# Patient Record
Sex: MALE | Race: WHITE | NOT HISPANIC OR LATINO | Employment: OTHER | URBAN - NONMETROPOLITAN AREA
[De-identification: names, ages, dates, MRNs, and addresses within clinical notes are randomized per-mention and may not be internally consistent; named-entity substitution may affect disease eponyms.]

---

## 2017-01-02 ENCOUNTER — OUTSIDE FACILITY SERVICE (OUTPATIENT)
Dept: PULMONOLOGY | Facility: CLINIC | Age: 57
End: 2017-01-02

## 2017-01-02 PROCEDURE — 32555 ASPIRATE PLEURA W/ IMAGING: CPT | Performed by: INTERNAL MEDICINE

## 2017-02-01 ENCOUNTER — HOSPITAL ENCOUNTER (OUTPATIENT)
Dept: INFUSION THERAPY | Facility: HOSPITAL | Age: 57
Setting detail: INFUSION SERIES
Discharge: HOME | End: 2017-02-01

## 2017-02-01 VITALS
BODY MASS INDEX: 21.37 KG/M2 | DIASTOLIC BLOOD PRESSURE: 71 MMHG | HEART RATE: 72 BPM | OXYGEN SATURATION: 98 % | SYSTOLIC BLOOD PRESSURE: 141 MMHG | HEIGHT: 68 IN | RESPIRATION RATE: 20 BRPM | TEMPERATURE: 97.2 F | WEIGHT: 141 LBS

## 2017-02-01 DIAGNOSIS — Z45.2 ENCOUNTER FOR VENOUS ACCESS DEVICE CARE: ICD-10-CM

## 2017-02-01 PROCEDURE — 96523 IRRIG DRUG DELIVERY DEVICE: CPT

## 2017-02-01 PROCEDURE — 25010000002 HEPARIN FLUSH (PORCINE) 100 UNIT/ML SOLUTION: Performed by: NURSE PRACTITIONER

## 2017-02-01 RX ORDER — SODIUM CHLORIDE 0.9 % (FLUSH) 0.9 %
10 SYRINGE (ML) INJECTION AS NEEDED
Status: DISCONTINUED | OUTPATIENT
Start: 2017-02-01 | End: 2017-02-03 | Stop reason: HOSPADM

## 2017-02-01 RX ORDER — SODIUM CHLORIDE 0.9 % (FLUSH) 0.9 %
10 SYRINGE (ML) INJECTION AS NEEDED
Status: CANCELLED | OUTPATIENT
Start: 2017-02-01

## 2017-02-01 RX ORDER — HEPARIN SODIUM (PORCINE) LOCK FLUSH IV SOLN 100 UNIT/ML 100 UNIT/ML
500 SOLUTION INTRAVENOUS AS NEEDED
Status: CANCELLED | OUTPATIENT
Start: 2017-02-01

## 2017-02-01 RX ADMIN — SODIUM CHLORIDE, PRESERVATIVE FREE 500 UNITS: 5 INJECTION INTRAVENOUS at 09:14

## 2017-02-01 RX ADMIN — Medication 20 ML: at 09:12

## 2017-02-10 ENCOUNTER — HOSPITAL ENCOUNTER (OUTPATIENT)
Dept: GENERAL RADIOLOGY | Facility: HOSPITAL | Age: 57
Discharge: HOME | End: 2017-02-10
Attending: INTERNAL MEDICINE | Admitting: INTERNAL MEDICINE

## 2017-02-10 DIAGNOSIS — J90 PLEURAL EFFUSION: Primary | ICD-10-CM

## 2017-02-10 PROCEDURE — 71020 HC CHEST PA AND LATERAL: CPT

## 2017-02-13 ENCOUNTER — OFFICE VISIT (OUTPATIENT)
Dept: PULMONOLOGY | Facility: CLINIC | Age: 57
End: 2017-02-13

## 2017-02-13 VITALS
BODY MASS INDEX: 21.37 KG/M2 | OXYGEN SATURATION: 97 % | SYSTOLIC BLOOD PRESSURE: 142 MMHG | WEIGHT: 141 LBS | RESPIRATION RATE: 18 BRPM | DIASTOLIC BLOOD PRESSURE: 82 MMHG | HEART RATE: 75 BPM | HEIGHT: 68 IN

## 2017-02-13 DIAGNOSIS — R59.0 MEDIASTINAL LYMPHADENOPATHY: ICD-10-CM

## 2017-02-13 DIAGNOSIS — J90 PLEURAL EFFUSION: Primary | ICD-10-CM

## 2017-02-13 PROCEDURE — 99213 OFFICE O/P EST LOW 20 MIN: CPT | Performed by: INTERNAL MEDICINE

## 2017-02-13 NOTE — PROGRESS NOTES
"Chief Complaint   Patient presents with   • Follow-up         Subjective   Talha Cutler is a 56 y.o. male.     History of Present Illness   Comes back today after being seen in the hospital and after he had a right-sided thoracentesis.  He denies any significant shortness of breath.  Apart from occasional cough, he denies any other symptoms with regards to fever, chills or night sweats.    He has not missed any more dialysis.    The following portions of the patient's history were reviewed and updated as appropriate: allergies, current medications, past family history, past medical history, past social history and past surgical history.    Review of Systems   HENT: Positive for sneezing. Negative for sinus pressure and sore throat.    Respiratory: Positive for cough. Negative for shortness of breath and wheezing.    Cardiovascular: Negative for palpitations and leg swelling.   Psychiatric/Behavioral: Positive for sleep disturbance.       Objective   Visit Vitals   • /82   • Pulse 75   • Resp 18   • Ht 68\" (172.7 cm)   • Wt 141 lb (64 kg)   • SpO2 97%   • BMI 21.44 kg/m2     Physical Exam  Constitutional:          General: No respiratory distress noted. Communication was appropriate.    Eyes:          Extra ocular movement was intact.    Neck:           Supple. No JVD noted.    Respiratory:          Right dullness to percussion.          Decreased Air Entry right base with scattered crackles heard.    Musculoskeletal/Extremities:          Used a wheelchair          No significant edema noted     Neurologic:          Awake, alert and oriented x 3. No focal deficits.           Patient was able to follow simple commands.      Assessment/Plan   Talha was seen today for follow-up.    Diagnoses and all orders for this visit:    Pleural effusion  -     CT Chest Without Contrast; Future    Mediastinal lymphadenopathy  -     CT Chest Without Contrast; Future         Return in about 7 weeks (around 4/3/2017) for " Recheck, Imaging study.    DISCUSSION (if any):  I reviewed the patient's CT scan with the patient and his family members.  He definitely had mediastinal lymphadenopathy    I would request the patient to undergo a repeat CT scan.  His pleural effusion was nonmalignant although it was lymphocytic.    The patient does not have any ongoing symptoms and since the pleural effusion is currently benign, we will only attempt repeat thoracentesis, if clinically indicated for worsening symptoms.    If the CT scan continues to suggest the presence of lymphadenopathy, then I will defer to discuss the possibility of endobronchial ultrasound-guided needle biopsy.      Errors in dictation may reflect use of voice recognition software and not all errors in transcription may have been detected prior to signing    This document was electronically signed by Ean Hernandez MD February 13, 2017  10:41 AM

## 2017-02-27 ENCOUNTER — HOSPITAL ENCOUNTER (EMERGENCY)
Facility: HOSPITAL | Age: 57
Discharge: HOME | End: 2017-02-27
Attending: EMERGENCY MEDICINE | Admitting: STUDENT IN AN ORGANIZED HEALTH CARE EDUCATION/TRAINING PROGRAM

## 2017-02-27 ENCOUNTER — APPOINTMENT (OUTPATIENT)
Dept: CT IMAGING | Facility: HOSPITAL | Age: 57
End: 2017-02-27

## 2017-02-27 ENCOUNTER — APPOINTMENT (OUTPATIENT)
Dept: GENERAL RADIOLOGY | Facility: HOSPITAL | Age: 57
End: 2017-02-27

## 2017-02-27 VITALS
DIASTOLIC BLOOD PRESSURE: 81 MMHG | HEIGHT: 68 IN | HEART RATE: 79 BPM | WEIGHT: 144 LBS | SYSTOLIC BLOOD PRESSURE: 141 MMHG | BODY MASS INDEX: 21.82 KG/M2 | TEMPERATURE: 97.8 F | RESPIRATION RATE: 20 BRPM | OXYGEN SATURATION: 97 %

## 2017-02-27 DIAGNOSIS — J90 PLEURAL EFFUSION, RIGHT: Primary | ICD-10-CM

## 2017-02-27 LAB
ALBUMIN SERPL-MCNC: 4.1 G/DL (ref 3.5–5)
ALBUMIN/GLOB SERPL: 1 G/DL (ref 1–2)
ALP SERPL-CCNC: 56 U/L (ref 38–126)
ALT SERPL W P-5'-P-CCNC: 19 U/L (ref 13–69)
ANION GAP SERPL CALCULATED.3IONS-SCNC: 16.9 MMOL/L
AST SERPL-CCNC: 27 U/L (ref 15–46)
BASOPHILS # BLD AUTO: 0.06 10*3/MM3 (ref 0–0.2)
BASOPHILS NFR BLD AUTO: 0.8 % (ref 0–2.5)
BILIRUB SERPL-MCNC: 0.8 MG/DL (ref 0.2–1.3)
BUN BLD-MCNC: 35 MG/DL (ref 7–20)
BUN/CREAT SERPL: 4.6 (ref 6.3–21.9)
CALCIUM SPEC-SCNC: 9.5 MG/DL (ref 8.4–10.2)
CHLORIDE SERPL-SCNC: 92 MMOL/L (ref 98–107)
CO2 SERPL-SCNC: 29 MMOL/L (ref 26–30)
CREAT BLD-MCNC: 7.6 MG/DL (ref 0.6–1.3)
DEPRECATED RDW RBC AUTO: 47 FL (ref 37–54)
EOSINOPHIL # BLD AUTO: 0.16 10*3/MM3 (ref 0–0.7)
EOSINOPHIL NFR BLD AUTO: 2 % (ref 0–7)
ERYTHROCYTE [DISTWIDTH] IN BLOOD BY AUTOMATED COUNT: 14.2 % (ref 11.5–14.5)
GFR SERPL CREATININE-BSD FRML MDRD: 7 ML/MIN/1.73
GLOBULIN UR ELPH-MCNC: 4.1 GM/DL
GLUCOSE BLD-MCNC: 173 MG/DL (ref 74–98)
HCT VFR BLD AUTO: 30.8 % (ref 42–52)
HGB BLD-MCNC: 10.2 G/DL (ref 14–18)
IMM GRANULOCYTES # BLD: 0.04 10*3/MM3 (ref 0–0.06)
IMM GRANULOCYTES NFR BLD: 0.5 % (ref 0–0.6)
LYMPHOCYTES # BLD AUTO: 0.88 10*3/MM3 (ref 0.6–3.4)
LYMPHOCYTES NFR BLD AUTO: 11.1 % (ref 10–50)
MCH RBC QN AUTO: 30.1 PG (ref 27–31)
MCHC RBC AUTO-ENTMCNC: 33.1 G/DL (ref 30–37)
MCV RBC AUTO: 90.9 FL (ref 80–94)
MONOCYTES # BLD AUTO: 0.73 10*3/MM3 (ref 0–0.9)
MONOCYTES NFR BLD AUTO: 9.2 % (ref 0–12)
NEUTROPHILS # BLD AUTO: 6.06 10*3/MM3 (ref 2–6.9)
NEUTROPHILS NFR BLD AUTO: 76.4 % (ref 37–80)
NRBC BLD MANUAL-RTO: 0 /100 WBC (ref 0–0)
NT-PROBNP SERPL-MCNC: ABNORMAL PG/ML (ref 0–125)
PLATELET # BLD AUTO: 276 10*3/MM3 (ref 130–400)
PMV BLD AUTO: 10.3 FL (ref 6–12)
POTASSIUM BLD-SCNC: 4.9 MMOL/L (ref 3.5–5.1)
PROT SERPL-MCNC: 8.2 G/DL (ref 6.3–8.2)
RBC # BLD AUTO: 3.39 10*6/MM3 (ref 4.7–6.1)
SODIUM BLD-SCNC: 133 MMOL/L (ref 137–145)
TROPONIN I SERPL-MCNC: 0.02 NG/ML (ref 0–0.03)
WBC NRBC COR # BLD: 7.93 10*3/MM3 (ref 4.8–10.8)

## 2017-02-27 PROCEDURE — 25010000002 MORPHINE PER 10 MG: Performed by: STUDENT IN AN ORGANIZED HEALTH CARE EDUCATION/TRAINING PROGRAM

## 2017-02-27 PROCEDURE — 99284 EMERGENCY DEPT VISIT MOD MDM: CPT

## 2017-02-27 PROCEDURE — 93005 ELECTROCARDIOGRAM TRACING: CPT | Performed by: PHYSICIAN ASSISTANT

## 2017-02-27 PROCEDURE — 84484 ASSAY OF TROPONIN QUANT: CPT | Performed by: PHYSICIAN ASSISTANT

## 2017-02-27 PROCEDURE — 80053 COMPREHEN METABOLIC PANEL: CPT | Performed by: PHYSICIAN ASSISTANT

## 2017-02-27 PROCEDURE — 83880 ASSAY OF NATRIURETIC PEPTIDE: CPT | Performed by: PHYSICIAN ASSISTANT

## 2017-02-27 PROCEDURE — 85025 COMPLETE CBC W/AUTO DIFF WBC: CPT | Performed by: PHYSICIAN ASSISTANT

## 2017-02-27 PROCEDURE — 96374 THER/PROPH/DIAG INJ IV PUSH: CPT

## 2017-02-27 PROCEDURE — 96375 TX/PRO/DX INJ NEW DRUG ADDON: CPT

## 2017-02-27 PROCEDURE — 25010000002 HEPARIN FLUSH (PORCINE) 100 UNIT/ML SOLUTION: Performed by: STUDENT IN AN ORGANIZED HEALTH CARE EDUCATION/TRAINING PROGRAM

## 2017-02-27 PROCEDURE — 71250 CT THORAX DX C-: CPT

## 2017-02-27 PROCEDURE — 25010000002 ONDANSETRON PER 1 MG: Performed by: PHYSICIAN ASSISTANT

## 2017-02-27 RX ORDER — MORPHINE SULFATE 4 MG/ML
4 INJECTION, SOLUTION INTRAMUSCULAR; INTRAVENOUS ONCE
Status: COMPLETED | OUTPATIENT
Start: 2017-02-27 | End: 2017-02-27

## 2017-02-27 RX ORDER — ONDANSETRON 2 MG/ML
4 INJECTION INTRAMUSCULAR; INTRAVENOUS ONCE
Status: COMPLETED | OUTPATIENT
Start: 2017-02-27 | End: 2017-02-27

## 2017-02-27 RX ORDER — SODIUM CHLORIDE 0.9 % (FLUSH) 0.9 %
10 SYRINGE (ML) INJECTION AS NEEDED
Status: DISCONTINUED | OUTPATIENT
Start: 2017-02-27 | End: 2017-02-27 | Stop reason: HOSPADM

## 2017-02-27 RX ADMIN — ONDANSETRON 4 MG: 2 INJECTION INTRAMUSCULAR; INTRAVENOUS at 20:17

## 2017-02-27 RX ADMIN — MORPHINE SULFATE 4 MG: 4 INJECTION, SOLUTION INTRAMUSCULAR; INTRAVENOUS at 20:17

## 2017-02-27 RX ADMIN — SODIUM CHLORIDE, PRESERVATIVE FREE 300 UNITS: 5 INJECTION INTRAVENOUS at 21:10

## 2017-02-28 LAB
HOLD SPECIMEN: NORMAL
HOLD SPECIMEN: NORMAL
WHOLE BLOOD HOLD SPECIMEN: NORMAL
WHOLE BLOOD HOLD SPECIMEN: NORMAL

## 2017-03-01 ENCOUNTER — OFFICE VISIT (OUTPATIENT)
Dept: PULMONOLOGY | Facility: CLINIC | Age: 57
End: 2017-03-01

## 2017-03-01 VITALS
DIASTOLIC BLOOD PRESSURE: 77 MMHG | WEIGHT: 144 LBS | HEIGHT: 68 IN | RESPIRATION RATE: 18 BRPM | BODY MASS INDEX: 21.82 KG/M2 | OXYGEN SATURATION: 96 % | HEART RATE: 77 BPM | SYSTOLIC BLOOD PRESSURE: 122 MMHG

## 2017-03-01 DIAGNOSIS — R59.0 MEDIASTINAL LYMPHADENOPATHY: ICD-10-CM

## 2017-03-01 DIAGNOSIS — J90 PLEURAL EFFUSION: Primary | ICD-10-CM

## 2017-03-01 DIAGNOSIS — R59.0 AXILLARY LYMPHADENOPATHY: ICD-10-CM

## 2017-03-01 PROCEDURE — 99214 OFFICE O/P EST MOD 30 MIN: CPT | Performed by: INTERNAL MEDICINE

## 2017-03-01 NOTE — PROGRESS NOTES
"Chief Complaint   Patient presents with   • Follow-up         Subjective   Talha Cutler is a 56 y.o. male.     History of Present Illness     The patient is here for a follow-up of pleural effusion.    He states he was in the emergency room 2 days ago for pain in the right side and epigastric area.  The pain resolved after receiving morphine and has not returned at this time.  He denies missing dialysis.  He does feel the thoracentesis he previously had helped with the ease of his breathing. He denies any shortness of breath more than normal.    The following portions of the patient's history were reviewed and updated as appropriate: allergies, current medications, past family history, past medical history, past social history and past surgical history.    Review of Systems   HENT: Negative for sinus pressure, sneezing and sore throat.    Respiratory: Negative for cough, shortness of breath and wheezing.        Objective   Visit Vitals   • /77   • Pulse 77   • Resp 18   • Ht 68\" (172.7 cm)   • Wt 144 lb (65.3 kg)   • SpO2 96%   • BMI 21.9 kg/m2     Physical Exam   Constitutional: He is oriented to person, place, and time. He appears well-developed.   HENT:   Head: Normocephalic and atraumatic.   Crowded oropharynx.   Eyes: EOM are normal.   Cardiovascular: Normal rate and regular rhythm.    Pulmonary/Chest: Effort normal.   Dullness to percussion on the right.  Decreased A/E on the right side.   Musculoskeletal:   Wheelchair.   Neurological: He is alert and oriented to person, place, and time.   Skin: Skin is dry.   Left port and right graft.  Left axillary lymph node enlarged and palpable.   Psychiatric: He has a normal mood and affect.   Vitals reviewed.        Assessment/Plan   Talha was seen today for follow-up.    Diagnoses and all orders for this visit:    Pleural effusion    Mediastinal lymphadenopathy    Axillary lymphadenopathy  -     Ambulatory Referral to General Surgery           Return in " about 4 weeks (around 3/29/2017) for Recheck.    DISCUSSION (if any):  Reviewed and discussed the CT with the patient which shows a large right effusion and adjacent airspace disease and adenopathy. Findings may be infectious but suspect neoplastic process and recommend appropriate followup.    The effusion does not seem to be worse than the previous when a thoracentesis was performed and he denies shortness of breath more than normal.  There also seems to be an element of trapped lung.  His recent symptoms that led him to the ER, could be from multiple etiologies including possible pleurisy versus costochondritis?    I will refer the patient to Dr. Garnett for possible biopsy of the left axillary lymph node. The patient is agreeable with this procedure if necessary.    If there is really lymph node biopsy is not possible, then we can consider endobronchial ultrasound-guided needle biopsy.  For the bronchoscopy he will definitely need general anesthesia and endotracheal intubation and at this time, axillary lymph node biopsy seems to be less invasive.    Errors in dictation may reflect use of voice recognition software and not all errors in transcription may have been detected prior to signing    This document was electronically signed by Ean Hernandez MD March 9, 2017  4:00 PM

## 2017-03-12 ENCOUNTER — APPOINTMENT (OUTPATIENT)
Dept: GENERAL RADIOLOGY | Facility: HOSPITAL | Age: 57
End: 2017-03-12

## 2017-03-12 ENCOUNTER — APPOINTMENT (OUTPATIENT)
Dept: CT IMAGING | Facility: HOSPITAL | Age: 57
End: 2017-03-12

## 2017-03-12 ENCOUNTER — HOSPITAL ENCOUNTER (EMERGENCY)
Facility: HOSPITAL | Age: 57
Discharge: HOME | End: 2017-03-12
Attending: EMERGENCY MEDICINE | Admitting: EMERGENCY MEDICINE

## 2017-03-12 VITALS
WEIGHT: 145.72 LBS | DIASTOLIC BLOOD PRESSURE: 79 MMHG | OXYGEN SATURATION: 97 % | HEART RATE: 76 BPM | BODY MASS INDEX: 22.09 KG/M2 | TEMPERATURE: 97.8 F | SYSTOLIC BLOOD PRESSURE: 149 MMHG | RESPIRATION RATE: 18 BRPM | HEIGHT: 68 IN

## 2017-03-12 DIAGNOSIS — G89.29 CHRONIC ABDOMINAL PAIN: Primary | ICD-10-CM

## 2017-03-12 DIAGNOSIS — R10.9 CHRONIC ABDOMINAL PAIN: Primary | ICD-10-CM

## 2017-03-12 DIAGNOSIS — K59.01 SLOW TRANSIT CONSTIPATION: ICD-10-CM

## 2017-03-12 DIAGNOSIS — Z99.2 END STAGE RENAL DISEASE ON DIALYSIS (HCC): ICD-10-CM

## 2017-03-12 DIAGNOSIS — N18.6 END STAGE RENAL DISEASE ON DIALYSIS (HCC): ICD-10-CM

## 2017-03-12 LAB
ALBUMIN SERPL-MCNC: 4 G/DL (ref 3.5–5)
ALBUMIN/GLOB SERPL: 1 G/DL (ref 1–2)
ALP SERPL-CCNC: 66 U/L (ref 38–126)
ALT SERPL W P-5'-P-CCNC: 11 U/L (ref 13–69)
AMYLASE SERPL-CCNC: 51 U/L (ref 30–110)
ANION GAP SERPL CALCULATED.3IONS-SCNC: 15.1 MMOL/L
AST SERPL-CCNC: 25 U/L (ref 15–46)
BASOPHILS # BLD AUTO: 0.05 10*3/MM3 (ref 0–0.2)
BASOPHILS NFR BLD AUTO: 0.6 % (ref 0–2.5)
BILIRUB SERPL-MCNC: 0.7 MG/DL (ref 0.2–1.3)
BUN BLD-MCNC: 25 MG/DL (ref 7–20)
BUN/CREAT SERPL: 4.5 (ref 6.3–21.9)
CALCIUM SPEC-SCNC: 9.2 MG/DL (ref 8.4–10.2)
CHLORIDE SERPL-SCNC: 90 MMOL/L (ref 98–107)
CK SERPL-CCNC: 93 U/L (ref 30–170)
CO2 SERPL-SCNC: 33 MMOL/L (ref 26–30)
CREAT BLD-MCNC: 5.6 MG/DL (ref 0.6–1.3)
DEPRECATED RDW RBC AUTO: 51.3 FL (ref 37–54)
EOSINOPHIL # BLD AUTO: 0.16 10*3/MM3 (ref 0–0.7)
EOSINOPHIL NFR BLD AUTO: 1.9 % (ref 0–7)
ERYTHROCYTE [DISTWIDTH] IN BLOOD BY AUTOMATED COUNT: 15.2 % (ref 11.5–14.5)
GFR SERPL CREATININE-BSD FRML MDRD: 11 ML/MIN/1.73
GLOBULIN UR ELPH-MCNC: 4.2 GM/DL
GLUCOSE BLD-MCNC: 363 MG/DL (ref 74–98)
HCT VFR BLD AUTO: 31.7 % (ref 42–52)
HGB BLD-MCNC: 10.3 G/DL (ref 14–18)
HOLD SPECIMEN: NORMAL
IMM GRANULOCYTES # BLD: 0.05 10*3/MM3 (ref 0–0.06)
IMM GRANULOCYTES NFR BLD: 0.6 % (ref 0–0.6)
LIPASE SERPL-CCNC: 158 U/L (ref 23–300)
LYMPHOCYTES # BLD AUTO: 0.87 10*3/MM3 (ref 0.6–3.4)
LYMPHOCYTES NFR BLD AUTO: 10.3 % (ref 10–50)
MAGNESIUM SERPL-MCNC: 1.9 MG/DL (ref 1.6–2.3)
MCH RBC QN AUTO: 30 PG (ref 27–31)
MCHC RBC AUTO-ENTMCNC: 32.5 G/DL (ref 30–37)
MCV RBC AUTO: 92.4 FL (ref 80–94)
MONOCYTES # BLD AUTO: 0.78 10*3/MM3 (ref 0–0.9)
MONOCYTES NFR BLD AUTO: 9.2 % (ref 0–12)
NEUTROPHILS # BLD AUTO: 6.57 10*3/MM3 (ref 2–6.9)
NEUTROPHILS NFR BLD AUTO: 77.4 % (ref 37–80)
NRBC BLD MANUAL-RTO: 0 /100 WBC (ref 0–0)
PLATELET # BLD AUTO: 238 10*3/MM3 (ref 130–400)
PMV BLD AUTO: 10.3 FL (ref 6–12)
POTASSIUM BLD-SCNC: 5.1 MMOL/L (ref 3.5–5.1)
PROT SERPL-MCNC: 8.2 G/DL (ref 6.3–8.2)
RBC # BLD AUTO: 3.43 10*6/MM3 (ref 4.7–6.1)
SODIUM BLD-SCNC: 133 MMOL/L (ref 137–145)
TROPONIN I SERPL-MCNC: 0.02 NG/ML (ref 0–0.03)
WBC NRBC COR # BLD: 8.48 10*3/MM3 (ref 4.8–10.8)
WHOLE BLOOD HOLD SPECIMEN: NORMAL
WHOLE BLOOD HOLD SPECIMEN: NORMAL

## 2017-03-12 PROCEDURE — 99283 EMERGENCY DEPT VISIT LOW MDM: CPT

## 2017-03-12 PROCEDURE — 71020 HC CHEST PA AND LATERAL: CPT

## 2017-03-12 PROCEDURE — 82550 ASSAY OF CK (CPK): CPT | Performed by: NURSE PRACTITIONER

## 2017-03-12 PROCEDURE — 84484 ASSAY OF TROPONIN QUANT: CPT | Performed by: EMERGENCY MEDICINE

## 2017-03-12 PROCEDURE — 74176 CT ABD & PELVIS W/O CONTRAST: CPT

## 2017-03-12 PROCEDURE — 83690 ASSAY OF LIPASE: CPT | Performed by: EMERGENCY MEDICINE

## 2017-03-12 PROCEDURE — 80053 COMPREHEN METABOLIC PANEL: CPT | Performed by: EMERGENCY MEDICINE

## 2017-03-12 PROCEDURE — 82150 ASSAY OF AMYLASE: CPT | Performed by: NURSE PRACTITIONER

## 2017-03-12 PROCEDURE — 25010000002 HEPARIN FLUSH (PORCINE) 100 UNIT/ML SOLUTION: Performed by: EMERGENCY MEDICINE

## 2017-03-12 PROCEDURE — 85025 COMPLETE CBC W/AUTO DIFF WBC: CPT | Performed by: EMERGENCY MEDICINE

## 2017-03-12 PROCEDURE — 83735 ASSAY OF MAGNESIUM: CPT | Performed by: NURSE PRACTITIONER

## 2017-03-12 RX ORDER — HEPARIN SODIUM 1000 [USP'U]/ML
INJECTION, SOLUTION INTRAVENOUS; SUBCUTANEOUS ONCE
Status: CANCELLED | OUTPATIENT
Start: 2017-03-12 | End: 2017-03-12

## 2017-03-12 RX ORDER — SODIUM CHLORIDE 0.9 % (FLUSH) 0.9 %
10 SYRINGE (ML) INJECTION AS NEEDED
Status: DISCONTINUED | OUTPATIENT
Start: 2017-03-12 | End: 2017-03-12 | Stop reason: HOSPADM

## 2017-03-12 RX ORDER — ONDANSETRON 4 MG/1
4 TABLET, FILM COATED ORAL ONCE
Status: COMPLETED | OUTPATIENT
Start: 2017-03-12 | End: 2017-03-12

## 2017-03-12 RX ORDER — PHENOBARBITAL, HYOSCYAMINE SULFATE, ATROPINE SULFATE, SCOPOLAMINE HYDROBROMIDE .0194; .1037; 16.2; .0065 MG/5ML; MG/5ML; MG/5ML; MG/5ML
2.5 ELIXIR ORAL ONCE
Status: DISCONTINUED | OUTPATIENT
Start: 2017-03-12 | End: 2017-03-12

## 2017-03-12 RX ORDER — MAGNESIUM HYDROXIDE/ALUMINUM HYDROXICE/SIMETHICONE 120; 1200; 1200 MG/30ML; MG/30ML; MG/30ML
30 SUSPENSION ORAL ONCE
Status: COMPLETED | OUTPATIENT
Start: 2017-03-12 | End: 2017-03-12

## 2017-03-12 RX ADMIN — SODIUM CHLORIDE, PRESERVATIVE FREE 500 UNITS: 5 INJECTION INTRAVENOUS at 19:28

## 2017-03-12 RX ADMIN — ALUMINUM HYDROXIDE, MAGNESIUM HYDROXIDE, AND SIMETHICONE 30 ML: 200; 200; 20 SUSPENSION ORAL at 17:26

## 2017-03-12 RX ADMIN — LIDOCAINE HYDROCHLORIDE 15 ML: 20 SOLUTION ORAL; TOPICAL at 17:27

## 2017-03-12 RX ADMIN — ONDANSETRON 4 MG: 4 TABLET, FILM COATED ORAL at 17:27

## 2017-03-12 NOTE — ED PROVIDER NOTES
Subjective   HPI Comments: Patient presents to the emergency department with complaint of diffuse abdominal pain that he recognizes as similar in presentation and intensity to frequent occasions of abdominal pain.  This pain was worse after eating yesterday.  It is worse when he lies down.  She has had no vomiting.  No diarrhea.  No fever.  He is eating drinking and voiding well.  His symptoms are worse after eating.    Review of medical record reveals that the patient had an EGD revealing gastritis in January of this year.          History provided by:  Patient   used: No        Review of Systems   Constitutional: Negative.  Negative for diaphoresis and fever.   HENT: Negative for sore throat.    Eyes: Negative.  Negative for pain and visual disturbance.   Respiratory: Negative for cough, shortness of breath, wheezing and stridor.    Cardiovascular: Negative.  Negative for chest pain.   Gastrointestinal: Positive for abdominal distention. Negative for blood in stool, constipation, diarrhea, nausea and vomiting. Abdominal pain: patient localizes pain to the entire abdomen and especially below the umbilicus.   Endocrine:        Patient has dialysis on Tuesdays Thursdays and Saturdays; he did attend dialysis yesterday on schedule.   Genitourinary: Negative.  Negative for dysuria.   Musculoskeletal: Negative.  Negative for back pain and neck pain.   Skin: Negative.  Negative for pallor and rash.   Allergic/Immunologic: Negative.    Neurological: Negative.  Negative for syncope and headaches.   Hematological: Negative.    Psychiatric/Behavioral: Negative.  Negative for agitation.       Past Medical History   Diagnosis Date   • Anemia    • Chronic kidney disease    • Diabetes mellitus    • H/O chest x-ray 03/25/2016     Interval decrease in size of the patients right pleural effusion with a persistent small left effusion as well   • Hypertension    • Left-sided weakness    • Renal failure    • Stroke         Allergies   Allergen Reactions   • Erythromycin Hives       Past Surgical History   Procedure Laterality Date   • Cholecystectomy     • Eye surgery     • Cataract extraction, bilateral     • Colonoscopy     • Portacath placement         History reviewed. No pertinent family history.    Social History     Social History   • Marital status:      Spouse name: N/A   • Number of children: N/A   • Years of education: N/A     Social History Main Topics   • Smoking status: Never Smoker   • Smokeless tobacco: Never Used   • Alcohol use No   • Drug use: None   • Sexual activity: Not Asked     Other Topics Concern   • None     Social History Narrative           Objective   Physical Exam   Constitutional: He is oriented to person, place, and time. He appears well-developed.   HENT:   Head: Normocephalic.   Eyes: EOM are normal. Pupils are equal, round, and reactive to light.   Neck: Normal range of motion. Neck supple.   Cardiovascular: Normal rate and regular rhythm.    Pulmonary/Chest: Effort normal. He has no wheezes. He has no rales.   Abdominal: Soft. Bowel sounds are normal. He exhibits no distension. There is tenderness. There is no rebound and no guarding.   Musculoskeletal: Normal range of motion. He exhibits no edema.   Neurological: He is alert and oriented to person, place, and time.   Skin: Skin is warm and dry.   Psychiatric: He has a normal mood and affect.   Nursing note and vitals reviewed.      Procedures         ED Course  ED Course   Comment By Time   Patient had no relief of his presenting pain with GI cocktail.  CT reveals constipation which is consistent with his examination. Patti Palencia, APRN 03/12 [id number]                  [facility]  Number of Diagnoses or Management Options  Chronic abdominal pain:   End stage renal disease on dialysis:   Slow transit constipation:       Final diagnoses:   Chronic abdominal pain   Slow transit constipation   End stage renal disease on dialysis             Patti Palencia, ALEXYS  03/12/17 1846       Patti Palencia, ALEXYS  03/12/17 1856

## 2017-03-12 NOTE — ED NOTES
Port-a cath de-accessed to left chest wall and flushed with 5ml of Heparin per policy. Guaze dressing applied.      Haydee Casey RN  03/12/17 1934

## 2017-03-14 LAB — HOLD SPECIMEN: NORMAL

## 2017-03-16 ENCOUNTER — APPOINTMENT (OUTPATIENT)
Dept: GENERAL RADIOLOGY | Facility: HOSPITAL | Age: 57
End: 2017-03-16

## 2017-03-16 ENCOUNTER — HOSPITAL ENCOUNTER (EMERGENCY)
Facility: HOSPITAL | Age: 57
Discharge: HOME | End: 2017-03-16
Attending: EMERGENCY MEDICINE | Admitting: EMERGENCY MEDICINE

## 2017-03-16 VITALS
HEIGHT: 68 IN | WEIGHT: 144 LBS | RESPIRATION RATE: 20 BRPM | OXYGEN SATURATION: 96 % | SYSTOLIC BLOOD PRESSURE: 156 MMHG | TEMPERATURE: 98.2 F | DIASTOLIC BLOOD PRESSURE: 87 MMHG | BODY MASS INDEX: 21.82 KG/M2 | HEART RATE: 101 BPM

## 2017-03-16 DIAGNOSIS — R73.9 HYPERGLYCEMIA: ICD-10-CM

## 2017-03-16 DIAGNOSIS — R07.89 CHEST TIGHTNESS OR PRESSURE: Primary | ICD-10-CM

## 2017-03-16 DIAGNOSIS — J90 PLEURAL EFFUSION: ICD-10-CM

## 2017-03-16 DIAGNOSIS — N18.9 CHRONIC RENAL FAILURE, UNSPECIFIED STAGE: ICD-10-CM

## 2017-03-16 LAB
ALBUMIN SERPL-MCNC: 3.9 G/DL (ref 3.5–5)
ALBUMIN/GLOB SERPL: 1 G/DL (ref 1–2)
ALP SERPL-CCNC: 60 U/L (ref 38–126)
ALT SERPL W P-5'-P-CCNC: 22 U/L (ref 13–69)
ANION GAP SERPL CALCULATED.3IONS-SCNC: 13 MMOL/L
ARTERIAL PATENCY WRIST A: POSITIVE
AST SERPL-CCNC: 29 U/L (ref 15–46)
BACTERIA UR QL AUTO: ABNORMAL /HPF
BASE EXCESS BLDA CALC-SCNC: 13.1 MMOL/L
BASOPHILS # BLD AUTO: 0.04 10*3/MM3 (ref 0–0.2)
BASOPHILS NFR BLD AUTO: 0.5 % (ref 0–2.5)
BDY SITE: ABNORMAL
BILIRUB SERPL-MCNC: 0.9 MG/DL (ref 0.2–1.3)
BILIRUB UR QL STRIP: NEGATIVE
BUN BLD-MCNC: 14 MG/DL (ref 7–20)
BUN/CREAT SERPL: 3.5 (ref 6.3–21.9)
CALCIUM SPEC-SCNC: 9 MG/DL (ref 8.4–10.2)
CHLORIDE SERPL-SCNC: 91 MMOL/L (ref 98–107)
CLARITY UR: CLEAR
CO2 SERPL-SCNC: 35 MMOL/L (ref 26–30)
COHGB MFR BLD: 1.7 %
COLOR UR: YELLOW
CREAT BLD-MCNC: 4 MG/DL (ref 0.6–1.3)
DEPRECATED RDW RBC AUTO: 50.7 FL (ref 37–54)
EOSINOPHIL # BLD AUTO: 0.11 10*3/MM3 (ref 0–0.7)
EOSINOPHIL NFR BLD AUTO: 1.3 % (ref 0–7)
ERYTHROCYTE [DISTWIDTH] IN BLOOD BY AUTOMATED COUNT: 14.8 % (ref 11.5–14.5)
FLUAV AG NPH QL: NEGATIVE
FLUBV AG NPH QL IA: NEGATIVE
GFR SERPL CREATININE-BSD FRML MDRD: 16 ML/MIN/1.73
GLOBULIN UR ELPH-MCNC: 3.8 GM/DL
GLUCOSE BLD-MCNC: 320 MG/DL (ref 74–98)
GLUCOSE UR STRIP-MCNC: ABNORMAL MG/DL
HCO3 BLDA-SCNC: 35.8 MMOL/L (ref 22–28)
HCT VFR BLD AUTO: 31.1 % (ref 42–52)
HGB BLD-MCNC: 10 G/DL (ref 14–18)
HGB BLDA-MCNC: 10.3 G/DL (ref 12–18)
HGB UR QL STRIP.AUTO: ABNORMAL
HOROWITZ INDEX BLD+IHG-RTO: 24 %
HYALINE CASTS UR QL AUTO: ABNORMAL /LPF
IMM GRANULOCYTES # BLD: 0.06 10*3/MM3 (ref 0–0.06)
IMM GRANULOCYTES NFR BLD: 0.7 % (ref 0–0.6)
KETONES UR QL STRIP: ABNORMAL
LEUKOCYTE ESTERASE UR QL STRIP.AUTO: NEGATIVE
LYMPHOCYTES # BLD AUTO: 0.56 10*3/MM3 (ref 0.6–3.4)
LYMPHOCYTES NFR BLD AUTO: 6.7 % (ref 10–50)
MCH RBC QN AUTO: 29.9 PG (ref 27–31)
MCHC RBC AUTO-ENTMCNC: 32.2 G/DL (ref 30–37)
MCV RBC AUTO: 92.8 FL (ref 80–94)
METHGB BLD QL: 0.7 %
MODALITY: ABNORMAL
MONOCYTES # BLD AUTO: 0.72 10*3/MM3 (ref 0–0.9)
MONOCYTES NFR BLD AUTO: 8.6 % (ref 0–12)
NEUTROPHILS # BLD AUTO: 6.93 10*3/MM3 (ref 2–6.9)
NEUTROPHILS NFR BLD AUTO: 82.2 % (ref 37–80)
NITRITE UR QL STRIP: NEGATIVE
NRBC BLD MANUAL-RTO: 0 /100 WBC (ref 0–0)
OXYHGB MFR BLDV: 92.8 % (ref 94–99)
PCO2 BLDA: 42.7 MM HG (ref 35–45)
PH BLDA: 7.53 PH UNITS
PH UR STRIP.AUTO: >=9 [PH] (ref 5–8)
PLATELET # BLD AUTO: 294 10*3/MM3 (ref 130–400)
PMV BLD AUTO: 10.2 FL (ref 6–12)
PO2 BLDA: 67.4 MM HG (ref 75–100)
POTASSIUM BLD-SCNC: 4 MMOL/L (ref 3.5–5.1)
PROT SERPL-MCNC: 7.7 G/DL (ref 6.3–8.2)
PROT UR QL STRIP: ABNORMAL
RBC # BLD AUTO: 3.35 10*6/MM3 (ref 4.7–6.1)
RBC # UR: ABNORMAL /HPF
REF LAB TEST METHOD: ABNORMAL
SODIUM BLD-SCNC: 135 MMOL/L (ref 137–145)
SP GR UR STRIP: 1.02 (ref 1–1.03)
SQUAMOUS #/AREA URNS HPF: ABNORMAL /HPF
TROPONIN I SERPL-MCNC: 0.03 NG/ML (ref 0–0.03)
TROPONIN I SERPL-MCNC: 0.03 NG/ML (ref 0–0.05)
UROBILINOGEN UR QL STRIP: ABNORMAL
WBC NRBC COR # BLD: 8.42 10*3/MM3 (ref 4.8–10.8)
WBC UR QL AUTO: ABNORMAL /HPF

## 2017-03-16 PROCEDURE — 81001 URINALYSIS AUTO W/SCOPE: CPT | Performed by: EMERGENCY MEDICINE

## 2017-03-16 PROCEDURE — 83050 HGB METHEMOGLOBIN QUAN: CPT

## 2017-03-16 PROCEDURE — 36600 WITHDRAWAL OF ARTERIAL BLOOD: CPT

## 2017-03-16 PROCEDURE — 84484 ASSAY OF TROPONIN QUANT: CPT | Performed by: EMERGENCY MEDICINE

## 2017-03-16 PROCEDURE — 96374 THER/PROPH/DIAG INJ IV PUSH: CPT

## 2017-03-16 PROCEDURE — 99285 EMERGENCY DEPT VISIT HI MDM: CPT

## 2017-03-16 PROCEDURE — 63710000001 INSULIN REGULAR HUMAN PER 5 UNITS: Performed by: EMERGENCY MEDICINE

## 2017-03-16 PROCEDURE — 82805 BLOOD GASES W/O2 SATURATION: CPT

## 2017-03-16 PROCEDURE — 93005 ELECTROCARDIOGRAM TRACING: CPT | Performed by: EMERGENCY MEDICINE

## 2017-03-16 PROCEDURE — 25010000002 ONDANSETRON PER 1 MG: Performed by: EMERGENCY MEDICINE

## 2017-03-16 PROCEDURE — 71010 HC CHEST PA OR AP: CPT

## 2017-03-16 PROCEDURE — 85025 COMPLETE CBC W/AUTO DIFF WBC: CPT | Performed by: EMERGENCY MEDICINE

## 2017-03-16 PROCEDURE — 96375 TX/PRO/DX INJ NEW DRUG ADDON: CPT

## 2017-03-16 PROCEDURE — 87086 URINE CULTURE/COLONY COUNT: CPT | Performed by: EMERGENCY MEDICINE

## 2017-03-16 PROCEDURE — 82375 ASSAY CARBOXYHB QUANT: CPT

## 2017-03-16 PROCEDURE — 80053 COMPREHEN METABOLIC PANEL: CPT | Performed by: EMERGENCY MEDICINE

## 2017-03-16 PROCEDURE — 87804 INFLUENZA ASSAY W/OPTIC: CPT | Performed by: EMERGENCY MEDICINE

## 2017-03-16 RX ORDER — SODIUM CHLORIDE 0.9 % (FLUSH) 0.9 %
10 SYRINGE (ML) INJECTION AS NEEDED
Status: DISCONTINUED | OUTPATIENT
Start: 2017-03-16 | End: 2017-03-16 | Stop reason: HOSPADM

## 2017-03-16 RX ORDER — ONDANSETRON 2 MG/ML
4 INJECTION INTRAMUSCULAR; INTRAVENOUS ONCE
Status: COMPLETED | OUTPATIENT
Start: 2017-03-16 | End: 2017-03-16

## 2017-03-16 RX ADMIN — HUMAN INSULIN 6 UNITS: 100 INJECTION, SOLUTION SUBCUTANEOUS at 20:56

## 2017-03-16 RX ADMIN — ONDANSETRON 4 MG: 2 INJECTION INTRAMUSCULAR; INTRAVENOUS at 19:53

## 2017-03-16 NOTE — ED PROVIDER NOTES
Subjective   HPI Comments: Patient with some chest tightness since awakening today, thought it would improve with dialysis but it did not.  No corey chest pain.  Mild SOA, no cough, no fever.  Has had nausea for past several days.      History provided by:  Patient      Review of Systems   Constitutional: Positive for fatigue. Negative for chills and fever.   HENT: Negative for congestion.    Respiratory: Positive for chest tightness. Negative for cough.    Cardiovascular: Negative for chest pain.   Gastrointestinal: Positive for nausea. Negative for abdominal pain, constipation, diarrhea and vomiting.   Genitourinary: Negative for dysuria.   Musculoskeletal: Negative for back pain.   Skin: Negative for color change and rash.   All other systems reviewed and are negative.      Past Medical History   Diagnosis Date   • Anemia    • Chronic kidney disease    • Diabetes mellitus    • H/O chest x-ray 03/25/2016     Interval decrease in size of the patients right pleural effusion with a persistent small left effusion as well   • Hypertension    • Left-sided weakness    • Renal failure    • Stroke        Allergies   Allergen Reactions   • Erythromycin Hives       Past Surgical History   Procedure Laterality Date   • Cholecystectomy     • Eye surgery     • Cataract extraction, bilateral     • Colonoscopy     • Portacath placement         History reviewed. No pertinent family history.    Social History     Social History   • Marital status:      Spouse name: N/A   • Number of children: N/A   • Years of education: N/A     Social History Main Topics   • Smoking status: Never Smoker   • Smokeless tobacco: Never Used   • Alcohol use No   • Drug use: None   • Sexual activity: Not Asked     Other Topics Concern   • None     Social History Narrative           Objective   Physical Exam   Constitutional: He is oriented to person, place, and time. He appears well-developed and well-nourished.   HENT:   Head: Normocephalic.    Eyes: EOM are normal. Pupils are equal, round, and reactive to light.   Neck: Normal range of motion. Neck supple.   Cardiovascular: Normal rate and regular rhythm.    Pulmonary/Chest: Effort normal and breath sounds normal.   Abdominal: Soft. There is no tenderness.   Musculoskeletal: Normal range of motion. He exhibits no edema.   Neurological: He is alert and oriented to person, place, and time.   Skin: Skin is warm and dry.   Psychiatric: He has a normal mood and affect. His behavior is normal. Thought content normal.   Nursing note and vitals reviewed.      ECG 12 Lead    Date/Time: 3/16/2017 7:38 PM  Performed by: KATHIE VINCENT  Authorized by: KATHIE VINCENT   Interpreted by physician  Rhythm: sinus tachycardia  Rate: tachycardic  QRS axis: normal  T flattening: II, III, aVF, V4, V5 and V6  Clinical impression: non-specific ECG               ED Course  ED Course                  MDM  Number of Diagnoses or Management Options  Chest tightness or pressure:   Chronic renal failure, unspecified stage:   Hyperglycemia:   Pleural effusion:   Diagnosis management comments: Patient is resting comfortably and breathing easily.  Despite many hours of his symptoms he has no significant elevation in his cardiac enzymes.  His chest x-ray reveals a large right-sided pleural effusion which has been demonstrated previously and may be related to his current symptoms.  He states that he feels fine but just has some mild sensation of tightness in his chest which is improved.  He wants to go home and follow-up with his physician in the morning.  We discussed indications for return and need for close follow-up.       Amount and/or Complexity of Data Reviewed  Clinical lab tests: ordered and reviewed  Tests in the radiology section of CPT®: ordered and reviewed  Independent visualization of images, tracings, or specimens: yes        Final diagnoses:   Chest tightness or pressure   Pleural effusion   Hyperglycemia   Chronic  renal failure, unspecified stage            Butch Hsu MD  03/16/17 2124

## 2017-03-17 NOTE — DISCHARGE INSTRUCTIONS
Return to the emergency Department for increased pain, fevers, increased shortness of breath.  Get rechecked by a physician in the morning.

## 2017-03-18 LAB — BACTERIA SPEC AEROBE CULT: NO GROWTH

## 2017-03-25 ENCOUNTER — APPOINTMENT (OUTPATIENT)
Dept: GENERAL RADIOLOGY | Facility: HOSPITAL | Age: 57
End: 2017-03-25

## 2017-03-25 ENCOUNTER — HOSPITAL ENCOUNTER (EMERGENCY)
Facility: HOSPITAL | Age: 57
Discharge: ANOTHER HOSPITAL | End: 2017-03-25
Attending: EMERGENCY MEDICINE | Admitting: EMERGENCY MEDICINE

## 2017-03-25 ENCOUNTER — APPOINTMENT (OUTPATIENT)
Dept: CT IMAGING | Facility: HOSPITAL | Age: 57
End: 2017-03-25

## 2017-03-25 VITALS
RESPIRATION RATE: 16 BRPM | HEIGHT: 68 IN | SYSTOLIC BLOOD PRESSURE: 155 MMHG | WEIGHT: 138 LBS | TEMPERATURE: 98.3 F | OXYGEN SATURATION: 97 % | DIASTOLIC BLOOD PRESSURE: 84 MMHG | BODY MASS INDEX: 20.92 KG/M2 | HEART RATE: 81 BPM

## 2017-03-25 DIAGNOSIS — N49.2 CELLULITIS, SCROTUM: Primary | ICD-10-CM

## 2017-03-25 LAB
ALBUMIN SERPL-MCNC: 4 G/DL (ref 3.5–5)
ALBUMIN/GLOB SERPL: 1 G/DL (ref 1–2)
ALP SERPL-CCNC: 64 U/L (ref 38–126)
ALT SERPL W P-5'-P-CCNC: 12 U/L (ref 13–69)
ANION GAP SERPL CALCULATED.3IONS-SCNC: 18 MMOL/L
AST SERPL-CCNC: 25 U/L (ref 15–46)
BASOPHILS # BLD AUTO: 0.06 10*3/MM3 (ref 0–0.2)
BASOPHILS NFR BLD AUTO: 0.6 % (ref 0–2.5)
BILIRUB SERPL-MCNC: 0.9 MG/DL (ref 0.2–1.3)
BUN BLD-MCNC: 29 MG/DL (ref 7–20)
BUN/CREAT SERPL: 4.3 (ref 6.3–21.9)
CALCIUM SPEC-SCNC: 9.1 MG/DL (ref 8.4–10.2)
CHLORIDE SERPL-SCNC: 86 MMOL/L (ref 98–107)
CO2 SERPL-SCNC: 30 MMOL/L (ref 26–30)
CREAT BLD-MCNC: 6.7 MG/DL (ref 0.6–1.3)
D-LACTATE SERPL-SCNC: 0.6 MMOL/L (ref 0.5–2)
DEPRECATED RDW RBC AUTO: 49.9 FL (ref 37–54)
EOSINOPHIL # BLD AUTO: 0.22 10*3/MM3 (ref 0–0.7)
EOSINOPHIL NFR BLD AUTO: 2.4 % (ref 0–7)
ERYTHROCYTE [DISTWIDTH] IN BLOOD BY AUTOMATED COUNT: 15.2 % (ref 11.5–14.5)
GFR SERPL CREATININE-BSD FRML MDRD: 9 ML/MIN/1.73
GLOBULIN UR ELPH-MCNC: 4.1 GM/DL
GLUCOSE BLD-MCNC: 363 MG/DL (ref 74–98)
HCT VFR BLD AUTO: 32.3 % (ref 42–52)
HGB BLD-MCNC: 10.6 G/DL (ref 14–18)
HOLD SPECIMEN: NORMAL
HOLD SPECIMEN: NORMAL
IMM GRANULOCYTES # BLD: 0.05 10*3/MM3 (ref 0–0.06)
IMM GRANULOCYTES NFR BLD: 0.5 % (ref 0–0.6)
LYMPHOCYTES # BLD AUTO: 0.69 10*3/MM3 (ref 0.6–3.4)
LYMPHOCYTES NFR BLD AUTO: 7.5 % (ref 10–50)
MCH RBC QN AUTO: 30 PG (ref 27–31)
MCHC RBC AUTO-ENTMCNC: 32.8 G/DL (ref 30–37)
MCV RBC AUTO: 91.5 FL (ref 80–94)
MONOCYTES # BLD AUTO: 0.76 10*3/MM3 (ref 0–0.9)
MONOCYTES NFR BLD AUTO: 8.2 % (ref 0–12)
NEUTROPHILS # BLD AUTO: 7.48 10*3/MM3 (ref 2–6.9)
NEUTROPHILS NFR BLD AUTO: 80.8 % (ref 37–80)
NRBC BLD MANUAL-RTO: 0 /100 WBC (ref 0–0)
PLATELET # BLD AUTO: 257 10*3/MM3 (ref 130–400)
PMV BLD AUTO: 10.2 FL (ref 6–12)
POTASSIUM BLD-SCNC: 5 MMOL/L (ref 3.5–5.1)
PROT SERPL-MCNC: 8.1 G/DL (ref 6.3–8.2)
RBC # BLD AUTO: 3.53 10*6/MM3 (ref 4.7–6.1)
SODIUM BLD-SCNC: 129 MMOL/L (ref 137–145)
TROPONIN I SERPL-MCNC: 0.02 NG/ML (ref 0–0.03)
TROPONIN I SERPL-MCNC: 0.02 NG/ML (ref 0–0.05)
WBC NRBC COR # BLD: 9.26 10*3/MM3 (ref 4.8–10.8)
WHOLE BLOOD HOLD SPECIMEN: NORMAL
WHOLE BLOOD HOLD SPECIMEN: NORMAL

## 2017-03-25 PROCEDURE — 96365 THER/PROPH/DIAG IV INF INIT: CPT

## 2017-03-25 PROCEDURE — 25010000002 VANCOMYCIN PER 500 MG: Performed by: EMERGENCY MEDICINE

## 2017-03-25 PROCEDURE — 85025 COMPLETE CBC W/AUTO DIFF WBC: CPT | Performed by: EMERGENCY MEDICINE

## 2017-03-25 PROCEDURE — 96368 THER/DIAG CONCURRENT INF: CPT

## 2017-03-25 PROCEDURE — 93005 ELECTROCARDIOGRAM TRACING: CPT | Performed by: EMERGENCY MEDICINE

## 2017-03-25 PROCEDURE — 74176 CT ABD & PELVIS W/O CONTRAST: CPT

## 2017-03-25 PROCEDURE — 71010 HC CHEST PA OR AP: CPT

## 2017-03-25 PROCEDURE — 84484 ASSAY OF TROPONIN QUANT: CPT | Performed by: EMERGENCY MEDICINE

## 2017-03-25 PROCEDURE — 80053 COMPREHEN METABOLIC PANEL: CPT | Performed by: EMERGENCY MEDICINE

## 2017-03-25 PROCEDURE — 25010000002 PIPERACILLIN SOD-TAZOBACTAM PER 1 G: Performed by: EMERGENCY MEDICINE

## 2017-03-25 PROCEDURE — 99284 EMERGENCY DEPT VISIT MOD MDM: CPT

## 2017-03-25 PROCEDURE — 83605 ASSAY OF LACTIC ACID: CPT | Performed by: EMERGENCY MEDICINE

## 2017-03-25 RX ORDER — ASPIRIN 325 MG
325 TABLET ORAL ONCE
Status: COMPLETED | OUTPATIENT
Start: 2017-03-25 | End: 2017-03-25

## 2017-03-25 RX ORDER — SODIUM CHLORIDE 0.9 % (FLUSH) 0.9 %
10 SYRINGE (ML) INJECTION AS NEEDED
Status: DISCONTINUED | OUTPATIENT
Start: 2017-03-25 | End: 2017-03-25 | Stop reason: HOSPADM

## 2017-03-25 RX ADMIN — ASPIRIN 325 MG ORAL TABLET 325 MG: 325 PILL ORAL at 11:30

## 2017-03-25 RX ADMIN — VANCOMYCIN HYDROCHLORIDE 1250 MG: 500 INJECTION, POWDER, LYOPHILIZED, FOR SOLUTION INTRAVENOUS at 11:29

## 2017-03-25 RX ADMIN — TAZOBACTAM SODIUM AND PIPERACILLIN SODIUM 3.38 G: 375; 3 INJECTION, SOLUTION INTRAVENOUS at 11:29

## 2017-03-25 NOTE — ED PROVIDER NOTES
Subjective   HPI Comments: 56-year-old male presenting with 2 complaints.  He states that this morning he woke up, had fluttering in the left side of his chest, felt very weak all over, could not get out of bed.  This prompted him to call EMS.he denies any shortness of breath, specific chest pain, nausea, vomiting.  His second complaint is scrotal him pain and swelling.  He states that for the last 2-3 weeks he has had swelling and pain to his right scrotum, there is been drainage both bloody and purulent from this over the last couple days.  Again he denies any fevers, nausea, vomiting.  A Tuesday Thursday Saturday dialysis patient and last dialyzed on Thursday.      Review of Systems   Constitutional: Negative for chills and fever.   HENT: Negative for congestion, rhinorrhea and sore throat.    Eyes: Negative for pain.   Respiratory: Negative for cough and shortness of breath.    Cardiovascular: Positive for chest pain and palpitations. Negative for leg swelling.   Gastrointestinal: Negative for abdominal pain, diarrhea, nausea and vomiting.   Genitourinary: Positive for scrotal swelling. Negative for dysuria.   Musculoskeletal: Negative for arthralgias.   Skin: Negative for rash.   Neurological: Negative for weakness and numbness.   Psychiatric/Behavioral: Negative for behavioral problems.       Past Medical History:   Diagnosis Date   • Anemia    • Chronic kidney disease    • Diabetes mellitus    • H/O chest x-ray 03/25/2016    Interval decrease in size of the patients right pleural effusion with a persistent small left effusion as well   • Hypertension    • Left-sided weakness    • Renal failure    • Stroke        Allergies   Allergen Reactions   • Erythromycin Hives       Past Surgical History:   Procedure Laterality Date   • CATARACT EXTRACTION, BILATERAL     • CHOLECYSTECTOMY     • COLONOSCOPY     • EYE SURGERY     • PORTACATH PLACEMENT         History reviewed. No pertinent family history.    Social History      Social History   • Marital status:      Spouse name: N/A   • Number of children: N/A   • Years of education: N/A     Social History Main Topics   • Smoking status: Never Smoker   • Smokeless tobacco: Never Used   • Alcohol use No   • Drug use: No   • Sexual activity: Not Asked     Other Topics Concern   • None     Social History Narrative   • None           Objective   Physical Exam   Constitutional: He is oriented to person, place, and time. No distress.   Chronically ill-appearing   HENT:   Head: Normocephalic and atraumatic.   Right Ear: External ear normal.   Left Ear: External ear normal.   Nose: Nose normal.   Mouth/Throat: Oropharynx is clear and moist.   Eyes: Conjunctivae and EOM are normal. Pupils are equal, round, and reactive to light.   Neck: Normal range of motion. Neck supple.   Cardiovascular: Normal rate, regular rhythm, normal heart sounds and intact distal pulses.    AV fistula and right upper extremity, good bruit/thrill   Pulmonary/Chest: Effort normal and breath sounds normal. No respiratory distress.   Abdominal: Soft. Bowel sounds are normal. He exhibits no distension. There is no tenderness. There is no rebound and no guarding.   Genitourinary:   Genitourinary Comments: Scrotum is markedly swollen with induration and erythema, and there is one area with some mild drainage to the right superior aspect   Musculoskeletal: Normal range of motion. He exhibits no edema, tenderness or deformity.   Neurological: He is alert and oriented to person, place, and time.   Skin: Skin is warm and dry. No rash noted.   Psychiatric: He has a normal mood and affect. His behavior is normal.   Nursing note and vitals reviewed.      Procedures         ED Course  ED Course                  MDM  Number of Diagnoses or Management Options  Cellulitis, scrotum:   Diagnosis management comments: 55 yo M with chest fluttering and scrotal swelling/redness. Chronically ill appearing M in no distress with normal  "vitals and exam as above. I am much more concerned about the infection of his scrotum than I am about his \"fluttering\". We will check labs, CT, cxr. Disposition pending work up though he may need admission for IV antibiotics/drainage.  -labs  -ct  -cxr  -ekg    Ddx: cellulitis, epididymitis, uti, lyte abnormality, acs, palpitations    Ekg: Sinus rhythm, normal rate, normal axis/intervals, diffuse T wave inversions and flattening, this is unchanged from previous    Labs without any acute or significant abnormality. CT re demonstrates large R pleural effusion and airspace disease, unchanged, also shows extensive scrotal cellulitis. I have given him vancomycin and zosyn. Discussed with hospitalist for admission.  We have no urology back up here, recommended transfer to . Discussed with Dr Oliva who accepted in transfer.      Final diagnoses:   Cellulitis, scrotum            Steffen Bedolla MD  03/25/17 1222    "

## 2017-04-10 ENCOUNTER — OFFICE VISIT (OUTPATIENT)
Dept: SURGERY | Facility: CLINIC | Age: 57
End: 2017-04-10

## 2017-04-10 VITALS
HEIGHT: 68 IN | HEART RATE: 84 BPM | BODY MASS INDEX: 20.76 KG/M2 | RESPIRATION RATE: 18 BRPM | WEIGHT: 137 LBS | DIASTOLIC BLOOD PRESSURE: 72 MMHG | TEMPERATURE: 98.6 F | SYSTOLIC BLOOD PRESSURE: 108 MMHG

## 2017-04-10 DIAGNOSIS — R22.32 AXILLARY MASS, LEFT: Primary | ICD-10-CM

## 2017-04-10 DIAGNOSIS — R59.0 LYMPHADENOPATHY OF HEAD AND NECK REGION: Primary | ICD-10-CM

## 2017-04-10 DIAGNOSIS — N18.5 CHRONIC RENAL FAILURE, STAGE 5 (HCC): ICD-10-CM

## 2017-04-10 PROCEDURE — 99204 OFFICE O/P NEW MOD 45 MIN: CPT | Performed by: SURGERY

## 2017-04-10 PROCEDURE — 10022 PR FINE NEEDLE ASP;W/IMAGING GUIDANCE: CPT | Performed by: SURGERY

## 2017-04-10 RX ORDER — METOPROLOL SUCCINATE 25 MG/1
TABLET, EXTENDED RELEASE ORAL
COMMUNITY
Start: 2017-04-05 | End: 2017-05-04 | Stop reason: HOSPADM

## 2017-04-10 RX ORDER — LANCETS
EACH MISCELLANEOUS
COMMUNITY
Start: 2017-02-24 | End: 2017-05-04 | Stop reason: HOSPADM

## 2017-04-10 RX ORDER — LOSARTAN POTASSIUM 100 MG/1
TABLET ORAL
COMMUNITY
Start: 2017-03-06 | End: 2017-05-04 | Stop reason: HOSPADM

## 2017-04-10 RX ORDER — LANTHANUM CARBONATE 1000 MG/1
TABLET, CHEWABLE ORAL
COMMUNITY
Start: 2017-01-02 | End: 2018-12-17

## 2017-04-10 NOTE — PROGRESS NOTES
"Reason for Consultation: Lymphadenopathy    Chief Complaint:   Chief Complaint   Patient presents with   • Swollen Glands     axillary.       History: Pt is here at the request of Dr. Hernandez for evaluation of axillay lymphadenopathy, he denies palpable mass @ either axilla, he stated \"Dr. Hernandez told me that he feels something in both armpits, he also stated \"I had a port-a-cath taken out recently and I need Dr. Garnett to look at my incision.\"  Patient has mediastinal lymphadenopathy as well.  There is concern he may have malignancy.  He was sent and for evaluation of axillary lymph nodes for possible biopsy.  No night sweats or fevers.  No weight loss but does have anemia from chronic renal failure.  He is on hemodialysis.    Review of Systems   Constitutional: Negative for chills, fever and unexpected weight change.   HENT: Negative for trouble swallowing and voice change.    Eyes: Negative for visual disturbance.   Respiratory: Negative for apnea, cough, chest tightness, shortness of breath and wheezing.    Cardiovascular: Negative for chest pain, palpitations and leg swelling.   Gastrointestinal: Negative for abdominal distention, abdominal pain, anal bleeding, blood in stool, constipation, diarrhea, nausea, rectal pain and vomiting.   Endocrine: Negative for cold intolerance and heat intolerance.   Genitourinary: Negative for difficulty urinating, dysuria, flank pain, scrotal swelling and testicular pain.   Musculoskeletal: Negative for back pain, gait problem and joint swelling.   Skin: Negative for color change, rash and wound.   Neurological: Negative for dizziness, syncope, speech difficulty, weakness, numbness and headaches.   Hematological: Negative for adenopathy. Does not bruise/bleed easily.   Psychiatric/Behavioral: Negative for confusion. The patient is not nervous/anxious.        Vital Signs  /72 (BP Location: Left arm)  Pulse 84  Temp 98.6 °F (37 °C) (Temporal Artery )   Resp 18  Ht 68\" " (172.7 cm)  Wt 137 lb (62.1 kg)  BMI 20.83 kg/m2    Allergies:  Allergies   Allergen Reactions   • Erythromycin Hives       Medications:    Current Outpatient Prescriptions:   •  ACCU-CHEK SOFTCLIX LANCETS lancets, , Disp: , Rfl:   •  amLODIPine (NORVASC) 5 MG tablet, Take 5 mg by mouth daily., Disp: , Rfl:   •  aspirin 325 MG tablet, Take 325 mg by mouth daily., Disp: , Rfl:   •  Cholecalciferol (VITAMIN D3) 5000 UNITS capsule capsule, Take  by mouth daily., Disp: , Rfl:   •  fenofibrate (TRICOR) 54 MG tablet, Take 54 mg by mouth daily., Disp: , Rfl:   •  folic acid (FOLVITE) 1 MG tablet, Take 1 mg by mouth daily., Disp: , Rfl:   •  FOSRENOL 1000 MG chewable tablet, , Disp: , Rfl:   •  insulin aspart (NovoLOG) 100 UNIT/ML injection, Inject 12 Units under the skin 3 (Three) Times a Day Before Meals., Disp: , Rfl:   •  Insulin Glargine (TOUJEO SOLOSTAR) 300 UNIT/ML solution pen-injector, Inject 40 Units under the skin Every Night., Disp: , Rfl:   •  levothyroxine (SYNTHROID, LEVOTHROID) 100 MCG tablet, Take 100 mcg by mouth daily., Disp: , Rfl:   •  losartan (COZAAR) 100 MG tablet, , Disp: , Rfl:   •  metoprolol succinate XL (TOPROL-XL) 25 MG 24 hr tablet, , Disp: , Rfl:   •  omeprazole (PriLOSEC) 20 MG capsule, Take 20 mg by mouth daily., Disp: , Rfl:     Physical Exam:   General Appearance:    Alert, cooperative, in no acute distress   Head:    Normocephalic, without obvious abnormality, atraumatic   Lungs:     Clear to auscultation,respirations regular, even and                  unlabored    Heart:    Regular rhythm and normal rate, normal S1 and S2, no            murmur, no gallop, no rub, no click   Abdomen:     Normal bowel sounds, no masses, no organomegaly, soft        non-tender, non-distended, no guarding, no rebound                tenderness   Extremities:   Moves all extremities well, no edema, no cyanosis, no             Redness.  Engorged veins right upper extremity with edema.  Nonpitting.  Minimal  palpable lymph nodes in both axilla.     Pulses:   Pulses palpable and equal bilaterally   Skin:   No bleeding, bruising or rash      Assessment/Plan     1. Lymphadenopathy of head and neck region    2. Chronic renal failure, stage 5      Procedure: FNA left axillary lymph node    I recommend a FNA of the left axillary lesion. The procedure and risks were clearly explained including bleeding, infection and requirement for re-biopsy and the patient understood these and wishes to proceed.    The patient was brought to the procedure room. Consent and time out were performed. The area was prepped and draped in the usual fashion. 1% lidocaine with epinephrine was infused locally. A 20G needle was used to biopsy the lesion with ultrasound guidance.  Minimal blood loss had occurred and hemostasis had been well controlled with pressure.  The patient tolerated the procedure and there were no complications. We will make a f/u appointment to discuss results.    Hema Garnett MD  04/10/17

## 2017-04-26 ENCOUNTER — PREP FOR SURGERY (OUTPATIENT)
Dept: PULMONOLOGY | Facility: CLINIC | Age: 57
End: 2017-04-26

## 2017-04-26 ENCOUNTER — OFFICE VISIT (OUTPATIENT)
Dept: PULMONOLOGY | Facility: CLINIC | Age: 57
End: 2017-04-26

## 2017-04-26 VITALS
BODY MASS INDEX: 20.76 KG/M2 | OXYGEN SATURATION: 95 % | HEART RATE: 91 BPM | SYSTOLIC BLOOD PRESSURE: 120 MMHG | HEIGHT: 68 IN | WEIGHT: 137 LBS | DIASTOLIC BLOOD PRESSURE: 84 MMHG

## 2017-04-26 DIAGNOSIS — J90 PLEURAL EFFUSION ON RIGHT: Primary | ICD-10-CM

## 2017-04-26 DIAGNOSIS — G47.34 NOCTURNAL HYPOXIA: ICD-10-CM

## 2017-04-26 DIAGNOSIS — J90 PLEURAL EFFUSION: Primary | ICD-10-CM

## 2017-04-26 DIAGNOSIS — R59.0 MEDIASTINAL LYMPHADENOPATHY: ICD-10-CM

## 2017-04-26 PROCEDURE — 99214 OFFICE O/P EST MOD 30 MIN: CPT | Performed by: INTERNAL MEDICINE

## 2017-04-26 NOTE — PROGRESS NOTES
"Chief Complaint   Patient presents with   • Follow-up     procedure         Subjective   Talha Cutler is a 56 y.o. male.     History of Present Illness   Patient comes in today to discuss the results of attempted axillary lymph node biopsy.  He is complaining of shortness of breath and also feels continued heaviness on the right side of the chest.    He was recently placed on oxygen by his cardiologist, for overnight use, and was also told that he has high pressures on the right side of his heart?    He denies any weight loss or hemoptysis.  He continues to struggle with shortness of breath with minimal exertion and although he is currently at his \"dry weight\", his heaviness on the right side of the chest almost never completely resolves.    The following portions of the patient's history were reviewed and updated as appropriate: allergies, current medications, past family history, past medical history, past social history and past surgical history.    Review of Systems   HENT: Negative for sinus pressure, sneezing and sore throat.    Respiratory: Positive for cough, shortness of breath and wheezing.        Objective   Visit Vitals   • /84   • Pulse 91   • Ht 68\" (172.7 cm)   • Wt 137 lb (62.1 kg)   • SpO2 95%   • BMI 20.83 kg/m2       Physical Exam   Constitutional: He is oriented to person, place, and time. He appears well-developed and well-nourished.   Eyes: EOM are normal.   Neck: Neck supple. No JVD present.   Cardiovascular: Normal rate and regular rhythm.    Pulmonary/Chest:   Dullness to percussion right side.   Musculoskeletal:   Used wheelchair.   Neurological: He is alert and oriented to person, place, and time.   Skin: Skin is warm and dry.   Vitals reviewed.      Assessment/Plan   Talha was seen today for follow-up.    Diagnoses and all orders for this visit:    Pleural effusion  -     Thoracentesis; Future    Mediastinal lymphadenopathy    Nocturnal hypoxia         Return in about 4 weeks " (around 5/24/2017) for Recheck, Discuss results, Overbook.    DISCUSSION (if any):  I reviewed the patient's needle biopsy results which were unfortunately non-diagnostic.  I also reviewed the patient's last CT scan which was done while he was in the emergency room and it continues to show large right-sided pleural effusion along with mediastinal lymphadenopathy.    The patient's last pleural fluid analysis was also reviewed and although the cytometry and cytology were negative, the cell count was definitely lymphocytic with 96% lymphocytes noted.    Due to continued symptoms and the fact that the pleural effusion has recurred post-thoracentesis in January and was exudative and lymphocytic, further investigation is definitely necessary.    I will proceed with thoracentesis within the next 72 hours and if the results remain nondiagnostic, then we will strongly consider endobronchial ultrasound-guided needle biopsy of the mediastinal lymph nodes.    The risks of the thoracentesis including but not limited to damage to the overlying structures, pneumothorax, bleeding, worsening of respiratory failure, requirement for chest tube placement among others were explained.    Patient understood the risks and benefits and agreed to proceed with the procedure.    The alternatives were also discussed with the patient.    Errors in dictation may reflect use of voice recognition software and not all errors in transcription may have been detected prior to signing    This document was electronically signed by Ean Hernandez MD April 26, 2017  12:50 PM

## 2017-04-28 ENCOUNTER — APPOINTMENT (OUTPATIENT)
Dept: GENERAL RADIOLOGY | Facility: HOSPITAL | Age: 57
End: 2017-04-28
Attending: INTERNAL MEDICINE

## 2017-04-28 ENCOUNTER — HOSPITAL ENCOUNTER (OUTPATIENT)
Dept: CARDIOLOGY | Facility: HOSPITAL | Age: 57
Setting detail: OBSERVATION
Discharge: ANOTHER HOSPITAL | End: 2017-05-04
Attending: INTERNAL MEDICINE | Admitting: INTERNAL MEDICINE

## 2017-04-28 DIAGNOSIS — J90 PLEURAL EFFUSION ON RIGHT: ICD-10-CM

## 2017-04-28 DIAGNOSIS — J90 PLEURAL EFFUSION: ICD-10-CM

## 2017-04-28 LAB
ALBUMIN SERPL-MCNC: 4.1 G/DL (ref 3.5–5)
ALBUMIN/GLOB SERPL: 1 G/DL (ref 1–2)
ALP SERPL-CCNC: 67 U/L (ref 38–126)
ALT SERPL W P-5'-P-CCNC: 22 U/L (ref 13–69)
ANION GAP SERPL CALCULATED.3IONS-SCNC: 21.6 MMOL/L
APPEARANCE FLD: CLEAR
AST SERPL-CCNC: 24 U/L (ref 15–46)
BASOPHILS # BLD AUTO: 0.07 10*3/MM3 (ref 0–0.2)
BASOPHILS NFR BLD AUTO: 1.4 % (ref 0–2.5)
BILIRUB SERPL-MCNC: 0.7 MG/DL (ref 0.2–1.3)
BUN BLD-MCNC: 27 MG/DL (ref 7–20)
BUN/CREAT SERPL: 5 (ref 6.3–21.9)
CALCIUM SPEC-SCNC: 9.2 MG/DL (ref 8.4–10.2)
CHLORIDE SERPL-SCNC: 88 MMOL/L (ref 98–107)
CK SERPL-CCNC: 71 U/L (ref 30–170)
CO2 SERPL-SCNC: 28 MMOL/L (ref 26–30)
COLOR FLD: YELLOW
CREAT BLD-MCNC: 5.4 MG/DL (ref 0.6–1.3)
DEPRECATED RDW RBC AUTO: 55.9 FL (ref 37–54)
EOSINOPHIL # BLD AUTO: 0.19 10*3/MM3 (ref 0–0.7)
EOSINOPHIL NFR BLD AUTO: 3.7 % (ref 0–7)
ERYTHROCYTE [DISTWIDTH] IN BLOOD BY AUTOMATED COUNT: 16.3 % (ref 11.5–14.5)
GFR SERPL CREATININE-BSD FRML MDRD: 11 ML/MIN/1.73
GLOBULIN UR ELPH-MCNC: 4 GM/DL
GLUCOSE BLD-MCNC: 398 MG/DL (ref 74–98)
GLUCOSE BLDC GLUCOMTR-MCNC: 132 MG/DL (ref 70–130)
GLUCOSE BLDC GLUCOMTR-MCNC: 349 MG/DL (ref 70–130)
GLUCOSE FLD-MCNC: 330 MG/DL
HCT VFR BLD AUTO: 34.9 % (ref 42–52)
HGB BLD-MCNC: 11.1 G/DL (ref 14–18)
IMM GRANULOCYTES # BLD: 0.02 10*3/MM3 (ref 0–0.06)
IMM GRANULOCYTES NFR BLD: 0.4 % (ref 0–0.6)
INR PPP: 1.16 (ref 0.9–1.1)
LDH FLD-CCNC: 243 U/L
LDH SERPL-CCNC: 392 U/L (ref 313–618)
LYMPHOCYTES # BLD AUTO: 0.56 10*3/MM3 (ref 0.6–3.4)
LYMPHOCYTES NFR BLD AUTO: 10.9 % (ref 10–50)
LYMPHOCYTES NFR FLD MANUAL: 86 %
MCH RBC QN AUTO: 30.5 PG (ref 27–31)
MCHC RBC AUTO-ENTMCNC: 31.8 G/DL (ref 30–37)
MCV RBC AUTO: 95.9 FL (ref 80–94)
MONOCYTES # BLD AUTO: 0.57 10*3/MM3 (ref 0–0.9)
MONOCYTES NFR BLD AUTO: 11.1 % (ref 0–12)
NEUTROPHILS # BLD AUTO: 3.74 10*3/MM3 (ref 2–6.9)
NEUTROPHILS NFR BLD AUTO: 72.5 % (ref 37–80)
NEUTROPHILS NFR FLD MANUAL: 14 %
NRBC BLD MANUAL-RTO: 0 /100 WBC (ref 0–0)
PLATELET # BLD AUTO: 226 10*3/MM3 (ref 130–400)
PMV BLD AUTO: 10.4 FL (ref 6–12)
POTASSIUM BLD-SCNC: 5.6 MMOL/L (ref 3.5–5.1)
PROT FLD-MCNC: 3.1 G/DL
PROT SERPL-MCNC: 8.1 G/DL (ref 6.3–8.2)
PROTHROMBIN TIME: 12.7 SECONDS (ref 9.3–12.1)
RBC # BLD AUTO: 3.64 10*6/MM3 (ref 4.7–6.1)
RBC # FLD AUTO: 2000 /MM3 (ref 0–200000)
SODIUM BLD-SCNC: 132 MMOL/L (ref 137–145)
TROPONIN I SERPL-MCNC: 0.02 NG/ML (ref 0–0.03)
TROPONIN I SERPL-MCNC: 0.03 NG/ML (ref 0–0.03)
WBC # FLD: 278 /MM3 (ref 0–1000)
WBC NRBC COR # BLD: 5.15 10*3/MM3 (ref 4.8–10.8)

## 2017-04-28 PROCEDURE — 85610 PROTHROMBIN TIME: CPT | Performed by: INTERNAL MEDICINE

## 2017-04-28 PROCEDURE — 87116 MYCOBACTERIA CULTURE: CPT | Performed by: INTERNAL MEDICINE

## 2017-04-28 PROCEDURE — 82962 GLUCOSE BLOOD TEST: CPT

## 2017-04-28 PROCEDURE — 87081 CULTURE SCREEN ONLY: CPT | Performed by: INTERNAL MEDICINE

## 2017-04-28 PROCEDURE — 89051 BODY FLUID CELL COUNT: CPT | Performed by: INTERNAL MEDICINE

## 2017-04-28 PROCEDURE — 63710000001 INSULIN ASPART PER 5 UNITS: Performed by: FAMILY MEDICINE

## 2017-04-28 PROCEDURE — 84157 ASSAY OF PROTEIN OTHER: CPT | Performed by: INTERNAL MEDICINE

## 2017-04-28 PROCEDURE — 71010 HC CHEST PA OR AP: CPT

## 2017-04-28 PROCEDURE — 84484 ASSAY OF TROPONIN QUANT: CPT | Performed by: INTERNAL MEDICINE

## 2017-04-28 PROCEDURE — 83615 LACTATE (LD) (LDH) ENZYME: CPT | Performed by: INTERNAL MEDICINE

## 2017-04-28 PROCEDURE — G0378 HOSPITAL OBSERVATION PER HR: HCPCS

## 2017-04-28 PROCEDURE — 63710000001 INSULIN DETEMIR PER 5 UNITS: Performed by: FAMILY MEDICINE

## 2017-04-28 PROCEDURE — 96372 THER/PROPH/DIAG INJ SC/IM: CPT

## 2017-04-28 PROCEDURE — 25010000002 HEPARIN (PORCINE) PER 1000 UNITS: Performed by: INTERNAL MEDICINE

## 2017-04-28 PROCEDURE — 87070 CULTURE OTHR SPECIMN AEROBIC: CPT | Performed by: INTERNAL MEDICINE

## 2017-04-28 PROCEDURE — 85025 COMPLETE CBC W/AUTO DIFF WBC: CPT | Performed by: INTERNAL MEDICINE

## 2017-04-28 PROCEDURE — 87102 FUNGUS ISOLATION CULTURE: CPT | Performed by: INTERNAL MEDICINE

## 2017-04-28 PROCEDURE — 87075 CULTR BACTERIA EXCEPT BLOOD: CPT | Performed by: INTERNAL MEDICINE

## 2017-04-28 PROCEDURE — 87206 SMEAR FLUORESCENT/ACID STAI: CPT | Performed by: INTERNAL MEDICINE

## 2017-04-28 PROCEDURE — 88305 TISSUE EXAM BY PATHOLOGIST: CPT | Performed by: INTERNAL MEDICINE

## 2017-04-28 PROCEDURE — 32555 ASPIRATE PLEURA W/ IMAGING: CPT | Performed by: INTERNAL MEDICINE

## 2017-04-28 PROCEDURE — 82945 GLUCOSE OTHER FLUID: CPT | Performed by: INTERNAL MEDICINE

## 2017-04-28 PROCEDURE — 88112 CYTOPATH CELL ENHANCE TECH: CPT | Performed by: INTERNAL MEDICINE

## 2017-04-28 PROCEDURE — 87015 SPECIMEN INFECT AGNT CONCNTJ: CPT | Performed by: INTERNAL MEDICINE

## 2017-04-28 PROCEDURE — 93005 ELECTROCARDIOGRAM TRACING: CPT | Performed by: INTERNAL MEDICINE

## 2017-04-28 PROCEDURE — 80053 COMPREHEN METABOLIC PANEL: CPT | Performed by: INTERNAL MEDICINE

## 2017-04-28 PROCEDURE — 82550 ASSAY OF CK (CPK): CPT | Performed by: INTERNAL MEDICINE

## 2017-04-28 RX ORDER — SODIUM CHLORIDE 0.9 % (FLUSH) 0.9 %
1-10 SYRINGE (ML) INJECTION AS NEEDED
Status: DISCONTINUED | OUTPATIENT
Start: 2017-04-28 | End: 2017-05-05 | Stop reason: HOSPADM

## 2017-04-28 RX ORDER — ONDANSETRON 2 MG/ML
4 INJECTION INTRAMUSCULAR; INTRAVENOUS EVERY 6 HOURS PRN
Status: DISCONTINUED | OUTPATIENT
Start: 2017-04-28 | End: 2017-05-05 | Stop reason: HOSPADM

## 2017-04-28 RX ORDER — ASPIRIN 81 MG/1
81 TABLET ORAL DAILY
Status: DISCONTINUED | OUTPATIENT
Start: 2017-04-28 | End: 2017-05-05 | Stop reason: HOSPADM

## 2017-04-28 RX ORDER — ACETAMINOPHEN 325 MG/1
650 TABLET ORAL EVERY 4 HOURS PRN
Status: DISCONTINUED | OUTPATIENT
Start: 2017-04-28 | End: 2017-05-05 | Stop reason: HOSPADM

## 2017-04-28 RX ORDER — ONDANSETRON 4 MG/1
4 TABLET, ORALLY DISINTEGRATING ORAL EVERY 6 HOURS PRN
Status: DISCONTINUED | OUTPATIENT
Start: 2017-04-28 | End: 2017-05-05 | Stop reason: HOSPADM

## 2017-04-28 RX ORDER — FOLIC ACID 1 MG/1
1 TABLET ORAL DAILY
Status: DISCONTINUED | OUTPATIENT
Start: 2017-04-28 | End: 2017-05-05 | Stop reason: HOSPADM

## 2017-04-28 RX ORDER — ONDANSETRON 4 MG/1
4 TABLET, FILM COATED ORAL EVERY 6 HOURS PRN
Status: DISCONTINUED | OUTPATIENT
Start: 2017-04-28 | End: 2017-05-05 | Stop reason: HOSPADM

## 2017-04-28 RX ORDER — METOPROLOL SUCCINATE 25 MG/1
25 TABLET, EXTENDED RELEASE ORAL
Status: DISCONTINUED | OUTPATIENT
Start: 2017-04-28 | End: 2017-04-30

## 2017-04-28 RX ORDER — LEVOTHYROXINE SODIUM 0.1 MG/1
100 TABLET ORAL
Status: DISCONTINUED | OUTPATIENT
Start: 2017-04-29 | End: 2017-05-05 | Stop reason: HOSPADM

## 2017-04-28 RX ORDER — HEPARIN SODIUM 5000 [USP'U]/ML
5000 INJECTION, SOLUTION INTRAVENOUS; SUBCUTANEOUS EVERY 12 HOURS SCHEDULED
Status: DISCONTINUED | OUTPATIENT
Start: 2017-04-28 | End: 2017-05-05 | Stop reason: HOSPADM

## 2017-04-28 RX ORDER — PANTOPRAZOLE SODIUM 40 MG/1
40 TABLET, DELAYED RELEASE ORAL
Status: DISCONTINUED | OUTPATIENT
Start: 2017-04-29 | End: 2017-05-05 | Stop reason: HOSPADM

## 2017-04-28 RX ORDER — AMLODIPINE BESYLATE 5 MG/1
5 TABLET ORAL DAILY
Status: DISCONTINUED | OUTPATIENT
Start: 2017-04-28 | End: 2017-05-05 | Stop reason: HOSPADM

## 2017-04-28 RX ORDER — FENOFIBRATE 48 MG/1
48 TABLET, COATED ORAL DAILY
Status: DISCONTINUED | OUTPATIENT
Start: 2017-04-29 | End: 2017-05-02

## 2017-04-28 RX ORDER — LOSARTAN POTASSIUM 50 MG/1
100 TABLET ORAL
Status: DISCONTINUED | OUTPATIENT
Start: 2017-04-28 | End: 2017-05-05 | Stop reason: HOSPADM

## 2017-04-28 RX ADMIN — HEPARIN SODIUM 5000 UNITS: 5000 INJECTION, SOLUTION INTRAVENOUS; SUBCUTANEOUS at 20:02

## 2017-04-28 RX ADMIN — INSULIN ASPART 12 UNITS: 100 INJECTION, SOLUTION INTRAVENOUS; SUBCUTANEOUS at 18:32

## 2017-04-28 RX ADMIN — INSULIN DETEMIR 40 UNITS: 100 INJECTION, SOLUTION SUBCUTANEOUS at 20:03

## 2017-04-29 ENCOUNTER — APPOINTMENT (OUTPATIENT)
Dept: GENERAL RADIOLOGY | Facility: HOSPITAL | Age: 57
End: 2017-04-29

## 2017-04-29 LAB
ANION GAP SERPL CALCULATED.3IONS-SCNC: 19.7 MMOL/L
BASOPHILS # BLD AUTO: 0.05 10*3/MM3 (ref 0–0.2)
BASOPHILS NFR BLD AUTO: 0.9 % (ref 0–2.5)
BUN BLD-MCNC: 38 MG/DL (ref 7–20)
BUN/CREAT SERPL: 5.9 (ref 6.3–21.9)
CALCIUM SPEC-SCNC: 9.2 MG/DL (ref 8.4–10.2)
CHLORIDE SERPL-SCNC: 89 MMOL/L (ref 98–107)
CO2 SERPL-SCNC: 27 MMOL/L (ref 26–30)
CREAT BLD-MCNC: 6.4 MG/DL (ref 0.6–1.3)
DEPRECATED RDW RBC AUTO: 54.4 FL (ref 37–54)
EOSINOPHIL # BLD AUTO: 0.19 10*3/MM3 (ref 0–0.7)
EOSINOPHIL NFR BLD AUTO: 3.4 % (ref 0–7)
ERYTHROCYTE [DISTWIDTH] IN BLOOD BY AUTOMATED COUNT: 15.9 % (ref 11.5–14.5)
GFR SERPL CREATININE-BSD FRML MDRD: 9 ML/MIN/1.73
GLUCOSE BLD-MCNC: 102 MG/DL (ref 74–98)
GLUCOSE BLDC GLUCOMTR-MCNC: 112 MG/DL (ref 70–130)
GLUCOSE BLDC GLUCOMTR-MCNC: 131 MG/DL (ref 70–130)
GLUCOSE BLDC GLUCOMTR-MCNC: 230 MG/DL (ref 70–130)
GLUCOSE BLDC GLUCOMTR-MCNC: 60 MG/DL (ref 70–130)
HCT VFR BLD AUTO: 35.4 % (ref 42–52)
HGB BLD-MCNC: 11.3 G/DL (ref 14–18)
IMM GRANULOCYTES # BLD: 0.02 10*3/MM3 (ref 0–0.06)
IMM GRANULOCYTES NFR BLD: 0.4 % (ref 0–0.6)
LYMPHOCYTES # BLD AUTO: 0.84 10*3/MM3 (ref 0.6–3.4)
LYMPHOCYTES NFR BLD AUTO: 15.1 % (ref 10–50)
MCH RBC QN AUTO: 30.4 PG (ref 27–31)
MCHC RBC AUTO-ENTMCNC: 31.9 G/DL (ref 30–37)
MCV RBC AUTO: 95.2 FL (ref 80–94)
MONOCYTES # BLD AUTO: 0.68 10*3/MM3 (ref 0–0.9)
MONOCYTES NFR BLD AUTO: 12.2 % (ref 0–12)
NEUTROPHILS # BLD AUTO: 3.79 10*3/MM3 (ref 2–6.9)
NEUTROPHILS NFR BLD AUTO: 68 % (ref 37–80)
NRBC BLD MANUAL-RTO: 0 /100 WBC (ref 0–0)
PHOSPHATE SERPL-MCNC: 7 MG/DL (ref 2.5–4.5)
PLATELET # BLD AUTO: 213 10*3/MM3 (ref 130–400)
PMV BLD AUTO: 10.1 FL (ref 6–12)
POTASSIUM BLD-SCNC: 4.7 MMOL/L (ref 3.5–5.1)
RBC # BLD AUTO: 3.72 10*6/MM3 (ref 4.7–6.1)
SODIUM BLD-SCNC: 131 MMOL/L (ref 137–145)
TROPONIN I SERPL-MCNC: 0.03 NG/ML (ref 0–0.03)
WBC NRBC COR # BLD: 5.57 10*3/MM3 (ref 4.8–10.8)

## 2017-04-29 PROCEDURE — 80074 ACUTE HEPATITIS PANEL: CPT | Performed by: INTERNAL MEDICINE

## 2017-04-29 PROCEDURE — 99225 PR SBSQ OBSERVATION CARE/DAY 25 MINUTES: CPT | Performed by: INTERNAL MEDICINE

## 2017-04-29 PROCEDURE — G0378 HOSPITAL OBSERVATION PER HR: HCPCS

## 2017-04-29 PROCEDURE — 82962 GLUCOSE BLOOD TEST: CPT

## 2017-04-29 PROCEDURE — 85025 COMPLETE CBC W/AUTO DIFF WBC: CPT | Performed by: INTERNAL MEDICINE

## 2017-04-29 PROCEDURE — 84484 ASSAY OF TROPONIN QUANT: CPT | Performed by: INTERNAL MEDICINE

## 2017-04-29 PROCEDURE — 71020 HC CHEST PA AND LATERAL: CPT

## 2017-04-29 PROCEDURE — 86706 HEP B SURFACE ANTIBODY: CPT | Performed by: INTERNAL MEDICINE

## 2017-04-29 PROCEDURE — 96372 THER/PROPH/DIAG INJ SC/IM: CPT

## 2017-04-29 PROCEDURE — 90935 HEMODIALYSIS ONE EVALUATION: CPT

## 2017-04-29 PROCEDURE — 80048 BASIC METABOLIC PNL TOTAL CA: CPT | Performed by: INTERNAL MEDICINE

## 2017-04-29 PROCEDURE — 84100 ASSAY OF PHOSPHORUS: CPT | Performed by: INTERNAL MEDICINE

## 2017-04-29 PROCEDURE — 25010000002 HEPARIN (PORCINE) PER 1000 UNITS: Performed by: INTERNAL MEDICINE

## 2017-04-29 RX ADMIN — ASPIRIN 81 MG: 81 TABLET, COATED ORAL at 08:55

## 2017-04-29 RX ADMIN — FOLIC ACID 1 MG: 1 TABLET ORAL at 08:56

## 2017-04-29 RX ADMIN — LEVOTHYROXINE SODIUM 100 MCG: 100 TABLET ORAL at 06:00

## 2017-04-29 RX ADMIN — METOPROLOL SUCCINATE 25 MG: 25 TABLET, EXTENDED RELEASE ORAL at 08:55

## 2017-04-29 RX ADMIN — PANTOPRAZOLE SODIUM 40 MG: 40 TABLET, DELAYED RELEASE ORAL at 06:00

## 2017-04-29 RX ADMIN — INSULIN DETEMIR 40 UNITS: 100 INJECTION, SOLUTION SUBCUTANEOUS at 22:05

## 2017-04-29 RX ADMIN — AMLODIPINE BESYLATE 5 MG: 5 TABLET ORAL at 08:56

## 2017-04-29 RX ADMIN — LOSARTAN POTASSIUM 100 MG: 50 TABLET, FILM COATED ORAL at 08:56

## 2017-04-29 RX ADMIN — FENOFIBRATE 48 MG: 48 TABLET, FILM COATED ORAL at 08:56

## 2017-04-29 RX ADMIN — HEPARIN SODIUM 5000 UNITS: 5000 INJECTION, SOLUTION INTRAVENOUS; SUBCUTANEOUS at 08:56

## 2017-04-29 RX ADMIN — HEPARIN SODIUM 5000 UNITS: 5000 INJECTION, SOLUTION INTRAVENOUS; SUBCUTANEOUS at 20:30

## 2017-04-30 ENCOUNTER — APPOINTMENT (OUTPATIENT)
Dept: GENERAL RADIOLOGY | Facility: HOSPITAL | Age: 57
End: 2017-04-30

## 2017-04-30 LAB
ANION GAP SERPL CALCULATED.3IONS-SCNC: 19.6 MMOL/L
BASOPHILS # BLD AUTO: 0.07 10*3/MM3 (ref 0–0.2)
BASOPHILS NFR BLD AUTO: 1 % (ref 0–2.5)
BUN BLD-MCNC: 19 MG/DL (ref 7–20)
BUN/CREAT SERPL: 4.4 (ref 6.3–21.9)
CALCIUM SPEC-SCNC: 9.3 MG/DL (ref 8.4–10.2)
CHLORIDE SERPL-SCNC: 96 MMOL/L (ref 98–107)
CO2 SERPL-SCNC: 25 MMOL/L (ref 26–30)
CREAT BLD-MCNC: 4.3 MG/DL (ref 0.6–1.3)
DEPRECATED RDW RBC AUTO: 53.9 FL (ref 37–54)
EOSINOPHIL # BLD AUTO: 0.5 10*3/MM3 (ref 0–0.7)
EOSINOPHIL NFR BLD AUTO: 7.3 % (ref 0–7)
ERYTHROCYTE [DISTWIDTH] IN BLOOD BY AUTOMATED COUNT: 15.9 % (ref 11.5–14.5)
GFR SERPL CREATININE-BSD FRML MDRD: 14 ML/MIN/1.73
GLUCOSE BLD-MCNC: 61 MG/DL (ref 74–98)
GLUCOSE BLDC GLUCOMTR-MCNC: 136 MG/DL (ref 70–130)
GLUCOSE BLDC GLUCOMTR-MCNC: 175 MG/DL (ref 70–130)
GLUCOSE BLDC GLUCOMTR-MCNC: 277 MG/DL (ref 70–130)
GLUCOSE BLDC GLUCOMTR-MCNC: 99 MG/DL (ref 70–130)
HAV IGM SERPL QL IA: NEGATIVE
HBV CORE IGM SERPL QL IA: NEGATIVE
HBV SURFACE AB SER QL: REACTIVE
HBV SURFACE AG SERPL QL IA: NEGATIVE
HCT VFR BLD AUTO: 39.2 % (ref 42–52)
HCV AB S/CO SERPL IA: <0.1 S/CO RATIO (ref 0–0.9)
HGB BLD-MCNC: 12.7 G/DL (ref 14–18)
IMM GRANULOCYTES # BLD: 0.02 10*3/MM3 (ref 0–0.06)
IMM GRANULOCYTES NFR BLD: 0.3 % (ref 0–0.6)
LYMPHOCYTES # BLD AUTO: 1.39 10*3/MM3 (ref 0.6–3.4)
LYMPHOCYTES NFR BLD AUTO: 20.3 % (ref 10–50)
MCH RBC QN AUTO: 30.6 PG (ref 27–31)
MCHC RBC AUTO-ENTMCNC: 32.4 G/DL (ref 30–37)
MCV RBC AUTO: 94.5 FL (ref 80–94)
MONOCYTES # BLD AUTO: 0.82 10*3/MM3 (ref 0–0.9)
MONOCYTES NFR BLD AUTO: 12 % (ref 0–12)
MRSA SPEC QL CULT: NORMAL
NEUTROPHILS # BLD AUTO: 4.05 10*3/MM3 (ref 2–6.9)
NEUTROPHILS NFR BLD AUTO: 59.1 % (ref 37–80)
NRBC BLD MANUAL-RTO: 0 /100 WBC (ref 0–0)
PLATELET # BLD AUTO: 243 10*3/MM3 (ref 130–400)
PMV BLD AUTO: 9.8 FL (ref 6–12)
POTASSIUM BLD-SCNC: 4.6 MMOL/L (ref 3.5–5.1)
RBC # BLD AUTO: 4.15 10*6/MM3 (ref 4.7–6.1)
SODIUM BLD-SCNC: 136 MMOL/L (ref 137–145)
TROPONIN I SERPL-MCNC: 0.04 NG/ML (ref 0–0.03)
VRE SPEC QL CULT: NORMAL
WBC NRBC COR # BLD: 6.85 10*3/MM3 (ref 4.8–10.8)

## 2017-04-30 PROCEDURE — 63710000001 INSULIN ASPART PER 5 UNITS: Performed by: FAMILY MEDICINE

## 2017-04-30 PROCEDURE — 93005 ELECTROCARDIOGRAM TRACING: CPT | Performed by: FAMILY MEDICINE

## 2017-04-30 PROCEDURE — 80048 BASIC METABOLIC PNL TOTAL CA: CPT | Performed by: FAMILY MEDICINE

## 2017-04-30 PROCEDURE — 99225 PR SBSQ OBSERVATION CARE/DAY 25 MINUTES: CPT | Performed by: INTERNAL MEDICINE

## 2017-04-30 PROCEDURE — 71010 HC CHEST PA OR AP: CPT

## 2017-04-30 PROCEDURE — 84484 ASSAY OF TROPONIN QUANT: CPT | Performed by: FAMILY MEDICINE

## 2017-04-30 PROCEDURE — 85025 COMPLETE CBC W/AUTO DIFF WBC: CPT | Performed by: INTERNAL MEDICINE

## 2017-04-30 PROCEDURE — 25010000002 HEPARIN (PORCINE) PER 1000 UNITS: Performed by: INTERNAL MEDICINE

## 2017-04-30 PROCEDURE — G0378 HOSPITAL OBSERVATION PER HR: HCPCS

## 2017-04-30 PROCEDURE — 82962 GLUCOSE BLOOD TEST: CPT

## 2017-04-30 PROCEDURE — 96372 THER/PROPH/DIAG INJ SC/IM: CPT

## 2017-04-30 RX ORDER — METOPROLOL SUCCINATE 25 MG/1
25 TABLET, EXTENDED RELEASE ORAL
Status: DISCONTINUED | OUTPATIENT
Start: 2017-05-02 | End: 2017-05-05 | Stop reason: HOSPADM

## 2017-04-30 RX ADMIN — ASPIRIN 81 MG: 81 TABLET, COATED ORAL at 09:08

## 2017-04-30 RX ADMIN — INSULIN ASPART 12 UNITS: 100 INJECTION, SOLUTION INTRAVENOUS; SUBCUTANEOUS at 12:03

## 2017-04-30 RX ADMIN — INSULIN DETEMIR 20 UNITS: 100 INJECTION, SOLUTION SUBCUTANEOUS at 21:36

## 2017-04-30 RX ADMIN — HEPARIN SODIUM 5000 UNITS: 5000 INJECTION, SOLUTION INTRAVENOUS; SUBCUTANEOUS at 09:07

## 2017-04-30 RX ADMIN — FENOFIBRATE 48 MG: 48 TABLET, FILM COATED ORAL at 09:08

## 2017-04-30 RX ADMIN — ACETAMINOPHEN 650 MG: 325 TABLET, FILM COATED ORAL at 05:49

## 2017-04-30 RX ADMIN — FOLIC ACID 1 MG: 1 TABLET ORAL at 09:08

## 2017-04-30 RX ADMIN — AMLODIPINE BESYLATE 5 MG: 5 TABLET ORAL at 09:08

## 2017-04-30 RX ADMIN — METOPROLOL SUCCINATE 25 MG: 25 TABLET, EXTENDED RELEASE ORAL at 09:07

## 2017-04-30 RX ADMIN — LOSARTAN POTASSIUM 100 MG: 50 TABLET, FILM COATED ORAL at 09:08

## 2017-04-30 RX ADMIN — HEPARIN SODIUM 5000 UNITS: 5000 INJECTION, SOLUTION INTRAVENOUS; SUBCUTANEOUS at 21:36

## 2017-04-30 RX ADMIN — PANTOPRAZOLE SODIUM 40 MG: 40 TABLET, DELAYED RELEASE ORAL at 05:38

## 2017-04-30 RX ADMIN — LEVOTHYROXINE SODIUM 100 MCG: 100 TABLET ORAL at 05:39

## 2017-05-01 ENCOUNTER — APPOINTMENT (OUTPATIENT)
Dept: GENERAL RADIOLOGY | Facility: HOSPITAL | Age: 57
End: 2017-05-01

## 2017-05-01 LAB
ACINETOBACTER SCREEN CX: NORMAL
ALBUMIN SERPL-MCNC: 4.5 G/DL (ref 3.5–5)
ANION GAP SERPL CALCULATED.3IONS-SCNC: 21.4 MMOL/L
BASOPHILS # BLD AUTO: 0.06 10*3/MM3 (ref 0–0.2)
BASOPHILS NFR BLD AUTO: 1.1 % (ref 0–2.5)
BUN BLD-MCNC: 30 MG/DL (ref 7–20)
BUN/CREAT SERPL: 5.3 (ref 6.3–21.9)
CALCIUM SPEC-SCNC: 9.7 MG/DL (ref 8.4–10.2)
CHLORIDE SERPL-SCNC: 90 MMOL/L (ref 98–107)
CO2 SERPL-SCNC: 22 MMOL/L (ref 26–30)
CREAT BLD-MCNC: 5.7 MG/DL (ref 0.6–1.3)
DEPRECATED RDW RBC AUTO: 53.6 FL (ref 37–54)
EOSINOPHIL # BLD AUTO: 0.45 10*3/MM3 (ref 0–0.7)
EOSINOPHIL NFR BLD AUTO: 8.5 % (ref 0–7)
ERYTHROCYTE [DISTWIDTH] IN BLOOD BY AUTOMATED COUNT: 15.4 % (ref 11.5–14.5)
GFR SERPL CREATININE-BSD FRML MDRD: 10 ML/MIN/1.73
GLUCOSE BLD-MCNC: 133 MG/DL (ref 74–98)
GLUCOSE BLDC GLUCOMTR-MCNC: 103 MG/DL (ref 70–130)
GLUCOSE BLDC GLUCOMTR-MCNC: 118 MG/DL (ref 70–130)
GLUCOSE BLDC GLUCOMTR-MCNC: 141 MG/DL (ref 70–130)
HCT VFR BLD AUTO: 38.9 % (ref 42–52)
HGB BLD-MCNC: 12.4 G/DL (ref 14–18)
IMM GRANULOCYTES # BLD: 0.03 10*3/MM3 (ref 0–0.06)
IMM GRANULOCYTES NFR BLD: 0.6 % (ref 0–0.6)
LYMPHOCYTES # BLD AUTO: 0.78 10*3/MM3 (ref 0.6–3.4)
LYMPHOCYTES NFR BLD AUTO: 14.8 % (ref 10–50)
MCH RBC QN AUTO: 30.4 PG (ref 27–31)
MCHC RBC AUTO-ENTMCNC: 31.9 G/DL (ref 30–37)
MCV RBC AUTO: 95.3 FL (ref 80–94)
MONOCYTES # BLD AUTO: 0.53 10*3/MM3 (ref 0–0.9)
MONOCYTES NFR BLD AUTO: 10 % (ref 0–12)
NEUTROPHILS # BLD AUTO: 3.43 10*3/MM3 (ref 2–6.9)
NEUTROPHILS NFR BLD AUTO: 65 % (ref 37–80)
NRBC BLD MANUAL-RTO: 0 /100 WBC (ref 0–0)
PHOSPHATE SERPL-MCNC: 5.8 MG/DL (ref 2.5–4.5)
PLATELET # BLD AUTO: 214 10*3/MM3 (ref 130–400)
PMV BLD AUTO: 10.3 FL (ref 6–12)
POTASSIUM BLD-SCNC: 5.4 MMOL/L (ref 3.5–5.1)
RBC # BLD AUTO: 4.08 10*6/MM3 (ref 4.7–6.1)
SODIUM BLD-SCNC: 128 MMOL/L (ref 137–145)
WBC NRBC COR # BLD: 5.28 10*3/MM3 (ref 4.8–10.8)

## 2017-05-01 PROCEDURE — 71010 HC CHEST PA OR AP: CPT

## 2017-05-01 PROCEDURE — 99239 HOSP IP/OBS DSCHRG MGMT >30: CPT | Performed by: INTERNAL MEDICINE

## 2017-05-01 PROCEDURE — 85025 COMPLETE CBC W/AUTO DIFF WBC: CPT | Performed by: INTERNAL MEDICINE

## 2017-05-01 PROCEDURE — 63710000001 INSULIN ASPART PER 5 UNITS: Performed by: FAMILY MEDICINE

## 2017-05-01 PROCEDURE — 25010000002 HEPARIN (PORCINE) PER 1000 UNITS: Performed by: INTERNAL MEDICINE

## 2017-05-01 PROCEDURE — 90935 HEMODIALYSIS ONE EVALUATION: CPT

## 2017-05-01 PROCEDURE — 80069 RENAL FUNCTION PANEL: CPT | Performed by: INTERNAL MEDICINE

## 2017-05-01 PROCEDURE — G0378 HOSPITAL OBSERVATION PER HR: HCPCS

## 2017-05-01 PROCEDURE — 82962 GLUCOSE BLOOD TEST: CPT

## 2017-05-01 PROCEDURE — 96372 THER/PROPH/DIAG INJ SC/IM: CPT

## 2017-05-01 RX ORDER — HYDROCODONE BITARTRATE AND ACETAMINOPHEN 5; 325 MG/1; MG/1
1 TABLET ORAL ONCE
Status: COMPLETED | OUTPATIENT
Start: 2017-05-01 | End: 2017-05-01

## 2017-05-01 RX ORDER — LOSARTAN POTASSIUM 100 MG/1
100 TABLET ORAL
Start: 2017-05-01 | End: 2019-01-01 | Stop reason: HOSPADM

## 2017-05-01 RX ORDER — HYDROCODONE BITARTRATE AND ACETAMINOPHEN 5; 325 MG/1; MG/1
1 TABLET ORAL ONCE
Refills: 0
Start: 2017-05-01 | End: 2017-05-01

## 2017-05-01 RX ORDER — METOPROLOL SUCCINATE 25 MG/1
25 TABLET, EXTENDED RELEASE ORAL
Start: 2017-05-02 | End: 2017-05-31 | Stop reason: ALTCHOICE

## 2017-05-01 RX ADMIN — FENOFIBRATE 48 MG: 48 TABLET, FILM COATED ORAL at 09:19

## 2017-05-01 RX ADMIN — INSULIN ASPART 12 UNITS: 100 INJECTION, SOLUTION INTRAVENOUS; SUBCUTANEOUS at 16:35

## 2017-05-01 RX ADMIN — ASPIRIN 81 MG: 81 TABLET, COATED ORAL at 09:20

## 2017-05-01 RX ADMIN — LEVOTHYROXINE SODIUM 100 MCG: 100 TABLET ORAL at 05:40

## 2017-05-01 RX ADMIN — INSULIN DETEMIR 20 UNITS: 100 INJECTION, SOLUTION SUBCUTANEOUS at 21:55

## 2017-05-01 RX ADMIN — HEPARIN SODIUM 5000 UNITS: 5000 INJECTION, SOLUTION INTRAVENOUS; SUBCUTANEOUS at 21:55

## 2017-05-01 RX ADMIN — LOSARTAN POTASSIUM 100 MG: 50 TABLET, FILM COATED ORAL at 09:20

## 2017-05-01 RX ADMIN — FOLIC ACID 1 MG: 1 TABLET ORAL at 09:20

## 2017-05-01 RX ADMIN — AMLODIPINE BESYLATE 5 MG: 5 TABLET ORAL at 09:20

## 2017-05-01 RX ADMIN — PANTOPRAZOLE SODIUM 40 MG: 40 TABLET, DELAYED RELEASE ORAL at 05:40

## 2017-05-01 RX ADMIN — INSULIN ASPART 12 UNITS: 100 INJECTION, SOLUTION INTRAVENOUS; SUBCUTANEOUS at 11:00

## 2017-05-01 RX ADMIN — HEPARIN SODIUM 5000 UNITS: 5000 INJECTION, SOLUTION INTRAVENOUS; SUBCUTANEOUS at 09:20

## 2017-05-01 RX ADMIN — HYDROCODONE BITARTRATE AND ACETAMINOPHEN 1 TABLET: 5; 325 TABLET ORAL at 16:06

## 2017-05-02 LAB
ALBUMIN SERPL-MCNC: 3.9 G/DL (ref 3.5–5)
ALBUMIN/GLOB SERPL: 1 G/DL (ref 1–2)
ALP SERPL-CCNC: 59 U/L (ref 38–126)
ALT SERPL W P-5'-P-CCNC: 19 U/L (ref 13–69)
ANION GAP SERPL CALCULATED.3IONS-SCNC: 15.9 MMOL/L
AST SERPL-CCNC: 25 U/L (ref 15–46)
BACTERIA FLD CULT: NO GROWTH
BACTERIA SPEC ANAEROBE CULT: NO GROWTH
BASOPHILS # BLD AUTO: 0.06 10*3/MM3 (ref 0–0.2)
BASOPHILS NFR BLD AUTO: 1 % (ref 0–2.5)
BILIRUB SERPL-MCNC: 0.6 MG/DL (ref 0.2–1.3)
BUN BLD-MCNC: 20 MG/DL (ref 7–20)
BUN/CREAT SERPL: 4.5 (ref 6.3–21.9)
CALCIUM SPEC-SCNC: 9 MG/DL (ref 8.4–10.2)
CHLORIDE SERPL-SCNC: 91 MMOL/L (ref 98–107)
CO2 SERPL-SCNC: 29 MMOL/L (ref 26–30)
CREAT BLD-MCNC: 4.4 MG/DL (ref 0.6–1.3)
DEPRECATED RDW RBC AUTO: 52.5 FL (ref 37–54)
EOSINOPHIL # BLD AUTO: 0.55 10*3/MM3 (ref 0–0.7)
EOSINOPHIL NFR BLD AUTO: 9.5 % (ref 0–7)
ERYTHROCYTE [DISTWIDTH] IN BLOOD BY AUTOMATED COUNT: 15.4 % (ref 11.5–14.5)
GFR SERPL CREATININE-BSD FRML MDRD: 14 ML/MIN/1.73
GLOBULIN UR ELPH-MCNC: 3.9 GM/DL
GLUCOSE BLD-MCNC: 186 MG/DL (ref 74–98)
GLUCOSE BLDC GLUCOMTR-MCNC: 127 MG/DL (ref 70–130)
GLUCOSE BLDC GLUCOMTR-MCNC: 194 MG/DL (ref 70–130)
GLUCOSE BLDC GLUCOMTR-MCNC: 211 MG/DL (ref 70–130)
GLUCOSE BLDC GLUCOMTR-MCNC: 73 MG/DL (ref 70–130)
HCT VFR BLD AUTO: 36.8 % (ref 42–52)
HGB BLD-MCNC: 11.9 G/DL (ref 14–18)
IMM GRANULOCYTES # BLD: 0.03 10*3/MM3 (ref 0–0.06)
IMM GRANULOCYTES NFR BLD: 0.5 % (ref 0–0.6)
LYMPHOCYTES # BLD AUTO: 0.67 10*3/MM3 (ref 0.6–3.4)
LYMPHOCYTES NFR BLD AUTO: 11.5 % (ref 10–50)
MCH RBC QN AUTO: 30.2 PG (ref 27–31)
MCHC RBC AUTO-ENTMCNC: 32.3 G/DL (ref 30–37)
MCV RBC AUTO: 93.4 FL (ref 80–94)
MONOCYTES # BLD AUTO: 0.74 10*3/MM3 (ref 0–0.9)
MONOCYTES NFR BLD AUTO: 12.7 % (ref 0–12)
NEUTROPHILS # BLD AUTO: 3.76 10*3/MM3 (ref 2–6.9)
NEUTROPHILS NFR BLD AUTO: 64.8 % (ref 37–80)
NRBC BLD MANUAL-RTO: 0 /100 WBC (ref 0–0)
PLATELET # BLD AUTO: 219 10*3/MM3 (ref 130–400)
PMV BLD AUTO: 10 FL (ref 6–12)
POTASSIUM BLD-SCNC: 4.9 MMOL/L (ref 3.5–5.1)
PROT SERPL-MCNC: 7.8 G/DL (ref 6.3–8.2)
RBC # BLD AUTO: 3.94 10*6/MM3 (ref 4.7–6.1)
SODIUM BLD-SCNC: 131 MMOL/L (ref 137–145)
WBC NRBC COR # BLD: 5.81 10*3/MM3 (ref 4.8–10.8)

## 2017-05-02 PROCEDURE — 63710000001 INSULIN DETEMIR PER 5 UNITS: Performed by: FAMILY MEDICINE

## 2017-05-02 PROCEDURE — 80053 COMPREHEN METABOLIC PANEL: CPT | Performed by: INTERNAL MEDICINE

## 2017-05-02 PROCEDURE — G0378 HOSPITAL OBSERVATION PER HR: HCPCS

## 2017-05-02 PROCEDURE — 25010000002 HEPARIN (PORCINE) PER 1000 UNITS: Performed by: INTERNAL MEDICINE

## 2017-05-02 PROCEDURE — 63710000001 INSULIN ASPART PER 5 UNITS: Performed by: FAMILY MEDICINE

## 2017-05-02 PROCEDURE — 96372 THER/PROPH/DIAG INJ SC/IM: CPT

## 2017-05-02 PROCEDURE — 85025 COMPLETE CBC W/AUTO DIFF WBC: CPT | Performed by: INTERNAL MEDICINE

## 2017-05-02 PROCEDURE — 82962 GLUCOSE BLOOD TEST: CPT

## 2017-05-02 RX ADMIN — AMLODIPINE BESYLATE 5 MG: 5 TABLET ORAL at 08:41

## 2017-05-02 RX ADMIN — LOSARTAN POTASSIUM 100 MG: 50 TABLET, FILM COATED ORAL at 08:42

## 2017-05-02 RX ADMIN — ASPIRIN 81 MG: 81 TABLET, COATED ORAL at 08:44

## 2017-05-02 RX ADMIN — HEPARIN SODIUM 5000 UNITS: 5000 INJECTION, SOLUTION INTRAVENOUS; SUBCUTANEOUS at 08:42

## 2017-05-02 RX ADMIN — INSULIN DETEMIR 40 UNITS: 100 INJECTION, SOLUTION SUBCUTANEOUS at 21:42

## 2017-05-02 RX ADMIN — PANTOPRAZOLE SODIUM 40 MG: 40 TABLET, DELAYED RELEASE ORAL at 05:06

## 2017-05-02 RX ADMIN — LEVOTHYROXINE SODIUM 100 MCG: 100 TABLET ORAL at 05:06

## 2017-05-02 RX ADMIN — METOPROLOL SUCCINATE 25 MG: 25 TABLET, EXTENDED RELEASE ORAL at 08:41

## 2017-05-02 RX ADMIN — FOLIC ACID 1 MG: 1 TABLET ORAL at 08:41

## 2017-05-02 RX ADMIN — INSULIN ASPART 12 UNITS: 100 INJECTION, SOLUTION INTRAVENOUS; SUBCUTANEOUS at 06:40

## 2017-05-02 RX ADMIN — FENOFIBRATE 48 MG: 48 TABLET, FILM COATED ORAL at 08:42

## 2017-05-02 RX ADMIN — HEPARIN SODIUM 5000 UNITS: 5000 INJECTION, SOLUTION INTRAVENOUS; SUBCUTANEOUS at 21:41

## 2017-05-03 LAB
ANION GAP SERPL CALCULATED.3IONS-SCNC: 17.6 MMOL/L
BASOPHILS # BLD AUTO: 0.09 10*3/MM3 (ref 0–0.2)
BASOPHILS NFR BLD AUTO: 1.7 % (ref 0–2.5)
BUN BLD-MCNC: 37 MG/DL (ref 7–20)
BUN/CREAT SERPL: 6.3 (ref 6.3–21.9)
CALCIUM SPEC-SCNC: 9.6 MG/DL (ref 8.4–10.2)
CHLORIDE SERPL-SCNC: 91 MMOL/L (ref 98–107)
CO2 SERPL-SCNC: 26 MMOL/L (ref 26–30)
CREAT BLD-MCNC: 5.9 MG/DL (ref 0.6–1.3)
DEPRECATED RDW RBC AUTO: 51.8 FL (ref 37–54)
EOSINOPHIL # BLD AUTO: 0.48 10*3/MM3 (ref 0–0.7)
EOSINOPHIL NFR BLD AUTO: 9.2 % (ref 0–7)
ERYTHROCYTE [DISTWIDTH] IN BLOOD BY AUTOMATED COUNT: 15.1 % (ref 11.5–14.5)
GFR SERPL CREATININE-BSD FRML MDRD: 10 ML/MIN/1.73
GLUCOSE BLD-MCNC: 114 MG/DL (ref 74–98)
GLUCOSE BLDC GLUCOMTR-MCNC: 147 MG/DL (ref 70–130)
GLUCOSE BLDC GLUCOMTR-MCNC: 227 MG/DL (ref 70–130)
GLUCOSE BLDC GLUCOMTR-MCNC: 96 MG/DL (ref 70–130)
HCT VFR BLD AUTO: 37.6 % (ref 42–52)
HGB BLD-MCNC: 11.8 G/DL (ref 14–18)
IMM GRANULOCYTES # BLD: 0.04 10*3/MM3 (ref 0–0.06)
IMM GRANULOCYTES NFR BLD: 0.8 % (ref 0–0.6)
LYMPHOCYTES # BLD AUTO: 0.77 10*3/MM3 (ref 0.6–3.4)
LYMPHOCYTES NFR BLD AUTO: 14.7 % (ref 10–50)
MCH RBC QN AUTO: 29.3 PG (ref 27–31)
MCHC RBC AUTO-ENTMCNC: 31.4 G/DL (ref 30–37)
MCV RBC AUTO: 93.3 FL (ref 80–94)
MONOCYTES # BLD AUTO: 0.75 10*3/MM3 (ref 0–0.9)
MONOCYTES NFR BLD AUTO: 14.3 % (ref 0–12)
NEUTROPHILS # BLD AUTO: 3.11 10*3/MM3 (ref 2–6.9)
NEUTROPHILS NFR BLD AUTO: 59.3 % (ref 37–80)
NRBC BLD MANUAL-RTO: 0 /100 WBC (ref 0–0)
PLATELET # BLD AUTO: 227 10*3/MM3 (ref 130–400)
PMV BLD AUTO: 10.2 FL (ref 6–12)
POTASSIUM BLD-SCNC: 5.6 MMOL/L (ref 3.5–5.1)
RBC # BLD AUTO: 4.03 10*6/MM3 (ref 4.7–6.1)
SODIUM BLD-SCNC: 129 MMOL/L (ref 137–145)
WBC NRBC COR # BLD: 5.24 10*3/MM3 (ref 4.8–10.8)

## 2017-05-03 PROCEDURE — 63710000001 INSULIN DETEMIR PER 5 UNITS: Performed by: FAMILY MEDICINE

## 2017-05-03 PROCEDURE — G0378 HOSPITAL OBSERVATION PER HR: HCPCS

## 2017-05-03 PROCEDURE — 82962 GLUCOSE BLOOD TEST: CPT

## 2017-05-03 PROCEDURE — 80048 BASIC METABOLIC PNL TOTAL CA: CPT | Performed by: INTERNAL MEDICINE

## 2017-05-03 PROCEDURE — 96372 THER/PROPH/DIAG INJ SC/IM: CPT

## 2017-05-03 PROCEDURE — 25010000002 HEPARIN (PORCINE) PER 1000 UNITS: Performed by: INTERNAL MEDICINE

## 2017-05-03 PROCEDURE — 85025 COMPLETE CBC W/AUTO DIFF WBC: CPT | Performed by: INTERNAL MEDICINE

## 2017-05-03 PROCEDURE — 90935 HEMODIALYSIS ONE EVALUATION: CPT

## 2017-05-03 RX ORDER — SODIUM POLYSTYRENE SULFONATE 15 G/60ML
15 SUSPENSION ORAL; RECTAL ONCE
Status: DISCONTINUED | OUTPATIENT
Start: 2017-05-03 | End: 2017-05-03

## 2017-05-03 RX ADMIN — LEVOTHYROXINE SODIUM 100 MCG: 100 TABLET ORAL at 05:37

## 2017-05-03 RX ADMIN — HEPARIN SODIUM 5000 UNITS: 5000 INJECTION, SOLUTION INTRAVENOUS; SUBCUTANEOUS at 22:08

## 2017-05-03 RX ADMIN — ASPIRIN 81 MG: 81 TABLET, COATED ORAL at 09:23

## 2017-05-03 RX ADMIN — ACETAMINOPHEN 650 MG: 325 TABLET, FILM COATED ORAL at 15:22

## 2017-05-03 RX ADMIN — PANTOPRAZOLE SODIUM 40 MG: 40 TABLET, DELAYED RELEASE ORAL at 05:37

## 2017-05-03 RX ADMIN — FOLIC ACID 1 MG: 1 TABLET ORAL at 09:23

## 2017-05-03 RX ADMIN — HEPARIN SODIUM 5000 UNITS: 5000 INJECTION, SOLUTION INTRAVENOUS; SUBCUTANEOUS at 09:23

## 2017-05-03 RX ADMIN — METOPROLOL SUCCINATE 25 MG: 25 TABLET, EXTENDED RELEASE ORAL at 09:26

## 2017-05-03 RX ADMIN — LOSARTAN POTASSIUM 100 MG: 50 TABLET, FILM COATED ORAL at 09:26

## 2017-05-03 RX ADMIN — INSULIN DETEMIR 40 UNITS: 100 INJECTION, SOLUTION SUBCUTANEOUS at 22:09

## 2017-05-03 RX ADMIN — AMLODIPINE BESYLATE 5 MG: 5 TABLET ORAL at 09:25

## 2017-05-04 VITALS
BODY MASS INDEX: 20.72 KG/M2 | HEIGHT: 68 IN | WEIGHT: 136.7 LBS | TEMPERATURE: 97.5 F | OXYGEN SATURATION: 96 % | RESPIRATION RATE: 20 BRPM | SYSTOLIC BLOOD PRESSURE: 125 MMHG | HEART RATE: 94 BPM | DIASTOLIC BLOOD PRESSURE: 64 MMHG

## 2017-05-04 LAB
ANION GAP SERPL CALCULATED.3IONS-SCNC: 15.6 MMOL/L
BUN BLD-MCNC: 25 MG/DL (ref 7–20)
BUN/CREAT SERPL: 5.8 (ref 6.3–21.9)
CALCIUM SPEC-SCNC: 9.3 MG/DL (ref 8.4–10.2)
CHLORIDE SERPL-SCNC: 97 MMOL/L (ref 98–107)
CO2 SERPL-SCNC: 25 MMOL/L (ref 26–30)
CREAT BLD-MCNC: 4.3 MG/DL (ref 0.6–1.3)
GFR SERPL CREATININE-BSD FRML MDRD: 14 ML/MIN/1.73
GLUCOSE BLD-MCNC: 108 MG/DL (ref 74–98)
GLUCOSE BLDC GLUCOMTR-MCNC: 148 MG/DL (ref 70–130)
GLUCOSE BLDC GLUCOMTR-MCNC: 212 MG/DL (ref 70–130)
GLUCOSE BLDC GLUCOMTR-MCNC: 68 MG/DL (ref 70–130)
GLUCOSE BLDC GLUCOMTR-MCNC: 72 MG/DL (ref 70–130)
LAB AP CASE REPORT: NORMAL
LAB AP DIAGNOSIS COMMENT: NORMAL
Lab: NORMAL
POTASSIUM BLD-SCNC: 4.6 MMOL/L (ref 3.5–5.1)
SODIUM BLD-SCNC: 133 MMOL/L (ref 137–145)

## 2017-05-04 PROCEDURE — G0378 HOSPITAL OBSERVATION PER HR: HCPCS

## 2017-05-04 PROCEDURE — 25010000002 HEPARIN (PORCINE) PER 1000 UNITS: Performed by: INTERNAL MEDICINE

## 2017-05-04 PROCEDURE — 63710000001 INSULIN DETEMIR PER 5 UNITS: Performed by: FAMILY MEDICINE

## 2017-05-04 PROCEDURE — 63710000001 INSULIN ASPART PER 5 UNITS: Performed by: FAMILY MEDICINE

## 2017-05-04 PROCEDURE — 96372 THER/PROPH/DIAG INJ SC/IM: CPT

## 2017-05-04 PROCEDURE — 80048 BASIC METABOLIC PNL TOTAL CA: CPT | Performed by: INTERNAL MEDICINE

## 2017-05-04 PROCEDURE — 82962 GLUCOSE BLOOD TEST: CPT

## 2017-05-04 RX ADMIN — ASPIRIN 81 MG: 81 TABLET, COATED ORAL at 08:51

## 2017-05-04 RX ADMIN — METOPROLOL SUCCINATE 25 MG: 25 TABLET, EXTENDED RELEASE ORAL at 08:51

## 2017-05-04 RX ADMIN — LEVOTHYROXINE SODIUM 100 MCG: 100 TABLET ORAL at 06:38

## 2017-05-04 RX ADMIN — AMLODIPINE BESYLATE 5 MG: 5 TABLET ORAL at 08:50

## 2017-05-04 RX ADMIN — HEPARIN SODIUM 5000 UNITS: 5000 INJECTION, SOLUTION INTRAVENOUS; SUBCUTANEOUS at 08:50

## 2017-05-04 RX ADMIN — FOLIC ACID 1 MG: 1 TABLET ORAL at 08:51

## 2017-05-04 RX ADMIN — INSULIN DETEMIR 40 UNITS: 100 INJECTION, SOLUTION SUBCUTANEOUS at 20:20

## 2017-05-04 RX ADMIN — HEPARIN SODIUM 5000 UNITS: 5000 INJECTION, SOLUTION INTRAVENOUS; SUBCUTANEOUS at 20:19

## 2017-05-04 RX ADMIN — ACETAMINOPHEN 650 MG: 325 TABLET, FILM COATED ORAL at 18:36

## 2017-05-04 RX ADMIN — LOSARTAN POTASSIUM 100 MG: 50 TABLET, FILM COATED ORAL at 08:51

## 2017-05-04 RX ADMIN — PANTOPRAZOLE SODIUM 40 MG: 40 TABLET, DELAYED RELEASE ORAL at 06:38

## 2017-05-04 RX ADMIN — INSULIN ASPART 12 UNITS: 100 INJECTION, SOLUTION INTRAVENOUS; SUBCUTANEOUS at 12:30

## 2017-05-26 ENCOUNTER — TELEPHONE (OUTPATIENT)
Dept: PULMONOLOGY | Facility: CLINIC | Age: 57
End: 2017-05-26

## 2017-05-30 LAB
BACTERIA SPEC AEROBE CULT: NORMAL
FUNGUS SPEC CULT: NORMAL
FUNGUS SPEC CULT: NORMAL
FUNGUS SPEC FUNGUS STN: NORMAL

## 2017-05-31 ENCOUNTER — OFFICE VISIT (OUTPATIENT)
Dept: PULMONOLOGY | Facility: CLINIC | Age: 57
End: 2017-05-31

## 2017-05-31 VITALS
HEART RATE: 94 BPM | DIASTOLIC BLOOD PRESSURE: 56 MMHG | WEIGHT: 137 LBS | SYSTOLIC BLOOD PRESSURE: 110 MMHG | OXYGEN SATURATION: 98 % | BODY MASS INDEX: 20.83 KG/M2

## 2017-05-31 DIAGNOSIS — R59.0 MEDIASTINAL LYMPHADENOPATHY: ICD-10-CM

## 2017-05-31 DIAGNOSIS — J90 PLEURAL EFFUSION: Primary | ICD-10-CM

## 2017-05-31 PROCEDURE — 99214 OFFICE O/P EST MOD 30 MIN: CPT | Performed by: INTERNAL MEDICINE

## 2017-05-31 RX ORDER — AMOXICILLIN AND CLAVULANATE POTASSIUM 500; 125 MG/1; MG/1
TABLET, FILM COATED ORAL
COMMUNITY
Start: 2017-04-12 | End: 2017-06-14 | Stop reason: HOSPADM

## 2017-05-31 RX ORDER — METOPROLOL SUCCINATE 50 MG/1
50 TABLET, EXTENDED RELEASE ORAL DAILY
Status: ON HOLD | COMMUNITY
Start: 2017-05-15 | End: 2017-11-03

## 2017-05-31 RX ORDER — AMLODIPINE BESYLATE 10 MG/1
10 TABLET ORAL DAILY
COMMUNITY
Start: 2017-05-15 | End: 2019-01-01 | Stop reason: HOSPADM

## 2017-06-09 LAB
ACID FAST STN SPEC: NEGATIVE
BACTERIA SPEC AEROBE CULT: NEGATIVE
SPECIMEN PREPARATION: NORMAL

## 2017-06-13 ENCOUNTER — APPOINTMENT (OUTPATIENT)
Dept: CT IMAGING | Facility: HOSPITAL | Age: 57
End: 2017-06-13

## 2017-06-13 ENCOUNTER — APPOINTMENT (OUTPATIENT)
Dept: GENERAL RADIOLOGY | Facility: HOSPITAL | Age: 57
End: 2017-06-13

## 2017-06-13 ENCOUNTER — HOSPITAL ENCOUNTER (INPATIENT)
Facility: HOSPITAL | Age: 57
LOS: 1 days | Discharge: ANOTHER HOSPITAL | End: 2017-06-14
Attending: EMERGENCY MEDICINE | Admitting: FAMILY MEDICINE

## 2017-06-13 DIAGNOSIS — R73.9 HYPERGLYCEMIA: ICD-10-CM

## 2017-06-13 DIAGNOSIS — R06.02 SOB (SHORTNESS OF BREATH): Primary | ICD-10-CM

## 2017-06-13 PROBLEM — E11.65 UNCONTROLLED DIABETES MELLITUS WITH HYPERGLYCEMIA, WITH LONG-TERM CURRENT USE OF INSULIN (HCC): Chronic | Status: ACTIVE | Noted: 2017-06-13

## 2017-06-13 PROBLEM — N18.6 HYPERTENSION DUE TO END STAGE RENAL DISEASE CAUSED BY TYPE 2 DIABETES MELLITUS, ON DIALYSIS (HCC): Chronic | Status: ACTIVE | Noted: 2017-06-13

## 2017-06-13 PROBLEM — R06.89 CHRONIC RESPIRATORY INSUFFICIENCY: Chronic | Status: ACTIVE | Noted: 2017-06-13

## 2017-06-13 PROBLEM — E11.22 HYPERTENSION DUE TO END STAGE RENAL DISEASE CAUSED BY TYPE 2 DIABETES MELLITUS, ON DIALYSIS (HCC): Chronic | Status: ACTIVE | Noted: 2017-06-13

## 2017-06-13 PROBLEM — Z99.2 END STAGE RENAL DISEASE ON DIALYSIS (HCC): Chronic | Status: ACTIVE | Noted: 2017-06-13

## 2017-06-13 PROBLEM — Z79.4 TYPE 2 DIABETES MELLITUS TREATED WITH INSULIN (HCC): Chronic | Status: ACTIVE | Noted: 2017-06-13

## 2017-06-13 PROBLEM — Z79.4 UNCONTROLLED DIABETES MELLITUS WITH HYPERGLYCEMIA, WITH LONG-TERM CURRENT USE OF INSULIN (HCC): Chronic | Status: ACTIVE | Noted: 2017-06-13

## 2017-06-13 PROBLEM — E11.9 TYPE 2 DIABETES MELLITUS TREATED WITH INSULIN (HCC): Chronic | Status: ACTIVE | Noted: 2017-06-13

## 2017-06-13 PROBLEM — I12.0 HYPERTENSION DUE TO END STAGE RENAL DISEASE CAUSED BY TYPE 2 DIABETES MELLITUS, ON DIALYSIS (HCC): Chronic | Status: ACTIVE | Noted: 2017-06-13

## 2017-06-13 PROBLEM — Z99.2 HYPERTENSION DUE TO END STAGE RENAL DISEASE CAUSED BY TYPE 2 DIABETES MELLITUS, ON DIALYSIS (HCC): Chronic | Status: ACTIVE | Noted: 2017-06-13

## 2017-06-13 PROBLEM — R53.81 PHYSICAL DEBILITY: Chronic | Status: ACTIVE | Noted: 2017-06-13

## 2017-06-13 PROBLEM — N18.6 END STAGE RENAL DISEASE ON DIALYSIS (HCC): Chronic | Status: ACTIVE | Noted: 2017-06-13

## 2017-06-13 PROBLEM — E87.5 HYPERKALEMIA: Status: ACTIVE | Noted: 2017-06-13

## 2017-06-13 LAB
ALBUMIN SERPL-MCNC: 3.7 G/DL (ref 3.5–5)
ALBUMIN/GLOB SERPL: 0.9 G/DL (ref 1–2)
ALP SERPL-CCNC: 96 U/L (ref 38–126)
ALT SERPL W P-5'-P-CCNC: 19 U/L (ref 13–69)
ANION GAP SERPL CALCULATED.3IONS-SCNC: 17.6 MMOL/L
ANION GAP SERPL CALCULATED.3IONS-SCNC: 20.8 MMOL/L
ANION GAP SERPL CALCULATED.3IONS-SCNC: 23.6 MMOL/L
ANION GAP SERPL CALCULATED.3IONS-SCNC: 24.3 MMOL/L
ARTERIAL PATENCY WRIST A: POSITIVE
AST SERPL-CCNC: 39 U/L (ref 15–46)
BASE EXCESS BLDA CALC-SCNC: 1.6 MMOL/L
BASOPHILS # BLD AUTO: 0.05 10*3/MM3 (ref 0–0.2)
BASOPHILS # BLD AUTO: 0.08 10*3/MM3 (ref 0–0.2)
BASOPHILS NFR BLD AUTO: 0.5 % (ref 0–2.5)
BASOPHILS NFR BLD AUTO: 0.6 % (ref 0–2.5)
BDY SITE: ABNORMAL
BILIRUB SERPL-MCNC: 1.1 MG/DL (ref 0.2–1.3)
BUN BLD-MCNC: 13 MG/DL (ref 7–20)
BUN BLD-MCNC: 36 MG/DL (ref 7–20)
BUN BLD-MCNC: 36 MG/DL (ref 7–20)
BUN BLD-MCNC: 38 MG/DL (ref 7–20)
BUN/CREAT SERPL: 5 (ref 6.3–21.9)
BUN/CREAT SERPL: 5.6 (ref 6.3–21.9)
BUN/CREAT SERPL: 6.1 (ref 6.3–21.9)
BUN/CREAT SERPL: 6.1 (ref 6.3–21.9)
CALCIUM SPEC-SCNC: 8.6 MG/DL (ref 8.4–10.2)
CALCIUM SPEC-SCNC: 8.9 MG/DL (ref 8.4–10.2)
CALCIUM SPEC-SCNC: 9.1 MG/DL (ref 8.4–10.2)
CALCIUM SPEC-SCNC: 9.2 MG/DL (ref 8.4–10.2)
CHLORIDE SERPL-SCNC: 79 MMOL/L (ref 98–107)
CHLORIDE SERPL-SCNC: 84 MMOL/L (ref 98–107)
CHLORIDE SERPL-SCNC: 85 MMOL/L (ref 98–107)
CHLORIDE SERPL-SCNC: 98 MMOL/L (ref 98–107)
CO2 SERPL-SCNC: 24 MMOL/L (ref 26–30)
CO2 SERPL-SCNC: 25 MMOL/L (ref 26–30)
CO2 SERPL-SCNC: 26 MMOL/L (ref 26–30)
CO2 SERPL-SCNC: 26 MMOL/L (ref 26–30)
COHGB MFR BLD: 1.3 %
CREAT BLD-MCNC: 2.6 MG/DL (ref 0.6–1.3)
CREAT BLD-MCNC: 5.9 MG/DL (ref 0.6–1.3)
CREAT BLD-MCNC: 6.2 MG/DL (ref 0.6–1.3)
CREAT BLD-MCNC: 6.4 MG/DL (ref 0.6–1.3)
DEPRECATED RDW RBC AUTO: 48.4 FL (ref 37–54)
DEPRECATED RDW RBC AUTO: 53.8 FL (ref 37–54)
EOSINOPHIL # BLD AUTO: 0.17 10*3/MM3 (ref 0–0.7)
EOSINOPHIL # BLD AUTO: 0.17 10*3/MM3 (ref 0–0.7)
EOSINOPHIL NFR BLD AUTO: 1.2 % (ref 0–7)
EOSINOPHIL NFR BLD AUTO: 1.6 % (ref 0–7)
ERYTHROCYTE [DISTWIDTH] IN BLOOD BY AUTOMATED COUNT: 15.8 % (ref 11.5–14.5)
ERYTHROCYTE [DISTWIDTH] IN BLOOD BY AUTOMATED COUNT: 16.7 % (ref 11.5–14.5)
GFR SERPL CREATININE-BSD FRML MDRD: 10 ML/MIN/1.73
GFR SERPL CREATININE-BSD FRML MDRD: 25 ML/MIN/1.73
GFR SERPL CREATININE-BSD FRML MDRD: 9 ML/MIN/1.73
GFR SERPL CREATININE-BSD FRML MDRD: 9 ML/MIN/1.73
GLOBULIN UR ELPH-MCNC: 4 GM/DL
GLUCOSE BLD-MCNC: 163 MG/DL (ref 74–98)
GLUCOSE BLD-MCNC: 618 MG/DL (ref 74–98)
GLUCOSE BLD-MCNC: 784 MG/DL (ref 74–98)
GLUCOSE BLD-MCNC: 94 MG/DL (ref 74–98)
GLUCOSE BLDC GLUCOMTR-MCNC: 116 MG/DL (ref 70–130)
GLUCOSE BLDC GLUCOMTR-MCNC: 120 MG/DL (ref 70–130)
GLUCOSE BLDC GLUCOMTR-MCNC: 164 MG/DL (ref 70–130)
GLUCOSE BLDC GLUCOMTR-MCNC: 190 MG/DL (ref 70–130)
GLUCOSE BLDC GLUCOMTR-MCNC: 310 MG/DL (ref 70–130)
GLUCOSE BLDC GLUCOMTR-MCNC: 487 MG/DL (ref 70–130)
GLUCOSE BLDC GLUCOMTR-MCNC: 69 MG/DL (ref 70–130)
GLUCOSE BLDC GLUCOMTR-MCNC: 87 MG/DL (ref 70–130)
GLUCOSE BLDC GLUCOMTR-MCNC: >599 MG/DL (ref 70–130)
HCO3 BLDA-SCNC: 26.1 MMOL/L (ref 22–28)
HCT VFR BLD AUTO: 35.7 % (ref 42–52)
HCT VFR BLD AUTO: 37.8 % (ref 42–52)
HGB BLD-MCNC: 11.6 G/DL (ref 14–18)
HGB BLD-MCNC: 12.7 G/DL (ref 14–18)
HGB BLDA-MCNC: 12.3 G/DL (ref 12–18)
HOLD SPECIMEN: NORMAL
HOLD SPECIMEN: NORMAL
HOROWITZ INDEX BLD+IHG-RTO: 32 %
IMM GRANULOCYTES # BLD: 0.09 10*3/MM3 (ref 0–0.06)
IMM GRANULOCYTES # BLD: 0.19 10*3/MM3 (ref 0–0.06)
IMM GRANULOCYTES NFR BLD: 0.8 % (ref 0–0.6)
IMM GRANULOCYTES NFR BLD: 1.4 % (ref 0–0.6)
LYMPHOCYTES # BLD AUTO: 0.49 10*3/MM3 (ref 0.6–3.4)
LYMPHOCYTES # BLD AUTO: 0.51 10*3/MM3 (ref 0.6–3.4)
LYMPHOCYTES NFR BLD AUTO: 3.7 % (ref 10–50)
LYMPHOCYTES NFR BLD AUTO: 4.5 % (ref 10–50)
MCH RBC QN AUTO: 28.7 PG (ref 27–31)
MCH RBC QN AUTO: 28.9 PG (ref 27–31)
MCHC RBC AUTO-ENTMCNC: 32.5 G/DL (ref 30–37)
MCHC RBC AUTO-ENTMCNC: 33.6 G/DL (ref 30–37)
MCV RBC AUTO: 85.3 FL (ref 80–94)
MCV RBC AUTO: 89 FL (ref 80–94)
METHGB BLD QL: 0.9 %
MODALITY: ABNORMAL
MONOCYTES # BLD AUTO: 0.75 10*3/MM3 (ref 0–0.9)
MONOCYTES # BLD AUTO: 1.02 10*3/MM3 (ref 0–0.9)
MONOCYTES NFR BLD AUTO: 6.9 % (ref 0–12)
MONOCYTES NFR BLD AUTO: 7.3 % (ref 0–12)
NEUTROPHILS # BLD AUTO: 11.94 10*3/MM3 (ref 2–6.9)
NEUTROPHILS # BLD AUTO: 9.39 10*3/MM3 (ref 2–6.9)
NEUTROPHILS NFR BLD AUTO: 85.7 % (ref 37–80)
NEUTROPHILS NFR BLD AUTO: 85.8 % (ref 37–80)
NRBC BLD MANUAL-RTO: 0 /100 WBC (ref 0–0)
NRBC BLD MANUAL-RTO: 0 /100 WBC (ref 0–0)
NT-PROBNP SERPL-MCNC: ABNORMAL PG/ML (ref 0–125)
OXYHGB MFR BLDV: 92.8 % (ref 94–99)
PCO2 BLDA: 40.8 MM HG (ref 35–45)
PH BLDA: 7.42 PH UNITS
PLATELET # BLD AUTO: 282 10*3/MM3 (ref 130–400)
PLATELET # BLD AUTO: 341 10*3/MM3 (ref 130–400)
PMV BLD AUTO: 10.4 FL (ref 6–12)
PMV BLD AUTO: 9.9 FL (ref 6–12)
PO2 BLDA: 77.7 MM HG (ref 75–100)
POTASSIUM BLD-SCNC: 3.6 MMOL/L (ref 3.5–5.1)
POTASSIUM BLD-SCNC: 4.6 MMOL/L (ref 3.5–5.1)
POTASSIUM BLD-SCNC: 5.3 MMOL/L (ref 3.5–5.1)
POTASSIUM BLD-SCNC: 5.8 MMOL/L (ref 3.5–5.1)
PROT SERPL-MCNC: 7.7 G/DL (ref 6.3–8.2)
RBC # BLD AUTO: 4.01 10*6/MM3 (ref 4.7–6.1)
RBC # BLD AUTO: 4.43 10*6/MM3 (ref 4.7–6.1)
SAO2 % BLDCOA: 94.9 %
SODIUM BLD-SCNC: 120 MMOL/L (ref 137–145)
SODIUM BLD-SCNC: 127 MMOL/L (ref 137–145)
SODIUM BLD-SCNC: 129 MMOL/L (ref 137–145)
SODIUM BLD-SCNC: 138 MMOL/L (ref 137–145)
TROPONIN I SERPL-MCNC: 0.01 NG/ML (ref 0–0.03)
WBC NRBC COR # BLD: 10.94 10*3/MM3 (ref 4.8–10.8)
WBC NRBC COR # BLD: 13.91 10*3/MM3 (ref 4.8–10.8)
WHOLE BLOOD HOLD SPECIMEN: NORMAL
WHOLE BLOOD HOLD SPECIMEN: NORMAL

## 2017-06-13 PROCEDURE — 90935 HEMODIALYSIS ONE EVALUATION: CPT

## 2017-06-13 PROCEDURE — 87081 CULTURE SCREEN ONLY: CPT | Performed by: FAMILY MEDICINE

## 2017-06-13 PROCEDURE — 99285 EMERGENCY DEPT VISIT HI MDM: CPT

## 2017-06-13 PROCEDURE — 82962 GLUCOSE BLOOD TEST: CPT

## 2017-06-13 PROCEDURE — 83050 HGB METHEMOGLOBIN QUAN: CPT

## 2017-06-13 PROCEDURE — 87040 BLOOD CULTURE FOR BACTERIA: CPT | Performed by: INTERNAL MEDICINE

## 2017-06-13 PROCEDURE — 71020 HC CHEST PA AND LATERAL: CPT

## 2017-06-13 PROCEDURE — 5A1D60Z PERFORMANCE OF URINARY FILTRATION, MULTIPLE: ICD-10-PCS | Performed by: INTERNAL MEDICINE

## 2017-06-13 PROCEDURE — 85025 COMPLETE CBC W/AUTO DIFF WBC: CPT | Performed by: FAMILY MEDICINE

## 2017-06-13 PROCEDURE — 82805 BLOOD GASES W/O2 SATURATION: CPT

## 2017-06-13 PROCEDURE — 25010000002 PIPERACILLIN SOD-TAZOBACTAM PER 1 G: Performed by: INTERNAL MEDICINE

## 2017-06-13 PROCEDURE — 71250 CT THORAX DX C-: CPT

## 2017-06-13 PROCEDURE — 25010000002 VANCOMYCIN PER 500 MG: Performed by: INTERNAL MEDICINE

## 2017-06-13 PROCEDURE — 80053 COMPREHEN METABOLIC PANEL: CPT | Performed by: EMERGENCY MEDICINE

## 2017-06-13 PROCEDURE — 82375 ASSAY CARBOXYHB QUANT: CPT

## 2017-06-13 PROCEDURE — 25010000002 LEVOFLOXACIN PER 250 MG: Performed by: INTERNAL MEDICINE

## 2017-06-13 PROCEDURE — 93005 ELECTROCARDIOGRAM TRACING: CPT | Performed by: EMERGENCY MEDICINE

## 2017-06-13 PROCEDURE — 25010000002 ONDANSETRON PER 1 MG: Performed by: FAMILY MEDICINE

## 2017-06-13 PROCEDURE — 25010000002 INSULIN REGULAR HUMAN PER 5 UNITS: Performed by: EMERGENCY MEDICINE

## 2017-06-13 PROCEDURE — 85025 COMPLETE CBC W/AUTO DIFF WBC: CPT | Performed by: EMERGENCY MEDICINE

## 2017-06-13 PROCEDURE — 63710000001 INSULIN DETEMIR PER 5 UNITS: Performed by: INTERNAL MEDICINE

## 2017-06-13 PROCEDURE — 83880 ASSAY OF NATRIURETIC PEPTIDE: CPT | Performed by: EMERGENCY MEDICINE

## 2017-06-13 PROCEDURE — 84484 ASSAY OF TROPONIN QUANT: CPT | Performed by: EMERGENCY MEDICINE

## 2017-06-13 PROCEDURE — 99223 1ST HOSP IP/OBS HIGH 75: CPT | Performed by: INTERNAL MEDICINE

## 2017-06-13 PROCEDURE — 25010000002 HEPARIN (PORCINE) PER 1000 UNITS: Performed by: FAMILY MEDICINE

## 2017-06-13 PROCEDURE — 99223 1ST HOSP IP/OBS HIGH 75: CPT | Performed by: FAMILY MEDICINE

## 2017-06-13 PROCEDURE — 36600 WITHDRAWAL OF ARTERIAL BLOOD: CPT

## 2017-06-13 RX ORDER — SODIUM CHLORIDE 0.9 % (FLUSH) 0.9 %
30 SYRINGE (ML) INJECTION ONCE AS NEEDED
Status: DISCONTINUED | OUTPATIENT
Start: 2017-06-13 | End: 2017-06-14

## 2017-06-13 RX ORDER — OXYCODONE HYDROCHLORIDE AND ACETAMINOPHEN 5; 325 MG/1; MG/1
1 TABLET ORAL ONCE
Status: COMPLETED | OUTPATIENT
Start: 2017-06-13 | End: 2017-06-13

## 2017-06-13 RX ORDER — OXYCODONE HYDROCHLORIDE AND ACETAMINOPHEN 5; 325 MG/1; MG/1
1 TABLET ORAL EVERY 4 HOURS PRN
Status: DISCONTINUED | OUTPATIENT
Start: 2017-06-13 | End: 2017-06-14 | Stop reason: HOSPADM

## 2017-06-13 RX ORDER — AMLODIPINE BESYLATE 5 MG/1
5 TABLET ORAL
Status: DISCONTINUED | OUTPATIENT
Start: 2017-06-13 | End: 2017-06-14 | Stop reason: HOSPADM

## 2017-06-13 RX ORDER — SODIUM CHLORIDE 9 MG/ML
30 INJECTION, SOLUTION INTRAVENOUS CONTINUOUS PRN
Status: DISCONTINUED | OUTPATIENT
Start: 2017-06-13 | End: 2017-06-14

## 2017-06-13 RX ORDER — FAMOTIDINE 20 MG/1
40 TABLET, FILM COATED ORAL DAILY
Status: DISCONTINUED | OUTPATIENT
Start: 2017-06-13 | End: 2017-06-14 | Stop reason: HOSPADM

## 2017-06-13 RX ORDER — LANTHANUM CARBONATE 500 MG/1
1000 TABLET, CHEWABLE ORAL
Status: DISCONTINUED | OUTPATIENT
Start: 2017-06-13 | End: 2017-06-14 | Stop reason: HOSPADM

## 2017-06-13 RX ORDER — DEXTROSE MONOHYDRATE 25 G/50ML
25 INJECTION, SOLUTION INTRAVENOUS
Status: DISCONTINUED | OUTPATIENT
Start: 2017-06-13 | End: 2017-06-14 | Stop reason: HOSPADM

## 2017-06-13 RX ORDER — LEVOFLOXACIN 5 MG/ML
500 INJECTION, SOLUTION INTRAVENOUS
Status: DISCONTINUED | OUTPATIENT
Start: 2017-06-13 | End: 2017-06-13

## 2017-06-13 RX ORDER — FOLIC ACID 1 MG/1
1 TABLET ORAL DAILY
Status: DISCONTINUED | OUTPATIENT
Start: 2017-06-13 | End: 2017-06-14 | Stop reason: HOSPADM

## 2017-06-13 RX ORDER — ASPIRIN 325 MG
325 TABLET ORAL DAILY
Status: DISCONTINUED | OUTPATIENT
Start: 2017-06-13 | End: 2017-06-14 | Stop reason: HOSPADM

## 2017-06-13 RX ORDER — SODIUM CHLORIDE 0.9 % (FLUSH) 0.9 %
10 SYRINGE (ML) INJECTION AS NEEDED
Status: DISCONTINUED | OUTPATIENT
Start: 2017-06-13 | End: 2017-06-14

## 2017-06-13 RX ORDER — SODIUM CHLORIDE 9 MG/ML
30 INJECTION, SOLUTION INTRAVENOUS CONTINUOUS
Status: DISCONTINUED | OUTPATIENT
Start: 2017-06-13 | End: 2017-06-14 | Stop reason: HOSPADM

## 2017-06-13 RX ORDER — ONDANSETRON 2 MG/ML
4 INJECTION INTRAMUSCULAR; INTRAVENOUS EVERY 6 HOURS PRN
Status: DISCONTINUED | OUTPATIENT
Start: 2017-06-13 | End: 2017-06-14 | Stop reason: HOSPADM

## 2017-06-13 RX ORDER — METOPROLOL SUCCINATE 50 MG/1
50 TABLET, EXTENDED RELEASE ORAL
Status: DISCONTINUED | OUTPATIENT
Start: 2017-06-13 | End: 2017-06-14 | Stop reason: HOSPADM

## 2017-06-13 RX ORDER — LEVOTHYROXINE SODIUM 0.1 MG/1
100 TABLET ORAL DAILY
Status: DISCONTINUED | OUTPATIENT
Start: 2017-06-13 | End: 2017-06-14 | Stop reason: HOSPADM

## 2017-06-13 RX ORDER — HEPARIN SODIUM 5000 [USP'U]/ML
5000 INJECTION, SOLUTION INTRAVENOUS; SUBCUTANEOUS EVERY 12 HOURS SCHEDULED
Status: DISCONTINUED | OUTPATIENT
Start: 2017-06-13 | End: 2017-06-14 | Stop reason: HOSPADM

## 2017-06-13 RX ORDER — SODIUM CHLORIDE 0.9 % (FLUSH) 0.9 %
10 SYRINGE (ML) INJECTION AS NEEDED
Status: DISCONTINUED | OUTPATIENT
Start: 2017-06-13 | End: 2017-06-14 | Stop reason: HOSPADM

## 2017-06-13 RX ORDER — SODIUM CHLORIDE 9 MG/ML
125 INJECTION, SOLUTION INTRAVENOUS CONTINUOUS
Status: ACTIVE | OUTPATIENT
Start: 2017-06-13 | End: 2017-06-13

## 2017-06-13 RX ORDER — LOSARTAN POTASSIUM 50 MG/1
100 TABLET ORAL
Status: DISCONTINUED | OUTPATIENT
Start: 2017-06-13 | End: 2017-06-14 | Stop reason: HOSPADM

## 2017-06-13 RX ORDER — SODIUM CHLORIDE 0.9 % (FLUSH) 0.9 %
1-10 SYRINGE (ML) INJECTION AS NEEDED
Status: DISCONTINUED | OUTPATIENT
Start: 2017-06-13 | End: 2017-06-14

## 2017-06-13 RX ADMIN — LEVOTHYROXINE SODIUM 100 MCG: 100 TABLET ORAL at 07:58

## 2017-06-13 RX ADMIN — FAMOTIDINE 40 MG: 20 TABLET, FILM COATED ORAL at 09:25

## 2017-06-13 RX ADMIN — LOSARTAN POTASSIUM 100 MG: 50 TABLET, FILM COATED ORAL at 09:25

## 2017-06-13 RX ADMIN — OXYCODONE HYDROCHLORIDE AND ACETAMINOPHEN 1 TABLET: 5; 325 TABLET ORAL at 03:58

## 2017-06-13 RX ADMIN — OXYCODONE HYDROCHLORIDE AND ACETAMINOPHEN 1 TABLET: 5; 325 TABLET ORAL at 11:06

## 2017-06-13 RX ADMIN — VANCOMYCIN HYDROCHLORIDE 1250 MG: 500 INJECTION, POWDER, LYOPHILIZED, FOR SOLUTION INTRAVENOUS at 13:44

## 2017-06-13 RX ADMIN — METOPROLOL SUCCINATE 50 MG: 50 TABLET, EXTENDED RELEASE ORAL at 09:25

## 2017-06-13 RX ADMIN — ASPIRIN 325 MG ORAL TABLET 325 MG: 325 PILL ORAL at 09:25

## 2017-06-13 RX ADMIN — HEPARIN SODIUM 5000 UNITS: 5000 INJECTION, SOLUTION INTRAVENOUS; SUBCUTANEOUS at 09:26

## 2017-06-13 RX ADMIN — SODIUM CHLORIDE 125 ML/HR: 9 INJECTION, SOLUTION INTRAVENOUS at 03:38

## 2017-06-13 RX ADMIN — FOLIC ACID 1 MG: 1 TABLET ORAL at 09:25

## 2017-06-13 RX ADMIN — LANTHANUM CARBONATE 1000 MG: 500 TABLET, CHEWABLE ORAL at 11:49

## 2017-06-13 RX ADMIN — ONDANSETRON 4 MG: 2 INJECTION INTRAMUSCULAR; INTRAVENOUS at 14:09

## 2017-06-13 RX ADMIN — TAZOBACTAM SODIUM AND PIPERACILLIN SODIUM 2.25 G: 250; 2 INJECTION, SOLUTION INTRAVENOUS at 11:49

## 2017-06-13 RX ADMIN — CHOLECALCIFEROL CAP 125 MCG (5000 UNIT) 5000 UNITS: 125 CAP at 09:25

## 2017-06-13 RX ADMIN — HEPARIN SODIUM 5000 UNITS: 5000 INJECTION, SOLUTION INTRAVENOUS; SUBCUTANEOUS at 20:47

## 2017-06-13 RX ADMIN — SODIUM CHLORIDE 4 UNITS/HR: 9 INJECTION, SOLUTION INTRAVENOUS at 03:36

## 2017-06-13 RX ADMIN — AMLODIPINE BESYLATE 5 MG: 5 TABLET ORAL at 09:25

## 2017-06-13 RX ADMIN — INSULIN DETEMIR 10 UNITS: 100 INJECTION, SOLUTION SUBCUTANEOUS at 16:15

## 2017-06-13 RX ADMIN — LEVOFLOXACIN 500 MG: 5 INJECTION, SOLUTION INTRAVENOUS at 12:36

## 2017-06-13 NOTE — CONSULTS
Nephrology Consultation    Referring Provider:   No ref. provider found    Reason for Consultation:    ESRD and associated problems       Subjective     Chief complaint   Chief Complaint   Patient presents with   • Shortness of Breath       History of present illness:    Patient is 57-year-old white male with multiple medical problems as listed below in the past medical history.  He has been fairly compliant with his dialysis regimen recently had a Pleurx catheter placed after VATS procedure at [facility].  Gradually became short of breath to a point where he was uncomfortable.  He also complained of testicular pain that was also getting worse.  He said he had cysts that were drained and feels like that infection is coming back with recurrent pain in the scrotum as well.  I was consulted for his end-stage renal disease management and hemodialysis.  I reviewed the admission H&P and emergency room documentation.  Details were discussed with the hospitalist service.  Past Medical History:   Diagnosis Date   • Anemia    • Chronic kidney disease    • Diabetes mellitus    • H/O chest x-ray 03/25/2016    Interval decrease in size of the patients right pleural effusion with a persistent small left effusion as well   • Hypertension    • Left-sided weakness    • Renal failure    • Stroke        Past Surgical History:   Procedure Laterality Date   • CATARACT EXTRACTION, BILATERAL     • CHOLECYSTECTOMY     • COLONOSCOPY     • EYE SURGERY     • PORTACATH PLACEMENT     • VENOUS ACCESS DEVICE (PORT) REMOVAL         Family History   Problem Relation Age of Onset   • Cancer Mother    • Hypertension Father    • Diabetes Father    • Hypertension Sister    • Diabetes Sister    • Hypertension Brother    • Diabetes Paternal Grandmother           negative h/o ESRD     Social History   Substance Use Topics   • Smoking status: Never Smoker   • Smokeless tobacco: Never Used   • Alcohol use No     Prescriptions Prior to Admission    Medication Sig Dispense Refill Last Dose   • amLODIPine (NORVASC) 10 MG tablet    Taking   • amLODIPine (NORVASC) 5 MG tablet Take 5 mg by mouth daily.   Not Taking   • amoxicillin-clavulanate (AUGMENTIN) 500-125 MG per tablet    Not Taking   • aspirin 325 MG tablet Take 325 mg by mouth daily.   Taking   • Cholecalciferol (VITAMIN D3) 5000 UNITS capsule capsule Take  by mouth daily.   Taking   • fenofibrate (TRICOR) 54 MG tablet Take 54 mg by mouth daily.   Taking   • folic acid (FOLVITE) 1 MG tablet Take 1 mg by mouth daily.   Taking   • FOSRENOL 1000 MG chewable tablet 3 (Three) Times a Day With Meals.   Taking   • insulin aspart (NovoLOG) 100 UNIT/ML injection Inject 4 Units under the skin 3 (Three) Times a Day Before Meals.   Taking   • levothyroxine (SYNTHROID, LEVOTHROID) 100 MCG tablet Take 100 mcg by mouth daily.   Taking   • losartan (COZAAR) 100 MG tablet Take 1 tablet by mouth Daily.   Taking   • metoprolol succinate XL (TOPROL-XL) 50 MG 24 hr tablet    Taking   • omeprazole (PriLOSEC) 20 MG capsule Take 20 mg by mouth daily.   Taking     Allergies:  Erythromycin    Review of Systems  Constitutional: Negative for fever and chills, no diaphoresis, He is complaining of fatigue and unexpected weight change, he has lost significant weight.  HENT: Negative for congestion and hearing loss.   Eyes: Negative for redness and visual disturbance.   Respiratory: Positive for shortness of breath. Negative for chest pain . Negative for cough and chest tightness.   Cardiovascular: Negative for chest pain and palpitations.   Gastrointestinal: Negative for abdominal distention, abdominal pain and blood in stool. Denies any constipation or diarrhea.  Endocrine: Negative for cold or heat intolerance.   Genitourinary: Negative for difficulty urinating, dysuria and frequency.  He makes very small amount of urine.  He does complain of scrotal pain and said there was some abscess and/or cyst that was noted and drained.  He is  "scheduled to have a follow-up set up with  urology as well.  Musculoskeletal: Positive for arthralgias, denies any back pain and myalgias.   Skin: Negative for color change, rash and wound.   Neurological: Negative for syncope, new onset weakness or numbness of any extremities. Denies any headaches.   Hematological: Negative for adenopathy. Does not bruise/bleed easily.   Psychiatric/Behavioral: Negative for confusion. The patient is not nervous/anxious.     Objective     Vital Signs  /80  Pulse 89  Temp 97.5 °F (36.4 °C) (Oral)   Resp 22  Ht 68\" (172.7 cm)  Wt 148 lb 4 oz (67.2 kg)  SpO2 100%  BMI 22.54 kg/m2            No intake or output data in the 24 hours ending 06/13/17 0805    Physical Exam:     General Appearance:   Alert, cooperative, in no acute distress.     Head:   Normocephalic, without obvious abnormality, atraumatic.     Eyes:       Normal, conjunctivae and sclerae, no icterus, no pallor, corneas clear, PERRLA        Throat:   Oral mucosa dry      Neck:  No adenopathy, supple, trachea midline, no thyromegaly, no carotid bruit, no JVD      Back:   No CVA tenderness on Percussion.     Lungs:    Clear to auscultation and fair air movement noted.      Heart:   Regular rhythm and normal rate, normal S1 and S2.       Abdomen:   Obese. Normal bowel sounds, no masses, no organomegaly, soft non-tender, non-distended, no guarding, no rebound tenderness        Extremities:  Moves all extremities, no edema, no cyanosis, no redness.     Pulses:  Pulses palpable and equal bilaterally but weak.     Skin:  No bleeding, bruising or rash        Neurologic:  Cranial nerves grossly intact, move all extremities             Results Review:  Lab Results (last 7 days)     Procedure Component Value Units Date/Time    CBC & Differential [[id number]] Collected:  06/13/17 0211    Specimen:  Blood Updated:  06/13/17 0240    Narrative:       The following orders were created for panel order CBC & " Differential.  Procedure                               Abnormality         Status                     ---------                               -----------         ------                     CBC Auto Differential[[id number]]        Abnormal            Final result                 Please view results for these tests on the individual orders.    CBC Auto Differential [[id number]]  (Abnormal) Collected:  06/13/17 0211    Specimen:  Blood Updated:  06/13/17 0240     WBC 10.94 (H) 10*3/mm3      RBC 4.01 (L) 10*6/mm3      Hemoglobin 11.6 (L) g/dL      Hematocrit 35.7 (L) %      MCV 89.0 fL      MCH 28.9 pg      MCHC 32.5 g/dL      RDW 16.7 (H) %      RDW-SD 53.8 fl      MPV 9.9 fL      Platelets 341 10*3/mm3      Neutrophil % 85.7 (H) %      Lymphocyte % 4.5 (L) %      Monocyte % 6.9 %      Eosinophil % 1.6 %      Basophil % 0.5 %      Immature Grans % 0.8 (H) %      Neutrophils, Absolute 9.39 (H) 10*3/mm3      Lymphocytes, Absolute 0.49 (L) 10*3/mm3      Monocytes, Absolute 0.75 10*3/mm3      Eosinophils, Absolute 0.17 10*3/mm3      Basophils, Absolute 0.05 10*3/mm3      Immature Grans, Absolute 0.09 (H) 10*3/mm3      nRBC 0.0 /100 WBC     Troponin [[id number]]  (Normal) Collected:  06/13/17 0211    Specimen:  Blood Updated:  06/13/17 0243     Troponin I 0.015 ng/mL     Narrative:       Normal Patient Upper Reference Limit (URL) (99th Percentile)=0.03 ng/mL   Non-AMI Illness Reference Limit=0.03-0.11 ng/mL   AMI Confirmation=0.12 ng/mL and above    Comprehensive Metabolic Panel [[id number]]  (Abnormal) Collected:  06/13/17 0211    Specimen:  Blood Updated:  06/13/17 0246     Glucose 784 (C) mg/dL       Glucose >180, Hemoglobin A1C recommended.        BUN 36 (H) mg/dL      Creatinine 5.90 (H) mg/dL      Sodium 120 (C) mmol/L      Potassium 5.8 (C) mmol/L      Chloride 79 (L) mmol/L      CO2 26.0 mmol/L      Calcium 8.9 mg/dL      Total Protein 7.7 g/dL      Albumin 3.70 g/dL      ALT (SGPT) 19 U/L      AST (SGOT) 39 U/L       Alkaline Phosphatase 96 U/L      Total Bilirubin 1.1 mg/dL      eGFR Non African Amer 10 (L) mL/min/1.73      Globulin 4.0 gm/dL      A/G Ratio 0.9 (L) g/dL      BUN/Creatinine Ratio 6.1 (L)     Anion Gap 20.8 mmol/L     Narrative:       Abnormal estimated GFR should be followed by more specific studies to confirm end stage chronic renal disease. The equation used for calculation may not be accurate for patients less than 19 years old, greater than 70 years old, patients at extremes of weight, malnutrition, or with acute renal dysfunction.    BNP [[id number]]  (Abnormal) Collected:  06/13/17 0211    Specimen:  Blood Updated:  06/13/17 0316     proBNP 211734.0 (C) pg/mL     Blood Gas, Arterial With Co-Ox [[id number]]  (Abnormal) Collected:  06/13/17 0322    Specimen:  Arterial Blood Updated:  06/13/17 0323     Site Arterial: left radial     Gerardo's Test Positive     pH, Arterial 7.415 pH units      pCO2, Arterial 40.8 mm Hg      pO2, Arterial 77.7 mm Hg      HCO3, Arterial 26.1 mmol/L      Base Excess, Arterial 1.6 mmol/L      O2 Saturation, Arterial 94.9 %      Hemoglobin, Blood Gas 12.3 g/dL      Oxyhemoglobin 92.8 (L) %      Methemoglobin 0.9 %      Carboxyhemoglobin 1.3 %      Modality Cannula - Nasal     FIO2 32 %     Lavender Top [[id number]] Collected:  06/13/17 0211    Specimen:  Blood Updated:  06/13/17 0401     Extra Tube hold for add-on      Auto resulted       Gold Top - SST [[id number]] Collected:  06/13/17 0211    Specimen:  Blood Updated:  06/13/17 0401     Extra Tube Hold for add-ons.      Auto resulted.       POC Glucose Fingerstick [[id number]]  (Abnormal) Collected:  06/13/17 0335    Specimen:  Blood Updated:  06/13/17 0430     Glucose >599 (C) mg/dL       Serial Number: [id number]    : [id number]       POC Glucose Fingerstick [[id number]]  (Abnormal) Collected:  06/13/17 0423    Specimen:  Blood Updated:  06/13/17 0431     Glucose >599 (C) mg/dL       Serial Number: [id number]    : [id number]        POC Glucose Fingerstick [[id number]]  (Abnormal) Collected:  06/13/17 0449    Specimen:  Blood Updated:  06/13/17 0455     Glucose >599 (C) mg/dL       Serial Number: [id number]    : [id number]       Acinetobacter Screen [[id number]] Collected:  06/13/17 0535    Specimen:  Swab from Nares Updated:  06/13/17 0539    MRSA Screen Culture [[id number]] Collected:  06/13/17 0535    Specimen:  Swab from Nares Updated:  06/13/17 0539    VRE Culture [[id number]] Collected:  06/13/17 0535    Specimen:  Swab from Per Rectum Updated:  06/13/17 0539    [place] Draw [[id number]] Collected:  06/13/17 0211    Specimen:  Blood Updated:  06/13/17 0601    Narrative:       The following orders were created for panel order [place] Draw.  Procedure                               Abnormality         Status                     ---------                               -----------         ------                     Light Blue Top[[id number]]                                   Final result               Lavender Top[[id number]]                                     Final result               Gold Top - SST[[id number]]                                   Final result               Green Top (No Gel)[[id number]]                               Final result                 Please view results for these tests on the individual orders.    Light Blue Top [[id number]] Collected:  06/13/17 0211    Specimen:  Blood Updated:  06/13/17 0601     Extra Tube hold for add-on      Auto resulted       Green Top (No Gel) [[id number]] Collected:  06/13/17 0211    Specimen:  Blood Updated:  06/13/17 0601     Extra Tube Hold for add-ons.      Auto resulted.       Basic Metabolic Panel [[id number]]  (Abnormal) Collected:  06/13/17 0551    Specimen:  Blood Updated:  06/13/17 0633     Glucose 618 (C) mg/dL      BUN 36 (H) mg/dL      Creatinine 6.40 (H) mg/dL      Sodium 127 (C) mmol/L      Potassium 5.3 (H) mmol/L      Chloride 84 (L) mmol/L      CO2 24.0 (L) mmol/L      Calcium  9.1 mg/dL      eGFR Non African Amer 9 (L) mL/min/1.73      BUN/Creatinine Ratio 5.6 (L)     Anion Gap 24.3 mmol/L     Narrative:       Abnormal estimated GFR should be followed by more specific studies to confirm end stage chronic renal disease. The equation used for calculation may not be accurate for patients less than 19 years old, greater than 70 years old, patients at extremes of weight, malnutrition, or with acute renal dysfunction.    CBC & Differential [[id number]] Collected:  06/13/17 0551    Specimen:  Blood Updated:  06/13/17 0639    Narrative:       The following orders were created for panel order CBC & Differential.  Procedure                               Abnormality         Status                     ---------                               -----------         ------                     CBC Auto Differential[[id number]]        Abnormal            Final result                 Please view results for these tests on the individual orders.    CBC Auto Differential [[id number]]  (Abnormal) Collected:  06/13/17 0551    Specimen:  Blood Updated:  06/13/17 0639     WBC 13.91 (H) 10*3/mm3      RBC 4.43 (L) 10*6/mm3      Hemoglobin 12.7 (L) g/dL      Hematocrit 37.8 (L) %      MCV 85.3 fL      MCH 28.7 pg      MCHC 33.6 g/dL      RDW 15.8 (H) %      RDW-SD 48.4 fl      MPV 10.4 fL      Platelets 282 10*3/mm3      Neutrophil % 85.8 (H) %      Lymphocyte % 3.7 (L) %      Monocyte % 7.3 %      Eosinophil % 1.2 %      Basophil % 0.6 %      Immature Grans % 1.4 (H) %      Neutrophils, Absolute 11.94 (H) 10*3/mm3      Lymphocytes, Absolute 0.51 (L) 10*3/mm3      Monocytes, Absolute 1.02 (H) 10*3/mm3      Eosinophils, Absolute 0.17 10*3/mm3      Basophils, Absolute 0.08 10*3/mm3      Immature Grans, Absolute 0.19 (H) 10*3/mm3      nRBC 0.0 /100 WBC     POC Glucose Fingerstick [[id number]]  (Abnormal) Collected:  06/13/17 0702    Specimen:  Blood Updated:  06/13/17 0705     Glucose 487 (C) mg/dL       Serial Number:  [id number]    : [id number]           Imaging Results (last 72 hours)     Procedure Component Value Units Date/Time    XR Chest 2 View [[id number]] Collected:  06/13/17 0758     Updated:  06/13/17 0801    Narrative:       PROCEDURE: XR CHEST 2 VW-        HISTORY: SOA  triage protocol; R06.02-Shortness of breath     COMPARISON: May 1, 2017.     FINDINGS: The heart is normal in size. The mediastinum is unremarkable.  There is a small to moderate size right pleural effusion which is  unchanged. A Pleurx drain is in place on the right. There is new  airspace disease in the right upper lung field likely a pneumonia. The  left lung is clear. There is no pneumothorax. There are no acute osseous  abnormalities.           Impression:       New right upper lung field airspace disease consistent with  a pneumonia. Follow-up to radiographic resolution is recommended.           This report was finalized on 6/13/2017 7:59 AM by Marilia Mora M.D..    CT Chest Without Contrast [[id number]] Collected:  06/13/17 0759     Updated:  06/13/17 0802    Narrative:       PROCEDURE: CT CHEST WO CONTRAST-     HISTORY: SOB; R06.02-Shortness of breath; R73.9-Hyperglycemia,  unspecified     COMPARISON:  None .     PROCEDURE:  Multiple axial CT images were obtained from the thoracic  inlet through the upper abdomen without the use of contrast.      FINDINGS:   Soft tissue windows reveal prominent axillary lymph nodes likely  reactive. Heart size is normal. The aorta is normal in caliber. There is  a small loculated right pleural effusion with a Pleurx drain in place.  There is a small simple appearing left effusion. Lung windows reveal  patchy airspace disease fairly diffusely within the right lung likely  related to a pneumonia. The visualized upper abdomen is unremarkable.  Bone windows reveal no acute osseous abnormalities.       Impression:       Loculated right effusion and diffuse right lung airspace  disease likely reflecting a  pneumonia.     349 mGy.cm          This study was performed with techniques to keep radiation doses as low  as reasonably achievable (ALARA). Individualized dose reduction  techniques using automated exposure control or adjustment of mA and/or  kV according to the patient size were employed.      This report was finalized on 6/13/2017 8:00 AM by Marilia Mora M.D..              amLODIPine 5 mg Oral Q24H   aspirin 325 mg Oral Daily   famotidine 40 mg Oral Daily   folic acid 1 mg Oral Daily   heparin (porcine) 5,000 Units Subcutaneous Q12H   insulin aspart 0-14 Units Subcutaneous 4x Daily AC & at Bedtime   lanthanum 1,000 mg Oral TID With Meals   levothyroxine 100 mcg Oral Daily   losartan 100 mg Oral Q24H   metoprolol succinate XL 50 mg Oral Q24H   vitamin D3 5,000 Units Oral Daily       insulin regular infusion 1 unit/mL 1-20 Units/hr Last Rate: 4 Units/hr (06/13/17 0336)   sodium chloride 30 mL/hr    sodium chloride 125 mL/hr Last Rate: 125 mL/hr (06/13/17 0338)   sodium chloride 30 mL/hr        Assessment/Plan       1. ESRD: HD TTS, etiol diabetic nephropathy; dialyzed yest w/o incident; access RUE AV Graft. He is scheduled for  dialysis today clinically appears stable.  2. Vol overload: Clinically does appear euvolemic. ultrafiltration As tolerated will try 3-4 L   3. Hyperkalemia: resolved with HD, continue to follow closely.  4. Recurrent right pleural effusion: Dr. Hernandez is planning to evaluate and treat.  5. HTN: BP stable, on ARB, metoprolol and norvasc  6. DM2: BG improved, initially quite high, on insulin  7. MBD: Since he's under a controlled environment he is taking the binders and likely will improve.  8. Anemia of CKD: Hb stable 12.7, hold MARIANO   9. H/o CAD: History of multiple coronary artery issues does follow-up with her cardiologist on a regular basis. He sees Dr. Ambrosio for cardiac issues.    Details discussed with the patient as well as family and the hospitalist service.     Rest as  ordered    In closing, I sincerely appreciate opportunity to participate in care of this patient. If I can be of any further assistance with the management of this patient, please don’t hesitate to contact me.    Chance Mackey MD  06/13/17  8:05 AM    EMR Dragon/Transcription disclaimer:   Much of this encounter note is an electronic transcription/translation of spoken language to printed text. The electronic translation of spoken language may permit erroneous, or at times, nonsensical words or phrases to be inadvertently transcribed; Although I have reviewed the note for such errors, some may still exist.

## 2017-06-13 NOTE — DISCHARGE SUMMARY
[facility]   DISCHARGE SUMMARY      Name:  Talha Cutler   Age:  57 y.o.  Sex:  male  :  1960  MRN:  [id number]   Visit Number:  [id number]    Admission Date:  2017  Date of Discharge:  2017  Primary Care Physician:  Idalia Kay MD    Discharge Diagnoses:     1. Right-sided recurrent loculated pleural effusion, present on admission.  2. Right lower lobe pneumonia, healthcare associated, present on admission.  3. Diabetes mellitus type 2 with hyperglycemia, present on admission.  4. End-stage renal disease on hemodialysis on Tuesday, Thursday and Saturday.  5. Coronary artery disease on medical management.  6. Scrotal cellulitis, present on admission.    Principal Problem:    SOB (shortness of breath)  Active Problems:    Pleural effusion, bilateral    Type 2 diabetes mellitus treated with insulin    End stage renal disease on dialysis    Uncontrolled diabetes mellitus with hyperglycemia, with long-term current use of insulin    Hyperkalemia    Hypertension due to end stage renal disease caused by type 2 diabetes mellitus, on dialysis    Physical debility    Chronic respiratory insufficiency    Presenting Problem:    SOB (shortness of breath) [R06.02]  SOB (shortness of breath) [R06.02]     Consults:     Consults     Date and Time Order Name Status Description    2017 0500 Inpatient Consult to Pulmonology Completed     2017 0500 Inpatient Consult to Nephrology Completed         Consulting Physician(s)     Provider Relationship    Ean Hernandez MD Consulting Physician    Chance Mackey MD Consulting Physician        Procedures Performed:    None    History of presenting illness:    Patient is a 56 yo male, with history of of ESRD on HD, followed by Dr. Mackey. Chronic pleural effusions, s/p pleural drain placed by CT surgeon at  upon transfer from this facility last month; followed by Dr. Hernandez from pulmonology as well, who uses oxygen  via [place] at night; was brought to the emergency room with increasing shortness of breath for approx 7-10 days.  Patient states that there was sudden reduction in drainage from 1000 ml every 2 days, to less than 5ml a few days ago.  No history of chest pain or fevers.  He was noted to have elevated blood sugars in the emergency room of more than 700 without evidence of diabetic ketoacidosis.  He was started on insulin drip and was admitted to the ICU.  Chest x-ray showed effusion on the right side.      Hospital Course:    Patient was admitted to the ICU and was continued on oxygen and bronchodilators.  He was continued on IV insulin drip with improvement in his blood sugars and it was subsequently discontinued.  He was started on Levemir 10 units daily.  He was seen by Dr. Mackey from nephrology and he ordered hemodialysis which is due today.  He was also seen by Dr. Hernandez from pulmonology and he felt that the patient's Pleurx catheter is not draining likely secondary to the localization office pleural effusion.  He felt that the patient would benefit from going back to [facility] for evaluation by the thoracic surgeon.    I discussed the patient's condition with Dr. Trevor Garcia at [facility] and he was willing to see the patient in consultation but advised that the patient be admitted to the medical service.  I discussed the patient's condition with Dr. Leslie and he accepted the patient in transfer.  Patient was placed on IV antibiotic therapy with Zosyn and vancomycin due to pneumonia noted in the right lower lobe.  Patient also has scrotal cellulitis as well as ulcers with surrounding erythema in the right leg.    Vital Signs:    Temp:  [96.5 °F (35.8 °C)-98.3 °F (36.8 °C)] 97.9 °F (36.6 °C)  Heart Rate:  [71-93] 86  Resp:  [13-23] 18  BP: (107-148)/(57-93) 128/78    Physical Exam:    General Appearance:  Alert and cooperative, not in any acute distress.   Head:  Atraumatic and  normocephalic, without obvious abnormality.   Eyes:          PERRLA, conjunctivae and sclerae normal, no Icterus. No pallor. Extra-occular movements are within normal limits.   Ears:  Ears appear intact with no abnormalities noted.   Throat: No oral lesions, no thrush, oral mucosa moist.   Neck: Supple, trachea midline, no thyromegaly, no carotid bruit.   Back:   No kyphoscoliosis present. No tenderness to palpation,   range of motion normal.   Lungs:   Chest shape is normal. Breath sounds heard bilaterally equally but decreased in the right base.  No crackles or wheezing. No Pleural rub or bronchial breathing. Pleurx catheter noted in the right side.     Heart:  Normal S1 and S2, no murmur, no gallop, no rub. No JVD.   Abdomen:   Normal bowel sounds, no masses, no organomegaly. Soft     non-tender, non-distended, no guarding, no rebound tenderness.   Extremities: Moves all extremities well, no edema, no cyanosis, no            clubbing.  AV fistula with bruit noted on the right arm.  He has also send redness noted in the right leg and right foot as well as heel as documented on the nurse's notes and photographs.   Neurologic: Alert and oriented x 3. Moves all four limbs equally. No tremors. No facial asymetry.     Pertinent Lab Results:       Results from last 7 days  Lab Units 06/14/17  0654 06/13/17  1917 06/13/17  1139  06/13/17  0211   SODIUM mmol/L 132* 138 129*  < > 120*   POTASSIUM mmol/L 5.2* 3.6 4.6  < > 5.8*   CHLORIDE mmol/L 94* 98 85*  < > 79*   TOTAL CO2 mmol/L 25.0* 26.0 25.0*  < > 26.0   BUN mg/dL 21* 13 38*  < > 36*   CREATININE mg/dL 4.10* 2.60* 6.20*  < > 5.90*   CALCIUM mg/dL 8.7 8.6 9.2  < > 8.9   BILIRUBIN mg/dL  --   --   --   --  1.1   ALK PHOS U/L  --   --   --   --  96   ALT (SGPT) U/L  --   --   --   --  19   AST (SGOT) U/L  --   --   --   --  39   GLUCOSE mg/dL 120* 94 163*  < > 784*   < > = values in this interval not displayed.    Results from last 7 days  Lab Units 06/14/17  0674  06/13/17  0551 06/13/17  0211   WBC 10*3/mm3 10.30 13.91* 10.94*   HEMOGLOBIN g/dL 11.9* 12.7* 11.6*   HEMATOCRIT % 37.6* 37.8* 35.7*   PLATELETS 10*3/mm3 319 282 341           Results from last 7 days  Lab Units 06/13/17  0211   TROPONIN I ng/mL 0.015       Results from last 7 days  Lab Units 06/13/17  0211   PROBNP pg/mL 655337.0*               Results from last 7 days  Lab Units 06/13/17  0322   PH, ARTERIAL pH units 7.415   PO2 ART mm Hg 77.7   PCO2, ARTERIAL mm Hg 40.8   HCO3 ART mmol/L 26.1     Pertinent Radiology Results:    Imaging Results (all)     Procedure Component Value Units Date/Time    XR Chest 2 View [[id number]] Collected:  06/13/17 0758     Updated:  06/13/17 0801    Narrative:       PROCEDURE: XR CHEST 2 VW-        HISTORY: SOA  triage protocol; R06.02-Shortness of breath     COMPARISON: May 1, 2017.     FINDINGS: The heart is normal in size. The mediastinum is unremarkable.  There is a small to moderate size right pleural effusion which is  unchanged. A Pleurx drain is in place on the right. There is new  airspace disease in the right upper lung field likely a pneumonia. The  left lung is clear. There is no pneumothorax. There are no acute osseous  abnormalities.           Impression:       New right upper lung field airspace disease consistent with  a pneumonia. Follow-up to radiographic resolution is recommended.     This report was finalized on 6/13/2017 7:59 AM by Marilia Mora M.D..    CT Chest Without Contrast [[id number]] Collected:  06/13/17 0759     Updated:  06/13/17 0802    Narrative:       PROCEDURE: CT CHEST WO CONTRAST-     HISTORY: SOB; R06.02-Shortness of breath; R73.9-Hyperglycemia,  unspecified     COMPARISON:  None .     PROCEDURE:  Multiple axial CT images were obtained from the thoracic  inlet through the upper abdomen without the use of contrast.      FINDINGS:   Soft tissue windows reveal prominent axillary lymph nodes likely  reactive. Heart size is normal. The aorta  is normal in caliber. There is  a small loculated right pleural effusion with a Pleurx drain in place.  There is a small simple appearing left effusion. Lung windows reveal  patchy airspace disease fairly diffusely within the right lung likely  related to a pneumonia. The visualized upper abdomen is unremarkable.  Bone windows reveal no acute osseous abnormalities.       Impression:       Loculated right effusion and diffuse right lung airspace  disease likely reflecting a pneumonia.     This study was performed with techniques to keep radiation doses as low  as reasonably achievable (ALARA). Individualized dose reduction  techniques using automated exposure control or adjustment of mA and/or  kV according to the patient size were employed.      This report was finalized on 6/13/2017 8:00 AM by Marilia Mora M.D..        Condition on Discharge:      Stable.    Code status during the hospital stay:    Full Code    Discharge Disposition:    Short Term Hospital ([place] - External) Transfer to [facility] medical service, when bed available.    Discharge Medications:     Talha Cutler   Home Medication Instructions RICHAR:[id number]    Printed on:06/14/17 6438   Medication Information                      amLODIPine (NORVASC) 10 MG tablet               aspirin 325 MG tablet  Take 325 mg by mouth daily.             Cholecalciferol (VITAMIN D3) 5000 UNITS capsule capsule  Take  by mouth daily.             fenofibrate (TRICOR) 54 MG tablet  Take 54 mg by mouth daily.             folic acid (FOLVITE) 1 MG tablet  Take 1 mg by mouth daily.             FOSRENOL 1000 MG chewable tablet  3 (Three) Times a Day With Meals.             insulin aspart (NovoLOG) 100 UNIT/ML injection  Inject 4 Units under the skin 3 (Three) Times a Day Before Meals.             insulin detemir (LEVEMIR) 100 UNIT/ML injection  Inject 10 Units under the skin Every Night.             levothyroxine (SYNTHROID, LEVOTHROID) 100 MCG tablet  Take 100 mcg by  mouth daily.             losartan (COZAAR) 100 MG tablet  Take 1 tablet by mouth Daily.             metoprolol succinate XL (TOPROL-XL) 50 MG 24 hr tablet               omeprazole (PriLOSEC) 20 MG capsule  Take 20 mg by mouth daily.             piperacillin-tazobactam (ZOSYN) 2.25 g/100 mL 0.9% NS IVPB (mbp)  Infuse 100 mL into a venous catheter Every 8 (Eight) Hours. Indications: Skin and Soft Tissue Infection             pravastatin (PRAVACHOL) 40 MG tablet  Take 80 mg by mouth Daily.               Discharge Diet:     Diet Instructions     Diet: Consistent Carbohydrate, Renal; Thin Liquids, No Restrictions       Discharge Diet:   Consistent Carbohydrate  Renal      Fluid Consistency:  Thin Liquids, No Restrictions               Activity at Discharge:     Activity Instructions     Activity as Tolerated                   Follow-up Appointments:    Follow-up Information     Follow up with Idalia Kay MD Follow up in 2 week(s).    Specialty:  Family Medicine    Contact information:    [address] [facility] [address]  [phone]          Future Appointments  Date Time Provider Department Center   8/2/2017 8:00 AM FER[facility]O CT 1 [facility] CT [facility]   8/2/2017 11:00 AM ALEXYS Funez [facility] None     Test Results Pending at Discharge:   Order Current Status    Acinetobacter Screen In process    MRSA Screen Culture In process    VRE Culture In process    Blood Culture Preliminary result             Carmelo Ray MD  06/14/17  2:36 PM    Time spent: 35 min    Please note that portions of this note may have been completed with a voice recognition program. Efforts were made to edit the dictations, but occasionally words are mistranscribed.

## 2017-06-13 NOTE — PAYOR COMM NOTE
"TO:HUMANA  FROM:TROY BURTON PHONE [phone]  INPT CLINICALS    Talha Storm (57 y.o. 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)   Code Status:  FULL    Ht:  68\" (172.7 cm)   Wt:  148 lb 2.4 oz (67.2 kg)    Admission Cmt:  None   Principal Problem:  SOB (shortness of breath) [R06.02]                 Active Insurance as of 6/13/2017     Primary Coverage     Payor Plan Insurance Group Employer/Plan Group    HUMANA MEDICARE REPLACEMENT HUMANA MEDICARE REPL [id number]     Payor Plan Address Payor Plan Phone Number Effective From Effective To    [address] [address] [phone] 1/1/2014     [place] [address]       Subscriber Name Subscriber Birth Date Member ID       TALHA STORM 1960 [id number]           Secondary Coverage     Payor Plan Insurance Group Employer/Plan Group    [place]      Payor Plan Address Payor Plan Phone Number Effective From Effective To    [address] [phone] 2/1/2017     [place] [address]       Subscriber Name Subscriber Birth Date Member ID       TALHA STORM 1960 [id number]                 Emergency Contacts      (Rel.) Home Phone Work Phone Mobile Phone    Mallika Storm (Spouse) [phone] -- --               History & Physical      Nickolas Ruelas DO at 6/13/2017  4:49 AM              [facility] Medicine Services  HISTORY AND " PHYSICAL    Primary Care Physician: Idalia Kay MD    Subjective     Chief Complaint:    Chief Complaint   Patient presents with   • Shortness of Breath       History of Present Illness:   Pt is a 56 yo male, well known hx of ESRD on HD, sched of T/Th/Sat; followed by Dr. Mackey.  Chronic pleural effusions, s/p pleural drain placed by CT surgeon at  upon transfer from this facility last month; followed by Dr. Hernandez also; uses oxygen via NC at night; worsened SOA/CARSON for approx 7-10 days; pt states sudden reduction in drainage from 1000ml every 2 days, to less than 5ml a few days ago; now chronically SOA; without F/C; no CP; no hemoptysis; sig elevation to his BS additionally; has appt to see CT surgeon at pulm clinic at  in July.    In ER was found to have sig elev BNP, with BS > 700, without DKA; K elevated as well; chronic effusion noted on CXR, and CT was ordered of chest.  Pt denies any worsened edema to bernadine LE.  Pt to be admitted for further inpt care/treatment.      Review of Systems   1. Constitutional: Poor appetite; worsened SOA/CARSON and malaise and fatigue.   2. HENT: No dysphagia; no changes to vision/hearing/smell/taste; no epistaxis  3. Eyes: Negative for redness and visual disturbance.   4. Respiratory: worsened SOA; mild cough, not productive.   5. Cardiovascular: Negative for chest pain and palpitations.   6. Gastrointestinal: Negative for abdominal distention, abdominal pain and blood in stool.   7. Endocrine: Negative for cold intolerance and heat intolerance.   8. Genitourinary: Chronic dec UOP; no hematuria.   9. Musculoskeletal: Chronic arthralgias/myalgias.   10. Skin: no new wounds or ulcerations; chronic erythema to R pleural drain site; no sig drainage.  11. Neurological: Negative for syncope, but generalized weakness chronically, worsened over last week or so.   12. Hematological: Negative for adenopathy. Does not bruise/bleed easily.   13. Psychiatric/Behavioral: Negative  for confusion. The patient is not nervous/anxious.     Past Medical History:   Past Medical History:   Diagnosis Date   • Anemia    • Chronic kidney disease    • Diabetes mellitus    • H/O chest x-ray 03/25/2016    Interval decrease in size of the patients right pleural effusion with a persistent small left effusion as well   • Hypertension    • Left-sided weakness    • Renal failure    • Stroke        Past Surgical History:  Past Surgical History:   Procedure Laterality Date   • CATARACT EXTRACTION, BILATERAL     • CHOLECYSTECTOMY     • COLONOSCOPY     • EYE SURGERY     • PORTACATH PLACEMENT     • VENOUS ACCESS DEVICE (PORT) REMOVAL         Family History: family history includes Cancer in his mother; Diabetes in his father, paternal grandmother, and sister; Hypertension in his brother, father, and sister.    Social History:  reports that he has never smoked. He has never used smokeless tobacco. He reports that he does not drink alcohol or use illicit drugs.    Medications:  Prescriptions Prior to Admission   Medication Sig Dispense Refill Last Dose   • amLODIPine (NORVASC) 10 MG tablet    Taking   • amLODIPine (NORVASC) 5 MG tablet Take 5 mg by mouth daily.   Not Taking   • amoxicillin-clavulanate (AUGMENTIN) 500-125 MG per tablet    Not Taking   • aspirin 325 MG tablet Take 325 mg by mouth daily.   Taking   • Cholecalciferol (VITAMIN D3) 5000 UNITS capsule capsule Take  by mouth daily.   Taking   • fenofibrate (TRICOR) 54 MG tablet Take 54 mg by mouth daily.   Taking   • folic acid (FOLVITE) 1 MG tablet Take 1 mg by mouth daily.   Taking   • FOSRENOL 1000 MG chewable tablet 3 (Three) Times a Day With Meals.   Taking   • insulin aspart (NovoLOG) 100 UNIT/ML injection Inject 4 Units under the skin 3 (Three) Times a Day Before Meals.   Taking   • levothyroxine (SYNTHROID, LEVOTHROID) 100 MCG tablet Take 100 mcg by mouth daily.   Taking   • losartan (COZAAR) 100 MG tablet Take 1 tablet by mouth Daily.   Taking   •  "metoprolol succinate XL (TOPROL-XL) 50 MG 24 hr tablet    Taking   • omeprazole (PriLOSEC) 20 MG capsule Take 20 mg by mouth daily.   Taking       Allergies:  Allergies   Allergen Reactions   • Erythromycin Hives         Objective     Physical Exam:  Vital Signs: /85  Pulse 98  Temp 97.8 °F (36.6 °C) (Oral)   Resp 22  Ht 68\" (172.7 cm)  Wt 134 lb (60.8 kg)  SpO2 (!) 88% Comment: PLACED ON 2L/NC AND INCREASED TO 96%   BMI 20.37 kg/m2     General Appearance: alert, oriented x 3, no acute distress.  Chronic ill-appearing male.  Skin: warm and dry.   HEENT: Atraumatic.  pupils round and reactive to light and accommodation, oral mucosa pink and moist.  Nares patent without epistaxis.  External auditory canals are patent tympanic membranes intact.  Neck: supple, no JVD, trachea midline.  No thyromegaly  Lungs: AAE to all lung fields, rhonchus BS to Right lung throughout.  R pleural drain in place, clean and dry  Heart: RR, normal S1 and S2, no S3, no rub.  Abdomen: soft, non-tender, no palpable bladder, present bowel sounds to auscultation ×4.  No guarding or rigidity.  Extremities: no clubbing, cyanosis or edema.  Good range of motion actively and passively.  Symmetric muscle strength and development, thin frail LE.  Noted tunnel cath/av fistula to RUE.  Neuro: normal speech and mental status.  Cranial nerves II through XII intact.  No anosmia. DTR 2+; No focal motor/sensory deficits.          Results Reviewed:    Lab Results (last 72 hours)     Procedure Component Value Units Date/Time    CBC & Differential [[id number]] Collected:  06/13/17 0211    Specimen:  Blood Updated:  06/13/17 0240    Narrative:       The following orders were created for panel order CBC & Differential.  Procedure                               Abnormality         Status                     ---------                               -----------         ------                     CBC Auto Differential[[id number]]        Abnormal            " Final result                 Please view results for these tests on the individual orders.    CBC Auto Differential [[id number]]  (Abnormal) Collected:  06/13/17 0211    Specimen:  Blood Updated:  06/13/17 0240     WBC 10.94 (H) 10*3/mm3      RBC 4.01 (L) 10*6/mm3      Hemoglobin 11.6 (L) g/dL      Hematocrit 35.7 (L) %      MCV 89.0 fL      MCH 28.9 pg      MCHC 32.5 g/dL      RDW 16.7 (H) %      RDW-SD 53.8 fl      MPV 9.9 fL      Platelets 341 10*3/mm3      Neutrophil % 85.7 (H) %      Lymphocyte % 4.5 (L) %      Monocyte % 6.9 %      Eosinophil % 1.6 %      Basophil % 0.5 %      Immature Grans % 0.8 (H) %      Neutrophils, Absolute 9.39 (H) 10*3/mm3      Lymphocytes, Absolute 0.49 (L) 10*3/mm3      Monocytes, Absolute 0.75 10*3/mm3      Eosinophils, Absolute 0.17 10*3/mm3      Basophils, Absolute 0.05 10*3/mm3      Immature Grans, Absolute 0.09 (H) 10*3/mm3      nRBC 0.0 /100 WBC     Troponin [[id number]]  (Normal) Collected:  06/13/17 0211    Specimen:  Blood Updated:  06/13/17 0243     Troponin I 0.015 ng/mL     Narrative:       Normal Patient Upper Reference Limit (URL) (99th Percentile)=0.03 ng/mL   Non-AMI Illness Reference Limit=0.03-0.11 ng/mL   AMI Confirmation=0.12 ng/mL and above    Comprehensive Metabolic Panel [[id number]]  (Abnormal) Collected:  06/13/17 0211    Specimen:  Blood Updated:  06/13/17 0246     Glucose 784 (C) mg/dL       Glucose >180, Hemoglobin A1C recommended.        BUN 36 (H) mg/dL      Creatinine 5.90 (H) mg/dL      Sodium 120 (C) mmol/L      Potassium 5.8 (C) mmol/L      Chloride 79 (L) mmol/L      CO2 26.0 mmol/L      Calcium 8.9 mg/dL      Total Protein 7.7 g/dL      Albumin 3.70 g/dL      ALT (SGPT) 19 U/L      AST (SGOT) 39 U/L      Alkaline Phosphatase 96 U/L      Total Bilirubin 1.1 mg/dL      eGFR Non African Amer 10 (L) mL/min/1.73      Globulin 4.0 gm/dL      A/G Ratio 0.9 (L) g/dL      BUN/Creatinine Ratio 6.1 (L)     Anion Gap 20.8 mmol/L     Narrative:       Abnormal  estimated GFR should be followed by more specific studies to confirm end stage chronic renal disease. The equation used for calculation may not be accurate for patients less than 19 years old, greater than 70 years old, patients at extremes of weight, malnutrition, or with acute renal dysfunction.    BNP [[id number]]  (Abnormal) Collected:  06/13/17 0211    Specimen:  Blood Updated:  06/13/17 0316     proBNP 231443.0 (C) pg/mL     Blood Gas, Arterial With Co-Ox [[id number]]  (Abnormal) Collected:  06/13/17 0322    Specimen:  Arterial Blood Updated:  06/13/17 0323     Site Arterial: left radial     Gerardo's Test Positive     pH, Arterial 7.415 pH units      pCO2, Arterial 40.8 mm Hg      pO2, Arterial 77.7 mm Hg      HCO3, Arterial 26.1 mmol/L      Base Excess, Arterial 1.6 mmol/L      O2 Saturation, Arterial 94.9 %      Hemoglobin, Blood Gas 12.3 g/dL      Oxyhemoglobin 92.8 (L) %      Methemoglobin 0.9 %      Carboxyhemoglobin 1.3 %      Modality Cannula - Nasal     FIO2 32 %     [facility] Draw [[id number]] Collected:  06/13/17 0211    Specimen:  Blood Updated:  06/13/17 0401    Narrative:       The following orders were created for panel order [facility] Draw.  Procedure                               Abnormality         Status                     ---------                               -----------         ------                     Light Blue Top[[id number]]                                                              Lavender Top[[id number]]                                     Final result               Gold Top - SST[[id number]]                                   Final result               Green Top (No Gel)[[id number]]                                                            Please view results for these tests on the individual orders.    Lavender Top [[id number]] Collected:  06/13/17 0211    Specimen:  Blood Updated:  06/13/17 0401     Extra Tube hold for add-on      Auto resulted       Gold Top - SST [[id number]]  Collected:  06/13/17 0211    Specimen:  Blood Updated:  06/13/17 0401     Extra Tube Hold for add-ons.      Auto resulted.       POC Glucose Fingerstick [[id number]]  (Abnormal) Collected:  06/13/17 0335    Specimen:  Blood Updated:  06/13/17 0430     Glucose >599 (C) mg/dL       Serial Number: [id number]    : [id number]       POC Glucose Fingerstick [[id number]]  (Abnormal) Collected:  06/13/17 0423    Specimen:  Blood Updated:  06/13/17 0431     Glucose >599 (C) mg/dL       Serial Number: [id number]    : [id number]             Imaging Results (last 72 hours)     Procedure Component Value Units Date/Time    XR Chest 2 View [[id number]] Updated:  06/13/17 0227    CT Chest Without Contrast [[id number]] Updated:  06/13/17 0328          ECG/EMG Results (most recent)     None          I have personally reviewed and interpreted available lab data, radiology studies and ECG obtained at time of admission.     Assessment / Plan     Assessment/Problem List:   Principal Problem:    SOB (shortness of breath)  Active Problems:    Pleural effusion, bilateral    Type 2 diabetes mellitus treated with insulin    End stage renal disease on dialysis    Uncontrolled diabetes mellitus with hyperglycemia, with long-term current use of insulin    Hyperkalemia    Hypertension due to end stage renal disease caused by type 2 diabetes mellitus, on dialysis    Physical debility    Chronic respiratory insufficiency          Plan:  Pt admitted for inpt care; will need HD today, consulted Dr. Mackey his usual nephrologist; serial BMP d/t hyperkalemia; given insulin at onset of tx in ER; initially on a gtt; will convert to SSI as able; renal profile daily with CBC; consulted Dr. Hernandez concerning POC for now presumed malfunctioning R lung drain; pt had placed by CT surgeon at ; planned f/u there in July, but likely to need expedited appt for f/u; CT scan today looks more loculated in R lung, likely explains sudden cessation of any sig  drainage per pt hx; no identifiable need for abx at this time; BNP sig elevated; will follow clinically after HD; I have lowered his amlodipine dosing today in lieu of his HD and volume extraction; cont oxygen supplementation, but pt usually only uses during sleep at home, but needs continuously at this time; severe hyperglycemia, not sure of when/if pt has ever been at goal for BS control; last HbA1c I can see was >12; pt from historical perspective only takes a few units of novolog prior to meals; likely needs long acting insulin therapy in addition to short acting with meals.  Condition guarded, clinical picture likely to change after HD; POC to be adjusted as needed; discussed POC in detail with pt and family at bedside, all verb understanding and agree.    I have spent a total of 70 minutes in direct critical care with pt today.          Nickolas Ruelas DO 06/13/17 4:49 AM    Please note that portions of this note may have been completed with a voice recognition program. Efforts were made to edit the dictations, but occasionally words are mistranscribed.         Electronically signed by Nickolas Ruelas DO at 6/13/2017  5:09 AM           Emergency Department Notes      Estrada Sorto MD at 6/13/2017  2:06 AM          Subjective   History of Present Illness   57M with a history of end-stage renal disease on hemodialysis, recurrent right-sided pleural effusion with a Pleurx catheter in place presenting with shortness of breath.  Patient states that he has felt more short of breath tonight in the setting of 3 days of his catheter not draining.  States that he had previously been draining about 1 L every 2 days but over the last 3 days only draining about a teaspoon.  Denies fevers, chills, vomiting, cough, leg swelling or other specific symptoms.    Review of Systems   Respiratory: Positive for shortness of breath.    All other systems reviewed and are negative.      Past Medical History:   Diagnosis Date    • Anemia    • Chronic kidney disease    • Diabetes mellitus    • H/O chest x-ray 03/25/2016    Interval decrease in size of the patients right pleural effusion with a persistent small left effusion as well   • Hypertension    • Left-sided weakness    • Renal failure    • Stroke        Allergies   Allergen Reactions   • Erythromycin Hives       Past Surgical History:   Procedure Laterality Date   • CATARACT EXTRACTION, BILATERAL     • CHOLECYSTECTOMY     • COLONOSCOPY     • EYE SURGERY     • PORTACATH PLACEMENT     • VENOUS ACCESS DEVICE (PORT) REMOVAL         Family History   Problem Relation Age of Onset   • Cancer Mother    • Hypertension Father    • Diabetes Father    • Hypertension Sister    • Diabetes Sister    • Hypertension Brother    • Diabetes Paternal Grandmother        Social History     Social History   • Marital status:      Spouse name: N/A   • Number of children: N/A   • Years of education: N/A     Social History Main Topics   • Smoking status: Never Smoker   • Smokeless tobacco: Never Used   • Alcohol use No   • Drug use: No   • Sexual activity: Defer     Other Topics Concern   • None     Social History Narrative           Objective   Physical Exam   Constitutional: He is oriented to person, place, and time. He appears well-developed and well-nourished. No distress.   HENT:   Head: Normocephalic and atraumatic.   Mouth/Throat: Oropharynx is clear and moist.   Eyes: Conjunctivae are normal. No scleral icterus.   Neck: Normal range of motion. Neck supple. No tracheal deviation present.   Cardiovascular: Normal rate, regular rhythm, normal heart sounds and intact distal pulses.  Exam reveals no gallop and no friction rub.    No murmur heard.  Pulmonary/Chest: Effort normal. No stridor. No respiratory distress. He has no wheezes. He has rales (R-sided from base to midchest). He exhibits no tenderness.   pleurex catheter in place in R lower anterior chest wall w/ dressing in place clean / dry /  intact   Abdominal: Soft. He exhibits no distension and no mass. There is no tenderness. There is no rebound and no guarding.   Musculoskeletal: Normal range of motion. He exhibits no deformity.   Neurological: He is alert and oriented to person, place, and time.   Skin: Skin is warm and dry. No rash noted. He is not diaphoretic. No erythema. No pallor.   Psychiatric: He has a normal mood and affect. His behavior is normal.   Nursing note and vitals reviewed.      Procedures        ED Course  ED Course                  MDM   57M here w/ SOB, crackles R lung field in setting of pleurex catheter not draining. Denies other symptoms of PNA. Has breath sounds throughout both lung fields, making PNTX less likely. Low suspicion for atypical presentation of cardiac etiology. Highest concern for recurrent effusion vs other. Will get labs, cxr and reassess.     EKG: NSR w/ inferolateral TWI similar to prior EKG.     2:29 AM Xray shows pulmonary congestion and opacification of R lung field similar to prior CXRs.   3:05 AM Given equivocal CXR, will add CT non-contrast. Labs show hyperglycemia (700s) w/o e/o DKA. Will start on mIVF, insulin gtt and discuss w/ hospitalist.   3:13 AM Discussed w/ hospitalist and will get CT scan here in ED and then transfer to inpatient care.     Final diagnoses:   SOB (shortness of breath)            Estrada Sorto MD  06/13/17 0316       Electronically signed by Estrada Sorto MD at 6/13/2017  3:16 AM        Hospital Medications (active)       Dose Frequency Start End    amLODIPine (NORVASC) tablet 5 mg 5 mg Every 24 Hours Scheduled 6/13/2017     Sig - Route: Take 1 tablet by mouth Daily. - Oral    aspirin tablet 325 mg 325 mg Daily 6/13/2017     Sig - Route: Take 1 tablet by mouth Daily. - Oral    dextrose (D50W) solution 25 g 25 g Every 15 Minutes PRN 6/13/2017     Sig - Route: Infuse 50 mL into a venous catheter Every 15 (Fifteen) Minutes As Needed for Low Blood Sugar (Blood Sugar  Less Than 70, Patient Has IV Access - Unresponsive, NPO or Unable To Safely Swallow). - Intravenous    dextrose (INSTA-GLUCOSE) 77.4 % gel 1 tube 1 tube Every 15 Minutes PRN 6/13/2017     Sig - Route: Take 1 tube by mouth Every 15 (Fifteen) Minutes As Needed (Blood Sugar Less Than 70, Patient Alert, Is Not NPO & Can Safely Swallow). - Oral    famotidine (PEPCID) tablet 40 mg 40 mg Daily 6/13/2017     Sig - Route: Take 2 tablets by mouth Daily. - Oral    folic acid (FOLVITE) tablet 1 mg 1 mg Daily 6/13/2017     Sig - Route: Take 1 tablet by mouth Daily. - Oral    glucagon (human recombinant) (GLUCAGEN DIAGNOSTIC) injection 1 mg 1 mg Every 15 Minutes PRN 6/13/2017     Sig - Route: Inject 1 mg under the skin Every 15 (Fifteen) Minutes As Needed (Blood Glucose Less Than 70 - Patient Without IV Access - Unresponsive, NPO or Unable To Safely Swallow). - Subcutaneous    heparin (porcine) 5000 UNIT/ML injection 5,000 Units 5,000 Units Every 12 Hours Scheduled 6/13/2017     Sig - Route: Inject 1 mL under the skin Every 12 (Twelve) Hours. - Subcutaneous    insulin aspart (novoLOG) injection 0-14 Units 0-14 Units 4 Times Daily Before Meals & Nightly 6/13/2017     Sig - Route: Inject 0-14 Units under the skin 4 (Four) Times a Day Before Meals & at Bedtime. - Subcutaneous    insulin regular (HumuLIN R,NovoLIN R) 100 Units in sodium chloride 0.9 % 100 mL (1 Units/mL) infusion 1-20 Units/hr Titrated 6/13/2017     Sig - Route: Infuse 1-20 Units/hr into a venous catheter Dose Adjusted By Provider As Needed. - Intravenous    lanthanum (FOSRENOL) chewable tablet 1,000 mg 1,000 mg 3 Times Daily With Meals 6/13/2017     Sig - Route: Chew 2 tablets 3 (Three) Times a Day With Meals. - Oral    levoFLOXacin (LEVAQUIN) 500 mg/100 mL D5W (premix) (LEVAQUIN) 500 mg 500 mg Every 48 Hours 6/13/2017     Sig - Route: Infuse 100 mL into a venous catheter Every Other Day. - Intravenous    levothyroxine (SYNTHROID, LEVOTHROID) tablet 100 mcg 100 mcg  Daily 6/13/2017     Sig - Route: Take 1 tablet by mouth Daily. - Oral    losartan (COZAAR) tablet 100 mg 100 mg Every 24 Hours Scheduled 6/13/2017     Sig - Route: Take 2 tablets by mouth Daily. - Oral    metoprolol succinate XL (TOPROL-XL) 24 hr tablet 50 mg 50 mg Every 24 Hours Scheduled 6/13/2017     Sig - Route: Take 1 tablet by mouth Daily. - Oral    ondansetron (ZOFRAN) injection 4 mg 4 mg Every 6 Hours PRN 6/13/2017     Sig - Route: Infuse 2 mL into a venous catheter Every 6 (Six) Hours As Needed for Nausea or Vomiting. - Intravenous    oxyCODONE-acetaminophen (PERCOCET) 5-325 MG per tablet 1 tablet 1 tablet Once 6/13/2017 6/13/2017    Sig - Route: Take 1 tablet by mouth 1 (One) Time. - Oral    oxyCODONE-acetaminophen (PERCOCET) 5-325 MG per tablet 1 tablet 1 tablet Every 4 Hours PRN 6/13/2017 6/23/2017    Sig - Route: Take 1 tablet by mouth Every 4 (Four) Hours As Needed for Moderate Pain (4-6). - Oral    Pharmacy to dose vancomycin  Continuous PRN 6/13/2017     Sig - Route: Continuous As Needed for Consult. - Does not apply    piperacillin-tazobactam (ZOSYN) in iso-osmotic dextrose IVPB 2.25 g (premix) 2.25 g Every 8 Hours 6/13/2017     Sig - Route: Infuse 50 mL into a venous catheter Every 8 (Eight) Hours. - Intravenous    sodium chloride 0.9 % flush 1-10 mL 1-10 mL As Needed 6/13/2017     Sig - Route: Infuse 1-10 mL into a venous catheter As Needed for Line Care. - Intravenous    sodium chloride 0.9 % flush 10 mL 10 mL As Needed 6/13/2017     Sig - Route: Infuse 10 mL into a venous catheter As Needed for Line Care. - Intravenous    Cosign for Ordering: Accepted by Estrada Sorto MD on 6/13/2017  3:34 AM    sodium chloride 0.9 % flush 10 mL 10 mL As Needed 6/13/2017     Sig - Route: Infuse 10 mL into a venous catheter As Needed for Line Care. - Intravenous    sodium chloride 0.9 % flush 30 mL 30 mL Once As Needed 6/13/2017     Sig - Route: Infuse 30 mL into a venous catheter 1 (One) Time As Needed for  Line Care. - Intravenous    sodium chloride 0.9 % infusion 30 mL/hr Continuous PRN 6/13/2017     Sig - Route: Infuse 30 mL/hr into a venous catheter Continuous As Needed (if insulin infusion rate is insufficient to maintain patency.). - Intravenous    sodium chloride 0.9 % infusion 125 mL/hr Continuous 6/13/2017 6/13/2017    Sig - Route: Infuse 125 mL/hr into a venous catheter Continuous. - Intravenous    sodium chloride 0.9 % infusion 30 mL/hr Continuous 6/13/2017     Sig - Route: Infuse 30 mL/hr into a venous catheter Continuous. - Intravenous    vitamin D3 capsule 5,000 Units 5,000 Units Daily 6/13/2017     Sig - Route: Take 1 capsule by mouth Daily. - Oral          Blood Administration Record     None        Physician Progress Notes (last 24 hours) (Notes from 6/12/2017 11:05 AM through 6/13/2017 11:05 AM)     No notes of this type exist for this encounter.           Consult Notes (last 24 hours) (Notes from 6/12/2017 11:05 AM through 6/13/2017 11:05 AM)      Ean Hernandez MD at 6/13/2017 10:24 AM  Version 1 of 1     Consult Orders:    1. Inpatient Consult to Pulmonology [[id number]] ordered by Nickolas Ruelas DO at 06/13/17 0447                Date of consultation:   June 13, 2017    Requested by:   Hospitalist Service.   PCP: Idalia Kay MD      Reason:  Abnormal CT of the chest.  Pleural catheter not draining any more    History of Present Illness:  57-year-old gentleman with past medical history significant for recurrent right-sided pleural effusion who was sent to [facility] after he developed hydropneumothorax postthoracentesis.  The patient was invited by CT surgery and a Pleurx catheter was placed.    The patient says that over the past few days there has been decreasing fluid drained from the right-sided Yusef catheter and for the past 2 times he is only gotten 5-10 mls at a time.    The patient says that he has been having increased cough with phlegm production, which is  "mostly clear.  He denies any fever or chills but mentions increased right-sided pressure and pain.  He also mentions epigastric pain which sometimes radiates towards the umbilicus.     He denies any sick contacts.  He also said that he has not missed any dialysis.    The patient came to the emergency room with above mentioned symptoms along with shortness of breath.  He underwent a CT scan and was admitted to the hospitalist service    Review of System: All other review of systems negative except indicated in HPI.  Also positive for occasional back pain and mild anxiety.    Past Medical History:  Past Medical History:   Diagnosis Date   • Anemia    • Chronic kidney disease    • Diabetes mellitus    • H/O chest x-ray 03/25/2016    Interval decrease in size of the patients right pleural effusion with a persistent small left effusion as well   • Hypertension    • Left-sided weakness    • Renal failure    • Stroke          Past Surgical History:  Past Surgical History:   Procedure Laterality Date   • CATARACT EXTRACTION, BILATERAL     • CHOLECYSTECTOMY     • COLONOSCOPY     • EYE SURGERY     • PORTACATH PLACEMENT     • VENOUS ACCESS DEVICE (PORT) REMOVAL           Family History:  Family History   Problem Relation Age of Onset   • Cancer Mother    • Hypertension Father    • Diabetes Father    • Hypertension Sister    • Diabetes Sister    • Hypertension Brother    • Diabetes Paternal Grandmother          Social History:  Social History     Social History   • Marital status:      Spouse name: N/A   • Number of children: N/A   • Years of education: N/A     Social History Main Topics   • Smoking status: Never Smoker   • Smokeless tobacco: Never Used   • Alcohol use No   • Drug use: No   • Sexual activity: Defer     Other Topics Concern   • None     Social History Narrative         Physical Exam:  /69  Pulse 81  Temp 97.5 °F (36.4 °C) (Oral)   Resp 22  Ht 68\" (172.7 cm)  Wt 148 lb 2.4 oz (67.2 kg)  SpO2 " 100%  BMI 22.53 kg/m2    General:              No respiratory distress noted.        Eyes:                EOM sluggish               Conjunctiva appeared somewhat injected and pale. Pupils seemed equal        ENT:                 Oropharynx was somewhat crowded. No lesions noted.               Missing teeth noted         Neck:                 No JVD.                 Thyroid did not seem to be enlarged.        Cardiovascular:                S1 + S2.  Regular.  Left-sided port noted        Respiratory:                 Respiratory effort was adequate.                 Dullness to percussion at the right base.  Right sided pleur-x catheter noted.               Good Air Entry Bilaterally with no wheezing.  Bilateral crackles heard.        Abdomen:                Soft. Bowel sounds positive.  No obvious organomegaly noted. Mild epigastric tenderness noted.      Musculoskeletal/Extremities:                Traceedema noted in the lower extremities.                Right AV graft noted                No clubbing noted.                Gait could not be assesed at this time.        Neurologic/Psychiatric:                AAOx3.                  Affect was appropriate                Was able to follow simple commands        Skin:               Warm and dry.               Chronic skin changes in lower extremities              Labs:   Reviewed. Pertinent labs were noted.     Lab Results   Component Value Date    WBC 13.91 (H) 06/13/2017    HGB 12.7 (L) 06/13/2017    HCT 37.8 (L) 06/13/2017    MCV 85.3 06/13/2017     06/13/2017       Lab Results   Component Value Date    GLUCOSE 618 (C) 06/13/2017    CALCIUM 9.1 06/13/2017     (C) 06/13/2017    K 5.3 (H) 06/13/2017    CO2 24.0 (L) 06/13/2017    CL 84 (L) 06/13/2017    BUN 36 (H) 06/13/2017    CREATININE 6.40 (H) 06/13/2017    EGFRIFNONA 9 (L) 06/13/2017    BCR 5.6 (L) 06/13/2017    ANIONGAP 24.3 06/13/2017         ABG:  Lab Results   Component Value Date    PHART 7.415  06/13/2017    ZRV0KVO 40.8 06/13/2017    PO2ART 77.7 06/13/2017    SO2 91.8 (L) 03/20/2016    FCOHB 0.9 (L) 03/20/2016    HGBBG 12.3 06/13/2017    N7JVGZCK 94.9 06/13/2017    CARBOXYHGB 1.3 06/13/2017    FMETHB 0.9 03/20/2016           Imaging Study: Images reviewed personally and discussed with patient.    Imaging Results (last 72 hours)     Procedure Component Value Units Date/Time    XR Chest 2 View [[id number]] Collected:  06/13/17 0758     Updated:  06/13/17 0801    Narrative:       PROCEDURE: XR CHEST 2 VW-        HISTORY: SOA  triage protocol; R06.02-Shortness of breath     COMPARISON: May 1, 2017.     FINDINGS: The heart is normal in size. The mediastinum is unremarkable.  There is a small to moderate size right pleural effusion which is  unchanged. A Pleurx drain is in place on the right. There is new  airspace disease in the right upper lung field likely a pneumonia. The  left lung is clear. There is no pneumothorax. There are no acute osseous  abnormalities.           Impression:       New right upper lung field airspace disease consistent with  a pneumonia. Follow-up to radiographic resolution is recommended.           This report was finalized on 6/13/2017 7:59 AM by Marilia Mora M.D..    CT Chest Without Contrast [[id number]] Collected:  06/13/17 0759     Updated:  06/13/17 0802    Narrative:       PROCEDURE: CT CHEST WO CONTRAST-     HISTORY: SOB; R06.02-Shortness of breath; R73.9-Hyperglycemia,  unspecified     COMPARISON:  None .     PROCEDURE:  Multiple axial CT images were obtained from the thoracic  inlet through the upper abdomen without the use of contrast.      FINDINGS:   Soft tissue windows reveal prominent axillary lymph nodes likely  reactive. Heart size is normal. The aorta is normal in caliber. There is  a small loculated right pleural effusion with a Pleurx drain in place.  There is a small simple appearing left effusion. Lung windows reveal  patchy airspace disease fairly  diffusely within the right lung likely  related to a pneumonia. The visualized upper abdomen is unremarkable.  Bone windows reveal no acute osseous abnormalities.       Impression:       Loculated right effusion and diffuse right lung airspace  disease likely reflecting a pneumonia.     349 mGy.cm          This study was performed with techniques to keep radiation doses as low  as reasonably achievable (ALARA). Individualized dose reduction  techniques using automated exposure control or adjustment of mA and/or  kV according to the patient size were employed.      This report was finalized on 6/13/2017 8:00 AM by Marilia Mora M.D..            Assessment:  1.  HCAP?  2.  Recent recurrent right-sided pleural effusion  3.  Status post Pleurx catheter placement  4.  ESRD  5.  Epigastric pain.?  Acute gastritis    Discussion/Recommendations:   The patient's history is somewhat vague and is difficult to attribute to one condition although it seems that he probably has an element of underlying pneumonia.  The Pleurx catheter output would already low and currently the patient says that it does not drain anything but 5 mL at a time.  Based on the CT findings, and the fact that he already has an appointment with CT surgery at [facility], I believe he needs to have CT surgery evaluated the Pleurx catheter.    He may need to undergo thoracic procedure and I explained that the patient.  This will of course be determined by the thoracic surgeon after his evaluation.    Since the patient is on dialysis and should be considered somewhat immunocompromised, along with you definitely elevated glucose levels, I would start the patient on antibiotics.  As already mentioned, his symptoms are vague but could point towards pneumonia.    The plan was discussed with the patient.  I have also discussed the case with the nursing staff.    Recommendations were also discussed with the referring provider.     I would like to  thank you for the opportunity to participate in the care of this patient.  We will communicate changes and recommendations, if and when necessary.      Ean Hernandez MD  06/13/17  10:24 AM    Please note that portions of this note may have been completed with a voice recognition software. Every effort was made to edit the dictation, but occasionally words are mistranscribed.       Electronically signed by Ean Hernandez MD at 6/13/2017 10:40 AM

## 2017-06-13 NOTE — CONSULTS
"Adult Nutrition  Assessment/PES    Patient Name:  Talha Cutler  YOB: 1960  MRN: [id number]  Admit Date:  6/13/2017    Assessment Date:  6/13/2017        Reason for Assessment       06/13/17 1001    Reason for Assessment    Reason For Assessment/Visit diagnosis/disease state;nurse/nurse practitioner consult    Identified At Risk By Screening Criteria MST SCORE 2+;reduced oral intake over the last month;unintentional loss of 10 lbs or more in the past 2 mos    Diagnosis Diagnosis    Cardiac HTN    Endocrine DM Type 2    Metabolic/ICU Hyperkalemia    Pulmonary/Critical Care Pulmonary edema;Other (comment)   Respiratory insufficiency, Pleural effusions    Renal ESRD;Hemodialysis                Anthropometrics       06/13/17 1003    Anthropometrics    Height Method Stated    Height 172.7 cm (68\")    Weight Method Bed scale    Weight 67.2 kg (148 lb 2.4 oz)    Ideal Body Weight (IBW)    Ideal Body Weight (IBW), Male (kg) 70.89    % Ideal Body Weight 94.99    Body Mass Index (BMI)    BMI (kg/m2) 22.57    BMI Grade 19.1 - 24.9 - normal            Labs/Tests/Procedures/Meds       06/13/17 1003    Labs/Tests/Procedures/Meds    Labs/Tests Review Reviewed   Low: Na, Cl     High: K, Glu, BUN, Creat, HgbA1C    Medication Review Reviewed, pertinent;Anticoag;Insulin   Switching to basal levemir            Physical Findings       06/13/17 1007    Physical Findings/Assessment    Additional Documentation --            Estimated/Assessed Needs       06/13/17 1008    Calculation Measurements    Weight Used For Calculations 67.2 kg (148 lb 2.4 oz)   Actual    Height Used for Calculations 1.727 m (5' 8\")    Estimated/Assessed Energy Needs    Energy Need Method [place] Jeor    Age 57    RMR ([place] Jeor Equation) 1466.5    Activity Factors ([place] Jeor)  Confined to bed  1.2    Estimated Kcal Range  ~1272-6600 kcal    Estimated/Assessed Protein Needs    Weight Used for Protein Calculation 67.2 kg (148 lb 2.4 " oz)    Protein (gm/kg) 1.4    1.4 Gm Protein (gm) 94.08    Estimated Protein Range ~81-94 gm    Estimated/Assessed Fluid Needs    Fluid Need Method RDA method   Output + 1000 mL            Nutrition Prescription Ordered       06/13/17 1010    Nutrition Prescription PO    Current PO Diet Regular    Common Modifiers Cardiac;Consistent Carbohydrate;Renal            Evaluation of Received Nutrient/Fluid Intake       06/13/17 1008    PO Evaluation    Number of Days PO Intake Evaluated Insufficient Data   Pt recently admitted              Problem/Interventions:        Problem 1       06/13/17 1011    Nutrition Diagnoses Problem 1    Problem 1 Impaired Nutrient Utilization    Etiology (related to) Medical Diagnosis    Endocrine DM2    Renal ESRD;Hemodialysis    Signs/Symptoms (evidenced by) Biochemical    Specific Labs Noted BUN;HgbA1C;Glucose;K+;Creatinine;Na+            Problem 2       06/13/17 1012    Nutrition Diagnoses Problem 2    Problem 2 Unintended Weight Loss    Etiology (related to) Factors Affecting Nutrition    Appetite Poor    Signs/Symptoms (evidenced by) Unintended Weight Change;Report/Observation;Report of Minimal PO Intake    Reported/Observed By RN;Patient    Unintended Weight Change Loss    Number of Pounds Lost 14-23    Weight loss time period 2 months                  Intervention Goal       06/13/17 1013    Intervention Goal    General Meet nutritional needs for age/condition;Provide information regarding MNT for treatment/condition    PO Meet estimated needs;Establish PO    Weight Maintain weight            Nutrition Intervention       06/13/17 1013    Nutrition Intervention    RD/Tech Action Follow Tx progress;Encourage intake;Care plan reviewd            Nutrition Prescription       06/13/17 1014    Nutrition Prescription PO    PO Prescription Other (comment)   Continue current diet order    New PO Prescription Ordered? No, recommended    Other Orders    Obtain Weight Daily    Obtain Weight  Ordered? No, recommended    Supplement Vitamin mineral supplement   Renal    Supplement Ordered? No, recommended            Education/Evaluation       06/13/17 1015    Education    Education Will Instruct as appropriate;Education topics    Education Topics Diabetes;CHO counting;Renal diet;Cardiac diabetic    Monitor/Evaluation    Monitor Per protocol;I&O;Pertinent labs;PO intake;Weight        Comments:   Rec #1: Continue current diet order; Establishing and encouraging PO intake. Rec #2: Consider renal MVI with minerals daily. Rec #3: Continue to monitor/replace electrolytes. RD aware of pt's possible VATS procedure. Pt being switched to basal insulin therapy Levemir per MD to better control elevated BS. Elevated most recent HgbA1C 11.7. RD to follow pt. Consult RD PRN.     Electronically signed by:  Afshan Grace RD  06/13/17 10:16 AM

## 2017-06-13 NOTE — PROGRESS NOTES
Continued Stay Note  ANTOINE Sharma     Patient Name: Talha Cutler  MRN: [id number]  Today's Date: 6/13/2017    Admit Date: 6/13/2017          Discharge Plan       06/13/17 1338    Case Management/Social Work Plan    Plan Discharge plan is tenatively to transfer to . SW will follow as needed.     Patient/Family In Agreement With Plan yes    Final Note    Final Note Following for discharge planning.               Discharge Codes     None            Alexus Lockett

## 2017-06-13 NOTE — H&P
[facility] Medicine Services  HISTORY AND PHYSICAL    Primary Care Physician: Idalia Kay MD    Subjective     Chief Complaint:    Chief Complaint   Patient presents with   • Shortness of Breath       History of Present Illness:   Pt is a 56 yo male, well known hx of ESRD on HD, sched of T/Th/Sat; followed by Dr. Mackey.  Chronic pleural effusions, s/p pleural drain placed by CT surgeon at  upon transfer from this facility last month; followed by Dr. Hernandez also; uses oxygen via NC at night; worsened SOA/CARSON for approx 7-10 days; pt states sudden reduction in drainage from 1000ml every 2 days, to less than 5ml a few days ago; now chronically SOA; without F/C; no CP; no hemoptysis; sig elevation to his BS additionally; has appt to see CT surgeon at pulm clinic at  in July.    In ER was found to have sig elev BNP, with BS > 700, without DKA; K elevated as well; chronic effusion noted on CXR, and CT was ordered of chest.  Pt denies any worsened edema to bernadine LE.  Pt to be admitted for further inpt care/treatment.      Review of Systems   1. Constitutional: Poor appetite; worsened SOA/CARSON and malaise and fatigue.   2. HENT: No dysphagia; no changes to vision/hearing/smell/taste; no epistaxis  3. Eyes: Negative for redness and visual disturbance.   4. Respiratory: worsened SOA; mild cough, not productive.   5. Cardiovascular: Negative for chest pain and palpitations.   6. Gastrointestinal: Negative for abdominal distention, abdominal pain and blood in stool.   7. Endocrine: Negative for cold intolerance and heat intolerance.   8. Genitourinary: Chronic dec UOP; no hematuria.   9. Musculoskeletal: Chronic arthralgias/myalgias.   10. Skin: no new wounds or ulcerations; chronic erythema to R pleural drain site; no sig drainage.  11. Neurological: Negative for syncope, but generalized weakness chronically, worsened over last week or so.   12. Hematological: Negative for adenopathy.  Does not bruise/bleed easily.   13. Psychiatric/Behavioral: Negative for confusion. The patient is not nervous/anxious.     Past Medical History:   Past Medical History:   Diagnosis Date   • Anemia    • Chronic kidney disease    • Diabetes mellitus    • H/O chest x-ray 03/25/2016    Interval decrease in size of the patients right pleural effusion with a persistent small left effusion as well   • Hypertension    • Left-sided weakness    • Renal failure    • Stroke        Past Surgical History:  Past Surgical History:   Procedure Laterality Date   • CATARACT EXTRACTION, BILATERAL     • CHOLECYSTECTOMY     • COLONOSCOPY     • EYE SURGERY     • PORTACATH PLACEMENT     • VENOUS ACCESS DEVICE (PORT) REMOVAL         Family History: family history includes Cancer in his mother; Diabetes in his father, paternal grandmother, and sister; Hypertension in his brother, father, and sister.    Social History:  reports that he has never smoked. He has never used smokeless tobacco. He reports that he does not drink alcohol or use illicit drugs.    Medications:  Prescriptions Prior to Admission   Medication Sig Dispense Refill Last Dose   • amLODIPine (NORVASC) 10 MG tablet    Taking   • amLODIPine (NORVASC) 5 MG tablet Take 5 mg by mouth daily.   Not Taking   • amoxicillin-clavulanate (AUGMENTIN) 500-125 MG per tablet    Not Taking   • aspirin 325 MG tablet Take 325 mg by mouth daily.   Taking   • Cholecalciferol (VITAMIN D3) 5000 UNITS capsule capsule Take  by mouth daily.   Taking   • fenofibrate (TRICOR) 54 MG tablet Take 54 mg by mouth daily.   Taking   • folic acid (FOLVITE) 1 MG tablet Take 1 mg by mouth daily.   Taking   • FOSRENOL 1000 MG chewable tablet 3 (Three) Times a Day With Meals.   Taking   • insulin aspart (NovoLOG) 100 UNIT/ML injection Inject 4 Units under the skin 3 (Three) Times a Day Before Meals.   Taking   • levothyroxine (SYNTHROID, LEVOTHROID) 100 MCG tablet Take 100 mcg by mouth daily.   Taking   • losartan  "(COZAAR) 100 MG tablet Take 1 tablet by mouth Daily.   Taking   • metoprolol succinate XL (TOPROL-XL) 50 MG 24 hr tablet    Taking   • omeprazole (PriLOSEC) 20 MG capsule Take 20 mg by mouth daily.   Taking       Allergies:  Allergies   Allergen Reactions   • Erythromycin Hives         Objective     Physical Exam:  Vital Signs: /85  Pulse 98  Temp 97.8 °F (36.6 °C) (Oral)   Resp 22  Ht 68\" (172.7 cm)  Wt 134 lb (60.8 kg)  SpO2 (!) 88% Comment: PLACED ON 2L/NC AND INCREASED TO 96%   BMI 20.37 kg/m2     General Appearance: alert, oriented x 3, no acute distress.  Chronic ill-appearing male.  Skin: warm and dry.   HEENT: Atraumatic.  pupils round and reactive to light and accommodation, oral mucosa pink and moist.  Nares patent without epistaxis.  External auditory canals are patent tympanic membranes intact.  Neck: supple, no JVD, trachea midline.  No thyromegaly  Lungs: AAE to all lung fields, rhonchus BS to Right lung throughout.  R pleural drain in place, clean and dry  Heart: RR, normal S1 and S2, no S3, no rub.  Abdomen: soft, non-tender, no palpable bladder, present bowel sounds to auscultation ×4.  No guarding or rigidity.  Extremities: no clubbing, cyanosis or edema.  Good range of motion actively and passively.  Symmetric muscle strength and development, thin frail LE.  Noted tunnel cath/av fistula to RUE.  Neuro: normal speech and mental status.  Cranial nerves II through XII intact.  No anosmia. DTR 2+; No focal motor/sensory deficits.          Results Reviewed:    Lab Results (last 72 hours)     Procedure Component Value Units Date/Time    CBC & Differential [[id number]] Collected:  06/13/17 0211    Specimen:  Blood Updated:  06/13/17 0240    Narrative:       The following orders were created for panel order CBC & Differential.  Procedure                               Abnormality         Status                     ---------                               -----------         ------                "      CBC Auto Differential[[id number]]        Abnormal            Final result                 Please view results for these tests on the individual orders.    CBC Auto Differential [[id number]]  (Abnormal) Collected:  06/13/17 0211    Specimen:  Blood Updated:  06/13/17 0240     WBC 10.94 (H) 10*3/mm3      RBC 4.01 (L) 10*6/mm3      Hemoglobin 11.6 (L) g/dL      Hematocrit 35.7 (L) %      MCV 89.0 fL      MCH 28.9 pg      MCHC 32.5 g/dL      RDW 16.7 (H) %      RDW-SD 53.8 fl      MPV 9.9 fL      Platelets 341 10*3/mm3      Neutrophil % 85.7 (H) %      Lymphocyte % 4.5 (L) %      Monocyte % 6.9 %      Eosinophil % 1.6 %      Basophil % 0.5 %      Immature Grans % 0.8 (H) %      Neutrophils, Absolute 9.39 (H) 10*3/mm3      Lymphocytes, Absolute 0.49 (L) 10*3/mm3      Monocytes, Absolute 0.75 10*3/mm3      Eosinophils, Absolute 0.17 10*3/mm3      Basophils, Absolute 0.05 10*3/mm3      Immature Grans, Absolute 0.09 (H) 10*3/mm3      nRBC 0.0 /100 WBC     Troponin [[id number]]  (Normal) Collected:  06/13/17 0211    Specimen:  Blood Updated:  06/13/17 0243     Troponin I 0.015 ng/mL     Narrative:       Normal Patient Upper Reference Limit (URL) (99th Percentile)=0.03 ng/mL   Non-AMI Illness Reference Limit=0.03-0.11 ng/mL   AMI Confirmation=0.12 ng/mL and above    Comprehensive Metabolic Panel [[id number]]  (Abnormal) Collected:  06/13/17 0211    Specimen:  Blood Updated:  06/13/17 0246     Glucose 784 (C) mg/dL       Glucose >180, Hemoglobin A1C recommended.        BUN 36 (H) mg/dL      Creatinine 5.90 (H) mg/dL      Sodium 120 (C) mmol/L      Potassium 5.8 (C) mmol/L      Chloride 79 (L) mmol/L      CO2 26.0 mmol/L      Calcium 8.9 mg/dL      Total Protein 7.7 g/dL      Albumin 3.70 g/dL      ALT (SGPT) 19 U/L      AST (SGOT) 39 U/L      Alkaline Phosphatase 96 U/L      Total Bilirubin 1.1 mg/dL      eGFR Non African Amer 10 (L) mL/min/1.73      Globulin 4.0 gm/dL      A/G Ratio 0.9 (L) g/dL      BUN/Creatinine Ratio 6.1  (L)     Anion Gap 20.8 mmol/L     Narrative:       Abnormal estimated GFR should be followed by more specific studies to confirm end stage chronic renal disease. The equation used for calculation may not be accurate for patients less than 19 years old, greater than 70 years old, patients at extremes of weight, malnutrition, or with acute renal dysfunction.    BNP [[id number]]  (Abnormal) Collected:  06/13/17 0211    Specimen:  Blood Updated:  06/13/17 0316     proBNP 357206.0 (C) pg/mL     Blood Gas, Arterial With Co-Ox [[id number]]  (Abnormal) Collected:  06/13/17 0322    Specimen:  Arterial Blood Updated:  06/13/17 0323     Site Arterial: left radial     Gerardo's Test Positive     pH, Arterial 7.415 pH units      pCO2, Arterial 40.8 mm Hg      pO2, Arterial 77.7 mm Hg      HCO3, Arterial 26.1 mmol/L      Base Excess, Arterial 1.6 mmol/L      O2 Saturation, Arterial 94.9 %      Hemoglobin, Blood Gas 12.3 g/dL      Oxyhemoglobin 92.8 (L) %      Methemoglobin 0.9 %      Carboxyhemoglobin 1.3 %      Modality Cannula - Nasal     FIO2 32 %     [place] Draw [[id number]] Collected:  06/13/17 0211    Specimen:  Blood Updated:  06/13/17 0401    Narrative:       The following orders were created for panel order [place] Draw.  Procedure                               Abnormality         Status                     ---------                               -----------         ------                     Light Blue Top[[id number]]                                                              Lavender Top[[id number]]                                     Final result               Gold Top - SST[[id number]]                                   Final result               Green Top (No Gel)[[id number]]                                                            Please view results for these tests on the individual orders.    Lavender Top [[id number]] Collected:  06/13/17 0211    Specimen:  Blood Updated:  06/13/17 0401     Extra Tube hold for add-on       Auto resulted       Gold Top - SST [[id number]] Collected:  06/13/17 0211    Specimen:  Blood Updated:  06/13/17 0401     Extra Tube Hold for add-ons.      Auto resulted.       POC Glucose Fingerstick [[id number]]  (Abnormal) Collected:  06/13/17 0335    Specimen:  Blood Updated:  06/13/17 0430     Glucose >599 (C) mg/dL       Serial Number: [id number]    : [id number]       POC Glucose Fingerstick [[id number]]  (Abnormal) Collected:  06/13/17 0423    Specimen:  Blood Updated:  06/13/17 0431     Glucose >599 (C) mg/dL       Serial Number: [id number]    : [id number]             Imaging Results (last 72 hours)     Procedure Component Value Units Date/Time    XR Chest 2 View [[id number]] Updated:  06/13/17 0227    CT Chest Without Contrast [[id number]] Updated:  06/13/17 0328          ECG/EMG Results (most recent)     None          I have personally reviewed and interpreted available lab data, radiology studies and ECG obtained at time of admission.     Assessment / Plan     Assessment/Problem List:   Principal Problem:    SOB (shortness of breath)  Active Problems:    Pleural effusion, bilateral    Type 2 diabetes mellitus treated with insulin    End stage renal disease on dialysis    Uncontrolled diabetes mellitus with hyperglycemia, with long-term current use of insulin    Hyperkalemia    Hypertension due to end stage renal disease caused by type 2 diabetes mellitus, on dialysis    Physical debility    Chronic respiratory insufficiency          Plan:  Pt admitted for inpt care; will need HD today, consulted Dr. Mackey his usual nephrologist; serial BMP d/t hyperkalemia; given insulin at onset of tx in ER; initially on a gtt; will convert to SSI as able; renal profile daily with CBC; consulted Dr. Hernandez concerning POC for now presumed malfunctioning R lung drain; pt had placed by CT surgeon at ; planned f/u there in July, but likely to need expedited appt for f/u; CT scan today looks more loculated in R  lung, likely explains sudden cessation of any sig drainage per pt hx; no identifiable need for abx at this time; BNP sig elevated; will follow clinically after HD; I have lowered his amlodipine dosing today in lieu of his HD and volume extraction; cont oxygen supplementation, but pt usually only uses during sleep at home, but needs continuously at this time; severe hyperglycemia, not sure of when/if pt has ever been at goal for BS control; last HbA1c I can see was >12; pt from historical perspective only takes a few units of novolog prior to meals; likely needs long acting insulin therapy in addition to short acting with meals.  Condition guarded, clinical picture likely to change after HD; POC to be adjusted as needed; discussed POC in detail with pt and family at bedside, all verb understanding and agree.    I have spent a total of 70 minutes in direct critical care with pt today.          Nickolas Ruelas,  06/13/17 4:49 AM    Please note that portions of this note may have been completed with a voice recognition program. Efforts were made to edit the dictations, but occasionally words are mistranscribed.

## 2017-06-13 NOTE — PROGRESS NOTES
Results from last 7 days     Lab Units 06/13/17  1139 06/13/17  0551 06/13/17  0211   WBC 10*3/mm3 --  13.91* 10.94*   CREATININE mg/dL 6.20* 6.40* 5.90*   Estimated Creatinine Clearance: 12.3 mL/min (by C-G formula based on Cr of 6.2).    Indication:  HCAP    Micro:  Microbiology Results (last 10 days)       ** No results found for the last 240 hours. **                                      Current Abx:  Vancomycin, Levaquin, Zosyn    Assessment/Plan:  Pharmacy will continue to monitor.

## 2017-06-13 NOTE — CONSULTS
"Pharmacokinetic Follow-up Note - Vancomycin     Talha Cutler is a 57 y.o. male  68\" (172.7 cm) 148 lb 2.4 oz (67.2 kg)     Indication for use: HCAP      Results from last 7 days     Lab Units 06/13/17  1139 06/13/17  0551 06/13/17  0211   WBC 10*3/mm3 --  13.91* 10.94*   CREATININE mg/dL 6.20* 6.40* 5.90*      Estimated Creatinine Clearance: 12.3 mL/min (by C-G formula based on Cr of 6.2).  Temp Readings from Last 1 Encounters:   06/13/17 97.5 °F (36.4 °C) (Oral)     Lab Results   Component Value Date    VANCORANDOM 10 01/03/2017       Microbiology:  Microbiology Results (last 10 days)       ** No results found for the last 240 hours. **            Current Vancomycin Dose:  750 mg IVPB every pre-dialysis level < 20    Other Antimicrobials:   Zosyn, Levaquin    Assessment/Plan:    Pharmacy will continue to monitor renal function and adjust dose accordingly.    Lenora Yoder RPH   06/13/17 2:55 PM   "

## 2017-06-13 NOTE — CONSULTS
Date of consultation:   June 13, 2017    Requested by:   Hospitalist Service.   PCP: Idalia Kay MD      Reason:  Abnormal CT of the chest.  Pleural catheter not draining any more    History of Present Illness:  57-year-old gentleman with past medical history significant for recurrent right-sided pleural effusion who was sent to [facility] after he developed hydropneumothorax postthoracentesis.  The patient was invited by CT surgery and a Pleurx catheter was placed.    The patient says that over the past few days there has been decreasing fluid drained from the right-sided Yusef catheter and for the past 2 times he is only gotten 5-10 mls at a time.    The patient says that he has been having increased cough with phlegm production, which is mostly clear.  He denies any fever or chills but mentions increased right-sided pressure and pain.  He also mentions epigastric pain which sometimes radiates towards the umbilicus.     He denies any sick contacts.  He also said that he has not missed any dialysis.    The patient came to the emergency room with above mentioned symptoms along with shortness of breath.  He underwent a CT scan and was admitted to the hospitalist service    Review of System: All other review of systems negative except indicated in HPI.  Also positive for occasional back pain and mild anxiety.    Past Medical History:  Past Medical History:   Diagnosis Date   • Anemia    • Chronic kidney disease    • Diabetes mellitus    • H/O chest x-ray 03/25/2016    Interval decrease in size of the patients right pleural effusion with a persistent small left effusion as well   • Hypertension    • Left-sided weakness    • Renal failure    • Stroke          Past Surgical History:  Past Surgical History:   Procedure Laterality Date   • CATARACT EXTRACTION, BILATERAL     • CHOLECYSTECTOMY     • COLONOSCOPY     • EYE SURGERY     • PORTACATH PLACEMENT     • VENOUS ACCESS DEVICE (PORT) REMOVAL    "        Family History:  Family History   Problem Relation Age of Onset   • Cancer Mother    • Hypertension Father    • Diabetes Father    • Hypertension Sister    • Diabetes Sister    • Hypertension Brother    • Diabetes Paternal Grandmother          Social History:  Social History     Social History   • Marital status:      Spouse name: N/A   • Number of children: N/A   • Years of education: N/A     Social History Main Topics   • Smoking status: Never Smoker   • Smokeless tobacco: Never Used   • Alcohol use No   • Drug use: No   • Sexual activity: Defer     Other Topics Concern   • None     Social History Narrative         Physical Exam:  /69  Pulse 81  Temp 97.5 °F (36.4 °C) (Oral)   Resp 22  Ht 68\" (172.7 cm)  Wt 148 lb 2.4 oz (67.2 kg)  SpO2 100%  BMI 22.53 kg/m2    General:              No respiratory distress noted.        Eyes:                EOM sluggish               Conjunctiva appeared somewhat injected and pale. Pupils seemed equal        ENT:                 Oropharynx was somewhat crowded. No lesions noted.               Missing teeth noted         Neck:                 No JVD.                 Thyroid did not seem to be enlarged.        Cardiovascular:                S1 + S2.  Regular.  Left-sided port noted        Respiratory:                 Respiratory effort was adequate.                 Dullness to percussion at the right base.  Right sided pleur-x catheter noted.               Good Air Entry Bilaterally with no wheezing.  Bilateral crackles heard.        Abdomen:                Soft. Bowel sounds positive.  No obvious organomegaly noted. Mild epigastric tenderness noted.      Musculoskeletal/Extremities:                Traceedema noted in the lower extremities.                Right AV graft noted                No clubbing noted.                Gait could not be assesed at this time.        Neurologic/Psychiatric:                AAOx3.                  Affect was appropriate  "               Was able to follow simple commands        Skin:               Warm and dry.               Chronic skin changes in lower extremities              Labs:   Reviewed. Pertinent labs were noted.     Lab Results   Component Value Date    WBC 13.91 (H) 06/13/2017    HGB 12.7 (L) 06/13/2017    HCT 37.8 (L) 06/13/2017    MCV 85.3 06/13/2017     06/13/2017       Lab Results   Component Value Date    GLUCOSE 618 (C) 06/13/2017    CALCIUM 9.1 06/13/2017     (C) 06/13/2017    K 5.3 (H) 06/13/2017    CO2 24.0 (L) 06/13/2017    CL 84 (L) 06/13/2017    BUN 36 (H) 06/13/2017    CREATININE 6.40 (H) 06/13/2017    EGFRIFNONA 9 (L) 06/13/2017    BCR 5.6 (L) 06/13/2017    ANIONGAP 24.3 06/13/2017         ABG:  Lab Results   Component Value Date    PHART 7.415 06/13/2017    ADG6NCO 40.8 06/13/2017    PO2ART 77.7 06/13/2017    SO2 91.8 (L) 03/20/2016    FCOHB 0.9 (L) 03/20/2016    HGBBG 12.3 06/13/2017    R4UFKPQG 94.9 06/13/2017    CARBOXYHGB 1.3 06/13/2017    FMETHB 0.9 03/20/2016           Imaging Study: Images reviewed personally and discussed with patient.    Imaging Results (last 72 hours)     Procedure Component Value Units Date/Time    XR Chest 2 View [[id number]] Collected:  06/13/17 0758     Updated:  06/13/17 0801    Narrative:       PROCEDURE: XR CHEST 2 VW-        HISTORY: SOA  triage protocol; R06.02-Shortness of breath     COMPARISON: May 1, 2017.     FINDINGS: The heart is normal in size. The mediastinum is unremarkable.  There is a small to moderate size right pleural effusion which is  unchanged. A Pleurx drain is in place on the right. There is new  airspace disease in the right upper lung field likely a pneumonia. The  left lung is clear. There is no pneumothorax. There are no acute osseous  abnormalities.           Impression:       New right upper lung field airspace disease consistent with  a pneumonia. Follow-up to radiographic resolution is recommended.           This report was finalized  on 6/13/2017 7:59 AM by Marilia Mora M.D..    CT Chest Without Contrast [[id number]] Collected:  06/13/17 0759     Updated:  06/13/17 0802    Narrative:       PROCEDURE: CT CHEST WO CONTRAST-     HISTORY: SOB; R06.02-Shortness of breath; R73.9-Hyperglycemia,  unspecified     COMPARISON:  None .     PROCEDURE:  Multiple axial CT images were obtained from the thoracic  inlet through the upper abdomen without the use of contrast.      FINDINGS:   Soft tissue windows reveal prominent axillary lymph nodes likely  reactive. Heart size is normal. The aorta is normal in caliber. There is  a small loculated right pleural effusion with a Pleurx drain in place.  There is a small simple appearing left effusion. Lung windows reveal  patchy airspace disease fairly diffusely within the right lung likely  related to a pneumonia. The visualized upper abdomen is unremarkable.  Bone windows reveal no acute osseous abnormalities.       Impression:       Loculated right effusion and diffuse right lung airspace  disease likely reflecting a pneumonia.     349 mGy.cm          This study was performed with techniques to keep radiation doses as low  as reasonably achievable (ALARA). Individualized dose reduction  techniques using automated exposure control or adjustment of mA and/or  kV according to the patient size were employed.      This report was finalized on 6/13/2017 8:00 AM by Marilia Mora M.D..            Assessment:  1.  HCAP?  2.  Recent recurrent right-sided pleural effusion  3.  Status post Pleurx catheter placement  4.  ESRD  5.  Epigastric pain.?  Acute gastritis    Discussion/Recommendations:   The patient's history is somewhat vague and is difficult to attribute to one condition although it seems that he probably has an element of underlying pneumonia.  The Pleurx catheter output would already low and currently the patient says that it does not drain anything but 5 mL at a time.  Based on the CT findings, and  the fact that he already has an appointment with CT surgery at [facility], I believe he needs to have CT surgery evaluated the Pleurx catheter.    He may need to undergo thoracic procedure and I explained that the patient.  This will of course be determined by the thoracic surgeon after his evaluation.    Since the patient is on dialysis and should be considered somewhat immunocompromised, along with you definitely elevated glucose levels, I would start the patient on antibiotics.  As already mentioned, his symptoms are vague but could point towards pneumonia.    The plan was discussed with the patient.  I have also discussed the case with the nursing staff.    Recommendations were also discussed with the referring provider.     I would like to thank you for the opportunity to participate in the care of this patient.  We will communicate changes and recommendations, if and when necessary.      Ean Hernandez MD  06/13/17  10:24 AM    Please note that portions of this note may have been completed with a voice recognition software. Every effort was made to edit the dictation, but occasionally words are mistranscribed.

## 2017-06-13 NOTE — PLAN OF CARE
Problem: Patient Care Overview (Adult)  Goal: Plan of Care Review  Outcome: Ongoing (interventions implemented as appropriate)    06/13/17 1400   Patient Care Overview   Progress improving   Outcome Evaluation   Outcome Summary/Follow up Plan VSS, to transfer to    Coping/Psychosocial Response Interventions   Plan Of Care Reviewed With patient       Goal: Adult Individualization and Mutuality  Outcome: Ongoing (interventions implemented as appropriate)    Problem: Respiratory Insufficiency (Adult)  Goal: Identify Related Risk Factors and Signs and Symptoms  Outcome: Outcome(s) achieved Date Met:  06/13/17  Goal: Acid/Base Balance  Outcome: Ongoing (interventions implemented as appropriate)  Goal: Effective Ventilation  Outcome: Ongoing (interventions implemented as appropriate)    Problem: Skin Integrity Impairment, Risk/Actual (Adult)  Goal: Identify Related Risk Factors and Signs and Symptoms  Outcome: Outcome(s) achieved Date Met:  06/13/17  Goal: Skin Integrity/Wound Healing  Outcome: Ongoing (interventions implemented as appropriate)

## 2017-06-13 NOTE — ED PROVIDER NOTES
Subjective   History of Present Illness   57M with a history of end-stage renal disease on hemodialysis, recurrent right-sided pleural effusion with a Pleurx catheter in place presenting with shortness of breath.  Patient states that he has felt more short of breath tonight in the setting of 3 days of his catheter not draining.  States that he had previously been draining about 1 L every 2 days but over the last 3 days only draining about a teaspoon.  Denies fevers, chills, vomiting, cough, leg swelling or other specific symptoms.    Review of Systems   Respiratory: Positive for shortness of breath.    All other systems reviewed and are negative.      Past Medical History:   Diagnosis Date   • Anemia    • Chronic kidney disease    • Diabetes mellitus    • H/O chest x-ray 03/25/2016    Interval decrease in size of the patients right pleural effusion with a persistent small left effusion as well   • Hypertension    • Left-sided weakness    • Renal failure    • Stroke        Allergies   Allergen Reactions   • Erythromycin Hives       Past Surgical History:   Procedure Laterality Date   • CATARACT EXTRACTION, BILATERAL     • CHOLECYSTECTOMY     • COLONOSCOPY     • EYE SURGERY     • PORTACATH PLACEMENT     • VENOUS ACCESS DEVICE (PORT) REMOVAL         Family History   Problem Relation Age of Onset   • Cancer Mother    • Hypertension Father    • Diabetes Father    • Hypertension Sister    • Diabetes Sister    • Hypertension Brother    • Diabetes Paternal Grandmother        Social History     Social History   • Marital status:      Spouse name: N/A   • Number of children: N/A   • Years of education: N/A     Social History Main Topics   • Smoking status: Never Smoker   • Smokeless tobacco: Never Used   • Alcohol use No   • Drug use: No   • Sexual activity: Defer     Other Topics Concern   • None     Social History Narrative           Objective   Physical Exam   Constitutional: He is oriented to person, place, and  time. He appears well-developed and well-nourished. No distress.   HENT:   Head: Normocephalic and atraumatic.   Mouth/Throat: Oropharynx is clear and moist.   Eyes: Conjunctivae are normal. No scleral icterus.   Neck: Normal range of motion. Neck supple. No tracheal deviation present.   Cardiovascular: Normal rate, regular rhythm, normal heart sounds and intact distal pulses.  Exam reveals no gallop and no friction rub.    No murmur heard.  Pulmonary/Chest: Effort normal. No stridor. No respiratory distress. He has no wheezes. He has rales (R-sided from base to midchest). He exhibits no tenderness.   pleurex catheter in place in R lower anterior chest wall w/ dressing in place clean / dry / intact   Abdominal: Soft. He exhibits no distension and no mass. There is no tenderness. There is no rebound and no guarding.   Musculoskeletal: Normal range of motion. He exhibits no deformity.   Neurological: He is alert and oriented to person, place, and time.   Skin: Skin is warm and dry. No rash noted. He is not diaphoretic. No erythema. No pallor.   Psychiatric: He has a normal mood and affect. His behavior is normal.   Nursing note and vitals reviewed.      Procedures         ED Course  ED Course                  MDM   57M here w/ SOB, crackles R lung field in setting of pleurex catheter not draining. Denies other symptoms of PNA. Has breath sounds throughout both lung fields, making PNTX less likely. Low suspicion for atypical presentation of cardiac etiology. Highest concern for recurrent effusion vs other. Will get labs, cxr and reassess.     EKG: NSR w/ inferolateral TWI similar to prior EKG.     2:29 AM Xray shows pulmonary congestion and opacification of R lung field similar to prior CXRs.   3:05 AM Given equivocal CXR, will add CT non-contrast. Labs show hyperglycemia (700s) w/o e/o DKA. Will start on mIVF, insulin gtt and discuss w/ hospitalist.   3:13 AM Discussed w/ hospitalist and will get CT scan here in ED  and then transfer to inpatient care.     Final diagnoses:   SOB (shortness of breath)            Estrada Sorto MD  06/13/17 8837

## 2017-06-13 NOTE — PROGRESS NOTES
"Pharmacokinetic Initial Note - Vancomycin    Talha Culter is a 57 y.o. male  68\" (172.7 cm) 148 lb 2.4 oz (67.2 kg)    Indication for use: HCAP      Results from last 7 days     Lab Units 06/13/17  1139 06/13/17  0551 06/13/17  0211   WBC 10*3/mm3 --  13.91* 10.94*   CREATININE mg/dL 6.20* 6.40* 5.90*      Estimated Creatinine Clearance: 12.3 mL/min (by C-G formula based on Cr of 6.2).  Temp Readings from Last 1 Encounters:   06/13/17 97.5 °F (36.4 °C) (Oral)       Culture results  Microbiology Results (last 10 days)       ** No results found for the last 240 hours. **            Other Antimicrobials  HCAP    Assessment/Plan  Initiated Vancomycin 1250 mg IVPB once, followed by 750 mg for pre-dialysis level of < 20. Will order Vancomycin trough when pharmacokinetically appropriate.  Pharmacy will monitor renal function and adjust dose accordingly.    Lenora Yoder RPH  06/13/17 2:48 PM   "

## 2017-06-13 NOTE — PROGRESS NOTES
"Adult Nutrition  Assessment/PES    Patient Name:  Talha Cutler  YOB: 1960  MRN: [id number]  Admit Date:  6/13/2017    Assessment Date:  6/13/2017        Reason for Assessment       06/13/17 1001    Reason for Assessment    Reason For Assessment/Visit diagnosis/disease state;nurse/nurse practitioner consult    Identified At Risk By Screening Criteria MST SCORE 2+;reduced oral intake over the last month;unintentional loss of 10 lbs or more in the past 2 mos    Diagnosis Diagnosis    Cardiac HTN    Endocrine DM Type 2    Metabolic/ICU Hyperkalemia    Pulmonary/Critical Care Pulmonary edema;Other (comment)   Respiratory insufficiency, Pleural effusions    Renal ESRD;Hemodialysis                Anthropometrics       06/13/17 1003    Anthropometrics    Height Method Stated    Height 172.7 cm (68\")    Weight Method Bed scale    Weight 67.2 kg (148 lb 2.4 oz)    Ideal Body Weight (IBW)    Ideal Body Weight (IBW), Male (kg) 70.89    % Ideal Body Weight 94.99    Body Mass Index (BMI)    BMI (kg/m2) 22.57    BMI Grade 19.1 - 24.9 - normal            Labs/Tests/Procedures/Meds       06/13/17 1003    Labs/Tests/Procedures/Meds    Labs/Tests Review Reviewed   Low: Na, Cl     High: K, Glu, BUN, Creat, HgbA1C    Medication Review Reviewed, pertinent;Anticoag;Insulin   Switching to basal levemir            Physical Findings       06/13/17 1007    Physical Findings/Assessment    Additional Documentation --            Estimated/Assessed Needs       06/13/17 1008    Calculation Measurements    Weight Used For Calculations 67.2 kg (148 lb 2.4 oz)   Actual    Height Used for Calculations 1.727 m (5' 8\")    Estimated/Assessed Energy Needs    Energy Need Method McClure-St Jeor    Age 57    RMR (McClure-St. Jeor Equation) 1466.5    Activity Factors (McClure St Jeor)  Confined to bed  1.2    Estimated Kcal Range  ~5807-4901 kcal    Estimated/Assessed Protein Needs    Weight Used for Protein Calculation 67.2 kg (148 lb 2.4 " oz)    Protein (gm/kg) 1.4    1.4 Gm Protein (gm) 94.08    Estimated Protein Range ~81-94 gm    Estimated/Assessed Fluid Needs    Fluid Need Method RDA method   Output + 1000 mL            Nutrition Prescription Ordered       06/13/17 1010    Nutrition Prescription PO    Current PO Diet Regular    Common Modifiers Cardiac;Consistent Carbohydrate;Renal            Evaluation of Received Nutrient/Fluid Intake       06/13/17 1008    PO Evaluation    Number of Days PO Intake Evaluated Insufficient Data   Pt recently admitted              Problem/Interventions:        Problem 1       06/13/17 1011    Nutrition Diagnoses Problem 1    Problem 1 Impaired Nutrient Utilization    Etiology (related to) Medical Diagnosis    Endocrine DM2    Renal ESRD;Hemodialysis    Signs/Symptoms (evidenced by) Biochemical    Specific Labs Noted BUN;HgbA1C;Glucose;K+;Creatinine;Na+            Problem 2       06/13/17 1012    Nutrition Diagnoses Problem 2    Problem 2 Unintended Weight Loss    Etiology (related to) Factors Affecting Nutrition    Appetite Poor    Signs/Symptoms (evidenced by) Unintended Weight Change;Report/Observation;Report of Minimal PO Intake    Reported/Observed By RN;Patient    Unintended Weight Change Loss    Number of Pounds Lost 14-23    Weight loss time period 2 months                  Intervention Goal       06/13/17 1013    Intervention Goal    General Meet nutritional needs for age/condition;Provide information regarding MNT for treatment/condition    PO Meet estimated needs;Establish PO    Weight Maintain weight            Nutrition Intervention       06/13/17 1013    Nutrition Intervention    RD/Tech Action Follow Tx progress;Encourage intake;Care plan reviewd            Nutrition Prescription       06/13/17 1014    Nutrition Prescription PO    PO Prescription Other (comment)   Continue current diet order    New PO Prescription Ordered? No, recommended    Other Orders    Obtain Weight Daily    Obtain Weight  Ordered? No, recommended    Supplement Vitamin mineral supplement   Renal    Supplement Ordered? No, recommended            Education/Evaluation       06/13/17 1015    Education    Education Will Instruct as appropriate;Education topics    Education Topics Diabetes;CHO counting;Renal diet;Cardiac diabetic    Monitor/Evaluation    Monitor Per protocol;I&O;Pertinent labs;PO intake;Weight        Comments:   Rec #1: Continue current diet order; Establishing and encouraging PO intake. Rec #2: Consider renal MVI with minerals daily. Rec #3: Continue to monitor/replace electrolytes. RD aware of pt's possible VATS procedure. Pt being switched to basal insulin therapy Levemir per MD to better control elevated BS. Elevated most recent HgbA1C 11.7. RD to follow pt. Consult RD PRN.    Electronically signed by:  Afshan Grace RD  06/13/17 10:25 AM

## 2017-06-14 VITALS
BODY MASS INDEX: 22.11 KG/M2 | HEIGHT: 68 IN | SYSTOLIC BLOOD PRESSURE: 142 MMHG | HEART RATE: 90 BPM | DIASTOLIC BLOOD PRESSURE: 77 MMHG | WEIGHT: 145.9 LBS | TEMPERATURE: 98.9 F | RESPIRATION RATE: 18 BRPM | OXYGEN SATURATION: 98 %

## 2017-06-14 LAB
ALBUMIN SERPL-MCNC: 3 G/DL (ref 3.5–5)
ANION GAP SERPL CALCULATED.3IONS-SCNC: 18.2 MMOL/L
BASOPHILS # BLD AUTO: 0.08 10*3/MM3 (ref 0–0.2)
BASOPHILS NFR BLD AUTO: 0.8 % (ref 0–2.5)
BUN BLD-MCNC: 21 MG/DL (ref 7–20)
BUN/CREAT SERPL: 5.1 (ref 6.3–21.9)
CALCIUM SPEC-SCNC: 8.7 MG/DL (ref 8.4–10.2)
CHLORIDE SERPL-SCNC: 94 MMOL/L (ref 98–107)
CO2 SERPL-SCNC: 25 MMOL/L (ref 26–30)
CREAT BLD-MCNC: 4.1 MG/DL (ref 0.6–1.3)
DEPRECATED RDW RBC AUTO: 52.6 FL (ref 37–54)
EOSINOPHIL # BLD AUTO: 0.31 10*3/MM3 (ref 0–0.7)
EOSINOPHIL NFR BLD AUTO: 3 % (ref 0–7)
ERYTHROCYTE [DISTWIDTH] IN BLOOD BY AUTOMATED COUNT: 16.3 % (ref 11.5–14.5)
GFR SERPL CREATININE-BSD FRML MDRD: 15 ML/MIN/1.73
GLUCOSE BLD-MCNC: 120 MG/DL (ref 74–98)
GLUCOSE BLDC GLUCOMTR-MCNC: 129 MG/DL (ref 70–130)
GLUCOSE BLDC GLUCOMTR-MCNC: 145 MG/DL (ref 70–130)
GLUCOSE BLDC GLUCOMTR-MCNC: 209 MG/DL (ref 70–130)
GLUCOSE BLDC GLUCOMTR-MCNC: 46 MG/DL (ref 70–130)
GLUCOSE BLDC GLUCOMTR-MCNC: 51 MG/DL (ref 70–130)
GLUCOSE BLDC GLUCOMTR-MCNC: 77 MG/DL (ref 70–130)
HCT VFR BLD AUTO: 37.6 % (ref 42–52)
HGB BLD-MCNC: 11.9 G/DL (ref 14–18)
IMM GRANULOCYTES # BLD: 0.1 10*3/MM3 (ref 0–0.06)
IMM GRANULOCYTES NFR BLD: 1 % (ref 0–0.6)
LYMPHOCYTES # BLD AUTO: 0.67 10*3/MM3 (ref 0.6–3.4)
LYMPHOCYTES NFR BLD AUTO: 6.5 % (ref 10–50)
MCH RBC QN AUTO: 28.2 PG (ref 27–31)
MCHC RBC AUTO-ENTMCNC: 31.6 G/DL (ref 30–37)
MCV RBC AUTO: 89.1 FL (ref 80–94)
MONOCYTES # BLD AUTO: 0.83 10*3/MM3 (ref 0–0.9)
MONOCYTES NFR BLD AUTO: 8.1 % (ref 0–12)
NEUTROPHILS # BLD AUTO: 8.31 10*3/MM3 (ref 2–6.9)
NEUTROPHILS NFR BLD AUTO: 80.6 % (ref 37–80)
NRBC BLD MANUAL-RTO: 0 /100 WBC (ref 0–0)
PHOSPHATE SERPL-MCNC: 6.2 MG/DL (ref 2.5–4.5)
PLATELET # BLD AUTO: 319 10*3/MM3 (ref 130–400)
PMV BLD AUTO: 10 FL (ref 6–12)
POTASSIUM BLD-SCNC: 5.2 MMOL/L (ref 3.5–5.1)
RBC # BLD AUTO: 4.22 10*6/MM3 (ref 4.7–6.1)
SODIUM BLD-SCNC: 132 MMOL/L (ref 137–145)
VANCOMYCIN SERPL-MCNC: 13.57 MCG/ML (ref 5–40)
WBC NRBC COR # BLD: 10.3 10*3/MM3 (ref 4.8–10.8)

## 2017-06-14 PROCEDURE — 80069 RENAL FUNCTION PANEL: CPT | Performed by: FAMILY MEDICINE

## 2017-06-14 PROCEDURE — 85025 COMPLETE CBC W/AUTO DIFF WBC: CPT | Performed by: FAMILY MEDICINE

## 2017-06-14 PROCEDURE — 99232 SBSQ HOSP IP/OBS MODERATE 35: CPT | Performed by: INTERNAL MEDICINE

## 2017-06-14 PROCEDURE — 80202 ASSAY OF VANCOMYCIN: CPT | Performed by: INTERNAL MEDICINE

## 2017-06-14 PROCEDURE — 25010000002 HEPARIN (PORCINE) PER 1000 UNITS: Performed by: FAMILY MEDICINE

## 2017-06-14 PROCEDURE — 82962 GLUCOSE BLOOD TEST: CPT

## 2017-06-14 PROCEDURE — 99239 HOSP IP/OBS DSCHRG MGMT >30: CPT | Performed by: INTERNAL MEDICINE

## 2017-06-14 PROCEDURE — 25010000002 PIPERACILLIN-TAZOBACTAM: Performed by: INTERNAL MEDICINE

## 2017-06-14 PROCEDURE — 63710000001 INSULIN ASPART PER 5 UNITS: Performed by: FAMILY MEDICINE

## 2017-06-14 RX ORDER — PRAVASTATIN SODIUM 40 MG
80 TABLET ORAL NIGHTLY
COMMUNITY
End: 2018-12-17

## 2017-06-14 RX ADMIN — PIPERACILLIN AND TAZOBACTAM 2.25 G: 2; .25 INJECTION, POWDER, LYOPHILIZED, FOR SOLUTION INTRAVENOUS; PARENTERAL at 12:02

## 2017-06-14 RX ADMIN — ASPIRIN 325 MG ORAL TABLET 325 MG: 325 PILL ORAL at 08:06

## 2017-06-14 RX ADMIN — AMLODIPINE BESYLATE 5 MG: 5 TABLET ORAL at 08:06

## 2017-06-14 RX ADMIN — LANTHANUM CARBONATE 1000 MG: 500 TABLET, CHEWABLE ORAL at 08:07

## 2017-06-14 RX ADMIN — FAMOTIDINE 40 MG: 20 TABLET, FILM COATED ORAL at 08:06

## 2017-06-14 RX ADMIN — FOLIC ACID 1 MG: 1 TABLET ORAL at 08:06

## 2017-06-14 RX ADMIN — LANTHANUM CARBONATE 1000 MG: 500 TABLET, CHEWABLE ORAL at 17:14

## 2017-06-14 RX ADMIN — Medication 1 TUBE: at 00:49

## 2017-06-14 RX ADMIN — INSULIN ASPART 10 UNITS: 100 INJECTION, SOLUTION INTRAVENOUS; SUBCUTANEOUS at 17:15

## 2017-06-14 RX ADMIN — LEVOTHYROXINE SODIUM 100 MCG: 100 TABLET ORAL at 07:31

## 2017-06-14 RX ADMIN — LOSARTAN POTASSIUM 100 MG: 50 TABLET, FILM COATED ORAL at 08:06

## 2017-06-14 RX ADMIN — CHOLECALCIFEROL CAP 125 MCG (5000 UNIT) 5000 UNITS: 125 CAP at 08:06

## 2017-06-14 RX ADMIN — METOPROLOL SUCCINATE 50 MG: 50 TABLET, EXTENDED RELEASE ORAL at 08:06

## 2017-06-14 RX ADMIN — HEPARIN SODIUM 5000 UNITS: 5000 INJECTION, SOLUTION INTRAVENOUS; SUBCUTANEOUS at 08:06

## 2017-06-14 RX ADMIN — LANTHANUM CARBONATE 1000 MG: 500 TABLET, CHEWABLE ORAL at 12:02

## 2017-06-14 NOTE — PLAN OF CARE
Problem: Patient Care Overview (Adult)  Goal: Plan of Care Review  Outcome: Ongoing (interventions implemented as appropriate)    06/14/17 4343   Patient Care Overview   Progress improving   Outcome Evaluation   Outcome Summary/Follow up Plan Patient's labs improved with dialysis today. pt reports less pain, but continued tightness in chest r/t non functioning drain site. VS at this time, will continue to monitor. MARGRET Hernandez RN   Coping/Psychosocial Response Interventions   Plan Of Care Reviewed With patient       Goal: Discharge Needs Assessment  Outcome: Ongoing (interventions implemented as appropriate)    Problem: Respiratory Insufficiency (Adult)  Goal: Acid/Base Balance  Outcome: Ongoing (interventions implemented as appropriate)  Goal: Effective Ventilation  Outcome: Ongoing (interventions implemented as appropriate)    Problem: Skin Integrity Impairment, Risk/Actual (Adult)  Goal: Skin Integrity/Wound Healing  Outcome: Ongoing (interventions implemented as appropriate)

## 2017-06-14 NOTE — PROGRESS NOTES
Discharge Planning Assessment  [facility]     Patient Name: Talha Cutler  MRN: [id number]  Today's Date: 6/14/2017    Admit Date: 6/13/2017          Discharge Needs Assessment       06/14/17 1052    Living Environment    Lives With spouse    Living Arrangements house    Home Accessibility no concerns;other (see comments)    Transportation Available family or friend will provide    Living Environment    Provides Primary Care For no one, unable/limited ability to care for self    Caregiving Concerns wife still working    Unique Family Situation Lives with wife, takes [facility] to dialysis    Quality Of Family Relationships supportive    Able to Return to Prior Living Arrangements yes    Living Arrangement Comments Transferring to     Discharge Needs Assessment    Concerns To Be Addressed no discharge needs identified    Anticipated Changes Related to Illness none    Equipment Needed After Discharge none    Discharge Facility/Level Of Care Needs acute care hospital    Discharge Disposition another healthcare facility, not defined    Discharge Planning Comments TO  when bed available            Discharge Plan     None        Discharge Placement     No information found        Expected Discharge Date and Time     Expected Discharge Date Expected Discharge Time    Jun 14, 2017               Demographic Summary       06/14/17 1051    Referral Information    Admission Type inpatient    Arrived From admitted as an inpatient    Referral Source admission list    Reason For Consult discharge planning    Record Reviewed medical record            Functional Status     None            Psychosocial     None            Abuse/Neglect     None            Legal     None            Substance Abuse     None            Patient Forms     None          Alexus SCHMIDT patient. Patient is transferring to  when a bed is available.

## 2017-06-14 NOTE — PROGRESS NOTES
Continued Stay Note  [facility]     Patient Name: Talha Cutler  MRN: [id number]  Today's Date: 6/14/2017    Admit Date: 6/13/2017          Discharge Plan       06/14/17 1106    Case Management/Social Work Plan    Plan SW spoke to patient who lives at home with his wife in [place]. Takes [facility] to dialysis in [place]. Has [employer] for O2 and is on 2l nc at night.  Has manual wheelchair, no home health, no medication access issues, no transport issues, no drug/alcohol issues. Wants a power chair and will work with GP. Has ramp at home.  No home access issues.      Patient/Family In Agreement With Plan yes    Additional Comments To [facility] when appropriate. LACe patient.     Final Note    Final Note Following for social and discharge needs.               Discharge Codes     None        Expected Discharge Date and Time     Expected Discharge Date Expected Discharge Time    Jun 14, 2017             Alexus Lockett

## 2017-06-14 NOTE — PROGRESS NOTES
[facility]    Name:  Talha Cutler   Age:  57 y.o.  Sex:  male  :  1960  MRN:  [id number]   Visit Number:  [id number]  Admission Date:  2017  Date Of Service:  17  Primary Care Physician:  Idalia Kay MD     LOS: 1 day :  Patient Care Team:  Idalia Kay MD as PCP - General:    Chief Complaint:      Follow-up of right-sided pleural effusion and hyperglycemia.    Subjective / Interval History:     Patient is currently lying down on the bed and is complaining of right-sided pleuritic chest pain.  Denies any significant shortness of breath today.  No fevers.  He was admitted yesterday from the emergency room with nondraining Pleurx catheter.  CT scan showed loculated right-sided pleural effusion as well as underlying pneumonia.  Patient was seen by Dr. Hernandez yesterday and he recommended transfer to [facility] with the patient was recently admitted.  He was scheduled for transfer to [facility] but is currently awaiting availability of beds.    Patient was also on insulin drip yesterday but his blood sugars have improved and is currently on low-dose Levemir and subcutaneous insulin protocol.  No fevers.    Review of Systems:     General ROS: Patient denies any fevers, chills or loss of consciousness.  Respiratory ROS: Complains of baseline shortness of breath and pleuritic chest pain.  Cardiovascular ROS: No history of exertional chest pain.  Gastrointestinal ROS: Denies nausea and vomiting. Denies any abdominal pain. No diarrhea.  Neurological ROS: Denies any focal weakness. No loss of consciousness. Denies any numbness. Denies neck pain.  Dermatological ROS: Denies any redness or pruritis.    Vital Signs:    Temp:  [96.5 °F (35.8 °C)-98.3 °F (36.8 °C)] 97.9 °F (36.6 °C)  Heart Rate:  [71-93] 84  Resp:  [13-23] 13  BP: (107-148)/(57-93) 136/83    Intake and output:    I/O last 3 completed shifts:  In: 2289 [P.O.:1080; I.V.:1209]  Out:  3700 [Other:3700]  I/O this shift:  In: 100 [IV Piggyback:100]  Out: -     Physical Examination:    General Appearance:  Alert and cooperative, not in any acute distress.   Head:  Atraumatic and normocephalic, without obvious abnormality.   Eyes:          PERRLA, conjunctivae and sclerae normal, no Icterus. No pallor. Extra-occular movements are within normal limits.   Neck: Supple, trachea midline, no thyromegaly, no carotid bruit.   Lungs:   Chest shape is normal. Breath sounds heard bilaterally but decreased in the right base.  Bilateral basal crackles heard.  No wheezing.  Pleurx catheter is noted in the right side.     Heart:  Normal S1 and S2, no murmur, no gallop, no rub. No JVD   Abdomen:   Normal bowel sounds, no masses, no organomegaly. Soft       non-tender, non-distended, no guarding, no rebound tenderness.  Scrotal edema and erythema noted.   Extremities: Moves all extremities well, no edema, no cyanosis, no            clubbing.  AV fistula with bruit noted on the right arm.  He has also send redness noted in the right leg and right foot as well as heel as documented on the nurse's notes and photographs.   Neurologic: Awake, alert and oriented times 3. Moves all 4 extremities equally.   Laboratory results:      Results from last 7 days  Lab Units 06/14/17  0654 06/13/17  1917 06/13/17  1139  06/13/17  0211   SODIUM mmol/L 132* 138 129*  < > 120*   POTASSIUM mmol/L 5.2* 3.6 4.6  < > 5.8*   CHLORIDE mmol/L 94* 98 85*  < > 79*   TOTAL CO2 mmol/L 25.0* 26.0 25.0*  < > 26.0   BUN mg/dL 21* 13 38*  < > 36*   CREATININE mg/dL 4.10* 2.60* 6.20*  < > 5.90*   CALCIUM mg/dL 8.7 8.6 9.2  < > 8.9   BILIRUBIN mg/dL  --   --   --   --  1.1   ALK PHOS U/L  --   --   --   --  96   ALT (SGPT) U/L  --   --   --   --  19   AST (SGOT) U/L  --   --   --   --  39   GLUCOSE mg/dL 120* 94 163*  < > 784*   < > = values in this interval not displayed.    Results from last 7 days  Lab Units 06/14/17  0654 06/13/17  0551  06/13/17  0211   WBC 10*3/mm3 10.30 13.91* 10.94*   HEMOGLOBIN g/dL 11.9* 12.7* 11.6*   HEMATOCRIT % 37.6* 37.8* 35.7*   PLATELETS 10*3/mm3 319 282 341           Results from last 7 days  Lab Units 06/13/17  0211   TROPONIN I ng/mL 0.015       Results from last 7 days  Lab Units 06/13/17  1139   BLOODCX  No growth at 24 hours     I have reviewed the patient's laboratory results.    Radiology results:    Imaging Results (last 24 hours)     ** No results found for the last 24 hours. **        I have reviewed the patient's radiology reports.    Medication Review:     I have reviewed the patients active and prn medications.     Principal Problem:    SOB (shortness of breath)  Active Problems:    Pleural effusion, bilateral    Type 2 diabetes mellitus treated with insulin    End stage renal disease on dialysis    Uncontrolled diabetes mellitus with hyperglycemia, with long-term current use of insulin    Hyperkalemia    Hypertension due to end stage renal disease caused by type 2 diabetes mellitus, on dialysis    Physical debility    Chronic respiratory insufficiency    Assessment:    1. Right-sided recurrent loculated pleural effusion, present on admission.  2.  Right lower lobe pneumonia, healthcare associated, present on admission.  3. Diabetes mellitus type 2 with hyperglycemia, present on admission.  4. End-stage renal disease on hemodialysis on Tuesday, Thursday and Saturday.  5. Coronary artery disease on medical management.  6.  Scrotal cellulitis, present on admission.    Plan:    Patient is currently doing better and did have hemodialysis yesterday.  He has been seen by Dr. Hernandez and Dr. Mackey and is currently on IV antibiotic therapy for his right lower lobe pneumonia as well as scrotal cellulitis.  He will be continued on oxygen and bronchodilators.  He is on Levemir as well as subcutaneous insulin protocol and his blood sugars are fairly stable.  His home medications have been continued.  We will transfer  him to the regular medical floor today.  He is currently awaiting transfer to [facility] under the medical service with cardiothoracic surgery consultation.    Carmelo Ray MD  06/14/17  2:05 PM    Please note that portions of this note may have been completed with a voice recognition program. Efforts were made to edit the dictations, but occasionally words are mistranscribed.

## 2017-06-14 NOTE — DISCHARGE INSTR - OTHER ORDERS
If you have any questions about your recovery, please call [phone]. A registered nurse is available 24 hours a day 7 days a week to assist you.

## 2017-06-14 NOTE — PLAN OF CARE
Problem: Skin Integrity Impairment, Risk/Actual (Adult)  Goal: Skin Integrity/Wound Healing  Outcome: Ongoing (interventions implemented as appropriate)    06/14/17 9637   Skin Integrity Impairment, Risk/Actual (Adult)   Skin Integrity/Wound Healing making progress toward outcome

## 2017-06-14 NOTE — PROGRESS NOTES
"Nephrology Progress Note.    LOS: 1 day    Patient Care Team:  Idalia Kay MD as PCP - General    Chief Complaint:    Chief Complaint   Patient presents with   • Shortness of Breath       Subjective   Patient seen and examined this morning.  Events from last night noted.  Patient denies having any fevers chills.  No nausea or vomiting no abdominal pain.  Denies any chest pain shortness of breath cough or sputum production.  There is no significant edema.   Patient also denies having new onset weakness of numbness of either extremity. Still complaining of pain in the scrotal region.      Review of Systems:    The pertinent  ROS was done and it is noted above, rest  was negative.    Objective     Vital Signs  /77  Pulse 93  Temp 98.1 °F (36.7 °C) (Oral)   Resp 20  Ht 68\" (172.7 cm)  Wt 145 lb 14.4 oz (66.2 kg)  SpO2 100%  BMI 22.18 kg/m2           Intake/Output Summary (Last 24 hours) at 06/14/17 0950  Last data filed at 06/14/17 0500   Gross per 24 hour   Intake             1689 ml   Output             3700 ml   Net            -2011 ml       Physical Exam:    General Appearance: alert, oriented x 3, no acute distress,   HEENT: pupils round and reactive to light, oral mucosa dry, extra occular movements intact.  Neck: supple, no JVD, trachea midline  Lungs: Clear to Auscultation, unlabored breathing effort  Heart: RRR, normal S1 and S2, no S3, no rub  Abdomen: soft, non-tender, no palpable bladder, present bowel sounds to auscultation, scrotal area shows small multiple cysts and some pain and disconfort.  Extremities: no edema, cyanosis or clubbing.   Neuro: normal speech and mental status, grossly non focal.     Results Review:      Results from last 7 days  Lab Units 06/14/17  0654 06/13/17  1917 06/13/17  1139  06/13/17  0211   SODIUM mmol/L 132* 138 129*  < > 120*   POTASSIUM mmol/L 5.2* 3.6 4.6  < > 5.8*   CHLORIDE mmol/L 94* 98 85*  < > 79*   TOTAL CO2 mmol/L 25.0* 26.0 25.0*  < > 26.0 "   BUN mg/dL 21* 13 38*  < > 36*   CREATININE mg/dL 4.10* 2.60* 6.20*  < > 5.90*   CALCIUM mg/dL 8.7 8.6 9.2  < > 8.9   BILIRUBIN mg/dL  --   --   --   --  1.1   ALK PHOS U/L  --   --   --   --  96   ALT (SGPT) U/L  --   --   --   --  19   AST (SGOT) U/L  --   --   --   --  39   GLUCOSE mg/dL 120* 94 163*  < > 784*   < > = values in this interval not displayed.    Estimated Creatinine Clearance: 18.4 mL/min (by C-G formula based on Cr of 4.1).      Results from last 7 days  Lab Units 06/14/17  0654   PHOSPHORUS mg/dL 6.2*               Results from last 7 days  Lab Units 06/14/17  0654 06/13/17  0551 06/13/17  0211   WBC 10*3/mm3 10.30 13.91* 10.94*   HEMOGLOBIN g/dL 11.9* 12.7* 11.6*   PLATELETS 10*3/mm3 319 282 341               Imaging Results (last 24 hours)     ** No results found for the last 24 hours. **          amLODIPine 5 mg Oral Q24H   aspirin 325 mg Oral Daily   famotidine 40 mg Oral Daily   folic acid 1 mg Oral Daily   heparin (porcine) 5,000 Units Subcutaneous Q12H   insulin aspart 0-14 Units Subcutaneous 4x Daily AC & at Bedtime   insulin detemir 10 Units Subcutaneous Nightly   lanthanum 1,000 mg Oral TID With Meals   levothyroxine 100 mcg Oral Daily   losartan 100 mg Oral Q24H   metoprolol succinate XL 50 mg Oral Q24H   vitamin D3 5,000 Units Oral Daily       insulin regular infusion 1 unit/mL 1-20 Units/hr Last Rate: 12 Units/hr (06/13/17 0842)   Pharmacy to dose vancomycin     sodium chloride 30 mL/hr        Medication Review:   Current Facility-Administered Medications   Medication Dose Route Frequency Provider Last Rate Last Dose   • amLODIPine (NORVASC) tablet 5 mg  5 mg Oral Q24H Nickolas Ruelas DO   5 mg at 06/14/17 0806   • aspirin tablet 325 mg  325 mg Oral Daily Nickolas Ruelas DO   325 mg at 06/14/17 0806   • dextrose (D50W) solution 25 g  25 g Intravenous Q15 Min PRN Nickolas Ruelas, DO       • dextrose (INSTA-GLUCOSE) 77.4 % gel 1 tube  1 tube Oral Q15 Min PRN Nickolas Ruelas, DO   1  tube at 06/14/17 0049   • famotidine (PEPCID) tablet 40 mg  40 mg Oral Daily Nickolas K Suraj, DO   40 mg at 06/14/17 0806   • folic acid (FOLVITE) tablet 1 mg  1 mg Oral Daily Nickolas K Suraj, DO   1 mg at 06/14/17 0806   • glucagon (human recombinant) (GLUCAGEN DIAGNOSTIC) injection 1 mg  1 mg Subcutaneous Q15 Min PRN Nickolas KAYCE Ruelas, DO       • heparin (porcine) 5000 UNIT/ML injection 5,000 Units  5,000 Units Subcutaneous Q12H Nickolas K [place], DO   5,000 Units at 06/14/17 0806   • insulin aspart (novoLOG) injection 0-14 Units  0-14 Units Subcutaneous 4x Daily AC & at Bedtime Nickolashal Ruelas, DO       • insulin detemir (LEVEMIR) injection 10 Units  10 Units Subcutaneous Nightly Carmelo Ray MD   10 Units at 06/13/17 1615   • insulin regular (HumuLIN R,NovoLIN R) 100 Units in sodium chloride 0.9 % 100 mL (1 Units/mL) infusion  1-20 Units/hr Intravenous Titrated Estrada Sorto MD 12 mL/hr at 06/13/17 0842 12 Units/hr at 06/13/17 0842   • lanthanum (FOSRENOL) chewable tablet 1,000 mg  1,000 mg Oral TID With Meals Nickolas KAYCE Ruelas, DO   1,000 mg at 06/14/17 0807   • levothyroxine (SYNTHROID, LEVOTHROID) tablet 100 mcg  100 mcg Oral Daily Nickolas KAYCE Ruelas, DO   100 mcg at 06/14/17 0731   • losartan (COZAAR) tablet 100 mg  100 mg Oral Q24H Nickolas K Suraj, DO   100 mg at 06/14/17 0806   • metoprolol succinate XL (TOPROL-XL) 24 hr tablet 50 mg  50 mg Oral Q24H Nickolas KAYCE Ruelas, DO   50 mg at 06/14/17 0806   • ondansetron (ZOFRAN) injection 4 mg  4 mg Intravenous Q6H PRN Nickolashal Ruelas, DO   4 mg at 06/13/17 1409   • oxyCODONE-acetaminophen (PERCOCET) 5-325 MG per tablet 1 tablet  1 tablet Oral Q4H PRN Nickolas Ruelas DO   1 tablet at 06/13/17 1106   • Pharmacy to dose vancomycin   Does not apply Continuous PRN Ean Hernandez MD       • sodium chloride 0.9 % flush 10 mL  10 mL Intravenous PRN Estrada Sorto MD       • sodium chloride 0.9 % infusion  30 mL/hr Intravenous Continuous Nickolas Ruelas DO       •  vancomycin in dextrose 5% 150 mL (VANCOCIN) IVPB 750 mg  750 mg Intravenous PRN Ean Hernandez MD       • vitamin D3 capsule 5,000 Units  5,000 Units Oral Daily Nickolas Ruelas,    5,000 Units at 06/14/17 0806       Assessment/Plan     1. ESRD: HD TTS, etiol diabetic nephropathy; dialyzed yest w/o incident; access RUE AV Graft. He is scheduled for dialysis tomorrow clinically appears stable.  2. Vol overload: Clinically does appear euvolemic. ultrafiltration As tolerated will try 3-4 L yesterday and again in the morning with dialysis.  3. Hyperkalemia: resolved with HD, continue to follow closely. Increased K+ noted again today.  4. Recurrent right pleural effusion: Dr. Hernandez is planning to evaluate and treat., likely transfer to .  5. HTN: BP stable, on ARB, metoprolol and norvasc  6. DM2: BG improved, initially quite high, on insulin  7. MBD: Since he's under a controlled environment he is taking the binders and likely will improve.  8. Anemia of CKD: Hb stable 12.7, hold MARIANO   9. H/o CAD: History of multiple coronary artery issues does follow-up with her cardiologist on a regular basis. He sees Dr. Ambrosio for cardiac issues.      Details were discussed with the patient as well as family in the room.  I will also discussed the assessment and plan with the hospitalist service.    Rest as ordered.    Chance Mackey MD  06/14/17  9:50 AM      EMR Dragon/Transcription disclaimer:   Much of this encounter note is an electronic transcription/translation of spoken language to printed text. The electronic translation of spoken language may permit erroneous, or at times, nonsensical words or phrases to be inadvertently transcribed; Although I have reviewed the note for such errors, some may still exist.

## 2017-06-14 NOTE — DISCHARGE SUMMARY
[facility]   DISCHARGE SUMMARY      Name:  Talha Cutler   Age:  57 y.o.  Sex:  male  :  1960  MRN:  [id number]   Visit Number:  [id number]    Admission Date:  2017  Date of Discharge:  2017  Primary Care Physician:  Idalia Kay MD    Discharge Diagnoses:     1. Right-sided recurrent loculated pleural effusion, present on admission.  2. Right lower lobe pneumonia, healthcare associated, present on admission.  3. Diabetes mellitus type 2 with hyperglycemia, present on admission.  4. End-stage renal disease on hemodialysis on Tuesday, Thursday and Saturday.  5. Coronary artery disease on medical management.  6. Scrotal cellulitis, present on admission.    Principal Problem:    SOB (shortness of breath)  Active Problems:    Pleural effusion, bilateral    Type 2 diabetes mellitus treated with insulin    End stage renal disease on dialysis    Uncontrolled diabetes mellitus with hyperglycemia, with long-term current use of insulin    Hyperkalemia    Hypertension due to end stage renal disease caused by type 2 diabetes mellitus, on dialysis    Physical debility    Chronic respiratory insufficiency    Presenting Problem:    SOB (shortness of breath) [R06.02]  SOB (shortness of breath) [R06.02]     Consults:     Consults     Date and Time Order Name Status Description    2017 0500 Inpatient Consult to Pulmonology Completed     2017 0500 Inpatient Consult to Nephrology Completed         Consulting Physician(s)     Provider Relationship    Ean Hernandez MD Consulting Physician    Chance Mackey MD Consulting Physician        Procedures Performed:    None    History of presenting illness:    Patient is a 56 yo male, with history of of ESRD on HD, followed by Dr. Mackey. Chronic pleural effusions, s/p pleural drain placed by CT surgeon at  upon transfer from this facility last month; followed by Dr. Hernandez from pulmonology as well, who uses oxygen  via [place] at night; was brought to the emergency room with increasing shortness of breath for approx 7-10 days.  Patient states that there was sudden reduction in drainage from 1000 ml every 2 days, to less than 5ml a few days ago.  No history of chest pain or fevers.  He was noted to have elevated blood sugars in the emergency room of more than 700 without evidence of diabetic ketoacidosis.  He was started on insulin drip and was admitted to the ICU.  Chest x-ray showed effusion on the right side.      Hospital Course:    Patient was admitted to the ICU and was continued on oxygen and bronchodilators.  He was continued on IV insulin drip with improvement in his blood sugars and it was subsequently discontinued.  He was started on Levemir 10 units daily.  He was seen by Dr. Mackey from nephrology and he ordered hemodialysis which is due today.  He was also seen by Dr. Hernandez from pulmonology and he felt that the patient's Pleurx catheter is not draining likely secondary to the localization office pleural effusion.  He felt that the patient would benefit from going back to [facility] for evaluation by the thoracic surgeon.    I discussed the patient's condition with Dr. Trevor Garcia at [facility] and he was willing to see the patient in consultation but advised that the patient be admitted to the medical service.  I discussed the patient's condition with Dr. Leslie and he accepted the patient in transfer.  Patient was placed on IV antibiotic therapy with Zosyn and vancomycin due to pneumonia noted in the right lower lobe.  Patient also has scrotal cellulitis as well as ulcers with surrounding erythema in the right leg.    Patient was initially planned for discharge on 6/13/2017 when this discharge summary was prepared.  Unfortunately no bed was available yesterday and hence the patient will be transferred to [facility] 2 days since bed has become available.  No significant change in the patients medical  condition since yesterday.  He is hemodynamically stable for transfer.    Vital Signs:    Temp:  [97.9 °F (36.6 °C)-98.9 °F (37.2 °C)] 98.9 °F (37.2 °C)  Heart Rate:  [74-93] 90  Resp:  [13-23] 18  BP: (107-148)/(57-93) 142/77    Physical Exam:    General Appearance:  Alert and cooperative, not in any acute distress.   Head:  Atraumatic and normocephalic, without obvious abnormality.   Eyes:          PERRLA, conjunctivae and sclerae normal, no Icterus. No pallor. Extra-occular movements are within normal limits.   Ears:  Ears appear intact with no abnormalities noted.   Throat: No oral lesions, no thrush, oral mucosa moist.   Neck: Supple, trachea midline, no thyromegaly, no carotid bruit.   Back:   No kyphoscoliosis present. No tenderness to palpation,   range of motion normal.   Lungs:   Chest shape is normal. Breath sounds heard bilaterally equally but decreased in the right base.  No crackles or wheezing. No Pleural rub or bronchial breathing. Pleurx catheter noted in the right side.     Heart:  Normal S1 and S2, no murmur, no gallop, no rub. No JVD.   Abdomen:   Normal bowel sounds, no masses, no organomegaly. Soft     non-tender, non-distended, no guarding, no rebound tenderness.   Extremities: Moves all extremities well, no edema, no cyanosis, no            clubbing.  AV fistula with bruit noted on the right arm.  He has also send redness noted in the right leg and right foot as well as heel as documented on the nurse's notes and photographs.   Neurologic: Alert and oriented x 3. Moves all four limbs equally. No tremors. No facial asymetry.     Pertinent Lab Results:       Results from last 7 days  Lab Units 06/14/17  0654 06/13/17  1917 06/13/17  1139  06/13/17  0211   SODIUM mmol/L 132* 138 129*  < > 120*   POTASSIUM mmol/L 5.2* 3.6 4.6  < > 5.8*   CHLORIDE mmol/L 94* 98 85*  < > 79*   TOTAL CO2 mmol/L 25.0* 26.0 25.0*  < > 26.0   BUN mg/dL 21* 13 38*  < > 36*   CREATININE mg/dL 4.10* 2.60* 6.20*  < > 5.90*    CALCIUM mg/dL 8.7 8.6 9.2  < > 8.9   BILIRUBIN mg/dL  --   --   --   --  1.1   ALK PHOS U/L  --   --   --   --  96   ALT (SGPT) U/L  --   --   --   --  19   AST (SGOT) U/L  --   --   --   --  39   GLUCOSE mg/dL 120* 94 163*  < > 784*   < > = values in this interval not displayed.    Results from last 7 days  Lab Units 06/14/17  0654 06/13/17  0551 06/13/17 0211   WBC 10*3/mm3 10.30 13.91* 10.94*   HEMOGLOBIN g/dL 11.9* 12.7* 11.6*   HEMATOCRIT % 37.6* 37.8* 35.7*   PLATELETS 10*3/mm3 319 282 341           Results from last 7 days  Lab Units 06/13/17  0211   TROPONIN I ng/mL 0.015       Results from last 7 days  Lab Units 06/13/17  0211   PROBNP pg/mL 921091.0*               Results from last 7 days  Lab Units 06/13/17  0322   PH, ARTERIAL pH units 7.415   PO2 ART mm Hg 77.7   PCO2, ARTERIAL mm Hg 40.8   HCO3 ART mmol/L 26.1     Pertinent Radiology Results:    Imaging Results (all)     Procedure Component Value Units Date/Time    XR Chest 2 View [[id number]] Collected:  06/13/17 0758     Updated:  06/13/17 0801    Narrative:       PROCEDURE: XR CHEST 2 VW-        HISTORY: SOA  triage protocol; R06.02-Shortness of breath     COMPARISON: May 1, 2017.     FINDINGS: The heart is normal in size. The mediastinum is unremarkable.  There is a small to moderate size right pleural effusion which is  unchanged. A Pleurx drain is in place on the right. There is new  airspace disease in the right upper lung field likely a pneumonia. The  left lung is clear. There is no pneumothorax. There are no acute osseous  abnormalities.           Impression:       New right upper lung field airspace disease consistent with  a pneumonia. Follow-up to radiographic resolution is recommended.     This report was finalized on 6/13/2017 7:59 AM by Marilia Mora M.D..    CT Chest Without Contrast [[id number]] Collected:  06/13/17 0759     Updated:  06/13/17 0802    Narrative:       PROCEDURE: CT CHEST WO CONTRAST-     HISTORY: SOB;  R06.02-Shortness of breath; R73.9-Hyperglycemia,  unspecified     COMPARISON:  None .     PROCEDURE:  Multiple axial CT images were obtained from the thoracic  inlet through the upper abdomen without the use of contrast.      FINDINGS:   Soft tissue windows reveal prominent axillary lymph nodes likely  reactive. Heart size is normal. The aorta is normal in caliber. There is  a small loculated right pleural effusion with a Pleurx drain in place.  There is a small simple appearing left effusion. Lung windows reveal  patchy airspace disease fairly diffusely within the right lung likely  related to a pneumonia. The visualized upper abdomen is unremarkable.  Bone windows reveal no acute osseous abnormalities.       Impression:       Loculated right effusion and diffuse right lung airspace  disease likely reflecting a pneumonia.     This study was performed with techniques to keep radiation doses as low  as reasonably achievable (ALARA). Individualized dose reduction  techniques using automated exposure control or adjustment of mA and/or  kV according to the patient size were employed.      This report was finalized on 6/13/2017 8:00 AM by Marilia Mora M.D..        Condition on Discharge:      Stable.    Code status during the hospital stay:    Full Code    Discharge Disposition:    Short Term Hospital (DC - External) Transfer to [facility] medical service, when bed available.    Discharge Medications:     Talha Cutler   Home Medication Instructions RICHAR:[id number]    Printed on:06/14/17 1482   Medication Information                      amLODIPine (NORVASC) 10 MG tablet               aspirin 325 MG tablet  Take 325 mg by mouth daily.             Cholecalciferol (VITAMIN D3) 5000 UNITS capsule capsule  Take  by mouth daily.             fenofibrate (TRICOR) 54 MG tablet  Take 54 mg by mouth daily.             folic acid (FOLVITE) 1 MG tablet  Take 1 mg by mouth daily.             FOSRENOL 1000 MG chewable tablet  3  (Three) Times a Day With Meals.             insulin aspart (NovoLOG) 100 UNIT/ML injection  Inject 4 Units under the skin 3 (Three) Times a Day Before Meals.             insulin detemir (LEVEMIR) 100 UNIT/ML injection  Inject 10 Units under the skin Every Night.             levothyroxine (SYNTHROID, LEVOTHROID) 100 MCG tablet  Take 100 mcg by mouth daily.             losartan (COZAAR) 100 MG tablet  Take 1 tablet by mouth Daily.             metoprolol succinate XL (TOPROL-XL) 50 MG 24 hr tablet               omeprazole (PriLOSEC) 20 MG capsule  Take 20 mg by mouth daily.             piperacillin-tazobactam (ZOSYN) 2.25 g/100 mL 0.9% NS IVPB (mbp)  Infuse 100 mL into a venous catheter Every 8 (Eight) Hours. Indications: Skin and Soft Tissue Infection             pravastatin (PRAVACHOL) 40 MG tablet  Take 80 mg by mouth Daily.               Discharge Diet:     Diet Instructions     Diet: Consistent Carbohydrate, Renal; Thin Liquids, No Restrictions       Discharge Diet:   Consistent Carbohydrate  Renal      Fluid Consistency:  Thin Liquids, No Restrictions               Activity at Discharge:     Activity Instructions     Activity as Tolerated                   Follow-up Appointments:    Follow-up Information     Follow up with Idalia Kay MD Follow up in 2 week(s).    Specialty:  Family Medicine    Contact information:    [address] [facility] [address] [phone]          Future Appointments  Date Time Provider Department Center   8/2/2017 8:00 AM FERNANDO CT 1 [facility] CT [facility]   8/2/2017 11:00 AM ALEXYS Funez [facility] None     Test Results Pending at Discharge:   Order Current Status    Acinetobacter Screen In process    MRSA Screen Culture In process    VRE Culture In process    Blood Culture Preliminary result             Carmelo Ray MD  06/14/17  4:34 PM    Time spent: 35 min    Please note that portions of this note may have been completed with a voice recognition  program. Efforts were made to edit the dictations, but occasionally words are mistranscribed.

## 2017-06-14 NOTE — NURSING NOTE
Pt resting on side of bed, assisted self to allow skin care assessment; multiple scabs to lower extremities, pt stated from trauma; open area to left heel, per pt from pulling skin; cleansed well and dressing applied; skin dry and scaley; pts scrotum/penis redness appears improving since pictures on admission by nursing care; plan of care explained to pt and rn

## 2017-06-14 NOTE — PLAN OF CARE
Problem: Patient Care Overview (Adult)  Goal: Plan of Care Review  Outcome: Ongoing (interventions implemented as appropriate)    06/14/17 7927   Patient Care Overview   Progress improving   Outcome Evaluation   Outcome Summary/Follow up Plan (scrotum appears better )   Coping/Psychosocial Response Interventions   Plan Of Care Reviewed With patient

## 2017-06-14 NOTE — PROGRESS NOTES
"  CC: Abnormal CT. ? PNA.    S: Feels \"okay\". Had HD yesterday.    O:Vital signs reviewed.     General: No respiratory distress noted.  Neck: No JVD   Cardiovascular: S1 + S2. Reg.   Respiratory: No wheezing heard. R>L crackles noted. Pleur-x catheter in place  Extremities: No edema noted.  Neurologic:  AAOx3.  Was able to follow commands     Labs: Reviewed.       Results from last 7 days  Lab Units 06/14/17  0654 06/13/17  1917 06/13/17  1139  06/13/17  0211   SODIUM mmol/L 132* 138 129*  < > 120*   POTASSIUM mmol/L 5.2* 3.6 4.6  < > 5.8*   CHLORIDE mmol/L 94* 98 85*  < > 79*   TOTAL CO2 mmol/L 25.0* 26.0 25.0*  < > 26.0   BUN mg/dL 21* 13 38*  < > 36*   CREATININE mg/dL 4.10* 2.60* 6.20*  < > 5.90*   CALCIUM mg/dL 8.7 8.6 9.2  < > 8.9   BILIRUBIN mg/dL  --   --   --   --  1.1   ALK PHOS U/L  --   --   --   --  96   ALT (SGPT) U/L  --   --   --   --  19   AST (SGOT) U/L  --   --   --   --  39   GLUCOSE mg/dL 120* 94 163*  < > 784*   < > = values in this interval not displayed.      Results from last 7 days  Lab Units 06/14/17  0654   PHOSPHORUS mg/dL 6.2*           Results from last 7 days  Lab Units 06/14/17  0654 06/13/17  0551 06/13/17  0211   WBC 10*3/mm3 10.30 13.91* 10.94*   HEMOGLOBIN g/dL 11.9* 12.7* 11.6*   PLATELETS 10*3/mm3 319 282 341                 insulin regular infusion 1 unit/mL 1-20 Units/hr Last Rate: 12 Units/hr (06/13/17 0842)   Pharmacy to dose vancomycin     sodium chloride 30 mL/hr        Assessment & Recommendations/Plan:   1. HCAP?  2. Recent recurrent right-sided pleural effusion  3. Status post Pleurx catheter placement  4. ESRD    Patient is awaiting transfer to  CT surgery for further evaluation.    I will advise continuation of Abx for now.    Ean Hernandez MD  06/14/17  3:30 PM    Please note that portions of this note may have been completed with a voice recognition software. Every effort was made to edit the dictation, but occasionally words are mistranscribed.    "

## 2017-06-15 LAB
ACINETOBACTER SCREEN CX: NORMAL
GLUCOSE BLDC GLUCOMTR-MCNC: 310 MG/DL (ref 70–130)
MRSA SPEC QL CULT: NORMAL
VRE SPEC QL CULT: NORMAL

## 2017-06-18 LAB — BACTERIA SPEC AEROBE CULT: NORMAL

## 2017-07-12 ENCOUNTER — APPOINTMENT (OUTPATIENT)
Dept: CT IMAGING | Facility: HOSPITAL | Age: 57
End: 2017-07-12

## 2017-07-12 ENCOUNTER — HOSPITAL ENCOUNTER (INPATIENT)
Facility: HOSPITAL | Age: 57
LOS: 2 days | Discharge: HOME | End: 2017-07-14
Attending: EMERGENCY MEDICINE | Admitting: FAMILY MEDICINE

## 2017-07-12 DIAGNOSIS — E87.5 HYPERKALEMIA: Primary | ICD-10-CM

## 2017-07-12 DIAGNOSIS — Z74.09 IMPAIRED MOBILITY AND ADLS: ICD-10-CM

## 2017-07-12 DIAGNOSIS — Z74.09 IMPAIRED FUNCTIONAL MOBILITY, BALANCE, GAIT, AND ENDURANCE: ICD-10-CM

## 2017-07-12 DIAGNOSIS — K85.90 ACUTE PANCREATITIS, UNSPECIFIED COMPLICATION STATUS, UNSPECIFIED PANCREATITIS TYPE: ICD-10-CM

## 2017-07-12 DIAGNOSIS — Z78.9 IMPAIRED MOBILITY AND ADLS: ICD-10-CM

## 2017-07-12 DIAGNOSIS — R73.9 HYPERGLYCEMIA WITHOUT KETOSIS: ICD-10-CM

## 2017-07-12 PROBLEM — E11.65 HYPERGLYCEMIA DUE TO TYPE 2 DIABETES MELLITUS (HCC): Status: ACTIVE | Noted: 2017-07-12

## 2017-07-12 LAB
ALBUMIN SERPL-MCNC: 3.6 G/DL (ref 3.5–5)
ALBUMIN/GLOB SERPL: 0.9 G/DL (ref 1–2)
ALP SERPL-CCNC: 77 U/L (ref 38–126)
ALT SERPL W P-5'-P-CCNC: 25 U/L (ref 13–69)
ANION GAP SERPL CALCULATED.3IONS-SCNC: 21.2 MMOL/L
AST SERPL-CCNC: 28 U/L (ref 15–46)
BASOPHILS # BLD AUTO: 0.05 10*3/MM3 (ref 0–0.2)
BASOPHILS NFR BLD AUTO: 1.1 % (ref 0–2.5)
BILIRUB SERPL-MCNC: 1 MG/DL (ref 0.2–1.3)
BUN BLD-MCNC: 29 MG/DL (ref 7–20)
BUN/CREAT SERPL: 6.3 (ref 6.3–21.9)
CALCIUM SPEC-SCNC: 8.7 MG/DL (ref 8.4–10.2)
CHLORIDE SERPL-SCNC: 92 MMOL/L (ref 98–107)
CO2 SERPL-SCNC: 26 MMOL/L (ref 26–30)
CREAT BLD-MCNC: 4.6 MG/DL (ref 0.6–1.3)
D-LACTATE SERPL-SCNC: 1.6 MMOL/L (ref 0.5–2)
DEPRECATED RDW RBC AUTO: 63.7 FL (ref 37–54)
EOSINOPHIL # BLD AUTO: 0.23 10*3/MM3 (ref 0–0.7)
EOSINOPHIL NFR BLD AUTO: 5.1 % (ref 0–7)
ERYTHROCYTE [DISTWIDTH] IN BLOOD BY AUTOMATED COUNT: 19.2 % (ref 11.5–14.5)
GFR SERPL CREATININE-BSD FRML MDRD: 13 ML/MIN/1.73
GLOBULIN UR ELPH-MCNC: 3.8 GM/DL
GLUCOSE BLD-MCNC: 560 MG/DL (ref 74–98)
GLUCOSE BLDC GLUCOMTR-MCNC: 366 MG/DL (ref 70–130)
GLUCOSE BLDC GLUCOMTR-MCNC: 527 MG/DL (ref 70–130)
HCT VFR BLD AUTO: 34.4 % (ref 42–52)
HGB BLD-MCNC: 10.7 G/DL (ref 14–18)
IMM GRANULOCYTES # BLD: 0.02 10*3/MM3 (ref 0–0.06)
IMM GRANULOCYTES NFR BLD: 0.4 % (ref 0–0.6)
LIPASE SERPL-CCNC: 132 U/L (ref 23–300)
LYMPHOCYTES # BLD AUTO: 0.52 10*3/MM3 (ref 0.6–3.4)
LYMPHOCYTES NFR BLD AUTO: 11.4 % (ref 10–50)
MCH RBC QN AUTO: 28.7 PG (ref 27–31)
MCHC RBC AUTO-ENTMCNC: 31.1 G/DL (ref 30–37)
MCV RBC AUTO: 92.2 FL (ref 80–94)
MONOCYTES # BLD AUTO: 0.63 10*3/MM3 (ref 0–0.9)
MONOCYTES NFR BLD AUTO: 13.8 % (ref 0–12)
NEUTROPHILS # BLD AUTO: 3.1 10*3/MM3 (ref 2–6.9)
NEUTROPHILS NFR BLD AUTO: 68.2 % (ref 37–80)
NRBC BLD MANUAL-RTO: 0 /100 WBC (ref 0–0)
PH BLDV: 7.39 [PH]
PH BLDV: 7.39 [PH]
PLATELET # BLD AUTO: 217 10*3/MM3 (ref 130–400)
PMV BLD AUTO: 10.3 FL (ref 6–12)
POTASSIUM BLD-SCNC: 7.2 MMOL/L (ref 3.5–5.1)
PROT SERPL-MCNC: 7.4 G/DL (ref 6.3–8.2)
RBC # BLD AUTO: 3.73 10*6/MM3 (ref 4.7–6.1)
SODIUM BLD-SCNC: 132 MMOL/L (ref 137–145)
TROPONIN I SERPL-MCNC: 0.01 NG/ML (ref 0–0.03)
WBC NRBC COR # BLD: 4.55 10*3/MM3 (ref 4.8–10.8)

## 2017-07-12 PROCEDURE — 87077 CULTURE AEROBIC IDENTIFY: CPT | Performed by: FAMILY MEDICINE

## 2017-07-12 PROCEDURE — 87070 CULTURE OTHR SPECIMN AEROBIC: CPT | Performed by: FAMILY MEDICINE

## 2017-07-12 PROCEDURE — 87081 CULTURE SCREEN ONLY: CPT | Performed by: FAMILY MEDICINE

## 2017-07-12 PROCEDURE — 63710000001 INSULIN REGULAR HUMAN PER 5 UNITS: Performed by: NURSE PRACTITIONER

## 2017-07-12 PROCEDURE — 85025 COMPLETE CBC W/AUTO DIFF WBC: CPT | Performed by: NURSE PRACTITIONER

## 2017-07-12 PROCEDURE — 93005 ELECTROCARDIOGRAM TRACING: CPT | Performed by: NURSE PRACTITIONER

## 2017-07-12 PROCEDURE — 83690 ASSAY OF LIPASE: CPT | Performed by: NURSE PRACTITIONER

## 2017-07-12 PROCEDURE — 84484 ASSAY OF TROPONIN QUANT: CPT | Performed by: NURSE PRACTITIONER

## 2017-07-12 PROCEDURE — 82962 GLUCOSE BLOOD TEST: CPT

## 2017-07-12 PROCEDURE — 71250 CT THORAX DX C-: CPT

## 2017-07-12 PROCEDURE — 80053 COMPREHEN METABOLIC PANEL: CPT | Performed by: NURSE PRACTITIONER

## 2017-07-12 PROCEDURE — 83605 ASSAY OF LACTIC ACID: CPT | Performed by: NURSE PRACTITIONER

## 2017-07-12 PROCEDURE — 74176 CT ABD & PELVIS W/O CONTRAST: CPT

## 2017-07-12 PROCEDURE — 87015 SPECIMEN INFECT AGNT CONCNTJ: CPT | Performed by: FAMILY MEDICINE

## 2017-07-12 PROCEDURE — 99222 1ST HOSP IP/OBS MODERATE 55: CPT | Performed by: FAMILY MEDICINE

## 2017-07-12 PROCEDURE — 82800 BLOOD PH: CPT

## 2017-07-12 PROCEDURE — 99285 EMERGENCY DEPT VISIT HI MDM: CPT

## 2017-07-12 RX ORDER — SODIUM CHLORIDE 0.9 % (FLUSH) 0.9 %
1-10 SYRINGE (ML) INJECTION AS NEEDED
Status: DISCONTINUED | OUTPATIENT
Start: 2017-07-12 | End: 2017-07-14 | Stop reason: HOSPADM

## 2017-07-12 RX ORDER — ACETAMINOPHEN 325 MG/1
650 TABLET ORAL EVERY 4 HOURS PRN
Status: DISCONTINUED | OUTPATIENT
Start: 2017-07-12 | End: 2017-07-14 | Stop reason: HOSPADM

## 2017-07-12 RX ORDER — ONDANSETRON 4 MG/1
4 TABLET, FILM COATED ORAL EVERY 6 HOURS PRN
Status: DISCONTINUED | OUTPATIENT
Start: 2017-07-12 | End: 2017-07-14 | Stop reason: HOSPADM

## 2017-07-12 RX ORDER — PANTOPRAZOLE SODIUM 40 MG/1
40 TABLET, DELAYED RELEASE ORAL EVERY MORNING
Status: DISCONTINUED | OUTPATIENT
Start: 2017-07-13 | End: 2017-07-14 | Stop reason: HOSPADM

## 2017-07-12 RX ORDER — HEPARIN SODIUM 5000 [USP'U]/ML
5000 INJECTION, SOLUTION INTRAVENOUS; SUBCUTANEOUS EVERY 12 HOURS SCHEDULED
Status: DISCONTINUED | OUTPATIENT
Start: 2017-07-13 | End: 2017-07-14 | Stop reason: HOSPADM

## 2017-07-12 RX ORDER — DEXTROSE MONOHYDRATE 25 G/50ML
25 INJECTION, SOLUTION INTRAVENOUS
Status: DISCONTINUED | OUTPATIENT
Start: 2017-07-12 | End: 2017-07-13 | Stop reason: SDUPTHER

## 2017-07-12 RX ORDER — SODIUM CHLORIDE 0.9 % (FLUSH) 0.9 %
30 SYRINGE (ML) INJECTION ONCE AS NEEDED
Status: DISCONTINUED | OUTPATIENT
Start: 2017-07-12 | End: 2017-07-13

## 2017-07-12 RX ORDER — ONDANSETRON 2 MG/ML
4 INJECTION INTRAMUSCULAR; INTRAVENOUS EVERY 6 HOURS PRN
Status: DISCONTINUED | OUTPATIENT
Start: 2017-07-12 | End: 2017-07-14 | Stop reason: HOSPADM

## 2017-07-12 RX ORDER — LOSARTAN POTASSIUM 50 MG/1
100 TABLET ORAL
Status: DISCONTINUED | OUTPATIENT
Start: 2017-07-13 | End: 2017-07-14 | Stop reason: HOSPADM

## 2017-07-12 RX ORDER — ATORVASTATIN CALCIUM 10 MG/1
10 TABLET, FILM COATED ORAL DAILY
Status: DISCONTINUED | OUTPATIENT
Start: 2017-07-13 | End: 2017-07-14 | Stop reason: HOSPADM

## 2017-07-12 RX ORDER — METOPROLOL SUCCINATE 50 MG/1
50 TABLET, EXTENDED RELEASE ORAL
Status: DISCONTINUED | OUTPATIENT
Start: 2017-07-13 | End: 2017-07-14 | Stop reason: HOSPADM

## 2017-07-12 RX ORDER — SODIUM CHLORIDE 9 MG/ML
30 INJECTION, SOLUTION INTRAVENOUS CONTINUOUS
Status: DISCONTINUED | OUTPATIENT
Start: 2017-07-13 | End: 2017-07-13

## 2017-07-12 RX ORDER — FOLIC ACID 1 MG/1
1 TABLET ORAL DAILY
Status: DISCONTINUED | OUTPATIENT
Start: 2017-07-13 | End: 2017-07-14 | Stop reason: HOSPADM

## 2017-07-12 RX ORDER — ASPIRIN 325 MG
325 TABLET ORAL DAILY
Status: DISCONTINUED | OUTPATIENT
Start: 2017-07-13 | End: 2017-07-14 | Stop reason: HOSPADM

## 2017-07-12 RX ORDER — ONDANSETRON 4 MG/1
4 TABLET, ORALLY DISINTEGRATING ORAL EVERY 6 HOURS PRN
Status: DISCONTINUED | OUTPATIENT
Start: 2017-07-12 | End: 2017-07-14 | Stop reason: HOSPADM

## 2017-07-12 RX ORDER — SODIUM CHLORIDE 9 MG/ML
125 INJECTION, SOLUTION INTRAVENOUS CONTINUOUS
Status: ACTIVE | OUTPATIENT
Start: 2017-07-12 | End: 2017-07-12

## 2017-07-12 RX ORDER — AMLODIPINE BESYLATE 5 MG/1
10 TABLET ORAL
Status: DISCONTINUED | OUTPATIENT
Start: 2017-07-13 | End: 2017-07-14 | Stop reason: HOSPADM

## 2017-07-12 RX ORDER — LEVOTHYROXINE SODIUM 0.1 MG/1
100 TABLET ORAL DAILY
Status: DISCONTINUED | OUTPATIENT
Start: 2017-07-13 | End: 2017-07-14 | Stop reason: HOSPADM

## 2017-07-12 RX ORDER — SODIUM CHLORIDE 0.9 % (FLUSH) 0.9 %
10 SYRINGE (ML) INJECTION AS NEEDED
Status: DISCONTINUED | OUTPATIENT
Start: 2017-07-12 | End: 2017-07-14 | Stop reason: HOSPADM

## 2017-07-12 RX ADMIN — SODIUM CHLORIDE 125 ML/HR: 9 INJECTION, SOLUTION INTRAVENOUS at 21:29

## 2017-07-12 RX ADMIN — HUMAN INSULIN 7 UNITS: 100 INJECTION, SOLUTION SUBCUTANEOUS at 21:39

## 2017-07-13 PROBLEM — N18.9 CHRONIC KIDNEY DISEASE-MINERAL AND BONE DISORDER: Status: ACTIVE | Noted: 2017-07-13

## 2017-07-13 PROBLEM — E83.9 CHRONIC KIDNEY DISEASE-MINERAL AND BONE DISORDER: Status: ACTIVE | Noted: 2017-07-13

## 2017-07-13 PROBLEM — M89.9 CHRONIC KIDNEY DISEASE-MINERAL AND BONE DISORDER: Status: ACTIVE | Noted: 2017-07-13

## 2017-07-13 LAB
ANION GAP SERPL CALCULATED.3IONS-SCNC: 22 MMOL/L
BACTERIA UR QL AUTO: ABNORMAL /HPF
BASOPHILS # BLD AUTO: 0.08 10*3/MM3 (ref 0–0.2)
BASOPHILS NFR BLD AUTO: 1.6 % (ref 0–2.5)
BILIRUB UR QL STRIP: NEGATIVE
BUN BLD-MCNC: 33 MG/DL (ref 7–20)
BUN/CREAT SERPL: 6.9 (ref 6.3–21.9)
CALCIUM SPEC-SCNC: 8.9 MG/DL (ref 8.4–10.2)
CHLORIDE SERPL-SCNC: 97 MMOL/L (ref 98–107)
CLARITY UR: CLEAR
CO2 SERPL-SCNC: 25 MMOL/L (ref 26–30)
COLOR UR: ABNORMAL
CREAT BLD-MCNC: 4.8 MG/DL (ref 0.6–1.3)
DEPRECATED RDW RBC AUTO: 65.4 FL (ref 37–54)
EOSINOPHIL # BLD AUTO: 0.3 10*3/MM3 (ref 0–0.7)
EOSINOPHIL NFR BLD AUTO: 5.9 % (ref 0–7)
ERYTHROCYTE [DISTWIDTH] IN BLOOD BY AUTOMATED COUNT: 19.4 % (ref 11.5–14.5)
GFR SERPL CREATININE-BSD FRML MDRD: 13 ML/MIN/1.73
GLUCOSE BLD-MCNC: 114 MG/DL (ref 74–98)
GLUCOSE BLDC GLUCOMTR-MCNC: 108 MG/DL (ref 70–130)
GLUCOSE BLDC GLUCOMTR-MCNC: 114 MG/DL (ref 70–130)
GLUCOSE BLDC GLUCOMTR-MCNC: 114 MG/DL (ref 70–130)
GLUCOSE BLDC GLUCOMTR-MCNC: 131 MG/DL (ref 70–130)
GLUCOSE BLDC GLUCOMTR-MCNC: 141 MG/DL (ref 70–130)
GLUCOSE BLDC GLUCOMTR-MCNC: 172 MG/DL (ref 70–130)
GLUCOSE BLDC GLUCOMTR-MCNC: 177 MG/DL (ref 70–130)
GLUCOSE BLDC GLUCOMTR-MCNC: 282 MG/DL (ref 70–130)
GLUCOSE BLDC GLUCOMTR-MCNC: 282 MG/DL (ref 70–130)
GLUCOSE BLDC GLUCOMTR-MCNC: 310 MG/DL (ref 70–130)
GLUCOSE BLDC GLUCOMTR-MCNC: 90 MG/DL (ref 70–130)
GLUCOSE BLDC GLUCOMTR-MCNC: 96 MG/DL (ref 70–130)
GLUCOSE BLDC GLUCOMTR-MCNC: 99 MG/DL (ref 70–130)
GLUCOSE UR STRIP-MCNC: ABNORMAL MG/DL
HBA1C MFR BLD: 9.6 % (ref 3–6)
HCT VFR BLD AUTO: 38.3 % (ref 42–52)
HGB BLD-MCNC: 11.9 G/DL (ref 14–18)
HGB UR QL STRIP.AUTO: ABNORMAL
HYALINE CASTS UR QL AUTO: ABNORMAL /LPF
IMM GRANULOCYTES # BLD: 0.04 10*3/MM3 (ref 0–0.06)
IMM GRANULOCYTES NFR BLD: 0.8 % (ref 0–0.6)
KETONES UR QL STRIP: NEGATIVE
LEUKOCYTE ESTERASE UR QL STRIP.AUTO: NEGATIVE
LYMPHOCYTES # BLD AUTO: 0.68 10*3/MM3 (ref 0.6–3.4)
LYMPHOCYTES NFR BLD AUTO: 13.3 % (ref 10–50)
MCH RBC QN AUTO: 29.1 PG (ref 27–31)
MCHC RBC AUTO-ENTMCNC: 31.1 G/DL (ref 30–37)
MCV RBC AUTO: 93.6 FL (ref 80–94)
MONOCYTES # BLD AUTO: 0.68 10*3/MM3 (ref 0–0.9)
MONOCYTES NFR BLD AUTO: 13.3 % (ref 0–12)
NEUTROPHILS # BLD AUTO: 3.32 10*3/MM3 (ref 2–6.9)
NEUTROPHILS NFR BLD AUTO: 65.1 % (ref 37–80)
NITRITE UR QL STRIP: NEGATIVE
NRBC BLD MANUAL-RTO: 0 /100 WBC (ref 0–0)
PH UR STRIP.AUTO: 7.5 [PH] (ref 5–8)
PLAT MORPH BLD: NORMAL
PLATELET # BLD AUTO: 210 10*3/MM3 (ref 130–400)
PMV BLD AUTO: 10 FL (ref 6–12)
POTASSIUM BLD-SCNC: 6 MMOL/L (ref 3.5–5.1)
PROT UR QL STRIP: ABNORMAL
RBC # BLD AUTO: 4.09 10*6/MM3 (ref 4.7–6.1)
RBC # UR: ABNORMAL /HPF
RBC MORPH BLD: NORMAL
REF LAB TEST METHOD: ABNORMAL
SODIUM BLD-SCNC: 138 MMOL/L (ref 137–145)
SP GR UR STRIP: 1.02 (ref 1–1.03)
SQUAMOUS #/AREA URNS HPF: ABNORMAL /HPF
TROPONIN I SERPL-MCNC: 0.01 NG/ML (ref 0–0.03)
UROBILINOGEN UR QL STRIP: ABNORMAL
WBC MORPH BLD: NORMAL
WBC NRBC COR # BLD: 5.1 10*3/MM3 (ref 4.8–10.8)
WBC UR QL AUTO: ABNORMAL /HPF

## 2017-07-13 PROCEDURE — 25010000002 INSULIN REGULAR HUMAN PER 5 UNITS: Performed by: NURSE PRACTITIONER

## 2017-07-13 PROCEDURE — 85025 COMPLETE CBC W/AUTO DIFF WBC: CPT | Performed by: FAMILY MEDICINE

## 2017-07-13 PROCEDURE — 90935 HEMODIALYSIS ONE EVALUATION: CPT

## 2017-07-13 PROCEDURE — 85007 BL SMEAR W/DIFF WBC COUNT: CPT | Performed by: FAMILY MEDICINE

## 2017-07-13 PROCEDURE — 83036 HEMOGLOBIN GLYCOSYLATED A1C: CPT | Performed by: INTERNAL MEDICINE

## 2017-07-13 PROCEDURE — 99223 1ST HOSP IP/OBS HIGH 75: CPT | Performed by: INTERNAL MEDICINE

## 2017-07-13 PROCEDURE — 80048 BASIC METABOLIC PNL TOTAL CA: CPT | Performed by: FAMILY MEDICINE

## 2017-07-13 PROCEDURE — 99232 SBSQ HOSP IP/OBS MODERATE 35: CPT | Performed by: INTERNAL MEDICINE

## 2017-07-13 PROCEDURE — 84484 ASSAY OF TROPONIN QUANT: CPT | Performed by: FAMILY MEDICINE

## 2017-07-13 PROCEDURE — 5A1D00Z PERFORMANCE OF URINARY FILTRATION, SINGLE: ICD-10-PCS | Performed by: INTERNAL MEDICINE

## 2017-07-13 PROCEDURE — 25010000002 HEPARIN (PORCINE) PER 1000 UNITS: Performed by: FAMILY MEDICINE

## 2017-07-13 PROCEDURE — 82962 GLUCOSE BLOOD TEST: CPT

## 2017-07-13 PROCEDURE — 81001 URINALYSIS AUTO W/SCOPE: CPT | Performed by: NURSE PRACTITIONER

## 2017-07-13 RX ORDER — LANTHANUM CARBONATE 500 MG/1
1000 TABLET, CHEWABLE ORAL
Status: DISCONTINUED | OUTPATIENT
Start: 2017-07-13 | End: 2017-07-14 | Stop reason: HOSPADM

## 2017-07-13 RX ORDER — HYDROCODONE BITARTRATE AND ACETAMINOPHEN 5; 325 MG/1; MG/1
1 TABLET ORAL EVERY 6 HOURS PRN
Status: DISCONTINUED | OUTPATIENT
Start: 2017-07-13 | End: 2017-07-14 | Stop reason: HOSPADM

## 2017-07-13 RX ORDER — FOLIC ACID/VIT B COMPLEX AND C 0.8 MG
1 TABLET ORAL DAILY
Status: DISCONTINUED | OUTPATIENT
Start: 2017-07-13 | End: 2017-07-14 | Stop reason: HOSPADM

## 2017-07-13 RX ORDER — AMOXICILLIN AND CLAVULANATE POTASSIUM 875; 125 MG/1; MG/1
1 TABLET, FILM COATED ORAL 2 TIMES DAILY
Status: ON HOLD | COMMUNITY
Start: 2017-07-10 | End: 2017-10-31

## 2017-07-13 RX ORDER — DEXTROSE MONOHYDRATE 25 G/50ML
25 INJECTION, SOLUTION INTRAVENOUS
Status: DISCONTINUED | OUTPATIENT
Start: 2017-07-13 | End: 2017-07-14 | Stop reason: HOSPADM

## 2017-07-13 RX ADMIN — LEVOTHYROXINE SODIUM 100 MCG: 100 TABLET ORAL at 06:31

## 2017-07-13 RX ADMIN — HEPARIN SODIUM 5000 UNITS: 5000 INJECTION, SOLUTION INTRAVENOUS; SUBCUTANEOUS at 09:13

## 2017-07-13 RX ADMIN — HEPARIN SODIUM 5000 UNITS: 5000 INJECTION, SOLUTION INTRAVENOUS; SUBCUTANEOUS at 01:32

## 2017-07-13 RX ADMIN — LOSARTAN POTASSIUM 100 MG: 50 TABLET, FILM COATED ORAL at 09:13

## 2017-07-13 RX ADMIN — PANTOPRAZOLE SODIUM 40 MG: 40 TABLET, DELAYED RELEASE ORAL at 06:32

## 2017-07-13 RX ADMIN — HEPARIN SODIUM 5000 UNITS: 5000 INJECTION, SOLUTION INTRAVENOUS; SUBCUTANEOUS at 22:29

## 2017-07-13 RX ADMIN — HYDROCODONE BITARTRATE AND ACETAMINOPHEN 1 TABLET: 5; 325 TABLET ORAL at 10:06

## 2017-07-13 RX ADMIN — Medication 1 TABLET: at 10:06

## 2017-07-13 RX ADMIN — ATORVASTATIN CALCIUM 10 MG: 10 TABLET, FILM COATED ORAL at 09:13

## 2017-07-13 RX ADMIN — SODIUM CHLORIDE 3 UNITS/HR: 9 INJECTION, SOLUTION INTRAVENOUS at 00:57

## 2017-07-13 RX ADMIN — METOPROLOL SUCCINATE 50 MG: 50 TABLET, EXTENDED RELEASE ORAL at 09:13

## 2017-07-13 RX ADMIN — FOLIC ACID 1 MG: 1 TABLET ORAL at 09:13

## 2017-07-13 RX ADMIN — LANTHANUM CARBONATE 1000 MG: 500 TABLET, CHEWABLE ORAL at 10:06

## 2017-07-13 RX ADMIN — SODIUM CHLORIDE 30 ML/HR: 9 INJECTION, SOLUTION INTRAVENOUS at 01:00

## 2017-07-13 RX ADMIN — ASPIRIN 325 MG ORAL TABLET 325 MG: 325 PILL ORAL at 09:13

## 2017-07-13 RX ADMIN — HYDROCODONE BITARTRATE AND ACETAMINOPHEN 1 TABLET: 5; 325 TABLET ORAL at 22:29

## 2017-07-13 RX ADMIN — AMLODIPINE BESYLATE 10 MG: 5 TABLET ORAL at 09:13

## 2017-07-13 NOTE — PROGRESS NOTES
[facility]    Name:  Talha Cutler   Age:  57 y.o.  Sex:  male  :  1960  MRN:  [id number]   Visit Number:  [id number]  Admission Date:  2017  Date Of Service:  17  Primary Care Physician:  Idalia Kay MD     LOS: 1 day :  Patient Care Team:  Idalia Kay MD as PCP - General:    Chief Complaint:      Abdominal pain.    Subjective / Interval History:     This is a 57-year-old pleasant male who is well known to me from his prior admissions, with history of diabetes, end-stage renal disease, chronic right-sided loculated pleural effusion with the Pleurx catheter was admitted from the emergency room yesterday with hypoglycemia requiring insulin drip.  CT scan of the abdomen and pelvis done in the emergency room showed mild pancreatitis without any elevated lipase levels.    Patient states that he has history of labile blood sugar control for the last several years.  He does give history of recurrent hypoglycemic events and during his last admission at [facility] his long-acting insulin was discontinued.  Patient is currently on short-acting insulin 3 times daily at home.  He stated that when he woke up the previous night he checked his blood sugars C1 though he did not have any significant symptoms, the blood sugar was noted to be very high and he came to the emergency room.    During his last admission here, his Pleurx catheter was malfunctioning and he was transferred to [facility] for cardiothoracic surgery evaluation.  Patient states that, he was evaluated by the surgeon and he recommended that the Pleurx catheter remain for another 2 months.  He states that the Pleurx catheter has stopped draining for the last month or so.  He denies any increasing shortness of breath at this time.  He was evaluated by Dr. Hernandez this morning and he has felt that his pleural effusions  are improving significantly.  Previous physician documentation, laboratory and imaging data have been reviewed.    Patient lives with his wife and has been wheelchair bound in the last several weeks.  He does not have any home health at this time.  He does use 2 L of oxygen at night at home.    Review of Systems:     General ROS: Patient denies any fevers, chills or loss of consciousness.  Respiratory ROS: Denies cough or shortness of breath.  Cardiovascular ROS: Denies chest pain or palpitations. No history of exertional chest pain.  Gastrointestinal ROS: Complaints of abdominal pain and nausea.  No vomiting. No diarrhea.  Neurological ROS: Denies any focal weakness. No loss of consciousness. Denies any numbness. Denies neck pain.  Dermatological ROS: Denies any redness or pruritis.    Vital Signs:    Temp:  [96.5 °F (35.8 °C)-98.6 °F (37 °C)] 97.7 °F (36.5 °C)  Heart Rate:  [88-96] 95  Resp:  [15-18] 15  BP: (109-158)/() 137/80    Intake and output:    I/O last 3 completed shifts:  In: 149.5 [I.V.:149.5]  Out: 100 [Urine:100]       Physical Examination:    General Appearance:  Alert and cooperative, not in any acute distress.   Head:  Atraumatic and normocephalic, without obvious abnormality.   Eyes:          PERRLA, conjunctivae and sclerae normal, no Icterus. No pallor. Extra-occular movements are within normal limits.   Neck: Supple, trachea midline, no thyromegaly, no carotid bruit.   Lungs:   Chest shape is normal. Breath sounds heard bilaterally equally but decreased in the bases.  Bilateral basal crackles heard.  No wheezing.  Pleurx catheter is noted in the right anterior lower chest.     Heart:  Normal S1 and S2, no murmur, no gallop, no rub. No JVD   Abdomen:   Normal bowel sounds, no masses, no organomegaly. Soft       Epigastric tenderness noted, non-distended, no guarding, no rebound tenderness.  Acute Modic patches noted on the abdominal wall.   Extremities: Moves all extremities well, no  edema, no cyanosis, no            clubbing.  Patient has skin ulcers in both the shins as well as absent nail on the left great toe.  AV fistula with the bruit noted in the right arm.   Skin: No bleeding, bruising or rash.   Neurologic: Awake, alert and oriented times 3. Moves all 4 extremities equally.   Laboratory results:      Results from last 7 days  Lab Units 07/13/17  0438 07/12/17 2128   SODIUM mmol/L 138 132*   POTASSIUM mmol/L 6.0* 7.2*   CHLORIDE mmol/L 97* 92*   CO2 mmol/L 25.0* 26.0   BUN mg/dL 33* 29*   CREATININE mg/dL 4.80* 4.60*   CALCIUM mg/dL 8.9 8.7   BILIRUBIN mg/dL  --  1.0   ALK PHOS U/L  --  77   ALT (SGPT) U/L  --  25   AST (SGOT) U/L  --  28   GLUCOSE mg/dL 114* 560*       Results from last 7 days  Lab Units 07/13/17  0438 07/12/17 2128   WBC 10*3/mm3 5.10 4.55*   HEMOGLOBIN g/dL 11.9* 10.7*   HEMATOCRIT % 38.3* 34.4*   PLATELETS 10*3/mm3 210 217           Results from last 7 days  Lab Units 07/13/17  0438 07/12/17 2128   TROPONIN I ng/mL 0.015 0.013         I have reviewed the patient's laboratory results.    Radiology results:    Imaging Results (last 24 hours)     Procedure Component Value Units Date/Time    CT Abdomen Pelvis Without Contrast [[id number]] Collected:  07/12/17 2120     Updated:  07/12/17 2123    Narrative:       FINAL REPORT    CLINICAL HISTORY:  RIGHT UPPER QUAD PAIN/EPIGASTRIC PAIN, RENAL FAILURE    FINDINGS:  Multiple contiguous transaxial slices through the abdomen and pelvis were obtained without the intravenous administration of contrast with coronal reformatted images.    There are bilateral pleural effusions and airspace disease. There is pancreatic edema with stranding in adjacent fat consistent with a mild acute pancreatitis. Recommend correlation with amylase and lipase values. The liver, spleenn, adrenal glands   appear normal. Kidneys are atrophic with dense renal vascular calcifications. There is no hydronephrosis.    The bowel gas pattern is  nonobstructive.    Moderate stool consistent with constipation. There is scattered diverticulosis without diverticulitis. There is no evidence for appendicitis. The aorta and iliac arteries are densely calcified. There is moderate bladder wall thickening with stranding of   adjacent fat suggesting cystitis.    Impression:    Findings suggesting a mild pancreatitis. Recommend correlation with  lab values    Cystitis    Bilateral airspace disease and effusions    Atherosclerotic vascular disease    Constipation      Impression:       Authenticated by José Luis Morgan MD on 07/12/2017 09:20:59 PM    CT Chest Without Contrast [[id number]] Collected:  07/12/17 2125     Updated:  07/12/17 2127    Narrative:       FINAL REPORT    CLINICAL HISTORY:  CHRONIC PLEURAL EFFUSION WITH THORACENTESIS TUBE IN PLACE TO DRAIN AS NEEDED. (LOOKING FOR POSSIBLE ABSCESS) OR WORSE PLEURAL EFFUSION    FINDINGS:  Comparisons none available    Multiple contiguous transaxial slices of the chest obtained without the intravenous administration of contrast.    There is a loculated mild-to-moderate right pleural effusion with drainage catheter in place. There is adjacent airspace disease. There is a moderate layering left pleural effusion. There is diffuse centrilobular emphysema. There are multiple large   prevascular and paratracheal lymph nodes area the heart is enlarged. There are calcifications of the coronary arteries. Aorta is calcified and ectatic.    Impression:    Bilateral pleural effusions and right airspace disease without evidence for abscess      Impression:       Authenticated by José Luis Morgan MD on 07/12/2017 09:25:46 PM        I have reviewed the patient's radiology reports.    Medication Review:     I have reviewed the patients active and prn medications.     Principal Problem:    Hyperglycemia due to type 2 diabetes mellitus  Active Problems:    Pleural effusion, bilateral    Hypertension    SOB (shortness of breath)     Type 2 diabetes mellitus treated with insulin    End stage renal disease on dialysis    Uncontrolled diabetes mellitus with hyperglycemia, with long-term current use of insulin    Hyperkalemia    Physical debility    Chronic respiratory insufficiency    Pancreatitis, acute    Chronic kidney disease-mineral and bone disorder    Assessment:    1.  Acute hyperglycemia secondary to diabetes mellitus type 2, present on admission.  2.  Acute hyperkalemia secondary to #3.  3.  End-stage renal disease on hemodialysis, MWF.  4.  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  5.  Chronic right-sided loculated pleural effusion status post Pleurx catheter.  6.  Mild pancreatitis with abdominal pain, present on admission.  7.  Coronary artery disease on medical management.  8.  Chronic debility.  9.  Essential hypertension.    Plan:    Patient is currently lying down on the bed and is feeling better except for mild abdominal pain.  He has been on insulin drip through the night but was discontinued early this morning due to normal blood sugars.  Patient is currently nothing by mouth but will be started on clear liquid diet.  Patient states that he has been on Levemir in the past but it was discontinued due to history of significant hypoglycemic events.  We will start him on low-dose Levemir today at 5 units daily along with subcutaneous insulin protocol.  He will be watched for hypoglycemic events.    Patient is currently on Tylenol for pain and I will place him on Lortab for his abdominal pain.  His potassium is 6 today and he is scheduled for hemodialysis today.  He does not have any significant fluid overload at this time.  He is being followed by Dr. Mackey and I have discussed the patient's condition with him.    Patient was also seen by Dr. Hernandez today and he feels that the patient's bilateral pleural effusions have significantly improved.  I have discussed the patient's condition with Dr. Hernandez today.  He will  be continued on nasal cannula oxygen and incentive spirometry.  He is not on any antibiotics at this time.  We will consult physical and occupational therapy to improve his functional status.  He will be transferred to medical floor with telemetry once his dialysis is completed.  Hopefully, he may be able to go home in the next couple of days with home health.  Patient has an appointment with cardiothoracic surgeon in  in the next 1 month or so for evaluation regarding continuation of his Pleurx catheter.    Carmelo Ray MD  07/13/17  9:14 AM    Portions of this note may have been completed with Dragon, a voice recognition program. Not all errors in transcription may have been detected prior to signing.

## 2017-07-13 NOTE — CONSULTS
"Adult Nutrition  Assessment/PES    Patient Name:  Talha Cutler  YOB: 1960  MRN: [id number]  Admit Date:  7/12/2017    Assessment Date:  7/13/2017        Reason for Assessment       07/13/17 1135    Reason for Assessment    Reason For Assessment/Visit diagnosis/disease state;nurse/nurse practitioner consult    Diagnosis Diagnosis    Endocrine DM Type 2;Hyperglycemia    Gastrointestinal Pancreatitis, acute;Other (comment)   possible pancrease failure    Metabolic/ICU Hyperkalemia    Pulmonary/Critical Care Other (comment)   SOA, Chronic Respiratory Insuffeciency     Renal ESRD;Hemodialysis                Anthropometrics       07/13/17 1140    Anthropometrics    Height Method Stated    Height 172.7 cm (68\")    Weight Method Bed scale    Weight 65.1 kg (143 lb 8.3 oz)    Ideal Body Weight (IBW)    Ideal Body Weight (IBW), Male (kg) 70.89    % Ideal Body Weight 92.02    Body Mass Index (BMI)    BMI (kg/m2) 21.87    BMI Grade 19.1 - 24.9 - normal            Labs/Tests/Procedures/Meds       07/13/17 1141    Labs/Tests/Procedures/Meds    Labs/Tests Review Reviewed   Low: Cl   High: Glu, K, BUN, Creat    Medication Review Reviewed, pertinent;Insulin;Anticoag              Estimated/Assessed Needs       07/13/17 1143    Calculation Measurements    Weight Used For Calculations 70.9 kg (156 lb 4.6 oz)   IBW    Height Used for Calculations 1.727 m (5' 8\")    Estimated/Assessed Energy Needs    Energy Need Method [place]-St Jeor    Age 57    RMR ([place]-St. Jeor Equation) 1503.4    Activity Factors ([place] St Jeor)  Confined to bed  1.2    Estimated Kcal Range  ~7057-4760 kcal    Estimated/Assessed Protein Needs    Weight Used for Protein Calculation 65.1 kg (143 lb 8.3 oz)    Protein (gm/kg) 1.4    1.4 Gm Protein (gm) 91.14    Estimated Protein Range ~78-91 gm    Estimated/Assessed Fluid Needs    Fluid Need Method RDA method   Output + 1000 mL            Nutrition Prescription Ordered       07/13/17 1144    " Nutrition Prescription PO    Current PO Diet Clear Liquid;Renal Dialysis([facility] only)            Evaluation of Received Nutrient/Fluid Intake       07/13/17 1143    PO Evaluation    Number of Days PO Intake Evaluated Insufficient Data   Pt recently admitted              Problem/Interventions:        Problem 1       07/13/17 1147    Nutrition Diagnoses Problem 1    Problem 1 Impaired Nutrient Utilization    Etiology (related to) Medical Diagnosis    Endocrine DM2    Renal ESRD;Hemodialysis    Signs/Symptoms (evidenced by) Biochemical    Specific Labs Noted Glucose;K+;Creatinine;BUN;Chloride            Problem 2       07/13/17 1151    Nutrition Diagnoses Problem 2    Problem 2 Inadequate Intake/Infusion    Inadequate Intake Type Oral    Macronutrient Kcal;Fluid;Protein    Micronutrient Vitamin;Mineral    Etiology (related to) MNT for Treatment/Condition    Signs/Symptoms (evidenced by) Clear Liquid Diet                  Intervention Goal       07/13/17 1151    Intervention Goal    General Meet nutritional needs for age/condition;Provide information regarding MNT for treatment/condition    PO Meet estimated needs;Establish PO    Weight Maintain weight            Nutrition Intervention       07/13/17 1152    Nutrition Intervention    RD/Tech Action Care plan reviewd;Encourage intake;Follow Tx progress;Recommend/ordered    Recommended/Ordered Supplement            Nutrition Prescription       07/13/17 1152    Nutrition Prescription PO    PO Prescription Begin/change supplement    Supplement Ensure Clear    Supplement Frequency 2 times a day    New PO Prescription Ordered? Yes    Other Orders    Obtain Weight Daily    Obtain Weight Ordered? No, recommended    Supplement Vitamin mineral supplement    Supplement Ordered? No, recommended            Education/Evaluation       07/13/17 1152    Education    Education Previous education by RD/CHUY;Provided education regarding;Education topics    Provided education regarding Healthy  eating for diabetes    Education Topics Renal diet;Diabetes;CHO counting    Monitor/Evaluation    Monitor Per protocol;I&O;PO intake;Supplement intake;Pertinent labs        Comments:  Rec #1: Advance diet to full liquids when medically feasible. RD ordered nutrition supplement of Ensure Clear BID to promote PO intake. Rec #2: Establish and encourage PO intake. Rec #3: Consider renal MVI with minerals daily. RD to follow pt. Consult RD PRN.     Electronically signed by:  Afshan Grace RD  07/13/17 11:53 AM

## 2017-07-13 NOTE — PROGRESS NOTES
"Adult Nutrition  Assessment/PES    Patient Name:  Talha Cutler  YOB: 1960  MRN: [id number]  Admit Date:  7/12/2017    Assessment Date:  7/13/2017        Reason for Assessment       07/13/17 1135    Reason for Assessment    Reason For Assessment/Visit diagnosis/disease state;nurse/nurse practitioner consult    Diagnosis Diagnosis    Endocrine DM Type 2;Hyperglycemia    Gastrointestinal Pancreatitis, acute;Other (comment)   possible pancrease failure    Metabolic/ICU Hyperkalemia    Pulmonary/Critical Care Other (comment)   SOA, Chronic Respiratory Insuffeciency     Renal ESRD;Hemodialysis                Anthropometrics       07/13/17 1140    Anthropometrics    Height Method Stated    Height 172.7 cm (68\")    Weight Method Bed scale    Weight 65.1 kg (143 lb 8.3 oz)    Ideal Body Weight (IBW)    Ideal Body Weight (IBW), Male (kg) 70.89    % Ideal Body Weight 92.02    Body Mass Index (BMI)    BMI (kg/m2) 21.87    BMI Grade 19.1 - 24.9 - normal            Labs/Tests/Procedures/Meds       07/13/17 1141    Labs/Tests/Procedures/Meds    Labs/Tests Review Reviewed   Low: Cl   High: Glu, K, BUN, Creat    Medication Review Reviewed, pertinent;Insulin;Anticoag              Estimated/Assessed Needs       07/13/17 1143    Calculation Measurements    Weight Used For Calculations 70.9 kg (156 lb 4.6 oz)   IBW    Height Used for Calculations 1.727 m (5' 8\")    Estimated/Assessed Energy Needs    Energy Need Method [place]-St Jeor    Age 57    RMR ([place]-St. Jeor Equation) 1503.4    Activity Factors ([place] St Jeor)  Confined to bed  1.2    Estimated Kcal Range  ~2415-5476 kcal    Estimated/Assessed Protein Needs    Weight Used for Protein Calculation 65.1 kg (143 lb 8.3 oz)    Protein (gm/kg) 1.4    1.4 Gm Protein (gm) 91.14    Estimated Protein Range ~78-91 gm    Estimated/Assessed Fluid Needs    Fluid Need Method RDA method   Output + 1000 mL            Nutrition Prescription Ordered       07/13/17 1144    " Nutrition Prescription PO    Current PO Diet Clear Liquid;Renal Dialysis([facility] only)            Evaluation of Received Nutrient/Fluid Intake       07/13/17 1143    PO Evaluation    Number of Days PO Intake Evaluated Insufficient Data   Pt recently admitted              Problem/Interventions:        Problem 1       07/13/17 1147    Nutrition Diagnoses Problem 1    Problem 1 Impaired Nutrient Utilization    Etiology (related to) Medical Diagnosis    Endocrine DM2    Renal ESRD;Hemodialysis    Signs/Symptoms (evidenced by) Biochemical    Specific Labs Noted Glucose;K+;Creatinine;BUN;Chloride            Problem 2       07/13/17 1151    Nutrition Diagnoses Problem 2    Problem 2 Inadequate Intake/Infusion    Inadequate Intake Type Oral    Macronutrient Kcal;Fluid;Protein    Micronutrient Vitamin;Mineral    Etiology (related to) MNT for Treatment/Condition    Signs/Symptoms (evidenced by) Clear Liquid Diet                  Intervention Goal       07/13/17 1151    Intervention Goal    General Meet nutritional needs for age/condition;Provide information regarding MNT for treatment/condition    PO Meet estimated needs;Establish PO    Weight Maintain weight            Nutrition Intervention       07/13/17 1152    Nutrition Intervention    RD/Tech Action Care plan reviewd;Encourage intake;Follow Tx progress;Recommend/ordered    Recommended/Ordered Supplement            Nutrition Prescription       07/13/17 1152    Nutrition Prescription PO    PO Prescription Begin/change supplement    Supplement Ensure Clear    Supplement Frequency 2 times a day    New PO Prescription Ordered? Yes    Other Orders    Obtain Weight Daily    Obtain Weight Ordered? No, recommended    Supplement Vitamin mineral supplement    Supplement Ordered? No, recommended            Education/Evaluation       07/13/17 1152    Education    Education Previous education by RD/CHUY;Provided education regarding;Education topics    Provided education regarding Healthy  eating for diabetes    Education Topics Renal diet;Diabetes;CHO counting    Monitor/Evaluation    Monitor Per protocol;I&O;PO intake;Supplement intake;Pertinent labs        Comments:  Rec #1: Advance diet to full liquids when medically feasible. RD ordered nutrition supplement of Ensure Clear BID to promote PO intake. Rec #2: Establish and encourage PO intake. Rec #3: Consider renal MVI with minerals daily. RD to follow pt. Consult RD PRN.     Electronically signed by:  Afshan Grace RD  07/13/17 11:56 AM

## 2017-07-13 NOTE — CONSULTS
Nephrology Consultation    Referring Provider:   No ref. provider found    Reason for Consultation:    ESRD and associated problems       Subjective     Chief complaint   Chief Complaint   Patient presents with   • Hyperglycemia       History of present illness:    Patient is 57 year old white male with multiple medical problems as listed in the past medical history  who was admitted by the hospitalist service. He was noted to have increased blood sugars as well as hyper kalemia. Is also noted to have signs of pancreatitis on CT of the abdomen. he is due for his dialysis today.I was consulted for dialysis and associated problems  I have reviewed labs/imaging/records from this hospitalization, including ER staff and admitting/attending physicians H/P's and progress notes to establish a comprehensive understanding of this patient's clinical hospital course, as well as to establish plan of care appropriately.   Past Medical History:   Diagnosis Date   • Anemia    • CHF (congestive heart failure)    • Chronic kidney disease    • Diabetes mellitus    • H/O chest x-ray 03/25/2016    Interval decrease in size of the patients right pleural effusion with a persistent small left effusion as well   • History of transfusion    • Hypertension    • Left-sided weakness    • Renal failure    • Stroke        Past Surgical History:   Procedure Laterality Date   • CATARACT EXTRACTION, BILATERAL     • CHOLECYSTECTOMY     • COLONOSCOPY     • EYE SURGERY     • PORTACATH PLACEMENT     • VENOUS ACCESS DEVICE (PORT) REMOVAL         Family History   Problem Relation Age of Onset   • COPD Mother    • Hypertension Father    • Diabetes Father    • Hypertension Sister    • Diabetes Sister    • Hypertension Brother    • Diabetes Paternal Grandmother           negative h/o ESRD     Social History   Substance Use Topics   • Smoking status: Never Smoker   • Smokeless tobacco: Never Used   • Alcohol use No     Prescriptions Prior to Admission    Medication Sig Dispense Refill Last Dose   • amLODIPine (NORVASC) 10 MG tablet    7/12/2017 at Unknown time   • amoxicillin-clavulanate (AUGMENTIN) 875-125 MG per tablet Take 1 tablet by mouth 2 (Two) Times a Day.   7/12/2017 at Unknown time   • aspirin 325 MG tablet Take 325 mg by mouth daily.   7/12/2017 at Unknown time   • Cholecalciferol (VITAMIN D3) 5000 UNITS capsule capsule Take  by mouth daily.   7/12/2017 at Unknown time   • fenofibrate (TRICOR) 54 MG tablet Take 54 mg by mouth daily.   7/11/2017 at Unknown time   • folic acid (FOLVITE) 1 MG tablet Take 1 mg by mouth daily.   7/12/2017 at Unknown time   • FOSRENOL 1000 MG chewable tablet 3 (Three) Times a Day With Meals.   7/12/2017 at Unknown time   • insulin aspart (NovoLOG) 100 UNIT/ML injection Inject 4 Units under the skin 3 (Three) Times a Day Before Meals.   7/12/2017 at Unknown time   • levothyroxine (SYNTHROID, LEVOTHROID) 100 MCG tablet Take 100 mcg by mouth daily.   7/12/2017 at Unknown time   • losartan (COZAAR) 100 MG tablet Take 1 tablet by mouth Daily.   7/12/2017 at Unknown time   • metoprolol succinate XL (TOPROL-XL) 50 MG 24 hr tablet    7/12/2017 at Unknown time   • omeprazole (PriLOSEC) 20 MG capsule Take 20 mg by mouth 2 (Two) Times a Day.   7/12/2017 at Unknown time   • pravastatin (PRAVACHOL) 40 MG tablet Take 80 mg by mouth Daily.   7/12/2017 at Unknown time   • insulin detemir (LEVEMIR) 100 UNIT/ML injection Inject 10 Units under the skin Every Night. 100 mL 0    • piperacillin-tazobactam (ZOSYN) 2.25 g/100 mL 0.9% NS IVPB (mbp) Infuse 100 mL into a venous catheter Every 8 (Eight) Hours. Indications: Skin and Soft Tissue Infection 10 each 0      Allergies:  Erythromycin    Review of Systems  Constitutional: Negative for fever and chills, no diaphoresis, positive fatigue and unexpected weight change. Recently he has been losing weight  HENT: Negative for congestion and hearing loss.   Eyes: Negative for redness and visual  "disturbance.   Respiratory: positive for shortness of breath. Negative for chest pain . Negative for cough and chest tightness.   Cardiovascular: Negative for chest pain and palpitations.   Gastrointestinal: Negative for abdominal distention, he does complain of nonspecific abdominal pain and denies any blood in stool. Denies any diarrhea.he does have constipation  Endocrine: Negative for cold or heat intolerance.   Genitourinary: Negative for difficulty urinating, dysuria and frequency. He does not make much urine  Musculoskeletal: Negative for arthralgias, back pain and myalgias.   Skin: Negative for color change, rash and wound.   Neurological: Negative for syncope, new onset weakness or numbness of any extremities. Denies any headaches.   Hematological: Negative for adenopathy. Does not bruise/bleed easily.   Psychiatric/Behavioral: Negative for confusion. The patient is not nervous/anxious.     Objective     Vital Signs  /80  Pulse 78  Temp 98.2 °F (36.8 °C) (Oral)   Resp 15  Ht 68\" (172.7 cm)  Wt 143 lb 8.3 oz (65.1 kg)  SpO2 99%  BMI 21.82 kg/m2              Intake/Output Summary (Last 24 hours) at 07/13/17 1221  Last data filed at 07/13/17 0700   Gross per 24 hour   Intake            149.5 ml   Output              100 ml   Net             49.5 ml       Physical Exam:     General Appearance:   Alert, cooperative, in no acute distress.     Head:   Normocephalic, without obvious abnormality, atraumatic.     Eyes:       Normal, conjunctivae and sclerae, no icterus, no pallor, corneas clear, PERRLA        Throat:   Oral mucosa dry      Neck:  No adenopathy, supple, trachea midline, no thyromegaly, no carotid bruit, no JVD      Back:   No CVA tenderness on Percussion.     Lungs:    does have decreased breath sounds all over.      Heart:   Regular rhythm and normal rate, normal S1 and S2. 2/6 SM       Abdomen:   Obese. Normal bowel sounds, no masses, no organomegaly, soft tender, non-distended, no " guarding, no rebound tenderness        Extremities:  Moves all extremities, no edema, no cyanosis, no redness.     Pulses:  Pulses palpable and equal bilaterally but weak.     Skin:  No bleeding, bruising or rash        Neurologic:  Cranial nerves grossly intact, move all extremities             Results Review:  Lab Results (last 7 days)     Procedure Component Value Units Date/Time    POC Glucose Fingerstick [[id number]]  (Abnormal) Collected:  07/12/17 2014    Specimen:  Blood Updated:  07/12/17 2021     Glucose 527 (C) mg/dL       Serial Number: [id number]:  [id number]       pH, Venous [[id number]] Collected:  07/12/17 2124    Specimen:  Blood Updated:  07/12/17 2125     pH, Venous 7.393    pH, Venous [[id number]] Collected:  07/12/17 2124    Specimen:  Blood Updated:  07/12/17 2125     pH, Venous 7.393    CBC & Differential [[id number]] Collected:  07/12/17 2128    Specimen:  Blood Updated:  07/12/17 2143    Narrative:       The following orders were created for panel order CBC & Differential.  Procedure                               Abnormality         Status                     ---------                               -----------         ------                     CBC Auto Differential[[id number]]        Abnormal            Final result                 Please view results for these tests on the individual orders.    CBC Auto Differential [[id number]]  (Abnormal) Collected:  07/12/17 2128    Specimen:  Blood Updated:  07/12/17 2143     WBC 4.55 (L) 10*3/mm3      RBC 3.73 (L) 10*6/mm3      Hemoglobin 10.7 (L) g/dL      Hematocrit 34.4 (L) %      MCV 92.2 fL      MCH 28.7 pg      MCHC 31.1 g/dL      RDW 19.2 (H) %      RDW-SD 63.7 (H) fl      MPV 10.3 fL      Platelets 217 10*3/mm3      Neutrophil % 68.2 %      Lymphocyte % 11.4 %      Monocyte % 13.8 (H) %      Eosinophil % 5.1 %      Basophil % 1.1 %      Immature Grans % 0.4 %      Neutrophils, Absolute 3.10 10*3/mm3      Lymphocytes, Absolute 0.52 (L) 10*3/mm3       Monocytes, Absolute 0.63 10*3/mm3      Eosinophils, Absolute 0.23 10*3/mm3      Basophils, Absolute 0.05 10*3/mm3      Immature Grans, Absolute 0.02 10*3/mm3      nRBC 0.0 /100 WBC     Lactic Acid, Plasma [[id number]]  (Normal) Collected:  07/12/17 2128    Specimen:  Blood Updated:  07/12/17 2148     Lactate 1.6 mmol/L     Lipase [[id number]]  (Normal) Collected:  07/12/17 2128    Specimen:  Blood Updated:  07/12/17 2159     Lipase 132 U/L     Troponin [[id number]]  (Normal) Collected:  07/12/17 2128    Specimen:  Blood Updated:  07/12/17 2159     Troponin I 0.013 ng/mL     Narrative:       Normal Patient Upper Reference Limit (URL) (99th Percentile)=0.03 ng/mL   Non-AMI Illness Reference Limit=0.03-0.11 ng/mL   AMI Confirmation=0.12 ng/mL and above    Comprehensive Metabolic Panel [[id number]]  (Abnormal) Collected:  07/12/17 2128    Specimen:  Blood Updated:  07/12/17 2201     Glucose 560 (C) mg/dL      BUN 29 (H) mg/dL      Creatinine 4.60 (H) mg/dL      Sodium 132 (L) mmol/L      Potassium 7.2 (C) mmol/L      Chloride 92 (L) mmol/L      CO2 26.0 mmol/L      Calcium 8.7 mg/dL      Total Protein 7.4 g/dL      Albumin 3.60 g/dL      ALT (SGPT) 25 U/L      AST (SGOT) 28 U/L      Alkaline Phosphatase 77 U/L      Total Bilirubin 1.0 mg/dL      eGFR Non African Amer 13 (L) mL/min/1.73      Globulin 3.8 gm/dL      A/G Ratio 0.9 (L) g/dL      BUN/Creatinine Ratio 6.3     Anion Gap 21.2 mmol/L     Narrative:       Abnormal estimated GFR should be followed by more specific studies to confirm end stage chronic renal disease. The equation used for calculation may not be accurate for patients less than 19 years old, greater than 70 years old, patients at extremes of weight, malnutrition, or with acute renal dysfunction.    POC Glucose Fingerstick [[id number]]  (Abnormal) Collected:  07/12/17 2247    Specimen:  Blood Updated:  07/12/17 2250     Glucose 366 (H) mg/dL       Serial Number: [id number]:  [id number]       POC  Glucose Fingerstick [[id number]]  (Abnormal) Collected:  07/12/17 2338    Specimen:  Blood Updated:  07/13/17 0005     Glucose 310 (H) mg/dL       Serial Number: [id number]:  [id number]       MRSA Screen Culture [[id number]] Collected:  07/12/17 2357    Specimen:  Swab from Nares Updated:  07/13/17 0022    Body Fluid Culture [[id number]] Collected:  07/12/17 2358    Specimen:  Body Fluid from Pleural Cavity Updated:  07/13/17 0022    VRE Culture [[id number]] Collected:  07/12/17 2357    Specimen:  Swab from Per Rectum Updated:  07/13/17 0022    POC Glucose Fingerstick [[id number]]  (Abnormal) Collected:  07/13/17 0024    Specimen:  Blood Updated:  07/13/17 0030     Glucose 282 (H) mg/dL       Serial Number: [id number]:  [id number]       Urinalysis With / Culture If Indicated [[id number]]  (Abnormal) Collected:  07/13/17 0049    Specimen:  Urine from Urine, Clean Catch Updated:  07/13/17 0102     Color, UA Dark Yellow (A)     Appearance, UA Clear     pH, UA 7.5     Specific Gravity, UA 1.020     Glucose,  mg/dL (2+) (A)     Ketones, UA Negative     Bilirubin, UA Negative     Blood, UA Trace (A)     Protein, UA >=300 mg/dL (3+) (A)     Leuk Esterase, UA Negative     Nitrite, UA Negative     Urobilinogen, UA 0.2 E.U./dL    Urinalysis, Microscopic Only [[id number]]  (Abnormal) Collected:  07/13/17 0049    Specimen:  Urine from Urine, Clean Catch Updated:  07/13/17 0110     RBC, UA 3-5 (A) /HPF      WBC, UA 0-2 (A) /HPF      Bacteria, UA Trace (A) /HPF      Squamous Epithelial Cells, UA 0-2 /HPF      Hyaline Casts, UA None Seen /LPF      Methodology Manual Light Microscopy    Acinetobacter Screen [[id number]] Collected:  07/12/17 2349    Specimen:  Swab from Axilla, Right Updated:  07/13/17 0127    POC Glucose Fingerstick [[id number]]  (Abnormal) Collected:  07/13/17 0133    Specimen:  Blood Updated:  07/13/17 0145     Glucose 282 (H) mg/dL       Serial Number: [id number]:  [id number]       POC Glucose  Fingerstick [[id number]]  (Abnormal) Collected:  07/13/17 0238    Specimen:  Blood Updated:  07/13/17 0240     Glucose 177 (H) mg/dL       Serial Number: [id number]:  [id number]       POC Glucose Fingerstick [[id number]]  (Normal) Collected:  07/13/17 0313    Specimen:  Blood Updated:  07/13/17 0315     Glucose 108 mg/dL       Serial Number: [id number]:  [id number]       POC Glucose Fingerstick [[id number]]  (Normal) Collected:  07/13/17 0352    Specimen:  Blood Updated:  07/13/17 0355     Glucose 99 mg/dL       Serial Number: [id number]:  [id number]       CBC Auto Differential [[id number]]  (Abnormal) Collected:  07/13/17 0438    Specimen:  Blood Updated:  07/13/17 0508     WBC 5.10 10*3/mm3      RBC 4.09 (L) 10*6/mm3      Hemoglobin 11.9 (L) g/dL      Hematocrit 38.3 (L) %      MCV 93.6 fL      MCH 29.1 pg      MCHC 31.1 g/dL      RDW 19.4 (H) %      RDW-SD 65.4 (H) fl      MPV 10.0 fL      Platelets 210 10*3/mm3      Neutrophil % 65.1 %      Lymphocyte % 13.3 %      Monocyte % 13.3 (H) %      Eosinophil % 5.9 %      Basophil % 1.6 %      Immature Grans % 0.8 (H) %      Neutrophils, Absolute 3.32 10*3/mm3      Lymphocytes, Absolute 0.68 10*3/mm3      Monocytes, Absolute 0.68 10*3/mm3      Eosinophils, Absolute 0.30 10*3/mm3      Basophils, Absolute 0.08 10*3/mm3      Immature Grans, Absolute 0.04 10*3/mm3      nRBC 0.0 /100 WBC     Scan Slide [[id number]]  (Normal) Collected:  07/13/17 0438    Specimen:  Blood Updated:  07/13/17 0508     RBC Morphology Normal     WBC Morphology Normal     Platelet Morphology Normal    POC Glucose Fingerstick [[id number]]  (Normal) Collected:  07/13/17 0509    Specimen:  Blood Updated:  07/13/17 0515     Glucose 96 mg/dL       Serial Number: [id number]:  [id number]       POC Glucose Fingerstick [[id number]]  (Normal) Collected:  07/13/17 0625    Specimen:  Blood Updated:  07/13/17 0635     Glucose 114 mg/dL       Serial Number: [id number]:  [id number]       Troponin  [[id number]]  (Normal) Collected:  07/13/17 0438    Specimen:  Blood Updated:  07/13/17 0644     Troponin I 0.015 ng/mL     Narrative:       Normal Patient Upper Reference Limit (URL) (99th Percentile)=0.03 ng/mL   Non-AMI Illness Reference Limit=0.03-0.11 ng/mL   AMI Confirmation=0.12 ng/mL and above    Basic Metabolic Panel [[id number]]  (Abnormal) Collected:  07/13/17 0438    Specimen:  Blood Updated:  07/13/17 0710     Glucose 114 (H) mg/dL      BUN 33 (H) mg/dL      Creatinine 4.80 (H) mg/dL      Sodium 138 mmol/L      Potassium 6.0 (C) mmol/L      Chloride 97 (L) mmol/L      CO2 25.0 (L) mmol/L      Calcium 8.9 mg/dL      eGFR Non African Amer 13 (L) mL/min/1.73      BUN/Creatinine Ratio 6.9     Anion Gap 22.0 mmol/L     Narrative:       Abnormal estimated GFR should be followed by more specific studies to confirm end stage chronic renal disease. The equation used for calculation may not be accurate for patients less than 19 years old, greater than 70 years old, patients at extremes of weight, malnutrition, or with acute renal dysfunction.    POC Glucose Fingerstick [[id number]]  (Normal) Collected:  07/13/17 0736    Specimen:  Blood Updated:  07/13/17 0740     Glucose 114 mg/dL       Serial Number: [id number]:  [id number]       POC Glucose Fingerstick [[id number]]  (Abnormal) Collected:  07/13/17 0825    Specimen:  Blood Updated:  07/13/17 0830     Glucose 141 (H) mg/dL       Serial Number: [id number]:  [id number]           Imaging Results (last 72 hours)     Procedure Component Value Units Date/Time    CT Abdomen Pelvis Without Contrast [[id number]] Collected:  07/12/17 2120     Updated:  07/12/17 2123    Narrative:       FINAL REPORT    CLINICAL HISTORY:  RIGHT UPPER QUAD PAIN/EPIGASTRIC PAIN, RENAL FAILURE    FINDINGS:  Multiple contiguous transaxial slices through the abdomen and pelvis were obtained without the intravenous administration of contrast with coronal reformatted images.    There are  bilateral pleural effusions and airspace disease. There is pancreatic edema with stranding in adjacent fat consistent with a mild acute pancreatitis. Recommend correlation with amylase and lipase values. The liver, spleenn, adrenal glands   appear normal. Kidneys are atrophic with dense renal vascular calcifications. There is no hydronephrosis.    The bowel gas pattern is nonobstructive.    Moderate stool consistent with constipation. There is scattered diverticulosis without diverticulitis. There is no evidence for appendicitis. The aorta and iliac arteries are densely calcified. There is moderate bladder wall thickening with stranding of   adjacent fat suggesting cystitis.    Impression:    Findings suggesting a mild pancreatitis. Recommend correlation with  lab values    Cystitis    Bilateral airspace disease and effusions    Atherosclerotic vascular disease    Constipation      Impression:       Authenticated by José Luis Morgan MD on 07/12/2017 09:20:59 PM    CT Chest Without Contrast [[id number]] Collected:  07/12/17 2125     Updated:  07/12/17 2127    Narrative:       FINAL REPORT    CLINICAL HISTORY:  CHRONIC PLEURAL EFFUSION WITH THORACENTESIS TUBE IN PLACE TO DRAIN AS NEEDED. (LOOKING FOR POSSIBLE ABSCESS) OR WORSE PLEURAL EFFUSION    FINDINGS:  Comparisons none available    Multiple contiguous transaxial slices of the chest obtained without the intravenous administration of contrast.    There is a loculated mild-to-moderate right pleural effusion with drainage catheter in place. There is adjacent airspace disease. There is a moderate layering left pleural effusion. There is diffuse centrilobular emphysema. There are multiple large   prevascular and paratracheal lymph nodes area the heart is enlarged. There are calcifications of the coronary arteries. Aorta is calcified and ectatic.    Impression:    Bilateral pleural effusions and right airspace disease without evidence for abscess      Impression:        Authenticated by José Luis Morgan MD on 07/12/2017 09:25:46 PM              amLODIPine 10 mg Oral Q24H   aspirin 325 mg Oral Daily   atorvastatin 10 mg Oral Daily   b complex-vitamin c-folic acid 1 tablet Oral Daily   folic acid 1 mg Oral Daily   heparin (porcine) 5,000 Units Subcutaneous Q12H   insulin aspart 0-7 Units Subcutaneous 4x Daily AC & at Bedtime   lanthanum 1,000 mg Oral TID With Meals   levothyroxine 100 mcg Oral Daily   losartan 100 mg Oral Q24H   metoprolol succinate XL 50 mg Oral Q24H   pantoprazole 40 mg Oral QAM          Assessment/Plan          1.   End stage renal disease on dialysis: He gets his dialysis on TTS, in [facility] and runs 4 hours with standard bath. Usually gains 2-3 L in between  Treatments.He is due for his dialysis today we'll make arrangements.  2.   Uncontrolled diabetes mellitus with hyperglycemia, with long-term current use of insulin: it appears that for the cause 2-3 years he has poorly controlled diabetes blood sugars have been running between 200 to 400 HbA1c was 11 or higher.  3.   Pancreatitis, acute: He is doing better continue to follow labs.  4.   Chronic kidney disease-mineral and bone disorder: Recently noted to have good control of his PO4 and PTH.  5.   Pleural effusion, bilateral: since the Plurex cath he has done well no acute issues have been noted.  6.   Hypertension: Optimal control with current meds.  7.   SOB (shortness of breath): Off and on with any amount of physical activity.  8.   Hyperkalemia: Likely will resolve with dialysis follow BS closely.  9.   Physical debility: He is wheel chair dependent for now.  10.   Chronic respiratory insufficiency: Fair control with current treatment plan.        Details discussed with the patient as well as family and the hospitalist service.     Rest as ordered    In closing, I sincerely appreciate opportunity to participate in care of this patient. If I can be of any further assistance with the management of  this patient, please don’t hesitate to contact me.    Chance Mackey MD  07/13/17  12:21 PM    EMR Dragon/Transcription disclaimer:   Much of this encounter note is an electronic transcription/translation of spoken language to printed text. The electronic translation of spoken language may permit erroneous, or at times, nonsensical words or phrases to be inadvertently transcribed; Although I have reviewed the note for such errors, some may still exist.

## 2017-07-13 NOTE — PLAN OF CARE
Problem: Patient Care Overview (Adult)  Goal: Plan of Care Review  Outcome: Ongoing (interventions implemented as appropriate)    07/13/17 0349   Coping/Psychosocial Response Interventions   Plan Of Care Reviewed With patient   Patient Care Overview   Progress no change         Problem: Diabetes, Type 2 (Adult)  Goal: Signs and Symptoms of Listed Potential Problems Will be Absent or Manageable (Diabetes, Type 2)  Outcome: Ongoing (interventions implemented as appropriate)    07/13/17 0349   Diabetes, Type 2   Problems Assessed (Type 2 Diabetes) all   Problems Present (Type 2 Diabetes) situational response  (insulin drip off latest fsbs 99)         Problem: Skin Integrity Impairment, Risk/Actual (Adult)  Goal: Identify Related Risk Factors and Signs and Symptoms  Outcome: Ongoing (interventions implemented as appropriate)    07/13/17 0349   Skin Integrity Impairment, Risk/Actual   Skin Integrity Impairment, Risk/Actual: Related Risk Factors fluid/nutrition status;immobility;infection/disease process;medication effects;metabolic imbalance;sensory impairment;skin disorders;tissue perfusion impaired   Signs and Symptoms (Skin Integrity Impairment) inflammation;plaques/scales  (shins bloody &red,left heel,butt crease red)       Goal: Skin Integrity/Wound Healing  Outcome: Ongoing (interventions implemented as appropriate)    07/13/17 0349   Skin Integrity Impairment, Risk/Actual (Adult)   Skin Integrity/Wound Healing unable to achieve outcome

## 2017-07-13 NOTE — NURSING NOTE
Discontinued Wound Consult. Wound Nurse was unable to visualize and treat pt., due to Dialysis. Discussed treatment plan, and spoke to physician regarding plan of care. Orders to follow.

## 2017-07-13 NOTE — SIGNIFICANT NOTE
07/13/17 1302   Rehab Treatment   Discipline physical therapist   Rehab Evaluation   Evaluation Not Performed patient unavailable for evaluation  (Pt currently in dialysis and unavailable for PT eval.  PT yury follow up with patient tomorrow and proceed with eval as tolerated and appropriate.)

## 2017-07-13 NOTE — ED NOTES
2240: DR. MYERS CALLED PER SEFERINO JULIEN, CALL SENT TO HER @ THIS TIME.     Jackelyn Anna  07/12/17 2248

## 2017-07-13 NOTE — PROGRESS NOTES
Discharge Planning Assessment   Zachary     Patient Name: Talha Cutler  MRN: [id number]  Today's Date: 7/13/2017    Admit Date: 7/12/2017          Discharge Needs Assessment     None            Discharge Plan       07/13/17 1617    Case Management/Social Work Plan    Plan Social service discharge planning    Additional Comments SW will follow up tomorrow with discharge planning. SW went to talk with pt earlier, he was getting dialysis and sleeping.     Final Note    Final Note SW will continue to follow pt for discharge needs         Discharge Placement     No information found                Demographic Summary     None            Functional Status     None            Psychosocial     None            Abuse/Neglect     None            Legal     None            Substance Abuse     None            Patient Forms     None          KEI Peralta  07/13/17  4:20 PM

## 2017-07-13 NOTE — H&P
[facility] Medicine Services  HISTORY AND PHYSICAL    Primary Care Physician: Idalia Kay MD    Subjective     Chief Complaint:  High blood sugar, weakness, nausea, abdominal pain  Chief Complaint   Patient presents with   • Hyperglycemia       History of Present Illness:   This is a very unfortunate 57 year old male, , ESRD on dialysis, DM2 uncontrolled, comes to ER with c/o high blood sugar reading at assisted living facility in which he resides.  ER workup found several abnormal lab findings: blood sugar over 570, potassium greater than 7, CT ABD/PELV showed pancreatitis but lipase is normal which could indicate pancreatic failure to produce enzymatic response to the inflammation.  Patient has chronic pleural effusions, has drain in place for evacuation of material.  Absence of WBC increase is comforting for lack of infection but given the patient's chronic immunodeficiency secondary to multiple organ pathology, lungs, pancreas, heart, liver, etc could be present, will check a culture to cover.        Review of Systems   1. Constitutional: Negative for fever. Negative for chills, diaphoresis, fatigue and unexpected weight change. Very talkative gentleman with adequate knowledge of his healthcare and medicine dosages, optimistic considering his serious health conditions.  2. HENT: Negative for congestion and hearing loss.   3. Eyes: Negative for redness and visual disturbance.   4. Respiratory: history of pleural effusions, bilateral pneumonias, has shunt in place for chronic effusion drainage, has floculated effusions in past, chronic respiratory insufficiency  5. Cardiovascular: Negative for chest pain and palpitations.   6. Gastrointestinal: Negative for abdominal distention, positive for abdominal pain and negative blood in stool.   7. Endocrine: Negative for cold intolerance and heat intolerance, history of poorly controlled DM2  8. Genitourinary: Negative for  "difficulty urinating, dysuria and frequency.   9. Musculoskeletal: Negative for arthralgias, back pain and myalgias.   10. Skin: Negative for color change, rash and wound.   11. Neurological: Negative for syncope, weakness and headaches.   12. Hematological: Negative for adenopathy. Does not bruise/bleed easily. Has chronic anemia secondary to ESRD  13. Psychiatric/Behavioral: Negative for confusion. The patient is not nervous/anxious.   14. Renal: history of Tuesday Thursday Saturday dialysis had his session yesterday      Past Medical History:   Past Medical History:   Diagnosis Date   • Anemia    • Chronic kidney disease    • Diabetes mellitus    • H/O chest x-ray 03/25/2016    Interval decrease in size of the patients right pleural effusion with a persistent small left effusion as well   • Hypertension    • Left-sided weakness    • Renal failure    • Stroke        Past Surgical History:  Past Surgical History:   Procedure Laterality Date   • CATARACT EXTRACTION, BILATERAL     • CHOLECYSTECTOMY     • COLONOSCOPY     • EYE SURGERY     • PORTACATH PLACEMENT     • VENOUS ACCESS DEVICE (PORT) REMOVAL         Family History: family history includes Cancer in his mother; Diabetes in his father, paternal grandmother, and sister; Hypertension in his brother, father, and sister.    Social History:  reports that he has never smoked. He has never used smokeless tobacco. He reports that he does not drink alcohol or use illicit drugs.    Medications:    (Not in a hospital admission)    Allergies:  Allergies   Allergen Reactions   • Erythromycin Hives         Objective     Physical Exam:  Vital Signs: /77  Pulse 94  Temp 98.6 °F (37 °C) (Oral)   Resp 18  Ht 68\" (172.7 cm)  Wt 133 lb (60.3 kg)  SpO2 93%  BMI 20.22 kg/m2     General Appearance: alert, oriented x 3, no acute distress.  Skin: warm and dry generally but has pressure ulcer on left buttock stage I  Also has anterior tibial surfaces skin breakdown covered " in bandage/tagaderms bilaterally in similar locations distal tibial surfaces anteriorly oval in shape about 3-5 cm  HEENT: pupils round and reactive to light, oral mucosa normal.  Neck: supple, no JVD, trachea midline.  Lungs: CTA, unlabored breathing effort.  Heart: RRR, normal S1 and S2, no S3, no rub.  Abdomen: soft, non-tender, no palpable bladder, present bowel sounds to auscultation.  : scrotal nodularity from previous history of cysts w removal/drainage now scarring  Extremities: no clubbing, cyanosis or edema.  Neuro: normal speech and mental status.          Results Reviewed:    Lab Results (last 24 hours)     Procedure Component Value Units Date/Time    POC Glucose Fingerstick [[id number]]  (Abnormal) Collected:  07/12/17 2014    Specimen:  Blood Updated:  07/12/17 2021     Glucose 527 (C) mg/dL       Serial Number: [id number]:  [id number]       pH, Venous [[id number]] Collected:  07/12/17 2124    Specimen:  Blood Updated:  07/12/17 2125     pH, Venous 7.393    pH, Venous [[id number]] Collected:  07/12/17 2124    Specimen:  Blood Updated:  07/12/17 2125     pH, Venous 7.393    CBC & Differential [[id number]] Collected:  07/12/17 2128    Specimen:  Blood Updated:  07/12/17 2143    Narrative:       The following orders were created for panel order CBC & Differential.  Procedure                               Abnormality         Status                     ---------                               -----------         ------                     CBC Auto Differential[[id number]]        Abnormal            Final result                 Please view results for these tests on the individual orders.    CBC Auto Differential [[id number]]  (Abnormal) Collected:  07/12/17 2128    Specimen:  Blood Updated:  07/12/17 2143     WBC 4.55 (L) 10*3/mm3      RBC 3.73 (L) 10*6/mm3      Hemoglobin 10.7 (L) g/dL      Hematocrit 34.4 (L) %      MCV 92.2 fL      MCH 28.7 pg      MCHC 31.1 g/dL      RDW 19.2 (H) %      RDW-SD 63.7 (H)  fl      MPV 10.3 fL      Platelets 217 10*3/mm3      Neutrophil % 68.2 %      Lymphocyte % 11.4 %      Monocyte % 13.8 (H) %      Eosinophil % 5.1 %      Basophil % 1.1 %      Immature Grans % 0.4 %      Neutrophils, Absolute 3.10 10*3/mm3      Lymphocytes, Absolute 0.52 (L) 10*3/mm3      Monocytes, Absolute 0.63 10*3/mm3      Eosinophils, Absolute 0.23 10*3/mm3      Basophils, Absolute 0.05 10*3/mm3      Immature Grans, Absolute 0.02 10*3/mm3      nRBC 0.0 /100 WBC     Lactic Acid, Plasma [[id number]]  (Normal) Collected:  07/12/17 2128    Specimen:  Blood Updated:  07/12/17 2148     Lactate 1.6 mmol/L     Lipase [[id number]]  (Normal) Collected:  07/12/17 2128    Specimen:  Blood Updated:  07/12/17 2159     Lipase 132 U/L     Troponin [[id number]]  (Normal) Collected:  07/12/17 2128    Specimen:  Blood Updated:  07/12/17 2159     Troponin I 0.013 ng/mL     Narrative:       Normal Patient Upper Reference Limit (URL) (99th Percentile)=0.03 ng/mL   Non-AMI Illness Reference Limit=0.03-0.11 ng/mL   AMI Confirmation=0.12 ng/mL and above    Comprehensive Metabolic Panel [[id number]]  (Abnormal) Collected:  07/12/17 2128    Specimen:  Blood Updated:  07/12/17 2201     Glucose 560 (C) mg/dL      BUN 29 (H) mg/dL      Creatinine 4.60 (H) mg/dL      Sodium 132 (L) mmol/L      Potassium 7.2 (C) mmol/L      Chloride 92 (L) mmol/L      CO2 26.0 mmol/L      Calcium 8.7 mg/dL      Total Protein 7.4 g/dL      Albumin 3.60 g/dL      ALT (SGPT) 25 U/L      AST (SGOT) 28 U/L      Alkaline Phosphatase 77 U/L      Total Bilirubin 1.0 mg/dL      eGFR Non African Amer 13 (L) mL/min/1.73      Globulin 3.8 gm/dL      A/G Ratio 0.9 (L) g/dL      BUN/Creatinine Ratio 6.3     Anion Gap 21.2 mmol/L     Narrative:       Abnormal estimated GFR should be followed by more specific studies to confirm end stage chronic renal disease. The equation used for calculation may not be accurate for patients less than 19 years old, greater than 70 years old,  patients at extremes of weight, malnutrition, or with acute renal dysfunction.    POC Glucose Fingerstick [[id number]]  (Abnormal) Collected:  07/12/17 2247    Specimen:  Blood Updated:  07/12/17 2250     Glucose 366 (H) mg/dL       Serial Number: [id number]:  [id number]             I have personally reviewed and interpreted available lab data, radiology studies and ECG obtained at time of admission.     Assessment / Plan     Assessment/Problem List:   Principal Problem:    Hyperglycemia due to type 2 diabetes mellitus  Active Problems:    Pleural effusion, bilateral    Hypertension    SOB (shortness of breath)    Type 2 diabetes mellitus treated with insulin    End stage renal disease on dialysis    Uncontrolled diabetes mellitus with hyperglycemia, with long-term current use of insulin    Hyperkalemia    Physical debility    Chronic respiratory insufficiency    Pancreatitis, acute          Plan:  Admit to ICU on insulin drip  Dialysis per nephrologist, Dr Mackey familiar with patient, consult in am  IVNS at [facility]  NPO  SSI, labs q 4 hours for BMP, accuchecks once bs below 200 and can DC insulin gtt  duoneb prn  O2 NC sats above 92  Heparin for dvt proph  Hyperkalemia will self correct with administration of insulin along with high K+  Closely monitor EKG since hyperkalemic  Phosphorus magnesium add to morning labs  Full code needs to be discussed at least with family  Follow lipase once more to check for sudden abnormal value but so far just CT evidence of such, treat clinically  Monitor chronic anemia with daily CBC  Blood cultures prior to any Abx  Consult pulmonology for pleural effusion, culture the collected material.            Joel Wallace DO 07/12/17 11:00 PM    Please note that portions of this note may have been completed with a voice recognition program. Efforts were made to edit the dictations, but occasionally words are mistranscribed.

## 2017-07-13 NOTE — ED PROVIDER NOTES
Subjective   History of Present Illness  This is a 57-year-old gentleman who is brought in by his wife for hyperglycemia.  She states she has a history of chronic illness and he has been having increase in high blood sugars for the past few days he goes to her [facility] who  about the day and when he came home today he had a blood sugar over 400 and he was having complaints of fatigue and malaise.  He does complain of some shortness of breath, some pain across the top of his abdomen and his right side.  He does have hemodialysis and has recently had that yesterday he is on Tuesday, Thursday, Saturday schedule.  He as recently been treated for a chronic pleural effusion at  with a therapututic thorecenticis   Review of Systems   Constitutional: Positive for appetite change, chills, fatigue and unexpected weight change.   HENT: Negative.    Eyes: Negative.    Respiratory: Positive for shortness of breath.    Cardiovascular: Negative.    Gastrointestinal: Positive for abdominal pain and nausea.   Endocrine: Positive for cold intolerance.   Genitourinary: Negative.    Skin: Negative.    Neurological: Positive for weakness.   Psychiatric/Behavioral: Negative.    All other systems reviewed and are negative.      Past Medical History:   Diagnosis Date   • Anemia    • Chronic kidney disease    • Diabetes mellitus    • H/O chest x-ray 03/25/2016    Interval decrease in size of the patients right pleural effusion with a persistent small left effusion as well   • Hypertension    • Left-sided weakness    • Renal failure    • Stroke        Allergies   Allergen Reactions   • Erythromycin Hives       Past Surgical History:   Procedure Laterality Date   • CATARACT EXTRACTION, BILATERAL     • CHOLECYSTECTOMY     • COLONOSCOPY     • EYE SURGERY     • PORTACATH PLACEMENT     • VENOUS ACCESS DEVICE (PORT) REMOVAL         Family History   Problem Relation Age of Onset   • Cancer Mother    • Hypertension Father    • Diabetes  Father    • Hypertension Sister    • Diabetes Sister    • Hypertension Brother    • Diabetes Paternal Grandmother        Social History     Social History   • Marital status:      Spouse name: N/A   • Number of children: N/A   • Years of education: N/A     Social History Main Topics   • Smoking status: Never Smoker   • Smokeless tobacco: Never Used   • Alcohol use No   • Drug use: No   • Sexual activity: Defer     Other Topics Concern   • None     Social History Narrative           Objective   Physical Exam   Constitutional: He appears well-developed and well-nourished.   Nursing note and vitals reviewed.  GEN: No acute distress  Head: Normocephalic, atraumatic  Eyes: Pupils equal round reactive to light  ENT: Posterior pharynx normal in appearance, oral mucosa is moist  Chest: Nontender to palpation  Cardiovascular: Regular rate  Lungs: Clear to auscultation bilaterally  Abdomen: Soft, tender across top of abdomen with increase in  Tenderness across RUQ  Extremities: No edema, multiple abrasions across bilat lower extremities. Right heel with 2 cm cir pressure ulcer to heel non stage able. Right great toenail missing. Multiple abrasions on tops of both feet. No abnormal exudate or redness to surrounding tissue.  Scrotum with multiple areas that has been opened from appears to be prior I/d no induration. Non tender, no drainage.   Neuro: GCS 15  Psych: Mood and affect are appropriate      Procedures         ED Course  ED Course   Comment By Time   Consulted Dr. Wallace, discussed case including his chronic condition. Will admit to ICU and arrange his HD tomorrow Latia Huahead, APRN 07/12 8500                  MDM  Number of Diagnoses or Management Options  Diagnosis management comments: Mr. Gomez has multiple chronic conditions including hemodialysis, diabetes, and chronic pleural effusions recently.  He has several areas that were could be suspect for infection and possible sepsis.  Due to his  hyperglycemia we are going to assess him for ketoacidosis, we will do a CT of his abdomen and pelvis to assess for any infection to his abdomen or possible pancreatitis, also do a CT of his chest to evaluate to see if he has any worsening pleural effusion or infection/abscess around his thoracentesis site.  I'll go ahead and give him 7 units of Humulin R and a slow 250 mL bolus while we are waiting on his labs.      Final diagnoses:   Hyperkalemia   Hyperglycemia without ketosis   Acute pancreatitis, unspecified complication status, unspecified pancreatitis type            Latia Blancas, APRN  07/12/17 7908

## 2017-07-13 NOTE — SIGNIFICANT NOTE
07/13/17 1300   Rehab Treatment   Discipline occupational therapist   Rehab Evaluation   Evaluation Not Performed patient unavailable for evaluation  (Pt in dialysis at this time.)

## 2017-07-13 NOTE — CONSULTS
Date of consultation:   July 13, 2017    Requested by:   Hospitalist Service.   PCP: Idalia Kay MD    Reason:  Abnormal CT of the chest.      History of Present Illness:  57-year-old gentleman with past medical history significant for ESRD and recurrent pleural effusion who has been managed by [facility] and came in yesterday due to AMS.    He was found to have hyperglycemia and also underwent CT of the chest. The CT of the chest showed abnormal finding in pulmonary consultation requested for further recommendations.    The patient is currently awake and alert and denies any cough, phlegm production or fever.  The patient actually has been followed by [facility] cardiothoracic surgical department and was recommended to keep the indwelling chest catheter in place for the next few weeks as it was felt that it was definitely increasing the chances of resolving the pleural effusion.    He also says that while he was in the hospital at [facility], his dialysis was only given for 3 hours at a time?.  The patient since discharge has had multiple sessions of dialysis with successful removal of fluid and the patient is back to his dry weight.    Review of System: All other review of systems negative except indicated in HPI.  Also positive for occasional back pain.    Past Medical History:  Past Medical History:   Diagnosis Date   • Anemia    • CHF (congestive heart failure)    • Chronic kidney disease    • Diabetes mellitus    • H/O chest x-ray 03/25/2016    Interval decrease in size of the patients right pleural effusion with a persistent small left effusion as well   • History of transfusion    • Hypertension    • Left-sided weakness    • Renal failure    • Stroke          Past Surgical History:  Past Surgical History:   Procedure Laterality Date   • CATARACT EXTRACTION, BILATERAL     • CHOLECYSTECTOMY     • COLONOSCOPY     • EYE SURGERY     • PORTACATH PLACEMENT     • VENOUS  "ACCESS DEVICE (PORT) REMOVAL           Family History:  Family History   Problem Relation Age of Onset   • COPD Mother    • Hypertension Father    • Diabetes Father    • Hypertension Sister    • Diabetes Sister    • Hypertension Brother    • Diabetes Paternal Grandmother          Social History:  Social History     Social History   • Marital status:      Spouse name: N/A   • Number of children: N/A   • Years of education: N/A     Social History Main Topics   • Smoking status: Never Smoker   • Smokeless tobacco: Never Used   • Alcohol use No   • Drug use: No   • Sexual activity: Defer     Other Topics Concern   • None     Social History Narrative         Physical Exam:  /74  Pulse 92  Temp 97.7 °F (36.5 °C) (Oral)   Resp 15  Ht 68\" (172.7 cm)  Wt 143 lb 9 oz (65.1 kg)  SpO2 96%  BMI 21.83 kg/m2    General:              No respiratory distress noted.        Eyes:                EOM sluggish               Conjunctiva appeared somewhat injected and pale. Pupils seemed equal        ENT:                 Oropharynx was somewhat crowded. No lesions noted.               Missing teeth noted         Neck:                 No JVD.                 Thyroid did not seem to be enlarged.        Cardiovascular:                S1 + S2.  Regular.        Respiratory:                 Respiratory effort was adequate.                 Right sided pleur-x catheter noted.               Good Air Entry Bilaterally with no wheezing.  Bilateral crackles heard.        Abdomen:                Soft. Bowel sounds positive.  No obvious organomegaly noted. Mild epigastric tenderness noted.      Musculoskeletal/Extremities:                Traceedema noted in the lower extremities.                Right AV graft noted                No clubbing noted.                Gait could not be assesed at this time.        Neurologic/Psychiatric:                AAOx3.                  Affect was appropriate                Was able to follow simple " commands        Skin:               Warm and dry.               Chronic skin changes in lower extremities            Labs:   Reviewed. Pertinent labs were noted.     Lab Results   Component Value Date    WBC 5.10 07/13/2017    HGB 11.9 (L) 07/13/2017    HCT 38.3 (L) 07/13/2017    MCV 93.6 07/13/2017     07/13/2017       Lab Results   Component Value Date    GLUCOSE 114 (H) 07/13/2017    CALCIUM 8.9 07/13/2017     07/13/2017    K 6.0 (C) 07/13/2017    CO2 25.0 (L) 07/13/2017    CL 97 (L) 07/13/2017    BUN 33 (H) 07/13/2017    CREATININE 4.80 (H) 07/13/2017    EGFRIFNONA 13 (L) 07/13/2017    BCR 6.9 07/13/2017    ANIONGAP 22.0 07/13/2017         ABG:  Lab Results   Component Value Date    PHART 7.415 06/13/2017    OSH4SKI 40.8 06/13/2017    PO2ART 77.7 06/13/2017    SO2 91.8 (L) 03/20/2016    FCOHB 0.9 (L) 03/20/2016    HGBBG 12.3 06/13/2017    F0VELJDX 94.9 06/13/2017    CARBOXYHGB 1.3 06/13/2017    FMETHB 0.9 03/20/2016           Imaging Study: Images reviewed personally and discussed with patient.  Imaging Results (last 24 hours)     Procedure Component Value Units Date/Time    CT Abdomen Pelvis Without Contrast [[id number]] Collected:  07/12/17 2120     Updated:  07/12/17 2123    Narrative:       FINAL REPORT    CLINICAL HISTORY:  RIGHT UPPER QUAD PAIN/EPIGASTRIC PAIN, RENAL FAILURE    FINDINGS:  Multiple contiguous transaxial slices through the abdomen and pelvis were obtained without the intravenous administration of contrast with coronal reformatted images.    There are bilateral pleural effusions and airspace disease. There is pancreatic edema with stranding in adjacent fat consistent with a mild acute pancreatitis. Recommend correlation with amylase and lipase values. The liver, spleenn, adrenal glands   appear normal. Kidneys are atrophic with dense renal vascular calcifications. There is no hydronephrosis.    The bowel gas pattern is nonobstructive.    Moderate stool consistent with  constipation. There is scattered diverticulosis without diverticulitis. There is no evidence for appendicitis. The aorta and iliac arteries are densely calcified. There is moderate bladder wall thickening with stranding of   adjacent fat suggesting cystitis.    Impression:    Findings suggesting a mild pancreatitis. Recommend correlation with  lab values    Cystitis    Bilateral airspace disease and effusions    Atherosclerotic vascular disease    Constipation      Impression:       Authenticated by José Luis Morgan MD on 07/12/2017 09:20:59 PM    CT Chest Without Contrast [[id number]] Collected:  07/12/17 2125     Updated:  07/12/17 2127    Narrative:       FINAL REPORT    CLINICAL HISTORY:  CHRONIC PLEURAL EFFUSION WITH THORACENTESIS TUBE IN PLACE TO DRAIN AS NEEDED. (LOOKING FOR POSSIBLE ABSCESS) OR WORSE PLEURAL EFFUSION    FINDINGS:  Comparisons none available    Multiple contiguous transaxial slices of the chest obtained without the intravenous administration of contrast.    There is a loculated mild-to-moderate right pleural effusion with drainage catheter in place. There is adjacent airspace disease. There is a moderate layering left pleural effusion. There is diffuse centrilobular emphysema. There are multiple large   prevascular and paratracheal lymph nodes area the heart is enlarged. There are calcifications of the coronary arteries. Aorta is calcified and ectatic.    Impression:    Bilateral pleural effusions and right airspace disease without evidence for abscess      Impression:       Authenticated by José Luis Morgan MD on 07/12/2017 09:25:46 PM            Assessment:  1.  Chronic B/L, R>L recurrent right-sided pleural effusion  2.  Pancreatitis  3.  Status post Pleurx catheter placement  4.  ESRD    Discussion/Recommendations:   I reviewed all his recent CT scans including the CT scan from yesterday and the one from 13th of June.  The changes are mostly chronic and if anything, there has been  increased resolution of the right-sided pleural effusion which was definitely loculated in the past.  The Pleurx catheter remains in place.  There is no evidence to suggest active infection involving the patient's pleural space or lung parenchyma.    His altered mental status was likely due to?  Sepsis due to pancreatitis plus/minus hyperglycemia.    There is no further need for pulmonary workup at this time.    He will definitely need to have close follow-up with [facility] and I have recommended that he follows with them on a regular basis.    The plan was discussed with the patient.  I have also discussed the case with the nursing staff.    Recommendations were also discussed with the referring provider.     I would like to thank you for the opportunity to participate in the care of this patient.  We will communicate changes and recommendations, if and when necessary.    We will mostly follow on a when necessary basis.      Ean Hernandez MD  07/13/17  10:24 AM    Please note that portions of this note may have been completed with a voice recognition software. Every effort was made to edit the dictation, but occasionally words are mistranscribed.

## 2017-07-13 NOTE — PLAN OF CARE
Problem: Patient Care Overview (Adult)  Goal: Plan of Care Review  Outcome: Ongoing (interventions implemented as appropriate)    07/13/17 4427   Coping/Psychosocial Response Interventions   Plan Of Care Reviewed With patient   Patient Care Overview   Progress improving   Outcome Evaluation   Outcome Summary/Follow up Plan Pt. is showing signs of improvement throughout the shift. Pt. no longer needs insulin drip, and dialysis was performed. VSS at this time, and will continue to monitor.         Problem: Diabetes, Type 2 (Adult)  Goal: Signs and Symptoms of Listed Potential Problems Will be Absent or Manageable (Diabetes, Type 2)  Outcome: Ongoing (interventions implemented as appropriate)    Problem: Skin Integrity Impairment, Risk/Actual (Adult)  Goal: Identify Related Risk Factors and Signs and Symptoms  Outcome: Outcome(s) achieved Date Met:  07/13/17  Goal: Skin Integrity/Wound Healing  Outcome: Ongoing (interventions implemented as appropriate)

## 2017-07-13 NOTE — PAYOR COMM NOTE
"TO:HUMANA  FROM:TROY BURTON PHONE [phone]  INPT CLINICALS    Talha Storm (57 y.o. 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)   Code Status:  FULL    Ht:  68\" (172.7 cm)   Wt:  143 lb 8.3 oz (65.1 kg)    Admission Cmt:  None   Principal Problem:  Hyperglycemia due to type 2 diabetes mellitus [E11.65]                 Active Insurance as of 7/12/2017     Primary Coverage     Payor Plan Insurance Group Employer/Plan Group    HUMANA MEDICARE REPLACEMENT HUMANA MEDICARE REPL [id number]     Payor Plan Address Payor Plan Phone Number Effective From Effective To    [address] [address] [phone] 1/1/2014     [place] [address]       Subscriber Name Subscriber Birth Date Member ID       TALHA STORM 1960 [id number]                 Emergency Contacts      (Rel.) Home Phone Work Phone Mobile Phone    Mallika Storm (Spouse) [phone] -- --               History & Physical      Joel Wallace DO at 7/12/2017 10:51 PM              [facility] Medicine Services  HISTORY AND PHYSICAL    Primary Care Physician: Idalia Kay MD    Subjective     Chief Complaint:  High blood sugar, weakness, nausea, abdominal pain  Chief Complaint   Patient presents with   • Hyperglycemia       History of Present Illness:   This is a very unfortunate 57 year old male, , ESRD on dialysis, DM2 uncontrolled, " comes to ER with c/o high blood sugar reading at assisted living facility in which he resides.  ER workup found several abnormal lab findings: blood sugar over 570, potassium greater than 7, CT ABD/PELV showed pancreatitis but lipase is normal which could indicate pancreatic failure to produce enzymatic response to the inflammation.  Patient has chronic pleural effusions, has drain in place for evacuation of material.  Absence of WBC increase is comforting for lack of infection but given the patient's chronic immunodeficiency secondary to multiple organ pathology, lungs, pancreas, heart, liver, etc could be present, will check a culture to cover.        Review of Systems   1. Constitutional: Negative for fever. Negative for chills, diaphoresis, fatigue and unexpected weight change. Very talkative gentleman with adequate knowledge of his healthcare and medicine dosages, optimistic considering his serious health conditions.  2. HENT: Negative for congestion and hearing loss.   3. Eyes: Negative for redness and visual disturbance.   4. Respiratory: history of pleural effusions, bilateral pneumonias, has shunt in place for chronic effusion drainage, has floculated effusions in past, chronic respiratory insufficiency  5. Cardiovascular: Negative for chest pain and palpitations.   6. Gastrointestinal: Negative for abdominal distention, positive for abdominal pain and negative blood in stool.   7. Endocrine: Negative for cold intolerance and heat intolerance, history of poorly controlled DM2  8. Genitourinary: Negative for difficulty urinating, dysuria and frequency.   9. Musculoskeletal: Negative for arthralgias, back pain and myalgias.   10. Skin: Negative for color change, rash and wound.   11. Neurological: Negative for syncope, weakness and headaches.   12. Hematological: Negative for adenopathy. Does not bruise/bleed easily. Has chronic anemia secondary to ESRD  13. Psychiatric/Behavioral: Negative for confusion. The  "patient is not nervous/anxious.   14. Renal: history of Tuesday Thursday Saturday dialysis had his session yesterday      Past Medical History:   Past Medical History:   Diagnosis Date   • Anemia    • Chronic kidney disease    • Diabetes mellitus    • H/O chest x-ray 03/25/2016    Interval decrease in size of the patients right pleural effusion with a persistent small left effusion as well   • Hypertension    • Left-sided weakness    • Renal failure    • Stroke        Past Surgical History:  Past Surgical History:   Procedure Laterality Date   • CATARACT EXTRACTION, BILATERAL     • CHOLECYSTECTOMY     • COLONOSCOPY     • EYE SURGERY     • PORTACATH PLACEMENT     • VENOUS ACCESS DEVICE (PORT) REMOVAL         Family History: family history includes Cancer in his mother; Diabetes in his father, paternal grandmother, and sister; Hypertension in his brother, father, and sister.    Social History:  reports that he has never smoked. He has never used smokeless tobacco. He reports that he does not drink alcohol or use illicit drugs.    Medications:    (Not in a hospital admission)    Allergies:  Allergies   Allergen Reactions   • Erythromycin Hives         Objective     Physical Exam:  Vital Signs: /77  Pulse 94  Temp 98.6 °F (37 °C) (Oral)   Resp 18  Ht 68\" (172.7 cm)  Wt 133 lb (60.3 kg)  SpO2 93%  BMI 20.22 kg/m2     General Appearance: alert, oriented x 3, no acute distress.  Skin: warm and dry generally but has pressure ulcer on left buttock stage I  Also has anterior tibial surfaces skin breakdown covered in bandage/tagaderms bilaterally in similar locations distal tibial surfaces anteriorly oval in shape about 3-5 cm  HEENT: pupils round and reactive to light, oral mucosa normal.  Neck: supple, no JVD, trachea midline.  Lungs: CTA, unlabored breathing effort.  Heart: RRR, normal S1 and S2, no S3, no rub.  Abdomen: soft, non-tender, no palpable bladder, present bowel sounds to auscultation.  : scrotal " nodularity from previous history of cysts w removal/drainage now scarring  Extremities: no clubbing, cyanosis or edema.  Neuro: normal speech and mental status.          Results Reviewed:    Lab Results (last 24 hours)     Procedure Component Value Units Date/Time    POC Glucose Fingerstick [[id number]]  (Abnormal) Collected:  07/12/17 2014    Specimen:  Blood Updated:  07/12/17 2021     Glucose 527 (C) mg/dL       Serial Number: [id number]:  [id number]       pH, Venous [[id number]] Collected:  07/12/17 2124    Specimen:  Blood Updated:  07/12/17 2125     pH, Venous 7.393    pH, Venous [[id number]] Collected:  07/12/17 2124    Specimen:  Blood Updated:  07/12/17 2125     pH, Venous 7.393    CBC & Differential [[id number]] Collected:  07/12/17 2128    Specimen:  Blood Updated:  07/12/17 2143    Narrative:       The following orders were created for panel order CBC & Differential.  Procedure                               Abnormality         Status                     ---------                               -----------         ------                     CBC Auto Differential[[id number]]        Abnormal            Final result                 Please view results for these tests on the individual orders.    CBC Auto Differential [[id number]]  (Abnormal) Collected:  07/12/17 2128    Specimen:  Blood Updated:  07/12/17 2143     WBC 4.55 (L) 10*3/mm3      RBC 3.73 (L) 10*6/mm3      Hemoglobin 10.7 (L) g/dL      Hematocrit 34.4 (L) %      MCV 92.2 fL      MCH 28.7 pg      MCHC 31.1 g/dL      RDW 19.2 (H) %      RDW-SD 63.7 (H) fl      MPV 10.3 fL      Platelets 217 10*3/mm3      Neutrophil % 68.2 %      Lymphocyte % 11.4 %      Monocyte % 13.8 (H) %      Eosinophil % 5.1 %      Basophil % 1.1 %      Immature Grans % 0.4 %      Neutrophils, Absolute 3.10 10*3/mm3      Lymphocytes, Absolute 0.52 (L) 10*3/mm3      Monocytes, Absolute 0.63 10*3/mm3      Eosinophils, Absolute 0.23 10*3/mm3      Basophils, Absolute 0.05 10*3/mm3       Immature Grans, Absolute 0.02 10*3/mm3      nRBC 0.0 /100 WBC     Lactic Acid, Plasma [[id number]]  (Normal) Collected:  07/12/17 2128    Specimen:  Blood Updated:  07/12/17 2148     Lactate 1.6 mmol/L     Lipase [[id number]]  (Normal) Collected:  07/12/17 2128    Specimen:  Blood Updated:  07/12/17 2159     Lipase 132 U/L     Troponin [[id number]]  (Normal) Collected:  07/12/17 2128    Specimen:  Blood Updated:  07/12/17 2159     Troponin I 0.013 ng/mL     Narrative:       Normal Patient Upper Reference Limit (URL) (99th Percentile)=0.03 ng/mL   Non-AMI Illness Reference Limit=0.03-0.11 ng/mL   AMI Confirmation=0.12 ng/mL and above    Comprehensive Metabolic Panel [[id number]]  (Abnormal) Collected:  07/12/17 2128    Specimen:  Blood Updated:  07/12/17 2201     Glucose 560 (C) mg/dL      BUN 29 (H) mg/dL      Creatinine 4.60 (H) mg/dL      Sodium 132 (L) mmol/L      Potassium 7.2 (C) mmol/L      Chloride 92 (L) mmol/L      CO2 26.0 mmol/L      Calcium 8.7 mg/dL      Total Protein 7.4 g/dL      Albumin 3.60 g/dL      ALT (SGPT) 25 U/L      AST (SGOT) 28 U/L      Alkaline Phosphatase 77 U/L      Total Bilirubin 1.0 mg/dL      eGFR Non African Amer 13 (L) mL/min/1.73      Globulin 3.8 gm/dL      A/G Ratio 0.9 (L) g/dL      BUN/Creatinine Ratio 6.3     Anion Gap 21.2 mmol/L     Narrative:       Abnormal estimated GFR should be followed by more specific studies to confirm end stage chronic renal disease. The equation used for calculation may not be accurate for patients less than 19 years old, greater than 70 years old, patients at extremes of weight, malnutrition, or with acute renal dysfunction.    POC Glucose Fingerstick [[id number]]  (Abnormal) Collected:  07/12/17 2247    Specimen:  Blood Updated:  07/12/17 2250     Glucose 366 (H) mg/dL       Serial Number: [id number]:  [id number]             I have personally reviewed and interpreted available lab data, radiology studies and ECG obtained at time of admission.      Assessment / Plan     Assessment/Problem List:   Principal Problem:    Hyperglycemia due to type 2 diabetes mellitus  Active Problems:    Pleural effusion, bilateral    Hypertension    SOB (shortness of breath)    Type 2 diabetes mellitus treated with insulin    End stage renal disease on dialysis    Uncontrolled diabetes mellitus with hyperglycemia, with long-term current use of insulin    Hyperkalemia    Physical debility    Chronic respiratory insufficiency    Pancreatitis, acute          Plan:  Admit to ICU on insulin drip  Dialysis per nephrologist, Dr Mackey familiar with patient, consult in am  IVNS at [facility]  NPO  SSI, labs q 4 hours for BMP, accuchecks once bs below 200 and can DC insulin gtt  duoneb prn  O2 NC sats above 92  Heparin for dvt proph  Hyperkalemia will self correct with administration of insulin along with high K+  Closely monitor EKG since hyperkalemic  Phosphorus magnesium add to morning labs  Full code needs to be discussed at least with family  Follow lipase once more to check for sudden abnormal value but so far just CT evidence of such, treat clinically  Monitor chronic anemia with daily CBC  Blood cultures prior to any Abx  Consult pulmonology for pleural effusion, culture the collected material.            Joel Wallace DO 07/12/17 11:00 PM    Please note that portions of this note may have been completed with a voice recognition program. Efforts were made to edit the dictations, but occasionally words are mistranscribed.       Electronically signed by Joel Wallace DO at 7/13/2017  6:07 AM           Emergency Department Notes      ALEXYS uFentes at 7/12/2017  8:49 PM     Attestation signed by Estrada Sorto MD at 7/12/2017 11:33 PM              For this patient encounter, I reviewed the NP or PA documentation, treatment plan, and medical decision making. Estrada Sorto MD 7/12/2017 11:33 PM                               Subjective   History of Present Illness  This  is a 57-year-old gentleman who is brought in by his wife for hyperglycemia.  She states she has a history of chronic illness and he has been having increase in high blood sugars for the past few days he goes to her rising adults who  about the day and when he came home today he had a blood sugar over 400 and he was having complaints of fatigue and malaise.  He does complain of some shortness of breath, some pain across the top of his abdomen and his right side.  He does have hemodialysis and has recently had that yesterday he is on Tuesday, Thursday, Saturday schedule.  He as recently been treated for a chronic pleural effusion at  with a therapututic thorecenticis   Review of Systems   Constitutional: Positive for appetite change, chills, fatigue and unexpected weight change.   HENT: Negative.    Eyes: Negative.    Respiratory: Positive for shortness of breath.    Cardiovascular: Negative.    Gastrointestinal: Positive for abdominal pain and nausea.   Endocrine: Positive for cold intolerance.   Genitourinary: Negative.    Skin: Negative.    Neurological: Positive for weakness.   Psychiatric/Behavioral: Negative.    All other systems reviewed and are negative.      Past Medical History:   Diagnosis Date   • Anemia    • Chronic kidney disease    • Diabetes mellitus    • H/O chest x-ray 03/25/2016    Interval decrease in size of the patients right pleural effusion with a persistent small left effusion as well   • Hypertension    • Left-sided weakness    • Renal failure    • Stroke        Allergies   Allergen Reactions   • Erythromycin Hives       Past Surgical History:   Procedure Laterality Date   • CATARACT EXTRACTION, BILATERAL     • CHOLECYSTECTOMY     • COLONOSCOPY     • EYE SURGERY     • PORTACATH PLACEMENT     • VENOUS ACCESS DEVICE (PORT) REMOVAL         Family History   Problem Relation Age of Onset   • Cancer Mother    • Hypertension Father    • Diabetes Father    • Hypertension Sister    • Diabetes  Sister    • Hypertension Brother    • Diabetes Paternal Grandmother        Social History     Social History   • Marital status:      Spouse name: N/A   • Number of children: N/A   • Years of education: N/A     Social History Main Topics   • Smoking status: Never Smoker   • Smokeless tobacco: Never Used   • Alcohol use No   • Drug use: No   • Sexual activity: Defer     Other Topics Concern   • None     Social History Narrative           Objective   Physical Exam   Constitutional: He appears well-developed and well-nourished.   Nursing note and vitals reviewed.  GEN: No acute distress  Head: Normocephalic, atraumatic  Eyes: Pupils equal round reactive to light  ENT: Posterior pharynx normal in appearance, oral mucosa is moist  Chest: Nontender to palpation  Cardiovascular: Regular rate  Lungs: Clear to auscultation bilaterally  Abdomen: Soft, tender across top of abdomen with increase in  Tenderness across RUQ  Extremities: No edema, multiple abrasions across bilat lower extremities. Right heel with 2 cm cir pressure ulcer to heel non stage able. Right great toenail missing. Multiple abrasions on tops of both feet. No abnormal exudate or redness to surrounding tissue.  Scrotum with multiple areas that has been opened from appears to be prior I/d no induration. Non tender, no drainage.   Neuro: GCS 15  Psych: Mood and affect are appropriate      Procedures        ED Course  ED Course   Comment By Time   Consulted Dr. Wallace, discussed case including his chronic condition. Will admit to ICU and arrange his HD tomorrow Latia Huahead, APRN 07/12 0164                  MDM  Number of Diagnoses or Management Options  Diagnosis management comments: Mr. Gomez has multiple chronic conditions including hemodialysis, diabetes, and chronic pleural effusions recently.  He has several areas that were could be suspect for infection and possible sepsis.  Due to his hyperglycemia we are going to assess him for  ketoacidosis, we will do a CT of his abdomen and pelvis to assess for any infection to his abdomen or possible pancreatitis, also do a CT of his chest to evaluate to see if he has any worsening pleural effusion or infection/abscess around his thoracentesis site.  I'll go ahead and give him 7 units of Humulin R and a slow 250 mL bolus while we are waiting on his labs.      Final diagnoses:   Hyperkalemia   Hyperglycemia without ketosis   Acute pancreatitis, unspecified complication status, unspecified pancreatitis type            Latia Blancas, ALEXYS  07/12/17 [id number]       Electronically signed by Estrada Sorto MD at 7/12/2017 11:33 PM      Jackelyn Anna at 7/12/2017 10:41 PM          2240: DR. MYERS CALLED PER LATIA JULIEN, CALL SENT TO HER @ THIS TIME.     Jackelyn Anna  07/12/17 2242       Electronically signed by Jackelyn Anna at 7/12/2017 10:42 PM        Hospital Medications (active)       Dose Frequency Start End    acetaminophen (TYLENOL) tablet 650 mg 650 mg Every 4 Hours PRN 7/12/2017     Sig - Route: Take 2 tablets by mouth Every 4 (Four) Hours As Needed for Mild Pain (1-3). - Oral    amLODIPine (NORVASC) tablet 10 mg 10 mg Every 24 Hours Scheduled 7/13/2017     Sig - Route: Take 2 tablets by mouth Daily. - Oral    aspirin tablet 325 mg 325 mg Daily 7/13/2017     Sig - Route: Take 1 tablet by mouth Daily. - Oral    atorvastatin (LIPITOR) tablet 10 mg 10 mg Daily 7/13/2017     Sig - Route: Take 1 tablet by mouth Daily. - Oral    b complex-vitamin c-folic acid (NEPHRO-SHIRLENE) tablet 1 tablet 1 tablet Daily 7/13/2017     Sig - Route: Take 1 tablet by mouth Daily. - Oral    dextrose (D50W) solution 25 g 25 g Every 15 Minutes PRN 7/13/2017     Sig - Route: Infuse 50 mL into a venous catheter Every 15 (Fifteen) Minutes As Needed for Low Blood Sugar (Blood Sugar Less Than 70, Patient Has IV Access - Unresponsive, NPO or Unable To Safely Swallow). - Intravenous    dextrose (INSTA-GLUCOSE) 77.4 % gel 1 tube 1  tube Every 15 Minutes PRN 7/13/2017     Sig - Route: Take 1 tube by mouth Every 15 (Fifteen) Minutes As Needed (Blood Sugar Less Than 70, Patient Alert, Is Not NPO & Can Safely Swallow). - Oral    folic acid (FOLVITE) tablet 1 mg 1 mg Daily 7/13/2017     Sig - Route: Take 1 tablet by mouth Daily. - Oral    glucagon (human recombinant) (GLUCAGEN DIAGNOSTIC) injection 1 mg 1 mg Every 15 Minutes PRN 7/13/2017     Sig - Route: Inject 1 mg under the skin Every 15 (Fifteen) Minutes As Needed (Blood Glucose Less Than 70 - Patient Without IV Access - Unresponsive, NPO or Unable To Safely Swallow). - Subcutaneous    heparin (porcine) 5000 UNIT/ML injection 5,000 Units 5,000 Units Every 12 Hours Scheduled 7/13/2017     Sig - Route: Inject 1 mL under the skin Every 12 (Twelve) Hours. - Subcutaneous    HYDROcodone-acetaminophen (NORCO) 5-325 MG per tablet 1 tablet 1 tablet Every 6 Hours PRN 7/13/2017 7/23/2017    Sig - Route: Take 1 tablet by mouth Every 6 (Six) Hours As Needed for Moderate Pain . - Oral    insulin aspart (novoLOG) injection 0-7 Units 0-7 Units 4 Times Daily Before Meals & Nightly 7/13/2017     Sig - Route: Inject 0-7 Units under the skin 4 (Four) Times a Day Before Meals & at Bedtime. - Subcutaneous    insulin regular (humuLIN R,novoLIN R) injection 7 Units 7 Units Once 7/12/2017 7/12/2017    Sig - Route: Infuse 7 Units into a venous catheter 1 (One) Time. - Intravenous    Cosign for Ordering: Accepted by Estrada Sorto MD on 7/12/2017 10:15 PM    lanthanum (FOSRENOL) chewable tablet 1,000 mg 1,000 mg 3 Times Daily With Meals 7/13/2017     Sig - Route: Chew 2 tablets 3 (Three) Times a Day With Meals. - Oral    Notes to Pharmacy: Only give if he eats a meal    levothyroxine (SYNTHROID, LEVOTHROID) tablet 100 mcg 100 mcg Daily 7/13/2017     Sig - Route: Take 1 tablet by mouth Daily. - Oral    losartan (COZAAR) tablet 100 mg 100 mg Every 24 Hours Scheduled 7/13/2017     Sig - Route: Take 2 tablets by mouth  "Daily. - Oral    metoprolol succinate XL (TOPROL-XL) 24 hr tablet 50 mg 50 mg Every 24 Hours Scheduled 7/13/2017     Sig - Route: Take 1 tablet by mouth Daily. - Oral    ondansetron (ZOFRAN) injection 4 mg 4 mg Every 6 Hours PRN 7/12/2017     Sig - Route: Infuse 2 mL into a venous catheter Every 6 (Six) Hours As Needed for Nausea or Vomiting. - Intravenous    Linked Group 1:  \"Or\" Linked Group Details        ondansetron (ZOFRAN) tablet 4 mg 4 mg Every 6 Hours PRN 7/12/2017     Sig - Route: Take 1 tablet by mouth Every 6 (Six) Hours As Needed for Nausea or Vomiting. - Oral    Linked Group 1:  \"Or\" Linked Group Details        ondansetron ODT (ZOFRAN-ODT) disintegrating tablet 4 mg 4 mg Every 6 Hours PRN 7/12/2017     Sig - Route: Take 1 tablet by mouth Every 6 (Six) Hours As Needed for Nausea or Vomiting. - Oral    Linked Group 1:  \"Or\" Linked Group Details        pantoprazole (PROTONIX) EC tablet 40 mg 40 mg Every Morning 7/13/2017     Sig - Route: Take 1 tablet by mouth Every Morning. - Oral    sodium chloride 0.9 % bolus 1,000 mL 1,000 mL As Needed 7/12/2017 7/13/2017    Sig - Route: Infuse 1,000 mL into a venous catheter As Needed (to maintain SBP > 100 mmHG DURING DIALYSIS ONLY). - Intravenous    sodium chloride 0.9 % flush 1-10 mL 1-10 mL As Needed 7/12/2017     Sig - Route: Infuse 1-10 mL into a venous catheter As Needed for Line Care. - Intravenous    sodium chloride 0.9 % flush 10 mL 10 mL As Needed 7/12/2017     Sig - Route: Infuse 10 mL into a venous catheter As Needed for Line Care. - Intravenous    Cosign for Ordering: Accepted by Estrada Sorto MD on 7/12/2017 10:15 PM    Linked Group 2:  \"And\" Linked Group Details        sodium chloride 0.9 % infusion 125 mL/hr Continuous 7/12/2017 7/12/2017    Sig - Route: Infuse 125 mL/hr into a venous catheter Continuous. - Intravenous    Cosign for Ordering: Accepted by Estrada Sorto MD on 7/12/2017 10:15 PM    dextrose (D50W) solution 25 g (Discontinued) " 25 g Every 15 Minutes PRN 7/12/2017 7/13/2017    Sig - Route: Infuse 50 mL into a venous catheter Every 15 (Fifteen) Minutes As Needed for Low Blood Sugar (Blood Sugar Less Than 70, Patient Has IV Access - Unresponsive, NPO or Unable To Safely Swallow). - Intravenous    Reason for Discontinue: Duplicate order    dextrose (INSTA-GLUCOSE) 77.4 % gel 1 tube (Discontinued) 1 tube Every 15 Minutes PRN 7/12/2017 7/13/2017    Sig - Route: Take 1 tube by mouth Every 15 (Fifteen) Minutes As Needed (Blood Sugar Less Than 70, Patient Alert, Is Not NPO & Can Safely Swallow). - Oral    Reason for Discontinue: Duplicate order    glucagon (human recombinant) (GLUCAGEN DIAGNOSTIC) injection 1 mg (Discontinued) 1 mg Every 15 Minutes PRN 7/12/2017 7/13/2017    Sig - Route: Inject 1 mg under the skin Every 15 (Fifteen) Minutes As Needed (Blood Glucose Less Than 70 - Patient Without IV Access - Unresponsive, NPO or Unable To Safely Swallow). - Subcutaneous    Reason for Discontinue: Duplicate order    insulin aspart (novoLOG) injection 0-7 Units (Discontinued) 0-7 Units 4 Times Daily Before Meals & Nightly 7/13/2017 7/13/2017    Sig - Route: Inject 0-7 Units under the skin 4 (Four) Times a Day Before Meals & at Bedtime. - Subcutaneous    insulin aspart (novoLOG) injection 4 Units (Discontinued) 4 Units 3 Times Daily Before Meals 7/13/2017 7/13/2017    Sig - Route: Inject 4 Units under the skin 3 (Three) Times a Day Before Meals. - Subcutaneous    insulin detemir (LEVEMIR) injection 10 Units (Discontinued) 10 Units Nightly 7/13/2017 7/13/2017    Sig - Route: Inject 10 Units under the skin Every Night. - Subcutaneous    insulin regular (HumuLIN R,NovoLIN R) 100 Units in sodium chloride 0.9 % 100 mL (1 Units/mL) infusion (Discontinued) 3 Units/hr Continuous 7/12/2017 7/13/2017    Sig - Route: Infuse 3 Units/hr into a venous catheter Continuous. - Intravenous    Cosign for Ordering: Accepted by Estrada Sorto MD on 7/12/2017 11:34 PM     insulin regular (HumuLIN R,NovoLIN R) 100 Units in sodium chloride 0.9 % 100 mL (1 Units/mL) infusion (Discontinued) 3 Units/hr Titrated 7/13/2017 7/13/2017    Sig - Route: Infuse 3 Units/hr into a venous catheter Dose Adjusted By Provider As Needed. - Intravenous    sodium chloride 0.9 % bolus 500 mL (Discontinued) 500 mL Once 7/12/2017 7/12/2017    Sig - Route: Infuse 500 mL into a venous catheter 1 (One) Time. - Intravenous    Cosign for Ordering: Accepted by Estrada Sorto MD on 7/12/2017 10:15 PM    sodium chloride 0.9 % flush 30 mL (Discontinued) 30 mL Once As Needed 7/12/2017 7/13/2017    Sig - Route: Infuse 30 mL into a venous catheter 1 (One) Time As Needed for Line Care. - Intravenous    Cosign for Ordering: Accepted by Estrada Sorto MD on 7/12/2017 11:34 PM    sodium chloride 0.9 % infusion (Discontinued) 30 mL/hr Continuous 7/13/2017 7/13/2017    Sig - Route: Infuse 30 mL/hr into a venous catheter Continuous. - Intravenous            Lab Results (last 24 hours)     Procedure Component Value Units Date/Time    POC Glucose Fingerstick [[id number]]  (Abnormal) Collected:  07/12/17 2014    Specimen:  Blood Updated:  07/12/17 2021     Glucose 527 (C) mg/dL       Serial Number: [id number]:  [id number]       pH, Venous [[id number]] Collected:  07/12/17 2124    Specimen:  Blood Updated:  07/12/17 2125     pH, Venous 7.393    pH, Venous [[id number]] Collected:  07/12/17 2124    Specimen:  Blood Updated:  07/12/17 2125     pH, Venous 7.393    CBC & Differential [[id number]] Collected:  07/12/17 2128    Specimen:  Blood Updated:  07/12/17 2143    Narrative:       The following orders were created for panel order CBC & Differential.  Procedure                               Abnormality         Status                     ---------                               -----------         ------                     CBC Auto Differential[[id number]]        Abnormal            Final result                 Please view  results for these tests on the individual orders.    CBC Auto Differential [[id number]]  (Abnormal) Collected:  07/12/17 2128    Specimen:  Blood Updated:  07/12/17 2143     WBC 4.55 (L) 10*3/mm3      RBC 3.73 (L) 10*6/mm3      Hemoglobin 10.7 (L) g/dL      Hematocrit 34.4 (L) %      MCV 92.2 fL      MCH 28.7 pg      MCHC 31.1 g/dL      RDW 19.2 (H) %      RDW-SD 63.7 (H) fl      MPV 10.3 fL      Platelets 217 10*3/mm3      Neutrophil % 68.2 %      Lymphocyte % 11.4 %      Monocyte % 13.8 (H) %      Eosinophil % 5.1 %      Basophil % 1.1 %      Immature Grans % 0.4 %      Neutrophils, Absolute 3.10 10*3/mm3      Lymphocytes, Absolute 0.52 (L) 10*3/mm3      Monocytes, Absolute 0.63 10*3/mm3      Eosinophils, Absolute 0.23 10*3/mm3      Basophils, Absolute 0.05 10*3/mm3      Immature Grans, Absolute 0.02 10*3/mm3      nRBC 0.0 /100 WBC     Lactic Acid, Plasma [[id number]]  (Normal) Collected:  07/12/17 2128    Specimen:  Blood Updated:  07/12/17 2148     Lactate 1.6 mmol/L     Lipase [[id number]]  (Normal) Collected:  07/12/17 2128    Specimen:  Blood Updated:  07/12/17 2159     Lipase 132 U/L     Troponin [[id number]]  (Normal) Collected:  07/12/17 2128    Specimen:  Blood Updated:  07/12/17 2159     Troponin I 0.013 ng/mL     Narrative:       Normal Patient Upper Reference Limit (URL) (99th Percentile)=0.03 ng/mL   Non-AMI Illness Reference Limit=0.03-0.11 ng/mL   AMI Confirmation=0.12 ng/mL and above    Comprehensive Metabolic Panel [[id number]]  (Abnormal) Collected:  07/12/17 2128    Specimen:  Blood Updated:  07/12/17 2201     Glucose 560 (C) mg/dL      BUN 29 (H) mg/dL      Creatinine 4.60 (H) mg/dL      Sodium 132 (L) mmol/L      Potassium 7.2 (C) mmol/L      Chloride 92 (L) mmol/L      CO2 26.0 mmol/L      Calcium 8.7 mg/dL      Total Protein 7.4 g/dL      Albumin 3.60 g/dL      ALT (SGPT) 25 U/L      AST (SGOT) 28 U/L      Alkaline Phosphatase 77 U/L      Total Bilirubin 1.0 mg/dL      eGFR Non African Amer 13  (L) mL/min/1.73      Globulin 3.8 gm/dL      A/G Ratio 0.9 (L) g/dL      BUN/Creatinine Ratio 6.3     Anion Gap 21.2 mmol/L     Narrative:       Abnormal estimated GFR should be followed by more specific studies to confirm end stage chronic renal disease. The equation used for calculation may not be accurate for patients less than 19 years old, greater than 70 years old, patients at extremes of weight, malnutrition, or with acute renal dysfunction.    POC Glucose Fingerstick [[id number]]  (Abnormal) Collected:  07/12/17 2247    Specimen:  Blood Updated:  07/12/17 2250     Glucose 366 (H) mg/dL       Serial Number: [id number]:  [id number]       POC Glucose Fingerstick [[id number]]  (Abnormal) Collected:  07/12/17 2338    Specimen:  Blood Updated:  07/13/17 0005     Glucose 310 (H) mg/dL       Serial Number: [id number]:  [id number]       MRSA Screen Culture [[id number]] Collected:  07/12/17 2357    Specimen:  Swab from Nares Updated:  07/13/17 0022    Body Fluid Culture [[id number]] Collected:  07/12/17 2358    Specimen:  Body Fluid from Pleural Cavity Updated:  07/13/17 0022    VRE Culture [[id number]] Collected:  07/12/17 2357    Specimen:  Swab from Per Rectum Updated:  07/13/17 0022    POC Glucose Fingerstick [[id number]]  (Abnormal) Collected:  07/13/17 0024    Specimen:  Blood Updated:  07/13/17 0030     Glucose 282 (H) mg/dL       Serial Number: [id number]:  [id number]       Urinalysis With / Culture If Indicated [[id number]]  (Abnormal) Collected:  07/13/17 0049    Specimen:  Urine from Urine, Clean Catch Updated:  07/13/17 0102     Color, UA Dark Yellow (A)     Appearance, UA Clear     pH, UA 7.5     Specific Gravity, UA 1.020     Glucose,  mg/dL (2+) (A)     Ketones, UA Negative     Bilirubin, UA Negative     Blood, UA Trace (A)     Protein, UA >=300 mg/dL (3+) (A)     Leuk Esterase, UA Negative     Nitrite, UA Negative     Urobilinogen, UA 0.2 E.U./dL    Urinalysis, Microscopic Only [[id number]]   (Abnormal) Collected:  07/13/17 0049    Specimen:  Urine from Urine, Clean Catch Updated:  07/13/17 0110     RBC, UA 3-5 (A) /HPF      WBC, UA 0-2 (A) /HPF      Bacteria, UA Trace (A) /HPF      Squamous Epithelial Cells, UA 0-2 /HPF      Hyaline Casts, UA None Seen /LPF      Methodology Manual Light Microscopy    Acinetobacter Screen [[id number]] Collected:  07/12/17 2349    Specimen:  Swab from Axilla, Right Updated:  07/13/17 0127    POC Glucose Fingerstick [[id number]]  (Abnormal) Collected:  07/13/17 0133    Specimen:  Blood Updated:  07/13/17 0145     Glucose 282 (H) mg/dL       Serial Number: [id number]:  [id number]       POC Glucose Fingerstick [[id number]]  (Abnormal) Collected:  07/13/17 0238    Specimen:  Blood Updated:  07/13/17 0240     Glucose 177 (H) mg/dL       Serial Number: [id number]:  [id number]       POC Glucose Fingerstick [[id number]]  (Normal) Collected:  07/13/17 0313    Specimen:  Blood Updated:  07/13/17 0315     Glucose 108 mg/dL       Serial Number: [id number]:  [id number]       POC Glucose Fingerstick [[id number]]  (Normal) Collected:  07/13/17 0352    Specimen:  Blood Updated:  07/13/17 0355     Glucose 99 mg/dL       Serial Number: [id number]:  [id number]       CBC Auto Differential [[id number]]  (Abnormal) Collected:  07/13/17 0438    Specimen:  Blood Updated:  07/13/17 0508     WBC 5.10 10*3/mm3      RBC 4.09 (L) 10*6/mm3      Hemoglobin 11.9 (L) g/dL      Hematocrit 38.3 (L) %      MCV 93.6 fL      MCH 29.1 pg      MCHC 31.1 g/dL      RDW 19.4 (H) %      RDW-SD 65.4 (H) fl      MPV 10.0 fL      Platelets 210 10*3/mm3      Neutrophil % 65.1 %      Lymphocyte % 13.3 %      Monocyte % 13.3 (H) %      Eosinophil % 5.9 %      Basophil % 1.6 %      Immature Grans % 0.8 (H) %      Neutrophils, Absolute 3.32 10*3/mm3      Lymphocytes, Absolute 0.68 10*3/mm3      Monocytes, Absolute 0.68 10*3/mm3      Eosinophils, Absolute 0.30 10*3/mm3      Basophils, Absolute 0.08 10*3/mm3       Immature Grans, Absolute 0.04 10*3/mm3      nRBC 0.0 /100 WBC     Scan Slide [[id number]]  (Normal) Collected:  07/13/17 0438    Specimen:  Blood Updated:  07/13/17 0508     RBC Morphology Normal     WBC Morphology Normal     Platelet Morphology Normal    POC Glucose Fingerstick [[id number]]  (Normal) Collected:  07/13/17 0509    Specimen:  Blood Updated:  07/13/17 0515     Glucose 96 mg/dL       Serial Number: [id number]:  [id number]       POC Glucose Fingerstick [[id number]]  (Normal) Collected:  07/13/17 0625    Specimen:  Blood Updated:  07/13/17 0635     Glucose 114 mg/dL       Serial Number: [id number]:  [id number]       Troponin [[id number]]  (Normal) Collected:  07/13/17 0438    Specimen:  Blood Updated:  07/13/17 0644     Troponin I 0.015 ng/mL     Narrative:       Normal Patient Upper Reference Limit (URL) (99th Percentile)=0.03 ng/mL   Non-AMI Illness Reference Limit=0.03-0.11 ng/mL   AMI Confirmation=0.12 ng/mL and above    Basic Metabolic Panel [[id number]]  (Abnormal) Collected:  07/13/17 0438    Specimen:  Blood Updated:  07/13/17 0710     Glucose 114 (H) mg/dL      BUN 33 (H) mg/dL      Creatinine 4.80 (H) mg/dL      Sodium 138 mmol/L      Potassium 6.0 (C) mmol/L      Chloride 97 (L) mmol/L      CO2 25.0 (L) mmol/L      Calcium 8.9 mg/dL      eGFR Non African Amer 13 (L) mL/min/1.73      BUN/Creatinine Ratio 6.9     Anion Gap 22.0 mmol/L     Narrative:       Abnormal estimated GFR should be followed by more specific studies to confirm end stage chronic renal disease. The equation used for calculation may not be accurate for patients less than 19 years old, greater than 70 years old, patients at extremes of weight, malnutrition, or with acute renal dysfunction.    POC Glucose Fingerstick [[id number]]  (Normal) Collected:  07/13/17 0736    Specimen:  Blood Updated:  07/13/17 0740     Glucose 114 mg/dL       Serial Number: [id number]:  [id number]       POC Glucose Fingerstick [[id number]]   (Abnormal) Collected:  07/13/17 0825    Specimen:  Blood Updated:  07/13/17 0830     Glucose 141 (H) mg/dL       Serial Number: [id number]:  [id number]       Hemoglobin A1c [[id number]] Collected:  07/13/17 0438    Specimen:  Blood Updated:  07/13/17 0944    POC Glucose Fingerstick [[id number]]  (Abnormal) Collected:  07/13/17 1107    Specimen:  Blood Updated:  07/13/17 1111     Glucose 172 (H) mg/dL       Serial Number: [id number]:  [id number]           Physician Progress Notes (last 24 hours) (Notes from 7/12/2017 11:47 AM through 7/13/2017 11:47 AM)     No notes of this type exist for this encounter.        Consult Notes (last 24 hours) (Notes from 7/12/2017 11:47 AM through 7/13/2017 11:47 AM)     No notes of this type exist for this encounter.

## 2017-07-14 VITALS
HEIGHT: 68 IN | OXYGEN SATURATION: 91 % | RESPIRATION RATE: 18 BRPM | WEIGHT: 141.1 LBS | BODY MASS INDEX: 21.38 KG/M2 | DIASTOLIC BLOOD PRESSURE: 79 MMHG | HEART RATE: 99 BPM | TEMPERATURE: 98.1 F | SYSTOLIC BLOOD PRESSURE: 147 MMHG

## 2017-07-14 LAB
ALBUMIN SERPL-MCNC: 3.8 G/DL (ref 3.5–5)
ANION GAP SERPL CALCULATED.3IONS-SCNC: 21.2 MMOL/L
BUN BLD-MCNC: 14 MG/DL (ref 7–20)
BUN/CREAT SERPL: 4.2 (ref 6.3–21.9)
CALCIUM SPEC-SCNC: 9 MG/DL (ref 8.4–10.2)
CHLORIDE SERPL-SCNC: 96 MMOL/L (ref 98–107)
CO2 SERPL-SCNC: 26 MMOL/L (ref 26–30)
CREAT BLD-MCNC: 3.3 MG/DL (ref 0.6–1.3)
GFR SERPL CREATININE-BSD FRML MDRD: 19 ML/MIN/1.73
GLUCOSE BLD-MCNC: 107 MG/DL (ref 74–98)
GLUCOSE BLDC GLUCOMTR-MCNC: 197 MG/DL (ref 70–130)
GLUCOSE BLDC GLUCOMTR-MCNC: 90 MG/DL (ref 70–130)
PHOSPHATE SERPL-MCNC: 5.3 MG/DL (ref 2.5–4.5)
POTASSIUM BLD-SCNC: 5.2 MMOL/L (ref 3.5–5.1)
SODIUM BLD-SCNC: 138 MMOL/L (ref 137–145)

## 2017-07-14 PROCEDURE — 25010000002 HEPARIN (PORCINE) PER 1000 UNITS: Performed by: FAMILY MEDICINE

## 2017-07-14 PROCEDURE — 82962 GLUCOSE BLOOD TEST: CPT

## 2017-07-14 PROCEDURE — 97165 OT EVAL LOW COMPLEX 30 MIN: CPT

## 2017-07-14 PROCEDURE — 99238 HOSP IP/OBS DSCHRG MGMT 30/<: CPT | Performed by: INTERNAL MEDICINE

## 2017-07-14 PROCEDURE — 97162 PT EVAL MOD COMPLEX 30 MIN: CPT

## 2017-07-14 PROCEDURE — 63710000001 INSULIN DETEMIR PER 5 UNITS: Performed by: INTERNAL MEDICINE

## 2017-07-14 PROCEDURE — 80069 RENAL FUNCTION PANEL: CPT | Performed by: INTERNAL MEDICINE

## 2017-07-14 RX ADMIN — FOLIC ACID 1 MG: 1 TABLET ORAL at 08:43

## 2017-07-14 RX ADMIN — PANTOPRAZOLE SODIUM 40 MG: 40 TABLET, DELAYED RELEASE ORAL at 06:30

## 2017-07-14 RX ADMIN — LEVOTHYROXINE SODIUM 100 MCG: 100 TABLET ORAL at 06:30

## 2017-07-14 RX ADMIN — ASPIRIN 325 MG ORAL TABLET 325 MG: 325 PILL ORAL at 08:44

## 2017-07-14 RX ADMIN — AMLODIPINE BESYLATE 10 MG: 5 TABLET ORAL at 08:43

## 2017-07-14 RX ADMIN — LOSARTAN POTASSIUM 100 MG: 50 TABLET, FILM COATED ORAL at 08:44

## 2017-07-14 RX ADMIN — Medication 1 TABLET: at 08:44

## 2017-07-14 RX ADMIN — LANTHANUM CARBONATE 1000 MG: 500 TABLET, CHEWABLE ORAL at 08:47

## 2017-07-14 RX ADMIN — HEPARIN SODIUM 5000 UNITS: 5000 INJECTION, SOLUTION INTRAVENOUS; SUBCUTANEOUS at 08:45

## 2017-07-14 RX ADMIN — METOPROLOL SUCCINATE 50 MG: 50 TABLET, EXTENDED RELEASE ORAL at 08:44

## 2017-07-14 RX ADMIN — ATORVASTATIN CALCIUM 10 MG: 10 TABLET, FILM COATED ORAL at 08:43

## 2017-07-14 RX ADMIN — INSULIN DETEMIR 8 UNITS: 100 INJECTION, SOLUTION SUBCUTANEOUS at 08:55
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
## 2017-07-14 NOTE — PLAN OF CARE
Problem: Patient Care Overview (Adult)  Goal: Plan of Care Review  Outcome: Ongoing (interventions implemented as appropriate)    07/14/17 1433   Coping/Psychosocial Response Interventions   Plan Of Care Reviewed With patient   Patient Care Overview   Progress no change   Outcome Evaluation   Outcome Summary/Follow up Plan Pt seen for OT evaluation today. Pt is being d/c home today therefore a plan of care will not be established. Pt is going home with home health services.

## 2017-07-14 NOTE — PLAN OF CARE
Problem: Patient Care Overview (Adult)  Goal: Plan of Care Review  Outcome: Ongoing (interventions implemented as appropriate)    07/14/17 0013   Coping/Psychosocial Response Interventions   Plan Of Care Reviewed With patient   Patient Care Overview   Progress no change         Problem: Diabetes, Type 2 (Adult)  Goal: Signs and Symptoms of Listed Potential Problems Will be Absent or Manageable (Diabetes, Type 2)  Outcome: Ongoing (interventions implemented as appropriate)    07/14/17 0013   Diabetes, Type 2   Problems Assessed (Type 2 Diabetes) all   Problems Present (Type 2 Diabetes) impaired glycemic control  (no sliding scale coverage)         Problem: Skin Integrity Impairment, Risk/Actual (Adult)  Goal: Skin Integrity/Wound Healing  Outcome: Ongoing (interventions implemented as appropriate)    07/14/17 0013   Skin Integrity Impairment, Risk/Actual (Adult)   Skin Integrity/Wound Healing making progress toward outcome         Problem: Pancreatitis, Acute/Chronic (Adult)  Goal: Signs and Symptoms of Listed Potential Problems Will be Absent or Manageable (Pancreatitis, Acute/Chronic)  Outcome: Ongoing (interventions implemented as appropriate)    07/14/17 0013   Pancreatitis, Acute/Chronic   Problems Assessed (Pancreatitis) all   Problems Present (Pancreatitis) pain;infection;malabsorption/maldigestion  (PRN pain med given)

## 2017-07-14 NOTE — THERAPY DISCHARGE NOTE
Acute Care - Physical Therapy Initial Eval/Discharge  ANTOINE Sharma     Patient Name: Talha Cutler  : 1960  MRN: [id number]  Today's Date: 2017   Onset of Illness/Injury or Date of Surgery Date: 17  Date of Referral to PT: 17  Referring Physician: Dr. Ray       Admit Date: 2017    Visit Dx:    ICD-10-CM ICD-9-CM   1. Hyperkalemia E87.5 276.7   2. Hyperglycemia without ketosis R73.9 790.29   3. Acute pancreatitis, unspecified complication status, unspecified pancreatitis type K85.90 577.0   4. Impaired functional mobility, balance, gait, and endurance Z74.09 V49.89     Patient Active Problem List   Diagnosis   • Pleural effusion, bilateral   • Chest pain   • Edema   • Diabetes mellitus   • Hypertension   • Kidney disease   • Encounter for venous access device care   • Pleural effusion   • SOB (shortness of breath)   • Type 2 diabetes mellitus treated with insulin   • End stage renal disease on dialysis   • Uncontrolled diabetes mellitus with hyperglycemia, with long-term current use of insulin   • Hyperkalemia   • Hypertension due to end stage renal disease caused by type 2 diabetes mellitus, on dialysis   • Physical debility   • Chronic respiratory insufficiency   • Hyperglycemia due to type 2 diabetes mellitus   • Pancreatitis, acute   • Chronic kidney disease-mineral and bone disorder     Past Medical History:   Diagnosis Date   • Anemia    • CHF (congestive heart failure)    • Chronic kidney disease    • Diabetes mellitus    • H/O chest x-ray 2016    Interval decrease in size of the patients right pleural effusion with a persistent small left effusion as well   • History of transfusion    • Hypertension    • Left-sided weakness    • Renal failure    • Stroke      Past Surgical History:   Procedure Laterality Date   • CATARACT EXTRACTION, BILATERAL     • CHOLECYSTECTOMY     • COLONOSCOPY     • EYE SURGERY     • PORTACATH PLACEMENT     • VENOUS ACCESS DEVICE (PORT) REMOVAL             PT ASSESSMENT (last 72 hours)      PT Evaluation       07/14/17 1112 07/14/17 1052    Rehab Evaluation    Document Type discharge evaluation/summary  -LM (r) MC (t) LM (c)     Subjective Information agree to therapy;no complaints  -LM (r) MC (t) LM (c)     Patient Effort, Rehab Treatment good  -LM (r) MC (t) LM (c)     Symptoms Noted During/After Treatment none  -LM (r) MC (t) LM (c)     General Information    Patient Profile Review yes  -LM (r) MC (t) LM (c)     Onset of Illness/Injury or Date of Surgery Date 07/12/17  -LM (r) MC (t) LM (c)     Referring Physician Dr. Ray   -LM (r) MC (t) LM (c)     General Observations Pt receievd sitting EOB   -LM (r) MC (t) LM (c)     Pertinent History Of Current Problem Hyperglycemia PMH stroke, CKD, DM, chronic pleural effusion, HTN, renal failure   -LM (r) MC (t) LM (c)     Precautions/Limitations fall precautions  -LM (r) MC (t) LM (c)     Prior Level of Function w/c or scooter;community mobility;all household mobility  -LM (r) MC (t) LM (c)     Equipment Currently Used at Home power chair, (recliner lift);wheelchair;commode;shower chair;oxygen  -LM (r) MC (t) LM (c) oxygen;ramp;wheelchair;commode;shower chair   In process of getting power wheelchair, self. O2 Premier  -KN    Plans/Goals Discussed With patient;agreed upon  -LM (r) MC (t) LM (c)     Risks Reviewed patient:;increased discomfort  -LM (r) MC (t) LM (c)     Benefits Reviewed patient:;improve function;increase independence;increase strength;increase balance  -LM (r) MC (t) LM (c)     Barriers to Rehab previous functional deficit;medically complex  -LM (r) MC (t) LM (c)     Living Environment    Lives With spouse  -LM (r) MC (t) LM (c) spouse  -KN    Living Arrangements house  -LM (r) MC (t) LM (c) house  -KN    Home Accessibility ramps present at home  -LM (r) MC (t) LM (c) no concerns  -KN    Stair Railings at Home none  -LM (r) MC (t) LM (c) none  -KN    Type of Financial/Environmental Concern  none   -KN    Transportation Available  family or friend will provide;public transportation  -    Clinical Impression    Date of Referral to PT 07/13/17  -LM (r) MC (t) LM (c)     PT Diagnosis Impaired strength and functional mobility   -LM (r) MC (t) LM (c)     Patient/Family Goals Statement Home with home health   -LM (r) MC (t) LM (c)     Criteria for Skilled Therapeutic Interventions Met no;other (see comments)   Pt is being d/c from facility on day of evaluation   -LM (r) MC (t) LM (c)     Pathology/Pathophysiology Noted (Describe Specifically for Each System) musculoskeletal;neuromuscular;cardiovascular;pulmonary;integumentary  -LM (r) MC (t) LM (c)     Impairments Found (describe specific impairments) aerobic capacity/endurance;gait, locomotion, and balance;muscle performance;posture  -LM (r) MC (t) LM (c)     Vital Signs    Pretreatment Heart Rate (beats/min) 93  -LM (r) MC (t) LM (c)     Posttreatment Heart Rate (beats/min) 93  -LM (r) MC (t) LM (c)     Pre SpO2 (%) 93  -LM (r) MC (t) LM (c)     O2 Delivery Pre Treatment room air  -LM (r) MC (t) LM (c)     Post SpO2 (%) 92  -LM (r) MC (t) LM (c)     O2 Delivery Post Treatment room air  -LM (r) MC (t) LM (c)     Pre Patient Position Sitting  -LM (r) MC (t) LM (c)     Post Patient Position Sitting  -LM (r) MC (t) LM (c)     Pain Assessment    Pain Assessment No/denies pain  -LM (r) MC (t) LM (c)     Cognitive Assessment/Intervention    Current Cognitive/Communication Assessment functional  -LM (r) MC (t) LM (c)     Orientation Status oriented x 4  -LM (r) MC (t) LM (c)     Follows Commands/Answers Questions able to follow multi-step instructions;100% of the time  -LM (r) MC (t) LM (c)     Personal Safety WNL/WFL  -LM (r) MC (t) LM (c)     Personal Safety Interventions fall prevention program maintained;gait belt;nonskid shoes/slippers when out of bed  -LM (r) MC (t) LM (c)     MMT (Manual Muscle Testing)    General MMT Assessment lower extremity strength deficits  identified  -LM (r) MC (t) LM (c)     General MMT Assessment Detail L LE demonstrates increased weakness during functional activities. Generalized weakness in B LE demonstrated during functional mobility   -LM (r) MC (t) LM (c)     Bed Mobility, Assessment/Treatment    Bed Mobility, Assistive Device bed rails;head of bed elevated  -LM (r) MC (t) LM (c)     Bed Mob, Supine to Sit, Rogers conditional independence  -LM (r) MC (t) LM (c)     Bed Mob, Sit to Supine, Rogers conditional independence  -LM (r) MC (t) LM (c)     Bed Mobility, Safety Issues impaired trunk control for bed mobility;decreased use of legs for bridging/pushing;decreased use of arms for pushing/pulling  -LM (r) MC (t) LM (c)     Bed Mobility, Impairments strength decreased;impaired balance;coordination impaired;motor control impaired;postural control impaired;pain  -LM (r) MC (t) LM (c)     Bed Mobility, Comment Pt states unable to achieve full supine position secondary to shortened hip flexors   -LM (r) MC (t) LM (c)     Transfer Assessment/Treatment    Transfers, Bed-Chair Rogers minimum assist (75% patient effort)  -LM (r) MC (t) LM (c)     Transfers, Chair-Bed Rogers minimum assist (75% patient effort)  -LM (r) MC (t) LM (c)     Transfers, Sit-Stand Rogers minimum assist (75% patient effort)  -LM (r) MC (t) LM (c)     Transfers, Stand-Sit Rogers minimum assist (75% patient effort)  -LM (r) MC (t) LM (c)     Transfer, Safety Issues impulsivity;balance decreased during turns;step length decreased  -LM (r) MC (t) LM (c)     Transfer, Impairments ROM decreased;strength decreased;impaired balance;coordination impaired;postural control impaired;motor control impaired  -LM (r) MC (t) LM (c)     Gait Assessment/Treatment    Gait, Comment Pt unable to perform gait during evaluation   -LM (r) MC (t) LM (c)     Positioning and Restraints    Pre-Treatment Position in bed  -LM (r) MC (t) LM (c)     Post Treatment Position  chair  -LM (r) MC (t) LM (c)     In Chair sitting;call light within reach;encouraged to call for assist;notified nsg   Notified Nsg about sore on scrotal area  -LM (r) MC (t) LM (c)       07/14/17 0000 07/13/17 2000    Muscle Tone Assessment    Muscle Tone Assessment Bilateral Upper Extremities;Bilateral Lower Extremities  -CS Bilateral Upper Extremities;Bilateral Lower Extremities  -CS    Bilateral Upper Extremities Muscle Tone Assessment mildly decreased tone  -CS mildly decreased tone  -CS    Bilateral Lower Extremities Muscle Tone Assessment mildly decreased tone  -CS mildly decreased tone  -CS      07/13/17 1600 07/13/17 1302    Rehab Evaluation    Evaluation Not Performed  patient unavailable for evaluation   Pt currently in dialysis and unavailable for PT eval.  PT yury follow up with patient tomorrow and proceed with eval as tolerated and appropriate.  -LM    Muscle Tone Assessment    Bilateral Upper Extremities Muscle Tone Assessment mildly decreased tone  -NW     Bilateral Lower Extremities Muscle Tone Assessment mildly decreased tone  -NW       07/13/17 1300 07/13/17 1200    Rehab Evaluation    Evaluation Not Performed patient unavailable for evaluation   Pt in dialysis at this time. Simultaneous filing. User may be unaware of other data.  -AH     Muscle Tone Assessment    Bilateral Upper Extremities Muscle Tone Assessment  mildly decreased tone  -NW    Bilateral Lower Extremities Muscle Tone Assessment  mildly decreased tone  -NW      07/13/17 0800 07/13/17 0656    Muscle Tone Assessment    Muscle Tone Assessment  Bilateral Upper Extremities;Bilateral Lower Extremities  -CS    Bilateral Upper Extremities Muscle Tone Assessment mildly decreased tone  -NW mildly decreased tone  -CS    Bilateral Lower Extremities Muscle Tone Assessment mildly decreased tone  -NW mildly decreased tone  -CS      07/13/17 0600 07/13/17 0400    Muscle Tone Assessment    Muscle Tone Assessment Bilateral Upper  Extremities;Bilateral Lower Extremities  -CS Bilateral Upper Extremities;Bilateral Lower Extremities  -CS    Bilateral Upper Extremities Muscle Tone Assessment mildly decreased tone  -CS mildly decreased tone  -CS    Bilateral Lower Extremities Muscle Tone Assessment mildly decreased tone  -CS mildly decreased tone  -CS      07/13/17 0200 07/13/17 0127    Living Environment    Lives With  child(starr), adult;spouse  -CS    Living Arrangements  house  -CS    Home Accessibility  no concerns;ramps present at home  -CS    Stair Railings at Home  none  -CS    Type of Financial/Environmental Concern  none  -CS    Transportation Available  car;family or friend will provide  -CS    Muscle Tone Assessment    Muscle Tone Assessment Bilateral Upper Extremities;Bilateral Lower Extremities  -CS     Bilateral Upper Extremities Muscle Tone Assessment mildly decreased tone  -CS     Bilateral Lower Extremities Muscle Tone Assessment mildly decreased tone  -CS       07/13/17 0113 07/12/17 2330    General Information    Equipment Currently Used at Home shower chair;wheelchair;commode;glucometer;oxygen  -CS     Muscle Tone Assessment    Muscle Tone Assessment  Bilateral Upper Extremities;Bilateral Lower Extremities  -CS    Bilateral Upper Extremities Muscle Tone Assessment  mildly decreased tone  -CS    Bilateral Lower Extremities Muscle Tone Assessment  mildly decreased tone  -CS      User Key  (r) = Recorded By, (t) = Taken By, (c) = Cosigned By    Initials Name Provider Type    NADIR Landrum Occupational Therapist    NW Torie Anderson, RN Registered Nurse    CS Duarte Canales, RN Registered Nurse    LENNOX Serrato, PT Physical Therapist    NATHAN Villanueva, PT Student PT Student    SUMI Contreras           Physical Therapy Education     Title: PT OT SLP Therapies (Active)     Topic: Physical Therapy (Active)     Point: Mobility training (Done)    Learning Progress Summary    Learner Readiness Method  Response Comment Documented by Status   Patient Acceptance E VU Safe and proper t/fs to from bed to chair.  07/14/17 1251 Done               Point: Body mechanics (Done)    Learning Progress Summary    Learner Readiness Method Response Comment Documented by Status   Patient Acceptance E VU Safe and proper t/fs to from bed to chair.  07/14/17 1251 Done               Point: Precautions (Done)    Learning Progress Summary    Learner Readiness Method Response Comment Documented by Status   Patient Acceptance E VU Safe and proper t/fs to from bed to chair.  07/14/17 1251 Done                      User Key     Initials Effective Dates Name Provider Type Discipline     06/15/17 -  Sheldon Villanueva, PT Student PT Student PT                PT Recommendation and Plan  Anticipated Discharge Disposition: (P) home with home health  Planned Therapy Interventions: balance training, bed mobility training, gait training, home exercise program, manual therapy techniques, motor coordination training, postural re-education, patient/family education, ROM (Range of Motion), stretching, transfer training, wheelchair management/propulsion training  Plan of Care Review  Plan Of Care Reviewed With: (P) patient  Outcome Summary/Follow up Plan: (P) Pt seen for PT evaluation today. Pt able to perform bed mobility with CI and bed to chair t/f with Heber.  Pt does present with decreased strength, balance and aerobic capacity with functional mobility.  Pt  is being d/c from facility home with home health to address these deficits with functional mobility.               Outcome Measures       07/14/17 1112          How much help from another person do you currently need...    Turning from your back to your side while in flat bed without using bedrails? 4  -LM (r) MC (t) LM (c)      Moving from lying on back to sitting on the side of a flat bed without bedrails? 4  -LM (r) MC (t) LM (c)      Moving to and from a bed to a chair (including a  wheelchair)? 3  -LM (r) MC (t) LM (c)      Standing up from a chair using your arms (e.g., wheelchair, bedside chair)? 3  -LM (r) MC (t) LM (c)      Climbing 3-5 steps with a railing? 2  -LM (r) MC (t) LM (c)      To walk in hospital room? 1  -LM (r) MC (t) LM (c)      AM-PAC 6 Clicks Score 17  -LM (r) MC (t)      Functional Assessment    Outcome Measure Options AM-PAC 6 Clicks Basic Mobility (PT)  -LM (r) MC (t) LM (c)        User Key  (r) = Recorded By, (t) = Taken By, (c) = Cosigned By    Initials Name Provider Type    LENNOX Serrato, PT Physical Therapist    NATHAN Villanueva, PT Student PT Student           Time Calculation:         PT Charges       07/14/17 1254          Time Calculation    Start Time 1112  -LM (r) MC (t) LM (c)      PT Received On 07/14/17  -LM (r) MC (t) LM (c)      PT Goal Re-Cert Due Date 07/24/17  -LM (r) MC (t) LM (c)        User Key  (r) = Recorded By, (t) = Taken By, (c) = Cosigned By    Initials Name Provider Type    LENNOX Serrato, PT Physical Therapist    NATHAN Villanueva, PT Student PT Student          Therapy Charges for Today     Code Description Service Date Service Provider Modifiers Qty    09200646498 HC PT EVAL MOD COMPLEXITY 4 7/14/2017 Sheldon Villanueva, PT Student GP 1          PT G-Codes  Outcome Measure Options: AM-PAC 6 Clicks Basic Mobility (PT)    PT Discharge Summary  Anticipated Discharge Disposition: (P) home with home health  Reason for Discharge: (P) Discharge from facility  Outcomes Achieved: (P) Discharge from facility occurred on same date as evluation  Discharge Destination: (P) Home with home health    Sheldon Villanueva, PT Student  7/14/2017

## 2017-07-14 NOTE — NURSING NOTE
Transferred pt to room 306 from ICU bed. Total lift into bed in room, assist of 4 staff .  His belongings (phone and eyeglasses) with him.

## 2017-07-14 NOTE — PLAN OF CARE
Problem: Patient Care Overview (Adult)  Goal: Plan of Care Review  Outcome: Ongoing (interventions implemented as appropriate)    07/14/17 4095   Coping/Psychosocial Response Interventions   Plan Of Care Reviewed With patient   Outcome Evaluation   Outcome Summary/Follow up Plan Pt seen for PT evaluation today. Pt able to perform bed mobility with CI and bed to chair t/f with Heber. Pt does present with decreased strength, balance and aerobic capacity with functional mobility. Pt is being d/c from facility home with home health to address these deficits with functional mobility.

## 2017-07-14 NOTE — PROGRESS NOTES
"Nephrology Progress Note.    LOS: 2 days    Patient Care Team:  Idalia Kay MD as PCP - General    Chief Complaint:    Chief Complaint   Patient presents with   • Hyperglycemia       Subjective   Patient seen and examined this morning.  Events from last night noted.  Patient denies having any fevers chills.  No nausea or vomiting no abdominal pain.  Denies any chest pain shortness of breath cough or sputum production.  There is no significant edema.   Patient also denies having new onset weakness of numbness of either extremity.he feels fairly well and wants to go home.      Review of Systems:    The pertinent  ROS was done and it is noted above, rest  was negative.    Objective     Vital Signs  /79 (BP Location: Left arm, Patient Position: Lying)  Pulse 99  Temp 98.1 °F (36.7 °C) (Oral)   Resp 18  Ht 68\" (172.7 cm)  Wt 141 lb 1.6 oz (64 kg)  SpO2 91%  BMI 21.45 kg/m2      I/O this shift:  In: 360 [P.O.:360]  Out: -     Intake/Output Summary (Last 24 hours) at 07/14/17 0927  Last data filed at 07/14/17 0902   Gross per 24 hour   Intake             1183 ml   Output             3000 ml   Net            -1817 ml       Physical Exam:    General Appearance: alert, oriented x 3, no acute distress,   HEENT: pupils round and reactive to light, oral mucosa dry, extra occular movements intact.  Neck: supple, no JVD, trachea midline  Lungs: Clear to Auscultation, unlabored breathing effort  Heart: RRR, normal S1 and S2, no S3, no rub  Abdomen: soft, non-tender, no palpable bladder, present bowel sounds to auscultation  Extremities: no edema, cyanosis or clubbing.   Neuro: normal speech and mental status, grossly non focal.     Results Review:      Results from last 7 days  Lab Units 07/14/17  0520 07/13/17  0438 07/12/17  2128   SODIUM mmol/L 138 138 132*   POTASSIUM mmol/L 5.2* 6.0* 7.2*   CHLORIDE mmol/L 96* 97* 92*   CO2 mmol/L 26.0 25.0* 26.0   BUN mg/dL 14 33* 29*   CREATININE mg/dL 3.30* 4.80* " 4.60*   CALCIUM mg/dL 9.0 8.9 8.7   BILIRUBIN mg/dL  --   --  1.0   ALK PHOS U/L  --   --  77   ALT (SGPT) U/L  --   --  25   AST (SGOT) U/L  --   --  28   GLUCOSE mg/dL 107* 114* 560*       Estimated Creatinine Clearance: 22.1 mL/min (by C-G formula based on Cr of 3.3).      Results from last 7 days  Lab Units 07/14/17  0520   PHOSPHORUS mg/dL 5.3*               Results from last 7 days  Lab Units 07/13/17  0438 07/12/17  2128   WBC 10*3/mm3 5.10 4.55*   HEMOGLOBIN g/dL 11.9* 10.7*   PLATELETS 10*3/mm3 210 217               Imaging Results (last 24 hours)     ** No results found for the last 24 hours. **          amLODIPine 10 mg Oral Q24H   aspirin 325 mg Oral Daily   atorvastatin 10 mg Oral Daily   b complex-vitamin c-folic acid 1 tablet Oral Daily   folic acid 1 mg Oral Daily   heparin (porcine) 5,000 Units Subcutaneous Q12H   insulin aspart 0-7 Units Subcutaneous 4x Daily AC & at Bedtime   insulin detemir 8 Units Subcutaneous QAM   lanthanum 1,000 mg Oral TID With Meals   levothyroxine 100 mcg Oral Daily   losartan 100 mg Oral Q24H   metoprolol succinate XL 50 mg Oral Q24H   pantoprazole 40 mg Oral QAM          Medication Review:   Current Facility-Administered Medications   Medication Dose Route Frequency Provider Last Rate Last Dose   • acetaminophen (TYLENOL) tablet 650 mg  650 mg Oral Q4H PRN Joel Wallace DO       • amLODIPine (NORVASC) tablet 10 mg  10 mg Oral Q24H Joel Wallace DO   10 mg at 07/14/17 0843   • aspirin tablet 325 mg  325 mg Oral Daily Joel Wallace DO   325 mg at 07/14/17 0844   • atorvastatin (LIPITOR) tablet 10 mg  10 mg Oral Daily Joel Wallace DO   10 mg at 07/14/17 0843   • b complex-vitamin c-folic acid (NEPHRO-SHIRLENE) tablet 1 tablet  1 tablet Oral Daily Chance Mackey MD   1 tablet at 07/14/17 0844   • dextrose (D50W) solution 25 g  25 g Intravenous Q15 Min PRN Carmelo Ray MD       • dextrose (INSTA-GLUCOSE) 77.4 % gel 1 tube  1 tube Oral Q15 Min PRN Carmelo Ray MD       •  folic acid (FOLVITE) tablet 1 mg  1 mg Oral Daily Joel Likens, DO   1 mg at 07/14/17 0843   • glucagon (human recombinant) (GLUCAGEN DIAGNOSTIC) injection 1 mg  1 mg Subcutaneous Q15 Min PRN Carmelo Ray MD       • heparin (porcine) 5000 UNIT/ML injection 5,000 Units  5,000 Units Subcutaneous Q12H Joel Likens, DO   5,000 Units at 07/14/17 0845   • HYDROcodone-acetaminophen (NORCO) 5-325 MG per tablet 1 tablet  1 tablet Oral Q6H PRN Carmelo Ray MD   1 tablet at 07/13/17 2229   • insulin aspart (novoLOG) injection 0-7 Units  0-7 Units Subcutaneous 4x Daily AC & at Bedtime Carmelo Ray MD       • insulin detemir (LEVEMIR) injection 8 Units  8 Units Subcutaneous QAM Carmelo Ray MD   8 Units at 07/14/17 0855   • lanthanum (FOSRENOL) chewable tablet 1,000 mg  1,000 mg Oral TID With Meals Chance Mackey MD   1,000 mg at 07/14/17 0847   • levothyroxine (SYNTHROID, LEVOTHROID) tablet 100 mcg  100 mcg Oral Daily Joel Likens, DO   100 mcg at 07/14/17 0630   • losartan (COZAAR) tablet 100 mg  100 mg Oral Q24H Joel Likens, DO   100 mg at 07/14/17 0844   • metoprolol succinate XL (TOPROL-XL) 24 hr tablet 50 mg  50 mg Oral Q24H Joel Likens, DO   50 mg at 07/14/17 0844   • ondansetron (ZOFRAN) tablet 4 mg  4 mg Oral Q6H PRN Joel Likens, DO        Or   • ondansetron ODT (ZOFRAN-ODT) disintegrating tablet 4 mg  4 mg Oral Q6H PRN Joel Santosns, DO        Or   • ondansetron (ZOFRAN) injection 4 mg  4 mg Intravenous Q6H PRN Joel Likens, DO       • pantoprazole (PROTONIX) EC tablet 40 mg  40 mg Oral QAM Joel Likens, DO   40 mg at 07/14/17 0630   • sodium chloride 0.9 % bolus 1,000 mL  1,000 mL Intravenous PRN Chance Mackey MD       • sodium chloride 0.9 % flush 1-10 mL  1-10 mL Intravenous PRN Joel Wallaec DO       • sodium chloride 0.9 % flush 10 mL  10 mL Intravenous PRN ALEXYS Fuentes           Assessment/Plan     1. End stage renal disease on dialysis: He gets his dialysis on [facility]TS, [facility] and  runs 4 hours with standard bath. Usually gains 2-3 L in between Treatments.He is due for his dialysis tomorrow and he will go to the pre-clinic.   2. Uncontrolled diabetes mellitus with hyperglycemia, with long-term current use of insulin: it appears that for the cause 2-3 years he has poorly controlled diabetes blood sugars have been running between 200 to 400 HbA1c was 11 or higher. Continue counseling  3. Pancreatitis, acute: He is doing better continue to follow labs. he is able to eat and drink does not have any abdominal pain  4. Chronic kidney disease-mineral and bone disorder: Recently noted to have good control of his PO4 and PTH.  5. Pleural effusion, bilateral: since the Plurex cath he has done well no acute issues have been noted.  6. Hypertension: Optimal control with current meds.  7. SOB (shortness of breath): Off and on with any amount of physical activity. Currently he is pretty much wheelchair dependent.  8. Hyperkalemia: Likely will resolve with dialysis follow BS closely.  9. Physical debility: He is wheel chair dependent for now.  10. Chronic respiratory insufficiency: Fair control with current treatment plan      Details were discussed with the patient as well as family in the room.  I will also discussed the assessment and plan with the hospitalist service. I will see him during dialysis. He does not need any follow up in the office.    Rest as ordered.    Chance Mackey MD  07/14/17  9:27 AM      EMR Dragon/Transcription disclaimer:   Much of this encounter note is an electronic transcription/translation of spoken language to printed text. The electronic translation of spoken language may permit erroneous, or at times, nonsensical words or phrases to be inadvertently transcribed; Although I have reviewed the note for such errors, some may still exist.

## 2017-07-14 NOTE — DISCHARGE SUMMARY
[facility]   DISCHARGE SUMMARY      Name:  Talha Cutler   Age:  57 y.o.  Sex:  male  :  1960  MRN:  [id number]   Visit Number:  [id number]    Admission Date:  2017  Date of Discharge:  2017  Primary Care Physician:  Idalia Kay MD   Primary cardiothoracic surgeon: Trevor Garcia MD ([facility])    Discharge Diagnoses:     1. Acute hyperglycemia secondary to diabetes mellitus type 2, present on admission.  2. Acute hyperkalemia secondary to #3.  3. End-stage renal disease on hemodialysis, MWF.  4. 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  5. Chronic right-sided loculated pleural effusion status post Pleurx catheter.  6. Mild pancreatitis with abdominal pain, present on admission.  7. Coronary artery disease on medical management.  8. Chronic debility.  9. Essential hypertension.    Principal Problem:    Hyperglycemia due to type 2 diabetes mellitus  Active Problems:    Pleural effusion, bilateral    Hypertension    SOB (shortness of breath)    Type 2 diabetes mellitus treated with insulin    End stage renal disease on dialysis    Uncontrolled diabetes mellitus with hyperglycemia, with long-term current use of insulin    Hyperkalemia    Physical debility    Chronic respiratory insufficiency    Pancreatitis, acute    Chronic kidney disease-mineral and bone disorder    Presenting Problem:    Hyperglycemia due to type 2 diabetes mellitus [E11.65]  Hyperkalemia [E87.5]     Consults:     Consulting Physician(s)     Provider Relationship    Ean Hernandez MD Consulting Physician    Chance Mackey MD Consulting Physician        Procedures Performed:    None.    History of presenting illness:    This is a 57-year-old male with history of ESRD on dialysis, DM2 uncontrolled, comes to ER with complaints of high blood sugar reading at home.  Emergency room workup found several abnormal lab findings including blood sugar over 570, potassium greater  than 7. CT of the abdomen and pelvis showed pancreatitis but his lipase levels were within normal limits. Patient has had chronic pleural effusions, has drain in place for evacuation of material.  He was last admitted to this facility about a month ago and was subsequently transferred to  for nondraining Pleurx catheter.  He was subsequently discharged and was recommended to keep the Pleurx drain for about 2 months despite non-drainage.  During his last admission at  his Levemir was discontinued apparently because of persistent hypoglycemia.  Patient states that he lives with his wife and son and is only been taking NovoLog at home without any long-acting insulin.  He did not have any evidence of diabetic ketoacidosis but due to his elevated blood sugars, he was placed on insulin drip and was admitted to the ICU.     Hospital Course:    Patient was continued on insulin drip in the ICU for a few hours with improvement in his blood sugars and it was subsequently discontinued.  Patient was initially placed on nothing by mouth orders due to his epigastric pain and mild pancreatitis.  He was subsequently started on clear liquid diet.  His CT of the chest did show right-sided airspace disease but he did not have any fevers, leukocytosis, chest congestion or cough.  His CT scan from a month ago also showed the similar findings suggesting chronicity of these findings.  He did not receive any antibiotics during the hospital stay.  He remained afebrile throughout the hospital stay.  He was transferred to medical floor and was seen by physical therapy.    Patient's right-sided Pleurx catheter has not been draining at least for the last one month according to the patient.  He however stated that a needle aspiration was apparently done on admission but no documentation of this was found.  I discussed the patient's condition with Dr. Garcia at  and advised them about his admission here and the need for follow-up.  Patient  states that he has a follow-up with Dr. Garcia on 7/26/2017 and Dr. Garcia thought that it was appropriate.  Patient currently lives with his wife and son and has home oxygen at 2 L/m at night.  He refused home health but is planning to get outpatient physical therapy at the adult  center that he visits.  During the hospital stay, he was seen by Dr. Hernandez who is his pulmonologist, and Dr. Hernandez felt that the patient's right-sided pleural effusion has improved compared to the past.    Patient does have uncontrolled diabetes but has history of recurrent hypoglycemic events.  He was started on low-dose Levemir therapy at 8 units daily which will be continued at discharge.  He was seen by Dr. Mackey from nephrology and underwent hemodialysis for his hyperkalemia which has improved after dialysis.  His home medications will be continued.  Patient was on Augmentin ride to admission for his toe infection and he has been advised to complete that antibiotic therapy when he returns home.  Overall his long-term prognosis is poor due to his multiple comorbidities and recurrent hospitalization.    Vital Signs:    Temp:  [97.3 °F (36.3 °C)-98.8 °F (37.1 °C)] 98.1 °F (36.7 °C)  Heart Rate:  [75-99] 99  Resp:  [13-21] 18  BP: (109-147)/(59-91) 147/79    Physical Exam:    General Appearance:  Alert and cooperative, not in any acute distress.   Head:  Atraumatic and normocephalic, without obvious abnormality.   Eyes:          PERRLA, conjunctivae and sclerae normal, no Icterus. No pallor. Extra-occular movements are within normal limits.   Ears:  Ears appear intact with no abnormalities noted.   Throat: No oral lesions, no thrush, oral mucosa moist.   Neck: Supple, trachea midline, no thyromegaly, no carotid bruit.   Back:   No kyphoscoliosis present. No tenderness to palpation,   range of motion normal.   Lungs:   Chest shape is normal. Breath sounds heard bilaterally equally.  No crackles or wheezing. No Pleural rub or  bronchial breathing.   Heart:  Normal S1 and S2, no murmur, no gallop, no rub. No JVD.   Abdomen:   Normal bowel sounds, no masses, no organomegaly. Soft Epigastric tenderness noted, non-distended, no guarding, no rebound tenderness. Echymotic patches noted on the abdominal wall.   Extremities: Moves all extremities well, no edema, no cyanosis, no clubbing. Patient has skin ulcers in both the shins as well as absent nail on the left great toe. AV fistula with the bruit noted in the right arm.   Pulses: Pulses palpable and equal bilaterally.   Skin: No bleeding, bruising or rash.   Neurologic: Alert and oriented x 3. Moves all four limbs equally. No tremors. No facial asymetry.       Pertinent Lab Results:       Results from last 7 days  Lab Units 07/14/17  0520 07/13/17 0438 07/12/17 2128   SODIUM mmol/L 138 138 132*   POTASSIUM mmol/L 5.2* 6.0* 7.2*   CHLORIDE mmol/L 96* 97* 92*   CO2 mmol/L 26.0 25.0* 26.0   BUN mg/dL 14 33* 29*   CREATININE mg/dL 3.30* 4.80* 4.60*   CALCIUM mg/dL 9.0 8.9 8.7   BILIRUBIN mg/dL  --   --  1.0   ALK PHOS U/L  --   --  77   ALT (SGPT) U/L  --   --  25   AST (SGOT) U/L  --   --  28   GLUCOSE mg/dL 107* 114* 560*       Results from last 7 days  Lab Units 07/13/17  0438 07/12/17 2128   WBC 10*3/mm3 5.10 4.55*   HEMOGLOBIN g/dL 11.9* 10.7*   HEMATOCRIT % 38.3* 34.4*   PLATELETS 10*3/mm3 210 217           Results from last 7 days  Lab Units 07/13/17  0438 07/12/17 2128   TROPONIN I ng/mL 0.015 0.013               Results from last 7 days  Lab Units 07/12/17 2128   LIPASE U/L 132           Results from last 7 days  Lab Units 07/13/17  0049   COLOR UA  Dark Yellow*   GLUCOSE UA  500 mg/dL (2+)*   KETONES UA  Negative   LEUKOCYTES UA  Negative   PH, URINE  7.5   BILIRUBIN UA  Negative   UROBILINOGEN UA  0.2 E.U./dL     Pain Management Panel     Pain Management Panel Latest Ref Rng & Units 6/23/2016 7/30/2015    CREATININE UR Not Estab. mg/dL 10.5 25.1              Pertinent Radiology  Results:    Imaging Results (all)     Procedure Component Value Units Date/Time    CT Abdomen Pelvis Without Contrast [[id number]] Collected:  07/12/17 2120     Updated:  07/12/17 2123    Narrative:       FINAL REPORT    CLINICAL HISTORY:  RIGHT UPPER QUAD PAIN/EPIGASTRIC PAIN, RENAL FAILURE    FINDINGS:  Multiple contiguous transaxial slices through the abdomen and pelvis were obtained without the intravenous administration of contrast with coronal reformatted images.    There are bilateral pleural effusions and airspace disease. There is pancreatic edema with stranding in adjacent fat consistent with a mild acute pancreatitis. Recommend correlation with amylase and lipase values. The liver, spleenn, adrenal glands   appear normal. Kidneys are atrophic with dense renal vascular calcifications. There is no hydronephrosis.    The bowel gas pattern is nonobstructive.    Moderate stool consistent with constipation. There is scattered diverticulosis without diverticulitis. There is no evidence for appendicitis. The aorta and iliac arteries are densely calcified. There is moderate bladder wall thickening with stranding of   adjacent fat suggesting cystitis.    Impression:    Findings suggesting a mild pancreatitis. Recommend correlation with  lab values    Cystitis    Bilateral airspace disease and effusions    Atherosclerotic vascular disease    Constipation      Impression:       Authenticated by José Luis Morgan MD on 07/12/2017 09:20:59 PM    CT Chest Without Contrast [[id number]] Collected:  07/12/17 2125     Updated:  07/12/17 2127    Narrative:       FINAL REPORT    CLINICAL HISTORY:  CHRONIC PLEURAL EFFUSION WITH THORACENTESIS TUBE IN PLACE TO DRAIN AS NEEDED. (LOOKING FOR POSSIBLE ABSCESS) OR WORSE PLEURAL EFFUSION    FINDINGS:  Comparisons none available    Multiple contiguous transaxial slices of the chest obtained without the intravenous administration of contrast.    There is a loculated mild-to-moderate  right pleural effusion with drainage catheter in place. There is adjacent airspace disease. There is a moderate layering left pleural effusion. There is diffuse centrilobular emphysema. There are multiple large   prevascular and paratracheal lymph nodes area the heart is enlarged. There are calcifications of the coronary arteries. Aorta is calcified and ectatic.    Impression:    Bilateral pleural effusions and right airspace disease without evidence for abscess      Impression:       Authenticated by José Luis Morgan MD on 07/12/2017 09:25:46 PM        Condition on Discharge:      Stable.    Code status during the hospital stay:    Full Code    Discharge Disposition:    Home or Self Care    Discharge Medications:     CutlerTalha   Home Medication Instructions RICHAR:[id number]    Printed on:07/14/17 4839   Medication Information                      amLODIPine (NORVASC) 10 MG tablet               amoxicillin-clavulanate (AUGMENTIN) 875-125 MG per tablet  Take 1 tablet by mouth 2 (Two) Times a Day.             aspirin 325 MG tablet  Take 325 mg by mouth daily.             Cholecalciferol (VITAMIN D3) 5000 UNITS capsule capsule  Take  by mouth daily.             fenofibrate (TRICOR) 54 MG tablet  Take 54 mg by mouth daily.             folic acid (FOLVITE) 1 MG tablet  Take 1 mg by mouth daily.             FOSRENOL 1000 MG chewable tablet  3 (Three) Times a Day With Meals.             insulin aspart (NovoLOG) 100 UNIT/ML injection  Inject 4 Units under the skin 3 (Three) Times a Day Before Meals.             insulin detemir (LEVEMIR) 100 UNIT/ML injection  Inject 8 Units under the skin Daily.             levothyroxine (SYNTHROID, LEVOTHROID) 100 MCG tablet  Take 100 mcg by mouth daily.             losartan (COZAAR) 100 MG tablet  Take 1 tablet by mouth Daily.             metoprolol succinate XL (TOPROL-XL) 50 MG 24 hr tablet               omeprazole (PriLOSEC) 20 MG capsule  Take 20 mg by mouth 2 (Two) Times a  Day.             pravastatin (PRAVACHOL) 40 MG tablet  Take 80 mg by mouth Daily.               Discharge Diet:     Diet Instructions     Diet: Cardiac, Renal; Thin Liquids, No Restrictions       Discharge Diet:   Cardiac  Renal      Fluid Consistency:  Thin Liquids, No Restrictions               Activity at Discharge:     Activity Instructions     Activity as Tolerated                   Follow-up Appointments:    Follow-up Information     Follow up with Idalia Kay MD Follow up on 7/20/2017.    Specialty:  Family Medicine    Why:  @ 2:30 pm    Contact information:    [address] [place] [address]  [phone]          Follow up with ALEXYS Funez Follow up on 8/2/2017.    Specialty:  Pulmonary Disease    Why:  @ 11:00 am.    Contact information:    [address], [place] [address] [phone]          Follow up with Chance Mackey MD Follow up in 4 week(s).    Specialties:  Nephrology, Hospitalist    Why:  Dr. Mackey will follow up with patient in dialysis.    Contact information:    [address] [place] [address]  [phone]          Future Appointments  Date Time Provider Department Center   8/2/2017 8:00 AM [facility] CT 1 [facility]   8/2/2017 11:00 AM ALEXYS Funez [facility] None     Test Results Pending at Discharge:     Order Current Status    Acinetobacter Screen In process    Body Fluid Culture In process    MRSA Screen Culture In process    VRE Culture In process           Carmelo Ray MD  07/14/17  11:14 AM    Time spent: 25 min    Portions of this note may have been completed with Dragon, a voice recognition program. Not all errors in transcription may have been detected prior to signing.

## 2017-07-14 NOTE — THERAPY DISCHARGE NOTE
Acute Care - Occupational Therapy Initial Eval/Discharge   Zachary     Patient Name: Talha Cutler  : 1960  MRN: [id number]  Today's Date: 2017  Onset of Illness/Injury or Date of Surgery Date: 17  Date of Referral to OT: 17  Referring Physician: Dr. Ray       Admit Date: 2017       ICD-10-CM ICD-9-CM   1. Hyperkalemia E87.5 276.7   2. Hyperglycemia without ketosis R73.9 790.29   3. Acute pancreatitis, unspecified complication status, unspecified pancreatitis type K85.90 577.0   4. Impaired functional mobility, balance, gait, and endurance Z74.09 V49.89   5. Impaired mobility and ADLs Z74.09 799.89     Patient Active Problem List   Diagnosis   • Pleural effusion, bilateral   • Chest pain   • Edema   • Diabetes mellitus   • Hypertension   • Kidney disease   • Encounter for venous access device care   • Pleural effusion   • SOB (shortness of breath)   • Type 2 diabetes mellitus treated with insulin   • End stage renal disease on dialysis   • Uncontrolled diabetes mellitus with hyperglycemia, with long-term current use of insulin   • Hyperkalemia   • Hypertension due to end stage renal disease caused by type 2 diabetes mellitus, on dialysis   • Physical debility   • Chronic respiratory insufficiency   • Hyperglycemia due to type 2 diabetes mellitus   • Pancreatitis, acute   • Chronic kidney disease-mineral and bone disorder     Past Medical History:   Diagnosis Date   • Anemia    • CHF (congestive heart failure)    • Chronic kidney disease    • Diabetes mellitus    • H/O chest x-ray 2016    Interval decrease in size of the patients right pleural effusion with a persistent small left effusion as well   • History of transfusion    • Hypertension    • Left-sided weakness    • Renal failure    • Stroke      Past Surgical History:   Procedure Laterality Date   • CATARACT EXTRACTION, BILATERAL     • CHOLECYSTECTOMY     • COLONOSCOPY     • EYE SURGERY     • PORTACATH PLACEMENT     •  VENOUS ACCESS DEVICE (PORT) REMOVAL            OT ASSESSMENT FLOWSHEET (last 72 hours)      OT Evaluation       07/14/17 1112 07/14/17 1107 07/14/17 1052 07/14/17 1047 07/14/17 0000    Rehab Evaluation    Document Type discharge evaluation/summary  -LM (r) MC (t) LM (c) evaluation;discharge summary  -       Subjective Information agree to therapy;no complaints  -LM (r) MC (t) LM (c) agree to therapy;no complaints  -       Patient Effort, Rehab Treatment good  -LM (r) MC (t) LM (c) good  -       Symptoms Noted During/After Treatment none  -LM (r) MC (t) LM (c) none  -       General Information    Patient Profile Review yes  -LM (r) MC (t) LM (c) yes  -       Onset of Illness/Injury or Date of Surgery Date 07/12/17  -LM (r) MC (t) LM (c) 07/12/17  -       Referring Physician Dr. Ray   -LM (r) MC (t) LM (c) Dr. Ray  -       General Observations Pt receievd sitting EOB   -LM (r) MC (t) LM (c) Pt received sitting eob  -       Pertinent History Of Current Problem Hyperglycemia PMH stroke, CKD, DM, chronic pleural effusion, HTN, renal failure   -LM (r) MC (t) LM (c) hyperglycemia, h/o CVA, CKD, DM, chronic pleural effusions, HTN, chronic renal failure  -       Precautions/Limitations fall precautions  -LM (r) MC (t) LM (c) fall precautions  -       Prior Level of Function w/c or scooter;community mobility;all household mobility  -LM (r) MC (t) LM (c) independent:;w/c or scooter;dressing;min assist:;bathing  -       Equipment Currently Used at Home power chair, (recliner lift);wheelchair;commode;shower chair;oxygen  -LM (r) MC (t) LM (c) power chair, (recliner lift);wheelchair;shower chair;commode;oxygen  - oxygen;ramp;wheelchair;commode;shower chair   In process of getting power wheelchair, self. O2 Premier  -      Plans/Goals Discussed With patient;agreed upon  -LM (r) MC (t) LM (c) patient;agreed upon  -       Risks Reviewed patient:;increased discomfort  -LM (r) MC (t) LM (c)  patient:;increased discomfort  -       Benefits Reviewed patient:;improve function;increase independence;increase strength;increase balance  -LM (r) MC (t) LM (c) patient:;increase independence;improve function;increase strength  -       Barriers to Rehab previous functional deficit;medically complex  -LM (r) MC (t) LM (c) previous functional deficit;medically complex  -       Living Environment    Lives With spouse  -LM (r) MC (t) LM (c) spouse  - spouse  -KN      Living Arrangements house  -LM (r) MC (t) LM (c) house  - house  -      Home Accessibility ramps present at home  -LM (r) MC (t) LM (c) ramps present at home  - no concerns  -      Stair Railings at Home none  -LM (r) MC (t) LM (c) none  - none  -      Type of Financial/Environmental Concern   none  -      Transportation Available   family or friend will provide;public transportation  -      Clinical Impression    Date of Referral to OT  07/13/17  -       OT Diagnosis  ADL decline  -       Patient/Family Goals Statement  Pt wants to return home with his family  -       Criteria for Skilled Therapeutic Interventions Met  no  -       Therapy Frequency  evaluation only  -       Anticipated Discharge Disposition  home with home health  -       Functional Level Prior    Ambulation  4-->completely dependent   used w/c for mobility  -  3-->assistive equipment and person  -KN     Transferring  2-->assistive person  -AH  3-->assistive equipment and person  -KN     Toileting  0-->independent  -  3-->assistive equipment and person  -KN     Bathing  2-->assistive person  -AH  2-->assistive person  -KN     Dressing  0-->independent  -  2-->assistive person  -KN     Eating  0-->independent  -AH  0-->independent  -KN     Communication  0-->understands/communicates without difficulty  -  0-->understands/communicates without difficulty  -KN     Swallowing  0-->swallows foods/liquids without difficulty  -  0-->swallows  foods/liquids without difficulty  -KN     Vital Signs    Pretreatment Heart Rate (beats/min) 93  -LM (r) MC (t) LM (c)        Posttreatment Heart Rate (beats/min) 93  -LM (r) MC (t) LM (c)        Pre SpO2 (%) 93  -LM (r) MC (t) LM (c) 93  -AH       O2 Delivery Pre Treatment room air  -LM (r) MC (t) LM (c) room air  -AH       Post SpO2 (%) 92  -LM (r) MC (t) LM (c) 92  -AH       O2 Delivery Post Treatment room air  -LM (r) MC (t) LM (c) room air  -AH       Pre Patient Position Sitting  -LM (r) MC (t) LM (c)        Post Patient Position Sitting  -LM (r) MC (t) LM (c)        Pain Assessment    Pain Assessment No/denies pain  -LM (r) MC (t) LM (c) No/denies pain  -AH       Vision Assessment/Intervention    Visual Impairment  WFL with corrective lenses  -       Cognitive Assessment/Intervention    Current Cognitive/Communication Assessment functional  -LM (r) MC (t) LM (c) functional  -       Orientation Status oriented x 4  -LM (r) MC (t) LM (c) oriented x 4  -AH       Follows Commands/Answers Questions able to follow multi-step instructions;100% of the time  -LM (r) MC (t) LM (c) able to follow multi-step instructions  -       Personal Safety WNL/WFL  -LM (r) MC (t) LM (c) WNL/WFL  -AH       Personal Safety Interventions fall prevention program maintained;gait belt;nonskid shoes/slippers when out of bed  -LM (r) MC (t) LM (c) fall prevention program maintained;gait belt;nonskid shoes/slippers when out of bed  -       MMT (Manual Muscle Testing)    General MMT Assessment lower extremity strength deficits identified  -LM (r) MC (t) LM (c)        General MMT Assessment Detail L LE demonstrates increased weakness during functional activities. Generalized weakness in B LE demonstrated during functional mobility   -LM (r) MC (t) LM (c)        Muscle Tone Assessment    Muscle Tone Assessment     Bilateral Upper Extremities;Bilateral Lower Extremities  -CS    Bilateral Upper Extremities Muscle Tone Assessment      mildly decreased tone  -CS    Bilateral Lower Extremities Muscle Tone Assessment     mildly decreased tone  -CS    Bed Mobility, Assessment/Treatment    Bed Mobility, Assistive Device bed rails;head of bed elevated  -LM (r) MC (t) LM (c) bed rails;head of bed elevated  -       Bed Mob, Supine to Sit, [place] conditional independence  -LM (r) MC (t) LM (c) conditional independence  -       Bed Mob, Sit to Supine, [place] conditional independence  -LM (r) MC (t) LM (c) conditional independence  -       Bed Mobility, Safety Issues impaired trunk control for bed mobility;decreased use of legs for bridging/pushing;decreased use of arms for pushing/pulling  -LM (r) MC (t) LM (c)        Bed Mobility, Impairments strength decreased;impaired balance;coordination impaired;motor control impaired;postural control impaired;pain  -LM (r) MC (t) LM (c)        Bed Mobility, Comment Pt states unable to achieve full supine position secondary to shortened hip flexors   -LM (r) MC (t) LM (c)        Transfer Assessment/Treatment    Transfers, Bed-Chair [place] minimum assist (75% patient effort)  -LM (r) MC (t) LM (c) minimum assist (75% patient effort)  -       Transfers, Chair-Bed [place] minimum assist (75% patient effort)  -LM (r) MC (t) LM (c) minimum assist (75% patient effort)  -       Transfers, Sit-Stand [place] minimum assist (75% patient effort)  -LM (r) MC (t) LM (c) minimum assist (75% patient effort)  -       Transfers, Stand-Sit [place] minimum assist (75% patient effort)  -LM (r) MC (t) LM (c) minimum assist (75% patient effort)  -       Transfer, Safety Issues impulsivity;balance decreased during turns;step length decreased  -LM (r) MC (t) LM (c)        Transfer, Impairments ROM decreased;strength decreased;impaired balance;coordination impaired;postural control impaired;motor control impaired  -LM (r) MC (t) LM (c)        Upper Body Bathing Assessment/Training    UB Bathing  Assess/Train, [place] Level  minimum assist (75% patient effort)  -       Lower Body Bathing Assessment/Training    LB Bathing Assess/Train, [place] Level  minimum assist (75% patient effort)  -       Upper Body Dressing Assessment/Training    UB Dressing Assess/Train, [place]  set up required  -       Lower Body Dressing Assessment/Training    LB Dressing Assess/Train, [place]  set up required  -       Toileting Assessment/Training    Toileting Assess/Train, Indepen Level  set up required  -       Grooming Assessment/Training    Grooming Assess/Train, Indepen Level  independent  -       Positioning and Restraints    Pre-Treatment Position in bed  -LM (r) MC (t) LM (c) in bed  -AH       Post Treatment Position chair  -LM (r) MC (t) LM (c) chair  -AH       In Chair sitting;call light within reach;encouraged to call for assist;notified nsg   Notified Nsg about sore on scrotal area  -LM (r) MC (t) LM (c) sitting;call light within reach;encouraged to call for assist  -         07/13/17 2000 07/13/17 1600 07/13/17 1302 07/13/17 1300 07/13/17 1200    Rehab Evaluation    Evaluation Not Performed   patient unavailable for evaluation   Pt currently in dialysis and unavailable for PT eval.  PT yury follow up with patient tomorrow and proceed with eval as tolerated and appropriate.  - patient unavailable for evaluation   Pt in dialysis at this time. Simultaneous filing. User may be unaware of other data.  -AH     Muscle Tone Assessment    Muscle Tone Assessment Bilateral Upper Extremities;Bilateral Lower Extremities  -CS        Bilateral Upper Extremities Muscle Tone Assessment mildly decreased tone  -CS mildly decreased tone  -NW   mildly decreased tone  -NW    Bilateral Lower Extremities Muscle Tone Assessment mildly decreased tone  -CS mildly decreased tone  -NW   mildly decreased tone  -NW      07/13/17 0800 07/13/17 0656 07/13/17 0600 07/13/17 0400 07/13/17 0200    Muscle Tone Assessment     Muscle Tone Assessment  Bilateral Upper Extremities;Bilateral Lower Extremities  -CS Bilateral Upper Extremities;Bilateral Lower Extremities  -CS Bilateral Upper Extremities;Bilateral Lower Extremities  -CS Bilateral Upper Extremities;Bilateral Lower Extremities  -CS    Bilateral Upper Extremities Muscle Tone Assessment mildly decreased tone  -NW mildly decreased tone  -CS mildly decreased tone  -CS mildly decreased tone  -CS mildly decreased tone  -CS    Bilateral Lower Extremities Muscle Tone Assessment mildly decreased tone  -NW mildly decreased tone  -CS mildly decreased tone  -CS mildly decreased tone  -CS mildly decreased tone  -CS      07/13/17 0127 07/13/17 0113 07/12/17 2330          General Information    Equipment Currently Used at Home  shower chair;wheelchair;commode;glucometer;oxygen  -CS       Living Environment    Lives With child(starr), adult;spouse  -CS        Living Arrangements house  -CS        Home Accessibility no concerns;ramps present at home  -CS        Stair Railings at Home none  -CS        Type of Financial/Environmental Concern none  -CS        Transportation Available car;family or friend will provide  -CS        Functional Level Prior    Ambulation  4-->completely dependent  -CS       Transferring  3-->assistive equipment and person  -CS       Toileting  3-->assistive equipment and person  -CS       Bathing  3-->assistive equipment and person  -CS       Dressing  2-->assistive person  -CS       Eating  0-->independent  -CS       Communication  0-->understands/communicates without difficulty  -CS       Swallowing  0-->swallows foods/liquids without difficulty  -CS       Muscle Tone Assessment    Muscle Tone Assessment   Bilateral Upper Extremities;Bilateral Lower Extremities  -CS      Bilateral Upper Extremities Muscle Tone Assessment   mildly decreased tone  -CS      Bilateral Lower Extremities Muscle Tone Assessment   mildly decreased tone  -CS        User Key  (r) = Recorded By,  (t) = Taken By, (c) = Cosigned By    Initials Name Effective Dates     Grace Landrum 10/26/16 -     NW Torie Anderson, RN 10/26/16 -     CS Duarte Canales, RN 11/07/16 -     LM Xiao Serrato, PT 10/26/16 -     NATHAN Villanueva, PT Student 06/15/17 -     SUMI Contreras 06/23/17 -           Occupational Therapy Education     Title: PT OT SLP Therapies (Resolved)     Topic: Occupational Therapy (Resolved)     Point: ADL training (Resolved)    Description: Instruct learner(s) on proper safety adaptation and remediation techniques during self care or transfers.   Instruct in proper use of assistive devices.    Learning Progress Summary    Learner Readiness Method Response Comment Documented by Status   Patient Acceptance E VU Role of OT  07/14/17 1432 Done                      User Key     Initials Effective Dates Name Provider Type Discipline     10/26/16 -  Grace Merrittss Occupational Therapist OT                OT Recommendation and Plan  Anticipated Discharge Disposition: home with home health  Therapy Frequency: evaluation only  Plan of Care Review  Plan Of Care Reviewed With: patient  Progress: no change  Outcome Summary/Follow up Plan: Pt seen for OT evaluation today.  Pt is being d/c home today therefore a plan of care will not be established.  Pt is going home with home health services.               Outcome Measures       07/14/17 1112 07/14/17 1107       How much help from another person do you currently need...    Turning from your back to your side while in flat bed without using bedrails? 4  -LM (r) MC (t) LM (c)      Moving from lying on back to sitting on the side of a flat bed without bedrails? 4  -LM (r) MC (t) LM (c)      Moving to and from a bed to a chair (including a wheelchair)? 3  -LM (r) MC (t) LM (c)      Standing up from a chair using your arms (e.g., wheelchair, bedside chair)? 3  -LM (r) MC (t) LM (c)      Climbing 3-5 steps with a railing? 2  -LM (r) MC (t) LM (c)       To walk in hospital room? 1  -LM (r) MC (t) LM (c)      AM-PAC 6 Clicks Score 17  -LM (r) MC (t)      How much help from another is currently needed...    Putting on and taking off regular lower body clothing?  3  -AH     Bathing (including washing, rinsing, and drying)  3  -AH     Toileting (which includes using toilet bed pan or urinal)  3  -AH     Putting on and taking off regular upper body clothing  4  -AH     Taking care of personal grooming (such as brushing teeth)  4  -AH     Eating meals  4  -     Score  21  -     Functional Assessment    Outcome Measure Options AM-PAC 6 Clicks Basic Mobility (PT)  -LM (r) MC (t) LM (c) AM-PAC 6 Clicks Daily Activity (OT)  -       User Key  (r) = Recorded By, (t) = Taken By, (c) = Cosigned By    Initials Name Provider Type     Grace Landrum Occupational Therapist    LM Xiao Serrato, PT Physical Therapist    MC Sheldon Villanueva, PT Student PT Student          Time Calculation:         Time Calculation- OT       07/14/17 1435          Time Calculation- OT    OT Start Time 1107  -      OT Received On 07/14/17  -        User Key  (r) = Recorded By, (t) = Taken By, (c) = Cosigned By    Initials Name Provider Type     Grace Landrum Occupational Therapist          Therapy Charges for Today     Code Description Service Date Service Provider Modifiers Qty    [id number]  OT EVAL LOW COMPLEXITY 4 7/14/2017 Grace Landrum GO 1               OT Discharge Summary  Anticipated Discharge Disposition: home with home health    Grace Landrum  7/14/2017

## 2017-07-14 NOTE — PLAN OF CARE
Problem: Patient Care Overview (Adult)  Goal: Plan of Care Review  Outcome: Ongoing (interventions implemented as appropriate)    07/14/17 0504   Coping/Psychosocial Response Interventions   Plan Of Care Reviewed With patient   Patient Care Overview   Progress no change   Outcome Evaluation   Outcome Summary/Follow up Plan Pt transferred from ICU. Pt rested well throughout the night.       Goal: Adult Individualization and Mutuality  Outcome: Ongoing (interventions implemented as appropriate)  Goal: Discharge Needs Assessment  Outcome: Ongoing (interventions implemented as appropriate)    Problem: Diabetes, Type 2 (Adult)  Goal: Signs and Symptoms of Listed Potential Problems Will be Absent or Manageable (Diabetes, Type 2)  Outcome: Ongoing (interventions implemented as appropriate)    07/14/17 0504   Diabetes, Type 2   Problems Assessed (Type 2 Diabetes) all   Problems Present (Type 2 Diabetes) impaired glycemic control         Problem: Skin Integrity Impairment, Risk/Actual (Adult)  Goal: Skin Integrity/Wound Healing  Outcome: Ongoing (interventions implemented as appropriate)    07/14/17 0504   Skin Integrity Impairment, Risk/Actual (Adult)   Skin Integrity/Wound Healing making progress toward outcome         Problem: Pancreatitis, Acute/Chronic (Adult)  Goal: Signs and Symptoms of Listed Potential Problems Will be Absent or Manageable (Pancreatitis, Acute/Chronic)  Outcome: Ongoing (interventions implemented as appropriate)    07/14/17 0504   Pancreatitis, Acute/Chronic   Problems Assessed (Pancreatitis) all   Problems Present (Pancreatitis) pain

## 2017-07-15 LAB — ACINETOBACTER SCREEN CX: NORMAL

## 2017-07-16 LAB — MRSA SPEC QL CULT: NORMAL

## 2017-07-17 LAB
BACTERIA FLD CULT: NORMAL
VRE SPEC QL CULT: ABNORMAL

## 2017-07-31 ENCOUNTER — HOSPITAL ENCOUNTER (EMERGENCY)
Facility: HOSPITAL | Age: 57
Discharge: HOME | End: 2017-07-31
Attending: EMERGENCY MEDICINE | Admitting: EMERGENCY MEDICINE

## 2017-07-31 ENCOUNTER — APPOINTMENT (OUTPATIENT)
Dept: GENERAL RADIOLOGY | Facility: HOSPITAL | Age: 57
End: 2017-07-31

## 2017-07-31 VITALS
DIASTOLIC BLOOD PRESSURE: 90 MMHG | OXYGEN SATURATION: 99 % | HEART RATE: 82 BPM | HEIGHT: 68 IN | SYSTOLIC BLOOD PRESSURE: 145 MMHG | TEMPERATURE: 97.7 F | RESPIRATION RATE: 18 BRPM | WEIGHT: 133 LBS | BODY MASS INDEX: 20.16 KG/M2

## 2017-07-31 DIAGNOSIS — R07.89 ACUTE CHEST WALL PAIN: Primary | ICD-10-CM

## 2017-07-31 LAB
ALBUMIN SERPL-MCNC: 3.8 G/DL (ref 3.5–5)
ALBUMIN/GLOB SERPL: 1.1 G/DL (ref 1–2)
ALP SERPL-CCNC: 70 U/L (ref 38–126)
ALT SERPL W P-5'-P-CCNC: 22 U/L (ref 13–69)
AMYLASE SERPL-CCNC: 48 U/L (ref 30–110)
ANION GAP SERPL CALCULATED.3IONS-SCNC: 20.6 MMOL/L
AST SERPL-CCNC: 32 U/L (ref 15–46)
BASOPHILS # BLD AUTO: 0.07 10*3/MM3 (ref 0–0.2)
BASOPHILS NFR BLD AUTO: 1.1 % (ref 0–2.5)
BILIRUB SERPL-MCNC: 1 MG/DL (ref 0.2–1.3)
BUN BLD-MCNC: 33 MG/DL (ref 7–20)
BUN/CREAT SERPL: 6.2 (ref 6.3–21.9)
CALCIUM SPEC-SCNC: 9.6 MG/DL (ref 8.4–10.2)
CHLORIDE SERPL-SCNC: 82 MMOL/L (ref 98–107)
CO2 SERPL-SCNC: 30 MMOL/L (ref 26–30)
CREAT BLD-MCNC: 5.3 MG/DL (ref 0.6–1.3)
DEPRECATED RDW RBC AUTO: 52.4 FL (ref 37–54)
EOSINOPHIL # BLD AUTO: 0.39 10*3/MM3 (ref 0–0.7)
EOSINOPHIL NFR BLD AUTO: 5.9 % (ref 0–7)
ERYTHROCYTE [DISTWIDTH] IN BLOOD BY AUTOMATED COUNT: 18 % (ref 11.5–14.5)
GFR SERPL CREATININE-BSD FRML MDRD: 11 ML/MIN/1.73
GLOBULIN UR ELPH-MCNC: 3.5 GM/DL
GLUCOSE BLD-MCNC: 125 MG/DL (ref 74–98)
HCT VFR BLD AUTO: 35.4 % (ref 42–52)
HGB BLD-MCNC: 11.4 G/DL (ref 14–18)
IMM GRANULOCYTES # BLD: 0.05 10*3/MM3 (ref 0–0.06)
IMM GRANULOCYTES NFR BLD: 0.8 % (ref 0–0.6)
LIPASE SERPL-CCNC: 40 U/L (ref 23–300)
LYMPHOCYTES # BLD AUTO: 0.94 10*3/MM3 (ref 0.6–3.4)
LYMPHOCYTES NFR BLD AUTO: 14.1 % (ref 10–50)
MCH RBC QN AUTO: 28.6 PG (ref 27–31)
MCHC RBC AUTO-ENTMCNC: 32.2 G/DL (ref 30–37)
MCV RBC AUTO: 88.9 FL (ref 80–94)
MONOCYTES # BLD AUTO: 0.72 10*3/MM3 (ref 0–0.9)
MONOCYTES NFR BLD AUTO: 10.8 % (ref 0–12)
NEUTROPHILS # BLD AUTO: 4.48 10*3/MM3 (ref 2–6.9)
NEUTROPHILS NFR BLD AUTO: 67.3 % (ref 37–80)
NRBC BLD MANUAL-RTO: 0.6 /100 WBC (ref 0–0)
PLATELET # BLD AUTO: 292 10*3/MM3 (ref 130–400)
PMV BLD AUTO: 10.1 FL (ref 6–12)
POTASSIUM BLD-SCNC: 5.6 MMOL/L (ref 3.5–5.1)
PROT SERPL-MCNC: 7.3 G/DL (ref 6.3–8.2)
RBC # BLD AUTO: 3.98 10*6/MM3 (ref 4.7–6.1)
SODIUM BLD-SCNC: 127 MMOL/L (ref 137–145)
TROPONIN I SERPL-MCNC: 0.01 NG/ML (ref 0–0.03)
WBC NRBC COR # BLD: 6.65 10*3/MM3 (ref 4.8–10.8)

## 2017-07-31 PROCEDURE — 83690 ASSAY OF LIPASE: CPT | Performed by: EMERGENCY MEDICINE

## 2017-07-31 PROCEDURE — 82150 ASSAY OF AMYLASE: CPT | Performed by: EMERGENCY MEDICINE

## 2017-07-31 PROCEDURE — 84484 ASSAY OF TROPONIN QUANT: CPT | Performed by: EMERGENCY MEDICINE

## 2017-07-31 PROCEDURE — 80053 COMPREHEN METABOLIC PANEL: CPT | Performed by: EMERGENCY MEDICINE

## 2017-07-31 PROCEDURE — 71010 HC CHEST PA OR AP: CPT

## 2017-07-31 PROCEDURE — 93005 ELECTROCARDIOGRAM TRACING: CPT | Performed by: EMERGENCY MEDICINE

## 2017-07-31 PROCEDURE — 99284 EMERGENCY DEPT VISIT MOD MDM: CPT

## 2017-07-31 PROCEDURE — 85025 COMPLETE CBC W/AUTO DIFF WBC: CPT | Performed by: EMERGENCY MEDICINE

## 2017-07-31 RX ORDER — SODIUM CHLORIDE 0.9 % (FLUSH) 0.9 %
10 SYRINGE (ML) INJECTION AS NEEDED
Status: DISCONTINUED | OUTPATIENT
Start: 2017-07-31 | End: 2017-07-31 | Stop reason: HOSPADM

## 2017-07-31 RX ADMIN — NITROGLYCERIN 1 INCH: 20 OINTMENT TOPICAL at 16:41

## 2017-08-02 ENCOUNTER — HOSPITAL ENCOUNTER (OUTPATIENT)
Dept: CT IMAGING | Facility: HOSPITAL | Age: 57
End: 2017-08-02
Attending: INTERNAL MEDICINE

## 2017-08-20 ENCOUNTER — APPOINTMENT (OUTPATIENT)
Dept: CT IMAGING | Facility: HOSPITAL | Age: 57
End: 2017-08-20

## 2017-08-20 ENCOUNTER — HOSPITAL ENCOUNTER (EMERGENCY)
Facility: HOSPITAL | Age: 57
Discharge: HOME | End: 2017-08-20
Attending: EMERGENCY MEDICINE | Admitting: EMERGENCY MEDICINE

## 2017-08-20 VITALS
DIASTOLIC BLOOD PRESSURE: 86 MMHG | HEIGHT: 68 IN | HEART RATE: 92 BPM | TEMPERATURE: 97.6 F | RESPIRATION RATE: 18 BRPM | BODY MASS INDEX: 20 KG/M2 | SYSTOLIC BLOOD PRESSURE: 142 MMHG | WEIGHT: 132 LBS | OXYGEN SATURATION: 99 %

## 2017-08-20 DIAGNOSIS — J18.9 PNEUMONIA OF RIGHT LUNG DUE TO INFECTIOUS ORGANISM, UNSPECIFIED PART OF LUNG: Primary | ICD-10-CM

## 2017-08-20 LAB
ALBUMIN SERPL-MCNC: 3.9 G/DL (ref 3.5–5)
ALBUMIN/GLOB SERPL: 1.1 G/DL (ref 1–2)
ALP SERPL-CCNC: 71 U/L (ref 38–126)
ALT SERPL W P-5'-P-CCNC: 25 U/L (ref 13–69)
ANION GAP SERPL CALCULATED.3IONS-SCNC: 18.8 MMOL/L
AST SERPL-CCNC: 28 U/L (ref 15–46)
BASOPHILS # BLD AUTO: 0.08 10*3/MM3 (ref 0–0.2)
BASOPHILS NFR BLD AUTO: 1.2 % (ref 0–2.5)
BILIRUB SERPL-MCNC: 0.8 MG/DL (ref 0.2–1.3)
BUN BLD-MCNC: 24 MG/DL (ref 7–20)
BUN/CREAT SERPL: 5.5 (ref 6.3–21.9)
CALCIUM SPEC-SCNC: 9.8 MG/DL (ref 8.4–10.2)
CHLORIDE SERPL-SCNC: 82 MMOL/L (ref 98–107)
CO2 SERPL-SCNC: 33 MMOL/L (ref 26–30)
CREAT BLD-MCNC: 4.4 MG/DL (ref 0.6–1.3)
DEPRECATED RDW RBC AUTO: 49.1 FL (ref 37–54)
EOSINOPHIL # BLD AUTO: 0.39 10*3/MM3 (ref 0–0.7)
EOSINOPHIL NFR BLD AUTO: 5.9 % (ref 0–7)
ERYTHROCYTE [DISTWIDTH] IN BLOOD BY AUTOMATED COUNT: 15.2 % (ref 11.5–14.5)
GFR SERPL CREATININE-BSD FRML MDRD: 14 ML/MIN/1.73
GLOBULIN UR ELPH-MCNC: 3.7 GM/DL
GLUCOSE BLD-MCNC: 346 MG/DL (ref 74–98)
HCT VFR BLD AUTO: 35.5 % (ref 42–52)
HGB BLD-MCNC: 11.6 G/DL (ref 14–18)
HOLD SPECIMEN: NORMAL
HOLD SPECIMEN: NORMAL
IMM GRANULOCYTES # BLD: 0.03 10*3/MM3 (ref 0–0.06)
IMM GRANULOCYTES NFR BLD: 0.5 % (ref 0–0.6)
LYMPHOCYTES # BLD AUTO: 0.72 10*3/MM3 (ref 0.6–3.4)
LYMPHOCYTES NFR BLD AUTO: 10.9 % (ref 10–50)
MCH RBC QN AUTO: 28.9 PG (ref 27–31)
MCHC RBC AUTO-ENTMCNC: 32.7 G/DL (ref 30–37)
MCV RBC AUTO: 88.3 FL (ref 80–94)
MONOCYTES # BLD AUTO: 0.79 10*3/MM3 (ref 0–0.9)
MONOCYTES NFR BLD AUTO: 12 % (ref 0–12)
NEUTROPHILS # BLD AUTO: 4.6 10*3/MM3 (ref 2–6.9)
NEUTROPHILS NFR BLD AUTO: 69.5 % (ref 37–80)
NRBC BLD MANUAL-RTO: 0 /100 WBC (ref 0–0)
NT-PROBNP SERPL-MCNC: ABNORMAL PG/ML (ref 0–125)
PLATELET # BLD AUTO: 244 10*3/MM3 (ref 130–400)
PMV BLD AUTO: 10 FL (ref 6–12)
POTASSIUM BLD-SCNC: 4.8 MMOL/L (ref 3.5–5.1)
PROT SERPL-MCNC: 7.6 G/DL (ref 6.3–8.2)
RBC # BLD AUTO: 4.02 10*6/MM3 (ref 4.7–6.1)
SODIUM BLD-SCNC: 129 MMOL/L (ref 137–145)
TROPONIN I SERPL-MCNC: 0.01 NG/ML (ref 0–0.03)
WBC NRBC COR # BLD: 6.61 10*3/MM3 (ref 4.8–10.8)
WHOLE BLOOD HOLD SPECIMEN: NORMAL
WHOLE BLOOD HOLD SPECIMEN: NORMAL

## 2017-08-20 PROCEDURE — 80053 COMPREHEN METABOLIC PANEL: CPT | Performed by: EMERGENCY MEDICINE

## 2017-08-20 PROCEDURE — 93005 ELECTROCARDIOGRAM TRACING: CPT | Performed by: EMERGENCY MEDICINE

## 2017-08-20 PROCEDURE — 83880 ASSAY OF NATRIURETIC PEPTIDE: CPT | Performed by: EMERGENCY MEDICINE

## 2017-08-20 PROCEDURE — 71250 CT THORAX DX C-: CPT

## 2017-08-20 PROCEDURE — 99284 EMERGENCY DEPT VISIT MOD MDM: CPT

## 2017-08-20 PROCEDURE — 85025 COMPLETE CBC W/AUTO DIFF WBC: CPT | Performed by: EMERGENCY MEDICINE

## 2017-08-20 PROCEDURE — 84484 ASSAY OF TROPONIN QUANT: CPT | Performed by: EMERGENCY MEDICINE

## 2017-08-20 RX ORDER — DOXYCYCLINE 100 MG/1
100 CAPSULE ORAL 2 TIMES DAILY
Qty: 13 CAPSULE | Refills: 0 | Status: SHIPPED | OUTPATIENT
Start: 2017-08-20 | End: 2017-08-27

## 2017-08-20 RX ORDER — DOXYCYCLINE HYCLATE 50 MG/1
100 CAPSULE ORAL ONCE
Status: DISCONTINUED | OUTPATIENT
Start: 2017-08-20 | End: 2017-08-20

## 2017-08-20 RX ORDER — DOXYCYCLINE 100 MG/1
100 CAPSULE ORAL ONCE
Status: COMPLETED | OUTPATIENT
Start: 2017-08-20 | End: 2017-08-20

## 2017-08-20 RX ORDER — DOCUSATE SODIUM 100 MG/1
100 CAPSULE, LIQUID FILLED ORAL EVERY 12 HOURS
COMMUNITY

## 2017-08-20 RX ORDER — SODIUM CHLORIDE 0.9 % (FLUSH) 0.9 %
10 SYRINGE (ML) INJECTION AS NEEDED
Status: DISCONTINUED | OUTPATIENT
Start: 2017-08-20 | End: 2017-08-21 | Stop reason: HOSPADM

## 2017-08-20 RX ADMIN — DOXYCYCLINE 100 MG: 100 CAPSULE ORAL at 23:14

## 2017-08-21 NOTE — ED PROVIDER NOTES
Subjective   History of Present Illness   57M w/ hx of ESRD (HD yesterday), recurrent right-sided pleural effusion with a nondraining a Pleurx catheter in place, CHF, anemia presenting with shortness of breath.  Patient describes dyspnea on exertion that started today.  Has a dry cough but that is unchanged from prior.  Denies any fevers, chills, chest pain, leg swelling, or other specific symptoms.  States that currently he feels fine at rest and is breathing well.    Review of Systems   Respiratory: Positive for shortness of breath.    All other systems reviewed and are negative.      Past Medical History:   Diagnosis Date   • Anemia    • CHF (congestive heart failure)    • Chronic kidney disease    • Diabetes mellitus    • H/O chest x-ray 03/25/2016    Interval decrease in size of the patients right pleural effusion with a persistent small left effusion as well   • History of transfusion    • Hypertension    • Left-sided weakness    • Renal failure    • Stroke        Allergies   Allergen Reactions   • Erythromycin Hives       Past Surgical History:   Procedure Laterality Date   • ARTERIOVENOUS FISTULA Right    • CATARACT EXTRACTION, BILATERAL     • CHOLECYSTECTOMY     • COLONOSCOPY     • EYE SURGERY     • PORTACATH PLACEMENT     • VENOUS ACCESS DEVICE (PORT) REMOVAL         Family History   Problem Relation Age of Onset   • COPD Mother    • Hypertension Father    • Diabetes Father    • Hypertension Sister    • Diabetes Sister    • Hypertension Brother    • Diabetes Paternal Grandmother        Social History     Social History   • Marital status:      Spouse name: N/A   • Number of children: N/A   • Years of education: N/A     Social History Main Topics   • Smoking status: Never Smoker   • Smokeless tobacco: Never Used   • Alcohol use No   • Drug use: No   • Sexual activity: Defer     Other Topics Concern   • None     Social History Narrative   • None           Objective   Physical Exam   Constitutional: He  is oriented to person, place, and time. He appears well-developed and well-nourished. No distress.   HENT:   Head: Normocephalic and atraumatic.   Mouth/Throat: Oropharynx is clear and moist.   Eyes: No scleral icterus.   Neck: Normal range of motion. Neck supple. No tracheal deviation present.   Cardiovascular: Normal rate, regular rhythm, normal heart sounds and intact distal pulses.  Exam reveals no gallop and no friction rub.    No murmur heard.  Pulmonary/Chest: Effort normal and breath sounds normal. No stridor. No respiratory distress. He has no wheezes. He has no rales (fine crackles R>L base. ).   Pleurex catheter in place R lower chest w/o any surrounding erythema nor induration.   Abdominal: Soft. He exhibits no distension and no mass. There is no tenderness. There is no rebound and no guarding.   Musculoskeletal: Normal range of motion. He exhibits no deformity.   Neurological: He is alert and oriented to person, place, and time.   Skin: Skin is warm and dry. No rash noted. He is not diaphoretic. No erythema. No pallor.   Psychiatric: He has a normal mood and affect. His behavior is normal.   Nursing note and vitals reviewed.      Procedures         ED Course  ED Course                  MDM   49-year-old male with known recurrent pleural effusion here with shortness of breath.  Afebrile, vital signs stable.  O2 sat 91% on room air.  He does have crackles in the right more than the left base concerning for recurrent effusion versus other.  Given his, located pulmonary history, will get CT scan of the chest and send basic lab work.  We'll reassess.    9:55 PM CBC, CMP remarkable for mild pseudo-hyponatremia and hyperglycemia w/o anion gap.     10:58 PM Pt Continues to look well and remained stable.  Rest of labs are reassuring.  CT scan shows bilateral pleural effusions with It in place.  Right lung opacity somewhat worrisome for pneumonia.  We will treat for outpatient pneumonia allow to go home given the  patient's well appearance.  Discussed strict return to care precautions the importance of follow-up with his pulmonologist within the next 2-3 days for reassessment and possible drainage of his pleural effusions.  Patient agrees to this plan of care and understands strict return to care precautions.    Final diagnoses:   Pneumonia of right lung due to infectious organism, unspecified part of lung            Estrada Sorto MD  08/20/17 0371

## 2017-08-25 ENCOUNTER — APPOINTMENT (OUTPATIENT)
Dept: GENERAL RADIOLOGY | Facility: HOSPITAL | Age: 57
End: 2017-08-25

## 2017-08-25 ENCOUNTER — HOSPITAL ENCOUNTER (EMERGENCY)
Facility: HOSPITAL | Age: 57
Discharge: HOME | End: 2017-08-26
Attending: STUDENT IN AN ORGANIZED HEALTH CARE EDUCATION/TRAINING PROGRAM | Admitting: STUDENT IN AN ORGANIZED HEALTH CARE EDUCATION/TRAINING PROGRAM

## 2017-08-25 DIAGNOSIS — R06.02 SOB (SHORTNESS OF BREATH): Primary | ICD-10-CM

## 2017-08-25 DIAGNOSIS — J90 RECURRENT PLEURAL EFFUSION ON RIGHT: ICD-10-CM

## 2017-08-25 LAB
ALBUMIN SERPL-MCNC: 4 G/DL (ref 3.5–5)
ALBUMIN/GLOB SERPL: 1.1 G/DL (ref 1–2)
ALP SERPL-CCNC: 62 U/L (ref 38–126)
ALT SERPL W P-5'-P-CCNC: 29 U/L (ref 13–69)
ANION GAP SERPL CALCULATED.3IONS-SCNC: 20.5 MMOL/L
AST SERPL-CCNC: 40 U/L (ref 15–46)
BASOPHILS # BLD AUTO: 0.09 10*3/MM3 (ref 0–0.2)
BASOPHILS NFR BLD AUTO: 1.4 % (ref 0–2.5)
BILIRUB SERPL-MCNC: 0.9 MG/DL (ref 0.2–1.3)
BUN BLD-MCNC: 34 MG/DL (ref 7–20)
BUN/CREAT SERPL: 8.1 (ref 6.3–21.9)
CALCIUM SPEC-SCNC: 9.6 MG/DL (ref 8.4–10.2)
CHLORIDE SERPL-SCNC: 85 MMOL/L (ref 98–107)
CO2 SERPL-SCNC: 30 MMOL/L (ref 26–30)
CREAT BLD-MCNC: 4.2 MG/DL (ref 0.6–1.3)
D-LACTATE SERPL-SCNC: 0.8 MMOL/L (ref 0.5–2)
DEPRECATED RDW RBC AUTO: 49.1 FL (ref 37–54)
EOSINOPHIL # BLD AUTO: 0.52 10*3/MM3 (ref 0–0.7)
EOSINOPHIL NFR BLD AUTO: 8.3 % (ref 0–7)
ERYTHROCYTE [DISTWIDTH] IN BLOOD BY AUTOMATED COUNT: 15.1 % (ref 11.5–14.5)
GFR SERPL CREATININE-BSD FRML MDRD: 15 ML/MIN/1.73
GLOBULIN UR ELPH-MCNC: 3.6 GM/DL
GLUCOSE BLD-MCNC: 289 MG/DL (ref 74–98)
HCT VFR BLD AUTO: 35.5 % (ref 42–52)
HGB BLD-MCNC: 11.4 G/DL (ref 14–18)
IMM GRANULOCYTES # BLD: 0.02 10*3/MM3 (ref 0–0.06)
IMM GRANULOCYTES NFR BLD: 0.3 % (ref 0–0.6)
LYMPHOCYTES # BLD AUTO: 0.87 10*3/MM3 (ref 0.6–3.4)
LYMPHOCYTES NFR BLD AUTO: 13.9 % (ref 10–50)
MCH RBC QN AUTO: 28.5 PG (ref 27–31)
MCHC RBC AUTO-ENTMCNC: 32.1 G/DL (ref 30–37)
MCV RBC AUTO: 88.8 FL (ref 80–94)
MONOCYTES # BLD AUTO: 0.75 10*3/MM3 (ref 0–0.9)
MONOCYTES NFR BLD AUTO: 12 % (ref 0–12)
NEUTROPHILS # BLD AUTO: 4.02 10*3/MM3 (ref 2–6.9)
NEUTROPHILS NFR BLD AUTO: 64.1 % (ref 37–80)
NRBC BLD MANUAL-RTO: 0 /100 WBC (ref 0–0)
PLATELET # BLD AUTO: 240 10*3/MM3 (ref 130–400)
PMV BLD AUTO: 10.2 FL (ref 6–12)
POTASSIUM BLD-SCNC: 6.5 MMOL/L (ref 3.5–5.1)
PROT SERPL-MCNC: 7.6 G/DL (ref 6.3–8.2)
RBC # BLD AUTO: 4 10*6/MM3 (ref 4.7–6.1)
SODIUM BLD-SCNC: 129 MMOL/L (ref 137–145)
WBC NRBC COR # BLD: 6.27 10*3/MM3 (ref 4.8–10.8)

## 2017-08-25 PROCEDURE — 80053 COMPREHEN METABOLIC PANEL: CPT | Performed by: STUDENT IN AN ORGANIZED HEALTH CARE EDUCATION/TRAINING PROGRAM

## 2017-08-25 PROCEDURE — 99284 EMERGENCY DEPT VISIT MOD MDM: CPT

## 2017-08-25 PROCEDURE — 85025 COMPLETE CBC W/AUTO DIFF WBC: CPT | Performed by: STUDENT IN AN ORGANIZED HEALTH CARE EDUCATION/TRAINING PROGRAM

## 2017-08-25 PROCEDURE — 83605 ASSAY OF LACTIC ACID: CPT | Performed by: STUDENT IN AN ORGANIZED HEALTH CARE EDUCATION/TRAINING PROGRAM

## 2017-08-25 PROCEDURE — 93005 ELECTROCARDIOGRAM TRACING: CPT | Performed by: STUDENT IN AN ORGANIZED HEALTH CARE EDUCATION/TRAINING PROGRAM

## 2017-08-25 PROCEDURE — 71020 HC CHEST PA AND LATERAL: CPT

## 2017-08-25 RX ORDER — SODIUM CHLORIDE 0.9 % (FLUSH) 0.9 %
10 SYRINGE (ML) INJECTION AS NEEDED
Status: DISCONTINUED | OUTPATIENT
Start: 2017-08-25 | End: 2017-08-26 | Stop reason: HOSPADM

## 2017-08-26 VITALS
TEMPERATURE: 98.3 F | RESPIRATION RATE: 18 BRPM | OXYGEN SATURATION: 98 % | BODY MASS INDEX: 19.55 KG/M2 | HEIGHT: 68 IN | WEIGHT: 129 LBS | HEART RATE: 97 BPM | DIASTOLIC BLOOD PRESSURE: 83 MMHG | SYSTOLIC BLOOD PRESSURE: 136 MMHG

## 2017-08-26 LAB
HOLD SPECIMEN: NORMAL
WHOLE BLOOD HOLD SPECIMEN: NORMAL

## 2017-08-26 RX ORDER — OXYCODONE HYDROCHLORIDE AND ACETAMINOPHEN 5; 325 MG/1; MG/1
1 TABLET ORAL ONCE
Status: COMPLETED | OUTPATIENT
Start: 2017-08-26 | End: 2017-08-26

## 2017-08-26 RX ORDER — ONDANSETRON 4 MG/1
4 TABLET, ORALLY DISINTEGRATING ORAL EVERY 6 HOURS PRN
Qty: 20 TABLET | Refills: 0 | Status: SHIPPED | OUTPATIENT
Start: 2017-08-26 | End: 2019-01-01

## 2017-08-26 RX ORDER — OXYCODONE HYDROCHLORIDE AND ACETAMINOPHEN 5; 325 MG/1; MG/1
1 TABLET ORAL EVERY 6 HOURS PRN
Qty: 20 TABLET | Refills: 0 | Status: SHIPPED | OUTPATIENT
Start: 2017-08-26 | End: 2018-09-19 | Stop reason: HOSPADM

## 2017-08-26 RX ORDER — ONDANSETRON 4 MG/1
4 TABLET, FILM COATED ORAL ONCE
Status: COMPLETED | OUTPATIENT
Start: 2017-08-26 | End: 2017-08-26

## 2017-08-26 RX ADMIN — OXYCODONE AND ACETAMINOPHEN 1 TABLET: 5; 325 TABLET ORAL at 00:10

## 2017-08-26 RX ADMIN — ONDANSETRON 4 MG: 4 TABLET, FILM COATED ORAL at 00:40

## 2017-10-09 ENCOUNTER — HOSPITAL ENCOUNTER (EMERGENCY)
Facility: HOSPITAL | Age: 57
Discharge: LEFT BEFORE DISCHARGE | End: 2017-10-09

## 2017-10-09 VITALS
TEMPERATURE: 97.5 F | DIASTOLIC BLOOD PRESSURE: 85 MMHG | HEIGHT: 68 IN | OXYGEN SATURATION: 94 % | BODY MASS INDEX: 19.85 KG/M2 | WEIGHT: 131 LBS | SYSTOLIC BLOOD PRESSURE: 144 MMHG | RESPIRATION RATE: 16 BRPM | HEART RATE: 88 BPM

## 2017-10-30 ENCOUNTER — APPOINTMENT (OUTPATIENT)
Dept: GENERAL RADIOLOGY | Facility: HOSPITAL | Age: 57
End: 2017-10-30

## 2017-10-30 ENCOUNTER — APPOINTMENT (OUTPATIENT)
Dept: CT IMAGING | Facility: HOSPITAL | Age: 57
End: 2017-10-30

## 2017-10-30 ENCOUNTER — HOSPITAL ENCOUNTER (INPATIENT)
Facility: HOSPITAL | Age: 57
LOS: 3 days | Discharge: HOME | End: 2017-11-03
Attending: EMERGENCY MEDICINE | Admitting: INTERNAL MEDICINE

## 2017-10-30 DIAGNOSIS — J90 PLEURAL EFFUSION: ICD-10-CM

## 2017-10-30 DIAGNOSIS — J18.9 PNEUMONIA OF RIGHT LOWER LOBE DUE TO INFECTIOUS ORGANISM: Primary | ICD-10-CM

## 2017-10-30 DIAGNOSIS — R06.02 SOB (SHORTNESS OF BREATH): ICD-10-CM

## 2017-10-30 DIAGNOSIS — E87.5 HYPERKALEMIA: ICD-10-CM

## 2017-10-30 LAB
ANION GAP SERPL CALCULATED.3IONS-SCNC: 20.7 MMOL/L
BASOPHILS # BLD AUTO: 0.05 10*3/MM3 (ref 0–0.2)
BASOPHILS NFR BLD AUTO: 0.8 % (ref 0–2.5)
BUN BLD-MCNC: 42 MG/DL (ref 7–20)
BUN/CREAT SERPL: 7.1 (ref 6.3–21.9)
CALCIUM SPEC-SCNC: 9.8 MG/DL (ref 8.4–10.2)
CHLORIDE SERPL-SCNC: 89 MMOL/L (ref 98–107)
CO2 SERPL-SCNC: 27 MMOL/L (ref 26–30)
CREAT BLD-MCNC: 5.9 MG/DL (ref 0.6–1.3)
DEPRECATED RDW RBC AUTO: 62.4 FL (ref 37–54)
EOSINOPHIL # BLD AUTO: 0.39 10*3/MM3 (ref 0–0.7)
EOSINOPHIL NFR BLD AUTO: 6.6 % (ref 0–7)
ERYTHROCYTE [DISTWIDTH] IN BLOOD BY AUTOMATED COUNT: 18.9 % (ref 11.5–14.5)
GFR SERPL CREATININE-BSD FRML MDRD: 10 ML/MIN/1.73
GLUCOSE BLD-MCNC: 230 MG/DL (ref 74–98)
HCT VFR BLD AUTO: 34.9 % (ref 42–52)
HGB BLD-MCNC: 11.8 G/DL (ref 14–18)
IMM GRANULOCYTES # BLD: 0.02 10*3/MM3 (ref 0–0.06)
IMM GRANULOCYTES NFR BLD: 0.3 % (ref 0–0.6)
LYMPHOCYTES # BLD AUTO: 0.76 10*3/MM3 (ref 0.6–3.4)
LYMPHOCYTES NFR BLD AUTO: 12.9 % (ref 10–50)
MCH RBC QN AUTO: 30.9 PG (ref 27–31)
MCHC RBC AUTO-ENTMCNC: 33.8 G/DL (ref 30–37)
MCV RBC AUTO: 91.4 FL (ref 80–94)
MONOCYTES # BLD AUTO: 0.77 10*3/MM3 (ref 0–0.9)
MONOCYTES NFR BLD AUTO: 13.1 % (ref 0–12)
NEUTROPHILS # BLD AUTO: 3.9 10*3/MM3 (ref 2–6.9)
NEUTROPHILS NFR BLD AUTO: 66.3 % (ref 37–80)
NRBC BLD MANUAL-RTO: 0 /100 WBC (ref 0–0)
PLATELET # BLD AUTO: 183 10*3/MM3 (ref 130–400)
PMV BLD AUTO: 10.2 FL (ref 6–12)
POTASSIUM BLD-SCNC: 5.7 MMOL/L (ref 3.5–5.1)
RBC # BLD AUTO: 3.82 10*6/MM3 (ref 4.7–6.1)
SODIUM BLD-SCNC: 131 MMOL/L (ref 137–145)
WBC NRBC COR # BLD: 5.89 10*3/MM3 (ref 4.8–10.8)

## 2017-10-30 PROCEDURE — 25010000002 LEVOFLOXACIN PER 250 MG: Performed by: PHYSICIAN ASSISTANT

## 2017-10-30 PROCEDURE — 80048 BASIC METABOLIC PNL TOTAL CA: CPT | Performed by: PHYSICIAN ASSISTANT

## 2017-10-30 PROCEDURE — 63710000001 INSULIN REGULAR HUMAN PER 5 UNITS: Performed by: PHYSICIAN ASSISTANT

## 2017-10-30 PROCEDURE — G0378 HOSPITAL OBSERVATION PER HR: HCPCS

## 2017-10-30 PROCEDURE — 99284 EMERGENCY DEPT VISIT MOD MDM: CPT

## 2017-10-30 PROCEDURE — 87040 BLOOD CULTURE FOR BACTERIA: CPT | Performed by: PHYSICIAN ASSISTANT

## 2017-10-30 PROCEDURE — 99220 PR INITIAL OBSERVATION CARE/DAY 70 MINUTES: CPT | Performed by: INTERNAL MEDICINE

## 2017-10-30 PROCEDURE — 85025 COMPLETE CBC W/AUTO DIFF WBC: CPT | Performed by: PHYSICIAN ASSISTANT

## 2017-10-30 PROCEDURE — 71250 CT THORAX DX C-: CPT

## 2017-10-30 PROCEDURE — 93005 ELECTROCARDIOGRAM TRACING: CPT | Performed by: PHYSICIAN ASSISTANT

## 2017-10-30 PROCEDURE — 71020 HC CHEST PA AND LATERAL: CPT

## 2017-10-30 RX ORDER — DEXTROSE MONOHYDRATE 25 G/50ML
50 INJECTION, SOLUTION INTRAVENOUS
Status: DISCONTINUED | OUTPATIENT
Start: 2017-10-30 | End: 2017-10-31

## 2017-10-30 RX ORDER — SODIUM CHLORIDE 0.9 % (FLUSH) 0.9 %
10 SYRINGE (ML) INJECTION AS NEEDED
Status: DISCONTINUED | OUTPATIENT
Start: 2017-10-30 | End: 2017-11-03 | Stop reason: HOSPADM

## 2017-10-30 RX ORDER — LEVOFLOXACIN 5 MG/ML
750 INJECTION, SOLUTION INTRAVENOUS ONCE
Status: COMPLETED | OUTPATIENT
Start: 2017-10-30 | End: 2017-10-31

## 2017-10-30 RX ADMIN — DEXTROSE MONOHYDRATE 50 ML: 25 INJECTION, SOLUTION INTRAVENOUS at 22:56

## 2017-10-30 RX ADMIN — HUMAN INSULIN 10 UNITS: 100 INJECTION, SOLUTION SUBCUTANEOUS at 22:58

## 2017-10-30 RX ADMIN — LEVOFLOXACIN 750 MG: 5 INJECTION, SOLUTION INTRAVENOUS at 23:40

## 2017-10-31 LAB
GLUCOSE BLDC GLUCOMTR-MCNC: 213 MG/DL (ref 70–130)
GLUCOSE BLDC GLUCOMTR-MCNC: 236 MG/DL (ref 70–130)
GLUCOSE BLDC GLUCOMTR-MCNC: 78 MG/DL (ref 70–130)
GLUCOSE BLDC GLUCOMTR-MCNC: 86 MG/DL (ref 70–130)

## 2017-10-31 PROCEDURE — 63710000001 INSULIN ASPART PER 5 UNITS: Performed by: INTERNAL MEDICINE

## 2017-10-31 PROCEDURE — 87081 CULTURE SCREEN ONLY: CPT | Performed by: INTERNAL MEDICINE

## 2017-10-31 PROCEDURE — 80074 ACUTE HEPATITIS PANEL: CPT | Performed by: INTERNAL MEDICINE

## 2017-10-31 PROCEDURE — 90935 HEMODIALYSIS ONE EVALUATION: CPT

## 2017-10-31 PROCEDURE — 63710000001 INSULIN DETEMIR PER 5 UNITS: Performed by: INTERNAL MEDICINE

## 2017-10-31 PROCEDURE — 86706 HEP B SURFACE ANTIBODY: CPT | Performed by: INTERNAL MEDICINE

## 2017-10-31 PROCEDURE — 82962 GLUCOSE BLOOD TEST: CPT

## 2017-10-31 PROCEDURE — 25010000002 PIPERACILLIN SOD-TAZOBACTAM PER 1 G: Performed by: INTERNAL MEDICINE

## 2017-10-31 PROCEDURE — 25010000002 ENOXAPARIN PER 10 MG: Performed by: INTERNAL MEDICINE

## 2017-10-31 PROCEDURE — 99225 PR SBSQ OBSERVATION CARE/DAY 25 MINUTES: CPT | Performed by: NURSE PRACTITIONER

## 2017-10-31 PROCEDURE — 25010000002 VANCOMYCIN PER 500 MG: Performed by: INTERNAL MEDICINE

## 2017-10-31 PROCEDURE — G0378 HOSPITAL OBSERVATION PER HR: HCPCS

## 2017-10-31 RX ORDER — ONDANSETRON 2 MG/ML
4 INJECTION INTRAMUSCULAR; INTRAVENOUS EVERY 6 HOURS PRN
Status: DISCONTINUED | OUTPATIENT
Start: 2017-10-31 | End: 2017-11-03 | Stop reason: HOSPADM

## 2017-10-31 RX ORDER — LOSARTAN POTASSIUM 50 MG/1
100 TABLET ORAL
Status: DISCONTINUED | OUTPATIENT
Start: 2017-10-31 | End: 2017-11-03 | Stop reason: HOSPADM

## 2017-10-31 RX ORDER — FOLIC ACID 1 MG/1
1 TABLET ORAL DAILY
Status: DISCONTINUED | OUTPATIENT
Start: 2017-10-31 | End: 2017-11-03 | Stop reason: HOSPADM

## 2017-10-31 RX ORDER — LEVOTHYROXINE SODIUM 0.1 MG/1
100 TABLET ORAL DAILY
Status: DISCONTINUED | OUTPATIENT
Start: 2017-10-31 | End: 2017-11-03 | Stop reason: HOSPADM

## 2017-10-31 RX ORDER — ATORVASTATIN CALCIUM 10 MG/1
10 TABLET, FILM COATED ORAL DAILY
Status: DISCONTINUED | OUTPATIENT
Start: 2017-10-31 | End: 2017-11-03 | Stop reason: HOSPADM

## 2017-10-31 RX ORDER — PANTOPRAZOLE SODIUM 40 MG/1
40 TABLET, DELAYED RELEASE ORAL EVERY MORNING
Status: DISCONTINUED | OUTPATIENT
Start: 2017-10-31 | End: 2017-11-03 | Stop reason: HOSPADM

## 2017-10-31 RX ORDER — DIPHENHYDRAMINE HCL 25 MG
50 CAPSULE ORAL NIGHTLY PRN
Status: DISCONTINUED | OUTPATIENT
Start: 2017-10-31 | End: 2017-11-03 | Stop reason: HOSPADM

## 2017-10-31 RX ORDER — METOPROLOL SUCCINATE 100 MG/1
100 TABLET, EXTENDED RELEASE ORAL DAILY
Status: DISCONTINUED | OUTPATIENT
Start: 2017-10-31 | End: 2017-11-03 | Stop reason: HOSPADM

## 2017-10-31 RX ORDER — METOCLOPRAMIDE 5 MG/1
5 TABLET ORAL 2 TIMES DAILY
COMMUNITY
End: 2018-07-07

## 2017-10-31 RX ORDER — NICOTINE POLACRILEX 4 MG
1 LOZENGE BUCCAL
Status: DISCONTINUED | OUTPATIENT
Start: 2017-10-31 | End: 2017-11-03 | Stop reason: HOSPADM

## 2017-10-31 RX ORDER — DEXTROSE MONOHYDRATE 25 G/50ML
25 INJECTION, SOLUTION INTRAVENOUS
Status: DISCONTINUED | OUTPATIENT
Start: 2017-10-31 | End: 2017-11-03 | Stop reason: HOSPADM

## 2017-10-31 RX ORDER — ACETAMINOPHEN 325 MG/1
650 TABLET ORAL EVERY 4 HOURS PRN
Status: DISCONTINUED | OUTPATIENT
Start: 2017-10-31 | End: 2017-11-03 | Stop reason: HOSPADM

## 2017-10-31 RX ORDER — SODIUM CHLORIDE 0.9 % (FLUSH) 0.9 %
1-10 SYRINGE (ML) INJECTION AS NEEDED
Status: DISCONTINUED | OUTPATIENT
Start: 2017-10-31 | End: 2017-11-03 | Stop reason: HOSPADM

## 2017-10-31 RX ORDER — DOCUSATE SODIUM 100 MG/1
100 CAPSULE, LIQUID FILLED ORAL 2 TIMES DAILY
Status: DISCONTINUED | OUTPATIENT
Start: 2017-10-31 | End: 2017-11-03 | Stop reason: HOSPADM

## 2017-10-31 RX ORDER — AMLODIPINE BESYLATE 5 MG/1
10 TABLET ORAL DAILY
Status: DISCONTINUED | OUTPATIENT
Start: 2017-10-31 | End: 2017-11-03 | Stop reason: HOSPADM

## 2017-10-31 RX ORDER — ASPIRIN 325 MG
325 TABLET ORAL DAILY
Status: DISCONTINUED | OUTPATIENT
Start: 2017-10-31 | End: 2017-11-03 | Stop reason: HOSPADM

## 2017-10-31 RX ADMIN — PANTOPRAZOLE SODIUM 40 MG: 40 TABLET, DELAYED RELEASE ORAL at 06:45

## 2017-10-31 RX ADMIN — ASPIRIN 325 MG ORAL TABLET 325 MG: 325 PILL ORAL at 09:17

## 2017-10-31 RX ADMIN — LEVOTHYROXINE SODIUM 100 MCG: 100 TABLET ORAL at 09:16

## 2017-10-31 RX ADMIN — INSULIN ASPART 3 UNITS: 100 INJECTION, SOLUTION INTRAVENOUS; SUBCUTANEOUS at 06:45

## 2017-10-31 RX ADMIN — LOSARTAN POTASSIUM 100 MG: 50 TABLET, FILM COATED ORAL at 09:16

## 2017-10-31 RX ADMIN — TAZOBACTAM SODIUM AND PIPERACILLIN SODIUM 2.25 G: 250; 2 INJECTION, SOLUTION INTRAVENOUS at 12:45

## 2017-10-31 RX ADMIN — INSULIN ASPART 3 UNITS: 100 INJECTION, SOLUTION INTRAVENOUS; SUBCUTANEOUS at 22:27

## 2017-10-31 RX ADMIN — ENOXAPARIN SODIUM 30 MG: 30 INJECTION SUBCUTANEOUS at 09:16

## 2017-10-31 RX ADMIN — VANCOMYCIN HYDROCHLORIDE 500 MG: 500 INJECTION, POWDER, LYOPHILIZED, FOR SOLUTION INTRAVENOUS at 21:02

## 2017-10-31 RX ADMIN — ATORVASTATIN CALCIUM 10 MG: 10 TABLET, FILM COATED ORAL at 09:16

## 2017-10-31 RX ADMIN — INSULIN DETEMIR 8 UNITS: 100 INJECTION, SOLUTION SUBCUTANEOUS at 09:16

## 2017-10-31 RX ADMIN — TAZOBACTAM SODIUM AND PIPERACILLIN SODIUM 3.38 G: 375; 3 INJECTION, SOLUTION INTRAVENOUS at 03:41

## 2017-10-31 RX ADMIN — FOLIC ACID 1 MG: 1 TABLET ORAL at 09:17

## 2017-10-31 RX ADMIN — VANCOMYCIN HYDROCHLORIDE 1250 MG: 500 INJECTION, POWDER, LYOPHILIZED, FOR SOLUTION INTRAVENOUS at 05:13

## 2017-10-31 RX ADMIN — AMLODIPINE BESYLATE 10 MG: 5 TABLET ORAL at 09:16

## 2017-10-31 RX ADMIN — TAZOBACTAM SODIUM AND PIPERACILLIN SODIUM 2.25 G: 250; 2 INJECTION, SOLUTION INTRAVENOUS at 21:02

## 2017-10-31 RX ADMIN — METOPROLOL SUCCINATE 100 MG: 100 TABLET, EXTENDED RELEASE ORAL at 09:17

## 2017-11-01 LAB
ANION GAP SERPL CALCULATED.3IONS-SCNC: 20.7 MMOL/L
BASOPHILS # BLD AUTO: 0.06 10*3/MM3 (ref 0–0.2)
BASOPHILS NFR BLD AUTO: 1 % (ref 0–2.5)
BUN BLD-MCNC: 27 MG/DL (ref 7–20)
BUN/CREAT SERPL: 5.4 (ref 6.3–21.9)
CALCIUM SPEC-SCNC: 9.7 MG/DL (ref 8.4–10.2)
CHLORIDE SERPL-SCNC: 94 MMOL/L (ref 98–107)
CO2 SERPL-SCNC: 26 MMOL/L (ref 26–30)
CREAT BLD-MCNC: 5 MG/DL (ref 0.6–1.3)
DEPRECATED RDW RBC AUTO: 64.5 FL (ref 37–54)
EOSINOPHIL # BLD AUTO: 0.37 10*3/MM3 (ref 0–0.7)
EOSINOPHIL NFR BLD AUTO: 6.2 % (ref 0–7)
ERYTHROCYTE [DISTWIDTH] IN BLOOD BY AUTOMATED COUNT: 19 % (ref 11.5–14.5)
GFR SERPL CREATININE-BSD FRML MDRD: 12 ML/MIN/1.73
GLUCOSE BLD-MCNC: 186 MG/DL (ref 74–98)
GLUCOSE BLDC GLUCOMTR-MCNC: 154 MG/DL (ref 70–130)
GLUCOSE BLDC GLUCOMTR-MCNC: 194 MG/DL (ref 70–130)
GLUCOSE BLDC GLUCOMTR-MCNC: 197 MG/DL (ref 70–130)
GLUCOSE BLDC GLUCOMTR-MCNC: 328 MG/DL (ref 70–130)
HAV IGM SERPL QL IA: NEGATIVE
HBV CORE IGM SERPL QL IA: NEGATIVE
HBV SURFACE AB SER QL: REACTIVE
HBV SURFACE AG SERPL QL IA: NEGATIVE
HCT VFR BLD AUTO: 36.4 % (ref 42–52)
HCV AB S/CO SERPL IA: <0.1 S/CO RATIO (ref 0–0.9)
HGB BLD-MCNC: 11.8 G/DL (ref 14–18)
IMM GRANULOCYTES # BLD: 0.02 10*3/MM3 (ref 0–0.06)
IMM GRANULOCYTES NFR BLD: 0.3 % (ref 0–0.6)
LYMPHOCYTES # BLD AUTO: 0.71 10*3/MM3 (ref 0.6–3.4)
LYMPHOCYTES NFR BLD AUTO: 12 % (ref 10–50)
MCH RBC QN AUTO: 30.3 PG (ref 27–31)
MCHC RBC AUTO-ENTMCNC: 32.4 G/DL (ref 30–37)
MCV RBC AUTO: 93.6 FL (ref 80–94)
MONOCYTES # BLD AUTO: 1.04 10*3/MM3 (ref 0–0.9)
MONOCYTES NFR BLD AUTO: 17.5 % (ref 0–12)
MRSA SPEC QL CULT: NORMAL
NEUTROPHILS # BLD AUTO: 3.74 10*3/MM3 (ref 2–6.9)
NEUTROPHILS NFR BLD AUTO: 63 % (ref 37–80)
NRBC BLD MANUAL-RTO: 0 /100 WBC (ref 0–0)
PLATELET # BLD AUTO: 189 10*3/MM3 (ref 130–400)
PMV BLD AUTO: 10.1 FL (ref 6–12)
POTASSIUM BLD-SCNC: 5.7 MMOL/L (ref 3.5–5.1)
RBC # BLD AUTO: 3.89 10*6/MM3 (ref 4.7–6.1)
SODIUM BLD-SCNC: 135 MMOL/L (ref 137–145)
WBC NRBC COR # BLD: 5.94 10*3/MM3 (ref 4.8–10.8)

## 2017-11-01 PROCEDURE — 87205 SMEAR GRAM STAIN: CPT | Performed by: INTERNAL MEDICINE

## 2017-11-01 PROCEDURE — 25010000002 ENOXAPARIN PER 10 MG: Performed by: INTERNAL MEDICINE

## 2017-11-01 PROCEDURE — 87070 CULTURE OTHR SPECIMN AEROBIC: CPT | Performed by: INTERNAL MEDICINE

## 2017-11-01 PROCEDURE — 99225 PR SBSQ OBSERVATION CARE/DAY 25 MINUTES: CPT | Performed by: NURSE PRACTITIONER

## 2017-11-01 PROCEDURE — 25010000002 PIPERACILLIN SOD-TAZOBACTAM PER 1 G: Performed by: INTERNAL MEDICINE

## 2017-11-01 PROCEDURE — 85025 COMPLETE CBC W/AUTO DIFF WBC: CPT | Performed by: NURSE PRACTITIONER

## 2017-11-01 PROCEDURE — 63710000001 INSULIN DETEMIR PER 5 UNITS: Performed by: INTERNAL MEDICINE

## 2017-11-01 PROCEDURE — G0378 HOSPITAL OBSERVATION PER HR: HCPCS

## 2017-11-01 PROCEDURE — 80048 BASIC METABOLIC PNL TOTAL CA: CPT | Performed by: NURSE PRACTITIONER

## 2017-11-01 PROCEDURE — 63710000001 INSULIN ASPART PER 5 UNITS: Performed by: INTERNAL MEDICINE

## 2017-11-01 PROCEDURE — 99223 1ST HOSP IP/OBS HIGH 75: CPT | Performed by: INTERNAL MEDICINE

## 2017-11-01 PROCEDURE — 94799 UNLISTED PULMONARY SVC/PX: CPT

## 2017-11-01 PROCEDURE — 82962 GLUCOSE BLOOD TEST: CPT

## 2017-11-01 RX ORDER — FOLIC ACID/VIT B COMPLEX AND C 0.8 MG
1 TABLET ORAL DAILY
Status: DISCONTINUED | OUTPATIENT
Start: 2017-11-01 | End: 2017-11-03 | Stop reason: HOSPADM

## 2017-11-01 RX ORDER — SODIUM POLYSTYRENE SULFONATE 15 G/60ML
15 SUSPENSION ORAL; RECTAL ONCE
Status: COMPLETED | OUTPATIENT
Start: 2017-11-01 | End: 2017-11-01

## 2017-11-01 RX ADMIN — TAZOBACTAM SODIUM AND PIPERACILLIN SODIUM 2.25 G: 250; 2 INJECTION, SOLUTION INTRAVENOUS at 11:40

## 2017-11-01 RX ADMIN — DOCUSATE SODIUM 100 MG: 100 CAPSULE, LIQUID FILLED ORAL at 18:14

## 2017-11-01 RX ADMIN — LEVOTHYROXINE SODIUM 100 MCG: 100 TABLET ORAL at 08:39

## 2017-11-01 RX ADMIN — Medication 1 TABLET: at 12:38

## 2017-11-01 RX ADMIN — ENOXAPARIN SODIUM 30 MG: 30 INJECTION SUBCUTANEOUS at 09:00

## 2017-11-01 RX ADMIN — INSULIN ASPART 2 UNITS: 100 INJECTION, SOLUTION INTRAVENOUS; SUBCUTANEOUS at 18:13

## 2017-11-01 RX ADMIN — AMLODIPINE BESYLATE 10 MG: 5 TABLET ORAL at 08:39

## 2017-11-01 RX ADMIN — TAZOBACTAM SODIUM AND PIPERACILLIN SODIUM 2.25 G: 250; 2 INJECTION, SOLUTION INTRAVENOUS at 05:07

## 2017-11-01 RX ADMIN — INSULIN DETEMIR 8 UNITS: 100 INJECTION, SOLUTION SUBCUTANEOUS at 08:40

## 2017-11-01 RX ADMIN — INSULIN ASPART 2 UNITS: 100 INJECTION, SOLUTION INTRAVENOUS; SUBCUTANEOUS at 21:29

## 2017-11-01 RX ADMIN — INSULIN ASPART 2 UNITS: 100 INJECTION, SOLUTION INTRAVENOUS; SUBCUTANEOUS at 11:50

## 2017-11-01 RX ADMIN — DOCUSATE SODIUM 100 MG: 100 CAPSULE, LIQUID FILLED ORAL at 08:40

## 2017-11-01 RX ADMIN — METOPROLOL SUCCINATE 100 MG: 100 TABLET, EXTENDED RELEASE ORAL at 08:39

## 2017-11-01 RX ADMIN — INSULIN ASPART 5 UNITS: 100 INJECTION, SOLUTION INTRAVENOUS; SUBCUTANEOUS at 06:53

## 2017-11-01 RX ADMIN — ATORVASTATIN CALCIUM 10 MG: 10 TABLET, FILM COATED ORAL at 08:39

## 2017-11-01 RX ADMIN — FOLIC ACID 1 MG: 1 TABLET ORAL at 08:39

## 2017-11-01 RX ADMIN — SODIUM POLYSTYRENE SULFONATE 15 G: 15 SUSPENSION ORAL; RECTAL at 14:23

## 2017-11-01 RX ADMIN — LOSARTAN POTASSIUM 100 MG: 50 TABLET, FILM COATED ORAL at 08:39

## 2017-11-01 RX ADMIN — PANTOPRAZOLE SODIUM 40 MG: 40 TABLET, DELAYED RELEASE ORAL at 06:53

## 2017-11-01 RX ADMIN — TAZOBACTAM SODIUM AND PIPERACILLIN SODIUM 2.25 G: 250; 2 INJECTION, SOLUTION INTRAVENOUS at 20:29

## 2017-11-01 RX ADMIN — ASPIRIN 325 MG ORAL TABLET 325 MG: 325 PILL ORAL at 08:39

## 2017-11-01 NOTE — CONSULTS
"Date of consultation:   November 1, 2017    Requested by:   Hospitalist Service.     PCP: Idalia Kay MD      Reason:  HCAP. Pleural effusion.       History of Present Illness:  57 y.o. male with past medical history significant for ESRD and recurrent pleural effusion who has been managed by [facility].  He came in with complaints of cough and increasing shortness of breath for the past day or 2.  The patient actually underwent dialysis on Saturday and noticed that he started having cough and shortness of breath shortly thereafter.  He also noticed pain on the right side of the chest with deep inspiration.  The patient apparently had no issues whatsoever during dialysis.    The patient denies any fever or chills.  He did however, have some \"cold sweats\".  He denies any recent sick contacts.    Upon arrival to the emergency room he was admitted to the hospitalist service.  He had a CT of the chest performed which suggested right-sided pneumonia.  Currently the patient says that he feels somewhat better.    Review of System:  All other review of systems negative except indicated in HPI.    Past Medical History:  Past Medical History:   Diagnosis Date   • Anemia    • CHF (congestive heart failure)    • Chronic kidney disease    • Diabetes mellitus    • H/O chest x-ray 03/25/2016    Interval decrease in size of the patients right pleural effusion with a persistent small left effusion as well   • History of transfusion    • Hypertension    • Left-sided weakness    • Renal failure    • Stroke          Past Surgical History:  Past Surgical History:   Procedure Laterality Date   • ARTERIOVENOUS FISTULA Right    • CATARACT EXTRACTION, BILATERAL     • CHOLECYSTECTOMY     • COLONOSCOPY     • EYE SURGERY     • PORTACATH PLACEMENT     • VENOUS ACCESS DEVICE (PORT) REMOVAL           Family History:  Family History   Problem Relation Age of Onset   • COPD Mother    • Hypertension Father    • Diabetes " "Father    • Hypertension Sister    • Diabetes Sister    • Hypertension Brother    • Diabetes Paternal Grandmother          Social History:  Social History     Social History   • Marital status:      Spouse name: N/A   • Number of children: N/A   • Years of education: N/A     Social History Main Topics   • Smoking status: Never Smoker   • Smokeless tobacco: Never Used   • Alcohol use No   • Drug use: No   • Sexual activity: Defer     Other Topics Concern   • None     Social History Narrative         Physical Exam:  /83  Pulse 95  Temp 98.6 °F (37 °C) (Oral)   Resp 18  Ht 68\" (172.7 cm)  Wt 129 lb 12.8 oz (58.9 kg)  SpO2 96%  BMI 19.74 kg/m2    General:              No respiratory distress noted.        Eyes:                EOM sluggish               Conjunctiva appeared somewhat pale. Pupils seemed equal        ENT:                 Oropharynx was moist. No lesions noted.               Missing teeth noted         Neck:                 No JVD.                 Thyroid did not seem to be enlarged.        Cardiovascular:                S1 + S2.  Regular.        Respiratory:                 Respiratory effort was adequate.                 Good [place] Entry Bilaterally with right basal crackles heard.        Abdomen:                Soft. Bowel sounds positive.  No obvious organomegaly noted. Mild epigastric tenderness noted.      Musculoskeletal/Extremities:                No edema noted in the lower extremities.                Right AV graft noted                No clubbing noted.                Gait could not be assesed at this time.        Neurologic/Psychiatric:                AAOx3.                  Affect was appropriate                Was able to follow simple commands        Skin:               Warm and dry.               Chronic skin changes in lower extremities        Labs:   Reviewed. Pertinent labs were noted.     Lab Results   Component Value Date    WBC 5.89 10/30/2017    HGB 11.8 (L) " 10/30/2017    HCT 34.9 (L) 10/30/2017    MCV 91.4 10/30/2017     10/30/2017       Lab Results   Component Value Date    GLUCOSE 230 (H) 10/30/2017    CALCIUM 9.8 10/30/2017     (L) 10/30/2017    K 5.7 (C) 10/30/2017    CO2 27.0 10/30/2017    CL 89 (L) 10/30/2017    BUN 42 (H) 10/30/2017    CREATININE 5.90 (H) 10/30/2017    EGFRIFNONA 10 (L) 10/30/2017    BCR 7.1 10/30/2017    ANIONGAP 20.7 10/30/2017         Imaging Study: Images reviewed personally and discussed with patient     Imaging Results (last 72 hours)     Procedure Component Value Units Date/Time    CT Chest Without Contrast [[id number]] Collected:  10/30/17 2214     Updated:  10/30/17 2215    Narrative:       FINAL REPORT    TECHNIQUE:  Routine axial images were obtained from the lung apices to below  the diaphragm without contrast.    CLINICAL HISTORY:  soa cough    FINDINGS:  There are small bilateral pleural effusions. There are air space  opacities involving the right lower and right middle lobes  worrisome for pneumonia. The left lung is clear. There are  multiple enlarged mediastinal nodes, probably reactive but  follow CT is recommended for reevaluation. There is extensive  vascular calcification involving the coronary arteries and the  upper abdomen. There is no significant osseous abnormality.      Impression:       Right lung opacities worrisome for pneumonia. Bilateral pleural  effusions and mediastinal adenopathy of uncertain etiology.    Authenticated by Brice Sutton M.D. on 10/30/2017 10:14:07 PM    XR Chest 2 View [[id number]] Collected:  10/31/17 0747     Updated:  10/31/17 0750    Narrative:       PROCEDURE: XR CHEST 2 VW-        HISTORY: cough soa     COMPARISON: August 25, 2017.     FINDINGS: The heart is enlarged. The mediastinum is unremarkable. A  right effusion and right basilar airspace disease is essentially  unchanged. While the airspace disease may reflect atelectasis and  underlying pneumonia is not excluded. The left  lung is clear. There is  no pneumothorax. There are no acute osseous abnormalities.           Impression:       Small right pleural effusion and right lung airspace disease  which may reflect atelectasis or pneumonia.         This report was finalized on 10/31/2017 7:48 AM by Marilia Mora M.D..            Assessment:  1.  Right-sided healthcare associated pneumonia  2.  Chronic right-sided pleural effusion.  Status post Pleurx catheter placement and removal.  3.  ESRD.  On hemodialysis  4.  Mediastinal lymphadenopathy    Discussion/Recommendations:   Based on the CT findings, I do believe that the patient has right-sided healthcare associated pneumonia.  I agree with broad-spectrum antibiotics at this time.  I will order a sputum culture and sensitivity.    We will also repeat a chest x-ray in the morning.    As far as mediastinal lymph nodes are concerned, he is being followed closely by [facility] and have recommended him to continue follow-up with them.  The patient is not entirely clear but it seems that he was supposed to undergo a biopsy, although he insisted it was canceled?.    I told the patient that the lymph nodes have persisted and some shape or form and since he's already had initial workup and discussion with [facility] pulmonology group, I have asked him to continue follow-up with them and to discuss further strategies with him as well.    As far as his pleural effusion is concerned, it is definitely not recurrent to the same extent and seems to have responded to the Pleurx catheter placement.    Some parts of history, ROS & physical exam were obtained by and with the APRN.     All the relevant labs, radiology images, echocardiograms etc were reviewed with the APRN. Plan was also discussed in detail with APRN.     The plan was discussed with the patient.  I have also discussed the case with the nursing staff.    Recommendations were also discussed with the referring  provider.     I would like to thank you for the opportunity to participate in the care of this patient.  We will communicate changes and recommendations, if and when necessary.      Ean Hernandez MD  11/01/17  9:10 AM    Dictated utilizing Dragon dictation.

## 2017-11-01 NOTE — PLAN OF CARE
Problem: Patient Care Overview (Adult)  Goal: Plan of Care Review  Outcome: Ongoing (interventions implemented as appropriate)    Problem: Fall Risk (Adult)  Goal: Absence of Falls  Outcome: Ongoing (interventions implemented as appropriate)    Problem: Pneumonia (Adult)  Goal: Signs and Symptoms of Listed Potential Problems Will be Absent or Manageable (Pneumonia)  Outcome: Ongoing (interventions implemented as appropriate)    Problem: Diabetes, Type 2 (Adult)  Goal: Signs and Symptoms of Listed Potential Problems Will be Absent or Manageable (Diabetes, Type 2)  Outcome: Ongoing (interventions implemented as appropriate)    Problem: Skin Integrity Impairment, Risk/Actual (Adult)  Goal: Skin Integrity/Wound Healing  Outcome: Ongoing (interventions implemented as appropriate)

## 2017-11-01 NOTE — PLAN OF CARE
Problem: Patient Care Overview (Adult)  Goal: Plan of Care Review  Outcome: Ongoing (interventions implemented as appropriate)    11/01/17 0526   Coping/Psychosocial Response Interventions   Plan Of Care Reviewed With patient   Patient Care Overview   Progress improving   Outcome Evaluation   Outcome Summary/Follow up Plan Respiratory status stable overnight.       Goal: Adult Individualization and Mutuality  Outcome: Ongoing (interventions implemented as appropriate)    10/31/17 0517 11/01/17 0526   Individualization   Patient Specific Preferences likes room dark at night --    Patient Specific Goals go home --    Patient Specific Interventions --  IV antibiotics       Goal: Discharge Needs Assessment  Outcome: Ongoing (interventions implemented as appropriate)    Problem: Fall Risk (Adult)  Goal: Absence of Falls  Outcome: Ongoing (interventions implemented as appropriate)    11/01/17 0526   Fall Risk (Adult)   Absence of Falls making progress toward outcome         Problem: Pneumonia (Adult)  Goal: Signs and Symptoms of Listed Potential Problems Will be Absent or Manageable (Pneumonia)  Outcome: Ongoing (interventions implemented as appropriate)    11/01/17 0526   Pneumonia   Problems Assessed (Pneumonia) all   Problems Present (Pneumonia) none         Problem: Diabetes, Type 2 (Adult)  Goal: Signs and Symptoms of Listed Potential Problems Will be Absent or Manageable (Diabetes, Type 2)  Outcome: Ongoing (interventions implemented as appropriate)    11/01/17 0526   Diabetes, Type 2   Problems Assessed (Type 2 Diabetes) all   Problems Present (Type 2 Diabetes) impaired glycemic control         Problem: Skin Integrity Impairment, Risk/Actual (Adult)  Goal: Identify Related Risk Factors and Signs and Symptoms  Outcome: Outcome(s) achieved Date Met:  11/01/17 11/01/17 0526   Skin Integrity Impairment, Risk/Actual   Skin Integrity Impairment, Risk/Actual: Related Risk Factors immobility;infection/disease process        Goal: Skin Integrity/Wound Healing  Outcome: Ongoing (interventions implemented as appropriate)    11/01/17 0526   Skin Integrity Impairment, Risk/Actual (Adult)   Skin Integrity/Wound Healing making progress toward outcome

## 2017-11-01 NOTE — PROGRESS NOTES
"Nephrology Progress Note.    LOS: 0 days    Patient Care Team:  Idalia Kay MD as PCP - General    Chief Complaint:    Chief Complaint   Patient presents with   • Cough       Subjective     Interval History:     Patient Complaints: none    Patient seen and examined this morning.  Events from last night noted.  Patient denies having any fevers chills.  No nausea or vomiting no abdominal pain.  Denies any chest pain or worsening shortness of breath.  There is no significant edema.   Patient also denies having new onset weakness of numbness of either extremity, Pulmonary note reviewed and details were discussed with the patient. He thinks he is getting better.    History taken from: patient      Review of Systems:    The pertinent  ROS was done and it is noted above, rest  was negative.    Objective     Vital Signs  /83  Pulse 95  Temp 98.6 °F (37 °C) (Oral)   Resp 18  Ht 68\" (172.7 cm)  Wt 129 lb 12.8 oz (58.9 kg)  SpO2 96%  BMI 19.74 kg/m2      I/O this shift:  In: 50 [IV Piggyback:50]  Out: -     Intake/Output Summary (Last 24 hours) at 11/01/17 1322  Last data filed at 11/01/17 1140   Gross per 24 hour   Intake              490 ml   Output             2500 ml   Net            -2010 ml       Physical Exam:    General Appearance: alert, oriented x 3, no acute distress,   HEENT: pupils round and reactive to light, oral mucosa dry, extra occular movements intact.  Neck: supple, no JVD, trachea midline  Lungs: Clear to Auscultation, unlabored breathing effort, about his baseline.  Heart: RRR, normal S1 and S2, no S3, no rub 2/6 CONNOR  Abdomen: soft, non-tender, no palpable bladder, present bowel sounds to auscultation  Extremities: no edema, cyanosis or clubbing.   Neuro: normal speech and mental status, grossly non focal.     Results Review:      Results from last 7 days  Lab Units 11/01/17  1228 10/30/17  2106   SODIUM mmol/L 135* 131*   POTASSIUM mmol/L 5.7* 5.7*   CHLORIDE mmol/L 94* 89* "   CO2 mmol/L 26.0 27.0   BUN mg/dL 27* 42*   CREATININE mg/dL 5.00* 5.90*   CALCIUM mg/dL 9.7 9.8   GLUCOSE mg/dL 186* 230*       Estimated Creatinine Clearance: 13.4 mL/min (by C-G formula based on Cr of 5).                  Results from last 7 days  Lab Units 11/01/17  1228 10/30/17  2106   WBC 10*3/mm3 5.94 5.89   HEMOGLOBIN g/dL 11.8* 11.8*   PLATELETS 10*3/mm3 189 183               Imaging Results (last 24 hours)     ** No results found for the last 24 hours. **          amLODIPine 10 mg Oral Daily   aspirin 325 mg Oral Daily   atorvastatin 10 mg Oral Daily   b complex-vitamin c-folic acid 1 tablet Oral Daily   docusate sodium 100 mg Oral BID   enoxaparin 30 mg Subcutaneous Daily   folic acid 1 mg Oral Daily   insulin aspart 0-7 Units Subcutaneous 4x Daily AC & at Bedtime   insulin detemir 8 Units Subcutaneous Daily   levothyroxine 100 mcg Oral Daily   losartan 100 mg Oral Q24H   metoprolol succinate  mg Oral Daily   pantoprazole 40 mg Oral QAM   piperacillin-tazobactam 2.25 g Intravenous Q8H       Pharmacy to dose vancomycin    [START ON 11/2/2017] vancomycin 1,000 mg       Medication Review:   Current Facility-Administered Medications   Medication Dose Route Frequency Provider Last Rate Last Dose   • acetaminophen (TYLENOL) tablet 650 mg  650 mg Oral Q4H PRN John Sosa MD       • amLODIPine (NORVASC) tablet 10 mg  10 mg Oral Daily John Ssoa MD   10 mg at 11/01/17 0839   • aspirin tablet 325 mg  325 mg Oral Daily John Sosa MD   325 mg at 11/01/17 0839   • atorvastatin (LIPITOR) tablet 10 mg  10 mg Oral Daily John Sosa MD   10 mg at 11/01/17 0839   • b complex-vitamin c-folic acid (NEPHRO-SHIRLENE) tablet 1 tablet  1 tablet Oral Daily Chance Mackey MD   1 tablet at 11/01/17 1238   • dextrose (D50W) solution 25 g  25 g Intravenous Q15 Min PRN John Sosa MD       • dextrose (GLUTOSE) oral gel 1 tube  1 tube Oral Q15 Min PRN John Sosa MD       • diphenhydrAMINE (BENADRYL)  capsule 50 mg  50 mg Oral Nightly PRN John Sosa MD       • docusate sodium (COLACE) capsule 100 mg  100 mg Oral BID John Sosa MD   100 mg at 11/01/17 0840   • enoxaparin (LOVENOX) syringe 30 mg  30 mg Subcutaneous Daily John Sosa MD   30 mg at 11/01/17 0900   • folic acid (FOLVITE) tablet 1 mg  1 mg Oral Daily John Sosa MD   1 mg at 11/01/17 0839   • glucagon (human recombinant) (GLUCAGEN DIAGNOSTIC) injection 1 mg  1 mg Subcutaneous Q15 Min PRN John Sosa MD       • insulin aspart (novoLOG) injection 0-7 Units  0-7 Units Subcutaneous 4x Daily AC & at Bedtime John Sosa MD   2 Units at 11/01/17 1150   • insulin detemir (LEVEMIR) injection 8 Units  8 Units Subcutaneous Daily John Sosa MD   8 Units at 11/01/17 0840   • levothyroxine (SYNTHROID, LEVOTHROID) tablet 100 mcg  100 mcg Oral Daily John Sosa MD   100 mcg at 11/01/17 0839   • losartan (COZAAR) tablet 100 mg  100 mg Oral Q24H oJhn Sosa MD   100 mg at 11/01/17 0839   • metoprolol succinate XL (TOPROL-XL) 24 hr tablet 100 mg  100 mg Oral Daily John Sosa MD   100 mg at 11/01/17 0839   • ondansetron (ZOFRAN) injection 4 mg  4 mg Intravenous Q6H PRN John Sosa MD       • pantoprazole (PROTONIX) EC tablet 40 mg  40 mg Oral QAM John Sosa MD   40 mg at 11/01/17 0653   • Pharmacy to dose vancomycin   Does not apply Continuous PRN John Sosa MD       • piperacillin-tazobactam (ZOSYN) in iso-osmotic dextrose IVPB 2.25 g (premix)  2.25 g Intravenous Q8H John Sosa MD 0 mL/hr at 10/31/17 1345 2.25 g at 11/01/17 1140   • sodium chloride 0.9 % bolus 1,000 mL  1,000 mL Intravenous PRN Chance Mackey MD       • sodium chloride 0.9 % flush 1-10 mL  1-10 mL Intravenous PRN John Sosa MD       • sodium chloride 0.9 % flush 10 mL  10 mL Intravenous PRN Bernard Chatterjee Jr., PA-C       • [START ON 11/2/2017] vancomycin 1000 mg in sodium chloride 0.9% 250 mL IVPB  1,000 mg Intravenous  Continuous PRN John Sosa MD           Assessment/Plan       1.   End stage renal disease on dialysis  2.   Uncontrolled diabetes mellitus with hyperglycemia, with long-term current use of insulin  3.   Chronic kidney disease-mineral and bone disorder  4.   Anemia of chronic kidney disease  5.   Type 2 diabetes mellitus treated with insulin  6.   Hypertension due to end stage renal disease caused by type 2 diabetes mellitus, on dialysis  7.   Physical debility  8.   Chronic respiratory insufficiency  9.   Pneumonia of right lower lobe due to infectious organism    Will continue his regular dialysis, TTS and was done yesterday without complication. Next dialysis is due tomorrow and will make the arrangements.  His pulmonary issues are being monitored, he has complicated pulmonary problem has been followed by  as outpatient as well. Dr. Hernandez note reviewed and discussed with the patient and the hospitalist service as well.    Details were discussed with the patient as well as family in the room.  Further recommendations will depend on clinical course of the patient during the current hospitalization.      I also discussed the details with the nursing staff.    Rest as ordered.    Chance Mackey MD  11/01/17  1:22 PM      EMR Dragon/Transcription disclaimer:   Much of this encounter note is an electronic transcription/translation of spoken language to printed text. The electronic translation of spoken language may permit erroneous, or at times, nonsensical words or phrases to be inadvertently transcribed; Although I have reviewed the note for such errors, some may still exist.

## 2017-11-01 NOTE — PROGRESS NOTES
PROGRESS NOTE        Date of Admission: 10/30/2017  Length of Stay: 0  Primary Care Physician: Idalia Kay MD    Subjective   Chief Complaint:  Follow up pleural effusions with possible pneumonia  HPI: Patient was seen today.  He was admitted for possible RLL pneumonia.  Dr. Hernandez with pulmonology was consulted.  He presented with SOA and right sided pleuritic chest pain.  I have spoken with pulmonology who does feel that this is an actual right sided health care associated pneumonia.  He is getting broad spectrum IV antibiotics.  Repeat chest xray ordered for am.  He is followed at  closely for mediastinal lymph nodes for which pulmonary recommends that he continues to follow up there.  There is question as to whether or not he is supposed to undergo a biopsy.  As for the pleural effusion it is not recurrent and does appear to have responded to pleurix catheter.  Patient is currently lying in bed and states that he is feeling better.  His SOA is better. He did undergo dialysis yesterday.      Review Of Systems:   Review of Systems   General ROS: Patient denies any fevers, chills or loss of consciousness.    Psychological ROS: Denies any hallucinations and delusions.  Ophthalmic ROS: Denies any diplopia or transient loss of vision.  ENT ROS: Denies sore throat, nasal congestion or ear pain.   Allergy and Immunology ROS: Denies rash or itching.  Hematological and Lymphatic ROS: Denies neck swelling or easy bleeding.  Endocrine ROS: Denies any recent unintentional weight gain or loss.  Breast ROS: Denies any pain or swelling.  Respiratory ROS: shortness of breath.  Pleuritic typle pain right side.     Cardiovascular ROS: Denies chest pain or palpitations. No history of exertional chest pain.   Gastrointestinal ROS: Denies nausea and vomiting. Denies any abdominal pain. No diarrhea.  Genito-Urinary ROS: Denies dysuria or hematuria.  Musculoskeletal ROS: Denies back pain. No muscle pain. No calf pain.    Neurological ROS: Denies any focal weakness. No loss of consciousness. Denies any numbness. Denies neck pain.   Dermatological ROS: Denies any redness or pruritis.    Objective      Temp:  [97.9 °F (36.6 °C)-98.6 °F (37 °C)] 98.6 °F (37 °C)  Heart Rate:  [86-95] 95  Resp:  [17-20] 18  BP: (124-150)/(68-89) 150/83  Physical Exam    General Appearance:  Alert and cooperative, not in any acute distress.   Head:  Atraumatic and normocephalic, without obvious abnormality.   Eyes:          PERRLA, conjunctivae and sclerae normal, no Icterus. No pallor. Extra-occular movements are within normal limits.   Ears:  Ears appear intact with no abnormalities noted.   Throat: No oral lesions, no thrush, oral mucosa moist.  Poor dentition   Neck: Supple, trachea midline, no thyromegaly, no carotid bruit.   Back:   No kyphoscoliosis present. No tenderness to palpation,   range of motion normal.   Lungs:   Chest shape is normal. Breath sounds heard bilaterally equally.  Few crackles in base. No Pleural rub or bronchial breathing.   Heart:  Normal S1 and S2, no murmur, no gallop, no rub. No JVD   Abdomen:   Normal bowel sounds, no masses, no organomegaly. Soft     non-tender, non-distended, no guarding, no rebound                tenderness   Extremities: Moves all extremities well, no edema, no cyanosis, no clubbing.  RUE AV graft   Pulses: Pulses palpable and equal bilaterally   Skin: No bleeding, bruising or rash   Lymph nodes: No palpable adenopathy   Neurologic:    Psychiatric/Behavior:     Cranial nerves 2 - 12 grossly intact, sensation intact, Motor power is normal and equal bilaterally.  Mood normal, behavior normal       Results Review:    I have reviewed the labs, radiology results and diagnostic studies.      Results from last 7 days  Lab Units 10/30/17  2106   WBC 10*3/mm3 5.89   HEMOGLOBIN g/dL 11.8*   PLATELETS 10*3/mm3 183       Results from last 7 days  Lab Units 10/30/17  2106   SODIUM mmol/L 131*   POTASSIUM mmol/L  5.7*   CO2 mmol/L 27.0   CREATININE mg/dL 5.90*   GLUCOSE mg/dL 230*       Culture Data:   Blood Culture   Date Value Ref Range Status   10/30/2017 No growth at less than 24 hours  Preliminary   10/30/2017 No growth at less than 24 hours  Preliminary     Radiology Data:   Cardiology Data:    I have reviewed the medications.    Assessment/Plan     Assessment and Plan:   1. Right lower lobe pneumonia, present on admission,  Continue IV antibitoics in the form of Vanc, Zosyn and Levaquin.  Dr. Hernandez has been consulted and following with hospitalist service.    2. Bilateral pleural effusion, chronic- Pumonology consulted for further evaluation and does not feel that this is recurrent has in fact responded to pleurx cath placement.    3. Acute hyperkalemia, due to # 4-      4. End-stage renal disease on hemodialysis, MW.  Dr. Mackey has been consulted.  He is due for dialysis in am.   5. 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  Insulin protocol.  Monitor BS and titrate accordingly.    6. Coronary artery disease on medical management.  7. Chronic debility.  8. Essential hypertension.  BP is stable.  Continue currently treatment regimen.          DVT prophylaxis:  Lovenox  Discharge Planning:   Judit Person, ALEXYS 11/01/17 12:07 PM

## 2017-11-02 ENCOUNTER — APPOINTMENT (OUTPATIENT)
Dept: GENERAL RADIOLOGY | Facility: HOSPITAL | Age: 57
End: 2017-11-02
Attending: INTERNAL MEDICINE

## 2017-11-02 LAB
ACINETOBACTER SCREEN CX: NORMAL
ANION GAP SERPL CALCULATED.3IONS-SCNC: 22.6 MMOL/L
BASOPHILS # BLD AUTO: 0.05 10*3/MM3 (ref 0–0.2)
BASOPHILS NFR BLD AUTO: 1.1 % (ref 0–2.5)
BUN BLD-MCNC: 33 MG/DL (ref 7–20)
BUN/CREAT SERPL: 5.5 (ref 6.3–21.9)
CALCIUM SPEC-SCNC: 9 MG/DL (ref 8.4–10.2)
CHLORIDE SERPL-SCNC: 92 MMOL/L (ref 98–107)
CO2 SERPL-SCNC: 22 MMOL/L (ref 26–30)
CREAT BLD-MCNC: 6 MG/DL (ref 0.6–1.3)
DEPRECATED RDW RBC AUTO: 60.8 FL (ref 37–54)
EOSINOPHIL # BLD AUTO: 0.22 10*3/MM3 (ref 0–0.7)
EOSINOPHIL NFR BLD AUTO: 4.6 % (ref 0–7)
ERYTHROCYTE [DISTWIDTH] IN BLOOD BY AUTOMATED COUNT: 18.1 % (ref 11.5–14.5)
GFR SERPL CREATININE-BSD FRML MDRD: 10 ML/MIN/1.73
GLUCOSE BLD-MCNC: 183 MG/DL (ref 74–98)
GLUCOSE BLDC GLUCOMTR-MCNC: 127 MG/DL (ref 70–130)
GLUCOSE BLDC GLUCOMTR-MCNC: 180 MG/DL (ref 70–130)
GLUCOSE BLDC GLUCOMTR-MCNC: 354 MG/DL (ref 70–130)
HCT VFR BLD AUTO: 29.9 % (ref 42–52)
HGB BLD-MCNC: 9.7 G/DL (ref 14–18)
IMM GRANULOCYTES # BLD: 0.01 10*3/MM3 (ref 0–0.06)
IMM GRANULOCYTES NFR BLD: 0.2 % (ref 0–0.6)
LYMPHOCYTES # BLD AUTO: 0.49 10*3/MM3 (ref 0.6–3.4)
LYMPHOCYTES NFR BLD AUTO: 10.3 % (ref 10–50)
MCH RBC QN AUTO: 29.6 PG (ref 27–31)
MCHC RBC AUTO-ENTMCNC: 32.4 G/DL (ref 30–37)
MCV RBC AUTO: 91.2 FL (ref 80–94)
MONOCYTES # BLD AUTO: 0.72 10*3/MM3 (ref 0–0.9)
MONOCYTES NFR BLD AUTO: 15.2 % (ref 0–12)
NEUTROPHILS # BLD AUTO: 3.25 10*3/MM3 (ref 2–6.9)
NEUTROPHILS NFR BLD AUTO: 68.6 % (ref 37–80)
NRBC BLD MANUAL-RTO: 0 /100 WBC (ref 0–0)
PLATELET # BLD AUTO: 167 10*3/MM3 (ref 130–400)
PMV BLD AUTO: 10.7 FL (ref 6–12)
POTASSIUM BLD-SCNC: 5.6 MMOL/L (ref 3.5–5.1)
RBC # BLD AUTO: 3.28 10*6/MM3 (ref 4.7–6.1)
SODIUM BLD-SCNC: 131 MMOL/L (ref 137–145)
VANCOMYCIN SERPL-MCNC: 17.84 MCG/ML (ref 5–40)
WBC NRBC COR # BLD: 4.74 10*3/MM3 (ref 4.8–10.8)

## 2017-11-02 PROCEDURE — 25010000002 ENOXAPARIN PER 10 MG: Performed by: INTERNAL MEDICINE

## 2017-11-02 PROCEDURE — 63710000001 INSULIN DETEMIR PER 5 UNITS: Performed by: INTERNAL MEDICINE

## 2017-11-02 PROCEDURE — 82962 GLUCOSE BLOOD TEST: CPT

## 2017-11-02 PROCEDURE — 80202 ASSAY OF VANCOMYCIN: CPT | Performed by: INTERNAL MEDICINE

## 2017-11-02 PROCEDURE — 90935 HEMODIALYSIS ONE EVALUATION: CPT

## 2017-11-02 PROCEDURE — 63710000001 INSULIN ASPART PER 5 UNITS: Performed by: INTERNAL MEDICINE

## 2017-11-02 PROCEDURE — 25010000002 PIPERACILLIN SOD-TAZOBACTAM PER 1 G: Performed by: INTERNAL MEDICINE

## 2017-11-02 PROCEDURE — 99232 SBSQ HOSP IP/OBS MODERATE 35: CPT | Performed by: INTERNAL MEDICINE

## 2017-11-02 PROCEDURE — 85025 COMPLETE CBC W/AUTO DIFF WBC: CPT | Performed by: NURSE PRACTITIONER

## 2017-11-02 PROCEDURE — 80048 BASIC METABOLIC PNL TOTAL CA: CPT | Performed by: NURSE PRACTITIONER

## 2017-11-02 PROCEDURE — 71020 HC CHEST PA AND LATERAL: CPT

## 2017-11-02 PROCEDURE — 25010000002 VANCOMYCIN PER 500 MG: Performed by: INTERNAL MEDICINE

## 2017-11-02 PROCEDURE — 99232 SBSQ HOSP IP/OBS MODERATE 35: CPT | Performed by: NURSE PRACTITIONER

## 2017-11-02 PROCEDURE — 87070 CULTURE OTHR SPECIMN AEROBIC: CPT | Performed by: INTERNAL MEDICINE

## 2017-11-02 RX ADMIN — TAZOBACTAM SODIUM AND PIPERACILLIN SODIUM 2.25 G: 250; 2 INJECTION, SOLUTION INTRAVENOUS at 11:55

## 2017-11-02 RX ADMIN — ENOXAPARIN SODIUM 30 MG: 30 INJECTION SUBCUTANEOUS at 08:13

## 2017-11-02 RX ADMIN — LEVOTHYROXINE SODIUM 100 MCG: 100 TABLET ORAL at 08:12

## 2017-11-02 RX ADMIN — INSULIN ASPART 2 UNITS: 100 INJECTION, SOLUTION INTRAVENOUS; SUBCUTANEOUS at 06:34

## 2017-11-02 RX ADMIN — TAZOBACTAM SODIUM AND PIPERACILLIN SODIUM 2.25 G: 250; 2 INJECTION, SOLUTION INTRAVENOUS at 03:45

## 2017-11-02 RX ADMIN — ASPIRIN 325 MG ORAL TABLET 325 MG: 325 PILL ORAL at 08:12

## 2017-11-02 RX ADMIN — LOSARTAN POTASSIUM 100 MG: 50 TABLET, FILM COATED ORAL at 08:12

## 2017-11-02 RX ADMIN — DOCUSATE SODIUM 100 MG: 100 CAPSULE, LIQUID FILLED ORAL at 08:12

## 2017-11-02 RX ADMIN — INSULIN ASPART 5 UNITS: 100 INJECTION, SOLUTION INTRAVENOUS; SUBCUTANEOUS at 21:35

## 2017-11-02 RX ADMIN — TAZOBACTAM SODIUM AND PIPERACILLIN SODIUM 2.25 G: 250; 2 INJECTION, SOLUTION INTRAVENOUS at 19:51

## 2017-11-02 RX ADMIN — AMLODIPINE BESYLATE 10 MG: 5 TABLET ORAL at 08:12

## 2017-11-02 RX ADMIN — VANCOMYCIN HYDROCHLORIDE 1000 MG: 1 INJECTION, POWDER, LYOPHILIZED, FOR SOLUTION INTRAVENOUS at 21:35

## 2017-11-02 RX ADMIN — INSULIN ASPART 6 UNITS: 100 INJECTION, SOLUTION INTRAVENOUS; SUBCUTANEOUS at 17:20

## 2017-11-02 RX ADMIN — DOCUSATE SODIUM 100 MG: 100 CAPSULE, LIQUID FILLED ORAL at 17:20

## 2017-11-02 RX ADMIN — ATORVASTATIN CALCIUM 10 MG: 10 TABLET, FILM COATED ORAL at 08:12

## 2017-11-02 RX ADMIN — FOLIC ACID 1 MG: 1 TABLET ORAL at 08:13

## 2017-11-02 RX ADMIN — PANTOPRAZOLE SODIUM 40 MG: 40 TABLET, DELAYED RELEASE ORAL at 06:14

## 2017-11-02 RX ADMIN — Medication 1 TABLET: at 08:13

## 2017-11-02 RX ADMIN — METOPROLOL SUCCINATE 100 MG: 100 TABLET, EXTENDED RELEASE ORAL at 08:13

## 2017-11-02 RX ADMIN — Medication 10 ML: at 19:51

## 2017-11-02 RX ADMIN — INSULIN DETEMIR 8 UNITS: 100 INJECTION, SOLUTION SUBCUTANEOUS at 08:13

## 2017-11-02 NOTE — PROGRESS NOTES
"CC: Abnormal CT. Right sided PNA.     S: Decreased right sided chest pain. Continues to have occasional cough. Clear sputum. No fever.     O: /81  Pulse 84  Temp 98.3 °F (36.8 °C) (Oral)   Resp 18  Ht 68\" (172.7 cm)  Wt 129 lb 12.8 oz (58.9 kg)  SpO2 100%  BMI 19.74 kg/m2    General: No respiratory distress noted.  Neck: No JVD   Cardiovascular: S1 + S2. Reg.   Respiratory: No wheezing heard. R>L crackles noted.   Extremities: No edema noted.  Neurologic:  AAOx3.  Was able to follow commands     Labs: Reviewed.       Results from last 7 days  Lab Units 11/02/17  0541 11/01/17  1228 10/30/17  2106   SODIUM mmol/L 131* 135* 131*   POTASSIUM mmol/L 5.6* 5.7* 5.7*   CHLORIDE mmol/L 92* 94* 89*   CO2 mmol/L 22.0* 26.0 27.0   BUN mg/dL 33* 27* 42*   CREATININE mg/dL 6.00* 5.00* 5.90*   CALCIUM mg/dL 9.0 9.7 9.8   GLUCOSE mg/dL 183* 186* 230*                 Results from last 7 days  Lab Units 11/02/17  0541 11/01/17  1228 10/30/17  2106   WBC 10*3/mm3 4.74* 5.94 5.89   HEMOGLOBIN g/dL 9.7* 11.8* 11.8*   PLATELETS 10*3/mm3 167 189 183         Pharmacy to dose vancomycin    vancomycin 1,000 mg       Assessment & Recommendations/Plan:   1.  Right-sided healthcare associated pneumonia  - Cont Abx.  - Awaiting culture data.  - Will recommend manual chest PT.     2.  Chronic right-sided pleural effusion.  Status post Pleurx catheter placement and removal.  - Will do Chest X Ray, after HD today.     3.  ESRD.  On hemodialysis    4.  Mediastinal lymphadenopathy  - See below.     He was once again reminded to keep his appointment with [facility] team, especially since he is being evaluated for possible lymph node biopsy.      Ean Hernandez MD  11/02/17  8:22 AM    Please note that portions of this note may have been completed with a voice recognition software. Every effort was made to edit the dictation, but occasionally words are mistranscribed.    "

## 2017-11-02 NOTE — PROGRESS NOTES
"Nephrology Progress Note.    LOS: 0 days    Patient Care Team:  Idalia Kay MD as PCP - General    Chief Complaint:    Chief Complaint   Patient presents with   • Cough       Subjective     Interval History:     Patient Complaints: none    Patient seen and examined this morning.  Events from last night noted.  Patient denies having any fevers chills.  No nausea or vomiting no abdominal pain.  Denies any chest pain or worsening shortness of breath.  There is no significant edema.   Patient also denies having new onset weakness of numbness of either extremity, Pulmonary note reviewed and details were discussed with the patient. He thinks he is getting better. Repeat X ray planned for later today.    History taken from: patient      Review of Systems:    The pertinent  ROS was done and it is noted above, rest  was negative.    Objective     Vital Signs  /81  Pulse 107  Temp 98 °F (36.7 °C) (Oral)   Resp 16  Ht 68\" (172.7 cm)  Wt 129 lb 12.8 oz (58.9 kg)  SpO2 97%  BMI 19.74 kg/m2           Intake/Output Summary (Last 24 hours) at 11/02/17 1004  Last data filed at 11/02/17 0545   Gross per 24 hour   Intake              150 ml   Output                0 ml   Net              150 ml       Physical Exam:    General Appearance: alert, oriented x 3, no acute distress,   HEENT: pupils round and reactive to light, oral mucosa dry, extra occular movements intact.  Neck: supple, no JVD, trachea midline  Lungs: Clear to Auscultation, unlabored breathing effort, about his baseline.  Heart: RRR, normal S1 and S2, no S3, no rub 2/6 CONNOR  Abdomen: soft, non-tender, no palpable bladder, present bowel sounds to auscultation  Extremities: no edema, cyanosis or clubbing.   Neuro: normal speech and mental status, grossly non focal.     Results Review:      Results from last 7 days  Lab Units 11/02/17  0541 11/01/17  1228 10/30/17  2106   SODIUM mmol/L 131* 135* 131*   POTASSIUM mmol/L 5.6* 5.7* 5.7*   CHLORIDE " mmol/L 92* 94* 89*   CO2 mmol/L 22.0* 26.0 27.0   BUN mg/dL 33* 27* 42*   CREATININE mg/dL 6.00* 5.00* 5.90*   CALCIUM mg/dL 9.0 9.7 9.8   GLUCOSE mg/dL 183* 186* 230*       Estimated Creatinine Clearance: 11.2 mL/min (by C-G formula based on Cr of 6).                  Results from last 7 days  Lab Units 11/02/17  0541 11/01/17  1228 10/30/17  2106   WBC 10*3/mm3 4.74* 5.94 5.89   HEMOGLOBIN g/dL 9.7* 11.8* 11.8*   PLATELETS 10*3/mm3 167 189 183               Imaging Results (last 24 hours)     ** No results found for the last 24 hours. **          amLODIPine 10 mg Oral Daily   aspirin 325 mg Oral Daily   atorvastatin 10 mg Oral Daily   b complex-vitamin c-folic acid 1 tablet Oral Daily   docusate sodium 100 mg Oral BID   enoxaparin 30 mg Subcutaneous Daily   folic acid 1 mg Oral Daily   insulin aspart 0-7 Units Subcutaneous 4x Daily AC & at Bedtime   insulin detemir 8 Units Subcutaneous Daily   levothyroxine 100 mcg Oral Daily   losartan 100 mg Oral Q24H   metoprolol succinate  mg Oral Daily   pantoprazole 40 mg Oral QAM   piperacillin-tazobactam 2.25 g Intravenous Q8H       Pharmacy to dose vancomycin    vancomycin 1,000 mg       Medication Review:   Current Facility-Administered Medications   Medication Dose Route Frequency Provider Last Rate Last Dose   • acetaminophen (TYLENOL) tablet 650 mg  650 mg Oral Q4H PRN John Sosa MD       • amLODIPine (NORVASC) tablet 10 mg  10 mg Oral Daily John Sosa MD   10 mg at 11/02/17 0812   • aspirin tablet 325 mg  325 mg Oral Daily John Sosa MD   325 mg at 11/02/17 0812   • atorvastatin (LIPITOR) tablet 10 mg  10 mg Oral Daily John Sosa MD   10 mg at 11/02/17 0812   • b complex-vitamin c-folic acid (NEPHRO-SHIRLENE) tablet 1 tablet  1 tablet Oral Daily Chance Mackey MD   1 tablet at 11/02/17 0813   • dextrose (D50W) solution 25 g  25 g Intravenous Q15 Min PRN John Sosa MD       • dextrose (GLUTOSE) oral gel 1 tube  1 tube Oral Q15 Min PRN  John Sosa MD       • diphenhydrAMINE (BENADRYL) capsule 50 mg  50 mg Oral Nightly PRN John Sosa MD       • docusate sodium (COLACE) capsule 100 mg  100 mg Oral BID John Sosa MD   100 mg at 11/02/17 0812   • enoxaparin (LOVENOX) syringe 30 mg  30 mg Subcutaneous Daily John Sosa MD   30 mg at 11/02/17 0813   • folic acid (FOLVITE) tablet 1 mg  1 mg Oral Daily John Sosa MD   1 mg at 11/02/17 0813   • glucagon (human recombinant) (GLUCAGEN DIAGNOSTIC) injection 1 mg  1 mg Subcutaneous Q15 Min PRN John Sosa MD       • insulin aspart (novoLOG) injection 0-7 Units  0-7 Units Subcutaneous 4x Daily AC & at Bedtime John Sosa MD   2 Units at 11/02/17 0634   • insulin detemir (LEVEMIR) injection 8 Units  8 Units Subcutaneous Daily John Sosa MD   8 Units at 11/02/17 0813   • levothyroxine (SYNTHROID, LEVOTHROID) tablet 100 mcg  100 mcg Oral Daily John Sosa MD   100 mcg at 11/02/17 0812   • losartan (COZAAR) tablet 100 mg  100 mg Oral Q24H John Sosa MD   100 mg at 11/02/17 0812   • metoprolol succinate XL (TOPROL-XL) 24 hr tablet 100 mg  100 mg Oral Daily John Sosa MD   100 mg at 11/02/17 0813   • ondansetron (ZOFRAN) injection 4 mg  4 mg Intravenous Q6H PRN John Sosa MD       • pantoprazole (PROTONIX) EC tablet 40 mg  40 mg Oral QAM John Sosa MD   40 mg at 11/02/17 0614   • Pharmacy to dose vancomycin   Does not apply Continuous PRN John Sosa MD       • piperacillin-tazobactam (ZOSYN) in iso-osmotic dextrose IVPB 2.25 g (premix)  2.25 g Intravenous Q8H John Sosa MD   Stopped at 11/02/17 0545   • sodium chloride 0.9 % bolus 1,000 mL  1,000 mL Intravenous PRN Chance Mackey MD       • sodium chloride 0.9 % flush 1-10 mL  1-10 mL Intravenous PRN John Sosa MD       • sodium chloride 0.9 % flush 10 mL  10 mL Intravenous PRN Bernard Chatterjee Jr., PA-C       • vancomycin 1000 mg in sodium chloride 0.9% 250 mL IVPB  1,000 mg  Intravenous Continuous PRN John Sosa MD           Assessment/Plan       1.   End stage renal disease on dialysis  2.   Uncontrolled diabetes mellitus with hyperglycemia, with long-term current use of insulin  3.   Chronic kidney disease-mineral and bone disorder  4.   Anemia of chronic kidney disease  5.   Type 2 diabetes mellitus treated with insulin  6.   Hypertension due to end stage renal disease caused by type 2 diabetes mellitus, on dialysis  7.   Physical debility  8.   Chronic respiratory insufficiency  9.   Pneumonia of right lower lobe due to infectious organism    Will continue his regular dialysis, TTS and was seen today while he was getting it done. It went without complication. Next dialysis is due Saturday and will make the arrangements. Labs reviewed and discussed.  His pulmonary issues are being monitored, he has complicated pulmonary problem has been followed by  as outpatient as well. Dr. Hernandez note reviewed and discussed with the patient and the hospitalist service as well. Continue the antibiotics.    Details were discussed with the patient as well as family in the room.  Further recommendations will depend on clinical course of the patient during the current hospitalization.      I also discussed the details with the nursing staff.    Rest as ordered.    Chance Mackey MD  11/02/17  10:04 AM      EMR Dragon/Transcription disclaimer:   Much of this encounter note is an electronic transcription/translation of spoken language to printed text. The electronic translation of spoken language may permit erroneous, or at times, nonsensical words or phrases to be inadvertently transcribed; Although I have reviewed the note for such errors, some may still exist.

## 2017-11-02 NOTE — PROGRESS NOTES
PROGRESS NOTE        Date of Admission: 10/30/2017  Length of Stay: 0  Primary Care Physician: Idalia Kay MD    Subjective   Chief Complaint:  Follow up pleural effusions with possible pneumonia  HPI: Patient was seen today.  He was admitted for possible RLL pneumonia.  Dr. Hernandez with pulmonology was consulted.  He presented with SOA and right sided pleuritic chest pain.  I have spoken with pulmonology who does feel that this is an actual right sided health care associated pneumonia.  He is getting broad spectrum IV antibiotics.  He is followed at  closely for mediastinal lymph nodes for which pulmonary recommends that he continues to follow up there.  There is question as to whether or not he is supposed to undergo a biopsy.  As for the pleural effusion it is not recurrent and does appear to have responded to pleurix catheter.  Patient is currently lying in bed and states that he is feeling better.  His SOA is better. He is currently undergoing dialysis.  Dr. Hernandez has ordered a repeat chest xray for after dialysis today then further decisions can be made regarding discharge.      Review Of Systems:   Review of Systems   General ROS: Patient denies any fevers, chills or loss of consciousness.    Psychological ROS: Denies any hallucinations and delusions.  Ophthalmic ROS: Denies any diplopia or transient loss of vision.  ENT ROS: Denies sore throat, nasal congestion or ear pain.   Allergy and Immunology ROS: Denies rash or itching.  Hematological and Lymphatic ROS: Denies neck swelling or easy bleeding.  Endocrine ROS: Denies any recent unintentional weight gain or loss.  Breast ROS: Denies any pain or swelling.  Respiratory ROS: shortness of breath.  Pleuritic typle pain right side.     Cardiovascular ROS: Denies chest pain or palpitations. No history of exertional chest pain.   Gastrointestinal ROS: Denies nausea and vomiting. Denies any abdominal pain. No diarrhea.  Genito-Urinary ROS: Denies  dysuria or hematuria.  Musculoskeletal ROS: Denies back pain. No muscle pain. No calf pain.   Neurological ROS: Denies any focal weakness. No loss of consciousness. Denies any numbness. Denies neck pain.   Dermatological ROS: Denies any redness or pruritis.    Objective      Temp:  [97.5 °F (36.4 °C)-98.3 °F (36.8 °C)] 98 °F (36.7 °C)  Heart Rate:  [] 84  Resp:  [16-18] 18  BP: (119-147)/(64-81) 133/74  Physical Exam    General Appearance:  Alert and cooperative, not in any acute distress.   Head:  Atraumatic and normocephalic, without obvious abnormality.   Eyes:          PERRLA, conjunctivae and sclerae normal, no Icterus. No pallor. Extra-occular movements are within normal limits.   Ears:  Ears appear intact with no abnormalities noted.   Throat: No oral lesions, no thrush, oral mucosa moist.  Poor dentition   Neck: Supple, trachea midline, no thyromegaly, no carotid bruit.   Back:   No kyphoscoliosis present. No tenderness to palpation,   range of motion normal.   Lungs:   Chest shape is normal. Breath sounds heard bilaterally equally.  Few crackles right lobe. No Pleural rub or bronchial breathing.   Heart:  Normal S1 and S2, no murmur, no gallop, no rub. No JVD   Abdomen:   Normal bowel sounds, no masses, no organomegaly. Soft     non-tender, non-distended, no guarding, no rebound                tenderness   Extremities: Moves all extremities well, no edema, no cyanosis, no clubbing.  RUE AV graft   Pulses: Pulses palpable and equal bilaterally   Skin: No bleeding, bruising or rash   Lymph nodes: No palpable adenopathy   Neurologic:    Psychiatric/Behavior:     Cranial nerves 2 - 12 grossly intact, sensation intact, Motor power is normal and equal bilaterally.  Mood normal, behavior normal       Results Review:    I have reviewed the labs, radiology results and diagnostic studies.      Results from last 7 days  Lab Units 11/02/17  0541   WBC 10*3/mm3 4.74*   HEMOGLOBIN g/dL 9.7*   PLATELETS 10*3/mm3 167        Results from last 7 days  Lab Units 11/02/17  0541   SODIUM mmol/L 131*   POTASSIUM mmol/L 5.6*   CO2 mmol/L 22.0*   CREATININE mg/dL 6.00*   GLUCOSE mg/dL 183*       Culture Data:   Blood Culture   Date Value Ref Range Status   10/30/2017 No growth at less than 24 hours  Preliminary   10/30/2017 No growth at less than 24 hours  Preliminary     Radiology Data:   Cardiology Data:    I have reviewed the medications.    Assessment/Plan     Assessment and Plan:   1. Right lower lobe pneumonia, present on admission,  Continue IV antibitoics in the form of Vanc, Zosyn and Levaquin.  Dr. Hernandez has been consulted and following with hospitalist service.    2. Bilateral pleural effusion, chronic- Pumonology consulted for further evaluation and does not feel that this is recurrent has in fact responded to pleurx cath placement.  A repeat chest xray has been ordered for after dialysis.  Will await finding and recommendation per pulmonary and then make decisions regarding discharge planning.    3. Acute hyperkalemia, due to # 4-  Patient did get kayexalate yesterday and is getting dialysis currently.      4. End-stage renal disease on hemodialysis, MWF.  Dr. Mackey has been consulted.  He is currently getting dialysis.  .   5. 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  Insulin protocol.  Monitor BS and titrate accordingly.  BS are currently stable.    6. Coronary artery disease on medical management.  7. Chronic debility.  8. Essential hypertension.  BP is stable.  Continue currently treatment regimen.          DVT prophylaxis:  Lovenox  Discharge Planning:   ALEXYS Gunderson 11/02/17 1:16 PM

## 2017-11-02 NOTE — PAYOR COMM NOTE
"TO:HUMANA   FROM:TROY BURTON RN PHONE [phone]  INPT CLINICALS    Talha Storm (57 y.o. 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)   Code Status:  FULL    Ht:  68\" (172.7 cm)   Wt:  129 lb 12.8 oz (58.9 kg)    Admission Cmt:  None   Principal Problem:  None                Active Insurance as of 10/30/2017     Primary Coverage     Payor Plan Insurance Group Employer/Plan Group    HUMANA MEDICARE REPLACEMENT HUMANA MEDICARE REPL [id number]     Payor Plan Address Payor Plan Phone Number Effective From Effective To    [address] [address] [phone] 1/1/2014     [place] [address]       Subscriber Name Subscriber Birth Date Member ID       TALHA STORM 1960 [id number]                 Emergency Contacts      (Rel.) Home Phone Work Phone Mobile Phone    Mallika Storm (Spouse) [phone] -- --               History & Physical      John Sosa MD at 10/30/2017 11:25 PM              [facility] Room Medicine Services  HISTORY AND PHYSICAL    Primary Care Physician: Idalia Kay MD    Subjective     Chief Complaint:    Chief Complaint   Patient presents with   • Cough       History of Present Illness:     I have reviewed labs/imaging/records from this hospitalization, including ER staff to establish a comprehensive understanding of this patient's clinical issues, as well as to " establish plan of care appropriately.     Mr. Cutler is a 57 year old male with medical history of chronic right-sided loculated recurrent pleural effusions s/p Plurex catheter placement and removal, on home oxygen therapy, ESRD on hemodialysis, MWF, type 2 diabetes mellitus, coronary artery disease, history of CVA with left sided hemiparesis, hypertension, who presented to the ER with complaints of right sided rib pain for 1 day. He states that he has had his Pleurx catheter taken out about 6 weeks ago after which he followed up with his pulmonologist at . He states that no clear reason for the recurrent pleural effusions has been identified. He also states that he was supposed to have biopsy of his lymph nodes but this was canceled. He states that he has chronic shortness of breath and this has not worsened over the past few days and he is not coughing more than usual but when he does cough he feels as if he has thick phlegm that would not come out. He denies any fevers or chills. Denies any lower extremity edema, abdominal pain, diarrhea, dysuria or hematuria.     In the ER, his vitals were stable and labs were at baseline, with exception of potassium which was elevated at 5.7. He received dextrose with insulin for this. A CXR was performed which showed questionable effusions and right sided infiltrate and subsequently a CT scan was ordered for better evaluation. CT showed bilateral pleural effusions and right lower lobe infiltrate. He was initiated on IV antibiotics and admitted for further management.       Review of Systems   1. Constitutional: Negative for fever. Negative for chills, diaphoresis, fatigue and unexpected weight change.   2. HENT: Negative for congestion and hearing loss.   3. Eyes: Negative for redness and visual disturbance.   4. Respiratory: + shortness of breath. Negative for chest pain . + cough and chest tightness.   5. Cardiovascular: Negative for chest pain and palpitations.  "  6. Gastrointestinal: Negative for abdominal distention, abdominal pain and blood in stool.   7. Endocrine: Negative for cold intolerance and heat intolerance.   8. Genitourinary: Negative for difficulty urinating, dysuria and frequency.   9. Musculoskeletal: Negative for arthralgias, back pain and myalgias.   10. Skin: Negative for color change, + wound on right anterior shin  11. Neurological: Negative for syncope, weakness and headaches.   12. Hematological: Negative for adenopathy. Does not bruise/bleed easily.   13. Psychiatric/Behavioral: Negative for confusion. The patient is not nervous/anxious.     Past Medical History:   Past Medical History:   Diagnosis Date   • Anemia    • CHF (congestive heart failure)    • Chronic kidney disease    • Diabetes mellitus    • H/O chest x-ray 03/25/2016    Interval decrease in size of the patients right pleural effusion with a persistent small left effusion as well   • History of transfusion    • Hypertension    • Left-sided weakness    • Renal failure    • Stroke        Past Surgical History:  Past Surgical History:   Procedure Laterality Date   • ARTERIOVENOUS FISTULA Right    • CATARACT EXTRACTION, BILATERAL     • CHOLECYSTECTOMY     • COLONOSCOPY     • EYE SURGERY     • PORTACATH PLACEMENT     • VENOUS ACCESS DEVICE (PORT) REMOVAL         Family History: family history includes COPD in his mother; Diabetes in his father, paternal grandmother, and sister; Hypertension in his brother, father, and sister.    Social History:  reports that he has never smoked. He has never used smokeless tobacco. He reports that he does not drink alcohol or use illicit drugs.    Medications:    (Not in a hospital admission)    Allergies:  Allergies   Allergen Reactions   • Erythromycin Hives         Objective     Physical Exam:  Vital Signs: /78 (BP Location: Right arm, Patient Position: Lying)  Pulse 90  Temp 98.2 °F (36.8 °C) (Oral)   Resp 16  Ht 68\" (172.7 cm)  Wt 128 lb " (58.1 kg)  SpO2 96%  BMI 19.46 kg/m2     Physical Exam:     General Appearance:   Alert, cooperative, in no acute distress.     Head:   Normocephalic, without obvious abnormality, atraumatic.     Eyes:       Normal, conjunctivae and sclerae, no icterus, no pallor, corneas clear, PERRLA        Throat:   Oral mucosa dry      Neck:  No adenopathy, supple, trachea midline, no thyromegaly, no carotid bruit, no JVD      Back:   No CVA tenderness on Percussion.     Lungs:    Decreased air entry on the right side, diminished breath sounds at the right lung base, mild rales bilaterally, no wheezing      Heart:   Regular rhythm and normal rate, normal S1 and S2.       Abdomen:   Obese. Normal bowel sounds, no masses, no organomegaly, soft non-tender, non-distended, no guarding, no rebound tenderness        Extremities:  Moves all extremities, no edema, no cyanosis, no redness, left upper extremity weakness (chronic), right upper extremity fistula with thrill and bruit   Pulses:  Pulses palpable and equal bilaterally but weak.     Skin:  No bleeding, abrasions noted on left hand, right anterior shin with healed sound       Neurologic:  Cranial nerves grossly intact, move all extremities         Results Review:  Lab Results (last 7 days)     Procedure Component Value Units Date/Time    CBC Auto Differential [[id number]]  (Abnormal) Collected:  10/30/17 2106    Specimen:  Blood Updated:  10/30/17 2116     WBC 5.89 10*3/mm3      RBC 3.82 (L) 10*6/mm3      Hemoglobin 11.8 (L) g/dL      Hematocrit 34.9 (L) %      MCV 91.4 fL      MCH 30.9 pg      MCHC 33.8 g/dL      RDW 18.9 (H) %      RDW-SD 62.4 (H) fl      MPV 10.2 fL      Platelets 183 10*3/mm3      Neutrophil % 66.3 %      Lymphocyte % 12.9 %      Monocyte % 13.1 (H) %      Eosinophil % 6.6 %      Basophil % 0.8 %      Immature Grans % 0.3 %      Neutrophils, Absolute 3.90 10*3/mm3      Lymphocytes, Absolute 0.76 10*3/mm3      Monocytes, Absolute 0.77 10*3/mm3       Eosinophils, Absolute 0.39 10*3/mm3      Basophils, Absolute 0.05 10*3/mm3      Immature Grans, Absolute 0.02 10*3/mm3      nRBC 0.0 /100 WBC     Basic Metabolic Panel [[id number]]  (Abnormal) Collected:  10/30/17 2106    Specimen:  Blood Updated:  10/30/17 2137     Glucose 230 (H) mg/dL      BUN 42 (H) mg/dL      Creatinine 5.90 (H) mg/dL      Sodium 131 (L) mmol/L      Potassium 5.7 (C) mmol/L      Chloride 89 (L) mmol/L      CO2 27.0 mmol/L      Calcium 9.8 mg/dL      eGFR Non African Amer 10 (L) mL/min/1.73      BUN/Creatinine Ratio 7.1     Anion Gap 20.7 mmol/L     Narrative:       Abnormal estimated GFR should be followed by more specific studies to confirm end stage chronic renal disease. The equation used for calculation may not be accurate for patients less than 19 years old, greater than 70 years old, patients at extremes of weight, malnutrition, or with acute renal dysfunction.        Imaging Results (last 72 hours)     Procedure Component Value Units Date/Time    XR Chest 2 View [[id number]] Updated:  10/30/17 2041    CT Chest Without Contrast [[id number]] Collected:  10/30/17 2214     Updated:  10/30/17 2215    Narrative:       FINAL REPORT    TECHNIQUE:  Routine axial images were obtained from the lung apices to below  the diaphragm without contrast.    CLINICAL HISTORY:  soa cough    FINDINGS:  There are small bilateral pleural effusions. There are air space  opacities involving the right lower and right middle lobes  worrisome for pneumonia. The left lung is clear. There are  multiple enlarged mediastinal nodes, probably reactive but  follow CT is recommended for reevaluation. There is extensive  vascular calcification involving the coronary arteries and the  upper abdomen. There is no significant osseous abnormality.      Impression:       Right lung opacities worrisome for pneumonia. Bilateral pleural  effusions and mediastinal adenopathy of uncertain etiology.    Authenticated by Brice Sutton M.D. on  10/30/2017 10:14:07 PM          I have personally reviewed and interpreted available lab data, radiology studies and ECG obtained at time of admission.     Assessment / Plan     Assessment/Problem List:   Active Problems:    Pneumonia of right lower lobe due to infectious organism    1. Right lower lobe pneumonia, present on admission, this does appear to be chronic  2. Bilateral pleural effusion, chronic  3. Acute hyperkalemia, due to # 4  4. End-stage renal disease on hemodialysis, MWF.  5. 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  6. Coronary artery disease on medical management.  7. Chronic debility.  8. Essential hypertension.    Plan:  The pleurual effusions and right lower lobe infiltrate appear to be chronic in nature. In the absence of fever or leukocytosis, I doubt this is acute pneumonia, however, per patient there has not been a clear cause of his recurrent effusions. Will initiate broad spectrum antibiotics as patient is immunocompromised and has had recent hospitalizations, but would consider de-escalating or discontinuing within 24 hours if cultures return negative.    Pulmonary consultation with Dr. Hernandez.   Will need hemodialysis in the morning, nephrology consultation with Dr. Mackey.     Details were discussed with the patient.   Further recommendations will depend on clinical course of the patient during the current hospitalization.    I also discussed the details with the nursing staff.    Rest as ordered.    Anticipated stay is less than 2 midnights.    John Sosa MD 10/30/17 11:25 PM    EMR Dragon/Transcription disclaimer:   Much of this encounter note is an electronic transcription/translation of spoken language to printed text. The electronic translation of spoken language may permit erroneous, or at times, nonsensical words or phrases to be inadvertently transcribed; Although I have reviewed the note for such errors, some may still exist.      Electronically signed by  John Sosa MD at 10/31/2017  3:05 AM           Emergency Department Notes      Bernard Chatterjee Jr., PA-C at 10/30/2017  7:37 PM     Attestation signed by Estrada Sorto MD at 10/31/2017  2:06 AM              For this patient encounter, I reviewed the NP or PA documentation, treatment plan, and medical decision making. Estrada Sorto MD 10/31/2017 2:06 AM                               Subjective   HPI Comments: 57-year-old male presents with right sided pain in his rib area when he takes a deep breath or lies on his side.  He states he feels like he has fluid in the area.  He states he feels shortness of breath and has difficulty breathing, as well as of pain with cough.  Symptoms have been there for 1 day.      History provided by:  Patient   used: No        Review of Systems   Respiratory: Positive for cough and shortness of breath.    All other systems reviewed and are negative.      Past Medical History:   Diagnosis Date   • Anemia    • CHF (congestive heart failure)    • Chronic kidney disease    • Diabetes mellitus    • H/O chest x-ray 03/25/2016    Interval decrease in size of the patients right pleural effusion with a persistent small left effusion as well   • History of transfusion    • Hypertension    • Left-sided weakness    • Renal failure    • Stroke        Allergies   Allergen Reactions   • Erythromycin Hives       Past Surgical History:   Procedure Laterality Date   • ARTERIOVENOUS FISTULA Right    • CATARACT EXTRACTION, BILATERAL     • CHOLECYSTECTOMY     • COLONOSCOPY     • EYE SURGERY     • PORTACATH PLACEMENT     • VENOUS ACCESS DEVICE (PORT) REMOVAL         Family History   Problem Relation Age of Onset   • COPD Mother    • Hypertension Father    • Diabetes Father    • Hypertension Sister    • Diabetes Sister    • Hypertension Brother    • Diabetes Paternal Grandmother        Social History     Social History   • Marital status:      Spouse name: N/A   •  Number of children: N/A   • Years of education: N/A     Social History Main Topics   • Smoking status: Never Smoker   • Smokeless tobacco: Never Used   • Alcohol use No   • Drug use: No   • Sexual activity: Defer     Other Topics Concern   • None     Social History Narrative           Objective   Physical Exam   Constitutional: He is oriented to person, place, and time. He appears well-developed and well-nourished.   HENT:   Head: Normocephalic and atraumatic.   Left Ear: External ear normal.   Eyes: EOM are normal. Pupils are equal, round, and reactive to light.   Neck: Normal range of motion.   Cardiovascular: Normal rate and regular rhythm.    Pulmonary/Chest: Effort normal and breath sounds normal.   Abdominal: Soft. Bowel sounds are normal.   Musculoskeletal: Normal range of motion.   Neurological: He is alert and oriented to person, place, and time.   Skin: Skin is warm and dry.   Psychiatric: He has a normal mood and affect. His behavior is normal. Judgment and thought content normal.   Nursing note and vitals reviewed.      Procedures        ED Course  ED Course      Discussed with Dr. Sosa she  accept the patient for admission            MDM    Final diagnoses:   Pneumonia of right lower lobe due to infectious organism   Hyperkalemia   SOB (shortness of breath)   Pleural effusion            Bernard Chatterjee Jr., PA-C  10/30/17 2302       Electronically signed by Estrada Sorto MD at 10/31/2017  2:06 AM      Fanny Meraz RN at 10/30/2017  8:25 PM          Called      Fanny Meraz RN  10/30/17 2025       Electronically signed by Fanny Meraz RN at 10/30/2017  8:25 PM      Nikky Venegas at 10/30/2017 10:44 PM          Contacted Dr. Sosa per Bernard Chatterjee.     April Maribel Venegas  10/30/17 0904       Electronically signed by Nikky Venegas at 10/30/2017 10:44 PM      Fanny Meraz RN at 10/30/2017 11:06 PM          WAITING ON LAB TO COME DRAW BC BEFORE  STARTING IV ABX.      Fanny Meraz RN  10/30/17 9254       Electronically signed by Fanny Meraz RN at 10/30/2017 11:07 PM      Fanny Meraz RN at 10/30/2017 11:37 PM          LAB HAS FINISHED DRAWING BC. WILL INITIATE IV ABX.      Fanny Meraz RN  10/30/17 2566       Electronically signed by Fanny Meraz RN at 10/30/2017 11:38 PM      Fanny Meraz RN at 10/30/2017 11:51 PM          CALLED 3RD FLOOR TO GIVE REPORT; SPOKE WITH LUCIO. LUCIO STATES THAT THE RN TAKING MY PT IS GIVING REPORT ON A PT SHE IS HANDING OFF AND WILL CALL ME BACK.      Fanny Meraz RN  10/30/17 9931       Electronically signed by Fanny Meraz RN at 10/30/2017 11:52 PM      Fanny Meraz RN at 10/30/2017 11:59 PM          REPORT COMPLETED TO ROSALINDA PENN.      Fanny Meraz RN  10/30/17 5842       Electronically signed by Fanny Meraz RN at 10/30/2017 11:59 PM        Hospital Medications (active)       Dose Frequency Start End    acetaminophen (TYLENOL) tablet 650 mg 650 mg Every 4 Hours PRN 10/31/2017     Sig - Route: Take 2 tablets by mouth Every 4 (Four) Hours As Needed for Mild Pain . - Oral    amLODIPine (NORVASC) tablet 10 mg 10 mg Daily 10/31/2017     Sig - Route: Take 2 tablets by mouth Daily. - Oral    aspirin tablet 325 mg 325 mg Daily 10/31/2017     Sig - Route: Take 1 tablet by mouth Daily. - Oral    atorvastatin (LIPITOR) tablet 10 mg 10 mg Daily 10/31/2017     Sig - Route: Take 1 tablet by mouth Daily. - Oral    b complex-vitamin c-folic acid (NEPHRO-SHIRLENE) tablet 1 tablet 1 tablet Daily 11/1/2017     Sig - Route: Take 1 tablet by mouth Daily. - Oral    dextrose (D50W) solution 25 g 25 g Every 15 Minutes PRN 10/31/2017     Sig - Route: Infuse 50 mL into a venous catheter Every 15 (Fifteen) Minutes As Needed for Low Blood Sugar (Blood Sugar Less Than 70, Patient Has IV Access - Unresponsive, NPO or Unable To Safely Swallow). - Intravenous    dextrose (GLUTOSE) oral gel 1 tube 1  tube Every 15 Minutes PRN 10/31/2017     Sig - Route: Take 1 tube by mouth Every 15 (Fifteen) Minutes As Needed for Low Blood Sugar (Blood Sugar Less Than 70, Patient Alert, Is Not NPO & Can Safely Swallow). - Oral    diphenhydrAMINE (BENADRYL) capsule 50 mg 50 mg Nightly PRN 10/31/2017     Sig - Route: Take 2 capsules by mouth At Night As Needed for Itching or Sleep. - Oral    docusate sodium (COLACE) capsule 100 mg 100 mg 2 Times Daily 10/31/2017     Sig - Route: Take 1 capsule by mouth 2 (Two) Times a Day. - Oral    enoxaparin (LOVENOX) syringe 30 mg 30 mg Daily 10/31/2017     Sig - Route: Inject 0.3 mL under the skin Daily. - Subcutaneous    folic acid (FOLVITE) tablet 1 mg 1 mg Daily 10/31/2017     Sig - Route: Take 1 tablet by mouth Daily. - Oral    glucagon (human recombinant) (GLUCAGEN DIAGNOSTIC) injection 1 mg 1 mg Every 15 Minutes PRN 10/31/2017     Sig - Route: Inject 1 mg under the skin Every 15 (Fifteen) Minutes As Needed (Blood Glucose Less Than 70 - Patient Without IV Access - Unresponsive, NPO or Unable To Safely Swallow). - Subcutaneous    insulin aspart (novoLOG) injection 0-7 Units 0-7 Units 4 Times Daily Before Meals & Nightly 10/31/2017     Sig - Route: Inject 0-7 Units under the skin 4 (Four) Times a Day Before Meals & at Bedtime. - Subcutaneous    insulin detemir (LEVEMIR) injection 8 Units 8 Units Daily 10/31/2017     Sig - Route: Inject 8 Units under the skin Daily. - Subcutaneous    levothyroxine (SYNTHROID, LEVOTHROID) tablet 100 mcg 100 mcg Daily 10/31/2017     Sig - Route: Take 1 tablet by mouth Daily. - Oral    losartan (COZAAR) tablet 100 mg 100 mg Every 24 Hours Scheduled 10/31/2017     Sig - Route: Take 2 tablets by mouth Daily. - Oral    metoprolol succinate XL (TOPROL-XL) 24 hr tablet 100 mg 100 mg Daily 10/31/2017     Sig - Route: Take 1 tablet by mouth Daily. - Oral    ondansetron (ZOFRAN) injection 4 mg 4 mg Every 6 Hours PRN 10/31/2017     Sig - Route: Infuse 2 mL into a  "venous catheter Every 6 (Six) Hours As Needed for Nausea or Vomiting. - Intravenous    pantoprazole (PROTONIX) EC tablet 40 mg 40 mg Every Morning 10/31/2017     Sig - Route: Take 1 tablet by mouth Every Morning. - Oral    Pharmacy to dose vancomycin  Continuous PRN 10/31/2017     Sig - Route: Continuous As Needed for Consult. - Does not apply    Linked Group 1:  \"And\" Linked Group Details        piperacillin-tazobactam (ZOSYN) in iso-osmotic dextrose IVPB 2.25 g (premix) 2.25 g Every 8 Hours 10/31/2017     Sig - Route: Infuse 50 mL into a venous catheter Every 8 (Eight) Hours. - Intravenous    sodium chloride 0.9 % bolus 1,000 mL 1,000 mL As Needed 11/1/2017 11/2/2017    Sig - Route: Infuse 1,000 mL into a venous catheter As Needed (to maintain SBP > 100 mmHG DURING DIALYSIS ONLY). - Intravenous    sodium chloride 0.9 % flush 1-10 mL 1-10 mL As Needed 10/31/2017     Sig - Route: Infuse 1-10 mL into a venous catheter As Needed for Line Care. - Intravenous    sodium chloride 0.9 % flush 10 mL 10 mL As Needed 10/30/2017     Sig - Route: Infuse 10 mL into a venous catheter As Needed for Line Care. - Intravenous    Cosign for Ordering: Accepted by Estrada Sorto MD on 10/30/2017  9:43 PM    Linked Group 2:  \"And\" Linked Group Details        sodium polystyrene (KAYEXALATE) 15 GM/60ML suspension 15 g 15 g Once 11/1/2017 11/1/2017    Sig - Route: Take 60 mL by mouth 1 (One) Time. - Oral    vancomycin 1000 mg in sodium chloride 0.9% 250 mL IVPB 1,000 mg Continuous PRN 11/2/2017     Sig - Route: Infuse 1,000 mg into a venous catheter Continuous As Needed (administer post dialysis if AM random on dialysis days is <20). - Intravenous            Lab Results (last 24 hours)     Procedure Component Value Units Date/Time    CBC & Differential [[id number]] Collected:  11/01/17 1228    Specimen:  Blood Updated:  11/01/17 1242    Narrative:       The following orders were created for panel order CBC & Differential.  Procedure      "                          Abnormality         Status                     ---------                               -----------         ------                     CBC Auto Differential[[id number]]        Abnormal            Final result                 Please view results for these tests on the individual orders.    CBC Auto Differential [[id number]]  (Abnormal) Collected:  11/01/17 1228    Specimen:  Blood Updated:  11/01/17 1242     WBC 5.94 10*3/mm3      RBC 3.89 (L) 10*6/mm3      Hemoglobin 11.8 (L) g/dL      Hematocrit 36.4 (L) %      MCV 93.6 fL      MCH 30.3 pg      MCHC 32.4 g/dL      RDW 19.0 (H) %      RDW-SD 64.5 (H) fl      MPV 10.1 fL      Platelets 189 10*3/mm3      Neutrophil % 63.0 %      Lymphocyte % 12.0 %      Monocyte % 17.5 (H) %      Eosinophil % 6.2 %      Basophil % 1.0 %      Immature Grans % 0.3 %      Neutrophils, Absolute 3.74 10*3/mm3      Lymphocytes, Absolute 0.71 10*3/mm3      Monocytes, Absolute 1.04 (H) 10*3/mm3      Eosinophils, Absolute 0.37 10*3/mm3      Basophils, Absolute 0.06 10*3/mm3      Immature Grans, Absolute 0.02 10*3/mm3      nRBC 0.0 /100 WBC     Basic Metabolic Panel [[id number]]  (Abnormal) Collected:  11/01/17 1228    Specimen:  Blood Updated:  11/01/17 1308     Glucose 186 (H) mg/dL      BUN 27 (H) mg/dL      Creatinine 5.00 (H) mg/dL      Sodium 135 (L) mmol/L      Potassium 5.7 (C) mmol/L      Chloride 94 (L) mmol/L      CO2 26.0 mmol/L      Calcium 9.7 mg/dL      eGFR Non African Amer 12 (L) mL/min/1.73      BUN/Creatinine Ratio 5.4 (L)     Anion Gap 20.7 mmol/L     Narrative:       Abnormal estimated GFR should be followed by more specific studies to confirm end stage chronic renal disease. The equation used for calculation may not be accurate for patients less than 19 years old, greater than 70 years old, patients at extremes of weight, malnutrition, or with acute renal dysfunction.    POC Glucose Fingerstick [[id number]]  (Abnormal) Collected:  11/01/17 9087     Specimen:  Blood Updated:  11/01/17 1800     Glucose 154 (H) mg/dL       Serial Number: [id number]:  [id number]       POC Glucose Fingerstick [[id number]]  (Abnormal) Collected:  11/01/17 2054    Specimen:  Blood Updated:  11/01/17 2105     Glucose 194 (H) mg/dL       Serial Number: [id number]:  [id number]       Blood Culture - Blood, [[id number]]  (Normal) Collected:  10/30/17 2326    Specimen:  Blood from Hand, Right Updated:  11/01/17 2331     Blood Culture No growth at 2 days    Blood Culture - Blood, Blood, Venous Line [[id number]]  (Normal) Collected:  10/30/17 2336    Specimen:  Blood from Arm, Left Updated:  11/01/17 2346     Blood Culture No growth at 2 days    POC Glucose Fingerstick [[id number]]  (Abnormal) Collected:  11/02/17 0605    Specimen:  Blood Updated:  11/02/17 0630     Glucose 180 (H) mg/dL       Serial Number: [id number]:  [id number]       CBC & Differential [[id number]] Collected:  11/02/17 0541    Specimen:  Blood Updated:  11/02/17 0634    Narrative:       The following orders were created for panel order CBC & Differential.  Procedure                               Abnormality         Status                     ---------                               -----------         ------                     CBC Auto Differential[[id number]]        Abnormal            Final result                 Please view results for these tests on the individual orders.    CBC Auto Differential [[id number]]  (Abnormal) Collected:  11/02/17 0541    Specimen:  Blood Updated:  11/02/17 0634     WBC 4.74 (L) 10*3/mm3      RBC 3.28 (L) 10*6/mm3      Hemoglobin 9.7 (L) g/dL      Hematocrit 29.9 (L) %      MCV 91.2 fL      MCH 29.6 pg      MCHC 32.4 g/dL      RDW 18.1 (H) %      RDW-SD 60.8 (H) fl      MPV 10.7 fL      Platelets 167 10*3/mm3      Neutrophil % 68.6 %      Lymphocyte % 10.3 %      Monocyte % 15.2 (H) %      Eosinophil % 4.6 %      Basophil % 1.1 %      Immature Grans % 0.2 %      Neutrophils, Absolute  3.25 10*3/mm3      Lymphocytes, Absolute 0.49 (L) 10*3/mm3      Monocytes, Absolute 0.72 10*3/mm3      Eosinophils, Absolute 0.22 10*3/mm3      Basophils, Absolute 0.05 10*3/mm3      Immature Grans, Absolute 0.01 10*3/mm3      nRBC 0.0 /100 WBC     Basic Metabolic Panel [[id number]]  (Abnormal) Collected:  11/02/17 0541    Specimen:  Blood Updated:  11/02/17 0641     Glucose 183 (H) mg/dL      BUN 33 (H) mg/dL      Creatinine 6.00 (H) mg/dL      Sodium 131 (L) mmol/L      Potassium 5.6 (C) mmol/L      Chloride 92 (L) mmol/L      CO2 22.0 (L) mmol/L      Calcium 9.0 mg/dL      eGFR Non African Amer 10 (L) mL/min/1.73      BUN/Creatinine Ratio 5.5 (L)     Anion Gap 22.6 mmol/L     Narrative:       Abnormal estimated GFR should be followed by more specific studies to confirm end stage chronic renal disease. The equation used for calculation may not be accurate for patients less than 19 years old, greater than 70 years old, patients at extremes of weight, malnutrition, or with acute renal dysfunction.    Vancomycin, Random [[id number]]  (Normal) Collected:  11/02/17 0541    Specimen:  Blood Updated:  11/02/17 0652     Vancomycin Random 17.84 mcg/mL     Acinetobacter Screen - Swab, Nares [[id number]]  (Normal) Collected:  10/31/17 0057    Specimen:  Swab from Nares Updated:  11/02/17 0828     ACINETOBACTER SCREEN CX No Acinetobacter isolated    Respiratory Culture - Sputum, Cough [[id number]] Collected:  11/01/17 1000    Specimen:  Sputum from Cough Updated:  11/02/17 0947     Gram Stain Result Greater than 20 WBCs per low power field      Less than 10 Epithelial cells per low power field      Rare (1+) Gram positive cocci in pairs    Respiratory Culture - Sputum, Cough [[id number]] Collected:  11/02/17 0749    Specimen:  Sputum from Cough Updated:  11/02/17 1120     Gram Stain Result Greater than 20 WBCs per low power field      Less than 10 Epithelial cells per low power field      Few (2+) Gram positive cocci in pairs and  "clusters    POC Glucose Fingerstick [[id number]]  (Normal) Collected:  11/02/17 1142    Specimen:  Blood Updated:  11/02/17 1211     Glucose 127 mg/dL       Serial Number: [id number]:  [id number]           Imaging Results (last 24 hours)     ** No results found for the last 24 hours. **           Physician Progress Notes (last 24 hours) (Notes from 11/1/2017 12:33 PM through 11/2/2017 12:33 PM)      Ean Hernanedz MD at 11/2/2017  8:22 AM  Version 1 of 1         CC: Abnormal CT. Right sided PNA.     S: Decreased right sided chest pain. Continues to have occasional cough. Clear sputum. No fever.     O: /81  Pulse 84  Temp 98.3 °F (36.8 °C) (Oral)   Resp 18  Ht 68\" (172.7 cm)  Wt 129 lb 12.8 oz (58.9 kg)  SpO2 100%  BMI 19.74 kg/m2    General: No respiratory distress noted.  Neck: No JVD   Cardiovascular: S1 + S2. Reg.   Respiratory: No wheezing heard. R>L crackles noted.   Extremities: No edema noted.  Neurologic:  AAOx3.  Was able to follow commands     Labs: Reviewed.       Results from last 7 days  Lab Units 11/02/17  0541 11/01/17  1228 10/30/17  2106   SODIUM mmol/L 131* 135* 131*   POTASSIUM mmol/L 5.6* 5.7* 5.7*   CHLORIDE mmol/L 92* 94* 89*   CO2 mmol/L 22.0* 26.0 27.0   BUN mg/dL 33* 27* 42*   CREATININE mg/dL 6.00* 5.00* 5.90*   CALCIUM mg/dL 9.0 9.7 9.8   GLUCOSE mg/dL 183* 186* 230*                 Results from last 7 days  Lab Units 11/02/17  0541 11/01/17  1228 10/30/17  2106   WBC 10*3/mm3 4.74* 5.94 5.89   HEMOGLOBIN g/dL 9.7* 11.8* 11.8*   PLATELETS 10*3/mm3 167 189 183         Pharmacy to dose vancomycin    vancomycin 1,000 mg       Assessment & Recommendations/Plan:   1.  Right-sided healthcare associated pneumonia  - Cont Abx.  - Awaiting culture data.  - Will recommend manual chest PT.     2.  Chronic right-sided pleural effusion.  Status post Pleurx catheter placement and removal.  - Will do Chest X Ray, after HD today.     3.  ESRD.  On hemodialysis    4.  Mediastinal " lymphadenopathy  - See below.     He was once again reminded to keep his appointment with [facility] team, especially since he is being evaluated for possible lymph node biopsy.      Ean Hernandez MD  11/02/17  8:22 AM    Please note that portions of this note may have been completed with a voice recognition software. Every effort was made to edit the dictation, but occasionally words are mistranscribed.       Electronically signed by Ean Hernandez MD at 11/2/2017  8:26 AM        Consult Notes (last 24 hours) (Notes from 11/1/2017 12:33 PM through 11/2/2017 12:33 PM)     No notes of this type exist for this encounter.

## 2017-11-02 NOTE — PLAN OF CARE
Problem: Patient Care Overview (Adult)  Goal: Plan of Care Review  Outcome: Ongoing (interventions implemented as appropriate)    Problem: Pneumonia (Adult)  Goal: Signs and Symptoms of Listed Potential Problems Will be Absent or Manageable (Pneumonia)  Outcome: Ongoing (interventions implemented as appropriate)    Problem: Diabetes, Type 2 (Adult)  Goal: Signs and Symptoms of Listed Potential Problems Will be Absent or Manageable (Diabetes, Type 2)  Outcome: Ongoing (interventions implemented as appropriate)    Problem: Skin Integrity Impairment, Risk/Actual (Adult)  Goal: Skin Integrity/Wound Healing  Outcome: Ongoing (interventions implemented as appropriate)

## 2017-11-02 NOTE — PLAN OF CARE
Problem: Fall Risk (Adult)  Goal: Absence of Falls  Outcome: Ongoing (interventions implemented as appropriate)    11/01/17 2305   Fall Risk (Adult)   Absence of Falls making progress toward outcome         Problem: Pneumonia (Adult)  Goal: Signs and Symptoms of Listed Potential Problems Will be Absent or Manageable (Pneumonia)  Outcome: Ongoing (interventions implemented as appropriate)    11/01/17 2305   Pneumonia   Problems Assessed (Pneumonia) progression of infection;respiratory compromise   Problems Present (Pneumonia) progression of infection;respiratory compromise         Problem: Diabetes, Type 2 (Adult)  Goal: Signs and Symptoms of Listed Potential Problems Will be Absent or Manageable (Diabetes, Type 2)  Outcome: Ongoing (interventions implemented as appropriate)    11/01/17 2305   Diabetes, Type 2   Problems Assessed (Type 2 Diabetes) impaired glycemic control   Problems Present (Type 2 Diabetes) impaired glycemic control         Problem: Skin Integrity Impairment, Risk/Actual (Adult)  Goal: Skin Integrity/Wound Healing  Outcome: Ongoing (interventions implemented as appropriate)    11/01/17 2305   Skin Integrity Impairment, Risk/Actual (Adult)   Skin Integrity/Wound Healing making progress toward outcome

## 2017-11-03 VITALS
SYSTOLIC BLOOD PRESSURE: 132 MMHG | TEMPERATURE: 97.5 F | BODY MASS INDEX: 19.67 KG/M2 | WEIGHT: 129.8 LBS | OXYGEN SATURATION: 95 % | RESPIRATION RATE: 18 BRPM | DIASTOLIC BLOOD PRESSURE: 72 MMHG | HEIGHT: 68 IN | HEART RATE: 83 BPM

## 2017-11-03 LAB
ANION GAP SERPL CALCULATED.3IONS-SCNC: 21.5 MMOL/L
BUN BLD-MCNC: 20 MG/DL (ref 7–20)
BUN/CREAT SERPL: 5.1 (ref 6.3–21.9)
CALCIUM SPEC-SCNC: 9 MG/DL (ref 8.4–10.2)
CHLORIDE SERPL-SCNC: 93 MMOL/L (ref 98–107)
CO2 SERPL-SCNC: 24 MMOL/L (ref 26–30)
CREAT BLD-MCNC: 3.9 MG/DL (ref 0.6–1.3)
DEPRECATED RDW RBC AUTO: 61.1 FL (ref 37–54)
ERYTHROCYTE [DISTWIDTH] IN BLOOD BY AUTOMATED COUNT: 18.2 % (ref 11.5–14.5)
GFR SERPL CREATININE-BSD FRML MDRD: 16 ML/MIN/1.73
GLUCOSE BLD-MCNC: 146 MG/DL (ref 74–98)
GLUCOSE BLDC GLUCOMTR-MCNC: 143 MG/DL (ref 70–130)
GLUCOSE BLDC GLUCOMTR-MCNC: 305 MG/DL (ref 70–130)
GLUCOSE BLDC GLUCOMTR-MCNC: 43 MG/DL (ref 70–130)
HCT VFR BLD AUTO: 31.4 % (ref 42–52)
HGB BLD-MCNC: 10.2 G/DL (ref 14–18)
MCH RBC QN AUTO: 29.7 PG (ref 27–31)
MCHC RBC AUTO-ENTMCNC: 32.5 G/DL (ref 30–37)
MCV RBC AUTO: 91.3 FL (ref 80–94)
PLATELET # BLD AUTO: 176 10*3/MM3 (ref 130–400)
PMV BLD AUTO: 10.8 FL (ref 6–12)
POTASSIUM BLD-SCNC: 5.5 MMOL/L (ref 3.5–5.1)
RBC # BLD AUTO: 3.44 10*6/MM3 (ref 4.7–6.1)
SODIUM BLD-SCNC: 133 MMOL/L (ref 137–145)
VRE SPEC QL CULT: NORMAL
WBC NRBC COR # BLD: 4.31 10*3/MM3 (ref 4.8–10.8)

## 2017-11-03 PROCEDURE — 25010000002 PIPERACILLIN SOD-TAZOBACTAM PER 1 G: Performed by: INTERNAL MEDICINE

## 2017-11-03 PROCEDURE — 63710000001 INSULIN DETEMIR PER 5 UNITS: Performed by: INTERNAL MEDICINE

## 2017-11-03 PROCEDURE — 80048 BASIC METABOLIC PNL TOTAL CA: CPT | Performed by: NURSE PRACTITIONER

## 2017-11-03 PROCEDURE — 82962 GLUCOSE BLOOD TEST: CPT

## 2017-11-03 PROCEDURE — 25010000002 ENOXAPARIN PER 10 MG: Performed by: INTERNAL MEDICINE

## 2017-11-03 PROCEDURE — 99232 SBSQ HOSP IP/OBS MODERATE 35: CPT | Performed by: INTERNAL MEDICINE

## 2017-11-03 PROCEDURE — 85027 COMPLETE CBC AUTOMATED: CPT | Performed by: NURSE PRACTITIONER

## 2017-11-03 PROCEDURE — 99239 HOSP IP/OBS DSCHRG MGMT >30: CPT | Performed by: FAMILY MEDICINE

## 2017-11-03 PROCEDURE — 63710000001 INSULIN ASPART PER 5 UNITS: Performed by: INTERNAL MEDICINE

## 2017-11-03 RX ORDER — ACETAMINOPHEN 325 MG/1
325 TABLET ORAL EVERY 4 HOURS PRN
Status: ON HOLD
Start: 2017-11-03 | End: 2019-01-01

## 2017-11-03 RX ORDER — AMOXICILLIN AND CLAVULANATE POTASSIUM 250; 125 MG/1; MG/1
1 TABLET, FILM COATED ORAL 2 TIMES DAILY
Qty: 14 TABLET | Refills: 0 | Status: SHIPPED | OUTPATIENT
Start: 2017-11-03 | End: 2017-11-10

## 2017-11-03 RX ORDER — METOPROLOL SUCCINATE 100 MG/1
100 TABLET, EXTENDED RELEASE ORAL DAILY
Qty: 30 TABLET | Refills: 0 | Status: SHIPPED | OUTPATIENT
Start: 2017-11-03 | End: 2019-01-01 | Stop reason: HOSPADM

## 2017-11-03 RX ADMIN — TAZOBACTAM SODIUM AND PIPERACILLIN SODIUM 2.25 G: 250; 2 INJECTION, SOLUTION INTRAVENOUS at 12:41

## 2017-11-03 RX ADMIN — ENOXAPARIN SODIUM 30 MG: 30 INJECTION SUBCUTANEOUS at 08:28

## 2017-11-03 RX ADMIN — Medication 1 TABLET: at 08:23

## 2017-11-03 RX ADMIN — LOSARTAN POTASSIUM 100 MG: 50 TABLET, FILM COATED ORAL at 08:23

## 2017-11-03 RX ADMIN — LEVOTHYROXINE SODIUM 100 MCG: 100 TABLET ORAL at 08:23

## 2017-11-03 RX ADMIN — ASPIRIN 325 MG ORAL TABLET 325 MG: 325 PILL ORAL at 08:23

## 2017-11-03 RX ADMIN — TAZOBACTAM SODIUM AND PIPERACILLIN SODIUM 2.25 G: 250; 2 INJECTION, SOLUTION INTRAVENOUS at 04:32

## 2017-11-03 RX ADMIN — AMLODIPINE BESYLATE 10 MG: 5 TABLET ORAL at 08:23

## 2017-11-03 RX ADMIN — ATORVASTATIN CALCIUM 10 MG: 10 TABLET, FILM COATED ORAL at 08:23

## 2017-11-03 RX ADMIN — METOPROLOL SUCCINATE 100 MG: 100 TABLET, EXTENDED RELEASE ORAL at 08:23

## 2017-11-03 RX ADMIN — PANTOPRAZOLE SODIUM 40 MG: 40 TABLET, DELAYED RELEASE ORAL at 06:22

## 2017-11-03 RX ADMIN — FOLIC ACID 1 MG: 1 TABLET ORAL at 08:23

## 2017-11-03 RX ADMIN — INSULIN DETEMIR 8 UNITS: 100 INJECTION, SOLUTION SUBCUTANEOUS at 08:23

## 2017-11-03 RX ADMIN — INSULIN ASPART 5 UNITS: 100 INJECTION, SOLUTION INTRAVENOUS; SUBCUTANEOUS at 12:35

## 2017-11-03 RX ADMIN — DOCUSATE SODIUM 100 MG: 100 CAPSULE, LIQUID FILLED ORAL at 08:23

## 2017-11-03 NOTE — PROGRESS NOTES
"CC: Abnormal CT. Right sided PNA.     S: No right sided chest pain. No cough. No fever.     O: /72 (BP Location: Right arm, Patient Position: Sitting)  Pulse 96  Temp 97.8 °F (36.6 °C) (Oral)   Resp 18  Ht 68\" (172.7 cm)  Wt 129 lb 12.8 oz (58.9 kg)  SpO2 96%  BMI 19.74 kg/m2    General: No respiratory distress noted.  Neck: No JVD   Cardiovascular: S1 + S2. Reg.   Respiratory: No wheezing heard. R>L minimal basal crackles noted.   Extremities: No edema noted.  Neurologic:  AAOx3.  Was able to follow commands     Labs: Reviewed.       Results from last 7 days  Lab Units 11/03/17  0611 11/02/17  0541 11/01/17  1228   SODIUM mmol/L 133* 131* 135*   POTASSIUM mmol/L 5.5* 5.6* 5.7*   CHLORIDE mmol/L 93* 92* 94*   CO2 mmol/L 24.0* 22.0* 26.0   BUN mg/dL 20 33* 27*   CREATININE mg/dL 3.90* 6.00* 5.00*   CALCIUM mg/dL 9.0 9.0 9.7   GLUCOSE mg/dL 146* 183* 186*                 Results from last 7 days  Lab Units 11/03/17  0611 11/02/17  0541 11/01/17  1228 10/30/17  2106   WBC 10*3/mm3 4.31* 4.74* 5.94 5.89   HEMOGLOBIN g/dL 10.2* 9.7* 11.8* 11.8*   PLATELETS 10*3/mm3 176 167 189 183         Pharmacy to dose vancomycin    vancomycin 1,000 mg     Imaging Results (last 24 hours)     Procedure Component Value Units Date/Time    XR Chest 2 View [[id number]] Collected:  11/02/17 1442     Updated:  11/02/17 1445    Narrative:       PROCEDURE: XR CHEST 2 VW-        HISTORY: PNA.; J18.1-Lobar pneumonia, unspecified organism;  E87.5-Hyperkalemia; R06.02-Shortness of breath; O25-Yliykfp effusion,  not elsewhere classified     COMPARISON: October 30, 2017.     FINDINGS: The heart is normal in size. The mediastinum is unremarkable.  There is a loculated right effusion. There is improved aeration of the  right lung base compared to the prior exam. The left lung is clear.  There is no pneumothorax. There are no acute osseous abnormalities.           Impression:       Improved aeration of the right lung compared to the " prior  exam.                 This report was finalized on 11/2/2017 2:43 PM by Marilia Mora M.D..          Assessment & Recommendations/Plan:   1.  Right-sided healthcare associated pneumonia  - Can de escalate antibiotics and stop the Vanc and Zosyn.  - He can be possibly discharged on Augmentin.   - MRSA screen was negative.  - Will recommend manual chest PT.     2.  Chronic right-sided pleural effusion.  Status post Pleurx catheter placement and removal.  - Chest X Ray, after HD yesterday, was reviewed  - Definite improvement noted.     3.  ESRD.  On hemodialysis    4.  Mediastinal lymphadenopathy  - See below.     His wife was in the room with him and I once again reminded the patient and his wife, to keep his appointment with [facility] team, especially since he is being evaluated for possible lymph node biopsy.    We have updated the admitting attending and nursing staff, as appropriate, on the patient's current status and plan. I will be going off shift tonight and will be unavailable.       Ean Hernandez MD  11/03/17  12:49 PM    Please note that portions of this note may have been completed with a voice recognition software. Every effort was made to edit the dictation, but occasionally words are mistranscribed.

## 2017-11-03 NOTE — DISCHARGE SUMMARY
[facility]   DISCHARGE SUMMARY      Name:  Talha Cutler   Age:  57 y.o.  Sex:  male  :  1960  MRN:  [id number]   Visit Number:  [id number]  Primary Care Physician:  Idalia Kay MD  Date of Discharge:  11/3/2017  Admission Date:  10/30/2017      Discharge Diagnosis:         Principal Problem:    Pneumonia of right lower lobe due to infectious organism  Active Problems:    Type 2 diabetes mellitus treated with insulin    End stage renal disease on dialysis    Uncontrolled diabetes mellitus with hyperglycemia, with long-term current use of insulin    Hypertension due to end stage renal disease caused by type 2 diabetes mellitus, on dialysis    Physical debility    Chronic respiratory insufficiency    Recurrent right pleural effusion    Chronic kidney disease-mineral and bone disorder      Presenting Problem/History of Present Illness:    Pneumonia of right lower lobe due to infectious organism [J18.1]  Pneumonia of right lower lobe due to infectious organism [J18.1]     Consults:     Consults     Date and Time Order Name Status Description    10/31/2017 0245 Inpatient Consult to Nephrology Completed     10/31/2017 024 Inpatient Consult to Pulmonology Completed           Procedures Performed:             History of presenting illness:    Mr. Cutler is a 57 year old male with medical history of chronic right-sided loculated recurrent pleural effusions s/p Plurex catheter placement and removal, on home oxygen therapy, ESRD on hemodialysis, MWF, type 2 diabetes mellitus, coronary artery disease, history of CVA with left sided hemiparesis, hypertension, who presented to the ER with complaints of right sided rib pain for 1 day. He states that he has had his Pleurx catheter taken out about 6 weeks ago after which he followed up with his pulmonologist at . He states that no clear reason for the recurrent pleural effusions has been identified. He also states that he was  supposed to have biopsy of his lymph nodes but this was canceled. He states that he has chronic shortness of breath and this has not worsened over the past few days and he is not coughing more than usual but when he does cough he feels as if he has thick phlegm that would not come out. He denies any fevers or chills. Denies any lower extremity edema, abdominal pain, diarrhea, dysuria or hematuria.      In the ER, his vitals were stable and labs were at baseline, with exception of potassium which was elevated at 5.7. He received dextrose with insulin for this. A CXR was performed which showed questionable effusions and right sided infiltrate and subsequently a CT scan was ordered for better evaluation. CT showed bilateral pleural effusions and right lower lobe infiltrate. He was initiated on IV antibiotics and admitted for further management.     Hospital Course:    I have reviewed labs/imaging/records from this hospitalization, including ER staff and admitting/attending physicians H/P's and progress notes to establish a comprehensive understanding of this patient's clinical hospital course, as well as to establish a transition of care appropriately.    Patient has responded well to IV antibiotic therapy.  He is shown no further fever or chills.  His respiratory status is improved throughout his hospitalization.  He has been evaluated by Dr. Hernandez, pulmonology, as well as Dr. Mackey, nephrology, and has undergone his normal scheduled hemodialysis while inpatient.  Patient's follow-up chest x-ray done after hemodialysis yesterday showed marked improvement in aeration of his lung with no other acute or new findings.  His transition to oral Augmentin therapy, decrease dosing and planned administration on days of hemodialysis only.  He will take another 14 total doses until completion.  He will be scheduled follow-up with  concerning his chronic right pleural effusion and mediastinal adenopathy with probable planned  left no biopsy at some point.  He'll also follow Dr. Hernandez approximately 2 weeks, and continue his planned hemodialysis on Tuesdays Thursdays and Saturdays.  Dr. Mackey has agreed to this plan of care as well.  Patient verbalized understanding of the plan, verbalized understanding and agrees.  I've answered all of his questions today.  I'll discuss case with his nephrologist and pulmonologist prior to discharge.    Vital Signs:    Temp:  [97.5 °F (36.4 °C)-98.3 °F (36.8 °C)] 97.5 °F (36.4 °C)  Heart Rate:  [67-97] 83  Resp:  [16-18] 18  BP: (127-155)/(58-78) 132/72    Physical Exam:    General Appearance:  Alert and cooperative, not in any acute distress.   Head:  Atraumatic and normocephalic, without obvious abnormality.   Eyes:          PERRLA, conjunctivae and sclerae normal, no Icterus. No pallor. Extra-occular movements are within normal limits.   Ears:  Ears appear intact with no abnormalities noted.   Throat: No oral lesions, no thrush, oral mucosa moist.   Neck: Supple, trachea midline, no thyromegaly, no carotid bruit.   Back:   No kyphoscoliosis present. No tenderness to palpation,   range of motion normal.   Lungs:   Chest shape is normal. Breath sounds heard bilaterally with AAE to all lung fields.  No wheezing, with trace sandpaper like crackles to R base. No Pleural rub or bronchial breathing.   Heart:  Normal S1 and S2, 2/6 CONNOR, no gallop, no rub. No JVD.   Abdomen:   Normal bowel sounds, no masses, no organomegaly. Soft     non-tender, non-distended, no guarding, no rebound tenderness.   Extremities: Moves all extremities well, no edema, no cyanosis, no clubbing.   Pulses: Pulses palpable and equal bilaterally.   Skin: No bleeding, bruising or rash.   Lymph nodes: No palpable adenopathy.   Neurologic: Alert and oriented x 3. Moves all four limbs equally. No tremors. No facial asymetry.       Pertinent Lab Results:       Results from last 7 days  Lab Units 11/03/17  0611 11/02/17  0541 11/01/17  1228    SODIUM mmol/L 133* 131* 135*   POTASSIUM mmol/L 5.5* 5.6* 5.7*   CHLORIDE mmol/L 93* 92* 94*   CO2 mmol/L 24.0* 22.0* 26.0   BUN mg/dL 20 33* 27*   CREATININE mg/dL 3.90* 6.00* 5.00*   CALCIUM mg/dL 9.0 9.0 9.7   GLUCOSE mg/dL 146* 183* 186*       Results from last 7 days  Lab Units 11/03/17  0611 11/02/17  0541 11/01/17  1228   WBC 10*3/mm3 4.31* 4.74* 5.94   HEMOGLOBIN g/dL 10.2* 9.7* 11.8*   HEMATOCRIT % 31.4* 29.9* 36.4*   PLATELETS 10*3/mm3 176 167 189                                   Invalid input(s): USDES,  BLOODU, NITRITITE, BACT, EP  Pain Management Panel     Pain Management Panel Latest Ref Rng & Units 6/23/2016 7/30/2015    CREATININE UR Not Estab. mg/dL 10.5 25.1          Results from last 7 days  Lab Units 10/31/17  0057 10/30/17  2336 10/30/17  2326   BLOODCX   --  No growth at 3 days No growth at 3 days   MRSA SCREEN CX  No Methicillin Resistant Staphylococcus aureus isolated  --   --        Pertinent Radiology Results:    Imaging Results (all)     Procedure Component Value Units Date/Time    CT Chest Without Contrast [[id number]] Collected:  10/30/17 2214     Updated:  10/30/17 2215    Narrative:       FINAL REPORT    TECHNIQUE:  Routine axial images were obtained from the lung apices to below  the diaphragm without contrast.    CLINICAL HISTORY:  soa cough    FINDINGS:  There are small bilateral pleural effusions. There are air space  opacities involving the right lower and right middle lobes  worrisome for pneumonia. The left lung is clear. There are  multiple enlarged mediastinal nodes, probably reactive but  follow CT is recommended for reevaluation. There is extensive  vascular calcification involving the coronary arteries and the  upper abdomen. There is no significant osseous abnormality.      Impression:       Right lung opacities worrisome for pneumonia. Bilateral pleural  effusions and mediastinal adenopathy of uncertain etiology.    Authenticated by Brice Sutton M.D. on 10/30/2017 10:14:07  PM    XR Chest 2 View [[id number]] Collected:  10/31/17 0747     Updated:  10/31/17 0750    Narrative:       PROCEDURE: XR CHEST 2 VW-        HISTORY: cough soa     COMPARISON: August 25, 2017.     FINDINGS: The heart is enlarged. The mediastinum is unremarkable. A  right effusion and right basilar airspace disease is essentially  unchanged. While the airspace disease may reflect atelectasis and  underlying pneumonia is not excluded. The left lung is clear. There is  no pneumothorax. There are no acute osseous abnormalities.           Impression:       Small right pleural effusion and right lung airspace disease  which may reflect atelectasis or pneumonia.           This report was finalized on 10/31/2017 7:48 AM by Marilia Mora M.D..    XR Chest 2 View [[id number]] Collected:  11/02/17 1442     Updated:  11/02/17 1445    Narrative:       PROCEDURE: XR CHEST 2 VW-        HISTORY: PNA.; J18.1-Lobar pneumonia, unspecified organism;  E87.5-Hyperkalemia; R06.02-Shortness of breath; M98-Psfdlxs effusion,  not elsewhere classified     COMPARISON: October 30, 2017.     FINDINGS: The heart is normal in size. The mediastinum is unremarkable.  There is a loculated right effusion. There is improved aeration of the  right lung base compared to the prior exam. The left lung is clear.  There is no pneumothorax. There are no acute osseous abnormalities.           Impression:       Improved aeration of the right lung compared to the prior  exam.                 This report was finalized on 11/2/2017 2:43 PM by Marilia oMra M.D..          Condition on Discharge:      Improved/stable    Code status during the hospital stay:    Full Code    Discharge Disposition:    Home or Self Care    Discharge Medication:     Talha Cutler   Home Medication Instructions RICHAR:[id number]    Printed on:11/03/17 4557   Medication Information                      acetaminophen (TYLENOL) 325 MG tablet  Take 1 tablet by mouth Every 4  (Four) Hours As Needed for Mild Pain .             amLODIPine (NORVASC) 10 MG tablet               amoxicillin-clavulanate (AUGMENTIN) 250-125 MG per tablet  Take 1 tablet by mouth 2 (Two) Times a Day for 14 doses.             aspirin 325 MG tablet  Take 81 mg by mouth Daily.             Cholecalciferol (VITAMIN D3) 5000 UNITS capsule capsule  Take  by mouth daily.             docusate sodium (COLACE) 100 MG capsule  Take 100 mg by mouth Daily.             folic acid (FOLVITE) 1 MG tablet  Take 1 mg by mouth daily.             FOSRENOL 1000 MG chewable tablet  3 (Three) Times a Day With Meals.             insulin detemir (LEVEMIR) 100 UNIT/ML injection  Inject 8 Units under the skin Daily.             levothyroxine (SYNTHROID, LEVOTHROID) 100 MCG tablet  Take 100 mcg by mouth daily.             losartan (COZAAR) 100 MG tablet  Take 1 tablet by mouth Daily.             metoclopramide (REGLAN) 5 MG tablet  Take 5 mg by mouth 2 (Two) Times a Day.             metoprolol succinate XL (TOPROL-XL) 100 MG 24 hr tablet  Take 1 tablet by mouth Daily.             ondansetron ODT (ZOFRAN-ODT) 4 MG disintegrating tablet  Take 1 tablet by mouth Every 6 (Six) Hours As Needed for Nausea or Vomiting.             oxyCODONE-acetaminophen (PERCOCET) 5-325 MG per tablet  Take 1 tablet by mouth Every 6 (Six) Hours As Needed for Severe Pain .             pravastatin (PRAVACHOL) 40 MG tablet  Take 80 mg by mouth Daily.                 Discharge Diet:     Diet Instructions     Diet: Regular, Consistent Carbohydrate, Cardiac, Renal; Thin       Discharge Diet:   Regular  Consistent Carbohydrate  Cardiac  Renal      Fluid Consistency:  Thin                 Activity at Discharge:     Activity Instructions     Activity as Tolerated                     Follow-up Appointments:    Additional Instructions for the Follow-ups that You Need to Schedule     Additional Discharge Follow-Up (Specify Provider)    As directed    To:  Dr. Mackey   Follow Up  Details:  post-hospitalization follow up, scheduled Tuesday/Thursday/Sat Hemodialysis       Discharge Follow-Up With Specified Provider    As directed    To:  Dr. Hernandez   Follow Up:  2 Weeks   Follow Up Details:  post-hospitalization follow up             Follow-up Information     Follow up with Idalia Kay MD .    Specialty:  Family Medicine    Contact information:    [address] [place] [address]  [phone]            Additional Instructions for the Follow-ups that You Need to Schedule     Additional Discharge Follow-Up (Specify Provider)    As directed    To:  Dr. Mackey   Follow Up Details:  post-hospitalization follow up, scheduled Tuesday/Thursday/Sat Hemodialysis       Discharge Follow-Up With Specified Provider    As directed    To:  Dr. Hernandez   Follow Up:  2 Weeks   Follow Up Details:  post-hospitalization follow up                 Test Results Pending at Discharge:     Order Current Status    Blood Culture - Blood, Preliminary result    Blood Culture - Blood, Blood, Venous Line Preliminary result    Respiratory Culture - Sputum, Cough Preliminary result    Respiratory Culture - Sputum, Cough Preliminary result             Nickolas Ruelas DO  11/03/17  1:07 PM    Time: Discharge 35 min

## 2017-11-03 NOTE — PROGRESS NOTES
Continued Stay Note  ANTOINE Sharma     Patient Name: Talha Cutler  MRN: [id number]  Today's Date: 11/3/2017    Admit Date: 10/30/2017          Discharge Plan       11/03/17 1436    Final Note    Final Note Discharged home               Discharge Codes       11/03/17 1436    Discharge Codes    Discharge Codes 01  Discharge to home        Expected Discharge Date and Time     Expected Discharge Date Expected Discharge Time    Nov 3, 2017             Annika Stanford

## 2017-11-03 NOTE — PROGRESS NOTES
Continued Stay Note  ANTOINE Sharma     Patient Name: Talha Cutler  MRN: [id number]  Today's Date: 11/3/2017    Admit Date: 10/30/2017          Discharge Plan       11/03/17 1441    Case Management/Social Work Plan    Additional Comments Called Sofía at [facility]  for  Human Nurse follow up       11/03/17 1436    Final Note    Final Note Discharged home               Discharge Codes       11/03/17 1436    Discharge Codes    Discharge Codes 01  Discharge to home        Expected Discharge Date and Time     Expected Discharge Date Expected Discharge Time    Nov 3, 2017             Annika Stanford

## 2017-11-03 NOTE — PROGRESS NOTES
"Nephrology Progress Note.    LOS: 1 day    Patient Care Team:  Idalia Kay MD as PCP - General    Chief Complaint:    Chief Complaint   Patient presents with   • Cough       Subjective     Interval History:     Patient Complaints: none    Patient seen and examined this morning.  Events from last night noted.  Patient denies having any fevers chills.  No nausea or vomiting no abdominal pain.  Denies any chest pain or worsening shortness of breath. Denies having any cough.  There is no significant edema.   Patient also denies having new onset weakness of numbness of either extremity, Pulmonary note reviewed and details were discussed with the patient. He thinks he is getting better. Repeat X ray was done just today and it was reviewed by me.  He feels that he is much better and may end up going home today.    History taken from: patient      Review of Systems:    The pertinent  ROS was done and it is noted above, rest  was negative.    Objective     Vital Signs  /72 (BP Location: Right arm, Patient Position: Sitting)  Pulse 96  Temp 97.8 °F (36.6 °C) (Oral)   Resp 18  Ht 68\" (172.7 cm)  Wt 129 lb 12.8 oz (58.9 kg)  SpO2 96%  BMI 19.74 kg/m2      I/O this shift:  In: 240 [P.O.:240]  Out: -     Intake/Output Summary (Last 24 hours) at 11/03/17 1054  Last data filed at 11/03/17 0823   Gross per 24 hour   Intake             1290 ml   Output             3200 ml   Net            -1910 ml       Physical Exam:    General Appearance: alert, oriented x 3, no acute distress,   HEENT: pupils round and reactive to light, oral mucosa dry, extra occular movements intact.  Neck: supple, no JVD, trachea midline  Lungs: Clear to Auscultation, unlabored breathing effort, about his baseline.  Heart: RRR, normal S1 and S2, no S3, no rub 2/6 CONNOR  Abdomen: soft, non-tender, no palpable bladder, present bowel sounds to auscultation  Extremities: no edema, cyanosis or clubbing.   Neuro: normal speech and mental " status, grossly non focal.     Results Review:      Results from last 7 days  Lab Units 11/03/17  0611 11/02/17  0541 11/01/17  1228   SODIUM mmol/L 133* 131* 135*   POTASSIUM mmol/L 5.5* 5.6* 5.7*   CHLORIDE mmol/L 93* 92* 94*   CO2 mmol/L 24.0* 22.0* 26.0   BUN mg/dL 20 33* 27*   CREATININE mg/dL 3.90* 6.00* 5.00*   CALCIUM mg/dL 9.0 9.0 9.7   GLUCOSE mg/dL 146* 183* 186*       Estimated Creatinine Clearance: 17.2 mL/min (by C-G formula based on Cr of 3.9).                  Results from last 7 days  Lab Units 11/03/17  0611 11/02/17  0541 11/01/17  1228 10/30/17  2106   WBC 10*3/mm3 4.31* 4.74* 5.94 5.89   HEMOGLOBIN g/dL 10.2* 9.7* 11.8* 11.8*   PLATELETS 10*3/mm3 176 167 189 183               Imaging Results (last 24 hours)     Procedure Component Value Units Date/Time    XR Chest 2 View [[id number]] Collected:  11/02/17 1442     Updated:  11/02/17 1445    Narrative:       PROCEDURE: XR CHEST 2 VW-        HISTORY: PNA.; J18.1-Lobar pneumonia, unspecified organism;  E87.5-Hyperkalemia; R06.02-Shortness of breath; B33-Ttygscs effusion,  not elsewhere classified     COMPARISON: October 30, 2017.     FINDINGS: The heart is normal in size. The mediastinum is unremarkable.  There is a loculated right effusion. There is improved aeration of the  right lung base compared to the prior exam. The left lung is clear.  There is no pneumothorax. There are no acute osseous abnormalities.           Impression:       Improved aeration of the right lung compared to the prior  exam.                 This report was finalized on 11/2/2017 2:43 PM by Marilia Mora M.D..          amLODIPine 10 mg Oral Daily   aspirin 325 mg Oral Daily   atorvastatin 10 mg Oral Daily   b complex-vitamin c-folic acid 1 tablet Oral Daily   docusate sodium 100 mg Oral BID   enoxaparin 30 mg Subcutaneous Daily   folic acid 1 mg Oral Daily   insulin aspart 0-7 Units Subcutaneous 4x Daily AC & at Bedtime   insulin detemir 8 Units Subcutaneous Daily    levothyroxine 100 mcg Oral Daily   losartan 100 mg Oral Q24H   metoprolol succinate  mg Oral Daily   pantoprazole 40 mg Oral QAM   piperacillin-tazobactam 2.25 g Intravenous Q8H       Pharmacy to dose vancomycin    vancomycin 1,000 mg       Medication Review:   Current Facility-Administered Medications   Medication Dose Route Frequency Provider Last Rate Last Dose   • acetaminophen (TYLENOL) tablet 650 mg  650 mg Oral Q4H PRN John Sosa MD       • amLODIPine (NORVASC) tablet 10 mg  10 mg Oral Daily John Sosa MD   10 mg at 11/03/17 0823   • aspirin tablet 325 mg  325 mg Oral Daily John Sosa MD   325 mg at 11/03/17 0823   • atorvastatin (LIPITOR) tablet 10 mg  10 mg Oral Daily John Sosa MD   10 mg at 11/03/17 0823   • b complex-vitamin c-folic acid (NEPHRO-SHIRLENE) tablet 1 tablet  1 tablet Oral Daily Chance Mackey MD   1 tablet at 11/03/17 0823   • dextrose (D50W) solution 25 g  25 g Intravenous Q15 Min PRN John Sosa MD       • dextrose (GLUTOSE) oral gel 1 tube  1 tube Oral Q15 Min PRN John Sosa MD       • diphenhydrAMINE (BENADRYL) capsule 50 mg  50 mg Oral Nightly PRN John Sosa MD       • docusate sodium (COLACE) capsule 100 mg  100 mg Oral BID John Sosa MD   100 mg at 11/03/17 0823   • enoxaparin (LOVENOX) syringe 30 mg  30 mg Subcutaneous Daily John Sosa MD   30 mg at 11/03/17 0828   • folic acid (FOLVITE) tablet 1 mg  1 mg Oral Daily John Sosa MD   1 mg at 11/03/17 0823   • glucagon (human recombinant) (GLUCAGEN DIAGNOSTIC) injection 1 mg  1 mg Subcutaneous Q15 Min PRN John Sosa MD       • insulin aspart (novoLOG) injection 0-7 Units  0-7 Units Subcutaneous 4x Daily AC & at Bedtime John Sosa MD   5 Units at 11/02/17 2135   • insulin detemir (LEVEMIR) injection 8 Units  8 Units Subcutaneous Daily John Sosa MD   8 Units at 11/03/17 0823   • levothyroxine (SYNTHROID, LEVOTHROID) tablet 100 mcg  100 mcg Oral Daily Mosumi  MD Sheila   100 mcg at 11/03/17 0823   • losartan (COZAAR) tablet 100 mg  100 mg Oral Q24H John Sosa MD   100 mg at 11/03/17 0823   • metoprolol succinate XL (TOPROL-XL) 24 hr tablet 100 mg  100 mg Oral Daily John Sosa MD   100 mg at 11/03/17 0823   • ondansetron (ZOFRAN) injection 4 mg  4 mg Intravenous Q6H PRN John Sosa MD       • pantoprazole (PROTONIX) EC tablet 40 mg  40 mg Oral QAM John Sosa MD   40 mg at 11/03/17 0622   • Pharmacy to dose vancomycin   Does not apply Continuous PRN John Sosa MD       • piperacillin-tazobactam (ZOSYN) in iso-osmotic dextrose IVPB 2.25 g (premix)  2.25 g Intravenous Q8H John Sosa MD 0 mL/hr at 11/02/17 0545 2.25 g at 11/03/17 0432   • sodium chloride 0.9 % flush 1-10 mL  1-10 mL Intravenous PRN John Sosa MD       • sodium chloride 0.9 % flush 10 mL  10 mL Intravenous PRN Bernard Chatterjee Jr., PA-C   10 mL at 11/02/17 1951   • vancomycin 1000 mg in sodium chloride 0.9% 250 mL IVPB  1,000 mg Intravenous Continuous PRN John Sosa MD           Assessment/Plan       1.   End stage renal disease on dialysis  2.   Uncontrolled diabetes mellitus with hyperglycemia, with long-term current use of insulin  3.   Chronic kidney disease-mineral and bone disorder  4.   Anemia of chronic kidney disease  5.   Type 2 diabetes mellitus treated with insulin  6.   Hypertension due to end stage renal disease caused by type 2 diabetes mellitus, on dialysis  7.   Physical debility  8.   Chronic respiratory insufficiency  9.   Pneumonia of right lower lobe due to infectious organism  Plan:    · Will continue his regular dialysis, TTS . Next dialysis is due Saturday and will make the arrangements. Labs reviewed and discussed.   · His pulmonary issues are being monitored, he has complicated pulmonary problem has been followed by  as outpatient as well. Dr. Hernandez note reviewed and discussed with the patient and the hospitalist service as well.  Continue the antibiotics.  · He does have slightly increased potassium as long as it stays low high 5.6 will not give him any Kayexalate.  He has done fairly well and potassium usually runs below 6.  · Continue surveillance labs      Details were discussed with the patient as well as hospitalist service.  Further recommendations will depend on clinical course of the patient during the current hospitalization.      I also discussed the details with the nursing staff.    Rest as ordered.    Chance Mackey MD  11/03/17  10:54 AM      EMR Dragon/Transcription disclaimer:   Much of this encounter note is an electronic transcription/translation of spoken language to printed text. The electronic translation of spoken language may permit erroneous, or at times, nonsensical words or phrases to be inadvertently transcribed; Although I have reviewed the note for such errors, some may still exist.

## 2017-11-03 NOTE — PLAN OF CARE
"Problem: Patient Care Overview (Adult)  Goal: Adult Individualization and Mutuality  Outcome: Ongoing (interventions implemented as appropriate)  Due to freq ED visits and admissions visited pt to discuss Palliative Care for chronic life-limiting conditions. Pt verbalized \" I have those\".  Pt reports has had HH ([facility]) in past and [employer] nurse visit monthly.  Reports [employer] nurse no longer visits.  He would like that as well.   Pt reports feels like he is doing better but will accept whatever help we can provide.  Pt scheduled to see Pulmonologist at [facility] but would like to \"switch to Dr Hernandez\" after [facility] visit.    Pt agreeable to Palliative Care referral.  HCP notified.  Demographics and clinicals faxed.  Dr Ruelas updated.   CM, Annika Stanford RN, notified of request for [employer] nurse to visit.  Reported would contact Sofía @ [facility] to notify.      "

## 2017-11-04 LAB
BACTERIA SPEC AEROBE CULT: NORMAL
BACTERIA SPEC AEROBE CULT: NORMAL

## 2017-11-05 LAB
BACTERIA SPEC RESP CULT: ABNORMAL
GRAM STN SPEC: ABNORMAL

## 2017-11-06 LAB — GLUCOSE BLDC GLUCOMTR-MCNC: 308 MG/DL (ref 70–130)

## 2017-11-07 ENCOUNTER — APPOINTMENT (OUTPATIENT)
Dept: GENERAL RADIOLOGY | Facility: HOSPITAL | Age: 57
End: 2017-11-07

## 2017-11-07 ENCOUNTER — HOSPITAL ENCOUNTER (EMERGENCY)
Facility: HOSPITAL | Age: 57
Discharge: HOME | End: 2017-11-07
Attending: EMERGENCY MEDICINE | Admitting: EMERGENCY MEDICINE

## 2017-11-07 VITALS
HEART RATE: 95 BPM | BODY MASS INDEX: 19.38 KG/M2 | OXYGEN SATURATION: 93 % | RESPIRATION RATE: 20 BRPM | WEIGHT: 127.87 LBS | SYSTOLIC BLOOD PRESSURE: 173 MMHG | DIASTOLIC BLOOD PRESSURE: 81 MMHG | TEMPERATURE: 97.8 F | HEIGHT: 68 IN
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
## 2017-11-07 DIAGNOSIS — J18.9 PNEUMONIA OF RIGHT LOWER LOBE DUE TO INFECTIOUS ORGANISM: ICD-10-CM

## 2017-11-07 DIAGNOSIS — S83.92XA SPRAIN OF LEFT KNEE, INITIAL ENCOUNTER: ICD-10-CM

## 2017-11-07 DIAGNOSIS — M25.552 ACUTE PAIN OF LEFT HIP: ICD-10-CM

## 2017-11-07 LAB
ALBUMIN SERPL-MCNC: 4.7 G/DL (ref 3.5–5)
ALBUMIN/GLOB SERPL: 1.2 G/DL (ref 1–2)
ALP SERPL-CCNC: 95 U/L (ref 38–126)
ALT SERPL W P-5'-P-CCNC: 27 U/L (ref 13–69)
ANION GAP SERPL CALCULATED.3IONS-SCNC: 21.7 MMOL/L
AST SERPL-CCNC: 41 U/L (ref 15–46)
BASOPHILS # BLD AUTO: 0.05 10*3/MM3 (ref 0–0.2)
BASOPHILS NFR BLD AUTO: 0.7 % (ref 0–2.5)
BILIRUB SERPL-MCNC: 0.9 MG/DL (ref 0.2–1.3)
BUN BLD-MCNC: 20 MG/DL (ref 7–20)
BUN/CREAT SERPL: 5.7 (ref 6.3–21.9)
CALCIUM SPEC-SCNC: 9.7 MG/DL (ref 8.4–10.2)
CHLORIDE SERPL-SCNC: 85 MMOL/L (ref 98–107)
CO2 SERPL-SCNC: 34 MMOL/L (ref 26–30)
CREAT BLD-MCNC: 3.5 MG/DL (ref 0.6–1.3)
DEPRECATED RDW RBC AUTO: 57.2 FL (ref 37–54)
EOSINOPHIL # BLD AUTO: 0.24 10*3/MM3 (ref 0–0.7)
EOSINOPHIL NFR BLD AUTO: 3.3 % (ref 0–7)
ERYTHROCYTE [DISTWIDTH] IN BLOOD BY AUTOMATED COUNT: 17.2 % (ref 11.5–14.5)
GFR SERPL CREATININE-BSD FRML MDRD: 18 ML/MIN/1.73
GLOBULIN UR ELPH-MCNC: 4 GM/DL
GLUCOSE BLD-MCNC: 224 MG/DL (ref 74–98)
HCT VFR BLD AUTO: 36 % (ref 42–52)
HGB BLD-MCNC: 11.9 G/DL (ref 14–18)
IMM GRANULOCYTES # BLD: 0.02 10*3/MM3 (ref 0–0.06)
IMM GRANULOCYTES NFR BLD: 0.3 % (ref 0–0.6)
LYMPHOCYTES # BLD AUTO: 0.61 10*3/MM3 (ref 0.6–3.4)
LYMPHOCYTES NFR BLD AUTO: 8.4 % (ref 10–50)
MCH RBC QN AUTO: 30.1 PG (ref 27–31)
MCHC RBC AUTO-ENTMCNC: 33.1 G/DL (ref 30–37)
MCV RBC AUTO: 90.9 FL (ref 80–94)
MONOCYTES # BLD AUTO: 0.87 10*3/MM3 (ref 0–0.9)
MONOCYTES NFR BLD AUTO: 12 % (ref 0–12)
NEUTROPHILS # BLD AUTO: 5.49 10*3/MM3 (ref 2–6.9)
NEUTROPHILS NFR BLD AUTO: 75.3 % (ref 37–80)
NRBC BLD MANUAL-RTO: 0 /100 WBC (ref 0–0)
PLATELET # BLD AUTO: 227 10*3/MM3 (ref 130–400)
PMV BLD AUTO: 10.2 FL (ref 6–12)
POTASSIUM BLD-SCNC: 4.7 MMOL/L (ref 3.5–5.1)
PROT SERPL-MCNC: 8.7 G/DL (ref 6.3–8.2)
RBC # BLD AUTO: 3.96 10*6/MM3 (ref 4.7–6.1)
SODIUM BLD-SCNC: 136 MMOL/L (ref 137–145)
WBC NRBC COR # BLD: 7.28 10*3/MM3 (ref 4.8–10.8)

## 2017-11-07 PROCEDURE — 71010 HC CHEST PA OR AP: CPT

## 2017-11-07 PROCEDURE — 73502 X-RAY EXAM HIP UNI 2-3 VIEWS: CPT

## 2017-11-07 PROCEDURE — 73562 X-RAY EXAM OF KNEE 3: CPT

## 2017-11-07 PROCEDURE — 85025 COMPLETE CBC W/AUTO DIFF WBC: CPT | Performed by: EMERGENCY MEDICINE

## 2017-11-07 PROCEDURE — 99284 EMERGENCY DEPT VISIT MOD MDM: CPT

## 2017-11-07 PROCEDURE — 80053 COMPREHEN METABOLIC PANEL: CPT | Performed by: EMERGENCY MEDICINE

## 2017-11-07 RX ORDER — HYDROCODONE BITARTRATE AND ACETAMINOPHEN 5; 325 MG/1; MG/1
1 TABLET ORAL ONCE
Status: COMPLETED | OUTPATIENT
Start: 2017-11-07 | End: 2017-11-07

## 2017-11-07 RX ADMIN — HYDROCODONE BITARTRATE AND ACETAMINOPHEN 1 TABLET: 5; 325 TABLET ORAL at 17:45

## 2017-11-07 NOTE — ED PROVIDER NOTES
Subjective   HPI Comments: 57-year-old male with a remote CVA causing left-sided weakness, chronic renal failure, on dialysis Tuesday Thursday and Saturday.  Finished dialysis today.  Also has history of diabetes mellitus and hypertension.    He is here for generalized weakness for more than one week, weakness to the severity that when he stood up out of his wheelchair today, he twisted his left knee and felt a pop.  The pain radiates to his left hip.  It is moderate to severe in character and achy.  No pain to his left ankle or foot.  He has never seen orthopedics before.  No head injury or loss of consciousness.  No spine pain.  Also complains of a nonproductive cough with some soreness in his left ribs.  Diagnosed with pneumonia last week and is currently taking Augmentin on dialysis days.  This fall was at 7 AM this morning.  Symptoms are progressively worsening.    Aggravating factors: Movement of the knee.  Alleviating factors: Remaining still.  Treatment prior to arrival: Remaining still.      History provided by:  Patient  History limited by: nothing.   used: No        Review of Systems   Constitutional: Negative.    HENT: Negative.    Eyes: Negative.    Respiratory: Positive for cough and shortness of breath.    Cardiovascular: Negative.  Negative for chest pain.   Gastrointestinal: Negative.  Negative for abdominal pain.   Endocrine: Negative.    Genitourinary: Negative for dysuria.   Musculoskeletal: Positive for arthralgias and joint swelling.   Skin: Negative.    Allergic/Immunologic: Negative.    Neurological: Negative.    Hematological: Negative.    Psychiatric/Behavioral: Negative.    All other systems reviewed and are negative.      Past Medical History:   Diagnosis Date   • Anemia    • CHF (congestive heart failure)    • Chronic kidney disease    • Diabetes mellitus    • H/O chest x-ray 03/25/2016    Interval decrease in size of the patients right pleural effusion with a  persistent small left effusion as well   • History of transfusion    • Hypertension    • Left-sided weakness    • Renal failure    • Stroke        Allergies   Allergen Reactions   • Erythromycin Hives       Past Surgical History:   Procedure Laterality Date   • ARTERIOVENOUS FISTULA Right    • CATARACT EXTRACTION, BILATERAL     • CHOLECYSTECTOMY     • COLONOSCOPY     • EYE SURGERY     • PORTACATH PLACEMENT     • VENOUS ACCESS DEVICE (PORT) REMOVAL         Family History   Problem Relation Age of Onset   • COPD Mother    • Hypertension Father    • Diabetes Father    • Hypertension Sister    • Diabetes Sister    • Hypertension Brother    • Diabetes Paternal Grandmother        Social History     Social History   • Marital status:      Spouse name: N/A   • Number of children: N/A   • Years of education: N/A     Social History Main Topics   • Smoking status: Never Smoker   • Smokeless tobacco: Never Used   • Alcohol use No   • Drug use: No   • Sexual activity: Defer     Other Topics Concern   • None     Social History Narrative           Objective   Physical Exam   Constitutional: He is oriented to person, place, and time. He appears well-developed and well-nourished. No distress.   HENT:   Head: Normocephalic and atraumatic.   Right Ear: External ear normal.   Left Ear: External ear normal.   Eyes: EOM are normal. Pupils are equal, round, and reactive to light.   Neck: Normal range of motion. Neck supple.   Cardiovascular: Normal rate, regular rhythm and normal heart sounds.    Pulmonary/Chest: Effort normal. No stridor. He has no wheezes. He exhibits no tenderness.   The patient has decreased breath sounds in the right lung base.  Some rhonchi in the left lung base.   Musculoskeletal: Normal range of motion. He exhibits no edema.   The left knee is mild to moderately swollen and tender to palpation.  The left ankle and left foot are nontender.  The left hip is tender to palpation without shortening or deformity.   The mid and lower back are nontender to palpation.    The patient has an abrasion to his right medial ankle.   Neurological: He is alert and oriented to person, place, and time.   The patient has left-sided hemiparesis from a previous stroke, unchanged according to his report.   Skin: Skin is warm and dry. No rash noted. He is not diaphoretic.   Psychiatric: He has a normal mood and affect. His behavior is normal. Judgment and thought content normal.   Nursing note and vitals reviewed.      Procedures         ED Course  ED Course                  MDM  Number of Diagnoses or Management Options  Accidental fall, initial encounter: new and requires workup  Acute pain of left hip: new and requires workup  Pneumonia of right lower lobe due to infectious organism: new and requires workup  Sprain of left knee, initial encounter: new and requires workup     Amount and/or Complexity of Data Reviewed  Clinical lab tests: reviewed and ordered  Tests in the radiology section of CPT®: ordered and reviewed  Independent visualization of images, tracings, or specimens: yes    Risk of Complications, Morbidity, and/or Mortality  Presenting problems: moderate  Diagnostic procedures: low  Management options: moderate  General comments: The patient understands that there is no fracture but he may need to have an MRI performed of his knee to look for torn ligaments/cartilage.  He understands he may need to have an MRI performed through his family doctor or to Dr. Cueto, orthopedic specialist.  He understands to call Dr. Cueto for an appointment.    Patient Progress  Patient progress: stable      Final diagnoses:   Accidental fall, initial encounter   Sprain of left knee, initial encounter   Acute pain of left hip   Pneumonia of right lower lobe due to infectious organism            Ale Mendoza PA-C  11/07/17 9016

## 2017-11-08 NOTE — DISCHARGE INSTRUCTIONS
Finish Augmentin.    Return to the ER if worse.  Follow-up with your doctor in 1-2 days for further workup, treatment and evaluation.  Follow-up with Dr. Cueto, orthopedic specialist, for further workup of your knee pain if it does not improve.  You may need to have an MRI performed.  Call for appointment.

## 2017-11-14 ENCOUNTER — APPOINTMENT (OUTPATIENT)
Dept: GENERAL RADIOLOGY | Facility: HOSPITAL | Age: 57
End: 2017-11-14

## 2017-11-14 ENCOUNTER — HOSPITAL ENCOUNTER (EMERGENCY)
Facility: HOSPITAL | Age: 57
Discharge: HOME | End: 2017-11-14
Attending: EMERGENCY MEDICINE | Admitting: EMERGENCY MEDICINE

## 2017-11-14 VITALS
SYSTOLIC BLOOD PRESSURE: 129 MMHG | RESPIRATION RATE: 18 BRPM | HEIGHT: 68 IN | TEMPERATURE: 99.4 F | DIASTOLIC BLOOD PRESSURE: 74 MMHG | OXYGEN SATURATION: 94 % | WEIGHT: 127 LBS | BODY MASS INDEX: 19.25 KG/M2 | HEART RATE: 101 BPM

## 2017-11-14 DIAGNOSIS — R05.9 COUGH: ICD-10-CM

## 2017-11-14 DIAGNOSIS — J90 LOCULATED PLEURAL EFFUSION: Primary | ICD-10-CM

## 2017-11-14 LAB
ALBUMIN SERPL-MCNC: 4.1 G/DL (ref 3.5–5)
ALBUMIN/GLOB SERPL: 1.1 G/DL (ref 1–2)
ALP SERPL-CCNC: 74 U/L (ref 38–126)
ALT SERPL W P-5'-P-CCNC: 21 U/L (ref 13–69)
ANION GAP SERPL CALCULATED.3IONS-SCNC: 17.9 MMOL/L
AST SERPL-CCNC: 35 U/L (ref 15–46)
BASOPHILS # BLD AUTO: 0.04 10*3/MM3 (ref 0–0.2)
BASOPHILS NFR BLD AUTO: 0.8 % (ref 0–2.5)
BILIRUB SERPL-MCNC: 1 MG/DL (ref 0.2–1.3)
BUN BLD-MCNC: 19 MG/DL (ref 7–20)
BUN/CREAT SERPL: 6.3 (ref 6.3–21.9)
CALCIUM SPEC-SCNC: 9.1 MG/DL (ref 8.4–10.2)
CHLORIDE SERPL-SCNC: 82 MMOL/L (ref 98–107)
CO2 SERPL-SCNC: 33 MMOL/L (ref 26–30)
CREAT BLD-MCNC: 3 MG/DL (ref 0.6–1.3)
D-LACTATE SERPL-SCNC: 1.4 MMOL/L (ref 0.5–2)
DEPRECATED RDW RBC AUTO: 53.1 FL (ref 37–54)
EOSINOPHIL # BLD AUTO: 0.02 10*3/MM3 (ref 0–0.7)
EOSINOPHIL NFR BLD AUTO: 0.4 % (ref 0–7)
ERYTHROCYTE [DISTWIDTH] IN BLOOD BY AUTOMATED COUNT: 16.1 % (ref 11.5–14.5)
GFR SERPL CREATININE-BSD FRML MDRD: 22 ML/MIN/1.73
GLOBULIN UR ELPH-MCNC: 3.9 GM/DL
GLUCOSE BLD-MCNC: 394 MG/DL (ref 74–98)
HCT VFR BLD AUTO: 31 % (ref 42–52)
HGB BLD-MCNC: 10.3 G/DL (ref 14–18)
IMM GRANULOCYTES # BLD: 0.02 10*3/MM3 (ref 0–0.06)
IMM GRANULOCYTES NFR BLD: 0.4 % (ref 0–0.6)
LYMPHOCYTES # BLD AUTO: 0.58 10*3/MM3 (ref 0.6–3.4)
LYMPHOCYTES NFR BLD AUTO: 11.4 % (ref 10–50)
MCH RBC QN AUTO: 30 PG (ref 27–31)
MCHC RBC AUTO-ENTMCNC: 33.2 G/DL (ref 30–37)
MCV RBC AUTO: 90.4 FL (ref 80–94)
MONOCYTES # BLD AUTO: 0.81 10*3/MM3 (ref 0–0.9)
MONOCYTES NFR BLD AUTO: 15.9 % (ref 0–12)
NEUTROPHILS # BLD AUTO: 3.64 10*3/MM3 (ref 2–6.9)
NEUTROPHILS NFR BLD AUTO: 71.1 % (ref 37–80)
NRBC BLD MANUAL-RTO: 0 /100 WBC (ref 0–0)
PLATELET # BLD AUTO: 231 10*3/MM3 (ref 130–400)
PMV BLD AUTO: 10.4 FL (ref 6–12)
POTASSIUM BLD-SCNC: 3.9 MMOL/L (ref 3.5–5.1)
PROT SERPL-MCNC: 8 G/DL (ref 6.3–8.2)
RBC # BLD AUTO: 3.43 10*6/MM3 (ref 4.7–6.1)
SODIUM BLD-SCNC: 129 MMOL/L (ref 137–145)
TROPONIN I SERPL-MCNC: 0.1 NG/ML (ref 0–0.03)
WBC NRBC COR # BLD: 5.11 10*3/MM3 (ref 4.8–10.8)

## 2017-11-14 PROCEDURE — 80053 COMPREHEN METABOLIC PANEL: CPT | Performed by: NURSE PRACTITIONER

## 2017-11-14 PROCEDURE — 63710000001 INSULIN REGULAR HUMAN PER 5 UNITS: Performed by: NURSE PRACTITIONER

## 2017-11-14 PROCEDURE — 99284 EMERGENCY DEPT VISIT MOD MDM: CPT

## 2017-11-14 PROCEDURE — 25010000002 VANCOMYCIN PER 500 MG: Performed by: NURSE PRACTITIONER

## 2017-11-14 PROCEDURE — 87040 BLOOD CULTURE FOR BACTERIA: CPT | Performed by: EMERGENCY MEDICINE

## 2017-11-14 PROCEDURE — 85025 COMPLETE CBC W/AUTO DIFF WBC: CPT | Performed by: NURSE PRACTITIONER

## 2017-11-14 PROCEDURE — 96367 TX/PROPH/DG ADDL SEQ IV INF: CPT

## 2017-11-14 PROCEDURE — 71020 HC CHEST PA AND LATERAL: CPT

## 2017-11-14 PROCEDURE — 82962 GLUCOSE BLOOD TEST: CPT

## 2017-11-14 PROCEDURE — 25010000002 CEFTRIAXONE PER 250 MG: Performed by: NURSE PRACTITIONER

## 2017-11-14 PROCEDURE — 96365 THER/PROPH/DIAG IV INF INIT: CPT

## 2017-11-14 PROCEDURE — 83605 ASSAY OF LACTIC ACID: CPT | Performed by: NURSE PRACTITIONER

## 2017-11-14 PROCEDURE — 84484 ASSAY OF TROPONIN QUANT: CPT | Performed by: NURSE PRACTITIONER

## 2017-11-14 PROCEDURE — 93005 ELECTROCARDIOGRAM TRACING: CPT | Performed by: NURSE PRACTITIONER

## 2017-11-14 RX ORDER — SODIUM CHLORIDE 0.9 % (FLUSH) 0.9 %
10 SYRINGE (ML) INJECTION AS NEEDED
Status: DISCONTINUED | OUTPATIENT
Start: 2017-11-14 | End: 2017-11-15 | Stop reason: HOSPADM

## 2017-11-14 RX ADMIN — HUMAN INSULIN 8 UNITS: 100 INJECTION, SOLUTION SUBCUTANEOUS at 22:20

## 2017-11-14 RX ADMIN — CEFTRIAXONE 1 G: 1 INJECTION, SOLUTION INTRAVENOUS at 21:54

## 2017-11-14 RX ADMIN — VANCOMYCIN HYDROCHLORIDE 1000 MG: 1 INJECTION, POWDER, LYOPHILIZED, FOR SOLUTION INTRAVENOUS at 22:21

## 2017-11-15 LAB
GLUCOSE BLDC GLUCOMTR-MCNC: 263 MG/DL (ref 70–130)
GLUCOSE BLDC GLUCOMTR-MCNC: 404 MG/DL (ref 70–130)

## 2017-11-15 NOTE — ED PROVIDER NOTES
Subjective   History of Present Illness  This is a 57-year-old male who comes in today complaining of increased cough and congestion times the past 3 days.  He reports a recent hospitalization here at [facility] where he was admitted on October 30 and discharged on November 3 for a right lower lobe pleural effusion.  According to the records he has a history of a chronic loculated pleural effusion and was being worked up by [facility] pulmonology and was scheduled to go back for a biopsy to see why he was having these recurrent pleural effusions.  He apparently at one time had a PleurX catheter in place but was subsequently removed prior to his last hospitalization. He was treated with several days of IV antibiotics and sent home on augmentin where he finished his last dose on November 7th. He reports he was feeling much better until about 4 days ago he started to develop a cough and fever again with increase in shortness of breath. He went to his dialysis appointment today and they advised him to come back to the emergency department for reevaluation of his cough. .  Review of Systems   Constitutional: Negative.    HENT: Negative.    Eyes: Negative.    Respiratory: Positive for cough, choking and shortness of breath.    Cardiovascular: Negative.    Gastrointestinal: Negative.    Genitourinary: Negative.    Skin: Negative.    Neurological: Negative.    Psychiatric/Behavioral: Negative.    All other systems reviewed and are negative.      Past Medical History:   Diagnosis Date   • Anemia    • CHF (congestive heart failure)    • Chronic kidney disease    • Diabetes mellitus    • H/O chest x-ray 03/25/2016    Interval decrease in size of the patients right pleural effusion with a persistent small left effusion as well   • History of transfusion    • Hypertension    • Left-sided weakness    • Renal failure    • Stroke        Allergies   Allergen Reactions   • Erythromycin Hives       Past Surgical  History:   Procedure Laterality Date   • ARTERIOVENOUS FISTULA Right    • CATARACT EXTRACTION, BILATERAL     • CHOLECYSTECTOMY     • COLONOSCOPY     • EYE SURGERY     • PORTACATH PLACEMENT     • VENOUS ACCESS DEVICE (PORT) REMOVAL         Family History   Problem Relation Age of Onset   • COPD Mother    • Hypertension Father    • Diabetes Father    • Hypertension Sister    • Diabetes Sister    • Hypertension Brother    • Diabetes Paternal Grandmother        Social History     Social History   • Marital status:      Spouse name: N/A   • Number of children: N/A   • Years of education: N/A     Social History Main Topics   • Smoking status: Never Smoker   • Smokeless tobacco: Never Used   • Alcohol use No   • Drug use: No   • Sexual activity: Defer     Other Topics Concern   • None     Social History Narrative           Objective   Physical Exam   Constitutional: He appears well-developed and well-nourished.   Nursing note and vitals reviewed.  GEN: No acute distress  Head: Normocephalic, atraumatic  Eyes: Pupils equal round reactive to light  ENT: Posterior pharynx normal in appearance, oral mucosa is moist  Chest: Nontender to palpation  Cardiovascular: Regular rate  Lungs: Decreased right lower lobe  Abdomen: Soft, nontender, nondistended, no peritoneal signs  Extremities: No edema, normal appearance  Neuro: GCS 15  Psych: Mood and affect are appropriate      Procedures         ED Course  ED Course   Comment By Time   Consult with Dr. Mackey and reviewed patient's troponin, chest x-ray, glucose and labs.  After careful discussion and review of his medical records Dr. Mackey feels that it would be in his best interest to give him a gram of Rocephin today and a gram of vancomycin and let him go home and he will continue those antibiotics with his dialysis.  When we reviewed his chest x-ray he does not have any significant changes that would warrant hospitalization his white blood count is normal and his lactic  acid is normal.  He does not feel that his condition has changed from his chronic pleural effusion.  He also has discussed this with Mr. ibrahim and he is agreeable with this plan of care.  We did discuss his troponin being 0.097 and he does not feel that this is an elevation due to a cardiac problem. Latia RUIZ Magalis, APRN 11/14 2120                  MDM  Number of Diagnoses or Management Options  Diagnosis management comments: Due to the fact he is having these same symptoms as he had during his prior hospitalization we will go ahead and repeat his labs CBC, CMP also get a lactic acid and blood cultures, and a chest x-ray.       Amount and/or Complexity of Data Reviewed  Clinical lab tests: ordered and reviewed  Tests in the radiology section of CPT®: ordered and reviewed  Review and summarize past medical records: yes  Discuss the patient with other providers: yes  Independent visualization of images, tracings, or specimens: yes    Risk of Complications, Morbidity, and/or Mortality  Presenting problems: high  Diagnostic procedures: moderate  Management options: moderate        Final diagnoses:   Loculated pleural effusion   Cough            Latia SARA Magalis, APRN  11/14/17 2125

## 2017-11-15 NOTE — ED NOTES
2113: DR. ALBERT CALLED PER SEFERINO JULIEN, CALL SENT TO HER @ THIS TIME.     Jackelyn Anna  11/14/17 2113

## 2017-11-18 ENCOUNTER — HOSPITAL ENCOUNTER (EMERGENCY)
Facility: HOSPITAL | Age: 57
Discharge: HOME | End: 2017-11-18
Attending: EMERGENCY MEDICINE | Admitting: EMERGENCY MEDICINE

## 2017-11-18 ENCOUNTER — APPOINTMENT (OUTPATIENT)
Dept: GENERAL RADIOLOGY | Facility: HOSPITAL | Age: 57
End: 2017-11-18

## 2017-11-18 VITALS
OXYGEN SATURATION: 95 % | BODY MASS INDEX: 19.55 KG/M2 | RESPIRATION RATE: 20 BRPM | SYSTOLIC BLOOD PRESSURE: 137 MMHG | HEIGHT: 68 IN | TEMPERATURE: 98.3 F | HEART RATE: 109 BPM | WEIGHT: 128.97 LBS | DIASTOLIC BLOOD PRESSURE: 62 MMHG

## 2017-11-18 DIAGNOSIS — R06.00 DYSPNEA, UNSPECIFIED TYPE: Primary | ICD-10-CM

## 2017-11-18 DIAGNOSIS — J90 LOCULATED PLEURAL EFFUSION: ICD-10-CM

## 2017-11-18 LAB
ALBUMIN SERPL-MCNC: 4.2 G/DL (ref 3.5–5)
ALBUMIN/GLOB SERPL: 1.1 G/DL (ref 1–2)
ALP SERPL-CCNC: 70 U/L (ref 38–126)
ALT SERPL W P-5'-P-CCNC: 29 U/L (ref 13–69)
ANION GAP SERPL CALCULATED.3IONS-SCNC: 19.7 MMOL/L
AST SERPL-CCNC: 30 U/L (ref 15–46)
BASOPHILS # BLD AUTO: 0.05 10*3/MM3 (ref 0–0.2)
BASOPHILS NFR BLD AUTO: 0.6 % (ref 0–2.5)
BILIRUB SERPL-MCNC: 1.1 MG/DL (ref 0.2–1.3)
BUN BLD-MCNC: 11 MG/DL (ref 7–20)
BUN/CREAT SERPL: 5.2 (ref 6.3–21.9)
CALCIUM SPEC-SCNC: 9.3 MG/DL (ref 8.4–10.2)
CHLORIDE SERPL-SCNC: 85 MMOL/L (ref 98–107)
CO2 SERPL-SCNC: 34 MMOL/L (ref 26–30)
CREAT BLD-MCNC: 2.1 MG/DL (ref 0.6–1.3)
DEPRECATED RDW RBC AUTO: 50.2 FL (ref 37–54)
EOSINOPHIL # BLD AUTO: 0.17 10*3/MM3 (ref 0–0.7)
EOSINOPHIL NFR BLD AUTO: 2.2 % (ref 0–7)
ERYTHROCYTE [DISTWIDTH] IN BLOOD BY AUTOMATED COUNT: 15.4 % (ref 11.5–14.5)
FLUAV AG NPH QL: NEGATIVE
FLUBV AG NPH QL IA: NEGATIVE
GFR SERPL CREATININE-BSD FRML MDRD: 33 ML/MIN/1.73
GLOBULIN UR ELPH-MCNC: 3.7 GM/DL
GLUCOSE BLD-MCNC: 182 MG/DL (ref 74–98)
HCT VFR BLD AUTO: 30.1 % (ref 42–52)
HGB BLD-MCNC: 10 G/DL (ref 14–18)
IMM GRANULOCYTES # BLD: 0.06 10*3/MM3 (ref 0–0.06)
IMM GRANULOCYTES NFR BLD: 0.8 % (ref 0–0.6)
LYMPHOCYTES # BLD AUTO: 0.54 10*3/MM3 (ref 0.6–3.4)
LYMPHOCYTES NFR BLD AUTO: 6.9 % (ref 10–50)
MCH RBC QN AUTO: 29.5 PG (ref 27–31)
MCHC RBC AUTO-ENTMCNC: 33.2 G/DL (ref 30–37)
MCV RBC AUTO: 88.8 FL (ref 80–94)
MONOCYTES # BLD AUTO: 0.88 10*3/MM3 (ref 0–0.9)
MONOCYTES NFR BLD AUTO: 11.3 % (ref 0–12)
NEUTROPHILS # BLD AUTO: 6.11 10*3/MM3 (ref 2–6.9)
NEUTROPHILS NFR BLD AUTO: 78.2 % (ref 37–80)
NRBC BLD MANUAL-RTO: 0 /100 WBC (ref 0–0)
PLATELET # BLD AUTO: 260 10*3/MM3 (ref 130–400)
PMV BLD AUTO: 10.1 FL (ref 6–12)
POTASSIUM BLD-SCNC: 3.7 MMOL/L (ref 3.5–5.1)
PROT SERPL-MCNC: 7.9 G/DL (ref 6.3–8.2)
RBC # BLD AUTO: 3.39 10*6/MM3 (ref 4.7–6.1)
SODIUM BLD-SCNC: 135 MMOL/L (ref 137–145)
TROPONIN I SERPL-MCNC: 0.04 NG/ML (ref 0–0.03)
WBC NRBC COR # BLD: 7.81 10*3/MM3 (ref 4.8–10.8)

## 2017-11-18 PROCEDURE — 85025 COMPLETE CBC W/AUTO DIFF WBC: CPT | Performed by: NURSE PRACTITIONER

## 2017-11-18 PROCEDURE — 94799 UNLISTED PULMONARY SVC/PX: CPT

## 2017-11-18 PROCEDURE — 99284 EMERGENCY DEPT VISIT MOD MDM: CPT

## 2017-11-18 PROCEDURE — 71020 HC CHEST PA AND LATERAL: CPT

## 2017-11-18 PROCEDURE — 94640 AIRWAY INHALATION TREATMENT: CPT

## 2017-11-18 PROCEDURE — 84484 ASSAY OF TROPONIN QUANT: CPT | Performed by: NURSE PRACTITIONER

## 2017-11-18 PROCEDURE — 80053 COMPREHEN METABOLIC PANEL: CPT | Performed by: NURSE PRACTITIONER

## 2017-11-18 PROCEDURE — 93005 ELECTROCARDIOGRAM TRACING: CPT | Performed by: NURSE PRACTITIONER

## 2017-11-18 PROCEDURE — 87804 INFLUENZA ASSAY W/OPTIC: CPT | Performed by: EMERGENCY MEDICINE

## 2017-11-18 RX ORDER — SODIUM CHLORIDE 0.9 % (FLUSH) 0.9 %
10 SYRINGE (ML) INJECTION AS NEEDED
Status: DISCONTINUED | OUTPATIENT
Start: 2017-11-18 | End: 2017-11-18 | Stop reason: HOSPADM

## 2017-11-18 RX ORDER — IPRATROPIUM BROMIDE AND ALBUTEROL SULFATE 2.5; .5 MG/3ML; MG/3ML
3 SOLUTION RESPIRATORY (INHALATION) ONCE
Status: COMPLETED | OUTPATIENT
Start: 2017-11-18 | End: 2017-11-18

## 2017-11-18 RX ORDER — ALBUTEROL SULFATE 90 UG/1
2 AEROSOL, METERED RESPIRATORY (INHALATION) EVERY 4 HOURS PRN
Qty: 1 INHALER | Refills: 0 | Status: SHIPPED | OUTPATIENT
Start: 2017-11-18

## 2017-11-18 RX ADMIN — IPRATROPIUM BROMIDE AND ALBUTEROL SULFATE 3 ML: .5; 3 SOLUTION RESPIRATORY (INHALATION) at 15:17

## 2017-11-18 NOTE — ED PROVIDER NOTES
"Subjective   History of Present Illness  This is a 57-year-old male who comes in today complaining of increased cough and congestion that started today.  He was seen here in the emergency department 4 days ago and was noted not to have any worsening of his pleural effusion. I contacted on call Dr. Garcia who is also his nephrologist. He informed me that he would let him go home and would start him on IV vancomycin and rocephin with each of his dialysis treatments. He went to dialysis today and the nurse told him he sounded \"tight\" and sent him here for further evaluation.    He reports a recent hospitalization here at [facility] where he was admitted on October 30 and discharged on November 3 for a right lower lobe pleural effusion.  According to the records he has a history of a chronic loculated pleural effusion and was being worked up by Psychiatric pul[facility]ology and was scheduled to go back for a biopsy to see why he was having these recurrent pleural effusions.  He apparently at one time had a PleurX catheter in place but was subsequently removed prior to his last hospitalization. He was treated with several days of IV antibiotics and sent home on augmentin where he finished his last dose on November 7th. He reported he was feeling much better for only a few days and then he started to develop a cough and fever again with increase in shortness of breath so he returned to the emergency department 4 days ago..  Review of Systems   Constitutional: Negative.    HENT: Negative.    Eyes: Negative.    Respiratory: Positive for cough and shortness of breath.    Cardiovascular: Negative.    Gastrointestinal: Negative.    Genitourinary: Negative.    Skin: Negative.    Neurological: Negative.    Psychiatric/Behavioral: Negative.    All other systems reviewed and are negative.      Past Medical History:   Diagnosis Date   • Anemia    • CHF (congestive heart failure)    • Chronic kidney disease    • Diabetes " mellitus    • H/O chest x-ray 03/25/2016    Interval decrease in size of the patients right pleural effusion with a persistent small left effusion as well   • History of transfusion    • Hypertension    • Left-sided weakness    • Renal failure    • Stroke        Allergies   Allergen Reactions   • Erythromycin Hives       Past Surgical History:   Procedure Laterality Date   • ARTERIOVENOUS FISTULA Right    • CATARACT EXTRACTION, BILATERAL     • CHOLECYSTECTOMY     • COLONOSCOPY     • EYE SURGERY     • PORTACATH PLACEMENT     • VENOUS ACCESS DEVICE (PORT) REMOVAL         Family History   Problem Relation Age of Onset   • COPD Mother    • Hypertension Father    • Diabetes Father    • Hypertension Sister    • Diabetes Sister    • Hypertension Brother    • Diabetes Paternal Grandmother        Social History     Social History   • Marital status:      Spouse name: N/A   • Number of children: N/A   • Years of education: N/A     Social History Main Topics   • Smoking status: Never Smoker   • Smokeless tobacco: Never Used   • Alcohol use No   • Drug use: No   • Sexual activity: Defer     Other Topics Concern   • None     Social History Narrative           Objective   Physical Exam   Constitutional: He appears well-developed and well-nourished.   Nursing note and vitals reviewed.  GEN: No acute distress  Head: Normocephalic, atraumatic  Eyes: Pupils equal round reactive to light  ENT: Posterior pharynx normal in appearance, oral mucosa is moist  Chest: Nontender to palpation  Cardiovascular: Regular rate  Lungs: wheezes with rhonchi RLL  Abdomen: Soft, nontender, nondistended, no peritoneal signs  Extremities: No edema, normal appearance  Neuro: GCS 15  Psych: Mood and affect are appropriate      Procedures         ED Course  ED Course   Comment By Time   Console did with Dr. Mackey he states that at this point that we need to discuss long-term care for Mr. ibrahim.  He does not meet criteria for inpatient treatment  today unless the family are just unable to care for him at home.  He is getting IV antibiotic therapy during his dialysis treatment. Latia Blancas, APRN 11/18 1623   Discussed with patient and his wife about possible long-term care options.  They both state that he is able to care for himself at home and she does assist with his clear somewhat.  She seemed somewhat put off by suggesting that he would need long-term care.  She states that she is able to care for him and he does fine most of the time.  I did discuss with them the frequent emergency department visits would suggest that he is unable to care for his self.  However, that he states that it's just when he gets short of breath that he has to come to the emergency department.  However, today we will go ahead and send him home and we will have him follow-up with Dr. Mackey and he will continue to get his IV therapy during dialysis.  Him and his wife is agreeable to this plan of care with given him strict return to care instructions Latia Blancas, APRN 11/18 1627                  MDM  Number of Diagnoses or Management Options  Diagnosis management comments: After reviewing his records and talking today Dr. Mackey with the last ER visit feel like that the family is having a hard time caring for him at home.  Dr. Mackey feels like that he is becoming harder for them to care for with his chronic medical problems and his mental status.  He has been getting IV vancomycin and Rocephin during his dialysis treatments.  However, he is still complaining of increasing shortness of air.  Today we will repeat his chest x-ray, CBC, CMP and troponin and evaluate the need for hospitalization.       Amount and/or Complexity of Data Reviewed  Clinical lab tests: ordered and reviewed  Tests in the radiology section of CPT®: ordered and reviewed  Review and summarize past medical records: yes  Discuss the patient with other providers: yes  Independent visualization of  images, tracings, or specimens: yes    Risk of Complications, Morbidity, and/or Mortality  Presenting problems: moderate  Diagnostic procedures: moderate  Management options: moderate        Final diagnoses:   Dyspnea, unspecified type   Loculated pleural effusion            Latia Blancas, APRN  11/18/17 [id number]

## 2017-11-18 NOTE — ED NOTES
2 unsuccessful attempts at IV and blood obtainment. Lab aware and sending someone.      Gracie Mcneil RN  11/18/17 7615

## 2017-11-19 LAB
BACTERIA SPEC AEROBE CULT: NORMAL
BACTERIA SPEC AEROBE CULT: NORMAL

## 2018-02-15 ENCOUNTER — HOSPITAL ENCOUNTER (EMERGENCY)
Facility: HOSPITAL | Age: 58
Discharge: HOME | End: 2018-02-15
Attending: EMERGENCY MEDICINE | Admitting: EMERGENCY MEDICINE

## 2018-02-15 ENCOUNTER — APPOINTMENT (OUTPATIENT)
Dept: GENERAL RADIOLOGY | Facility: HOSPITAL | Age: 58
End: 2018-02-15
Attending: EMERGENCY MEDICINE

## 2018-02-15 VITALS
WEIGHT: 127 LBS | BODY MASS INDEX: 19.25 KG/M2 | TEMPERATURE: 98 F | HEIGHT: 68 IN | HEART RATE: 78 BPM | RESPIRATION RATE: 18 BRPM | OXYGEN SATURATION: 98 % | DIASTOLIC BLOOD PRESSURE: 77 MMHG | SYSTOLIC BLOOD PRESSURE: 149 MMHG

## 2018-02-15 DIAGNOSIS — R07.9 CHEST PAIN, UNSPECIFIED TYPE: Primary | ICD-10-CM

## 2018-02-15 DIAGNOSIS — N18.6 END STAGE RENAL DISEASE ON DIALYSIS (HCC): Chronic | ICD-10-CM

## 2018-02-15 DIAGNOSIS — Z99.2 END STAGE RENAL DISEASE ON DIALYSIS (HCC): Chronic | ICD-10-CM

## 2018-02-15 LAB
ALBUMIN SERPL-MCNC: 4 G/DL (ref 3.5–5)
ALBUMIN/GLOB SERPL: 1.1 G/DL (ref 1–2)
ALP SERPL-CCNC: 74 U/L (ref 38–126)
ALT SERPL W P-5'-P-CCNC: 8 U/L (ref 13–69)
ANION GAP SERPL CALCULATED.3IONS-SCNC: 18.3 MMOL/L
AST SERPL-CCNC: 30 U/L (ref 15–46)
BASOPHILS # BLD AUTO: 0.04 10*3/MM3 (ref 0–0.2)
BASOPHILS NFR BLD AUTO: 0.7 % (ref 0–2.5)
BILIRUB SERPL-MCNC: 0.8 MG/DL (ref 0.2–1.3)
BUN BLD-MCNC: 19 MG/DL (ref 7–20)
BUN/CREAT SERPL: 6.8 (ref 6.3–21.9)
CALCIUM SPEC-SCNC: 8.9 MG/DL (ref 8.4–10.2)
CHLORIDE SERPL-SCNC: 93 MMOL/L (ref 98–107)
CO2 SERPL-SCNC: 31 MMOL/L (ref 26–30)
CREAT BLD-MCNC: 2.8 MG/DL (ref 0.6–1.3)
DEPRECATED RDW RBC AUTO: 47.8 FL (ref 37–54)
EOSINOPHIL # BLD AUTO: 0.51 10*3/MM3 (ref 0–0.7)
EOSINOPHIL NFR BLD AUTO: 9.2 % (ref 0–7)
ERYTHROCYTE [DISTWIDTH] IN BLOOD BY AUTOMATED COUNT: 14.3 % (ref 11.5–14.5)
GFR SERPL CREATININE-BSD FRML MDRD: 23 ML/MIN/1.73
GLOBULIN UR ELPH-MCNC: 3.5 GM/DL
GLUCOSE BLD-MCNC: 192 MG/DL (ref 74–98)
HCT VFR BLD AUTO: 27.3 % (ref 42–52)
HGB BLD-MCNC: 9.2 G/DL (ref 14–18)
IMM GRANULOCYTES # BLD: 0.1 10*3/MM3 (ref 0–0.06)
IMM GRANULOCYTES NFR BLD: 1.8 % (ref 0–0.6)
LYMPHOCYTES # BLD AUTO: 0.72 10*3/MM3 (ref 0.6–3.4)
LYMPHOCYTES NFR BLD AUTO: 13 % (ref 10–50)
MCH RBC QN AUTO: 30.5 PG (ref 27–31)
MCHC RBC AUTO-ENTMCNC: 33.7 G/DL (ref 30–37)
MCV RBC AUTO: 90.4 FL (ref 80–94)
MONOCYTES # BLD AUTO: 0.81 10*3/MM3 (ref 0–0.9)
MONOCYTES NFR BLD AUTO: 14.7 % (ref 0–12)
NEUTROPHILS # BLD AUTO: 3.34 10*3/MM3 (ref 2–6.9)
NEUTROPHILS NFR BLD AUTO: 60.6 % (ref 37–80)
NRBC BLD MANUAL-RTO: 0 /100 WBC (ref 0–0)
PLATELET # BLD AUTO: 255 10*3/MM3 (ref 130–400)
PMV BLD AUTO: 10.3 FL (ref 6–12)
POTASSIUM BLD-SCNC: 4.3 MMOL/L (ref 3.5–5.1)
PROT SERPL-MCNC: 7.5 G/DL (ref 6.3–8.2)
RBC # BLD AUTO: 3.02 10*6/MM3 (ref 4.7–6.1)
SODIUM BLD-SCNC: 138 MMOL/L (ref 137–145)
TROPONIN I SERPL-MCNC: 0.02 NG/ML (ref 0–0.03)
TROPONIN I SERPL-MCNC: 0.03 NG/ML (ref 0–0.03)
WBC NRBC COR # BLD: 5.52 10*3/MM3 (ref 4.8–10.8)

## 2018-02-15 PROCEDURE — 84484 ASSAY OF TROPONIN QUANT: CPT | Performed by: EMERGENCY MEDICINE

## 2018-02-15 PROCEDURE — 93005 ELECTROCARDIOGRAM TRACING: CPT | Performed by: EMERGENCY MEDICINE

## 2018-02-15 PROCEDURE — 80053 COMPREHEN METABOLIC PANEL: CPT | Performed by: EMERGENCY MEDICINE

## 2018-02-15 PROCEDURE — 85025 COMPLETE CBC W/AUTO DIFF WBC: CPT | Performed by: EMERGENCY MEDICINE

## 2018-02-15 PROCEDURE — 99284 EMERGENCY DEPT VISIT MOD MDM: CPT

## 2018-02-15 PROCEDURE — 71045 X-RAY EXAM CHEST 1 VIEW: CPT

## 2018-02-15 NOTE — ED PROVIDER NOTES
Subjective   Patient is a 57 y.o. male presenting with chest pain.   History provided by:  Patient   used: No    Chest Pain   Pain location:  L chest  Pain quality: pressure    Pain radiates to:  Does not radiate  Onset quality:  Sudden  Progression:  Resolved  Chronicity:  New  Context: at rest    Relieved by:  Nothing  Worsened by:  Nothing  Ineffective treatments:  None tried  Associated symptoms: no abdominal pain, no back pain, no cough, no diaphoresis, no dizziness, no dysphagia, no fever, no headache, no heartburn, no lower extremity edema, no nausea, no near-syncope, no numbness, no palpitations, no shortness of breath, no syncope, no vomiting and no weakness    Risk factors: diabetes mellitus and hypertension        Review of Systems   Constitutional: Negative for chills, diaphoresis and fever.   HENT: Negative for congestion, rhinorrhea, sore throat and trouble swallowing.    Eyes: Negative for discharge and visual disturbance.   Respiratory: Negative for cough, chest tightness, shortness of breath and wheezing.    Cardiovascular: Positive for chest pain. Negative for palpitations, leg swelling, syncope and near-syncope.   Gastrointestinal: Negative for abdominal pain, constipation, diarrhea, heartburn, nausea and vomiting.   Genitourinary: Negative for dysuria, flank pain and hematuria.   Musculoskeletal: Negative for back pain, myalgias and neck pain.   Skin: Negative for color change and rash.   Neurological: Negative for dizziness, weakness, numbness and headaches.   Psychiatric/Behavioral: Negative for self-injury and suicidal ideas.       Past Medical History:   Diagnosis Date   • Anemia    • CHF (congestive heart failure)    • Chronic kidney disease    • Diabetes mellitus    • H/O chest x-ray 03/25/2016    Interval decrease in size of the patients right pleural effusion with a persistent small left effusion as well   • History of transfusion    • Hypertension    • Left-sided  weakness    • Renal failure    • Stroke        Allergies   Allergen Reactions   • Erythromycin Hives       Past Surgical History:   Procedure Laterality Date   • ARTERIOVENOUS FISTULA Right    • CATARACT EXTRACTION, BILATERAL     • CHOLECYSTECTOMY     • COLONOSCOPY     • EYE SURGERY     • PORTACATH PLACEMENT     • VENOUS ACCESS DEVICE (PORT) REMOVAL         Family History   Problem Relation Age of Onset   • COPD Mother    • Hypertension Father    • Diabetes Father    • Hypertension Sister    • Diabetes Sister    • Hypertension Brother    • Diabetes Paternal Grandmother        Social History     Social History   • Marital status:      Spouse name: N/A   • Number of children: N/A   • Years of education: N/A     Social History Main Topics   • Smoking status: Never Smoker   • Smokeless tobacco: Never Used   • Alcohol use No   • Drug use: No   • Sexual activity: Defer     Other Topics Concern   • None     Social History Narrative           Objective   Physical Exam   Constitutional: He is oriented to person, place, and time. He appears well-developed and well-nourished.   HENT:   Head: Normocephalic and atraumatic.   Nose: Nose normal.   Mouth/Throat: Oropharynx is clear and moist.   Eyes: Conjunctivae and EOM are normal. Pupils are equal, round, and reactive to light.   Neck: Normal range of motion. Neck supple.   Cardiovascular: Normal rate, regular rhythm, normal heart sounds and intact distal pulses.    Pulmonary/Chest: Effort normal and breath sounds normal. No respiratory distress. He has no wheezes. He exhibits no tenderness.   Abdominal: Soft. Bowel sounds are normal. There is no tenderness. There is no rebound and no guarding.   Musculoskeletal: Normal range of motion. He exhibits no edema, tenderness or deformity.   Neurological: He is alert and oriented to person, place, and time. No cranial nerve deficit. Coordination normal.   Skin: Skin is warm and dry. No rash noted. No erythema. No pallor.    Psychiatric: He has a normal mood and affect. His behavior is normal. Judgment and thought content normal.   Nursing note and vitals reviewed.      Procedures         ED Course  ED Course                  MDM  Number of Diagnoses or Management Options  Chest pain, unspecified type:   End stage renal disease on dialysis:   Diagnosis management comments: EKG: Ventricular rate 79, CO interval 134, , QRS duration 78, sinus rhythm. Similar to 11/18/17    Patient presents from dialysis with complaints of L chest pressure. Started suddenly. Resolved within a minute. Patient currently asymptomatic. Patient had 2:47 minute treatment. Lost 3kg and 1 unit of blood. Sees Dr. Garcia. In the ED, no complaints. Troponin is WNL. Creatinine is chronically elevated. Potassium is WNL. EKG does not show an acute process. CXR shows small right effusion with scarring in the R lung base. No acute process. Repeat troponin obtained. Trending down. Contacted the nephrologist on call for Dr. Garcia. Dr. Stroud. He feels the patient can resume dialysis Saturday as scheduled. Patient instructed to follow up with PCP this week, to take medication as directed, and to return for worsening symptoms.       Final diagnoses:   Chest pain, unspecified type   End stage renal disease on dialysis            Lesa Ruby MD  02/15/18 2534

## 2018-03-26 ENCOUNTER — HOSPITAL ENCOUNTER (EMERGENCY)
Facility: HOSPITAL | Age: 58
Discharge: HOME | End: 2018-03-26
Attending: EMERGENCY MEDICINE | Admitting: STUDENT IN AN ORGANIZED HEALTH CARE EDUCATION/TRAINING PROGRAM

## 2018-03-26 ENCOUNTER — APPOINTMENT (OUTPATIENT)
Dept: CT IMAGING | Facility: HOSPITAL | Age: 58
End: 2018-03-26

## 2018-03-26 ENCOUNTER — APPOINTMENT (OUTPATIENT)
Dept: ULTRASOUND IMAGING | Facility: HOSPITAL | Age: 58
End: 2018-03-26

## 2018-03-26 VITALS
OXYGEN SATURATION: 99 % | BODY MASS INDEX: 18.79 KG/M2 | TEMPERATURE: 97.8 F | RESPIRATION RATE: 18 BRPM | DIASTOLIC BLOOD PRESSURE: 77 MMHG | HEART RATE: 77 BPM | HEIGHT: 68 IN | WEIGHT: 124 LBS | SYSTOLIC BLOOD PRESSURE: 164 MMHG

## 2018-03-26 DIAGNOSIS — I99.8 ISCHEMIC TOE: Primary | ICD-10-CM

## 2018-03-26 DIAGNOSIS — M54.41 ACUTE MIDLINE LOW BACK PAIN WITH RIGHT-SIDED SCIATICA: ICD-10-CM

## 2018-03-26 LAB
ALBUMIN SERPL-MCNC: 4.3 G/DL (ref 3.5–5)
ALBUMIN/GLOB SERPL: 1.2 G/DL (ref 1–2)
ALP SERPL-CCNC: 79 U/L (ref 38–126)
ALT SERPL W P-5'-P-CCNC: 12 U/L (ref 13–69)
ANION GAP SERPL CALCULATED.3IONS-SCNC: 25.4 MMOL/L (ref 10–20)
AST SERPL-CCNC: 25 U/L (ref 15–46)
BASOPHILS # BLD AUTO: 0.07 10*3/MM3 (ref 0–0.2)
BASOPHILS NFR BLD AUTO: 0.6 % (ref 0–2.5)
BILIRUB SERPL-MCNC: 0.7 MG/DL (ref 0.2–1.3)
BUN BLD-MCNC: 32 MG/DL (ref 7–20)
BUN/CREAT SERPL: 4.6 (ref 6.3–21.9)
CALCIUM SPEC-SCNC: 9.4 MG/DL (ref 8.4–10.2)
CHLORIDE SERPL-SCNC: 83 MMOL/L (ref 98–107)
CO2 SERPL-SCNC: 29 MMOL/L (ref 26–30)
CREAT BLD-MCNC: 6.9 MG/DL (ref 0.6–1.3)
DEPRECATED RDW RBC AUTO: 56.3 FL (ref 37–54)
EOSINOPHIL # BLD AUTO: 0.29 10*3/MM3 (ref 0–0.7)
EOSINOPHIL NFR BLD AUTO: 2.7 % (ref 0–7)
ERYTHROCYTE [DISTWIDTH] IN BLOOD BY AUTOMATED COUNT: 17 % (ref 11.5–14.5)
GFR SERPL CREATININE-BSD FRML MDRD: 8 ML/MIN/1.73
GLOBULIN UR ELPH-MCNC: 3.7 GM/DL
GLUCOSE BLD-MCNC: 285 MG/DL (ref 74–98)
HCT VFR BLD AUTO: 36.1 % (ref 42–52)
HGB BLD-MCNC: 11.6 G/DL (ref 14–18)
IMM GRANULOCYTES # BLD: 0.04 10*3/MM3 (ref 0–0.06)
IMM GRANULOCYTES NFR BLD: 0.4 % (ref 0–0.6)
INR PPP: 1.45 (ref 0.9–1.1)
LYMPHOCYTES # BLD AUTO: 0.68 10*3/MM3 (ref 0.6–3.4)
LYMPHOCYTES NFR BLD AUTO: 6.2 % (ref 10–50)
MCH RBC QN AUTO: 30.2 PG (ref 27–31)
MCHC RBC AUTO-ENTMCNC: 32.1 G/DL (ref 30–37)
MCV RBC AUTO: 94 FL (ref 80–94)
MONOCYTES # BLD AUTO: 1.02 10*3/MM3 (ref 0–0.9)
MONOCYTES NFR BLD AUTO: 9.3 % (ref 0–12)
NEUTROPHILS # BLD AUTO: 8.81 10*3/MM3 (ref 2–6.9)
NEUTROPHILS NFR BLD AUTO: 80.8 % (ref 37–80)
NRBC BLD MANUAL-RTO: 0 /100 WBC (ref 0–0)
PLATELET # BLD AUTO: 356 10*3/MM3 (ref 130–400)
PMV BLD AUTO: 8.4 FL (ref 6–12)
POTASSIUM BLD-SCNC: 4.4 MMOL/L (ref 3.5–5.1)
PROT SERPL-MCNC: 8 G/DL (ref 6.3–8.2)
PROTHROMBIN TIME: 16.1 SECONDS (ref 9.3–12.1)
RBC # BLD AUTO: 3.84 10*6/MM3 (ref 4.7–6.1)
SODIUM BLD-SCNC: 133 MMOL/L (ref 137–145)
WBC NRBC COR # BLD: 10.91 10*3/MM3 (ref 4.8–10.8)

## 2018-03-26 PROCEDURE — 25010000002 IOPAMIDOL 61 % SOLUTION: Performed by: STUDENT IN AN ORGANIZED HEALTH CARE EDUCATION/TRAINING PROGRAM

## 2018-03-26 PROCEDURE — 99284 EMERGENCY DEPT VISIT MOD MDM: CPT

## 2018-03-26 PROCEDURE — 85610 PROTHROMBIN TIME: CPT | Performed by: EMERGENCY MEDICINE

## 2018-03-26 PROCEDURE — 80053 COMPREHEN METABOLIC PANEL: CPT | Performed by: EMERGENCY MEDICINE

## 2018-03-26 PROCEDURE — 85025 COMPLETE CBC W/AUTO DIFF WBC: CPT | Performed by: EMERGENCY MEDICINE

## 2018-03-26 PROCEDURE — 93926 LOWER EXTREMITY STUDY: CPT

## 2018-03-26 PROCEDURE — 75635 CT ANGIO ABDOMINAL ARTERIES: CPT

## 2018-03-26 RX ORDER — HYDROCODONE BITARTRATE AND ACETAMINOPHEN 7.5; 325 MG/1; MG/1
1 TABLET ORAL EVERY 6 HOURS PRN
COMMUNITY
End: 2018-09-19 | Stop reason: HOSPADM

## 2018-03-26 RX ORDER — DIGOXIN 125 MCG
125 TABLET ORAL 3 TIMES WEEKLY
COMMUNITY
End: 2019-01-07 | Stop reason: HOSPADM

## 2018-03-26 RX ORDER — OXYCODONE HYDROCHLORIDE AND ACETAMINOPHEN 5; 325 MG/1; MG/1
1 TABLET ORAL ONCE
Status: COMPLETED | OUTPATIENT
Start: 2018-03-26 | End: 2018-03-26

## 2018-03-26 RX ORDER — ASPIRIN 81 MG/1
81 TABLET ORAL DAILY
COMMUNITY
End: 2018-09-19 | Stop reason: HOSPADM

## 2018-03-26 RX ORDER — SPIRONOLACTONE 25 MG/1
25 TABLET ORAL DAILY
COMMUNITY
End: 2018-11-11 | Stop reason: HOSPADM

## 2018-03-26 RX ORDER — ISOSORBIDE MONONITRATE 30 MG/1
30 TABLET, EXTENDED RELEASE ORAL DAILY
COMMUNITY
End: 2019-01-01 | Stop reason: HOSPADM

## 2018-03-26 RX ORDER — WARFARIN SODIUM 3 MG/1
1.5 TABLET ORAL
COMMUNITY
End: 2018-07-07

## 2018-03-26 RX ORDER — HYDRALAZINE HYDROCHLORIDE 50 MG/1
50 TABLET, FILM COATED ORAL 3 TIMES DAILY
COMMUNITY
End: 2019-01-01 | Stop reason: HOSPADM

## 2018-03-26 RX ORDER — FENOFIBRATE 54 MG/1
54 TABLET ORAL DAILY
COMMUNITY
End: 2018-12-17

## 2018-03-26 RX ORDER — OMEPRAZOLE 20 MG/1
20 CAPSULE, DELAYED RELEASE ORAL DAILY
COMMUNITY
End: 2019-01-01 | Stop reason: HOSPADM

## 2018-03-26 RX ADMIN — IOPAMIDOL 95 ML: 612 INJECTION, SOLUTION INTRAVENOUS at 20:41

## 2018-03-26 RX ADMIN — OXYCODONE AND ACETAMINOPHEN 1 TABLET: 5; 325 TABLET ORAL at 18:20

## 2018-03-27 ENCOUNTER — TRANSCRIBE ORDERS (OUTPATIENT)
Dept: CARDIOLOGY | Facility: HOSPITAL | Age: 58
End: 2018-03-27

## 2018-03-27 DIAGNOSIS — Z99.2 DIALYSIS PATIENT (HCC): Primary | ICD-10-CM

## 2018-03-28 ENCOUNTER — TRANSCRIBE ORDERS (OUTPATIENT)
Dept: CARDIOLOGY | Facility: HOSPITAL | Age: 58
End: 2018-03-28

## 2018-03-28 ENCOUNTER — HOSPITAL ENCOUNTER (OUTPATIENT)
Facility: HOSPITAL | Age: 58
Discharge: HOME | End: 2018-03-28
Attending: INTERNAL MEDICINE | Admitting: INTERNAL MEDICINE

## 2018-03-28 ENCOUNTER — HOSPITAL ENCOUNTER (OUTPATIENT)
Dept: CARDIOLOGY | Facility: HOSPITAL | Age: 58
Discharge: HOME | End: 2018-03-28
Attending: INTERNAL MEDICINE

## 2018-03-28 VITALS
HEIGHT: 68 IN | WEIGHT: 124.34 LBS | RESPIRATION RATE: 18 BRPM | HEART RATE: 78 BPM | SYSTOLIC BLOOD PRESSURE: 128 MMHG | DIASTOLIC BLOOD PRESSURE: 53 MMHG | BODY MASS INDEX: 18.84 KG/M2 | TEMPERATURE: 98.6 F | OXYGEN SATURATION: 99 %

## 2018-03-28 VITALS
SYSTOLIC BLOOD PRESSURE: 127 MMHG | HEART RATE: 68 BPM | DIASTOLIC BLOOD PRESSURE: 55 MMHG | OXYGEN SATURATION: 97 % | RESPIRATION RATE: 16 BRPM

## 2018-03-28 DIAGNOSIS — I73.9 PAD (PERIPHERAL ARTERY DISEASE) (HCC): ICD-10-CM

## 2018-03-28 DIAGNOSIS — I73.9 PAD (PERIPHERAL ARTERY DISEASE) (HCC): Primary | ICD-10-CM

## 2018-03-28 DIAGNOSIS — I70.229 CRITICAL LOWER LIMB ISCHEMIA (HCC): Primary | ICD-10-CM

## 2018-03-28 DIAGNOSIS — Z99.2 DIALYSIS PATIENT (HCC): ICD-10-CM

## 2018-03-28 LAB
ALBUMIN SERPL-MCNC: 3.7 G/DL (ref 3.5–5)
ALBUMIN/GLOB SERPL: 1.1 G/DL (ref 1–2)
ALP SERPL-CCNC: 72 U/L (ref 38–126)
ALT SERPL W P-5'-P-CCNC: 17 U/L (ref 13–69)
ANION GAP SERPL CALCULATED.3IONS-SCNC: 20.1 MMOL/L (ref 10–20)
APTT PPP: 28 SECONDS (ref 25–36)
AST SERPL-CCNC: 21 U/L (ref 15–46)
BASOPHILS # BLD AUTO: 0.05 10*3/MM3 (ref 0–0.2)
BASOPHILS NFR BLD AUTO: 0.5 % (ref 0–2.5)
BILIRUB SERPL-MCNC: 0.5 MG/DL (ref 0.2–1.3)
BUN BLD-MCNC: 23 MG/DL (ref 7–20)
BUN/CREAT SERPL: 4.7 (ref 6.3–21.9)
CALCIUM SPEC-SCNC: 8.6 MG/DL (ref 8.4–10.2)
CHLORIDE SERPL-SCNC: 83 MMOL/L (ref 98–107)
CO2 SERPL-SCNC: 33 MMOL/L (ref 26–30)
CREAT BLD-MCNC: 4.9 MG/DL (ref 0.6–1.3)
DEPRECATED RDW RBC AUTO: 55.8 FL (ref 37–54)
EOSINOPHIL # BLD AUTO: 0.24 10*3/MM3 (ref 0–0.7)
EOSINOPHIL NFR BLD AUTO: 2.4 % (ref 0–7)
ERYTHROCYTE [DISTWIDTH] IN BLOOD BY AUTOMATED COUNT: 16.2 % (ref 11.5–14.5)
GFR SERPL CREATININE-BSD FRML MDRD: 12 ML/MIN/1.73
GLOBULIN UR ELPH-MCNC: 3.5 GM/DL
GLUCOSE BLD-MCNC: 406 MG/DL (ref 74–98)
GLUCOSE BLDC GLUCOMTR-MCNC: 107 MG/DL (ref 70–130)
HCT VFR BLD AUTO: 32.4 % (ref 42–52)
HGB BLD-MCNC: 10.5 G/DL (ref 14–18)
IMM GRANULOCYTES # BLD: 0.06 10*3/MM3 (ref 0–0.06)
IMM GRANULOCYTES NFR BLD: 0.6 % (ref 0–0.6)
INR PPP: 1.29 (ref 0.9–1.1)
LYMPHOCYTES # BLD AUTO: 0.61 10*3/MM3 (ref 0.6–3.4)
LYMPHOCYTES NFR BLD AUTO: 6.1 % (ref 10–50)
MCH RBC QN AUTO: 30.3 PG (ref 27–31)
MCHC RBC AUTO-ENTMCNC: 32.4 G/DL (ref 30–37)
MCV RBC AUTO: 93.6 FL (ref 80–94)
MONOCYTES # BLD AUTO: 1.07 10*3/MM3 (ref 0–0.9)
MONOCYTES NFR BLD AUTO: 10.6 % (ref 0–12)
NEUTROPHILS # BLD AUTO: 8.05 10*3/MM3 (ref 2–6.9)
NEUTROPHILS NFR BLD AUTO: 79.8 % (ref 37–80)
NRBC BLD MANUAL-RTO: 0 /100 WBC (ref 0–0)
PLATELET # BLD AUTO: 279 10*3/MM3 (ref 130–400)
PMV BLD AUTO: 8.6 FL (ref 6–12)
POTASSIUM BLD-SCNC: 4.1 MMOL/L (ref 3.5–5.1)
PROT SERPL-MCNC: 7.2 G/DL (ref 6.3–8.2)
PROTHROMBIN TIME: 14.3 SECONDS (ref 9.3–12.1)
RBC # BLD AUTO: 3.46 10*6/MM3 (ref 4.7–6.1)
SODIUM BLD-SCNC: 132 MMOL/L (ref 137–145)
WBC NRBC COR # BLD: 10.08 10*3/MM3 (ref 4.8–10.8)

## 2018-03-28 PROCEDURE — C1887 CATHETER, GUIDING: HCPCS | Performed by: INTERNAL MEDICINE

## 2018-03-28 PROCEDURE — C1725 CATH, TRANSLUMIN NON-LASER: HCPCS | Performed by: INTERNAL MEDICINE

## 2018-03-28 PROCEDURE — 85730 THROMBOPLASTIN TIME PARTIAL: CPT | Performed by: INTERNAL MEDICINE

## 2018-03-28 PROCEDURE — C1876 STENT, NON-COA/NON-COV W/DEL: HCPCS | Performed by: INTERNAL MEDICINE

## 2018-03-28 PROCEDURE — 85610 PROTHROMBIN TIME: CPT | Performed by: INTERNAL MEDICINE

## 2018-03-28 PROCEDURE — 82962 GLUCOSE BLOOD TEST: CPT

## 2018-03-28 PROCEDURE — C1724 CATH, TRANS ATHEREC,ROTATION: HCPCS | Performed by: INTERNAL MEDICINE

## 2018-03-28 PROCEDURE — 63710000001 INSULIN ASPART PER 5 UNITS: Performed by: INTERNAL MEDICINE

## 2018-03-28 PROCEDURE — C1769 GUIDE WIRE: HCPCS | Performed by: INTERNAL MEDICINE

## 2018-03-28 PROCEDURE — C1760 CLOSURE DEV, VASC: HCPCS | Performed by: INTERNAL MEDICINE

## 2018-03-28 PROCEDURE — 25010000002 HEPARIN (PORCINE) PER 1000 UNITS: Performed by: INTERNAL MEDICINE

## 2018-03-28 PROCEDURE — 85025 COMPLETE CBC W/AUTO DIFF WBC: CPT | Performed by: INTERNAL MEDICINE

## 2018-03-28 PROCEDURE — 0 IOPAMIDOL PER 1 ML: Performed by: INTERNAL MEDICINE

## 2018-03-28 PROCEDURE — 75710 ARTERY X-RAYS ARM/LEG: CPT | Performed by: INTERNAL MEDICINE

## 2018-03-28 PROCEDURE — 25010000002 MIDAZOLAM PER 1 MG: Performed by: INTERNAL MEDICINE

## 2018-03-28 PROCEDURE — C1884 EMBOLIZATION PROTECT SYST: HCPCS | Performed by: INTERNAL MEDICINE

## 2018-03-28 PROCEDURE — 25010000002 FENTANYL CITRATE (PF) 100 MCG/2ML SOLUTION: Performed by: INTERNAL MEDICINE

## 2018-03-28 PROCEDURE — 80053 COMPREHEN METABOLIC PANEL: CPT | Performed by: INTERNAL MEDICINE

## 2018-03-28 PROCEDURE — C1894 INTRO/SHEATH, NON-LASER: HCPCS | Performed by: INTERNAL MEDICINE

## 2018-03-28 DEVICE — PREMOUNTED STENT SYSTEM
Type: IMPLANTABLE DEVICE | Status: FUNCTIONAL
Brand: EXPRESS® LD ILIAC / BILIARY

## 2018-03-28 RX ORDER — ONDANSETRON 2 MG/ML
4 INJECTION INTRAMUSCULAR; INTRAVENOUS EVERY 6 HOURS PRN
Status: DISCONTINUED | OUTPATIENT
Start: 2018-03-28 | End: 2018-03-28 | Stop reason: HOSPADM

## 2018-03-28 RX ORDER — ACETAMINOPHEN 325 MG/1
650 TABLET ORAL EVERY 4 HOURS PRN
Status: DISCONTINUED | OUTPATIENT
Start: 2018-03-28 | End: 2018-03-29 | Stop reason: HOSPADM

## 2018-03-28 RX ORDER — ONDANSETRON 2 MG/ML
4 INJECTION INTRAMUSCULAR; INTRAVENOUS EVERY 6 HOURS PRN
Status: DISCONTINUED | OUTPATIENT
Start: 2018-03-28 | End: 2018-03-29 | Stop reason: HOSPADM

## 2018-03-28 RX ORDER — FENTANYL CITRATE 50 UG/ML
INJECTION, SOLUTION INTRAMUSCULAR; INTRAVENOUS AS NEEDED
Status: DISCONTINUED | OUTPATIENT
Start: 2018-03-28 | End: 2018-03-28 | Stop reason: HOSPADM

## 2018-03-28 RX ORDER — CLOPIDOGREL BISULFATE 75 MG/1
TABLET ORAL AS NEEDED
Status: DISCONTINUED | OUTPATIENT
Start: 2018-03-28 | End: 2018-03-28 | Stop reason: HOSPADM

## 2018-03-28 RX ORDER — MIDAZOLAM HYDROCHLORIDE 1 MG/ML
INJECTION INTRAMUSCULAR; INTRAVENOUS AS NEEDED
Status: DISCONTINUED | OUTPATIENT
Start: 2018-03-28 | End: 2018-03-28 | Stop reason: HOSPADM

## 2018-03-28 RX ORDER — ONDANSETRON 4 MG/1
4 TABLET, ORALLY DISINTEGRATING ORAL EVERY 6 HOURS PRN
Status: DISCONTINUED | OUTPATIENT
Start: 2018-03-28 | End: 2018-03-28 | Stop reason: HOSPADM

## 2018-03-28 RX ORDER — LIDOCAINE HYDROCHLORIDE 10 MG/ML
INJECTION, SOLUTION INFILTRATION; PERINEURAL AS NEEDED
Status: DISCONTINUED | OUTPATIENT
Start: 2018-03-28 | End: 2018-03-28 | Stop reason: HOSPADM

## 2018-03-28 RX ORDER — HEPARIN SODIUM 1000 [USP'U]/ML
INJECTION, SOLUTION INTRAVENOUS; SUBCUTANEOUS AS NEEDED
Status: DISCONTINUED | OUTPATIENT
Start: 2018-03-28 | End: 2018-03-28 | Stop reason: HOSPADM

## 2018-03-28 RX ORDER — HYDROCODONE BITARTRATE AND ACETAMINOPHEN 5; 325 MG/1; MG/1
1 TABLET ORAL EVERY 4 HOURS PRN
Status: DISCONTINUED | OUTPATIENT
Start: 2018-03-28 | End: 2018-03-29 | Stop reason: HOSPADM

## 2018-03-28 RX ORDER — ONDANSETRON 4 MG/1
4 TABLET, FILM COATED ORAL EVERY 6 HOURS PRN
Status: DISCONTINUED | OUTPATIENT
Start: 2018-03-28 | End: 2018-03-28 | Stop reason: HOSPADM

## 2018-03-28 RX ADMIN — HYDROCODONE BITARTRATE AND ACETAMINOPHEN 1 TABLET: 5; 325 TABLET ORAL at 07:59

## 2018-03-28 RX ADMIN — INSULIN ASPART 6 UNITS: 100 INJECTION, SOLUTION INTRAVENOUS; SUBCUTANEOUS at 10:24

## 2018-03-28 NOTE — PRE-PROCEDURE NOTE
For all pre-procedure (A/O with runoff) charting, refer to 3/28/18 case number [id number]. This patient has two outpatient procedures on the same day with different physicians that required two separate case numbers.

## 2018-03-28 NOTE — H&P
Idalia Kay MD      Patient Care Team:  Idalia Kay MD as PCP - General        History of present illness:  Middle-aged gentleman with end-stage renal disease been having continuous pain in the right lower ex 20 below the knee going on for the last 2 weeks now.  Is unable to lie down comfortably in bed.  Has injured his third toe of the right lower extremity which is badly lacerated after which has not been healing and hurting a lot.  Was in the emergency room 2 days ago at which point a arterial Doppler revealed abnormal inflow nevertheless a CT angiography subsequently done revealed diffuse disease of the SFA.  With the diagnosis of critical limb ischemia has been brought in for possible angiography with intervention as needed.    Review of Systems   Pertinent items are noted in HPI  Review of Systems      History  #1 Baseline EKG: Sinus rhythm MS interval of 170 ms ST segment depression inferior and inferolateral leads.    #2 LV function assessment EF 40-45% echocardiogram.    #3 coronary artery disease-has established CAD and has had previous interventions presently is on medical therapy.    #4 peripheral vascular disease.    #5 hypertension but    #6 end-stage renal disease on dialysis therapy.    #7 diabetes mellitus with poor control.    #8 in traction of both lower extremities at the hip cannot straighten it out completely.    #9 CVA in the past.    #10 Chronic Anemia.    #11 Atrial Fibrillation but    #12 Anticoagulation Therapy.    Personal history: Nonsmoker does not drink alcohol function status the patient is been minimal to none.    Family history noncontributory.    Review of symptoms not available.      Past Surgical History:   Procedure Laterality Date   • ARTERIOVENOUS FISTULA Right    • CATARACT EXTRACTION, BILATERAL     • CHOLECYSTECTOMY     • COLONOSCOPY     • EYE SURGERY     • PORTACATH PLACEMENT     • VENOUS ACCESS DEVICE (PORT) REMOVAL     , Family History    Problem Relation Age of Onset   • COPD Mother    • Hypertension Father    • Diabetes Father    • Hypertension Sister    • Diabetes Sister    • Hypertension Brother    • Diabetes Paternal Grandmother    , Social History   Substance Use Topics   • Smoking status: Never Smoker   • Smokeless tobacco: Never Used   • Alcohol use No   , Prescriptions Prior to Admission   Medication Sig Dispense Refill Last Dose   • acetaminophen (TYLENOL) 325 MG tablet Take 1 tablet by mouth Every 4 (Four) Hours As Needed for Mild Pain .   3/24/2018 at 0600   • albuterol (PROVENTIL HFA;VENTOLIN HFA) 108 (90 Base) MCG/ACT inhaler Inhale 2 puffs Every 4 (Four) Hours As Needed for Shortness of Air. 1 inhaler 0 More than a month at Unknown time   • amLODIPine (NORVASC) 10 MG tablet    3/28/2018 at 0500   • aspirin 325 MG tablet Take 81 mg by mouth Daily.   10/30/2017 at Unknown time   • aspirin 81 MG EC tablet Take 81 mg by mouth Daily.   3/28/2018 at 0500   • Cholecalciferol (VITAMIN D3) 5000 UNITS capsule capsule Take  by mouth daily.   3/28/2018 at 0500   • digoxin (LANOXIN) 125 MCG tablet Take 125 mcg by mouth Daily.   3/26/2018 at 2030   • docusate sodium (COLACE) 100 MG capsule Take 100 mg by mouth Daily.   3/28/2018 at 0500   • fenofibrate (TRICOR) 54 MG tablet Take 54 mg by mouth Daily.   3/27/2018 at 2030   • folic acid (FOLVITE) 1 MG tablet Take 1 mg by mouth daily.   3/28/2018 at 0500   • FOSRENOL 1000 MG chewable tablet 3 (Three) Times a Day With Meals.   3/27/2018 at 2030   • hydrALAZINE (APRESOLINE) 50 MG tablet Take 50 mg by mouth 3 (Three) Times a Day.   3/28/2018 at 0500   • HYDROcodone-acetaminophen (NORCO) 7.5-325 MG per tablet Take 1 tablet by mouth Every 6 (Six) Hours As Needed for Moderate Pain .      • insulin detemir (LEVEMIR) 100 UNIT/ML injection Inject 8 Units under the skin Daily. 100 mL 0 3/28/2018 at 0500   • isosorbide mononitrate (IMDUR) 30 MG 24 hr tablet Take 30 mg by mouth Daily.   3/28/2018 at 0500   •  levothyroxine (SYNTHROID, LEVOTHROID) 100 MCG tablet Take 100 mcg by mouth daily.   3/28/2018 at 0500   • losartan (COZAAR) 100 MG tablet Take 1 tablet by mouth Daily.   3/28/2018 at 0500   • metoclopramide (REGLAN) 5 MG tablet Take 5 mg by mouth 2 (Two) Times a Day.      • metoprolol succinate XL (TOPROL-XL) 100 MG 24 hr tablet Take 1 tablet by mouth Daily. 30 tablet 0 3/28/2018 at 0500   • omeprazole (priLOSEC) 20 MG capsule Take 20 mg by mouth Daily.   3/28/2018 at 0500   • ondansetron ODT (ZOFRAN-ODT) 4 MG disintegrating tablet Take 1 tablet by mouth Every 6 (Six) Hours As Needed for Nausea or Vomiting. 20 tablet 0 Not Taking at Unknown time   • oxyCODONE-acetaminophen (PERCOCET) 5-325 MG per tablet Take 1 tablet by mouth Every 6 (Six) Hours As Needed for Severe Pain . 20 tablet 0 Not Taking at Unknown time   • pravastatin (PRAVACHOL) 40 MG tablet Take 80 mg by mouth Daily.   3/27/2018 at 2030   • spironolactone (ALDACTONE) 25 MG tablet Take 25 mg by mouth Daily.   3/28/2018 at 0530   • warfarin (COUMADIN) 3 MG tablet Take 1.5 mg by mouth Daily.   3/26/2018 at 0530   , Scheduled Meds:  , Continuous Infusions:    No current facility-administered medications for this encounter. , PRN Meds:  , Allergies:  Erythromycin     Objective     Vital Sign Min/Max for last 24 hours  Temp  Min: 98.6 °F (37 °C)  Max: 98.6 °F (37 °C)   BP  Min: 109/45  Max: 139/63   Pulse  Min: 71  Max: 84   Resp  Min: 16  Max: 18   SpO2  Min: 94 %  Max: 100 %   Flow (L/min)  Min: 2  Max: 2   Weight  Min: 56.4 kg (124 lb 5.4 oz)  Max: 56.4 kg (124 lb 5.4 oz)              Physical Exam:     General Appearance:    Alert, cooperative, in no acute distress   Head:    Normocephalic, without obvious abnormality, atraumatic   Eyes:            Lids and lashes normal, conjunctivae and sclerae normal, no   icterus, no pallor, corneas clear, PERRLA   Ears:    Ears appear intact with no abnormalities noted   Throat:   No oral lesions, no thrush, oral  mucosa moist   Neck:   No adenopathy, supple, trachea midline, no thyromegaly, no   carotid bruit, no JVD   Back:     No kyphosis present, no scoliosis present, no skin lesions,      erythema or scars, no tenderness to percussion or                   palpation,   range of motion normal   Lungs:     Clear to auscultation,respirations regular, even and                  unlabored    Heart:    Regular rhythm and normal rate, normal S1 and S2, no            murmur, no gallop, no rub, no click   Chest Wall:    No abnormalities observed   Abdomen:     Normal bowel sounds, no masses, no organomegaly, soft        non-tender, non-distended, no guarding, no rebound                tenderness   Rectal:     Deferred   Extremities:   Moves all extremities well, no edema, no cyanosis, no             redness   Pulses:  No palpable pulses in both lower extremities.  Injury to the third toe of the right lower extremity present.     Skin:   No bleeding, bruising or rash   Lymph nodes:   No palpable adenopathy   Neurologic:   Cranial nerves 2 - 12 grossly intact, sensation intact, DTR       present and equal bilaterally       Results Review:   I reviewed the patient's new clinical results.      LAB DATA :           WBC   Date Value Ref Range Status   03/28/2018 10.08 4.80 - 10.80 10*3/mm3 Final     RBC   Date Value Ref Range Status   03/28/2018 3.46 (L) 4.70 - 6.10 10*6/mm3 Final     Hemoglobin   Date Value Ref Range Status   03/28/2018 10.5 (L) 14.0 - 18.0 g/dL Final     Hematocrit   Date Value Ref Range Status   03/28/2018 32.4 (L) 42.0 - 52.0 % Final     MCV   Date Value Ref Range Status   03/28/2018 93.6 80.0 - 94.0 fL Final     MCH   Date Value Ref Range Status   03/28/2018 30.3 27.0 - 31.0 pg Final     MCHC   Date Value Ref Range Status   03/28/2018 32.4 30.0 - 37.0 g/dL Final     RDW   Date Value Ref Range Status   03/28/2018 16.2 (H) 11.5 - 14.5 % Final     RDW-SD   Date Value Ref Range Status   03/28/2018 55.8 (H) 37.0 - 54.0  fl Final     MPV   Date Value Ref Range Status   03/28/2018 8.6 6.0 - 12.0 fL Final     Platelets   Date Value Ref Range Status   03/28/2018 279 130 - 400 10*3/mm3 Final     Neutrophil %   Date Value Ref Range Status   03/28/2018 79.8 37.0 - 80.0 % Final     Lymphocyte %   Date Value Ref Range Status   03/28/2018 6.1 (L) 10.0 - 50.0 % Final     Monocyte %   Date Value Ref Range Status   03/28/2018 10.6 0.0 - 12.0 % Final     Eosinophil %   Date Value Ref Range Status   03/28/2018 2.4 0.0 - 7.0 % Final     Basophil %   Date Value Ref Range Status   03/28/2018 0.5 0.0 - 2.5 % Final     Immature Grans %   Date Value Ref Range Status   03/28/2018 0.6 0.0 - 0.6 % Final     Neutrophils, Absolute   Date Value Ref Range Status   03/28/2018 8.05 (H) 2.00 - 6.90 10*3/mm3 Final     Lymphocytes, Absolute   Date Value Ref Range Status   03/28/2018 0.61 0.60 - 3.40 10*3/mm3 Final     Monocytes, Absolute   Date Value Ref Range Status   03/28/2018 1.07 (H) 0.00 - 0.90 10*3/mm3 Final     Eosinophils, Absolute   Date Value Ref Range Status   03/28/2018 0.24 0.00 - 0.70 10*3/mm3 Final     Basophils, Absolute   Date Value Ref Range Status   03/28/2018 0.05 0.00 - 0.20 10*3/mm3 Final     Immature Grans, Absolute   Date Value Ref Range Status   03/28/2018 0.06 0.00 - 0.06 10*3/mm3 Final     nRBC   Date Value Ref Range Status   03/28/2018 0.0 0.0 - 0.0 /100 WBC Final       Lab Results   Component Value Date    GLUCOSE 406 (C) 03/28/2018    BUN 23 (H) 03/28/2018    CREATININE 4.90 (H) 03/28/2018    EGFRIFNONA 12 (L) 03/28/2018    BCR 4.7 (L) 03/28/2018    CO2 33.0 (H) 03/28/2018    CALCIUM 8.6 03/28/2018    ALBUMIN 3.70 03/28/2018    LABIL2 1.1 03/28/2018    AST 21 03/28/2018    ALT 17 03/28/2018       Lab Results   Component Value Date    CKTOTAL 71 04/28/2017    CKMB 1.9 01/02/2017    CKMBINDEX 2 01/02/2017    TROPONINI 0.024 02/15/2018       No results found for: DDIMER    No results found for: SITE, ALLENTEST, PHART, WQC7NQH, PO2ART,  CSF0PQJ, BASEEXCESS, Z9CHINOO, HGBBG, HCTABG, OXYHEMOGLOBI, METHHGBN, CARBOXYHGB, CO2CT, BAROMETRIC, MODALITY, FIO2  Lab Results   Component Value Date    HGBA1C 9.6 (H) 07/13/2017         Lab Results   Component Value Date    LIPASE 40 07/31/2017       IMAGING DATA:     Ct Angio Abdominal Aorta Bilateral Iliofem Runoff With & Without Contrast    Result Date: 3/27/2018  Narrative: PROCEDURE: CT ANGIO ABDOMINAL AORTA BILAT ILIOFEM RUNOFF W WO CONTRAST-  HISTORY: ischemic toe, known vasculopath; I99.8-Other disorder of circulatory system; M54.41-Lumbago with sciatica, right side  COMPARISON: None .  PROCEDURE: Axial images were obtained from the lung bases through the lower extremities following the administration of Isovue-300 contrast per the CTA protocol. Coronal and sagittal MIP images were reformatted.  FINDINGS:  CTA: The abdominal aorta is normal in caliber with no evidence of aneurysmal dilatation or dissection. Atherosclerotic changes are seen within the abdominal aorta with calcified plaque present. The SMA, celiac, and NICOLE are patent. There are single nonstenotic renal arteries bilaterally. The common iliac arteries are patent with no significant stenosis.      Right lower extremity: Diffuse atherosclerotic disease is seen in the common iliac and internal iliac arteries without significant stenosis. The external iliac, CFA, profunda femoris and SFA are patent, however there is diffuse atherosclerotic disease in the SFA with varying degrees of stenosis. There is normal three-vessel runoff.  Left lower extremity: The common iliac and internal iliac arteries are diffusely atherosclerotic, however they are patent. The CFA and profunda femoris are patent. There is diffuse atherosclerotic disease in the SFA which produces areas of up to 80% stenosis proximally. There is also atherosclerotic disease in the left popliteal which produces an area of high-grade stenosis. There is normal three-vessel runoff on the  left. Note is made of contrast within the left external iliac vein which may be either secondary to collateral venous circulation or an AV fistula although a direct fistulous connection is not seen.  ABDOMEN: The lung bases are clear. The solid abdominal organs and abdominal portions of the GI tract are within normal limits. There is no free fluid, free air or adenopathy.  Pelvis: The pelvic viscera and pelvic GI tract is unremarkable. The appendix is normal.      Impression: Diffuse bilateral atherosclerotic disease with areas of high-grade stenosis in the right SFA and left popliteal arteries. There is also contrast seen in the left external iliac vein which may be from collateral circulation or possibly an AV fistula. Catheter directed angiogram is recommended.  This study was performed with techniques to keep radiation doses as low as reasonably achievable (ALARA). Individualized dose reduction techniques using automated exposure control or adjustment of mA and/or kV according to the patient size were employed.  This report was finalized on 3/27/2018 8:07 AM by Marilia Mora M.D..    Us Arterial Doppler Lower Extremity Right    Result Date: 3/26/2018  Narrative: FINAL REPORT TECHNIQUE: Multiple duplex Doppler images of the right lower extremity arterial system were performed using color, Doppler, and spectral analysis. CLINICAL HISTORY: ISCHEMIC THIRD TOE FINDINGS: Common iliac: 83 cm/s CFA: 108 cm/s Proximal SFA: 100 cm/s Mid SFA: 154 cm/s Distal SFA: 50.6 cm/s Popliteal artery: 54 cm/s. Distal posterior tibial: 59 cm/s Distal anterior tibial  52 cm/s There is calcified plaque within the right external iliac artery with a monophasic waveform.  Plaque disease is seen in the common femoral and superficial femoral arteries with monophasic waveforms.  In the mid superficial femoral artery, there is calcified plaque with elevated velocities suggesting at least 50% stenosis.  In the distal SFA there is a low  amplitude monophasic waveform.  This is also seen within the popliteal artery as well as within the tibial vessels.  Findings are most consistent with mid/distal SFA stenosis.     Impression: Velocities and waveforms suggest mid/distal SFA stenosis.  There is monophasic flow distally. Authenticated by Heath Gooden MD on 03/26/2018 06:28:42 PM      Assessment/Plan     DIAGNOSIS   #1 critical limb ischemia: Patient has had critical limb ischemia of the right lower extremity with constant rest pain.  He is CT angiography does not reveal any inflow of normality nevertheless his affect arterial Doppler clearly shows monophasic waveforms with reduced velocities.  Needs further evaluation of his inflow.  There also appears to be significant disease in the SFA.  All these will be evaluated with an invasive angiography with intervention as needed.  Patient has been explained the same risks and benefits have been explained he is in agreement with this plan.        Principal Problem:    PAD (peripheral artery disease)          I discussed the patients findings and my recommendations with patient    Clay Ruano MD  03/28/18  1:50 PM

## 2018-03-28 NOTE — PROCEDURES
Patient was recognized and procedure were discussed with the patient.  Under aseptic technique and local anesthesia left side was prepared.  Incision was made to free the barrier in the tunnel and catheter was removed without any difficulty.  1 sutures were applied along with some Steri-Strips and pressure dressing was done.  Patient tolerated the procedure well, there was no complications.

## 2018-03-28 NOTE — DISCHARGE INSTRUCTIONS
Do not get your chest site wet until your suture is out.      Have the dialysis nurse remove your suture in one week.    Stop taking your Aspirin.      Start taking Plavix 75 mg by mouth daily.

## 2018-03-28 NOTE — DISCHARGE INSTR - APPOINTMENTS
You have a follow up appointment with Dr. Ruano on May 9, Wednesday, at 215 pm.  If you are unable to keep that appointment, please call Dr. Ruano's office and reschedule.  ( [phone])

## 2018-03-28 NOTE — H&P
[facility]  HISTORY AND PHYSICAL    Primary Care Physician: Idalia Kay MD    Subjective     Chief Complaint:  Has a functioning fistula needs tunneled dialysis catheter removal.    History of Present Illness:   Patient with long-standing history of diabetes and hypertension who had a clotted access get a tunneled catheter placed. He has new access placed which is working fine has been used for about 2 weeks.  At this point it has been decided to remove the tunneled dialysis catheter that was placed temporarily.  The patient denies having any fevers chills chest pain shortness of breath no nausea vomiting.  Patient was recently seen during dialysis and has been doing fairly well.      Review of Systems   Otherwise complete ROS performed and negative except as mentioned in the HPI.    Past Medical History:   Past Medical History:   Diagnosis Date   • Anemia    • CHF (congestive heart failure)    • Chronic kidney disease    • Diabetes mellitus    • H/O chest x-ray 03/25/2016    Interval decrease in size of the patients right pleural effusion with a persistent small left effusion as well   • History of transfusion    • Hypertension    • Left-sided weakness    • Renal failure    • Stroke        Past Surgical History:  Past Surgical History:   Procedure Laterality Date   • ARTERIOVENOUS FISTULA Right    • CATARACT EXTRACTION, BILATERAL     • CHOLECYSTECTOMY     • COLONOSCOPY     • EYE SURGERY     • PORTACATH PLACEMENT     • VENOUS ACCESS DEVICE (PORT) REMOVAL         Family History: family history includes COPD in his mother; Diabetes in his father, paternal grandmother, and sister; Hypertension in his brother, father, and sister.    Social History:  reports that he has never smoked. He has never used smokeless tobacco. He reports that he does not drink alcohol or use drugs.    Medications:    (Not in a hospital admission)  Reviewed by me    Allergies:  Allergies  "  Allergen Reactions   • Erythromycin Hives         Objective     Physical Exam:  Vital Signs: /51 (BP Location: Left arm, Patient Position: Lying)   Pulse 84   Temp 98.6 °F (37 °C)   Resp 16   Ht 172.7 cm (68\")   Wt 56.4 kg (124 lb 5.4 oz)   SpO2 100%   BMI 18.91 kg/m²      General Appearance: alert, oriented x 3, no acute distress,   Skin: warm and dry  HEENT: pupils round and reactive to light, oral mucosa normal,   Neck: supple, no JVD, trachea midline  Lungs: CTA, unlabored breathing effort  Heart: RRR, normal S1 and S2, no S3, no rub  Abdomen: soft, non-tender, no palpable bladder, present bowel sounds to auscultation  Extremities: no edema, cyanosis or clubbing  Neuro: normal speech and mental status          Results Reviewed:    Results from last 7 days  Lab Units 03/28/18  0930   WBC 10*3/mm3 10.08   HEMOGLOBIN g/dL 10.5*   PLATELETS 10*3/mm3 279       Results from last 7 days  Lab Units 03/26/18  1850   SODIUM mmol/L 133*   POTASSIUM mmol/L 4.4   CO2 mmol/L 29.0   CREATININE mg/dL 6.90*   GLUCOSE mg/dL 285*   CALCIUM mg/dL 9.4         Assessment / Plan     Assessment/Problem List:   Principal Problem:    Encounter for venous access device care  Active Problems:    End stage renal disease on dialysis    Chronic kidney disease-mineral and bone disorder    Diabetes mellitus    Hypertension    Physical debility          Plan:    Tunneled dialysis catheter will be removed, please see the procedure note.  Keep the dressing on for 24 hours.    The stitches will be removed by the dialysis nurse at the dialysis clinic in 1 week.    Follow-up with me during dialysis at the dialysis clinic             Chance Mackey MD 03/28/18 9:52 AM        "

## 2018-06-20 ENCOUNTER — HOSPITAL ENCOUNTER (EMERGENCY)
Facility: HOSPITAL | Age: 58
Discharge: HOME | End: 2018-06-20
Attending: EMERGENCY MEDICINE | Admitting: EMERGENCY MEDICINE

## 2018-06-20 ENCOUNTER — HOSPITAL ENCOUNTER (EMERGENCY)
Facility: HOSPITAL | Age: 58
Discharge: ANOTHER HOSPITAL | End: 2018-06-20
Attending: EMERGENCY MEDICINE | Admitting: EMERGENCY MEDICINE

## 2018-06-20 VITALS
TEMPERATURE: 97.8 F | WEIGHT: 124 LBS | RESPIRATION RATE: 16 BRPM | OXYGEN SATURATION: 97 % | HEIGHT: 68 IN | DIASTOLIC BLOOD PRESSURE: 77 MMHG | SYSTOLIC BLOOD PRESSURE: 172 MMHG | BODY MASS INDEX: 18.79 KG/M2 | HEART RATE: 71 BPM

## 2018-06-20 VITALS
TEMPERATURE: 97.9 F | WEIGHT: 124 LBS | HEART RATE: 69 BPM | RESPIRATION RATE: 18 BRPM | DIASTOLIC BLOOD PRESSURE: 76 MMHG | SYSTOLIC BLOOD PRESSURE: 137 MMHG | HEIGHT: 68 IN | BODY MASS INDEX: 18.79 KG/M2 | OXYGEN SATURATION: 94 %

## 2018-06-20 DIAGNOSIS — T14.8XXA BLEEDING FROM WOUND: Primary | ICD-10-CM

## 2018-06-20 DIAGNOSIS — T82.9XXA COMPLICATION OF ARTERIOVENOUS DIALYSIS FISTULA, INITIAL ENCOUNTER: Primary | ICD-10-CM

## 2018-06-20 PROCEDURE — 99283 EMERGENCY DEPT VISIT LOW MDM: CPT

## 2018-06-20 PROCEDURE — 99282 EMERGENCY DEPT VISIT SF MDM: CPT

## 2018-07-02 ENCOUNTER — HOSPITAL ENCOUNTER (EMERGENCY)
Facility: HOSPITAL | Age: 58
Discharge: HOME | End: 2018-07-02
Attending: STUDENT IN AN ORGANIZED HEALTH CARE EDUCATION/TRAINING PROGRAM | Admitting: STUDENT IN AN ORGANIZED HEALTH CARE EDUCATION/TRAINING PROGRAM

## 2018-07-02 ENCOUNTER — APPOINTMENT (OUTPATIENT)
Dept: GENERAL RADIOLOGY | Facility: HOSPITAL | Age: 58
End: 2018-07-02

## 2018-07-02 VITALS
WEIGHT: 124 LBS | DIASTOLIC BLOOD PRESSURE: 76 MMHG | SYSTOLIC BLOOD PRESSURE: 168 MMHG | RESPIRATION RATE: 16 BRPM | TEMPERATURE: 98.3 F | OXYGEN SATURATION: 95 % | HEART RATE: 80 BPM | BODY MASS INDEX: 18.79 KG/M2 | HEIGHT: 68 IN

## 2018-07-02 DIAGNOSIS — R07.81 RIB PAIN ON RIGHT SIDE: Primary | ICD-10-CM

## 2018-07-02 DIAGNOSIS — S81.001A OPEN KNEE WOUND, RIGHT, INITIAL ENCOUNTER: ICD-10-CM

## 2018-07-02 PROCEDURE — 99283 EMERGENCY DEPT VISIT LOW MDM: CPT

## 2018-07-02 PROCEDURE — 71046 X-RAY EXAM CHEST 2 VIEWS: CPT

## 2018-07-02 RX ORDER — CEPHALEXIN 500 MG/1
500 CAPSULE ORAL 4 TIMES DAILY
Qty: 28 CAPSULE | Refills: 0 | Status: SHIPPED | OUTPATIENT
Start: 2018-07-02 | End: 2018-09-19 | Stop reason: HOSPADM

## 2018-07-02 NOTE — ED PROVIDER NOTES
Subjective   58-year-old male who presents with family with concerns for right-sided rib pain.  The patient reports that 5 days ago while waiting on the bus to take him to dialysis he fell out of his wheelchair.  States he did not strike his head but he did strike the right side of his chest wall.  States it hurt but is progressively getting worse and this morning it is 5 out of 10 and worse with movement or deep breaths.  Patient has a secondary complaint of concerns for 2 small sores on his knee he states that he believes he may be getting infected.            Review of Systems   All other systems reviewed and are negative.      Past Medical History:   Diagnosis Date   • Anemia    • CHF (congestive heart failure)    • Chronic kidney disease    • Diabetes mellitus    • H/O chest x-ray 03/25/2016    Interval decrease in size of the patients right pleural effusion with a persistent small left effusion as well   • History of transfusion    • Hypertension    • Left-sided weakness    • Renal failure    • Stroke        Allergies   Allergen Reactions   • Erythromycin Hives       Past Surgical History:   Procedure Laterality Date   • ARTERIOVENOUS FISTULA Right    • CARDIAC CATHETERIZATION N/A 3/28/2018    Procedure: Peripheral angiography;  Surgeon: Clay Ruano MD;  Location: [facility] CATH INVASIVE LOCATION;  Service: Cardiovascular   • CARDIAC CATHETERIZATION N/A 3/28/2018    Procedure: Angioplasty-peripheral;  Surgeon: Clay Ruano MD;  Location: [facility] CATH INVASIVE LOCATION;  Service: Cardiovascular   • CARDIAC CATHETERIZATION N/A 3/28/2018    Procedure: Atherectomy-peripheral;  Surgeon: Clay Ruano MD;  Location: [facility] CATH INVASIVE LOCATION;  Service: Cardiovascular   • CATARACT EXTRACTION, BILATERAL     • CHOLECYSTECTOMY     • COLONOSCOPY     • EYE SURGERY     • INTERVENTIONAL RADIOLOGY PROCEDURE N/A 3/28/2018    Procedure: Abdominal Aortogram;  Surgeon: Clay Ruano MD;   Location: [facility] CATH INVASIVE LOCATION;  Service: Cardiovascular   • PORTACATH PLACEMENT     • VENOUS ACCESS DEVICE (PORT) REMOVAL         Family History   Problem Relation Age of Onset   • COPD Mother    • Hypertension Father    • Diabetes Father    • Hypertension Sister    • Diabetes Sister    • Hypertension Brother    • Diabetes Paternal Grandmother        Social History     Social History   • Marital status:      Social History Main Topics   • Smoking status: Never Smoker   • Smokeless tobacco: Never Used   • Alcohol use No   • Drug use: No   • Sexual activity: Defer     Other Topics Concern   • Not on file           Objective   Physical Exam   Nursing note and vitals reviewed.    GEN: No acute distress  Head: Normocephalic, atraumatic  Eyes: Pupils equal round reactive to light  ENT: Posterior pharynx normal in appearance, oral mucosa is moist  Chest: Chest wall is tender to palpation along the lateral right lower rib cage, no ecchymosis, no palpable crepitus  Cardiovascular: Regular rate  Lungs: Clear to auscultation bilaterally  Abdomen: Soft, nontender, nondistended, no peritoneal signs  Extremities: Patient has 2 half centimeter lesions just above his kneecap with mild surrounding erythema.  There is no weeping or purulence.  Neuro: GCS 15  Psych: Mood and affect are appropriate    Procedures           ED Course                  MDM  Number of Diagnoses or Management Options  Open knee wound, right, initial encounter:   Rib pain on right side:   Diagnosis management comments: At this time I do not think the patient has severe infection on his knees.  He is a severe vasculopath.  I will cover him prophylactically due to his open wounds.    Chest x-ray does show an old small right-sided effusion.  I see no acute medical abnormalities.    Did discuss findings with the patient.         Amount and/or Complexity of Data Reviewed  Tests in the radiology section of CPT®: reviewed  Decide to obtain previous  medical records or to obtain history from someone other than the patient: yes  Obtain history from someone other than the patient: yes  Review and summarize past medical records: yes  Independent visualization of images, tracings, or specimens: yes          Final diagnoses:   Rib pain on right side   Open knee wound, right, initial encounter            Joel Zelaya MD  07/02/18 0710

## 2018-07-07 ENCOUNTER — APPOINTMENT (OUTPATIENT)
Dept: GENERAL RADIOLOGY | Facility: HOSPITAL | Age: 58
End: 2018-07-07

## 2018-07-07 ENCOUNTER — HOSPITAL ENCOUNTER (EMERGENCY)
Facility: HOSPITAL | Age: 58
Discharge: HOME | End: 2018-07-07
Attending: EMERGENCY MEDICINE | Admitting: EMERGENCY MEDICINE

## 2018-07-07 ENCOUNTER — APPOINTMENT (OUTPATIENT)
Dept: CT IMAGING | Facility: HOSPITAL | Age: 58
End: 2018-07-07

## 2018-07-07 VITALS
DIASTOLIC BLOOD PRESSURE: 69 MMHG | HEIGHT: 68 IN | RESPIRATION RATE: 18 BRPM | WEIGHT: 124 LBS | BODY MASS INDEX: 18.79 KG/M2 | SYSTOLIC BLOOD PRESSURE: 157 MMHG | HEART RATE: 81 BPM | OXYGEN SATURATION: 98 % | TEMPERATURE: 97.8 F

## 2018-07-07 DIAGNOSIS — R07.81 RIB PAIN ON RIGHT SIDE: Primary | ICD-10-CM

## 2018-07-07 LAB
ALBUMIN SERPL-MCNC: 4.4 G/DL (ref 3.5–5)
ALBUMIN/GLOB SERPL: 1 G/DL (ref 1–2)
ALP SERPL-CCNC: 223 U/L (ref 38–126)
ALT SERPL W P-5'-P-CCNC: 22 U/L (ref 13–69)
ANION GAP SERPL CALCULATED.3IONS-SCNC: 13.9 MMOL/L (ref 10–20)
AST SERPL-CCNC: 48 U/L (ref 15–46)
BASOPHILS # BLD AUTO: 0.07 10*3/MM3 (ref 0–0.2)
BASOPHILS NFR BLD AUTO: 1.3 % (ref 0–2.5)
BILIRUB SERPL-MCNC: 1.1 MG/DL (ref 0.2–1.3)
BUN BLD-MCNC: 11 MG/DL (ref 7–20)
BUN/CREAT SERPL: 6.9 (ref 6.3–21.9)
CALCIUM SPEC-SCNC: 9.7 MG/DL (ref 8.4–10.2)
CHLORIDE SERPL-SCNC: 86 MMOL/L (ref 98–107)
CO2 SERPL-SCNC: 40 MMOL/L (ref 26–30)
CREAT BLD-MCNC: 1.6 MG/DL (ref 0.6–1.3)
DEPRECATED RDW RBC AUTO: 48.1 FL (ref 37–54)
EOSINOPHIL # BLD AUTO: 0.64 10*3/MM3 (ref 0–0.7)
EOSINOPHIL NFR BLD AUTO: 11.9 % (ref 0–7)
ERYTHROCYTE [DISTWIDTH] IN BLOOD BY AUTOMATED COUNT: 15.3 % (ref 11.5–14.5)
GFR SERPL CREATININE-BSD FRML MDRD: 44 ML/MIN/1.73
GLOBULIN UR ELPH-MCNC: 4.2 GM/DL
GLUCOSE BLD-MCNC: 249 MG/DL (ref 74–98)
HCT VFR BLD AUTO: 31.5 % (ref 42–52)
HGB BLD-MCNC: 10.9 G/DL (ref 14–18)
HOLD SPECIMEN: NORMAL
HOLD SPECIMEN: NORMAL
IMM GRANULOCYTES # BLD: 0.02 10*3/MM3 (ref 0–0.06)
IMM GRANULOCYTES NFR BLD: 0.4 % (ref 0–0.6)
INR PPP: 1.04 (ref 0.9–1.1)
LYMPHOCYTES # BLD AUTO: 0.63 10*3/MM3 (ref 0.6–3.4)
LYMPHOCYTES NFR BLD AUTO: 11.8 % (ref 10–50)
MCH RBC QN AUTO: 29.6 PG (ref 27–31)
MCHC RBC AUTO-ENTMCNC: 34.6 G/DL (ref 30–37)
MCV RBC AUTO: 85.6 FL (ref 80–94)
MONOCYTES # BLD AUTO: 0.59 10*3/MM3 (ref 0–0.9)
MONOCYTES NFR BLD AUTO: 11 % (ref 0–12)
NEUTROPHILS # BLD AUTO: 3.41 10*3/MM3 (ref 2–6.9)
NEUTROPHILS NFR BLD AUTO: 63.6 % (ref 37–80)
NRBC BLD MANUAL-RTO: 0 /100 WBC (ref 0–0)
NT-PROBNP SERPL-MCNC: ABNORMAL PG/ML (ref 0–125)
PLATELET # BLD AUTO: 242 10*3/MM3 (ref 130–400)
PMV BLD AUTO: 10.1 FL (ref 6–12)
POTASSIUM BLD-SCNC: 3.9 MMOL/L (ref 3.5–5.1)
PROT SERPL-MCNC: 8.6 G/DL (ref 6.3–8.2)
PROTHROMBIN TIME: 11.6 SECONDS (ref 9.3–12.1)
RBC # BLD AUTO: 3.68 10*6/MM3 (ref 4.7–6.1)
SODIUM BLD-SCNC: 136 MMOL/L (ref 137–145)
TROPONIN I SERPL-MCNC: 0.03 NG/ML (ref 0–0.03)
WBC NRBC COR # BLD: 5.36 10*3/MM3 (ref 4.8–10.8)
WHOLE BLOOD HOLD SPECIMEN: NORMAL
WHOLE BLOOD HOLD SPECIMEN: NORMAL

## 2018-07-07 PROCEDURE — 85025 COMPLETE CBC W/AUTO DIFF WBC: CPT | Performed by: EMERGENCY MEDICINE

## 2018-07-07 PROCEDURE — 93005 ELECTROCARDIOGRAM TRACING: CPT

## 2018-07-07 PROCEDURE — 71250 CT THORAX DX C-: CPT

## 2018-07-07 PROCEDURE — 96374 THER/PROPH/DIAG INJ IV PUSH: CPT

## 2018-07-07 PROCEDURE — 80053 COMPREHEN METABOLIC PANEL: CPT | Performed by: EMERGENCY MEDICINE

## 2018-07-07 PROCEDURE — 84484 ASSAY OF TROPONIN QUANT: CPT | Performed by: EMERGENCY MEDICINE

## 2018-07-07 PROCEDURE — 85610 PROTHROMBIN TIME: CPT | Performed by: EMERGENCY MEDICINE

## 2018-07-07 PROCEDURE — 99284 EMERGENCY DEPT VISIT MOD MDM: CPT

## 2018-07-07 PROCEDURE — 25010000002 FENTANYL CITRATE (PF) 100 MCG/2ML SOLUTION: Performed by: EMERGENCY MEDICINE

## 2018-07-07 PROCEDURE — 83880 ASSAY OF NATRIURETIC PEPTIDE: CPT | Performed by: EMERGENCY MEDICINE

## 2018-07-07 RX ORDER — SODIUM CHLORIDE 0.9 % (FLUSH) 0.9 %
10 SYRINGE (ML) INJECTION AS NEEDED
Status: DISCONTINUED | OUTPATIENT
Start: 2018-07-07 | End: 2018-07-07 | Stop reason: HOSPADM

## 2018-07-07 RX ORDER — FENTANYL CITRATE 50 UG/ML
50 INJECTION, SOLUTION INTRAMUSCULAR; INTRAVENOUS ONCE
Status: COMPLETED | OUTPATIENT
Start: 2018-07-07 | End: 2018-07-07

## 2018-07-07 RX ADMIN — FENTANYL CITRATE 50 MCG: 50 INJECTION, SOLUTION INTRAMUSCULAR; INTRAVENOUS at 12:44

## 2018-07-07 NOTE — ED PROVIDER NOTES
Subjective   58 year old male presenting with chest pain and shortness of breath. He states that for a few days he has had right sided chest pain and right sided back pain. Pain is sharp, it does not radiate, there are no alleviating factors, it is worse with breathing and movement. About two weeks ago he had a fall onto that side. He was seen subsequently and had a negative xray. Today he was at dialysis and told them his symptoms, they listened to him and told him he had no breath sounds on the right so sent him here for evaluation.              Review of Systems   Constitutional: Negative for chills and fever.   HENT: Negative for congestion, rhinorrhea and sore throat.    Eyes: Negative for pain.   Respiratory: Positive for shortness of breath. Negative for cough.    Cardiovascular: Positive for chest pain. Negative for palpitations and leg swelling.   Gastrointestinal: Negative for abdominal pain, diarrhea, nausea and vomiting.   Genitourinary: Negative for dysuria.   Musculoskeletal: Negative for arthralgias.   Skin: Negative for rash.   Neurological: Negative for weakness and numbness.   Psychiatric/Behavioral: Negative for behavioral problems.       Past Medical History:   Diagnosis Date   • Anemia    • CHF (congestive heart failure) (CMS/HCC)    • Chronic kidney disease    • Diabetes mellitus (CMS/HCC)    • H/O chest x-ray 03/25/2016    Interval decrease in size of the patients right pleural effusion with a persistent small left effusion as well   • History of transfusion    • Hypertension    • Left-sided weakness    • Renal failure    • Stroke (CMS/HCC)        Allergies   Allergen Reactions   • Erythromycin Hives       Past Surgical History:   Procedure Laterality Date   • ARTERIOVENOUS FISTULA Right    • CARDIAC CATHETERIZATION N/A 3/28/2018    Procedure: Peripheral angiography;  Surgeon: Clay Ruano MD;  Location: [facility] CATH INVASIVE LOCATION;  Service: Cardiovascular   • CARDIAC  CATHETERIZATION N/A 3/28/2018    Procedure: Angioplasty-peripheral;  Surgeon: Clay Ruano MD;  Location: [facility] CATH INVASIVE LOCATION;  Service: Cardiovascular   • CARDIAC CATHETERIZATION N/A 3/28/2018    Procedure: Atherectomy-peripheral;  Surgeon: Clay Ruano MD;  Location: [facility] CATH INVASIVE LOCATION;  Service: Cardiovascular   • CATARACT EXTRACTION, BILATERAL     • CHOLECYSTECTOMY     • COLONOSCOPY     • EYE SURGERY     • INTERVENTIONAL RADIOLOGY PROCEDURE N/A 3/28/2018    Procedure: Abdominal Aortogram;  Surgeon: Clay Ruano MD;  Location: [facility] CATH INVASIVE LOCATION;  Service: Cardiovascular   • PORTACATH PLACEMENT     • VENOUS ACCESS DEVICE (PORT) REMOVAL         Family History   Problem Relation Age of Onset   • COPD Mother    • Hypertension Father    • Diabetes Father    • Hypertension Sister    • Diabetes Sister    • Hypertension Brother    • Diabetes Paternal Grandmother        Social History     Social History   • Marital status:      Social History Main Topics   • Smoking status: Never Smoker   • Smokeless tobacco: Never Used   • Alcohol use No   • Drug use: No   • Sexual activity: Defer     Other Topics Concern   • Not on file           Objective   Physical Exam   Constitutional: He is oriented to person, place, and time. No distress.   Chronically ill appearing    HENT:   Head: Normocephalic and atraumatic.   Right Ear: External ear normal.   Left Ear: External ear normal.   Nose: Nose normal.   Mouth/Throat: Oropharynx is clear and moist.   Eyes: Conjunctivae and EOM are normal. Pupils are equal, round, and reactive to light.   Neck: Normal range of motion. Neck supple.   Cardiovascular: Normal rate, regular rhythm, normal heart sounds and intact distal pulses.    Right upper extremity fistula with good bruit/thrill   Pulmonary/Chest: Effort normal and breath sounds normal. No respiratory distress. He has no wheezes. He has no rales.   Abdominal: Soft.  Bowel sounds are normal. He exhibits no distension. There is no tenderness. There is no rebound and no guarding.   Musculoskeletal: Normal range of motion. He exhibits no edema or deformity.   Small contusion and tenderness to the right back just lateral to midline    Neurological: He is alert and oriented to person, place, and time.   Skin: Skin is warm and dry. No rash noted.   Psychiatric: He has a normal mood and affect. His behavior is normal.   Nursing note and vitals reviewed.      Procedures           ED Course                  MDM  Number of Diagnoses or Management Options  Rib pain on right side:   Diagnosis management comments: 58 year old male with chest and back pain. Chronically ill appearing man in no distress with exam as above. He has bilateral breath sounds. Suspect ongoing pain from recent trauma. Will check EKG, labs and treat pain. Disposition pending work up.    DDX: musculoskeletal pain, fracture, pneumothorax, hemothorax    EKG interpreted by me: Sinus rhythm, normal rate, normal axis/intervals, nonspecific ST/T-wave changes, this is an abnormal EKG, this is unchanged from previous    Lab work reveals no acute or significant abnormality.  CT scan shows no rib fractures, no pleural fluid collection, no pneumothorax.  At this time I feel he is safe for discharge home.  Encouraged him to follow up closely with his primary doctor.       Amount and/or Complexity of Data Reviewed  Clinical lab tests: reviewed  Tests in the radiology section of CPT®: reviewed  Decide to obtain previous medical records or to obtain history from someone other than the patient: yes          Final diagnoses:   Rib pain on right side            Steffen Bedolla MD  07/07/18 6679

## 2018-07-27 ENCOUNTER — APPOINTMENT (OUTPATIENT)
Dept: CT IMAGING | Facility: HOSPITAL | Age: 58
End: 2018-07-27

## 2018-07-27 ENCOUNTER — HOSPITAL ENCOUNTER (EMERGENCY)
Facility: HOSPITAL | Age: 58
Discharge: HOME | End: 2018-07-27
Attending: EMERGENCY MEDICINE | Admitting: EMERGENCY MEDICINE

## 2018-07-27 VITALS
RESPIRATION RATE: 18 BRPM | WEIGHT: 124 LBS | HEIGHT: 68 IN | TEMPERATURE: 97.7 F | BODY MASS INDEX: 18.79 KG/M2 | OXYGEN SATURATION: 96 % | SYSTOLIC BLOOD PRESSURE: 150 MMHG | DIASTOLIC BLOOD PRESSURE: 63 MMHG | HEART RATE: 68 BPM

## 2018-07-27 DIAGNOSIS — R10.9 ABDOMINAL PAIN, UNSPECIFIED ABDOMINAL LOCATION: Primary | ICD-10-CM

## 2018-07-27 LAB
ALBUMIN SERPL-MCNC: 4 G/DL (ref 3.5–5)
ALBUMIN/GLOB SERPL: 1.2 G/DL (ref 1–2)
ALP SERPL-CCNC: 148 U/L (ref 38–126)
ALT SERPL W P-5'-P-CCNC: 24 U/L (ref 13–69)
ANION GAP SERPL CALCULATED.3IONS-SCNC: 19.7 MMOL/L (ref 10–20)
AST SERPL-CCNC: 48 U/L (ref 15–46)
BASOPHILS # BLD AUTO: 0.06 10*3/MM3 (ref 0–0.2)
BASOPHILS NFR BLD AUTO: 0.9 % (ref 0–2.5)
BILIRUB SERPL-MCNC: 1.1 MG/DL (ref 0.2–1.3)
BUN BLD-MCNC: 37 MG/DL (ref 7–20)
BUN/CREAT SERPL: 8.2 (ref 6.3–21.9)
CALCIUM SPEC-SCNC: 9.3 MG/DL (ref 8.4–10.2)
CHLORIDE SERPL-SCNC: 84 MMOL/L (ref 98–107)
CO2 SERPL-SCNC: 28 MMOL/L (ref 26–30)
CREAT BLD-MCNC: 4.5 MG/DL (ref 0.6–1.3)
DEPRECATED RDW RBC AUTO: 57.9 FL (ref 37–54)
DIGOXIN SERPL-MCNC: 1.1 NG/ML (ref 0.8–2)
EOSINOPHIL # BLD AUTO: 0.57 10*3/MM3 (ref 0–0.7)
EOSINOPHIL NFR BLD AUTO: 9 % (ref 0–7)
ERYTHROCYTE [DISTWIDTH] IN BLOOD BY AUTOMATED COUNT: 17.4 % (ref 11.5–14.5)
GFR SERPL CREATININE-BSD FRML MDRD: 13 ML/MIN/1.73
GLOBULIN UR ELPH-MCNC: 3.4 GM/DL
GLUCOSE BLD-MCNC: 342 MG/DL (ref 74–98)
HCT VFR BLD AUTO: 31 % (ref 42–52)
HGB BLD-MCNC: 10.2 G/DL (ref 14–18)
HOLD SPECIMEN: NORMAL
HOLD SPECIMEN: NORMAL
IMM GRANULOCYTES # BLD: 0.03 10*3/MM3 (ref 0–0.06)
IMM GRANULOCYTES NFR BLD: 0.5 % (ref 0–0.6)
LYMPHOCYTES # BLD AUTO: 0.72 10*3/MM3 (ref 0.6–3.4)
LYMPHOCYTES NFR BLD AUTO: 11.4 % (ref 10–50)
MAGNESIUM SERPL-MCNC: 1.9 MG/DL (ref 1.6–2.3)
MCH RBC QN AUTO: 29.8 PG (ref 27–31)
MCHC RBC AUTO-ENTMCNC: 32.9 G/DL (ref 30–37)
MCV RBC AUTO: 90.6 FL (ref 80–94)
MONOCYTES # BLD AUTO: 0.75 10*3/MM3 (ref 0–0.9)
MONOCYTES NFR BLD AUTO: 11.9 % (ref 0–12)
NEUTROPHILS # BLD AUTO: 4.19 10*3/MM3 (ref 2–6.9)
NEUTROPHILS NFR BLD AUTO: 66.3 % (ref 37–80)
NRBC BLD MANUAL-RTO: 0 /100 WBC (ref 0–0)
PHOSPHATE SERPL-MCNC: 7.2 MG/DL (ref 2.5–4.5)
PLATELET # BLD AUTO: 181 10*3/MM3 (ref 130–400)
PMV BLD AUTO: 10.6 FL (ref 6–12)
POTASSIUM BLD-SCNC: 5.7 MMOL/L (ref 3.5–5.1)
PROT SERPL-MCNC: 7.4 G/DL (ref 6.3–8.2)
RBC # BLD AUTO: 3.42 10*6/MM3 (ref 4.7–6.1)
SODIUM BLD-SCNC: 126 MMOL/L (ref 137–145)
WBC NRBC COR # BLD: 6.32 10*3/MM3 (ref 4.8–10.8)
WHOLE BLOOD HOLD SPECIMEN: NORMAL
WHOLE BLOOD HOLD SPECIMEN: NORMAL

## 2018-07-27 PROCEDURE — 80053 COMPREHEN METABOLIC PANEL: CPT | Performed by: PHYSICIAN ASSISTANT

## 2018-07-27 PROCEDURE — 84100 ASSAY OF PHOSPHORUS: CPT | Performed by: PHYSICIAN ASSISTANT

## 2018-07-27 PROCEDURE — 74176 CT ABD & PELVIS W/O CONTRAST: CPT

## 2018-07-27 PROCEDURE — 93005 ELECTROCARDIOGRAM TRACING: CPT | Performed by: EMERGENCY MEDICINE

## 2018-07-27 PROCEDURE — 71250 CT THORAX DX C-: CPT

## 2018-07-27 PROCEDURE — 99284 EMERGENCY DEPT VISIT MOD MDM: CPT

## 2018-07-27 PROCEDURE — 83735 ASSAY OF MAGNESIUM: CPT | Performed by: PHYSICIAN ASSISTANT

## 2018-07-27 PROCEDURE — 85025 COMPLETE CBC W/AUTO DIFF WBC: CPT | Performed by: PHYSICIAN ASSISTANT

## 2018-07-27 PROCEDURE — 80162 ASSAY OF DIGOXIN TOTAL: CPT | Performed by: PHYSICIAN ASSISTANT

## 2018-07-27 RX ORDER — SODIUM CHLORIDE 0.9 % (FLUSH) 0.9 %
10 SYRINGE (ML) INJECTION AS NEEDED
Status: DISCONTINUED | OUTPATIENT
Start: 2018-07-27 | End: 2018-07-27 | Stop reason: HOSPADM

## 2018-09-15 ENCOUNTER — HOSPITAL ENCOUNTER (INPATIENT)
Facility: HOSPITAL | Age: 58
LOS: 4 days | Discharge: HOME HEALTH | End: 2018-09-19
Attending: EMERGENCY MEDICINE | Admitting: INTERNAL MEDICINE

## 2018-09-15 ENCOUNTER — APPOINTMENT (OUTPATIENT)
Dept: GENERAL RADIOLOGY | Facility: HOSPITAL | Age: 58
End: 2018-09-15

## 2018-09-15 ENCOUNTER — APPOINTMENT (OUTPATIENT)
Dept: CT IMAGING | Facility: HOSPITAL | Age: 58
End: 2018-09-15

## 2018-09-15 DIAGNOSIS — E11.65 UNCONTROLLED TYPE 2 DIABETES MELLITUS WITH HYPERGLYCEMIA, WITH LONG-TERM CURRENT USE OF INSULIN (HCC): Chronic | ICD-10-CM

## 2018-09-15 DIAGNOSIS — Z99.2 END STAGE RENAL DISEASE ON DIALYSIS (HCC): Chronic | ICD-10-CM

## 2018-09-15 DIAGNOSIS — Z79.4 UNCONTROLLED TYPE 2 DIABETES MELLITUS WITH HYPERGLYCEMIA, WITH LONG-TERM CURRENT USE OF INSULIN (HCC): Chronic | ICD-10-CM

## 2018-09-15 DIAGNOSIS — R53.81 PHYSICAL DEBILITY: Chronic | ICD-10-CM

## 2018-09-15 DIAGNOSIS — J18.9 PNEUMONIA DUE TO INFECTIOUS ORGANISM, UNSPECIFIED LATERALITY, UNSPECIFIED PART OF LUNG: Primary | ICD-10-CM

## 2018-09-15 DIAGNOSIS — R06.89 CHRONIC RESPIRATORY INSUFFICIENCY: Chronic | ICD-10-CM

## 2018-09-15 DIAGNOSIS — N18.6 END STAGE RENAL DISEASE ON DIALYSIS (HCC): Chronic | ICD-10-CM

## 2018-09-15 LAB
ALBUMIN SERPL-MCNC: 3.8 G/DL (ref 3.5–5)
ALBUMIN/GLOB SERPL: 1.2 G/DL (ref 1–2)
ALP SERPL-CCNC: 223 U/L (ref 38–126)
ALT SERPL W P-5'-P-CCNC: 19 U/L (ref 13–69)
ANION GAP SERPL CALCULATED.3IONS-SCNC: 12.8 MMOL/L (ref 10–20)
AST SERPL-CCNC: 37 U/L (ref 15–46)
BASOPHILS # BLD AUTO: 0.08 10*3/MM3 (ref 0–0.2)
BASOPHILS NFR BLD AUTO: 1.3 % (ref 0–2.5)
BILIRUB SERPL-MCNC: 0.8 MG/DL (ref 0.2–1.3)
BUN BLD-MCNC: 10 MG/DL (ref 7–20)
BUN/CREAT SERPL: 5.6 (ref 6.3–21.9)
CALCIUM SPEC-SCNC: 9.1 MG/DL (ref 8.4–10.2)
CHLORIDE SERPL-SCNC: 89 MMOL/L (ref 98–107)
CO2 SERPL-SCNC: 35 MMOL/L (ref 26–30)
CREAT BLD-MCNC: 1.8 MG/DL (ref 0.6–1.3)
D-LACTATE SERPL-SCNC: 0.6 MMOL/L (ref 0.5–2)
DEPRECATED RDW RBC AUTO: 48.7 FL (ref 37–54)
EOSINOPHIL # BLD AUTO: 0.64 10*3/MM3 (ref 0–0.7)
EOSINOPHIL NFR BLD AUTO: 10.2 % (ref 0–7)
ERYTHROCYTE [DISTWIDTH] IN BLOOD BY AUTOMATED COUNT: 14.6 % (ref 11.5–14.5)
GFR SERPL CREATININE-BSD FRML MDRD: 39 ML/MIN/1.73
GLOBULIN UR ELPH-MCNC: 3.2 GM/DL
GLUCOSE BLD-MCNC: 210 MG/DL (ref 74–98)
GLUCOSE BLDC GLUCOMTR-MCNC: 302 MG/DL (ref 70–130)
GLUCOSE BLDC GLUCOMTR-MCNC: 365 MG/DL (ref 70–130)
HCT VFR BLD AUTO: 30.4 % (ref 42–52)
HGB BLD-MCNC: 10.2 G/DL (ref 14–18)
HOLD SPECIMEN: NORMAL
HOLD SPECIMEN: NORMAL
IMM GRANULOCYTES # BLD: 0.03 10*3/MM3 (ref 0–0.06)
IMM GRANULOCYTES NFR BLD: 0.5 % (ref 0–0.6)
LYMPHOCYTES # BLD AUTO: 0.63 10*3/MM3 (ref 0.6–3.4)
LYMPHOCYTES NFR BLD AUTO: 10.1 % (ref 10–50)
MAGNESIUM SERPL-MCNC: 1.9 MG/DL (ref 1.6–2.3)
MCH RBC QN AUTO: 30.4 PG (ref 27–31)
MCHC RBC AUTO-ENTMCNC: 33.6 G/DL (ref 30–37)
MCV RBC AUTO: 90.7 FL (ref 80–94)
MONOCYTES # BLD AUTO: 0.74 10*3/MM3 (ref 0–0.9)
MONOCYTES NFR BLD AUTO: 11.8 % (ref 0–12)
NEUTROPHILS # BLD AUTO: 4.14 10*3/MM3 (ref 2–6.9)
NEUTROPHILS NFR BLD AUTO: 66.1 % (ref 37–80)
NRBC BLD MANUAL-RTO: 0 /100 WBC (ref 0–0)
PLATELET # BLD AUTO: 225 10*3/MM3 (ref 130–400)
PMV BLD AUTO: 9.1 FL (ref 6–12)
POTASSIUM BLD-SCNC: 3.8 MMOL/L (ref 3.5–5.1)
PROT SERPL-MCNC: 7 G/DL (ref 6.3–8.2)
RBC # BLD AUTO: 3.35 10*6/MM3 (ref 4.7–6.1)
SODIUM BLD-SCNC: 133 MMOL/L (ref 137–145)
TROPONIN I SERPL-MCNC: 0.05 NG/ML (ref 0–0.03)
WBC NRBC COR # BLD: 6.26 10*3/MM3 (ref 4.8–10.8)
WHOLE BLOOD HOLD SPECIMEN: NORMAL
WHOLE BLOOD HOLD SPECIMEN: NORMAL

## 2018-09-15 PROCEDURE — 63710000001 INSULIN ASPART PER 5 UNITS: Performed by: INTERNAL MEDICINE

## 2018-09-15 PROCEDURE — 94799 UNLISTED PULMONARY SVC/PX: CPT

## 2018-09-15 PROCEDURE — 82962 GLUCOSE BLOOD TEST: CPT

## 2018-09-15 PROCEDURE — 99220 PR INITIAL OBSERVATION CARE/DAY 70 MINUTES: CPT | Performed by: INTERNAL MEDICINE

## 2018-09-15 PROCEDURE — 85025 COMPLETE CBC W/AUTO DIFF WBC: CPT

## 2018-09-15 PROCEDURE — 87081 CULTURE SCREEN ONLY: CPT | Performed by: INTERNAL MEDICINE

## 2018-09-15 PROCEDURE — 71045 X-RAY EXAM CHEST 1 VIEW: CPT

## 2018-09-15 PROCEDURE — 84484 ASSAY OF TROPONIN QUANT: CPT

## 2018-09-15 PROCEDURE — 25010000002 ONDANSETRON PER 1 MG: Performed by: EMERGENCY MEDICINE

## 2018-09-15 PROCEDURE — 87147 CULTURE TYPE IMMUNOLOGIC: CPT | Performed by: INTERNAL MEDICINE

## 2018-09-15 PROCEDURE — 87040 BLOOD CULTURE FOR BACTERIA: CPT | Performed by: EMERGENCY MEDICINE

## 2018-09-15 PROCEDURE — 87070 CULTURE OTHR SPECIMN AEROBIC: CPT | Performed by: INTERNAL MEDICINE

## 2018-09-15 PROCEDURE — 87186 SC STD MICRODIL/AGAR DIL: CPT | Performed by: INTERNAL MEDICINE

## 2018-09-15 PROCEDURE — 25010000002 LEVOFLOXACIN PER 250 MG: Performed by: EMERGENCY MEDICINE

## 2018-09-15 PROCEDURE — 25010000002 VANCOMYCIN PER 500 MG: Performed by: EMERGENCY MEDICINE

## 2018-09-15 PROCEDURE — 93005 ELECTROCARDIOGRAM TRACING: CPT

## 2018-09-15 PROCEDURE — 99284 EMERGENCY DEPT VISIT MOD MDM: CPT

## 2018-09-15 PROCEDURE — 63710000001 INSULIN DETEMIR PER 5 UNITS: Performed by: INTERNAL MEDICINE

## 2018-09-15 PROCEDURE — 87077 CULTURE AEROBIC IDENTIFY: CPT | Performed by: INTERNAL MEDICINE

## 2018-09-15 PROCEDURE — G0378 HOSPITAL OBSERVATION PER HR: HCPCS

## 2018-09-15 PROCEDURE — 25010000002 CEFEPIME PER 500 MG: Performed by: EMERGENCY MEDICINE

## 2018-09-15 PROCEDURE — 70450 CT HEAD/BRAIN W/O DYE: CPT

## 2018-09-15 PROCEDURE — 71250 CT THORAX DX C-: CPT

## 2018-09-15 PROCEDURE — 83735 ASSAY OF MAGNESIUM: CPT

## 2018-09-15 PROCEDURE — 83605 ASSAY OF LACTIC ACID: CPT | Performed by: EMERGENCY MEDICINE

## 2018-09-15 PROCEDURE — 80053 COMPREHEN METABOLIC PANEL: CPT

## 2018-09-15 PROCEDURE — 25010000002 HEPARIN (PORCINE) PER 1000 UNITS: Performed by: INTERNAL MEDICINE

## 2018-09-15 RX ORDER — CLOPIDOGREL BISULFATE 75 MG/1
75 TABLET ORAL DAILY
Status: DISCONTINUED | OUTPATIENT
Start: 2018-09-16 | End: 2018-09-19 | Stop reason: HOSPADM

## 2018-09-15 RX ORDER — HEPARIN SODIUM 5000 [USP'U]/ML
5000 INJECTION, SOLUTION INTRAVENOUS; SUBCUTANEOUS EVERY 12 HOURS SCHEDULED
Status: DISCONTINUED | OUTPATIENT
Start: 2018-09-15 | End: 2018-09-19 | Stop reason: HOSPADM

## 2018-09-15 RX ORDER — AMLODIPINE BESYLATE 5 MG/1
10 TABLET ORAL
Status: DISCONTINUED | OUTPATIENT
Start: 2018-09-16 | End: 2018-09-19 | Stop reason: HOSPADM

## 2018-09-15 RX ORDER — MECLIZINE HYDROCHLORIDE 25 MG/1
25 TABLET ORAL ONCE
Status: COMPLETED | OUTPATIENT
Start: 2018-09-15 | End: 2018-09-15

## 2018-09-15 RX ORDER — ONDANSETRON 2 MG/ML
4 INJECTION INTRAMUSCULAR; INTRAVENOUS EVERY 6 HOURS PRN
Status: DISCONTINUED | OUTPATIENT
Start: 2018-09-15 | End: 2018-09-19 | Stop reason: HOSPADM

## 2018-09-15 RX ORDER — MECLIZINE HCL 12.5 MG/1
25 TABLET ORAL ONCE
Status: DISCONTINUED | OUTPATIENT
Start: 2018-09-15 | End: 2018-09-15 | Stop reason: SDUPTHER

## 2018-09-15 RX ORDER — ASPIRIN 81 MG/1
81 TABLET ORAL DAILY
Status: DISCONTINUED | OUTPATIENT
Start: 2018-09-16 | End: 2018-09-19 | Stop reason: HOSPADM

## 2018-09-15 RX ORDER — LEVOFLOXACIN 5 MG/ML
750 INJECTION, SOLUTION INTRAVENOUS ONCE
Status: COMPLETED | OUTPATIENT
Start: 2018-09-15 | End: 2018-09-15

## 2018-09-15 RX ORDER — LANTHANUM CARBONATE 500 MG/1
1000 TABLET, CHEWABLE ORAL
Status: DISCONTINUED | OUTPATIENT
Start: 2018-09-15 | End: 2018-09-19 | Stop reason: HOSPADM

## 2018-09-15 RX ORDER — PANTOPRAZOLE SODIUM 40 MG/1
40 TABLET, DELAYED RELEASE ORAL
Status: DISCONTINUED | OUTPATIENT
Start: 2018-09-16 | End: 2018-09-19 | Stop reason: HOSPADM

## 2018-09-15 RX ORDER — HYDROCODONE BITARTRATE AND ACETAMINOPHEN 7.5; 325 MG/1; MG/1
1 TABLET ORAL EVERY 6 HOURS PRN
Status: DISCONTINUED | OUTPATIENT
Start: 2018-09-15 | End: 2018-09-19 | Stop reason: HOSPADM

## 2018-09-15 RX ORDER — DIGOXIN 125 MCG
125 TABLET ORAL
Status: DISCONTINUED | OUTPATIENT
Start: 2018-09-16 | End: 2018-09-17

## 2018-09-15 RX ORDER — DOCUSATE SODIUM 100 MG/1
100 CAPSULE, LIQUID FILLED ORAL EVERY 12 HOURS
Status: DISCONTINUED | OUTPATIENT
Start: 2018-09-15 | End: 2018-09-19 | Stop reason: HOSPADM

## 2018-09-15 RX ORDER — ATORVASTATIN CALCIUM 10 MG/1
10 TABLET, FILM COATED ORAL NIGHTLY
Status: DISCONTINUED | OUTPATIENT
Start: 2018-09-15 | End: 2018-09-19 | Stop reason: HOSPADM

## 2018-09-15 RX ORDER — LOSARTAN POTASSIUM 50 MG/1
100 TABLET ORAL
Status: DISCONTINUED | OUTPATIENT
Start: 2018-09-16 | End: 2018-09-19 | Stop reason: HOSPADM

## 2018-09-15 RX ORDER — ISOSORBIDE MONONITRATE 30 MG/1
30 TABLET, EXTENDED RELEASE ORAL DAILY
Status: DISCONTINUED | OUTPATIENT
Start: 2018-09-16 | End: 2018-09-19 | Stop reason: HOSPADM

## 2018-09-15 RX ORDER — SODIUM CHLORIDE FOR INHALATION 3 %
VIAL, NEBULIZER (ML) INHALATION
Status: COMPLETED
Start: 2018-09-15 | End: 2018-09-15

## 2018-09-15 RX ORDER — SODIUM CHLORIDE 0.9 % (FLUSH) 0.9 %
10 SYRINGE (ML) INJECTION AS NEEDED
Status: DISCONTINUED | OUTPATIENT
Start: 2018-09-15 | End: 2018-09-19 | Stop reason: HOSPADM

## 2018-09-15 RX ORDER — SODIUM CHLORIDE 0.9 % (FLUSH) 0.9 %
1-10 SYRINGE (ML) INJECTION AS NEEDED
Status: DISCONTINUED | OUTPATIENT
Start: 2018-09-15 | End: 2018-09-19 | Stop reason: HOSPADM

## 2018-09-15 RX ORDER — HYDRALAZINE HYDROCHLORIDE 25 MG/1
50 TABLET, FILM COATED ORAL 3 TIMES DAILY
Status: DISCONTINUED | OUTPATIENT
Start: 2018-09-15 | End: 2018-09-19 | Stop reason: HOSPADM

## 2018-09-15 RX ORDER — FOLIC ACID 1 MG/1
1 TABLET ORAL DAILY
Status: DISCONTINUED | OUTPATIENT
Start: 2018-09-16 | End: 2018-09-19 | Stop reason: HOSPADM

## 2018-09-15 RX ORDER — LEVOFLOXACIN 5 MG/ML
500 INJECTION, SOLUTION INTRAVENOUS
Status: COMPLETED | OUTPATIENT
Start: 2018-09-17 | End: 2018-09-17

## 2018-09-15 RX ORDER — ONDANSETRON 2 MG/ML
4 INJECTION INTRAMUSCULAR; INTRAVENOUS ONCE
Status: COMPLETED | OUTPATIENT
Start: 2018-09-15 | End: 2018-09-15

## 2018-09-15 RX ORDER — SPIRONOLACTONE 25 MG/1
25 TABLET ORAL DAILY
Status: DISCONTINUED | OUTPATIENT
Start: 2018-09-16 | End: 2018-09-19 | Stop reason: HOSPADM

## 2018-09-15 RX ORDER — IPRATROPIUM BROMIDE AND ALBUTEROL SULFATE 2.5; .5 MG/3ML; MG/3ML
3 SOLUTION RESPIRATORY (INHALATION) EVERY 4 HOURS PRN
Status: DISCONTINUED | OUTPATIENT
Start: 2018-09-15 | End: 2018-09-19 | Stop reason: HOSPADM

## 2018-09-15 RX ORDER — METOPROLOL SUCCINATE 100 MG/1
100 TABLET, EXTENDED RELEASE ORAL DAILY
Status: DISCONTINUED | OUTPATIENT
Start: 2018-09-16 | End: 2018-09-19 | Stop reason: HOSPADM

## 2018-09-15 RX ORDER — DEXTROSE MONOHYDRATE 25 G/50ML
25 INJECTION, SOLUTION INTRAVENOUS
Status: DISCONTINUED | OUTPATIENT
Start: 2018-09-15 | End: 2018-09-19 | Stop reason: HOSPADM

## 2018-09-15 RX ORDER — LEVOTHYROXINE SODIUM 0.1 MG/1
100 TABLET ORAL
Status: DISCONTINUED | OUTPATIENT
Start: 2018-09-16 | End: 2018-09-19 | Stop reason: HOSPADM

## 2018-09-15 RX ORDER — ACETAMINOPHEN 325 MG/1
650 TABLET ORAL EVERY 4 HOURS PRN
Status: DISCONTINUED | OUTPATIENT
Start: 2018-09-15 | End: 2018-09-19 | Stop reason: HOSPADM

## 2018-09-15 RX ORDER — L.ACID,PARA/B.BIFIDUM/S.THERM 8B CELL
1 CAPSULE ORAL 2 TIMES DAILY
Status: DISCONTINUED | OUTPATIENT
Start: 2018-09-15 | End: 2018-09-19 | Stop reason: HOSPADM

## 2018-09-15 RX ORDER — NICOTINE POLACRILEX 4 MG
1 LOZENGE BUCCAL
Status: DISCONTINUED | OUTPATIENT
Start: 2018-09-15 | End: 2018-09-19 | Stop reason: HOSPADM

## 2018-09-15 RX ADMIN — HEPARIN SODIUM 5000 UNITS: 5000 INJECTION, SOLUTION INTRAVENOUS; SUBCUTANEOUS at 21:31

## 2018-09-15 RX ADMIN — LANTHANUM CARBONATE 1000 MG: 500 TABLET, CHEWABLE ORAL at 18:06

## 2018-09-15 RX ADMIN — SODIUM CHLORIDE SOLN NEBU 3% 4 ML: 3 NEBU SOLN at 20:37

## 2018-09-15 RX ADMIN — Medication 1 CAPSULE: at 21:30

## 2018-09-15 RX ADMIN — IPRATROPIUM BROMIDE AND ALBUTEROL SULFATE 3 ML: .5; 3 SOLUTION RESPIRATORY (INHALATION) at 20:36

## 2018-09-15 RX ADMIN — VANCOMYCIN HYDROCHLORIDE 1250 MG: 500 INJECTION, POWDER, LYOPHILIZED, FOR SOLUTION INTRAVENOUS at 14:54

## 2018-09-15 RX ADMIN — MECLIZINE HYDROCHLORIDE 25 MG: 25 TABLET ORAL at 11:51

## 2018-09-15 RX ADMIN — DOCUSATE SODIUM 100 MG: 100 CAPSULE, LIQUID FILLED ORAL at 18:06

## 2018-09-15 RX ADMIN — ONDANSETRON 4 MG: 2 INJECTION INTRAMUSCULAR; INTRAVENOUS at 11:51

## 2018-09-15 RX ADMIN — HYDROCODONE BITARTRATE AND ACETAMINOPHEN 1 TABLET: 7.5; 325 TABLET ORAL at 21:30

## 2018-09-15 RX ADMIN — INSULIN ASPART 6 UNITS: 100 INJECTION, SOLUTION INTRAVENOUS; SUBCUTANEOUS at 21:30

## 2018-09-15 RX ADMIN — CEFEPIME HYDROCHLORIDE 2 G: 2 INJECTION, SOLUTION INTRAVENOUS at 13:44

## 2018-09-15 RX ADMIN — INSULIN DETEMIR 8 UNITS: 100 INJECTION, SOLUTION SUBCUTANEOUS at 18:06

## 2018-09-15 RX ADMIN — HYDRALAZINE HYDROCHLORIDE 50 MG: 25 TABLET ORAL at 21:30

## 2018-09-15 RX ADMIN — LEVOFLOXACIN 750 MG: 5 INJECTION, SOLUTION INTRAVENOUS at 14:54

## 2018-09-15 RX ADMIN — SODIUM CHLORIDE 250 ML: 9 INJECTION, SOLUTION INTRAVENOUS at 11:51

## 2018-09-15 RX ADMIN — ATORVASTATIN CALCIUM 10 MG: 10 TABLET, FILM COATED ORAL at 21:30

## 2018-09-15 NOTE — ED PROVIDER NOTES
TRIAGE CHIEF COMPLAINT:     Nursing and triage notes reviewed    Chief Complaint   Patient presents with   • Dizziness   • Shortness of Breath      HPI: Talha Cutler is a 58 y.o. male who presents to the emergency department complaining of dizziness, shortness of breath, nausea, pain in the right side of his chest.  Symptoms have been present for approximately the past hour.  Patient had dialysis this morning and symptoms began afterwards.  Patient states he's had these symptoms many times in the past and has been in the emergency department for similar symptoms previously.  Patient describes a sharp and aching pain in the right chest that is worse with breathing.  He states any movement also causes pain or pushing on the right side of his ribs.  He denies falling or any trauma to the area.  Patient states he also became dizzy when turning his head.  Patient states if he turns his head in any direction he becomes very lightheaded and dizzy and feels nauseated.  The symptoms also began around the same time.  Patient states he's had these symptoms before as well.  He denies any changes in his vision.  He denies any numbness, tingling, or weakness in his extremities.  Denies fevers or chills.  No abdominal pain, vomiting, diarrhea.     REVIEW OF SYSTEMS: All other systems reviewed and are negative     PAST MEDICAL HISTORY:   Past Medical History:   Diagnosis Date   • Anemia    • CHF (congestive heart failure) (CMS/HCC)    • Chronic kidney disease    • Diabetes mellitus (CMS/HCC)    • H/O chest x-ray 03/25/2016    Interval decrease in size of the patients right pleural effusion with a persistent small left effusion as well   • History of transfusion    • Hypertension    • Left-sided weakness    • Renal failure    • Stroke (CMS/HCC)         FAMILY HISTORY:   Family History   Problem Relation Age of Onset   • COPD Mother    • Hypertension Father    • Diabetes Father    • Hypertension Sister    • Diabetes Sister    •  Hypertension Brother    • Diabetes Paternal Grandmother         SOCIAL HISTORY:   Social History     Social History   • Marital status:      Spouse name: N/A   • Number of children: N/A   • Years of education: N/A     Occupational History   • Not on file.     Social History Main Topics   • Smoking status: Never Smoker   • Smokeless tobacco: Never Used   • Alcohol use No   • Drug use: No   • Sexual activity: Defer     Other Topics Concern   • Not on file     Social History Narrative   • No narrative on file        SURGICAL HISTORY:   Past Surgical History:   Procedure Laterality Date   • ARTERIOVENOUS FISTULA Right    • CARDIAC CATHETERIZATION N/A 3/28/2018    Procedure: Peripheral angiography;  Surgeon: Clay Ruano MD;  Location: [facility] CATH INVASIVE LOCATION;  Service: Cardiovascular   • CARDIAC CATHETERIZATION N/A 3/28/2018    Procedure: Angioplasty-peripheral;  Surgeon: Clay Ruano MD;  Location: [facility] CATH INVASIVE LOCATION;  Service: Cardiovascular   • CARDIAC CATHETERIZATION N/A 3/28/2018    Procedure: Atherectomy-peripheral;  Surgeon: Clay Ruano MD;  Location: [facility] CATH INVASIVE LOCATION;  Service: Cardiovascular   • CATARACT EXTRACTION, BILATERAL     • CHOLECYSTECTOMY     • COLONOSCOPY     • EYE SURGERY     • INTERVENTIONAL RADIOLOGY PROCEDURE N/A 3/28/2018    Procedure: Abdominal Aortogram;  Surgeon: Clay Ruano MD;  Location: [facility] CATH INVASIVE LOCATION;  Service: Cardiovascular   • PORTACATH PLACEMENT     • VENOUS ACCESS DEVICE (PORT) REMOVAL          CURRENT MEDICATIONS:      Medication List      ASK your doctor about these medications    acetaminophen 325 MG tablet  Commonly known as:  TYLENOL  Take 1 tablet by mouth Every 4 (Four) Hours As Needed for Mild Pain .     albuterol 108 (90 Base) MCG/ACT inhaler  Commonly known as:  PROVENTIL HFA;VENTOLIN HFA  Inhale 2 puffs Every 4 (Four) Hours As Needed for Shortness of Air.     amLODIPine 10 MG  tablet  Commonly known as:  NORVASC     * aspirin 81 MG EC tablet     * aspirin 325 MG tablet     cephalexin 500 MG capsule  Commonly known as:  KEFLEX  Take 1 capsule by mouth 4 (Four) Times a Day.     clopidogrel 75 MG tablet  Commonly known as:  PLAVIX  Take 1 tablet by mouth Daily.     digoxin 125 MCG tablet  Commonly known as:  LANOXIN     docusate sodium 100 MG capsule  Commonly known as:  COLACE     fenofibrate 54 MG tablet  Commonly known as:  TRICOR     folic acid 1 MG tablet  Commonly known as:  FOLVITE     FOSRENOL 1000 MG chewable tablet  Generic drug:  lanthanum     hydrALAZINE 50 MG tablet  Commonly known as:  APRESOLINE     HYDROcodone-acetaminophen 7.5-325 MG per tablet  Commonly known as:  NORCO     insulin detemir 100 UNIT/ML injection  Commonly known as:  LEVEMIR  Inject 8 Units under the skin Daily.     isosorbide mononitrate 30 MG 24 hr tablet  Commonly known as:  IMDUR     levothyroxine 100 MCG tablet  Commonly known as:  SYNTHROID, LEVOTHROID     losartan 100 MG tablet  Commonly known as:  COZAAR  Take 1 tablet by mouth Daily.     metoprolol succinate  MG 24 hr tablet  Commonly known as:  TOPROL-XL  Take 1 tablet by mouth Daily.     omeprazole 20 MG capsule  Commonly known as:  priLOSEC     ondansetron ODT 4 MG disintegrating tablet  Commonly known as:  ZOFRAN-ODT  Take 1 tablet by mouth Every 6 (Six) Hours As Needed for Nausea or   Vomiting.     oxyCODONE-acetaminophen 5-325 MG per tablet  Commonly known as:  PERCOCET  Take 1 tablet by mouth Every 6 (Six) Hours As Needed for Severe Pain .     pravastatin 40 MG tablet  Commonly known as:  PRAVACHOL     spironolactone 25 MG tablet  Commonly known as:  ALDACTONE     vitamin D3 5000 units capsule capsule        * This list has 2 medication(s) that are the same as other medications   prescribed for you. Read the directions carefully, and ask your doctor or   other care provider to review them with you.               ALLERGIES: Erythromycin      PHYSICAL EXAM:   VITAL SIGNS:   Vitals:    09/15/18 1125   BP: 148/52   Pulse: 77   Resp: 16   Temp: 97.6 °F (36.4 °C)   SpO2: 95%      CONSTITUTIONAL: Awake, oriented, appears non-toxic   HENT: Atraumatic, normocephalic, oral mucosa pink and moist, airway patent. Nares patent without drainage. External ears normal.   EYES: Conjunctiva clear, EOMI, PERRL   NECK: Trachea midline, non-tender, supple   CARDIOVASCULAR: Normal heart rate, Normal rhythm, No murmurs, rubs, gallops   PULMONARY/CHEST: There are crackles in the lower lobes of the lungs bilaterally.  There is overall good air movement.  No respiratory distress is noted.  There is tenderness to palpation on the right lateral chest, no obvious deformity or abnormality of the ribs is appreciated at this time.  ABDOMINAL: Non-distended, soft, non-tender - no rebound or guarding.  NEUROLOGIC: Exam is nonfocal.  Patient has symmetrical smile.  There is normal facial muscle function.  Sensation to the face is intact in all distributions.  Pupils are equal round and reactive to light with extraocular movements intact.  There is symmetrical strength at the shoulders, elbows, wrists, hands, hips, knees, ankles bilaterally.  There are no apparent sensory deficits.  Finger to nose testing is appropriate.  Can cause patient to become dizzy by having him turn his head from side to side or by having him change position.  EXTREMITIES: No clubbing, cyanosis, or edema   SKIN: Warm, Dry, No erythema, No rash     ED COURSE / MEDICAL DECISION MAKING:   Talha Cutler is a 58 y.o. male who presents to the emergency department for evaluation of dizziness, chest discomfort, shortness of breath.  Patient is nondistressed on arrival in the emergency department.  Patient appears mildly uncomfortable but is nontoxic appearing.  Exam reveals some reproducible dizziness but otherwise is nonfocal.  An EKG obtained on arrival was interpreted by me to reveal sinus rhythm with rate of 78  bpm.  There are nonspecific ST segment changes diffusely that are similar in appearance to patient's previous EKGs.  No signs of arrhythmia.  Abnormal appearing EKG.  Patient's labs revealed a slightly elevated troponin is 0.04.  Other laboratory tests were around patient's baseline.  Imaging studies revealed a multifocal pneumonia in the right lung.  I suspect this is likely the cause of patient's symptoms.  I spoke to the nephrologist on-call who is also the hospitalist for today who is very familiar with the patient.  He recommended admitting to the hospital and treated for healthcare associated pneumonia.    DECISION TO DISCHARGE/ADMIT: see ED care timeline     FINAL IMPRESSION:   1 -- pneumonia   2 --   3 --     Electronically signed by: Vane Martinez MD, 9/15/2018 11:43 AM       Vane Martinez MD  09/15/18 [id number]

## 2018-09-15 NOTE — PHARMACY RECOMMENDATION
"Pharmacokinetic Initial Note - Vancomycin, Cefepime, and Levaquin    Pharmacy was consulted to dose antibiotics for  Talha Cutler, a 58 y.o. male  172.7 cm (68\") 55.9 kg (123 lb 3.2 oz)    Indication for use: HCAP      Results from last 7 days     Lab Units 09/15/18  1145   WBC 10*3/mm3 6.26   CREATININE mg/dL 1.80*      Estimated Creatinine Clearance: 34.9 mL/min (A) (by C-G formula based on SCr of 1.8 mg/dL (H)).  Temp Readings from Last 1 Encounters:   09/15/18 97.9 °F (36.6 °C) (Oral)       Other Antimicrobials  Cefepime 1 g IV q24h  Levofloxacin 750 mg x 1 then 500 mg IV q48h    Assessment/Plan  Initiated Vancomycin 1250 mg (22 mg/kg) IV in ED, followed by 750 mg (12.5 mg/kg) IV as needed post-dialysis.  Pharmacy will continue to follow and adjust dose accordingly.    Barbara Ni, MichaelD, BCPS  09/15/18 5:26 PM    "

## 2018-09-15 NOTE — PLAN OF CARE
Problem: Skin Injury Risk (Adult)  Goal: Identify Related Risk Factors and Signs and Symptoms  Outcome: Ongoing (interventions implemented as appropriate)    Goal: Skin Health and Integrity  Outcome: Ongoing (interventions implemented as appropriate)      Problem: Patient Care Overview  Goal: Plan of Care Review  Outcome: Ongoing (interventions implemented as appropriate)   09/15/18 3663   Coping/Psychosocial   Plan of Care Reviewed With patient;spouse;son   OTHER   Outcome Summary new admit       Problem: Fall Risk (Adult)  Goal: Identify Related Risk Factors and Signs and Symptoms  Outcome: Ongoing (interventions implemented as appropriate)    Goal: Absence of Fall  Outcome: Ongoing (interventions implemented as appropriate)      Problem: Pneumonia (Adult)  Goal: Signs and Symptoms of Listed Potential Problems Will be Absent, Minimized or Managed (Pneumonia)  Outcome: Ongoing (interventions implemented as appropriate)

## 2018-09-15 NOTE — H&P
[facility]   HISTORY AND PHYSICAL      Name:  Talha Cutler   Age:  58 y.o.  Sex:  male  :  1960  MRN:  [id number]   Visit Number:  [id number]  Admission Date:  9/15/2018  Date Of Service:  09/15/18  Primary Care Physician:  Idalia Kay MD   Primary cardiothoracic surgeon: Trevor Garcia MD ([facility])    Chief Complaint:     Right-sided pleuritic chest pain and shortness of breath.    History Of Presenting Illness:      This is a 58-year-old male with history of end-stage renal disease on hemodialysis, diabetes mellitus type 2 and anemia was brought to the emergency room by his wife from the dialysis center with above complaints.  The history is obtained from the patient as well as the medical chart.  Patient states that he has been doing fairly well and has not been hospitalized in the last 4 months but developed generalized weakness associated with right-sided pleuritic chest pain, shortness of breath and nausea in the last couple of days.  These symptoms have significantly worsened over the last 24 hours.  He did go to his dialysis today and after his dialysis was completed, he came to the emergency room.    Patient was evaluated in the emergency room.  He was afebrile and hemodynamically stable.  Blood work did not show any significant abnormalities other than his renal failure and chronic anemia.  His lactic acid was 0.6.  A CT of the chest however showed right-sided upper and lower lobe pneumonia as.  Patient was given broad-spectrum IV antibiotic therapy due to healthcare acquired pneumonia with cefepime, Levaquin and vancomycin.  He was subsequently admitted to the medical floor with telemetry.  He is currently lying down in the bed and is comfortable at rest.  Patient states that he has chronic draining ulcer from his scrotum and is supposed to see a urologist at [facility].  He denies any fevers, anginal chest pain.  Is  independent in daily activities and lives with his wife.    Review Of Systems:     General ROS: Patient denies any fevers, chills or loss of consciousness. Complains of generalized weakness.  Psychological ROS: No history of any hallucinations and delusions.  Ophthalmic ROS: No history of any diplopia or transient loss of vision.  ENT ROS: No history of sore throat, nasal congestion or ear pain.   Allergy and Immunology ROS: No history of rash or itching.  Hematological and Lymphatic ROS: No history of neck swelling or easy bleeding.  Endocrine ROS: No history of any recent unintentional weight gain or loss.  Respiratory ROS: Complains of right-sided pleuritic chest pain and shortness of breath.  Cardiovascular ROS: No history of chest pain or palpitations.  Gastrointestinal ROS: No history of nausea and vomiting. Denies any abdominal pain. No diarrhea.  Genito-Urinary ROS: No history of dysuria or hematuria.  Musculoskeletal ROS: No muscle pain. No calf pain. Complains of chronic back pain.  Neurological ROS: No history of any focal weakness. No loss of consciousness. Denies any numbness.  Dermatological ROS: No history of any redness or pruritis.     Past Medical History:    Past Medical History:   Diagnosis Date   • Anemia    • CHF (congestive heart failure) (CMS/HCC)    • Chronic kidney disease    • Diabetes mellitus (CMS/HCC)    • H/O chest x-ray 03/25/2016    Interval decrease in size of the patients right pleural effusion with a persistent small left effusion as well   • History of transfusion    • Hypertension    • Left-sided weakness    • Renal failure    • Stroke (CMS/HCC)      Past Surgical history:    Past Surgical History:   Procedure Laterality Date   • ARTERIOVENOUS FISTULA Right    • CARDIAC CATHETERIZATION N/A 3/28/2018    Procedure: Peripheral angiography;  Surgeon: Clay Ruano MD;  Location: [facility] CATH INVASIVE LOCATION;  Service: Cardiovascular   • CARDIAC CATHETERIZATION N/A  3/28/2018    Procedure: Angioplasty-peripheral;  Surgeon: Clay Ruano MD;  Location: [facility] CATH INVASIVE LOCATION;  Service: Cardiovascular   • CARDIAC CATHETERIZATION N/A 3/28/2018    Procedure: Atherectomy-peripheral;  Surgeon: Clay Ruano MD;  Location: [facility] CATH INVASIVE LOCATION;  Service: Cardiovascular   • CATARACT EXTRACTION, BILATERAL     • CHOLECYSTECTOMY     • COLONOSCOPY     • EYE SURGERY     • INTERVENTIONAL RADIOLOGY PROCEDURE N/A 3/28/2018    Procedure: Abdominal Aortogram;  Surgeon: Clay Ruano MD;  Location: [facility] CATH INVASIVE LOCATION;  Service: Cardiovascular   • PORTACATH PLACEMENT     • VENOUS ACCESS DEVICE (PORT) REMOVAL       Social History:    Social History     Social History   • Marital status:      Spouse name: N/A   • Number of children: N/A   • Years of education: N/A     Occupational History   • Not on file.     Social History Main Topics   • Smoking status: Never Smoker   • Smokeless tobacco: Never Used   • Alcohol use No   • Drug use: No   • Sexual activity: Defer     Other Topics Concern   • Not on file     Social History Narrative   • No narrative on file     Family History:    Family History   Problem Relation Age of Onset   • COPD Mother    • Hypertension Father    • Diabetes Father    • Hypertension Sister    • Diabetes Sister    • Hypertension Brother    • Diabetes Paternal Grandmother      Allergies:      Erythromycin    Home Medications:    Prior to Admission Medications     Prescriptions Last Dose Informant Patient Reported? Taking?    amLODIPine (NORVASC) 10 MG tablet 9/15/2018  Yes Yes    aspirin 81 MG EC tablet 9/15/2018  Yes Yes    Take 81 mg by mouth Daily.    Cholecalciferol (VITAMIN D3) 5000 UNITS capsule capsule 9/15/2018  Yes Yes    Take  by mouth daily.    clopidogrel (PLAVIX) 75 MG tablet 9/15/2018  No Yes    Take 1 tablet by mouth Daily.    docusate sodium (COLACE) 100 MG capsule 9/15/2018  Yes Yes    Take 100 mg by  mouth Every 12 (Twelve) Hours.    folic acid (FOLVITE) 1 MG tablet 9/15/2018  Yes Yes    Take 1 mg by mouth daily.    hydrALAZINE (APRESOLINE) 50 MG tablet 9/15/2018  Yes Yes    Take 50 mg by mouth 3 (Three) Times a Day.    isosorbide mononitrate (IMDUR) 30 MG 24 hr tablet 9/15/2018  Yes Yes    Take 30 mg by mouth Daily.    levothyroxine (SYNTHROID, LEVOTHROID) 100 MCG tablet 9/15/2018  Yes Yes    Take 100 mcg by mouth daily.    losartan (COZAAR) 100 MG tablet 9/15/2018  No Yes    Take 1 tablet by mouth Daily.    metoprolol succinate XL (TOPROL-XL) 100 MG 24 hr tablet 9/15/2018  No Yes    Take 1 tablet by mouth Daily.    omeprazole (priLOSEC) 20 MG capsule 9/15/2018  Yes Yes    Take 20 mg by mouth Daily.    pravastatin (PRAVACHOL) 40 MG tablet 9/14/2018  Yes Yes    Take 80 mg by mouth Every Night.    spironolactone (ALDACTONE) 25 MG tablet 9/15/2018  Yes Yes    Take 25 mg by mouth Daily.    acetaminophen (TYLENOL) 325 MG tablet   No No    Take 1 tablet by mouth Every 4 (Four) Hours As Needed for Mild Pain .    albuterol (PROVENTIL HFA;VENTOLIN HFA) 108 (90 Base) MCG/ACT inhaler   No No    Inhale 2 puffs Every 4 (Four) Hours As Needed for Shortness of Air.    aspirin 325 MG tablet   Yes No    Take 81 mg by mouth Daily.    cephalexin (KEFLEX) 500 MG capsule   No No    Take 1 capsule by mouth 4 (Four) Times a Day.    digoxin (LANOXIN) 125 MCG tablet Unknown  Yes No    Take 125 mcg by mouth Daily. Per pt and family, he takes after dialysis    fenofibrate (TRICOR) 54 MG tablet   Yes No    Take 54 mg by mouth Daily.    FOSRENOL 1000 MG chewable tablet   Yes No    3 (Three) Times a Day With Meals. Per pt, he takes with each meal and with snacks    HYDROcodone-acetaminophen (NORCO) 7.5-325 MG per tablet   Yes No    Take 1 tablet by mouth Every 6 (Six) Hours As Needed for Moderate Pain .    insulin detemir (LEVEMIR) 100 UNIT/ML injection   No No    Inject 8 Units under the skin Daily.    ondansetron ODT (ZOFRAN-ODT) 4 MG  disintegrating tablet Unknown  No No    Take 1 tablet by mouth Every 6 (Six) Hours As Needed for Nausea or Vomiting.    oxyCODONE-acetaminophen (PERCOCET) 5-325 MG per tablet   No No    Take 1 tablet by mouth Every 6 (Six) Hours As Needed for Severe Pain .           ED Medications:    Medications   sodium chloride 0.9 % flush 10 mL (not administered)   meclizine (ANTIVERT) tablet 25 mg (25 mg Oral Not Given 9/15/18 1339)   ondansetron (ZOFRAN) injection 4 mg (4 mg Intravenous Given 9/15/18 1151)   sodium chloride 0.9 % bolus 250 mL (0 mL Intravenous Stopped 9/15/18 1358)   meclizine (ANTIVERT) tablet 25 mg (25 mg Oral Given 9/15/18 1151)   cefepime (MAXIPIME) IVPB 2 g/50ml D5W (premix) (2 g Intravenous New Bag 9/15/18 1344)   levoFLOXacin (LEVAQUIN) 750 mg/150 mL D5W (premix) (LEVAQUIN) 750 mg (750 mg Intravenous New Bag 9/15/18 1454)   vancomycin 1250 mg in sodium chloride 0.9% 250 mL IVPB (1,250 mg Intravenous New Bag 9/15/18 1454)     Vital Signs:    Temp:  [97.6 °F (36.4 °C)-97.9 °F (36.6 °C)] 97.9 °F (36.6 °C)  Heart Rate:  [74-77] 75  Resp:  [16-18] 18  BP: (148-162)/(52-70) 148/64    1    09/15/18  1125 09/15/18  1450   Weight: 55.9 kg (123 lb 3.2 oz) 55.9 kg (123 lb 3.2 oz)     Body mass index is 18.73 kg/m².    Physical Exam:    General Appearance:  Alert and cooperative, not in any acute distress.   Head:  Atraumatic and normocephalic, without obvious abnormality.   Eyes:          PERRLA, conjunctivae and sclerae normal, no Icterus. No pallor. Extraocular movements are within normal limits.   Ears:  Ears appear intact with no abnormalities noted.   Throat: No oral lesions, no thrush, oral mucosa moist.   Neck: Supple, trachea midline, no thyromegaly, no carotid bruit.   Back:   No kyphoscoliosis present. No tenderness to palpation,   range of motion normal.   Lungs:   Chest shape is normal. Breath sounds heard bilaterally equally.  No wheezing.  Bilateral basal crackles heard more prominent on the right  base. No Pleural rub or bronchial breathing.   Heart:  Normal S1 and S2, no murmur, no gallop, no rub. No JVD.   Abdomen:   Normal bowel sounds, no masses, no organomegaly. Soft, non-tender, non-distended, no guarding, no rebound tenderness.  Induration and redness noted on the right side of the scrotum with clear drainage.   Extremities: Moves all extremities well, no edema, no cyanosis, no clubbing.  AV fistula with the bruit noted on the right arm.   Pulses: Pulses palpable and equal bilaterally.   Skin: No bleeding, bruising or rash.   Neurologic: Alert and oriented x 3. Moves all four limbs equally. No tremors. No facial asymmetry.     Laboratory data:    I have reviewed the labs done in the emergency room.      Results from last 7 days  Lab Units 09/15/18  1145   SODIUM mmol/L 133*   POTASSIUM mmol/L 3.8   CHLORIDE mmol/L 89*   CO2 mmol/L 35.0*   BUN mg/dL 10   CREATININE mg/dL 1.80*   CALCIUM mg/dL 9.1   BILIRUBIN mg/dL 0.8   ALK PHOS U/L 223*   ALT (SGPT) U/L 19   AST (SGOT) U/L 37   GLUCOSE mg/dL 210*       Results from last 7 days  Lab Units 09/15/18  1145   WBC 10*3/mm3 6.26   HEMOGLOBIN g/dL 10.2*   HEMATOCRIT % 30.4*   PLATELETS 10*3/mm3 225           Results from last 7 days  Lab Units 09/15/18  1145   TROPONIN I ng/mL 0.046*     EKG:      EKG done in the emergency room was reviewed by me.  It shows sinus rhythm at 78 beats per minute.  Normal axis.  Nonspecific ST-T changes noted in the inferior lateral leads.    Radiology:    Imaging Results (last 72 hours)     Procedure Component Value Units Date/Time    XR Chest 1 View [[id number]] Collected:  09/15/18 1236     Updated:  09/15/18 1240    Narrative:       PROCEDURE: XR CHEST 1 VW-     HISTORY: Weak/Dizzy/AMS triage protocol     COMPARISON: None.     FINDINGS: Electrodes overlie the chest. Atherosclerosis is noted. The  heart is normal in size. The mediastinum is unremarkable. Vague right  upper lobe opacity. Right basilar consolidate with effusion.  There is no  pneumothorax.  There are no acute osseous abnormalities.           Impression:       Vague right upper lobe opacity. Right basilar consolidate  with effusion. Findings likely secondary to pneumonia.     Continued followup is recommended.     This report was finalized on 9/15/2018 12:38 PM by Devin Villagomez DO.    CT Chest Without Contrast [[id number]] Collected:  09/15/18 1232     Updated:  09/15/18 1238    Narrative:       PROCEDURE: CT CHEST WO CONTRAST-     HISTORY: right chest pain     COMPARISON: July 27, 2018.     PROCEDURE:  Multiple axial CT images were obtained from the thoracic  inlet through the upper abdomen without the use of contrast.      FINDINGS: Lack of intravenous contrast limits evaluation of the solid  organs, the mediastinum, and the vasculature.        Soft tissue windows reveal no axillary, hilar or mediastinal adenopathy.  Atherosclerosis is noted. There is evidence for calcified granulomatous  disease. Heart size is normal. The aorta is normal in caliber. Right  upper lobe infiltrate, right lower lobe infiltrate, and probable  atelectasis at the left base and small bilateral pleural effusions.    .  The visualized upper abdomen is unremarkable. Bone windows reveal no  acute osseous abnormalities.       Impression:       Findings consistent with multifocal pneumonia.     255.06 mGy.cm          This study was performed with techniques to keep radiation doses as low  as reasonably achievable (ALARA). Individualized dose reduction  techniques using automated exposure control or adjustment of mA and/or  kV according to the patient size were employed.    Contrast     This report was finalized on 9/15/2018 12:36 PM by Devin Villagomez DO.    CT Head Without Contrast [[id number]] Collected:  09/15/18 1230     Updated:  09/15/18 1233    Narrative:       PROCEDURE: CT HEAD WO CONTRAST-     HISTORY: dizziness     COMPARISON:  None .     TECHNIQUE: Multiple axial CT images were performed from the  foramen  magnum to the vertex without enhancement.      FINDINGS: There is no CT evidence of hemorrhage. There is no mass, mass  effect or midline shift.  Cerebral atrophy with without evidence of  hydrocephalus. The paranasal sinuses are clear. Bone windows reveal no  acute osseous abnormalities. Atherosclerosis is noted.       Impression:       No acute intracranial process.     803.69 mGy.cm       This study was performed with techniques to keep radiation doses as low  as reasonably achievable (ALARA). Individualized dose reduction  techniques using automated exposure control or adjustment of mA and/or  kV according to the patient size were employed.      This report was finalized on 9/15/2018 12:31 PM by Devin Villagomez DO.        Active Problems:    Pneumonia due to infectious organism    Assessment:    1.  Right upper lobe and lower lobe healthcare pneumonia, present on admission.  2.  Right-sided pleuritic chest pain secondary to #1, present on admission.  3. End-stage renal disease on hemodialysis, on Tuesday, Thursday and Saturday.  4. 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  5. Chronic right-sided loculated pleural effusion status post pleurodesis.  6. Coronary artery disease on medical management.  7. Chronic scrotal ulcer, followed up at [facility] urology.  8. Chronic debility.  9. Essential hypertension.    Plan:    Mr. Cutler is currently being admitted to the medical floor.  He is hemodynamically stable.  We will consult pharmacy to dose his antibiotic therapy with cefepime, Levaquin and vancomycin.  Hopefully, we will deescalate his antibiotic therapy in the next couple of days.  We will get sputum culture.  His home medications will be continued.  He will be placed on Lortab for pain control.  He will be placed on heparin for DVT prophylaxis.  He will be placed on subcutaneous insulin protocol for coverage.  His Levemir will be continued.  Further recommendations depend  upon his clinical course.    Carmelo Ray MD  09/15/18  4:55 PM    Dictated utilizing Dragon dictation.

## 2018-09-16 LAB
ANION GAP SERPL CALCULATED.3IONS-SCNC: 13.3 MMOL/L (ref 10–20)
BASOPHILS # BLD AUTO: 0.09 10*3/MM3 (ref 0–0.2)
BASOPHILS NFR BLD AUTO: 1.3 % (ref 0–2.5)
BUN BLD-MCNC: 18 MG/DL (ref 7–20)
BUN/CREAT SERPL: 5.8 (ref 6.3–21.9)
CALCIUM SPEC-SCNC: 8.8 MG/DL (ref 8.4–10.2)
CHLORIDE SERPL-SCNC: 92 MMOL/L (ref 98–107)
CO2 SERPL-SCNC: 34 MMOL/L (ref 26–30)
CREAT BLD-MCNC: 3.1 MG/DL (ref 0.6–1.3)
DEPRECATED RDW RBC AUTO: 50.4 FL (ref 37–54)
EOSINOPHIL # BLD AUTO: 0.81 10*3/MM3 (ref 0–0.7)
EOSINOPHIL NFR BLD AUTO: 11.5 % (ref 0–7)
ERYTHROCYTE [DISTWIDTH] IN BLOOD BY AUTOMATED COUNT: 14.9 % (ref 11.5–14.5)
GFR SERPL CREATININE-BSD FRML MDRD: 21 ML/MIN/1.73
GLUCOSE BLD-MCNC: 152 MG/DL (ref 74–98)
GLUCOSE BLDC GLUCOMTR-MCNC: 130 MG/DL (ref 70–130)
GLUCOSE BLDC GLUCOMTR-MCNC: 147 MG/DL (ref 70–130)
GLUCOSE BLDC GLUCOMTR-MCNC: 203 MG/DL (ref 70–130)
GLUCOSE BLDC GLUCOMTR-MCNC: 204 MG/DL (ref 70–130)
HCT VFR BLD AUTO: 30.7 % (ref 42–52)
HGB BLD-MCNC: 9.9 G/DL (ref 14–18)
IMM GRANULOCYTES # BLD: 0.05 10*3/MM3 (ref 0–0.06)
IMM GRANULOCYTES NFR BLD: 0.7 % (ref 0–0.6)
LYMPHOCYTES # BLD AUTO: 0.58 10*3/MM3 (ref 0.6–3.4)
LYMPHOCYTES NFR BLD AUTO: 8.2 % (ref 10–50)
MCH RBC QN AUTO: 30.2 PG (ref 27–31)
MCHC RBC AUTO-ENTMCNC: 32.2 G/DL (ref 30–37)
MCV RBC AUTO: 93.6 FL (ref 80–94)
MONOCYTES # BLD AUTO: 0.92 10*3/MM3 (ref 0–0.9)
MONOCYTES NFR BLD AUTO: 13 % (ref 0–12)
NEUTROPHILS # BLD AUTO: 4.62 10*3/MM3 (ref 2–6.9)
NEUTROPHILS NFR BLD AUTO: 65.3 % (ref 37–80)
NRBC BLD MANUAL-RTO: 0 /100 WBC (ref 0–0)
PLATELET # BLD AUTO: 191 10*3/MM3 (ref 130–400)
PMV BLD AUTO: 9.5 FL (ref 6–12)
POTASSIUM BLD-SCNC: 4.3 MMOL/L (ref 3.5–5.1)
PROCALCITONIN SERPL-MCNC: 1 NG/ML
RBC # BLD AUTO: 3.28 10*6/MM3 (ref 4.7–6.1)
SODIUM BLD-SCNC: 135 MMOL/L (ref 137–145)
VANCOMYCIN SERPL-MCNC: 19.05 MCG/ML (ref 5–40)
WBC NRBC COR # BLD: 7.07 10*3/MM3 (ref 4.8–10.8)

## 2018-09-16 PROCEDURE — 63710000001 INSULIN ASPART PER 5 UNITS: Performed by: INTERNAL MEDICINE

## 2018-09-16 PROCEDURE — 82962 GLUCOSE BLOOD TEST: CPT

## 2018-09-16 PROCEDURE — 80202 ASSAY OF VANCOMYCIN: CPT | Performed by: INTERNAL MEDICINE

## 2018-09-16 PROCEDURE — 87340 HEPATITIS B SURFACE AG IA: CPT | Performed by: INTERNAL MEDICINE

## 2018-09-16 PROCEDURE — 94799 UNLISTED PULMONARY SVC/PX: CPT

## 2018-09-16 PROCEDURE — 84145 PROCALCITONIN (PCT): CPT | Performed by: INTERNAL MEDICINE

## 2018-09-16 PROCEDURE — 63710000001 INSULIN DETEMIR PER 5 UNITS: Performed by: INTERNAL MEDICINE

## 2018-09-16 PROCEDURE — 25010000002 HEPARIN (PORCINE) PER 1000 UNITS: Performed by: INTERNAL MEDICINE

## 2018-09-16 PROCEDURE — G0378 HOSPITAL OBSERVATION PER HR: HCPCS

## 2018-09-16 PROCEDURE — 25010000002 CEFEPIME PER 500 MG: Performed by: INTERNAL MEDICINE

## 2018-09-16 PROCEDURE — 80048 BASIC METABOLIC PNL TOTAL CA: CPT | Performed by: INTERNAL MEDICINE

## 2018-09-16 PROCEDURE — 99225 PR SBSQ OBSERVATION CARE/DAY 25 MINUTES: CPT | Performed by: INTERNAL MEDICINE

## 2018-09-16 PROCEDURE — 85025 COMPLETE CBC W/AUTO DIFF WBC: CPT | Performed by: INTERNAL MEDICINE

## 2018-09-16 RX ORDER — SODIUM CHLORIDE FOR INHALATION 0.9 %
VIAL, NEBULIZER (ML) INHALATION
Status: COMPLETED
Start: 2018-09-16 | End: 2018-09-16

## 2018-09-16 RX ADMIN — ISODIUM CHLORIDE 3 ML: 0.03 SOLUTION RESPIRATORY (INHALATION) at 08:20

## 2018-09-16 RX ADMIN — DOCUSATE SODIUM 100 MG: 100 CAPSULE, LIQUID FILLED ORAL at 06:32

## 2018-09-16 RX ADMIN — DOCUSATE SODIUM 100 MG: 100 CAPSULE, LIQUID FILLED ORAL at 17:35

## 2018-09-16 RX ADMIN — SPIRONOLACTONE 25 MG: 25 TABLET ORAL at 08:37

## 2018-09-16 RX ADMIN — HYDRALAZINE HYDROCHLORIDE 50 MG: 25 TABLET ORAL at 17:35

## 2018-09-16 RX ADMIN — LANTHANUM CARBONATE 1000 MG: 500 TABLET, CHEWABLE ORAL at 12:03

## 2018-09-16 RX ADMIN — Medication 1 CAPSULE: at 22:15

## 2018-09-16 RX ADMIN — LANTHANUM CARBONATE 1000 MG: 500 TABLET, CHEWABLE ORAL at 17:36

## 2018-09-16 RX ADMIN — CLOPIDOGREL BISULFATE 75 MG: 75 TABLET ORAL at 08:37

## 2018-09-16 RX ADMIN — DIGOXIN 125 MCG: 125 TABLET ORAL at 12:02

## 2018-09-16 RX ADMIN — HEPARIN SODIUM 5000 UNITS: 5000 INJECTION, SOLUTION INTRAVENOUS; SUBCUTANEOUS at 22:15

## 2018-09-16 RX ADMIN — LOSARTAN POTASSIUM 100 MG: 50 TABLET, FILM COATED ORAL at 08:37

## 2018-09-16 RX ADMIN — HYDRALAZINE HYDROCHLORIDE 50 MG: 25 TABLET ORAL at 22:17

## 2018-09-16 RX ADMIN — CEFEPIME HYDROCHLORIDE 1 G: 1 INJECTION, SOLUTION INTRAVENOUS at 17:35

## 2018-09-16 RX ADMIN — Medication 1 CAPSULE: at 08:37

## 2018-09-16 RX ADMIN — INSULIN ASPART 3 UNITS: 100 INJECTION, SOLUTION INTRAVENOUS; SUBCUTANEOUS at 12:02

## 2018-09-16 RX ADMIN — PANTOPRAZOLE SODIUM 40 MG: 40 TABLET, DELAYED RELEASE ORAL at 06:32

## 2018-09-16 RX ADMIN — FOLIC ACID 1 MG: 1 TABLET ORAL at 08:37

## 2018-09-16 RX ADMIN — AMLODIPINE BESYLATE 10 MG: 5 TABLET ORAL at 08:37

## 2018-09-16 RX ADMIN — CHOLECALCIFEROL CAP 125 MCG (5000 UNIT) 5000 UNITS: 125 CAP at 08:37

## 2018-09-16 RX ADMIN — INSULIN DETEMIR 8 UNITS: 100 INJECTION, SOLUTION SUBCUTANEOUS at 08:36

## 2018-09-16 RX ADMIN — ASPIRIN 81 MG: 81 TABLET, COATED ORAL at 08:37

## 2018-09-16 RX ADMIN — IPRATROPIUM BROMIDE AND ALBUTEROL SULFATE 3 ML: .5; 3 SOLUTION RESPIRATORY (INHALATION) at 08:20

## 2018-09-16 RX ADMIN — LANTHANUM CARBONATE 1000 MG: 500 TABLET, CHEWABLE ORAL at 08:38

## 2018-09-16 RX ADMIN — INSULIN ASPART 3 UNITS: 100 INJECTION, SOLUTION INTRAVENOUS; SUBCUTANEOUS at 22:15

## 2018-09-16 RX ADMIN — ISOSORBIDE MONONITRATE 30 MG: 30 TABLET, EXTENDED RELEASE ORAL at 08:37

## 2018-09-16 RX ADMIN — ATORVASTATIN CALCIUM 10 MG: 10 TABLET, FILM COATED ORAL at 22:15

## 2018-09-16 RX ADMIN — HYDRALAZINE HYDROCHLORIDE 50 MG: 25 TABLET ORAL at 08:37

## 2018-09-16 RX ADMIN — METOPROLOL SUCCINATE 100 MG: 100 TABLET, EXTENDED RELEASE ORAL at 08:37

## 2018-09-16 RX ADMIN — LEVOTHYROXINE SODIUM 100 MCG: 100 TABLET ORAL at 06:33

## 2018-09-16 RX ADMIN — HEPARIN SODIUM 5000 UNITS: 5000 INJECTION, SOLUTION INTRAVENOUS; SUBCUTANEOUS at 08:38

## 2018-09-16 NOTE — PROGRESS NOTES
[facility]    Name:  Talha Cutler   Age:  58 y.o.  Sex:  male  :  1960  MRN:  [id number]   Visit Number:  [id number]  Admission Date:  9/15/2018  Date Of Service:  18  Primary Care Physician:  Idalia Kay MD     LOS: 1 day :  Patient Care Team:  Idalia Kay MD as PCP - General:    Chief Complaint:      Right-sided pleuritic chest pain and diarrhea.    Subjective / Interval History:     Mr. Cutler is currently lying down in the bed and continues to have right-sided pleuritic chest pain.  He did not have any fever.  Since this morning he has started having diarrhea.  He complains of left lower quadrant pain.  Denies any vomiting.  He was admitted from the emergency room yesterday with generalized weakness and right-sided pleuritic chest pain.  He was noted to have right upper and lower lobe pneumonia on the CT scan of the chest and was started on broad-spectrum IV antibiotic therapy with cefepime, Levaquin and vancomycin.  His pro calcitonin is elevated.  He currently has diarrhea with abdominal pain.  We will get a stool C. difficile toxin.    Review of Systems:     General ROS: Patient denies any fevers, chills or loss of consciousness.  Respiratory ROS: Complains of cough, chest congestion and right-sided pleuritic chest pain.  Cardiovascular ROS: Denies chest pain or palpitations. No history of exertional chest pain.  Gastrointestinal ROS: Denies nausea and vomiting. Denies any abdominal pain. No diarrhea.  Neurological ROS: Denies any focal weakness. No loss of consciousness. Denies any numbness.  Dermatological ROS: Denies any redness or pruritis.    Vital Signs:    Temp:  [97.6 °F (36.4 °C)-97.9 °F (36.6 °C)] 97.8 °F (36.6 °C)  Heart Rate:  [70-84] 79  Resp:  [12-18] 16  BP: (134-162)/(52-77) 144/61    Intake and output:    I/O last 3 completed shifts:  In: 8 [P.O.:660; I.V.:758; IV Piggyback:400]  Out: -   No  intake/output data recorded.    Physical Examination:    General Appearance:  Alert and cooperative, not in any acute distress.   Head:  Atraumatic and normocephalic, without obvious abnormality.   Eyes:          PERRLA, conjunctivae and sclerae normal, no Icterus. No pallor. Extra-occular movements are within normal limits.   Neck: Supple, trachea midline, no thyromegaly, no carotid bruit.   Lungs:   Chest shape is normal. Breath sounds heard bilaterally equally.  No wheezing.  Bilateral basal crackles heard more prominent on the right base. No Pleural rub or bronchial breathing.   Heart:  Normal S1 and S2, no murmur, no gallop, no rub. No JVD   Abdomen:   Normal bowel sounds, no masses, no organomegaly. Soft, tenderness noted in the left lower quadrant on palpation, non-distended, no guarding, no rebound tenderness. Induration and redness noted on the right side of the scrotum with clear drainage.   Extremities: Moves all extremities well, no edema, no cyanosis, no            Clubbing. AV fistula with the bruit noted on the right arm.   Skin: No bleeding, bruising or rash.   Neurologic: Awake, alert and oriented times 3. Moves all 4 extremities equally.   Laboratory results:      Results from last 7 days  Lab Units 09/16/18  0618 09/15/18  1145   SODIUM mmol/L 135* 133*   POTASSIUM mmol/L 4.3 3.8   CHLORIDE mmol/L 92* 89*   CO2 mmol/L 34.0* 35.0*   BUN mg/dL 18 10   CREATININE mg/dL 3.10* 1.80*   CALCIUM mg/dL 8.8 9.1   BILIRUBIN mg/dL  --  0.8   ALK PHOS U/L  --  223*   ALT (SGPT) U/L  --  19   AST (SGOT) U/L  --  37   GLUCOSE mg/dL 152* 210*       Results from last 7 days  Lab Units 09/16/18  0618 09/15/18  1145   WBC 10*3/mm3 7.07 6.26   HEMOGLOBIN g/dL 9.9* 10.2*   HEMATOCRIT % 30.7* 30.4*   PLATELETS 10*3/mm3 191 225           Results from last 7 days  Lab Units 09/15/18  1145   TROPONIN I ng/mL 0.046*       Results from last 7 days  Lab Units 09/15/18  1339   BLOODCX  No growth at less than 24 hours  No  growth at less than 24 hours     I have reviewed the patient's laboratory results.    Radiology results:    Imaging Results (last 24 hours)     Procedure Component Value Units Date/Time    XR Chest 1 View [[id number]] Collected:  09/15/18 1236     Updated:  09/15/18 1240    Narrative:       PROCEDURE: XR CHEST 1 VW-     HISTORY: Weak/Dizzy/AMS triage protocol     COMPARISON: None.     FINDINGS: Electrodes overlie the chest. Atherosclerosis is noted. The  heart is normal in size. The mediastinum is unremarkable. Vague right  upper lobe opacity. Right basilar consolidate with effusion. There is no  pneumothorax.  There are no acute osseous abnormalities.           Impression:       Vague right upper lobe opacity. Right basilar consolidate  with effusion. Findings likely secondary to pneumonia.     Continued followup is recommended.     This report was finalized on 9/15/2018 12:38 PM by Devin Villagomez DO.    CT Chest Without Contrast [[id number]] Collected:  09/15/18 1232     Updated:  09/15/18 1238    Narrative:       PROCEDURE: CT CHEST WO CONTRAST-     HISTORY: right chest pain     COMPARISON: July 27, 2018.     PROCEDURE:  Multiple axial CT images were obtained from the thoracic  inlet through the upper abdomen without the use of contrast.      FINDINGS: Lack of intravenous contrast limits evaluation of the solid  organs, the mediastinum, and the vasculature.        Soft tissue windows reveal no axillary, hilar or mediastinal adenopathy.  Atherosclerosis is noted. There is evidence for calcified granulomatous  disease. Heart size is normal. The aorta is normal in caliber. Right  upper lobe infiltrate, right lower lobe infiltrate, and probable  atelectasis at the left base and small bilateral pleural effusions.    .  The visualized upper abdomen is unremarkable. Bone windows reveal no  acute osseous abnormalities.       Impression:       Findings consistent with multifocal pneumonia.     255.06 mGy.cm          This  study was performed with techniques to keep radiation doses as low  as reasonably achievable (ALARA). Individualized dose reduction  techniques using automated exposure control or adjustment of mA and/or  kV according to the patient size were employed.    Contrast     This report was finalized on 9/15/2018 12:36 PM by Devin Villagomez DO.    CT Head Without Contrast [[id number]] Collected:  09/15/18 1230     Updated:  09/15/18 1233    Narrative:       PROCEDURE: CT HEAD WO CONTRAST-     HISTORY: dizziness     COMPARISON:  None .     TECHNIQUE: Multiple axial CT images were performed from the foramen  magnum to the vertex without enhancement.      FINDINGS: There is no CT evidence of hemorrhage. There is no mass, mass  effect or midline shift.  Cerebral atrophy with without evidence of  hydrocephalus. The paranasal sinuses are clear. Bone windows reveal no  acute osseous abnormalities. Atherosclerosis is noted.       Impression:       No acute intracranial process.             803.69 mGy.cm          This study was performed with techniques to keep radiation doses as low  as reasonably achievable (ALARA). Individualized dose reduction  techniques using automated exposure control or adjustment of mA and/or  kV according to the patient size were employed.      This report was finalized on 9/15/2018 12:31 PM by Devin Villagomez DO.        I have reviewed the patient's radiology reports.    Medication Review:     I have reviewed the patients active and prn medications.     Principal Problem:    Pneumonia of right lower lobe due to infectious organism (CMS/HCC)  Active Problems:    Type 2 diabetes mellitus treated with insulin (CMS/HCC)    End stage renal disease on dialysis (CMS/HCC)    Physical debility    Assessment:    1.  Right upper lobe and lower lobe healthcare pneumonia, present on admission.  2.  Right-sided pleuritic chest pain secondary to #1, present on admission.  3.  End-stage renal disease on hemodialysis, on  Tuesday, Thursday and Saturday.  4.  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  5.  Chronic right-sided loculated pleural effusion status post pleurodesis.  6.  Coronary artery disease on medical management.  7.  Chronic scrotal ulcer, followed up at [facility] urology.  8.  Chronic debility.  9.  Essential hypertension.    Plan:    Mr. Cutler is currently hemodynamically stable and will be continued on broad-spectrum IV antibiotic therapy with cefepime, Levaquin and vancomycin.  His pro calcitonin is still elevated.  Hopefully we should be able to start de-escalating antibiotic therapy from tomorrow.  He will be continued on lactobacillus supplements.  We will get a stool C. difficile toxin in view of his abdominal pain and diarrhea.  His home medications have been continued.  He will be continued on Levemir and subcutaneous insulin protocol.  We will consult physical and occupational therapy due to his generalized weakness and debility.  Further recommendations and discharge plan depends upon his clinical course.    Carmelo Ray MD  09/16/18  11:09 AM    Dictated utilizing Dragon dictation.

## 2018-09-16 NOTE — PLAN OF CARE
Problem: Skin Injury Risk (Adult)  Goal: Identify Related Risk Factors and Signs and Symptoms  Outcome: Ongoing (interventions implemented as appropriate)    Goal: Skin Health and Integrity  Outcome: Ongoing (interventions implemented as appropriate)      Problem: Patient Care Overview  Goal: Plan of Care Review  Outcome: Ongoing (interventions implemented as appropriate)   09/16/18 1532   Coping/Psychosocial   Plan of Care Reviewed With patient;family   OTHER   Outcome Summary VSS, blood glucose monitoring, tolerating diet   Plan of Care Review   Progress no change       Problem: Fall Risk (Adult)  Goal: Identify Related Risk Factors and Signs and Symptoms  Outcome: Ongoing (interventions implemented as appropriate)    Goal: Absence of Fall  Outcome: Ongoing (interventions implemented as appropriate)      Problem: Pneumonia (Adult)  Goal: Signs and Symptoms of Listed Potential Problems Will be Absent, Minimized or Managed (Pneumonia)  Outcome: Ongoing (interventions implemented as appropriate)      Problem: Wound (Includes Pressure Injury) (Adult)  Goal: Signs and Symptoms of Listed Potential Problems Will be Absent, Minimized or Managed (Wound)  Outcome: Ongoing (interventions implemented as appropriate)

## 2018-09-16 NOTE — PLAN OF CARE
Problem: Patient Care Overview  Goal: Plan of Care Review   09/15/18 3004   Coping/Psychosocial   Plan of Care Reviewed With patient   OTHER   Outcome Summary NEW ADMISSION,SKIN ISSUES,PNEU,ANTIBIOTICS

## 2018-09-17 LAB
GLUCOSE BLDC GLUCOMTR-MCNC: 115 MG/DL (ref 70–130)
GLUCOSE BLDC GLUCOMTR-MCNC: 220 MG/DL (ref 70–130)
GLUCOSE BLDC GLUCOMTR-MCNC: 240 MG/DL (ref 70–130)
GLUCOSE BLDC GLUCOMTR-MCNC: 264 MG/DL (ref 70–130)

## 2018-09-17 PROCEDURE — 25010000002 HEPARIN (PORCINE) PER 1000 UNITS: Performed by: INTERNAL MEDICINE

## 2018-09-17 PROCEDURE — 94799 UNLISTED PULMONARY SVC/PX: CPT

## 2018-09-17 PROCEDURE — 82962 GLUCOSE BLOOD TEST: CPT

## 2018-09-17 PROCEDURE — 99225 PR SBSQ OBSERVATION CARE/DAY 25 MINUTES: CPT | Performed by: INTERNAL MEDICINE

## 2018-09-17 PROCEDURE — G0378 HOSPITAL OBSERVATION PER HR: HCPCS

## 2018-09-17 PROCEDURE — 63710000001 INSULIN DETEMIR PER 5 UNITS: Performed by: INTERNAL MEDICINE

## 2018-09-17 PROCEDURE — 63710000001 INSULIN ASPART PER 5 UNITS: Performed by: INTERNAL MEDICINE

## 2018-09-17 PROCEDURE — 25010000002 LEVOFLOXACIN PER 250 MG: Performed by: INTERNAL MEDICINE

## 2018-09-17 PROCEDURE — 25010000002 CEFEPIME PER 500 MG: Performed by: INTERNAL MEDICINE

## 2018-09-17 RX ORDER — LISINOPRIL 10 MG/1
TABLET ORAL DAILY
COMMUNITY
End: 2018-11-11 | Stop reason: HOSPADM

## 2018-09-17 RX ORDER — ASPIRIN 81 MG/1
81 TABLET, CHEWABLE ORAL DAILY
COMMUNITY

## 2018-09-17 RX ORDER — DIGOXIN 125 MCG
125 TABLET ORAL
Status: DISCONTINUED | OUTPATIENT
Start: 2018-09-18 | End: 2018-09-19 | Stop reason: HOSPADM

## 2018-09-17 RX ADMIN — CEFEPIME HYDROCHLORIDE 1 G: 1 INJECTION, SOLUTION INTRAVENOUS at 18:53

## 2018-09-17 RX ADMIN — LANTHANUM CARBONATE 1000 MG: 500 TABLET, CHEWABLE ORAL at 08:36

## 2018-09-17 RX ADMIN — ASPIRIN 81 MG: 81 TABLET, COATED ORAL at 08:37

## 2018-09-17 RX ADMIN — ATORVASTATIN CALCIUM 10 MG: 10 TABLET, FILM COATED ORAL at 21:10

## 2018-09-17 RX ADMIN — ISOSORBIDE MONONITRATE 30 MG: 30 TABLET, EXTENDED RELEASE ORAL at 08:37

## 2018-09-17 RX ADMIN — HEPARIN SODIUM 5000 UNITS: 5000 INJECTION, SOLUTION INTRAVENOUS; SUBCUTANEOUS at 21:10

## 2018-09-17 RX ADMIN — Medication 1 CAPSULE: at 08:37

## 2018-09-17 RX ADMIN — HYDRALAZINE HYDROCHLORIDE 50 MG: 25 TABLET ORAL at 21:10

## 2018-09-17 RX ADMIN — HEPARIN SODIUM 5000 UNITS: 5000 INJECTION, SOLUTION INTRAVENOUS; SUBCUTANEOUS at 08:38

## 2018-09-17 RX ADMIN — DOCUSATE SODIUM 100 MG: 100 CAPSULE, LIQUID FILLED ORAL at 06:35

## 2018-09-17 RX ADMIN — IPRATROPIUM BROMIDE AND ALBUTEROL SULFATE 3 ML: .5; 3 SOLUTION RESPIRATORY (INHALATION) at 16:15

## 2018-09-17 RX ADMIN — LEVOFLOXACIN 500 MG: 5 INJECTION, SOLUTION INTRAVENOUS at 16:03

## 2018-09-17 RX ADMIN — PANTOPRAZOLE SODIUM 40 MG: 40 TABLET, DELAYED RELEASE ORAL at 06:35

## 2018-09-17 RX ADMIN — METOPROLOL SUCCINATE 100 MG: 100 TABLET, EXTENDED RELEASE ORAL at 08:37

## 2018-09-17 RX ADMIN — IPRATROPIUM BROMIDE AND ALBUTEROL SULFATE 3 ML: .5; 3 SOLUTION RESPIRATORY (INHALATION) at 00:14

## 2018-09-17 RX ADMIN — HYDROCODONE BITARTRATE AND ACETAMINOPHEN 1 TABLET: 7.5; 325 TABLET ORAL at 01:40

## 2018-09-17 RX ADMIN — INSULIN ASPART 4 UNITS: 100 INJECTION, SOLUTION INTRAVENOUS; SUBCUTANEOUS at 12:13

## 2018-09-17 RX ADMIN — INSULIN ASPART 3 UNITS: 100 INJECTION, SOLUTION INTRAVENOUS; SUBCUTANEOUS at 07:00

## 2018-09-17 RX ADMIN — HYDRALAZINE HYDROCHLORIDE 50 MG: 25 TABLET ORAL at 16:03

## 2018-09-17 RX ADMIN — LANTHANUM CARBONATE 1000 MG: 500 TABLET, CHEWABLE ORAL at 18:58

## 2018-09-17 RX ADMIN — SPIRONOLACTONE 25 MG: 25 TABLET ORAL at 08:37

## 2018-09-17 RX ADMIN — CHOLECALCIFEROL CAP 125 MCG (5000 UNIT) 5000 UNITS: 125 CAP at 08:37

## 2018-09-17 RX ADMIN — Medication 1 CAPSULE: at 21:10

## 2018-09-17 RX ADMIN — FOLIC ACID 1 MG: 1 TABLET ORAL at 08:38

## 2018-09-17 RX ADMIN — AMLODIPINE BESYLATE 10 MG: 5 TABLET ORAL at 08:37

## 2018-09-17 RX ADMIN — INSULIN DETEMIR 8 UNITS: 100 INJECTION, SOLUTION SUBCUTANEOUS at 08:39

## 2018-09-17 RX ADMIN — INSULIN ASPART 3 UNITS: 100 INJECTION, SOLUTION INTRAVENOUS; SUBCUTANEOUS at 21:10

## 2018-09-17 RX ADMIN — DOCUSATE SODIUM 100 MG: 100 CAPSULE, LIQUID FILLED ORAL at 18:59

## 2018-09-17 RX ADMIN — CLOPIDOGREL BISULFATE 75 MG: 75 TABLET ORAL at 08:37

## 2018-09-17 RX ADMIN — LEVOTHYROXINE SODIUM 100 MCG: 100 TABLET ORAL at 06:35

## 2018-09-17 RX ADMIN — LOSARTAN POTASSIUM 100 MG: 50 TABLET, FILM COATED ORAL at 08:38

## 2018-09-17 RX ADMIN — LANTHANUM CARBONATE 1000 MG: 500 TABLET, CHEWABLE ORAL at 12:13

## 2018-09-17 RX ADMIN — LEVOFLOXACIN 500 MG: 5 INJECTION, SOLUTION INTRAVENOUS at 16:05

## 2018-09-17 RX ADMIN — HYDRALAZINE HYDROCHLORIDE 50 MG: 25 TABLET ORAL at 08:38

## 2018-09-17 NOTE — PROGRESS NOTES
Spoke with pt as a follow to palliative care services.  Pt denies having palliative services.  He reports that he has [facility], goes to [facility] and dialysis T/R/S.  He states that he has enough people taking his blood pressure and looking after him.  He states that he DOES NOT need any more services.  Additionally, he reports that he has a [employer] nurse that checks on him 1x a month.  Brochure and additional info offered.  Pt denies palliative services.  He states that he is not ready for that service.    Called [facility]; spoke with Hanna.  Hanna verifies that pt declined services and was never admitted to their palliative program.

## 2018-09-17 NOTE — PLAN OF CARE
Problem: Skin Injury Risk (Adult)  Goal: Identify Related Risk Factors and Signs and Symptoms  Outcome: Outcome(s) achieved Date Met: 09/17/18    Goal: Skin Health and Integrity  Outcome: Ongoing (interventions implemented as appropriate)      Problem: Fall Risk (Adult)  Goal: Identify Related Risk Factors and Signs and Symptoms  Outcome: Outcome(s) achieved Date Met: 09/17/18    Goal: Absence of Fall  Outcome: Ongoing (interventions implemented as appropriate)      Problem: Pneumonia (Adult)  Goal: Signs and Symptoms of Listed Potential Problems Will be Absent, Minimized or Managed (Pneumonia)  Outcome: Ongoing (interventions implemented as appropriate)      Problem: Patient Care Overview  Goal: Plan of Care Review  Outcome: Ongoing (interventions implemented as appropriate)   09/16/18 2200   Coping/Psychosocial   Plan of Care Reviewed With patient   OTHER   Outcome Summary pleasant patient with complaint of pain-medications given per physician's orders-monitor blood sugar with coverage per protocol-monitor labs and continue to monitor patient   Plan of Care Review   Progress improving     Goal: Individualization and Mutuality  Outcome: Ongoing (interventions implemented as appropriate)    Goal: Interprofessional Rounds/Family Conf  Outcome: Ongoing (interventions implemented as appropriate)      Problem: Wound (Includes Pressure Injury) (Adult)  Goal: Signs and Symptoms of Listed Potential Problems Will be Absent, Minimized or Managed (Wound)  Outcome: Ongoing (interventions implemented as appropriate)      Problem: Diabetes, Type 2 (Adult)  Goal: Signs and Symptoms of Listed Potential Problems Will be Absent, Minimized or Managed (Diabetes, Type 2)  Outcome: Ongoing (interventions implemented as appropriate)

## 2018-09-17 NOTE — PROGRESS NOTES
Discharge Planning Assessment  [facility]     Patient Name: Talha Cutler  MRN: [id number]  Today's Date: 9/17/2018    Admit Date: 9/15/2018          Discharge Needs Assessment     Row Name 09/17/18 0903       Living Environment    Lives With spouse;child(starr), adult    Current Living Arrangements home/apartment/condo       Transition Planning    Patient/Family Anticipates Transition to home with family    Transportation Anticipated family or friend will provide       Discharge Needs Assessment    Readmission Within the Last 30 Days no previous admission in last 30 days    Equipment Currently Used at Home wheelchair;wheelchair, motorized;oxygen;power chair,(recliner lift);glucometer    Outpatient/Agency/Support Group Needs --   Goes to [facility] care Monday and Wednesday,  Has [facility] also            Discharge Plan     Row Name 09/17/18 0905       Plan    Plan home with continued HH with family    Plan Comments dependent for most activities of daily living, able to feed self,   does not ambulate, wife gives him a sponge bath, states goes to [facility] care  Monday and Wednesday,  states [facility], oxygen through [facility]   2 liters at night plans on returning home at discharge family will transport  oxygen         Destination     No service coordination in this encounter.      Durable Medical Equipment     No service coordination in this encounter.      Dialysis/Infusion     No service coordination in this encounter.      Home Medical Care     No service coordination in this encounter.      Social Care     No service coordination in this encounter.                Demographic Summary     Row Name 09/17/18 0902       General Information    Admission Type observation    Required Notices Provided Observation Status Notice    Referral Source admission list    Reason for Consult discharge planning            Functional Status     Row Name 09/17/18 0903       Functional Status    Usual Activity Tolerance poor     Current Activity Tolerance poor       Functional Status, IADL    Medications completely dependent    Meal Preparation completely dependent    Housekeeping completely dependent    Laundry completely dependent    Shopping completely dependent       Employment/    Employment Status disabled            Psychosocial    No documentation.           Abuse/Neglect    No documentation.           Legal    No documentation.           Substance Abuse    No documentation.           Patient Forms    No documentation.         Annika Wallace RN

## 2018-09-17 NOTE — PLAN OF CARE
Problem: Skin Injury Risk (Adult)  Goal: Skin Health and Integrity  Outcome: Ongoing (interventions implemented as appropriate)   09/17/18 1318   Skin Injury Risk (Adult)   Skin Health and Integrity making progress toward outcome       Problem: Patient Care Overview  Goal: Individualization and Mutuality  Outcome: Ongoing (interventions implemented as appropriate)   09/17/18 1318   Personal Strengths/Vulnerabilities   Patient Personal Strengths expressive of needs;expressive of emotions;positive attitude;self-awareness       Problem: Fall Risk (Adult)  Goal: Absence of Fall  Outcome: Ongoing (interventions implemented as appropriate)   09/17/18 1318   Fall Risk (Adult)   Absence of Fall making progress toward outcome       Problem: Pneumonia (Adult)  Goal: Signs and Symptoms of Listed Potential Problems Will be Absent, Minimized or Managed (Pneumonia)  Outcome: Ongoing (interventions implemented as appropriate)   09/17/18 1318   Goal/Outcome Evaluation   Problems Assessed (Pneumonia) all   Problems Present (Pneumonia) fluid/electrolyte imbalance;infection progression       Problem: Patient Care Overview  Goal: Interprofessional Rounds/Family Conf  Outcome: Ongoing (interventions implemented as appropriate)   09/17/18 1318   Interdisciplinary Rounds/Family Conf   Participants nursing       Problem: Wound (Includes Pressure Injury) (Adult)  Goal: Signs and Symptoms of Listed Potential Problems Will be Absent, Minimized or Managed (Wound)  Outcome: Ongoing (interventions implemented as appropriate)   09/17/18 1318   Goal/Outcome Evaluation   Problems Assessed (Wound) all   Problems Present (Wound) delayed wound healing       Problem: Diabetes, Type 2 (Adult)  Goal: Signs and Symptoms of Listed Potential Problems Will be Absent, Minimized or Managed (Diabetes, Type 2)  Outcome: Ongoing (interventions implemented as appropriate)   09/17/18 1318   Goal/Outcome Evaluation   Problems Assessed (Type 2 Diabetes) all    Problems Present (Type 2 Diabetes) hyperglycemia

## 2018-09-17 NOTE — PROGRESS NOTES
[facility]    Name:  Talha Cutler   Age:  58 y.o.  Sex:  male  :  1960  MRN:  [id number]   Visit Number:  [id number]  Admission Date:  9/15/2018  Date Of Service:  18  Primary Care Physician:  Idalia Kay MD     LOS: 1 day :  Patient Care Team:  Idalia Kay MD as PCP - General:    Chief Complaint:      Right-sided pleuritic chest pain     Subjective / Interval History:   58-year-old patient was been admitted because of right-sided pneumonia.  The he has had the chest pain on the right side which was sharp yesterday though it has improved after the breathing treatment.    Also his cough is a nonproductive.  He had only 1 episode of diuretic for yesterday after that there is no diarrhea  He has not had any fever and the slightly better compared to last night      Review of Systems:     General ROS: Patient denies any fevers, chills or loss of consciousness.  Respiratory ROS: Complains of cough, chest congestion and right-sided pleuritic chest pain.  Cardiovascular ROS: Denies chest pain or palpitations. No history of exertional chest pain.  Gastrointestinal ROS: Denies nausea and vomiting. Denies any abdominal pain. No diarrhea.  Neurological ROS: Denies any focal weakness. No loss of consciousness. Denies any numbness.  Dermatological ROS: Denies any redness or pruritis.    Vital Signs:    Temp:  [97.6 °F (36.4 °C)-98.2 °F (36.8 °C)] 98.2 °F (36.8 °C)  Heart Rate:  [68-75] 68  Resp:  [16-18] 18  BP: (132-159)/(54-77) 145/62    Intake and output:    I/O last 3 completed shifts:  In: 2345 [P.O.:900; I.V.:1395; IV Piggyback:50]  Out: -   No intake/output data recorded.    Physical Examination:    General Appearance:  Alert and cooperative, not in any acute distress.   Head:  Atraumatic and normocephalic, without obvious abnormality.   Eyes:          PERRLA, conjunctivae and sclerae normal, no Icterus. No pallor.  Extra-occular movements are within normal limits.   Neck: Supple, trachea midline, no thyromegaly, no carotid bruit.   Lungs:   Chest shape is normal. Breath sounds heard bilaterally equally.  No wheezing.  Bilateral basal crackles heard more prominent on the right base. No Pleural rub or bronchial breathing.   Heart:  Normal S1 and S2, no murmur, no gallop, no rub. No JVD   Abdomen:   Normal bowel sounds, no masses, no organomegaly. Soft, tenderness noted in the left lower quadrant on palpation, non-distended, no guarding, no rebound tenderness. Induration and redness noted on the right side of the scrotum with clear drainage.   Extremities: Moves all extremities well, no edema, no cyanosis, no            Clubbing. AV fistula with the bruit noted on the right arm.   Skin: No bleeding, bruising or rash.   Neurologic: Awake, alert and oriented times 3. Moves all 4 extremities equally.   Laboratory results:      Results from last 7 days  Lab Units 09/16/18  0618 09/15/18  1145   SODIUM mmol/L 135* 133*   POTASSIUM mmol/L 4.3 3.8   CHLORIDE mmol/L 92* 89*   CO2 mmol/L 34.0* 35.0*   BUN mg/dL 18 10   CREATININE mg/dL 3.10* 1.80*   CALCIUM mg/dL 8.8 9.1   BILIRUBIN mg/dL  --  0.8   ALK PHOS U/L  --  223*   ALT (SGPT) U/L  --  19   AST (SGOT) U/L  --  37   GLUCOSE mg/dL 152* 210*       Results from last 7 days  Lab Units 09/16/18  0618 09/15/18  1145   WBC 10*3/mm3 7.07 6.26   HEMOGLOBIN g/dL 9.9* 10.2*   HEMATOCRIT % 30.7* 30.4*   PLATELETS 10*3/mm3 191 225           Results from last 7 days  Lab Units 09/15/18  1145   TROPONIN I ng/mL 0.046*       Results from last 7 days  Lab Units 09/15/18  1826 09/15/18  1339   BLOODCX   --  No growth at 24 hours  No growth at 24 hours   WOUNDCX  Light growth (2+) Gram Negative Bacilli*  --      I have reviewed the patient's laboratory results.    Radiology results:    Imaging Results (last 24 hours)     ** No results found for the last 24 hours. **        I have reviewed the  patient's radiology reports.    Medication Review:     I have reviewed the patients active and prn medications.     Principal Problem:    Pneumonia of right lower lobe due to infectious organism (CMS/[facility])  Active Problems:    Type 2 diabetes mellitus treated with insulin (CMS/[facility])    End stage renal disease on dialysis (CMS/[facility])    Physical debility    Assessment:    1.  Right upper lobe and lower lobe healthcare pneumonia, present on admission.  2.  Right-sided pleuritic chest pain secondary to #1, present on admission.  3.  End-stage renal disease on hemodialysis, on Tuesday, Thursday and Saturday.  4.  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  5.  Chronic right-sided loculated pleural effusion status post pleurodesis.  6.  Coronary artery disease on medical management.  7.  Chronic scrotal ulcer, followed up at [facility] urology.  8.  Chronic debility.  9.  Essential hypertension.    Plan:    Mr. Cutler is currently hemodynamically stable and will be continued on broad-spectrum IV antibiotic therapy with cefepime, Levaquin and vancomycin.  Will continue with the bronchodilators as per orders      He will be continued on Levemir and subcutaneous insulin protocol.     Chronic kidney disease on dialysis will be continued as per nephrology     We will consult physical and occupational therapy due to his generalized weakness and debility.  Further recommendations and discharge plan depends upon his clinical course.    Leo Pacheco MD  09/17/18  8:50 AM    Dictated utilizing Dragon dictation.

## 2018-09-17 NOTE — PROGRESS NOTES
Adult Nutrition  Assessment/PES    Patient Name:  Talha Cutler  YOB: 1960  MRN: [id number]  Admit Date:  9/15/2018    Assessment Date:  9/17/2018    Comments:  Rec #1: Continue current diet order; Maintaining intake. Pt receiving 100% PO intake over 5 meals. Nutritional supplement ordered Nepro BID to promote PO intake. Rec #2: Consider Renal MVI with minerals daily. Rec #3: Continue to monitor/replace electrolytes PRN. Consult RD PRN.             Reason for Assessment     Row Name 09/17/18 1132          Reason for Assessment    Reason For Assessment diagnosis/disease state;identified at risk by screening criteria     Diagnosis diabetes diagnosis/complications;cardiac disease;renal disease   ESRD-HD, DM2, CHF, Pneumonia     Identified At Risk by Screening Criteria MST SCORE 2+;reduced oral intake over the last month                 Labs/Tests/Procedures/Meds     Row Name 09/17/18 1132          Labs/Procedures/Meds    Lab Results Reviewed reviewed, pertinent     Lab Results Comments High: Glu, Creat  Low: Na, Cl, Procalcitonin        Medications    Pertinent Medications Reviewed reviewed               Estimated/Assessed Needs     Row Name 09/17/18 1133          Calculation Measurements    Weight Used For Calculations 70.9 kg (156 lb 4.6 oz)   IBW        Estimated/Assessed Needs    Additional Documentation Protein Requirements (Group);Calorie Requirements (Group);[place]-St. Jeor Equation (Group);Fluid Requirements (Group)        Calorie Requirements    Weight Used For Calorie Calculations --   AF 1.3     Estimated Calorie Need Method [place]     Estimated Calorie Requirement Comment ~[id number]        KCAL/KG    14 Kcal/Kg (kcal) 992.46     15 Kcal/Kg (kcal) 1063.35     18 Kcal/Kg (kcal) 1276.02     20 Kcal/Kg (kcal) 1417.8     25 Kcal/Kg (kcal) 1772.25     30 Kcal/Kg (kcal) 2126.7     35 Kcal/Kg (kcal) 2481.15     40 Kcal/Kg (kcal) 2835.6     45 Kcal/Kg (kcal) 3190.05     50 Kcal/Kg (kcal)  3544.5        Swift-St. Jeor Equation    RMR (Swift-St. Jeor Equation) 1498.4        Protein Requirements    Est Protein Requirement Amount (gms/kg) 1.4 gm protein    gm     Estimated Protein Requirements (gms/day) 99.25        Fluid Requirements    Estimated Fluid Requirements (mL/day) --   Output + 1000 mL     Estimated Fluid Requirement Method other (see comments)     [place]-Trev Method (over 20 kg) 2917.8             Nutrition Prescription Ordered     Row Name 09/17/18 1135          Nutrition Prescription PO    Current PO Diet Regular     Common Modifiers Cardiac;Consistent Carbohydrate;Renal             Evaluation of Received Nutrient/Fluid Intake     Row Name 09/17/18 1133          Calculation Measurements    Weight Used For Calculations 70.9 kg (156 lb 4.6 oz)   IBW        PO Evaluation    Number of Days PO Intake Evaluated 3 days     Number of Meals 5     % PO Intake 100             Evaluation of Prescribed Nutrient/Fluid Intake     Row Name 09/17/18 1133          Calculation Measurements    Weight Used For Calculations 70.9 kg (156 lb 4.6 oz)   IBW           Problem/Interventions:        Problem 1     Row Name 09/17/18 1137          Nutrition Diagnoses Problem 1    Problem 1 Impaired Nutrient Utilization     Etiology (related to) Medical Diagnosis     Cardiac CHF     Endocrine DM2     Signs/Symptoms (evidenced by) Biochemical     Specific Labs Noted Glucose;Na+;Chloride             Problem 2     Row Name 09/17/18 1138          Nutrition Diagnoses Problem 2    Problem 2 Increased Nutrient Needs     Macronutrient Kcal;Fluid;Protein     Micronutrient Vitamin;Mineral     Etiology (related to) Medical Diagnosis     Renal ESRD;Hemodialysis     Signs/Symptoms (evidenced by) Other (comment)   Pt receiving HD treatment                   Intervention Goal     Row Name 09/17/18 1145          Intervention Goal    General Meet nutritional needs for age/condition;Provide information regarding MNT for  treatment/condition     PO Meet estimated needs     Weight Appropriate weight gain             Nutrition Intervention     Row Name 09/17/18 1146          Nutrition Intervention    RD/Tech Action Care plan reviewd;Follow Tx progress;Recommend/ordered     Recommended/Ordered Supplement             Nutrition Prescription     Row Name 09/17/18 1146          Nutrition Prescription PO    PO Prescription Begin/change supplement     Supplement Nepro     Supplement Frequency 2 times a day     New PO Prescription Ordered? Yes        Other Orders    Obtain Weight Daily     Obtain Weight Ordered? No, recommended     Supplement Vitamin mineral supplement     Supplement Ordered? No, recommended             Education/Evaluation     Row Name 09/17/18 1146          Education    Education Will Instruct as appropriate        Monitor/Evaluation    Monitor Per protocol;I&O;PO intake;Pertinent labs;Weight;Supplement intake         Electronically signed by:  Afshan Grace RD  09/17/18 11:47 AM

## 2018-09-18 ENCOUNTER — APPOINTMENT (OUTPATIENT)
Dept: ULTRASOUND IMAGING | Facility: HOSPITAL | Age: 58
End: 2018-09-18

## 2018-09-18 LAB
ALBUMIN SERPL-MCNC: 3.5 G/DL (ref 3.5–5)
ANION GAP SERPL CALCULATED.3IONS-SCNC: 19.5 MMOL/L (ref 10–20)
BASOPHILS # BLD AUTO: 0.08 10*3/MM3 (ref 0–0.2)
BASOPHILS NFR BLD AUTO: 1.2 % (ref 0–2.5)
BUN BLD-MCNC: 38 MG/DL (ref 7–20)
BUN/CREAT SERPL: 6.7 (ref 6.3–21.9)
CALCIUM SPEC-SCNC: 9.1 MG/DL (ref 8.4–10.2)
CHLORIDE SERPL-SCNC: 93 MMOL/L (ref 98–107)
CO2 SERPL-SCNC: 25 MMOL/L (ref 26–30)
CREAT BLD-MCNC: 5.7 MG/DL (ref 0.6–1.3)
DEPRECATED RDW RBC AUTO: 50.5 FL (ref 37–54)
EOSINOPHIL # BLD AUTO: 0.85 10*3/MM3 (ref 0–0.7)
EOSINOPHIL NFR BLD AUTO: 12.3 % (ref 0–7)
ERYTHROCYTE [DISTWIDTH] IN BLOOD BY AUTOMATED COUNT: 15 % (ref 11.5–14.5)
GFR SERPL CREATININE-BSD FRML MDRD: 10 ML/MIN/1.73
GLUCOSE BLD-MCNC: 99 MG/DL (ref 74–98)
GLUCOSE BLDC GLUCOMTR-MCNC: 202 MG/DL (ref 70–130)
GLUCOSE BLDC GLUCOMTR-MCNC: 222 MG/DL (ref 70–130)
GLUCOSE BLDC GLUCOMTR-MCNC: 300 MG/DL (ref 70–130)
GLUCOSE BLDC GLUCOMTR-MCNC: 83 MG/DL (ref 70–130)
HBV SURFACE AG SERPL QL IA: NEGATIVE
HCT VFR BLD AUTO: 28.7 % (ref 42–52)
HGB BLD-MCNC: 9.3 G/DL (ref 14–18)
IMM GRANULOCYTES # BLD: 0.06 10*3/MM3 (ref 0–0.06)
IMM GRANULOCYTES NFR BLD: 0.9 % (ref 0–0.6)
LYMPHOCYTES # BLD AUTO: 0.55 10*3/MM3 (ref 0.6–3.4)
LYMPHOCYTES NFR BLD AUTO: 8 % (ref 10–50)
MCH RBC QN AUTO: 30 PG (ref 27–31)
MCHC RBC AUTO-ENTMCNC: 32.4 G/DL (ref 30–37)
MCV RBC AUTO: 92.6 FL (ref 80–94)
MONOCYTES # BLD AUTO: 0.98 10*3/MM3 (ref 0–0.9)
MONOCYTES NFR BLD AUTO: 14.2 % (ref 0–12)
MRSA SPEC QL CULT: ABNORMAL
NEUTROPHILS # BLD AUTO: 4.38 10*3/MM3 (ref 2–6.9)
NEUTROPHILS NFR BLD AUTO: 63.4 % (ref 37–80)
NRBC BLD MANUAL-RTO: 0 /100 WBC (ref 0–0)
PHOSPHATE SERPL-MCNC: 6.2 MG/DL (ref 2.5–4.5)
PLATELET # BLD AUTO: 176 10*3/MM3 (ref 130–400)
PMV BLD AUTO: 9.9 FL (ref 6–12)
POTASSIUM BLD-SCNC: 5.5 MMOL/L (ref 3.5–5.1)
RBC # BLD AUTO: 3.1 10*6/MM3 (ref 4.7–6.1)
SODIUM BLD-SCNC: 132 MMOL/L (ref 137–145)
VANCOMYCIN SERPL-MCNC: 15.32 MCG/ML (ref 5–40)
VRE SPEC QL CULT: NORMAL
WBC NRBC COR # BLD: 6.9 10*3/MM3 (ref 4.8–10.8)

## 2018-09-18 PROCEDURE — 90935 HEMODIALYSIS ONE EVALUATION: CPT

## 2018-09-18 PROCEDURE — 76870 US EXAM SCROTUM: CPT

## 2018-09-18 PROCEDURE — 82962 GLUCOSE BLOOD TEST: CPT

## 2018-09-18 PROCEDURE — 99232 SBSQ HOSP IP/OBS MODERATE 35: CPT | Performed by: NURSE PRACTITIONER

## 2018-09-18 PROCEDURE — 80202 ASSAY OF VANCOMYCIN: CPT | Performed by: INTERNAL MEDICINE

## 2018-09-18 PROCEDURE — 85025 COMPLETE CBC W/AUTO DIFF WBC: CPT | Performed by: INTERNAL MEDICINE

## 2018-09-18 PROCEDURE — 25010000002 HEPARIN (PORCINE) PER 1000 UNITS: Performed by: INTERNAL MEDICINE

## 2018-09-18 PROCEDURE — 36591 DRAW BLOOD OFF VENOUS DEVICE: CPT

## 2018-09-18 PROCEDURE — 25010000002 CEFEPIME PER 500 MG: Performed by: INTERNAL MEDICINE

## 2018-09-18 PROCEDURE — 5A1D70Z PERFORMANCE OF URINARY FILTRATION, INTERMITTENT, LESS THAN 6 HOURS PER DAY: ICD-10-PCS | Performed by: INTERNAL MEDICINE

## 2018-09-18 PROCEDURE — 25010000002 VANCOMYCIN PER 500 MG: Performed by: INTERNAL MEDICINE

## 2018-09-18 PROCEDURE — 80069 RENAL FUNCTION PANEL: CPT | Performed by: INTERNAL MEDICINE

## 2018-09-18 PROCEDURE — 63710000001 INSULIN ASPART PER 5 UNITS: Performed by: INTERNAL MEDICINE

## 2018-09-18 RX ORDER — IBUPROFEN 600 MG/1
600 TABLET ORAL 2 TIMES DAILY
Status: DISCONTINUED | OUTPATIENT
Start: 2018-09-18 | End: 2018-09-19 | Stop reason: HOSPADM

## 2018-09-18 RX ADMIN — MUPIROCIN: 20 OINTMENT TOPICAL at 21:12

## 2018-09-18 RX ADMIN — VANCOMYCIN HYDROCHLORIDE 750 MG: 750 INJECTION, SOLUTION INTRAVENOUS at 15:23

## 2018-09-18 RX ADMIN — IBUPROFEN 600 MG: 600 TABLET ORAL at 21:10

## 2018-09-18 RX ADMIN — DOCUSATE SODIUM 100 MG: 100 CAPSULE, LIQUID FILLED ORAL at 17:23

## 2018-09-18 RX ADMIN — LANTHANUM CARBONATE 1000 MG: 500 TABLET, CHEWABLE ORAL at 17:25

## 2018-09-18 RX ADMIN — MUPIROCIN 1 APPLICATION: 20 OINTMENT TOPICAL at 15:23

## 2018-09-18 RX ADMIN — Medication 1 CAPSULE: at 21:10

## 2018-09-18 RX ADMIN — PANTOPRAZOLE SODIUM 40 MG: 40 TABLET, DELAYED RELEASE ORAL at 06:39

## 2018-09-18 RX ADMIN — DIGOXIN 125 MCG: 125 TABLET ORAL at 15:22

## 2018-09-18 RX ADMIN — ATORVASTATIN CALCIUM 10 MG: 10 TABLET, FILM COATED ORAL at 21:10

## 2018-09-18 RX ADMIN — INSULIN ASPART 4 UNITS: 100 INJECTION, SOLUTION INTRAVENOUS; SUBCUTANEOUS at 21:11

## 2018-09-18 RX ADMIN — HYDRALAZINE HYDROCHLORIDE 50 MG: 25 TABLET ORAL at 21:10

## 2018-09-18 RX ADMIN — INSULIN ASPART 3 UNITS: 100 INJECTION, SOLUTION INTRAVENOUS; SUBCUTANEOUS at 06:39

## 2018-09-18 RX ADMIN — CEFEPIME HYDROCHLORIDE 1 G: 1 INJECTION, SOLUTION INTRAVENOUS at 17:24

## 2018-09-18 RX ADMIN — INSULIN ASPART 5 UNITS: 100 INJECTION, SOLUTION INTRAVENOUS; SUBCUTANEOUS at 17:22

## 2018-09-18 RX ADMIN — LEVOTHYROXINE SODIUM 100 MCG: 100 TABLET ORAL at 06:39

## 2018-09-18 RX ADMIN — DOCUSATE SODIUM 100 MG: 100 CAPSULE, LIQUID FILLED ORAL at 06:39

## 2018-09-18 RX ADMIN — HEPARIN SODIUM 5000 UNITS: 5000 INJECTION, SOLUTION INTRAVENOUS; SUBCUTANEOUS at 21:10

## 2018-09-18 RX ADMIN — HYDRALAZINE HYDROCHLORIDE 50 MG: 25 TABLET ORAL at 15:22

## 2018-09-18 NOTE — PAYOR COMM NOTE
"TO:[employer] CARE SOURCE  FROM:TROY BURTON, RN PHONE [phone]  INPT NOTIFICATION AND CLINICALS    Talha Storm (58 y.o. 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)   Code Status:  CPR    Ht:  172.7 cm (68\")   Wt:  57.6 kg (126 lb 15.8 oz)    Admission Cmt:  None   Principal Problem:  Pneumonia of right lower lobe due to infectious organism (CMS/[facility]) [J18.1]                 Active Insurance as of 9/15/2018     Primary Coverage     Payor Plan Insurance Group Employer/Plan Group    HUMANA MEDICARE REPLACEMENT HUMANA MEDICARE REPL [id number]     Payor Plan Address Payor Plan Phone Number Effective From Effective To    [address] [address] [phone] 1/1/2014     [facility] [id number]       Subscriber Name Subscriber Birth Date Member ID       TALHA STORM 1960 [id number]           Secondary Coverage     Payor Plan Insurance Group Employer/Plan Group    [place]      Payor Plan Address Payor Plan Phone Number Effective From Effective To    [address] [phone] 2/15/2018     [facility] [id number]       Subscriber Name Subscriber Birth Date Member ID       TALHA STORM 1960 [id number]                 Emergency Contacts      (Rel.) Home Phone Work Phone Mobile Phone    Mallika Storm (Spouse) [phone] -- --    ErnestoCisco (Brother) [phone] -- --    "            History & Physical      Carmelo Ray MD at 9/15/2018  4:55 PM              [facility]   HISTORY AND PHYSICAL      Name:  Talha Cutler   Age:  58 y.o.  Sex:  male  :  1960  MRN:  [id number]   Visit Number:  [id number]  Admission Date:  9/15/2018  Date Of Service:  09/15/18  Primary Care Physician:  Idalia Kay MD   Primary cardiothoracic surgeon: Trevor Garcia MD ([facility])    Chief Complaint:     Right-sided pleuritic chest pain and shortness of breath.    History Of Presenting Illness:      This is a 58-year-old male with history of end-stage renal disease on hemodialysis, diabetes mellitus type 2 and anemia was brought to the emergency room by his wife from the dialysis center with above complaints.  The history is obtained from the patient as well as the medical chart.  Patient states that he has been doing fairly well and has not been hospitalized in the last 4 months but developed generalized weakness associated with right-sided pleuritic chest pain, shortness of breath and nausea in the last couple of days.  These symptoms have significantly worsened over the last 24 hours.  He did go to his dialysis today and after his dialysis was completed, he came to the emergency room.    Patient was evaluated in the emergency room.  He was afebrile and hemodynamically stable.  Blood work did not show any significant abnormalities other than his renal failure and chronic anemia.  His lactic acid was 0.6.  A CT of the chest however showed right-sided upper and lower lobe pneumonia as.  Patient was given broad-spectrum IV antibiotic therapy due to healthcare acquired pneumonia with cefepime, Levaquin and vancomycin.  He was subsequently admitted to the medical floor with telemetry.  He is currently lying down in the bed and is comfortable at rest.  Patient states that he has chronic draining ulcer from his scrotum and is supposed to see a urologist at [facility].  He denies any fevers, anginal chest pain.  Is independent in daily activities and lives with his wife.    Review Of Systems:     General ROS: Patient denies any fevers, chills or loss of consciousness. Complains of generalized weakness.  Psychological ROS: No history of any hallucinations and delusions.  Ophthalmic ROS: No history of any diplopia or transient loss of vision.  ENT ROS: No history of sore throat, nasal congestion or ear pain.   Allergy and Immunology ROS: No history of rash or itching.  Hematological and Lymphatic ROS: No history of neck swelling or easy bleeding.  Endocrine ROS: No history of any recent unintentional weight gain or loss.  Respiratory ROS: Complains of right-sided pleuritic chest pain and shortness of breath.  Cardiovascular ROS: No history of chest pain or palpitations.  Gastrointestinal ROS: No history of nausea and vomiting. Denies any abdominal pain. No diarrhea.  Genito-Urinary ROS: No history of dysuria or hematuria.  Musculoskeletal ROS: No muscle pain. No calf pain. Complains of chronic back pain.  Neurological ROS: No history of any focal weakness. No loss of consciousness. Denies any numbness.  Dermatological ROS: No history of any redness or pruritis.     Past Medical History:    Past Medical History:   Diagnosis Date   • Anemia    • CHF (congestive heart failure) (CMS/HCC)    • Chronic kidney disease    • Diabetes mellitus (CMS/HCC)    • H/O chest x-ray 03/25/2016    Interval decrease in size of the patients right pleural effusion with a persistent small left effusion as well   • History of transfusion    • Hypertension    • Left-sided weakness    • Renal failure    • Stroke (CMS/HCC)      Past Surgical history:    Past Surgical History:   Procedure Laterality Date   • ARTERIOVENOUS FISTULA Right    • CARDIAC CATHETERIZATION N/A 3/28/2018    Procedure: Peripheral angiography;  Surgeon: Clay Ruano MD;  Location: [facility] CATH INVASIVE  LOCATION;  Service: Cardiovascular   • CARDIAC CATHETERIZATION N/A 3/28/2018    Procedure: Angioplasty-peripheral;  Surgeon: Clay Ruano MD;  Location: [facility] CATH INVASIVE LOCATION;  Service: Cardiovascular   • CARDIAC CATHETERIZATION N/A 3/28/2018    Procedure: Atherectomy-peripheral;  Surgeon: Clay Ruano MD;  Location: [facility] CATH INVASIVE LOCATION;  Service: Cardiovascular   • CATARACT EXTRACTION, BILATERAL     • CHOLECYSTECTOMY     • COLONOSCOPY     • EYE SURGERY     • INTERVENTIONAL RADIOLOGY PROCEDURE N/A 3/28/2018    Procedure: Abdominal Aortogram;  Surgeon: Clay Ruano MD;  Location: [facility] CATH INVASIVE LOCATION;  Service: Cardiovascular   • PORTACATH PLACEMENT     • VENOUS ACCESS DEVICE (PORT) REMOVAL       Social History:    Social History     Social History   • Marital status:      Spouse name: N/A   • Number of children: N/A   • Years of education: N/A     Occupational History   • Not on file.     Social History Main Topics   • Smoking status: Never Smoker   • Smokeless tobacco: Never Used   • Alcohol use No   • Drug use: No   • Sexual activity: Defer     Other Topics Concern   • Not on file     Social History Narrative   • No narrative on file     Family History:    Family History   Problem Relation Age of Onset   • COPD Mother    • Hypertension Father    • Diabetes Father    • Hypertension Sister    • Diabetes Sister    • Hypertension Brother    • Diabetes Paternal Grandmother      Allergies:      Erythromycin    Home Medications:    Prior to Admission Medications     Prescriptions Last Dose Informant Patient Reported? Taking?    amLODIPine (NORVASC) 10 MG tablet 9/15/2018  Yes Yes    aspirin 81 MG EC tablet 9/15/2018  Yes Yes    Take 81 mg by mouth Daily.    Cholecalciferol (VITAMIN D3) 5000 UNITS capsule capsule 9/15/2018  Yes Yes    Take  by mouth daily.    clopidogrel (PLAVIX) 75 MG tablet 9/15/2018  No Yes    Take 1 tablet by mouth Daily.    docusate  sodium (COLACE) 100 MG capsule 9/15/2018  Yes Yes    Take 100 mg by mouth Every 12 (Twelve) Hours.    folic acid (FOLVITE) 1 MG tablet 9/15/2018  Yes Yes    Take 1 mg by mouth daily.    hydrALAZINE (APRESOLINE) 50 MG tablet 9/15/2018  Yes Yes    Take 50 mg by mouth 3 (Three) Times a Day.    isosorbide mononitrate (IMDUR) 30 MG 24 hr tablet 9/15/2018  Yes Yes    Take 30 mg by mouth Daily.    levothyroxine (SYNTHROID, LEVOTHROID) 100 MCG tablet 9/15/2018  Yes Yes    Take 100 mcg by mouth daily.    losartan (COZAAR) 100 MG tablet 9/15/2018  No Yes    Take 1 tablet by mouth Daily.    metoprolol succinate XL (TOPROL-XL) 100 MG 24 hr tablet 9/15/2018  No Yes    Take 1 tablet by mouth Daily.    omeprazole (priLOSEC) 20 MG capsule 9/15/2018  Yes Yes    Take 20 mg by mouth Daily.    pravastatin (PRAVACHOL) 40 MG tablet 9/14/2018  Yes Yes    Take 80 mg by mouth Every Night.    spironolactone (ALDACTONE) 25 MG tablet 9/15/2018  Yes Yes    Take 25 mg by mouth Daily.    acetaminophen (TYLENOL) 325 MG tablet   No No    Take 1 tablet by mouth Every 4 (Four) Hours As Needed for Mild Pain .    albuterol (PROVENTIL HFA;VENTOLIN HFA) 108 (90 Base) MCG/ACT inhaler   No No    Inhale 2 puffs Every 4 (Four) Hours As Needed for Shortness of Air.    aspirin 325 MG tablet   Yes No    Take 81 mg by mouth Daily.    cephalexin (KEFLEX) 500 MG capsule   No No    Take 1 capsule by mouth 4 (Four) Times a Day.    digoxin (LANOXIN) 125 MCG tablet Unknown  Yes No    Take 125 mcg by mouth Daily. Per pt and family, he takes after dialysis    fenofibrate (TRICOR) 54 MG tablet   Yes No    Take 54 mg by mouth Daily.    FOSRENOL 1000 MG chewable tablet   Yes No    3 (Three) Times a Day With Meals. Per pt, he takes with each meal and with snacks    HYDROcodone-acetaminophen (NORCO) 7.5-325 MG per tablet   Yes No    Take 1 tablet by mouth Every 6 (Six) Hours As Needed for Moderate Pain .    insulin detemir (LEVEMIR) 100 UNIT/ML injection   No No    Inject 8  Units under the skin Daily.    ondansetron ODT (ZOFRAN-ODT) 4 MG disintegrating tablet Unknown  No No    Take 1 tablet by mouth Every 6 (Six) Hours As Needed for Nausea or Vomiting.    oxyCODONE-acetaminophen (PERCOCET) 5-325 MG per tablet   No No    Take 1 tablet by mouth Every 6 (Six) Hours As Needed for Severe Pain .           ED Medications:    Medications   sodium chloride 0.9 % flush 10 mL (not administered)   meclizine (ANTIVERT) tablet 25 mg (25 mg Oral Not Given 9/15/18 1339)   ondansetron (ZOFRAN) injection 4 mg (4 mg Intravenous Given 9/15/18 1151)   sodium chloride 0.9 % bolus 250 mL (0 mL Intravenous Stopped 9/15/18 1358)   meclizine (ANTIVERT) tablet 25 mg (25 mg Oral Given 9/15/18 1151)   cefepime (MAXIPIME) IVPB 2 g/50ml D5W (premix) (2 g Intravenous New Bag 9/15/18 1344)   levoFLOXacin (LEVAQUIN) 750 mg/150 mL D5W (premix) (LEVAQUIN) 750 mg (750 mg Intravenous New Bag 9/15/18 1454)   vancomycin 1250 mg in sodium chloride 0.9% 250 mL IVPB (1,250 mg Intravenous New Bag 9/15/18 1454)     Vital Signs:    Temp:  [97.6 °F (36.4 °C)-97.9 °F (36.6 °C)] 97.9 °F (36.6 °C)  Heart Rate:  [74-77] 75  Resp:  [16-18] 18  BP: (148-162)/(52-70) 148/64    1    09/15/18  1125 09/15/18  1450   Weight: 55.9 kg (123 lb 3.2 oz) 55.9 kg (123 lb 3.2 oz)     Body mass index is 18.73 kg/m².    Physical Exam:    General Appearance:  Alert and cooperative, not in any acute distress.   Head:  Atraumatic and normocephalic, without obvious abnormality.   Eyes:          PERRLA, conjunctivae and sclerae normal, no Icterus. No pallor. Extraocular movements are within normal limits.   Ears:  Ears appear intact with no abnormalities noted.   Throat: No oral lesions, no thrush, oral mucosa moist.   Neck: Supple, trachea midline, no thyromegaly, no carotid bruit.   Back:   No kyphoscoliosis present. No tenderness to palpation,   range of motion normal.   Lungs:   Chest shape is normal. Breath sounds heard bilaterally equally.  No  wheezing.  Bilateral basal crackles heard more prominent on the right base. No Pleural rub or bronchial breathing.   Heart:  Normal S1 and S2, no murmur, no gallop, no rub. No JVD.   Abdomen:   Normal bowel sounds, no masses, no organomegaly. Soft, non-tender, non-distended, no guarding, no rebound tenderness.  Induration and redness noted on the right side of the scrotum with clear drainage.   Extremities: Moves all extremities well, no edema, no cyanosis, no clubbing.  AV fistula with the bruit noted on the right arm.   Pulses: Pulses palpable and equal bilaterally.   Skin: No bleeding, bruising or rash.   Neurologic: Alert and oriented x 3. Moves all four limbs equally. No tremors. No facial asymmetry.     Laboratory data:    I have reviewed the labs done in the emergency room.      Results from last 7 days  Lab Units 09/15/18  1145   SODIUM mmol/L 133*   POTASSIUM mmol/L 3.8   CHLORIDE mmol/L 89*   CO2 mmol/L 35.0*   BUN mg/dL 10   CREATININE mg/dL 1.80*   CALCIUM mg/dL 9.1   BILIRUBIN mg/dL 0.8   ALK PHOS U/L 223*   ALT (SGPT) U/L 19   AST (SGOT) U/L 37   GLUCOSE mg/dL 210*       Results from last 7 days  Lab Units 09/15/18  1145   WBC 10*3/mm3 6.26   HEMOGLOBIN g/dL 10.2*   HEMATOCRIT % 30.4*   PLATELETS 10*3/mm3 225           Results from last 7 days  Lab Units 09/15/18  1145   TROPONIN I ng/mL 0.046*     EKG:      EKG done in the emergency room was reviewed by me.  It shows sinus rhythm at 78 beats per minute.  Normal axis.  Nonspecific ST-T changes noted in the inferior lateral leads.    Radiology:    Imaging Results (last 72 hours)     Procedure Component Value Units Date/Time    XR Chest 1 View [[id number]] Collected:  09/15/18 1236     Updated:  09/15/18 1240    Narrative:       PROCEDURE: XR CHEST 1 VW-     HISTORY: Weak/Dizzy/AMS triage protocol     COMPARISON: None.     FINDINGS: Electrodes overlie the chest. Atherosclerosis is noted. The  heart is normal in size. The mediastinum is unremarkable. Vague  right  upper lobe opacity. Right basilar consolidate with effusion. There is no  pneumothorax.  There are no acute osseous abnormalities.           Impression:       Vague right upper lobe opacity. Right basilar consolidate  with effusion. Findings likely secondary to pneumonia.     Continued followup is recommended.     This report was finalized on 9/15/2018 12:38 PM by Devin Villagomez DO.    CT Chest Without Contrast [[id number]] Collected:  09/15/18 1232     Updated:  09/15/18 1238    Narrative:       PROCEDURE: CT CHEST WO CONTRAST-     HISTORY: right chest pain     COMPARISON: July 27, 2018.     PROCEDURE:  Multiple axial CT images were obtained from the thoracic  inlet through the upper abdomen without the use of contrast.      FINDINGS: Lack of intravenous contrast limits evaluation of the solid  organs, the mediastinum, and the vasculature.        Soft tissue windows reveal no axillary, hilar or mediastinal adenopathy.  Atherosclerosis is noted. There is evidence for calcified granulomatous  disease. Heart size is normal. The aorta is normal in caliber. Right  upper lobe infiltrate, right lower lobe infiltrate, and probable  atelectasis at the left base and small bilateral pleural effusions.    .  The visualized upper abdomen is unremarkable. Bone windows reveal no  acute osseous abnormalities.       Impression:       Findings consistent with multifocal pneumonia.     255.06 mGy.cm          This study was performed with techniques to keep radiation doses as low  as reasonably achievable (ALARA). Individualized dose reduction  techniques using automated exposure control or adjustment of mA and/or  kV according to the patient size were employed.    Contrast     This report was finalized on 9/15/2018 12:36 PM by Devin Villagomez DO.    CT Head Without Contrast [[id number]] Collected:  09/15/18 1230     Updated:  09/15/18 1233    Narrative:       PROCEDURE: CT HEAD WO CONTRAST-     HISTORY: dizziness     COMPARISON:   None .     TECHNIQUE: Multiple axial CT images were performed from the foramen  magnum to the vertex without enhancement.      FINDINGS: There is no CT evidence of hemorrhage. There is no mass, mass  effect or midline shift.  Cerebral atrophy with without evidence of  hydrocephalus. The paranasal sinuses are clear. Bone windows reveal no  acute osseous abnormalities. Atherosclerosis is noted.       Impression:       No acute intracranial process.     803.69 mGy.cm       This study was performed with techniques to keep radiation doses as low  as reasonably achievable (ALARA). Individualized dose reduction  techniques using automated exposure control or adjustment of mA and/or  kV according to the patient size were employed.      This report was finalized on 9/15/2018 12:31 PM by Devin Villagomez DO.        Active Problems:    Pneumonia due to infectious organism    Assessment:    1.  Right upper lobe and lower lobe healthcare pneumonia, present on admission.  2.  Right-sided pleuritic chest pain secondary to #1, present on admission.  3. End-stage renal disease on hemodialysis, on Tuesday, Thursday and Saturday.  4. 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  5. Chronic right-sided loculated pleural effusion status post pleurodesis.  6. Coronary artery disease on medical management.  7. Chronic scrotal ulcer, followed up at [facility] urology.  8. Chronic debility.  9. Essential hypertension.    Plan:    Mr. Cutler is currently being admitted to the medical floor.  He is hemodynamically stable.  We will consult pharmacy to dose his antibiotic therapy with cefepime, Levaquin and vancomycin.  Hopefully, we will deescalate his antibiotic therapy in the next couple of days.  We will get sputum culture.  His home medications will be continued.  He will be placed on Lortab for pain control.  He will be placed on heparin for DVT prophylaxis.  He will be placed on subcutaneous insulin protocol for  coverage.  His Levemir will be continued.  Further recommendations depend upon his clinical course.    Carmelo Ray MD  09/15/18  4:55 PM    Dictated utilizing Dragon dictation.    Electronically signed by Carmelo Ray MD at 9/15/2018  5:23 PM          Emergency Department Notes      Vane Martinez MD at 9/15/2018 11:43 AM          TRIAGE CHIEF COMPLAINT:     Nursing and triage notes reviewed    Chief Complaint   Patient presents with   • Dizziness   • Shortness of Breath      HPI: Talha Cutler is a 58 y.o. male who presents to the emergency department complaining of dizziness, shortness of breath, nausea, pain in the right side of his chest.  Symptoms have been present for approximately the past hour.  Patient had dialysis this morning and symptoms began afterwards.  Patient states he's had these symptoms many times in the past and has been in the emergency department for similar symptoms previously.  Patient describes a sharp and aching pain in the right chest that is worse with breathing.  He states any movement also causes pain or pushing on the right side of his ribs.  He denies falling or any trauma to the area.  Patient states he also became dizzy when turning his head.  Patient states if he turns his head in any direction he becomes very lightheaded and dizzy and feels nauseated.  The symptoms also began around the same time.  Patient states he's had these symptoms before as well.  He denies any changes in his vision.  He denies any numbness, tingling, or weakness in his extremities.  Denies fevers or chills.  No abdominal pain, vomiting, diarrhea.     REVIEW OF SYSTEMS: All other systems reviewed and are negative     PAST MEDICAL HISTORY:   Past Medical History:   Diagnosis Date   • Anemia    • CHF (congestive heart failure) (CMS/HCC)    • Chronic kidney disease    • Diabetes mellitus (CMS/HCC)    • H/O chest x-ray 03/25/2016    Interval decrease in size of the patients right pleural effusion  with a persistent small left effusion as well   • History of transfusion    • Hypertension    • Left-sided weakness    • Renal failure    • Stroke (CMS/HCC)         FAMILY HISTORY:   Family History   Problem Relation Age of Onset   • COPD Mother    • Hypertension Father    • Diabetes Father    • Hypertension Sister    • Diabetes Sister    • Hypertension Brother    • Diabetes Paternal Grandmother         SOCIAL HISTORY:   Social History     Social History   • Marital status:      Spouse name: N/A   • Number of children: N/A   • Years of education: N/A     Occupational History   • Not on file.     Social History Main Topics   • Smoking status: Never Smoker   • Smokeless tobacco: Never Used   • Alcohol use No   • Drug use: No   • Sexual activity: Defer     Other Topics Concern   • Not on file     Social History Narrative   • No narrative on file        SURGICAL HISTORY:   Past Surgical History:   Procedure Laterality Date   • ARTERIOVENOUS FISTULA Right    • CARDIAC CATHETERIZATION N/A 3/28/2018    Procedure: Peripheral angiography;  Surgeon: Clay Ruano MD;  Location: [facility] CATH INVASIVE LOCATION;  Service: Cardiovascular   • CARDIAC CATHETERIZATION N/A 3/28/2018    Procedure: Angioplasty-peripheral;  Surgeon: Clay Ruano MD;  Location: [facility] CATH INVASIVE LOCATION;  Service: Cardiovascular   • CARDIAC CATHETERIZATION N/A 3/28/2018    Procedure: Atherectomy-peripheral;  Surgeon: Clay Ruano MD;  Location: [facility] CATH INVASIVE LOCATION;  Service: Cardiovascular   • CATARACT EXTRACTION, BILATERAL     • CHOLECYSTECTOMY     • COLONOSCOPY     • EYE SURGERY     • INTERVENTIONAL RADIOLOGY PROCEDURE N/A 3/28/2018    Procedure: Abdominal Aortogram;  Surgeon: Clay Ruano MD;  Location: [facility] CATH INVASIVE LOCATION;  Service: Cardiovascular   • PORTACATH PLACEMENT     • VENOUS ACCESS DEVICE (PORT) REMOVAL          CURRENT MEDICATIONS:      Medication List      ASK your  doctor about these medications    acetaminophen 325 MG tablet  Commonly known as:  TYLENOL  Take 1 tablet by mouth Every 4 (Four) Hours As Needed for Mild Pain .     albuterol 108 (90 Base) MCG/ACT inhaler  Commonly known as:  PROVENTIL HFA;VENTOLIN HFA  Inhale 2 puffs Every 4 (Four) Hours As Needed for Shortness of Air.     amLODIPine 10 MG tablet  Commonly known as:  NORVASC     * aspirin 81 MG EC tablet     * aspirin 325 MG tablet     cephalexin 500 MG capsule  Commonly known as:  KEFLEX  Take 1 capsule by mouth 4 (Four) Times a Day.     clopidogrel 75 MG tablet  Commonly known as:  PLAVIX  Take 1 tablet by mouth Daily.     digoxin 125 MCG tablet  Commonly known as:  LANOXIN     docusate sodium 100 MG capsule  Commonly known as:  COLACE     fenofibrate 54 MG tablet  Commonly known as:  TRICOR     folic acid 1 MG tablet  Commonly known as:  FOLVITE     FOSRENOL 1000 MG chewable tablet  Generic drug:  lanthanum     hydrALAZINE 50 MG tablet  Commonly known as:  APRESOLINE     HYDROcodone-acetaminophen 7.5-325 MG per tablet  Commonly known as:  NORCO     insulin detemir 100 UNIT/ML injection  Commonly known as:  LEVEMIR  Inject 8 Units under the skin Daily.     isosorbide mononitrate 30 MG 24 hr tablet  Commonly known as:  IMDUR     levothyroxine 100 MCG tablet  Commonly known as:  SYNTHROID, LEVOTHROID     losartan 100 MG tablet  Commonly known as:  COZAAR  Take 1 tablet by mouth Daily.     metoprolol succinate  MG 24 hr tablet  Commonly known as:  TOPROL-XL  Take 1 tablet by mouth Daily.     omeprazole 20 MG capsule  Commonly known as:  priLOSEC     ondansetron ODT 4 MG disintegrating tablet  Commonly known as:  ZOFRAN-ODT  Take 1 tablet by mouth Every 6 (Six) Hours As Needed for Nausea or   Vomiting.     oxyCODONE-acetaminophen 5-325 MG per tablet  Commonly known as:  PERCOCET  Take 1 tablet by mouth Every 6 (Six) Hours As Needed for Severe Pain .     pravastatin 40 MG tablet  Commonly known as:  PRAVACHOL      spironolactone 25 MG tablet  Commonly known as:  ALDACTONE     vitamin D3 5000 units capsule capsule        * This list has 2 medication(s) that are the same as other medications   prescribed for you. Read the directions carefully, and ask your doctor or   other care provider to review them with you.               ALLERGIES: Erythromycin     PHYSICAL EXAM:   VITAL SIGNS:   Vitals:    09/15/18 1125   BP: 148/52   Pulse: 77   Resp: 16   Temp: 97.6 °F (36.4 °C)   SpO2: 95%      CONSTITUTIONAL: Awake, oriented, appears non-toxic   HENT: Atraumatic, normocephalic, oral mucosa pink and moist, airway patent. Nares patent without drainage. External ears normal.   EYES: Conjunctiva clear, EOMI, PERRL   NECK: Trachea midline, non-tender, supple   CARDIOVASCULAR: Normal heart rate, Normal rhythm, No murmurs, rubs, gallops   PULMONARY/CHEST: There are crackles in the lower lobes of the lungs bilaterally.  There is overall good air movement.  No respiratory distress is noted.  There is tenderness to palpation on the right lateral chest, no obvious deformity or abnormality of the ribs is appreciated at this time.  ABDOMINAL: Non-distended, soft, non-tender - no rebound or guarding.  NEUROLOGIC: Exam is nonfocal.  Patient has symmetrical smile.  There is normal facial muscle function.  Sensation to the face is intact in all distributions.  Pupils are equal round and reactive to light with extraocular movements intact.  There is symmetrical strength at the shoulders, elbows, wrists, hands, hips, knees, ankles bilaterally.  There are no apparent sensory deficits.  Finger to nose testing is appropriate.  Can cause patient to become dizzy by having him turn his head from side to side or by having him change position.  EXTREMITIES: No clubbing, cyanosis, or edema   SKIN: Warm, Dry, No erythema, No rash     ED COURSE / MEDICAL DECISION MAKING:   Talha Cutler is a 58 y.o. male who presents to the emergency department for evaluation  of dizziness, chest discomfort, shortness of breath.  Patient is nondistressed on arrival in the emergency department.  Patient appears mildly uncomfortable but is nontoxic appearing.  Exam reveals some reproducible dizziness but otherwise is nonfocal.  An EKG obtained on arrival was interpreted by me to reveal sinus rhythm with rate of 78 bpm.  There are nonspecific ST segment changes diffusely that are similar in appearance to patient's previous EKGs.  No signs of arrhythmia.  Abnormal appearing EKG.  Patient's labs revealed a slightly elevated troponin is 0.04.  Other laboratory tests were around patient's baseline.  Imaging studies revealed a multifocal pneumonia in the right lung.  I suspect this is likely the cause of patient's symptoms.  I spoke to the nephrologist on-call who is also the hospitalist for today who is very familiar with the patient.  He recommended admitting to the hospital and treated for healthcare associated pneumonia.    DECISION TO DISCHARGE/ADMIT: see ED care timeline     FINAL IMPRESSION:   1 -- pneumonia   2 --   3 --     Electronically signed by: Vane Martinez MD, 9/15/2018 11:43 AM       Vane Martinez MD  09/15/18 1323      Electronically signed by Vane Martinez MD at 9/15/2018  1:23 PM             ICU Vital Signs     Row Name 09/18/18 0800 09/18/18 0741 09/18/18 0427 09/18/18 0400 09/18/18 0320       Height and Weight    Weight  --  -- 57.6 kg (126 lb 15.8 oz)  --  --       Vitals    Temp  -- 98.1 °F (36.7 °C)  --  -- 98.2 °F (36.8 °C)    Temp src  -- Oral  --  -- Oral    Pulse  -- 73  --  -- 73    Heart Rate Source  -- Monitor  --  -- Monitor    Resp  -- 16  --  -- 16    Resp Rate Source  -- Visual  --  -- Visual    BP  -- 136/52  --  -- 136/47    Noninvasive MAP (mmHg)  --  --  --  -- 72    BP Location  -- Left arm  --  -- Left arm    BP Method  -- Automatic  --  -- Automatic    Patient Position  -- Lying  --  -- Lying       Oxygen Therapy    SpO2  -- 97 %   --  -- 100 %    Device (Oxygen Therapy) nasal cannula nasal cannula  -- nasal cannula  --    Flow (L/min) 2  --  -- 2  --    Row Name 09/18/18 0000 09/17/18 2345 09/17/18 2000 09/17/18 1928 09/17/18 1645       Vitals    Temp  -- 98.4 °F (36.9 °C)  -- 98 °F (36.7 °C) 98.2 °F (36.8 °C)    Temp src  -- Oral  -- Oral Oral    Pulse  -- 77  -- 80 72    Heart Rate Source  -- Monitor  -- Monitor Monitor    Resp  -- 16  -- 18 18    Resp Rate Source  -- Visual  -- Visual Visual    BP  -- 150/63  -- 140/59 128/67    Noninvasive MAP (mmHg)  -- 94  -- 92 69    BP Location  -- Left arm  -- Right arm Left arm    BP Method  -- Automatic  -- Automatic Automatic    Patient Position  -- Lying  -- Lying Lying       Oxygen Therapy    SpO2  -- 93 %  -- 90 % 93 %    Device (Oxygen Therapy) nasal cannula  -- nasal cannula  -- nasal cannula    Flow (L/min) 2  -- 2  --  --    Row Name 09/17/18 1642 09/17/18 1621 09/17/18 1615             Vitals    Pulse 73 67 66      Resp  -- 18 18         Oxygen Therapy    SpO2 93 %  -- 96 %      Pulse Oximetry Type  --  -- Continuous      Device (Oxygen Therapy)  --  -- nasal cannula      Flow (L/min)  --  -- 2          Hospital Medications (active)       Dose Frequency Start End    acetaminophen (TYLENOL) tablet 650 mg 650 mg Every 4 Hours PRN 9/15/2018     Sig - Route: Take 2 tablets by mouth Every 4 (Four) Hours As Needed for Mild Pain . - Oral    amLODIPine (NORVASC) tablet 10 mg 10 mg Every 24 Hours Scheduled 9/16/2018     Sig - Route: Take 2 tablets by mouth Daily. - Oral    aspirin EC tablet 81 mg 81 mg Daily 9/16/2018     Sig - Route: Take 1 tablet by mouth Daily. - Oral    atorvastatin (LIPITOR) tablet 10 mg 10 mg Nightly 9/15/2018     Sig - Route: Take 1 tablet by mouth Every Night. - Oral    cefepime (MAXIPIME) IVPB 1 g/50ml D5W (premix) 1 g Every 24 Hours 9/16/2018 9/21/2018    Sig - Route: Infuse 50 mL into a venous catheter Daily. - Intravenous    clopidogrel (PLAVIX) tablet 75 mg 75 mg Daily  9/16/2018     Sig - Route: Take 1 tablet by mouth Daily. - Oral    dextrose (D50W) 25 g/ 50mL Intravenous Solution 25 g 25 g Every 15 Minutes PRN 9/15/2018     Sig - Route: Infuse 50 mL into a venous catheter Every 15 (Fifteen) Minutes As Needed for Low Blood Sugar (Blood Sugar Less Than 70, Patient Has IV Access - Unresponsive, NPO or Unable To Safely Swallow). - Intravenous    dextrose (GLUTOSE) oral gel 1 tube 1 tube Every 15 Minutes PRN 9/15/2018     Sig - Route: Take 1 tube by mouth Every 15 (Fifteen) Minutes As Needed for Low Blood Sugar (BS<70, Patient Alert, Is not NPO, Can safely swallow.). - Oral    digoxin (LANOXIN) tablet 125 mcg 125 mcg User Specified 9/18/2018     Sig - Route: Take 1 tablet by mouth Once per day on Tue Thu Sat. - Oral    Cosign for Ordering: Required by Leo Pacheco MD    docusate sodium (COLACE) capsule 100 mg 100 mg Every 12 Hours 9/15/2018     Sig - Route: Take 1 capsule by mouth Every 12 (Twelve) Hours. - Oral    folic acid (FOLVITE) tablet 1 mg 1 mg Daily 9/16/2018     Sig - Route: Take 1 tablet by mouth Daily. - Oral    glucagon (human recombinant) (GLUCAGEN DIAGNOSTIC) injection 1 mg 1 mg As Needed 9/15/2018     Sig - Route: Inject 1 mg under the skin into the appropriate area as directed As Needed (Blood Glucose Less Than 70 - Patient Without IV Access - Unresponsive, NPO or Unable To Safely Swallow). - Subcutaneous    heparin (porcine) 5000 UNIT/ML injection 5,000 Units 5,000 Units Every 12 Hours Scheduled 9/15/2018     Sig - Route: Inject 1 mL under the skin into the appropriate area as directed Every 12 (Twelve) Hours. - Subcutaneous    hydrALAZINE (APRESOLINE) tablet 50 mg 50 mg 3 Times Daily 9/15/2018     Sig - Route: Take 2 tablets by mouth 3 (Three) Times a Day. - Oral    HYDROcodone-acetaminophen (NORCO) 7.5-325 MG per tablet 1 tablet 1 tablet Every 6 Hours PRN 9/15/2018     Sig - Route: Take 1 tablet by mouth Every 6 (Six) Hours As Needed for Moderate Pain . - Oral     ibuprofen (ADVIL,MOTRIN) tablet 600 mg 600 mg 2 Times Daily 9/18/2018 9/21/2018    Sig - Route: Take 1 tablet by mouth 2 (Two) Times a Day. - Oral    insulin aspart (novoLOG) injection 0-7 Units 0-7 Units 4 Times Daily Before Meals & Nightly 9/15/2018     Sig - Route: Inject 0-7 Units under the skin into the appropriate area as directed 4 (Four) Times a Day Before Meals & at Bedtime. - Subcutaneous    insulin detemir (LEVEMIR) injection 8 Units 8 Units Daily 9/15/2018     Sig - Route: Inject 8 Units under the skin into the appropriate area as directed Daily. - Subcutaneous    ipratropium-albuterol (DUO-NEB) nebulizer solution 3 mL 3 mL Every 4 Hours PRN 9/15/2018     Sig - Route: Take 3 mL by nebulization Every 4 (Four) Hours As Needed for Shortness of Air. - Nebulization    isosorbide mononitrate (IMDUR) 24 hr tablet 30 mg 30 mg Daily 9/16/2018     Sig - Route: Take 1 tablet by mouth Daily. - Oral    lactobacillus acidophilus (RISAQUAD) capsule 1 capsule 1 capsule 2 Times Daily 9/15/2018     Sig - Route: Take 1 capsule by mouth 2 (Two) Times a Day. - Oral    lanthanum (FOSRENOL) chewable tablet 1,000 mg 1,000 mg 3 Times Daily With Meals 9/15/2018     Sig - Route: Chew 2 tablets 3 (Three) Times a Day With Meals. - Oral    levoFLOXacin (LEVAQUIN) 500 mg/100 mL D5W (premix) (LEVAQUIN) 500 mg 500 mg Every 48 Hours 9/17/2018 9/17/2018    Sig - Route: Infuse 100 mL into a venous catheter Every Other Day. - Intravenous    levothyroxine (SYNTHROID, LEVOTHROID) tablet 100 mcg 100 mcg Every Early Morning 9/16/2018     Sig - Route: Take 1 tablet by mouth Every Morning. - Oral    losartan (COZAAR) tablet 100 mg 100 mg Every 24 Hours Scheduled 9/16/2018     Sig - Route: Take 2 tablets by mouth Daily. - Oral    metoprolol succinate XL (TOPROL-XL) 24 hr tablet 100 mg 100 mg Daily 9/16/2018     Sig - Route: Take 1 tablet by mouth Daily. - Oral    mupirocin (BACTROBAN) 2 % ointment  Every 12 Hours Scheduled 9/18/2018 9/23/2018     Sig - Route: into the nostril(s) as directed by provider Every 12 (Twelve) Hours. - Nasal    ondansetron (ZOFRAN) injection 4 mg 4 mg Every 6 Hours PRN 9/15/2018     Sig - Route: Infuse 2 mL into a venous catheter Every 6 (Six) Hours As Needed for Nausea or Vomiting. - Intravenous    pantoprazole (PROTONIX) EC tablet 40 mg 40 mg Every Morning Before Breakfast 9/16/2018     Sig - Route: Take 1 tablet by mouth Every Morning Before Breakfast. - Oral    Pharmacy Consult  Continuous PRN 9/15/2018 9/20/2018    Sig - Route: Continuous As Needed for Consult. - Does not apply    sodium chloride 0.9 % flush 1-10 mL 1-10 mL As Needed 9/15/2018     Sig - Route: Infuse 1-10 mL into a venous catheter As Needed for Line Care. - Intravenous    sodium chloride 0.9 % flush 10 mL 10 mL As Needed 9/15/2018     Sig - Route: Infuse 10 mL into a venous catheter As Needed for Line Care. - Intravenous    Cosign for Ordering: Accepted by Vane Martinez MD on 9/15/2018 11:55 AM    spironolactone (ALDACTONE) tablet 25 mg 25 mg Daily 9/16/2018     Sig - Route: Take 1 tablet by mouth Daily. - Oral    vancomycin in dextrose 5% 150 mL (VANCOCIN) IVPB 750 mg 750 mg As Needed 9/15/2018 9/20/2018    Sig - Route: Infuse 150 mL into a venous catheter As Needed (Pharmacy will schedule post-dialysis doses if level <20). - Intravenous    vancomycin in dextrose 5% 150 mL (VANCOCIN) IVPB 750 mg 750 mg Once 9/18/2018     Sig - Route: Infuse 150 mL into a venous catheter 1 (One) Time. - Intravenous    vitamin D3 capsule 5,000 Units 5,000 Units Daily 9/16/2018     Sig - Route: Take 1 capsule by mouth Daily. - Oral    digoxin (LANOXIN) tablet 125 mcg (Discontinued) 125 mcg Every 48 Hours 9/16/2018 9/17/2018    Sig - Route: Take 1 tablet by mouth Every Other Day. - Oral            Lab Results (last 24 hours)     Procedure Component Value Units Date/Time    POC Glucose Once [[id number]]  (Normal) Collected:  09/18/18 1135    Specimen:  Blood Updated:   09/18/18 1157     Glucose 83 mg/dL      Comment: Serial Number: [id number]:  [id number]       MRSA Screen Culture - Swab, Nares [[id number]]  (Abnormal) Collected:  09/15/18 1456    Specimen:  Swab from Nares Updated:  09/18/18 1107     MRSA SCREEN CX Staphylococcus aureus, MRSA (A)     Comment:   Methicillin resistant Staphylococcus aureus, Patient may be an isolation risk.       Renal Function Panel [[id number]]  (Abnormal) Collected:  09/18/18 0930    Specimen:  Blood Updated:  09/18/18 1024     Glucose 99 (H) mg/dL      BUN 38 (H) mg/dL      Creatinine 5.70 (H) mg/dL      Sodium 132 (L) mmol/L      Potassium 5.5 (H) mmol/L      Chloride 93 (L) mmol/L      CO2 25.0 (L) mmol/L      Calcium 9.1 mg/dL      Albumin 3.50 g/dL      Phosphorus 6.2 (C) mg/dL      Anion Gap 19.5 mmol/L      BUN/Creatinine Ratio 6.7     eGFR Non African Amer 10 (L) mL/min/1.73     Narrative:       GFR Normal >60  Chronic Kidney Disease <60  Kidney Failure <15    CBC & Differential [[id number]] Collected:  09/18/18 0930    Specimen:  Blood Updated:  09/18/18 1001    Narrative:       The following orders were created for panel order CBC & Differential.  Procedure                               Abnormality         Status                     ---------                               -----------         ------                     CBC Auto Differential[[id number]]        Abnormal            Final result                 Please view results for these tests on the individual orders.    CBC Auto Differential [[id number]]  (Abnormal) Collected:  09/18/18 0930    Specimen:  Blood Updated:  09/18/18 1001     WBC 6.90 10*3/mm3      RBC 3.10 (L) 10*6/mm3      Hemoglobin 9.3 (L) g/dL      Hematocrit 28.7 (L) %      MCV 92.6 fL      MCH 30.0 pg      MCHC 32.4 g/dL      RDW 15.0 (H) %      RDW-SD 50.5 fl      MPV 9.9 fL      Platelets 176 10*3/mm3      Neutrophil % 63.4 %      Lymphocyte % 8.0 (L) %      Monocyte % 14.2 (H) %      Eosinophil % 12.3 (H) %       Basophil % 1.2 %      Immature Grans % 0.9 (H) %      Neutrophils, Absolute 4.38 10*3/mm3      Lymphocytes, Absolute 0.55 (L) 10*3/mm3      Monocytes, Absolute 0.98 (H) 10*3/mm3      Eosinophils, Absolute 0.85 (H) 10*3/mm3      Basophils, Absolute 0.08 10*3/mm3      Immature Grans, Absolute 0.06 10*3/mm3      nRBC 0.0 /100 WBC     VRE Culture - Swab, Per Rectum [[id number]]  (Normal) Collected:  09/15/18 1456    Specimen:  Swab from Per Rectum Updated:  09/18/18 0936     VRE SCREEN CX No Vancomycin Resistant Enterococcus Isolated    Wound Culture - Drainage, Scrotum [[id number]]  (Abnormal)  (Susceptibility) Collected:  09/15/18 1826    Specimen:  Drainage from Scrotum Updated:  09/18/18 0936     Wound Culture Light growth (2+) Proteus mirabilis (A)     Comment:          Susceptibility      Proteus mirabilis     GABRIEL     Amikacin <=16 ug/ml Susceptible     Ampicillin <=8 ug/ml Susceptible     Ampicillin + Sulbactam <=8/4 ug/ml Susceptible     Aztreonam <=4 ug/ml Susceptible     Cefazolin <=8 ug/ml Susceptible     Cefepime <=4 ug/ml Susceptible     Cefotaxime <=2 ug/ml Susceptible     Cefoxitin <=8 ug/ml Susceptible     Ceftazidime <=1 ug/ml Susceptible     Ceftriaxone <=8 ug/ml Susceptible     Cefuroxime sodium <=4 ug/ml Susceptible     Ciprofloxacin <=1 ug/ml Susceptible     Doripenem <=0.5 ug/ml Susceptible     Ertapenem <=1 ug/ml Susceptible     Gentamicin <=4 ug/ml Susceptible     Levofloxacin <=2 ug/ml Susceptible     Meropenem <=1 ug/ml Susceptible     Piperacillin + Tazobactam <=16 ug/ml Susceptible     Tetracycline >8 ug/ml Resistant     Tobramycin <=4 ug/ml Susceptible     Trimethoprim + Sulfamethoxazole <=2/38 ug/ml Susceptible                    Blood Culture - Blood, [[id number]]  (Normal) Collected:  09/15/18 1339    Specimen:  Blood from Arm, Left Updated:  09/18/18 0845     Blood Culture No growth at 2 days    Hepatitis B Surface Antigen [[id number]] Collected:  09/16/18 0618    Specimen:  Blood Updated:   09/18/18 0714     Hepatitis B Surface Ag Negative    Narrative:       Performed at:  [facility] [id number]  : Drew Lopez PhD, Phone:  [phone]    Vancomycin, Random [[id number]]  (Normal) Collected:  09/18/18 0614    Specimen:  Blood Updated:  09/18/18 0711     Vancomycin Random 15.32 mcg/mL     POC Glucose Once [[id number]]  (Abnormal) Collected:  09/18/18 0635    Specimen:  Blood Updated:  09/18/18 0656     Glucose 222 (H) mg/dL      Comment: Serial Number: [id number]:  [id number]       POC Glucose Once [[id number]]  (Abnormal) Collected:  09/17/18 2021    Specimen:  Blood Updated:  09/17/18 2030     Glucose 220 (H) mg/dL      Comment: Serial Number: [id number]:  [id number]       POC Glucose Once [[id number]]  (Normal) Collected:  09/17/18 1641    Specimen:  Blood Updated:  09/17/18 1700     Glucose 115 mg/dL      Comment: Serial Number: [id number]:  [id number]       Blood Culture - Blood, [[id number]]  (Normal) Collected:  09/15/18 1339    Specimen:  Blood from Arm, Left Updated:  09/17/18 1345     Blood Culture No growth at 2 days           Physician Progress Notes (last 72 hours) (Notes from 9/15/2018  1:21 PM through 9/18/2018  1:21 PM)      Judit Person APRN at 9/18/2018 11:28 AM            PROGRESS NOTE        Date of Admission: 9/15/2018  Length of Stay: 1  Primary Care Physician: Idalia Kay MD    Subjective   Chief Complaint:  Pneumonia follow up  HPI: This is 58-year-old male who is admitted with right upper lobe pneumonia.  He does appear to be doing better.  He has been seen in hemodialysis today.  Nephrology has been consulted for dialysis management.  He was noted over the weekend to have some drainage from his scrotum for which a culture was obtained which grow Proteus Mirabilis.  According to the patient for the past 2 years he's have problems with drainage.  He has been seen at [facility]  had drainage in the past.  He was told should he have any more problems he may require surgery.  He is asking for urological evaluation in [facility].  I did speak with Dr. Mackey who recommended having neurology evaluate the patient.  I did see and evaluate there is no significant drainage noted at this time.  He does not have any significant swelling he may have some mild erythema.  I will try to get his notes from the [facility].  He did have MRSA of the nares for which she has been started on Bactroban.  He is on cefepime and vancomycin for his pneumonia which will cover given he has MRSA in his nares in case there is an MRSA pneumonia.       Review Of Systems:   Review of Systems   General ROS: Patient denies any fevers, chills or loss of consciousness.    Psychological ROS: Denies any hallucinations and delusions.  Ophthalmic ROS: Denies any diplopia or transient loss of vision.  ENT ROS: Denies sore throat, nasal congestion or ear pain.   Hematological and Lymphatic ROS: Denies neck swelling or easy bleeding.  Endocrine ROS: Denies any recent unintentional weight gain or loss.  Respiratory ROS: Cough and shortness of breath. Right side pleuritic pain improving  Cardiovascular ROS: Denies chest pain or palpitations. No history of exertional chest pain.   Gastrointestinal ROS: Denies nausea and vomiting. Denies any abdominal pain. No diarrhea.  Genito-Urinary ROS: Denies dysuria or hematuria. Scrotal edema and draining.    Musculoskeletal ROS: Denies back pain. No muscle pain. No calf pain.   Neurological ROS: Denies any focal weakness. No loss of consciousness. Denies any numbness. Denies neck pain.   Dermatological ROS: Denies any redness or pruritis.    Objective      Temp:  [98 °F (36.7 °C)-98.4 °F (36.9 °C)] 98.1 °F (36.7 °C)  Heart Rate:  [66-80] 73  Resp:  [16-18] 16  BP: (128-150)/(47-67) 136/52  Physical Exam    General Appearance:  Alert and cooperative, not in any acute distress.    Head:  Atraumatic and normocephalic, without obvious abnormality.   Eyes:          PERRLA, conjunctivae and sclerae normal, no Icterus. No pallor. Extraocular movements are within normal limits.   Ears:  Ears appear intact with no abnormalities noted.   Throat: No oral lesions, no thrush, oral mucosa moist.   Neck: Supple, trachea midline, no thyromegaly, no carotid bruit.       Lungs:   Chest shape is normal. Breath sounds heard bilaterally equally.  No crackles or wheezing. No Pleural rub or bronchial breathing.   Heart:  Normal S1 and S2, no murmur, no gallop, no rub. No JVD   Abdomen:   Normal bowel sounds, no masses, no organomegaly. Soft    non-tender, non-distended, no guarding, no rebound             tenderness   Extremities: Moves all extremities well, no edema, no cyanosis, no clubbing.  AV fistula RUE.     Pulses: Pulses palpable and equal bilaterally   Skin: No bleeding, bruising or rash   Lymph nodes: No palpable adenopathy   Neurologic:    Psychiatric/Behavior:     Cranial nerves 2 - 12 grossly intact, sensation intact, Motor power is normal and equal bilaterally.  Mood normal, behavior normal       Results Review:    I have reviewed the labs, radiology results and diagnostic studies.      Results from last 7 days  Lab Units 09/18/18  0930   WBC 10*3/mm3 6.90   HEMOGLOBIN g/dL 9.3*   PLATELETS 10*3/mm3 176       Results from last 7 days  Lab Units 09/18/18  0930   SODIUM mmol/L 132*   POTASSIUM mmol/L 5.5*   CO2 mmol/L 25.0*   CREATININE mg/dL 5.70*   GLUCOSE mg/dL 99*       Culture Data:   Blood Culture   Date Value Ref Range Status   09/15/2018 No growth at 2 days  Preliminary   09/15/2018 No growth at 2 days  Preliminary     Wound Culture   Date Value Ref Range Status   09/15/2018 Light growth (2+) Proteus mirabilis (A)  Preliminary     Comment:            MRSA SCREEN CX   Date Value Ref Range Status   09/15/2018 Staphylococcus aureus, MRSA (A)  Final     Comment:       Methicillin resistant  Staphylococcus aureus, Patient may be an isolation risk.     Radiology Data:   Cardiology Data:    I have reviewed the medications.    Assessment/Plan     Assessment and Plan:   1.  Right upper lobe and lower lobe healthcare associated pneumonia-present upon admission.  Patient is on IV antibiotics in the form of cefepime, Levaquin and vancomycin.  We will work to de-escalate his antibiotic therapy.  He did grow MRSA in his nares so we will continue to cover for MRSA until we can get a sputum culture.    2.  Right-sided pleuritic chest pain secondary to #1-improving    3.  Chronic scrotal edema with drainage-urology has been consulted.  We'll try to get his records from the [facility].  The drainage was cultured which is growing Proteus which is sensitive to cefepime.    4.  MRSA of the nares-Bactroban    5.  Coronary artery disease-medical management    6.  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  End-stage renal disease on hemodialysis Tuesday, Thursday, and Saturday.     8.  Chronic debility            DVT prophylaxis:  Heparin  Discharge Planning:   ALEXYS Gunderson 18 11:28 AM      Electronically signed by Judit Person APRN at 2018 11:41 AM     Leo Pacheco MD at 2018  8:50 AM                [facility]    PROGRESS NOTE    Name:  Talha Cutler   Age:  58 y.o.  Sex:  male  :  1960  MRN:  [id number]   Visit Number:  [id number]  Admission Date:  9/15/2018  Date Of Service:  18  Primary Care Physician:  Idalia Kay MD     LOS: 1 day :  Patient Care Team:  Idalia Kay MD as PCP - General:    Chief Complaint:      Right-sided pleuritic chest pain     Subjective / Interval History:   58-year-old patient was been admitted because of right-sided pneumonia.  The he has had the chest pain on  the right side which was sharp yesterday though it has improved after the breathing treatment.    Also his cough is a nonproductive.  He had only 1 episode of diuretic for yesterday after that there is no diarrhea  He has not had any fever and the slightly better compared to last night      Review of Systems:     General ROS: Patient denies any fevers, chills or loss of consciousness.  Respiratory ROS: Complains of cough, chest congestion and right-sided pleuritic chest pain.  Cardiovascular ROS: Denies chest pain or palpitations. No history of exertional chest pain.  Gastrointestinal ROS: Denies nausea and vomiting. Denies any abdominal pain. No diarrhea.  Neurological ROS: Denies any focal weakness. No loss of consciousness. Denies any numbness.  Dermatological ROS: Denies any redness or pruritis.    Vital Signs:    Temp:  [97.6 °F (36.4 °C)-98.2 °F (36.8 °C)] 98.2 °F (36.8 °C)  Heart Rate:  [68-75] 68  Resp:  [16-18] 18  BP: (132-159)/(54-77) 145/62    Intake and output:    I/O last 3 completed shifts:  In: 2345 [P.O.:900; I.V.:1395; IV Piggyback:50]  Out: -   No intake/output data recorded.    Physical Examination:    General Appearance:  Alert and cooperative, not in any acute distress.   Head:  Atraumatic and normocephalic, without obvious abnormality.   Eyes:          PERRLA, conjunctivae and sclerae normal, no Icterus. No pallor. Extra-occular movements are within normal limits.   Neck: Supple, trachea midline, no thyromegaly, no carotid bruit.   Lungs:   Chest shape is normal. Breath sounds heard bilaterally equally.  No wheezing.  Bilateral basal crackles heard more prominent on the right base. No Pleural rub or bronchial breathing.   Heart:  Normal S1 and S2, no murmur, no gallop, no rub. No JVD   Abdomen:   Normal bowel sounds, no masses, no organomegaly. Soft, tenderness noted in the left lower quadrant on palpation, non-distended, no guarding, no rebound tenderness. Induration and redness noted on the  right side of the scrotum with clear drainage.   Extremities: Moves all extremities well, no edema, no cyanosis, no            Clubbing. AV fistula with the bruit noted on the right arm.   Skin: No bleeding, bruising or rash.   Neurologic: Awake, alert and oriented times 3. Moves all 4 extremities equally.   Laboratory results:      Results from last 7 days  Lab Units 09/16/18  0618 09/15/18  1145   SODIUM mmol/L 135* 133*   POTASSIUM mmol/L 4.3 3.8   CHLORIDE mmol/L 92* 89*   CO2 mmol/L 34.0* 35.0*   BUN mg/dL 18 10   CREATININE mg/dL 3.10* 1.80*   CALCIUM mg/dL 8.8 9.1   BILIRUBIN mg/dL  --  0.8   ALK PHOS U/L  --  223*   ALT (SGPT) U/L  --  19   AST (SGOT) U/L  --  37   GLUCOSE mg/dL 152* 210*       Results from last 7 days  Lab Units 09/16/18  0618 09/15/18  1145   WBC 10*3/mm3 7.07 6.26   HEMOGLOBIN g/dL 9.9* 10.2*   HEMATOCRIT % 30.7* 30.4*   PLATELETS 10*3/mm3 191 225           Results from last 7 days  Lab Units 09/15/18  1145   TROPONIN I ng/mL 0.046*       Results from last 7 days  Lab Units 09/15/18  1826 09/15/18  1339   BLOODCX   --  No growth at 24 hours  No growth at 24 hours   WOUNDCX  Light growth (2+) Gram Negative Bacilli*  --      I have reviewed the patient's laboratory results.    Radiology results:    Imaging Results (last 24 hours)     ** No results found for the last 24 hours. **        I have reviewed the patient's radiology reports.    Medication Review:     I have reviewed the patients active and prn medications.     Principal Problem:    Pneumonia of right lower lobe due to infectious organism (CMS/[facility])  Active Problems:    Type 2 diabetes mellitus treated with insulin (CMS/[facility])    End stage renal disease on dialysis (CMS/[facility])    Physical debility    Assessment:    1.  Right upper lobe and lower lobe healthcare pneumonia, present on admission.  2.  Right-sided pleuritic chest pain secondary to #1, present on admission.  3.  End-stage renal disease on hemodialysis, on Tuesday, Thursday  and Saturday.  4.  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  5.  Chronic right-sided loculated pleural effusion status post pleurodesis.  6.  Coronary artery disease on medical management.  7.  Chronic scrotal ulcer, followed up at [facility] urology.  8.  Chronic debility.  9.  Essential hypertension.    Plan:    Mr. Cutelr is currently hemodynamically stable and will be continued on broad-spectrum IV antibiotic therapy with cefepime, Levaquin and vancomycin.  Will continue with the bronchodilators as per orders      He will be continued on Levemir and subcutaneous insulin protocol.     Chronic kidney disease on dialysis will be continued as per nephrology     We will consult physical and occupational therapy due to his generalized weakness and debility.  Further recommendations and discharge plan depends upon his clinical course.    Leo Pacheco MD  18  8:50 AM    Dictated utilizing Dragon dictation.      Electronically signed by Leo Pacheco MD at 2018  8:53 AM     Carmelo Ray MD at 2018 11:09 AM                [facility]    Name:  Talha Cutler   Age:  58 y.o.  Sex:  male  :  1960  MRN:  [id number]   Visit Number:  [id number]  Admission Date:  9/15/2018  Date Of Service:  18  Primary Care Physician:  Idalia Kay MD     LOS: 1 day :  Patient Care Team:  Idalia Kay MD as PCP - General:    Chief Complaint:      Right-sided pleuritic chest pain and diarrhea.    Subjective / Interval History:     Mr. Cutler is currently lying down in the bed and continues to have right-sided pleuritic chest pain.  He did not have any fever.  Since this morning he has started having diarrhea.  He complains of left lower quadrant pain.  Denies any vomiting.  He was admitted from the emergency room yesterday with generalized weakness and right-sided pleuritic chest pain.  He was noted to have  right upper and lower lobe pneumonia on the CT scan of the chest and was started on broad-spectrum IV antibiotic therapy with cefepime, Levaquin and vancomycin.  His pro calcitonin is elevated.  He currently has diarrhea with abdominal pain.  We will get a stool C. difficile toxin.    Review of Systems:     General ROS: Patient denies any fevers, chills or loss of consciousness.  Respiratory ROS: Complains of cough, chest congestion and right-sided pleuritic chest pain.  Cardiovascular ROS: Denies chest pain or palpitations. No history of exertional chest pain.  Gastrointestinal ROS: Denies nausea and vomiting. Denies any abdominal pain. No diarrhea.  Neurological ROS: Denies any focal weakness. No loss of consciousness. Denies any numbness.  Dermatological ROS: Denies any redness or pruritis.    Vital Signs:    Temp:  [97.6 °F (36.4 °C)-97.9 °F (36.6 °C)] 97.8 °F (36.6 °C)  Heart Rate:  [70-84] 79  Resp:  [12-18] 16  BP: (134-162)/(52-77) 144/61    Intake and output:    I/O last 3 completed shifts:  In: 1818 [P.O.:660; I.V.:758; IV Piggyback:400]  Out: -   No intake/output data recorded.    Physical Examination:    General Appearance:  Alert and cooperative, not in any acute distress.   Head:  Atraumatic and normocephalic, without obvious abnormality.   Eyes:          PERRLA, conjunctivae and sclerae normal, no Icterus. No pallor. Extra-occular movements are within normal limits.   Neck: Supple, trachea midline, no thyromegaly, no carotid bruit.   Lungs:   Chest shape is normal. Breath sounds heard bilaterally equally.  No wheezing.  Bilateral basal crackles heard more prominent on the right base. No Pleural rub or bronchial breathing.   Heart:  Normal S1 and S2, no murmur, no gallop, no rub. No JVD   Abdomen:   Normal bowel sounds, no masses, no organomegaly. Soft, tenderness noted in the left lower quadrant on palpation, non-distended, no guarding, no rebound tenderness. Induration and redness noted on the right  side of the scrotum with clear drainage.   Extremities: Moves all extremities well, no edema, no cyanosis, no            Clubbing. AV fistula with the bruit noted on the right arm.   Skin: No bleeding, bruising or rash.   Neurologic: Awake, alert and oriented times 3. Moves all 4 extremities equally.   Laboratory results:      Results from last 7 days  Lab Units 09/16/18  0618 09/15/18  1145   SODIUM mmol/L 135* 133*   POTASSIUM mmol/L 4.3 3.8   CHLORIDE mmol/L 92* 89*   CO2 mmol/L 34.0* 35.0*   BUN mg/dL 18 10   CREATININE mg/dL 3.10* 1.80*   CALCIUM mg/dL 8.8 9.1   BILIRUBIN mg/dL  --  0.8   ALK PHOS U/L  --  223*   ALT (SGPT) U/L  --  19   AST (SGOT) U/L  --  37   GLUCOSE mg/dL 152* 210*       Results from last 7 days  Lab Units 09/16/18  0618 09/15/18  1145   WBC 10*3/mm3 7.07 6.26   HEMOGLOBIN g/dL 9.9* 10.2*   HEMATOCRIT % 30.7* 30.4*   PLATELETS 10*3/mm3 191 225           Results from last 7 days  Lab Units 09/15/18  1145   TROPONIN I ng/mL 0.046*       Results from last 7 days  Lab Units 09/15/18  1339   BLOODCX  No growth at less than 24 hours  No growth at less than 24 hours     I have reviewed the patient's laboratory results.    Radiology results:    Imaging Results (last 24 hours)     Procedure Component Value Units Date/Time    XR Chest 1 View [[id number]] Collected:  09/15/18 1236     Updated:  09/15/18 1240    Narrative:       PROCEDURE: XR CHEST 1 VW-     HISTORY: Weak/Dizzy/AMS triage protocol     COMPARISON: None.     FINDINGS: Electrodes overlie the chest. Atherosclerosis is noted. The  heart is normal in size. The mediastinum is unremarkable. Vague right  upper lobe opacity. Right basilar consolidate with effusion. There is no  pneumothorax.  There are no acute osseous abnormalities.           Impression:       Vague right upper lobe opacity. Right basilar consolidate  with effusion. Findings likely secondary to pneumonia.     Continued followup is recommended.     This report was finalized on  9/15/2018 12:38 PM by Devin Villagomez DO.    CT Chest Without Contrast [[id number]] Collected:  09/15/18 1232     Updated:  09/15/18 1238    Narrative:       PROCEDURE: CT CHEST WO CONTRAST-     HISTORY: right chest pain     COMPARISON: July 27, 2018.     PROCEDURE:  Multiple axial CT images were obtained from the thoracic  inlet through the upper abdomen without the use of contrast.      FINDINGS: Lack of intravenous contrast limits evaluation of the solid  organs, the mediastinum, and the vasculature.        Soft tissue windows reveal no axillary, hilar or mediastinal adenopathy.  Atherosclerosis is noted. There is evidence for calcified granulomatous  disease. Heart size is normal. The aorta is normal in caliber. Right  upper lobe infiltrate, right lower lobe infiltrate, and probable  atelectasis at the left base and small bilateral pleural effusions.    .  The visualized upper abdomen is unremarkable. Bone windows reveal no  acute osseous abnormalities.       Impression:       Findings consistent with multifocal pneumonia.     255.06 mGy.cm          This study was performed with techniques to keep radiation doses as low  as reasonably achievable (ALARA). Individualized dose reduction  techniques using automated exposure control or adjustment of mA and/or  kV according to the patient size were employed.    Contrast     This report was finalized on 9/15/2018 12:36 PM by Devin Villagomez DO.    CT Head Without Contrast [[id number]] Collected:  09/15/18 1230     Updated:  09/15/18 1233    Narrative:       PROCEDURE: CT HEAD WO CONTRAST-     HISTORY: dizziness     COMPARISON:  None .     TECHNIQUE: Multiple axial CT images were performed from the foramen  magnum to the vertex without enhancement.      FINDINGS: There is no CT evidence of hemorrhage. There is no mass, mass  effect or midline shift.  Cerebral atrophy with without evidence of  hydrocephalus. The paranasal sinuses are clear. Bone windows reveal no  acute osseous  abnormalities. Atherosclerosis is noted.       Impression:       No acute intracranial process.             803.69 mGy.cm          This study was performed with techniques to keep radiation doses as low  as reasonably achievable (ALARA). Individualized dose reduction  techniques using automated exposure control or adjustment of mA and/or  kV according to the patient size were employed.      This report was finalized on 9/15/2018 12:31 PM by Devin Villagomez DO.        I have reviewed the patient's radiology reports.    Medication Review:     I have reviewed the patients active and prn medications.     Principal Problem:    Pneumonia of right lower lobe due to infectious organism (CMS/[facility])  Active Problems:    Type 2 diabetes mellitus treated with insulin (CMS/[facility])    End stage renal disease on dialysis (CMS/[facility])    Physical debility    Assessment:    1.  Right upper lobe and lower lobe healthcare pneumonia, present on admission.  2.  Right-sided pleuritic chest pain secondary to #1, present on admission.  3.  End-stage renal disease on hemodialysis, on Tuesday, Thursday and Saturday.  4.  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  5.  Chronic right-sided loculated pleural effusion status post pleurodesis.  6.  Coronary artery disease on medical management.  7.  Chronic scrotal ulcer, followed up at [facility] urology.  8.  Chronic debility.  9.  Essential hypertension.    Plan:    Mr. Cutler is currently hemodynamically stable and will be continued on broad-spectrum IV antibiotic therapy with cefepime, Levaquin and vancomycin.  His pro calcitonin is still elevated.  Hopefully we should be able to start de-escalating antibiotic therapy from tomorrow.  He will be continued on lactobacillus supplements.  We will get a stool C. difficile toxin in view of his abdominal pain and diarrhea.  His home medications have been continued.  He will be continued on Levemir and subcutaneous insulin protocol.   We will consult physical and occupational therapy due to his generalized weakness and debility.  Further recommendations and discharge plan depends upon his clinical course.    Carmelo Ray MD  09/16/18  11:09 AM    Dictated utilizing Dragon dictation.      Electronically signed by Carmelo aRy MD at 9/16/2018  5:37 PM          Consult Notes (last 72 hours) (Notes from 9/15/2018  1:21 PM through 9/18/2018  1:21 PM)      Chance Mackey MD, FASN at 9/18/2018  7:25 AM      Consult Orders:    1. Inpatient Nephrology Consult [[id number]] ordered by Leo Pacheco MD at 09/17/18 1711                        [facility]      Nephrology Consultation    Referring Provider:   Vane Martinez,*    Reason for Consultation:    ESRD and associated problems.       Subjective     Chief complaint   Chief Complaint   Patient presents with   • Dizziness   • Shortness of Breath       History of present illness:    Patient is a 58-year-old white male with multiple medical problems as listed below in the past medical history.  He was admitted through the hospitalist service after he was noted to have some right-sided pleuritic chest pain as well as shortness of breath, in the ER and he will had CT of the chest done showing pneumonia which was in the right upper and lower lobe.  He was started on broad-spectrum IV antibiotics and was admitted.  Today is day 3 of IV antibiotics.  I was consulted for end-stage renal disease and associated issue management.  I have reviewed labs/imaging/records from this hospitalization, including ER staff and admitting/attending physicians H/P's and progress notes to establish a comprehensive understanding of this patient's clinical hospital course, as well as to establish plan of care appropriately.   Past Medical History:   Diagnosis Date   • Anemia    • CHF (congestive heart failure) (CMS/HCC)    • Chronic kidney disease    • Diabetes mellitus (CMS/[facility])    • H/O chest x-ray  03/25/2016    Interval decrease in size of the patients right pleural effusion with a persistent small left effusion as well   • History of transfusion    • Hypertension    • Left-sided weakness    • Renal failure    • Stroke (CMS/HCC)        Past Surgical History:   Procedure Laterality Date   • ARTERIOVENOUS FISTULA Right    • CARDIAC CATHETERIZATION N/A 3/28/2018    Procedure: Peripheral angiography;  Surgeon: Clay Ruano MD;  Location:  [facility] CATH INVASIVE LOCATION;  Service: Cardiovascular   • CARDIAC CATHETERIZATION N/A 3/28/2018    Procedure: Angioplasty-peripheral;  Surgeon: Clay Ruano MD;  Location:  [facility] CATH INVASIVE LOCATION;  Service: Cardiovascular   • CARDIAC CATHETERIZATION N/A 3/28/2018    Procedure: Atherectomy-peripheral;  Surgeon: Clay Ruano MD;  Location: [facility] CATH INVASIVE LOCATION;  Service: Cardiovascular   • CATARACT EXTRACTION, BILATERAL     • CHOLECYSTECTOMY     • COLONOSCOPY     • EYE SURGERY     • INTERVENTIONAL RADIOLOGY PROCEDURE N/A 3/28/2018    Procedure: Abdominal Aortogram;  Surgeon: Clay Ruano MD;  Location:  [facility] CATH INVASIVE LOCATION;  Service: Cardiovascular   • PORTACATH PLACEMENT     • VENOUS ACCESS DEVICE (PORT) REMOVAL         Family History   Problem Relation Age of Onset   • COPD Mother    • Hypertension Father    • Diabetes Father    • Hypertension Sister    • Diabetes Sister    • Hypertension Brother    • Diabetes Paternal Grandmother       negative h/o ESRD     Social History   Substance Use Topics   • Smoking status: Never Smoker   • Smokeless tobacco: Never Used   • Alcohol use No     Prescriptions Prior to Admission   Medication Sig Dispense Refill Last Dose   • amLODIPine (NORVASC) 10 MG tablet Take 10 mg by mouth Daily.   9/15/2018 at Unknown time   • aspirin 81 MG EC tablet Take 81 mg by mouth Daily.   9/15/2018 at Unknown time   • Cholecalciferol (VITAMIN D3) 5000 UNITS capsule capsule Take  by mouth daily.    9/15/2018 at Unknown time   • clopidogrel (PLAVIX) 75 MG tablet Take 1 tablet by mouth Daily. 90 tablet 3 9/15/2018 at Unknown time   • docusate sodium (COLACE) 100 MG capsule Take 100 mg by mouth Every 12 (Twelve) Hours.   9/15/2018 at Unknown time   • folic acid (FOLVITE) 1 MG tablet Take 1 mg by mouth daily.   9/15/2018 at Unknown time   • hydrALAZINE (APRESOLINE) 50 MG tablet Take 50 mg by mouth 3 (Three) Times a Day.   9/15/2018 at Unknown time   • isosorbide mononitrate (IMDUR) 30 MG 24 hr tablet Take 30 mg by mouth Daily.   9/15/2018 at Unknown time   • levothyroxine (SYNTHROID, LEVOTHROID) 100 MCG tablet Take 100 mcg by mouth daily.   9/15/2018 at Unknown time   • metoprolol succinate XL (TOPROL-XL) 100 MG 24 hr tablet Take 1 tablet by mouth Daily. 30 tablet 0 9/15/2018 at Unknown time   • omeprazole (priLOSEC) 20 MG capsule Take 20 mg by mouth Daily.   9/15/2018 at Unknown time   • pravastatin (PRAVACHOL) 40 MG tablet Take 80 mg by mouth Every Night.   9/14/2018 at Unknown time   • spironolactone (ALDACTONE) 25 MG tablet Take 25 mg by mouth Daily.   9/15/2018 at Unknown time   • acetaminophen (TYLENOL) 325 MG tablet Take 1 tablet by mouth Every 4 (Four) Hours As Needed for Mild Pain .   3/24/2018 at 0600   • albuterol (PROVENTIL HFA;VENTOLIN HFA) 108 (90 Base) MCG/ACT inhaler Inhale 2 puffs Every 4 (Four) Hours As Needed for Shortness of Air. 1 inhaler 0 More than a month at Unknown time   • aspirin 325 MG tablet Take 81 mg by mouth Daily.   10/30/2017 at Unknown time   • aspirin 81 MG chewable tablet Chew 81 mg Daily.   Unknown at Unknown time   • B Complex-C-Folic Acid (RADHA-SHIRLENE PO) Take  by mouth. Patient states he takes these after dialysis, but is unaware of dose.   Unknown at Unknown time   • cephalexin (KEFLEX) 500 MG capsule Take 1 capsule by mouth 4 (Four) Times a Day. (Patient not taking: Reported on 9/17/2018) 28 capsule 0 Not Taking at Unknown time   • digoxin (LANOXIN) 125 MCG tablet Take  125 mcg by mouth See Admin Instructions. Alternate days after dialysis   Unknown at Unknown time   • fenofibrate (TRICOR) 54 MG tablet Take 54 mg by mouth Daily.   Unknown at Unknown time   • FOSRENOL 1000 MG chewable tablet 3 (Three) Times a Day With Meals. Per pt, he takes with each meal and with snacks   Unknown at Unknown time   • HYDROcodone-acetaminophen (NORCO) 7.5-325 MG per tablet Take 1 tablet by mouth Every 6 (Six) Hours As Needed for Moderate Pain .   Unknown at Unknown time   • insulin detemir (LEVEMIR) 100 UNIT/ML injection Inject 8 Units under the skin Daily. 100 mL 0 Unknown at Unknown time   • lisinopril (PRINIVIL,ZESTRIL) 10 MG tablet Take  by mouth Daily. Patient states they take this medication, but it has not been filled at [facility]'s pharmacy.   Unknown at Unknown time   • losartan (COZAAR) 100 MG tablet Take 1 tablet by mouth Daily. (Patient not taking: Reported on 9/17/2018)   Not Taking at Unknown time   • ondansetron ODT (ZOFRAN-ODT) 4 MG disintegrating tablet Take 1 tablet by mouth Every 6 (Six) Hours As Needed for Nausea or Vomiting. (Patient not taking: Reported on 9/17/2018) 20 tablet 0 Unknown at Unknown time   • oxyCODONE-acetaminophen (PERCOCET) 5-325 MG per tablet Take 1 tablet by mouth Every 6 (Six) Hours As Needed for Severe Pain . (Patient not taking: Reported on 9/17/2018) 20 tablet 0 Not Taking at Unknown time     Allergies:  Erythromycin    Review of Systems  1. Constitutional: Negative for fever. Negative for chills.  Denies any diaphoresis, positive for fatigue and denies any unexpected weight change.   2. HENT: Negative for congestion and has noticed some hearing loss.   3. Eyes: Negative for redness and visual disturbance.   4. Respiratory: Positive for shortness of breath appears to be improving and denies any cough.  Positive for chest pain  5. Cardiovascular: Negative for chest pain and chest tightness or palpitations.   6. Gastrointestinal: Negative for abdominal pain or  "distention, and blood in stool. Denies any Nausea, vomiting, diarrhea or constipation.  7. Endocrine: Negative for heat or cold intolerance.   8. Genitourinary: Negative for difficulty urinating, dysuria and frequency.   9. Musculoskeletal: Positive for arthralgias, back pain.  Denies any myalgias.   10. Skin: Negative for color change, rash and wound.   11. Neurological: Negative for syncope, he does have generalized weakness and pretty much dependent in the wheelchair, denies any headaches.   12. Hematological: Negative for adenopathy. Does not bruise/bleed easily.   13. Psychiatric/Behavioral: Negative for confusion. The patient is not nervous/anxious.     Objective     Vital Signs  /47 (BP Location: Left arm, Patient Position: Lying)   Pulse 73   Temp 98.2 °F (36.8 °C) (Oral)   Resp 16   Ht 172.7 cm (68\")   Wt 57.6 kg (126 lb 15.8 oz)   SpO2 100%   BMI 19.31 kg/m²           No intake/output data recorded.    Intake/Output Summary (Last 24 hours) at 09/18/18 0734  Last data filed at 09/18/18 0222   Gross per 24 hour   Intake              770 ml   Output               75 ml   Net              695 ml       Physical Exam:     General Appearance:   Alert, cooperative, in no acute distress.     Head:   Normocephalic, without obvious abnormality, atraumatic.     Eyes:      Normal, conjunctivae and sclerae, no icterus, no pallor, corneas clear, PERRLA        Throat:   Oral mucosa dry      Neck:  No adenopathy, supple, trachea midline, no thyromegaly, no carotid bruit, no JVD      Back:   No CVA tenderness on Percussion.     Lungs:    Occasional coarse breath sounds heard.      Heart::   Regular rhythm and normal rate, normal S1 and S2. 2/6 systolic ejection murmur        Abdomen:   Obese. Normal bowel sounds, no masses, no organomegaly, soft non-tender, non-distended, no guarding, no rebound tenderness      Genital urinary:   No urinary bladder palpable, he has symptoms drainage from the scrotal lesion that " has been infected off and on and has been drained.      Extremities:  Moves all extremities, no edema, no cyanosis, no redness.     Pulses:  Pulses palpable and equal bilaterally but weak.     Skin:  No bleeding, bruising or rash        Neurologic:  Cranial nerves grossly intact, move all extremities             Lab Results (last 7 days)     Procedure Component Value Units Date/Time    Hepatitis B Surface Antigen [[id number]] Collected:  09/16/18 0618    Specimen:  Blood Updated:  09/18/18 0714     Hepatitis B Surface Ag Negative    Narrative:       Performed at:  [address] [place] [address]  [id number]  : Drew Lopez PhD, Phone:  [phone]    Vancomycin, Random [[id number]]  (Normal) Collected:  09/18/18 0614    Specimen:  Blood Updated:  09/18/18 0711     Vancomycin Random 15.32 mcg/mL     POC Glucose Once [[id number]]  (Abnormal) Collected:  09/18/18 0635    Specimen:  Blood Updated:  09/18/18 0656     Glucose 222 (H) mg/dL      Comment: Serial Number: [id number]:  [id number]       POC Glucose Once [[id number]]  (Abnormal) Collected:  09/17/18 2021    Specimen:  Blood Updated:  09/17/18 2030     Glucose 220 (H) mg/dL      Comment: Serial Number: [id number]:  [id number]       POC Glucose Once [[id number]]  (Normal) Collected:  09/17/18 1641    Specimen:  Blood Updated:  09/17/18 1700     Glucose 115 mg/dL      Comment: Serial Number: [id number]:  [id number]       Blood Culture - Blood, [[id number]]  (Normal) Collected:  09/15/18 1339    Specimen:  Blood from Arm, Left Updated:  09/17/18 1345     Blood Culture No growth at 2 days    POC Glucose Once [[id number]]  (Abnormal) Collected:  09/17/18 1118    Specimen:  Blood Updated:  09/17/18 1143     Glucose 264 (H) mg/dL      Comment: Serial Number: [id number]:  [id number]       POC Glucose Once [[id number]]  (Abnormal) Collected:  09/17/18 0633    Specimen:  Blood Updated:  09/17/18 0637     Glucose 240 (H) mg/dL       Comment: Serial Number: [id number]:  [id number]       Wound Culture - Drainage, Scrotum [[id number]]  (Abnormal) Collected:  09/15/18 1826    Specimen:  Drainage from Scrotum Updated:  09/17/18 0631     Wound Culture Light growth (2+) Gram Negative Bacilli (A)     Comment: Identification and GABRIEL to follow.         POC Glucose Once [[id number]]  (Abnormal) Collected:  09/16/18 2120    Specimen:  Blood Updated:  09/16/18 2125     Glucose 203 (H) mg/dL      Comment: Serial Number: [id number]:  [id number]       POC Glucose Once [[id number]]  (Normal) Collected:  09/16/18 1655    Specimen:  Blood Updated:  09/16/18 1730     Glucose 130 mg/dL      Comment: Serial Number: [id number]:  [id number]       Blood Culture - Blood, [[id number]]  (Normal) Collected:  09/15/18 1339    Specimen:  Blood from Arm, Left Updated:  09/16/18 1345     Blood Culture No growth at 24 hours    POC Glucose Once [[id number]]  (Abnormal) Collected:  09/16/18 1132    Specimen:  Blood Updated:  09/16/18 1148     Glucose 204 (H) mg/dL      Comment: Serial Number: [id number]:  [id number]       Procalcitonin [[id number]]  (Abnormal) Collected:  09/16/18 0618    Specimen:  Blood Updated:  09/16/18 0902     Procalcitonin 1.00 (H) ng/mL     Narrative:       As a Marker for Sepsis (Non-Neonates):   1. <0.5 ng/mL represents a low risk of severe sepsis and/or septic shock.  2. >2 ng/mL represents a high risk of severe sepsis and/or septic shock.    As a Marker for Lower Respiratory Tract Infections that require antibiotic therapy:    PCT on Admission     Antibiotic Therapy       6-12 Hrs later  > 0.5                Strongly Recommended             >0.25 - <0.5         Recommended  0.1 - 0.25           Discouraged              Remeasure/reassess PCT  <0.1                 Strongly Discouraged     Remeasure/reassess PCT                     PCT values of < 0.5 ng/mL do not exclude an infection, because localized infections (without systemic signs) may  be associated with such low concentrations, or a systemic infection in its initial stages (< 6 hours). Furthermore, increased PCT can occur without infection. PCT concentrations between 0.5 and 2.0 ng/mL should be interpreted taking into account the patient's history. It is recommended to retest PCT within 6-24 hours if any concentrations < 2 ng/mL are obtained.    Vancomycin, Random [[id number]]  (Normal) Collected:  09/16/18 0618    Specimen:  Blood Updated:  09/16/18 0723     Vancomycin Random 19.05 mcg/mL     POC Glucose Once [[id number]]  (Abnormal) Collected:  09/16/18 0631    Specimen:  Blood Updated:  09/16/18 0645     Glucose 147 (H) mg/dL      Comment: Serial Number: [id number]:  [id number]       Basic Metabolic Panel [[id number]]  (Abnormal) Collected:  09/16/18 0618    Specimen:  Blood Updated:  09/16/18 0644     Glucose 152 (H) mg/dL      BUN 18 mg/dL      Creatinine 3.10 (H) mg/dL      Sodium 135 (L) mmol/L      Potassium 4.3 mmol/L      Chloride 92 (L) mmol/L      CO2 34.0 (H) mmol/L      Calcium 8.8 mg/dL      eGFR Non African Amer 21 (L) mL/min/1.73      BUN/Creatinine Ratio 5.8 (L)     Anion Gap 13.3 mmol/L     Narrative:       GFR Normal >60  Chronic Kidney Disease <60  Kidney Failure <15    CBC Auto Differential [[id number]]  (Abnormal) Collected:  09/16/18 0618    Specimen:  Blood Updated:  09/16/18 0628     WBC 7.07 10*3/mm3      RBC 3.28 (L) 10*6/mm3      Hemoglobin 9.9 (L) g/dL      Hematocrit 30.7 (L) %      MCV 93.6 fL      MCH 30.2 pg      MCHC 32.2 g/dL      RDW 14.9 (H) %      RDW-SD 50.4 fl      MPV 9.5 fL      Platelets 191 10*3/mm3      Neutrophil % 65.3 %      Lymphocyte % 8.2 (L) %      Monocyte % 13.0 (H) %      Eosinophil % 11.5 (H) %      Basophil % 1.3 %      Immature Grans % 0.7 (H) %      Neutrophils, Absolute 4.62 10*3/mm3      Lymphocytes, Absolute 0.58 (L) 10*3/mm3      Monocytes, Absolute 0.92 (H) 10*3/mm3      Eosinophils, Absolute 0.81 (H) 10*3/mm3      Basophils,  Absolute 0.09 10*3/mm3      Immature Grans, Absolute 0.05 10*3/mm3      nRBC 0.0 /100 WBC     POC Glucose Once [[id number]]  (Abnormal) Collected:  09/15/18 2104    Specimen:  Blood Updated:  09/15/18 2111     Glucose 365 (H) mg/dL      Comment: Serial Number: [id number]:  [id number]       POC Glucose Once [[id number]]  (Abnormal) Collected:  09/15/18 1627    Specimen:  Blood Updated:  09/15/18 1650     Glucose 302 (H) mg/dL      Comment: Serial Number: [id number]:  [id number]       MRSA Screen Culture - Swab, Nares [[id number]] Collected:  09/15/18 1456    Specimen:  Swab from Nares Updated:  09/15/18 1503    VRE Culture - Swab, Per Rectum [[id number]] Collected:  09/15/18 1456    Specimen:  Swab from Per Rectum Updated:  09/15/18 1503    Acinetobacter Screen - Swab, Nares [[id number]] Collected:  09/15/18 1456    Specimen:  Swab from Nares Updated:  09/15/18 1503    [place] Draw [[id number]] Collected:  09/15/18 1145    Specimen:  Blood Updated:  09/15/18 1445    Narrative:       The following orders were created for panel order [place] Draw.  Procedure                               Abnormality         Status                     ---------                               -----------         ------                     Light Blue Top[[id number]]                                   Final result               Lavender Top[[id number]]                                     Final result               Gold Top - SST[[id number]]                                   Final result               Green Top (No Gel)[[id number]]                               Final result                 Please view results for these tests on the individual orders.    Light Blue Top [[id number]] Collected:  09/15/18 1340    Specimen:  Blood Updated:  09/15/18 1445     Extra Tube hold for add-on     Comment: Auto resulted       Gold Top - SST [[id number]] Collected:  09/15/18 1340    Specimen:  Blood Updated:  09/15/18 1445     Extra Tube Hold for add-ons.      Comment: Auto resulted.       Lactic Acid, Plasma [[id number]]  (Normal) Collected:  09/15/18 1340    Specimen:  Blood Updated:  09/15/18 1356     Lactate 0.6 mmol/L     Green Top (No Gel) [[id number]] Collected:  09/15/18 1145    Specimen:  Blood Updated:  09/15/18 1330     Extra Tube Hold for add-ons.     Comment: Auto resulted.       Lavender Top [[id number]] Collected:  09/15/18 1145    Specimen:  Blood Updated:  09/15/18 1300     Extra Tube hold for add-on     Comment: Auto resulted       Comprehensive Metabolic Panel [[id number]]  (Abnormal) Collected:  09/15/18 1145    Specimen:  Blood Updated:  09/15/18 1229     Glucose 210 (H) mg/dL      Comment: Glucose >180, Hemoglobin A1C recommended.        BUN 10 mg/dL      Creatinine 1.80 (H) mg/dL      Sodium 133 (L) mmol/L      Potassium 3.8 mmol/L      Chloride 89 (L) mmol/L      CO2 35.0 (H) mmol/L      Calcium 9.1 mg/dL      Total Protein 7.0 g/dL      Albumin 3.80 g/dL      ALT (SGPT) 19 U/L      AST (SGOT) 37 U/L      Alkaline Phosphatase 223 (H) U/L      Total Bilirubin 0.8 mg/dL      eGFR Non African Amer 39 (L) mL/min/1.73      Globulin 3.2 gm/dL      A/G Ratio 1.2 g/dL      BUN/Creatinine Ratio 5.6 (L)     Anion Gap 12.8 mmol/L     Narrative:       GFR Normal >60  Chronic Kidney Disease <60  Kidney Failure <15    Troponin [[id number]]  (Abnormal) Collected:  09/15/18 1145    Specimen:  Blood Updated:  09/15/18 1229     Troponin I 0.046 (H) ng/mL     Narrative:       Normal Patient Upper Reference Limit (URL) (99th Percentile)=0.03 ng/mL   Non-AMI Illness Reference Limit=0.03-0.11 ng/mL   AMI Confirmation=0.12 ng/mL and above    Magnesium [[id number]]  (Normal) Collected:  09/15/18 1145    Specimen:  Blood Updated:  09/15/18 1229     Magnesium 1.9 mg/dL     CBC & Differential [[id number]] Collected:  09/15/18 1145    Specimen:  Blood Updated:  09/15/18 1150    Narrative:       The following orders were created for panel order CBC & Differential.  Procedure                                Abnormality         Status                     ---------                               -----------         ------                     CBC Auto Differential[[id number]]        Abnormal            Final result                 Please view results for these tests on the individual orders.    CBC Auto Differential [[id number]]  (Abnormal) Collected:  09/15/18 1145    Specimen:  Blood Updated:  09/15/18 1150     WBC 6.26 10*3/mm3      RBC 3.35 (L) 10*6/mm3      Hemoglobin 10.2 (L) g/dL      Hematocrit 30.4 (L) %      MCV 90.7 fL      MCH 30.4 pg      MCHC 33.6 g/dL      RDW 14.6 (H) %      RDW-SD 48.7 fl      MPV 9.1 fL      Platelets 225 10*3/mm3      Neutrophil % 66.1 %      Lymphocyte % 10.1 %      Monocyte % 11.8 %      Eosinophil % 10.2 (H) %      Basophil % 1.3 %      Immature Grans % 0.5 %      Neutrophils, Absolute 4.14 10*3/mm3      Lymphocytes, Absolute 0.63 10*3/mm3      Monocytes, Absolute 0.74 10*3/mm3      Eosinophils, Absolute 0.64 10*3/mm3      Basophils, Absolute 0.08 10*3/mm3      Immature Grans, Absolute 0.03 10*3/mm3      nRBC 0.0 /100 WBC         Imaging Results (last 72 hours)     Procedure Component Value Units Date/Time    XR Chest 1 View [[id number]] Collected:  09/15/18 1236     Updated:  09/15/18 1240    Narrative:       PROCEDURE: XR CHEST 1 VW-     HISTORY: Weak/Dizzy/AMS triage protocol     COMPARISON: None.     FINDINGS: Electrodes overlie the chest. Atherosclerosis is noted. The  heart is normal in size. The mediastinum is unremarkable. Vague right  upper lobe opacity. Right basilar consolidate with effusion. There is no  pneumothorax.  There are no acute osseous abnormalities.           Impression:       Vague right upper lobe opacity. Right basilar consolidate  with effusion. Findings likely secondary to pneumonia.     Continued followup is recommended.     This report was finalized on 9/15/2018 12:38 PM by Devin Villagomez DO.    CT Chest Without Contrast [[id number]]  Collected:  09/15/18 1232     Updated:  09/15/18 1238    Narrative:       PROCEDURE: CT CHEST WO CONTRAST-     HISTORY: right chest pain     COMPARISON: July 27, 2018.     PROCEDURE:  Multiple axial CT images were obtained from the thoracic  inlet through the upper abdomen without the use of contrast.      FINDINGS: Lack of intravenous contrast limits evaluation of the solid  organs, the mediastinum, and the vasculature.        Soft tissue windows reveal no axillary, hilar or mediastinal adenopathy.  Atherosclerosis is noted. There is evidence for calcified granulomatous  disease. Heart size is normal. The aorta is normal in caliber. Right  upper lobe infiltrate, right lower lobe infiltrate, and probable  atelectasis at the left base and small bilateral pleural effusions.    .  The visualized upper abdomen is unremarkable. Bone windows reveal no  acute osseous abnormalities.       Impression:       Findings consistent with multifocal pneumonia.     255.06 mGy.cm          This study was performed with techniques to keep radiation doses as low  as reasonably achievable (ALARA). Individualized dose reduction  techniques using automated exposure control or adjustment of mA and/or  kV according to the patient size were employed.    Contrast     This report was finalized on 9/15/2018 12:36 PM by Devin Villagomez DO.    CT Head Without Contrast [[id number]] Collected:  09/15/18 1230     Updated:  09/15/18 1233    Narrative:       PROCEDURE: CT HEAD WO CONTRAST-     HISTORY: dizziness     COMPARISON:  None .     TECHNIQUE: Multiple axial CT images were performed from the foramen  magnum to the vertex without enhancement.      FINDINGS: There is no CT evidence of hemorrhage. There is no mass, mass  effect or midline shift.  Cerebral atrophy with without evidence of  hydrocephalus. The paranasal sinuses are clear. Bone windows reveal no  acute osseous abnormalities. Atherosclerosis is noted.       Impression:       No acute  intracranial process.             803.69 mGy.cm          This study was performed with techniques to keep radiation doses as low  as reasonably achievable (ALARA). Individualized dose reduction  techniques using automated exposure control or adjustment of mA and/or  kV according to the patient size were employed.      This report was finalized on 9/15/2018 12:31 PM by Devin Villagomez DO.              amLODIPine 10 mg Oral Q24H   aspirin 81 mg Oral Daily   atorvastatin 10 mg Oral Nightly   cefepime 1 g Intravenous Q24H   clopidogrel 75 mg Oral Daily   digoxin 125 mcg Oral Once per day on Tue Thu Sat   docusate sodium 100 mg Oral Q12H   folic acid 1 mg Oral Daily   heparin (porcine) 5,000 Units Subcutaneous Q12H   hydrALAZINE 50 mg Oral TID   insulin aspart 0-7 Units Subcutaneous 4x Daily AC & at Bedtime   insulin detemir 8 Units Subcutaneous Daily   isosorbide mononitrate 30 mg Oral Daily   lactobacillus acidophilus 1 capsule Oral BID   lanthanum 1,000 mg Oral TID With Meals   levothyroxine 100 mcg Oral Q AM   losartan 100 mg Oral Q24H   metoprolol succinate  mg Oral Daily   pantoprazole 40 mg Oral QAM AC   spironolactone 25 mg Oral Daily   vitamin D3 5,000 Units Oral Daily       Pharmacy Consult        Assessment/Plan     1. End stage renal disease on dialysis (CMS/HCC)  2. Chronic kidney disease-mineral and bone disorder:  3. Pneumonia of right lower lobe due to infectious organism (CMS/HCC)  4. Type 2 diabetes mellitus treated with insulin (CMS/[facility]) uncontrolled.  5. Anemia of chronic kidney disease  6. Chronic right-sided loculated pleural effusion status post pleurodesis  7. Coronary artery disease with medical management  8. Chronic scrotal ulcer followed by [facility] urology  9. Hypertension with end-stage renal disease  10. Physical debility    Plan:    · Dialysis schedule on Tuesday Thursday Saturday, he runs 4 hours on a standard bath usually gains 2-3 L in between dialysis.  I will go ahead  and make arrangements.  · His pleuritic chest pain we'll go ahead and start him on ibuprofen 600 mg twice a day for 3 days.  · Scrotal abscess, patient is willing to see if there is a local urologist who will be able to help him so he does not have to go to [place].  · Continue antibiotics  · Will order labs predialysis.  · Surveillance labs.  · Details were discussed with the patient as well as family in the room.    · Details were also discussed with the hospitalist service.   · Further recommendations will depend on clinical course of the patient during the current hospitalization.    · I also discussed the details with the nursing staff.  · Rest as ordered.    In closing, I sincerely appreciate opportunity to participate in care of this patient. If I can be of any further assistance with the management of this patient, please don’t hesitate to contact me.    Chance Mackey MD, ARJUN  09/18/18  7:25 AM    Dictated using Dragon.            Electronically signed by Chance Mackey MD, ARJUN at 9/18/2018 10:48 AM

## 2018-09-18 NOTE — CONSULTS
[facility]      Nephrology Consultation    Referring Provider:   Vane Martinez,*    Reason for Consultation:    ESRD and associated problems.       Subjective     Chief complaint   Chief Complaint   Patient presents with   • Dizziness   • Shortness of Breath       History of present illness:    Patient is a 58-year-old white male with multiple medical problems as listed below in the past medical history.  He was admitted through the hospitalist service after he was noted to have some right-sided pleuritic chest pain as well as shortness of breath, in the ER and he will had CT of the chest done showing pneumonia which was in the right upper and lower lobe.  He was started on broad-spectrum IV antibiotics and was admitted.  Today is day 3 of IV antibiotics.  I was consulted for end-stage renal disease and associated issue management.  I have reviewed labs/imaging/records from this hospitalization, including ER staff and admitting/attending physicians H/P's and progress notes to establish a comprehensive understanding of this patient's clinical hospital course, as well as to establish plan of care appropriately.   Past Medical History:   Diagnosis Date   • Anemia    • CHF (congestive heart failure) (CMS/HCC)    • Chronic kidney disease    • Diabetes mellitus (CMS/HCC)    • H/O chest x-ray 03/25/2016    Interval decrease in size of the patients right pleural effusion with a persistent small left effusion as well   • History of transfusion    • Hypertension    • Left-sided weakness    • Renal failure    • Stroke (CMS/HCC)        Past Surgical History:   Procedure Laterality Date   • ARTERIOVENOUS FISTULA Right    • CARDIAC CATHETERIZATION N/A 3/28/2018    Procedure: Peripheral angiography;  Surgeon: Clay Ruano MD;  Location: [facility] CATH INVASIVE LOCATION;  Service: Cardiovascular   • CARDIAC CATHETERIZATION N/A 3/28/2018    Procedure: Angioplasty-peripheral;  Surgeon: Clay  Tom Ruano MD;  Location: [facility] CATH INVASIVE LOCATION;  Service: Cardiovascular   • CARDIAC CATHETERIZATION N/A 3/28/2018    Procedure: Atherectomy-peripheral;  Surgeon: Clay Ruano MD;  Location: [facility] CATH INVASIVE LOCATION;  Service: Cardiovascular   • CATARACT EXTRACTION, BILATERAL     • CHOLECYSTECTOMY     • COLONOSCOPY     • EYE SURGERY     • INTERVENTIONAL RADIOLOGY PROCEDURE N/A 3/28/2018    Procedure: Abdominal Aortogram;  Surgeon: Clay Ruano MD;  Location: [facility] CATH INVASIVE LOCATION;  Service: Cardiovascular   • PORTACATH PLACEMENT     • VENOUS ACCESS DEVICE (PORT) REMOVAL         Family History   Problem Relation Age of Onset   • COPD Mother    • Hypertension Father    • Diabetes Father    • Hypertension Sister    • Diabetes Sister    • Hypertension Brother    • Diabetes Paternal Grandmother       negative h/o ESRD     Social History   Substance Use Topics   • Smoking status: Never Smoker   • Smokeless tobacco: Never Used   • Alcohol use No     Prescriptions Prior to Admission   Medication Sig Dispense Refill Last Dose   • amLODIPine (NORVASC) 10 MG tablet Take 10 mg by mouth Daily.   9/15/2018 at Unknown time   • aspirin 81 MG EC tablet Take 81 mg by mouth Daily.   9/15/2018 at Unknown time   • Cholecalciferol (VITAMIN D3) 5000 UNITS capsule capsule Take  by mouth daily.   9/15/2018 at Unknown time   • clopidogrel (PLAVIX) 75 MG tablet Take 1 tablet by mouth Daily. 90 tablet 3 9/15/2018 at Unknown time   • docusate sodium (COLACE) 100 MG capsule Take 100 mg by mouth Every 12 (Twelve) Hours.   9/15/2018 at Unknown time   • folic acid (FOLVITE) 1 MG tablet Take 1 mg by mouth daily.   9/15/2018 at Unknown time   • hydrALAZINE (APRESOLINE) 50 MG tablet Take 50 mg by mouth 3 (Three) Times a Day.   9/15/2018 at Unknown time   • isosorbide mononitrate (IMDUR) 30 MG 24 hr tablet Take 30 mg by mouth Daily.   9/15/2018 at Unknown time   • levothyroxine (SYNTHROID, LEVOTHROID)  100 MCG tablet Take 100 mcg by mouth daily.   9/15/2018 at Unknown time   • metoprolol succinate XL (TOPROL-XL) 100 MG 24 hr tablet Take 1 tablet by mouth Daily. 30 tablet 0 9/15/2018 at Unknown time   • omeprazole (priLOSEC) 20 MG capsule Take 20 mg by mouth Daily.   9/15/2018 at Unknown time   • pravastatin (PRAVACHOL) 40 MG tablet Take 80 mg by mouth Every Night.   9/14/2018 at Unknown time   • spironolactone (ALDACTONE) 25 MG tablet Take 25 mg by mouth Daily.   9/15/2018 at Unknown time   • acetaminophen (TYLENOL) 325 MG tablet Take 1 tablet by mouth Every 4 (Four) Hours As Needed for Mild Pain .   3/24/2018 at 0600   • albuterol (PROVENTIL HFA;VENTOLIN HFA) 108 (90 Base) MCG/ACT inhaler Inhale 2 puffs Every 4 (Four) Hours As Needed for Shortness of Air. 1 inhaler 0 More than a month at Unknown time   • aspirin 325 MG tablet Take 81 mg by mouth Daily.   10/30/2017 at Unknown time   • aspirin 81 MG chewable tablet Chew 81 mg Daily.   Unknown at Unknown time   • B Complex-C-Folic Acid (RADHA-SHIRLENE PO) Take  by mouth. Patient states he takes these after dialysis, but is unaware of dose.   Unknown at Unknown time   • cephalexin (KEFLEX) 500 MG capsule Take 1 capsule by mouth 4 (Four) Times a Day. (Patient not taking: Reported on 9/17/2018) 28 capsule 0 Not Taking at Unknown time   • digoxin (LANOXIN) 125 MCG tablet Take 125 mcg by mouth See Admin Instructions. Alternate days after dialysis   Unknown at Unknown time   • fenofibrate (TRICOR) 54 MG tablet Take 54 mg by mouth Daily.   Unknown at Unknown time   • FOSRENOL 1000 MG chewable tablet 3 (Three) Times a Day With Meals. Per pt, he takes with each meal and with snacks   Unknown at Unknown time   • HYDROcodone-acetaminophen (NORCO) 7.5-325 MG per tablet Take 1 tablet by mouth Every 6 (Six) Hours As Needed for Moderate Pain .   Unknown at Unknown time   • insulin detemir (LEVEMIR) 100 UNIT/ML injection Inject 8 Units under the skin Daily. 100 mL 0 Unknown at Unknown  time   • lisinopril (PRINIVIL,ZESTRIL) 10 MG tablet Take  by mouth Daily. Patient states they take this medication, but it has not been filled at [facility]'s pharmacy.   Unknown at Unknown time   • losartan (COZAAR) 100 MG tablet Take 1 tablet by mouth Daily. (Patient not taking: Reported on 9/17/2018)   Not Taking at Unknown time   • ondansetron ODT (ZOFRAN-ODT) 4 MG disintegrating tablet Take 1 tablet by mouth Every 6 (Six) Hours As Needed for Nausea or Vomiting. (Patient not taking: Reported on 9/17/2018) 20 tablet 0 Unknown at Unknown time   • oxyCODONE-acetaminophen (PERCOCET) 5-325 MG per tablet Take 1 tablet by mouth Every 6 (Six) Hours As Needed for Severe Pain . (Patient not taking: Reported on 9/17/2018) 20 tablet 0 Not Taking at Unknown time     Allergies:  Erythromycin    Review of Systems  1. Constitutional: Negative for fever. Negative for chills.  Denies any diaphoresis, positive for fatigue and denies any unexpected weight change.   2. HENT: Negative for congestion and has noticed some hearing loss.   3. Eyes: Negative for redness and visual disturbance.   4. Respiratory: Positive for shortness of breath appears to be improving and denies any cough.  Positive for chest pain  5. Cardiovascular: Negative for chest pain and chest tightness or palpitations.   6. Gastrointestinal: Negative for abdominal pain or distention, and blood in stool. Denies any Nausea, vomiting, diarrhea or constipation.  7. Endocrine: Negative for heat or cold intolerance.   8. Genitourinary: Negative for difficulty urinating, dysuria and frequency.   9. Musculoskeletal: Positive for arthralgias, back pain.  Denies any myalgias.   10. Skin: Negative for color change, rash and wound.   11. Neurological: Negative for syncope, he does have generalized weakness and pretty much dependent in the wheelchair, denies any headaches.   12. Hematological: Negative for adenopathy. Does not bruise/bleed easily.   13. Psychiatric/Behavioral:  "Negative for confusion. The patient is not nervous/anxious.     Objective     Vital Signs  /47 (BP Location: Left arm, Patient Position: Lying)   Pulse 73   Temp 98.2 °F (36.8 °C) (Oral)   Resp 16   Ht 172.7 cm (68\")   Wt 57.6 kg (126 lb 15.8 oz)   SpO2 100%   BMI 19.31 kg/m²          No intake/output data recorded.    Intake/Output Summary (Last 24 hours) at 09/18/18 0725  Last data filed at 09/18/18 0222   Gross per 24 hour   Intake              770 ml   Output               75 ml   Net              695 ml       Physical Exam:     General Appearance:   Alert, cooperative, in no acute distress.     Head:   Normocephalic, without obvious abnormality, atraumatic.     Eyes:      Normal, conjunctivae and sclerae, no icterus, no pallor, corneas clear, PERRLA        Throat:   Oral mucosa dry      Neck:  No adenopathy, supple, trachea midline, no thyromegaly, no carotid bruit, no JVD      Back:   No CVA tenderness on Percussion.     Lungs:    Occasional coarse breath sounds heard.      Heart::   Regular rhythm and normal rate, normal S1 and S2. 2/6 systolic ejection murmur        Abdomen:   Obese. Normal bowel sounds, no masses, no organomegaly, soft non-tender, non-distended, no guarding, no rebound tenderness      Genital urinary:   No urinary bladder palpable, he has symptoms drainage from the scrotal lesion that has been infected off and on and has been drained.      Extremities:  Moves all extremities, no edema, no cyanosis, no redness.     Pulses:  Pulses palpable and equal bilaterally but weak.     Skin:  No bleeding, bruising or rash        Neurologic:  Cranial nerves grossly intact, move all extremities             Lab Results (last 7 days)     Procedure Component Value Units Date/Time    Hepatitis B Surface Antigen [[id number]] Collected:  09/16/18 0618    Specimen:  Blood Updated:  09/18/18 0714     Hepatitis B Surface Ag Negative    Narrative:       Performed at:  [address] [place] [address]  [address] [place], [place]  [id number]  : Drew Lopez PhD, Phone:  [phone]    Vancomycin, Random [[id number]]  (Normal) Collected:  09/18/18 0614    Specimen:  Blood Updated:  09/18/18 0711     Vancomycin Random 15.32 mcg/mL     POC Glucose Once [[id number]]  (Abnormal) Collected:  09/18/18 0635    Specimen:  Blood Updated:  09/18/18 0656     Glucose 222 (H) mg/dL      Comment: Serial Number: [id number]:  [id number]       POC Glucose Once [[id number]]  (Abnormal) Collected:  09/17/18 2021    Specimen:  Blood Updated:  09/17/18 2030     Glucose 220 (H) mg/dL      Comment: Serial Number: [id number]:  [id number]       POC Glucose Once [[id number]]  (Normal) Collected:  09/17/18 1641    Specimen:  Blood Updated:  09/17/18 1700     Glucose 115 mg/dL      Comment: Serial Number: [id number]:  [id number]       Blood Culture - Blood, [[id number]]  (Normal) Collected:  09/15/18 1339    Specimen:  Blood from Arm, Left Updated:  09/17/18 1345     Blood Culture No growth at 2 days    POC Glucose Once [[id number]]  (Abnormal) Collected:  09/17/18 1118    Specimen:  Blood Updated:  09/17/18 1143     Glucose 264 (H) mg/dL      Comment: Serial Number: [id number]:  [id number]       POC Glucose Once [[id number]]  (Abnormal) Collected:  09/17/18 0633    Specimen:  Blood Updated:  09/17/18 0637     Glucose 240 (H) mg/dL      Comment: Serial Number: [id number]:  [id number]       Wound Culture - Drainage, Scrotum [[id number]]  (Abnormal) Collected:  09/15/18 1826    Specimen:  Drainage from Scrotum Updated:  09/17/18 0631     Wound Culture Light growth (2+) Gram Negative Bacilli (A)     Comment: Identification and GABRIEL to follow.         POC Glucose Once [[id number]]  (Abnormal) Collected:  09/16/18 2120    Specimen:  Blood Updated:  09/16/18 2125     Glucose 203 (H) mg/dL      Comment: Serial Number: [id number]:  [id number]       POC Glucose Once [[id number]]  (Normal) Collected:  09/16/18 3805    Specimen:  Blood  Updated:  09/16/18 1730     Glucose 130 mg/dL      Comment: Serial Number: [id number]:  [id number]       Blood Culture - Blood, [[id number]]  (Normal) Collected:  09/15/18 1339    Specimen:  Blood from Arm, Left Updated:  09/16/18 1345     Blood Culture No growth at 24 hours    POC Glucose Once [[id number]]  (Abnormal) Collected:  09/16/18 1132    Specimen:  Blood Updated:  09/16/18 1148     Glucose 204 (H) mg/dL      Comment: Serial Number: [id number]:  [id number]       Procalcitonin [[id number]]  (Abnormal) Collected:  09/16/18 0618    Specimen:  Blood Updated:  09/16/18 0902     Procalcitonin 1.00 (H) ng/mL     Narrative:       As a Marker for Sepsis (Non-Neonates):   1. <0.5 ng/mL represents a low risk of severe sepsis and/or septic shock.  2. >2 ng/mL represents a high risk of severe sepsis and/or septic shock.    As a Marker for Lower Respiratory Tract Infections that require antibiotic therapy:    PCT on Admission     Antibiotic Therapy       6-12 Hrs later  > 0.5                Strongly Recommended             >0.25 - <0.5         Recommended  0.1 - 0.25           Discouraged              Remeasure/reassess PCT  <0.1                 Strongly Discouraged     Remeasure/reassess PCT                     PCT values of < 0.5 ng/mL do not exclude an infection, because localized infections (without systemic signs) may be associated with such low concentrations, or a systemic infection in its initial stages (< 6 hours). Furthermore, increased PCT can occur without infection. PCT concentrations between 0.5 and 2.0 ng/mL should be interpreted taking into account the patient's history. It is recommended to retest PCT within 6-24 hours if any concentrations < 2 ng/mL are obtained.    Vancomycin, Random [[id number]]  (Normal) Collected:  09/16/18 0618    Specimen:  Blood Updated:  09/16/18 0723     Vancomycin Random 19.05 mcg/mL     POC Glucose Once [[id number]]  (Abnormal) Collected:  09/16/18 0631    Specimen:  Blood  Updated:  09/16/18 0645     Glucose 147 (H) mg/dL      Comment: Serial Number: [id number]:  [id number]       Basic Metabolic Panel [[id number]]  (Abnormal) Collected:  09/16/18 0618    Specimen:  Blood Updated:  09/16/18 0644     Glucose 152 (H) mg/dL      BUN 18 mg/dL      Creatinine 3.10 (H) mg/dL      Sodium 135 (L) mmol/L      Potassium 4.3 mmol/L      Chloride 92 (L) mmol/L      CO2 34.0 (H) mmol/L      Calcium 8.8 mg/dL      eGFR Non African Amer 21 (L) mL/min/1.73      BUN/Creatinine Ratio 5.8 (L)     Anion Gap 13.3 mmol/L     Narrative:       GFR Normal >60  Chronic Kidney Disease <60  Kidney Failure <15    CBC Auto Differential [[id number]]  (Abnormal) Collected:  09/16/18 0618    Specimen:  Blood Updated:  09/16/18 0628     WBC 7.07 10*3/mm3      RBC 3.28 (L) 10*6/mm3      Hemoglobin 9.9 (L) g/dL      Hematocrit 30.7 (L) %      MCV 93.6 fL      MCH 30.2 pg      MCHC 32.2 g/dL      RDW 14.9 (H) %      RDW-SD 50.4 fl      MPV 9.5 fL      Platelets 191 10*3/mm3      Neutrophil % 65.3 %      Lymphocyte % 8.2 (L) %      Monocyte % 13.0 (H) %      Eosinophil % 11.5 (H) %      Basophil % 1.3 %      Immature Grans % 0.7 (H) %      Neutrophils, Absolute 4.62 10*3/mm3      Lymphocytes, Absolute 0.58 (L) 10*3/mm3      Monocytes, Absolute 0.92 (H) 10*3/mm3      Eosinophils, Absolute 0.81 (H) 10*3/mm3      Basophils, Absolute 0.09 10*3/mm3      Immature Grans, Absolute 0.05 10*3/mm3      nRBC 0.0 /100 WBC     POC Glucose Once [[id number]]  (Abnormal) Collected:  09/15/18 2104    Specimen:  Blood Updated:  09/15/18 2111     Glucose 365 (H) mg/dL      Comment: Serial Number: [id number]:  [id number]       POC Glucose Once [[id number]]  (Abnormal) Collected:  09/15/18 1627    Specimen:  Blood Updated:  09/15/18 1650     Glucose 302 (H) mg/dL      Comment: Serial Number: [id number]:  [id number]       MRSA Screen Culture - Swab, Nares [[id number]] Collected:  09/15/18 1456    Specimen:  Swab from Nares Updated:   09/15/18 1503    VRE Culture - Swab, Per Rectum [[id number]] Collected:  09/15/18 1456    Specimen:  Swab from Per Rectum Updated:  09/15/18 1503    Acinetobacter Screen - Swab, Nares [[id number]] Collected:  09/15/18 1456    Specimen:  Swab from Nares Updated:  09/15/18 1503    [place] Draw [[id number]] Collected:  09/15/18 1145    Specimen:  Blood Updated:  09/15/18 1445    Narrative:       The following orders were created for panel order [place] Draw.  Procedure                               Abnormality         Status                     ---------                               -----------         ------                     Light Blue Top[[id number]]                                   Final result               Lavender Top[[id number]]                                     Final result               Gold Top - SST[[id number]]                                   Final result               Green Top (No Gel)[[id number]]                               Final result                 Please view results for these tests on the individual orders.    Light Blue Top [[id number]] Collected:  09/15/18 1340    Specimen:  Blood Updated:  09/15/18 1445     Extra Tube hold for add-on     Comment: Auto resulted       Gold Top - SST [[id number]] Collected:  09/15/18 1340    Specimen:  Blood Updated:  09/15/18 1445     Extra Tube Hold for add-ons.     Comment: Auto resulted.       Lactic Acid, Plasma [[id number]]  (Normal) Collected:  09/15/18 1340    Specimen:  Blood Updated:  09/15/18 1356     Lactate 0.6 mmol/L     Green Top (No Gel) [[id number]] Collected:  09/15/18 1145    Specimen:  Blood Updated:  09/15/18 1330     Extra Tube Hold for add-ons.     Comment: Auto resulted.       Lavender Top [[id number]] Collected:  09/15/18 1145    Specimen:  Blood Updated:  09/15/18 1300     Extra Tube hold for add-on     Comment: Auto resulted       Comprehensive Metabolic Panel [[id number]]  (Abnormal) Collected:  09/15/18 1145    Specimen:  Blood Updated:   09/15/18 1229     Glucose 210 (H) mg/dL      Comment: Glucose >180, Hemoglobin A1C recommended.        BUN 10 mg/dL      Creatinine 1.80 (H) mg/dL      Sodium 133 (L) mmol/L      Potassium 3.8 mmol/L      Chloride 89 (L) mmol/L      CO2 35.0 (H) mmol/L      Calcium 9.1 mg/dL      Total Protein 7.0 g/dL      Albumin 3.80 g/dL      ALT (SGPT) 19 U/L      AST (SGOT) 37 U/L      Alkaline Phosphatase 223 (H) U/L      Total Bilirubin 0.8 mg/dL      eGFR Non African Amer 39 (L) mL/min/1.73      Globulin 3.2 gm/dL      A/G Ratio 1.2 g/dL      BUN/Creatinine Ratio 5.6 (L)     Anion Gap 12.8 mmol/L     Narrative:       GFR Normal >60  Chronic Kidney Disease <60  Kidney Failure <15    Troponin [[id number]]  (Abnormal) Collected:  09/15/18 1145    Specimen:  Blood Updated:  09/15/18 1229     Troponin I 0.046 (H) ng/mL     Narrative:       Normal Patient Upper Reference Limit (URL) (99th Percentile)=0.03 ng/mL   Non-AMI Illness Reference Limit=0.03-0.11 ng/mL   AMI Confirmation=0.12 ng/mL and above    Magnesium [[id number]]  (Normal) Collected:  09/15/18 1145    Specimen:  Blood Updated:  09/15/18 1229     Magnesium 1.9 mg/dL     CBC & Differential [[id number]] Collected:  09/15/18 1145    Specimen:  Blood Updated:  09/15/18 1150    Narrative:       The following orders were created for panel order CBC & Differential.  Procedure                               Abnormality         Status                     ---------                               -----------         ------                     CBC Auto Differential[[id number]]        Abnormal            Final result                 Please view results for these tests on the individual orders.    CBC Auto Differential [[id number]]  (Abnormal) Collected:  09/15/18 1145    Specimen:  Blood Updated:  09/15/18 1150     WBC 6.26 10*3/mm3      RBC 3.35 (L) 10*6/mm3      Hemoglobin 10.2 (L) g/dL      Hematocrit 30.4 (L) %      MCV 90.7 fL      MCH 30.4 pg      MCHC 33.6 g/dL      RDW 14.6 (H) %       RDW-SD 48.7 fl      MPV 9.1 fL      Platelets 225 10*3/mm3      Neutrophil % 66.1 %      Lymphocyte % 10.1 %      Monocyte % 11.8 %      Eosinophil % 10.2 (H) %      Basophil % 1.3 %      Immature Grans % 0.5 %      Neutrophils, Absolute 4.14 10*3/mm3      Lymphocytes, Absolute 0.63 10*3/mm3      Monocytes, Absolute 0.74 10*3/mm3      Eosinophils, Absolute 0.64 10*3/mm3      Basophils, Absolute 0.08 10*3/mm3      Immature Grans, Absolute 0.03 10*3/mm3      nRBC 0.0 /100 WBC         Imaging Results (last 72 hours)     Procedure Component Value Units Date/Time    XR Chest 1 View [[id number]] Collected:  09/15/18 1236     Updated:  09/15/18 1240    Narrative:       PROCEDURE: XR CHEST 1 VW-     HISTORY: Weak/Dizzy/AMS triage protocol     COMPARISON: None.     FINDINGS: Electrodes overlie the chest. Atherosclerosis is noted. The  heart is normal in size. The mediastinum is unremarkable. Vague right  upper lobe opacity. Right basilar consolidate with effusion. There is no  pneumothorax.  There are no acute osseous abnormalities.           Impression:       Vague right upper lobe opacity. Right basilar consolidate  with effusion. Findings likely secondary to pneumonia.     Continued followup is recommended.     This report was finalized on 9/15/2018 12:38 PM by Devin Villagomez DO.    CT Chest Without Contrast [[id number]] Collected:  09/15/18 1232     Updated:  09/15/18 1238    Narrative:       PROCEDURE: CT CHEST WO CONTRAST-     HISTORY: right chest pain     COMPARISON: July 27, 2018.     PROCEDURE:  Multiple axial CT images were obtained from the thoracic  inlet through the upper abdomen without the use of contrast.      FINDINGS: Lack of intravenous contrast limits evaluation of the solid  organs, the mediastinum, and the vasculature.        Soft tissue windows reveal no axillary, hilar or mediastinal adenopathy.  Atherosclerosis is noted. There is evidence for calcified granulomatous  disease. Heart size is normal.  The aorta is normal in caliber. Right  upper lobe infiltrate, right lower lobe infiltrate, and probable  atelectasis at the left base and small bilateral pleural effusions.    .  The visualized upper abdomen is unremarkable. Bone windows reveal no  acute osseous abnormalities.       Impression:       Findings consistent with multifocal pneumonia.     255.06 mGy.cm          This study was performed with techniques to keep radiation doses as low  as reasonably achievable (ALARA). Individualized dose reduction  techniques using automated exposure control or adjustment of mA and/or  kV according to the patient size were employed.    Contrast     This report was finalized on 9/15/2018 12:36 PM by Devin Villagomez DO.    CT Head Without Contrast [[id number]] Collected:  09/15/18 1230     Updated:  09/15/18 1233    Narrative:       PROCEDURE: CT HEAD WO CONTRAST-     HISTORY: dizziness     COMPARISON:  None .     TECHNIQUE: Multiple axial CT images were performed from the foramen  magnum to the vertex without enhancement.      FINDINGS: There is no CT evidence of hemorrhage. There is no mass, mass  effect or midline shift.  Cerebral atrophy with without evidence of  hydrocephalus. The paranasal sinuses are clear. Bone windows reveal no  acute osseous abnormalities. Atherosclerosis is noted.       Impression:       No acute intracranial process.             803.69 mGy.cm          This study was performed with techniques to keep radiation doses as low  as reasonably achievable (ALARA). Individualized dose reduction  techniques using automated exposure control or adjustment of mA and/or  kV according to the patient size were employed.      This report was finalized on 9/15/2018 12:31 PM by Devin Villagomez DO.              amLODIPine 10 mg Oral Q24H   aspirin 81 mg Oral Daily   atorvastatin 10 mg Oral Nightly   cefepime 1 g Intravenous Q24H   clopidogrel 75 mg Oral Daily   digoxin 125 mcg Oral Once per day on Tue Thu [facility]  sodium 100 mg Oral Q12H   folic acid 1 mg Oral Daily   heparin (porcine) 5,000 Units Subcutaneous Q12H   hydrALAZINE 50 mg Oral TID   insulin aspart 0-7 Units Subcutaneous 4x Daily AC & at Bedtime   insulin detemir 8 Units Subcutaneous Daily   isosorbide mononitrate 30 mg Oral Daily   lactobacillus acidophilus 1 capsule Oral BID   lanthanum 1,000 mg Oral TID With Meals   levothyroxine 100 mcg Oral Q AM   losartan 100 mg Oral Q24H   metoprolol succinate  mg Oral Daily   pantoprazole 40 mg Oral QAM AC   spironolactone 25 mg Oral Daily   vitamin D3 5,000 Units Oral Daily       Pharmacy Consult        Assessment/Plan     1. End stage renal disease on dialysis (CMS/[facility])  2. Chronic kidney disease-mineral and bone disorder:  3. Pneumonia of right lower lobe due to infectious organism (CMS/[facility])  4. Type 2 diabetes mellitus treated with insulin (CMS/[facility]) uncontrolled.  5. Anemia of chronic kidney disease  6. Chronic right-sided loculated pleural effusion status post pleurodesis  7. Coronary artery disease with medical management  8. Chronic scrotal ulcer followed by [facility] urology  9. Hypertension with end-stage renal disease  10. Physical debility    Plan:    · Dialysis schedule on Tuesday Thursday Saturday, he runs 4 hours on a standard bath usually gains 2-3 L in between dialysis.  I will go ahead and make arrangements.  · His pleuritic chest pain we'll go ahead and start him on ibuprofen 600 mg twice a day for 3 days.  · Scrotal abscess, patient is willing to see if there is a local urologist who will be able to help him so he does not have to go to [place].  · Continue antibiotics  · Will order labs predialysis.  · Surveillance labs.  · Details were discussed with the patient as well as family in the room.    · Details were also discussed with the hospitalist service.   · Further recommendations will depend on clinical course of the patient during the current hospitalization.    · I also  discussed the details with the nursing staff.  · Rest as ordered.    In closing, I sincerely appreciate opportunity to participate in care of this patient. If I can be of any further assistance with the management of this patient, please don’t hesitate to contact me.    Chance Mackey MD, ARJUN  09/18/18  7:25 AM    Dictated using Dragon.

## 2018-09-18 NOTE — PLAN OF CARE
Problem: Pneumonia (Adult)  Goal: Signs and Symptoms of Listed Potential Problems Will be Absent, Minimized or Managed (Pneumonia)  Outcome: Ongoing (interventions implemented as appropriate)   09/18/18 2859   Goal/Outcome Evaluation   Problems Assessed (Pneumonia) all   Problems Present (Pneumonia) respiratory compromise;infection progression;fluid/electrolyte imbalance

## 2018-09-18 NOTE — PLAN OF CARE
Problem: Patient Care Overview  Goal: Plan of Care Review   09/18/18 7097   Coping/Psychosocial   Plan of Care Reviewed With patient   OTHER   Outcome Summary Pt has no complaints. Dialysis this morning. Antibiotics continued. Telemetry discontinued. Pt tolerated dialysis well. No current complaints. Will continue to monitor.    Plan of Care Review   Progress no change

## 2018-09-18 NOTE — PROGRESS NOTES
PROGRESS NOTE        Date of Admission: 9/15/2018  Length of Stay: 1  Primary Care Physician: Idalia Kay MD    Subjective   Chief Complaint:  Pneumonia follow up  HPI: This is 58-year-old male who is admitted with right upper lobe pneumonia.  He does appear to be doing better.  He has been seen in hemodialysis today.  Nephrology has been consulted for dialysis management.  He was noted over the weekend to have some drainage from his scrotum for which a culture was obtained which grow Proteus Mirabilis.  According to the patient for the past 2 years he's have problems with drainage.  He has been seen at [facility] brace had drainage in the past.  He was told should he have any more problems he may require surgery.  He is asking for urological evaluation in [facility].  I did speak with Dr. Mackey who recommended having neurology evaluate the patient.  I did see and evaluate there is no significant drainage noted at this time.  He does not have any significant swelling he may have some mild erythema.  I will try to get his notes from the [facility].  He did have MRSA of the nares for which she has been started on Bactroban.  He is on cefepime and vancomycin for his pneumonia which will cover given he has MRSA in his nares in case there is an MRSA pneumonia.       Review Of Systems:   Review of Systems   General ROS: Patient denies any fevers, chills or loss of consciousness.    Psychological ROS: Denies any hallucinations and delusions.  Ophthalmic ROS: Denies any diplopia or transient loss of vision.  ENT ROS: Denies sore throat, nasal congestion or ear pain.   Hematological and Lymphatic ROS: Denies neck swelling or easy bleeding.  Endocrine ROS: Denies any recent unintentional weight gain or loss.  Respiratory ROS: Cough and shortness of breath. Right side pleuritic pain improving  Cardiovascular ROS: Denies chest pain or palpitations. No history of exertional chest  pain.   Gastrointestinal ROS: Denies nausea and vomiting. Denies any abdominal pain. No diarrhea.  Genito-Urinary ROS: Denies dysuria or hematuria. Scrotal edema and draining.    Musculoskeletal ROS: Denies back pain. No muscle pain. No calf pain.   Neurological ROS: Denies any focal weakness. No loss of consciousness. Denies any numbness. Denies neck pain.   Dermatological ROS: Denies any redness or pruritis.    Objective      Temp:  [98 °F (36.7 °C)-98.4 °F (36.9 °C)] 98.1 °F (36.7 °C)  Heart Rate:  [66-80] 73  Resp:  [16-18] 16  BP: (128-150)/(47-67) 136/52  Physical Exam    General Appearance:  Alert and cooperative, not in any acute distress.   Head:  Atraumatic and normocephalic, without obvious abnormality.   Eyes:          PERRLA, conjunctivae and sclerae normal, no Icterus. No pallor. Extraocular movements are within normal limits.   Ears:  Ears appear intact with no abnormalities noted.   Throat: No oral lesions, no thrush, oral mucosa moist.   Neck: Supple, trachea midline, no thyromegaly, no carotid bruit.       Lungs:   Chest shape is normal. Breath sounds heard bilaterally equally.  No crackles or wheezing. No Pleural rub or bronchial breathing.   Heart:  Normal S1 and S2, no murmur, no gallop, no rub. No JVD   Abdomen:   Normal bowel sounds, no masses, no organomegaly. Soft    non-tender, non-distended, no guarding, no rebound             tenderness   Extremities: Moves all extremities well, no edema, no cyanosis, no clubbing.  AV fistula RUE.     Pulses: Pulses palpable and equal bilaterally   Skin: No bleeding, bruising or rash   Lymph nodes: No palpable adenopathy   Neurologic:    Psychiatric/Behavior:     Cranial nerves 2 - 12 grossly intact, sensation intact, Motor power is normal and equal bilaterally.  Mood normal, behavior normal       Results Review:    I have reviewed the labs, radiology results and diagnostic studies.      Results from last 7 days  Lab Units 09/18/18  0930   WBC 10*3/mm3  6.90   HEMOGLOBIN g/dL 9.3*   PLATELETS 10*3/mm3 176       Results from last 7 days  Lab Units 09/18/18  0930   SODIUM mmol/L 132*   POTASSIUM mmol/L 5.5*   CO2 mmol/L 25.0*   CREATININE mg/dL 5.70*   GLUCOSE mg/dL 99*       Culture Data:   Blood Culture   Date Value Ref Range Status   09/15/2018 No growth at 2 days  Preliminary   09/15/2018 No growth at 2 days  Preliminary     Wound Culture   Date Value Ref Range Status   09/15/2018 Light growth (2+) Proteus mirabilis (A)  Preliminary     Comment:            MRSA SCREEN CX   Date Value Ref Range Status   09/15/2018 Staphylococcus aureus, MRSA (A)  Final     Comment:       Methicillin resistant Staphylococcus aureus, Patient may be an isolation risk.     Radiology Data:   Cardiology Data:    I have reviewed the medications.    Assessment/Plan     Assessment and Plan:   1.  Right upper lobe and lower lobe healthcare associated pneumonia-present upon admission.  Patient is on IV antibiotics in the form of cefepime, Levaquin and vancomycin.  We will work to de-escalate his antibiotic therapy.  He did grow MRSA in his nares so we will continue to cover for MRSA until we can get a sputum culture.    2.  Right-sided pleuritic chest pain secondary to #1-improving    3.  Chronic scrotal edema with drainage-urology has been consulted.  We'll try to get his records from the [facility].  The drainage was cultured which is growing Proteus which is sensitive to cefepime.    4.  MRSA of the nares-Bactroban    5.  Coronary artery disease-medical management    6.  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  End-stage renal disease on hemodialysis Tuesday, Thursday, and Saturday.     8.  Chronic debility    9.  Chronic congestive heart failure- stable          DVT prophylaxis:  Heparin  Discharge Planning:   Judit Person, ALEXYS 09/18/18 11:28 AM

## 2018-09-19 ENCOUNTER — APPOINTMENT (OUTPATIENT)
Dept: GENERAL RADIOLOGY | Facility: HOSPITAL | Age: 58
End: 2018-09-19

## 2018-09-19 VITALS
OXYGEN SATURATION: 100 % | HEART RATE: 77 BPM | SYSTOLIC BLOOD PRESSURE: 153 MMHG | BODY MASS INDEX: 19.35 KG/M2 | WEIGHT: 127.65 LBS | RESPIRATION RATE: 18 BRPM | TEMPERATURE: 98.2 F | HEIGHT: 68 IN | DIASTOLIC BLOOD PRESSURE: 87 MMHG

## 2018-09-19 LAB
ACINETOBACTER SCREEN CX: NORMAL
ALBUMIN SERPL-MCNC: 3.7 G/DL (ref 3.5–5)
ANION GAP SERPL CALCULATED.3IONS-SCNC: 16.5 MMOL/L (ref 10–20)
BACTERIA SPEC AEROBE CULT: ABNORMAL
BACTERIA SPEC AEROBE CULT: ABNORMAL
BUN BLD-MCNC: 24 MG/DL (ref 7–20)
BUN/CREAT SERPL: 6.5 (ref 6.3–21.9)
CALCIUM SPEC-SCNC: 9.5 MG/DL (ref 8.4–10.2)
CHLORIDE SERPL-SCNC: 96 MMOL/L (ref 98–107)
CO2 SERPL-SCNC: 26 MMOL/L (ref 26–30)
CREAT BLD-MCNC: 3.7 MG/DL (ref 0.6–1.3)
DEPRECATED RDW RBC AUTO: 51.2 FL (ref 37–54)
ERYTHROCYTE [DISTWIDTH] IN BLOOD BY AUTOMATED COUNT: 14.8 % (ref 11.5–14.5)
GFR SERPL CREATININE-BSD FRML MDRD: 17 ML/MIN/1.73
GLUCOSE BLD-MCNC: 240 MG/DL (ref 74–98)
GLUCOSE BLDC GLUCOMTR-MCNC: 234 MG/DL (ref 70–130)
GLUCOSE BLDC GLUCOMTR-MCNC: 270 MG/DL (ref 70–130)
GLUCOSE BLDC GLUCOMTR-MCNC: 318 MG/DL (ref 70–130)
HCT VFR BLD AUTO: 29.1 % (ref 42–52)
HGB BLD-MCNC: 9.3 G/DL (ref 14–18)
MCH RBC QN AUTO: 30 PG (ref 27–31)
MCHC RBC AUTO-ENTMCNC: 32 G/DL (ref 30–37)
MCV RBC AUTO: 93.9 FL (ref 80–94)
PHOSPHATE SERPL-MCNC: 5.1 MG/DL (ref 2.5–4.5)
PLATELET # BLD AUTO: 175 10*3/MM3 (ref 130–400)
PMV BLD AUTO: 10.2 FL (ref 6–12)
POTASSIUM BLD-SCNC: 5.5 MMOL/L (ref 3.5–5.1)
PROCALCITONIN SERPL-MCNC: 0.94 NG/ML
RBC # BLD AUTO: 3.1 10*6/MM3 (ref 4.7–6.1)
SODIUM BLD-SCNC: 133 MMOL/L (ref 137–145)
WBC NRBC COR # BLD: 5.97 10*3/MM3 (ref 4.8–10.8)

## 2018-09-19 PROCEDURE — 82962 GLUCOSE BLOOD TEST: CPT

## 2018-09-19 PROCEDURE — 71045 X-RAY EXAM CHEST 1 VIEW: CPT

## 2018-09-19 PROCEDURE — 85027 COMPLETE CBC AUTOMATED: CPT | Performed by: NURSE PRACTITIONER

## 2018-09-19 PROCEDURE — 63710000001 INSULIN DETEMIR PER 5 UNITS: Performed by: INTERNAL MEDICINE

## 2018-09-19 PROCEDURE — 99239 HOSP IP/OBS DSCHRG MGMT >30: CPT | Performed by: NURSE PRACTITIONER

## 2018-09-19 PROCEDURE — 80069 RENAL FUNCTION PANEL: CPT | Performed by: NURSE PRACTITIONER

## 2018-09-19 PROCEDURE — 25010000002 HEPARIN (PORCINE) PER 1000 UNITS: Performed by: INTERNAL MEDICINE

## 2018-09-19 PROCEDURE — 84145 PROCALCITONIN (PCT): CPT | Performed by: NURSE PRACTITIONER

## 2018-09-19 PROCEDURE — 63710000001 INSULIN ASPART PER 5 UNITS: Performed by: INTERNAL MEDICINE

## 2018-09-19 RX ORDER — L.ACID,PARA/B.BIFIDUM/S.THERM 8B CELL
1 CAPSULE ORAL 2 TIMES DAILY
Qty: 7 CAPSULE | Refills: 0 | Status: SHIPPED | OUTPATIENT
Start: 2018-09-19 | End: 2018-12-17

## 2018-09-19 RX ADMIN — PANTOPRAZOLE SODIUM 40 MG: 40 TABLET, DELAYED RELEASE ORAL at 06:30

## 2018-09-19 RX ADMIN — ISOSORBIDE MONONITRATE 30 MG: 30 TABLET, EXTENDED RELEASE ORAL at 09:05

## 2018-09-19 RX ADMIN — FOLIC ACID 1 MG: 1 TABLET ORAL at 09:06

## 2018-09-19 RX ADMIN — HEPARIN SODIUM 5000 UNITS: 5000 INJECTION, SOLUTION INTRAVENOUS; SUBCUTANEOUS at 09:02

## 2018-09-19 RX ADMIN — LEVOTHYROXINE SODIUM 100 MCG: 100 TABLET ORAL at 06:11

## 2018-09-19 RX ADMIN — INSULIN DETEMIR 8 UNITS: 100 INJECTION, SOLUTION SUBCUTANEOUS at 09:12

## 2018-09-19 RX ADMIN — CHOLECALCIFEROL CAP 125 MCG (5000 UNIT) 5000 UNITS: 125 CAP at 09:05

## 2018-09-19 RX ADMIN — MUPIROCIN 1 APPLICATION: 20 OINTMENT TOPICAL at 09:09

## 2018-09-19 RX ADMIN — Medication 1 CAPSULE: at 09:03

## 2018-09-19 RX ADMIN — LANTHANUM CARBONATE 1000 MG: 500 TABLET, CHEWABLE ORAL at 09:07

## 2018-09-19 RX ADMIN — CLOPIDOGREL BISULFATE 75 MG: 75 TABLET ORAL at 09:03

## 2018-09-19 RX ADMIN — HYDRALAZINE HYDROCHLORIDE 50 MG: 25 TABLET ORAL at 09:05

## 2018-09-19 RX ADMIN — INSULIN ASPART 5 UNITS: 100 INJECTION, SOLUTION INTRAVENOUS; SUBCUTANEOUS at 12:11

## 2018-09-19 RX ADMIN — AMLODIPINE BESYLATE 10 MG: 5 TABLET ORAL at 09:03

## 2018-09-19 RX ADMIN — IBUPROFEN 600 MG: 600 TABLET ORAL at 09:04

## 2018-09-19 RX ADMIN — DOCUSATE SODIUM 100 MG: 100 CAPSULE, LIQUID FILLED ORAL at 06:11

## 2018-09-19 RX ADMIN — INSULIN ASPART 3 UNITS: 100 INJECTION, SOLUTION INTRAVENOUS; SUBCUTANEOUS at 07:57

## 2018-09-19 RX ADMIN — METOPROLOL SUCCINATE 100 MG: 100 TABLET, EXTENDED RELEASE ORAL at 09:03

## 2018-09-19 RX ADMIN — SPIRONOLACTONE 25 MG: 25 TABLET ORAL at 09:05

## 2018-09-19 RX ADMIN — LOSARTAN POTASSIUM 100 MG: 50 TABLET, FILM COATED ORAL at 09:03

## 2018-09-19 RX ADMIN — LANTHANUM CARBONATE 1000 MG: 500 TABLET, CHEWABLE ORAL at 12:11

## 2018-09-19 RX ADMIN — ASPIRIN 81 MG: 81 TABLET, COATED ORAL at 09:05

## 2018-09-19 NOTE — PLAN OF CARE
Problem: Wound (Includes Pressure Injury) (Adult)  Goal: Signs and Symptoms of Listed Potential Problems Will be Absent, Minimized or Managed (Wound)  Outcome: Ongoing (interventions implemented as appropriate)   09/19/18 1326   Goal/Outcome Evaluation   Problems Assessed (Wound) all   Problems Present (Wound) delayed wound healing

## 2018-09-19 NOTE — DISCHARGE SUMMARY
[facility]   DISCHARGE SUMMARY    Name:  Talha Cutler   Age:  58 y.o.  Sex:  male  :  1960  MRN:  [id number]   Visit Number:  [id number]  Primary Care Physician:  Idalia Kay MD  Date of Discharge:  2018  Admission Date:  9/15/2018      Presenting Problem:    Pneumonia due to infectious organism, unspecified laterality, unspecified part of lung [J18.9]  Pneumonia due to infectious organism, unspecified laterality, unspecified part of lung [J18.9]  Pneumonia due to infectious organism, unspecified laterality, unspecified part of lung [J18.9]       Discharge Diagnosis:     Principal Problem:    Pneumonia of right lower lobe due to infectious organism (CMS/[facility])  Active Problems:    Type 2 diabetes mellitus treated with insulin (CMS/[facility])    End stage renal disease on dialysis (CMS/[facility])    Physical debility    Pneumonia due to infectious organism        Past Medical History:  Past Medical History:   Diagnosis Date   • Anemia    • CHF (congestive heart failure) (CMS/[facility])    • Chronic kidney disease    • Diabetes mellitus (CMS/[facility])    • H/O chest x-ray 2016    Interval decrease in size of the patients right pleural effusion with a persistent small left effusion as well   • History of transfusion    • Hypertension    • Left-sided weakness    • Renal failure    • Stroke (CMS/[facility])          Consults:     Consults     Date and Time Order Name Status Description    2018 1310 Inpatient Urology Consult      2018 1711 Inpatient Nephrology Consult Completed           Procedures Performed:  None             History of presenting illness/Hospital Course:  This is a 58-year-old male with history of end-stage renal disease on hemodialysis, diabetes mellitus type 2, and anemia he was brought into the emergency room from the dialysis center with complaints of right-sided pleuritic type chest pain and shortness of breath.  According to the patient had been doing  fairly well at not been hospitalized in over 4 months however he developed some weakness and right-sided pleuritic chest pain as well as shortness of breath.  The symptoms continued to worsen over the last 24 hours prior to admission.  Upon evaluation in the emergency room a CT of the chest was done which showed a right upper and lower lobe pneumonia.  He was started on broad-spectrum IV antibiotics in the form of Levaquin, cefepime and vancomycin.  He was admitted to the hospitalist service for further medical management.    Upon admission to the hospitalist service patient was continued on antibiotics as well as breathing treatments and mucolytics.  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  I did speak with Dr. Torres salazar here at [facility] and he will see him in the office.  An ultrasound of the scrotum was done which did not show any fluid or abscess formation.  He does have some scrotal wall thickening.  There was no evidence of an abscess.  Culture was done which grew staph aureus as well as Proteus Mirabilis.  Blood cultures were done which show no growth.      MRSA the naris was positive so the vancomycin was continued since he was not able to give a sputum culture.  I have also started the patient on Bactroban for the MRSA of the nares.  I have seen and evaluated patient today at bedside and he does appear to be feeling better.  A repeat chest x-ray done today does show interval improvement of his pneumonia.  He had only a mildly elevated pro calcitonin at the time of admission that has trended down.  He states he is feeling better at this time.  We will arrange for him to follow-up with pulmonary at  where he has been seen before as well as urology follow-up here in [place].  He is otherwise hemodynamically stable and  symptomatically improved for discharge home.  I have spoken with Dr. Mackey with nephrology who will arrange for 3 more doses of ceftaz as well as vancomycin.  I have discussed treatment plan with the patient and he is satisfied with this plan.  We will plan to discharge home with appropriate follow-ups.          Vital Signs:    Temp:  [97.9 °F (36.6 °C)-98.2 °F (36.8 °C)] 98.2 °F (36.8 °C)  Heart Rate:  [77-91] 77  Resp:  [16-18] 18  BP: (146-157)/(48-87) 153/87    Physical Exam:  General Appearance:    Alert and cooperative, not in any acute distress.   Head:    Atraumatic and normocephalic, without obvious abnormality.   Eyes:            PERRLA, conjunctivae and sclerae normal, no Icterus. No pallor. Extra-occular movements are within normal limits.   Ears:    Ears appear intact with no abnormalities noted.   Throat:   No oral lesions, no thrush, oral mucosa moist.   Neck:   Supple, trachea midline, no thyromegaly, no carotid bruit.   Back:     No kyphoscoliosis present. No tenderness to palpation,   range of motion normal.   Lungs:     Chest shape is normal. Breath sounds heard bilaterally equally.  No crackles or wheezing. No Pleural rub or bronchial breathing.  Right side tenderness with palpation and cough    Heart:    Normal S1 and S2, no murmur, no gallop, no rub. No JVD   Abdomen:     Normal bowel sounds, no masses, no organomegaly. Soft        non-tender, non-distended, no guarding, no rebound                tenderness   Extremities:   Moves all extremities well, no edema, no cyanosis, no             Clubbing, AV fistula RUE   Pulses:   Pulses palpable and equal bilaterally   Skin:   No bleeding, bruising or rash, right foot medial open area that appear to be healing with no drainage.  Multiple scabs, scars to lower extremities.     Lymph nodes:  Psychiatric/Behavior:    No palpable adenopathy    Normal mood, normal behavior   Neurologic:   Cranial nerves 2 - 12 grossly intact, sensation intact, Motor  power is normal and equal bilaterally.           Pertinent Lab Results:       Results from last 7 days  Lab Units 09/19/18  0645 09/18/18  0930 09/16/18  0618 09/15/18  1145   SODIUM mmol/L 133* 132* 135* 133*   POTASSIUM mmol/L 5.5* 5.5* 4.3 3.8   CHLORIDE mmol/L 96* 93* 92* 89*   CO2 mmol/L 26.0 25.0* 34.0* 35.0*   BUN mg/dL 24* 38* 18 10   CREATININE mg/dL 3.70* 5.70* 3.10* 1.80*   CALCIUM mg/dL 9.5 9.1 8.8 9.1   BILIRUBIN mg/dL  --   --   --  0.8   ALK PHOS U/L  --   --   --  223*   ALT (SGPT) U/L  --   --   --  19   AST (SGOT) U/L  --   --   --  37   GLUCOSE mg/dL 240* 99* 152* 210*       Results from last 7 days  Lab Units 09/19/18  0645 09/18/18  0930 09/16/18  0618   WBC 10*3/mm3 5.97 6.90 7.07   HEMOGLOBIN g/dL 9.3* 9.3* 9.9*   HEMATOCRIT % 29.1* 28.7* 30.7*   PLATELETS 10*3/mm3 175 176 191         Blood Culture   Date Value Ref Range Status   09/15/2018 No growth at 3 days  Preliminary   09/15/2018 No growth at 3 days  Preliminary     Wound Culture   Date Value Ref Range Status   09/15/2018 Light growth (2+) Proteus mirabilis (A)  Final     Comment:          09/15/2018 Staphylococcus aureus (A)  Final     MRSA SCREEN CX   Date Value Ref Range Status   09/15/2018 Staphylococcus aureus, MRSA (A)  Final     Comment:       Methicillin resistant Staphylococcus aureus, Patient may be an isolation risk.         Pertinent Radiology Results:    Imaging Results (all)     Procedure Component Value Units Date/Time    XR Chest 1 View [[id number]] Collected:  09/19/18 1216     Updated:  09/19/18 1219    Narrative:       PROCEDURE: XR CHEST 1 VW-     HISTORY: pneumonia; J18.9-Pneumonia, unspecified organism     COMPARISON: September 15, 2018.     FINDINGS: The heart is normal in size. The mediastinum is unremarkable.  There are chronic interstitial lung changes. There is been interval  improvement in the patient's right basilar airspace disease, however an  opacity persists. There is pleural thickening in the right lung  base.  The left lung is clear. There is no pneumothorax.  There are no acute  osseous abnormalities.           Impression:       Interval improvement in the patient's right basilar airspace  disease.     Continued followup is recommended.     This report was finalized on 9/19/2018 12:17 PM by Marilia Mora M.D..    US Scrotum & Testicles [[id number]] Collected:  09/18/18 1614     Updated:  09/18/18 1617    Narrative:       PROCEDURE: US SCROTUM AND TESTICLES-     HISTORY: drainage and edema; J18.9-Pneumonia, unspecified organism     PROCEDURE: Ultrasound images of the testicles were obtained bilaterally.  Color Doppler images were obtained.     FINDINGS: The testicles are normal in size and echotexture bilaterally.  Arterial flow is identified bilaterally. No intratesticular masses are  identified. Note is made of scrotal wall thickening.       Impression:       Scrotal wall thickening with no evidence of a discrete fluid  collection.     This report was finalized on 9/18/2018 4:15 PM by Marilia Mora M.D..    XR Chest 1 View [[id number]] Collected:  09/15/18 1236     Updated:  09/15/18 1240    Narrative:       PROCEDURE: XR CHEST 1 VW-     HISTORY: Weak/Dizzy/AMS triage protocol     COMPARISON: None.     FINDINGS: Electrodes overlie the chest. Atherosclerosis is noted. The  heart is normal in size. The mediastinum is unremarkable. Vague right  upper lobe opacity. Right basilar consolidate with effusion. There is no  pneumothorax.  There are no acute osseous abnormalities.           Impression:       Vague right upper lobe opacity. Right basilar consolidate  with effusion. Findings likely secondary to pneumonia.     Continued followup is recommended.     This report was finalized on 9/15/2018 12:38 PM by Devin Villagomez DO.    CT Chest Without Contrast [[id number]] Collected:  09/15/18 1232     Updated:  09/15/18 1238    Narrative:       PROCEDURE: CT CHEST WO CONTRAST-     HISTORY: right chest pain      COMPARISON: July 27, 2018.     PROCEDURE:  Multiple axial CT images were obtained from the thoracic  inlet through the upper abdomen without the use of contrast.      FINDINGS: Lack of intravenous contrast limits evaluation of the solid  organs, the mediastinum, and the vasculature.        Soft tissue windows reveal no axillary, hilar or mediastinal adenopathy.  Atherosclerosis is noted. There is evidence for calcified granulomatous  disease. Heart size is normal. The aorta is normal in caliber. Right  upper lobe infiltrate, right lower lobe infiltrate, and probable  atelectasis at the left base and small bilateral pleural effusions.    .  The visualized upper abdomen is unremarkable. Bone windows reveal no  acute osseous abnormalities.       Impression:       Findings consistent with multifocal pneumonia.     255.06 mGy.cm          This study was performed with techniques to keep radiation doses as low  as reasonably achievable (ALARA). Individualized dose reduction  techniques using automated exposure control or adjustment of mA and/or  kV according to the patient size were employed.    Contrast     This report was finalized on 9/15/2018 12:36 PM by Devin Villagomez DO.    CT Head Without Contrast [[id number]] Collected:  09/15/18 1230     Updated:  09/15/18 1233    Narrative:       PROCEDURE: CT HEAD WO CONTRAST-     HISTORY: dizziness     COMPARISON:  None .     TECHNIQUE: Multiple axial CT images were performed from the foramen  magnum to the vertex without enhancement.      FINDINGS: There is no CT evidence of hemorrhage. There is no mass, mass  effect or midline shift.  Cerebral atrophy with without evidence of  hydrocephalus. The paranasal sinuses are clear. Bone windows reveal no  acute osseous abnormalities. Atherosclerosis is noted.       Impression:       No acute intracranial process.             803.69 mGy.cm          This study was performed with techniques to keep radiation doses as low  as reasonably  achievable (ALARA). Individualized dose reduction  techniques using automated exposure control or adjustment of mA and/or  kV according to the patient size were employed.      This report was finalized on 9/15/2018 12:31 PM by Devin Villagomez DO.          Condition on Discharge:    Stable        Discharge Disposition:    Home or Self Care    Discharge Medication:       Discharge Medications      New Medications      Instructions Start Date   cefTAZidime (FORTAZ) 1 g in 0.9% sodium chloride IVPB   1 g, Intravenous, Take As Directed, After dialysis X 3 doses      lactobacillus acidophilus capsule capsule   1 capsule, Oral, 2 Times Daily      mupirocin 2 % ointment  Commonly known as:  BACTROBAN   Nasal, Every 12 Hours Scheduled      vancomycin in dextrose 5% 150 mL 750-5 MG/150ML-% IVPB  Commonly known as:  VANCOCIN   750 mg, Intravenous, As Needed         Changes to Medications      Instructions Start Date   aspirin 81 MG chewable tablet  What changed:  Another medication with the same name was removed. Continue taking this medication, and follow the directions you see here.   81 mg, Oral, Daily         Continue These Medications      Instructions Start Date   acetaminophen 325 MG tablet  Commonly known as:  TYLENOL   325 mg, Oral, Every 4 Hours PRN      albuterol 108 (90 Base) MCG/ACT inhaler  Commonly known as:  PROVENTIL HFA;VENTOLIN HFA   2 puffs, Inhalation, Every 4 Hours PRN      amLODIPine 10 MG tablet  Commonly known as:  NORVASC   10 mg, Oral, Daily      clopidogrel 75 MG tablet  Commonly known as:  PLAVIX   75 mg, Oral, Daily      digoxin 125 MCG tablet  Commonly known as:  LANOXIN   125 mcg, Oral, See Admin Instructions, Alternate days after dialysis      docusate sodium 100 MG capsule  Commonly known as:  COLACE   100 mg, Oral, Every 12 Hours      fenofibrate 54 MG tablet  Commonly known as:  TRICOR   54 mg, Oral, Daily      folic acid 1 MG tablet  Commonly known as:  FOLVITE   1 mg, Oral, Daily       FOSRENOL 1000 MG chewable tablet  Generic drug:  lanthanum   3 Times Daily With Meals, Per pt, he takes with each meal and with snacks      hydrALAZINE 50 MG tablet  Commonly known as:  APRESOLINE   50 mg, Oral, 3 Times Daily      insulin detemir 100 UNIT/ML injection  Commonly known as:  LEVEMIR   8 Units, Subcutaneous, Daily      isosorbide mononitrate 30 MG 24 hr tablet  Commonly known as:  IMDUR   30 mg, Oral, Daily      levothyroxine 100 MCG tablet  Commonly known as:  SYNTHROID, LEVOTHROID   100 mcg, Oral, Daily      lisinopril 10 MG tablet  Commonly known as:  PRINIVIL,ZESTRIL   Oral, Daily, Patient states they take this medication, but it has not been filled at [facility]'s pharmacy.       losartan 100 MG tablet  Commonly known as:  COZAAR   100 mg, Oral, Every 24 Hours Scheduled      metoprolol succinate  MG 24 hr tablet  Commonly known as:  TOPROL-XL   100 mg, Oral, Daily      omeprazole 20 MG capsule  Commonly known as:  priLOSEC   20 mg, Oral, Daily      ondansetron ODT 4 MG disintegrating tablet  Commonly known as:  ZOFRAN-ODT   4 mg, Oral, Every 6 Hours PRN      pravastatin 40 MG tablet  Commonly known as:  PRAVACHOL   80 mg, Oral, Nightly      RADHA-SHIRLENE PO   Oral, Patient states he takes these after dialysis, but is unaware of dose.       spironolactone 25 MG tablet  Commonly known as:  ALDACTONE   25 mg, Oral, Daily      vitamin D3 5000 units capsule capsule   Oral, Daily         Stop These Medications    cephalexin 500 MG capsule  Commonly known as:  KEFLEX     HYDROcodone-acetaminophen 7.5-325 MG per tablet  Commonly known as:  NORCO     oxyCODONE-acetaminophen 5-325 MG per tablet  Commonly known as:  PERCOCET            Discharge Diet:     Diet Instructions     Diet: Consistent Carbohydrate, Renal, Cardiac       Discharge Diet:   Consistent Carbohydrate  Renal  Cardiac             Activity at Discharge:     Activity Instructions     Discharge Activity       As tolerates          Follow-up  Appointments:    Future Appointments  Date Time Provider Department Center   10/2/2018 4:00 PM Estrada Macdonald MD [facility] None   10/22/2018 10:15 AM Ean Hernandez MD [facility] None     Additional Instructions for the Follow-ups that You Need to Schedule     Ambulatory Referral to Home Health    As directed      Face to Face Visit Date:  9/19/2018    Follow-up Provider for Plan of Care?:  I treated the patient in an acute care facility and will not continue treatment after discharge.    Follow-up Provider:  MARYLU RAMESH [[id number]]    Reason/Clinical Findings:  resumption of care    Describe mobility limitations that make leaving home difficult:  ESRD, COPD,    Nursing/Therapeutic Services Requested:  Skilled Nursing Physical Therapy Occupational Therapy    Skilled nursing orders:  O2 instruction COPD management    PT orders:  Strengthening    Occupational orders:  Strengthening    Frequency:  1 Week 1               Test Results Pending at Discharge:     Order Current Status    Blood Culture - Blood, Preliminary result    Blood Culture - Blood, Preliminary result        Discharge Instructions:  Dry dressing to right foot open area  Continue Dialysis on Tue, Thur, Sat  Follow up with Dr. Mackey in dialysis  IV Ceftaz and Vanc. Will be given in dialysis X 3 more doses  Oxygen 2 liters nasal canula  Walking oximetry prior to DC since patient only has nocturnal oxygen at home.  Try to keep scrotum clean and dry    ALEXYS Gunderson  09/19/18  3:57 PM    Time:  45  minutes were spent reviewing labs, history, evaluating patient and discharge planning.

## 2018-09-19 NOTE — PLAN OF CARE
Problem: Skin Injury Risk (Adult)  Goal: Identify Related Risk Factors and Signs and Symptoms  Outcome: Ongoing (interventions implemented as appropriate)   09/19/18 1327   Skin Injury Risk (Adult)   Related Risk Factors (Skin Injury Risk) fluid intake inadequate;mechanical forces;mobility impaired

## 2018-09-19 NOTE — PLAN OF CARE
Problem: Patient Care Overview  Goal: Plan of Care Review  Outcome: Ongoing (interventions implemented as appropriate)   09/19/18 0486   Coping/Psychosocial   Plan of Care Reviewed With patient   Coping/Psychosocial   Patient Agreement with Plan of Care agrees with comment (describe)

## 2018-09-19 NOTE — DISCHARGE INSTRUCTIONS
Dry dressing to right foot open area  Continue Dialysis on Tue, Thur, Sat  Follow up with Dr. Mackey in dialysis  IV Ceftaz and Vanc. Will be given in dialysis X 3 more doses  Walking oximetry prior to DC since patient only has nocturnal oxygen at home.  Try to keep scrotum clean and dry.

## 2018-09-19 NOTE — PROGRESS NOTES
Exercise Oximetry    Patient Name:Talha Cutler   MRN: [id number]   Date: 09/19/18             ROOM AIR BASELINE   SpO2% 96% resting RA   Heart Rate 89   Blood Pressure      EXERCISE ON ROOM AIR SpO2% EXERCISE ON O2 @  LPM SpO2%   1 MINUTE 96 1 MINUTE    2 MINUTES 95 2 MINUTES    3 MINUTES 95 3 MINUTES    4 MINUTES 94 4 MINUTES    5 MINUTES 94 5 MINUTES    6 MINUTES 94 6 MINUTES               Distance Walked   Distance Walked   Dyspnea (Rudy Scale)  0 Dyspnea (Rudy Scale)   Fatigue (Rudy Scale)   Fatigue (Rudy Scale)   SpO2% Post Exercise  98% on RA SpO2% Post Exercise   HR Post Exercise  88 HR Post Exercise   Time to Recovery  5 Minutes Time to Recovery     Comments: Pt stated he is unable to walk. Arm exercise test done in bed.

## 2018-09-19 NOTE — PROGRESS NOTES
Continued Stay Note   Sharma     Patient Name: Talha Cutler  MRN: [id number]  Today's Date: 9/19/2018    Admit Date: 9/15/2018          Discharge Plan     Row Name 09/19/18 1552       Plan     Per [place] RT does not need continuous o2.      Row Name 09/19/18 4986       Plan    Plan Discharge orders on chart.                Discharge Codes    No documentation.       Expected Discharge Date and Time     Expected Discharge Date Expected Discharge Time    Sep 19, 2018             Marcella Hsu

## 2018-09-19 NOTE — CONSULTS
"Diabetes Education  Assessment/Teaching    Patient Name:  Talha Cutler  YOB: 1960  MRN: [id number]  Admit Date:  9/15/2018      Assessment Date:  9/19/2018    Most Recent Value   General Information    Referral From:  Other (comment) [APRN via RN. ]   Height  172.7 cm (68\")   Height Method  Stated   Weight  57.9 kg (127 lb 10.3 oz)   Weight Method  Bed scale   Pregnancy Assessment   Diabetes History   What type of diabetes do you have?  -- [Order received for diabetes consult. Went to room to assess and patient became agitated, saying that he did not need diabetes education & that he \"knew all about diabetes\". Spouse in room. Apologized to patient & promptly left room when patient refused.]   Education Preferences   Nutrition Information   Assessment Topics   DM Goals            Most Recent Value   DM Education Needs   Meter  -- [States has working glucometer at home.]            Other Comments:  Patient refused diabetes education. No materials given to him per his request.        Electronically signed by:  Yoon Allen RN  09/19/18 3:34 PM  "

## 2018-09-19 NOTE — PROGRESS NOTES
"Nephrology Progress Note.    LOS: 2 days    Patient Care Team:  Idalia Kay MD as PCP - General    Chief Complaint:    Chief Complaint   Patient presents with   • Dizziness   • Shortness of Breath       Subjective     Follow up for ESRD and related issues.    Interval History:     Patient Complaints: none    Patient seen and examined this morning.  Events from last night noted.  Patient denies having any fevers chills.  No nausea or vomiting no abdominal pain.  Denies any chest pain, shortness of breath or cough and sputum production.  There is no significant edema.   Patient also denies having new onset weakness of numbness of either extremity    History taken from: patient    Review of Systems:    The pertinent  ROS was done and it is noted above, rest  was negative.    Objective     Vital Signs  /48 (BP Location: Right arm, Patient Position: Lying)   Pulse 77   Temp 98.2 °F (36.8 °C) (Oral)   Resp 18   Ht 172.7 cm (68\")   Wt 57.9 kg (127 lb 10.3 oz)   SpO2 100%   BMI 19.41 kg/m²       No intake/output data recorded.    Intake/Output Summary (Last 24 hours) at 09/19/18 0821  Last data filed at 09/18/18 1724   Gross per 24 hour   Intake             1010 ml   Output             4500 ml   Net            -3490 ml       Physical Exam:    General Appearance: alert, oriented x 3, no acute distress,   HEENT: Oral mucosa dry, extra occular movements intact. Sclera clear.  Skin: Warm and dry  Neck: supple, no JVD, trachea midline  Lungs:Chest shape is normal. Breath sounds heard bilaterally equally. No crackles, No wheezing.  Coarse breath sounds noted  Heart: regular rate and rhythm. normal S1 and S2, no S3, does have 2/6 systolic ejection murmur no rub, peripheral pulses weak but palpable.  Abdomen: Obese, soft, non-tender,  present bowel sounds to auscultation  : no palpable bladder.  Extremities: Trace edema, no cyanosis or clubbing.   Neuro: normal speech and mental status, grossly non " focal.     Results Review:      Results from last 7 days  Lab Units 09/19/18  0645 09/18/18  0930 09/16/18  0618 09/15/18  1145   SODIUM mmol/L 133* 132* 135* 133*   POTASSIUM mmol/L 5.5* 5.5* 4.3 3.8   CHLORIDE mmol/L 96* 93* 92* 89*   CO2 mmol/L 26.0 25.0* 34.0* 35.0*   BUN mg/dL 24* 38* 18 10   CREATININE mg/dL 3.70* 5.70* 3.10* 1.80*   CALCIUM mg/dL 9.5 9.1 8.8 9.1   BILIRUBIN mg/dL  --   --   --  0.8   ALK PHOS U/L  --   --   --  223*   ALT (SGPT) U/L  --   --   --  19   AST (SGOT) U/L  --   --   --  37   GLUCOSE mg/dL 240* 99* 152* 210*       Estimated Creatinine Clearance: 17.6 mL/min (A) (by C-G formula based on SCr of 3.7 mg/dL (H)).      Results from last 7 days  Lab Units 09/19/18  0645 09/18/18  0930 09/15/18  1145   MAGNESIUM mg/dL  --   --  1.9   PHOSPHORUS mg/dL 5.1* 6.2*  --                Results from last 7 days  Lab Units 09/19/18  0645 09/18/18  0930 09/16/18  0618 09/15/18  1145   WBC 10*3/mm3 5.97 6.90 7.07 6.26   HEMOGLOBIN g/dL 9.3* 9.3* 9.9* 10.2*   PLATELETS 10*3/mm3 175 176 191 225               Imaging Results (last 24 hours)     Procedure Component Value Units Date/Time    US Scrotum & Testicles [[id number]] Collected:  09/18/18 1614     Updated:  09/18/18 1617    Narrative:       PROCEDURE: US SCROTUM AND TESTICLES-     HISTORY: drainage and edema; J18.9-Pneumonia, unspecified organism     PROCEDURE: Ultrasound images of the testicles were obtained bilaterally.  Color Doppler images were obtained.     FINDINGS: The testicles are normal in size and echotexture bilaterally.  Arterial flow is identified bilaterally. No intratesticular masses are  identified. Note is made of scrotal wall thickening.       Impression:       Scrotal wall thickening with no evidence of a discrete fluid  collection.     This report was finalized on 9/18/2018 4:15 PM by Marilia Mora M.D..          amLODIPine 10 mg Oral Q24H   aspirin 81 mg Oral Daily   atorvastatin 10 mg Oral Nightly   cefepime 1 g  Intravenous Q24H   clopidogrel 75 mg Oral Daily   digoxin 125 mcg Oral Once per day on Tue Thu Sat   docusate sodium 100 mg Oral Q12H   folic acid 1 mg Oral Daily   heparin (porcine) 5,000 Units Subcutaneous Q12H   hydrALAZINE 50 mg Oral TID   ibuprofen 600 mg Oral BID   insulin aspart 0-7 Units Subcutaneous 4x Daily AC & at Bedtime   insulin detemir 8 Units Subcutaneous Daily   isosorbide mononitrate 30 mg Oral Daily   lactobacillus acidophilus 1 capsule Oral BID   lanthanum 1,000 mg Oral TID With Meals   levothyroxine 100 mcg Oral Q AM   losartan 100 mg Oral Q24H   metoprolol succinate  mg Oral Daily   mupirocin  Nasal Q12H   pantoprazole 40 mg Oral QAM AC   spironolactone 25 mg Oral Daily   vitamin D3 5,000 Units Oral Daily       Pharmacy Consult        Medication Review:   Current Facility-Administered Medications   Medication Dose Route Frequency Provider Last Rate Last Dose   • acetaminophen (TYLENOL) tablet 650 mg  650 mg Oral Q4H PRN Carmelo Ray MD       • amLODIPine (NORVASC) tablet 10 mg  10 mg Oral Q24H Carmelo Ray MD   10 mg at 09/17/18 0837   • aspirin EC tablet 81 mg  81 mg Oral Daily Carmelo Ray MD   81 mg at 09/17/18 0837   • atorvastatin (LIPITOR) tablet 10 mg  10 mg Oral Nightly Carmelo Ray MD   10 mg at 09/18/18 2110   • cefepime (MAXIPIME) IVPB 1 g/50ml D5W (premix)  1 g Intravenous Q24H Carmelo Ray MD 0 mL/hr at 09/17/18 2253 1 g at 09/18/18 1724   • clopidogrel (PLAVIX) tablet 75 mg  75 mg Oral Daily Carmelo Ray MD   75 mg at 09/17/18 0837   • dextrose (D50W) 25 g/ 50mL Intravenous Solution 25 g  25 g Intravenous Q15 Min PRN Carmelo Ray MD       • dextrose (GLUTOSE) oral gel 1 tube  1 tube Oral Q15 Min PRN Carmelo Ray MD       • digoxin (LANOXIN) tablet 125 mcg  125 mcg Oral Once per day on Tue Thu Sat Leo Pacheco MD   125 mcg at 09/18/18 1522   • docusate sodium (COLACE) capsule 100 mg  100 mg Oral Q12H Carmelo Ray MD   100 mg at 09/19/18 0611   • folic acid  (FOLVITE) tablet 1 mg  1 mg Oral Daily Carmelo Ray MD   1 mg at 09/17/18 0838   • glucagon (human recombinant) (GLUCAGEN DIAGNOSTIC) injection 1 mg  1 mg Subcutaneous PRN Carmelo Ray MD       • heparin (porcine) 5000 UNIT/ML injection 5,000 Units  5,000 Units Subcutaneous Q12H Carmelo Ray MD   5,000 Units at 09/18/18 2110   • hydrALAZINE (APRESOLINE) tablet 50 mg  50 mg Oral TID Carmelo Ray MD   50 mg at 09/18/18 2110   • HYDROcodone-acetaminophen (NORCO) 7.5-325 MG per tablet 1 tablet  1 tablet Oral Q6H PRN Carmelo Ray MD   1 tablet at 09/17/18 0140   • ibuprofen (ADVIL,MOTRIN) tablet 600 mg  600 mg Oral BID Chance Mackey MD, FASN   600 mg at 09/18/18 2110   • insulin aspart (novoLOG) injection 0-7 Units  0-7 Units Subcutaneous 4x Daily AC & at Bedtime Carmelo Ray MD   3 Units at 09/19/18 0757   • insulin detemir (LEVEMIR) injection 8 Units  8 Units Subcutaneous Daily Carmelo Ray MD   8 Units at 09/17/18 0839   • ipratropium-albuterol (DUO-NEB) nebulizer solution 3 mL  3 mL Nebulization Q4H PRN Carmelo Ray MD   3 mL at 09/17/18 1615   • isosorbide mononitrate (IMDUR) 24 hr tablet 30 mg  30 mg Oral Daily Carmelo Ray MD   30 mg at 09/17/18 0837   • lactobacillus acidophilus (RISAQUAD) capsule 1 capsule  1 capsule Oral BID Carmelo Ray MD   1 capsule at 09/18/18 2110   • lanthanum (FOSRENOL) chewable tablet 1,000 mg  1,000 mg Oral TID With Meals Carmelo Ray MD   1,000 mg at 09/18/18 1725   • levothyroxine (SYNTHROID, LEVOTHROID) tablet 100 mcg  100 mcg Oral Q AM Carmelo Ray MD   100 mcg at 09/19/18 0611   • losartan (COZAAR) tablet 100 mg  100 mg Oral Q24H Carmelo Ray MD   100 mg at 09/17/18 0838   • metoprolol succinate XL (TOPROL-XL) 24 hr tablet 100 mg  100 mg Oral Daily Carmelo Ray MD   100 mg at 09/17/18 0837   • mupirocin (BACTROBAN) 2 % ointment   Nasal Q12H Judit Person APRN       • ondansetron (ZOFRAN) injection 4 mg  4 mg Intravenous Q6H PRN Carmelo Ray MD       •  pantoprazole (PROTONIX) EC tablet 40 mg  40 mg Oral QAM AC Carmelo Ray MD   40 mg at 09/19/18 0630   • Pharmacy Consult   Does not apply Continuous PRN Caremlo Ray MD       • sodium chloride 0.9 % flush 1-10 mL  1-10 mL Intravenous PRN Carmelo Ray MD       • sodium chloride 0.9 % flush 10 mL  10 mL Intravenous PRN Emergency, Triage Protocol, MD       • spironolactone (ALDACTONE) tablet 25 mg  25 mg Oral Daily Carmelo Ray MD   25 mg at 09/17/18 0837   • vancomycin in dextrose 5% 150 mL (VANCOCIN) IVPB 750 mg  750 mg Intravenous PRN Carmelo Ray MD       • vitamin D3 capsule 5,000 Units  5,000 Units Oral Daily Carmelo Ray MD   5,000 Units at 09/17/18 0837       Assessment/Plan       1. End stage renal disease on dialysis (CMS/[facility]): Dialysis schedule on Tuesday Thursday Saturday, he runs 4 hours on a standard bath usually gains 2-3 L in between dialysis.  I will go ahead and make arrangements.  2. Chronic kidney disease-mineral and bone disorder:   3. Pneumonia of right lower lobe due to infectious organism (CMS/[facility])  4. Type 2 diabetes mellitus treated with insulin (CMS/[facility]) uncontrolled.  5. Anemia of chronic kidney disease  6. Chronic right-sided loculated pleural effusion status post pleurodesis  7. Coronary artery disease with medical management  8. Chronic scrotal ulcer followed by [facility] urology  9. Hypertension with end-stage renal disease  10. Physical debility     Plan:     · His pleuritic chest pain we'll go ahead and start him on ibuprofen 600 mg twice a day for 3 days.  Actually feels better with it.  · Scrotal abscess, patient is willing to see if there is a local urologist who will be able to help him so he does not have to go to [place].  · Continue antibiotics, if he just needs antibiotics it can be arranged with dialysis as an outpatient.  · Next dialysis due tomorrow will make arrangements  · Surveillance labs.  · Details were discussed with the patient no family in the  room.    · Details were also discussed with the hospitalist service.   · Further recommendations will depend on clinical course of the patient during the current hospitalization.    · I also discussed the details with the nursing staff.  · Rest as ordered.    Chance Mackey MD, ARJUN  09/19/18  8:21 AM    Dictated utilizing Dragon dictation.

## 2018-09-19 NOTE — PLAN OF CARE
Problem: Pneumonia (Adult)  Goal: Signs and Symptoms of Listed Potential Problems Will be Absent, Minimized or Managed (Pneumonia)  Outcome: Ongoing (interventions implemented as appropriate)   09/19/18 3335   Goal/Outcome Evaluation   Problems Assessed (Pneumonia) all   Problems Present (Pneumonia) infection progression;respiratory compromise       Problem: Wound (Includes Pressure Injury) (Adult)  Goal: Signs and Symptoms of Listed Potential Problems Will be Absent, Minimized or Managed (Wound)   09/19/18 0445   Goal/Outcome Evaluation   Problems Assessed (Wound) all   Problems Present (Wound) delayed wound healing;infection;situational response

## 2018-09-19 NOTE — NURSING NOTE
Pt resting comfortably; assessed lower extremity wound to right foot, medial; open area with what appears to be healing periwound tissue, inconsistent with pt stating the wound had been there for 4 days; base of wound with slough and subq; periwound dry, pink and intact; multiple other scabs, scars to lower extremities, per pt scratches and hits areas a lot; shoe does not rub in this area and has order for diabetic shoes and his insurance nurse assisting with this issue of obtaining new shoes; plan of care explained, emotional support given; discussed finding with APRN, she is addressing scrotal issue

## 2018-09-19 NOTE — PROGRESS NOTES
Continued Stay Note   [facility]     Patient Name: Talha Cutler  MRN: [id number]  Today's Date: 9/19/2018    Admit Date: 9/15/2018          Discharge Plan     Row Name 09/19/18 1539       Plan    Plan Discharge orders on chart.                Discharge Codes    No documentation.       Expected Discharge Date and Time     Expected Discharge Date Expected Discharge Time    Sep 19, 2018             Marcella Hsu

## 2018-09-20 ENCOUNTER — READMISSION MANAGEMENT (OUTPATIENT)
Dept: CALL CENTER | Facility: HOSPITAL | Age: 58
End: 2018-09-20

## 2018-09-20 LAB
BACTERIA SPEC AEROBE CULT: NORMAL
BACTERIA SPEC AEROBE CULT: NORMAL

## 2018-09-20 NOTE — OUTREACH NOTE
Prep Survey      Responses   Facility patient discharged from?  [facility]   Is patient eligible?  Yes   Discharge diagnosis  PNA   Does the patient have one of the following disease processes/diagnoses(primary or secondary)?  COPD/Pneumonia   Does the patient have Home health ordered?  No   Is there a DME ordered?  Yes   What DME was ordered?  [employer] for oxygen    Prep survey completed?  Yes          Nicki Mercedes RN

## 2018-09-20 NOTE — PROGRESS NOTES
Case Management Discharge Note         Destination     No service has been selected for the patient.      Durable Medical Equipment     No service has been selected for the patient.      Dialysis/Infusion     No service has been selected for the patient.      Home Medical Care - Selection Complete     Service Request Status Selected Specialties Address Phone Number Fax Number    [facility] Home Health Services [address] [address] [phone]      Social Care     No service has been selected for the patient.        Other:  (pvt car)    Final Discharge Disposition Code: 06 - home with home health care

## 2018-09-24 ENCOUNTER — READMISSION MANAGEMENT (OUTPATIENT)
Dept: CALL CENTER | Facility: HOSPITAL | Age: 58
End: 2018-09-24

## 2018-09-24 NOTE — OUTREACH NOTE
COPD/PN Week 1 Survey      Responses   Facility patient discharged from?  [facility]   Does the patient have one of the following disease processes/diagnoses(primary or secondary)?  COPD/Pneumonia   Is there a successful TCM telephone encounter documented?  No   Was the primary reason for admission:  Pneumonia   Week 1 attempt successful?  Yes   Call start time  1531   Call end time  1534   Discharge diagnosis  PNA   Meds reviewed with patient/caregiver?  Yes   Is the patient having any side effects they believe may be caused by any medication additions or changes?  No   Does the patient have all medications ordered at discharge?  Yes   Is the patient taking all medications as directed (includes completed medication regime)?  Yes   Does the patient have a primary care provider?   Yes   Does the patient have an appointment with their PCP or pulmonologist within 7 days of discharge?  Yes   Has the patient kept scheduled appointments due by today?  N/A   What DME was ordered?  [employer] for oxygen    Has all DME been delivered?  Yes   Psychosocial issues?  No   Did the patient receive a copy of their discharge instructions?  Yes   Nursing interventions  Reviewed instructions with patient   What is the patient's perception of their health status since discharge?  Improving   Are the patient's immunizations up to date?   Yes   Nursing interventions  Educated on importance of maintaining up to date immunizations as advised by provider   Additional teach back comments  Has not had Flu shot yet this year   Is the patient/caregiver able to teach back signs and symptoms of worsening condition:  Fever/chills, Shortness of breath, Chest pain   Is the patient/caregiver able to teach back importance of completing antibiotic course of treatment?  Yes   Week 1 call completed?  Yes          Coleen James RN

## 2018-10-02 ENCOUNTER — READMISSION MANAGEMENT (OUTPATIENT)
Dept: CALL CENTER | Facility: HOSPITAL | Age: 58
End: 2018-10-02

## 2018-10-02 NOTE — OUTREACH NOTE
COPD/PN Week 2 Survey      Responses   Facility patient discharged from?  [facility]   Does the patient have one of the following disease processes/diagnoses(primary or secondary)?  COPD/Pneumonia   Was the primary reason for admission:  Pneumonia   Week 2 attempt successful?  Yes   Call start time  1652   Call end time  1701   Discharge diagnosis  PNA   Meds reviewed with patient/caregiver?  Yes   Is the patient having any side effects they believe may be caused by any medication additions or changes?  No   Does the patient have all medications ordered at discharge?  Yes   Is the patient taking all medications as directed (includes completed medication regime)?  Yes   Comments regarding appointments  Pulmonary appt on Oct 22   Does the patient have a primary care provider?   Yes   Does the patient have an appointment with their PCP or pulmonologist within 7 days of discharge?  No   Comments regarding PCP  Patient missed urology appt and reported he did not have a ride. I offered assitance for travel arrangements with future appts but he declined.    Nursing Interventions  Advised patient to make appointment, Educated patient on importance of making appointment, Verified appointment date/time/provider   Has the patient kept scheduled appointments due by today?  No   What is preventing the patient from keeping their appointments?  Transportation [Patient declines help for transportation. ]   Nursing Interventions  Educated on importance of keeping appointment, Advised to reschedule appointment, Advised patient to keep appointment   What DME was ordered?  [employer] for oxygen    Has all DME been delivered?  Yes   Psychosocial issues?  No   Did the patient receive a copy of their discharge instructions?  Yes   Nursing interventions  Reviewed instructions with patient   What is the patient's perception of their health status since discharge?  Improving   Are the patient's immunizations up to date?   Yes   Nursing  interventions  Educated on importance of maintaining up to date immunizations as advised by provider   Is the patient/caregiver able to teach back the hierarchy of who to call/visit for symptoms/problems? PCP, Specialist, Home health nurse, Urgent Care, ED, 911  Yes   Is the patient/caregiver able to teach back signs and symptoms of worsening condition:  Fever/chills, Shortness of breath, Chest pain   Is the patient/caregiver able to teach back importance of completing antibiotic course of treatment?  Yes   Week 2 call completed?  Yes          Jose Du RN

## 2018-10-10 ENCOUNTER — READMISSION MANAGEMENT (OUTPATIENT)
Dept: CALL CENTER | Facility: HOSPITAL | Age: 58
End: 2018-10-10

## 2018-10-10 NOTE — OUTREACH NOTE
COPD/PN Week 3 Survey      Responses   Facility patient discharged from?  [facility]   Does the patient have one of the following disease processes/diagnoses(primary or secondary)?  COPD/Pneumonia   Was the primary reason for admission:  Pneumonia   Week 3 attempt successful?  Yes   Rescheduled  Rescheduled-pt requested          Coleen James, RN

## 2018-10-22 ENCOUNTER — OFFICE VISIT (OUTPATIENT)
Dept: PULMONOLOGY | Facility: CLINIC | Age: 58
End: 2018-10-22

## 2018-10-22 VITALS
DIASTOLIC BLOOD PRESSURE: 72 MMHG | OXYGEN SATURATION: 95 % | BODY MASS INDEX: 18.79 KG/M2 | RESPIRATION RATE: 16 BRPM | SYSTOLIC BLOOD PRESSURE: 118 MMHG | HEART RATE: 67 BPM | HEIGHT: 68 IN | WEIGHT: 124 LBS

## 2018-10-22 DIAGNOSIS — G47.34 NOCTURNAL HYPOXIA: ICD-10-CM

## 2018-10-22 DIAGNOSIS — J90 PLEURAL EFFUSION ON RIGHT: Primary | ICD-10-CM

## 2018-10-22 PROCEDURE — 99214 OFFICE O/P EST MOD 30 MIN: CPT | Performed by: INTERNAL MEDICINE

## 2018-10-22 NOTE — PROGRESS NOTES
"Chief Complaint   Patient presents with   • Follow-up     pleural effusion         Subjective   Talha Cutler is a 58 y.o. male.     History of Present Illness   Patient is back today to follow-up on pneumonia and pleural effusion.    The patient was recently discharged from the hospital where he was admitted with pneumonia.  He denies any ongoing symptoms of cough, phlegm production or fever.    The patient continues to use oxygen at night.    The following portions of the patient's history were reviewed and updated as appropriate: allergies, current medications, past family history, past medical history, past social history and past surgical history.    Review of Systems   HENT: Negative for sinus pressure, sneezing and sore throat.    Respiratory: Negative for cough, shortness of breath and wheezing.        Objective   Visit Vitals  /72   Pulse 67   Resp 16   Ht 172.7 cm (67.99\")   Wt 56.2 kg (124 lb)   SpO2 95%   BMI 18.86 kg/m²       Physical Exam   Constitutional: He is oriented to person, place, and time. He appears well-developed and well-nourished.   HENT:   Head: Normocephalic and atraumatic.   Eyes: EOM are normal.   Neck: Normal range of motion. Neck supple. No JVD present.   Cardiovascular: Normal rate.    Right upper extremity graft with positive thrill.   Pulmonary/Chest: Effort normal. He has rales (bilateral basal.).   Musculoskeletal:   Was in a wheelchair.   Neurological: He is alert and oriented to person, place, and time.   Skin: Skin is warm and dry.   Vitals reviewed.      Assessment/Plan   Talha was seen today for follow-up.    Diagnoses and all orders for this visit:    Pleural effusion on right  -     XR Chest PA & Lateral; Future    Nocturnal hypoxia           Return in about 6 months (around 4/22/2019) for Recheck, Imaging study.    DISCUSSION (if any):  I reviewed his last chest x-ray from 19 September which definitely shows significant improvement in the right-sided opacity and " pleural effusion.  I also reviewed his last discharge summary that mentioned right lower lobe pneumonia.  I reviewed his CT images from 15 September which confirmed right lower lobe infiltrate.  It also showed bilateral small pleural effusions.    Since the patient is status post Pleurx catheter placement on the right side, it is probably lead to pleurodesis.  The patient's right-sided effusion has resolved for the most part and it seems that he has residual scarring involving the pleura.    He was advised to continue dialysis on a regular basis.    We will keep a close eye on him clinically and also repeat chest x-ray in 6 months.    The patient was advised to continue oxygen at night, since patient has noticed improvement in some symptoms since using it as prescribed.    Dictated utilizing Dragon dictation.    This document was electronically signed by Ean Hernandez MD October 22, 2018  10:30 AM

## 2018-11-08 ENCOUNTER — APPOINTMENT (OUTPATIENT)
Dept: CT IMAGING | Facility: HOSPITAL | Age: 58
End: 2018-11-08

## 2018-11-08 ENCOUNTER — HOSPITAL ENCOUNTER (INPATIENT)
Facility: HOSPITAL | Age: 58
LOS: 3 days | Discharge: HOME HEALTH | End: 2018-11-11
Attending: EMERGENCY MEDICINE | Admitting: INTERNAL MEDICINE

## 2018-11-08 DIAGNOSIS — M89.9 CHRONIC KIDNEY DISEASE-MINERAL AND BONE DISORDER: ICD-10-CM

## 2018-11-08 DIAGNOSIS — R06.89 CHRONIC RESPIRATORY INSUFFICIENCY: Chronic | ICD-10-CM

## 2018-11-08 DIAGNOSIS — N18.9 CHRONIC KIDNEY DISEASE-MINERAL AND BONE DISORDER: ICD-10-CM

## 2018-11-08 DIAGNOSIS — Z74.09 IMPAIRED FUNCTIONAL MOBILITY, BALANCE, GAIT, AND ENDURANCE: ICD-10-CM

## 2018-11-08 DIAGNOSIS — E87.5 HYPERKALEMIA: ICD-10-CM

## 2018-11-08 DIAGNOSIS — J18.9 PNEUMONIA OF RIGHT UPPER LOBE DUE TO INFECTIOUS ORGANISM: Primary | ICD-10-CM

## 2018-11-08 DIAGNOSIS — E83.9 CHRONIC KIDNEY DISEASE-MINERAL AND BONE DISORDER: ICD-10-CM

## 2018-11-08 DIAGNOSIS — Z99.2 DIALYSIS PATIENT (HCC): ICD-10-CM

## 2018-11-08 LAB
ALBUMIN SERPL-MCNC: 4.6 G/DL (ref 3.5–5)
ALBUMIN/GLOB SERPL: 1.5 G/DL (ref 1–2)
ALP SERPL-CCNC: 126 U/L (ref 38–126)
ALT SERPL W P-5'-P-CCNC: 22 U/L (ref 13–69)
ANION GAP SERPL CALCULATED.3IONS-SCNC: 19.7 MMOL/L (ref 10–20)
ARTERIAL PATENCY WRIST A: POSITIVE
AST SERPL-CCNC: 36 U/L (ref 15–46)
ATMOSPHERIC PRESS: 739 MMHG
BASE EXCESS BLDA CALC-SCNC: 2.7 MMOL/L (ref 0–2)
BASOPHILS # BLD AUTO: 0.1 10*3/MM3 (ref 0–0.2)
BASOPHILS NFR BLD AUTO: 1.9 % (ref 0–2.5)
BDY SITE: ABNORMAL
BILIRUB SERPL-MCNC: 0.7 MG/DL (ref 0.2–1.3)
BUN BLD-MCNC: 54 MG/DL (ref 7–20)
BUN/CREAT SERPL: 9.2 (ref 6.3–21.9)
CALCIUM SPEC-SCNC: 9.7 MG/DL (ref 8.4–10.2)
CHLORIDE SERPL-SCNC: 87 MMOL/L (ref 98–107)
CO2 SERPL-SCNC: 28 MMOL/L (ref 26–30)
COHGB MFR BLD: 1.1 % (ref 0–2)
CREAT BLD-MCNC: 5.9 MG/DL (ref 0.6–1.3)
D-LACTATE SERPL-SCNC: 0.6 MMOL/L (ref 0.5–2)
DEPRECATED RDW RBC AUTO: 53.2 FL (ref 37–54)
DIGOXIN SERPL-MCNC: 1.1 NG/ML (ref 0.8–2)
EOSINOPHIL # BLD AUTO: 0.7 10*3/MM3 (ref 0–0.7)
EOSINOPHIL NFR BLD AUTO: 13.5 % (ref 0–7)
ERYTHROCYTE [DISTWIDTH] IN BLOOD BY AUTOMATED COUNT: 16.3 % (ref 11.5–14.5)
FLUAV AG NPH QL: NEGATIVE
FLUBV AG NPH QL IA: NEGATIVE
GFR SERPL CREATININE-BSD FRML MDRD: 10 ML/MIN/1.73
GFR SERPL CREATININE-BSD FRML MDRD: ABNORMAL ML/MIN/1.73
GLOBULIN UR ELPH-MCNC: 3.1 GM/DL
GLUCOSE BLD-MCNC: 482 MG/DL (ref 74–98)
HCO3 BLDA-SCNC: 27.7 MMOL/L (ref 22–28)
HCT VFR BLD AUTO: 35.3 % (ref 42–52)
HCT VFR BLD CALC: 35.3 %
HGB BLD-MCNC: 11.5 G/DL (ref 14–18)
HGB BLDA-MCNC: 11.5 G/DL (ref 12–18)
HOROWITZ INDEX BLD+IHG-RTO: 21 %
IMM GRANULOCYTES # BLD: 0.01 10*3/MM3 (ref 0–0.06)
IMM GRANULOCYTES NFR BLD: 0.2 % (ref 0–0.6)
LYMPHOCYTES # BLD AUTO: 0.62 10*3/MM3 (ref 0.6–3.4)
LYMPHOCYTES NFR BLD AUTO: 11.9 % (ref 10–50)
MCH RBC QN AUTO: 29.6 PG (ref 27–31)
MCHC RBC AUTO-ENTMCNC: 32.6 G/DL (ref 30–37)
MCV RBC AUTO: 90.7 FL (ref 80–94)
METHGB BLD QL: 1.2 % (ref 0–1.5)
MODALITY: ABNORMAL
MONOCYTES # BLD AUTO: 0.77 10*3/MM3 (ref 0–0.9)
MONOCYTES NFR BLD AUTO: 14.8 % (ref 0–12)
NEUTROPHILS # BLD AUTO: 2.99 10*3/MM3 (ref 2–6.9)
NEUTROPHILS NFR BLD AUTO: 57.7 % (ref 37–80)
NOTE: ABNORMAL
NRBC BLD MANUAL-RTO: 0 /100 WBC (ref 0–0)
OXYHGB MFR BLDV: 75.2 % (ref 94–99)
PCO2 BLDA: 43.8 MM HG (ref 35–45)
PCO2 TEMP ADJ BLD: ABNORMAL MM HG (ref 35–48)
PH BLDA: 7.41 PH UNITS (ref 7.3–7.5)
PH, TEMP CORRECTED: ABNORMAL PH UNITS
PLATELET # BLD AUTO: 170 10*3/MM3 (ref 130–400)
PMV BLD AUTO: 10.7 FL (ref 6–12)
PO2 BLDA: 45.8 MM HG (ref 75–100)
PO2 TEMP ADJ BLD: ABNORMAL MM HG (ref 83–108)
POTASSIUM BLD-SCNC: 5.9 MMOL/L (ref 3.5–5.1)
POTASSIUM BLD-SCNC: 6.7 MMOL/L (ref 3.5–5.1)
PROT SERPL-MCNC: 7.7 G/DL (ref 6.3–8.2)
RBC # BLD AUTO: 3.89 10*6/MM3 (ref 4.7–6.1)
SAO2 % BLDCOA: 77 % (ref 94–100)
SODIUM BLD-SCNC: 128 MMOL/L (ref 137–145)
TROPONIN I SERPL-MCNC: 0.02 NG/ML (ref 0–0.03)
TSH SERPL DL<=0.05 MIU/L-ACNC: 4.74 MIU/ML (ref 0.47–4.68)
VENTILATOR MODE: ABNORMAL
WBC NRBC COR # BLD: 5.19 10*3/MM3 (ref 4.8–10.8)

## 2018-11-08 PROCEDURE — 63710000001 INSULIN REGULAR HUMAN PER 5 UNITS: Performed by: PHYSICIAN ASSISTANT

## 2018-11-08 PROCEDURE — 36600 WITHDRAWAL OF ARTERIAL BLOOD: CPT

## 2018-11-08 PROCEDURE — 85025 COMPLETE CBC W/AUTO DIFF WBC: CPT | Performed by: PHYSICIAN ASSISTANT

## 2018-11-08 PROCEDURE — 93005 ELECTROCARDIOGRAM TRACING: CPT | Performed by: PHYSICIAN ASSISTANT

## 2018-11-08 PROCEDURE — 25010000002 LEVOFLOXACIN PER 250 MG: Performed by: PHYSICIAN ASSISTANT

## 2018-11-08 PROCEDURE — 82375 ASSAY CARBOXYHB QUANT: CPT

## 2018-11-08 PROCEDURE — 83605 ASSAY OF LACTIC ACID: CPT | Performed by: PHYSICIAN ASSISTANT

## 2018-11-08 PROCEDURE — 25010000002 CEFTRIAXONE SODIUM-DEXTROSE 1-3.74 GM-%(50ML) RECONSTITUTED SOLUTION: Performed by: PHYSICIAN ASSISTANT

## 2018-11-08 PROCEDURE — 82962 GLUCOSE BLOOD TEST: CPT

## 2018-11-08 PROCEDURE — 87040 BLOOD CULTURE FOR BACTERIA: CPT | Performed by: EMERGENCY MEDICINE

## 2018-11-08 PROCEDURE — 83050 HGB METHEMOGLOBIN QUAN: CPT

## 2018-11-08 PROCEDURE — 25010000002 HEPARIN (PORCINE) PER 1000 UNITS: Performed by: INTERNAL MEDICINE

## 2018-11-08 PROCEDURE — 84132 ASSAY OF SERUM POTASSIUM: CPT | Performed by: INTERNAL MEDICINE

## 2018-11-08 PROCEDURE — 63710000001 INSULIN REGULAR HUMAN PER 5 UNITS: Performed by: INTERNAL MEDICINE

## 2018-11-08 PROCEDURE — 99219 PR INITIAL OBSERVATION CARE/DAY 50 MINUTES: CPT | Performed by: INTERNAL MEDICINE

## 2018-11-08 PROCEDURE — G0378 HOSPITAL OBSERVATION PER HR: HCPCS

## 2018-11-08 PROCEDURE — 63710000001 INSULIN DETEMIR PER 5 UNITS: Performed by: INTERNAL MEDICINE

## 2018-11-08 PROCEDURE — 94799 UNLISTED PULMONARY SVC/PX: CPT

## 2018-11-08 PROCEDURE — 99284 EMERGENCY DEPT VISIT MOD MDM: CPT

## 2018-11-08 PROCEDURE — 84484 ASSAY OF TROPONIN QUANT: CPT | Performed by: PHYSICIAN ASSISTANT

## 2018-11-08 PROCEDURE — 25010000002 VANCOMYCIN 1 G RECONSTITUTED SOLUTION 1 EACH VIAL: Performed by: PHYSICIAN ASSISTANT

## 2018-11-08 PROCEDURE — 71250 CT THORAX DX C-: CPT

## 2018-11-08 PROCEDURE — 82805 BLOOD GASES W/O2 SATURATION: CPT

## 2018-11-08 PROCEDURE — 87804 INFLUENZA ASSAY W/OPTIC: CPT | Performed by: PHYSICIAN ASSISTANT

## 2018-11-08 PROCEDURE — 80162 ASSAY OF DIGOXIN TOTAL: CPT | Performed by: PHYSICIAN ASSISTANT

## 2018-11-08 PROCEDURE — 80053 COMPREHEN METABOLIC PANEL: CPT | Performed by: PHYSICIAN ASSISTANT

## 2018-11-08 PROCEDURE — 94640 AIRWAY INHALATION TREATMENT: CPT

## 2018-11-08 PROCEDURE — 84443 ASSAY THYROID STIM HORMONE: CPT | Performed by: PHYSICIAN ASSISTANT

## 2018-11-08 RX ORDER — AMLODIPINE BESYLATE 5 MG/1
10 TABLET ORAL DAILY
Status: DISCONTINUED | OUTPATIENT
Start: 2018-11-09 | End: 2018-11-11 | Stop reason: HOSPADM

## 2018-11-08 RX ORDER — L.ACID,PARA/B.BIFIDUM/S.THERM 8B CELL
1 CAPSULE ORAL 2 TIMES DAILY
Status: DISCONTINUED | OUTPATIENT
Start: 2018-11-08 | End: 2018-11-11 | Stop reason: HOSPADM

## 2018-11-08 RX ORDER — DEXTROSE MONOHYDRATE 25 G/50ML
50 INJECTION, SOLUTION INTRAVENOUS ONCE
Status: COMPLETED | OUTPATIENT
Start: 2018-11-08 | End: 2018-11-08

## 2018-11-08 RX ORDER — CEFTRIAXONE 1 G/50ML
1 INJECTION, SOLUTION INTRAVENOUS ONCE
Status: COMPLETED | OUTPATIENT
Start: 2018-11-08 | End: 2018-11-08

## 2018-11-08 RX ORDER — ONDANSETRON 2 MG/ML
4 INJECTION INTRAMUSCULAR; INTRAVENOUS EVERY 6 HOURS PRN
Status: DISCONTINUED | OUTPATIENT
Start: 2018-11-08 | End: 2018-11-11 | Stop reason: HOSPADM

## 2018-11-08 RX ORDER — METOPROLOL SUCCINATE 100 MG/1
100 TABLET, EXTENDED RELEASE ORAL DAILY
Status: DISCONTINUED | OUTPATIENT
Start: 2018-11-09 | End: 2018-11-11 | Stop reason: HOSPADM

## 2018-11-08 RX ORDER — LEVOFLOXACIN 5 MG/ML
500 INJECTION, SOLUTION INTRAVENOUS EVERY 24 HOURS
Status: DISCONTINUED | OUTPATIENT
Start: 2018-11-10 | End: 2018-11-11 | Stop reason: HOSPADM

## 2018-11-08 RX ORDER — HYDRALAZINE HYDROCHLORIDE 25 MG/1
50 TABLET, FILM COATED ORAL 3 TIMES DAILY
Status: DISCONTINUED | OUTPATIENT
Start: 2018-11-08 | End: 2018-11-11 | Stop reason: HOSPADM

## 2018-11-08 RX ORDER — ONDANSETRON 4 MG/1
4 TABLET, FILM COATED ORAL EVERY 6 HOURS PRN
Status: DISCONTINUED | OUTPATIENT
Start: 2018-11-08 | End: 2018-11-11 | Stop reason: HOSPADM

## 2018-11-08 RX ORDER — FOLIC ACID 1 MG/1
1 TABLET ORAL DAILY
Status: DISCONTINUED | OUTPATIENT
Start: 2018-11-09 | End: 2018-11-11 | Stop reason: HOSPADM

## 2018-11-08 RX ORDER — IPRATROPIUM BROMIDE AND ALBUTEROL SULFATE 2.5; .5 MG/3ML; MG/3ML
3 SOLUTION RESPIRATORY (INHALATION) ONCE
Status: COMPLETED | OUTPATIENT
Start: 2018-11-08 | End: 2018-11-08

## 2018-11-08 RX ORDER — SODIUM CHLORIDE 0.9 % (FLUSH) 0.9 %
3 SYRINGE (ML) INJECTION EVERY 12 HOURS SCHEDULED
Status: DISCONTINUED | OUTPATIENT
Start: 2018-11-08 | End: 2018-11-11 | Stop reason: HOSPADM

## 2018-11-08 RX ORDER — ONDANSETRON 4 MG/1
4 TABLET, ORALLY DISINTEGRATING ORAL EVERY 6 HOURS PRN
Status: DISCONTINUED | OUTPATIENT
Start: 2018-11-08 | End: 2018-11-11 | Stop reason: HOSPADM

## 2018-11-08 RX ORDER — LEVOTHYROXINE SODIUM 0.1 MG/1
100 TABLET ORAL DAILY
Status: DISCONTINUED | OUTPATIENT
Start: 2018-11-09 | End: 2018-11-11 | Stop reason: HOSPADM

## 2018-11-08 RX ORDER — LEVOFLOXACIN 5 MG/ML
750 INJECTION, SOLUTION INTRAVENOUS ONCE
Status: COMPLETED | OUTPATIENT
Start: 2018-11-08 | End: 2018-11-08

## 2018-11-08 RX ORDER — DIGOXIN 125 MCG
125 TABLET ORAL SEE ADMIN INSTRUCTIONS
Status: DISCONTINUED | OUTPATIENT
Start: 2018-11-08 | End: 2018-11-11 | Stop reason: HOSPADM

## 2018-11-08 RX ORDER — HEPARIN SODIUM 5000 [USP'U]/ML
5000 INJECTION, SOLUTION INTRAVENOUS; SUBCUTANEOUS EVERY 8 HOURS SCHEDULED
Status: DISCONTINUED | OUTPATIENT
Start: 2018-11-08 | End: 2018-11-11 | Stop reason: HOSPADM

## 2018-11-08 RX ORDER — PANTOPRAZOLE SODIUM 40 MG/1
40 TABLET, DELAYED RELEASE ORAL EVERY MORNING
Status: DISCONTINUED | OUTPATIENT
Start: 2018-11-09 | End: 2018-11-11 | Stop reason: HOSPADM

## 2018-11-08 RX ORDER — SODIUM POLYSTYRENE SULFONATE 15 G/60ML
15 SUSPENSION ORAL; RECTAL ONCE
Status: COMPLETED | OUTPATIENT
Start: 2018-11-08 | End: 2018-11-08

## 2018-11-08 RX ORDER — ACETAMINOPHEN 325 MG/1
325 TABLET ORAL EVERY 4 HOURS PRN
Status: DISCONTINUED | OUTPATIENT
Start: 2018-11-08 | End: 2018-11-11 | Stop reason: HOSPADM

## 2018-11-08 RX ORDER — CLOPIDOGREL BISULFATE 75 MG/1
75 TABLET ORAL DAILY
Status: DISCONTINUED | OUTPATIENT
Start: 2018-11-09 | End: 2018-11-11 | Stop reason: HOSPADM

## 2018-11-08 RX ORDER — SODIUM CHLORIDE 0.9 % (FLUSH) 0.9 %
3-10 SYRINGE (ML) INJECTION AS NEEDED
Status: DISCONTINUED | OUTPATIENT
Start: 2018-11-08 | End: 2018-11-11 | Stop reason: HOSPADM

## 2018-11-08 RX ORDER — IPRATROPIUM BROMIDE AND ALBUTEROL SULFATE 2.5; .5 MG/3ML; MG/3ML
3 SOLUTION RESPIRATORY (INHALATION) EVERY 6 HOURS PRN
Status: DISCONTINUED | OUTPATIENT
Start: 2018-11-08 | End: 2018-11-11 | Stop reason: HOSPADM

## 2018-11-08 RX ORDER — ACETAMINOPHEN 325 MG/1
650 TABLET ORAL EVERY 4 HOURS PRN
Status: DISCONTINUED | OUTPATIENT
Start: 2018-11-08 | End: 2018-11-11 | Stop reason: HOSPADM

## 2018-11-08 RX ORDER — ISOSORBIDE MONONITRATE 30 MG/1
30 TABLET, EXTENDED RELEASE ORAL DAILY
Status: DISCONTINUED | OUTPATIENT
Start: 2018-11-09 | End: 2018-11-11 | Stop reason: HOSPADM

## 2018-11-08 RX ORDER — SODIUM CHLORIDE 0.9 % (FLUSH) 0.9 %
10 SYRINGE (ML) INJECTION AS NEEDED
Status: DISCONTINUED | OUTPATIENT
Start: 2018-11-08 | End: 2018-11-11 | Stop reason: HOSPADM

## 2018-11-08 RX ORDER — FOLIC ACID/VIT B COMPLEX AND C 0.8 MG
1 TABLET ORAL DAILY
Status: DISCONTINUED | OUTPATIENT
Start: 2018-11-09 | End: 2018-11-11 | Stop reason: HOSPADM

## 2018-11-08 RX ORDER — LOSARTAN POTASSIUM 50 MG/1
100 TABLET ORAL
Status: DISCONTINUED | OUTPATIENT
Start: 2018-11-09 | End: 2018-11-11 | Stop reason: HOSPADM

## 2018-11-08 RX ORDER — ASPIRIN 81 MG/1
81 TABLET, CHEWABLE ORAL DAILY
Status: DISCONTINUED | OUTPATIENT
Start: 2018-11-09 | End: 2018-11-11 | Stop reason: HOSPADM

## 2018-11-08 RX ORDER — ATORVASTATIN CALCIUM 10 MG/1
10 TABLET, FILM COATED ORAL DAILY
Status: DISCONTINUED | OUTPATIENT
Start: 2018-11-09 | End: 2018-11-11 | Stop reason: HOSPADM

## 2018-11-08 RX ADMIN — VANCOMYCIN HYDROCHLORIDE 1000 MG: 1 INJECTION, POWDER, LYOPHILIZED, FOR SOLUTION INTRAVENOUS at 18:30

## 2018-11-08 RX ADMIN — Medication 1 CAPSULE: at 23:15

## 2018-11-08 RX ADMIN — CEFTRIAXONE 1 G: 1 INJECTION, SOLUTION INTRAVENOUS at 17:45

## 2018-11-08 RX ADMIN — HEPARIN SODIUM 5000 UNITS: 5000 INJECTION, SOLUTION INTRAVENOUS; SUBCUTANEOUS at 23:15

## 2018-11-08 RX ADMIN — LEVOFLOXACIN 750 MG: 5 INJECTION, SOLUTION INTRAVENOUS at 20:17

## 2018-11-08 RX ADMIN — HUMAN INSULIN 10 UNITS: 100 INJECTION, SOLUTION SUBCUTANEOUS at 23:26

## 2018-11-08 RX ADMIN — SODIUM POLYSTYRENE SULFONATE 15 G: 15 SUSPENSION ORAL; RECTAL at 23:24

## 2018-11-08 RX ADMIN — HYDRALAZINE HYDROCHLORIDE 50 MG: 25 TABLET, FILM COATED ORAL at 22:15

## 2018-11-08 RX ADMIN — IPRATROPIUM BROMIDE AND ALBUTEROL SULFATE 3 ML: .5; 3 SOLUTION RESPIRATORY (INHALATION) at 15:26

## 2018-11-08 RX ADMIN — DEXTROSE MONOHYDRATE 50 ML: 25 INJECTION, SOLUTION INTRAVENOUS at 23:17

## 2018-11-08 RX ADMIN — INSULIN DETEMIR 8 UNITS: 100 INJECTION, SOLUTION SUBCUTANEOUS at 23:38

## 2018-11-08 RX ADMIN — HUMAN INSULIN 10 UNITS: 100 INJECTION, SOLUTION SUBCUTANEOUS at 18:01

## 2018-11-08 NOTE — ED NOTES
At this time, the hospitalist was contacted and transferred to CRISTI Cueto Jasmine  11/08/18 1825

## 2018-11-08 NOTE — ED PROVIDER NOTES
Subjective   The patient is here with family members states he feels like he might have pneumonia he's had a cough for the past 3 or 4 days no fevers reported that he does describe some sweats at times,.. No chest pain no abdominal pain no nausea no vomiting  He is a dialysis patient,.. On a Tuesday Thursday Saturday schedule he is actually going to do his dialysis tomorrow  Presents here for further evaluation        History provided by:  Patient and relative      Review of Systems   Constitutional: Positive for diaphoresis. Negative for fever.   HENT: Negative.    Eyes: Negative.    Respiratory: Positive for cough.    Cardiovascular: Negative.    Gastrointestinal: Negative.  Negative for abdominal pain, diarrhea and vomiting.   Genitourinary: Negative.    Musculoskeletal: Positive for arthralgias.   Skin: Negative.    Neurological: Negative.    Psychiatric/Behavioral: The patient is nervous/anxious.    All other systems reviewed and are negative.      Past Medical History:   Diagnosis Date   • Anemia    • CHF (congestive heart failure) (CMS/HCC)    • Chronic kidney disease    • Diabetes mellitus (CMS/[facility])    • H/O chest x-ray 03/25/2016    Interval decrease in size of the patients right pleural effusion with a persistent small left effusion as well   • History of transfusion    • Hypertension    • Left-sided weakness    • Pneumonia    • Renal failure    • Stroke (CMS/HCC)        Allergies   Allergen Reactions   • Erythromycin Hives       Past Surgical History:   Procedure Laterality Date   • ARTERIOVENOUS FISTULA Right    • CARDIAC CATHETERIZATION N/A 3/28/2018    Procedure: Peripheral angiography;  Surgeon: Clay Ruano MD;  Location:  [facility] CATH INVASIVE LOCATION;  Service: Cardiovascular   • CARDIAC CATHETERIZATION N/A 3/28/2018    Procedure: Angioplasty-peripheral;  Surgeon: Clay Ruano MD;  Location: [facility] CATH INVASIVE LOCATION;  Service: Cardiovascular   • CARDIAC CATHETERIZATION N/A  3/28/2018    Procedure: Atherectomy-peripheral;  Surgeon: Clay Ruano MD;  Location:  [facility] CATH INVASIVE LOCATION;  Service: Cardiovascular   • CATARACT EXTRACTION, BILATERAL     • CHOLECYSTECTOMY     • COLONOSCOPY     • EYE SURGERY     • INTERVENTIONAL RADIOLOGY PROCEDURE N/A 3/28/2018    Procedure: Abdominal Aortogram;  Surgeon: Clay Ruano MD;  Location:  [facility] CATH INVASIVE LOCATION;  Service: Cardiovascular   • PORTACATH PLACEMENT     • VENOUS ACCESS DEVICE (PORT) REMOVAL         Family History   Problem Relation Age of Onset   • COPD Mother    • Diabetes Father    • Hypertension Sister    • Diabetes Sister    • Hypertension Brother    • Diabetes Paternal Grandmother        Social History     Social History   • Marital status:      Social History Main Topics   • Smoking status: Never Smoker   • Smokeless tobacco: Never Used   • Alcohol use No   • Drug use: No   • Sexual activity: Defer     Other Topics Concern   • Not on file           Objective   Physical Exam   Constitutional: He is oriented to person, place, and time. He appears well-developed and well-nourished. No distress.   Afebrile nontoxic no acute distress   HENT:   Head: Normocephalic.   Right Ear: External ear normal.   Left Ear: External ear normal.   Mouth/Throat: Oropharynx is clear and moist.   Eyes: Pupils are equal, round, and reactive to light. Conjunctivae and EOM are normal.   Neck: Normal range of motion. Neck supple.   Cardiovascular: Normal rate, regular rhythm and normal heart sounds.    Pulmonary/Chest: Effort normal and breath sounds normal.   Abdominal: Soft. There is no tenderness. There is no guarding.   Musculoskeletal: Normal range of motion. He exhibits no edema.   Neurological: He is alert and oriented to person, place, and time. No cranial nerve deficit or sensory deficit. He exhibits normal muscle tone. Coordination normal.   Skin: Skin is warm. Capillary refill takes less than 2 seconds. He is  not diaphoretic.   Psychiatric: He has a normal mood and affect. His behavior is normal. Judgment and thought content normal.   Nursing note and vitals reviewed.      Procedures           ED Course  ED Course as of Nov 08 2137   u Nov 08, 2018   1517 EKG interpreted by me: Sinus rhythm, normal rate, normal axis/intervals, nonspecific ST/T changes, this is an abnormal EKG, compared to previous this is actually improved  [MP]   1540 Patient case labs mgmt reviewed Dr Bedolla  [SC]   1635 Patient resting comfortably no acute distress  [SC]   1747 Discussed with Dr. Mackey, recommends giving insulin to treat glucose abnormality,.. Can be contacted for consult if needed//discussed with him regarding plan to admit to hospitalist service  [SC]   1839 Discussed patient case with Dr. Ramos accepts for admission  [SC]      ED Course User Index  [MP] Steffen Bedolla MD  [SC] Rom Alejo PA-C                  MDM  Number of Diagnoses or Management Options     Amount and/or Complexity of Data Reviewed  Clinical lab tests: ordered  Tests in the radiology section of CPT®: ordered  Tests in the medicine section of CPT®: ordered  Obtain history from someone other than the patient: yes  Review and summarize past medical records: yes  Discuss the patient with other providers: yes    Risk of Complications, Morbidity, and/or Mortality  Presenting problems: moderate  Diagnostic procedures: low  Management options: low    Patient Progress  Patient progress: stable        Final diagnoses:   Pneumonia of right upper lobe due to infectious organism (CMS/HCC)   Dialysis patient (CMS/HCC)            Rom Alejo PA-C  11/08/18 1440

## 2018-11-08 NOTE — ED NOTES
At this time, house supervisor was contacted and states she will call back with bed.      Antoinette Yip  11/08/18 4643

## 2018-11-09 LAB
ANION GAP SERPL CALCULATED.3IONS-SCNC: 22.3 MMOL/L (ref 10–20)
BASOPHILS # BLD AUTO: 0.11 10*3/MM3 (ref 0–0.2)
BASOPHILS NFR BLD AUTO: 1.7 % (ref 0–2.5)
BUN BLD-MCNC: 65 MG/DL (ref 7–20)
BUN/CREAT SERPL: 10.2 (ref 6.3–21.9)
CALCIUM SPEC-SCNC: 9.3 MG/DL (ref 8.4–10.2)
CHLORIDE SERPL-SCNC: 91 MMOL/L (ref 98–107)
CO2 SERPL-SCNC: 24 MMOL/L (ref 26–30)
CREAT BLD-MCNC: 6.4 MG/DL (ref 0.6–1.3)
DEPRECATED RDW RBC AUTO: 54.3 FL (ref 37–54)
EOSINOPHIL # BLD AUTO: 0.71 10*3/MM3 (ref 0–0.7)
EOSINOPHIL NFR BLD AUTO: 11.1 % (ref 0–7)
ERYTHROCYTE [DISTWIDTH] IN BLOOD BY AUTOMATED COUNT: 16.3 % (ref 11.5–14.5)
GFR SERPL CREATININE-BSD FRML MDRD: 9 ML/MIN/1.73
GFR SERPL CREATININE-BSD FRML MDRD: ABNORMAL ML/MIN/1.73
GLUCOSE BLD-MCNC: 111 MG/DL (ref 74–98)
GLUCOSE BLDC GLUCOMTR-MCNC: 105 MG/DL (ref 70–130)
GLUCOSE BLDC GLUCOMTR-MCNC: 211 MG/DL (ref 70–130)
GLUCOSE BLDC GLUCOMTR-MCNC: 309 MG/DL (ref 70–130)
GLUCOSE BLDC GLUCOMTR-MCNC: 382 MG/DL (ref 70–130)
GLUCOSE BLDC GLUCOMTR-MCNC: 480 MG/DL (ref 70–130)
GLUCOSE BLDC GLUCOMTR-MCNC: 80 MG/DL (ref 70–130)
GLUCOSE BLDC GLUCOMTR-MCNC: 86 MG/DL (ref 70–130)
HCT VFR BLD AUTO: 36 % (ref 42–52)
HGB BLD-MCNC: 11.5 G/DL (ref 14–18)
IMM GRANULOCYTES # BLD: 0.04 10*3/MM3 (ref 0–0.06)
IMM GRANULOCYTES NFR BLD: 0.6 % (ref 0–0.6)
LYMPHOCYTES # BLD AUTO: 0.67 10*3/MM3 (ref 0.6–3.4)
LYMPHOCYTES NFR BLD AUTO: 10.5 % (ref 10–50)
MCH RBC QN AUTO: 29.1 PG (ref 27–31)
MCHC RBC AUTO-ENTMCNC: 31.9 G/DL (ref 30–37)
MCV RBC AUTO: 91.1 FL (ref 80–94)
MONOCYTES # BLD AUTO: 0.82 10*3/MM3 (ref 0–0.9)
MONOCYTES NFR BLD AUTO: 12.9 % (ref 0–12)
NEUTROPHILS # BLD AUTO: 4.02 10*3/MM3 (ref 2–6.9)
NEUTROPHILS NFR BLD AUTO: 63.2 % (ref 37–80)
NRBC BLD MANUAL-RTO: 0 /100 WBC (ref 0–0)
PLATELET # BLD AUTO: 157 10*3/MM3 (ref 130–400)
PMV BLD AUTO: 10.6 FL (ref 6–12)
POTASSIUM BLD-SCNC: 6.3 MMOL/L (ref 3.5–5.1)
RBC # BLD AUTO: 3.95 10*6/MM3 (ref 4.7–6.1)
SODIUM BLD-SCNC: 131 MMOL/L (ref 137–145)
VANCOMYCIN SERPL-MCNC: 13.38 MCG/ML (ref 5–40)
WBC NRBC COR # BLD: 6.37 10*3/MM3 (ref 4.8–10.8)

## 2018-11-09 PROCEDURE — 80048 BASIC METABOLIC PNL TOTAL CA: CPT | Performed by: INTERNAL MEDICINE

## 2018-11-09 PROCEDURE — 99232 SBSQ HOSP IP/OBS MODERATE 35: CPT | Performed by: INTERNAL MEDICINE

## 2018-11-09 PROCEDURE — 87081 CULTURE SCREEN ONLY: CPT | Performed by: INTERNAL MEDICINE

## 2018-11-09 PROCEDURE — 25010000002 HEPARIN (PORCINE) PER 1000 UNITS: Performed by: INTERNAL MEDICINE

## 2018-11-09 PROCEDURE — 94799 UNLISTED PULMONARY SVC/PX: CPT

## 2018-11-09 PROCEDURE — 5A1D70Z PERFORMANCE OF URINARY FILTRATION, INTERMITTENT, LESS THAN 6 HOURS PER DAY: ICD-10-PCS | Performed by: INTERNAL MEDICINE

## 2018-11-09 PROCEDURE — 80202 ASSAY OF VANCOMYCIN: CPT | Performed by: INTERNAL MEDICINE

## 2018-11-09 PROCEDURE — 25010000002 PIPERACILLIN SOD-TAZOBACTAM PER 1 G: Performed by: INTERNAL MEDICINE

## 2018-11-09 PROCEDURE — 25010000002 VANCOMYCIN PER 500 MG: Performed by: INTERNAL MEDICINE

## 2018-11-09 PROCEDURE — 85025 COMPLETE CBC W/AUTO DIFF WBC: CPT | Performed by: INTERNAL MEDICINE

## 2018-11-09 PROCEDURE — 63710000001 INSULIN DETEMIR PER 5 UNITS: Performed by: INTERNAL MEDICINE

## 2018-11-09 PROCEDURE — 82962 GLUCOSE BLOOD TEST: CPT

## 2018-11-09 PROCEDURE — 80074 ACUTE HEPATITIS PANEL: CPT | Performed by: INTERNAL MEDICINE

## 2018-11-09 PROCEDURE — 63710000001 INSULIN ASPART PER 5 UNITS: Performed by: INTERNAL MEDICINE

## 2018-11-09 PROCEDURE — 90935 HEMODIALYSIS ONE EVALUATION: CPT

## 2018-11-09 RX ORDER — SODIUM CHLORIDE FOR INHALATION 3 %
4 VIAL, NEBULIZER (ML) INHALATION ONCE
Status: COMPLETED | OUTPATIENT
Start: 2018-11-09 | End: 2018-11-09

## 2018-11-09 RX ORDER — HYDROCODONE BITARTRATE AND ACETAMINOPHEN 5; 325 MG/1; MG/1
1 TABLET ORAL EVERY 6 HOURS PRN
Status: DISCONTINUED | OUTPATIENT
Start: 2018-11-09 | End: 2018-11-11 | Stop reason: HOSPADM

## 2018-11-09 RX ORDER — DEXTROSE MONOHYDRATE 25 G/50ML
25 INJECTION, SOLUTION INTRAVENOUS
Status: DISCONTINUED | OUTPATIENT
Start: 2018-11-09 | End: 2018-11-11 | Stop reason: HOSPADM

## 2018-11-09 RX ORDER — NICOTINE POLACRILEX 4 MG
1 LOZENGE BUCCAL
Status: DISCONTINUED | OUTPATIENT
Start: 2018-11-09 | End: 2018-11-11 | Stop reason: HOSPADM

## 2018-11-09 RX ORDER — SODIUM POLYSTYRENE SULFONATE 15 G/60ML
30 SUSPENSION ORAL; RECTAL ONCE
Status: COMPLETED | OUTPATIENT
Start: 2018-11-09 | End: 2018-11-09

## 2018-11-09 RX ADMIN — Medication 3 ML: at 01:08

## 2018-11-09 RX ADMIN — METOPROLOL SUCCINATE 100 MG: 100 TABLET, EXTENDED RELEASE ORAL at 08:27

## 2018-11-09 RX ADMIN — Medication 1 TABLET: at 08:27

## 2018-11-09 RX ADMIN — SODIUM CHLORIDE SOLN NEBU 3% 4 ML: 3 NEBU SOLN at 11:18

## 2018-11-09 RX ADMIN — TAZOBACTAM SODIUM AND PIPERACILLIN SODIUM 4.5 G: 500; 4 INJECTION, SOLUTION INTRAVENOUS at 01:07

## 2018-11-09 RX ADMIN — LOSARTAN POTASSIUM 100 MG: 50 TABLET, FILM COATED ORAL at 08:27

## 2018-11-09 RX ADMIN — DIGOXIN 125 MCG: 125 TABLET ORAL at 06:04

## 2018-11-09 RX ADMIN — HYDRALAZINE HYDROCHLORIDE 50 MG: 25 TABLET, FILM COATED ORAL at 08:27

## 2018-11-09 RX ADMIN — IPRATROPIUM BROMIDE AND ALBUTEROL SULFATE 3 ML: .5; 3 SOLUTION RESPIRATORY (INHALATION) at 23:18

## 2018-11-09 RX ADMIN — FOLIC ACID 1 MG: 1 TABLET ORAL at 08:28

## 2018-11-09 RX ADMIN — Medication 3 ML: at 08:28

## 2018-11-09 RX ADMIN — Medication 3 ML: at 21:08

## 2018-11-09 RX ADMIN — SODIUM CHLORIDE SOLN NEBU 3% 4 ML: 3 NEBU SOLN at 20:39

## 2018-11-09 RX ADMIN — PANTOPRAZOLE SODIUM 40 MG: 40 TABLET, DELAYED RELEASE ORAL at 06:04

## 2018-11-09 RX ADMIN — TAZOBACTAM SODIUM AND PIPERACILLIN SODIUM 4.5 G: 500; 4 INJECTION, SOLUTION INTRAVENOUS at 05:59

## 2018-11-09 RX ADMIN — HEPARIN SODIUM 5000 UNITS: 5000 INJECTION, SOLUTION INTRAVENOUS; SUBCUTANEOUS at 06:06

## 2018-11-09 RX ADMIN — HYDROCODONE BITARTRATE AND ACETAMINOPHEN 1 TABLET: 5; 325 TABLET ORAL at 16:30

## 2018-11-09 RX ADMIN — CHOLECALCIFEROL CAP 125 MCG (5000 UNIT) 5000 UNITS: 125 CAP at 08:27

## 2018-11-09 RX ADMIN — Medication 1 CAPSULE: at 21:04

## 2018-11-09 RX ADMIN — INSULIN ASPART 6 UNITS: 100 INJECTION, SOLUTION INTRAVENOUS; SUBCUTANEOUS at 21:04

## 2018-11-09 RX ADMIN — ASPIRIN 81 MG 81 MG: 81 TABLET ORAL at 08:28

## 2018-11-09 RX ADMIN — CLOPIDOGREL BISULFATE 75 MG: 75 TABLET ORAL at 08:28

## 2018-11-09 RX ADMIN — SODIUM POLYSTYRENE SULFONATE 30 G: 15 SUSPENSION ORAL; RECTAL at 08:27

## 2018-11-09 RX ADMIN — INSULIN ASPART 3 UNITS: 100 INJECTION, SOLUTION INTRAVENOUS; SUBCUTANEOUS at 17:58

## 2018-11-09 RX ADMIN — VANCOMYCIN HYDROCHLORIDE 750 MG: 750 INJECTION, SOLUTION INTRAVENOUS at 17:57

## 2018-11-09 RX ADMIN — ISOSORBIDE MONONITRATE 30 MG: 30 TABLET, EXTENDED RELEASE ORAL at 08:28

## 2018-11-09 RX ADMIN — ATORVASTATIN CALCIUM 10 MG: 10 TABLET, FILM COATED ORAL at 08:27

## 2018-11-09 RX ADMIN — LEVOTHYROXINE SODIUM 100 MCG: 100 TABLET ORAL at 08:28

## 2018-11-09 RX ADMIN — IPRATROPIUM BROMIDE AND ALBUTEROL SULFATE 3 ML: .5; 3 SOLUTION RESPIRATORY (INHALATION) at 11:18

## 2018-11-09 RX ADMIN — HYDRALAZINE HYDROCHLORIDE 50 MG: 25 TABLET, FILM COATED ORAL at 16:30

## 2018-11-09 RX ADMIN — HEPARIN SODIUM 5000 UNITS: 5000 INJECTION, SOLUTION INTRAVENOUS; SUBCUTANEOUS at 21:00

## 2018-11-09 RX ADMIN — AMLODIPINE BESYLATE 10 MG: 5 TABLET ORAL at 08:27

## 2018-11-09 RX ADMIN — HYDRALAZINE HYDROCHLORIDE 50 MG: 25 TABLET, FILM COATED ORAL at 21:05

## 2018-11-09 RX ADMIN — INSULIN DETEMIR 8 UNITS: 100 INJECTION, SOLUTION SUBCUTANEOUS at 21:06

## 2018-11-09 RX ADMIN — Medication 1 CAPSULE: at 08:27

## 2018-11-09 RX ADMIN — TAZOBACTAM SODIUM AND PIPERACILLIN SODIUM 4.5 G: 500; 4 INJECTION, SOLUTION INTRAVENOUS at 16:30

## 2018-11-09 NOTE — H&P
[facility]  HISTORY AND PHYSICAL    Primary Care Physician: Idalia Kay MD    Subjective     Chief Complaint:    Chief Complaint   Patient presents with   • Cough       History of Present Illness:     I have reviewed labs/imaging/records from this hospitalization, including ER staff to establish a comprehensive understanding of this patient's clinical issues, as well as to establish plan of care appropriately.     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  He states that his sugars have not been low and it did not matter whether there were control or control he was sweating through all of his closed and his bed sheets.  This was not associated with any fever or chills.  He denies having any shortness of breath, but has had some nonproductive coughing.  On further questioning he also reports on and off diarrhea for the past 7 days anywhere between 4-5 times a day with associated abdominal cramping.  He does report that this is a chronic issue for him, where he gets constipated for 4-5 days and then has diarrhea for a few days and vice versa.  On further questioning he does also state that he did not go to his routine dialysis session today because he had a trip scheduled with his  center and was supposed to have dialysis tomorrow but missed his trip due to feeling unwell.  At the time of my encounter patient is feeling well and has no significant complaint  "other than feeling hungry.     On arrival to the ER patient's vitals are notable for blood pressure 179/78 with normal heart rate, respiratory rate and temperature.  He is oxygenating well on room air.  Labs are notable for glucose of 482, BUN/Cr creatinine 54/5.9, sodium 128, potassium 6.7 with an anion gap of 19.  Troponin was obtained and it is negative.  He has a normal white blood cell count and baseline H&H of 11.5/35.3.  His lactate was within normal range at 0.6. EKG did not show any hyperkalemic changes. An arterial blood gas was obtained which shows a normal pH and a decreased PaO2 of 45 on room air, this does not correlate with his oxygen saturation and likely is a mixed blood gas.  Subsequently a CT of the chest without contrast was obtained which shows \"diffuse centrilobular emphysema with bilateral fibrotic scarring, greatest in the right middle lobe.  There is patchy and consolidated right upper and lower airspace disease with small right pleural effusion.  There are enlarged mediastinal lymph nodes measuring up to 1.5 cm.\" Patient was given a dose of vancomycin, levaquin and ceftriaxone. He was also given 10 units of IV inuslin and albuterol for treatment of hyperkalemia. He is being admitted to the hospitalist service for further treatment and management of right sided pneumonia.     Of note, patient reports that he has been taking a daily oral antibiotic since September for cellulitis of his scrotum for which he sees urology at [facility]. He could not remember the name of the antibiotic. Further chart review shows that the patient was seen by Dr. Hernandez on 10/22 in his office for follow up. It appears that the patient has had Pleurx catheter place on the right side at some point previously and probably has pleurodesis and he felt that he likely has residual scarring involving the pleura. He asked the patient to follow up in 6 months for repeat CXR at that time.     Review of Systems   1. Constitutional: " Negative for fever. Negative for chills, fatigue and unexpected weight change. +profuse sweating  2. HENT: Negative for congestion and hearing loss.   3. Eyes: Negative for redness and visual disturbance.   4. Respiratory: negative for shortness of breath. Negative for chest pain . Negative for cough and chest tightness.   5. Cardiovascular: Negative for chest pain and palpitations.   6. Gastrointestinal: Negative for abdominal distention, abdominal pain and blood in stool. + on and off diarrhea  7. Endocrine: Negative for cold intolerance and heat intolerance.   8. Genitourinary: Negative for difficulty urinating, dysuria and frequency.   9. Musculoskeletal: Negative for arthralgias, back pain and myalgias.   10. Skin: Negative for color change, rash and wound.   11. Neurological: Negative for syncope, weakness and headaches.   12. Hematological: Negative for adenopathy. Does not bruise/bleed easily.   13. Psychiatric/Behavioral: Negative for confusion. The patient is not nervous/anxious.     Past Medical History:   Past Medical History:   Diagnosis Date   • Anemia    • CHF (congestive heart failure) (CMS/HCC)    • Chronic kidney disease    • Diabetes mellitus (CMS/HCC)    • H/O chest x-ray 03/25/2016    Interval decrease in size of the patients right pleural effusion with a persistent small left effusion as well   • History of transfusion    • Hypertension    • Left-sided weakness    • Pneumonia    • Renal failure    • Stroke (CMS/HCC)        Past Surgical History:  Past Surgical History:   Procedure Laterality Date   • ARTERIOVENOUS FISTULA Right    • CARDIAC CATHETERIZATION N/A 3/28/2018    Procedure: Peripheral angiography;  Surgeon: Clay Ruano MD;  Location: [facility] CATH INVASIVE LOCATION;  Service: Cardiovascular   • CARDIAC CATHETERIZATION N/A 3/28/2018    Procedure: Angioplasty-peripheral;  Surgeon: Clay Ruano MD;  Location: [facility] CATH INVASIVE LOCATION;  Service: Cardiovascular    • CARDIAC CATHETERIZATION N/A 3/28/2018    Procedure: Atherectomy-peripheral;  Surgeon: Clay Ruano MD;  Location: [facility] CATH INVASIVE LOCATION;  Service: Cardiovascular   • CATARACT EXTRACTION, BILATERAL     • CHOLECYSTECTOMY     • COLONOSCOPY     • EYE SURGERY     • INTERVENTIONAL RADIOLOGY PROCEDURE N/A 3/28/2018    Procedure: Abdominal Aortogram;  Surgeon: Clay Ruano MD;  Location: [facility] CATH INVASIVE LOCATION;  Service: Cardiovascular   • PORTACATH PLACEMENT     • VENOUS ACCESS DEVICE (PORT) REMOVAL         Family History: family history includes COPD in his mother; Diabetes in his father, paternal grandmother, and sister; Hypertension in his brother and sister.    Social History:  reports that he has never smoked. He has never used smokeless tobacco. He reports that he does not drink alcohol or use drugs.    Medications:  Prescriptions Prior to Admission   Medication Sig Dispense Refill Last Dose   • acetaminophen (TYLENOL) 325 MG tablet Take 1 tablet by mouth Every 4 (Four) Hours As Needed for Mild Pain .   Taking   • albuterol (PROVENTIL HFA;VENTOLIN HFA) 108 (90 Base) MCG/ACT inhaler Inhale 2 puffs Every 4 (Four) Hours As Needed for Shortness of Air. 1 inhaler 0 Taking   • amLODIPine (NORVASC) 10 MG tablet Take 10 mg by mouth Daily.   11/8/2018 at Unknown time   • aspirin 81 MG chewable tablet Chew 81 mg Daily.   11/8/2018 at Unknown time   • B Complex-C-Folic Acid (RADHA-SHIRLENE PO) Take  by mouth. Patient states he takes these after dialysis, but is unaware of dose.   11/8/2018 at Unknown time   • Cholecalciferol (VITAMIN D3) 5000 UNITS capsule capsule Take  by mouth daily.   11/8/2018 at Unknown time   • clopidogrel (PLAVIX) 75 MG tablet Take 1 tablet by mouth Daily. 90 tablet 3 11/8/2018 at Unknown time   • digoxin (LANOXIN) 125 MCG tablet Take 125 mcg by mouth See Admin Instructions. Alternate days after dialysis   11/8/2018 at Unknown time   • docusate sodium (COLACE) 100 MG  capsule Take 100 mg by mouth Every 12 (Twelve) Hours.   11/8/2018 at Unknown time   • folic acid (FOLVITE) 1 MG tablet Take 1 mg by mouth daily.   11/8/2018 at Unknown time   • hydrALAZINE (APRESOLINE) 50 MG tablet Take 50 mg by mouth 3 (Three) Times a Day.   11/8/2018 at Unknown time   • insulin detemir (LEVEMIR) 100 UNIT/ML injection Inject 8 Units under the skin Daily. 100 mL 0 11/7/2018 at Unknown time   • isosorbide mononitrate (IMDUR) 30 MG 24 hr tablet Take 30 mg by mouth Daily.   Taking   • lactobacillus acidophilus (RISAQUAD) capsule capsule Take 1 capsule by mouth 2 (Two) Times a Day. 7 capsule 0 11/8/2018 at Unknown time   • levothyroxine (SYNTHROID, LEVOTHROID) 100 MCG tablet Take 100 mcg by mouth daily.   11/8/2018 at Unknown time   • lisinopril (PRINIVIL,ZESTRIL) 10 MG tablet Take  by mouth Daily. Patient states they take this medication, but it has not been filled at [facility]'s pharmacy.   11/8/2018 at Unknown time   • losartan (COZAAR) 100 MG tablet Take 1 tablet by mouth Daily.   11/8/2018 at Unknown time   • metoprolol succinate XL (TOPROL-XL) 100 MG 24 hr tablet Take 1 tablet by mouth Daily. 30 tablet 0 11/8/2018 at Unknown time   • omeprazole (priLOSEC) 20 MG capsule Take 20 mg by mouth Daily.   11/8/2018 at Unknown time   • ondansetron ODT (ZOFRAN-ODT) 4 MG disintegrating tablet Take 1 tablet by mouth Every 6 (Six) Hours As Needed for Nausea or Vomiting. 20 tablet 0 11/8/2018 at Unknown time   • pravastatin (PRAVACHOL) 40 MG tablet Take 80 mg by mouth Every Night.   11/8/2018 at Unknown time   • spironolactone (ALDACTONE) 25 MG tablet Take 25 mg by mouth Daily.   11/8/2018 at Unknown time   • fenofibrate (TRICOR) 54 MG tablet Take 54 mg by mouth Daily.   Not Taking   • FOSRENOL 1000 MG chewable tablet 3 (Three) Times a Day With Meals. Per pt, he takes with each meal and with snacks   Taking       Allergies:  Allergies   Allergen Reactions   • Erythromycin Hives         Objective     Physical  "Exam:  Vital Signs: /65 (BP Location: Left arm, Patient Position: Lying)   Pulse 70   Temp 98.6 °F (37 °C) (Oral)   Resp 16   Ht 172.7 cm (68\")   Wt 58.7 kg (129 lb 8 oz)   SpO2 98%   BMI 19.69 kg/m²      Physical Exam:     General Appearance:   Alert, cooperative, in no acute distress, chronically ill appearing     Head:   Normocephalic, without obvious abnormality, atraumatic.     Eyes:       Normal, conjunctivae and sclerae, no icterus, no pallor, corneas clear, PERRLA        Throat:   Oral mucosa dry      Neck:  No adenopathy, supple, trachea midline, no thyromegaly, no carotid bruit, no JVD      Back:   No CVA tenderness on Percussion.     Lungs:    Fair air entry bilaterally, rales and rhonchi appreciated on the right, no significant wheezing noted      Heart:   Regular rhythm and normal rate, normal S1 and S2.       Abdomen:   Obese. Normal bowel sounds, no masses, no organomegaly, soft non-tender, non-distended, no guarding, no rebound tenderness        Extremities:  Moves all extremities, no edema, no cyanosis, no redness, + AV graft on right upper extremity, blood vessels proximal to the graft engorged, this is chronic per patient, right upper extremity contracted     Pulses:  Pulses palpable and equal bilaterally but weak.     Skin:  No bleeding, bruising or rash        Neurologic:  Cranial nerves grossly intact         Results Review:  Lab Results (last 7 days)     Procedure Component Value Units Date/Time    Digoxin Level [[id number]]  (Normal) Collected:  11/08/18 1555    Specimen:  Blood Updated:  11/08/18 1748     Digoxin 1.10 ng/mL     Lactic Acid, Plasma [[id number]]  (Normal) Collected:  11/08/18 1712    Specimen:  Blood Updated:  11/08/18 1732     Lactate 0.6 mmol/L     Blood Culture With ROSELINE - Blood, [[id number]] Collected:  11/08/18 1712    Specimen:  Blood from Arm, Right Updated:  11/08/18 1725    Blood Culture With ROSELINE - Blood, [[id number]] Collected:  11/08/18 1700    Specimen:  " Blood from Hand, Left Updated:  11/08/18 1725    TSH [[id number]]  (Abnormal) Collected:  11/08/18 1555    Specimen:  Blood Updated:  11/08/18 1713     TSH 4.740 (H) mIU/mL     Comprehensive Metabolic Panel [[id number]]  (Abnormal) Collected:  11/08/18 1555    Specimen:  Blood Updated:  11/08/18 1641     Glucose 482 (C) mg/dL      Comment: Glucose >180, Hemoglobin A1C recommended.        BUN 54 (H) mg/dL      Creatinine 5.90 (H) mg/dL      Sodium 128 (L) mmol/L      Potassium 6.7 (C) mmol/L      Chloride 87 (L) mmol/L      CO2 28.0 mmol/L      Calcium 9.7 mg/dL      Total Protein 7.7 g/dL      Albumin 4.60 g/dL      ALT (SGPT) 22 U/L      AST (SGOT) 36 U/L      Alkaline Phosphatase 126 U/L      Total Bilirubin 0.7 mg/dL      eGFR Non African Amer 10 (L) mL/min/1.73      Comment: <15 Indicative of kidney failure.        eGFR   Amer -- mL/min/1.73      Comment: <15 Indicative of kidney failure.        Globulin 3.1 gm/dL      A/G Ratio 1.5 g/dL      BUN/Creatinine Ratio 9.2     Anion Gap 19.7 mmol/L     Narrative:       GFR Normal >60  Chronic Kidney Disease <60  Kidney Failure <15    Troponin [[id number]]  (Normal) Collected:  11/08/18 1555    Specimen:  Blood Updated:  11/08/18 1627     Troponin I 0.022 ng/mL     Narrative:       Normal Patient Upper Reference Limit (URL) (99th Percentile)=0.03 ng/mL   Non-AMI Illness Reference Limit=0.03-0.11 ng/mL   AMI Confirmation=0.12 ng/mL and above    CBC & Differential [[id number]] Collected:  11/08/18 1555    Specimen:  Blood Updated:  11/08/18 1602    Narrative:       The following orders were created for panel order CBC & Differential.  Procedure                               Abnormality         Status                     ---------                               -----------         ------                     CBC Auto Differential[[id number]]        Abnormal            Final result                 Please view results for these tests on the individual orders.    CBC Auto  Differential [[id number]]  (Abnormal) Collected:  11/08/18 1555    Specimen:  Blood Updated:  11/08/18 1602     WBC 5.19 10*3/mm3      RBC 3.89 (L) 10*6/mm3      Hemoglobin 11.5 (L) g/dL      Hematocrit 35.3 (L) %      MCV 90.7 fL      MCH 29.6 pg      MCHC 32.6 g/dL      RDW 16.3 (H) %      RDW-SD 53.2 fl      MPV 10.7 fL      Platelets 170 10*3/mm3      Neutrophil % 57.7 %      Lymphocyte % 11.9 %      Monocyte % 14.8 (H) %      Eosinophil % 13.5 (H) %      Basophil % 1.9 %      Immature Grans % 0.2 %      Neutrophils, Absolute 2.99 10*3/mm3      Lymphocytes, Absolute 0.62 10*3/mm3      Monocytes, Absolute 0.77 10*3/mm3      Eosinophils, Absolute 0.70 10*3/mm3      Basophils, Absolute 0.10 10*3/mm3      Immature Grans, Absolute 0.01 10*3/mm3      nRBC 0.0 /100 WBC     Influenza Antigen, Rapid - Swab, Nasopharynx [[id number]]  (Normal) Collected:  11/08/18 1522    Specimen:  Swab from Nasopharynx Updated:  11/08/18 1554     Influenza A Ag, EIA Negative     Influenza B Ag, EIA Negative    Blood Gas, Arterial With Co-Ox [[id number]]  (Abnormal) Collected:  11/08/18 1539    Specimen:  Arterial Blood Updated:  11/08/18 1541     Site Left Radial     Gerardo's Test Positive     pH, Arterial 7.410 pH units      pCO2, Arterial 43.8 mm Hg      pO2, Arterial 45.8 (C) mm Hg      HCO3, Arterial 27.7 mmol/L      Base Excess, Arterial 2.7 (H) mmol/L      O2 Saturation, Arterial 77.0 (L) %      Hemoglobin, Blood Gas 11.5 (L) g/dL      Hematocrit, Blood Gas 35.3 %      Oxyhemoglobin 75.2 (L) %      Methemoglobin 1.20 %      Carboxyhemoglobin 1.1 %      Barometric Pressure for Blood Gas 739 mmHg      Modality Room Air     FIO2 21 %      Ventilator Mode NA     Note --     pH, Temp Corrected -- pH Units      pCO2, Temperature Corrected -- mm Hg      pO2, Temperature Corrected -- mm Hg         Imaging Results (last 72 hours)     Procedure Component Value Units Date/Time    CT Chest Without Contrast [[id number]] Collected:  11/08/18 9128      Updated:  11/08/18 1655    Narrative:       FINAL REPORT    CLINICAL HISTORY:  cough    FINDINGS:  Multiple contiguous transaxial slices through the chest were  obtained without the intravenous ministration of contrast.  There is diffuse centrilobular emphysema with bilateral fibrotic  scarring, greatest in the right middle lobe.  There is patchy  and consolidated right upper and lower lobe airspace disease  with a small right pleural effusion.  There are enlarged  mediastinal lymph nodes measuring up to 1.5 cm in short axis.  Heart size is upper limits of normal.  There are calcifications  of the coronary arteries.  Impression: Right upper and lower  lobe pneumonia with parapneumonic effusion and nonspecific  mediastinal adenopathy.  Recommend appropriate follow-up until  resolution  Chronic changes      Impression:       Authenticated by José Luis Morgan MD on 11/08/2018 04:50:33 PM      I have personally reviewed and interpreted available lab data, radiology studies and ECG obtained at time of admission.     Assessment / Plan     Assessment/Problem List:     Pneumonia of right upper lobe due to infectious organism (CMS/HCC)    1. Right upper lobe pneumonia with small bilateral effusions, due to unknown infectious organism, POA  2. Acute hyperkalemia, likely due to missed HD  3. Hyperglycemia with known insulin dependent type 2 diabetes mellitus  4. Diarrhea with intermittent constipation, will need to rule out C. Diff as he has been on recent oral antibiotics   5. Diabetic nephropathy and peripheral neuropathy  6. ESRD on hemodialysis T, Th and Saturday  7. Chronic debility  8. History of CVA with left residual hemiparesis, wheelchair bound  9. Recent scrotal cellulitis on chronic oral antibiotics   10. Chronic congestive heart failure, stable      Plan:  Patient is admitted to [facility] with telemetry. He will be continued on Vancomycin, Zosyn and Levaquin. Will check sputum culture.  Continue with  bronchodilators. No need for IV steroids at this time. Blood cultures obtained in the ER, will need to be followed up.     Repeat potassium level down to 5.9. Will give another dose of Insulin 10mg IV with D50 and Kayexalate as his stool was actually not watery and unlikely to be C. Diff (was rejected by lab).  Will recheck potassium levels in the morning. Dr. Mackey consulted as he will likely need dialysis in the morning.     Patient cannot recall the oral antibiotic that he has been taking, will need to call his pharmacy in AM.     Continue with home dose of Levemir with novolog SS.     Heparin for DVT ppx.   PT/OT.     Details were discussed with the patient.   Further recommendations will depend on clinical course of the patient during the current hospitalization.    I also discussed the details with the nursing staff.  Rest as ordered.    Anticipated stay is less than 2 midnights.    John Sosa MD 11/09/18 1:29 AM    Dictated using Dragon.

## 2018-11-09 NOTE — PROGRESS NOTES
[facility]    Name:  Talha Cutler   Age:  58 y.o.  Sex:  male  :  1960  MRN:  [id number]   Visit Number:  [id number]  Admission Date:  2018  Date Of Service:  18  Primary Care Physician:  Idalia Kay MD     LOS: 0 days :  Patient Care Team:  Idalia Kay MD as PCP - General:    Chief Complaint:      Cough and chills.    Subjective / Interval History:     Mr. Cutler is currently sitting up on the bed and is comfortable at rest.  He complains of increased sweating and chills at times.  He also complains of some cough.  He was admitted from the emergency room yesterday with right upper lobe pneumonia as well as hyperkalemia.  He missed one day of dialysis.  His potassium is 6.3 this morning and he was given 30 mg of Kayexalate again.  He is currently awaiting hemodialysis.  He states that he lives with his wife but he has generalized weakness and has not walked in a few weeks.  He is expressing desire to go to short-term rehabilitation.  He is currently on broad-spectrum IV antibiotic therapy with Zosyn, Levaquin and vancomycin.  He was afebrile since admission.  Previous physician documentation, laboratory and imaging data been reviewed.    Review of Systems:     General ROS: Patient denies any fevers, chills or loss of consciousness.  Complaints of generalized weakness.  Respiratory ROS: Cough or shortness of breath.  Cardiovascular ROS: Denies chest pain or palpitations. No history of exertional chest pain.  Gastrointestinal ROS: Denies nausea and vomiting. Denies any abdominal pain. No diarrhea.  Neurological ROS: Denies any focal weakness. No loss of consciousness. Denies any numbness.  Dermatological ROS: Denies any redness or pruritis.    Vital Signs:    Temp:  [97.4 °F (36.3 °C)-98.6 °F (37 °C)] 97.5 °F (36.4 °C)  Heart Rate:  [64-75] 73  Resp:  [16-20] 18  BP: (151-184)/(65-92) 160/74    Intake and  output:    I/O last 3 completed shifts:  In: 450 [IV Piggyback:450]  Out: 0   I/O this shift:  In: -   Out: 2500 [Other:2500]    Physical Examination:    General Appearance:  Alert and cooperative, not in any acute distress.   Head:  Atraumatic and normocephalic, without obvious abnormality.   Eyes:          PERRLA, conjunctivae and sclerae normal, no Icterus. No pallor. Extra-occular movements are within normal limits.   Neck: Supple, trachea midline, no thyromegaly, no carotid bruit.   Lungs:   Chest shape is normal. Breath sounds heard bilaterally equally.  No wheezing.  Occasional basal crackles heard. No Pleural rub or bronchial breathing.   Heart:  Normal S1 and S2, no murmur, no gallop, no rub. No JVD   Abdomen:   Normal bowel sounds, no masses, no organomegaly. Soft, non-tender, non-distended, no guarding, no rebound tenderness.   Extremities: Moves all extremities well, no edema, no cyanosis, no            clubbing.  AV graft with the bruit noted in the right arm.   Skin: No bleeding, bruising or rash.   Neurologic: Awake, alert and oriented times 3. Moves all 4 extremities equally.   Laboratory results:      Results from last 7 days  Lab Units 11/09/18  0553 11/08/18 2058 11/08/18  1555   SODIUM mmol/L 131*  --  128*   POTASSIUM mmol/L 6.3* 5.9* 6.7*   CHLORIDE mmol/L 91*  --  87*   CO2 mmol/L 24.0*  --  28.0   BUN mg/dL 65*  --  54*   CREATININE mg/dL 6.40*  --  5.90*   CALCIUM mg/dL 9.3  --  9.7   BILIRUBIN mg/dL  --   --  0.7   ALK PHOS U/L  --   --  126   ALT (SGPT) U/L  --   --  22   AST (SGOT) U/L  --   --  36   GLUCOSE mg/dL 111*  --  482*       Results from last 7 days  Lab Units 11/09/18  0553 11/08/18  1555   WBC 10*3/mm3 6.37 5.19   HEMOGLOBIN g/dL 11.5* 11.5*   HEMATOCRIT % 36.0* 35.3*   PLATELETS 10*3/mm3 157 170           Results from last 7 days  Lab Units 11/08/18  1555   TROPONIN I ng/mL 0.022       Results from last 7 days  Lab Units 11/08/18  1712 11/08/18  1700   BLOODCX  No growth at  less than 24 hours No growth at less than 24 hours     I have reviewed the patient's laboratory results.    Radiology results:    Imaging Results (last 24 hours)     Procedure Component Value Units Date/Time    CT Chest Without Contrast [[id number]] Collected:  11/08/18 1650     Updated:  11/08/18 1651    Narrative:       FINAL REPORT    CLINICAL HISTORY:  cough    FINDINGS:  Multiple contiguous transaxial slices through the chest were  obtained without the intravenous ministration of contrast.  There is diffuse centrilobular emphysema with bilateral fibrotic  scarring, greatest in the right middle lobe.  There is patchy  and consolidated right upper and lower lobe airspace disease  with a small right pleural effusion.  There are enlarged  mediastinal lymph nodes measuring up to 1.5 cm in short axis.  Heart size is upper limits of normal.  There are calcifications  of the coronary arteries.  Impression: Right upper and lower  lobe pneumonia with parapneumonic effusion and nonspecific  mediastinal adenopathy.  Recommend appropriate follow-up until  resolution  Chronic changes      Impression:       Authenticated by José Luis Morgan MD on 11/08/2018 04:50:33 PM        I have reviewed the patient's radiology reports.    Medication Review:     I have reviewed the patients active and prn medications.       Pneumonia of right upper lobe due to infectious organism (CMS/[facility])    Assessment:    1.  Right upper lobe pneumonia with small bilateral pleural effusions, present on admission.  2.  Acute hyperkalemia, present on admission, secondary to missed dialysis.  3.  End-stage renal disease on hemodialysis, on Tuesday, Thursday and Saturday.  4.  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  5.  Chronic right-sided loculated pleural effusion status post pleurodesis.  6.  Coronary artery disease on medical management.  7.  Chronic scrotal ulcer, followed up at [facility] urology.  8.  Chronic  debility.  9.  Essential hypertension.    Plan:    Patient will be continued on broad-spectrum IV antibiotic therapy with Zosyn, vancomycin and Levaquin, both by pharmacy.  He is being consulted by Dr. Mackey and I have discussed the patient's condition with him.  He will be undergoing hemodialysis today.  I will also consult physical and occupational therapy especially since the patient has significant generalized weakness.  Patient wants to go to short-term rehabilitation and I have discussed this with case management services.  He will be continued on his home medications.  He is on heparin for DVT prophylaxis.  He is on subcutaneous insulin protocol for blood sugar coverage.  Further recommendations depend upon his clinical course.      Carmelo Ray MD  11/09/18  1:50 PM    Dictated utilizing Dragon dictation.

## 2018-11-09 NOTE — PROGRESS NOTES
Discharge Planning Assessment  [facility]     Patient Name: Talha Cutler  MRN: [id number]  Today's Date: 11/9/2018    Admit Date: 11/8/2018          Discharge Needs Assessment     Row Name 11/09/18 0908       Living Environment    Lives With spouse;child(starr), adult    Current Living Arrangements home/apartment/condo    Potentially Unsafe Housing Conditions --   denies problems    Primary Care Provided by child(starr)    Family Caregiver if Needed child(starr), adult;spouse    Living Arrangement Comments --   goes to [facility] dialysis in [place]   goes to [facility]       Resource/Environmental Concerns    Transportation Concerns car, none       Transition Planning    Transportation Anticipated family or friend will provide       Discharge Needs Assessment    Readmission Within the Last 30 Days no previous admission in last 30 days    Equipment Currently Used at Home oxygen;wheelchair, motorized;walker, standard;glucometer;commode;nebulizer    Outpatient/Agency/Support Group Needs --   goes to [facility] dialysis at [place]    Discharge Facility/Level of Care Needs --   has [facility], [facility] for oxygen            Discharge Plan     Row Name 11/09/18 0911       Plan    Plan home with family      Patient/Family in Agreement with Plan yes    Plan Comments lives with spouse and family,  fairly indpendent,  own POA,  has advance directives on file, oxygen 2 liters at night through  [facility], [facility], goes to   [facility] dialysis  in [place],  goes to  [facility], plans on returning home at discharge, family will transport        Destination     No service coordination in this encounter.      Durable Medical Equipment     No service coordination in this encounter.      Dialysis/Infusion     No service coordination in this encounter.      Home Medical Care     No service coordination in this encounter.      Social Care     No service coordination in this encounter.                 Demographic Summary     Row Name 11/09/18 0905       General Information    Admission Type observation    Required Notices Provided Observation Status Notice    Referral Source admission list    Preferred Language English            Functional Status     Row Name 11/09/18 0905       Functional Status    Usual Activity Tolerance fair    Current Activity Tolerance fair       Functional Status, IADL    Medications assistive person    Meal Preparation assistive person    Housekeeping assistive person    Laundry assistive person    Shopping assistive person       Mental Status    General Appearance WDL WDL            Psychosocial    No documentation.           Abuse/Neglect    No documentation.           Legal    No documentation.           Substance Abuse    No documentation.           Patient Forms    No documentation.         Annika Wallace RN

## 2018-11-09 NOTE — DISCHARGE PLACEMENT REQUEST
"Wants short term rehab  [facility]  [phone]    Talha Storm (58 y.o. 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), 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)   Code Status:  CPR    Ht:  172.7 cm (68\")   Wt:  59.3 kg (130 lb 11.2 oz)    Admission Cmt:  None   Principal Problem:  None                Active Insurance as of 11/8/2018     Primary Coverage     Payor Plan Insurance Group Employer/Plan Group    HUMANA MEDICARE REPLACEMENT HUMANA MEDICARE REPL [id number]     Payor Plan Address Payor Plan Phone Number Effective From Effective To    [address] [address] [phone] 1/1/2014     [facility] [id number]       Subscriber Name Subscriber Birth Date Member ID       TALHA STORM 1960 [id number]           Secondary Coverage     Payor Plan Insurance Group Employer/Plan Group    [place]      Payor Plan Address Payor Plan Phone Number Effective From Effective To    [address] [phone] 2/15/2018     [facility] [id number]       Subscriber Name Subscriber Birth Date Member ID       TALHA STORM 1960 [id number]                 Emergency Contacts      (Rel.) Home Phone Work Phone Mobile Phone    Mallika Storm (Spouse) [phone] -- --    Cisco Orozco (Brother) [phone] -- --               History & Physical      John Sosa MD at 11/8/2018  8:45 PM              [facility] Medicine Services  HISTORY " AND PHYSICAL    Primary Care Physician: Idalia Kay MD    Subjective     Chief Complaint:    Chief Complaint   Patient presents with   • Cough       History of Present Illness:     I have reviewed labs/imaging/records from this hospitalization, including ER staff to establish a comprehensive understanding of this patient's clinical issues, as well as to establish plan of care appropriately.     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 who presented to the ER today with complaints of profuse sweating associated with generalized weakness for the last 3 days.  He states that his sugars have not been low and it did not matter whether there were control or control he was sweating through all of his closed and his bed sheets.  This was not associated with any fever or chills.  He denies having any shortness of breath, but has had some nonproductive coughing.  On further questioning he also reports on and off diarrhea for the past 7 days anywhere between 4-5 times a day with associated abdominal cramping.  He does report that this is a chronic issue for him, where he gets constipated for 4-5 days and then has diarrhea for a few days and vice versa.  On further questioning he does also state that he did not go to his routine dialysis session today because he had a trip scheduled with his  center and was supposed to have dialysis tomorrow but missed his trip due to feeling unwell.  At the time of my encounter patient is feeling well and has no significant complaint other than feeling hungry.     On arrival to the ER patient's  "vitals are notable for blood pressure 179/78 with normal heart rate, respiratory rate and temperature.  He is oxygenating well on room air.  Labs are notable for glucose of 482, BUN/Cr creatinine 54/5.9, sodium 128, potassium 6.7 with an anion gap of 19.  Troponin was obtained and it is negative.  He has a normal white blood cell count and baseline H&H of 11.5/35.3.  His lactate was within normal range at 0.6. EKG did not show any hyperkalemic changes. An arterial blood gas was obtained which shows a normal pH and a decreased PaO2 of 45 on room air, this does not correlate with his oxygen saturation and likely is a mixed blood gas.  Subsequently a CT of the chest without contrast was obtained which shows \"diffuse centrilobular emphysema with bilateral fibrotic scarring, greatest in the right middle lobe.  There is patchy and consolidated right upper and lower airspace disease with small right pleural effusion.  There are enlarged mediastinal lymph nodes measuring up to 1.5 cm.\" Patient was given a dose of vancomycin, levaquin and ceftriaxone. He was also given 10 units of IV inuslin and albuterol for treatment of hyperkalemia. He is being admitted to the hospitalist service for further treatment and management of right sided pneumonia.     Of note, patient reports that he has been taking a daily oral antibiotic since September for cellulitis of his scrotum for which he sees urology at [facility]. He could not remember the name of the antibiotic. Further chart review shows that the patient was seen by Dr. Hernandez on 10/22 in his office for follow up. It appears that the patient has had Pleurx catheter place on the right side at some point previously and probably has pleurodesis and he felt that he likely has residual scarring involving the pleura. He asked the patient to follow up in 6 months for repeat CXR at that time.     Review of Systems   1. Constitutional: Negative for fever. Negative for chills, fatigue and " unexpected weight change. +profuse sweating  2. HENT: Negative for congestion and hearing loss.   3. Eyes: Negative for redness and visual disturbance.   4. Respiratory: negative for shortness of breath. Negative for chest pain . Negative for cough and chest tightness.   5. Cardiovascular: Negative for chest pain and palpitations.   6. Gastrointestinal: Negative for abdominal distention, abdominal pain and blood in stool. + on and off diarrhea  7. Endocrine: Negative for cold intolerance and heat intolerance.   8. Genitourinary: Negative for difficulty urinating, dysuria and frequency.   9. Musculoskeletal: Negative for arthralgias, back pain and myalgias.   10. Skin: Negative for color change, rash and wound.   11. Neurological: Negative for syncope, weakness and headaches.   12. Hematological: Negative for adenopathy. Does not bruise/bleed easily.   13. Psychiatric/Behavioral: Negative for confusion. The patient is not nervous/anxious.     Past Medical History:   Past Medical History:   Diagnosis Date   • Anemia    • CHF (congestive heart failure) (CMS/[facility])    • Chronic kidney disease    • Diabetes mellitus (CMS/HCC)    • H/O chest x-ray 03/25/2016    Interval decrease in size of the patients right pleural effusion with a persistent small left effusion as well   • History of transfusion    • Hypertension    • Left-sided weakness    • Pneumonia    • Renal failure    • Stroke (CMS/HCC)        Past Surgical History:  Past Surgical History:   Procedure Laterality Date   • ARTERIOVENOUS FISTULA Right    • CARDIAC CATHETERIZATION N/A 3/28/2018    Procedure: Peripheral angiography;  Surgeon: Clay Ruano MD;  Location: [facility] CATH INVASIVE LOCATION;  Service: Cardiovascular   • CARDIAC CATHETERIZATION N/A 3/28/2018    Procedure: Angioplasty-peripheral;  Surgeon: Clay Ruano MD;  Location: [facility] CATH INVASIVE LOCATION;  Service: Cardiovascular   • CARDIAC CATHETERIZATION N/A 3/28/2018    Procedure:  Atherectomy-peripheral;  Surgeon: Clay Ruano MD;  Location: [facility] CATH INVASIVE LOCATION;  Service: Cardiovascular   • CATARACT EXTRACTION, BILATERAL     • CHOLECYSTECTOMY     • COLONOSCOPY     • EYE SURGERY     • INTERVENTIONAL RADIOLOGY PROCEDURE N/A 3/28/2018    Procedure: Abdominal Aortogram;  Surgeon: Clay Ruano MD;  Location: [facility] CATH INVASIVE LOCATION;  Service: Cardiovascular   • PORTACATH PLACEMENT     • VENOUS ACCESS DEVICE (PORT) REMOVAL         Family History: family history includes COPD in his mother; Diabetes in his father, paternal grandmother, and sister; Hypertension in his brother and sister.    Social History:  reports that he has never smoked. He has never used smokeless tobacco. He reports that he does not drink alcohol or use drugs.    Medications:  Prescriptions Prior to Admission   Medication Sig Dispense Refill Last Dose   • acetaminophen (TYLENOL) 325 MG tablet Take 1 tablet by mouth Every 4 (Four) Hours As Needed for Mild Pain .   Taking   • albuterol (PROVENTIL HFA;VENTOLIN HFA) 108 (90 Base) MCG/ACT inhaler Inhale 2 puffs Every 4 (Four) Hours As Needed for Shortness of Air. 1 inhaler 0 Taking   • amLODIPine (NORVASC) 10 MG tablet Take 10 mg by mouth Daily.   11/8/2018 at Unknown time   • aspirin 81 MG chewable tablet Chew 81 mg Daily.   11/8/2018 at Unknown time   • B Complex-C-Folic Acid (RADHA-SHIRLENE PO) Take  by mouth. Patient states he takes these after dialysis, but is unaware of dose.   11/8/2018 at Unknown time   • Cholecalciferol (VITAMIN D3) 5000 UNITS capsule capsule Take  by mouth daily.   11/8/2018 at Unknown time   • clopidogrel (PLAVIX) 75 MG tablet Take 1 tablet by mouth Daily. 90 tablet 3 11/8/2018 at Unknown time   • digoxin (LANOXIN) 125 MCG tablet Take 125 mcg by mouth See Admin Instructions. Alternate days after dialysis   11/8/2018 at Unknown time   • docusate sodium (COLACE) 100 MG capsule Take 100 mg by mouth Every 12 (Twelve) Hours.    11/8/2018 at Unknown time   • folic acid (FOLVITE) 1 MG tablet Take 1 mg by mouth daily.   11/8/2018 at Unknown time   • hydrALAZINE (APRESOLINE) 50 MG tablet Take 50 mg by mouth 3 (Three) Times a Day.   11/8/2018 at Unknown time   • insulin detemir (LEVEMIR) 100 UNIT/ML injection Inject 8 Units under the skin Daily. 100 mL 0 11/7/2018 at Unknown time   • isosorbide mononitrate (IMDUR) 30 MG 24 hr tablet Take 30 mg by mouth Daily.   Taking   • lactobacillus acidophilus (RISAQUAD) capsule capsule Take 1 capsule by mouth 2 (Two) Times a Day. 7 capsule 0 11/8/2018 at Unknown time   • levothyroxine (SYNTHROID, LEVOTHROID) 100 MCG tablet Take 100 mcg by mouth daily.   11/8/2018 at Unknown time   • lisinopril (PRINIVIL,ZESTRIL) 10 MG tablet Take  by mouth Daily. Patient states they take this medication, but it has not been filled at [facility]'s pharmacy.   11/8/2018 at Unknown time   • losartan (COZAAR) 100 MG tablet Take 1 tablet by mouth Daily.   11/8/2018 at Unknown time   • metoprolol succinate XL (TOPROL-XL) 100 MG 24 hr tablet Take 1 tablet by mouth Daily. 30 tablet 0 11/8/2018 at Unknown time   • omeprazole (priLOSEC) 20 MG capsule Take 20 mg by mouth Daily.   11/8/2018 at Unknown time   • ondansetron ODT (ZOFRAN-ODT) 4 MG disintegrating tablet Take 1 tablet by mouth Every 6 (Six) Hours As Needed for Nausea or Vomiting. 20 tablet 0 11/8/2018 at Unknown time   • pravastatin (PRAVACHOL) 40 MG tablet Take 80 mg by mouth Every Night.   11/8/2018 at Unknown time   • spironolactone (ALDACTONE) 25 MG tablet Take 25 mg by mouth Daily.   11/8/2018 at Unknown time   • fenofibrate (TRICOR) 54 MG tablet Take 54 mg by mouth Daily.   Not Taking   • FOSRENOL 1000 MG chewable tablet 3 (Three) Times a Day With Meals. Per pt, he takes with each meal and with snacks   Taking       Allergies:  Allergies   Allergen Reactions   • Erythromycin Hives         Objective     Physical Exam:  Vital Signs: /65 (BP Location: Left arm, Patient  "Position: Lying)   Pulse 70   Temp 98.6 °F (37 °C) (Oral)   Resp 16   Ht 172.7 cm (68\")   Wt 58.7 kg (129 lb 8 oz)   SpO2 98%   BMI 19.69 kg/m²       Physical Exam:     General Appearance:   Alert, cooperative, in no acute distress, chronically ill appearing     Head:   Normocephalic, without obvious abnormality, atraumatic.     Eyes:       Normal, conjunctivae and sclerae, no icterus, no pallor, corneas clear, PERRLA        Throat:   Oral mucosa dry      Neck:  No adenopathy, supple, trachea midline, no thyromegaly, no carotid bruit, no JVD      Back:   No CVA tenderness on Percussion.     Lungs:    Fair air entry bilaterally, rales and rhonchi appreciated on the right, no significant wheezing noted      Heart:   Regular rhythm and normal rate, normal S1 and S2.       Abdomen:   Obese. Normal bowel sounds, no masses, no organomegaly, soft non-tender, non-distended, no guarding, no rebound tenderness        Extremities:  Moves all extremities, no edema, no cyanosis, no redness, + AV graft on right upper extremity, blood vessels proximal to the graft engorged, this is chronic per patient, right upper extremity contracted     Pulses:  Pulses palpable and equal bilaterally but weak.     Skin:  No bleeding, bruising or rash        Neurologic:  Cranial nerves grossly intact         Results Review:  Lab Results (last 7 days)     Procedure Component Value Units Date/Time    Digoxin Level [[id number]]  (Normal) Collected:  11/08/18 1555    Specimen:  Blood Updated:  11/08/18 1748     Digoxin 1.10 ng/mL     Lactic Acid, Plasma [[id number]]  (Normal) Collected:  11/08/18 1712    Specimen:  Blood Updated:  11/08/18 1732     Lactate 0.6 mmol/L     Blood Culture With ROSELINE - Blood, [[id number]] Collected:  11/08/18 1712    Specimen:  Blood from Arm, Right Updated:  11/08/18 1725    Blood Culture With ROSELINE - Blood, [[id number]] Collected:  11/08/18 1700    Specimen:  Blood from Hand, Left Updated:  11/08/18 1725    TSH " [[id number]]  (Abnormal) Collected:  11/08/18 1555    Specimen:  Blood Updated:  11/08/18 1713     TSH 4.740 (H) mIU/mL     Comprehensive Metabolic Panel [[id number]]  (Abnormal) Collected:  11/08/18 1555    Specimen:  Blood Updated:  11/08/18 1641     Glucose 482 (C) mg/dL      Comment: Glucose >180, Hemoglobin A1C recommended.        BUN 54 (H) mg/dL      Creatinine 5.90 (H) mg/dL      Sodium 128 (L) mmol/L      Potassium 6.7 (C) mmol/L      Chloride 87 (L) mmol/L      CO2 28.0 mmol/L      Calcium 9.7 mg/dL      Total Protein 7.7 g/dL      Albumin 4.60 g/dL      ALT (SGPT) 22 U/L      AST (SGOT) 36 U/L      Alkaline Phosphatase 126 U/L      Total Bilirubin 0.7 mg/dL      eGFR Non African Amer 10 (L) mL/min/1.73      Comment: <15 Indicative of kidney failure.        eGFR   Amer -- mL/min/1.73      Comment: <15 Indicative of kidney failure.        Globulin 3.1 gm/dL      A/G Ratio 1.5 g/dL      BUN/Creatinine Ratio 9.2     Anion Gap 19.7 mmol/L     Narrative:       GFR Normal >60  Chronic Kidney Disease <60  Kidney Failure <15    Troponin [[id number]]  (Normal) Collected:  11/08/18 1555    Specimen:  Blood Updated:  11/08/18 1627     Troponin I 0.022 ng/mL     Narrative:       Normal Patient Upper Reference Limit (URL) (99th Percentile)=0.03 ng/mL   Non-AMI Illness Reference Limit=0.03-0.11 ng/mL   AMI Confirmation=0.12 ng/mL and above    CBC & Differential [[id number]] Collected:  11/08/18 1555    Specimen:  Blood Updated:  11/08/18 1602    Narrative:       The following orders were created for panel order CBC & Differential.  Procedure                               Abnormality         Status                     ---------                               -----------         ------                     CBC Auto Differential[[id number]]        Abnormal            Final result                 Please view results for these tests on the individual orders.    CBC Auto Differential [[id number]]  (Abnormal) Collected:   11/08/18 1555    Specimen:  Blood Updated:  11/08/18 1602     WBC 5.19 10*3/mm3      RBC 3.89 (L) 10*6/mm3      Hemoglobin 11.5 (L) g/dL      Hematocrit 35.3 (L) %      MCV 90.7 fL      MCH 29.6 pg      MCHC 32.6 g/dL      RDW 16.3 (H) %      RDW-SD 53.2 fl      MPV 10.7 fL      Platelets 170 10*3/mm3      Neutrophil % 57.7 %      Lymphocyte % 11.9 %      Monocyte % 14.8 (H) %      Eosinophil % 13.5 (H) %      Basophil % 1.9 %      Immature Grans % 0.2 %      Neutrophils, Absolute 2.99 10*3/mm3      Lymphocytes, Absolute 0.62 10*3/mm3      Monocytes, Absolute 0.77 10*3/mm3      Eosinophils, Absolute 0.70 10*3/mm3      Basophils, Absolute 0.10 10*3/mm3      Immature Grans, Absolute 0.01 10*3/mm3      nRBC 0.0 /100 WBC     Influenza Antigen, Rapid - Swab, Nasopharynx [[id number]]  (Normal) Collected:  11/08/18 1522    Specimen:  Swab from Nasopharynx Updated:  11/08/18 1554     Influenza A Ag, EIA Negative     Influenza B Ag, EIA Negative    Blood Gas, Arterial With Co-Ox [[id number]]  (Abnormal) Collected:  11/08/18 1539    Specimen:  Arterial Blood Updated:  11/08/18 1541     Site Left Radial     Gerardo's Test Positive     pH, Arterial 7.410 pH units      pCO2, Arterial 43.8 mm Hg      pO2, Arterial 45.8 (C) mm Hg      HCO3, Arterial 27.7 mmol/L      Base Excess, Arterial 2.7 (H) mmol/L      O2 Saturation, Arterial 77.0 (L) %      Hemoglobin, Blood Gas 11.5 (L) g/dL      Hematocrit, Blood Gas 35.3 %      Oxyhemoglobin 75.2 (L) %      Methemoglobin 1.20 %      Carboxyhemoglobin 1.1 %      Barometric Pressure for Blood Gas 739 mmHg      Modality Room Air     FIO2 21 %      Ventilator Mode NA     Note --     pH, Temp Corrected -- pH Units      pCO2, Temperature Corrected -- mm Hg      pO2, Temperature Corrected -- mm Hg         Imaging Results (last 72 hours)     Procedure Component Value Units Date/Time    CT Chest Without Contrast [[id number]] Collected:  11/08/18 1650     Updated:  11/08/18 1651    Narrative:        FINAL REPORT    CLINICAL HISTORY:  cough    FINDINGS:  Multiple contiguous transaxial slices through the chest were  obtained without the intravenous ministration of contrast.  There is diffuse centrilobular emphysema with bilateral fibrotic  scarring, greatest in the right middle lobe.  There is patchy  and consolidated right upper and lower lobe airspace disease  with a small right pleural effusion.  There are enlarged  mediastinal lymph nodes measuring up to 1.5 cm in short axis.  Heart size is upper limits of normal.  There are calcifications  of the coronary arteries.  Impression: Right upper and lower  lobe pneumonia with parapneumonic effusion and nonspecific  mediastinal adenopathy.  Recommend appropriate follow-up until  resolution  Chronic changes      Impression:       Authenticated by José Luis Morgan MD on 11/08/2018 04:50:33 PM      I have personally reviewed and interpreted available lab data, radiology studies and ECG obtained at time of admission.     Assessment / Plan     Assessment/Problem List:     Pneumonia of right upper lobe due to infectious organism (CMS/HCC)    1. Right upper lobe pneumonia with small bilateral effusions, due to unknown infectious organism, POA  2. Acute hyperkalemia, likely due to missed HD  3. Hyperglycemia with known insulin dependent type 2 diabetes mellitus  4. Diarrhea with intermittent constipation, will need to rule out C. Diff as he has been on recent oral antibiotics   5. Diabetic nephropathy and peripheral neuropathy  6. ESRD on hemodialysis T, Th and Saturday  7. Chronic debility  8. History of CVA with left residual hemiparesis, wheelchair bound  9. Recent scrotal cellulitis on chronic oral antibiotics   10. Chronic congestive heart failure, stable      Plan:  Patient is [facility] to [facility] with telemetry. He will be continued on Vancomycin, Zosyn and Levaquin. Will check sputum culture.  Continue with bronchodilators. No need for IV steroids at this time.  Blood cultures obtained in the ER, will need to be followed up.     Repeat potassium level down to 5.9. Will give another dose of Insulin 10mg IV with D50 and Kayexalate as his stool was actually not watery and unlikely to be C. Diff (was rejected by lab).  Will recheck potassium levels in the morning. Dr. Mackey consulted as he will likely need dialysis in the morning.     Patient cannot recall the oral antibiotic that he has been taking, will need to call his pharmacy in AM.     Continue with home dose of Levemir with novolog SS.     Heparin for DVT ppx.   PT/OT.     Details were discussed with the patient.   Further recommendations will depend on clinical course of the patient during the current hospitalization.    I also discussed the details with the nursing staff.  Rest as ordered.    Anticipated stay is less than 2 midnights.    John Sosa MD 11/09/18 1:29 AM    Dictated using Dragon.    Electronically signed by John Sosa MD at 11/9/2018  1:29 AM          Emergency Department Notes      Mahi Hsu, RN at 11/8/2018  3:03 PM        [facility] 480     Mahi Hsu, RN  11/08/18 1503      Electronically signed by Mahi Hsu, RN at 11/8/2018  3:03 PM     Rom Alejo PA-C at 11/8/2018  3:14 PM          Subjective   The patient is here with family members states he feels like he might have pneumonia he's had a cough for the past 3 or 4 days no fevers reported that he does describe some sweats at times,.. No chest pain no abdominal pain no nausea no vomiting  He is a dialysis patient,.. On a Tuesday Thursday Saturday schedule he is actually going to do his dialysis tomorrow  Presents here for further evaluation        History provided by:  Patient and relative      Review of Systems   Constitutional: Positive for diaphoresis. Negative for fever.   HENT: Negative.    Eyes: Negative.    Respiratory: Positive for cough.    Cardiovascular: Negative.    Gastrointestinal: Negative.  Negative  for abdominal pain, diarrhea and vomiting.   Genitourinary: Negative.    Musculoskeletal: Positive for arthralgias.   Skin: Negative.    Neurological: Negative.    Psychiatric/Behavioral: The patient is nervous/anxious.    All other systems reviewed and are negative.      Past Medical History:   Diagnosis Date   • Anemia    • CHF (congestive heart failure) (CMS/HCC)    • Chronic kidney disease    • Diabetes mellitus (CMS/HCC)    • H/O chest x-ray 03/25/2016    Interval decrease in size of the patients right pleural effusion with a persistent small left effusion as well   • History of transfusion    • Hypertension    • Left-sided weakness    • Pneumonia    • Renal failure    • Stroke (CMS/HCC)        Allergies   Allergen Reactions   • Erythromycin Hives       Past Surgical History:   Procedure Laterality Date   • ARTERIOVENOUS FISTULA Right    • CARDIAC CATHETERIZATION N/A 3/28/2018    Procedure: Peripheral angiography;  Surgeon: Clay Ruano MD;  Location: [facility] CATH INVASIVE LOCATION;  Service: Cardiovascular   • CARDIAC CATHETERIZATION N/A 3/28/2018    Procedure: Angioplasty-peripheral;  Surgeon: Clay Ruano MD;  Location: [facility] CATH INVASIVE LOCATION;  Service: Cardiovascular   • CARDIAC CATHETERIZATION N/A 3/28/2018    Procedure: Atherectomy-peripheral;  Surgeon: Clay Ruano MD;  Location: [facility] CATH INVASIVE LOCATION;  Service: Cardiovascular   • CATARACT EXTRACTION, BILATERAL     • CHOLECYSTECTOMY     • COLONOSCOPY     • EYE SURGERY     • INTERVENTIONAL RADIOLOGY PROCEDURE N/A 3/28/2018    Procedure: Abdominal Aortogram;  Surgeon: Clay Ruano MD;  Location: [facility] CATH INVASIVE LOCATION;  Service: Cardiovascular   • PORTACATH PLACEMENT     • VENOUS ACCESS DEVICE (PORT) REMOVAL         Family History   Problem Relation Age of Onset   • COPD Mother    • Diabetes Father    • Hypertension Sister    • Diabetes Sister    • Hypertension Brother    • Diabetes Paternal  Grandmother        Social History     Social History   • Marital status:      Social History Main Topics   • Smoking status: Never Smoker   • Smokeless tobacco: Never Used   • Alcohol use No   • Drug use: No   • Sexual activity: Defer     Other Topics Concern   • Not on file           Objective   Physical Exam   Constitutional: He is oriented to person, place, and time. He appears well-developed and well-nourished. No distress.   Afebrile nontoxic no acute distress   HENT:   Head: Normocephalic.   Right Ear: External ear normal.   Left Ear: External ear normal.   Mouth/Throat: Oropharynx is clear and moist.   Eyes: Pupils are equal, round, and reactive to light. Conjunctivae and EOM are normal.   Neck: Normal range of motion. Neck supple.   Cardiovascular: Normal rate, regular rhythm and normal heart sounds.    Pulmonary/Chest: Effort normal and breath sounds normal.   Abdominal: Soft. There is no tenderness. There is no guarding.   Musculoskeletal: Normal range of motion. He exhibits no edema.   Neurological: He is alert and oriented to person, place, and time. No cranial nerve deficit or sensory deficit. He exhibits normal muscle tone. Coordination normal.   Skin: Skin is warm. Capillary refill takes less than 2 seconds. He is not diaphoretic.   Psychiatric: He has a normal mood and affect. His behavior is normal. Judgment and thought content normal.   Nursing note and vitals reviewed.      Procedures          ED Course  ED Course as of Nov 08 2137   Thu Nov 08, 2018   1517 EKG interpreted by me: Sinus rhythm, normal rate, normal axis/intervals, nonspecific ST/T changes, this is an abnormal EKG, compared to previous this is actually improved  [MP]   1540 Patient case labs mgmt reviewed Dr Bedolla  [SC]   1635 Patient resting comfortably no acute distress  [SC]   174 Discussed with Dr. Mackey, recommends giving insulin to treat glucose abnormality,.. Can be contacted for consult if needed//discussed with him  regarding plan to admit to hospitalist service  [SC]   6669 Discussed patient case with Dr. Ramos accepts for admission  [SC]      ED Course User Index  [MP] Steffen Bedolla MD  [SC] Rom Alejo PA-C                  MDM  Number of Diagnoses or Management Options     Amount and/or Complexity of Data Reviewed  Clinical lab tests: ordered  Tests in the radiology section of CPT®:  ordered  Tests in the medicine section of CPT®:  ordered  Obtain history from someone other than the patient: yes  Review and summarize past medical records: yes  Discuss the patient with other providers: yes    Risk of Complications, Morbidity, and/or Mortality  Presenting problems: moderate  Diagnostic procedures: low  Management options: low    Patient Progress  Patient progress: stable        Final diagnoses:   Pneumonia of right upper lobe due to infectious organism (CMS/[facility])   Dialysis patient (CMS/[facility])            Rom Alejo PA-C  11/08/18 2137      Electronically signed by Rom Alejo PA-C at 11/8/2018  9:37 PM     Mahi Hsu, RN at 11/8/2018  5:14 PM        Lab @ for blood cultures     Mahi Hsu, RN  11/08/18 1714      Electronically signed by Mahi Hsu, RN at 11/8/2018  5:14 PM     Antoinette Yip at 11/8/2018  6:27 PM        At this time, the hospitalist was contacted and transferred to [facility].      Antoinette Yip  11/08/18 1827      Electronically signed by Antoinette Yip at 11/8/2018  6:27 PM     Antoinette Yip at 11/8/2018  6:49 PM        At this time, house supervisor was contacted and states she will call back with bed.      Antoinette Yip  11/08/18 1850      Electronically signed by Antoinette Yip at 11/8/2018  6:50 PM       Hospital Medications (active)       Dose Frequency Start End    acetaminophen (TYLENOL) tablet 325 mg 325 mg Every 4 Hours PRN 11/8/2018     Sig - Route: Take 1 tablet by mouth Every 4 (Four) Hours As Needed for Mild Pain . - Oral     acetaminophen (TYLENOL) tablet 650 mg 650 mg Every 4 Hours PRN 11/8/2018     Sig - Route: Take 2 tablets by mouth Every 4 (Four) Hours As Needed for Mild Pain . - Oral    amLODIPine (NORVASC) tablet 10 mg 10 mg Daily 11/9/2018     Sig - Route: Take 2 tablets by mouth Daily. - Oral    aspirin chewable tablet 81 mg 81 mg Daily 11/9/2018     Sig - Route: Chew 1 tablet Daily. - Oral    atorvastatin (LIPITOR) tablet 10 mg 10 mg Daily 11/9/2018     Sig - Route: Take 1 tablet by mouth Daily. - Oral    b complex-vitamin c-folic acid (NEPHRO-SHIRLENE) tablet 1 tablet 1 tablet Daily 11/9/2018     Sig - Route: Take 1 tablet by mouth Daily. - Oral    cefTRIAXone (ROCEPHIN) IVPB 1 g/50ml dextrose (premix) 1 g Once 11/8/2018 11/8/2018    Sig - Route: Infuse 50 mL into a venous catheter 1 (One) Time. - Intravenous    Cosign for Ordering: Accepted by Steffen Bedolla MD on 11/8/2018  6:31 PM    clopidogrel (PLAVIX) tablet 75 mg 75 mg Daily 11/9/2018     Sig - Route: Take 1 tablet by mouth Daily. - Oral    dextrose (D50W) 25 g/ 50mL Intravenous Solution 25 g 25 g Every 15 Minutes PRN 11/9/2018     Sig - Route: Infuse 50 mL into a venous catheter Every 15 (Fifteen) Minutes As Needed for Low Blood Sugar (Blood Sugar Less Than 70, Patient Has IV Access - Unresponsive, NPO or Unable To Safely Swallow). - Intravenous    dextrose (D50W) 25 g/ 50mL Intravenous Solution 50 mL 50 mL Once 11/8/2018 11/8/2018    Sig - Route: Infuse 50 mL into a venous catheter 1 (One) Time. - Intravenous    dextrose (GLUTOSE) oral gel 1 tube 1 tube Every 15 Minutes PRN 11/9/2018     Sig - Route: Take 1 tube by mouth Every 15 (Fifteen) Minutes As Needed for Low Blood Sugar (BS<70, Patient Alert, Is not NPO, Can safely swallow.). - Oral    digoxin (LANOXIN) tablet 125 mcg 125 mcg See Admin Instructions 11/8/2018     Sig - Route: Take 1 tablet by mouth See Admin Instructions. - Oral    folic acid (FOLVITE) tablet 1 mg 1 mg Daily 11/9/2018     Sig - Route:  Take 1 tablet by mouth Daily. - Oral    glucagon (human recombinant) (GLUCAGEN DIAGNOSTIC) injection 1 mg 1 mg As Needed 11/9/2018     Sig - Route: Inject 1 mg under the skin into the appropriate area as directed As Needed (Blood Glucose Less Than 70 - Patient Without IV Access - Unresponsive, NPO or Unable To Safely Swallow). - Subcutaneous    heparin (porcine) 5000 UNIT/ML injection 5,000 Units 5,000 Units Every 8 Hours Scheduled 11/8/2018     Sig - Route: Inject 1 mL under the skin into the appropriate area as directed Every 8 (Eight) Hours. - Subcutaneous    hydrALAZINE (APRESOLINE) tablet 50 mg 50 mg 3 Times Daily 11/8/2018     Sig - Route: Take 2 tablets by mouth 3 (Three) Times a Day. - Oral    insulin aspart (novoLOG) injection 0-7 Units 0-7 Units 4 Times Daily Before Meals & Nightly 11/9/2018     Sig - Route: Inject 0-7 Units under the skin into the appropriate area as directed 4 (Four) Times a Day Before Meals & at Bedtime. - Subcutaneous    insulin detemir (LEVEMIR) injection 8 Units 8 Units Nightly 11/8/2018     Sig - Route: Inject 8 Units under the skin into the appropriate area as directed Every Night. - Subcutaneous    insulin regular (humuLIN R,novoLIN R) injection 10 Units 10 Units Once 11/8/2018 11/8/2018    Sig - Route: Infuse 10 Units into a venous catheter 1 (One) Time. - Intravenous    Cosign for Ordering: Accepted by Steffen Bedolla MD on 11/8/2018  6:31 PM    insulin regular (humuLIN R,novoLIN R) injection 10 Units 10 Units Once 11/8/2018 11/8/2018    Sig - Route: Infuse 10 Units into a venous catheter 1 (One) Time. - Intravenous    ipratropium-albuterol (DUO-NEB) nebulizer solution 3 mL 3 mL Once 11/8/2018 11/8/2018    Sig - Route: Take 3 mL by nebulization 1 (One) Time. - Nebulization    Cosign for Ordering: Accepted by Steffen Bedolla MD on 11/8/2018  6:31 PM    ipratropium-albuterol (DUO-NEB) nebulizer solution 3 mL 3 mL Every 6 Hours PRN 11/8/2018     Sig - Route: Take 3 mL  "by nebulization Every 6 (Six) Hours As Needed for Shortness of Air. - Nebulization    isosorbide mononitrate (IMDUR) 24 hr tablet 30 mg 30 mg Daily 11/9/2018     Sig - Route: Take 1 tablet by mouth Daily. - Oral    lactobacillus acidophilus (RISAQUAD) capsule 1 capsule 1 capsule 2 Times Daily 11/8/2018     Sig - Route: Take 1 capsule by mouth 2 (Two) Times a Day. - Oral    levoFLOXacin (LEVAQUIN) 500 mg/100 mL D5W (premix) (LEVAQUIN) 500 mg 500 mg Every 24 Hours 11/10/2018 11/13/2018    Sig - Route: Infuse 100 mL into a venous catheter Daily. - Intravenous    levoFLOXacin (LEVAQUIN) 750 mg/150 mL D5W (premix) (LEVAQUIN) 750 mg 750 mg Once 11/8/2018 11/8/2018    Sig - Route: Infuse 150 mL into a venous catheter 1 (One) Time. - Intravenous    Cosign for Ordering: Accepted by Steffen Bedolla MD on 11/8/2018  6:31 PM    levothyroxine (SYNTHROID, LEVOTHROID) tablet 100 mcg 100 mcg Daily 11/9/2018     Sig - Route: Take 1 tablet by mouth Daily. - Oral    losartan (COZAAR) tablet 100 mg 100 mg Every 24 Hours Scheduled 11/9/2018     Sig - Route: Take 2 tablets by mouth Daily. - Oral    metoprolol succinate XL (TOPROL-XL) 24 hr tablet 100 mg 100 mg Daily 11/9/2018     Sig - Route: Take 1 tablet by mouth Daily. - Oral    ondansetron (ZOFRAN) injection 4 mg 4 mg Every 6 Hours PRN 11/8/2018     Sig - Route: Infuse 2 mL into a venous catheter Every 6 (Six) Hours As Needed for Nausea or Vomiting. - Intravenous    Linked Group 1:  \"Or\" Linked Group Details        ondansetron (ZOFRAN) tablet 4 mg 4 mg Every 6 Hours PRN 11/8/2018     Sig - Route: Take 1 tablet by mouth Every 6 (Six) Hours As Needed for Nausea or Vomiting. - Oral    Linked Group 1:  \"Or\" Linked Group Details        ondansetron ODT (ZOFRAN-ODT) disintegrating tablet 4 mg 4 mg Every 6 Hours PRN 11/8/2018     Sig - Route: Take 1 tablet by mouth Every 6 (Six) Hours As Needed for Nausea or Vomiting. - Oral    Linked Group 1:  \"Or\" Linked Group Details        " "pantoprazole (PROTONIX) EC tablet 40 mg 40 mg Every Morning 11/9/2018     Sig - Route: Take 1 tablet by mouth Every Morning. - Oral    Pharmacy to dose vancomycin  Continuous PRN 11/8/2018 11/11/2018    Sig - Route: Continuous As Needed for Consult. - Does not apply    Pharmacy to Dose Zosyn  Continuous PRN 11/8/2018 11/11/2018    Sig - Route: Continuous As Needed for Consult. - Does not apply    piperacillin-tazobactam (ZOSYN) 4.5 g in iso-osmotic dextrose 100 mL IVPB (premix) 4.5 g Once 11/8/2018 11/9/2018    Sig - Route: Infuse 100 mL into a venous catheter 1 (One) Time. - Intravenous    piperacillin-tazobactam (ZOSYN) 4.5 g in iso-osmotic dextrose 100 mL IVPB (premix) 4.5 g Every 12 Hours 11/9/2018 11/16/2018    Sig - Route: Infuse 100 mL into a venous catheter Every 12 (Twelve) Hours. - Intravenous    sodium chloride 0.9 % bolus 1,000 mL 1,000 mL As Needed 11/9/2018 11/10/2018    Sig - Route: Infuse 1,000 mL into a venous catheter As Needed (to maintain SBP > 100 mmHG DURING DIALYSIS ONLY). - Intravenous    sodium chloride 0.9 % flush 10 mL 10 mL As Needed 11/8/2018     Sig - Route: Infuse 10 mL into a venous catheter As Needed for Line Care. - Intravenous    Cosign for Ordering: Accepted by Steffen Bedolla MD on 11/8/2018  6:31 PM    Linked Group 2:  \"And\" Linked Group Details        sodium chloride 0.9 % flush 3 mL 3 mL Every 12 Hours Scheduled 11/8/2018     Sig - Route: Infuse 3 mL into a venous catheter Every 12 (Twelve) Hours. - Intravenous    sodium chloride 0.9 % flush 3-10 mL 3-10 mL As Needed 11/8/2018     Sig - Route: Infuse 3-10 mL into a venous catheter As Needed for Line Care. - Intravenous    sodium chloride 3 % nebulizer solution 4 mL 4 mL Once 11/9/2018     Sig - Route: Take 4 mL by nebulization 1 (One) Time. - Nebulization    sodium polystyrene (KAYEXALATE) 15 GM/60ML suspension 15 g 15 g Once 11/8/2018 11/8/2018    Sig - Route: Take 60 mL by mouth 1 (One) Time. - Oral    sodium " polystyrene (KAYEXALATE) 15 GM/60ML suspension 30 g 30 g Once 11/9/2018 11/9/2018    Sig - Route: Take 120 mL by mouth 1 (One) Time. - Oral    vancomycin 1000 mg in sodium chloride 0.9% 250 mL IVPB 1,000 mg Once 11/8/2018 11/8/2018    Sig - Route: Infuse 1,000 mg into a venous catheter 1 (One) Time. - Intravenous    Cosign for Ordering: Accepted by Steffen Bedolla MD on 11/8/2018  6:31 PM    vancomycin 1000 mg in sodium chloride 0.9% 250 mL IVPB 1,000 mg As Needed 11/8/2018 11/15/2018    Sig - Route: Infuse 1,000 mg into a venous catheter As Needed (post dialysis). - Intravenous    vitamin D3 capsule 5,000 Units 5,000 Units Daily 11/9/2018     Sig - Route: Take 1 capsule by mouth Daily. - Oral          Physician Progress Notes (last 24 hours) (Notes from 11/8/2018 11:12 AM through 11/9/2018 11:12 AM)     No notes of this type exist for this encounter.        Consult Notes (last 24 hours) (Notes from 11/8/2018 11:12 AM through 11/9/2018 11:12 AM)     No notes of this type exist for this encounter.        Physical Therapy Notes (last 24 hours) (Notes from 11/8/2018 11:12 AM through 11/9/2018 11:12 AM)     No notes of this type exist for this encounter.

## 2018-11-09 NOTE — PLAN OF CARE
Problem: Fall Risk (Adult)  Goal: Identify Related Risk Factors and Signs and Symptoms  Outcome: Ongoing (interventions implemented as appropriate)   11/09/18 0655   Fall Risk (Adult)   Related Risk Factors (Fall Risk) gait/mobility problems   Signs and Symptoms (Fall Risk) presence of risk factors     Goal: Absence of Fall  Outcome: Ongoing (interventions implemented as appropriate)   11/09/18 0655   Fall Risk (Adult)   Absence of Fall making progress toward outcome       Problem: Patient Care Overview  Goal: Plan of Care Review  Outcome: Ongoing (interventions implemented as appropriate)   11/09/18 0655   Coping/Psychosocial   Plan of Care Reviewed With patient   Plan of Care Review   Progress improving   OTHER   Outcome Summary Patient was a new admit. wound consult and nephrology consult in. Patient is pleasant and had no complaints.      Goal: Interprofessional Rounds/Family Conf  Outcome: Ongoing (interventions implemented as appropriate)   11/09/18 0655   Interdisciplinary Rounds/Family Conf   Participants family;nursing;patient;pharmacy;physician       Problem: Skin Injury Risk (Adult)  Goal: Identify Related Risk Factors and Signs and Symptoms  Outcome: Ongoing (interventions implemented as appropriate)   11/09/18 0655   Skin Injury Risk (Adult)   Related Risk Factors (Skin Injury Risk) mobility impaired;moisture     Goal: Skin Health and Integrity  Outcome: Ongoing (interventions implemented as appropriate)   11/09/18 0655   Skin Injury Risk (Adult)   Skin Health and Integrity making progress toward outcome       Problem: Pneumonia (Adult)  Goal: Signs and Symptoms of Listed Potential Problems Will be Absent, Minimized or Managed (Pneumonia)  Outcome: Ongoing (interventions implemented as appropriate)   11/09/18 0655   Goal/Outcome Evaluation   Problems Assessed (Pneumonia) all   Problems Present (Pneumonia) respiratory compromise

## 2018-11-09 NOTE — SIGNIFICANT NOTE
11/09/18 1227   Rehab Time/Intention   Evaluation Not Performed patient unavailable for evaluation  (Pt in dialysis. PT will follow up with patient tomorrow and proceed with eval as tolerated.)   Rehab Treatment   Discipline physical therapist

## 2018-11-09 NOTE — PLAN OF CARE
Problem: Fall Risk (Adult)  Goal: Identify Related Risk Factors and Signs and Symptoms  Outcome: Ongoing (interventions implemented as appropriate)    Goal: Absence of Fall  Outcome: Ongoing (interventions implemented as appropriate)      Problem: Patient Care Overview  Goal: Plan of Care Review  Outcome: Ongoing (interventions implemented as appropriate)   11/09/18 1529   Coping/Psychosocial   Plan of Care Reviewed With patient;family   Plan of Care Review   Progress no change   OTHER   Outcome Summary potassium elevated, pt had dialysis today, pain control       Problem: Skin Injury Risk (Adult)  Goal: Identify Related Risk Factors and Signs and Symptoms  Outcome: Ongoing (interventions implemented as appropriate)    Goal: Skin Health and Integrity  Outcome: Ongoing (interventions implemented as appropriate)      Problem: Pneumonia (Adult)  Goal: Signs and Symptoms of Listed Potential Problems Will be Absent, Minimized or Managed (Pneumonia)  Outcome: Ongoing (interventions implemented as appropriate)      Problem: Hemodialysis (Adult)  Goal: Signs and Symptoms of Listed Potential Problems Will be Absent, Minimized or Managed (Hemodialysis)  Outcome: Ongoing (interventions implemented as appropriate)

## 2018-11-09 NOTE — PROGRESS NOTES
Adult Nutrition  Assessment/PES    Patient Name:  Talha Cutler  YOB: 1960  MRN: [id number]  Admit Date:  11/8/2018    Assessment Date:  11/9/2018    Comments:  Rec. #1: Continue with diet as ordered. RD added Oniel BID. Rec. #2: Consider adding renal MVI. Rec. #3: Continue to monitor/replace electrolytes. RD to follow pt. Consult RD PRN.           Reason for Assessment     Row Name 11/09/18 1402          Reason for Assessment    Reason For Assessment diagnosis/disease state     Diagnosis diabetes diagnosis/complications;renal disease;gastrointestinal disease;pulmonary disease;cardiac disease;metabolic state   Diarrhea, Constipation, DM Type 2, CKD HD, PNA, CHF, Hyperkalemia     Identified At Risk by Screening Criteria large or nonhealing wound, burn or pressure injury                 Labs/Tests/Procedures/Meds     Row Name 11/09/18 1405          Labs/Procedures/Meds    Lab Results Reviewed reviewed, pertinent     Lab Results Comments High: K+, BUN  Low: Na, Hgb/Hct        Medications    Pertinent Medications Reviewed reviewed, pertinent             Physical Findings     Row Name 11/09/18 1406          Physical Findings    Skin pressure injury             Estimated/Assessed Needs     Row Name 11/09/18 1406          Calculation Measurements    Weight Used For Calculations 70.9 kg (156 lb 4.6 oz)        Estimated/Assessed Needs    Additional Documentation Fluid Requirements (Group);Bennett-St. Jeor Equation (Group);Protein Requirements (Group);Calorie Requirements (Group)        Calorie Requirements    Estimated Calorie Requirement Comment ~4493-9408        KCAL/KG    14 Kcal/Kg (kcal) 992.46     15 Kcal/Kg (kcal) 1063.35     18 Kcal/Kg (kcal) 1276.02     20 Kcal/Kg (kcal) 1417.8     25 Kcal/Kg (kcal) 1772.25     30 Kcal/Kg (kcal) 2126.7     35 Kcal/Kg (kcal) 2481.15     40 Kcal/Kg (kcal) 2835.6     45 Kcal/Kg (kcal) 3190.05     50 Kcal/Kg (kcal) 3544.5        Bennett-St. Jeor Equation    RMR  ([place]-St. Jeor Equation) 1498.4        Protein Requirements    Est Protein Requirement Amount (gms/kg) 1.4 gm protein   ~85.07-99.25     Estimated Protein Requirements (gms/day) 99.25        Fluid Requirements    Estimated Fluid Requirement Method --   output + 500-1000 ml     Kempton-Segar Method (over 20 kg) 2917.8             Nutrition Prescription Ordered     Row Name 11/09/18 1407          Nutrition Prescription PO    Current PO Diet Regular     Common Modifiers Cardiac;Consistent Carbohydrate;Renal             Evaluation of Received Nutrient/Fluid Intake     Row Name 11/09/18 1407 11/09/18 1406       Calculation Measurements    Weight Used For Calculations  -- 70.9 kg (156 lb 4.6 oz)       PO Evaluation    Number of Days PO Intake Evaluated Insufficient Data  --            Evaluation of Prescribed Nutrient/Fluid Intake     Row Name 11/09/18 1406          Calculation Measurements    Weight Used For Calculations 70.9 kg (156 lb 4.6 oz)           Problem/Interventions:        Problem 1     Row Name 11/09/18 1408          Nutrition Diagnoses Problem 1    Problem 1 Increased Nutrient Needs     Macronutrient Kcal;Fluid;Protein     Micronutrient Vitamin;Mineral     Etiology (related to) Medical Diagnosis     Pulmonary/Critical Care Pneumonia     Renal CKD;Hemodialysis     Skin Pressure injury     Signs/Symptoms (evidenced by) Other (comment)   pulmonary and renal dysfunction with wound healing                     Intervention Goal     Row Name 11/09/18 1409          Intervention Goal    General Meet nutritional needs for age/condition     PO PO intake (%)     PO Intake % 50 %             Nutrition Intervention     Row Name 11/09/18 1409          Nutrition Intervention    RD/Tech Action Supplement provided;Recommend/ordered;Follow Tx progress;Interview for preference;Advise available snack;Encourage intake     Recommended/Ordered Supplement             Nutrition Prescription     Row Name 11/09/18 1409           Nutrition Prescription PO    PO Prescription Begin/change supplement   continue with diet as ordered     Supplement Oniel     Supplement Frequency 2 times a day     New PO Prescription Ordered? Yes        Other Orders    Supplement Vitamin mineral supplement     Supplement Ordered? No, recommended     Other continue to monitor/replace electrolytes             Education/Evaluation     Row Name 11/09/18 1990          Education    Education Will Instruct as appropriate        Monitor/Evaluation    Monitor Per protocol;PO intake;Supplement intake;Pertinent labs;Weight;Skin status         Electronically signed by:  Debi Cifuentes RD  11/09/18 2:10 PM

## 2018-11-09 NOTE — CONSULTS
[facility]      Nephrology Consultation    Referring Provider:   No ref. provider found    Reason for Consultation:    ESRD and associated problems.       Subjective     Chief complaint   Chief Complaint   Patient presents with   • Cough       History of present illness:    Patient is 58-year-old white male with multiple medical problems as listed below in the past medical history who presented to the emergency room with complaints of not feeling well with recent history of profuse sweating associated with generalized weakness and thinks that he may have pneumonia.  He said his blood sugars were fine at the time and he was sweating.  He has been in and out of the hospital with recurrent Episodes of pneumonia as well as some scrotal abscess that has been drained and worked up at [facility] by the urology.  He was recently admitted and discharged with pneumonia and some question of having a recurrent abscess in the scrotum, he did say that the he has followed up with Dr. Macdonald the urologist at this hospital and was told that it's all clear.  On initial presentation he was noted to have blood sugars close to 500 with the increased potassium once his sugars were corrected potassium started to come down.  I was consulted for his end-stage renal disease management he has missed his dialysis yesterday.  I have reviewed labs/imaging/records from this hospitalization, including ER staff and admitting/attending physicians H/P's and progress notes to establish a comprehensive understanding of this patient's clinical hospital course, as well as to establish plan of care appropriately.   Past Medical History:   Diagnosis Date   • Anemia    • CHF (congestive heart failure) (CMS/[facility])    • Chronic kidney disease    • Diabetes mellitus (CMS/[facility])    • H/O chest x-ray 03/25/2016    Interval decrease in size of the patients right pleural effusion with a persistent small left effusion as well   • History of  transfusion    • Hypertension    • Left-sided weakness    • Pneumonia    • Renal failure    • Stroke (CMS/HCC)        Past Surgical History:   Procedure Laterality Date   • ARTERIOVENOUS FISTULA Right    • CARDIAC CATHETERIZATION N/A 3/28/2018    Procedure: Peripheral angiography;  Surgeon: Clay Ruano MD;  Location: [facility] CATH INVASIVE LOCATION;  Service: Cardiovascular   • CARDIAC CATHETERIZATION N/A 3/28/2018    Procedure: Angioplasty-peripheral;  Surgeon: Clay Ruano MD;  Location: [facility] CATH INVASIVE LOCATION;  Service: Cardiovascular   • CARDIAC CATHETERIZATION N/A 3/28/2018    Procedure: Atherectomy-peripheral;  Surgeon: Clay Ruano MD;  Location:  [facility] CATH INVASIVE LOCATION;  Service: Cardiovascular   • CATARACT EXTRACTION, BILATERAL     • CHOLECYSTECTOMY     • COLONOSCOPY     • EYE SURGERY     • INTERVENTIONAL RADIOLOGY PROCEDURE N/A 3/28/2018    Procedure: Abdominal Aortogram;  Surgeon: Clay Ruano MD;  Location: [facility] CATH INVASIVE LOCATION;  Service: Cardiovascular   • PORTACATH PLACEMENT     • VENOUS ACCESS DEVICE (PORT) REMOVAL         Family History   Problem Relation Age of Onset   • COPD Mother    • Diabetes Father    • Hypertension Sister    • Diabetes Sister    • Hypertension Brother    • Diabetes Paternal Grandmother       negative h/o ESRD     Social History   Substance Use Topics   • Smoking status: Never Smoker   • Smokeless tobacco: Never Used   • Alcohol use No     Prescriptions Prior to Admission   Medication Sig Dispense Refill Last Dose   • acetaminophen (TYLENOL) 325 MG tablet Take 1 tablet by mouth Every 4 (Four) Hours As Needed for Mild Pain .   Taking   • albuterol (PROVENTIL HFA;VENTOLIN HFA) 108 (90 Base) MCG/ACT inhaler Inhale 2 puffs Every 4 (Four) Hours As Needed for Shortness of Air. 1 inhaler 0 Taking   • amLODIPine (NORVASC) 10 MG tablet Take 10 mg by mouth Daily.   11/8/2018 at Unknown time   • aspirin 81 MG chewable tablet  Chew 81 mg Daily.   11/8/2018 at Unknown time   • B Complex-C-Folic Acid (RADHA-SHIRLENE PO) Take  by mouth. Patient states he takes these after dialysis, but is unaware of dose.   11/8/2018 at Unknown time   • Cholecalciferol (VITAMIN D3) 5000 UNITS capsule capsule Take  by mouth daily.   11/8/2018 at Unknown time   • clopidogrel (PLAVIX) 75 MG tablet Take 1 tablet by mouth Daily. 90 tablet 3 11/8/2018 at Unknown time   • digoxin (LANOXIN) 125 MCG tablet Take 125 mcg by mouth See Admin Instructions. Alternate days after dialysis   11/8/2018 at Unknown time   • docusate sodium (COLACE) 100 MG capsule Take 100 mg by mouth Every 12 (Twelve) Hours.   11/8/2018 at Unknown time   • folic acid (FOLVITE) 1 MG tablet Take 1 mg by mouth daily.   11/8/2018 at Unknown time   • hydrALAZINE (APRESOLINE) 50 MG tablet Take 50 mg by mouth 3 (Three) Times a Day.   11/8/2018 at Unknown time   • insulin detemir (LEVEMIR) 100 UNIT/ML injection Inject 8 Units under the skin Daily. 100 mL 0 11/7/2018 at Unknown time   • isosorbide mononitrate (IMDUR) 30 MG 24 hr tablet Take 30 mg by mouth Daily.   Taking   • lactobacillus acidophilus (RISAQUAD) capsule capsule Take 1 capsule by mouth 2 (Two) Times a Day. 7 capsule 0 11/8/2018 at Unknown time   • levothyroxine (SYNTHROID, LEVOTHROID) 100 MCG tablet Take 100 mcg by mouth daily.   11/8/2018 at Unknown time   • lisinopril (PRINIVIL,ZESTRIL) 10 MG tablet Take  by mouth Daily. Patient states they take this medication, but it has not been filled at [facility]'s pharmacy.   11/8/2018 at Unknown time   • losartan (COZAAR) 100 MG tablet Take 1 tablet by mouth Daily.   11/8/2018 at Unknown time   • metoprolol succinate XL (TOPROL-XL) 100 MG 24 hr tablet Take 1 tablet by mouth Daily. 30 tablet 0 11/8/2018 at Unknown time   • omeprazole (priLOSEC) 20 MG capsule Take 20 mg by mouth Daily.   11/8/2018 at Unknown time   • ondansetron ODT (ZOFRAN-ODT) 4 MG disintegrating tablet Take 1 tablet by mouth Every 6  "(Six) Hours As Needed for Nausea or Vomiting. 20 tablet 0 11/8/2018 at Unknown time   • pravastatin (PRAVACHOL) 40 MG tablet Take 80 mg by mouth Every Night.   11/8/2018 at Unknown time   • spironolactone (ALDACTONE) 25 MG tablet Take 25 mg by mouth Daily.   11/8/2018 at Unknown time   • fenofibrate (TRICOR) 54 MG tablet Take 54 mg by mouth Daily.   Not Taking   • FOSRENOL 1000 MG chewable tablet 3 (Three) Times a Day With Meals. Per pt, he takes with each meal and with snacks   Taking     Allergies:  Erythromycin    Review of Systems  1. Constitutional: Negative for fever. Negative for chills, does complain of fatigue and unexpected weight change. +profuse sweating  2. HENT: Negative for congestion and hearing loss.   3. Eyes: Negative for redness and visual disturbance.   4. Respiratory: negative for shortness of breath. Negative for chest pain . Negative for cough and chest tightness.   5. Cardiovascular: Negative for chest pain and palpitations.   6. Gastrointestinal: Negative for abdominal distention, abdominal pain and blood in stool. + on and off diarrhea  7. Endocrine: Negative for cold intolerance and heat intolerance.   8. Genitourinary: Negative for difficulty urinating, dysuria and frequency.  Nuys any scrotal pain  9. Musculoskeletal: Negative for arthralgias, back pain and myalgias.   10. Skin: Negative for color change, rash and wound.   11. Neurological: Negative for syncope, weakness and headaches.  Does have old stroke with significant weakness of the left side.  12. Hematological: Negative for adenopathy. Does not bruise/bleed easily.   13. Psychiatric/Behavioral: Negative for confusion. The patient is not nervous/anxious.    Objective     Vital Signs  /74 (BP Location: Left arm, Patient Position: Lying)   Pulse 68   Temp 97.5 °F (36.4 °C) (Oral)   Resp 17   Ht 172.7 cm (68\")   Wt 59.3 kg (130 lb 11.2 oz)   SpO2 97%   BMI 19.87 kg/m²          No intake/output data " recorded.    Intake/Output Summary (Last 24 hours) at 11/09/18 0846  Last data filed at 11/09/18 0559   Gross per 24 hour   Intake              450 ml   Output                0 ml   Net              450 ml       Physical Exam:     General Appearance:   Alert, cooperative, in no acute distress.     Head:   Normocephalic, without obvious abnormality, atraumatic.     Eyes:      Normal, conjunctivae and sclerae, no icterus, no pallor, corneas clear, PERRLA        Throat:   Oral mucosa dry      Neck:  No adenopathy, supple, trachea midline, no thyromegaly, no carotid bruit, no JVD      Back:   No CVA tenderness on Percussion.     Lungs:    Clear to auscultation and fair air movement noted.      Heart::   Regular rhythm and normal rate, normal S1 and S2.       Abdomen:   Obese. Normal bowel sounds, no masses, no organomegaly, soft non-tender, non-distended, no guarding, no rebound tenderness      Genital urinary:   No urinary bladder palpable      Extremities:  Moves all extremities, no edema, no cyanosis, no redness.     Pulses:  Pulses palpable and equal bilaterally but weak.     Skin:  No bleeding, bruising or rash        Neurologic:  Cranial nerves grossly intact, move all extremities, he does have left-sided weakness.               Lab Results (last 7 days)     Procedure Component Value Units Date/Time    Vancomycin, Random [[id number]]  (Normal) Collected:  11/09/18 0553    Specimen:  Blood Updated:  11/09/18 0830     Vancomycin Random 13.38 mcg/mL     Basic Metabolic Panel [[id number]]  (Abnormal) Collected:  11/09/18 0553    Specimen:  Blood Updated:  11/09/18 0658     Glucose 111 (H) mg/dL      BUN 65 (H) mg/dL      Creatinine 6.40 (H) mg/dL      Sodium 131 (L) mmol/L      Potassium 6.3 (C) mmol/L      Chloride 91 (L) mmol/L      CO2 24.0 (L) mmol/L      Calcium 9.3 mg/dL      eGFR  African Amer -- mL/min/1.73      Comment: <15 Indicative of kidney failure.        eGFR Non African Amer 9 (L) mL/min/1.73       Comment: <15 Indicative of kidney failure.        BUN/Creatinine Ratio 10.2     Anion Gap 22.3 (H) mmol/L     Narrative:       GFR Normal >60  Chronic Kidney Disease <60  Kidney Failure <15    POC Glucose Once [[id number]]  (Normal) Collected:  11/09/18 0651    Specimen:  Blood Updated:  11/09/18 0657     Glucose 86 mg/dL      Comment: Serial Number: [id number]:  [id number]       CBC Auto Differential [[id number]]  (Abnormal) Collected:  11/09/18 0553    Specimen:  Blood Updated:  11/09/18 0634     WBC 6.37 10*3/mm3      RBC 3.95 (L) 10*6/mm3      Hemoglobin 11.5 (L) g/dL      Hematocrit 36.0 (L) %      MCV 91.1 fL      MCH 29.1 pg      MCHC 31.9 g/dL      RDW 16.3 (H) %      RDW-SD 54.3 (H) fl      MPV 10.6 fL      Platelets 157 10*3/mm3      Neutrophil % 63.2 %      Lymphocyte % 10.5 %      Monocyte % 12.9 (H) %      Eosinophil % 11.1 (H) %      Basophil % 1.7 %      Immature Grans % 0.6 %      Neutrophils, Absolute 4.02 10*3/mm3      Lymphocytes, Absolute 0.67 10*3/mm3      Monocytes, Absolute 0.82 10*3/mm3      Eosinophils, Absolute 0.71 (H) 10*3/mm3      Basophils, Absolute 0.11 10*3/mm3      Immature Grans, Absolute 0.04 10*3/mm3      nRBC 0.0 /100 WBC     POC Glucose Once [[id number]]  (Abnormal) Collected:  11/08/18 1828    Specimen:  Blood Updated:  11/09/18 0551     Glucose 309 (H) mg/dL      Comment: Serial Number: [id number]:  [id number]       POC Glucose Once [[id number]]  (Abnormal) Collected:  11/08/18 1502    Specimen:  Blood Updated:  11/09/18 0550     Glucose 480 (C) mg/dL      Comment: Serial Number: [id number]:  [id number]       Blood Culture With ROSELINE - Blood, [[id number]]  (Normal) Collected:  11/08/18 1700    Specimen:  Blood from Hand, Left Updated:  11/09/18 0530     Blood Culture No growth at less than 24 hours    Blood Culture With ROSELINE - Blood, [[id number]]  (Normal) Collected:  11/08/18 1712    Specimen:  Blood from Arm, Right Updated:  11/09/18 0530     Blood Culture No growth at  less than 24 hours    POC Glucose Once [[id number]]  (Normal) Collected:  11/09/18 0417    Specimen:  Blood Updated:  11/09/18 0425     Glucose 80 mg/dL      Comment: Serial Number: [id number]:  [id number]       VRE Culture - Swab, Per Rectum [[id number]] Collected:  11/09/18 0010    Specimen:  Swab from Per Rectum Updated:  11/09/18 0034    MRSA Screen Culture - Swab, Nares [[id number]] Collected:  11/09/18 0010    Specimen:  Swab from Nares Updated:  11/09/18 0034    Acinetobacter Screen - Swab, Axilla, Right [[id number]] Collected:  11/09/18 0010    Specimen:  Swab from Axilla, Right Updated:  11/09/18 0034    Potassium [[id number]]  (Abnormal) Collected:  11/08/18 2058    Specimen:  Blood Updated:  11/08/18 2119     Potassium 5.9 (C) mmol/L     Digoxin Level [[id number]]  (Normal) Collected:  11/08/18 1555    Specimen:  Blood Updated:  11/08/18 1748     Digoxin 1.10 ng/mL     Lactic Acid, Plasma [[id number]]  (Normal) Collected:  11/08/18 1712    Specimen:  Blood Updated:  11/08/18 1732     Lactate 0.6 mmol/L     TSH [[id number]]  (Abnormal) Collected:  11/08/18 1555    Specimen:  Blood Updated:  11/08/18 1713     TSH 4.740 (H) mIU/mL     Comprehensive Metabolic Panel [[id number]]  (Abnormal) Collected:  11/08/18 1555    Specimen:  Blood Updated:  11/08/18 1641     Glucose 482 (C) mg/dL      Comment: Glucose >180, Hemoglobin A1C recommended.        BUN 54 (H) mg/dL      Creatinine 5.90 (H) mg/dL      Sodium 128 (L) mmol/L      Potassium 6.7 (C) mmol/L      Chloride 87 (L) mmol/L      CO2 28.0 mmol/L      Calcium 9.7 mg/dL      Total Protein 7.7 g/dL      Albumin 4.60 g/dL      ALT (SGPT) 22 U/L      AST (SGOT) 36 U/L      Alkaline Phosphatase 126 U/L      Total Bilirubin 0.7 mg/dL      eGFR Non African Amer 10 (L) mL/min/1.73      Comment: <15 Indicative of kidney failure.        eGFR   Amer -- mL/min/1.73      Comment: <15 Indicative of kidney failure.        Globulin 3.1 gm/dL      A/G Ratio 1.5 g/dL       BUN/Creatinine Ratio 9.2     Anion Gap 19.7 mmol/L     Narrative:       GFR Normal >60  Chronic Kidney Disease <60  Kidney Failure <15    Troponin [[id number]]  (Normal) Collected:  11/08/18 1555    Specimen:  Blood Updated:  11/08/18 1627     Troponin I 0.022 ng/mL     Narrative:       Normal Patient Upper Reference Limit (URL) (99th Percentile)=0.03 ng/mL   Non-AMI Illness Reference Limit=0.03-0.11 ng/mL   AMI Confirmation=0.12 ng/mL and above    CBC & Differential [[id number]] Collected:  11/08/18 1555    Specimen:  Blood Updated:  11/08/18 1602    Narrative:       The following orders were created for panel order CBC & Differential.  Procedure                               Abnormality         Status                     ---------                               -----------         ------                     CBC Auto Differential[[id number]]        Abnormal            Final result                 Please view results for these tests on the individual orders.    CBC Auto Differential [[id number]]  (Abnormal) Collected:  11/08/18 1555    Specimen:  Blood Updated:  11/08/18 1602     WBC 5.19 10*3/mm3      RBC 3.89 (L) 10*6/mm3      Hemoglobin 11.5 (L) g/dL      Hematocrit 35.3 (L) %      MCV 90.7 fL      MCH 29.6 pg      MCHC 32.6 g/dL      RDW 16.3 (H) %      RDW-SD 53.2 fl      MPV 10.7 fL      Platelets 170 10*3/mm3      Neutrophil % 57.7 %      Lymphocyte % 11.9 %      Monocyte % 14.8 (H) %      Eosinophil % 13.5 (H) %      Basophil % 1.9 %      Immature Grans % 0.2 %      Neutrophils, Absolute 2.99 10*3/mm3      Lymphocytes, Absolute 0.62 10*3/mm3      Monocytes, Absolute 0.77 10*3/mm3      Eosinophils, Absolute 0.70 10*3/mm3      Basophils, Absolute 0.10 10*3/mm3      Immature Grans, Absolute 0.01 10*3/mm3      nRBC 0.0 /100 WBC     Influenza Antigen, Rapid - Swab, Nasopharynx [[id number]]  (Normal) Collected:  11/08/18 1522    Specimen:  Swab from Nasopharynx Updated:  11/08/18 1554     Influenza A Ag, EIA Negative      Influenza B Ag, EIA Negative    Blood Gas, Arterial With Co-Ox [[id number]]  (Abnormal) Collected:  11/08/18 1539    Specimen:  Arterial Blood Updated:  11/08/18 1541     Site Left Radial     Gerardo's Test Positive     pH, Arterial 7.410 pH units      pCO2, Arterial 43.8 mm Hg      pO2, Arterial 45.8 (C) mm Hg      HCO3, Arterial 27.7 mmol/L      Base Excess, Arterial 2.7 (H) mmol/L      O2 Saturation, Arterial 77.0 (L) %      Hemoglobin, Blood Gas 11.5 (L) g/dL      Hematocrit, Blood Gas 35.3 %      Oxyhemoglobin 75.2 (L) %      Methemoglobin 1.20 %      Carboxyhemoglobin 1.1 %      Barometric Pressure for Blood Gas 739 mmHg      Modality Room Air     FIO2 21 %      Ventilator Mode NA     Note --     pH, Temp Corrected -- pH Units      pCO2, Temperature Corrected -- mm Hg      pO2, Temperature Corrected -- mm Hg         Imaging Results (last 72 hours)     Procedure Component Value Units Date/Time    CT Chest Without Contrast [[id number]] Collected:  11/08/18 1650     Updated:  11/08/18 1651    Narrative:       FINAL REPORT    CLINICAL HISTORY:  cough    FINDINGS:  Multiple contiguous transaxial slices through the chest were  obtained without the intravenous ministration of contrast.  There is diffuse centrilobular emphysema with bilateral fibrotic  scarring, greatest in the right middle lobe.  There is patchy  and consolidated right upper and lower lobe airspace disease  with a small right pleural effusion.  There are enlarged  mediastinal lymph nodes measuring up to 1.5 cm in short axis.  Heart size is upper limits of normal.  There are calcifications  of the coronary arteries.  Impression: Right upper and lower  lobe pneumonia with parapneumonic effusion and nonspecific  mediastinal adenopathy.  Recommend appropriate follow-up until  resolution  Chronic changes      Impression:       Authenticated by José Luis Morgan MD on 11/08/2018 04:50:33 PM              amLODIPine 10 mg Oral Daily   aspirin 81 mg Oral Daily    atorvastatin 10 mg Oral Daily   b complex-vitamin c-folic acid 1 tablet Oral Daily   clopidogrel 75 mg Oral Daily   digoxin 125 mcg Oral See Admin Instructions   folic acid 1 mg Oral Daily   heparin (porcine) 5,000 Units Subcutaneous Q8H   hydrALAZINE 50 mg Oral TID   insulin aspart 0-7 Units Subcutaneous 4x Daily AC & at Bedtime   insulin detemir 8 Units Subcutaneous Nightly   isosorbide mononitrate 30 mg Oral Daily   lactobacillus acidophilus 1 capsule Oral BID   [START ON 11/10/2018] levoFLOXacin 500 mg Intravenous Q24H   levothyroxine 100 mcg Oral Daily   losartan 100 mg Oral Q24H   metoprolol succinate  mg Oral Daily   pantoprazole 40 mg Oral QAM   piperacillin-tazobactam 4.5 g Intravenous Q12H   sodium chloride 3 mL Intravenous Q12H   sodium chloride 4 mL Nebulization Once   vitamin D3 5,000 Units Oral Daily       Pharmacy to dose vancomycin    Pharmacy to Dose Zosyn        Assessment/Plan       1. End stage renal disease on dialysis (CMS/[facility])  2. Chronic kidney disease-mineral and bone disorder:  3. Pneumonia of right upper lobe due to infectious organism (CMS/[facility])  4. Pneumonia of right lower lobe due to infectious organism (CMS/[facility])  5. Type 2 diabetes mellitus treated with insulin (CMS/[facility]) uncontrolled.  6. Anemia of chronic kidney disease  7. Chronic right-sided loculated pleural effusion status post pleurodesis  8. Coronary artery disease with medical management  9. Chronic scrotal ulcer followed by Dr. Macdonald at [facility].  10. Hypertension with end-stage renal disease  11. Hypothyroidism  12. Physical debility    Plan:  · Dialysis schedule on Tuesday Thursday Saturday, he runs 4 hours on a standard bath usually gains 2-3 L in between dialysis.  I will go ahead and make arrangements.  He has missed his dialysis yesterday plan on doing him again tomorrow, if he is stable enough may be able to do a Sunday and then put him back on Tuesday Thursday Saturday for next week.  · Continue  antibiotics  · Surveillance labs.  · Details were discussed with the patient as well as family in the room.    · Details were also discussed with the hospitalist service.   · Further recommendations will depend on clinical course of the patient during the current hospitalization.    · I also discussed the details with the nursing staff.  · Rest as ordered.    In closing, I sincerely appreciate opportunity to participate in care of this patient. If I can be of any further assistance with the management of this patient, please don’t hesitate to contact me.    Chance Mackey MD, ARJUN  11/09/18  8:46 AM    Dictated using Dragon.

## 2018-11-09 NOTE — PROGRESS NOTES
Discharge Planning Assessment  [facility]     Patient Name: Talha Cutler  MRN: [id number]  Today's Date: 11/9/2018    Admit Date: 11/8/2018          Discharge Needs Assessment    No documentation.             Discharge Plan     Row Name 11/09/18 1209       Plan    Plan Comments Pt requesting short term rehab at [facility]  e faxed    Row Name 11/09/18 0900       Plan    Plan home with family      Patient/Family in Agreement with Plan yes    Plan Comments lives with spouse and family,  fairly indpendent,  own POA,  has advance directives on file, oxygen 2 liters at night through  [facility], [facility] home [facility], goes to   [facility] dialysis  in [place],  goes to  [facility] day [facility], plans on returning home at discharge, family will transport        Destination     Service Request Status Selected Specialties Address Phone Number Fax Number    [facility] Pending - Request Sent N/A [address] [facility] [id number] [phone]      Durable Medical Equipment     No service coordination in this encounter.      Dialysis/Infusion     No service coordination in this encounter.      Home Medical Care     No service coordination in this encounter.      Social Care     No service coordination in this encounter.                Demographic Summary    No documentation.           Functional Status    No documentation.           Psychosocial    No documentation.           Abuse/Neglect    No documentation.           Legal    No documentation.           Substance Abuse    No documentation.           Patient Forms    No documentation.         Annika Wallace RN

## 2018-11-10 LAB
ALBUMIN SERPL-MCNC: 3.9 G/DL (ref 3.5–5)
ANION GAP SERPL CALCULATED.3IONS-SCNC: 14.6 MMOL/L (ref 10–20)
BASOPHILS # BLD AUTO: 0.08 10*3/MM3 (ref 0–0.2)
BASOPHILS NFR BLD AUTO: 1.4 % (ref 0–2.5)
BUN BLD-MCNC: 35 MG/DL (ref 7–20)
BUN/CREAT SERPL: 7.4 (ref 6.3–21.9)
CALCIUM SPEC-SCNC: 9.2 MG/DL (ref 8.4–10.2)
CHLORIDE SERPL-SCNC: 95 MMOL/L (ref 98–107)
CO2 SERPL-SCNC: 29 MMOL/L (ref 26–30)
CREAT BLD-MCNC: 4.7 MG/DL (ref 0.6–1.3)
DEPRECATED RDW RBC AUTO: 54.3 FL (ref 37–54)
EOSINOPHIL # BLD AUTO: 0.61 10*3/MM3 (ref 0–0.7)
EOSINOPHIL NFR BLD AUTO: 11 % (ref 0–7)
ERYTHROCYTE [DISTWIDTH] IN BLOOD BY AUTOMATED COUNT: 16.6 % (ref 11.5–14.5)
GFR SERPL CREATININE-BSD FRML MDRD: 13 ML/MIN/1.73
GFR SERPL CREATININE-BSD FRML MDRD: ABNORMAL ML/MIN/1.73
GLUCOSE BLD-MCNC: 83 MG/DL (ref 74–98)
GLUCOSE BLDC GLUCOMTR-MCNC: 161 MG/DL (ref 70–130)
GLUCOSE BLDC GLUCOMTR-MCNC: 221 MG/DL (ref 70–130)
GLUCOSE BLDC GLUCOMTR-MCNC: 76 MG/DL (ref 70–130)
HAV IGM SERPL QL IA: NEGATIVE
HBV CORE IGM SERPL QL IA: NEGATIVE
HBV SURFACE AG SERPL QL IA: NEGATIVE
HCT VFR BLD AUTO: 33.1 % (ref 42–52)
HCV AB S/CO SERPL IA: <0.1 S/CO RATIO (ref 0–0.9)
HGB BLD-MCNC: 10.7 G/DL (ref 14–18)
IMM GRANULOCYTES # BLD: 0.01 10*3/MM3 (ref 0–0.06)
IMM GRANULOCYTES NFR BLD: 0.2 % (ref 0–0.6)
LYMPHOCYTES # BLD AUTO: 0.49 10*3/MM3 (ref 0.6–3.4)
LYMPHOCYTES NFR BLD AUTO: 8.8 % (ref 10–50)
MCH RBC QN AUTO: 29.4 PG (ref 27–31)
MCHC RBC AUTO-ENTMCNC: 32.3 G/DL (ref 30–37)
MCV RBC AUTO: 90.9 FL (ref 80–94)
MONOCYTES # BLD AUTO: 0.93 10*3/MM3 (ref 0–0.9)
MONOCYTES NFR BLD AUTO: 16.8 % (ref 0–12)
NEUTROPHILS # BLD AUTO: 3.42 10*3/MM3 (ref 2–6.9)
NEUTROPHILS NFR BLD AUTO: 61.8 % (ref 37–80)
NRBC BLD MANUAL-RTO: 0 /100 WBC (ref 0–0)
PHOSPHATE SERPL-MCNC: 7.3 MG/DL (ref 2.5–4.5)
PLATELET # BLD AUTO: 148 10*3/MM3 (ref 130–400)
PMV BLD AUTO: 10.5 FL (ref 6–12)
POTASSIUM BLD-SCNC: 5.6 MMOL/L (ref 3.5–5.1)
PROCALCITONIN SERPL-MCNC: 0.48 NG/ML
RBC # BLD AUTO: 3.64 10*6/MM3 (ref 4.7–6.1)
SODIUM BLD-SCNC: 133 MMOL/L (ref 137–145)
WBC NRBC COR # BLD: 5.54 10*3/MM3 (ref 4.8–10.8)

## 2018-11-10 PROCEDURE — 85025 COMPLETE CBC W/AUTO DIFF WBC: CPT | Performed by: INTERNAL MEDICINE

## 2018-11-10 PROCEDURE — 25010000002 PIPERACILLIN SOD-TAZOBACTAM PER 1 G: Performed by: INTERNAL MEDICINE

## 2018-11-10 PROCEDURE — 63710000001 INSULIN ASPART PER 5 UNITS: Performed by: INTERNAL MEDICINE

## 2018-11-10 PROCEDURE — 82962 GLUCOSE BLOOD TEST: CPT

## 2018-11-10 PROCEDURE — 80069 RENAL FUNCTION PANEL: CPT | Performed by: INTERNAL MEDICINE

## 2018-11-10 PROCEDURE — 84145 PROCALCITONIN (PCT): CPT | Performed by: INTERNAL MEDICINE

## 2018-11-10 PROCEDURE — 25010000002 HEPARIN (PORCINE) PER 1000 UNITS: Performed by: INTERNAL MEDICINE

## 2018-11-10 PROCEDURE — 97161 PT EVAL LOW COMPLEX 20 MIN: CPT

## 2018-11-10 PROCEDURE — 63710000001 INSULIN DETEMIR PER 5 UNITS: Performed by: INTERNAL MEDICINE

## 2018-11-10 PROCEDURE — 99232 SBSQ HOSP IP/OBS MODERATE 35: CPT | Performed by: INTERNAL MEDICINE

## 2018-11-10 RX ADMIN — Medication 1 CAPSULE: at 22:26

## 2018-11-10 RX ADMIN — CLOPIDOGREL BISULFATE 75 MG: 75 TABLET ORAL at 08:53

## 2018-11-10 RX ADMIN — INSULIN ASPART 2 UNITS: 100 INJECTION, SOLUTION INTRAVENOUS; SUBCUTANEOUS at 22:27

## 2018-11-10 RX ADMIN — HEPARIN SODIUM 5000 UNITS: 5000 INJECTION, SOLUTION INTRAVENOUS; SUBCUTANEOUS at 07:00

## 2018-11-10 RX ADMIN — ISOSORBIDE MONONITRATE 30 MG: 30 TABLET, EXTENDED RELEASE ORAL at 08:54

## 2018-11-10 RX ADMIN — Medication 1 TABLET: at 08:54

## 2018-11-10 RX ADMIN — HEPARIN SODIUM 5000 UNITS: 5000 INJECTION, SOLUTION INTRAVENOUS; SUBCUTANEOUS at 17:15

## 2018-11-10 RX ADMIN — Medication 3 ML: at 08:55

## 2018-11-10 RX ADMIN — INSULIN DETEMIR 8 UNITS: 100 INJECTION, SOLUTION SUBCUTANEOUS at 22:28

## 2018-11-10 RX ADMIN — LOSARTAN POTASSIUM 100 MG: 50 TABLET, FILM COATED ORAL at 08:54

## 2018-11-10 RX ADMIN — FOLIC ACID 1 MG: 1 TABLET ORAL at 08:54

## 2018-11-10 RX ADMIN — PANTOPRAZOLE SODIUM 40 MG: 40 TABLET, DELAYED RELEASE ORAL at 07:02

## 2018-11-10 RX ADMIN — LEVOTHYROXINE SODIUM 100 MCG: 100 TABLET ORAL at 08:54

## 2018-11-10 RX ADMIN — TAZOBACTAM SODIUM AND PIPERACILLIN SODIUM 4.5 G: 500; 4 INJECTION, SOLUTION INTRAVENOUS at 17:56

## 2018-11-10 RX ADMIN — ATORVASTATIN CALCIUM 10 MG: 10 TABLET, FILM COATED ORAL at 08:54

## 2018-11-10 RX ADMIN — HYDRALAZINE HYDROCHLORIDE 50 MG: 25 TABLET, FILM COATED ORAL at 17:15

## 2018-11-10 RX ADMIN — Medication 1 CAPSULE: at 08:54

## 2018-11-10 RX ADMIN — INSULIN ASPART 3 UNITS: 100 INJECTION, SOLUTION INTRAVENOUS; SUBCUTANEOUS at 17:14

## 2018-11-10 RX ADMIN — HYDRALAZINE HYDROCHLORIDE 50 MG: 25 TABLET, FILM COATED ORAL at 08:53

## 2018-11-10 RX ADMIN — HYDRALAZINE HYDROCHLORIDE 50 MG: 25 TABLET, FILM COATED ORAL at 22:26

## 2018-11-10 RX ADMIN — CHOLECALCIFEROL CAP 125 MCG (5000 UNIT) 5000 UNITS: 125 CAP at 08:54

## 2018-11-10 RX ADMIN — ASPIRIN 81 MG 81 MG: 81 TABLET ORAL at 08:54

## 2018-11-10 RX ADMIN — TAZOBACTAM SODIUM AND PIPERACILLIN SODIUM 4.5 G: 500; 4 INJECTION, SOLUTION INTRAVENOUS at 05:53

## 2018-11-10 RX ADMIN — AMLODIPINE BESYLATE 10 MG: 5 TABLET ORAL at 08:54

## 2018-11-10 RX ADMIN — METOPROLOL SUCCINATE 100 MG: 100 TABLET, EXTENDED RELEASE ORAL at 08:53

## 2018-11-10 RX ADMIN — HEPARIN SODIUM 5000 UNITS: 5000 INJECTION, SOLUTION INTRAVENOUS; SUBCUTANEOUS at 22:27

## 2018-11-10 NOTE — THERAPY EVALUATION
Acute Care - Physical Therapy Initial Evaluation   [place]     Patient Name: Talha Cutler  : 1960  MRN: [id number]  Today's Date: 11/10/2018   Onset of Illness/Injury or Date of Surgery: 18  Date of Referral to PT: 18  Referring Physician: John Sosa MD      Admit Date: 2018    Visit Dx:     ICD-10-CM ICD-9-CM   1. Pneumonia of right upper lobe due to infectious organism (CMS/[facility]) J18.1 486   2. Dialysis patient (CMS/[facility]) Z99.2 V45.11   3. Impaired functional mobility, balance, gait, and endurance Z74.09 V49.89     Patient Active Problem List   Diagnosis   • Chest pain   • Edema   • Diabetes mellitus (CMS/[facility])   • Essential hypertension   • Kidney disease   • Encounter for venous access device care   • Recurrent right pleural effusion   • SOB (shortness of breath)   • Type 2 diabetes mellitus treated with insulin (CMS/[facility])   • End stage renal disease on dialysis (CMS/[facility])   • Uncontrolled diabetes mellitus with hyperglycemia, with long-term current use of insulin (CMS/[facility])   • Acute hyperkalemia   • Hypertension due to end stage renal disease caused by type 2 diabetes mellitus, on dialysis (CMS/[facility])   • Physical debility   • Chronic respiratory insufficiency   • Hyperglycemia due to type 2 diabetes mellitus (CMS/HCC)   • Pancreatitis, acute   • Chronic kidney disease-mineral and bone disorder   • Pneumonia of right lower lobe due to infectious organism (CMS/HCC)   • PAD (peripheral artery disease) (CMS/[facility])   • Pneumonia due to infectious organism   • Pneumonia of right lower lobe due to infectious organism (CMS/HCC)   • Pneumonia of right upper lobe due to infectious organism (CMS/HCC)     Past Medical History:   Diagnosis Date   • Anemia    • CHF (congestive heart failure) (CMS/[facility])    • Chronic kidney disease    • Diabetes mellitus (CMS/HCC)    • H/O chest x-ray 2016    Interval decrease in size of the patients right pleural effusion with a persistent small left  effusion as well   • History of transfusion    • Hypertension    • Left-sided weakness    • Pneumonia    • Renal failure    • Stroke (CMS/HCC)      Past Surgical History:   Procedure Laterality Date   • ARTERIOVENOUS FISTULA Right    • CATARACT EXTRACTION, BILATERAL     • CHOLECYSTECTOMY     • COLONOSCOPY     • EYE SURGERY     • PORTACATH PLACEMENT     • VENOUS ACCESS DEVICE (PORT) REMOVAL          PT ASSESSMENT (last 12 hours)      Physical Therapy Evaluation     Row Name 11/10/18 1117          PT Evaluation Time/Intention    Subjective Information  complains of;weakness;fatigue  -JR     Document Type  evaluation  -JR     Mode of Treatment  physical therapy  -JR     Patient Effort  good  -JR     Symptoms Noted During/After Treatment  none  -JR     Comment  Patient was pleased to be up in a chair and is eager to work with PT  -JR     Row Name 11/10/18 1113          General Information    Patient Profile Reviewed?  yes  -JR     Onset of Illness/Injury or Date of Surgery  11/08/18  -JR     Referring Physician  John Sosa MD  -JR     Patient Observations  alert;agree to therapy;cooperative  -JR     Patient/Family Observations  No family present at time of evaluation  -JR     General Observations of Patient  Supine with IV in LUE, oxygen at 2 LPM per nasal cannula  -JR     Prior Level of Function  independent:;all household mobility;min assist:;ADL's  -JR     Equipment Currently Used at Home  wheelchair, motorized;wheelchair;shower chair;commode, bedside  -JR     Pertinent History of Current Functional Problem  RLL Pneumonia, CKD dialysis T-TH-Sat  -JR     Existing Precautions/Restrictions  oxygen therapy device and L/min;fall  -JR     Limitations/Impairments  insensate body part Left leg  -JR     Risks Reviewed  patient:;increased discomfort  -JR     Benefits Reviewed  patient:;increase strength;increase balance;increase independence;improve function  -JR     Barriers to Rehab  previous functional deficit  -JR      Row Name 11/10/18 1117          Relationship/Environment    Primary Source of Support/Comfort  spouse;child(starr)  -JR     Name of Support/Comfort Primary Source  Mallika Benton  -JR     Lives With  spouse;child(starr), adult  -JR     Name(s) of Who Lives With Patient  Mallika Benton--wife, Ronnie--son  -JR     Row Name 11/10/18 1117          Resource/Environmental Concerns    Current Living Arrangements  home/apartment/condo  -JR     Row Name 11/10/18 1117          Cognitive Assessment/Intervention- PT/OT    Orientation Status (Cognition)  oriented x 4  -JR     Follows Commands (Cognition)  WFL  -JR     Row Name 11/10/18 1117          Mobility Assessment/Treatment    Extremity Weight-bearing Status  left lower extremity  -JR     Left Lower Extremity (Weight-bearing Status)  other (see comments) flexion contracture s/p CVA   -JR     Row Name 11/10/18 1117          Bed Mobility Assessment/Treatment    Bed Mobility Assessment/Treatment  bed mobility (all) activities  -     [place] Level (Bed Mobility)  conditional independence  -     Bed Mobility, Safety Issues  decreased use of legs for bridging/pushing;impaired trunk control for bed mobility;decreased use of arms for pushing/pulling  -     Assistive Device (Bed Mobility)  head of bed elevated;bed rails  -     Row Name 11/10/18 1117          Transfer Assessment/Treatment    Transfer Assessment/Treatment  sit-stand transfer;stand-sit transfer  -     Sit-Stand [place] (Transfers)  moderate assist (50% patient effort)  -     Stand-Sit [place] (Transfers)  minimum assist (75% patient effort)  -     Row Name 11/10/18 1117          Sit-Stand Transfer    Assistive Device (Sit-Stand Transfers)  other (see comments) PT staff & chair arm  -     Row Name 11/10/18 1117          Stand-Sit Transfer    Assistive Device (Stand-Sit Transfers)  other (see comments) PT staff  -     Row Name 11/10/18 1117          Gait/Stairs Assessment/Training    Comment  (Gait/Stairs)  Pt is wheelchair bound at baseline  -     Row Name 11/10/18 1117          General ROM    GENERAL ROM COMMENTS  Left LE is contracted and atrophied  -     Row Name 11/10/18 1117          MMT (Manual Muscle Testing)    General MMT Comments  LLE is 3-/5  -     Row Name 11/10/18 1117          Pain Assessment    Additional Documentation  Pain Scale: Numbers Pre/Post-Treatment (Group)  -     Row Name 11/10/18 1117          Pain Scale: Numbers Pre/Post-Treatment    Pain Scale: Numbers, Pretreatment  0/10 - no pain  -JR     Pain Scale: Numbers, Post-Treatment  0/10 - no pain  -     Row Name             Wound 11/08/18 2000 Right lower ankle other (see comments)    Wound - Properties Group Date first assessed: 11/08/18  -AC Time first assessed: 2000  -AC Present On Admission : yes  -AC Side: Right  -AC Orientation: lower  -AC Location: ankle  -AC, inner ankle  Type: other (see comments)  -AC, patient states he bumped it  Stage, Pressure Injury: Stage 2  -AC    Row Name 11/10/18 1117          Coping    Observed Emotional State  cooperative  -     Verbalized Emotional State  happiness  -     Row Name 11/10/18 1117          Plan of Care Review    Plan of Care Reviewed With  patient  -     Row Name 11/10/18 1117          Physical Therapy Clinical Impression    Date of Referral to PT  11/08/18  -JR     PT Diagnosis (PT Clinical Impression)  Weakness, Left side hemiplegia s/p CVA, poor balance  -JR     Patient/Family Goals Statement (PT Clinical Impression)  Patient wants to be as independent as possible  -JR     Criteria for Skilled Interventions Met (PT Clinical Impression)  yes;treatment indicated  -JR     Pathology/Pathophysiology Noted (Describe Specifically for Each System)  pulmonary;musculoskeletal;neuromuscular  -JR     Impairments Found (describe specific impairments)  gait, locomotion, and balance;aerobic capacity/endurance;muscle performance  -JR     Rehab Potential (PT Clinical Summary)   good, to achieve stated therapy goals  -     Care Plan Review (PT)  evaluation/treatment results reviewed;care plan/treatment goals reviewed;risks/benefits reviewed;current/potential barriers reviewed;patient/other agree to care plan  -     Row Name 11/10/18 1117          Physical Therapy Goals    Bed Mobility Goal Selection (PT)  --  -     Transfer Goal Selection (PT)  transfer, PT goal 1  -     Gait Training Goal Selection (PT)  --  -     Row Name 11/10/18 1117          Transfer Goal 1 (PT)    Activity/Assistive Device (Transfer Goal 1, PT)  sit-to-stand/stand-to-sit  -JR     [place] Level/Cues Needed (Transfer Goal 1, PT)  contact guard assist  -JR     Time Frame (Transfer Goal 1, PT)  2 weeks  -JR     Progress/Outcome (Transfer Goal 1, PT)  goal ongoing  -[facility] Name 11/10/18 1117          Patient Education Goal (PT)    Activity (Patient Education Goal, PT)  Perform HEP x 15 reps  -JR     Frio/Cues/Accuracy (Memory Goal 2, PT)  demonstrates adequately  -JR     Time Frame (Patient Education Goal, PT)  2 weeks  -JR     Progress/Outcome (Patient Education Goal, PT)  goal ongoing  -[facility] Name 11/10/18 1117          Positioning and Restraints    Pre-Treatment Position  in bed  -JR     Post Treatment Position  chair  -     In Chair  sitting;call light within reach;encouraged to call for assist  -[facility] Name 11/10/18 1117          Living Environment    Home Accessibility  wheelchair accessible  -       User Key  (r) = Recorded By, (t) = Taken By, (c) = Cosigned By    Initials Name Provider Type     Sandra Conrad, PT Physical Therapist    Grace Clements, RN Registered Nurse        Physical Therapy Education     Title: PT OT SLP Therapies (Active)     Topic: Physical Therapy (Active)     Point: Mobility training (Done)     Learning Progress Summary           Patient Acceptance, TB,E, VU by  at 11/10/2018 12:49 PM    Comment:  Role of PT and importance of mobility to recovery                                User Key     Initials Effective Dates Name Provider Type Discipline     04/03/18 -  Sandra Conrad, PT Physical Therapist PT              PT Recommendation and Plan  Anticipated Discharge Disposition (PT): home with assist  Planned Therapy Interventions (PT Eval): bed mobility training, transfer training, balance training, patient/family education, home exercise program  Therapy Frequency (PT Clinical Impression): daily  Outcome Summary/Treatment Plan (PT)  Anticipated Discharge Disposition (PT): home with assist  Plan of Care Reviewed With: patient  Outcome Summary: Patient participates well in skilled PT evaluation and demonstrates a need for additonal PT services to improve strength and ROM of LLE to allow decreased need for assistance.      Time Calculation:   PT Charges     Row Name 11/10/18 1259             Time Calculation    Start Time  1117  -JR      PT Received On  11/10/18  -      PT Goal Re-Cert Due Date  11/20/18  -        User Key  (r) = Recorded By, (t) = Taken By, (c) = Cosigned By    Initials Name Provider Type     Sandra Conrad, PT Physical Therapist        Therapy Suggested Charges     Code   Minutes Charges    None           Therapy Charges for Today     Code Description Service Date Service Provider Modifiers Qty    [id number] HC PT EVAL LOW COMPLEXITY 4 11/10/2018 Sandra Conrad, PT GP 1                 Sandra Conrad, PT  11/10/2018

## 2018-11-10 NOTE — PLAN OF CARE
Problem: Patient Care Overview  Goal: Plan of Care Review  Outcome: Ongoing (interventions implemented as appropriate)   11/10/18 1851   Coping/Psychosocial   Plan of Care Reviewed With patient   OTHER   Outcome Summary Patient participates well in skilled PT evaluation and demonstrates a need for additonal PT services to improve strength and ROM of LLE to allow decreased need for assistance. Patient is expected to benefit from continued PT while hospitalized and upon discharge to home with home health care.

## 2018-11-10 NOTE — PROGRESS NOTES
"Nephrology Progress Note.    LOS: 1 day    Patient Care Team:  Idalia Kay MD as PCP - General    Chief Complaint:    Chief Complaint   Patient presents with   • Cough       Subjective     Follow up for ESRD and related issues.    Interval History:     Patient Complaints: none    Patient seen and examined this morning.  Events from last night noted.  Patient denies having any fevers chills.  No nausea or vomiting no abdominal pain.  Denies any chest pain, shortness of breath or cough and sputum production.  There is no significant edema.   Patient also denies having new onset weakness of numbness of either extremity, he does feel that he is improving.    History taken from: patient    Review of Systems:    The pertinent  ROS was done and it is noted above, rest  was negative.    Objective     Vital Signs  /68   Pulse 89   Temp 98.5 °F (36.9 °C) (Oral)   Resp 18   Ht 172.7 cm (68\")   Wt 57.3 kg (126 lb 4.8 oz)   SpO2 (!) 83%   BMI 19.20 kg/m²       No intake/output data recorded.    Intake/Output Summary (Last 24 hours) at 11/10/2018 0832  Last data filed at 11/10/2018 0553  Gross per 24 hour   Intake 1090 ml   Output 2500 ml   Net -1410 ml       Physical Exam:    General Appearance: alert, oriented x 3, no acute distress,   HEENT: Oral mucosa dry, extra occular movements intact. Sclera clear.  Skin: Warm and dry  Neck: supple, no JVD, trachea midline  Lungs:Chest shape is normal. Breath sounds heard bilaterally equally.  Does have scattered rhonchi all over the chest.  Heart: regular rate and rhythm. normal S1 and S2, no S3, no rub, peripheral pulses weak but palpable.  Abdomen: Obese, soft, non-tender,  present bowel sounds to auscultation  : no palpable bladder.  Extremities: Trace edema, no cyanosis or clubbing.   Neuro: normal speech and mental status, grossly non focal.     Results Review:    Results from last 7 days   Lab Units  11/10/18   0617  11/09/18   0553  11/08/18 2058  " 11/08/18   1555   SODIUM mmol/L  133*  131*   --   128*   POTASSIUM mmol/L  5.6*  6.3*  5.9*  6.7*   CHLORIDE mmol/L  95*  91*   --   87*   CO2 mmol/L  29.0  24.0*   --   28.0   BUN mg/dL  35*  65*   --   54*   CREATININE mg/dL  4.70*  6.40*   --   5.90*   CALCIUM mg/dL  9.2  9.3   --   9.7   BILIRUBIN mg/dL   --    --    --   0.7   ALK PHOS U/L   --    --    --   126   ALT (SGPT) U/L   --    --    --   22   AST (SGOT) U/L   --    --    --   36   GLUCOSE mg/dL  83  111*   --   482*       Estimated Creatinine Clearance: 13.7 mL/min (A) (by C-G formula based on SCr of 4.7 mg/dL (H)).    Results from last 7 days   Lab Units  11/10/18   0617   PHOSPHORUS mg/dL  7.3*             Results from last 7 days   Lab Units  11/10/18   0617  11/09/18   0553  11/08/18   1555   WBC 10*3/mm3  5.54  6.37  5.19   HEMOGLOBIN g/dL  10.7*  11.5*  11.5*   PLATELETS 10*3/mm3  148  157  170               Imaging Results (last 24 hours)     ** No results found for the last 24 hours. **          amLODIPine 10 mg Oral Daily   aspirin 81 mg Oral Daily   atorvastatin 10 mg Oral Daily   b complex-vitamin c-folic acid 1 tablet Oral Daily   clopidogrel 75 mg Oral Daily   digoxin 125 mcg Oral See Admin Instructions   folic acid 1 mg Oral Daily   heparin (porcine) 5,000 Units Subcutaneous Q8H   hydrALAZINE 50 mg Oral TID   insulin aspart 0-7 Units Subcutaneous 4x Daily AC & at Bedtime   insulin detemir 8 Units Subcutaneous Nightly   isosorbide mononitrate 30 mg Oral Daily   lactobacillus acidophilus 1 capsule Oral BID   levoFLOXacin 500 mg Intravenous Q24H   levothyroxine 100 mcg Oral Daily   losartan 100 mg Oral Q24H   metoprolol succinate  mg Oral Daily   pantoprazole 40 mg Oral QAM   piperacillin-tazobactam 4.5 g Intravenous Q12H   sodium chloride 3 mL Intravenous Q12H   vitamin D3 5,000 Units Oral Daily       Pharmacy to dose vancomycin    Pharmacy to Dose Zosyn        Medication Review:   Current Facility-Administered Medications    Medication Dose Route Frequency Provider Last Rate Last Dose   • acetaminophen (TYLENOL) tablet 325 mg  325 mg Oral Q4H PRN John Sosa MD       • acetaminophen (TYLENOL) tablet 650 mg  650 mg Oral Q4H PRN John Sosa MD       • amLODIPine (NORVASC) tablet 10 mg  10 mg Oral Daily John Sosa MD   10 mg at 11/09/18 0827   • aspirin chewable tablet 81 mg  81 mg Oral Daily John Sosa MD   81 mg at 11/09/18 0828   • atorvastatin (LIPITOR) tablet 10 mg  10 mg Oral Daily John Sosa MD   10 mg at 11/09/18 0827   • b complex-vitamin c-folic acid (NEPHRO-SHIRLENE) tablet 1 tablet  1 tablet Oral Daily John Sosa MD   1 tablet at 11/09/18 0827   • clopidogrel (PLAVIX) tablet 75 mg  75 mg Oral Daily John Sosa MD   75 mg at 11/09/18 0828   • dextrose (D50W) 25 g/ 50mL Intravenous Solution 25 g  25 g Intravenous Q15 Min PRN John Sosa MD       • dextrose (GLUTOSE) oral gel 1 tube  1 tube Oral Q15 Min PRN John Sosa MD       • digoxin (LANOXIN) tablet 125 mcg  125 mcg Oral See Admin Instructions John Sosa MD   125 mcg at 11/09/18 0604   • folic acid (FOLVITE) tablet 1 mg  1 mg Oral Daily John Sosa MD   1 mg at 11/09/18 0828   • glucagon (human recombinant) (GLUCAGEN DIAGNOSTIC) injection 1 mg  1 mg Subcutaneous PRN John Sosa MD       • heparin (porcine) 5000 UNIT/ML injection 5,000 Units  5,000 Units Subcutaneous Q8H John Sosa MD   5,000 Units at 11/10/18 0700   • hydrALAZINE (APRESOLINE) tablet 50 mg  50 mg Oral TID John Sosa MD   50 mg at 11/09/18 2105   • HYDROcodone-acetaminophen (NORCO) 5-325 MG per tablet 1 tablet  1 tablet Oral Q6H PRN Carmelo Ray MD   1 tablet at 11/09/18 1630   • insulin aspart (novoLOG) injection 0-7 Units  0-7 Units Subcutaneous 4x Daily AC & at Bedtime John Sosa MD   6 Units at 11/09/18 2104   • insulin detemir (LEVEMIR) injection 8 Units  8 Units Subcutaneous Nightly Jhon Sosa MD   8  Units at 11/09/18 2106   • ipratropium-albuterol (DUO-NEB) nebulizer solution 3 mL  3 mL Nebulization Q6H PRN John Sosa MD   3 mL at 11/09/18 2318   • isosorbide mononitrate (IMDUR) 24 hr tablet 30 mg  30 mg Oral Daily John Sosa MD   30 mg at 11/09/18 0828   • lactobacillus acidophilus (RISAQUAD) capsule 1 capsule  1 capsule Oral BID John Sosa MD   1 capsule at 11/09/18 2104   • levoFLOXacin (LEVAQUIN) 500 mg/100 mL D5W (premix) (LEVAQUIN) 500 mg  500 mg Intravenous Q24H John Sosa MD       • levothyroxine (SYNTHROID, LEVOTHROID) tablet 100 mcg  100 mcg Oral Daily John Sosa MD   100 mcg at 11/09/18 0828   • losartan (COZAAR) tablet 100 mg  100 mg Oral Q24H John Sosa MD   100 mg at 11/09/18 0827   • metoprolol succinate XL (TOPROL-XL) 24 hr tablet 100 mg  100 mg Oral Daily John Sosa MD   100 mg at 11/09/18 0827   • ondansetron (ZOFRAN) tablet 4 mg  4 mg Oral Q6H PRN John Sosa MD        Or   • ondansetron ODT (ZOFRAN-ODT) disintegrating tablet 4 mg  4 mg Oral Q6H PRN John Sosa MD        Or   • ondansetron (ZOFRAN) injection 4 mg  4 mg Intravenous Q6H PRN John Sosa MD       • pantoprazole (PROTONIX) EC tablet 40 mg  40 mg Oral QAM Jonh Sosa MD   40 mg at 11/10/18 0702   • Pharmacy to dose vancomycin   Does not apply Continuous PRN John Sosa MD       • Pharmacy to Dose Zosyn   Does not apply Continuous PRN John Sosa MD       • piperacillin-tazobactam (ZOSYN) 4.5 g in iso-osmotic dextrose 100 mL IVPB (premix)  4.5 g Intravenous Q12H John Sosa MD 0 mL/hr at 11/09/18 1238 4.5 g at 11/10/18 0553   • sodium chloride 0.9 % bolus 1,000 mL  1,000 mL Intravenous PRN Chance Mackey MD, ARJUN       • sodium chloride 0.9 % flush 10 mL  10 mL Intravenous PRN Rom Alejo PA-C       • sodium chloride 0.9 % flush 3 mL  3 mL Intravenous Q12H John Sosa MD   3 mL at 11/09/18 8442   • sodium chloride 0.9 % flush  3-10 mL  3-10 mL Intravenous PRN John Sosa MD       • vancomycin 1000 mg in sodium chloride 0.9% 250 mL IVPB  1,000 mg Intravenous PRN John Sosa MD       • vitamin D3 capsule 5,000 Units  5,000 Units Oral Daily John Sosa MD   5,000 Units at 11/09/18 0827       Assessment/Plan     1. End stage renal disease on dialysis (CMS/[facility])  2. Chronic kidney disease-mineral and bone disorder:  3. Pneumonia of right upper lobe due to infectious organism (CMS/[facility])  4. Pneumonia of right lower lobe due to infectious organism (CMS/[facility])  5. Type 2 diabetes mellitus treated with insulin (CMS/[facility]) uncontrolled.  6. Anemia of chronic kidney disease  7. Chronic right-sided loculated pleural effusion status post pleurodesis  8. Coronary artery disease with medical management  9. Chronic scrotal ulcer followed by Dr. Macdonald at [facility].  10. Hypertension with end-stage renal disease  11. Hypothyroidism  12. Physical debility     Plan:    · Dialysis schedule on Tuesday Thursday Saturday, he runs 4 hours on a standard bath usually gains 2-3 L in between dialysis.  He has missed his dialysis on thursday plan on doing him again tomorrow, if he is stable enough may be able to do a Sunday and then put him back on Tuesday Thursday Saturday for next week.  · Continue antibiotics, clinically appears to be improving.  · Continue rest of the treatment as planned.  · Surveillance labs.  · Details were discussed with the patient as well as family in the room.    · Details were also discussed with the hospitalist service.   · Further recommendations will depend on clinical course of the patient during the current hospitalization.    · I also discussed the details with the nursing staff.  · Rest as ordered.        Chance Mackey MD, FASN  11/10/18  8:32 AM    Dictated utilizing Dragon dictation.

## 2018-11-10 NOTE — PROGRESS NOTES
[facility]    Name:  Talha Cutler   Age:  58 y.o.  Sex:  male  :  1960  MRN:  [id number]   Visit Number:  [id number]  Admission Date:  2018  Date Of Service:  11/10/18  Primary Care Physician:  Idalia Kay MD     LOS: 1 day :  Patient Care Team:  Idalia Kay MD as PCP - General:    Chief Complaint:      Generalized weakness and occasional wheezing.    Subjective / Interval History:     Mr. Cutler is currently sitting up on the bed and is comfortable at rest.  He states that every time he sleeps on the left side he gets some wheezing but his lung sounds clear at this time except for a few crackles in the bases.  He was evaluated by physical therapy today and did some bed exercises and sat up in the chair.  He was admitted from the emergency room on 2018 with right upper lobe pneumonia as well as hyperkalemia.  He missed one day of dialysis.  His potassium was 6.7 on admission and he received a couple doses of Kayexalate.  He was subsequently seen by Dr. Mackey yesterday and underwent hemodialysis yesterday with 2 L of fluid removal.  His potassium this morning is 5.6 and he will be dialyzed again today.  He denies any fever, nausea or vomiting.  He states that he lives with his wife but he has generalized weakness and has not walked in a few weeks.  He is currently on broad-spectrum IV antibiotic therapy with Zosyn, Levaquin and vancomycin.  He has been afebrile since admission.      Review of Systems:     General ROS: Patient denies any fevers, chills or loss of consciousness.  Complaints of generalized weakness.  Respiratory ROS: Complains of cough, wheezing and shortness of breath.  Cardiovascular ROS: Denies chest pain or palpitations. No history of exertional chest pain.  Gastrointestinal ROS: Denies nausea and vomiting. Denies any abdominal pain. No diarrhea.  Neurological ROS: Denies any focal weakness. No loss  of consciousness. Denies any numbness.  Dermatological ROS: Denies any redness or pruritis.    Vital Signs:    Temp:  [97.4 °F (36.3 °C)-98.5 °F (36.9 °C)] 97.7 °F (36.5 °C)  Heart Rate:  [83-89] 84  Resp:  [16-18] 17  BP: (138-171)/(67-77) 141/68    Intake and output:    I/O last 3 completed shifts:  In: 1490 [P.O.:840; IV Piggyback:650]  Out: 2500 [Other:2500]  No intake/output data recorded.    Physical Examination:    General Appearance:  Alert and cooperative, not in any acute distress.   Head:  Atraumatic and normocephalic, without obvious abnormality.   Eyes:          PERRLA, conjunctivae and sclerae normal, no Icterus. No pallor. Extra-occular movements are within normal limits.   Neck: Supple, trachea midline, no thyromegaly, no carotid bruit.   Lungs:   Chest shape is normal. Breath sounds heard bilaterally equally.  No wheezing.  Occasional basal crackles heard. No Pleural rub or bronchial breathing.   Heart:  Normal S1 and S2, no murmur, no gallop, no rub. No JVD   Abdomen:   Normal bowel sounds, no masses, no organomegaly. Soft, non-tender, non-distended, no guarding, no rebound tenderness.   Extremities: Moves all extremities well, no edema, no cyanosis, no            clubbing.  AV graft with the bruit noted in the right arm.   Skin: No bleeding, bruising or rash.   Neurologic: Awake, alert and oriented times 3. Moves all 4 extremities equally.   Laboratory results:    Results from last 7 days   Lab Units  11/10/18   0617  11/09/18   0553  11/08/18 2058 11/08/18   1555   SODIUM mmol/L  133*  131*   --   128*   POTASSIUM mmol/L  5.6*  6.3*  5.9*  6.7*   CHLORIDE mmol/L  95*  91*   --   87*   CO2 mmol/L  29.0  24.0*   --   28.0   BUN mg/dL  35*  65*   --   54*   CREATININE mg/dL  4.70*  6.40*   --   5.90*   CALCIUM mg/dL  9.2  9.3   --   9.7   BILIRUBIN mg/dL   --    --    --   0.7   ALK PHOS U/L   --    --    --   126   ALT (SGPT) U/L   --    --    --   22   AST (SGOT) U/L   --    --    --   36    GLUCOSE mg/dL  83  111*   --   482*     Results from last 7 days   Lab Units  11/10/18   0617  11/09/18   0553  11/08/18   1555   WBC 10*3/mm3  5.54  6.37  5.19   HEMOGLOBIN g/dL  10.7*  11.5*  11.5*   HEMATOCRIT %  33.1*  36.0*  35.3*   PLATELETS 10*3/mm3  148  157  170         Results from last 7 days   Lab Units  11/08/18   1555   TROPONIN I ng/mL  0.022     Results from last 7 days   Lab Units  11/08/18   1712  11/08/18   1700   BLOODCX   No growth at 24 hours  No growth at 24 hours     I have reviewed the patient's laboratory results.    Radiology results:    Imaging Results (last 24 hours)     ** No results found for the last 24 hours. **        Medication Review:     I have reviewed the patients active and prn medications.       Pneumonia of right upper lobe due to infectious organism (CMS/[facility])    Acute hyperkalemia    Essential hypertension    Type 2 diabetes mellitus treated with insulin (CMS/[facility])    End stage renal disease on dialysis (CMS/HCC)    PAD (peripheral artery disease) (CMS/HCC)    Assessment:    1.  Right upper lobe pneumonia with small bilateral pleural effusions, present on admission.  2.  Acute hyperkalemia, present on admission, secondary to missed dialysis, improving.  3.  End-stage renal disease on hemodialysis, on Tuesday, Thursday and Saturday.  4.  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  5.  Chronic right-sided loculated pleural effusion status post pleurodesis.  6.  Coronary artery disease on medical management.  7.  Chronic scrotal ulcer, followed by Dr. Macdonald at urology.  8.  Chronic debility.  9.  Essential hypertension.    Plan:    Mr. Cutler is currently doing better and did have hemodialysis yesterday with improvement in his potassium levels.  He is being followed by Dr. Mackey and will have another dialysis today.  He is on broad-spectrum IV antibiotic therapy with Zosyn, vancomycin and Levaquin, dosed by pharmacy.  He has been afebrile.  Blood cultures have  been negative so far.  I will discontinue vancomycin today.  He was seen by physical and occupational therapy today. Patient is open to go to short-term rehabilitation if necessary and I have discussed this with case management services.  He will be continued on his home medications.  He is on heparin for DVT prophylaxis.  He is on subcutaneous insulin protocol for blood sugar coverage.  Further recommendations depend upon his clinical course.       Carmelo Ray MD  11/10/18  2:52 PM    Dictated utilizing Dragon dictation.

## 2018-11-11 VITALS
RESPIRATION RATE: 16 BRPM | TEMPERATURE: 98 F | SYSTOLIC BLOOD PRESSURE: 184 MMHG | OXYGEN SATURATION: 99 % | HEIGHT: 68 IN | HEART RATE: 90 BPM | BODY MASS INDEX: 20.26 KG/M2 | DIASTOLIC BLOOD PRESSURE: 82 MMHG | WEIGHT: 133.7 LBS

## 2018-11-11 LAB
ACINETOBACTER SCREEN CX: NO GROWTH
ALBUMIN SERPL-MCNC: 4 G/DL (ref 3.5–5)
ANION GAP SERPL CALCULATED.3IONS-SCNC: 18.9 MMOL/L (ref 10–20)
BUN BLD-MCNC: 48 MG/DL (ref 7–20)
BUN/CREAT SERPL: 8.1 (ref 6.3–21.9)
CALCIUM SPEC-SCNC: 9.4 MG/DL (ref 8.4–10.2)
CHLORIDE SERPL-SCNC: 94 MMOL/L (ref 98–107)
CO2 SERPL-SCNC: 25 MMOL/L (ref 26–30)
CREAT BLD-MCNC: 5.9 MG/DL (ref 0.6–1.3)
GFR SERPL CREATININE-BSD FRML MDRD: 10 ML/MIN/1.73
GFR SERPL CREATININE-BSD FRML MDRD: ABNORMAL ML/MIN/1.73
GLUCOSE BLD-MCNC: 89 MG/DL (ref 74–98)
GLUCOSE BLDC GLUCOMTR-MCNC: 81 MG/DL (ref 70–130)
GLUCOSE BLDC GLUCOMTR-MCNC: 91 MG/DL (ref 70–130)
MRSA SPEC QL CULT: NORMAL
PHOSPHATE SERPL-MCNC: 8.2 MG/DL (ref 2.5–4.5)
POTASSIUM BLD-SCNC: 5.9 MMOL/L (ref 3.5–5.1)
SODIUM BLD-SCNC: 132 MMOL/L (ref 137–145)
VRE SPEC QL CULT: NORMAL

## 2018-11-11 PROCEDURE — 94799 UNLISTED PULMONARY SVC/PX: CPT

## 2018-11-11 PROCEDURE — 5A1D70Z PERFORMANCE OF URINARY FILTRATION, INTERMITTENT, LESS THAN 6 HOURS PER DAY: ICD-10-PCS | Performed by: INTERNAL MEDICINE

## 2018-11-11 PROCEDURE — 82962 GLUCOSE BLOOD TEST: CPT

## 2018-11-11 PROCEDURE — 25010000002 HEPARIN (PORCINE) PER 1000 UNITS: Performed by: INTERNAL MEDICINE

## 2018-11-11 PROCEDURE — 80069 RENAL FUNCTION PANEL: CPT | Performed by: INTERNAL MEDICINE

## 2018-11-11 PROCEDURE — 90935 HEMODIALYSIS ONE EVALUATION: CPT

## 2018-11-11 PROCEDURE — 99239 HOSP IP/OBS DSCHRG MGMT >30: CPT | Performed by: NURSE PRACTITIONER

## 2018-11-11 PROCEDURE — 25010000002 PIPERACILLIN SOD-TAZOBACTAM PER 1 G: Performed by: INTERNAL MEDICINE

## 2018-11-11 PROCEDURE — 25010000002 LEVOFLOXACIN PER 250 MG: Performed by: INTERNAL MEDICINE

## 2018-11-11 RX ORDER — AMOXICILLIN AND CLAVULANATE POTASSIUM 500; 125 MG/1; MG/1
1 TABLET, FILM COATED ORAL DAILY
Qty: 7 TABLET | Refills: 0 | Status: SHIPPED | OUTPATIENT
Start: 2018-11-11 | End: 2018-12-17

## 2018-11-11 RX ORDER — AMOXICILLIN AND CLAVULANATE POTASSIUM 500; 125 MG/1; MG/1
1 TABLET, FILM COATED ORAL DAILY
Qty: 7 TABLET | Refills: 0 | Status: SHIPPED | OUTPATIENT
Start: 2018-11-11 | End: 2018-11-11 | Stop reason: HOSPADM

## 2018-11-11 RX ADMIN — LOSARTAN POTASSIUM 100 MG: 50 TABLET, FILM COATED ORAL at 08:45

## 2018-11-11 RX ADMIN — CLOPIDOGREL BISULFATE 75 MG: 75 TABLET ORAL at 08:45

## 2018-11-11 RX ADMIN — CHOLECALCIFEROL CAP 125 MCG (5000 UNIT) 5000 UNITS: 125 CAP at 08:45

## 2018-11-11 RX ADMIN — METOPROLOL SUCCINATE 100 MG: 100 TABLET, EXTENDED RELEASE ORAL at 08:45

## 2018-11-11 RX ADMIN — ASPIRIN 81 MG 81 MG: 81 TABLET ORAL at 08:45

## 2018-11-11 RX ADMIN — LEVOFLOXACIN 500 MG: 5 INJECTION, SOLUTION INTRAVENOUS at 01:01

## 2018-11-11 RX ADMIN — Medication 1 TABLET: at 08:46

## 2018-11-11 RX ADMIN — Medication 1 CAPSULE: at 08:46

## 2018-11-11 RX ADMIN — TAZOBACTAM SODIUM AND PIPERACILLIN SODIUM 4.5 G: 500; 4 INJECTION, SOLUTION INTRAVENOUS at 06:29

## 2018-11-11 RX ADMIN — HYDRALAZINE HYDROCHLORIDE 50 MG: 25 TABLET, FILM COATED ORAL at 08:45

## 2018-11-11 RX ADMIN — Medication 3 ML: at 08:46

## 2018-11-11 RX ADMIN — LEVOTHYROXINE SODIUM 100 MCG: 100 TABLET ORAL at 08:45

## 2018-11-11 RX ADMIN — PANTOPRAZOLE SODIUM 40 MG: 40 TABLET, DELAYED RELEASE ORAL at 06:30

## 2018-11-11 RX ADMIN — ISOSORBIDE MONONITRATE 30 MG: 30 TABLET, EXTENDED RELEASE ORAL at 08:45

## 2018-11-11 RX ADMIN — HEPARIN SODIUM 5000 UNITS: 5000 INJECTION, SOLUTION INTRAVENOUS; SUBCUTANEOUS at 06:30

## 2018-11-11 RX ADMIN — DIGOXIN 125 MCG: 125 TABLET ORAL at 00:16

## 2018-11-11 RX ADMIN — Medication 3 ML: at 01:02

## 2018-11-11 RX ADMIN — ATORVASTATIN CALCIUM 10 MG: 10 TABLET, FILM COATED ORAL at 08:46

## 2018-11-11 RX ADMIN — FOLIC ACID 1 MG: 1 TABLET ORAL at 08:46

## 2018-11-11 RX ADMIN — IPRATROPIUM BROMIDE AND ALBUTEROL SULFATE 3 ML: .5; 3 SOLUTION RESPIRATORY (INHALATION) at 04:30

## 2018-11-11 RX ADMIN — AMLODIPINE BESYLATE 10 MG: 5 TABLET ORAL at 08:46

## 2018-11-11 NOTE — DISCHARGE INSTRUCTIONS
Will need follow up CT chest in 2 months for enlarged mediastinal lymph nodes measuring up to 1.5 cm in short axis.  PCP or Dr. Hernandez can arrange as an outpatient.  Oxygen 2 liters nasal canula  BMP on Wed.

## 2018-11-11 NOTE — DISCHARGE SUMMARY
[facility]   DISCHARGE SUMMARY    Name:  Talha Cutler   Age:  58 y.o.  Sex:  male  :  1960  MRN:  [id number]   Visit Number:  [id number]  Primary Care Physician:  Idalia Kay MD  Date of Discharge:  2018  Admission Date:  2018      Presenting Problem:    Pneumonia of right upper lobe due to infectious organism (CMS/HCC) [J18.1]  Pneumonia of right upper lobe due to infectious organism (CMS/HCC) [J18.1]       Discharge Diagnosis:       Pneumonia of right upper lobe due to infectious organism (CMS/HCC)    Essential hypertension    Type 2 diabetes mellitus treated with insulin (CMS/HCC)    End stage renal disease on dialysis (CMS/HCC)    Acute hyperkalemia    PAD (peripheral artery disease) (CMS/HCC)        Past Medical History:  Past Medical History:   Diagnosis Date   • Anemia    • CHF (congestive heart failure) (CMS/HCC)    • Chronic kidney disease    • Diabetes mellitus (CMS/HCC)    • H/O chest x-ray 2016    Interval decrease in size of the patients right pleural effusion with a persistent small left effusion as well   • History of transfusion    • Hypertension    • Left-sided weakness    • Pneumonia    • Renal failure    • Stroke (CMS/HCC)          Consults:     Consults     Date and Time Order Name Status Description    2018 Inpatient Nephrology Consult Completed           Procedures Performed:  None             History of presenting illness/Hospital Course:  This is a 58-year-old male with past medical history significant for hemodialysis, diabetes mellitus type 2, CVA, chronic anemia, recurrent pneumonia with history of bilateral pleural effusions requiring thoracentesis in the past as well as scrotal edema being followed by urology.  Even admission patient had been complaining of cough but no significant shortness of breath.  He also had some diarrhea and abdominal cramping which is a chronic issue for him.  Upon evaluation in  the emergency room his lactic acid was normal vital signs were essentially stable he did have some hyponatremia as well as hyperkalemia with an anion gap 19.  CT scan of the chest was done which showed diffuse centrilobular emphysema with 5 bilateral fibrotic scarring.  He is followed by Dr. Hernandez on an outpatient basis.  There was a patchy consolidation in the right upper lobe and small right pleural effusion.  He also had some enlarged mediastinal lymph nodes measuring up to 1.5 cm for which he will need to follow-up with Dr. Hernandez on an outpatient basis and have repeat CT scan.  Patient has been taking oral antibiotics daily for scrotal edema and cellulitis but he is unsure of the name of the medication.    He is seen by  urology.  He was recently seen by Dr. Hernandez in office October 22nd.    He has had pleurodesis in the past and pulmonology feels that he has residual scarring as a result.  Patient was admitted to the hospitalist service and started on IV antibiotics in the form of Levaquin and Zosyn.     He has had some hyperkalemia for which he was given kayexalate.  Dr. Mackey is working to get the potassium down with dialysis.  He will be dialyzed again today prior to discharge which should help with the hyperkalemia.  Since admission his hyperkalemia is improving.  He will continue dialysis on an outpatient bases in his previous Tue, Thurs, Sat. Schedule.      Initially patient had considered rehab placement however he is now wanting to go home.   PT/OT has been working with patient and feels that he is essentially at his baseline.  From the respiratory standpoint patient is feeling much better.  Shortness of breath cough has improved.  He does have oxygen as well as a nebulizer at home.  We will get dialysis today and plan to discharge home this afternoon.  He does live with his wife.  According to the patient he has not walked in several weeks however prior to this he was not very ambulatory.  He has  been afebrile since admission.  We will plan to switch him over to oral antibiotics and discharge home today after dialysis.  He will follow-up with Dr. Hernandez on an outpatient basis for live in a follow-up CT scan to follow-up the mediastinal adenopathy.  He will get dialysis today to further correct his potassium.  He will resume his previous schedule on an an outpatient basis.  Given his hyperkalemia, will hold lisinopril and Aldactone and have him follow up with Dr. Mackey in dialysis next week.  He can restart these medications if indicated.        Vital Signs:    Temp:  [97.4 °F (36.3 °C)-98.1 °F (36.7 °C)] 97.4 °F (36.3 °C)  Heart Rate:  [71-84] 80  Resp:  [16-18] 18  BP: (135-150)/(64-78) 143/64    Physical Exam:  General Appearance:    Alert and cooperative, not in any acute distress.   Head:    Atraumatic and normocephalic, without obvious abnormality.   Eyes:            PERRLA, conjunctivae and sclerae normal, no Icterus. No pallor. Extra-occular movements are within normal limits.   Ears:    Ears appear intact with no abnormalities noted.   Throat:   No oral lesions, no thrush, oral mucosa moist.   Neck:   Supple, trachea midline, no thyromegaly, no carotid bruit.       Lungs:     Chest shape is normal. Breath sounds heard bilaterally equally.  Few faint crackles in bases but no wheezing. No Pleural rub or bronchial breathing.    Heart:    Normal S1 and S2, no murmur, no gallop, no rub. No JVD   Abdomen:     Normal bowel sounds, no masses, no organomegaly. Soft        non-tender, non-distended, no guarding, no rebound                tenderness   Extremities:   Moves all extremities well, no edema, no cyanosis, no             Clubbing, Right arm fistula.   Pulses:   Pulses palpable and equal bilaterally   Skin:   No bleeding, bruising or rash     Psychiatric/Behavior:        Normal mood, normal behavior   Neurologic:   Cranial nerves 2 - 12 grossly intact, sensation intact, Motor power is normal and equal  bilaterally.           Pertinent Lab Results:     Results from last 7 days   Lab Units  11/11/18   0610  11/10/18   0617 11/09/18   0553   11/08/18   1555   SODIUM mmol/L  132*  133*  131*   --   128*   POTASSIUM mmol/L  5.9*  5.6*  6.3*   < >  6.7*   CHLORIDE mmol/L  94*  95*  91*   --   87*   CO2 mmol/L  25.0*  29.0  24.0*   --   28.0   BUN mg/dL  48*  35*  65*   --   54*   CREATININE mg/dL  5.90*  4.70*  6.40*   --   5.90*   CALCIUM mg/dL  9.4  9.2  9.3   --   9.7   BILIRUBIN mg/dL   --    --    --    --   0.7   ALK PHOS U/L   --    --    --    --   126   ALT (SGPT) U/L   --    --    --    --   22   AST (SGOT) U/L   --    --    --    --   36   GLUCOSE mg/dL  89  83  111*   --   482*    < > = values in this interval not displayed.     Results from last 7 days   Lab Units  11/10/18   0617 11/09/18   0553  11/08/18   1555   WBC 10*3/mm3  5.54  6.37  5.19   HEMOGLOBIN g/dL  10.7*  11.5*  11.5*   HEMATOCRIT %  33.1*  36.0*  35.3*   PLATELETS 10*3/mm3  148  157  170         Blood Culture   Date Value Ref Range Status   11/08/2018 No growth at 2 days  Preliminary   11/08/2018 No growth at 2 days  Preliminary     MRSA SCREEN CX   Date Value Ref Range Status   11/09/2018   Final    No Methicillin Resistant Staphylococcus aureus isolated         Pertinent Radiology Results:    Imaging Results (all)     Procedure Component Value Units Date/Time    CT Chest Without Contrast [[id number]] Collected:  11/08/18 1650     Updated:  11/08/18 1651    Narrative:       FINAL REPORT    CLINICAL HISTORY:  cough    FINDINGS:  Multiple contiguous transaxial slices through the chest were  obtained without the intravenous ministration of contrast.  There is diffuse centrilobular emphysema with bilateral fibrotic  scarring, greatest in the right middle lobe.  There is patchy  and consolidated right upper and lower lobe airspace disease  with a small right pleural effusion.  There are enlarged  mediastinal lymph nodes measuring up to 1.5 cm  in short axis.  Heart size is upper limits of normal.  There are calcifications  of the coronary arteries.  Impression: Right upper and lower  lobe pneumonia with parapneumonic effusion and nonspecific  mediastinal adenopathy.  Recommend appropriate follow-up until  resolution  Chronic changes      Impression:       Authenticated by José Luis Morgan MD on 11/08/2018 04:50:33 PM          Condition on Discharge:    Stable        Discharge Disposition:        Discharge Medication:       Discharge Medications      New Medications      Instructions Start Date   amoxicillin-clavulanate 500-125 MG per tablet  Commonly known as:  AUGMENTIN   1 tablet, Oral, Daily         Continue These Medications      Instructions Start Date   acetaminophen 325 MG tablet  Commonly known as:  TYLENOL   325 mg, Oral, Every 4 Hours PRN      albuterol 108 (90 Base) MCG/ACT inhaler  Commonly known as:  PROVENTIL HFA;VENTOLIN HFA   2 puffs, Inhalation, Every 4 Hours PRN      amLODIPine 10 MG tablet  Commonly known as:  NORVASC   10 mg, Oral, Daily      aspirin 81 MG chewable tablet   81 mg, Oral, Daily      clopidogrel 75 MG tablet  Commonly known as:  PLAVIX   75 mg, Oral, Daily      digoxin 125 MCG tablet  Commonly known as:  LANOXIN   125 mcg, Oral, See Admin Instructions, Alternate days after dialysis      docusate sodium 100 MG capsule  Commonly known as:  COLACE   100 mg, Oral, Every 12 Hours      fenofibrate 54 MG tablet  Commonly known as:  TRICOR   54 mg, Oral, Daily      folic acid 1 MG tablet  Commonly known as:  FOLVITE   1 mg, Oral, Daily      FOSRENOL 1000 MG chewable tablet  Generic drug:  lanthanum   3 Times Daily With Meals, Per pt, he takes with each meal and with snacks      hydrALAZINE 50 MG tablet  Commonly known as:  APRESOLINE   50 mg, Oral, 3 Times Daily      insulin detemir 100 UNIT/ML injection  Commonly known as:  LEVEMIR   8 Units, Subcutaneous, Daily      isosorbide mononitrate 30 MG 24 hr tablet  Commonly known as:   IMDUR   30 mg, Oral, Daily      lactobacillus acidophilus capsule capsule   1 capsule, Oral, 2 Times Daily      levothyroxine 100 MCG tablet  Commonly known as:  SYNTHROID, LEVOTHROID   100 mcg, Oral, Daily      losartan 100 MG tablet  Commonly known as:  COZAAR   100 mg, Oral, Every 24 Hours Scheduled      metoprolol succinate  MG 24 hr tablet  Commonly known as:  TOPROL-XL   100 mg, Oral, Daily      omeprazole 20 MG capsule  Commonly known as:  priLOSEC   20 mg, Oral, Daily      ondansetron ODT 4 MG disintegrating tablet  Commonly known as:  ZOFRAN-ODT   4 mg, Oral, Every 6 Hours PRN      pravastatin 40 MG tablet  Commonly known as:  PRAVACHOL   80 mg, Oral, Nightly      RADHA-SHIRLENE PO   Oral, Patient states he takes these after dialysis, but is unaware of dose.       vitamin D3 5000 units capsule capsule   Oral, Daily         Stop These Medications    lisinopril 10 MG tablet  Commonly known as:  PRINIVIL,ZESTRIL     spironolactone 25 MG tablet  Commonly known as:  ALDACTONE            Discharge Diet:     Diet Instructions     Diet: Cardiac, Renal, Consistent Carbohydrate; Thin      Discharge Diet:   Cardiac  Renal  Consistent Carbohydrate       Fluid Consistency:  Thin          Activity at Discharge:     Activity Instructions     Discharge Activity            Follow-up Appointments:    Future Appointments   Date Time Provider Department Center   4/22/2019  1:30 PM Ean Hernandez MD [facility] None     Additional Instructions for the Follow-ups that You Need to Schedule     Ambulatory Referral to Home Health   As directed      Face to Face Visit Date:  11/11/2018    Follow-up Provider for Plan of Care?:  I treated the patient in an acute care facility and will not continue treatment after discharge.    Follow-up Provider:  MARYLU RAMEHS [[id number]]    Reason/Clinical Findings:  strength mobiltu    Describe mobility limitations that make leaving home difficult:  COPD, CHF, ESRD     Nursing/Therapeutic Services Requested:  Skilled Nursing Physical Therapy Occupational Therapy    Skilled nursing orders:  CHF management O2 instruction Medication education COPD management    PT orders:  Strengthening Home safety assessment    Occupational orders:  Energy conservation Activities of daily living Strengthening    Frequency:  1 Week 1         Basic Metabolic Panel    Nov 12, 2018 (Approximate)      Results to Dr. Mackey    Order Comments:  Results to Dr. Mackey                Test Results Pending at Discharge:     Order Current Status    Blood Culture With ROSELINE - Blood, Preliminary result    Blood Culture With ROSELINE - Blood, Preliminary result        Discharge Instructions:  Will need follow up CT chest in 2 months for enlarged mediastinal lymph nodes measuring up to 1.5 cm in short axis.  PCP or Dr. Hernandez can arrange as an outpatient.  Oxygen 2 liters nasal canula  BMP on Wednesday           ALEXYS Gunderson  11/11/18  12:29 PM    Time:   40 minutes were spent reviewing labs, history, evaluating patient and discharge planning.

## 2018-11-11 NOTE — PROGRESS NOTES
"Nephrology Progress Note.    LOS: 2 days    Patient Care Team:  Idalia Kay MD as PCP - General    Chief Complaint:    Chief Complaint   Patient presents with   • Cough       Subjective     Follow up for ESRD and related issues.    Interval History:     Patient Complaints: none    Patient seen and examined this morning.  Events from last night noted.  Patient denies having any fevers chills.  No nausea or vomiting no abdominal pain.  Denies any chest pain, shortness of breath or cough and sputum production.  There is no significant edema.   Patient also denies having new onset weakness of numbness of either extremity, he does feel that he is improving.  He feels good enough has breakfast in front of him, said he'll probably need all of it and has been feeling real well this morning.  Increase potassium noted and he will be due for his third dialysis this week today.    History taken from: patient    Review of Systems:    The pertinent  ROS was done and it is noted above, rest  was negative.    Objective     Vital Signs  /78   Pulse 73   Temp 98.1 °F (36.7 °C) (Oral)   Resp 18   Ht 172.7 cm (68\")   Wt 60.6 kg (133 lb 11.2 oz)   SpO2 100%   BMI 20.33 kg/m²       No intake/output data recorded.    Intake/Output Summary (Last 24 hours) at 11/11/2018 0720  Last data filed at 11/10/2018 1700  Gross per 24 hour   Intake 480 ml   Output --   Net 480 ml       Physical Exam:    General Appearance: alert, oriented x 3, no acute distress,   HEENT: Oral mucosa dry, extra occular movements intact. Sclera clear.  Skin: Warm and dry  Neck: supple, no JVD, trachea midline  Lungs:Chest shape is normal. Breath sounds heard bilaterally equally.  Does have scattered rhonchi all over the chest.  Heart: regular rate and rhythm. normal S1 and S2, no S3, no rub, he has 1 to 2/6 systolic ejection murmur.  peripheral pulses weak but palpable.  Abdomen: Obese, soft, non-tender,  present bowel sounds to " auscultation  : no palpable bladder.  Extremities: Trace edema, no cyanosis or clubbing.   Neuro: normal speech and mental status, grossly non focal.     Results Review:    Results from last 7 days   Lab Units  11/11/18   0610  11/10/18   0617  11/09/18   0553   11/08/18   1555   SODIUM mmol/L  132*  133*  131*   --   128*   POTASSIUM mmol/L  5.9*  5.6*  6.3*   < >  6.7*   CHLORIDE mmol/L  94*  95*  91*   --   87*   CO2 mmol/L  25.0*  29.0  24.0*   --   28.0   BUN mg/dL  48*  35*  65*   --   54*   CREATININE mg/dL  5.90*  4.70*  6.40*   --   5.90*   CALCIUM mg/dL  9.4  9.2  9.3   --   9.7   BILIRUBIN mg/dL   --    --    --    --   0.7   ALK PHOS U/L   --    --    --    --   126   ALT (SGPT) U/L   --    --    --    --   22   AST (SGOT) U/L   --    --    --    --   36   GLUCOSE mg/dL  89  83  111*   --   482*    < > = values in this interval not displayed.       Estimated Creatinine Clearance: 11.6 mL/min (A) (by C-G formula based on SCr of 5.9 mg/dL (H)).    Results from last 7 days   Lab Units  11/11/18   0610  11/10/18   0617   PHOSPHORUS mg/dL  8.2*  7.3*             Results from last 7 days   Lab Units  11/10/18   0617  11/09/18   0553  11/08/18   1555   WBC 10*3/mm3  5.54  6.37  5.19   HEMOGLOBIN g/dL  10.7*  11.5*  11.5*   PLATELETS 10*3/mm3  148  157  170               Imaging Results (last 24 hours)     ** No results found for the last 24 hours. **          amLODIPine 10 mg Oral Daily   aspirin 81 mg Oral Daily   atorvastatin 10 mg Oral Daily   b complex-vitamin c-folic acid 1 tablet Oral Daily   clopidogrel 75 mg Oral Daily   digoxin 125 mcg Oral See Admin Instructions   folic acid 1 mg Oral Daily   heparin (porcine) 5,000 Units Subcutaneous Q8H   hydrALAZINE 50 mg Oral TID   insulin aspart 0-7 Units Subcutaneous 4x Daily AC & at Bedtime   insulin detemir 8 Units Subcutaneous Nightly   isosorbide mononitrate 30 mg Oral Daily   lactobacillus acidophilus 1 capsule Oral BID   levoFLOXacin 500 mg Intravenous  Q24H   levothyroxine 100 mcg Oral Daily   losartan 100 mg Oral Q24H   metoprolol succinate  mg Oral Daily   pantoprazole 40 mg Oral QAM   piperacillin-tazobactam 4.5 g Intravenous Q12H   sodium chloride 3 mL Intravenous Q12H   vitamin D3 5,000 Units Oral Daily       Pharmacy to Dose Zosyn        Medication Review:   Current Facility-Administered Medications   Medication Dose Route Frequency Provider Last Rate Last Dose   • acetaminophen (TYLENOL) tablet 325 mg  325 mg Oral Q4H PRN John Sosa MD       • acetaminophen (TYLENOL) tablet 650 mg  650 mg Oral Q4H PRN John Sosa MD       • amLODIPine (NORVASC) tablet 10 mg  10 mg Oral Daily John Sosa MD   10 mg at 11/10/18 0854   • aspirin chewable tablet 81 mg  81 mg Oral Daily John Sosa MD   81 mg at 11/10/18 0854   • atorvastatin (LIPITOR) tablet 10 mg  10 mg Oral Daily John Sosa MD   10 mg at 11/10/18 0854   • b complex-vitamin c-folic acid (NEPHRO-SHIRLENE) tablet 1 tablet  1 tablet Oral Daily John Sosa MD   1 tablet at 11/10/18 0854   • clopidogrel (PLAVIX) tablet 75 mg  75 mg Oral Daily John Sosa MD   75 mg at 11/10/18 0853   • dextrose (D50W) 25 g/ 50mL Intravenous Solution 25 g  25 g Intravenous Q15 Min PRN John Sosa MD       • dextrose (GLUTOSE) oral gel 1 tube  1 tube Oral Q15 Min PRN John Sosa MD       • digoxin (LANOXIN) tablet 125 mcg  125 mcg Oral See Admin Instructions John Sosa MD   125 mcg at 11/11/18 0016   • folic acid (FOLVITE) tablet 1 mg  1 mg Oral Daily John Sosa MD   1 mg at 11/10/18 0854   • glucagon (human recombinant) (GLUCAGEN DIAGNOSTIC) injection 1 mg  1 mg Subcutaneous PRN John Sosa MD       • heparin (porcine) 5000 UNIT/ML injection 5,000 Units  5,000 Units Subcutaneous Q8H John Sosa MD   5,000 Units at 11/11/18 0630   • hydrALAZINE (APRESOLINE) tablet 50 mg  50 mg Oral TID John Sosa MD   50 mg at 11/10/18 9102   •  HYDROcodone-acetaminophen (NORCO) 5-325 MG per tablet 1 tablet  1 tablet Oral Q6H PRN Carmelo Ray MD   1 tablet at 11/09/18 1630   • insulin aspart (novoLOG) injection 0-7 Units  0-7 Units Subcutaneous 4x Daily AC & at Bedtime John Sosa MD   2 Units at 11/10/18 2227   • insulin detemir (LEVEMIR) injection 8 Units  8 Units Subcutaneous Nightly John Sosa MD   8 Units at 11/10/18 2228   • ipratropium-albuterol (DUO-NEB) nebulizer solution 3 mL  3 mL Nebulization Q6H PRN John Sosa MD   3 mL at 11/11/18 0430   • isosorbide mononitrate (IMDUR) 24 hr tablet 30 mg  30 mg Oral Daily John Sosa MD   30 mg at 11/10/18 0854   • lactobacillus acidophilus (RISAQUAD) capsule 1 capsule  1 capsule Oral BID John Sosa MD   1 capsule at 11/10/18 2226   • levoFLOXacin (LEVAQUIN) 500 mg/100 mL D5W (premix) (LEVAQUIN) 500 mg  500 mg Intravenous Q24H John Sosa MD   500 mg at 11/11/18 0101   • levothyroxine (SYNTHROID, LEVOTHROID) tablet 100 mcg  100 mcg Oral Daily John Sosa MD   100 mcg at 11/10/18 0854   • losartan (COZAAR) tablet 100 mg  100 mg Oral Q24H John Sosa MD   100 mg at 11/10/18 0854   • metoprolol succinate XL (TOPROL-XL) 24 hr tablet 100 mg  100 mg Oral Daily John Sosa MD   100 mg at 11/10/18 0853   • ondansetron (ZOFRAN) tablet 4 mg  4 mg Oral Q6H PRN John Sosa MD        Or   • ondansetron ODT (ZOFRAN-ODT) disintegrating tablet 4 mg  4 mg Oral Q6H PRN John Sosa MD        Or   • ondansetron (ZOFRAN) injection 4 mg  4 mg Intravenous Q6H PRN John Sosa MD       • pantoprazole (PROTONIX) EC tablet 40 mg  40 mg Oral QAM John Sosa MD   40 mg at 11/11/18 0630   • Pharmacy to Dose Zosyn   Does not apply Continuous PRN John Sosa MD       • piperacillin-tazobactam (ZOSYN) 4.5 g in iso-osmotic dextrose 100 mL IVPB (premix)  4.5 g Intravenous Q12H John Sosa MD 0 mL/hr at 11/09/18 1238 4.5 g at 11/11/18 0629   • sodium  chloride 0.9 % flush 10 mL  10 mL Intravenous PRN Rom Alejo PA-C       • sodium chloride 0.9 % flush 3 mL  3 mL Intravenous Q12H John Sosa MD   3 mL at 11/11/18 0102   • sodium chloride 0.9 % flush 3-10 mL  3-10 mL Intravenous PRN John Sosa MD       • vitamin D3 capsule 5,000 Units  5,000 Units Oral Daily John Sosa MD   5,000 Units at 11/10/18 0854       Assessment/Plan     1. End stage renal disease on dialysis (CMS/HCC)  2. Chronic kidney disease-mineral and bone disorder:  3. Pneumonia of right upper lobe due to infectious organism (CMS/HCC)  4. Pneumonia of right lower lobe due to infectious organism (CMS/HCC)  5. Type 2 diabetes mellitus treated with insulin (CMS/[facility]) uncontrolled.  6. Anemia of chronic kidney disease  7. Chronic right-sided loculated pleural effusion status post pleurodesis  8. Coronary artery disease with medical management  9. Chronic scrotal ulcer followed by Dr. Macdonald at [facility].  10. Hypertension with end-stage renal disease  11. Hypothyroidism  12. Physical debility     Plan:    · Dialysis schedule on Tuesday Thursday Saturday, he runs 4 hours on a standard bath usually gains 2-3 L in between dialysis.  He has missed his dialysis on thursday plan on doing him again tomorrow, if he is stable enough may be able to do a Sunday and then put him back on Tuesday Thursday Saturday for next week.  Does have high potassium today that'll probably correct with dialysis later today.  · Continue antibiotics, clinically appears to be improving.  · Continue rest of the treatment as planned.  · Surveillance labs.  · Details were discussed with the patient no family in the room.   he will resume his Tuesday Thursday Saturday dialysis starting next week.  · Details were also discussed with the hospitalist service.  If clinically stable post dialysis may be able to go home.  · Further recommendations will depend on clinical course of the patient during the  current hospitalization.    · I also discussed the details with the nursing staff.  · Rest as ordered.        Chance Mackey MD, ARJUN  11/11/18  7:20 AM    Dictated utilizing Dragon dictation.

## 2018-11-11 NOTE — PROGRESS NOTES
Continued Stay Note  ANTOINE Sharma     Patient Name: Talha Cutler  MRN: [id number]  Today's Date: 11/11/2018    Admit Date: 11/8/2018    Discharge Plan     Row Name 11/11/18 1217       Plan    Plan  Spoke to pt while in Dialysis, stated would be willing to go to [facility] for rehab if would help.  Explained he has [employer] and would have to be approved, not sure if could take dialysis at [place], would have to wait until Monday to follow up.  Does not want any other facility other then [facility]. ..  Spoke with Judit.  Per PT notes pt is WC bound and is at baseline.  Pt states he would just rather go home. States he has [facility] informed.  Referral faxed to [facility].         Discharge Codes    No documentation.       Expected Discharge Date and Time     Expected Discharge Date Expected Discharge Time    Nov 11, 2018             Marcella Hsu

## 2018-11-11 NOTE — PLAN OF CARE
Problem: Fall Risk (Adult)  Intervention: Review Medications/Identify Contributors to Fall Risk   11/11/18 0548   Safety Management   Medication Review/Management medications reviewed       Goal: Identify Related Risk Factors and Signs and Symptoms  Outcome: Ongoing (interventions implemented as appropriate)    Goal: Absence of Fall  Outcome: Ongoing (interventions implemented as appropriate)

## 2018-11-12 ENCOUNTER — READMISSION MANAGEMENT (OUTPATIENT)
Dept: CALL CENTER | Facility: HOSPITAL | Age: 58
End: 2018-11-12

## 2018-11-12 NOTE — PROGRESS NOTES
Case Management Discharge Note    Final Note: home with dialysis by car, [facility] home health    Destination      No service has been selected for the patient.      Durable Medical Equipment      No service has been selected for the patient.      Dialysis/Infusion - Selection Complete      Service Provider Request Status Selected Services Address Phone Number Fax Number    [facility] Selected Dialysis [address] [facility] [address] [phone]      Home Medical Care - Selection Complete      Service Provider Request Status Selected Services Address Phone Number Fax Number    [facility] Selected [facility] Health Services [address] [facility] [address] [phone]      Community Resources      No service has been selected for the patient.        Other: Other    Final Discharge Disposition Code: 01 - home or self-care, 06 - home with home health care

## 2018-11-12 NOTE — PAYOR COMM NOTE
"Please review for inpatient auth  Annika [phone], Fax [phone]    ICD 10 Code J18.1      Talha Cutler (58 y.o. 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)   Code Status:  Prior    Ht:  172.7 cm (68\")   Wt:  60.6 kg (133 lb 11.2 oz)    Admission Cmt:  None   Principal Problem:  Pneumonia of right upper lobe due to infectious organism (CMS/[facility]) [J18.1]                 Active Insurance as of 11/8/2018     Primary Coverage     Payor Plan Insurance Group Employer/Plan Group    HUMANA MEDICARE REPLACEMENT HUMANA MEDICARE REPL [id number]     Payor Plan Address Payor Plan Phone Number Payor Plan Fax Number Effective Dates    [address] [address] [phone]  1/1/2014 - None Entered    [facility] [id number]       Subscriber Name Subscriber Birth Date Member ID       TALHA CUTLER 1960 [id number]           Secondary Coverage     Payor Plan Insurance Group Employer/Plan Group    [place]      Payor Plan Address Payor Plan Phone Number Payor Plan Fax Number Effective Dates    [address] [phone]  2/15/2018 - None Entered    [facility] [id number]       Subscriber Name Subscriber Birth Date Member ID       TALHA CUTLER 1960 [id number]                 Emergency Contacts      (Rel.) Home Phone Work Phone Mobile Phone    Mallika Cutler (Spouse) [phone] -- --    Coffee,Cisco (Brother) " [phone] -- --               History & Physical      John Sosa MD at 11/8/2018  8:45 PM              [facility]  HISTORY AND PHYSICAL    Primary Care Physician: Idalia Kay MD    Subjective     Chief Complaint:    Chief Complaint   Patient presents with   • Cough       History of Present Illness:     I have reviewed labs/imaging/records from this hospitalization, including ER staff to establish a comprehensive understanding of this patient's clinical issues, as well as to establish plan of care appropriately.     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 who presented to the ER today with complaints of profuse sweating associated with generalized weakness for the last 3 days.  He states that his sugars have not been low and it did not matter whether there were control or control he was sweating through all of his closed and his bed sheets.  This was not associated with any fever or chills.  He denies having any shortness of breath, but has had some nonproductive coughing.  On further questioning he also reports on and off diarrhea for the past 7 days anywhere between 4-5 times a day with associated abdominal cramping.  He does report that this is a chronic issue for him, where he gets constipated for 4-5 days and then has diarrhea for a few days and vice versa.  On further questioning he does also state that he did not go to his routine dialysis session today because he had a trip scheduled with his  center and was supposed to have dialysis tomorrow but missed his trip  "due to feeling unwell.  At the time of my encounter patient is feeling well and has no significant complaint other than feeling hungry.     On arrival to the ER patient's vitals are notable for blood pressure 179/78 with normal heart rate, respiratory rate and temperature.  He is oxygenating well on room air.  Labs are notable for glucose of 482, BUN/Cr creatinine 54/5.9, sodium 128, potassium 6.7 with an anion gap of 19.  Troponin was obtained and it is negative.  He has a normal white blood cell count and baseline H&H of 11.5/35.3.  His lactate was within normal range at 0.6. EKG did not show any hyperkalemic changes. An arterial blood gas was obtained which shows a normal pH and a decreased PaO2 of 45 on room air, this does not correlate with his oxygen saturation and likely is a mixed blood gas.  Subsequently a CT of the chest without contrast was obtained which shows \"diffuse centrilobular emphysema with bilateral fibrotic scarring, greatest in the right middle lobe.  There is patchy and consolidated right upper and lower airspace disease with small right pleural effusion.  There are enlarged mediastinal lymph nodes measuring up to 1.5 cm.\" Patient was given a dose of vancomycin, levaquin and ceftriaxone. He was also given 10 units of IV inuslin and albuterol for treatment of hyperkalemia. He is being admitted to the hospitalist service for further treatment and management of right sided pneumonia.     Of note, patient reports that he has been taking a daily oral antibiotic since September for cellulitis of his scrotum for which he sees urology at [facility]. He could not remember the name of the antibiotic. Further chart review shows that the patient was seen by Dr. Hernandez on 10/22 in his office for follow up. It appears that the patient has had Pleurx catheter place on the right side at some point previously and probably has pleurodesis and he felt that he likely has residual scarring involving the pleura. He asked " the patient to follow up in 6 months for repeat CXR at that time.     Review of Systems   1. Constitutional: Negative for fever. Negative for chills, fatigue and unexpected weight change. +profuse sweating  2. HENT: Negative for congestion and hearing loss.   3. Eyes: Negative for redness and visual disturbance.   4. Respiratory: negative for shortness of breath. Negative for chest pain . Negative for cough and chest tightness.   5. Cardiovascular: Negative for chest pain and palpitations.   6. Gastrointestinal: Negative for abdominal distention, abdominal pain and blood in stool. + on and off diarrhea  7. Endocrine: Negative for cold intolerance and heat intolerance.   8. Genitourinary: Negative for difficulty urinating, dysuria and frequency.   9. Musculoskeletal: Negative for arthralgias, back pain and myalgias.   10. Skin: Negative for color change, rash and wound.   11. Neurological: Negative for syncope, weakness and headaches.   12. Hematological: Negative for adenopathy. Does not bruise/bleed easily.   13. Psychiatric/Behavioral: Negative for confusion. The patient is not nervous/anxious.     Past Medical History:   Past Medical History:   Diagnosis Date   • Anemia    • CHF (congestive heart failure) (CMS/HCC)    • Chronic kidney disease    • Diabetes mellitus (CMS/HCC)    • H/O chest x-ray 03/25/2016    Interval decrease in size of the patients right pleural effusion with a persistent small left effusion as well   • History of transfusion    • Hypertension    • Left-sided weakness    • Pneumonia    • Renal failure    • Stroke (CMS/HCC)        Past Surgical History:  Past Surgical History:   Procedure Laterality Date   • ARTERIOVENOUS FISTULA Right    • CARDIAC CATHETERIZATION N/A 3/28/2018    Procedure: Peripheral angiography;  Surgeon: Clay Ruano MD;  Location: [facility] CATH INVASIVE LOCATION;  Service: Cardiovascular   • CARDIAC CATHETERIZATION N/A 3/28/2018    Procedure:  Angioplasty-peripheral;  Surgeon: Clay Ruano MD;  Location: [facility] CATH INVASIVE LOCATION;  Service: Cardiovascular   • CARDIAC CATHETERIZATION N/A 3/28/2018    Procedure: Atherectomy-peripheral;  Surgeon: Clay Ruano MD;  Location: [facility] CATH INVASIVE LOCATION;  Service: Cardiovascular   • CATARACT EXTRACTION, BILATERAL     • CHOLECYSTECTOMY     • COLONOSCOPY     • EYE SURGERY     • INTERVENTIONAL RADIOLOGY PROCEDURE N/A 3/28/2018    Procedure: Abdominal Aortogram;  Surgeon: Clay Ruano MD;  Location: [facility] CATH INVASIVE LOCATION;  Service: Cardiovascular   • PORTACATH PLACEMENT     • VENOUS ACCESS DEVICE (PORT) REMOVAL         Family History: family history includes COPD in his mother; Diabetes in his father, paternal grandmother, and sister; Hypertension in his brother and sister.    Social History:  reports that he has never smoked. He has never used smokeless tobacco. He reports that he does not drink alcohol or use drugs.    Medications:  Prescriptions Prior to Admission   Medication Sig Dispense Refill Last Dose   • acetaminophen (TYLENOL) 325 MG tablet Take 1 tablet by mouth Every 4 (Four) Hours As Needed for Mild Pain .   Taking   • albuterol (PROVENTIL HFA;VENTOLIN HFA) 108 (90 Base) MCG/ACT inhaler Inhale 2 puffs Every 4 (Four) Hours As Needed for Shortness of Air. 1 inhaler 0 Taking   • amLODIPine (NORVASC) 10 MG tablet Take 10 mg by mouth Daily.   11/8/2018 at Unknown time   • aspirin 81 MG chewable tablet Chew 81 mg Daily.   11/8/2018 at Unknown time   • B Complex-C-Folic Acid (RAHDA-SHIRLENE PO) Take  by mouth. Patient states he takes these after dialysis, but is unaware of dose.   11/8/2018 at Unknown time   • Cholecalciferol (VITAMIN D3) 5000 UNITS capsule capsule Take  by mouth daily.   11/8/2018 at Unknown time   • clopidogrel (PLAVIX) 75 MG tablet Take 1 tablet by mouth Daily. 90 tablet 3 11/8/2018 at Unknown time   • digoxin (LANOXIN) 125 MCG tablet Take 125 mcg  by mouth See Admin Instructions. Alternate days after dialysis   11/8/2018 at Unknown time   • docusate sodium (COLACE) 100 MG capsule Take 100 mg by mouth Every 12 (Twelve) Hours.   11/8/2018 at Unknown time   • folic acid (FOLVITE) 1 MG tablet Take 1 mg by mouth daily.   11/8/2018 at Unknown time   • hydrALAZINE (APRESOLINE) 50 MG tablet Take 50 mg by mouth 3 (Three) Times a Day.   11/8/2018 at Unknown time   • insulin detemir (LEVEMIR) 100 UNIT/ML injection Inject 8 Units under the skin Daily. 100 mL 0 11/7/2018 at Unknown time   • isosorbide mononitrate (IMDUR) 30 MG 24 hr tablet Take 30 mg by mouth Daily.   Taking   • lactobacillus acidophilus (RISAQUAD) capsule capsule Take 1 capsule by mouth 2 (Two) Times a Day. 7 capsule 0 11/8/2018 at Unknown time   • levothyroxine (SYNTHROID, LEVOTHROID) 100 MCG tablet Take 100 mcg by mouth daily.   11/8/2018 at Unknown time   • lisinopril (PRINIVIL,ZESTRIL) 10 MG tablet Take  by mouth Daily. Patient states they take this medication, but it has not been filled at [facility]'s pharmacy.   11/8/2018 at Unknown time   • losartan (COZAAR) 100 MG tablet Take 1 tablet by mouth Daily.   11/8/2018 at Unknown time   • metoprolol succinate XL (TOPROL-XL) 100 MG 24 hr tablet Take 1 tablet by mouth Daily. 30 tablet 0 11/8/2018 at Unknown time   • omeprazole (priLOSEC) 20 MG capsule Take 20 mg by mouth Daily.   11/8/2018 at Unknown time   • ondansetron ODT (ZOFRAN-ODT) 4 MG disintegrating tablet Take 1 tablet by mouth Every 6 (Six) Hours As Needed for Nausea or Vomiting. 20 tablet 0 11/8/2018 at Unknown time   • pravastatin (PRAVACHOL) 40 MG tablet Take 80 mg by mouth Every Night.   11/8/2018 at Unknown time   • spironolactone (ALDACTONE) 25 MG tablet Take 25 mg by mouth Daily.   11/8/2018 at Unknown time   • fenofibrate (TRICOR) 54 MG tablet Take 54 mg by mouth Daily.   Not Taking   • FOSRENOL 1000 MG chewable tablet 3 (Three) Times a Day With Meals. Per pt, he takes with each meal and  "with snacks   Taking       Allergies:  Allergies   Allergen Reactions   • Erythromycin Hives         Objective     Physical Exam:  Vital Signs: /65 (BP Location: Left arm, Patient Position: Lying)   Pulse 70   Temp 98.6 °F (37 °C) (Oral)   Resp 16   Ht 172.7 cm (68\")   Wt 58.7 kg (129 lb 8 oz)   SpO2 98%   BMI 19.69 kg/m²       Physical Exam:     General Appearance:   Alert, cooperative, in no acute distress, chronically ill appearing     Head:   Normocephalic, without obvious abnormality, atraumatic.     Eyes:       Normal, conjunctivae and sclerae, no icterus, no pallor, corneas clear, PERRLA        Throat:   Oral mucosa dry      Neck:  No adenopathy, supple, trachea midline, no thyromegaly, no carotid bruit, no JVD      Back:   No CVA tenderness on Percussion.     Lungs:    Fair air entry bilaterally, rales and rhonchi appreciated on the right, no significant wheezing noted      Heart:   Regular rhythm and normal rate, normal S1 and S2.       Abdomen:   Obese. Normal bowel sounds, no masses, no organomegaly, soft non-tender, non-distended, no guarding, no rebound tenderness        Extremities:  Moves all extremities, no edema, no cyanosis, no redness, + AV graft on right upper extremity, blood vessels proximal to the graft engorged, this is chronic per patient, right upper extremity contracted     Pulses:  Pulses palpable and equal bilaterally but weak.     Skin:  No bleeding, bruising or rash        Neurologic:  Cranial nerves grossly intact         Results Review:  Lab Results (last 7 days)     Procedure Component Value Units Date/Time    Digoxin Level [[id number]]  (Normal) Collected:  11/08/18 1555    Specimen:  Blood Updated:  11/08/18 1748     Digoxin 1.10 ng/mL     Lactic Acid, Plasma [[id number]]  (Normal) Collected:  11/08/18 1712    Specimen:  Blood Updated:  11/08/18 1732     Lactate 0.6 mmol/L     Blood Culture With ROSELINE - Blood, [[id number]] Collected:  11/08/18 1712    Specimen:  Blood from " Arm, Right Updated:  11/08/18 1725    Blood Culture With ROSELINE - Blood, [[id number]] Collected:  11/08/18 1700    Specimen:  Blood from Hand, Left Updated:  11/08/18 1725    TSH [[id number]]  (Abnormal) Collected:  11/08/18 1555    Specimen:  Blood Updated:  11/08/18 1713     TSH 4.740 (H) mIU/mL     Comprehensive Metabolic Panel [[id number]]  (Abnormal) Collected:  11/08/18 1555    Specimen:  Blood Updated:  11/08/18 1641     Glucose 482 (C) mg/dL      Comment: Glucose >180, Hemoglobin A1C recommended.        BUN 54 (H) mg/dL      Creatinine 5.90 (H) mg/dL      Sodium 128 (L) mmol/L      Potassium 6.7 (C) mmol/L      Chloride 87 (L) mmol/L      CO2 28.0 mmol/L      Calcium 9.7 mg/dL      Total Protein 7.7 g/dL      Albumin 4.60 g/dL      ALT (SGPT) 22 U/L      AST (SGOT) 36 U/L      Alkaline Phosphatase 126 U/L      Total Bilirubin 0.7 mg/dL      eGFR Non African Amer 10 (L) mL/min/1.73      Comment: <15 Indicative of kidney failure.        eGFR   Amer -- mL/min/1.73      Comment: <15 Indicative of kidney failure.        Globulin 3.1 gm/dL      A/G Ratio 1.5 g/dL      BUN/Creatinine Ratio 9.2     Anion Gap 19.7 mmol/L     Narrative:       GFR Normal >60  Chronic Kidney Disease <60  Kidney Failure <15    Troponin [[id number]]  (Normal) Collected:  11/08/18 1555    Specimen:  Blood Updated:  11/08/18 1627     Troponin I 0.022 ng/mL     Narrative:       Normal Patient Upper Reference Limit (URL) (99th Percentile)=0.03 ng/mL   Non-AMI Illness Reference Limit=0.03-0.11 ng/mL   AMI Confirmation=0.12 ng/mL and above    CBC & Differential [[id number]] Collected:  11/08/18 1555    Specimen:  Blood Updated:  11/08/18 1602    Narrative:       The following orders were created for panel order CBC & Differential.  Procedure                               Abnormality         Status                     ---------                               -----------         ------                     CBC Auto Differential[[id number]]         Abnormal            Final result                 Please view results for these tests on the individual orders.    CBC Auto Differential [[id number]]  (Abnormal) Collected:  11/08/18 1555    Specimen:  Blood Updated:  11/08/18 1602     WBC 5.19 10*3/mm3      RBC 3.89 (L) 10*6/mm3      Hemoglobin 11.5 (L) g/dL      Hematocrit 35.3 (L) %      MCV 90.7 fL      MCH 29.6 pg      MCHC 32.6 g/dL      RDW 16.3 (H) %      RDW-SD 53.2 fl      MPV 10.7 fL      Platelets 170 10*3/mm3      Neutrophil % 57.7 %      Lymphocyte % 11.9 %      Monocyte % 14.8 (H) %      Eosinophil % 13.5 (H) %      Basophil % 1.9 %      Immature Grans % 0.2 %      Neutrophils, Absolute 2.99 10*3/mm3      Lymphocytes, Absolute 0.62 10*3/mm3      Monocytes, Absolute 0.77 10*3/mm3      Eosinophils, Absolute 0.70 10*3/mm3      Basophils, Absolute 0.10 10*3/mm3      Immature Grans, Absolute 0.01 10*3/mm3      nRBC 0.0 /100 WBC     Influenza Antigen, Rapid - Swab, Nasopharynx [[id number]]  (Normal) Collected:  11/08/18 1522    Specimen:  Swab from Nasopharynx Updated:  11/08/18 1554     Influenza A Ag, EIA Negative     Influenza B Ag, EIA Negative    Blood Gas, Arterial With Co-Ox [[id number]]  (Abnormal) Collected:  11/08/18 1539    Specimen:  Arterial Blood Updated:  11/08/18 1541     Site Left Radial     Gerardo's Test Positive     pH, Arterial 7.410 pH units      pCO2, Arterial 43.8 mm Hg      pO2, Arterial 45.8 (C) mm Hg      HCO3, Arterial 27.7 mmol/L      Base Excess, Arterial 2.7 (H) mmol/L      O2 Saturation, Arterial 77.0 (L) %      Hemoglobin, Blood Gas 11.5 (L) g/dL      Hematocrit, Blood Gas 35.3 %      Oxyhemoglobin 75.2 (L) %      Methemoglobin 1.20 %      Carboxyhemoglobin 1.1 %      Barometric Pressure for Blood Gas 739 mmHg      Modality Room Air     FIO2 21 %      Ventilator Mode NA     Note --     pH, Temp Corrected -- pH Units      pCO2, Temperature Corrected -- mm Hg      pO2, Temperature Corrected -- mm Hg         Imaging Results (last 72  hours)     Procedure Component Value Units Date/Time    CT Chest Without Contrast [[id number]] Collected:  11/08/18 1650     Updated:  11/08/18 1651    Narrative:       FINAL REPORT    CLINICAL HISTORY:  cough    FINDINGS:  Multiple contiguous transaxial slices through the chest were  obtained without the intravenous ministration of contrast.  There is diffuse centrilobular emphysema with bilateral fibrotic  scarring, greatest in the right middle lobe.  There is patchy  and consolidated right upper and lower lobe airspace disease  with a small right pleural effusion.  There are enlarged  mediastinal lymph nodes measuring up to 1.5 cm in short axis.  Heart size is upper limits of normal.  There are calcifications  of the coronary arteries.  Impression: Right upper and lower  lobe pneumonia with parapneumonic effusion and nonspecific  mediastinal adenopathy.  Recommend appropriate follow-up until  resolution  Chronic changes      Impression:       Authenticated by José Luis Morgan MD on 11/08/2018 04:50:33 PM      I have personally reviewed and interpreted available lab data, radiology studies and ECG obtained at time of admission.     Assessment / Plan     Assessment/Problem List:     Pneumonia of right upper lobe due to infectious organism (CMS/HCC)    1. Right upper lobe pneumonia with small bilateral effusions, due to unknown infectious organism, POA  2. Acute hyperkalemia, likely due to missed HD  3. Hyperglycemia with known insulin dependent type 2 diabetes mellitus  4. Diarrhea with intermittent constipation, will need to rule out C. Diff as he has been on recent oral antibiotics   5. Diabetic nephropathy and peripheral neuropathy  6. ESRD on hemodialysis T, Th and Saturday  7. Chronic debility  8. History of CVA with left residual hemiparesis, wheelchair bound  9. Recent scrotal cellulitis on chronic oral antibiotics   10. Chronic congestive heart failure, stable      Plan:  Patient is admitted to [facility]  with telemetry. He will be continued on Vancomycin, Zosyn and Levaquin. Will check sputum culture.  Continue with bronchodilators. No need for IV steroids at this time. Blood cultures obtained in the ER, will need to be followed up.     Repeat potassium level down to 5.9. Will give another dose of Insulin 10mg IV with D50 and Kayexalate as his stool was actually not watery and unlikely to be C. Diff (was rejected by lab).  Will recheck potassium levels in the morning. Dr. Mackey consulted as he will likely need dialysis in the morning.     Patient cannot recall the oral antibiotic that he has been taking, will need to call his pharmacy in AM.     Continue with home dose of Levemir with novolog SS.     Heparin for DVT ppx.   PT/OT.     Details were discussed with the patient.   Further recommendations will depend on clinical course of the patient during the current hospitalization.    I also discussed the details with the nursing staff.  Rest as ordered.    Anticipated stay is less than 2 midnights.    John Sosa MD 11/09/18 1:29 AM    Dictated using Dragon.    Electronically signed by John Sosa MD at 11/9/2018  1:29 AM          Emergency Department Notes      Mahi Hsu, RN at 11/8/2018  3:03 PM        [facility] 480     Mahi Hsu, RN  11/08/18 1503      Electronically signed by Mahi Hsu, RN at 11/8/2018  3:03 PM     Rom Alejo PA-C at 11/8/2018  3:14 PM     Attestation signed by Steffen Bedolla MD at 11/10/2018  7:07 PM          For this patient encounter, I reviewed the NP or PA documentation, treatment plan, and medical decision making. Steffen Bedolla MD 11/10/2018 7:07 PM                  Subjective   The patient is here with family members states he feels like he might have pneumonia he's had a cough for the past 3 or 4 days no fevers reported that he does describe some sweats at times,.. No chest pain no abdominal pain no nausea no vomiting  He is a dialysis  patient,.. On a Tuesday Thursday Saturday schedule he is actually going to do his dialysis tomorrow  Presents here for further evaluation        History provided by:  Patient and relative      Review of Systems   Constitutional: Positive for diaphoresis. Negative for fever.   HENT: Negative.    Eyes: Negative.    Respiratory: Positive for cough.    Cardiovascular: Negative.    Gastrointestinal: Negative.  Negative for abdominal pain, diarrhea and vomiting.   Genitourinary: Negative.    Musculoskeletal: Positive for arthralgias.   Skin: Negative.    Neurological: Negative.    Psychiatric/Behavioral: The patient is nervous/anxious.    All other systems reviewed and are negative.      Past Medical History:   Diagnosis Date   • Anemia    • CHF (congestive heart failure) (CMS/[facility])    • Chronic kidney disease    • Diabetes mellitus (CMS/[facility])    • H/O chest x-ray 03/25/2016    Interval decrease in size of the patients right pleural effusion with a persistent small left effusion as well   • History of transfusion    • Hypertension    • Left-sided weakness    • Pneumonia    • Renal failure    • Stroke (CMS/[facility])        Allergies   Allergen Reactions   • Erythromycin Hives       Past Surgical History:   Procedure Laterality Date   • ARTERIOVENOUS FISTULA Right    • CARDIAC CATHETERIZATION N/A 3/28/2018    Procedure: Peripheral angiography;  Surgeon: Clay Ruano MD;  Location: [facility] CATH INVASIVE LOCATION;  Service: Cardiovascular   • CARDIAC CATHETERIZATION N/A 3/28/2018    Procedure: Angioplasty-peripheral;  Surgeon: Clay Ruano MD;  Location: [facility] CATH INVASIVE LOCATION;  Service: Cardiovascular   • CARDIAC CATHETERIZATION N/A 3/28/2018    Procedure: Atherectomy-peripheral;  Surgeon: Clay Ruano MD;  Location: [facility] CATH INVASIVE LOCATION;  Service: Cardiovascular   • CATARACT EXTRACTION, BILATERAL     • CHOLECYSTECTOMY     • COLONOSCOPY     • EYE SURGERY     • INTERVENTIONAL RADIOLOGY  PROCEDURE N/A 3/28/2018    Procedure: Abdominal Aortogram;  Surgeon: Clay Ruano MD;  Location: [facility] CATH INVASIVE LOCATION;  Service: Cardiovascular   • PORTACATH PLACEMENT     • VENOUS ACCESS DEVICE (PORT) REMOVAL         Family History   Problem Relation Age of Onset   • COPD Mother    • Diabetes Father    • Hypertension Sister    • Diabetes Sister    • Hypertension Brother    • Diabetes Paternal Grandmother        Social History     Social History   • Marital status:      Social History Main Topics   • Smoking status: Never Smoker   • Smokeless tobacco: Never Used   • Alcohol use No   • Drug use: No   • Sexual activity: Defer     Other Topics Concern   • Not on file           Objective   Physical Exam   Constitutional: He is oriented to person, place, and time. He appears well-developed and well-nourished. No distress.   Afebrile nontoxic no acute distress   HENT:   Head: Normocephalic.   Right Ear: External ear normal.   Left Ear: External ear normal.   Mouth/Throat: Oropharynx is clear and moist.   Eyes: Pupils are equal, round, and reactive to light. Conjunctivae and EOM are normal.   Neck: Normal range of motion. Neck supple.   Cardiovascular: Normal rate, regular rhythm and normal heart sounds.    Pulmonary/Chest: Effort normal and breath sounds normal.   Abdominal: Soft. There is no tenderness. There is no guarding.   Musculoskeletal: Normal range of motion. He exhibits no edema.   Neurological: He is alert and oriented to person, place, and time. No cranial nerve deficit or sensory deficit. He exhibits normal muscle tone. Coordination normal.   Skin: Skin is warm. Capillary refill takes less than 2 seconds. He is not diaphoretic.   Psychiatric: He has a normal mood and affect. His behavior is normal. Judgment and thought content normal.   Nursing note and vitals reviewed.      Procedures          ED Course  ED Course as of Nov 08 2137   Thu Nov 08, 2018   1517 EKG interpreted by me:  Sinus rhythm, normal rate, normal axis/intervals, nonspecific ST/T changes, this is an abnormal EKG, compared to previous this is actually improved  []   1540 Patient case labs mgmt reviewed Dr Bedolla  [SC]   1635 Patient resting comfortably no acute distress  [SC]   1747 Discussed with Dr. Mackey, recommends giving insulin to treat glucose abnormality,.. Can be contacted for consult if needed//discussed with him regarding plan to admit to hospitalist service  [SC]   1839 Discussed patient case with Dr. Ramos accepts for admission  [SC]      ED Course User Index  [MP] Steffen Bedolla MD  [SC] oRm Alejo PA-C                  MDM  Number of Diagnoses or Management Options     Amount and/or Complexity of Data Reviewed  Clinical lab tests: ordered  Tests in the radiology section of CPT®:  ordered  Tests in the medicine section of CPT®:  ordered  Obtain history from someone other than the patient: yes  Review and summarize past medical records: yes  Discuss the patient with other providers: yes    Risk of Complications, Morbidity, and/or Mortality  Presenting problems: moderate  Diagnostic procedures: low  Management options: low    Patient Progress  Patient progress: stable        Final diagnoses:   Pneumonia of right upper lobe due to infectious organism (CMS/HCC)   Dialysis patient (CMS/[facility])            Rom Alejo PA-C  11/08/18 2137      Electronically signed by Steffen Bedolla MD at 11/10/2018  7:07 PM     Mahi Hsu, RN at 11/8/2018  5:14 PM        Lab @[facility] for blood cultures     Mahi Hsu, RN  11/08/18 1714      Electronically signed by Mahi Hsu, RN at 11/8/2018  5:14 PM     Antoinette Yip at 11/8/2018  6:27 PM        At this time, the hospitalist was contacted and transferred to [facility].      Antoinette Yip  11/08/18 1827      Electronically signed by Antoinette Yip at 11/8/2018  6:27 PM     Antoinette Yip at 11/8/2018  6:49 PM        At this  "time, house supervisor was contacted and states she will call back with bed.      Antoinette Yip  11/08/18 1850      Electronically signed by Antoinette Yip at 11/8/2018  6:50 PM          Physician Progress Notes (last 72 hours) (Notes from 11/9/2018 10:28 AM through 11/12/2018 10:28 AM)      Chance Mackey MD, FASN at 11/11/2018  6:34 AM          Nephrology Progress Note.    LOS: 2 days    Patient Care Team:  Idalia Kay MD as PCP - General    Chief Complaint:    Chief Complaint   Patient presents with   • Cough       Subjective     Follow up for ESRD and related issues.    Interval History:     Patient Complaints: none    Patient seen and examined this morning.  Events from last night noted.  Patient denies having any fevers chills.  No nausea or vomiting no abdominal pain.  Denies any chest pain, shortness of breath or cough and sputum production.  There is no significant edema.   Patient also denies having new onset weakness of numbness of either extremity, he does feel that he is improving.  He feels good enough has breakfast in front of him, said he'll probably need all of it and has been feeling real well this morning.  Increase potassium noted and he will be due for his third dialysis this week today.    History taken from: patient    Review of Systems:    The pertinent  ROS was done and it is noted above, rest  was negative.    Objective     Vital Signs  /78   Pulse 73   Temp 98.1 °F (36.7 °C) (Oral)   Resp 18   Ht 172.7 cm (68\")   Wt 60.6 kg (133 lb 11.2 oz)   SpO2 100%   BMI 20.33 kg/m²        No intake/output data recorded.    Intake/Output Summary (Last 24 hours) at 11/11/2018 0720  Last data filed at 11/10/2018 1700  Gross per 24 hour   Intake 480 ml   Output --   Net 480 ml       Physical Exam:    General Appearance: alert, oriented x 3, no acute distress,   HEENT: Oral mucosa dry, extra occular movements intact. Sclera clear.  Skin: Warm and dry  Neck: supple, no JVD, " trachea midline  Lungs:Chest shape is normal. Breath sounds heard bilaterally equally.  Does have scattered rhonchi all over the chest.  Heart: regular rate and rhythm. normal S1 and S2, no S3, no rub, he has 1 to 2/6 systolic ejection murmur.  peripheral pulses weak but palpable.  Abdomen: Obese, soft, non-tender,  present bowel sounds to auscultation  : no palpable bladder.  Extremities: Trace edema, no cyanosis or clubbing.   Neuro: normal speech and mental status, grossly non focal.     Results Review:    Results from last 7 days   Lab Units  11/11/18   0610  11/10/18   0617  11/09/18   0553   11/08/18   1555   SODIUM mmol/L  132*  133*  131*   --   128*   POTASSIUM mmol/L  5.9*  5.6*  6.3*   < >  6.7*   CHLORIDE mmol/L  94*  95*  91*   --   87*   CO2 mmol/L  25.0*  29.0  24.0*   --   28.0   BUN mg/dL  48*  35*  65*   --   54*   CREATININE mg/dL  5.90*  4.70*  6.40*   --   5.90*   CALCIUM mg/dL  9.4  9.2  9.3   --   9.7   BILIRUBIN mg/dL   --    --    --    --   0.7   ALK PHOS U/L   --    --    --    --   126   ALT (SGPT) U/L   --    --    --    --   22   AST (SGOT) U/L   --    --    --    --   36   GLUCOSE mg/dL  89  83  111*   --   482*    < > = values in this interval not displayed.       Estimated Creatinine Clearance: 11.6 mL/min (A) (by C-G formula based on SCr of 5.9 mg/dL (H)).    Results from last 7 days   Lab Units  11/11/18   0610  11/10/18   0617   PHOSPHORUS mg/dL  8.2*  7.3*             Results from last 7 days   Lab Units  11/10/18   0617  11/09/18   0553  11/08/18   1555   WBC 10*3/mm3  5.54  6.37  5.19   HEMOGLOBIN g/dL  10.7*  11.5*  11.5*   PLATELETS 10*3/mm3  148  157  170               Imaging Results (last 24 hours)     ** No results found for the last 24 hours. **          amLODIPine 10 mg Oral Daily   aspirin 81 mg Oral Daily   atorvastatin 10 mg Oral Daily   b complex-vitamin c-folic acid 1 tablet Oral Daily   clopidogrel 75 mg Oral Daily   digoxin 125 mcg Oral See Admin Instructions    folic acid 1 mg Oral Daily   heparin (porcine) 5,000 Units Subcutaneous Q8H   hydrALAZINE 50 mg Oral TID   insulin aspart 0-7 Units Subcutaneous 4x Daily AC & at Bedtime   insulin detemir 8 Units Subcutaneous Nightly   isosorbide mononitrate 30 mg Oral Daily   lactobacillus acidophilus 1 capsule Oral BID   levoFLOXacin 500 mg Intravenous Q24H   levothyroxine 100 mcg Oral Daily   losartan 100 mg Oral Q24H   metoprolol succinate  mg Oral Daily   pantoprazole 40 mg Oral QAM   piperacillin-tazobactam 4.5 g Intravenous Q12H   sodium chloride 3 mL Intravenous Q12H   vitamin D3 5,000 Units Oral Daily       Pharmacy to Dose Zosyn        Medication Review:   Current Facility-Administered Medications   Medication Dose Route Frequency Provider Last Rate Last Dose   • acetaminophen (TYLENOL) tablet 325 mg  325 mg Oral Q4H PRN John Sosa MD       • acetaminophen (TYLENOL) tablet 650 mg  650 mg Oral Q4H PRN John Sosa MD       • amLODIPine (NORVASC) tablet 10 mg  10 mg Oral Daily John Sosa MD   10 mg at 11/10/18 0854   • aspirin chewable tablet 81 mg  81 mg Oral Daily John Sosa MD   81 mg at 11/10/18 0854   • atorvastatin (LIPITOR) tablet 10 mg  10 mg Oral Daily John Sosa MD   10 mg at 11/10/18 0854   • b complex-vitamin c-folic acid (NEPHRO-SHIRLENE) tablet 1 tablet  1 tablet Oral Daily John Sosa MD   1 tablet at 11/10/18 0854   • clopidogrel (PLAVIX) tablet 75 mg  75 mg Oral Daily John Sosa MD   75 mg at 11/10/18 0853   • dextrose (D50W) 25 g/ 50mL Intravenous Solution 25 g  25 g Intravenous Q15 Min PRN John Sosa MD       • dextrose (GLUTOSE) oral gel 1 tube  1 tube Oral Q15 Min PRN John Sosa MD       • digoxin (LANOXIN) tablet 125 mcg  125 mcg Oral See Admin Instructions John Sosa MD   125 mcg at 11/11/18 0016   • folic acid (FOLVITE) tablet 1 mg  1 mg Oral Daily John Sosa MD   1 mg at 11/10/18 0854   • glucagon (human recombinant)  (GLUCAGEN DIAGNOSTIC) injection 1 mg  1 mg Subcutaneous PRN John Sosa MD       • heparin (porcine) 5000 UNIT/ML injection 5,000 Units  5,000 Units Subcutaneous Q8H John Sosa MD   5,000 Units at 11/11/18 0630   • hydrALAZINE (APRESOLINE) tablet 50 mg  50 mg Oral TID John Sosa MD   50 mg at 11/10/18 2226   • HYDROcodone-acetaminophen (NORCO) 5-325 MG per tablet 1 tablet  1 tablet Oral Q6H PRN Carmelo Ray MD   1 tablet at 11/09/18 1630   • insulin aspart (novoLOG) injection 0-7 Units  0-7 Units Subcutaneous 4x Daily AC & at Bedtime John Sosa MD   2 Units at 11/10/18 2227   • insulin detemir (LEVEMIR) injection 8 Units  8 Units Subcutaneous Nightly John Sosa MD   8 Units at 11/10/18 2228   • ipratropium-albuterol (DUO-NEB) nebulizer solution 3 mL  3 mL Nebulization Q6H PRN John Sosa MD   3 mL at 11/11/18 0430   • isosorbide mononitrate (IMDUR) 24 hr tablet 30 mg  30 mg Oral Daily John Sosa MD   30 mg at 11/10/18 0854   • lactobacillus acidophilus (RISAQUAD) capsule 1 capsule  1 capsule Oral BID John Sosa MD   1 capsule at 11/10/18 2226   • levoFLOXacin (LEVAQUIN) 500 mg/100 mL D5W (premix) (LEVAQUIN) 500 mg  500 mg Intravenous Q24H John Sosa MD   500 mg at 11/11/18 0101   • levothyroxine (SYNTHROID, LEVOTHROID) tablet 100 mcg  100 mcg Oral Daily John Sosa MD   100 mcg at 11/10/18 0854   • losartan (COZAAR) tablet 100 mg  100 mg Oral Q24H John Sosa MD   100 mg at 11/10/18 0854   • metoprolol succinate XL (TOPROL-XL) 24 hr tablet 100 mg  100 mg Oral Daily John Sosa MD   100 mg at 11/10/18 0853   • ondansetron (ZOFRAN) tablet 4 mg  4 mg Oral Q6H PRN oJhn Sosa MD        Or   • ondansetron ODT (ZOFRAN-ODT) disintegrating tablet 4 mg  4 mg Oral Q6H PRN John Sosa MD        Or   • ondansetron (ZOFRAN) injection 4 mg  4 mg Intravenous Q6H PRN John Sosa MD       • pantoprazole (PROTONIX) EC tablet 40 mg  40 mg  Oral QAM John Sosa MD   40 mg at 11/11/18 0630   • Pharmacy to Dose Zosyn   Does not apply Continuous PRN John Sosa MD       • piperacillin-tazobactam (ZOSYN) 4.5 g in iso-osmotic dextrose 100 mL IVPB (premix)  4.5 g Intravenous Q12H John Sosa MD 0 mL/hr at 11/09/18 1238 4.5 g at 11/11/18 0629   • sodium chloride 0.9 % flush 10 mL  10 mL Intravenous PRN Rom Alejo PA-C       • sodium chloride 0.9 % flush 3 mL  3 mL Intravenous Q12H John Sosa MD   3 mL at 11/11/18 0102   • sodium chloride 0.9 % flush 3-10 mL  3-10 mL Intravenous PRN John Sosa MD       • vitamin D3 capsule 5,000 Units  5,000 Units Oral Daily John Sosa MD   5,000 Units at 11/10/18 0854       Assessment/Plan     1. End stage renal disease on dialysis (CMS/[facility])  2. Chronic kidney disease-mineral and bone disorder:  3. Pneumonia of right upper lobe due to infectious organism (CMS/[facility])  4. Pneumonia of right lower lobe due to infectious organism (CMS/[facility])  5. Type 2 diabetes mellitus treated with insulin (CMS/[facility]) uncontrolled.  6. Anemia of chronic kidney disease  7. Chronic right-sided loculated pleural effusion status post pleurodesis  8. Coronary artery disease with medical management  9. Chronic scrotal ulcer followed by Dr. Macdonald at [facility].  10. Hypertension with end-stage renal disease  11. Hypothyroidism  12. Physical debility     Plan:    · Dialysis schedule on Tuesday Thursday Saturday, he runs 4 hours on a standard bath usually gains 2-3 L in between dialysis.  He has missed his dialysis on thursday plan on doing him again tomorrow, if he is stable enough may be able to do a Sunday and then put him back on Tuesday Thursday Saturday for next week.  Does have high potassium today that'll probably correct with dialysis later today.  · Continue antibiotics, clinically appears to be improving.  · Continue rest of the treatment as planned.  · Surveillance labs.  · Details were  discussed with the patient no family in the room.    he will resume his  dialysis starting next week.  · Details were also discussed with the hospitalist service.  If clinically stable post dialysis may be able to go home.  · Further recommendations will depend on clinical course of the patient during the current hospitalization.    · I also discussed the details with the nursing staff.  · Rest as ordered.        Chance Maceky MD, FASN  18  7:20 AM    Dictated utilizing Dragon dictation.    Electronically signed by Chance Mackey MD, FASN at 2018  7:12 PM     Carmelo Ray MD at 11/10/2018  2:52 PM                [facility]    [facility]    Name:  Talha Cutler   Age:  58 y.o.  Sex:  male  :  1960  MRN:  [id number]   Visit Number:  [id number]  Admission Date:  2018  Date Of Service:  11/10/18  Primary Care Physician:  Idalia Kay MD     LOS: 1 day :  Patient Care Team:  Idalia Kay MD as PCP - General:    Chief Complaint:      Generalized weakness and occasional wheezing.    Subjective / Interval History:     Mr. Cutler is currently sitting up on the bed and is comfortable at rest.  He states that every time he sleeps on the left side he gets some wheezing but his lung sounds clear at this time except for a few crackles in the bases.  He was evaluated by physical therapy today and did some bed exercises and sat up in the chair.  He was admitted from the emergency room on 2018 with right upper lobe pneumonia as well as hyperkalemia.  He missed one day of dialysis.  His potassium was 6.7 on admission and he received a couple doses of Kayexalate.  He was subsequently seen by Dr. Mackey yesterday and underwent hemodialysis yesterday with 2 L of fluid removal.  His potassium this morning is 5.6 and he will be dialyzed again today.  He denies any fever, nausea or vomiting.  He states that he lives with  his wife but he has generalized weakness and has not walked in a few weeks.  He is currently on broad-spectrum IV antibiotic therapy with Zosyn, Levaquin and vancomycin.  He has been afebrile since admission.      Review of Systems:     General ROS: Patient denies any fevers, chills or loss of consciousness.  Complaints of generalized weakness.  Respiratory ROS: Complains of cough, wheezing and shortness of breath.  Cardiovascular ROS: Denies chest pain or palpitations. No history of exertional chest pain.  Gastrointestinal ROS: Denies nausea and vomiting. Denies any abdominal pain. No diarrhea.  Neurological ROS: Denies any focal weakness. No loss of consciousness. Denies any numbness.  Dermatological ROS: Denies any redness or pruritis.    Vital Signs:    Temp:  [97.4 °F (36.3 °C)-98.5 °F (36.9 °C)] 97.7 °F (36.5 °C)  Heart Rate:  [83-89] 84  Resp:  [16-18] 17  BP: (138-171)/(67-77) 141/68    Intake and output:    I/O last 3 completed shifts:  In: 1490 [P.O.:840; IV Piggyback:650]  Out: 2500 [Other:2500]  No intake/output data recorded.    Physical Examination:    General Appearance:  Alert and cooperative, not in any acute distress.   Head:  Atraumatic and normocephalic, without obvious abnormality.   Eyes:          PERRLA, conjunctivae and sclerae normal, no Icterus. No pallor. Extra-occular movements are within normal limits.   Neck: Supple, trachea midline, no thyromegaly, no carotid bruit.   Lungs:   Chest shape is normal. Breath sounds heard bilaterally equally.  No wheezing.  Occasional basal crackles heard. No Pleural rub or bronchial breathing.   Heart:  Normal S1 and S2, no murmur, no gallop, no rub. No JVD   Abdomen:   Normal bowel sounds, no masses, no organomegaly. Soft, non-tender, non-distended, no guarding, no rebound tenderness.   Extremities: Moves all extremities well, no edema, no cyanosis, no            clubbing.  AV graft with the bruit noted in the right arm.   Skin: No bleeding, bruising or  rash.   Neurologic: Awake, alert and oriented times 3. Moves all 4 extremities equally.   Laboratory results:    Results from last 7 days   Lab Units  11/10/18   0617 11/09/18   0553  11/08/18 2058 11/08/18   1555   SODIUM mmol/L  133*  131*   --   128*   POTASSIUM mmol/L  5.6*  6.3*  5.9*  6.7*   CHLORIDE mmol/L  95*  91*   --   87*   CO2 mmol/L  29.0  24.0*   --   28.0   BUN mg/dL  35*  65*   --   54*   CREATININE mg/dL  4.70*  6.40*   --   5.90*   CALCIUM mg/dL  9.2  9.3   --   9.7   BILIRUBIN mg/dL   --    --    --   0.7   ALK PHOS U/L   --    --    --   126   ALT (SGPT) U/L   --    --    --   22   AST (SGOT) U/L   --    --    --   36   GLUCOSE mg/dL  83  111*   --   482*     Results from last 7 days   Lab Units  11/10/18   0617 11/09/18   0553  11/08/18   1555   WBC 10*3/mm3  5.54  6.37  5.19   HEMOGLOBIN g/dL  10.7*  11.5*  11.5*   HEMATOCRIT %  33.1*  36.0*  35.3*   PLATELETS 10*3/mm3  148  157  170         Results from last 7 days   Lab Units  11/08/18   1555   TROPONIN I ng/mL  0.022     Results from last 7 days   Lab Units  11/08/18   1712  11/08/18   1700   BLOODCX   No growth at 24 hours  No growth at 24 hours     I have reviewed the patient's laboratory results.    Radiology results:    Imaging Results (last 24 hours)     ** No results found for the last 24 hours. **        Medication Review:     I have reviewed the patients active and prn medications.       Pneumonia of right upper lobe due to infectious organism (CMS/[facility])    Acute hyperkalemia    Essential hypertension    Type 2 diabetes mellitus treated with insulin (CMS/[facility])    End stage renal disease on dialysis (CMS/[facility])    PAD (peripheral artery disease) (CMS/[facility])    Assessment:    1.  Right upper lobe pneumonia with small bilateral pleural effusions, present on admission.  2.  Acute hyperkalemia, present on admission, secondary to missed dialysis, improving.  3.  End-stage renal disease on hemodialysis, on Tuesday, Thursday and  Saturday.  4.  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  5.  Chronic right-sided loculated pleural effusion status post pleurodesis.  6.  Coronary artery disease on medical management.  7.  Chronic scrotal ulcer, followed by Dr. Macdonald at urology.  8.  Chronic debility.  9.  Essential hypertension.    Plan:    Mr. Cutler is currently doing better and did have hemodialysis yesterday with improvement in his potassium levels.  He is being followed by Dr. Mackey and will have another dialysis today.  He is on broad-spectrum IV antibiotic therapy with Zosyn, vancomycin and Levaquin, dosed by pharmacy.  He has been afebrile.  Blood cultures have been negative so far.  I will discontinue vancomycin today.  He was seen by physical and occupational therapy today. Patient is open to go to short-term rehabilitation if necessary and I have discussed this with case management services.  He will be continued on his home medications.  He is on heparin for DVT prophylaxis.  He is on subcutaneous insulin protocol for blood sugar coverage.  Further recommendations depend upon his clinical course.       Carmelo Ray MD  11/10/18  2:52 PM    Dictated utilizing Dragon dictation.      Electronically signed by Carmelo Ray MD at 11/10/2018  4:54 PM     Chance Mackey MD, FASN at 11/10/2018  8:31 AM          Nephrology Progress Note.    LOS: 1 day    Patient Care Team:  Idalia Kay MD as PCP - General    Chief Complaint:    Chief Complaint   Patient presents with   • Cough       Subjective     Follow up for ESRD and related issues.    Interval History:     Patient Complaints: none    Patient seen and examined this morning.  Events from last night noted.  Patient denies having any fevers chills.  No nausea or vomiting no abdominal pain.  Denies any chest pain, shortness of breath or cough and sputum production.  There is no significant edema.   Patient also denies having new onset weakness of numbness of  "either extremity, he does feel that he is improving.    History taken from: patient    Review of Systems:    The pertinent  ROS was done and it is noted above, rest  was negative.    Objective     Vital Signs  /68   Pulse 89   Temp 98.5 °F (36.9 °C) (Oral)   Resp 18   Ht 172.7 cm (68\")   Wt 57.3 kg (126 lb 4.8 oz)   SpO2 (!) 83%   BMI 19.20 kg/m²        No intake/output data recorded.    Intake/Output Summary (Last 24 hours) at 11/10/2018 0832  Last data filed at 11/10/2018 0553  Gross per 24 hour   Intake 1090 ml   Output 2500 ml   Net -1410 ml       Physical Exam:    General Appearance: alert, oriented x 3, no acute distress,   HEENT: Oral mucosa dry, extra occular movements intact. Sclera clear.  Skin: Warm and dry  Neck: supple, no JVD, trachea midline  Lungs:Chest shape is normal. Breath sounds heard bilaterally equally.  Does have scattered rhonchi all over the chest.  Heart: regular rate and rhythm. normal S1 and S2, no S3, no rub, peripheral pulses weak but palpable.  Abdomen: Obese, soft, non-tender,  present bowel sounds to auscultation  : no palpable bladder.  Extremities: Trace edema, no cyanosis or clubbing.   Neuro: normal speech and mental status, grossly non focal.     Results Review:    Results from last 7 days   Lab Units  11/10/18   0617  11/09/18   0553  11/08/18 2058 11/08/18   1555   SODIUM mmol/L  133*  131*   --   128*   POTASSIUM mmol/L  5.6*  6.3*  5.9*  6.7*   CHLORIDE mmol/L  95*  91*   --   87*   CO2 mmol/L  29.0  24.0*   --   28.0   BUN mg/dL  35*  65*   --   54*   CREATININE mg/dL  4.70*  6.40*   --   5.90*   CALCIUM mg/dL  9.2  9.3   --   9.7   BILIRUBIN mg/dL   --    --    --   0.7   ALK PHOS U/L   --    --    --   126   ALT (SGPT) U/L   --    --    --   22   AST (SGOT) U/L   --    --    --   36   GLUCOSE mg/dL  83  111*   --   482*       Estimated Creatinine Clearance: 13.7 mL/min (A) (by C-G formula based on SCr of 4.7 mg/dL (H)).    Results from last 7 days   Lab " Units  11/10/18   0617   PHOSPHORUS mg/dL  7.3*             Results from last 7 days   Lab Units  11/10/18   0617  11/09/18   0553  11/08/18   1555   WBC 10*3/mm3  5.54  6.37  5.19   HEMOGLOBIN g/dL  10.7*  11.5*  11.5*   PLATELETS 10*3/mm3  148  157  170               Imaging Results (last 24 hours)     ** No results found for the last 24 hours. **          amLODIPine 10 mg Oral Daily   aspirin 81 mg Oral Daily   atorvastatin 10 mg Oral Daily   b complex-vitamin c-folic acid 1 tablet Oral Daily   clopidogrel 75 mg Oral Daily   digoxin 125 mcg Oral See Admin Instructions   folic acid 1 mg Oral Daily   heparin (porcine) 5,000 Units Subcutaneous Q8H   hydrALAZINE 50 mg Oral TID   insulin aspart 0-7 Units Subcutaneous 4x Daily AC & at Bedtime   insulin detemir 8 Units Subcutaneous Nightly   isosorbide mononitrate 30 mg Oral Daily   lactobacillus acidophilus 1 capsule Oral BID   levoFLOXacin 500 mg Intravenous Q24H   levothyroxine 100 mcg Oral Daily   losartan 100 mg Oral Q24H   metoprolol succinate  mg Oral Daily   pantoprazole 40 mg Oral QAM   piperacillin-tazobactam 4.5 g Intravenous Q12H   sodium chloride 3 mL Intravenous Q12H   vitamin D3 5,000 Units Oral Daily       Pharmacy to dose vancomycin    Pharmacy to Dose Zosyn        Medication Review:   Current Facility-Administered Medications   Medication Dose Route Frequency Provider Last Rate Last Dose   • acetaminophen (TYLENOL) tablet 325 mg  325 mg Oral Q4H PRN John Sosa MD       • acetaminophen (TYLENOL) tablet 650 mg  650 mg Oral Q4H PRN John Sosa MD       • amLODIPine (NORVASC) tablet 10 mg  10 mg Oral Daily John Sosa MD   10 mg at 11/09/18 0827   • aspirin chewable tablet 81 mg  81 mg Oral Daily John Sosa MD   81 mg at 11/09/18 0828   • atorvastatin (LIPITOR) tablet 10 mg  10 mg Oral Daily John Sosa MD   10 mg at 11/09/18 0827   • b complex-vitamin c-folic acid (NEPHRO-SHIRLENE) tablet 1 tablet  1 tablet Oral Daily  John Sosa MD   1 tablet at 11/09/18 0827   • clopidogrel (PLAVIX) tablet 75 mg  75 mg Oral Daily John Sosa MD   75 mg at 11/09/18 0828   • dextrose (D50W) 25 g/ 50mL Intravenous Solution 25 g  25 g Intravenous Q15 Min PRN John Sosa MD       • dextrose (GLUTOSE) oral gel 1 tube  1 tube Oral Q15 Min PRN John Sosa MD       • digoxin (LANOXIN) tablet 125 mcg  125 mcg Oral See Admin Instructions John Sosa MD   125 mcg at 11/09/18 0604   • folic acid (FOLVITE) tablet 1 mg  1 mg Oral Daily John Sosa MD   1 mg at 11/09/18 0828   • glucagon (human recombinant) (GLUCAGEN DIAGNOSTIC) injection 1 mg  1 mg Subcutaneous PRN John Sosa MD       • heparin (porcine) 5000 UNIT/ML injection 5,000 Units  5,000 Units Subcutaneous Q8H John Sosa MD   5,000 Units at 11/10/18 0700   • hydrALAZINE (APRESOLINE) tablet 50 mg  50 mg Oral TID John Sosa MD   50 mg at 11/09/18 2105   • HYDROcodone-acetaminophen (NORCO) 5-325 MG per tablet 1 tablet  1 tablet Oral Q6H PRN Carmelo Ray MD   1 tablet at 11/09/18 1630   • insulin aspart (novoLOG) injection 0-7 Units  0-7 Units Subcutaneous 4x Daily AC & at Bedtime John Sosa MD   6 Units at 11/09/18 2104   • insulin detemir (LEVEMIR) injection 8 Units  8 Units Subcutaneous Nightly John Sosa MD   8 Units at 11/09/18 2106   • ipratropium-albuterol (DUO-NEB) nebulizer solution 3 mL  3 mL Nebulization Q6H PRN John Sosa MD   3 mL at 11/09/18 2318   • isosorbide mononitrate (IMDUR) 24 hr tablet 30 mg  30 mg Oral Daily John Sosa MD   30 mg at 11/09/18 0828   • lactobacillus acidophilus (RISAQUAD) capsule 1 capsule  1 capsule Oral BID John Sosa MD   1 capsule at 11/09/18 2104   • levoFLOXacin (LEVAQUIN) 500 mg/100 mL D5W (premix) (LEVAQUIN) 500 mg  500 mg Intravenous Q24H John Sosa MD       • levothyroxine (SYNTHROID, LEVOTHROID) tablet 100 mcg  100 mcg Oral Daily John Sosa MD   100  mcg at 11/09/18 0828   • losartan (COZAAR) tablet 100 mg  100 mg Oral Q24H John Sosa MD   100 mg at 11/09/18 0827   • metoprolol succinate XL (TOPROL-XL) 24 hr tablet 100 mg  100 mg Oral Daily John Sosa MD   100 mg at 11/09/18 0827   • ondansetron (ZOFRAN) tablet 4 mg  4 mg Oral Q6H PRN John Sosa MD        Or   • ondansetron ODT (ZOFRAN-ODT) disintegrating tablet 4 mg  4 mg Oral Q6H PRN John Sosa MD        Or   • ondansetron (ZOFRAN) injection 4 mg  4 mg Intravenous Q6H PRN oJhn Sosa MD       • pantoprazole (PROTONIX) EC tablet 40 mg  40 mg Oral QAM John Sosa MD   40 mg at 11/10/18 0702   • Pharmacy to dose vancomycin   Does not apply Continuous PRN John Sosa MD       • Pharmacy to Dose Zosyn   Does not apply Continuous PRN John Sosa MD       • piperacillin-tazobactam (ZOSYN) 4.5 g in iso-osmotic dextrose 100 mL IVPB (premix)  4.5 g Intravenous Q12H John Sosa MD 0 mL/hr at 11/09/18 1238 4.5 g at 11/10/18 0553   • sodium chloride 0.9 % bolus 1,000 mL  1,000 mL Intravenous PRN Chance Mackey MD, FASN       • sodium chloride 0.9 % flush 10 mL  10 mL Intravenous PRN Rom Alejo PA-C       • sodium chloride 0.9 % flush 3 mL  3 mL Intravenous Q12H John Sosa MD   3 mL at 11/09/18 2108   • sodium chloride 0.9 % flush 3-10 mL  3-10 mL Intravenous PRN John Sosa MD       • vancomycin 1000 mg in sodium chloride 0.9% 250 mL IVPB  1,000 mg Intravenous PRN John Sosa MD       • vitamin D3 capsule 5,000 Units  5,000 Units Oral Daily John Sosa MD   5,000 Units at 11/09/18 0827       Assessment/Plan     13. End stage renal disease on dialysis (CMS/HCC)  14. Chronic kidney disease-mineral and bone disorder:  15. Pneumonia of right upper lobe due to infectious organism (CMS/HCC)  16. Pneumonia of right lower lobe due to infectious organism (CMS/HCC)  17. Type 2 diabetes mellitus treated with insulin  (CMS/[facility]) uncontrolled.  18. Anemia of chronic kidney disease  19. Chronic right-sided loculated pleural effusion status post pleurodesis  20. Coronary artery disease with medical management  21. Chronic scrotal ulcer followed by Dr. Macdonald at [facility].  22. Hypertension with end-stage renal disease  23. Hypothyroidism  24. Physical debility     Plan:    · Dialysis schedule on , he runs 4 hours on a standard bath usually gains 2-3 L in between dialysis.  He has missed his dialysis on thursday plan on doing him again tomorrow, if he is stable enough may be able to do a  and then put him back on  for next week.  · Continue antibiotics, clinically appears to be improving.  · Continue rest of the treatment as planned.  · Surveillance labs.  · Details were discussed with the patient as well as family in the room.    · Details were also discussed with the hospitalist service.   · Further recommendations will depend on clinical course of the patient during the current hospitalization.    · I also discussed the details with the nursing staff.  · Rest as ordered.        Chance Mackey MD, ARJUN  11/10/18  8:32 AM    Dictated utilizing Dragon dictation.    Electronically signed by Chance Mackey MD, ARJUN at 11/10/2018  7:32 PM     Carmelo Ray MD at 2018  1:50 PM                [facility] HOSPITALIST    PROGRESS NOTE    Name:  Talha Cutler   Age:  58 y.o.  Sex:  male  :  1960  MRN:  [id number]   Visit Number:  [id number]  Admission Date:  2018  Date Of Service:  18  Primary Care Physician:  Idalia Kay MD     LOS: 0 days :  Patient Care Team:  Idalia Kay MD as PCP - General:    Chief Complaint:      Cough and chills.    Subjective / Interval History:     Mr. Cutler is currently sitting up on the bed and is comfortable at rest.  He complains of increased sweating and chills at times.   He also complains of some cough.  He was admitted from the emergency room yesterday with right upper lobe pneumonia as well as hyperkalemia.  He missed one day of dialysis.  His potassium is 6.3 this morning and he was given 30 mg of Kayexalate again.  He is currently awaiting hemodialysis.  He states that he lives with his wife but he has generalized weakness and has not walked in a few weeks.  He is expressing desire to go to short-term rehabilitation.  He is currently on broad-spectrum IV antibiotic therapy with Zosyn, Levaquin and vancomycin.  He was afebrile since admission.  Previous physician documentation, laboratory and imaging data been reviewed.    Review of Systems:     General ROS: Patient denies any fevers, chills or loss of consciousness.  Complaints of generalized weakness.  Respiratory ROS: Cough or shortness of breath.  Cardiovascular ROS: Denies chest pain or palpitations. No history of exertional chest pain.  Gastrointestinal ROS: Denies nausea and vomiting. Denies any abdominal pain. No diarrhea.  Neurological ROS: Denies any focal weakness. No loss of consciousness. Denies any numbness.  Dermatological ROS: Denies any redness or pruritis.    Vital Signs:    Temp:  [97.4 °F (36.3 °C)-98.6 °F (37 °C)] 97.5 °F (36.4 °C)  Heart Rate:  [64-75] 73  Resp:  [16-20] 18  BP: (151-184)/(65-92) 160/74    Intake and output:    I/O last 3 completed shifts:  In: 450 [IV Piggyback:450]  Out: 0   I/O this shift:  In: -   Out: 2500 [Other:2500]    Physical Examination:    General Appearance:  Alert and cooperative, not in any acute distress.   Head:  Atraumatic and normocephalic, without obvious abnormality.   Eyes:          PERRLA, conjunctivae and sclerae normal, no Icterus. No pallor. Extra-occular movements are within normal limits.   Neck: Supple, trachea midline, no thyromegaly, no carotid bruit.   Lungs:   Chest shape is normal. Breath sounds heard bilaterally equally.  No wheezing.  Occasional basal  crackles heard. No Pleural rub or bronchial breathing.   Heart:  Normal S1 and S2, no murmur, no gallop, no rub. No JVD   Abdomen:   Normal bowel sounds, no masses, no organomegaly. Soft, non-tender, non-distended, no guarding, no rebound tenderness.   Extremities: Moves all extremities well, no edema, no cyanosis, no            clubbing.  AV graft with the bruit noted in the right arm.   Skin: No bleeding, bruising or rash.   Neurologic: Awake, alert and oriented times 3. Moves all 4 extremities equally.   Laboratory results:      Results from last 7 days  Lab Units 11/09/18  0553 11/08/18  2058 11/08/18  1555   SODIUM mmol/L 131*  --  128*   POTASSIUM mmol/L 6.3* 5.9* 6.7*   CHLORIDE mmol/L 91*  --  87*   CO2 mmol/L 24.0*  --  28.0   BUN mg/dL 65*  --  54*   CREATININE mg/dL 6.40*  --  5.90*   CALCIUM mg/dL 9.3  --  9.7   BILIRUBIN mg/dL  --   --  0.7   ALK PHOS U/L  --   --  126   ALT (SGPT) U/L  --   --  22   AST (SGOT) U/L  --   --  36   GLUCOSE mg/dL 111*  --  482*       Results from last 7 days  Lab Units 11/09/18  0553 11/08/18  1555   WBC 10*3/mm3 6.37 5.19   HEMOGLOBIN g/dL 11.5* 11.5*   HEMATOCRIT % 36.0* 35.3*   PLATELETS 10*3/mm3 157 170           Results from last 7 days  Lab Units 11/08/18  1555   TROPONIN I ng/mL 0.022       Results from last 7 days  Lab Units 11/08/18  1712 11/08/18  1700   BLOODCX  No growth at less than 24 hours No growth at less than 24 hours     I have reviewed the patient's laboratory results.    Radiology results:    Imaging Results (last 24 hours)     Procedure Component Value Units Date/Time    CT Chest Without Contrast [[id number]] Collected:  11/08/18 1650     Updated:  11/08/18 1651    Narrative:       FINAL REPORT    CLINICAL HISTORY:  cough    FINDINGS:  Multiple contiguous transaxial slices through the chest were  obtained without the intravenous ministration of contrast.  There is diffuse centrilobular emphysema with bilateral fibrotic  scarring, greatest in the right  middle lobe.  There is patchy  and consolidated right upper and lower lobe airspace disease  with a small right pleural effusion.  There are enlarged  mediastinal lymph nodes measuring up to 1.5 cm in short axis.  Heart size is upper limits of normal.  There are calcifications  of the coronary arteries.  Impression: Right upper and lower  lobe pneumonia with parapneumonic effusion and nonspecific  mediastinal adenopathy.  Recommend appropriate follow-up until  resolution  Chronic changes      Impression:       Authenticated by José Luis Morgan MD on 11/08/2018 04:50:33 PM        I have reviewed the patient's radiology reports.    Medication Review:     I have reviewed the patients active and prn medications.       Pneumonia of right upper lobe due to infectious organism (CMS/[facility])    Assessment:    1.  Right upper lobe pneumonia with small bilateral pleural effusions, present on admission.  2.  Acute hyperkalemia, present on admission, secondary to missed dialysis.  3.  End-stage renal disease on hemodialysis, on Tuesday, Thursday and Saturday.  4.  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  5.  Chronic right-sided loculated pleural effusion status post pleurodesis.  6.  Coronary artery disease on medical management.  7.  Chronic scrotal ulcer, followed up at [facility] urology.  8.  Chronic debility.  9.  Essential hypertension.    Plan:    Patient will be continued on broad-spectrum IV antibiotic therapy with Zosyn, vancomycin and Levaquin, both by pharmacy.  He is being consulted by Dr. Mackey and I have discussed the patient's condition with him.  He will be undergoing hemodialysis today.  I will also consult physical and occupational therapy especially since the patient has significant generalized weakness.  Patient wants to go to short-term rehabilitation and I have discussed this with case management services.  He will be continued on his home medications.  He is on heparin for DVT  prophylaxis.  He is on subcutaneous insulin protocol for blood sugar coverage.  Further recommendations depend upon his clinical course.      Carmelo Ray MD  18  1:50 PM    Dictated utilizing Dragon dictation.      Electronically signed by Carmelo Ray MD at 2018  5:58 PM       Consult Notes (last 72 hours) (Notes from 2018 10:28 AM through 2018 10:28 AM)     No notes of this type exist for this encounter.           Discharge Summary      Judit Person APRN at 2018 11:45 AM     Attestation signed by Sandra Zavala DO at 2018  6:38 PM    I have reviewed the attached note including labs, medications, radiology testing. I agree with the assessment and treatment plan as described by extender provider.  Please see the above note for further details.                       [facility]   DISCHARGE SUMMARY    Name:  Talha Cutler   Age:  58 y.o.  Sex:  male  :  1960  MRN:  [id number]   Visit Number:  [id number]  Primary Care Physician:  Idalia Kay MD  Date of Discharge:  2018  Admission Date:  2018      Presenting Problem:    Pneumonia of right upper lobe due to infectious organism (CMS/HCC) [J18.1]  Pneumonia of right upper lobe due to infectious organism (CMS/HCC) [J18.1]       Discharge Diagnosis:       Pneumonia of right upper lobe due to infectious organism (CMS/HCC)    Essential hypertension    Type 2 diabetes mellitus treated with insulin (CMS/[facility])    End stage renal disease on dialysis (CMS/[facility])    Acute hyperkalemia    PAD (peripheral artery disease) (CMS/[facility])        Past Medical History:  Past Medical History:   Diagnosis Date   • Anemia    • CHF (congestive heart failure) (CMS/[facility])    • Chronic kidney disease    • Diabetes mellitus (CMS/[facility])    • H/O chest x-ray 2016    Interval decrease in size of the patients right pleural effusion with a persistent small left effusion as well   • History of  transfusion    • Hypertension    • Left-sided weakness    • Pneumonia    • Renal failure    • Stroke (CMS/[facility])          Consults:     Consults     Date and Time Order Name Status Description    11/8/2018 2126 Inpatient Nephrology Consult Completed           Procedures Performed:  None             History of presenting illness/Hospital Course:  This is a 58-year-old male with past medical history significant for hemodialysis, diabetes mellitus type 2, CVA, chronic anemia, recurrent pneumonia with history of bilateral pleural effusions requiring thoracentesis in the past as well as scrotal edema being followed by urology.  Even admission patient had been complaining of cough but no significant shortness of breath.  He also had some diarrhea and abdominal cramping which is a chronic issue for him.  Upon evaluation in the emergency room his lactic acid was normal vital signs were essentially stable he did have some hyponatremia as well as hyperkalemia with an anion gap 19.  CT scan of the chest was done which showed diffuse centrilobular emphysema with 5 bilateral fibrotic scarring.  He is followed by Dr. Hernandez on an outpatient basis.  There was a patchy consolidation in the right upper lobe and small right pleural effusion.  He also had some enlarged mediastinal lymph nodes measuring up to 1.5 cm for which he will need to follow-up with Dr. Hernandez on an outpatient basis and have repeat CT scan.  Patient has been taking oral antibiotics daily for scrotal edema and cellulitis but he is unsure of the name of the medication.    He is seen by  urology.  He was recently seen by Dr. Hernandez in office October 22nd.    He has had pleurodesis in the past and pulmonology feels that he has residual scarring as a result.  Patient was admitted to the hospitalist service and started on IV antibiotics in the form of Levaquin and Zosyn.     He has had some hyperkalemia for which he was given kayexalate.  Dr. Mackey is working to get the  potassium down with dialysis.  He will be dialyzed again today prior to discharge which should help with the hyperkalemia.  Since admission his hyperkalemia is improving.  He will continue dialysis on an outpatient bases in his previous Tue, Thurs, Sat. Schedule.      Initially patient had considered rehab placement however he is now wanting to go home.   PT/OT has been working with patient and feels that he is essentially at his baseline.  From the respiratory standpoint patient is feeling much better.  Shortness of breath cough has improved.  He does have oxygen as well as a nebulizer at home.  We will get dialysis today and plan to discharge home this afternoon.  He does live with his wife.  According to the patient he has not walked in several weeks however prior to this he was not very ambulatory.  He has been afebrile since admission.  We will plan to switch him over to oral antibiotics and discharge home today after dialysis.  He will follow-up with Dr. Hernandez on an outpatient basis for live in a follow-up CT scan to follow-up the mediastinal adenopathy.  He will get dialysis today to further correct his potassium.  He will resume his previous schedule on an an outpatient basis.  Given his hyperkalemia, will hold lisinopril and Aldactone and have him follow up with Dr. Mackey in dialysis next week.  He can restart these medications if indicated.        Vital Signs:    Temp:  [97.4 °F (36.3 °C)-98.1 °F (36.7 °C)] 97.4 °F (36.3 °C)  Heart Rate:  [71-84] 80  Resp:  [16-18] 18  BP: (135-150)/(64-78) 143/64    Physical Exam:  General Appearance:    Alert and cooperative, not in any acute distress.   Head:    Atraumatic and normocephalic, without obvious abnormality.   Eyes:            PERRLA, conjunctivae and sclerae normal, no Icterus. No pallor. Extra-occular movements are within normal limits.   Ears:    Ears appear intact with no abnormalities noted.   Throat:   No oral lesions, no thrush, oral mucosa moist.    Neck:   Supple, trachea midline, no thyromegaly, no carotid bruit.       Lungs:     Chest shape is normal. Breath sounds heard bilaterally equally.  Few faint crackles in bases but no wheezing. No Pleural rub or bronchial breathing.    Heart:    Normal S1 and S2, no murmur, no gallop, no rub. No JVD   Abdomen:     Normal bowel sounds, no masses, no organomegaly. Soft        non-tender, non-distended, no guarding, no rebound                tenderness   Extremities:   Moves all extremities well, no edema, no cyanosis, no             Clubbing, Right arm fistula.   Pulses:   Pulses palpable and equal bilaterally   Skin:   No bleeding, bruising or rash     Psychiatric/Behavior:        Normal mood, normal behavior   Neurologic:   Cranial nerves 2 - 12 grossly intact, sensation intact, Motor power is normal and equal bilaterally.           Pertinent Lab Results:     Results from last 7 days   Lab Units  11/11/18   0610  11/10/18   0617  11/09/18   0553   11/08/18   1555   SODIUM mmol/L  132*  133*  131*   --   128*   POTASSIUM mmol/L  5.9*  5.6*  6.3*   < >  6.7*   CHLORIDE mmol/L  94*  95*  91*   --   87*   CO2 mmol/L  25.0*  29.0  24.0*   --   28.0   BUN mg/dL  48*  35*  65*   --   54*   CREATININE mg/dL  5.90*  4.70*  6.40*   --   5.90*   CALCIUM mg/dL  9.4  9.2  9.3   --   9.7   BILIRUBIN mg/dL   --    --    --    --   0.7   ALK PHOS U/L   --    --    --    --   126   ALT (SGPT) U/L   --    --    --    --   22   AST (SGOT) U/L   --    --    --    --   36   GLUCOSE mg/dL  89  83  111*   --   482*    < > = values in this interval not displayed.     Results from last 7 days   Lab Units  11/10/18   0617 11/09/18   0553  11/08/18   1555   WBC 10*3/mm3  5.54  6.37  5.19   HEMOGLOBIN g/dL  10.7*  11.5*  11.5*   HEMATOCRIT %  33.1*  36.0*  35.3*   PLATELETS 10*3/mm3  148  157  170         Blood Culture   Date Value Ref Range Status   11/08/2018 No growth at 2 days  Preliminary   11/08/2018 No growth at 2 days  Preliminary      MRSA SCREEN CX   Date Value Ref Range Status   11/09/2018   Final    No Methicillin Resistant Staphylococcus aureus isolated         Pertinent Radiology Results:    Imaging Results (all)     Procedure Component Value Units Date/Time    CT Chest Without Contrast [[id number]] Collected:  11/08/18 1650     Updated:  11/08/18 1651    Narrative:       FINAL REPORT    CLINICAL HISTORY:  cough    FINDINGS:  Multiple contiguous transaxial slices through the chest were  obtained without the intravenous ministration of contrast.  There is diffuse centrilobular emphysema with bilateral fibrotic  scarring, greatest in the right middle lobe.  There is patchy  and consolidated right upper and lower lobe airspace disease  with a small right pleural effusion.  There are enlarged  mediastinal lymph nodes measuring up to 1.5 cm in short axis.  Heart size is upper limits of normal.  There are calcifications  of the coronary arteries.  Impression: Right upper and lower  lobe pneumonia with parapneumonic effusion and nonspecific  mediastinal adenopathy.  Recommend appropriate follow-up until  resolution  Chronic changes      Impression:       Authenticated by José Luis Morgan MD on 11/08/2018 04:50:33 PM          Condition on Discharge:    Stable        Discharge Disposition:        Discharge Medication:       Discharge Medications      New Medications      Instructions Start Date   amoxicillin-clavulanate 500-125 MG per tablet  Commonly known as:  AUGMENTIN   1 tablet, Oral, Daily         Continue These Medications      Instructions Start Date   acetaminophen 325 MG tablet  Commonly known as:  TYLENOL   325 mg, Oral, Every 4 Hours PRN      albuterol 108 (90 Base) MCG/ACT inhaler  Commonly known as:  PROVENTIL HFA;VENTOLIN HFA   2 puffs, Inhalation, Every 4 Hours PRN      amLODIPine 10 MG tablet  Commonly known as:  NORVASC   10 mg, Oral, Daily      aspirin 81 MG chewable tablet   81 mg, Oral, Daily      clopidogrel 75 MG  tablet  Commonly known as:  PLAVIX   75 mg, Oral, Daily      digoxin 125 MCG tablet  Commonly known as:  LANOXIN   125 mcg, Oral, See Admin Instructions, Alternate days after dialysis      docusate sodium 100 MG capsule  Commonly known as:  COLACE   100 mg, Oral, Every 12 Hours      fenofibrate 54 MG tablet  Commonly known as:  TRICOR   54 mg, Oral, Daily      folic acid 1 MG tablet  Commonly known as:  FOLVITE   1 mg, Oral, Daily      FOSRENOL 1000 MG chewable tablet  Generic drug:  lanthanum   3 Times Daily With Meals, Per pt, he takes with each meal and with snacks      hydrALAZINE 50 MG tablet  Commonly known as:  APRESOLINE   50 mg, Oral, 3 Times Daily      insulin detemir 100 UNIT/ML injection  Commonly known as:  LEVEMIR   8 Units, Subcutaneous, Daily      isosorbide mononitrate 30 MG 24 hr tablet  Commonly known as:  IMDUR   30 mg, Oral, Daily      lactobacillus acidophilus capsule capsule   1 capsule, Oral, 2 Times Daily      levothyroxine 100 MCG tablet  Commonly known as:  SYNTHROID, LEVOTHROID   100 mcg, Oral, Daily      losartan 100 MG tablet  Commonly known as:  COZAAR   100 mg, Oral, Every 24 Hours Scheduled      metoprolol succinate  MG 24 hr tablet  Commonly known as:  TOPROL-XL   100 mg, Oral, Daily      omeprazole 20 MG capsule  Commonly known as:  priLOSEC   20 mg, Oral, Daily      ondansetron ODT 4 MG disintegrating tablet  Commonly known as:  ZOFRAN-ODT   4 mg, Oral, Every 6 Hours PRN      pravastatin 40 MG tablet  Commonly known as:  PRAVACHOL   80 mg, Oral, Nightly      RADHA-SHIRLENE PO   Oral, Patient states he takes these after dialysis, but is unaware of dose.       vitamin D3 5000 units capsule capsule   Oral, Daily         Stop These Medications    lisinopril 10 MG tablet  Commonly known as:  PRINIVIL,ZESTRIL     spironolactone 25 MG tablet  Commonly known as:  ALDACTONE            Discharge Diet:     Diet Instructions     Diet: Cardiac, Renal, Consistent Carbohydrate; Thin       Discharge Diet:   Cardiac  Renal  Consistent Carbohydrate       Fluid Consistency:  Thin          Activity at Discharge:     Activity Instructions     Discharge Activity            Follow-up Appointments:    Future Appointments   Date Time Provider Department Center   4/22/2019  1:30 PM Ean Hernandez MD [facility] None     Additional Instructions for the Follow-ups that You Need to Schedule     Ambulatory Referral to Home Health   As directed      Face to Face Visit Date:  11/11/2018    Follow-up Provider for Plan of Care?:  I treated the patient in an acute care facility and will not continue treatment after discharge.    Follow-up Provider:  MARYLU RAMESH [[id number]]    Reason/Clinical Findings:  strength mobiltu    Describe mobility limitations that make leaving home difficult:  COPD, CHF, ESRD    Nursing/Therapeutic Services Requested:  Skilled Nursing Physical Therapy Occupational Therapy    Skilled nursing orders:  CHF management O2 instruction Medication education COPD management    PT orders:  Strengthening Home safety assessment    Occupational orders:  Energy conservation Activities of daily living Strengthening    Frequency:  1 Week 1         Basic Metabolic Panel    Nov 12, 2018 (Approximate)      Results to Dr. Mackey    Order Comments:  Results to Dr. Mackey                Test Results Pending at Discharge:     Order Current Status    Blood Culture With ROSELINE - Blood, Preliminary result    Blood Culture With ROSELINE - Blood, Preliminary result        Discharge Instructions:  Will need follow up CT chest in 2 months for enlarged mediastinal lymph nodes measuring up to 1.5 cm in short axis.  PCP or Dr. Hernandez can arrange as an outpatient.  Oxygen 2 liters nasal canula  BMP on Wednesday           Judit Person, ALEXYS  11/11/18  12:29 PM    Time:   40 minutes were spent reviewing labs, history, evaluating patient and discharge planning.            Electronically signed by Sandra Zavala DO at  11/11/2018  6:38 PM

## 2018-11-13 ENCOUNTER — READMISSION MANAGEMENT (OUTPATIENT)
Dept: CALL CENTER | Facility: HOSPITAL | Age: 58
End: 2018-11-13

## 2018-11-13 LAB
BACTERIA SPEC AEROBE CULT: NORMAL
BACTERIA SPEC AEROBE CULT: NORMAL

## 2018-11-13 NOTE — OUTREACH NOTE
COPD/PN Week 1 Survey      Responses   Facility patient discharged from?  [facility]   Does the patient have one of the following disease processes/diagnoses(primary or secondary)?  COPD/Pneumonia   Is there a successful TCM telephone encounter documented?  No   Was the primary reason for admission:  Pneumonia   Week 1 attempt successful?  Yes   Call start time  1534   Call end time  1539   General alerts for this patient  ESRD on HD Tu Th Sat, WC bound   Discharge diagnosis  PNA, ESRD on HD   Is patient permission given to speak with other caregiver?  No   Meds reviewed with patient/caregiver?  Yes   Is the patient having any side effects they believe may be caused by any medication additions or changes?  No   Does the patient have all medications ordered at discharge?  Yes   Is the patient taking all medications as directed (includes completed medication regime)?  Yes   Comments regarding appointments  Pulmonary appt on Oct 22   Does the patient have a primary care provider?   No   Does the patient have an appointment with their PCP or pulmonologist within 7 days of discharge?  Yes   DME comments  Has been on home O2 for a while   Is the patient/caregiver able to teach back signs and symptoms of worsening condition:  Fever/chills, Shortness of breath, Chest pain   Is the patient/caregiver able to teach back importance of completing antibiotic course of treatment?  Yes   Week 1 call completed?  Yes   Wrap up additional comments  Pt has never smoked. Was under impression he is to get labs tomorrow. I do not see that order in EPIC or mentioned on his AVS. He does have HH, explained they should be able to draw his labs, call them and ask.           Leta Berry RN

## 2018-11-13 NOTE — PROGRESS NOTES
Case Management Discharge Note    Final Note[place] [facility] [facility]    Destination      No service has been selected for the patient.      Durable Medical Equipment      No service has been selected for the patient.      Dialysis/Infusion - Selection Complete      Service Provider Request Status Selected Services Address Phone Number Fax Number    [facility] Dialysis [address] [place] [facility] [address] [phone]      Home Medical Care - Selection Complete      Service Provider Request Status Selected Services Address Phone Number Fax Number    [facility] Health Services [address] [facility] [address] [phone]      Community Resources      No service has been selected for the patient.        Other: Other    Final Discharge Disposition Code: 06 - home with home health care

## 2018-11-13 NOTE — OUTREACH NOTE
Prep Survey      Responses   Facility patient discharged from?  [facility]   Is patient eligible?  Yes   Discharge diagnosis  PNA, ESRD on HD   Does the patient have one of the following disease processes/diagnoses(primary or secondary)?  COPD/Pneumonia   Does the patient have Home health ordered?  No   Is there a DME ordered?  No   General alerts for this patient  ESRD on HD Tu Th Sat, WC bound   Prep survey completed?  Yes          Colleen Hsu RN

## 2018-11-13 NOTE — PROGRESS NOTES
Case Management Discharge Note    Final Note: home with dialysis by car, [facility]    Destination      No service has been selected for the patient.      Durable Medical Equipment      No service has been selected for the patient.      Dialysis/Infusion - Selection Complete      Service Provider Request Status Selected Services Address Phone Number Fax Number    [facility] Selected Dialysis [address] [facility] [address] [phone]      Home Medical Care - Selection Complete      Service Provider Request Status Selected Services Address Phone Number Fax Number    [facility] Selected Home Health Services [address] [facility] [address] [phone]      Community Resources      No service has been selected for the patient.        Other: Other    Final Discharge Disposition Code: 06 - home with home health care

## 2018-11-20 ENCOUNTER — READMISSION MANAGEMENT (OUTPATIENT)
Dept: CALL CENTER | Facility: HOSPITAL | Age: 58
End: 2018-11-20

## 2018-11-20 NOTE — OUTREACH NOTE
COPD/PN Week 2 Survey      Responses   Facility patient discharged from?  [facility]   Does the patient have one of the following disease processes/diagnoses(primary or secondary)?  COPD/Pneumonia   Was the primary reason for admission:  Pneumonia   Week 2 attempt successful?  Yes   Call start time  1505   Call end time  1517   General alerts for this patient  ESRD on HD Tu Th Sat, WC bound   Discharge diagnosis  PNA, ESRD on HD   Is patient permission given to speak with other caregiver?  No   Meds reviewed with patient/caregiver?  Yes   Is the patient having any side effects they believe may be caused by any medication additions or changes?  No   Does the patient have all medications ordered at discharge?  Yes   Is the patient taking all medications as directed (includes completed medication regime)?  Yes   Comments regarding appointments  Pulmonary doctor's appt Dec 18   Does the patient have a primary care provider?   Yes   Does the patient have an appointment with their PCP or pulmonologist within 7 days of discharge?  Yes   Comments regarding PCP  Dr Kay   Has the patient kept scheduled appointments due by today?  Yes   DME comments  Has been on home O2 for a while   Psychosocial issues?  No   Did the patient receive a copy of their discharge instructions?  Yes   Nursing interventions  Reviewed instructions with patient   What is the patient's perception of their health status since discharge?  Improving   Nursing Interventions  Nurse provided patient education   Are the patient's immunizations up to date?   Yes   Is the patient/caregiver able to teach back the hierarchy of who to call/visit for symptoms/problems? PCP, Specialist, Home health nurse, Urgent Care, ED, 911  Yes   Is the patient/caregiver able to teach back signs and symptoms of worsening condition:  Fever/chills, Shortness of breath, Chest pain   Is the patient/caregiver able to teach back importance of completing antibiotic course of  treatment?  Yes   Week 2 call completed?  Yes          Jose uD RN

## 2018-11-28 ENCOUNTER — READMISSION MANAGEMENT (OUTPATIENT)
Dept: CALL CENTER | Facility: HOSPITAL | Age: 58
End: 2018-11-28

## 2018-11-28 NOTE — OUTREACH NOTE
COPD/PN Week 3 Survey      Responses   Facility patient discharged from?  [facility]   Does the patient have one of the following disease processes/diagnoses(primary or secondary)?  COPD/Pneumonia   Was the primary reason for admission:  Pneumonia   Week 3 attempt successful?  Yes   Call start time  1131   Call end time  1134   General alerts for this patient  ESRD on HD Tu Th Sat, WC bound   Discharge diagnosis  PNA, ESRD on HD   Meds reviewed with patient/caregiver?  Yes   Is the patient having any side effects they believe may be caused by any medication additions or changes?  No   Does the patient have all medications ordered at discharge?  Yes   Is the patient taking all medications as directed (includes completed medication regime)?  Yes   Comments regarding appointments  Pulmonary doctor's appt Dec 18   Does the patient have a primary care provider?   Yes   Does the patient have an appointment with their PCP or pulmonologist within 7 days of discharge?  Yes   Comments regarding PCP  Dr Kay is PCP   Has the patient kept scheduled appointments due by today?  Yes   Has home health visited the patient within 72 hours of discharge?  N/A   DME comments  Has been on home O2 for a while   Psychosocial issues?  No   Did the patient receive a copy of their discharge instructions?  Yes   Nursing interventions  Reviewed instructions with patient   What is the patient's perception of their health status since discharge?  Improving   Nursing Interventions  Nurse provided patient education   Are the patient's immunizations up to date?   Yes   Is the patient/caregiver able to teach back the hierarchy of who to call/visit for symptoms/problems? PCP, Specialist, Home health nurse, Urgent Care, ED, 911  Yes   Is the patient/caregiver able to teach back signs and symptoms of worsening condition:  Fever/chills, Shortness of breath, Chest pain   Is the patient/caregiver able to teach back importance of completing antibiotic  course of treatment?  Yes   Week 3 call completed?  Yes          Jose Du RN

## 2018-12-05 ENCOUNTER — READMISSION MANAGEMENT (OUTPATIENT)
Dept: CALL CENTER | Facility: HOSPITAL | Age: 58
End: 2018-12-05

## 2018-12-05 NOTE — OUTREACH NOTE
COPD/PN Week 4 Survey      Responses   Facility patient discharged from?  [facility]   Does the patient have one of the following disease processes/diagnoses(primary or secondary)?  COPD/Pneumonia   Was the primary reason for admission:  Pneumonia   Week 4 attempt successful?  Yes   Call start time  1046   Call end time  1048   Meds reviewed with patient/caregiver?  Yes   Is the patient taking all medications as directed (includes completed medication regime)?  Yes   Has the patient kept scheduled appointments due by today?  Yes   Is the patient still receiving Home Health Services?  N/A   What is the patient's perception of their health status since discharge?  Improving   Is the patient able to teach back COPD zones?  Yes   Patient reports what zone on this call?  Green Zone   Week 4 call completed?  Yes   Would the patient like one additional call?  No   Graduated  Yes   Did the patient feel the follow up calls were helpful during their recovery period?  Yes   Was the number of calls appropriate?  Yes          Neyda Shafer RN

## 2018-12-17 ENCOUNTER — OFFICE VISIT (OUTPATIENT)
Dept: PULMONOLOGY | Facility: CLINIC | Age: 58
End: 2018-12-17

## 2018-12-17 VITALS
DIASTOLIC BLOOD PRESSURE: 82 MMHG | WEIGHT: 124 LBS | HEART RATE: 63 BPM | HEIGHT: 68 IN | SYSTOLIC BLOOD PRESSURE: 140 MMHG | OXYGEN SATURATION: 98 % | BODY MASS INDEX: 18.79 KG/M2

## 2018-12-17 DIAGNOSIS — J90 PLEURAL EFFUSION: ICD-10-CM

## 2018-12-17 DIAGNOSIS — R59.0 MEDIASTINAL LYMPHADENOPATHY: ICD-10-CM

## 2018-12-17 DIAGNOSIS — J18.9 PNEUMONIA OF RIGHT UPPER LOBE DUE TO INFECTIOUS ORGANISM: Primary | ICD-10-CM

## 2018-12-17 DIAGNOSIS — G47.34 NOCTURNAL HYPOXIA: ICD-10-CM

## 2018-12-17 PROCEDURE — 99214 OFFICE O/P EST MOD 30 MIN: CPT | Performed by: NURSE PRACTITIONER

## 2018-12-17 RX ORDER — PRAVASTATIN SODIUM 80 MG/1
TABLET ORAL
Status: ON HOLD | COMMUNITY
Start: 2018-11-20 | End: 2019-01-07

## 2018-12-17 RX ORDER — DOXYCYCLINE HYCLATE 100 MG/1
CAPSULE ORAL
Status: ON HOLD | COMMUNITY
Start: 2018-10-29 | End: 2019-01-07

## 2018-12-17 RX ORDER — FERRIC CITRATE 210 MG/1
1 TABLET, COATED ORAL
COMMUNITY
Start: 2018-11-26 | End: 2019-01-01 | Stop reason: HOSPADM

## 2018-12-17 NOTE — PROGRESS NOTES
"Chief Complaint   Patient presents with   • Follow-up     hospital follow up         Subjective   Talha Cutler is a 58 y.o. male.     History of Present Illness   The patient comes in today for follow-up of recent hospitalization.    He reports having increased shortness of breath and increased cough for the past 2 days.  He states the symptoms are very similar to the symptoms he had he was diagnosed with pneumonia.  He reports that his symptoms resolved and he was discharged from the hospital however the symptoms have returned.  He does have a productive cough.  He denies any fever or chills.    Looking at his hospital records he was hospitalized in September and November both times for pneumonia.     The last CT scan from November 8 shows a right upper and lower lobe pneumonia with when necessary pneumatic effusion and nonspecific mediastinal adenopathy.     He is using oxygen at night.    The following portions of the patient's history were reviewed and updated as appropriate: allergies, current medications, past family history, past medical history, past social history and past surgical history.    Review of Systems   Constitutional: Negative for chills and fever.   HENT: Negative for sinus pressure, sneezing and sore throat.    Respiratory: Positive for cough. Negative for shortness of breath and wheezing.        Objective   Visit Vitals  /82 (BP Location: Left arm, Patient Position: Sitting, Cuff Size: Adult)   Pulse 63   Ht 172.7 cm (68\")   Wt 56.2 kg (124 lb)   SpO2 98%   BMI 18.85 kg/m²     Physical Exam   Constitutional: He is oriented to person, place, and time. He appears well-developed and well-nourished.   HENT:   Head: Normocephalic and atraumatic.   Eyes: EOM are normal.   Neck: Neck supple.   Cardiovascular: Normal rate and regular rhythm.   Pulmonary/Chest: Effort normal. No respiratory distress.   Somewhat decreased A/E without wheezing noted. Bilateral crackles noted.  "   Musculoskeletal: He exhibits no edema.   Neurological: He is alert and oriented to person, place, and time.   Skin: Skin is warm and dry.   Psychiatric: He has a normal mood and affect.   Vitals reviewed.          Assessment/Plan   Talha was seen today for follow-up.    Diagnoses and all orders for this visit:    Pneumonia of right upper lobe due to infectious organism (CMS/HCC)    Nocturnal hypoxia    Pleural effusion    Mediastinal lymphadenopathy           Return in about 6 weeks (around 1/28/2019) for Recheck, For Me.    DISCUSSION (if any):  I do not see where a repeat CT scan or chest x-ray has been done to show resolution of the pneumonia.  Since he has been diagnosed and hospitalized with pneumonia twice in the last 3 months I feel that it is imperative to make sure that the pneumonia is resolving.  If not he will possibly need a bronchoscopy.  I have discussed all of this in detail with the patient today.    I have explained that there is no reason to give antibiotics at this time because he has been on several rounds of antibiotics including IV antibiotics and if the pneumonia has resolved completely by now another round of oral antibiotics is not likely going to help.  If his cough is productive enough we can try a respiratory culture.  Again it is possible he may need a bronchoscopy to better assess what type of infection/inflammation this may be so that a more targeted antibiotic therapy can be achieved.    The patient does not have a fever or chills. He is not toxic looking in any way that would suggest hospitalization is needed.  His oxygen saturation is 98% on room air.    I have explained all of this to the patient and he verbalizes understanding.  I have explained that the CT scan needs to be done ASAP so that we may treat him more efficiently.  Again he verbalizes understanding.    I will schedule him to come back to our clinic in 6 weeks however, after reviewing the CT scan I told him it is  possible that I will have him come into the clinic sooner.  We will call him with the results of his CT scan.    Dictated utilizing Dragon dictation.    This document was electronically signed by ALEXYS Means December 17, 2018  11:53 AM

## 2018-12-20 ENCOUNTER — APPOINTMENT (OUTPATIENT)
Dept: CT IMAGING | Facility: HOSPITAL | Age: 58
End: 2018-12-20

## 2018-12-28 ENCOUNTER — TELEPHONE (OUTPATIENT)
Dept: PULMONOLOGY | Facility: CLINIC | Age: 58
End: 2018-12-28

## 2018-12-28 ENCOUNTER — HOSPITAL ENCOUNTER (OUTPATIENT)
Dept: CT IMAGING | Facility: HOSPITAL | Age: 58
Discharge: HOME | End: 2018-12-28
Admitting: NURSE PRACTITIONER

## 2018-12-28 DIAGNOSIS — R59.0 MEDIASTINAL LYMPHADENOPATHY: ICD-10-CM

## 2018-12-28 DIAGNOSIS — J18.9 PNEUMONIA OF RIGHT UPPER LOBE DUE TO INFECTIOUS ORGANISM: ICD-10-CM

## 2018-12-28 PROCEDURE — 71250 CT THORAX DX C-: CPT

## 2018-12-28 NOTE — PROGRESS NOTES
Please call the patient regarding his CT results. It is still abnormal so schedule him on Monday, Dec 31 at 11:00 to see Dr. Hernandez and they will discuss the results and what to do next. This will be an overbook and Dr. Hernandez is aware.

## 2018-12-28 NOTE — TELEPHONE ENCOUNTER
Called patient to let him know that he needs to come on Monday 12/31/18 to speak to Dr Hernandez about his CT result, he will be at Dialysis and will be a little late but will be here before noon he stated.          ----- Message from ALEXYS Funez sent at 12/28/2018  1:14 PM EST -----  Please call the patient regarding his CT results. It is still abnormal so schedule him on Monday, Dec 31 at 11:00 to see Dr. Hernandez and they will discuss the results and what to do next. This will be an overbook and Dr. Hernandez is aware.

## 2018-12-31 ENCOUNTER — OFFICE VISIT (OUTPATIENT)
Dept: PULMONOLOGY | Facility: CLINIC | Age: 58
End: 2018-12-31

## 2018-12-31 VITALS
BODY MASS INDEX: 18.79 KG/M2 | SYSTOLIC BLOOD PRESSURE: 138 MMHG | DIASTOLIC BLOOD PRESSURE: 60 MMHG | WEIGHT: 124 LBS | HEART RATE: 83 BPM | HEIGHT: 68 IN | OXYGEN SATURATION: 90 %

## 2018-12-31 DIAGNOSIS — R93.89 ABNORMAL CT OF THE CHEST: ICD-10-CM

## 2018-12-31 DIAGNOSIS — J18.9 PNEUMONIA OF RIGHT UPPER LOBE DUE TO INFECTIOUS ORGANISM: Primary | ICD-10-CM

## 2018-12-31 PROCEDURE — 99214 OFFICE O/P EST MOD 30 MIN: CPT | Performed by: INTERNAL MEDICINE

## 2018-12-31 NOTE — PROGRESS NOTES
"Chief Complaint   Patient presents with   • Follow-up   • Pleural Effusion         Subjective   Talha Cutler is a 58 y.o. male.     History of Present Illness   Patient comes in today to follow-up on the results of CT scan.    The patient denies any fever or chills.    The patient denies any weight loss.  The patient does mention occasional cough and phlegm production which is mostly clear.    The following portions of the patient's history were reviewed and updated as appropriate: allergies, current medications, past family history, past medical history, past social history and past surgical history.    Review of Systems   Constitutional: Negative for chills and fever.   HENT: Negative for sinus pressure, sneezing and sore throat.    Respiratory: Positive for cough. Negative for shortness of breath and wheezing.        Objective   Visit Vitals  /60 (BP Location: Left arm, Patient Position: Sitting, Cuff Size: Adult)   Pulse 83   Ht 172.7 cm (68\")   Wt 56.2 kg (124 lb) Comment: pt refused to weigh but stated his weight   SpO2 90%   BMI 18.85 kg/m²       Physical Exam   Constitutional: He is oriented to person, place, and time. He appears well-developed and well-nourished.   Eyes: EOM are normal.   Neck: Neck supple. No JVD present.   Cardiovascular: Normal rate, regular rhythm and normal heart sounds.   No murmur heard.  Pulmonary/Chest: Effort normal. He has rales (Right basal.).   Musculoskeletal:   Used a wheelchair.   Neurological: He is alert and oriented to person, place, and time.   Left-sided weakness noted.   Skin: Skin is warm and dry.   Vitals reviewed.      Assessment/Plan   Talha was seen today for follow-up and pleural effusion.    Diagnoses and all orders for this visit:    Pneumonia of right upper lobe due to infectious organism (CMS/HCC)    Abnormal CT of the chest           Return in about 6 weeks (around 2/11/2019) for Recheck, For Kiera.    DISCUSSION (if any):  I reviewed the CT " images with the patient and shared the findings including infiltrative lesion which has not resolved.  This involves the right upper lobe as well as the lower lobe.    Since the patient is on dialysis and is at risk for chronic infections including fungal infections, bronchoscopy will definitely need to be considered.    The risks of bronchoscopy including but not limited to damage to the overlying structures, pneumothorax, bleeding, worsening of respiratory failure, requirement for chest tube placement among others were explained.    The alternatives were also discussed with the patient.    The patient has considered the above-mentioned risks, benefits and alternatives and has agreed to proceed with the procedure.    The patient has had significant issues with the right lung in the past and we will only proceed with transbronchial biopsies if felt absolutely necessary.    I will initially proceed with brush biopsies including protected brush specimen for microbiology cultures.    Cytology specimen will also be obtained.    We will ask the patient's cardiologist to assess the possibility of stopping Plavix for 5 days before the procedure.    Dictated utilizing Dragon dictation.    This document was electronically signed by Ean Hernandez MD December 31, 2018  11:57 AM

## 2019-01-01 ENCOUNTER — READMISSION MANAGEMENT (OUTPATIENT)
Dept: CALL CENTER | Facility: HOSPITAL | Age: 59
End: 2019-01-01

## 2019-01-01 ENCOUNTER — HOSPITAL ENCOUNTER (OUTPATIENT)
Facility: HOSPITAL | Age: 59
Setting detail: OBSERVATION
Discharge: HOME | End: 2019-09-22
Attending: FAMILY MEDICINE | Admitting: INTERNAL MEDICINE

## 2019-01-01 ENCOUNTER — APPOINTMENT (OUTPATIENT)
Dept: PULMONOLOGY | Facility: HOSPITAL | Age: 59
End: 2019-01-01

## 2019-01-01 ENCOUNTER — APPOINTMENT (OUTPATIENT)
Dept: CT IMAGING | Facility: HOSPITAL | Age: 59
End: 2019-01-01

## 2019-01-01 ENCOUNTER — APPOINTMENT (OUTPATIENT)
Dept: GENERAL RADIOLOGY | Facility: HOSPITAL | Age: 59
End: 2019-01-01

## 2019-01-01 ENCOUNTER — HOSPITAL ENCOUNTER (INPATIENT)
Facility: HOSPITAL | Age: 59
LOS: 10 days | Discharge: HOME HEALTH | End: 2019-11-16
Attending: INTERNAL MEDICINE | Admitting: INTERNAL MEDICINE

## 2019-01-01 ENCOUNTER — APPOINTMENT (OUTPATIENT)
Dept: MRI IMAGING | Facility: HOSPITAL | Age: 59
End: 2019-01-01

## 2019-01-01 ENCOUNTER — HOSPITAL ENCOUNTER (EMERGENCY)
Facility: HOSPITAL | Age: 59
Discharge: ANOTHER HOSPITAL | End: 2019-12-19
Attending: EMERGENCY MEDICINE | Admitting: EMERGENCY MEDICINE

## 2019-01-01 ENCOUNTER — APPOINTMENT (OUTPATIENT)
Dept: NEPHROLOGY | Facility: HOSPITAL | Age: 59
End: 2019-01-01

## 2019-01-01 ENCOUNTER — OFFICE VISIT (OUTPATIENT)
Dept: PULMONOLOGY | Facility: CLINIC | Age: 59
End: 2019-01-01

## 2019-01-01 ENCOUNTER — APPOINTMENT (OUTPATIENT)
Dept: NEUROLOGY | Facility: HOSPITAL | Age: 59
End: 2019-01-01

## 2019-01-01 ENCOUNTER — HOSPITAL ENCOUNTER (EMERGENCY)
Facility: HOSPITAL | Age: 59
Discharge: HOME | End: 2019-12-05
Attending: EMERGENCY MEDICINE | Admitting: STUDENT IN AN ORGANIZED HEALTH CARE EDUCATION/TRAINING PROGRAM

## 2019-01-01 ENCOUNTER — HOSPITAL ENCOUNTER (OUTPATIENT)
Dept: SLEEP MEDICINE | Facility: HOSPITAL | Age: 59
Setting detail: THERAPIES SERIES
End: 2019-01-01

## 2019-01-01 ENCOUNTER — APPOINTMENT (OUTPATIENT)
Dept: CARDIOLOGY | Facility: HOSPITAL | Age: 59
End: 2019-01-01

## 2019-01-01 ENCOUNTER — TELEPHONE (OUTPATIENT)
Dept: EMERGENCY DEPT | Facility: HOSPITAL | Age: 59
End: 2019-01-01

## 2019-01-01 ENCOUNTER — APPOINTMENT (OUTPATIENT)
Dept: ULTRASOUND IMAGING | Facility: HOSPITAL | Age: 59
End: 2019-01-01

## 2019-01-01 ENCOUNTER — HOSPITAL ENCOUNTER (EMERGENCY)
Facility: HOSPITAL | Age: 59
Discharge: HOME | End: 2019-08-16
Attending: STUDENT IN AN ORGANIZED HEALTH CARE EDUCATION/TRAINING PROGRAM | Admitting: STUDENT IN AN ORGANIZED HEALTH CARE EDUCATION/TRAINING PROGRAM

## 2019-01-01 ENCOUNTER — NURSE TRIAGE (OUTPATIENT)
Dept: CALL CENTER | Facility: HOSPITAL | Age: 59
End: 2019-01-01

## 2019-01-01 ENCOUNTER — HOSPITAL ENCOUNTER (INPATIENT)
Facility: HOSPITAL | Age: 59
LOS: 4 days | Discharge: HOME HEALTH | End: 2019-12-12
Attending: EMERGENCY MEDICINE | Admitting: INTERNAL MEDICINE

## 2019-01-01 ENCOUNTER — HOSPITAL ENCOUNTER (EMERGENCY)
Facility: HOSPITAL | Age: 59
Discharge: HOME | End: 2019-04-26
Attending: EMERGENCY MEDICINE | Admitting: EMERGENCY MEDICINE

## 2019-01-01 ENCOUNTER — HOSPITAL ENCOUNTER (INPATIENT)
Facility: HOSPITAL | Age: 59
LOS: 5 days | Discharge: HOME HEALTH | End: 2019-11-23
Attending: EMERGENCY MEDICINE | Admitting: HOSPITALIST

## 2019-01-01 ENCOUNTER — ANESTHESIA (OUTPATIENT)
Dept: GASTROENTEROLOGY | Facility: HOSPITAL | Age: 59
End: 2019-01-01

## 2019-01-01 ENCOUNTER — HOSPITAL ENCOUNTER (EMERGENCY)
Facility: HOSPITAL | Age: 59
Discharge: HOME | End: 2019-09-14
Attending: EMERGENCY MEDICINE | Admitting: EMERGENCY MEDICINE

## 2019-01-01 ENCOUNTER — ANESTHESIA EVENT (OUTPATIENT)
Dept: GASTROENTEROLOGY | Facility: HOSPITAL | Age: 59
End: 2019-01-01

## 2019-01-01 ENCOUNTER — HOSPITAL ENCOUNTER (INPATIENT)
Facility: HOSPITAL | Age: 59
LOS: 5 days | Discharge: ANOTHER HOSPITAL | End: 2019-11-06
Attending: EMERGENCY MEDICINE | Admitting: INTERNAL MEDICINE

## 2019-01-01 ENCOUNTER — ANCILLARY PROCEDURE (OUTPATIENT)
Dept: SPEECH THERAPY | Facility: HOSPITAL | Age: 59
End: 2019-01-01

## 2019-01-01 ENCOUNTER — HOSPITAL ENCOUNTER (EMERGENCY)
Facility: HOSPITAL | Age: 59
Discharge: HOME | End: 2019-08-31
Attending: EMERGENCY MEDICINE | Admitting: STUDENT IN AN ORGANIZED HEALTH CARE EDUCATION/TRAINING PROGRAM

## 2019-01-01 VITALS
DIASTOLIC BLOOD PRESSURE: 70 MMHG | BODY MASS INDEX: 18.19 KG/M2 | SYSTOLIC BLOOD PRESSURE: 133 MMHG | RESPIRATION RATE: 20 BRPM | HEIGHT: 68 IN | WEIGHT: 120 LBS | OXYGEN SATURATION: 96 % | HEART RATE: 91 BPM | TEMPERATURE: 98.2 F

## 2019-01-01 VITALS
DIASTOLIC BLOOD PRESSURE: 59 MMHG | HEIGHT: 68 IN | HEART RATE: 99 BPM | WEIGHT: 132.7 LBS | SYSTOLIC BLOOD PRESSURE: 134 MMHG | BODY MASS INDEX: 20.11 KG/M2 | TEMPERATURE: 98.6 F | RESPIRATION RATE: 18 BRPM | OXYGEN SATURATION: 94 %

## 2019-01-01 VITALS
HEIGHT: 68 IN | DIASTOLIC BLOOD PRESSURE: 30 MMHG | OXYGEN SATURATION: 100 % | TEMPERATURE: 97.7 F | WEIGHT: 125.22 LBS | SYSTOLIC BLOOD PRESSURE: 95 MMHG | HEART RATE: 74 BPM | RESPIRATION RATE: 18 BRPM | BODY MASS INDEX: 18.98 KG/M2

## 2019-01-01 VITALS
HEART RATE: 72 BPM | OXYGEN SATURATION: 100 % | BODY MASS INDEX: 19.7 KG/M2 | SYSTOLIC BLOOD PRESSURE: 120 MMHG | TEMPERATURE: 97.9 F | DIASTOLIC BLOOD PRESSURE: 68 MMHG | WEIGHT: 130.07 LBS | HEIGHT: 68 IN | SYSTOLIC BLOOD PRESSURE: 126 MMHG | RESPIRATION RATE: 20 BRPM | RESPIRATION RATE: 16 BRPM | OXYGEN SATURATION: 94 % | DIASTOLIC BLOOD PRESSURE: 66 MMHG | TEMPERATURE: 98.5 F | BODY MASS INDEX: 19.27 KG/M2 | HEART RATE: 94 BPM | HEIGHT: 69 IN | WEIGHT: 130 LBS

## 2019-01-01 VITALS
SYSTOLIC BLOOD PRESSURE: 113 MMHG | HEIGHT: 68 IN | RESPIRATION RATE: 16 BRPM | WEIGHT: 138 LBS | HEART RATE: 100 BPM | TEMPERATURE: 98.1 F | BODY MASS INDEX: 20.92 KG/M2 | DIASTOLIC BLOOD PRESSURE: 66 MMHG | OXYGEN SATURATION: 99 %

## 2019-01-01 VITALS
DIASTOLIC BLOOD PRESSURE: 64 MMHG | OXYGEN SATURATION: 95 % | WEIGHT: 130 LBS | TEMPERATURE: 98.2 F | HEART RATE: 84 BPM | RESPIRATION RATE: 19 BRPM | BODY MASS INDEX: 19.7 KG/M2 | HEIGHT: 68 IN | SYSTOLIC BLOOD PRESSURE: 129 MMHG

## 2019-01-01 VITALS
WEIGHT: 136 LBS | OXYGEN SATURATION: 100 % | DIASTOLIC BLOOD PRESSURE: 70 MMHG | HEIGHT: 68 IN | RESPIRATION RATE: 16 BRPM | BODY MASS INDEX: 20.61 KG/M2 | TEMPERATURE: 97.3 F | HEART RATE: 102 BPM | SYSTOLIC BLOOD PRESSURE: 126 MMHG

## 2019-01-01 VITALS
RESPIRATION RATE: 16 BRPM | SYSTOLIC BLOOD PRESSURE: 107 MMHG | HEART RATE: 78 BPM | DIASTOLIC BLOOD PRESSURE: 84 MMHG | TEMPERATURE: 97.3 F | OXYGEN SATURATION: 100 % | HEIGHT: 68 IN | BODY MASS INDEX: 19.88 KG/M2 | WEIGHT: 131.2 LBS

## 2019-01-01 VITALS
WEIGHT: 133 LBS | DIASTOLIC BLOOD PRESSURE: 76 MMHG | SYSTOLIC BLOOD PRESSURE: 135 MMHG | HEART RATE: 104 BPM | RESPIRATION RATE: 18 BRPM | TEMPERATURE: 97.7 F | BODY MASS INDEX: 20.16 KG/M2 | OXYGEN SATURATION: 100 % | HEIGHT: 68 IN

## 2019-01-01 VITALS
TEMPERATURE: 98.6 F | OXYGEN SATURATION: 99 % | DIASTOLIC BLOOD PRESSURE: 66 MMHG | WEIGHT: 118.5 LBS | SYSTOLIC BLOOD PRESSURE: 139 MMHG | HEART RATE: 73 BPM | RESPIRATION RATE: 17 BRPM | HEIGHT: 68 IN | BODY MASS INDEX: 17.96 KG/M2

## 2019-01-01 VITALS
HEIGHT: 68 IN | BODY MASS INDEX: 18.19 KG/M2 | WEIGHT: 120 LBS | HEART RATE: 67 BPM | SYSTOLIC BLOOD PRESSURE: 120 MMHG | OXYGEN SATURATION: 96 % | RESPIRATION RATE: 18 BRPM | DIASTOLIC BLOOD PRESSURE: 64 MMHG

## 2019-01-01 DIAGNOSIS — G47.33 OSA (OBSTRUCTIVE SLEEP APNEA): Primary | ICD-10-CM

## 2019-01-01 DIAGNOSIS — J18.9 HCAP (HEALTHCARE-ASSOCIATED PNEUMONIA): Primary | ICD-10-CM

## 2019-01-01 DIAGNOSIS — E11.65 POORLY CONTROLLED DIABETES MELLITUS (HCC): Primary | ICD-10-CM

## 2019-01-01 DIAGNOSIS — D64.9 ANEMIA, UNSPECIFIED TYPE: ICD-10-CM

## 2019-01-01 DIAGNOSIS — K92.2 UPPER GI BLEED: ICD-10-CM

## 2019-01-01 DIAGNOSIS — R05.9 COUGH: ICD-10-CM

## 2019-01-01 DIAGNOSIS — G47.34 NOCTURNAL HYPOXIA: ICD-10-CM

## 2019-01-01 DIAGNOSIS — G40.901 STATUS EPILEPTICUS (HCC): Primary | ICD-10-CM

## 2019-01-01 DIAGNOSIS — R06.83 SNORING: ICD-10-CM

## 2019-01-01 DIAGNOSIS — Z78.9 IMPAIRED MOBILITY AND ADLS: ICD-10-CM

## 2019-01-01 DIAGNOSIS — G40.901 STATUS EPILEPTICUS (HCC): ICD-10-CM

## 2019-01-01 DIAGNOSIS — J18.9 PNEUMONIA OF RIGHT UPPER LOBE DUE TO INFECTIOUS ORGANISM: ICD-10-CM

## 2019-01-01 DIAGNOSIS — J18.9 PNEUMONIA OF RIGHT LUNG DUE TO INFECTIOUS ORGANISM, UNSPECIFIED PART OF LUNG: ICD-10-CM

## 2019-01-01 DIAGNOSIS — N18.6 ESRD (END STAGE RENAL DISEASE) (HCC): ICD-10-CM

## 2019-01-01 DIAGNOSIS — Z74.09 IMPAIRED MOBILITY AND ADLS: ICD-10-CM

## 2019-01-01 DIAGNOSIS — R07.81 PLEURITIC PAIN: Primary | ICD-10-CM

## 2019-01-01 DIAGNOSIS — J18.9 PNEUMONIA OF LEFT LUNG DUE TO INFECTIOUS ORGANISM, UNSPECIFIED PART OF LUNG: Primary | ICD-10-CM

## 2019-01-01 DIAGNOSIS — J18.9 PNEUMONIA OF RIGHT LOWER LOBE DUE TO INFECTIOUS ORGANISM: ICD-10-CM

## 2019-01-01 DIAGNOSIS — J18.9 PNEUMONIA OF RIGHT LOWER LOBE DUE TO INFECTIOUS ORGANISM: Primary | ICD-10-CM

## 2019-01-01 DIAGNOSIS — J18.9 PNEUMONIA OF RIGHT UPPER LOBE DUE TO INFECTIOUS ORGANISM: Primary | ICD-10-CM

## 2019-01-01 DIAGNOSIS — R06.02 SHORTNESS OF BREATH: Primary | ICD-10-CM

## 2019-01-01 DIAGNOSIS — G47.33 OBSTRUCTIVE SLEEP APNEA: ICD-10-CM

## 2019-01-01 DIAGNOSIS — Z99.2 END STAGE RENAL DISEASE ON DIALYSIS (HCC): Chronic | ICD-10-CM

## 2019-01-01 DIAGNOSIS — N17.9 ACUTE RENAL FAILURE ON DIALYSIS (HCC): ICD-10-CM

## 2019-01-01 DIAGNOSIS — Z99.2 STAGE 5 CHRONIC KIDNEY DISEASE ON CHRONIC DIALYSIS (HCC): ICD-10-CM

## 2019-01-01 DIAGNOSIS — N18.6 STAGE 5 CHRONIC KIDNEY DISEASE ON CHRONIC DIALYSIS (HCC): ICD-10-CM

## 2019-01-01 DIAGNOSIS — R06.89 CHRONIC RESPIRATORY INSUFFICIENCY: Chronic | ICD-10-CM

## 2019-01-01 DIAGNOSIS — Z86.73 HISTORY OF CVA (CEREBROVASCULAR ACCIDENT): ICD-10-CM

## 2019-01-01 DIAGNOSIS — K72.00 SHOCK LIVER: ICD-10-CM

## 2019-01-01 DIAGNOSIS — R53.1 GENERALIZED WEAKNESS: ICD-10-CM

## 2019-01-01 DIAGNOSIS — N18.6 END STAGE RENAL DISEASE ON DIALYSIS (HCC): Chronic | ICD-10-CM

## 2019-01-01 DIAGNOSIS — N18.6 END STAGE RENAL DISEASE ON DIALYSIS (HCC): ICD-10-CM

## 2019-01-01 DIAGNOSIS — Z99.2 END STAGE RENAL DISEASE ON DIALYSIS (HCC): ICD-10-CM

## 2019-01-01 DIAGNOSIS — Z99.2 ACUTE RENAL FAILURE ON DIALYSIS (HCC): ICD-10-CM

## 2019-01-01 DIAGNOSIS — I50.22 CHRONIC SYSTOLIC CHF (CONGESTIVE HEART FAILURE) (HCC): ICD-10-CM

## 2019-01-01 DIAGNOSIS — E87.5 HYPERKALEMIA: ICD-10-CM
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
## 2019-01-01 DIAGNOSIS — K92.1 MELENA: ICD-10-CM

## 2019-01-01 DIAGNOSIS — J18.9 HOSPITAL-ACQUIRED PNEUMONIA: Primary | ICD-10-CM

## 2019-01-01 DIAGNOSIS — R93.89 ABNORMAL CT OF THE CHEST: Primary | ICD-10-CM

## 2019-01-01 DIAGNOSIS — R42 DIZZINESS: ICD-10-CM

## 2019-01-01 DIAGNOSIS — H91.92 HEARING LOSS OF LEFT EAR, UNSPECIFIED HEARING LOSS TYPE: Primary | ICD-10-CM

## 2019-01-01 LAB
A-A DO2: 97.7 MMHG
A-A DO2: ABNORMAL
ABO + RH BLD: NORMAL
ABO GROUP BLD: NORMAL
ACINETOBACTER SCREEN CX: NORMAL
ALBUMIN SERPL-MCNC: 2.6 G/DL (ref 3.5–5.2)
ALBUMIN SERPL-MCNC: 2.7 G/DL (ref 3.5–5.2)
ALBUMIN SERPL-MCNC: 2.8 G/DL (ref 3.5–5.2)
ALBUMIN SERPL-MCNC: 2.8 G/DL (ref 3.5–5.2)
ALBUMIN SERPL-MCNC: 2.9 G/DL (ref 3.5–5.2)
ALBUMIN SERPL-MCNC: 3 G/DL (ref 3.5–5.2)
ALBUMIN SERPL-MCNC: 3.1 G/DL (ref 3.5–5.2)
ALBUMIN SERPL-MCNC: 3.2 G/DL (ref 3.5–5.2)
ALBUMIN SERPL-MCNC: 3.3 G/DL (ref 3.5–5.2)
ALBUMIN SERPL-MCNC: 3.4 G/DL (ref 3.5–5.2)
ALBUMIN SERPL-MCNC: 3.5 G/DL (ref 3.5–5.2)
ALBUMIN SERPL-MCNC: 3.9 G/DL (ref 3.5–5.2)
ALBUMIN SERPL-MCNC: 4.2 G/DL (ref 3.5–5.2)
ALBUMIN/GLOB SERPL: 0.7 G/DL
ALBUMIN/GLOB SERPL: 0.8 G/DL
ALBUMIN/GLOB SERPL: 0.9 G/DL
ALBUMIN/GLOB SERPL: 1 G/DL
ALBUMIN/GLOB SERPL: 1.1 G/DL
ALBUMIN/GLOB SERPL: 1.3 G/DL
ALP SERPL-CCNC: 104 U/L (ref 39–117)
ALP SERPL-CCNC: 114 U/L (ref 39–117)
ALP SERPL-CCNC: 154 U/L (ref 39–117)
ALP SERPL-CCNC: 166 U/L (ref 39–117)
ALP SERPL-CCNC: 182 U/L (ref 39–117)
ALP SERPL-CCNC: 201 U/L (ref 39–117)
ALP SERPL-CCNC: 218 U/L (ref 39–117)
ALP SERPL-CCNC: 58 U/L (ref 39–117)
ALP SERPL-CCNC: 64 U/L (ref 39–117)
ALP SERPL-CCNC: 64 U/L (ref 39–117)
ALP SERPL-CCNC: 71 U/L (ref 39–117)
ALP SERPL-CCNC: 75 U/L (ref 39–117)
ALP SERPL-CCNC: 77 U/L (ref 39–117)
ALP SERPL-CCNC: 89 U/L (ref 39–117)
ALP SERPL-CCNC: 94 U/L (ref 39–117)
ALP SERPL-CCNC: 95 U/L (ref 39–117)
ALT SERPL W P-5'-P-CCNC: 1076 U/L (ref 1–41)
ALT SERPL W P-5'-P-CCNC: 11 U/L (ref 1–41)
ALT SERPL W P-5'-P-CCNC: 11 U/L (ref 1–41)
ALT SERPL W P-5'-P-CCNC: 12 U/L (ref 1–41)
ALT SERPL W P-5'-P-CCNC: 1308 U/L (ref 1–41)
ALT SERPL W P-5'-P-CCNC: 1684 U/L (ref 1–41)
ALT SERPL W P-5'-P-CCNC: 17 U/L (ref 1–41)
ALT SERPL W P-5'-P-CCNC: 17 U/L (ref 1–41)
ALT SERPL W P-5'-P-CCNC: 23 U/L (ref 1–41)
ALT SERPL W P-5'-P-CCNC: 45 U/L (ref 1–41)
ALT SERPL W P-5'-P-CCNC: 6 U/L (ref 1–41)
ALT SERPL W P-5'-P-CCNC: 727 U/L (ref 1–41)
ALT SERPL W P-5'-P-CCNC: 753 U/L (ref 1–41)
ALT SERPL W P-5'-P-CCNC: 9 U/L (ref 1–41)
AMMONIA BLD-SCNC: 47 UMOL/L (ref 16–60)
AMORPH URATE CRY URNS QL MICRO: ABNORMAL /HPF
AMPHET+METHAMPHET UR QL: NEGATIVE
AMPHETAMINES UR QL: NEGATIVE
ANION GAP SERPL CALCULATED.3IONS-SCNC: 11 MMOL/L (ref 5–15)
ANION GAP SERPL CALCULATED.3IONS-SCNC: 12 MMOL/L (ref 5–15)
ANION GAP SERPL CALCULATED.3IONS-SCNC: 13 MMOL/L (ref 5–15)
ANION GAP SERPL CALCULATED.3IONS-SCNC: 14 MMOL/L (ref 5–15)
ANION GAP SERPL CALCULATED.3IONS-SCNC: 14.2 MMOL/L (ref 5–15)
ANION GAP SERPL CALCULATED.3IONS-SCNC: 14.2 MMOL/L (ref 5–15)
ANION GAP SERPL CALCULATED.3IONS-SCNC: 15 MMOL/L (ref 5–15)
ANION GAP SERPL CALCULATED.3IONS-SCNC: 15.4 MMOL/L (ref 5–15)
ANION GAP SERPL CALCULATED.3IONS-SCNC: 15.9 MMOL/L (ref 5–15)
ANION GAP SERPL CALCULATED.3IONS-SCNC: 16 MMOL/L (ref 5–15)
ANION GAP SERPL CALCULATED.3IONS-SCNC: 16.6 MMOL/L (ref 5–15)
ANION GAP SERPL CALCULATED.3IONS-SCNC: 16.7 MMOL/L (ref 5–15)
ANION GAP SERPL CALCULATED.3IONS-SCNC: 17 MMOL/L (ref 5–15)
ANION GAP SERPL CALCULATED.3IONS-SCNC: 17 MMOL/L (ref 5–15)
ANION GAP SERPL CALCULATED.3IONS-SCNC: 18 MMOL/L (ref 5–15)
ANION GAP SERPL CALCULATED.3IONS-SCNC: 19 MMOL/L (ref 5–15)
ANION GAP SERPL CALCULATED.3IONS-SCNC: 19.2 MMOL/L (ref 5–15)
ANION GAP SERPL CALCULATED.3IONS-SCNC: 19.8 MMOL/L (ref 5–15)
ANION GAP SERPL CALCULATED.3IONS-SCNC: 20.2 MMOL/L (ref 5–15)
ANION GAP SERPL CALCULATED.3IONS-SCNC: 21.8 MMOL/L (ref 5–15)
ANION GAP SERPL CALCULATED.3IONS-SCNC: 22 MMOL/L (ref 5–15)
ANION GAP SERPL CALCULATED.3IONS-SCNC: 23.9 MMOL/L (ref 5–15)
APAP SERPL-MCNC: <5 MCG/ML (ref 10–30)
APAP SERPL-MCNC: <5 MCG/ML (ref 10–30)
ARTERIAL PATENCY WRIST A: ABNORMAL
ARTERIAL PATENCY WRIST A: POSITIVE
AST SERPL-CCNC: 1439 U/L (ref 1–40)
AST SERPL-CCNC: 1590 U/L (ref 1–40)
AST SERPL-CCNC: 18 U/L (ref 1–40)
AST SERPL-CCNC: 20 U/L (ref 1–40)
AST SERPL-CCNC: 22 U/L (ref 1–40)
AST SERPL-CCNC: 23 U/L (ref 1–40)
AST SERPL-CCNC: 23 U/L (ref 1–40)
AST SERPL-CCNC: 24 U/L (ref 1–40)
AST SERPL-CCNC: 2653 U/L (ref 1–40)
AST SERPL-CCNC: 28 U/L (ref 1–40)
AST SERPL-CCNC: 28 U/L (ref 1–40)
AST SERPL-CCNC: 358 U/L (ref 1–40)
AST SERPL-CCNC: 40 U/L (ref 1–40)
AST SERPL-CCNC: 42 U/L (ref 1–40)
AST SERPL-CCNC: 66 U/L (ref 1–40)
AST SERPL-CCNC: 931 U/L (ref 1–40)
ATMOSPHERIC PRESS: 738 MMHG
ATMOSPHERIC PRESS: 739 MMHG
ATMOSPHERIC PRESS: 744 MMHG
ATMOSPHERIC PRESS: 746 MMHG
ATMOSPHERIC PRESS: ABNORMAL MM[HG]
B PARAPERT DNA SPEC QL NAA+PROBE: NOT DETECTED
B PERT DNA SPEC QL NAA+PROBE: NOT DETECTED
BACTERIA SPEC AEROBE CULT: NORMAL
BACTERIA UR QL AUTO: ABNORMAL /HPF
BARBITURATES UR QL SCN: NEGATIVE
BASE EXCESS BLDA CALC-SCNC: -1 MMOL/L (ref 0–2)
BASE EXCESS BLDA CALC-SCNC: 0.2 MMOL/L (ref 0–2)
BASE EXCESS BLDA CALC-SCNC: 0.8 MMOL/L (ref 0–2)
BASE EXCESS BLDA CALC-SCNC: 12.7 MMOL/L (ref 0–2)
BASE EXCESS BLDA CALC-SCNC: 2.4 MMOL/L (ref 0–2)
BASE EXCESS BLDA CALC-SCNC: 2.9 MMOL/L (ref 0–2)
BASE EXCESS BLDA CALC-SCNC: 3.4 MMOL/L (ref 0–2)
BASE EXCESS BLDA CALC-SCNC: 4.2 MMOL/L (ref 0–2)
BASE EXCESS BLDA CALC-SCNC: 4.2 MMOL/L (ref 0–2)
BASE EXCESS BLDA CALC-SCNC: 4.6 MMOL/L (ref 0–2)
BASE EXCESS BLDA CALC-SCNC: 4.8 MMOL/L (ref 0–2)
BASE EXCESS BLDA CALC-SCNC: 6.1 MMOL/L (ref 0–2)
BASE EXCESS BLDV CALC-SCNC: -2.8 MMOL/L (ref 0–2)
BASE EXCESS BLDV CALC-SCNC: 12.6 MMOL/L (ref 0–2)
BASE EXCESS BLDV CALC-SCNC: 7.7 MMOL/L (ref -2–2)
BASOPHILS # BLD AUTO: 0.02 10*3/MM3 (ref 0–0.2)
BASOPHILS # BLD AUTO: 0.03 10*3/MM3 (ref 0–0.2)
BASOPHILS # BLD AUTO: 0.04 10*3/MM3 (ref 0–0.2)
BASOPHILS # BLD AUTO: 0.05 10*3/MM3 (ref 0–0.2)
BASOPHILS # BLD AUTO: 0.06 10*3/MM3 (ref 0–0.2)
BASOPHILS # BLD AUTO: 0.08 10*3/MM3 (ref 0–0.2)
BASOPHILS # BLD AUTO: 0.14 10*3/MM3 (ref 0–0.2)
BASOPHILS # BLD AUTO: 0.15 10*3/MM3 (ref 0–0.2)
BASOPHILS NFR BLD AUTO: 0.3 % (ref 0–1.5)
BASOPHILS NFR BLD AUTO: 0.4 % (ref 0–1.5)
BASOPHILS NFR BLD AUTO: 0.5 % (ref 0–1.5)
BASOPHILS NFR BLD AUTO: 0.6 % (ref 0–1.5)
BASOPHILS NFR BLD AUTO: 0.7 % (ref 0–1.5)
BASOPHILS NFR BLD AUTO: 0.7 % (ref 0–1.5)
BASOPHILS NFR BLD AUTO: 0.8 % (ref 0–1.5)
BASOPHILS NFR BLD AUTO: 0.9 % (ref 0–1.5)
BASOPHILS NFR BLD AUTO: 1.1 % (ref 0–1.5)
BASOPHILS NFR BLD AUTO: 1.2 % (ref 0–1.5)
BASOPHILS NFR BLD AUTO: 1.2 % (ref 0–1.5)
BASOPHILS NFR BLD AUTO: 1.3 % (ref 0–1.5)
BASOPHILS NFR BLD AUTO: 1.7 % (ref 0–1.5)
BDY SITE: ABNORMAL
BENZODIAZ UR QL SCN: NEGATIVE
BH BB BLOOD EXPIRATION DATE: NORMAL
BH BB BLOOD TYPE BARCODE: 6200
BH BB DISPENSE STATUS: NORMAL
BH BB PRODUCT CODE: NORMAL
BH BB UNIT NUMBER: NORMAL
BH CV ECHO MEAS - EF(MOD-BP): 25 %
BH CV LOWER VASCULAR LEFT COMMON FEMORAL AUGMENT: NORMAL
BH CV LOWER VASCULAR LEFT COMMON FEMORAL COMPRESS: NORMAL
BH CV LOWER VASCULAR LEFT COMMON FEMORAL PHASIC: NORMAL
BH CV LOWER VASCULAR LEFT COMMON FEMORAL SPONT: NORMAL
BH CV LOWER VASCULAR LEFT DISTAL FEMORAL COMPRESS: NORMAL
BH CV LOWER VASCULAR LEFT GASTRONEMIUS COMPRESS: NORMAL
BH CV LOWER VASCULAR LEFT GREATER SAPH AK COMPRESS: NORMAL
BH CV LOWER VASCULAR LEFT GREATER SAPH BK COMPRESS: NORMAL
BH CV LOWER VASCULAR LEFT LESSER SAPH COMPRESS: NORMAL
BH CV LOWER VASCULAR LEFT MID FEMORAL AUGMENT: NORMAL
BH CV LOWER VASCULAR LEFT MID FEMORAL COMPRESS: NORMAL
BH CV LOWER VASCULAR LEFT MID FEMORAL PHASIC: NORMAL
BH CV LOWER VASCULAR LEFT MID FEMORAL SPONT: NORMAL
BH CV LOWER VASCULAR LEFT PERONEAL COMPRESS: NORMAL
BH CV LOWER VASCULAR LEFT POPLITEAL AUGMENT: NORMAL
BH CV LOWER VASCULAR LEFT POPLITEAL COMPRESS: NORMAL
BH CV LOWER VASCULAR LEFT POPLITEAL PHASIC: NORMAL
BH CV LOWER VASCULAR LEFT POPLITEAL SPONT: NORMAL
BH CV LOWER VASCULAR LEFT POSTERIOR TIBIAL COMPRESS: NORMAL
BH CV LOWER VASCULAR LEFT PROFUNDA FEMORAL AUGMENT: NORMAL
BH CV LOWER VASCULAR LEFT PROFUNDA FEMORAL COMPRESS: NORMAL
BH CV LOWER VASCULAR LEFT PROFUNDA FEMORAL PHASIC: NORMAL
BH CV LOWER VASCULAR LEFT PROFUNDA FEMORAL SPONT: NORMAL
BH CV LOWER VASCULAR LEFT PROXIMAL FEMORAL COMPRESS: NORMAL
BH CV LOWER VASCULAR LEFT SAPHENOFEMORAL JUNCTION AUGMENT: NORMAL
BH CV LOWER VASCULAR LEFT SAPHENOFEMORAL JUNCTION COMPRESS: NORMAL
BH CV LOWER VASCULAR LEFT SAPHENOFEMORAL JUNCTION PHASIC: NORMAL
BH CV LOWER VASCULAR LEFT SAPHENOFEMORAL JUNCTION SPONT: NORMAL
BH CV LOWER VASCULAR RIGHT COMMON FEMORAL AUGMENT: NORMAL
BH CV LOWER VASCULAR RIGHT COMMON FEMORAL COMPRESS: NORMAL
BH CV LOWER VASCULAR RIGHT COMMON FEMORAL PHASIC: NORMAL
BH CV LOWER VASCULAR RIGHT COMMON FEMORAL SPONT: NORMAL
BH CV LOWER VASCULAR RIGHT DISTAL FEMORAL COMPRESS: NORMAL
BH CV LOWER VASCULAR RIGHT GASTRONEMIUS COMPRESS: NORMAL
BH CV LOWER VASCULAR RIGHT GREATER SAPH AK COMPRESS: NORMAL
BH CV LOWER VASCULAR RIGHT GREATER SAPH BK COMPRESS: NORMAL
BH CV LOWER VASCULAR RIGHT LESSER SAPH COMPRESS: NORMAL
BH CV LOWER VASCULAR RIGHT MID FEMORAL AUGMENT: NORMAL
BH CV LOWER VASCULAR RIGHT MID FEMORAL COMPRESS: NORMAL
BH CV LOWER VASCULAR RIGHT MID FEMORAL PHASIC: NORMAL
BH CV LOWER VASCULAR RIGHT MID FEMORAL SPONT: NORMAL
BH CV LOWER VASCULAR RIGHT PERONEAL COMPRESS: NORMAL
BH CV LOWER VASCULAR RIGHT POPLITEAL AUGMENT: NORMAL
BH CV LOWER VASCULAR RIGHT POPLITEAL COMPRESS: NORMAL
BH CV LOWER VASCULAR RIGHT POPLITEAL PHASIC: NORMAL
BH CV LOWER VASCULAR RIGHT POPLITEAL SPONT: NORMAL
BH CV LOWER VASCULAR RIGHT POSTERIOR TIBIAL COMPRESS: NORMAL
BH CV LOWER VASCULAR RIGHT PROFUNDA FEMORAL COMPRESS: NORMAL
BH CV LOWER VASCULAR RIGHT PROXIMAL FEMORAL COMPRESS: NORMAL
BH CV LOWER VASCULAR RIGHT SAPHENOFEMORAL JUNCTION AUGMENT: NORMAL
BH CV LOWER VASCULAR RIGHT SAPHENOFEMORAL JUNCTION COMPRESS: NORMAL
BH CV LOWER VASCULAR RIGHT SAPHENOFEMORAL JUNCTION PHASIC: NORMAL
BH CV LOWER VASCULAR RIGHT SAPHENOFEMORAL JUNCTION SPONT: NORMAL
BILIRUB CONJ SERPL-MCNC: 0.2 MG/DL (ref 0.2–0.3)
BILIRUB CONJ SERPL-MCNC: 0.3 MG/DL (ref 0.2–0.3)
BILIRUB INDIRECT SERPL-MCNC: 0.3 MG/DL
BILIRUB SERPL-MCNC: 0.2 MG/DL (ref 0.2–1.2)
BILIRUB SERPL-MCNC: 0.3 MG/DL (ref 0.2–1.2)
BILIRUB SERPL-MCNC: 0.4 MG/DL (ref 0.2–1.2)
BILIRUB SERPL-MCNC: 0.5 MG/DL (ref 0.2–1.2)
BILIRUB SERPL-MCNC: 0.5 MG/DL (ref 0.2–1.2)
BILIRUB SERPL-MCNC: 0.6 MG/DL (ref 0.2–1.2)
BILIRUB UR QL STRIP: NEGATIVE
BLD GP AB SCN SERPL QL: NEGATIVE
BODY TEMPERATURE: 37 C
BUN BLD-MCNC: 10 MG/DL (ref 8–23)
BUN BLD-MCNC: 12 MG/DL (ref 8–23)
BUN BLD-MCNC: 13 MG/DL (ref 8–23)
BUN BLD-MCNC: 14 MG/DL (ref 8–23)
BUN BLD-MCNC: 14 MG/DL (ref 8–23)
BUN BLD-MCNC: 15 MG/DL (ref 8–23)
BUN BLD-MCNC: 18 MG/DL (ref 8–23)
BUN BLD-MCNC: 20 MG/DL (ref 8–23)
BUN BLD-MCNC: 22 MG/DL (ref 8–23)
BUN BLD-MCNC: 24 MG/DL (ref 8–23)
BUN BLD-MCNC: 24 MG/DL (ref 8–23)
BUN BLD-MCNC: 25 MG/DL (ref 8–23)
BUN BLD-MCNC: 26 MG/DL (ref 8–23)
BUN BLD-MCNC: 27 MG/DL (ref 8–23)
BUN BLD-MCNC: 30 MG/DL (ref 8–23)
BUN BLD-MCNC: 31 MG/DL (ref 8–23)
BUN BLD-MCNC: 32 MG/DL (ref 8–23)
BUN BLD-MCNC: 34 MG/DL (ref 8–23)
BUN BLD-MCNC: 35 MG/DL (ref 8–23)
BUN BLD-MCNC: 38 MG/DL (ref 8–23)
BUN BLD-MCNC: 40 MG/DL (ref 8–23)
BUN BLD-MCNC: 43 MG/DL (ref 8–23)
BUN BLD-MCNC: 43 MG/DL (ref 8–23)
BUN BLD-MCNC: 46 MG/DL (ref 8–23)
BUN BLD-MCNC: 48 MG/DL (ref 8–23)
BUN BLD-MCNC: 51 MG/DL (ref 8–23)
BUN BLD-MCNC: 53 MG/DL (ref 8–23)
BUN BLD-MCNC: 58 MG/DL (ref 8–23)
BUN BLD-MCNC: 59 MG/DL (ref 8–23)
BUN BLD-MCNC: 6 MG/DL (ref 8–23)
BUN BLD-MCNC: 61 MG/DL (ref 8–23)
BUN BLD-MCNC: 9 MG/DL (ref 8–23)
BUN/CREAT SERPL: 10.1 (ref 7–25)
BUN/CREAT SERPL: 10.2 (ref 7–25)
BUN/CREAT SERPL: 10.3 (ref 7–25)
BUN/CREAT SERPL: 10.4 (ref 7–25)
BUN/CREAT SERPL: 10.7 (ref 7–25)
BUN/CREAT SERPL: 11.3 (ref 7–25)
BUN/CREAT SERPL: 12 (ref 7–25)
BUN/CREAT SERPL: 12.4 (ref 7–25)
BUN/CREAT SERPL: 12.8 (ref 7–25)
BUN/CREAT SERPL: 13.3 (ref 7–25)
BUN/CREAT SERPL: 13.9 (ref 7–25)
BUN/CREAT SERPL: 14.8 (ref 7–25)
BUN/CREAT SERPL: 15.6 (ref 7–25)
BUN/CREAT SERPL: 2.9 (ref 7–25)
BUN/CREAT SERPL: 3.7 (ref 7–25)
BUN/CREAT SERPL: 4 (ref 7–25)
BUN/CREAT SERPL: 4.3 (ref 7–25)
BUN/CREAT SERPL: 4.5 (ref 7–25)
BUN/CREAT SERPL: 4.7 (ref 7–25)
BUN/CREAT SERPL: 5.6 (ref 7–25)
BUN/CREAT SERPL: 5.9 (ref 7–25)
BUN/CREAT SERPL: 6.3 (ref 7–25)
BUN/CREAT SERPL: 6.8 (ref 7–25)
BUN/CREAT SERPL: 7 (ref 7–25)
BUN/CREAT SERPL: 7 (ref 7–25)
BUN/CREAT SERPL: 7.1 (ref 7–25)
BUN/CREAT SERPL: 7.3 (ref 7–25)
BUN/CREAT SERPL: 7.4 (ref 7–25)
BUN/CREAT SERPL: 7.5 (ref 7–25)
BUN/CREAT SERPL: 8 (ref 7–25)
BUN/CREAT SERPL: 8.1 (ref 7–25)
BUN/CREAT SERPL: 9.6 (ref 7–25)
BUPRENORPHINE SERPL-MCNC: NEGATIVE NG/ML
C PNEUM DNA NPH QL NAA+NON-PROBE: NOT DETECTED
CALCIUM SPEC-SCNC: 10 MG/DL (ref 8.6–10.5)
CALCIUM SPEC-SCNC: 10 MG/DL (ref 8.6–10.5)
CALCIUM SPEC-SCNC: 10.1 MG/DL (ref 8.6–10.5)
CALCIUM SPEC-SCNC: 10.2 MG/DL (ref 8.6–10.5)
CALCIUM SPEC-SCNC: 10.2 MG/DL (ref 8.6–10.5)
CALCIUM SPEC-SCNC: 10.3 MG/DL (ref 8.6–10.5)
CALCIUM SPEC-SCNC: 10.6 MG/DL (ref 8.6–10.5)
CALCIUM SPEC-SCNC: 8.4 MG/DL (ref 8.6–10.5)
CALCIUM SPEC-SCNC: 8.6 MG/DL (ref 8.6–10.5)
CALCIUM SPEC-SCNC: 8.7 MG/DL (ref 8.6–10.5)
CALCIUM SPEC-SCNC: 8.9 MG/DL (ref 8.6–10.5)
CALCIUM SPEC-SCNC: 8.9 MG/DL (ref 8.6–10.5)
CALCIUM SPEC-SCNC: 9 MG/DL (ref 8.6–10.5)
CALCIUM SPEC-SCNC: 9.1 MG/DL (ref 8.6–10.5)
CALCIUM SPEC-SCNC: 9.2 MG/DL (ref 8.6–10.5)
CALCIUM SPEC-SCNC: 9.3 MG/DL (ref 8.6–10.5)
CALCIUM SPEC-SCNC: 9.4 MG/DL (ref 8.6–10.5)
CALCIUM SPEC-SCNC: 9.4 MG/DL (ref 8.6–10.5)
CALCIUM SPEC-SCNC: 9.5 MG/DL (ref 8.6–10.5)
CALCIUM SPEC-SCNC: 9.5 MG/DL (ref 8.6–10.5)
CALCIUM SPEC-SCNC: 9.6 MG/DL (ref 8.6–10.5)
CALCIUM SPEC-SCNC: 9.8 MG/DL (ref 8.6–10.5)
CANNABINOIDS SERPL QL: NEGATIVE
CHLORIDE SERPL-SCNC: 100 MMOL/L (ref 98–107)
CHLORIDE SERPL-SCNC: 102 MMOL/L (ref 98–107)
CHLORIDE SERPL-SCNC: 81 MMOL/L (ref 98–107)
CHLORIDE SERPL-SCNC: 82 MMOL/L (ref 98–107)
CHLORIDE SERPL-SCNC: 83 MMOL/L (ref 98–107)
CHLORIDE SERPL-SCNC: 84 MMOL/L (ref 98–107)
CHLORIDE SERPL-SCNC: 84 MMOL/L (ref 98–107)
CHLORIDE SERPL-SCNC: 85 MMOL/L (ref 98–107)
CHLORIDE SERPL-SCNC: 86 MMOL/L (ref 98–107)
CHLORIDE SERPL-SCNC: 86 MMOL/L (ref 98–107)
CHLORIDE SERPL-SCNC: 87 MMOL/L (ref 98–107)
CHLORIDE SERPL-SCNC: 88 MMOL/L (ref 98–107)
CHLORIDE SERPL-SCNC: 91 MMOL/L (ref 98–107)
CHLORIDE SERPL-SCNC: 93 MMOL/L (ref 98–107)
CHLORIDE SERPL-SCNC: 94 MMOL/L (ref 98–107)
CHLORIDE SERPL-SCNC: 94 MMOL/L (ref 98–107)
CHLORIDE SERPL-SCNC: 95 MMOL/L (ref 98–107)
CHLORIDE SERPL-SCNC: 97 MMOL/L (ref 98–107)
CHLORIDE SERPL-SCNC: 98 MMOL/L (ref 98–107)
CHLORIDE SERPL-SCNC: 98 MMOL/L (ref 98–107)
CHLORIDE SERPL-SCNC: 99 MMOL/L (ref 98–107)
CHLORIDE SERPL-SCNC: 99 MMOL/L (ref 98–107)
CLARITY UR: ABNORMAL
CO2 BLDA-SCNC: 23.5 MMOL/L (ref 22–33)
CO2 BLDA-SCNC: 25.9 MMOL/L (ref 22–33)
CO2 BLDA-SCNC: 26 MMOL/L (ref 22–33)
CO2 BLDA-SCNC: 28.3 MMOL/L (ref 22–33)
CO2 BLDA-SCNC: 28.7 MMOL/L (ref 22–33)
CO2 BLDA-SCNC: 29.2 MMOL/L (ref 22–33)
CO2 BLDA-SCNC: 30.3 MMOL/L (ref 22–33)
CO2 BLDA-SCNC: 30.8 MMOL/L (ref 22–33)
CO2 BLDA-SCNC: 31.1 MMOL/L (ref 22–33)
CO2 BLDA-SCNC: 32 MMOL/L (ref 22–33)
CO2 BLDA-SCNC: 36.3 MMOL/L (ref 22–33)
CO2 SERPL-SCNC: 18 MMOL/L (ref 22–29)
CO2 SERPL-SCNC: 19 MMOL/L (ref 22–29)
CO2 SERPL-SCNC: 19 MMOL/L (ref 22–29)
CO2 SERPL-SCNC: 20 MMOL/L (ref 22–29)
CO2 SERPL-SCNC: 20 MMOL/L (ref 22–29)
CO2 SERPL-SCNC: 20.1 MMOL/L (ref 22–29)
CO2 SERPL-SCNC: 20.2 MMOL/L (ref 22–29)
CO2 SERPL-SCNC: 21 MMOL/L (ref 22–29)
CO2 SERPL-SCNC: 21 MMOL/L (ref 22–29)
CO2 SERPL-SCNC: 21.8 MMOL/L (ref 22–29)
CO2 SERPL-SCNC: 22 MMOL/L (ref 22–29)
CO2 SERPL-SCNC: 22.2 MMOL/L (ref 22–29)
CO2 SERPL-SCNC: 23 MMOL/L (ref 22–29)
CO2 SERPL-SCNC: 23.8 MMOL/L (ref 22–29)
CO2 SERPL-SCNC: 24 MMOL/L (ref 22–29)
CO2 SERPL-SCNC: 25 MMOL/L (ref 22–29)
CO2 SERPL-SCNC: 26 MMOL/L (ref 22–29)
CO2 SERPL-SCNC: 28 MMOL/L (ref 22–29)
CO2 SERPL-SCNC: 28.3 MMOL/L (ref 22–29)
CO2 SERPL-SCNC: 30.1 MMOL/L (ref 22–29)
CO2 SERPL-SCNC: 31 MMOL/L (ref 22–29)
CO2 SERPL-SCNC: 31 MMOL/L (ref 22–29)
CO2 SERPL-SCNC: 31.4 MMOL/L (ref 22–29)
CO2 SERPL-SCNC: 32.6 MMOL/L (ref 22–29)
CO2 SERPL-SCNC: 33.8 MMOL/L (ref 22–29)
CO2 SERPL-SCNC: 33.8 MMOL/L (ref 22–29)
COCAINE UR QL: NEGATIVE
COHGB MFR BLD: 1.3 %
COHGB MFR BLD: 1.6 % (ref 0–2)
COHGB MFR BLD: 1.7 % (ref 0–2)
COHGB MFR BLD: 1.8 % (ref 0–2)
COHGB MFR BLD: 1.8 % (ref 0–5)
COHGB MFR BLD: 1.9 % (ref 0–2)
COHGB MFR BLD: 2.2 % (ref 0–2)
COHGB MFR BLD: 2.5 % (ref 0–2)
COLOR UR: ABNORMAL
CORTIS SERPL-MCNC: 18.04 MCG/DL
CPAP: 5 CMH2O
CPAP: 8 CMH2O
CREAT BLD-MCNC: 2.03 MG/DL (ref 0.76–1.27)
CREAT BLD-MCNC: 2.05 MG/DL (ref 0.76–1.27)
CREAT BLD-MCNC: 2.11 MG/DL (ref 0.76–1.27)
CREAT BLD-MCNC: 2.19 MG/DL (ref 0.76–1.27)
CREAT BLD-MCNC: 2.36 MG/DL (ref 0.76–1.27)
CREAT BLD-MCNC: 2.42 MG/DL (ref 0.76–1.27)
CREAT BLD-MCNC: 2.46 MG/DL (ref 0.76–1.27)
CREAT BLD-MCNC: 2.51 MG/DL (ref 0.76–1.27)
CREAT BLD-MCNC: 2.53 MG/DL (ref 0.76–1.27)
CREAT BLD-MCNC: 2.65 MG/DL (ref 0.76–1.27)
CREAT BLD-MCNC: 2.77 MG/DL (ref 0.76–1.27)
CREAT BLD-MCNC: 2.92 MG/DL (ref 0.76–1.27)
CREAT BLD-MCNC: 3.1 MG/DL (ref 0.76–1.27)
CREAT BLD-MCNC: 3.32 MG/DL (ref 0.76–1.27)
CREAT BLD-MCNC: 3.46 MG/DL (ref 0.76–1.27)
CREAT BLD-MCNC: 3.54 MG/DL (ref 0.76–1.27)
CREAT BLD-MCNC: 3.58 MG/DL (ref 0.76–1.27)
CREAT BLD-MCNC: 3.59 MG/DL (ref 0.76–1.27)
CREAT BLD-MCNC: 3.67 MG/DL (ref 0.76–1.27)
CREAT BLD-MCNC: 3.7 MG/DL (ref 0.76–1.27)
CREAT BLD-MCNC: 3.76 MG/DL (ref 0.76–1.27)
CREAT BLD-MCNC: 3.78 MG/DL (ref 0.76–1.27)
CREAT BLD-MCNC: 3.78 MG/DL (ref 0.76–1.27)
CREAT BLD-MCNC: 3.83 MG/DL (ref 0.76–1.27)
CREAT BLD-MCNC: 3.86 MG/DL (ref 0.76–1.27)
CREAT BLD-MCNC: 4.36 MG/DL (ref 0.76–1.27)
CREAT BLD-MCNC: 4.48 MG/DL (ref 0.76–1.27)
CREAT BLD-MCNC: 4.75 MG/DL (ref 0.76–1.27)
CREAT BLD-MCNC: 4.78 MG/DL (ref 0.76–1.27)
CREAT BLD-MCNC: 4.8 MG/DL (ref 0.76–1.27)
CREAT BLD-MCNC: 5.67 MG/DL (ref 0.76–1.27)
CREAT BLD-MCNC: 5.92 MG/DL (ref 0.76–1.27)
CROSSMATCH INTERPRETATION: NORMAL
CRP SERPL-MCNC: 5.38 MG/DL (ref 0–0.5)
CYTO UR: NORMAL
D-LACTATE SERPL-SCNC: 0.6 MMOL/L (ref 0.5–2)
D-LACTATE SERPL-SCNC: 0.9 MMOL/L (ref 0.5–2)
D-LACTATE SERPL-SCNC: 1.2 MMOL/L (ref 0.5–2)
D-LACTATE SERPL-SCNC: 1.3 MMOL/L (ref 0.5–2)
D-LACTATE SERPL-SCNC: 1.3 MMOL/L (ref 0.5–2)
D-LACTATE SERPL-SCNC: 1.6 MMOL/L (ref 0.5–2)
D-LACTATE SERPL-SCNC: 3.6 MMOL/L (ref 0.5–2)
DEPRECATED RDW RBC AUTO: 53.2 FL (ref 37–54)
DEPRECATED RDW RBC AUTO: 53.5 FL (ref 37–54)
DEPRECATED RDW RBC AUTO: 54.4 FL (ref 37–54)
DEPRECATED RDW RBC AUTO: 54.6 FL (ref 37–54)
DEPRECATED RDW RBC AUTO: 54.9 FL (ref 37–54)
DEPRECATED RDW RBC AUTO: 55 FL (ref 37–54)
DEPRECATED RDW RBC AUTO: 55.3 FL (ref 37–54)
DEPRECATED RDW RBC AUTO: 55.5 FL (ref 37–54)
DEPRECATED RDW RBC AUTO: 55.9 FL (ref 37–54)
DEPRECATED RDW RBC AUTO: 56.2 FL (ref 37–54)
DEPRECATED RDW RBC AUTO: 56.4 FL (ref 37–54)
DEPRECATED RDW RBC AUTO: 56.5 FL (ref 37–54)
DEPRECATED RDW RBC AUTO: 57.2 FL (ref 37–54)
DEPRECATED RDW RBC AUTO: 57.5 FL (ref 37–54)
DEPRECATED RDW RBC AUTO: 58.2 FL (ref 37–54)
DEPRECATED RDW RBC AUTO: 58.3 FL (ref 37–54)
DEPRECATED RDW RBC AUTO: 59.4 FL (ref 37–54)
DEPRECATED RDW RBC AUTO: 59.6 FL (ref 37–54)
DEPRECATED RDW RBC AUTO: 59.8 FL (ref 37–54)
DEPRECATED RDW RBC AUTO: 60.8 FL (ref 37–54)
DEPRECATED RDW RBC AUTO: 61.4 FL (ref 37–54)
DEPRECATED RDW RBC AUTO: 61.8 FL (ref 37–54)
DEPRECATED RDW RBC AUTO: 61.8 FL (ref 37–54)
DEPRECATED RDW RBC AUTO: 62.3 FL (ref 37–54)
DEPRECATED RDW RBC AUTO: 62.4 FL (ref 37–54)
DEPRECATED RDW RBC AUTO: 62.5 FL (ref 37–54)
DEPRECATED RDW RBC AUTO: 62.5 FL (ref 37–54)
DEPRECATED RDW RBC AUTO: 65 FL (ref 37–54)
EOSINOPHIL # BLD AUTO: 0.03 10*3/MM3 (ref 0–0.4)
EOSINOPHIL # BLD AUTO: 0.13 10*3/MM3 (ref 0–0.4)
EOSINOPHIL # BLD AUTO: 0.15 10*3/MM3 (ref 0–0.4)
EOSINOPHIL # BLD AUTO: 0.15 10*3/MM3 (ref 0–0.4)
EOSINOPHIL # BLD AUTO: 0.16 10*3/MM3 (ref 0–0.4)
EOSINOPHIL # BLD AUTO: 0.2 10*3/MM3 (ref 0–0.4)
EOSINOPHIL # BLD AUTO: 0.21 10*3/MM3 (ref 0–0.4)
EOSINOPHIL # BLD AUTO: 0.23 10*3/MM3 (ref 0–0.4)
EOSINOPHIL # BLD AUTO: 0.29 10*3/MM3 (ref 0–0.4)
EOSINOPHIL # BLD AUTO: 0.3 10*3/MM3 (ref 0–0.4)
EOSINOPHIL # BLD AUTO: 0.32 10*3/MM3 (ref 0–0.4)
EOSINOPHIL # BLD AUTO: 0.33 10*3/MM3 (ref 0–0.4)
EOSINOPHIL # BLD AUTO: 0.35 10*3/MM3 (ref 0–0.4)
EOSINOPHIL # BLD AUTO: 0.35 10*3/MM3 (ref 0–0.4)
EOSINOPHIL # BLD AUTO: 0.36 10*3/MM3 (ref 0–0.4)
EOSINOPHIL # BLD AUTO: 0.36 10*3/MM3 (ref 0–0.4)
EOSINOPHIL # BLD AUTO: 0.37 10*3/MM3 (ref 0–0.4)
EOSINOPHIL # BLD AUTO: 0.44 10*3/MM3 (ref 0–0.4)
EOSINOPHIL # BLD AUTO: 0.44 10*3/MM3 (ref 0–0.4)
EOSINOPHIL # BLD AUTO: 0.47 10*3/MM3 (ref 0–0.4)
EOSINOPHIL # BLD AUTO: 0.58 10*3/MM3 (ref 0–0.4)
EOSINOPHIL # BLD AUTO: 0.6 10*3/MM3 (ref 0–0.4)
EOSINOPHIL # BLD AUTO: 0.76 10*3/MM3 (ref 0–0.4)
EOSINOPHIL # BLD AUTO: 0.8 10*3/MM3 (ref 0–0.4)
EOSINOPHIL NFR BLD AUTO: 0.3 % (ref 0.3–6.2)
EOSINOPHIL NFR BLD AUTO: 1.9 % (ref 0.3–6.2)
EOSINOPHIL NFR BLD AUTO: 1.9 % (ref 0.3–6.2)
EOSINOPHIL NFR BLD AUTO: 2.4 % (ref 0.3–6.2)
EOSINOPHIL NFR BLD AUTO: 2.4 % (ref 0.3–6.2)
EOSINOPHIL NFR BLD AUTO: 2.5 % (ref 0.3–6.2)
EOSINOPHIL NFR BLD AUTO: 3.4 % (ref 0.3–6.2)
EOSINOPHIL NFR BLD AUTO: 3.8 % (ref 0.3–6.2)
EOSINOPHIL NFR BLD AUTO: 4.3 % (ref 0.3–6.2)
EOSINOPHIL NFR BLD AUTO: 4.4 % (ref 0.3–6.2)
EOSINOPHIL NFR BLD AUTO: 4.6 % (ref 0.3–6.2)
EOSINOPHIL NFR BLD AUTO: 4.7 % (ref 0.3–6.2)
EOSINOPHIL NFR BLD AUTO: 5.1 % (ref 0.3–6.2)
EOSINOPHIL NFR BLD AUTO: 5.4 % (ref 0.3–6.2)
EOSINOPHIL NFR BLD AUTO: 5.8 % (ref 0.3–6.2)
EOSINOPHIL NFR BLD AUTO: 5.8 % (ref 0.3–6.2)
EOSINOPHIL NFR BLD AUTO: 6.1 % (ref 0.3–6.2)
EOSINOPHIL NFR BLD AUTO: 6.2 % (ref 0.3–6.2)
EOSINOPHIL NFR BLD AUTO: 6.8 % (ref 0.3–6.2)
EOSINOPHIL NFR BLD AUTO: 7.6 % (ref 0.3–6.2)
EOSINOPHIL NFR BLD AUTO: 9 % (ref 0.3–6.2)
EOSINOPHIL NFR BLD AUTO: 9.7 % (ref 0.3–6.2)
EPAP: 0
ERYTHROCYTE [DISTWIDTH] IN BLOOD BY AUTOMATED COUNT: 15.9 % (ref 12.3–15.4)
ERYTHROCYTE [DISTWIDTH] IN BLOOD BY AUTOMATED COUNT: 16 % (ref 12.3–15.4)
ERYTHROCYTE [DISTWIDTH] IN BLOOD BY AUTOMATED COUNT: 16.1 % (ref 12.3–15.4)
ERYTHROCYTE [DISTWIDTH] IN BLOOD BY AUTOMATED COUNT: 16.5 % (ref 12.3–15.4)
ERYTHROCYTE [DISTWIDTH] IN BLOOD BY AUTOMATED COUNT: 16.6 % (ref 12.3–15.4)
ERYTHROCYTE [DISTWIDTH] IN BLOOD BY AUTOMATED COUNT: 16.7 % (ref 12.3–15.4)
ERYTHROCYTE [DISTWIDTH] IN BLOOD BY AUTOMATED COUNT: 16.8 % (ref 12.3–15.4)
ERYTHROCYTE [DISTWIDTH] IN BLOOD BY AUTOMATED COUNT: 16.9 % (ref 12.3–15.4)
ERYTHROCYTE [DISTWIDTH] IN BLOOD BY AUTOMATED COUNT: 16.9 % (ref 12.3–15.4)
ERYTHROCYTE [DISTWIDTH] IN BLOOD BY AUTOMATED COUNT: 17.1 % (ref 12.3–15.4)
ERYTHROCYTE [DISTWIDTH] IN BLOOD BY AUTOMATED COUNT: 17.1 % (ref 12.3–15.4)
ERYTHROCYTE [DISTWIDTH] IN BLOOD BY AUTOMATED COUNT: 17.2 % (ref 12.3–15.4)
ERYTHROCYTE [DISTWIDTH] IN BLOOD BY AUTOMATED COUNT: 17.4 % (ref 12.3–15.4)
ERYTHROCYTE [DISTWIDTH] IN BLOOD BY AUTOMATED COUNT: 17.6 % (ref 12.3–15.4)
ERYTHROCYTE [DISTWIDTH] IN BLOOD BY AUTOMATED COUNT: 17.6 % (ref 12.3–15.4)
ERYTHROCYTE [DISTWIDTH] IN BLOOD BY AUTOMATED COUNT: 17.8 % (ref 12.3–15.4)
ERYTHROCYTE [DISTWIDTH] IN BLOOD BY AUTOMATED COUNT: 17.8 % (ref 12.3–15.4)
ERYTHROCYTE [DISTWIDTH] IN BLOOD BY AUTOMATED COUNT: 17.9 % (ref 12.3–15.4)
ERYTHROCYTE [DISTWIDTH] IN BLOOD BY AUTOMATED COUNT: 18 % (ref 12.3–15.4)
ERYTHROCYTE [DISTWIDTH] IN BLOOD BY AUTOMATED COUNT: 18 % (ref 12.3–15.4)
ERYTHROCYTE [DISTWIDTH] IN BLOOD BY AUTOMATED COUNT: 18.2 % (ref 12.3–15.4)
ERYTHROCYTE [DISTWIDTH] IN BLOOD BY AUTOMATED COUNT: 18.2 % (ref 12.3–15.4)
ERYTHROCYTE [DISTWIDTH] IN BLOOD BY AUTOMATED COUNT: 18.3 % (ref 12.3–15.4)
ERYTHROCYTE [DISTWIDTH] IN BLOOD BY AUTOMATED COUNT: 18.8 % (ref 12.3–15.4)
ERYTHROCYTE [DISTWIDTH] IN BLOOD BY AUTOMATED COUNT: 20.1 % (ref 12.3–15.4)
ETHANOL BLD-MCNC: <10 MG/DL (ref 0–10)
ETHANOL BLD-MCNC: <10 MG/DL (ref 0–10)
ETHANOL UR QL: <0.01 %
FLUAV AG NPH QL: NEGATIVE
FLUAV AG NPH QL: NEGATIVE
FLUAV H1 2009 PAND RNA NPH QL NAA+PROBE: NOT DETECTED
FLUAV H1 HA GENE NPH QL NAA+PROBE: NOT DETECTED
FLUAV H3 RNA NPH QL NAA+PROBE: NOT DETECTED
FLUAV SUBTYP SPEC NAA+PROBE: NOT DETECTED
FLUBV AG NPH QL IA: NEGATIVE
FLUBV AG NPH QL IA: NEGATIVE
FLUBV RNA ISLT QL NAA+PROBE: NOT DETECTED
GAS FLOW AIRWAY: 3 LPM
GFR SERPL CREATININE-BSD FRML MDRD: 10 ML/MIN/1.73
GFR SERPL CREATININE-BSD FRML MDRD: 10 ML/MIN/1.73
GFR SERPL CREATININE-BSD FRML MDRD: 12 ML/MIN/1.73
GFR SERPL CREATININE-BSD FRML MDRD: 13 ML/MIN/1.73
GFR SERPL CREATININE-BSD FRML MDRD: 13 ML/MIN/1.73
GFR SERPL CREATININE-BSD FRML MDRD: 14 ML/MIN/1.73
GFR SERPL CREATININE-BSD FRML MDRD: 14 ML/MIN/1.73
GFR SERPL CREATININE-BSD FRML MDRD: 16 ML/MIN/1.73
GFR SERPL CREATININE-BSD FRML MDRD: 17 ML/MIN/1.73
GFR SERPL CREATININE-BSD FRML MDRD: 18 ML/MIN/1.73
GFR SERPL CREATININE-BSD FRML MDRD: 18 ML/MIN/1.73
GFR SERPL CREATININE-BSD FRML MDRD: 19 ML/MIN/1.73
GFR SERPL CREATININE-BSD FRML MDRD: 21 ML/MIN/1.73
GFR SERPL CREATININE-BSD FRML MDRD: 22 ML/MIN/1.73
GFR SERPL CREATININE-BSD FRML MDRD: 24 ML/MIN/1.73
GFR SERPL CREATININE-BSD FRML MDRD: 25 ML/MIN/1.73
GFR SERPL CREATININE-BSD FRML MDRD: 26 ML/MIN/1.73
GFR SERPL CREATININE-BSD FRML MDRD: 26 ML/MIN/1.73
GFR SERPL CREATININE-BSD FRML MDRD: 27 ML/MIN/1.73
GFR SERPL CREATININE-BSD FRML MDRD: 27 ML/MIN/1.73
GFR SERPL CREATININE-BSD FRML MDRD: 28 ML/MIN/1.73
GFR SERPL CREATININE-BSD FRML MDRD: 31 ML/MIN/1.73
GFR SERPL CREATININE-BSD FRML MDRD: 32 ML/MIN/1.73
GFR SERPL CREATININE-BSD FRML MDRD: 33 ML/MIN/1.73
GFR SERPL CREATININE-BSD FRML MDRD: 34 ML/MIN/1.73
GFR SERPL CREATININE-BSD FRML MDRD: ABNORMAL ML/MIN/1.73
GFR SERPL CREATININE-BSD FRML MDRD: ABNORMAL ML/MIN/{1.73_M2}
GLOBULIN UR ELPH-MCNC: 3.1 GM/DL
GLOBULIN UR ELPH-MCNC: 3.4 GM/DL
GLOBULIN UR ELPH-MCNC: 3.6 GM/DL
GLOBULIN UR ELPH-MCNC: 3.7 GM/DL
GLOBULIN UR ELPH-MCNC: 3.8 GM/DL
GLOBULIN UR ELPH-MCNC: 3.9 GM/DL
GLOBULIN UR ELPH-MCNC: 4 GM/DL
GLOBULIN UR ELPH-MCNC: 4.1 GM/DL
GLOBULIN UR ELPH-MCNC: 4.2 GM/DL
GLUCOSE BLD-MCNC: 102 MG/DL (ref 65–99)
GLUCOSE BLD-MCNC: 103 MG/DL (ref 65–99)
GLUCOSE BLD-MCNC: 115 MG/DL (ref 65–99)
GLUCOSE BLD-MCNC: 123 MG/DL (ref 65–99)
GLUCOSE BLD-MCNC: 136 MG/DL (ref 65–99)
GLUCOSE BLD-MCNC: 165 MG/DL (ref 65–99)
GLUCOSE BLD-MCNC: 171 MG/DL (ref 65–99)
GLUCOSE BLD-MCNC: 181 MG/DL (ref 65–99)
GLUCOSE BLD-MCNC: 181 MG/DL (ref 65–99)
GLUCOSE BLD-MCNC: 184 MG/DL (ref 65–99)
GLUCOSE BLD-MCNC: 199 MG/DL (ref 65–99)
GLUCOSE BLD-MCNC: 207 MG/DL (ref 65–99)
GLUCOSE BLD-MCNC: 213 MG/DL (ref 65–99)
GLUCOSE BLD-MCNC: 217 MG/DL (ref 65–99)
GLUCOSE BLD-MCNC: 217 MG/DL (ref 65–99)
GLUCOSE BLD-MCNC: 222 MG/DL (ref 65–99)
GLUCOSE BLD-MCNC: 230 MG/DL (ref 65–99)
GLUCOSE BLD-MCNC: 236 MG/DL (ref 65–99)
GLUCOSE BLD-MCNC: 240 MG/DL (ref 65–99)
GLUCOSE BLD-MCNC: 246 MG/DL (ref 65–99)
GLUCOSE BLD-MCNC: 251 MG/DL (ref 65–99)
GLUCOSE BLD-MCNC: 252 MG/DL (ref 65–99)
GLUCOSE BLD-MCNC: 275 MG/DL (ref 65–99)
GLUCOSE BLD-MCNC: 317 MG/DL (ref 65–99)
GLUCOSE BLD-MCNC: 330 MG/DL (ref 65–99)
GLUCOSE BLD-MCNC: 372 MG/DL (ref 65–99)
GLUCOSE BLD-MCNC: 396 MG/DL (ref 65–99)
GLUCOSE BLD-MCNC: 501 MG/DL (ref 65–99)
GLUCOSE BLD-MCNC: 55 MG/DL (ref 65–99)
GLUCOSE BLD-MCNC: 95 MG/DL (ref 65–99)
GLUCOSE BLD-MCNC: 96 MG/DL (ref 65–99)
GLUCOSE BLD-MCNC: 98 MG/DL (ref 65–99)
GLUCOSE BLDC GLUCOMTR-MCNC: 100 MG/DL (ref 70–130)
GLUCOSE BLDC GLUCOMTR-MCNC: 104 MG/DL (ref 70–130)
GLUCOSE BLDC GLUCOMTR-MCNC: 105 MG/DL (ref 70–130)
GLUCOSE BLDC GLUCOMTR-MCNC: 105 MG/DL (ref 70–130)
GLUCOSE BLDC GLUCOMTR-MCNC: 106 MG/DL (ref 70–130)
GLUCOSE BLDC GLUCOMTR-MCNC: 106 MG/DL (ref 70–130)
GLUCOSE BLDC GLUCOMTR-MCNC: 107 MG/DL (ref 70–130)
GLUCOSE BLDC GLUCOMTR-MCNC: 108 MG/DL (ref 70–130)
GLUCOSE BLDC GLUCOMTR-MCNC: 111 MG/DL (ref 70–130)
GLUCOSE BLDC GLUCOMTR-MCNC: 115 MG/DL (ref 70–130)
GLUCOSE BLDC GLUCOMTR-MCNC: 116 MG/DL (ref 70–130)
GLUCOSE BLDC GLUCOMTR-MCNC: 116 MG/DL (ref 70–130)
GLUCOSE BLDC GLUCOMTR-MCNC: 117 MG/DL (ref 70–130)
GLUCOSE BLDC GLUCOMTR-MCNC: 117 MG/DL (ref 70–130)
GLUCOSE BLDC GLUCOMTR-MCNC: 120 MG/DL (ref 70–130)
GLUCOSE BLDC GLUCOMTR-MCNC: 120 MG/DL (ref 70–130)
GLUCOSE BLDC GLUCOMTR-MCNC: 121 MG/DL (ref 70–130)
GLUCOSE BLDC GLUCOMTR-MCNC: 127 MG/DL (ref 70–130)
GLUCOSE BLDC GLUCOMTR-MCNC: 128 MG/DL (ref 70–130)
GLUCOSE BLDC GLUCOMTR-MCNC: 129 MG/DL (ref 70–130)
GLUCOSE BLDC GLUCOMTR-MCNC: 132 MG/DL (ref 70–130)
GLUCOSE BLDC GLUCOMTR-MCNC: 132 MG/DL (ref 70–130)
GLUCOSE BLDC GLUCOMTR-MCNC: 135 MG/DL (ref 70–130)
GLUCOSE BLDC GLUCOMTR-MCNC: 136 MG/DL (ref 70–130)
GLUCOSE BLDC GLUCOMTR-MCNC: 137 MG/DL (ref 70–130)
GLUCOSE BLDC GLUCOMTR-MCNC: 139 MG/DL (ref 70–130)
GLUCOSE BLDC GLUCOMTR-MCNC: 139 MG/DL (ref 70–130)
GLUCOSE BLDC GLUCOMTR-MCNC: 140 MG/DL (ref 70–130)
GLUCOSE BLDC GLUCOMTR-MCNC: 140 MG/DL (ref 70–130)
GLUCOSE BLDC GLUCOMTR-MCNC: 141 MG/DL (ref 70–130)
GLUCOSE BLDC GLUCOMTR-MCNC: 144 MG/DL (ref 70–130)
GLUCOSE BLDC GLUCOMTR-MCNC: 145 MG/DL (ref 70–130)
GLUCOSE BLDC GLUCOMTR-MCNC: 147 MG/DL (ref 70–130)
GLUCOSE BLDC GLUCOMTR-MCNC: 149 MG/DL (ref 70–130)
GLUCOSE BLDC GLUCOMTR-MCNC: 150 MG/DL (ref 70–130)
GLUCOSE BLDC GLUCOMTR-MCNC: 151 MG/DL (ref 70–130)
GLUCOSE BLDC GLUCOMTR-MCNC: 151 MG/DL (ref 70–130)
GLUCOSE BLDC GLUCOMTR-MCNC: 155 MG/DL (ref 70–130)
GLUCOSE BLDC GLUCOMTR-MCNC: 157 MG/DL (ref 70–130)
GLUCOSE BLDC GLUCOMTR-MCNC: 162 MG/DL (ref 70–130)
GLUCOSE BLDC GLUCOMTR-MCNC: 163 MG/DL (ref 70–130)
GLUCOSE BLDC GLUCOMTR-MCNC: 164 MG/DL (ref 70–130)
GLUCOSE BLDC GLUCOMTR-MCNC: 172 MG/DL (ref 70–130)
GLUCOSE BLDC GLUCOMTR-MCNC: 172 MG/DL (ref 70–130)
GLUCOSE BLDC GLUCOMTR-MCNC: 177 MG/DL (ref 70–130)
GLUCOSE BLDC GLUCOMTR-MCNC: 178 MG/DL (ref 70–130)
GLUCOSE BLDC GLUCOMTR-MCNC: 179 MG/DL (ref 70–130)
GLUCOSE BLDC GLUCOMTR-MCNC: 183 MG/DL (ref 70–130)
GLUCOSE BLDC GLUCOMTR-MCNC: 186 MG/DL (ref 70–130)
GLUCOSE BLDC GLUCOMTR-MCNC: 186 MG/DL (ref 70–130)
GLUCOSE BLDC GLUCOMTR-MCNC: 187 MG/DL (ref 70–130)
GLUCOSE BLDC GLUCOMTR-MCNC: 188 MG/DL (ref 70–130)
GLUCOSE BLDC GLUCOMTR-MCNC: 189 MG/DL (ref 70–130)
GLUCOSE BLDC GLUCOMTR-MCNC: 191 MG/DL (ref 70–130)
GLUCOSE BLDC GLUCOMTR-MCNC: 192 MG/DL (ref 70–130)
GLUCOSE BLDC GLUCOMTR-MCNC: 193 MG/DL (ref 70–130)
GLUCOSE BLDC GLUCOMTR-MCNC: 194 MG/DL (ref 70–130)
GLUCOSE BLDC GLUCOMTR-MCNC: 194 MG/DL (ref 70–130)
GLUCOSE BLDC GLUCOMTR-MCNC: 199 MG/DL (ref 70–130)
GLUCOSE BLDC GLUCOMTR-MCNC: 200 MG/DL (ref 70–130)
GLUCOSE BLDC GLUCOMTR-MCNC: 200 MG/DL (ref 70–130)
GLUCOSE BLDC GLUCOMTR-MCNC: 201 MG/DL (ref 70–130)
GLUCOSE BLDC GLUCOMTR-MCNC: 203 MG/DL (ref 70–130)
GLUCOSE BLDC GLUCOMTR-MCNC: 207 MG/DL (ref 70–130)
GLUCOSE BLDC GLUCOMTR-MCNC: 207 MG/DL (ref 70–130)
GLUCOSE BLDC GLUCOMTR-MCNC: 215 MG/DL (ref 70–130)
GLUCOSE BLDC GLUCOMTR-MCNC: 216 MG/DL (ref 70–130)
GLUCOSE BLDC GLUCOMTR-MCNC: 217 MG/DL (ref 70–130)
GLUCOSE BLDC GLUCOMTR-MCNC: 218 MG/DL (ref 70–130)
GLUCOSE BLDC GLUCOMTR-MCNC: 222 MG/DL (ref 70–130)
GLUCOSE BLDC GLUCOMTR-MCNC: 224 MG/DL (ref 70–130)
GLUCOSE BLDC GLUCOMTR-MCNC: 226 MG/DL (ref 70–130)
GLUCOSE BLDC GLUCOMTR-MCNC: 229 MG/DL (ref 70–130)
GLUCOSE BLDC GLUCOMTR-MCNC: 230 MG/DL (ref 70–130)
GLUCOSE BLDC GLUCOMTR-MCNC: 230 MG/DL (ref 70–130)
GLUCOSE BLDC GLUCOMTR-MCNC: 232 MG/DL (ref 70–130)
GLUCOSE BLDC GLUCOMTR-MCNC: 233 MG/DL (ref 70–130)
GLUCOSE BLDC GLUCOMTR-MCNC: 237 MG/DL (ref 70–130)
GLUCOSE BLDC GLUCOMTR-MCNC: 244 MG/DL (ref 70–130)
GLUCOSE BLDC GLUCOMTR-MCNC: 250 MG/DL (ref 70–130)
GLUCOSE BLDC GLUCOMTR-MCNC: 264 MG/DL (ref 70–130)
GLUCOSE BLDC GLUCOMTR-MCNC: 272 MG/DL (ref 70–130)
GLUCOSE BLDC GLUCOMTR-MCNC: 278 MG/DL (ref 70–130)
GLUCOSE BLDC GLUCOMTR-MCNC: 294 MG/DL (ref 70–130)
GLUCOSE BLDC GLUCOMTR-MCNC: 336 MG/DL (ref 70–130)
GLUCOSE BLDC GLUCOMTR-MCNC: 336 MG/DL (ref 70–130)
GLUCOSE BLDC GLUCOMTR-MCNC: 357 MG/DL (ref 70–130)
GLUCOSE BLDC GLUCOMTR-MCNC: 44 MG/DL (ref 70–130)
GLUCOSE BLDC GLUCOMTR-MCNC: 46 MG/DL (ref 70–130)
GLUCOSE BLDC GLUCOMTR-MCNC: 514 MG/DL (ref 70–130)
GLUCOSE BLDC GLUCOMTR-MCNC: 55 MG/DL (ref 70–130)
GLUCOSE BLDC GLUCOMTR-MCNC: 59 MG/DL (ref 70–130)
GLUCOSE BLDC GLUCOMTR-MCNC: 66 MG/DL (ref 70–130)
GLUCOSE BLDC GLUCOMTR-MCNC: 70 MG/DL (ref 70–130)
GLUCOSE BLDC GLUCOMTR-MCNC: 73 MG/DL (ref 70–130)
GLUCOSE BLDC GLUCOMTR-MCNC: 74 MG/DL (ref 70–130)
GLUCOSE BLDC GLUCOMTR-MCNC: 76 MG/DL (ref 70–130)
GLUCOSE BLDC GLUCOMTR-MCNC: 82 MG/DL (ref 70–130)
GLUCOSE BLDC GLUCOMTR-MCNC: 85 MG/DL (ref 70–130)
GLUCOSE BLDC GLUCOMTR-MCNC: 85 MG/DL (ref 70–130)
GLUCOSE BLDC GLUCOMTR-MCNC: 89 MG/DL (ref 70–130)
GLUCOSE BLDC GLUCOMTR-MCNC: 96 MG/DL (ref 70–130)
GLUCOSE BLDC GLUCOMTR-MCNC: 96 MG/DL (ref 70–130)
GLUCOSE BLDC GLUCOMTR-MCNC: 98 MG/DL (ref 70–130)
GLUCOSE UR STRIP-MCNC: ABNORMAL MG/DL
HADV DNA SPEC NAA+PROBE: NOT DETECTED
HAV IGM SERPL QL IA: NORMAL
HAV IGM SERPL QL IA: NORMAL
HBA1C MFR BLD: 8.9 % (ref 4.8–5.6)
HBV CORE IGM SERPL QL IA: NORMAL
HBV CORE IGM SERPL QL IA: NORMAL
HBV SURFACE AG SERPL QL IA: NORMAL
HBV SURFACE AG SERPL QL IA: NORMAL
HCO3 BLDA-SCNC: 22.5 MMOL/L (ref 20–26)
HCO3 BLDA-SCNC: 24.9 MMOL/L (ref 20–26)
HCO3 BLDA-SCNC: 25.1 MMOL/L (ref 20–26)
HCO3 BLDA-SCNC: 25.6 MMOL/L (ref 22–28)
HCO3 BLDA-SCNC: 27.3 MMOL/L (ref 20–26)
HCO3 BLDA-SCNC: 27.4 MMOL/L (ref 20–26)
HCO3 BLDA-SCNC: 28 MMOL/L (ref 20–26)
HCO3 BLDA-SCNC: 28.9 MMOL/L (ref 20–26)
HCO3 BLDA-SCNC: 29.5 MMOL/L (ref 20–26)
HCO3 BLDA-SCNC: 29.7 MMOL/L (ref 20–26)
HCO3 BLDA-SCNC: 30.6 MMOL/L (ref 20–26)
HCO3 BLDA-SCNC: 37.1 MMOL/L (ref 22–28)
HCO3 BLDV-SCNC: 22.7 MMOL/L (ref 22–28)
HCO3 BLDV-SCNC: 34.4 MMOL/L (ref 22–28)
HCO3 BLDV-SCNC: 35.7 MMOL/L (ref 22–28)
HCOV 229E RNA SPEC QL NAA+PROBE: NOT DETECTED
HCOV HKU1 RNA SPEC QL NAA+PROBE: NOT DETECTED
HCOV NL63 RNA SPEC QL NAA+PROBE: NOT DETECTED
HCOV OC43 RNA SPEC QL NAA+PROBE: NOT DETECTED
HCT VFR BLD AUTO: 20.6 % (ref 37.5–51)
HCT VFR BLD AUTO: 23.3 % (ref 37.5–51)
HCT VFR BLD AUTO: 24.4 % (ref 37.5–51)
HCT VFR BLD AUTO: 24.5 % (ref 37.5–51)
HCT VFR BLD AUTO: 24.5 % (ref 37.5–51)
HCT VFR BLD AUTO: 24.7 % (ref 37.5–51)
HCT VFR BLD AUTO: 25.3 % (ref 37.5–51)
HCT VFR BLD AUTO: 25.3 % (ref 37.5–51)
HCT VFR BLD AUTO: 25.6 % (ref 37.5–51)
HCT VFR BLD AUTO: 25.6 % (ref 37.5–51)
HCT VFR BLD AUTO: 25.7 % (ref 37.5–51)
HCT VFR BLD AUTO: 26 % (ref 37.5–51)
HCT VFR BLD AUTO: 26.3 % (ref 37.5–51)
HCT VFR BLD AUTO: 26.6 % (ref 37.5–51)
HCT VFR BLD AUTO: 26.7 % (ref 37.5–51)
HCT VFR BLD AUTO: 26.9 % (ref 37.5–51)
HCT VFR BLD AUTO: 27.7 % (ref 37.5–51)
HCT VFR BLD AUTO: 27.7 % (ref 37.5–51)
HCT VFR BLD AUTO: 28 % (ref 37.5–51)
HCT VFR BLD AUTO: 28.2 % (ref 37.5–51)
HCT VFR BLD AUTO: 28.4 % (ref 37.5–51)
HCT VFR BLD AUTO: 28.9 % (ref 37.5–51)
HCT VFR BLD AUTO: 29 % (ref 37.5–51)
HCT VFR BLD AUTO: 29.1 % (ref 37.5–51)
HCT VFR BLD AUTO: 29.3 % (ref 37.5–51)
HCT VFR BLD AUTO: 29.3 % (ref 37.5–51)
HCT VFR BLD AUTO: 29.5 % (ref 37.5–51)
HCT VFR BLD AUTO: 29.7 % (ref 37.5–51)
HCT VFR BLD AUTO: 32 % (ref 37.5–51)
HCT VFR BLD AUTO: 32.4 % (ref 37.5–51)
HCT VFR BLD AUTO: 32.6 % (ref 37.5–51)
HCT VFR BLD AUTO: 32.7 % (ref 37.5–51)
HCT VFR BLD AUTO: 32.8 % (ref 37.5–51)
HCT VFR BLD AUTO: 33.5 % (ref 37.5–51)
HCT VFR BLD AUTO: 34 % (ref 37.5–51)
HCT VFR BLD CALC: 21 %
HCT VFR BLD CALC: 24.3 %
HCT VFR BLD CALC: 25.4 %
HCT VFR BLD CALC: 27.6 %
HCT VFR BLD CALC: 28.2 %
HCT VFR BLD CALC: 29.7 %
HCT VFR BLD CALC: 30.4 %
HCT VFR BLD CALC: 30.5 %
HCT VFR BLD CALC: 30.5 %
HCT VFR BLD CALC: 30.6 %
HCV AB SER DONR QL: NORMAL
HCV AB SER DONR QL: NORMAL
HGB BLD-MCNC: 10 G/DL (ref 13–17.7)
HGB BLD-MCNC: 10.1 G/DL (ref 13–17.7)
HGB BLD-MCNC: 10.6 G/DL (ref 13–17.7)
HGB BLD-MCNC: 6.2 G/DL (ref 13–17.7)
HGB BLD-MCNC: 6.8 G/DL (ref 13–17.7)
HGB BLD-MCNC: 7.4 G/DL (ref 13–17.7)
HGB BLD-MCNC: 7.5 G/DL (ref 13–17.7)
HGB BLD-MCNC: 7.7 G/DL (ref 13–17.7)
HGB BLD-MCNC: 7.9 G/DL (ref 13–17.7)
HGB BLD-MCNC: 8 G/DL (ref 13–17.7)
HGB BLD-MCNC: 8 G/DL (ref 13–17.7)
HGB BLD-MCNC: 8.1 G/DL (ref 13–17.7)
HGB BLD-MCNC: 8.2 G/DL (ref 13–17.7)
HGB BLD-MCNC: 8.2 G/DL (ref 13–17.7)
HGB BLD-MCNC: 8.3 G/DL (ref 13–17.7)
HGB BLD-MCNC: 8.4 G/DL (ref 13–17.7)
HGB BLD-MCNC: 8.4 G/DL (ref 13–17.7)
HGB BLD-MCNC: 8.5 G/DL (ref 13–17.7)
HGB BLD-MCNC: 8.6 G/DL (ref 13–17.7)
HGB BLD-MCNC: 8.8 G/DL (ref 13–17.7)
HGB BLD-MCNC: 8.9 G/DL (ref 13–17.7)
HGB BLD-MCNC: 8.9 G/DL (ref 13–17.7)
HGB BLD-MCNC: 9.2 G/DL (ref 13–17.7)
HGB BLD-MCNC: 9.6 G/DL (ref 13–17.7)
HGB BLD-MCNC: 9.8 G/DL (ref 13–17.7)
HGB BLDA-MCNC: 10 G/DL (ref 13.5–17.5)
HGB BLDA-MCNC: 6.9 G/DL (ref 13.5–17.5)
HGB BLDA-MCNC: 7.7 G/DL (ref 13.5–17.5)
HGB BLDA-MCNC: 7.9 G/DL (ref 12–18)
HGB BLDA-MCNC: 8.2 G/DL (ref 12–18)
HGB BLDA-MCNC: 8.3 G/DL (ref 12–18)
HGB BLDA-MCNC: 9 G/DL (ref 13.5–17.5)
HGB BLDA-MCNC: 9.2 G/DL (ref 13.5–17.5)
HGB BLDA-MCNC: 9.7 G/DL (ref 13.5–17.5)
HGB BLDA-MCNC: 9.9 G/DL (ref 13.5–17.5)
HGB UR QL STRIP.AUTO: ABNORMAL
HMPV RNA NPH QL NAA+NON-PROBE: DETECTED
HOLD SPECIMEN: NORMAL
HOROWITZ INDEX BLD+IHG-RTO: 21 %
HOROWITZ INDEX BLD+IHG-RTO: 28 %
HOROWITZ INDEX BLD+IHG-RTO: 30 %
HOROWITZ INDEX BLD+IHG-RTO: 32 %
HOROWITZ INDEX BLD+IHG-RTO: 35 %
HOROWITZ INDEX BLD+IHG-RTO: 50 %
HPIV1 RNA SPEC QL NAA+PROBE: NOT DETECTED
HPIV2 RNA SPEC QL NAA+PROBE: NOT DETECTED
HPIV3 RNA NPH QL NAA+PROBE: NOT DETECTED
HPIV4 P GENE NPH QL NAA+PROBE: NOT DETECTED
HYALINE CASTS UR QL AUTO: ABNORMAL /LPF
IMM GRANULOCYTES # BLD AUTO: 0.02 10*3/MM3 (ref 0–0.05)
IMM GRANULOCYTES # BLD AUTO: 0.03 10*3/MM3 (ref 0–0.05)
IMM GRANULOCYTES # BLD AUTO: 0.04 10*3/MM3 (ref 0–0.05)
IMM GRANULOCYTES # BLD AUTO: 0.07 10*3/MM3 (ref 0–0.05)
IMM GRANULOCYTES # BLD AUTO: 0.08 10*3/MM3 (ref 0–0.05)
IMM GRANULOCYTES # BLD AUTO: 0.09 10*3/MM3 (ref 0–0.05)
IMM GRANULOCYTES # BLD AUTO: 0.1 10*3/MM3 (ref 0–0.05)
IMM GRANULOCYTES # BLD AUTO: 0.13 10*3/MM3 (ref 0–0.05)
IMM GRANULOCYTES # BLD AUTO: 0.14 10*3/MM3 (ref 0–0.05)
IMM GRANULOCYTES # BLD AUTO: 0.15 10*3/MM3 (ref 0–0.05)
IMM GRANULOCYTES # BLD AUTO: 0.15 10*3/MM3 (ref 0–0.05)
IMM GRANULOCYTES # BLD AUTO: 0.34 10*3/MM3 (ref 0–0.05)
IMM GRANULOCYTES # BLD AUTO: 0.42 10*3/MM3 (ref 0–0.05)
IMM GRANULOCYTES # BLD AUTO: 0.48 10*3/MM3 (ref 0–0.05)
IMM GRANULOCYTES NFR BLD AUTO: 0.3 % (ref 0–0.5)
IMM GRANULOCYTES NFR BLD AUTO: 0.4 % (ref 0–0.5)
IMM GRANULOCYTES NFR BLD AUTO: 0.5 % (ref 0–0.5)
IMM GRANULOCYTES NFR BLD AUTO: 0.5 % (ref 0–0.5)
IMM GRANULOCYTES NFR BLD AUTO: 0.6 % (ref 0–0.5)
IMM GRANULOCYTES NFR BLD AUTO: 0.8 % (ref 0–0.5)
IMM GRANULOCYTES NFR BLD AUTO: 0.9 % (ref 0–0.5)
IMM GRANULOCYTES NFR BLD AUTO: 1 % (ref 0–0.5)
IMM GRANULOCYTES NFR BLD AUTO: 1.1 % (ref 0–0.5)
IMM GRANULOCYTES NFR BLD AUTO: 1.2 % (ref 0–0.5)
IMM GRANULOCYTES NFR BLD AUTO: 1.4 % (ref 0–0.5)
IMM GRANULOCYTES NFR BLD AUTO: 1.5 % (ref 0–0.5)
IMM GRANULOCYTES NFR BLD AUTO: 1.6 % (ref 0–0.5)
IMM GRANULOCYTES NFR BLD AUTO: 1.7 % (ref 0–0.5)
IMM GRANULOCYTES NFR BLD AUTO: 1.7 % (ref 0–0.5)
IMM GRANULOCYTES NFR BLD AUTO: 1.9 % (ref 0–0.5)
IMM GRANULOCYTES NFR BLD AUTO: 2.3 % (ref 0–0.5)
IMM GRANULOCYTES NFR BLD AUTO: 3.9 % (ref 0–0.5)
IMM GRANULOCYTES NFR BLD AUTO: 4 % (ref 0–0.5)
IMM GRANULOCYTES NFR BLD AUTO: 4.4 % (ref 0–0.5)
INR PPP: 1.2 (ref 0.9–1.1)
INR PPP: 1.48 (ref 0.85–1.16)
INR PPP: 1.67 (ref 0.85–1.16)
IPAP: 0
IRON 24H UR-MRATE: 33 MCG/DL (ref 59–158)
IRON SATN MFR SERPL: 13 % (ref 20–50)
KETONES UR QL STRIP: NEGATIVE
LAB AP CASE REPORT: NORMAL
LAB AP CLINICAL INFORMATION: NORMAL
LDH SERPL-CCNC: 1417 U/L (ref 135–225)
LEUKOCYTE ESTERASE UR QL STRIP.AUTO: ABNORMAL
LEVETIRACETAM SERPL-MCNC: 39.1 UG/ML (ref 10–40)
LIPASE SERPL-CCNC: 13 U/L (ref 13–60)
LIPASE SERPL-CCNC: 25 U/L (ref 13–60)
LYMPHOCYTES # BLD AUTO: 0.44 10*3/MM3 (ref 0.7–3.1)
LYMPHOCYTES # BLD AUTO: 0.48 10*3/MM3 (ref 0.7–3.1)
LYMPHOCYTES # BLD AUTO: 0.49 10*3/MM3 (ref 0.7–3.1)
LYMPHOCYTES # BLD AUTO: 0.51 10*3/MM3 (ref 0.7–3.1)
LYMPHOCYTES # BLD AUTO: 0.52 10*3/MM3 (ref 0.7–3.1)
LYMPHOCYTES # BLD AUTO: 0.53 10*3/MM3 (ref 0.7–3.1)
LYMPHOCYTES # BLD AUTO: 0.53 10*3/MM3 (ref 0.7–3.1)
LYMPHOCYTES # BLD AUTO: 0.54 10*3/MM3 (ref 0.7–3.1)
LYMPHOCYTES # BLD AUTO: 0.55 10*3/MM3 (ref 0.7–3.1)
LYMPHOCYTES # BLD AUTO: 0.56 10*3/MM3 (ref 0.7–3.1)
LYMPHOCYTES # BLD AUTO: 0.56 10*3/MM3 (ref 0.7–3.1)
LYMPHOCYTES # BLD AUTO: 0.59 10*3/MM3 (ref 0.7–3.1)
LYMPHOCYTES # BLD AUTO: 0.61 10*3/MM3 (ref 0.7–3.1)
LYMPHOCYTES # BLD AUTO: 0.62 10*3/MM3 (ref 0.7–3.1)
LYMPHOCYTES # BLD AUTO: 0.62 10*3/MM3 (ref 0.7–3.1)
LYMPHOCYTES # BLD AUTO: 0.63 10*3/MM3 (ref 0.7–3.1)
LYMPHOCYTES # BLD AUTO: 0.68 10*3/MM3 (ref 0.7–3.1)
LYMPHOCYTES # BLD AUTO: 0.68 10*3/MM3 (ref 0.7–3.1)
LYMPHOCYTES # BLD AUTO: 0.73 10*3/MM3 (ref 0.7–3.1)
LYMPHOCYTES # BLD AUTO: 0.74 10*3/MM3 (ref 0.7–3.1)
LYMPHOCYTES # BLD AUTO: 0.78 10*3/MM3 (ref 0.7–3.1)
LYMPHOCYTES # BLD AUTO: 0.8 10*3/MM3 (ref 0.7–3.1)
LYMPHOCYTES # BLD AUTO: 0.8 10*3/MM3 (ref 0.7–3.1)
LYMPHOCYTES # BLD AUTO: 0.94 10*3/MM3 (ref 0.7–3.1)
LYMPHOCYTES NFR BLD AUTO: 10.3 % (ref 19.6–45.3)
LYMPHOCYTES NFR BLD AUTO: 11.3 % (ref 19.6–45.3)
LYMPHOCYTES NFR BLD AUTO: 11.5 % (ref 19.6–45.3)
LYMPHOCYTES NFR BLD AUTO: 11.6 % (ref 19.6–45.3)
LYMPHOCYTES NFR BLD AUTO: 13.4 % (ref 19.6–45.3)
LYMPHOCYTES NFR BLD AUTO: 13.5 % (ref 19.6–45.3)
LYMPHOCYTES NFR BLD AUTO: 15.6 % (ref 19.6–45.3)
LYMPHOCYTES NFR BLD AUTO: 5.1 % (ref 19.6–45.3)
LYMPHOCYTES NFR BLD AUTO: 6.8 % (ref 19.6–45.3)
LYMPHOCYTES NFR BLD AUTO: 6.9 % (ref 19.6–45.3)
LYMPHOCYTES NFR BLD AUTO: 7.1 % (ref 19.6–45.3)
LYMPHOCYTES NFR BLD AUTO: 8 % (ref 19.6–45.3)
LYMPHOCYTES NFR BLD AUTO: 8.3 % (ref 19.6–45.3)
LYMPHOCYTES NFR BLD AUTO: 8.7 % (ref 19.6–45.3)
LYMPHOCYTES NFR BLD AUTO: 8.7 % (ref 19.6–45.3)
LYMPHOCYTES NFR BLD AUTO: 9 % (ref 19.6–45.3)
LYMPHOCYTES NFR BLD AUTO: 9.3 % (ref 19.6–45.3)
LYMPHOCYTES NFR BLD AUTO: 9.4 % (ref 19.6–45.3)
M PNEUMO IGG SER IA-ACNC: NOT DETECTED
MAGNESIUM SERPL-MCNC: 2.1 MG/DL (ref 1.6–2.4)
MAGNESIUM SERPL-MCNC: 2.3 MG/DL (ref 1.6–2.4)
MAGNESIUM SERPL-MCNC: 2.3 MG/DL (ref 1.6–2.4)
MAGNESIUM SERPL-MCNC: 2.4 MG/DL (ref 1.6–2.4)
MAGNESIUM SERPL-MCNC: 2.4 MG/DL (ref 1.6–2.4)
MAGNESIUM SERPL-MCNC: 2.5 MG/DL (ref 1.6–2.4)
MAGNESIUM SERPL-MCNC: 2.5 MG/DL (ref 1.6–2.4)
MAGNESIUM SERPL-MCNC: 2.8 MG/DL (ref 1.6–2.4)
MAXIMAL PREDICTED HEART RATE: 160 BPM
MCH RBC QN AUTO: 27.5 PG (ref 26.6–33)
MCH RBC QN AUTO: 27.7 PG (ref 26.6–33)
MCH RBC QN AUTO: 27.9 PG (ref 26.6–33)
MCH RBC QN AUTO: 28 PG (ref 26.6–33)
MCH RBC QN AUTO: 28.1 PG (ref 26.6–33)
MCH RBC QN AUTO: 28.1 PG (ref 26.6–33)
MCH RBC QN AUTO: 28.2 PG (ref 26.6–33)
MCH RBC QN AUTO: 28.3 PG (ref 26.6–33)
MCH RBC QN AUTO: 28.4 PG (ref 26.6–33)
MCH RBC QN AUTO: 28.5 PG (ref 26.6–33)
MCH RBC QN AUTO: 28.6 PG (ref 26.6–33)
MCH RBC QN AUTO: 28.6 PG (ref 26.6–33)
MCH RBC QN AUTO: 28.7 PG (ref 26.6–33)
MCH RBC QN AUTO: 28.7 PG (ref 26.6–33)
MCH RBC QN AUTO: 28.8 PG (ref 26.6–33)
MCH RBC QN AUTO: 28.9 PG (ref 26.6–33)
MCH RBC QN AUTO: 29 PG (ref 26.6–33)
MCH RBC QN AUTO: 29 PG (ref 26.6–33)
MCH RBC QN AUTO: 29.2 PG (ref 26.6–33)
MCH RBC QN AUTO: 29.3 PG (ref 26.6–33)
MCH RBC QN AUTO: 29.5 PG (ref 26.6–33)
MCH RBC QN AUTO: 29.5 PG (ref 26.6–33)
MCH RBC QN AUTO: 29.7 PG (ref 26.6–33)
MCH RBC QN AUTO: 29.7 PG (ref 26.6–33)
MCHC RBC AUTO-ENTMCNC: 29.2 G/DL (ref 31.5–35.7)
MCHC RBC AUTO-ENTMCNC: 29.3 G/DL (ref 31.5–35.7)
MCHC RBC AUTO-ENTMCNC: 29.3 G/DL (ref 31.5–35.7)
MCHC RBC AUTO-ENTMCNC: 29.4 G/DL (ref 31.5–35.7)
MCHC RBC AUTO-ENTMCNC: 29.6 G/DL (ref 31.5–35.7)
MCHC RBC AUTO-ENTMCNC: 29.6 G/DL (ref 31.5–35.7)
MCHC RBC AUTO-ENTMCNC: 29.7 G/DL (ref 31.5–35.7)
MCHC RBC AUTO-ENTMCNC: 30 G/DL (ref 31.5–35.7)
MCHC RBC AUTO-ENTMCNC: 30 G/DL (ref 31.5–35.7)
MCHC RBC AUTO-ENTMCNC: 30.1 G/DL (ref 31.5–35.7)
MCHC RBC AUTO-ENTMCNC: 30.2 G/DL (ref 31.5–35.7)
MCHC RBC AUTO-ENTMCNC: 30.3 G/DL (ref 31.5–35.7)
MCHC RBC AUTO-ENTMCNC: 30.5 G/DL (ref 31.5–35.7)
MCHC RBC AUTO-ENTMCNC: 30.6 G/DL (ref 31.5–35.7)
MCHC RBC AUTO-ENTMCNC: 30.6 G/DL (ref 31.5–35.7)
MCHC RBC AUTO-ENTMCNC: 30.7 G/DL (ref 31.5–35.7)
MCHC RBC AUTO-ENTMCNC: 30.8 G/DL (ref 31.5–35.7)
MCHC RBC AUTO-ENTMCNC: 31 G/DL (ref 31.5–35.7)
MCHC RBC AUTO-ENTMCNC: 31 G/DL (ref 31.5–35.7)
MCHC RBC AUTO-ENTMCNC: 31.1 G/DL (ref 31.5–35.7)
MCHC RBC AUTO-ENTMCNC: 31.2 G/DL (ref 31.5–35.7)
MCHC RBC AUTO-ENTMCNC: 31.3 G/DL (ref 31.5–35.7)
MCHC RBC AUTO-ENTMCNC: 31.3 G/DL (ref 31.5–35.7)
MCHC RBC AUTO-ENTMCNC: 31.4 G/DL (ref 31.5–35.7)
MCHC RBC AUTO-ENTMCNC: 31.6 G/DL (ref 31.5–35.7)
MCV RBC AUTO: 87.7 FL (ref 79–97)
MCV RBC AUTO: 89.7 FL (ref 79–97)
MCV RBC AUTO: 89.9 FL (ref 79–97)
MCV RBC AUTO: 90.2 FL (ref 79–97)
MCV RBC AUTO: 90.5 FL (ref 79–97)
MCV RBC AUTO: 91.4 FL (ref 79–97)
MCV RBC AUTO: 91.7 FL (ref 79–97)
MCV RBC AUTO: 91.8 FL (ref 79–97)
MCV RBC AUTO: 92.7 FL (ref 79–97)
MCV RBC AUTO: 93 FL (ref 79–97)
MCV RBC AUTO: 93.4 FL (ref 79–97)
MCV RBC AUTO: 93.6 FL (ref 79–97)
MCV RBC AUTO: 93.9 FL (ref 79–97)
MCV RBC AUTO: 94.5 FL (ref 79–97)
MCV RBC AUTO: 94.5 FL (ref 79–97)
MCV RBC AUTO: 94.7 FL (ref 79–97)
MCV RBC AUTO: 94.8 FL (ref 79–97)
MCV RBC AUTO: 95.2 FL (ref 79–97)
MCV RBC AUTO: 95.2 FL (ref 79–97)
MCV RBC AUTO: 95.4 FL (ref 79–97)
MCV RBC AUTO: 95.4 FL (ref 79–97)
MCV RBC AUTO: 95.8 FL (ref 79–97)
MCV RBC AUTO: 95.9 FL (ref 79–97)
MCV RBC AUTO: 96.3 FL (ref 79–97)
MCV RBC AUTO: 96.6 FL (ref 79–97)
MCV RBC AUTO: 97.6 FL (ref 79–97)
MCV RBC AUTO: 97.9 FL (ref 79–97)
MCV RBC AUTO: 99.7 FL (ref 79–97)
METHADONE UR QL SCN: NEGATIVE
METHGB BLD QL: -0.2 % (ref 0–1.5)
METHGB BLD QL: 0.3 % (ref 0–1.5)
METHGB BLD QL: 0.5 % (ref 0–1.5)
METHGB BLD QL: 0.5 % (ref 0–3)
METHGB BLD QL: 0.6 % (ref 0–1.5)
METHGB BLD QL: 0.8 %
METHGB BLD QL: 0.8 % (ref 0–1.5)
METHGB BLD QL: 0.9 % (ref 0–1.5)
METHGB BLD QL: 1 % (ref 0–1.5)
METHGB BLD QL: 1 % (ref 0–1.5)
MODALITY: ABNORMAL
MONOCYTES # BLD AUTO: 0.43 10*3/MM3 (ref 0.1–0.9)
MONOCYTES # BLD AUTO: 0.55 10*3/MM3 (ref 0.1–0.9)
MONOCYTES # BLD AUTO: 0.55 10*3/MM3 (ref 0.1–0.9)
MONOCYTES # BLD AUTO: 0.63 10*3/MM3 (ref 0.1–0.9)
MONOCYTES # BLD AUTO: 0.65 10*3/MM3 (ref 0.1–0.9)
MONOCYTES # BLD AUTO: 0.67 10*3/MM3 (ref 0.1–0.9)
MONOCYTES # BLD AUTO: 0.68 10*3/MM3 (ref 0.1–0.9)
MONOCYTES # BLD AUTO: 0.71 10*3/MM3 (ref 0.1–0.9)
MONOCYTES # BLD AUTO: 0.73 10*3/MM3 (ref 0.1–0.9)
MONOCYTES # BLD AUTO: 0.8 10*3/MM3 (ref 0.1–0.9)
MONOCYTES # BLD AUTO: 0.82 10*3/MM3 (ref 0.1–0.9)
MONOCYTES # BLD AUTO: 0.84 10*3/MM3 (ref 0.1–0.9)
MONOCYTES # BLD AUTO: 0.88 10*3/MM3 (ref 0.1–0.9)
MONOCYTES # BLD AUTO: 0.94 10*3/MM3 (ref 0.1–0.9)
MONOCYTES # BLD AUTO: 0.94 10*3/MM3 (ref 0.1–0.9)
MONOCYTES # BLD AUTO: 0.95 10*3/MM3 (ref 0.1–0.9)
MONOCYTES # BLD AUTO: 0.98 10*3/MM3 (ref 0.1–0.9)
MONOCYTES # BLD AUTO: 1 10*3/MM3 (ref 0.1–0.9)
MONOCYTES # BLD AUTO: 1.02 10*3/MM3 (ref 0.1–0.9)
MONOCYTES # BLD AUTO: 1.03 10*3/MM3 (ref 0.1–0.9)
MONOCYTES # BLD AUTO: 1.08 10*3/MM3 (ref 0.1–0.9)
MONOCYTES # BLD AUTO: 1.16 10*3/MM3 (ref 0.1–0.9)
MONOCYTES # BLD AUTO: 1.17 10*3/MM3 (ref 0.1–0.9)
MONOCYTES # BLD AUTO: 1.19 10*3/MM3 (ref 0.1–0.9)
MONOCYTES NFR BLD AUTO: 10.5 % (ref 5–12)
MONOCYTES NFR BLD AUTO: 10.9 % (ref 5–12)
MONOCYTES NFR BLD AUTO: 11 % (ref 5–12)
MONOCYTES NFR BLD AUTO: 11.1 % (ref 5–12)
MONOCYTES NFR BLD AUTO: 11.9 % (ref 5–12)
MONOCYTES NFR BLD AUTO: 11.9 % (ref 5–12)
MONOCYTES NFR BLD AUTO: 12 % (ref 5–12)
MONOCYTES NFR BLD AUTO: 12.4 % (ref 5–12)
MONOCYTES NFR BLD AUTO: 12.4 % (ref 5–12)
MONOCYTES NFR BLD AUTO: 12.8 % (ref 5–12)
MONOCYTES NFR BLD AUTO: 12.9 % (ref 5–12)
MONOCYTES NFR BLD AUTO: 13.4 % (ref 5–12)
MONOCYTES NFR BLD AUTO: 13.9 % (ref 5–12)
MONOCYTES NFR BLD AUTO: 15 % (ref 5–12)
MONOCYTES NFR BLD AUTO: 15.4 % (ref 5–12)
MONOCYTES NFR BLD AUTO: 15.6 % (ref 5–12)
MONOCYTES NFR BLD AUTO: 15.8 % (ref 5–12)
MONOCYTES NFR BLD AUTO: 18.4 % (ref 5–12)
MONOCYTES NFR BLD AUTO: 8.5 % (ref 5–12)
MONOCYTES NFR BLD AUTO: 9.3 % (ref 5–12)
MONOCYTES NFR BLD AUTO: 9.4 % (ref 5–12)
MONOCYTES NFR BLD AUTO: 9.5 % (ref 5–12)
MONOCYTES NFR BLD AUTO: 9.8 % (ref 5–12)
MONOCYTES NFR BLD AUTO: 9.9 % (ref 5–12)
MRSA DNA SPEC QL NAA+PROBE: NEGATIVE
MRSA DNA SPEC QL NAA+PROBE: NEGATIVE
MRSA SPEC QL CULT: NORMAL
MUCOUS THREADS URNS QL MICRO: ABNORMAL /HPF
NEUTROPHILS # BLD AUTO: 2.66 10*3/MM3 (ref 1.7–7)
NEUTROPHILS # BLD AUTO: 3.28 10*3/MM3 (ref 1.7–7)
NEUTROPHILS # BLD AUTO: 3.36 10*3/MM3 (ref 1.7–7)
NEUTROPHILS # BLD AUTO: 3.61 10*3/MM3 (ref 1.7–7)
NEUTROPHILS # BLD AUTO: 3.63 10*3/MM3 (ref 1.7–7)
NEUTROPHILS # BLD AUTO: 3.87 10*3/MM3 (ref 1.7–7)
NEUTROPHILS # BLD AUTO: 4.25 10*3/MM3 (ref 1.7–7)
NEUTROPHILS # BLD AUTO: 4.44 10*3/MM3 (ref 1.7–7)
NEUTROPHILS # BLD AUTO: 4.52 10*3/MM3 (ref 1.7–7)
NEUTROPHILS # BLD AUTO: 4.65 10*3/MM3 (ref 1.7–7)
NEUTROPHILS # BLD AUTO: 4.94 10*3/MM3 (ref 1.7–7)
NEUTROPHILS # BLD AUTO: 5.08 10*3/MM3 (ref 1.7–7)
NEUTROPHILS # BLD AUTO: 5.15 10*3/MM3 (ref 1.7–7)
NEUTROPHILS # BLD AUTO: 5.21 10*3/MM3 (ref 1.7–7)
NEUTROPHILS # BLD AUTO: 5.25 10*3/MM3 (ref 1.7–7)
NEUTROPHILS # BLD AUTO: 5.35 10*3/MM3 (ref 1.7–7)
NEUTROPHILS # BLD AUTO: 5.48 10*3/MM3 (ref 1.7–7)
NEUTROPHILS # BLD AUTO: 5.6 10*3/MM3 (ref 1.7–7)
NEUTROPHILS # BLD AUTO: 5.65 10*3/MM3 (ref 1.7–7)
NEUTROPHILS # BLD AUTO: 5.98 10*3/MM3 (ref 1.7–7)
NEUTROPHILS # BLD AUTO: 5.99 10*3/MM3 (ref 1.7–7)
NEUTROPHILS # BLD AUTO: 6.68 10*3/MM3 (ref 1.7–7)
NEUTROPHILS # BLD AUTO: 7.12 10*3/MM3 (ref 1.7–7)
NEUTROPHILS # BLD AUTO: 9.57 10*3/MM3 (ref 1.7–7)
NEUTROPHILS NFR BLD AUTO: 60.4 % (ref 42.7–76)
NEUTROPHILS NFR BLD AUTO: 64.3 % (ref 42.7–76)
NEUTROPHILS NFR BLD AUTO: 64.7 % (ref 42.7–76)
NEUTROPHILS NFR BLD AUTO: 66.7 % (ref 42.7–76)
NEUTROPHILS NFR BLD AUTO: 66.8 % (ref 42.7–76)
NEUTROPHILS NFR BLD AUTO: 67.2 % (ref 42.7–76)
NEUTROPHILS NFR BLD AUTO: 68 % (ref 42.7–76)
NEUTROPHILS NFR BLD AUTO: 68.2 % (ref 42.7–76)
NEUTROPHILS NFR BLD AUTO: 68.9 % (ref 42.7–76)
NEUTROPHILS NFR BLD AUTO: 69.7 % (ref 42.7–76)
NEUTROPHILS NFR BLD AUTO: 69.8 % (ref 42.7–76)
NEUTROPHILS NFR BLD AUTO: 69.9 % (ref 42.7–76)
NEUTROPHILS NFR BLD AUTO: 71.2 % (ref 42.7–76)
NEUTROPHILS NFR BLD AUTO: 71.3 % (ref 42.7–76)
NEUTROPHILS NFR BLD AUTO: 71.7 % (ref 42.7–76)
NEUTROPHILS NFR BLD AUTO: 74 % (ref 42.7–76)
NEUTROPHILS NFR BLD AUTO: 74.3 % (ref 42.7–76)
NEUTROPHILS NFR BLD AUTO: 75 % (ref 42.7–76)
NEUTROPHILS NFR BLD AUTO: 76.3 % (ref 42.7–76)
NEUTROPHILS NFR BLD AUTO: 76.5 % (ref 42.7–76)
NEUTROPHILS NFR BLD AUTO: 77.8 % (ref 42.7–76)
NEUTROPHILS NFR BLD AUTO: 78.5 % (ref 42.7–76)
NEUTROPHILS NFR BLD AUTO: 79.4 % (ref 42.7–76)
NEUTROPHILS NFR BLD AUTO: 79.6 % (ref 42.7–76)
NITRITE UR QL STRIP: NEGATIVE
NOTE: ABNORMAL
NRBC BLD AUTO-RTO: 0 /100 WBC (ref 0–0.2)
NRBC BLD AUTO-RTO: 0.2 /100 WBC (ref 0–0.2)
NRBC BLD AUTO-RTO: 0.5 /100 WBC (ref 0–0.2)
NRBC BLD AUTO-RTO: 0.7 /100 WBC (ref 0–0.2)
NRBC BLD AUTO-RTO: 1.3 /100 WBC (ref 0–0.2)
NT-PROBNP SERPL-MCNC: ABNORMAL PG/ML (ref 5–900)
OPIATES UR QL: POSITIVE
OXYCODONE UR QL SCN: NEGATIVE
OXYHGB MFR BLDV: 63.9 %
OXYHGB MFR BLDV: 65 % (ref 40–70)
OXYHGB MFR BLDV: 92.9 % (ref 94–99)
OXYHGB MFR BLDV: 93.2 % (ref 94–99)
OXYHGB MFR BLDV: 94 % (ref 94–99)
OXYHGB MFR BLDV: 94.8 % (ref 94–99)
OXYHGB MFR BLDV: 95.6 % (ref 94–99)
OXYHGB MFR BLDV: 96 % (ref 94–99)
OXYHGB MFR BLDV: 96.1 % (ref 94–99)
OXYHGB MFR BLDV: 96.4 % (ref 94–99)
OXYHGB MFR BLDV: 96.5 % (ref 94–99)
OXYHGB MFR BLDV: 96.6 % (ref 94–99)
OXYHGB MFR BLDV: 96.7 % (ref 94–99)
OXYHGB MFR BLDV: 97 % (ref 94–99)
PATH REPORT.FINAL DX SPEC: NORMAL
PATH REPORT.GROSS SPEC: NORMAL
PAW @ PEAK INSP FLOW SETTING VENT: 0 CMH2O
PAW @ PEAK INSP FLOW SETTING VENT: 23 CMH2O
PAW @ PEAK INSP FLOW SETTING VENT: 29 CMH2O
PCO2 BLDA: 28.7 MM HG
PCO2 BLDA: 31.2 MM HG
PCO2 BLDA: 34 MM HG
PCO2 BLDA: 37 MM HG
PCO2 BLDA: 38 MM HG
PCO2 BLDA: 43.2 MM HG
PCO2 BLDA: 43.2 MM HG
PCO2 BLDA: 43.3 MM HG
PCO2 BLDA: 44.5 MM HG (ref 35–45)
PCO2 BLDA: 46 MM HG
PCO2 BLDA: 46.9 MM HG (ref 35–45)
PCO2 BLDA: 47.4 MM HG
PCO2 BLDV: 39.3 MM HG (ref 40–50)
PCO2 BLDV: 41.6 MM HG (ref 40–50)
PCO2 BLDV: 62.2 MM HG (ref 41–51)
PCO2 TEMP ADJ BLD: 28.7 MM HG (ref 35–48)
PCO2 TEMP ADJ BLD: 31.2 MM HG (ref 35–48)
PCO2 TEMP ADJ BLD: 34 MM HG (ref 35–48)
PCO2 TEMP ADJ BLD: 37 MM HG (ref 35–48)
PCO2 TEMP ADJ BLD: 38 MM HG (ref 35–48)
PCO2 TEMP ADJ BLD: 43.2 MM HG (ref 35–48)
PCO2 TEMP ADJ BLD: 43.2 MM HG (ref 35–48)
PCO2 TEMP ADJ BLD: 43.3 MM HG (ref 35–48)
PCO2 TEMP ADJ BLD: 46 MM HG (ref 35–48)
PCO2 TEMP ADJ BLD: 47.4 MM HG (ref 35–48)
PCO2 TEMP ADJ BLD: ABNORMAL MM[HG]
PCO2 TEMP ADJ BLD: ABNORMAL MM[HG]
PCP UR QL SCN: NEGATIVE
PEEP RESPIRATORY: 5 CM[H2O]
PH BLDA: 7.37 PH UNITS (ref 7.3–7.5)
PH BLDA: 7.39 PH UNITS (ref 7.35–7.45)
PH BLDA: 7.41 PH UNITS (ref 7.35–7.45)
PH BLDA: 7.42 PH UNITS (ref 7.35–7.45)
PH BLDA: 7.44 PH UNITS (ref 7.35–7.45)
PH BLDA: 7.44 PH UNITS (ref 7.35–7.45)
PH BLDA: 7.46 PH UNITS (ref 7.35–7.45)
PH BLDA: 7.47 PH UNITS (ref 7.35–7.45)
PH BLDA: 7.48 PH UNITS (ref 7.35–7.45)
PH BLDA: 7.51 PH UNITS (ref 7.35–7.45)
PH BLDA: 7.51 PH UNITS (ref 7.3–7.5)
PH BLDA: 7.55 PH UNITS (ref 7.35–7.45)
PH BLDV: 7.35 PH UNITS
PH BLDV: 7.35 PH UNITS (ref 7.32–7.42)
PH BLDV: 7.57 PH UNITS (ref 7.32–7.42)
PH UR STRIP.AUTO: 5.5 [PH] (ref 5–8)
PH, TEMP CORRECTED: 7.39 PH UNITS
PH, TEMP CORRECTED: 7.41 PH UNITS
PH, TEMP CORRECTED: 7.42 PH UNITS
PH, TEMP CORRECTED: 7.44 PH UNITS
PH, TEMP CORRECTED: 7.44 PH UNITS
PH, TEMP CORRECTED: 7.46 PH UNITS
PH, TEMP CORRECTED: 7.47 PH UNITS
PH, TEMP CORRECTED: 7.48 PH UNITS
PH, TEMP CORRECTED: 7.51 PH UNITS
PH, TEMP CORRECTED: 7.55 PH UNITS
PH, TEMP CORRECTED: ABNORMAL
PH, TEMP CORRECTED: ABNORMAL
PHOSPHATE SERPL-MCNC: 2 MG/DL (ref 2.5–4.5)
PHOSPHATE SERPL-MCNC: 2.2 MG/DL (ref 2.5–4.5)
PHOSPHATE SERPL-MCNC: 2.4 MG/DL (ref 2.5–4.5)
PHOSPHATE SERPL-MCNC: 2.4 MG/DL (ref 2.5–4.5)
PHOSPHATE SERPL-MCNC: 3 MG/DL (ref 2.5–4.5)
PHOSPHATE SERPL-MCNC: 3.2 MG/DL (ref 2.5–4.5)
PHOSPHATE SERPL-MCNC: 3.2 MG/DL (ref 2.5–4.5)
PHOSPHATE SERPL-MCNC: 3.3 MG/DL (ref 2.5–4.5)
PHOSPHATE SERPL-MCNC: 3.4 MG/DL (ref 2.5–4.5)
PHOSPHATE SERPL-MCNC: 3.5 MG/DL (ref 2.5–4.5)
PHOSPHATE SERPL-MCNC: 3.6 MG/DL (ref 2.5–4.5)
PHOSPHATE SERPL-MCNC: 4.9 MG/DL (ref 2.5–4.5)
PHOSPHATE SERPL-MCNC: 6.2 MG/DL (ref 2.5–4.5)
PHOSPHATE SERPL-MCNC: 6.5 MG/DL (ref 2.5–4.5)
PLATELET # BLD AUTO: 132 10*3/MM3 (ref 140–450)
PLATELET # BLD AUTO: 133 10*3/MM3 (ref 140–450)
PLATELET # BLD AUTO: 134 10*3/MM3 (ref 140–450)
PLATELET # BLD AUTO: 145 10*3/MM3 (ref 140–450)
PLATELET # BLD AUTO: 163 10*3/MM3 (ref 140–450)
PLATELET # BLD AUTO: 167 10*3/MM3 (ref 140–450)
PLATELET # BLD AUTO: 169 10*3/MM3 (ref 140–450)
PLATELET # BLD AUTO: 176 10*3/MM3 (ref 140–450)
PLATELET # BLD AUTO: 179 10*3/MM3 (ref 140–450)
PLATELET # BLD AUTO: 179 10*3/MM3 (ref 140–450)
PLATELET # BLD AUTO: 190 10*3/MM3 (ref 140–450)
PLATELET # BLD AUTO: 191 10*3/MM3 (ref 140–450)
PLATELET # BLD AUTO: 194 10*3/MM3 (ref 140–450)
PLATELET # BLD AUTO: 194 10*3/MM3 (ref 140–450)
PLATELET # BLD AUTO: 201 10*3/MM3 (ref 140–450)
PLATELET # BLD AUTO: 205 10*3/MM3 (ref 140–450)
PLATELET # BLD AUTO: 207 10*3/MM3 (ref 140–450)
PLATELET # BLD AUTO: 208 10*3/MM3 (ref 140–450)
PLATELET # BLD AUTO: 212 10*3/MM3 (ref 140–450)
PLATELET # BLD AUTO: 214 10*3/MM3 (ref 140–450)
PLATELET # BLD AUTO: 217 10*3/MM3 (ref 140–450)
PLATELET # BLD AUTO: 226 10*3/MM3 (ref 140–450)
PLATELET # BLD AUTO: 240 10*3/MM3 (ref 140–450)
PLATELET # BLD AUTO: 241 10*3/MM3 (ref 140–450)
PLATELET # BLD AUTO: 245 10*3/MM3 (ref 140–450)
PLATELET # BLD AUTO: 275 10*3/MM3 (ref 140–450)
PLATELET # BLD AUTO: 288 10*3/MM3 (ref 140–450)
PLATELET # BLD AUTO: 97 10*3/MM3 (ref 140–450)
PMV BLD AUTO: 10 FL (ref 6–12)
PMV BLD AUTO: 10.1 FL (ref 6–12)
PMV BLD AUTO: 10.2 FL (ref 6–12)
PMV BLD AUTO: 10.2 FL (ref 6–12)
PMV BLD AUTO: 10.3 FL (ref 6–12)
PMV BLD AUTO: 10.4 FL (ref 6–12)
PMV BLD AUTO: 10.5 FL (ref 6–12)
PMV BLD AUTO: 10.8 FL (ref 6–12)
PMV BLD AUTO: 11.2 FL (ref 6–12)
PMV BLD AUTO: 9.3 FL (ref 6–12)
PMV BLD AUTO: 9.4 FL (ref 6–12)
PMV BLD AUTO: 9.5 FL (ref 6–12)
PMV BLD AUTO: 9.6 FL (ref 6–12)
PMV BLD AUTO: 9.7 FL (ref 6–12)
PMV BLD AUTO: 9.9 FL (ref 6–12)
PMV BLD AUTO: 9.9 FL (ref 6–12)
PO2 BLDA: 102 MM HG (ref 83–108)
PO2 BLDA: 103 MM HG (ref 83–108)
PO2 BLDA: 105 MM HG (ref 83–108)
PO2 BLDA: 108 MM HG (ref 83–108)
PO2 BLDA: 112 MM HG (ref 83–108)
PO2 BLDA: 71.9 MM HG (ref 83–108)
PO2 BLDA: 74.3 MM HG (ref 75–100)
PO2 BLDA: 74.5 MM HG (ref 75–100)
PO2 BLDA: 76.2 MM HG (ref 83–108)
PO2 BLDA: 87.7 MM HG (ref 83–108)
PO2 BLDA: 91 MM HG (ref 83–108)
PO2 BLDA: 95.1 MM HG (ref 83–108)
PO2 BLDV: 29.7 MM HG (ref 30–50)
PO2 BLDV: 38.4 MM HG (ref 30–50)
PO2 BLDV: 46.2 MM HG (ref 27–53)
PO2 TEMP ADJ BLD: 102 MM HG (ref 83–108)
PO2 TEMP ADJ BLD: 103 MM HG (ref 83–108)
PO2 TEMP ADJ BLD: 105 MM HG (ref 83–108)
PO2 TEMP ADJ BLD: 108 MM HG (ref 83–108)
PO2 TEMP ADJ BLD: 112 MM HG (ref 83–108)
PO2 TEMP ADJ BLD: 71.9 MM HG (ref 83–108)
PO2 TEMP ADJ BLD: 76.2 MM HG (ref 83–108)
PO2 TEMP ADJ BLD: 87.7 MM HG (ref 83–108)
PO2 TEMP ADJ BLD: 91 MM HG (ref 83–108)
PO2 TEMP ADJ BLD: 95.1 MM HG (ref 83–108)
PO2 TEMP ADJ BLD: ABNORMAL MM[HG]
PO2 TEMP ADJ BLD: ABNORMAL MM[HG]
POTASSIUM BLD-SCNC: 3.1 MMOL/L (ref 3.5–5.2)
POTASSIUM BLD-SCNC: 3.2 MMOL/L (ref 3.5–5.2)
POTASSIUM BLD-SCNC: 3.3 MMOL/L (ref 3.5–5.2)
POTASSIUM BLD-SCNC: 3.4 MMOL/L (ref 3.5–5.2)
POTASSIUM BLD-SCNC: 3.4 MMOL/L (ref 3.5–5.2)
POTASSIUM BLD-SCNC: 3.5 MMOL/L (ref 3.5–5.2)
POTASSIUM BLD-SCNC: 3.6 MMOL/L (ref 3.5–5.2)
POTASSIUM BLD-SCNC: 3.6 MMOL/L (ref 3.5–5.2)
POTASSIUM BLD-SCNC: 3.7 MMOL/L (ref 3.5–5.2)
POTASSIUM BLD-SCNC: 3.8 MMOL/L (ref 3.5–5.2)
POTASSIUM BLD-SCNC: 3.9 MMOL/L (ref 3.5–5.2)
POTASSIUM BLD-SCNC: 4 MMOL/L (ref 3.5–5.2)
POTASSIUM BLD-SCNC: 4 MMOL/L (ref 3.5–5.2)
POTASSIUM BLD-SCNC: 4.1 MMOL/L (ref 3.5–5.2)
POTASSIUM BLD-SCNC: 4.4 MMOL/L (ref 3.5–5.2)
POTASSIUM BLD-SCNC: 4.6 MMOL/L (ref 3.5–5.2)
POTASSIUM BLD-SCNC: 4.7 MMOL/L (ref 3.5–5.2)
POTASSIUM BLD-SCNC: 4.8 MMOL/L (ref 3.5–5.2)
POTASSIUM BLD-SCNC: 4.9 MMOL/L (ref 3.5–5.2)
POTASSIUM BLD-SCNC: 5.1 MMOL/L (ref 3.5–5.2)
POTASSIUM BLD-SCNC: 5.3 MMOL/L (ref 3.5–5.2)
POTASSIUM BLD-SCNC: 5.5 MMOL/L (ref 3.5–5.2)
POTASSIUM BLD-SCNC: 5.6 MMOL/L (ref 3.5–5.2)
POTASSIUM BLD-SCNC: 6.3 MMOL/L (ref 3.5–5.2)
PREALB SERPL-MCNC: 8.9 MG/DL (ref 20–40)
PROCALCITONIN SERPL-MCNC: 0.41 NG/ML (ref 0.1–0.25)
PROCALCITONIN SERPL-MCNC: 0.44 NG/ML (ref 0.1–0.25)
PROCALCITONIN SERPL-MCNC: 0.45 NG/ML (ref 0.1–0.25)
PROCALCITONIN SERPL-MCNC: 0.46 NG/ML (ref 0.1–0.25)
PROCALCITONIN SERPL-MCNC: 0.6 NG/ML (ref 0.1–0.25)
PROCALCITONIN SERPL-MCNC: 0.98 NG/ML (ref 0.1–0.25)
PROCALCITONIN SERPL-MCNC: 1.21 NG/ML (ref 0.1–0.25)
PROCALCITONIN SERPL-MCNC: 1.31 NG/ML (ref 0.1–0.25)
PROCALCITONIN SERPL-MCNC: 2.17 NG/ML (ref 0.1–0.25)
PROCALCITONIN SERPL-MCNC: 2.18 NG/ML (ref 0.1–0.25)
PROCALCITONIN SERPL-MCNC: 2.56 NG/ML (ref 0.1–0.25)
PROPOXYPH UR QL: NEGATIVE
PROT SERPL-MCNC: 6.3 G/DL (ref 6–8.5)
PROT SERPL-MCNC: 6.5 G/DL (ref 6–8.5)
PROT SERPL-MCNC: 6.8 G/DL (ref 6–8.5)
PROT SERPL-MCNC: 6.9 G/DL (ref 6–8.5)
PROT SERPL-MCNC: 7 G/DL (ref 6–8.5)
PROT SERPL-MCNC: 7 G/DL (ref 6–8.5)
PROT SERPL-MCNC: 7.1 G/DL (ref 6–8.5)
PROT SERPL-MCNC: 7.2 G/DL (ref 6–8.5)
PROT SERPL-MCNC: 7.3 G/DL (ref 6–8.5)
PROT SERPL-MCNC: 7.4 G/DL (ref 6–8.5)
PROT SERPL-MCNC: 7.5 G/DL (ref 6–8.5)
PROT SERPL-MCNC: 7.9 G/DL (ref 6–8.5)
PROT UR QL STRIP: ABNORMAL
PROTHROMBIN TIME: 15.5 SECONDS (ref 12–15.1)
PROTHROMBIN TIME: 17.2 SECONDS (ref 11.2–14.3)
PROTHROMBIN TIME: 18.9 SECONDS (ref 11.2–14.3)
PSV: 10 CMH2O
RBC # BLD AUTO: 2.16 10*6/MM3 (ref 4.14–5.8)
RBC # BLD AUTO: 2.38 10*6/MM3 (ref 4.14–5.8)
RBC # BLD AUTO: 2.51 10*6/MM3 (ref 4.14–5.8)
RBC # BLD AUTO: 2.66 10*6/MM3 (ref 4.14–5.8)
RBC # BLD AUTO: 2.66 10*6/MM3 (ref 4.14–5.8)
RBC # BLD AUTO: 2.71 10*6/MM3 (ref 4.14–5.8)
RBC # BLD AUTO: 2.71 10*6/MM3 (ref 4.14–5.8)
RBC # BLD AUTO: 2.72 10*6/MM3 (ref 4.14–5.8)
RBC # BLD AUTO: 2.73 10*6/MM3 (ref 4.14–5.8)
RBC # BLD AUTO: 2.8 10*6/MM3 (ref 4.14–5.8)
RBC # BLD AUTO: 2.81 10*6/MM3 (ref 4.14–5.8)
RBC # BLD AUTO: 2.88 10*6/MM3 (ref 4.14–5.8)
RBC # BLD AUTO: 2.89 10*6/MM3 (ref 4.14–5.8)
RBC # BLD AUTO: 2.9 10*6/MM3 (ref 4.14–5.8)
RBC # BLD AUTO: 2.94 10*6/MM3 (ref 4.14–5.8)
RBC # BLD AUTO: 2.95 10*6/MM3 (ref 4.14–5.8)
RBC # BLD AUTO: 3.05 10*6/MM3 (ref 4.14–5.8)
RBC # BLD AUTO: 3.07 10*6/MM3 (ref 4.14–5.8)
RBC # BLD AUTO: 3.12 10*6/MM3 (ref 4.14–5.8)
RBC # BLD AUTO: 3.15 10*6/MM3 (ref 4.14–5.8)
RBC # BLD AUTO: 3.29 10*6/MM3 (ref 4.14–5.8)
RBC # BLD AUTO: 3.34 10*6/MM3 (ref 4.14–5.8)
RBC # BLD AUTO: 3.42 10*6/MM3 (ref 4.14–5.8)
RBC # BLD AUTO: 3.45 10*6/MM3 (ref 4.14–5.8)
RBC # BLD AUTO: 3.48 10*6/MM3 (ref 4.14–5.8)
RBC # BLD AUTO: 3.51 10*6/MM3 (ref 4.14–5.8)
RBC # BLD AUTO: 3.55 10*6/MM3 (ref 4.14–5.8)
RBC # BLD AUTO: 3.78 10*6/MM3 (ref 4.14–5.8)
RBC # UR: ABNORMAL /HPF
REF LAB TEST METHOD: ABNORMAL
RETICS # AUTO: 0.12 10*6/MM3 (ref 0.02–0.13)
RETICS/RBC NFR AUTO: 4.71 % (ref 0.7–1.9)
RH BLD: POSITIVE
RHINOVIRUS RNA SPEC NAA+PROBE: NOT DETECTED
RSV RNA NPH QL NAA+NON-PROBE: NOT DETECTED
SALICYLATES SERPL-MCNC: <0.3 MG/DL
SALICYLATES SERPL-MCNC: <0.3 MG/DL
SAO2 % BLDCOA: 95 % (ref 94–100)
SAO2 % BLDCOA: 96.2 % (ref 94–100)
SAO2 % BLDCOV: 60.6 % (ref 45–75)
SAO2 % BLDCOV: 66.5 % (ref 45–75)
SET MECH RESP RATE: 10
SET MECH RESP RATE: 10
SET MECH RESP RATE: 16
SET MECH RESP RATE: 16
SODIUM BLD-SCNC: 126 MMOL/L (ref 136–145)
SODIUM BLD-SCNC: 127 MMOL/L (ref 136–145)
SODIUM BLD-SCNC: 128 MMOL/L (ref 136–145)
SODIUM BLD-SCNC: 129 MMOL/L (ref 136–145)
SODIUM BLD-SCNC: 129 MMOL/L (ref 136–145)
SODIUM BLD-SCNC: 130 MMOL/L (ref 136–145)
SODIUM BLD-SCNC: 130 MMOL/L (ref 136–145)
SODIUM BLD-SCNC: 131 MMOL/L (ref 136–145)
SODIUM BLD-SCNC: 132 MMOL/L (ref 136–145)
SODIUM BLD-SCNC: 133 MMOL/L (ref 136–145)
SODIUM BLD-SCNC: 134 MMOL/L (ref 136–145)
SODIUM BLD-SCNC: 136 MMOL/L (ref 136–145)
SODIUM BLD-SCNC: 137 MMOL/L (ref 136–145)
SODIUM BLD-SCNC: 139 MMOL/L (ref 136–145)
SP GR UR STRIP: 1.02 (ref 1–1.03)
SQUAMOUS #/AREA URNS HPF: ABNORMAL /HPF
STRESS TARGET HR: 136 BPM
T&S EXPIRATION DATE: NORMAL
T4 FREE SERPL-MCNC: 1.74 NG/DL (ref 0.93–1.7)
TIBC SERPL-MCNC: 250 MCG/DL (ref 298–536)
TOTAL RATE: 0 BREATHS/MINUTE
TOTAL RATE: 10 BREATHS/MINUTE
TOTAL RATE: 10 BREATHS/MINUTE
TOTAL RATE: 20 BREATHS/MINUTE
TRANSFERRIN SERPL-MCNC: 168 MG/DL (ref 200–360)
TRICYCLICS UR QL SCN: NEGATIVE
TRIGL SERPL-MCNC: 64 MG/DL (ref 0–150)
TROPONIN T SERPL-MCNC: 0.2 NG/ML (ref 0–0.03)
TROPONIN T SERPL-MCNC: 0.21 NG/ML (ref 0–0.03)
TROPONIN T SERPL-MCNC: 0.23 NG/ML (ref 0–0.03)
TROPONIN T SERPL-MCNC: 0.25 NG/ML (ref 0–0.03)
TROPONIN T SERPL-MCNC: 0.27 NG/ML (ref 0–0.03)
TROPONIN T SERPL-MCNC: 0.28 NG/ML (ref 0–0.03)
TROPONIN T SERPL-MCNC: 0.29 NG/ML (ref 0–0.03)
TROPONIN T SERPL-MCNC: 0.3 NG/ML (ref 0–0.03)
TROPONIN T SERPL-MCNC: 0.31 NG/ML (ref 0–0.03)
TROPONIN T SERPL-MCNC: 0.34 NG/ML (ref 0–0.03)
TROPONIN T SERPL-MCNC: 0.36 NG/ML (ref 0–0.03)
TROPONIN T SERPL-MCNC: 0.38 NG/ML (ref 0–0.03)
TROPONIN T SERPL-MCNC: 0.42 NG/ML (ref 0–0.03)
TROPONIN T SERPL-MCNC: 0.42 NG/ML (ref 0–0.03)
TROPONIN T SERPL-MCNC: 0.44 NG/ML (ref 0–0.03)
TROPONIN T SERPL-MCNC: 0.48 NG/ML (ref 0–0.03)
TSH SERPL DL<=0.05 MIU/L-ACNC: 1.79 UIU/ML (ref 0.27–4.2)
TSH SERPL DL<=0.05 MIU/L-ACNC: 6.95 UIU/ML (ref 0.27–4.2)
UNIT  ABO: NORMAL
UNIT  RH: NORMAL
UROBILINOGEN UR QL STRIP: ABNORMAL
VANCOMYCIN SERPL-MCNC: 20.7 MCG/ML (ref 5–40)
VANCOMYCIN SERPL-MCNC: 20.9 MCG/ML (ref 5–40)
VANCOMYCIN SERPL-MCNC: 23 MCG/ML (ref 5–40)
VANCOMYCIN SERPL-MCNC: 33.3 MCG/ML (ref 5–40)
VANCOMYCIN TROUGH SERPL-MCNC: 16.3 MCG/ML (ref 5–20)
VENTILATOR MODE: ABNORMAL
VENTILATOR MODE: AC
VENTILATOR MODE: AC
VRE SPEC QL CULT: NORMAL
VT ON VENT VENT: 480 ML
WBC NRBC COR # BLD: 12.2 10*3/MM3 (ref 3.4–10.8)
WBC NRBC COR # BLD: 3.97 10*3/MM3 (ref 3.4–10.8)
WBC NRBC COR # BLD: 4.6 10*3/MM3 (ref 3.4–10.8)
WBC NRBC COR # BLD: 4.88 10*3/MM3 (ref 3.4–10.8)
WBC NRBC COR # BLD: 5.3 10*3/MM3 (ref 3.4–10.8)
WBC NRBC COR # BLD: 5.89 10*3/MM3 (ref 3.4–10.8)
WBC NRBC COR # BLD: 5.98 10*3/MM3 (ref 3.4–10.8)
WBC NRBC COR # BLD: 6.01 10*3/MM3 (ref 3.4–10.8)
WBC NRBC COR # BLD: 6.08 10*3/MM3 (ref 3.4–10.8)
WBC NRBC COR # BLD: 6.37 10*3/MM3 (ref 3.4–10.8)
WBC NRBC COR # BLD: 6.38 10*3/MM3 (ref 3.4–10.8)
WBC NRBC COR # BLD: 6.47 10*3/MM3 (ref 3.4–10.8)
WBC NRBC COR # BLD: 6.48 10*3/MM3 (ref 3.4–10.8)
WBC NRBC COR # BLD: 6.65 10*3/MM3 (ref 3.4–10.8)
WBC NRBC COR # BLD: 6.8 10*3/MM3 (ref 3.4–10.8)
WBC NRBC COR # BLD: 6.88 10*3/MM3 (ref 3.4–10.8)
WBC NRBC COR # BLD: 6.9 10*3/MM3 (ref 3.4–10.8)
WBC NRBC COR # BLD: 6.91 10*3/MM3 (ref 3.4–10.8)
WBC NRBC COR # BLD: 7.12 10*3/MM3 (ref 3.4–10.8)
WBC NRBC COR # BLD: 7.23 10*3/MM3 (ref 3.4–10.8)
WBC NRBC COR # BLD: 7.54 10*3/MM3 (ref 3.4–10.8)
WBC NRBC COR # BLD: 7.82 10*3/MM3 (ref 3.4–10.8)
WBC NRBC COR # BLD: 7.84 10*3/MM3 (ref 3.4–10.8)
WBC NRBC COR # BLD: 8.23 10*3/MM3 (ref 3.4–10.8)
WBC NRBC COR # BLD: 8.27 10*3/MM3 (ref 3.4–10.8)
WBC NRBC COR # BLD: 8.59 10*3/MM3 (ref 3.4–10.8)
WBC NRBC COR # BLD: 8.6 10*3/MM3 (ref 3.4–10.8)
WBC NRBC COR # BLD: 9.59 10*3/MM3 (ref 3.4–10.8)
WBC UR QL AUTO: ABNORMAL /HPF
WHOLE BLOOD HOLD SPECIMEN: NORMAL

## 2019-01-01 PROCEDURE — 85025 COMPLETE CBC W/AUTO DIFF WBC: CPT | Performed by: INTERNAL MEDICINE

## 2019-01-01 PROCEDURE — 99291 CRITICAL CARE FIRST HOUR: CPT | Performed by: INTERNAL MEDICINE

## 2019-01-01 PROCEDURE — 36600 WITHDRAWAL OF ARTERIAL BLOOD: CPT

## 2019-01-01 PROCEDURE — 84443 ASSAY THYROID STIM HORMONE: CPT | Performed by: EMERGENCY MEDICINE

## 2019-01-01 PROCEDURE — 94799 UNLISTED PULMONARY SVC/PX: CPT

## 2019-01-01 PROCEDURE — 99222 1ST HOSP IP/OBS MODERATE 55: CPT | Performed by: INTERNAL MEDICINE

## 2019-01-01 PROCEDURE — 82375 ASSAY CARBOXYHB QUANT: CPT

## 2019-01-01 PROCEDURE — 85014 HEMATOCRIT: CPT | Performed by: INTERNAL MEDICINE

## 2019-01-01 PROCEDURE — 80048 BASIC METABOLIC PNL TOTAL CA: CPT | Performed by: INTERNAL MEDICINE

## 2019-01-01 PROCEDURE — 96361 HYDRATE IV INFUSION ADD-ON: CPT

## 2019-01-01 PROCEDURE — 94640 AIRWAY INHALATION TREATMENT: CPT

## 2019-01-01 PROCEDURE — 87804 INFLUENZA ASSAY W/OPTIC: CPT | Performed by: PHYSICIAN ASSISTANT

## 2019-01-01 PROCEDURE — 84484 ASSAY OF TROPONIN QUANT: CPT | Performed by: INTERNAL MEDICINE

## 2019-01-01 PROCEDURE — 85025 COMPLETE CBC W/AUTO DIFF WBC: CPT | Performed by: EMERGENCY MEDICINE

## 2019-01-01 PROCEDURE — 25010000002 CEFEPIME PER 500 MG: Performed by: NURSE PRACTITIONER

## 2019-01-01 PROCEDURE — 95951 HC EEG WITH VIDEO RECORDING EACH 24 HRS: CPT

## 2019-01-01 PROCEDURE — 80053 COMPREHEN METABOLIC PANEL: CPT | Performed by: EMERGENCY MEDICINE

## 2019-01-01 PROCEDURE — 80053 COMPREHEN METABOLIC PANEL: CPT | Performed by: PHYSICIAN ASSISTANT

## 2019-01-01 PROCEDURE — 25010000002 LEVETIRACETAM IN NACL 0.82% 500 MG/100ML SOLUTION: Performed by: INTERNAL MEDICINE

## 2019-01-01 PROCEDURE — 83605 ASSAY OF LACTIC ACID: CPT | Performed by: PHYSICIAN ASSISTANT

## 2019-01-01 PROCEDURE — 80307 DRUG TEST PRSMV CHEM ANLYZR: CPT | Performed by: EMERGENCY MEDICINE

## 2019-01-01 PROCEDURE — 85027 COMPLETE CBC AUTOMATED: CPT | Performed by: HOSPITALIST

## 2019-01-01 PROCEDURE — 82962 GLUCOSE BLOOD TEST: CPT

## 2019-01-01 PROCEDURE — P9016 RBC LEUKOCYTES REDUCED: HCPCS

## 2019-01-01 PROCEDURE — 94003 VENT MGMT INPAT SUBQ DAY: CPT

## 2019-01-01 PROCEDURE — 25010000002 VANCOMYCIN 5 G RECONSTITUTED SOLUTION 5,000 MG VIAL: Performed by: PHYSICIAN ASSISTANT

## 2019-01-01 PROCEDURE — G0378 HOSPITAL OBSERVATION PER HR: HCPCS

## 2019-01-01 PROCEDURE — 96366 THER/PROPH/DIAG IV INF ADDON: CPT

## 2019-01-01 PROCEDURE — 99232 SBSQ HOSP IP/OBS MODERATE 35: CPT | Performed by: INTERNAL MEDICINE

## 2019-01-01 PROCEDURE — 80053 COMPREHEN METABOLIC PANEL: CPT | Performed by: HOSPITALIST

## 2019-01-01 PROCEDURE — 5A1D70Z PERFORMANCE OF URINARY FILTRATION, INTERMITTENT, LESS THAN 6 HOURS PER DAY: ICD-10-PCS | Performed by: INTERNAL MEDICINE

## 2019-01-01 PROCEDURE — 84443 ASSAY THYROID STIM HORMONE: CPT | Performed by: INTERNAL MEDICINE

## 2019-01-01 PROCEDURE — 80202 ASSAY OF VANCOMYCIN: CPT

## 2019-01-01 PROCEDURE — 83605 ASSAY OF LACTIC ACID: CPT | Performed by: INTERNAL MEDICINE

## 2019-01-01 PROCEDURE — 84484 ASSAY OF TROPONIN QUANT: CPT | Performed by: EMERGENCY MEDICINE

## 2019-01-01 PROCEDURE — 84484 ASSAY OF TROPONIN QUANT: CPT

## 2019-01-01 PROCEDURE — 25010000002 HEPARIN (PORCINE) PER 1000 UNITS: Performed by: HOSPITALIST

## 2019-01-01 PROCEDURE — 25010000002 MIDAZOLAM 50 MG/10ML SOLUTION: Performed by: NURSE PRACTITIONER

## 2019-01-01 PROCEDURE — 84439 ASSAY OF FREE THYROXINE: CPT | Performed by: EMERGENCY MEDICINE

## 2019-01-01 PROCEDURE — 93005 ELECTROCARDIOGRAM TRACING: CPT | Performed by: EMERGENCY MEDICINE

## 2019-01-01 PROCEDURE — 82805 BLOOD GASES W/O2 SATURATION: CPT

## 2019-01-01 PROCEDURE — 85025 COMPLETE CBC W/AUTO DIFF WBC: CPT | Performed by: PHYSICIAN ASSISTANT

## 2019-01-01 PROCEDURE — 63710000001 INSULIN DETEMIR PER 5 UNITS: Performed by: INTERNAL MEDICINE

## 2019-01-01 PROCEDURE — 99285 EMERGENCY DEPT VISIT HI MDM: CPT

## 2019-01-01 PROCEDURE — 25010000002 CEFEPIME PER 500 MG: Performed by: INTERNAL MEDICINE

## 2019-01-01 PROCEDURE — 85018 HEMOGLOBIN: CPT | Performed by: INTERNAL MEDICINE

## 2019-01-01 PROCEDURE — 84484 ASSAY OF TROPONIN QUANT: CPT | Performed by: PHYSICIAN ASSISTANT

## 2019-01-01 PROCEDURE — 71045 X-RAY EXAM CHEST 1 VIEW: CPT

## 2019-01-01 PROCEDURE — 83050 HGB METHEMOGLOBIN QUAN: CPT

## 2019-01-01 PROCEDURE — 25010000002 PIPERACILLIN SOD-TAZOBACTAM PER 1 G: Performed by: NURSE PRACTITIONER

## 2019-01-01 PROCEDURE — 76937 US GUIDE VASCULAR ACCESS: CPT | Performed by: NURSE PRACTITIONER

## 2019-01-01 PROCEDURE — 96365 THER/PROPH/DIAG IV INF INIT: CPT

## 2019-01-01 PROCEDURE — 84145 PROCALCITONIN (PCT): CPT

## 2019-01-01 PROCEDURE — 96375 TX/PRO/DX INJ NEW DRUG ADDON: CPT

## 2019-01-01 PROCEDURE — 94770: CPT

## 2019-01-01 PROCEDURE — 83735 ASSAY OF MAGNESIUM: CPT | Performed by: INTERNAL MEDICINE

## 2019-01-01 PROCEDURE — 25010000002 PIPERACILLIN SOD-TAZOBACTAM PER 1 G: Performed by: HOSPITALIST

## 2019-01-01 PROCEDURE — 36591 DRAW BLOOD OFF VENOUS DEVICE: CPT

## 2019-01-01 PROCEDURE — 96367 TX/PROPH/DG ADDL SEQ IV INF: CPT

## 2019-01-01 PROCEDURE — 97166 OT EVAL MOD COMPLEX 45 MIN: CPT

## 2019-01-01 PROCEDURE — 25010000002 HEPARIN (PORCINE) PER 1000 UNITS: Performed by: NURSE PRACTITIONER

## 2019-01-01 PROCEDURE — 80053 COMPREHEN METABOLIC PANEL: CPT | Performed by: INTERNAL MEDICINE

## 2019-01-01 PROCEDURE — 99217 PR OBSERVATION CARE DISCHARGE MANAGEMENT: CPT | Performed by: INTERNAL MEDICINE

## 2019-01-01 PROCEDURE — 87641 MR-STAPH DNA AMP PROBE: CPT | Performed by: HOSPITALIST

## 2019-01-01 PROCEDURE — 92612 ENDOSCOPY SWALLOW (FEES) VID: CPT

## 2019-01-01 PROCEDURE — 25010000002 HYDROMORPHONE PER 4 MG: Performed by: HOSPITALIST

## 2019-01-01 PROCEDURE — 99232 SBSQ HOSP IP/OBS MODERATE 35: CPT | Performed by: NURSE PRACTITIONER

## 2019-01-01 PROCEDURE — 25010000002 HEPARIN (PORCINE) PER 1000 UNITS: Performed by: INTERNAL MEDICINE

## 2019-01-01 PROCEDURE — 95819 EEG AWAKE AND ASLEEP: CPT

## 2019-01-01 PROCEDURE — 82820 HEMOGLOBIN-OXYGEN AFFINITY: CPT

## 2019-01-01 PROCEDURE — 86900 BLOOD TYPING SEROLOGIC ABO: CPT

## 2019-01-01 PROCEDURE — 71046 X-RAY EXAM CHEST 2 VIEWS: CPT

## 2019-01-01 PROCEDURE — 83880 ASSAY OF NATRIURETIC PEPTIDE: CPT | Performed by: EMERGENCY MEDICINE

## 2019-01-01 PROCEDURE — 63710000001 INSULIN ASPART PER 5 UNITS: Performed by: INTERNAL MEDICINE

## 2019-01-01 PROCEDURE — 93005 ELECTROCARDIOGRAM TRACING: CPT | Performed by: INTERNAL MEDICINE

## 2019-01-01 PROCEDURE — 74176 CT ABD & PELVIS W/O CONTRAST: CPT

## 2019-01-01 PROCEDURE — 25010000002 LEVETIRACETAM IN NACL 0.82% 500 MG/100ML SOLUTION: Performed by: NURSE PRACTITIONER

## 2019-01-01 PROCEDURE — 99232 SBSQ HOSP IP/OBS MODERATE 35: CPT | Performed by: PSYCHIATRY & NEUROLOGY

## 2019-01-01 PROCEDURE — 99220 PR INITIAL OBSERVATION CARE/DAY 70 MINUTES: CPT | Performed by: INTERNAL MEDICINE

## 2019-01-01 PROCEDURE — 99284 EMERGENCY DEPT VISIT MOD MDM: CPT

## 2019-01-01 PROCEDURE — 25010000003 LEVETIRACETAM IN NACL 0.75% 1000 MG/100ML SOLUTION: Performed by: EMERGENCY MEDICINE

## 2019-01-01 PROCEDURE — 94660 CPAP INITIATION&MGMT: CPT

## 2019-01-01 PROCEDURE — 97162 PT EVAL MOD COMPLEX 30 MIN: CPT

## 2019-01-01 PROCEDURE — 99233 SBSQ HOSP IP/OBS HIGH 50: CPT | Performed by: HOSPITALIST

## 2019-01-01 PROCEDURE — 25010000002 LEVOFLOXACIN PER 250 MG: Performed by: INTERNAL MEDICINE

## 2019-01-01 PROCEDURE — 25010000002 VANCOMYCIN 10 G RECONSTITUTED SOLUTION: Performed by: EMERGENCY MEDICINE

## 2019-01-01 PROCEDURE — 93005 ELECTROCARDIOGRAM TRACING: CPT

## 2019-01-01 PROCEDURE — 83605 ASSAY OF LACTIC ACID: CPT | Performed by: EMERGENCY MEDICINE

## 2019-01-01 PROCEDURE — 74018 RADEX ABDOMEN 1 VIEW: CPT

## 2019-01-01 PROCEDURE — 25010000002 ONDANSETRON PER 1 MG: Performed by: INTERNAL MEDICINE

## 2019-01-01 PROCEDURE — 80053 COMPREHEN METABOLIC PANEL: CPT

## 2019-01-01 PROCEDURE — 5A1955Z RESPIRATORY VENTILATION, GREATER THAN 96 CONSECUTIVE HOURS: ICD-10-PCS | Performed by: INTERNAL MEDICINE

## 2019-01-01 PROCEDURE — 86850 RBC ANTIBODY SCREEN: CPT | Performed by: EMERGENCY MEDICINE

## 2019-01-01 PROCEDURE — 97165 OT EVAL LOW COMPLEX 30 MIN: CPT

## 2019-01-01 PROCEDURE — 63710000001 INSULIN REGULAR HUMAN PER 5 UNITS: Performed by: PHYSICIAN ASSISTANT

## 2019-01-01 PROCEDURE — 25010000002 LEVETRIRACETAM PER 10 MG: Performed by: PSYCHIATRY & NEUROLOGY

## 2019-01-01 PROCEDURE — 25010000002 PROPOFOL 10 MG/ML EMULSION: Performed by: NURSE ANESTHETIST, CERTIFIED REGISTERED

## 2019-01-01 PROCEDURE — 85014 HEMATOCRIT: CPT | Performed by: NURSE PRACTITIONER

## 2019-01-01 PROCEDURE — 86920 COMPATIBILITY TEST SPIN: CPT

## 2019-01-01 PROCEDURE — 99233 SBSQ HOSP IP/OBS HIGH 50: CPT | Performed by: INTERNAL MEDICINE

## 2019-01-01 PROCEDURE — 80069 RENAL FUNCTION PANEL: CPT | Performed by: INTERNAL MEDICINE

## 2019-01-01 PROCEDURE — 25010000002 MORPHINE PER 10 MG: Performed by: EMERGENCY MEDICINE

## 2019-01-01 PROCEDURE — 63710000001 INSULIN REGULAR HUMAN PER 5 UNITS: Performed by: INTERNAL MEDICINE

## 2019-01-01 PROCEDURE — G0108 DIAB MANAGE TRN  PER INDIV: HCPCS | Performed by: REGISTERED NURSE

## 2019-01-01 PROCEDURE — 70450 CT HEAD/BRAIN W/O DYE: CPT

## 2019-01-01 PROCEDURE — 71250 CT THORAX DX C-: CPT

## 2019-01-01 PROCEDURE — 85025 COMPLETE CBC W/AUTO DIFF WBC: CPT | Performed by: HOSPITALIST

## 2019-01-01 PROCEDURE — 99222 1ST HOSP IP/OBS MODERATE 55: CPT | Performed by: PSYCHIATRY & NEUROLOGY

## 2019-01-01 PROCEDURE — 86900 BLOOD TYPING SEROLOGIC ABO: CPT | Performed by: NURSE PRACTITIONER

## 2019-01-01 PROCEDURE — 86140 C-REACTIVE PROTEIN: CPT | Performed by: INTERNAL MEDICINE

## 2019-01-01 PROCEDURE — 86901 BLOOD TYPING SEROLOGIC RH(D): CPT

## 2019-01-01 PROCEDURE — 25010000002 PROMETHAZINE PER 50 MG: Performed by: NURSE PRACTITIONER

## 2019-01-01 PROCEDURE — 99239 HOSP IP/OBS DSCHRG MGMT >30: CPT | Performed by: NURSE PRACTITIONER

## 2019-01-01 PROCEDURE — 63710000001 ATORVASTATIN 40 MG TABLET: Performed by: INTERNAL MEDICINE

## 2019-01-01 PROCEDURE — 95816 EEG AWAKE AND DROWSY: CPT

## 2019-01-01 PROCEDURE — 99292 CRITICAL CARE ADDL 30 MIN: CPT | Performed by: INTERNAL MEDICINE

## 2019-01-01 PROCEDURE — 25010000002 CEFEPIME PER 500 MG: Performed by: PHYSICIAN ASSISTANT

## 2019-01-01 PROCEDURE — 86850 RBC ANTIBODY SCREEN: CPT | Performed by: INTERNAL MEDICINE

## 2019-01-01 PROCEDURE — 87081 CULTURE SCREEN ONLY: CPT | Performed by: INTERNAL MEDICINE

## 2019-01-01 PROCEDURE — 80053 COMPREHEN METABOLIC PANEL: CPT | Performed by: NURSE PRACTITIONER

## 2019-01-01 PROCEDURE — 25010000002 FENTANYL CITRATE (PF) 100 MCG/2ML SOLUTION: Performed by: EMERGENCY MEDICINE

## 2019-01-01 PROCEDURE — 25010000002 MORPHINE PER 10 MG: Performed by: INTERNAL MEDICINE

## 2019-01-01 PROCEDURE — 25010000002 VANCOMYCIN 5 G RECONSTITUTED SOLUTION 5,000 MG VIAL: Performed by: NURSE PRACTITIONER

## 2019-01-01 PROCEDURE — 82140 ASSAY OF AMMONIA: CPT | Performed by: NURSE PRACTITIONER

## 2019-01-01 PROCEDURE — 95806 SLEEP STUDY UNATT&RESP EFFT: CPT | Performed by: INTERNAL MEDICINE

## 2019-01-01 PROCEDURE — 96374 THER/PROPH/DIAG INJ IV PUSH: CPT

## 2019-01-01 PROCEDURE — 87040 BLOOD CULTURE FOR BACTERIA: CPT | Performed by: EMERGENCY MEDICINE

## 2019-01-01 PROCEDURE — 93306 TTE W/DOPPLER COMPLETE: CPT

## 2019-01-01 PROCEDURE — 25010000002 ONDANSETRON PER 1 MG: Performed by: EMERGENCY MEDICINE

## 2019-01-01 PROCEDURE — 25010000002 PIPERACILLIN SOD-TAZOBACTAM PER 1 G: Performed by: EMERGENCY MEDICINE

## 2019-01-01 PROCEDURE — 97535 SELF CARE MNGMENT TRAINING: CPT

## 2019-01-01 PROCEDURE — 84100 ASSAY OF PHOSPHORUS: CPT | Performed by: INTERNAL MEDICINE

## 2019-01-01 PROCEDURE — 93005 ELECTROCARDIOGRAM TRACING: CPT | Performed by: PHYSICIAN ASSISTANT

## 2019-01-01 PROCEDURE — 36556 INSERT NON-TUNNEL CV CATH: CPT | Performed by: NURSE PRACTITIONER

## 2019-01-01 PROCEDURE — 94690 O2 UPTK REST INDIRECT: CPT | Performed by: INTERNAL MEDICINE

## 2019-01-01 PROCEDURE — 25010000002 LEVETIRACETAM IN NACL 0.82% 500 MG/100ML SOLUTION: Performed by: PSYCHIATRY & NEUROLOGY

## 2019-01-01 PROCEDURE — 84145 PROCALCITONIN (PCT): CPT | Performed by: INTERNAL MEDICINE

## 2019-01-01 PROCEDURE — 63710000001 INSULIN LISPRO (HUMAN) PER 5 UNITS: Performed by: HOSPITALIST

## 2019-01-01 PROCEDURE — 97530 THERAPEUTIC ACTIVITIES: CPT

## 2019-01-01 PROCEDURE — 80069 RENAL FUNCTION PANEL: CPT | Performed by: NURSE PRACTITIONER

## 2019-01-01 PROCEDURE — 85045 AUTOMATED RETICULOCYTE COUNT: CPT | Performed by: INTERNAL MEDICINE

## 2019-01-01 PROCEDURE — 31500 INSERT EMERGENCY AIRWAY: CPT

## 2019-01-01 PROCEDURE — A9270 NON-COVERED ITEM OR SERVICE: HCPCS | Performed by: INTERNAL MEDICINE

## 2019-01-01 PROCEDURE — 99223 1ST HOSP IP/OBS HIGH 75: CPT | Performed by: HOSPITALIST

## 2019-01-01 PROCEDURE — 83615 LACTATE (LD) (LDH) ENZYME: CPT | Performed by: INTERNAL MEDICINE

## 2019-01-01 PROCEDURE — 25010000002 ALBUMIN HUMAN 25% PER 50 ML: Performed by: INTERNAL MEDICINE

## 2019-01-01 PROCEDURE — 86923 COMPATIBILITY TEST ELECTRIC: CPT

## 2019-01-01 PROCEDURE — 99239 HOSP IP/OBS DSCHRG MGMT >30: CPT | Performed by: PHYSICIAN ASSISTANT

## 2019-01-01 PROCEDURE — 84478 ASSAY OF TRIGLYCERIDES: CPT | Performed by: INTERNAL MEDICINE

## 2019-01-01 PROCEDURE — 93880 EXTRACRANIAL BILAT STUDY: CPT

## 2019-01-01 PROCEDURE — 76705 ECHO EXAM OF ABDOMEN: CPT

## 2019-01-01 PROCEDURE — 02HV33Z INSERTION OF INFUSION DEVICE INTO SUPERIOR VENA CAVA, PERCUTANEOUS APPROACH: ICD-10-PCS | Performed by: INTERNAL MEDICINE

## 2019-01-01 PROCEDURE — 83690 ASSAY OF LIPASE: CPT | Performed by: PHYSICIAN ASSISTANT

## 2019-01-01 PROCEDURE — 63710000001 INSULIN DETEMIR PER 5 UNITS: Performed by: HOSPITALIST

## 2019-01-01 PROCEDURE — 84484 ASSAY OF TROPONIN QUANT: CPT | Performed by: NURSE PRACTITIONER

## 2019-01-01 PROCEDURE — 93010 ELECTROCARDIOGRAM REPORT: CPT | Performed by: INTERNAL MEDICINE

## 2019-01-01 PROCEDURE — 80048 BASIC METABOLIC PNL TOTAL CA: CPT | Performed by: HOSPITALIST

## 2019-01-01 PROCEDURE — 93970 EXTREMITY STUDY: CPT

## 2019-01-01 PROCEDURE — 80177 DRUG SCRN QUAN LEVETIRACETAM: CPT | Performed by: NURSE PRACTITIONER

## 2019-01-01 PROCEDURE — 99231 SBSQ HOSP IP/OBS SF/LOW 25: CPT | Performed by: PSYCHIATRY & NEUROLOGY

## 2019-01-01 PROCEDURE — 84145 PROCALCITONIN (PCT): CPT | Performed by: PHYSICIAN ASSISTANT

## 2019-01-01 PROCEDURE — 63710000001 INSULIN LISPRO (HUMAN) PER 5 UNITS: Performed by: INTERNAL MEDICINE

## 2019-01-01 PROCEDURE — 25010000002 LEVOFLOXACIN PER 250 MG: Performed by: PHYSICIAN ASSISTANT

## 2019-01-01 PROCEDURE — 25010000002 MIDAZOLAM PER 1 MG: Performed by: NURSE PRACTITIONER

## 2019-01-01 PROCEDURE — 80202 ASSAY OF VANCOMYCIN: CPT | Performed by: INTERNAL MEDICINE

## 2019-01-01 PROCEDURE — 25010000002 ONDANSETRON PER 1 MG: Performed by: HOSPITALIST

## 2019-01-01 PROCEDURE — 70551 MRI BRAIN STEM W/O DYE: CPT

## 2019-01-01 PROCEDURE — 25010000002 CEFTRIAXONE PER 250 MG: Performed by: PHYSICIAN ASSISTANT

## 2019-01-01 PROCEDURE — 83690 ASSAY OF LIPASE: CPT | Performed by: NURSE PRACTITIONER

## 2019-01-01 PROCEDURE — 93970 EXTREMITY STUDY: CPT | Performed by: INTERNAL MEDICINE

## 2019-01-01 PROCEDURE — 93005 ELECTROCARDIOGRAM TRACING: CPT | Performed by: HOSPITALIST

## 2019-01-01 PROCEDURE — 86900 BLOOD TYPING SEROLOGIC ABO: CPT | Performed by: EMERGENCY MEDICINE

## 2019-01-01 PROCEDURE — 87641 MR-STAPH DNA AMP PROBE: CPT

## 2019-01-01 PROCEDURE — 85610 PROTHROMBIN TIME: CPT | Performed by: EMERGENCY MEDICINE

## 2019-01-01 PROCEDURE — 84134 ASSAY OF PREALBUMIN: CPT | Performed by: INTERNAL MEDICINE

## 2019-01-01 PROCEDURE — 85027 COMPLETE CBC AUTOMATED: CPT | Performed by: NURSE PRACTITIONER

## 2019-01-01 PROCEDURE — 83540 ASSAY OF IRON: CPT | Performed by: INTERNAL MEDICINE

## 2019-01-01 PROCEDURE — 87804 INFLUENZA ASSAY W/OPTIC: CPT | Performed by: EMERGENCY MEDICINE

## 2019-01-01 PROCEDURE — 25010000003 LEVETIRACETAM IN NACL 0.75% 1000 MG/100ML SOLUTION: Performed by: PSYCHIATRY & NEUROLOGY

## 2019-01-01 PROCEDURE — 25010000002 VANCOMYCIN 1 G RECONSTITUTED SOLUTION 1 EACH VIAL: Performed by: EMERGENCY MEDICINE

## 2019-01-01 PROCEDURE — 25010000002 VANCOMYCIN PER 500 MG: Performed by: INTERNAL MEDICINE

## 2019-01-01 PROCEDURE — 85027 COMPLETE CBC AUTOMATED: CPT | Performed by: INTERNAL MEDICINE

## 2019-01-01 PROCEDURE — 80048 BASIC METABOLIC PNL TOTAL CA: CPT

## 2019-01-01 PROCEDURE — 94002 VENT MGMT INPAT INIT DAY: CPT

## 2019-01-01 PROCEDURE — 99232 SBSQ HOSP IP/OBS MODERATE 35: CPT | Performed by: PHYSICIAN ASSISTANT

## 2019-01-01 PROCEDURE — 85007 BL SMEAR W/DIFF WBC COUNT: CPT | Performed by: INTERNAL MEDICINE

## 2019-01-01 PROCEDURE — 80074 ACUTE HEPATITIS PANEL: CPT | Performed by: INTERNAL MEDICINE

## 2019-01-01 PROCEDURE — 87040 BLOOD CULTURE FOR BACTERIA: CPT | Performed by: INTERNAL MEDICINE

## 2019-01-01 PROCEDURE — 25010000002 MIDAZOLAM 50 MG/10ML SOLUTION 10 ML VIAL: Performed by: EMERGENCY MEDICINE

## 2019-01-01 PROCEDURE — 84132 ASSAY OF SERUM POTASSIUM: CPT | Performed by: INTERNAL MEDICINE

## 2019-01-01 PROCEDURE — 80076 HEPATIC FUNCTION PANEL: CPT | Performed by: INTERNAL MEDICINE

## 2019-01-01 PROCEDURE — 82533 TOTAL CORTISOL: CPT | Performed by: INTERNAL MEDICINE

## 2019-01-01 PROCEDURE — 25010000002 PHENYLEPHRINE PER 1 ML: Performed by: NURSE ANESTHETIST, CERTIFIED REGISTERED

## 2019-01-01 PROCEDURE — 85018 HEMOGLOBIN: CPT | Performed by: NURSE PRACTITIONER

## 2019-01-01 PROCEDURE — G0257 UNSCHED DIALYSIS ESRD PT HOS: HCPCS

## 2019-01-01 PROCEDURE — 25010000002 EPOETIN ALFA PER 1000 UNITS: Performed by: INTERNAL MEDICINE

## 2019-01-01 PROCEDURE — 83735 ASSAY OF MAGNESIUM: CPT | Performed by: EMERGENCY MEDICINE

## 2019-01-01 PROCEDURE — 86901 BLOOD TYPING SEROLOGIC RH(D): CPT | Performed by: EMERGENCY MEDICINE

## 2019-01-01 PROCEDURE — 86850 RBC ANTIBODY SCREEN: CPT | Performed by: NURSE PRACTITIONER

## 2019-01-01 PROCEDURE — 88305 TISSUE EXAM BY PATHOLOGIST: CPT | Performed by: INTERNAL MEDICINE

## 2019-01-01 PROCEDURE — 84132 ASSAY OF SERUM POTASSIUM: CPT | Performed by: ANESTHESIOLOGY

## 2019-01-01 PROCEDURE — 80074 ACUTE HEPATITIS PANEL: CPT | Performed by: EMERGENCY MEDICINE

## 2019-01-01 PROCEDURE — 36430 TRANSFUSION BLD/BLD COMPNT: CPT

## 2019-01-01 PROCEDURE — 82248 BILIRUBIN DIRECT: CPT | Performed by: PHYSICIAN ASSISTANT

## 2019-01-01 PROCEDURE — 25010000002 VANCOMYCIN 10 G RECONSTITUTED SOLUTION: Performed by: HOSPITALIST

## 2019-01-01 PROCEDURE — 92610 EVALUATE SWALLOWING FUNCTION: CPT

## 2019-01-01 PROCEDURE — 87040 BLOOD CULTURE FOR BACTERIA: CPT | Performed by: PHYSICIAN ASSISTANT

## 2019-01-01 PROCEDURE — 95806 SLEEP STUDY UNATT&RESP EFFT: CPT

## 2019-01-01 PROCEDURE — 86901 BLOOD TYPING SEROLOGIC RH(D): CPT | Performed by: NURSE PRACTITIONER

## 2019-01-01 PROCEDURE — 84145 PROCALCITONIN (PCT): CPT | Performed by: EMERGENCY MEDICINE

## 2019-01-01 PROCEDURE — 99222 1ST HOSP IP/OBS MODERATE 55: CPT | Performed by: PHYSICIAN ASSISTANT

## 2019-01-01 PROCEDURE — 99282 EMERGENCY DEPT VISIT SF MDM: CPT

## 2019-01-01 PROCEDURE — 99214 OFFICE O/P EST MOD 30 MIN: CPT | Performed by: INTERNAL MEDICINE

## 2019-01-01 PROCEDURE — 84466 ASSAY OF TRANSFERRIN: CPT | Performed by: INTERNAL MEDICINE

## 2019-01-01 PROCEDURE — 99223 1ST HOSP IP/OBS HIGH 75: CPT | Performed by: PSYCHIATRY & NEUROLOGY

## 2019-01-01 PROCEDURE — 83036 HEMOGLOBIN GLYCOSYLATED A1C: CPT | Performed by: INTERNAL MEDICINE

## 2019-01-01 PROCEDURE — P9047 ALBUMIN (HUMAN), 25%, 50ML: HCPCS | Performed by: INTERNAL MEDICINE

## 2019-01-01 PROCEDURE — 85025 COMPLETE CBC W/AUTO DIFF WBC: CPT

## 2019-01-01 PROCEDURE — 0W3P8ZZ CONTROL BLEEDING IN GASTROINTESTINAL TRACT, VIA NATURAL OR ARTIFICIAL OPENING ENDOSCOPIC: ICD-10-PCS | Performed by: INTERNAL MEDICINE

## 2019-01-01 PROCEDURE — 0100U HC BIOFIRE FILMARRAY RESP PANEL 2: CPT | Performed by: INTERNAL MEDICINE

## 2019-01-01 PROCEDURE — 99233 SBSQ HOSP IP/OBS HIGH 50: CPT | Performed by: NURSE PRACTITIONER

## 2019-01-01 PROCEDURE — 85610 PROTHROMBIN TIME: CPT | Performed by: INTERNAL MEDICINE

## 2019-01-01 PROCEDURE — 99224 PR SBSQ OBSERVATION CARE/DAY 15 MINUTES: CPT | Performed by: INTERNAL MEDICINE

## 2019-01-01 PROCEDURE — 81001 URINALYSIS AUTO W/SCOPE: CPT | Performed by: EMERGENCY MEDICINE

## 2019-01-01 PROCEDURE — 86900 BLOOD TYPING SEROLOGIC ABO: CPT | Performed by: INTERNAL MEDICINE

## 2019-01-01 PROCEDURE — 71275 CT ANGIOGRAPHY CHEST: CPT

## 2019-01-01 PROCEDURE — 25010000003 LEVETIRACETAM IN NACL 0.75% 1000 MG/100ML SOLUTION: Performed by: NURSE PRACTITIONER

## 2019-01-01 PROCEDURE — 86901 BLOOD TYPING SEROLOGIC RH(D): CPT | Performed by: INTERNAL MEDICINE

## 2019-01-01 PROCEDURE — 83880 ASSAY OF NATRIURETIC PEPTIDE: CPT | Performed by: PHYSICIAN ASSISTANT

## 2019-01-01 PROCEDURE — 99223 1ST HOSP IP/OBS HIGH 75: CPT | Performed by: INTERNAL MEDICINE

## 2019-01-01 PROCEDURE — 25010000002 MIDAZOLAM PER 1 MG

## 2019-01-01 PROCEDURE — 93005 ELECTROCARDIOGRAM TRACING: CPT | Performed by: NURSE PRACTITIONER

## 2019-01-01 PROCEDURE — 25010000002 MIDAZOLAM PER 1MG: Performed by: EMERGENCY MEDICINE

## 2019-01-01 PROCEDURE — 25010000002 LORAZEPAM PER 2 MG

## 2019-01-01 PROCEDURE — 0 IOPAMIDOL PER 1 ML: Performed by: HOSPITALIST

## 2019-01-01 PROCEDURE — 94690 O2 UPTK REST INDIRECT: CPT

## 2019-01-01 PROCEDURE — 25010000002 ONDANSETRON PER 1 MG: Performed by: PHYSICIAN ASSISTANT

## 2019-01-01 DEVICE — DEV CLIP ENDO RESOLUTION360 CONTRL ROT 235CM: Type: IMPLANTABLE DEVICE | Status: FUNCTIONAL

## 2019-01-01 RX ORDER — ONDANSETRON 2 MG/ML
4 INJECTION INTRAMUSCULAR; INTRAVENOUS EVERY 6 HOURS PRN
Status: DISCONTINUED | OUTPATIENT
Start: 2019-01-01 | End: 2019-01-01 | Stop reason: HOSPADM

## 2019-01-01 RX ORDER — CASTOR OIL AND BALSAM, PERU 788; 87 MG/G; MG/G
OINTMENT TOPICAL
Qty: 60 G | Refills: 0 | Status: SHIPPED | OUTPATIENT
Start: 2019-01-01

## 2019-01-01 RX ORDER — POTASSIUM CHLORIDE 750 MG/1
40 CAPSULE, EXTENDED RELEASE ORAL ONCE
Status: COMPLETED | OUTPATIENT
Start: 2019-01-01 | End: 2019-01-01

## 2019-01-01 RX ORDER — SODIUM CHLORIDE 0.9 % (FLUSH) 0.9 %
10 SYRINGE (ML) INJECTION AS NEEDED
Status: DISCONTINUED | OUTPATIENT
Start: 2019-01-01 | End: 2019-01-01 | Stop reason: HOSPADM

## 2019-01-01 RX ORDER — ACETAMINOPHEN 650 MG
TABLET, EXTENDED RELEASE ORAL DAILY
Status: DISCONTINUED | OUTPATIENT
Start: 2019-01-01 | End: 2019-01-01 | Stop reason: HOSPADM

## 2019-01-01 RX ORDER — LEVETIRACETAM 5 MG/ML
500 INJECTION INTRAVASCULAR EVERY 12 HOURS SCHEDULED
Status: DISCONTINUED | OUTPATIENT
Start: 2019-01-01 | End: 2019-01-01 | Stop reason: HOSPADM

## 2019-01-01 RX ORDER — ALBUTEROL SULFATE 2.5 MG/3ML
10 SOLUTION RESPIRATORY (INHALATION) ONCE
Status: DISCONTINUED | OUTPATIENT
Start: 2019-01-01 | End: 2019-01-01 | Stop reason: HOSPADM

## 2019-01-01 RX ORDER — LIDOCAINE 50 MG/G
1 PATCH TOPICAL
Status: DISCONTINUED | OUTPATIENT
Start: 2019-01-01 | End: 2019-01-01 | Stop reason: HOSPADM

## 2019-01-01 RX ORDER — PANTOPRAZOLE SODIUM 40 MG/10ML
40 INJECTION, POWDER, LYOPHILIZED, FOR SOLUTION INTRAVENOUS EVERY 12 HOURS SCHEDULED
Status: DISCONTINUED | OUTPATIENT
Start: 2019-01-01 | End: 2019-01-01 | Stop reason: HOSPADM

## 2019-01-01 RX ORDER — LEVETIRACETAM 5 MG/ML
500 INJECTION INTRAVASCULAR EVERY 12 HOURS SCHEDULED
Status: DISCONTINUED | OUTPATIENT
Start: 2019-01-01 | End: 2019-01-01

## 2019-01-01 RX ORDER — MIDAZOLAM HYDROCHLORIDE 1 MG/ML
2 INJECTION INTRAMUSCULAR; INTRAVENOUS ONCE
Status: COMPLETED | OUTPATIENT
Start: 2019-01-01 | End: 2019-01-01

## 2019-01-01 RX ORDER — HEPARIN SODIUM 5000 [USP'U]/ML
5000 INJECTION, SOLUTION INTRAVENOUS; SUBCUTANEOUS EVERY 12 HOURS SCHEDULED
Status: DISCONTINUED | OUTPATIENT
Start: 2019-01-01 | End: 2019-01-01

## 2019-01-01 RX ORDER — PANTOPRAZOLE SODIUM 40 MG/10ML
40 INJECTION, POWDER, LYOPHILIZED, FOR SOLUTION INTRAVENOUS
Status: DISCONTINUED | OUTPATIENT
Start: 2019-01-01 | End: 2019-01-01 | Stop reason: HOSPADM

## 2019-01-01 RX ORDER — ACETAMINOPHEN 325 MG/1
650 TABLET ORAL EVERY 4 HOURS PRN
Status: ON HOLD
Start: 2019-01-01 | End: 2019-01-01

## 2019-01-01 RX ORDER — HEPARIN SODIUM 5000 [USP'U]/ML
5000 INJECTION, SOLUTION INTRAVENOUS; SUBCUTANEOUS EVERY 12 HOURS SCHEDULED
Status: DISCONTINUED | OUTPATIENT
Start: 2019-01-01 | End: 2019-01-01 | Stop reason: HOSPADM

## 2019-01-01 RX ORDER — DEXTROSE MONOHYDRATE 25 G/50ML
25 INJECTION, SOLUTION INTRAVENOUS
Status: DISCONTINUED | OUTPATIENT
Start: 2019-01-01 | End: 2019-01-01 | Stop reason: HOSPADM

## 2019-01-01 RX ORDER — ALBUMIN (HUMAN) 12.5 G/50ML
12.5 SOLUTION INTRAVENOUS AS NEEDED
Status: DISCONTINUED | OUTPATIENT
Start: 2019-01-01 | End: 2019-01-01

## 2019-01-01 RX ORDER — ALBUMIN (HUMAN) 12.5 G/50ML
12.5 SOLUTION INTRAVENOUS AS NEEDED
Status: CANCELLED | OUTPATIENT
Start: 2019-01-01 | End: 2019-01-01

## 2019-01-01 RX ORDER — ALBUMIN (HUMAN) 12.5 G/50ML
25 SOLUTION INTRAVENOUS AS NEEDED
Status: DISCONTINUED | OUTPATIENT
Start: 2019-01-01 | End: 2019-01-01 | Stop reason: HOSPADM

## 2019-01-01 RX ORDER — MECLIZINE HCL 25 MG/1
TABLET, CHEWABLE ORAL
Refills: 0 | Status: ON HOLD | COMMUNITY
Start: 2019-01-01 | End: 2019-01-01

## 2019-01-01 RX ORDER — LOSARTAN POTASSIUM 25 MG/1
25 TABLET ORAL
Start: 2019-01-01 | End: 2019-01-01 | Stop reason: HOSPADM

## 2019-01-01 RX ORDER — MORPHINE SULFATE 2 MG/ML
1 INJECTION, SOLUTION INTRAMUSCULAR; INTRAVENOUS EVERY 4 HOURS PRN
Status: DISCONTINUED | OUTPATIENT
Start: 2019-01-01 | End: 2019-01-01 | Stop reason: HOSPADM

## 2019-01-01 RX ORDER — NICOTINE POLACRILEX 4 MG
1 LOZENGE BUCCAL
Status: DISCONTINUED | OUTPATIENT
Start: 2019-01-01 | End: 2019-01-01 | Stop reason: HOSPADM

## 2019-01-01 RX ORDER — LORAZEPAM 0.5 MG/1
0.5 TABLET ORAL EVERY 8 HOURS PRN
Status: ON HOLD
Start: 2019-01-01 | End: 2019-01-01

## 2019-01-01 RX ORDER — AMINO ACIDS/PROTEIN HYDROLYS 15G-100/30
1 LIQUID (ML) ORAL 2 TIMES DAILY
Status: DISCONTINUED | OUTPATIENT
Start: 2019-01-01 | End: 2019-01-01

## 2019-01-01 RX ORDER — DOCUSATE SODIUM 100 MG/1
100 CAPSULE, LIQUID FILLED ORAL EVERY 12 HOURS
Status: DISCONTINUED | OUTPATIENT
Start: 2019-01-01 | End: 2019-01-01 | Stop reason: HOSPADM

## 2019-01-01 RX ORDER — ONDANSETRON 4 MG/1
4 TABLET, ORALLY DISINTEGRATING ORAL EVERY 6 HOURS PRN
Status: DISCONTINUED | OUTPATIENT
Start: 2019-01-01 | End: 2019-01-01 | Stop reason: HOSPADM

## 2019-01-01 RX ORDER — PROMETHAZINE HYDROCHLORIDE 25 MG/ML
12.5 INJECTION, SOLUTION INTRAMUSCULAR; INTRAVENOUS EVERY 6 HOURS PRN
Status: DISCONTINUED | OUTPATIENT
Start: 2019-01-01 | End: 2019-01-01 | Stop reason: HOSPADM

## 2019-01-01 RX ORDER — MIDAZOLAM HCL IN 0.9 % NACL/PF 1 MG/ML
1-10 PLASTIC BAG, INJECTION (ML) INTRAVENOUS
Status: DISCONTINUED | OUTPATIENT
Start: 2019-01-01 | End: 2019-01-01

## 2019-01-01 RX ORDER — ALBUTEROL SULFATE 2.5 MG/3ML
2.5 SOLUTION RESPIRATORY (INHALATION) ONCE
Status: COMPLETED | OUTPATIENT
Start: 2019-01-01 | End: 2019-01-01

## 2019-01-01 RX ORDER — AMMONIA INHALANTS 0.04 G/.3ML
INHALANT RESPIRATORY (INHALATION)
Status: DISPENSED
Start: 2019-01-01 | End: 2019-01-01

## 2019-01-01 RX ORDER — DOXYCYCLINE 100 MG/1
100 CAPSULE ORAL 2 TIMES DAILY
Qty: 14 CAPSULE | Refills: 0 | Status: SHIPPED | OUTPATIENT
Start: 2019-01-01 | End: 2019-01-01

## 2019-01-01 RX ORDER — SACCHAROMYCES BOULARDII 250 MG
250 CAPSULE ORAL 2 TIMES DAILY
Status: DISCONTINUED | OUTPATIENT
Start: 2019-01-01 | End: 2019-01-01 | Stop reason: HOSPADM

## 2019-01-01 RX ORDER — IPRATROPIUM BROMIDE AND ALBUTEROL SULFATE 2.5; .5 MG/3ML; MG/3ML
3 SOLUTION RESPIRATORY (INHALATION)
Status: DISCONTINUED | OUTPATIENT
Start: 2019-01-01 | End: 2019-01-01 | Stop reason: HOSPADM

## 2019-01-01 RX ORDER — ATORVASTATIN CALCIUM 40 MG/1
40 TABLET, FILM COATED ORAL DAILY
Status: DISCONTINUED | OUTPATIENT
Start: 2019-01-01 | End: 2019-01-01

## 2019-01-01 RX ORDER — SODIUM CHLORIDE 9 MG/ML
INJECTION, SOLUTION INTRAVENOUS CONTINUOUS PRN
Status: DISCONTINUED | OUTPATIENT
Start: 2019-01-01 | End: 2019-01-01 | Stop reason: SURG

## 2019-01-01 RX ORDER — GUAIFENESIN 600 MG/1
600 TABLET, EXTENDED RELEASE ORAL EVERY 12 HOURS SCHEDULED
Status: ON HOLD
Start: 2019-01-01 | End: 2019-01-01

## 2019-01-01 RX ORDER — PANTOPRAZOLE SODIUM 40 MG/10ML
40 INJECTION, POWDER, LYOPHILIZED, FOR SOLUTION INTRAVENOUS 2 TIMES DAILY
Status: DISCONTINUED | OUTPATIENT
Start: 2019-01-01 | End: 2019-01-01

## 2019-01-01 RX ORDER — METOPROLOL SUCCINATE 50 MG/1
50 TABLET, EXTENDED RELEASE ORAL DAILY
Status: DISCONTINUED | OUTPATIENT
Start: 2019-01-01 | End: 2019-01-01 | Stop reason: HOSPADM

## 2019-01-01 RX ORDER — VALSARTAN 160 MG/1
80 TABLET ORAL EVERY 12 HOURS SCHEDULED
Status: DISCONTINUED | OUTPATIENT
Start: 2019-01-01 | End: 2019-01-01

## 2019-01-01 RX ORDER — CALCIUM GLUCONATE 20 MG/ML
1 INJECTION, SOLUTION INTRAVENOUS ONCE
Status: COMPLETED | OUTPATIENT
Start: 2019-01-01 | End: 2019-01-01

## 2019-01-01 RX ORDER — DEXTROSE AND SODIUM CHLORIDE 5; .45 G/100ML; G/100ML
75 INJECTION, SOLUTION INTRAVENOUS CONTINUOUS
Status: DISCONTINUED | OUTPATIENT
Start: 2019-01-01 | End: 2019-01-01 | Stop reason: HOSPADM

## 2019-01-01 RX ORDER — FAMOTIDINE 20 MG/1
20 TABLET, FILM COATED ORAL DAILY
Status: DISCONTINUED | OUTPATIENT
Start: 2019-01-01 | End: 2019-01-01

## 2019-01-01 RX ORDER — CHOLECALCIFEROL (VITAMIN D3) 125 MCG
5 CAPSULE ORAL ONCE
Status: COMPLETED | OUTPATIENT
Start: 2019-01-01 | End: 2019-01-01

## 2019-01-01 RX ORDER — ATORVASTATIN CALCIUM 40 MG/1
40 TABLET, FILM COATED ORAL NIGHTLY
Status: DISCONTINUED | OUTPATIENT
Start: 2019-01-01 | End: 2019-01-01 | Stop reason: HOSPADM

## 2019-01-01 RX ORDER — ACETAMINOPHEN 650 MG/1
650 SUPPOSITORY RECTAL EVERY 4 HOURS PRN
Status: DISCONTINUED | OUTPATIENT
Start: 2019-01-01 | End: 2019-01-01 | Stop reason: HOSPADM

## 2019-01-01 RX ORDER — VALSARTAN 80 MG/1
40 TABLET ORAL EVERY 12 HOURS SCHEDULED
Status: DISCONTINUED | OUTPATIENT
Start: 2019-01-01 | End: 2019-01-01

## 2019-01-01 RX ORDER — SODIUM CHLORIDE FOR INHALATION 3 %
4 VIAL, NEBULIZER (ML) INHALATION ONCE
Status: COMPLETED | OUTPATIENT
Start: 2019-01-01 | End: 2019-01-01

## 2019-01-01 RX ORDER — LEVETIRACETAM 500 MG/1
500 TABLET ORAL 2 TIMES DAILY
Status: DISCONTINUED | OUTPATIENT
Start: 2019-01-01 | End: 2019-01-01 | Stop reason: HOSPADM

## 2019-01-01 RX ORDER — SODIUM CHLORIDE 0.9 % (FLUSH) 0.9 %
10 SYRINGE (ML) INJECTION EVERY 12 HOURS SCHEDULED
Status: DISCONTINUED | OUTPATIENT
Start: 2019-01-01 | End: 2019-01-01 | Stop reason: HOSPADM

## 2019-01-01 RX ORDER — FENTANYL CITRATE 50 UG/ML
50 INJECTION, SOLUTION INTRAMUSCULAR; INTRAVENOUS
Status: DISCONTINUED | OUTPATIENT
Start: 2019-01-01 | End: 2019-01-01 | Stop reason: HOSPADM

## 2019-01-01 RX ORDER — ONDANSETRON 2 MG/ML
4 INJECTION INTRAMUSCULAR; INTRAVENOUS ONCE
Status: COMPLETED | OUTPATIENT
Start: 2019-01-01 | End: 2019-01-01

## 2019-01-01 RX ORDER — LORAZEPAM 0.5 MG/1
0.5 TABLET ORAL EVERY 8 HOURS PRN
Status: DISCONTINUED | OUTPATIENT
Start: 2019-01-01 | End: 2019-01-01 | Stop reason: HOSPADM

## 2019-01-01 RX ORDER — CARVEDILOL 6.25 MG/1
6.25 TABLET ORAL 2 TIMES DAILY WITH MEALS
Status: DISCONTINUED | OUTPATIENT
Start: 2019-01-01 | End: 2019-01-01

## 2019-01-01 RX ORDER — IPRATROPIUM BROMIDE AND ALBUTEROL SULFATE 2.5; .5 MG/3ML; MG/3ML
3 SOLUTION RESPIRATORY (INHALATION) EVERY 4 HOURS PRN
Status: DISCONTINUED | OUTPATIENT
Start: 2019-01-01 | End: 2019-01-01 | Stop reason: HOSPADM

## 2019-01-01 RX ORDER — ASPIRIN 81 MG/1
81 TABLET, CHEWABLE ORAL DAILY
Status: DISCONTINUED | OUTPATIENT
Start: 2019-01-01 | End: 2019-01-01 | Stop reason: HOSPADM

## 2019-01-01 RX ORDER — ALBUTEROL SULFATE 2.5 MG/3ML
2.5 SOLUTION RESPIRATORY (INHALATION) EVERY 6 HOURS PRN
Status: DISCONTINUED | OUTPATIENT
Start: 2019-01-01 | End: 2019-01-01 | Stop reason: HOSPADM

## 2019-01-01 RX ORDER — LEVETIRACETAM 10 MG/ML
1000 INJECTION INTRAVASCULAR ONCE
Status: COMPLETED | OUTPATIENT
Start: 2019-01-01 | End: 2019-01-01

## 2019-01-01 RX ORDER — METOPROLOL SUCCINATE 100 MG/1
100 TABLET, EXTENDED RELEASE ORAL DAILY
Status: DISCONTINUED | OUTPATIENT
Start: 2019-01-01 | End: 2019-01-01

## 2019-01-01 RX ORDER — ACETAMINOPHEN 160 MG/5ML
650 SOLUTION ORAL EVERY 4 HOURS PRN
Status: DISCONTINUED | OUTPATIENT
Start: 2019-01-01 | End: 2019-01-01 | Stop reason: HOSPADM

## 2019-01-01 RX ORDER — DEXTROSE MONOHYDRATE 100 MG/ML
50 INJECTION, SOLUTION INTRAVENOUS CONTINUOUS
Status: DISCONTINUED | OUTPATIENT
Start: 2019-01-01 | End: 2019-01-01

## 2019-01-01 RX ORDER — FOLIC ACID 1 MG/1
1 TABLET ORAL DAILY
Status: DISCONTINUED | OUTPATIENT
Start: 2019-01-01 | End: 2019-01-01 | Stop reason: HOSPADM

## 2019-01-01 RX ORDER — DEXTROSE MONOHYDRATE 25 G/50ML
INJECTION, SOLUTION INTRAVENOUS
Status: COMPLETED
Start: 2019-01-01 | End: 2019-01-01

## 2019-01-01 RX ORDER — FENTANYL CITRATE 50 UG/ML
50 INJECTION, SOLUTION INTRAMUSCULAR; INTRAVENOUS ONCE
Status: COMPLETED | OUTPATIENT
Start: 2019-01-01 | End: 2019-01-01

## 2019-01-01 RX ORDER — DOXYCYCLINE 100 MG/1
100 CAPSULE ORAL EVERY 12 HOURS SCHEDULED
Status: DISCONTINUED | OUTPATIENT
Start: 2019-01-01 | End: 2019-01-01

## 2019-01-01 RX ORDER — ACETAMINOPHEN 650 MG
TABLET, EXTENDED RELEASE ORAL
Qty: 236 ML | Refills: 1 | Status: SHIPPED | OUTPATIENT
Start: 2019-01-01

## 2019-01-01 RX ORDER — BENZONATATE 100 MG/1
100 CAPSULE ORAL 3 TIMES DAILY PRN
Status: DISCONTINUED | OUTPATIENT
Start: 2019-01-01 | End: 2019-01-01 | Stop reason: HOSPADM

## 2019-01-01 RX ORDER — HEPARIN SODIUM 5000 [USP'U]/ML
5000 INJECTION, SOLUTION INTRAVENOUS; SUBCUTANEOUS EVERY 12 HOURS SCHEDULED
Status: ON HOLD
Start: 2019-01-01 | End: 2019-01-01

## 2019-01-01 RX ORDER — POLYETHYLENE GLYCOL 3350 17 G/17G
17 POWDER, FOR SOLUTION ORAL DAILY
Qty: 100 EACH | Refills: 1 | Status: SHIPPED | OUTPATIENT
Start: 2019-01-01

## 2019-01-01 RX ORDER — HYDROCODONE BITARTRATE AND ACETAMINOPHEN 5; 325 MG/1; MG/1
1 TABLET ORAL EVERY 6 HOURS PRN
Status: ON HOLD
Start: 2019-01-01 | End: 2019-01-01

## 2019-01-01 RX ORDER — MECLIZINE HYDROCHLORIDE 25 MG/1
25 TABLET ORAL ONCE
Status: COMPLETED | OUTPATIENT
Start: 2019-01-01 | End: 2019-01-01

## 2019-01-01 RX ORDER — ISOSORBIDE MONONITRATE 30 MG/1
30 TABLET, EXTENDED RELEASE ORAL DAILY
Status: DISCONTINUED | OUTPATIENT
Start: 2019-01-01 | End: 2019-01-01

## 2019-01-01 RX ORDER — ONDANSETRON 4 MG/1
4 TABLET, FILM COATED ORAL EVERY 6 HOURS PRN
Status: DISCONTINUED | OUTPATIENT
Start: 2019-01-01 | End: 2019-01-01 | Stop reason: HOSPADM

## 2019-01-01 RX ORDER — HEPARIN SODIUM 5000 [USP'U]/ML
5000 INJECTION, SOLUTION INTRAVENOUS; SUBCUTANEOUS EVERY 8 HOURS SCHEDULED
Status: DISCONTINUED | OUTPATIENT
Start: 2019-01-01 | End: 2019-01-01 | Stop reason: HOSPADM

## 2019-01-01 RX ORDER — BUMETANIDE 0.25 MG/ML
1 INJECTION INTRAMUSCULAR; INTRAVENOUS ONCE
Status: DISCONTINUED | OUTPATIENT
Start: 2019-01-01 | End: 2019-01-01

## 2019-01-01 RX ORDER — DOCUSATE SODIUM 100 MG/1
100 CAPSULE, LIQUID FILLED ORAL 2 TIMES DAILY
Status: DISCONTINUED | OUTPATIENT
Start: 2019-01-01 | End: 2019-01-01 | Stop reason: HOSPADM

## 2019-01-01 RX ORDER — PANTOPRAZOLE SODIUM 40 MG/10ML
40 INJECTION, POWDER, LYOPHILIZED, FOR SOLUTION INTRAVENOUS
Status: DISCONTINUED | OUTPATIENT
Start: 2019-01-01 | End: 2019-01-01

## 2019-01-01 RX ORDER — CLOPIDOGREL BISULFATE 75 MG/1
75 TABLET ORAL DAILY
Status: DISCONTINUED | OUTPATIENT
Start: 2019-01-01 | End: 2019-01-01 | Stop reason: HOSPADM

## 2019-01-01 RX ORDER — PANTOPRAZOLE SODIUM 40 MG/1
40 TABLET, DELAYED RELEASE ORAL
Status: DISCONTINUED | OUTPATIENT
Start: 2019-01-01 | End: 2019-01-01 | Stop reason: HOSPADM

## 2019-01-01 RX ORDER — ETOMIDATE 2 MG/ML
20 INJECTION INTRAVENOUS ONCE
Status: COMPLETED | OUTPATIENT
Start: 2019-01-01 | End: 2019-01-01

## 2019-01-01 RX ORDER — LEVOTHYROXINE SODIUM 0.12 MG/1
125 TABLET ORAL
Status: DISCONTINUED | OUTPATIENT
Start: 2019-01-01 | End: 2019-01-01 | Stop reason: HOSPADM

## 2019-01-01 RX ORDER — GUAIFENESIN 600 MG/1
600 TABLET, EXTENDED RELEASE ORAL EVERY 12 HOURS SCHEDULED
Status: DISCONTINUED | OUTPATIENT
Start: 2019-01-01 | End: 2019-01-01 | Stop reason: HOSPADM

## 2019-01-01 RX ORDER — LEVOFLOXACIN 5 MG/ML
750 INJECTION, SOLUTION INTRAVENOUS ONCE
Status: COMPLETED | OUTPATIENT
Start: 2019-01-01 | End: 2019-01-01

## 2019-01-01 RX ORDER — DOXYCYCLINE 100 MG/1
100 CAPSULE ORAL ONCE
Status: COMPLETED | OUTPATIENT
Start: 2019-01-01 | End: 2019-01-01

## 2019-01-01 RX ORDER — HYDROCODONE BITARTRATE AND ACETAMINOPHEN 5; 325 MG/1; MG/1
TABLET ORAL
Status: ON HOLD | COMMUNITY
End: 2019-01-01

## 2019-01-01 RX ORDER — METOPROLOL SUCCINATE 50 MG/1
50 TABLET, EXTENDED RELEASE ORAL DAILY
Qty: 30 TABLET | Refills: 0 | Status: SHIPPED | OUTPATIENT
Start: 2019-01-01

## 2019-01-01 RX ORDER — LORAZEPAM 2 MG/ML
INJECTION INTRAMUSCULAR
Status: COMPLETED
Start: 2019-01-01 | End: 2019-01-01

## 2019-01-01 RX ORDER — HYDRALAZINE HYDROCHLORIDE 25 MG/1
50 TABLET, FILM COATED ORAL 3 TIMES DAILY
Status: DISCONTINUED | OUTPATIENT
Start: 2019-01-01 | End: 2019-01-01

## 2019-01-01 RX ORDER — DEXTROSE MONOHYDRATE 25 G/50ML
50 INJECTION, SOLUTION INTRAVENOUS ONCE
Status: DISCONTINUED | OUTPATIENT
Start: 2019-01-01 | End: 2019-01-01 | Stop reason: HOSPADM

## 2019-01-01 RX ORDER — LIDOCAINE 50 MG/G
1 PATCH TOPICAL NIGHTLY
Status: DISCONTINUED | OUTPATIENT
Start: 2019-01-01 | End: 2019-01-01 | Stop reason: HOSPADM

## 2019-01-01 RX ORDER — AMLODIPINE BESYLATE 5 MG/1
10 TABLET ORAL DAILY
Status: DISCONTINUED | OUTPATIENT
Start: 2019-01-01 | End: 2019-01-01

## 2019-01-01 RX ORDER — CHLORHEXIDINE GLUCONATE 0.12 MG/ML
15 RINSE ORAL EVERY 12 HOURS SCHEDULED
Status: DISCONTINUED | OUTPATIENT
Start: 2019-01-01 | End: 2019-01-01

## 2019-01-01 RX ORDER — AMLODIPINE BESYLATE 5 MG/1
10 TABLET ORAL DAILY
Status: DISCONTINUED | OUTPATIENT
Start: 2019-01-01 | End: 2019-01-01 | Stop reason: HOSPADM

## 2019-01-01 RX ORDER — PROMETHAZINE HYDROCHLORIDE 25 MG/ML
12.5 INJECTION, SOLUTION INTRAMUSCULAR; INTRAVENOUS ONCE
Status: COMPLETED | OUTPATIENT
Start: 2019-01-01 | End: 2019-01-01

## 2019-01-01 RX ORDER — ASPIRIN 81 MG/1
81 TABLET, CHEWABLE ORAL DAILY
Status: DISCONTINUED | OUTPATIENT
Start: 2019-01-01 | End: 2019-01-01

## 2019-01-01 RX ORDER — MIDAZOLAM HYDROCHLORIDE 1 MG/ML
INJECTION INTRAMUSCULAR; INTRAVENOUS
Status: COMPLETED
Start: 2019-01-01 | End: 2019-01-01

## 2019-01-01 RX ORDER — METOPROLOL SUCCINATE 100 MG/1
100 TABLET, EXTENDED RELEASE ORAL DAILY
Status: DISCONTINUED | OUTPATIENT
Start: 2019-01-01 | End: 2019-01-01 | Stop reason: HOSPADM

## 2019-01-01 RX ORDER — ALBUMIN (HUMAN) 12.5 G/50ML
12.5 SOLUTION INTRAVENOUS AS NEEDED
Status: ACTIVE | OUTPATIENT
Start: 2019-01-01 | End: 2019-01-01

## 2019-01-01 RX ORDER — ACETAMINOPHEN 325 MG/1
650 TABLET ORAL EVERY 4 HOURS PRN
Status: DISCONTINUED | OUTPATIENT
Start: 2019-01-01 | End: 2019-01-01 | Stop reason: HOSPADM

## 2019-01-01 RX ORDER — CEFUROXIME AXETIL 500 MG/1
500 TABLET ORAL 2 TIMES DAILY
Qty: 12 TABLET | Refills: 0 | Status: SHIPPED | OUTPATIENT
Start: 2019-01-01 | End: 2019-01-01

## 2019-01-01 RX ORDER — GUAIFENESIN AND DEXTROMETHORPHAN HYDROBROMIDE 600; 30 MG/1; MG/1
2 TABLET, EXTENDED RELEASE ORAL 2 TIMES DAILY PRN
Status: DISCONTINUED | OUTPATIENT
Start: 2019-01-01 | End: 2019-01-01 | Stop reason: HOSPADM

## 2019-01-01 RX ORDER — FOLIC ACID/VIT B COMPLEX AND C 0.8 MG
1 TABLET ORAL DAILY
Status: DISCONTINUED | OUTPATIENT
Start: 2019-01-01 | End: 2019-01-01 | Stop reason: HOSPADM

## 2019-01-01 RX ORDER — LANTHANUM CARBONATE 1000 MG/1
1000 TABLET, CHEWABLE ORAL
Start: 2019-01-01 | End: 2019-01-01 | Stop reason: HOSPADM

## 2019-01-01 RX ORDER — VANCOMYCIN HYDROCHLORIDE 1 G/200ML
15 INJECTION, SOLUTION INTRAVENOUS ONCE
Status: COMPLETED | OUTPATIENT
Start: 2019-01-01 | End: 2019-01-01

## 2019-01-01 RX ORDER — FOLIC ACID/VIT B COMPLEX AND C 0.8 MG
1 TABLET ORAL NIGHTLY
Status: DISCONTINUED | OUTPATIENT
Start: 2019-01-01 | End: 2019-01-01 | Stop reason: HOSPADM

## 2019-01-01 RX ORDER — CHOLECALCIFEROL (VITAMIN D3) 125 MCG
5 CAPSULE ORAL NIGHTLY PRN
Status: DISCONTINUED | OUTPATIENT
Start: 2019-01-01 | End: 2019-01-01 | Stop reason: HOSPADM

## 2019-01-01 RX ORDER — BENZONATATE 100 MG/1
100 CAPSULE ORAL 3 TIMES DAILY PRN
Status: ON HOLD
Start: 2019-01-01 | End: 2019-01-01

## 2019-01-01 RX ORDER — VALSARTAN 80 MG/1
80 TABLET ORAL
Status: DISCONTINUED | OUTPATIENT
Start: 2019-01-01 | End: 2019-01-01

## 2019-01-01 RX ORDER — SACCHAROMYCES BOULARDII 250 MG
250 CAPSULE ORAL 2 TIMES DAILY
Qty: 14 CAPSULE | Refills: 0 | Status: SHIPPED | OUTPATIENT
Start: 2019-01-01 | End: 2019-01-01

## 2019-01-01 RX ORDER — DIPHENHYDRAMINE HCL 25 MG
25 CAPSULE ORAL NIGHTLY PRN
Status: DISCONTINUED | OUTPATIENT
Start: 2019-01-01 | End: 2019-01-01 | Stop reason: HOSPADM

## 2019-01-01 RX ORDER — LORAZEPAM 2 MG/ML
0.5 INJECTION INTRAMUSCULAR ONCE
Status: COMPLETED | OUTPATIENT
Start: 2019-01-01 | End: 2019-01-01

## 2019-01-01 RX ORDER — LEVETIRACETAM 500 MG/1
500 TABLET ORAL 2 TIMES DAILY
Qty: 60 TABLET | Refills: 0 | Status: SHIPPED | OUTPATIENT
Start: 2019-01-01

## 2019-01-01 RX ORDER — ALBUMIN (HUMAN) 12.5 G/50ML
25 SOLUTION INTRAVENOUS AS NEEDED
Status: DISCONTINUED | OUTPATIENT
Start: 2019-01-01 | End: 2019-01-01

## 2019-01-01 RX ORDER — HYDROMORPHONE HYDROCHLORIDE 1 MG/ML
0.5 INJECTION, SOLUTION INTRAMUSCULAR; INTRAVENOUS; SUBCUTANEOUS
Status: DISCONTINUED | OUTPATIENT
Start: 2019-01-01 | End: 2019-01-01

## 2019-01-01 RX ORDER — AMOXICILLIN AND CLAVULANATE POTASSIUM 875; 125 MG/1; MG/1
1 TABLET, FILM COATED ORAL 2 TIMES DAILY
Qty: 8 TABLET | Refills: 0 | Status: SHIPPED | OUTPATIENT
Start: 2019-01-01 | End: 2019-01-01

## 2019-01-01 RX ORDER — INSULIN GLARGINE 100 [IU]/ML
15 INJECTION, SOLUTION SUBCUTANEOUS NIGHTLY
COMMUNITY

## 2019-01-01 RX ORDER — METOPROLOL SUCCINATE 25 MG/1
75 TABLET, EXTENDED RELEASE ORAL DAILY
Status: ON HOLD
Start: 2019-01-01 | End: 2019-01-01 | Stop reason: SDUPTHER

## 2019-01-01 RX ORDER — LEVETIRACETAM 5 MG/ML
500 INJECTION INTRAVASCULAR EVERY 12 HOURS SCHEDULED
Qty: 4000 ML
Start: 2019-01-01 | End: 2019-01-01 | Stop reason: HOSPADM

## 2019-01-01 RX ORDER — MIDAZOLAM HYDROCHLORIDE 2 MG/2ML
2 INJECTION, SOLUTION INTRAMUSCULAR; INTRAVENOUS ONCE
Status: COMPLETED | OUTPATIENT
Start: 2019-01-01 | End: 2019-01-01

## 2019-01-01 RX ORDER — LANTHANUM CARBONATE 500 MG/1
1000 TABLET, CHEWABLE ORAL
Status: DISCONTINUED | OUTPATIENT
Start: 2019-01-01 | End: 2019-01-01 | Stop reason: HOSPADM

## 2019-01-01 RX ORDER — PANTOPRAZOLE SODIUM 40 MG/1
40 TABLET, DELAYED RELEASE ORAL DAILY
Qty: 30 TABLET | Refills: 0 | Status: SHIPPED | OUTPATIENT
Start: 2019-01-01 | End: 2019-01-01 | Stop reason: HOSPADM

## 2019-01-01 RX ORDER — CASTOR OIL AND BALSAM, PERU 788; 87 MG/G; MG/G
OINTMENT TOPICAL EVERY 12 HOURS SCHEDULED
Status: DISCONTINUED | OUTPATIENT
Start: 2019-01-01 | End: 2019-01-01 | Stop reason: HOSPADM

## 2019-01-01 RX ORDER — ATORVASTATIN CALCIUM 40 MG/1
40 TABLET, FILM COATED ORAL DAILY
Status: DISCONTINUED | OUTPATIENT
Start: 2019-01-01 | End: 2019-01-01 | Stop reason: HOSPADM

## 2019-01-01 RX ORDER — ALBUTEROL SULFATE 2.5 MG/3ML
2.5 SOLUTION RESPIRATORY (INHALATION) EVERY 4 HOURS PRN
COMMUNITY
End: 2019-01-01 | Stop reason: HOSPADM

## 2019-01-01 RX ORDER — ISOSORBIDE MONONITRATE 30 MG/1
30 TABLET, EXTENDED RELEASE ORAL DAILY
Status: DISCONTINUED | OUTPATIENT
Start: 2019-01-01 | End: 2019-01-01 | Stop reason: HOSPADM

## 2019-01-01 RX ORDER — CLOPIDOGREL BISULFATE 75 MG/1
75 TABLET ORAL DAILY
Status: DISCONTINUED | OUTPATIENT
Start: 2019-01-01 | End: 2019-01-01

## 2019-01-01 RX ORDER — LIDOCAINE 50 MG/G
1 PATCH TOPICAL NIGHTLY
Status: ON HOLD
Start: 2019-01-01 | End: 2019-01-01

## 2019-01-01 RX ORDER — IPRATROPIUM BROMIDE AND ALBUTEROL SULFATE 2.5; .5 MG/3ML; MG/3ML
3 SOLUTION RESPIRATORY (INHALATION)
Status: DISCONTINUED | OUTPATIENT
Start: 2019-01-01 | End: 2019-01-01

## 2019-01-01 RX ORDER — ROCURONIUM BROMIDE 10 MG/ML
50 INJECTION, SOLUTION INTRAVENOUS ONCE
Status: COMPLETED | OUTPATIENT
Start: 2019-01-01 | End: 2019-01-01

## 2019-01-01 RX ORDER — CEFEPIME HYDROCHLORIDE 2 G/50ML
2 INJECTION, SOLUTION INTRAVENOUS EVERY 24 HOURS
Status: DISCONTINUED | OUTPATIENT
Start: 2019-01-01 | End: 2019-01-01

## 2019-01-01 RX ORDER — HYDROCODONE BITARTRATE AND ACETAMINOPHEN 5; 325 MG/1; MG/1
1 TABLET ORAL EVERY 6 HOURS PRN
Status: DISCONTINUED | OUTPATIENT
Start: 2019-01-01 | End: 2019-01-01 | Stop reason: HOSPADM

## 2019-01-01 RX ORDER — FLUTICASONE PROPIONATE 50 MCG
1 SPRAY, SUSPENSION (ML) NASAL DAILY
Status: DISCONTINUED | OUTPATIENT
Start: 2019-01-01 | End: 2019-01-01 | Stop reason: HOSPADM

## 2019-01-01 RX ORDER — IPRATROPIUM BROMIDE AND ALBUTEROL SULFATE 2.5; .5 MG/3ML; MG/3ML
3 SOLUTION RESPIRATORY (INHALATION)
Qty: 360 ML | Status: ON HOLD
Start: 2019-01-01 | End: 2019-01-01

## 2019-01-01 RX ORDER — SODIUM POLYSTYRENE SULFONATE 15 G/60ML
15 SUSPENSION ORAL; RECTAL ONCE
Status: COMPLETED | OUTPATIENT
Start: 2019-01-01 | End: 2019-01-01

## 2019-01-01 RX ORDER — ASPIRIN 81 MG/1
81 TABLET ORAL DAILY
Status: DISCONTINUED | OUTPATIENT
Start: 2019-01-01 | End: 2019-01-01 | Stop reason: HOSPADM

## 2019-01-01 RX ORDER — NICOTINE POLACRILEX 4 MG
15 LOZENGE BUCCAL
Status: DISCONTINUED | OUTPATIENT
Start: 2019-01-01 | End: 2019-01-01 | Stop reason: HOSPADM

## 2019-01-01 RX ORDER — ONDANSETRON 4 MG/1
4 TABLET, FILM COATED ORAL EVERY 6 HOURS PRN
Status: ON HOLD
Start: 2019-01-01 | End: 2019-01-01

## 2019-01-01 RX ORDER — ATORVASTATIN CALCIUM 40 MG/1
40 TABLET, FILM COATED ORAL NIGHTLY
Status: DISCONTINUED | OUTPATIENT
Start: 2019-01-01 | End: 2019-01-01

## 2019-01-01 RX ORDER — SIMETHICONE 80 MG
80 TABLET,CHEWABLE ORAL 4 TIMES DAILY PRN
Status: DISCONTINUED | OUTPATIENT
Start: 2019-01-01 | End: 2019-01-01 | Stop reason: HOSPADM

## 2019-01-01 RX ORDER — ALBUTEROL SULFATE 2.5 MG/3ML
2.5 SOLUTION RESPIRATORY (INHALATION) EVERY 4 HOURS PRN
Status: DISCONTINUED | OUTPATIENT
Start: 2019-01-01 | End: 2019-01-01 | Stop reason: HOSPADM

## 2019-01-01 RX ORDER — LOSARTAN POTASSIUM 50 MG/1
100 TABLET ORAL
Status: DISCONTINUED | OUTPATIENT
Start: 2019-01-01 | End: 2019-01-01

## 2019-01-01 RX ORDER — LEVOFLOXACIN 5 MG/ML
500 INJECTION, SOLUTION INTRAVENOUS
Status: COMPLETED | OUTPATIENT
Start: 2019-01-01 | End: 2019-01-01

## 2019-01-01 RX ORDER — ATORVASTATIN CALCIUM 40 MG/1
40 TABLET, FILM COATED ORAL DAILY
COMMUNITY
End: 2019-01-01 | Stop reason: HOSPADM

## 2019-01-01 RX ORDER — FAMOTIDINE 20 MG/1
20 TABLET, FILM COATED ORAL
Status: DISCONTINUED | OUTPATIENT
Start: 2019-01-01 | End: 2019-01-01 | Stop reason: HOSPADM

## 2019-01-01 RX ORDER — ETOMIDATE 2 MG/ML
INJECTION INTRAVENOUS
Status: COMPLETED
Start: 2019-01-01 | End: 2019-01-01

## 2019-01-01 RX ORDER — CARVEDILOL 12.5 MG/1
12.5 TABLET ORAL 2 TIMES DAILY WITH MEALS
Status: DISCONTINUED | OUTPATIENT
Start: 2019-01-01 | End: 2019-01-01 | Stop reason: HOSPADM

## 2019-01-01 RX ORDER — MORPHINE SULFATE 4 MG/ML
4 INJECTION, SOLUTION INTRAMUSCULAR; INTRAVENOUS ONCE
Status: COMPLETED | OUTPATIENT
Start: 2019-01-01 | End: 2019-01-01

## 2019-01-01 RX ORDER — PANTOPRAZOLE SODIUM 40 MG/10ML
40 INJECTION, POWDER, LYOPHILIZED, FOR SOLUTION INTRAVENOUS EVERY 12 HOURS SCHEDULED
Status: ON HOLD
Start: 2019-01-01 | End: 2019-01-01

## 2019-01-01 RX ORDER — SEVELAMER HYDROCHLORIDE 800 MG/1
800 TABLET, FILM COATED ORAL
Status: DISCONTINUED | OUTPATIENT
Start: 2019-01-01 | End: 2019-01-01 | Stop reason: HOSPADM

## 2019-01-01 RX ORDER — ALUMINA, MAGNESIA, AND SIMETHICONE 2400; 2400; 240 MG/30ML; MG/30ML; MG/30ML
15 SUSPENSION ORAL EVERY 6 HOURS PRN
Status: ON HOLD
Start: 2019-01-01 | End: 2019-01-01

## 2019-01-01 RX ORDER — LOSARTAN POTASSIUM 25 MG/1
25 TABLET ORAL
Status: DISCONTINUED | OUTPATIENT
Start: 2019-01-01 | End: 2019-01-01 | Stop reason: HOSPADM

## 2019-01-01 RX ORDER — ALUMINA, MAGNESIA, AND SIMETHICONE 2400; 2400; 240 MG/30ML; MG/30ML; MG/30ML
15 SUSPENSION ORAL EVERY 6 HOURS PRN
Status: DISCONTINUED | OUTPATIENT
Start: 2019-01-01 | End: 2019-01-01 | Stop reason: HOSPADM

## 2019-01-01 RX ORDER — CEFEPIME HYDROCHLORIDE 2 G/50ML
2 INJECTION, SOLUTION INTRAVENOUS ONCE
Status: DISCONTINUED | OUTPATIENT
Start: 2019-01-01 | End: 2019-01-01

## 2019-01-01 RX ORDER — VALSARTAN 80 MG/1
40 TABLET ORAL EVERY 12 HOURS SCHEDULED
Status: DISCONTINUED | OUTPATIENT
Start: 2019-01-01 | End: 2019-01-01 | Stop reason: HOSPADM

## 2019-01-01 RX ORDER — LIDOCAINE 50 MG/G
1 PATCH TOPICAL
Status: DISCONTINUED | OUTPATIENT
Start: 2019-01-01 | End: 2019-01-01 | Stop reason: ALTCHOICE

## 2019-01-01 RX ORDER — PRAVASTATIN SODIUM 20 MG
TABLET ORAL
Status: ON HOLD | COMMUNITY
Start: 2019-01-01 | End: 2019-01-01

## 2019-01-01 RX ORDER — ALBUMIN (HUMAN) 12.5 G/50ML
25 SOLUTION INTRAVENOUS AS NEEDED
Status: CANCELLED | OUTPATIENT
Start: 2019-01-01 | End: 2019-01-01

## 2019-01-01 RX ORDER — HYDRALAZINE HYDROCHLORIDE 25 MG/1
25 TABLET, FILM COATED ORAL 3 TIMES DAILY
Status: DISCONTINUED | OUTPATIENT
Start: 2019-01-01 | End: 2019-01-01

## 2019-01-01 RX ORDER — EPHEDRINE SULFATE 50 MG/ML
INJECTION, SOLUTION INTRAVENOUS AS NEEDED
Status: DISCONTINUED | OUTPATIENT
Start: 2019-01-01 | End: 2019-01-01 | Stop reason: SURG

## 2019-01-01 RX ORDER — RANITIDINE 150 MG/1
150 TABLET ORAL 2 TIMES DAILY
COMMUNITY
End: 2019-01-01 | Stop reason: HOSPADM

## 2019-01-01 RX ORDER — NALOXONE HCL 0.4 MG/ML
0.4 VIAL (ML) INJECTION
Status: DISCONTINUED | OUTPATIENT
Start: 2019-01-01 | End: 2019-01-01 | Stop reason: HOSPADM

## 2019-01-01 RX ORDER — LEVETIRACETAM 10 MG/ML
1000 INJECTION INTRAVASCULAR EVERY 12 HOURS SCHEDULED
Status: DISCONTINUED | OUTPATIENT
Start: 2019-01-01 | End: 2019-01-01

## 2019-01-01 RX ORDER — PROPOFOL 10 MG/ML
VIAL (ML) INTRAVENOUS AS NEEDED
Status: DISCONTINUED | OUTPATIENT
Start: 2019-01-01 | End: 2019-01-01 | Stop reason: SURG

## 2019-01-01 RX ORDER — ACETAMINOPHEN 160 MG/5ML
650 SOLUTION ORAL EVERY 6 HOURS PRN
Status: DISCONTINUED | OUTPATIENT
Start: 2019-01-01 | End: 2019-01-01 | Stop reason: HOSPADM

## 2019-01-01 RX ORDER — PANTOPRAZOLE SODIUM 40 MG/1
40 TABLET, DELAYED RELEASE ORAL
Qty: 90 TABLET | Refills: 1 | Status: SHIPPED | OUTPATIENT
Start: 2019-01-01

## 2019-01-01 RX ORDER — GUAIFENESIN 600 MG/1
600 TABLET, EXTENDED RELEASE ORAL EVERY 12 HOURS SCHEDULED
Qty: 20 TABLET | Refills: 0 | Status: SHIPPED | OUTPATIENT
Start: 2019-01-01 | End: 2019-01-01

## 2019-01-01 RX ORDER — FAMOTIDINE 20 MG/1
20 TABLET, FILM COATED ORAL DAILY
COMMUNITY
End: 2019-01-01 | Stop reason: HOSPADM

## 2019-01-01 RX ORDER — DOXYCYCLINE 100 MG/1
100 CAPSULE ORAL 2 TIMES DAILY
Qty: 14 CAPSULE | Refills: 0 | Status: SHIPPED | OUTPATIENT
Start: 2019-01-01 | End: 2019-01-01 | Stop reason: HOSPADM

## 2019-01-01 RX ORDER — BISACODYL 10 MG
10 SUPPOSITORY, RECTAL RECTAL ONCE
Status: DISCONTINUED | OUTPATIENT
Start: 2019-01-01 | End: 2019-01-01 | Stop reason: HOSPADM

## 2019-01-01 RX ADMIN — PIPERACILLIN SODIUM AND TAZOBACTAM SODIUM 3.38 G: 3; .375 INJECTION, POWDER, FOR SOLUTION INTRAVENOUS at 10:34

## 2019-01-01 RX ADMIN — PHENYLEPHRINE HYDROCHLORIDE 80 MCG: 10 INJECTION INTRAVENOUS at 09:08

## 2019-01-01 RX ADMIN — SODIUM CHLORIDE, PRESERVATIVE FREE 10 ML: 5 INJECTION INTRAVENOUS at 21:30

## 2019-01-01 RX ADMIN — GUAIFENESIN 600 MG: 600 TABLET, EXTENDED RELEASE ORAL at 20:40

## 2019-01-01 RX ADMIN — SODIUM CHLORIDE, PRESERVATIVE FREE 10 ML: 5 INJECTION INTRAVENOUS at 16:21

## 2019-01-01 RX ADMIN — ATORVASTATIN CALCIUM 40 MG: 40 TABLET, FILM COATED ORAL at 11:28

## 2019-01-01 RX ADMIN — DOCUSATE SODIUM 100 MG: 100 CAPSULE, LIQUID FILLED ORAL at 21:24

## 2019-01-01 RX ADMIN — LEVOTHYROXINE SODIUM 125 MCG: 125 TABLET ORAL at 05:15

## 2019-01-01 RX ADMIN — FLUTICASONE PROPIONATE 1 SPRAY: 50 SPRAY, METERED NASAL at 09:43

## 2019-01-01 RX ADMIN — LEVETIRACETAM INJECTION 500 MG: 5 INJECTION INTRAVENOUS at 15:40

## 2019-01-01 RX ADMIN — INSULIN DETEMIR 10 UNITS: 100 INJECTION, SOLUTION SUBCUTANEOUS at 20:02

## 2019-01-01 RX ADMIN — HEPARIN SODIUM 5000 UNITS: 5000 INJECTION, SOLUTION INTRAVENOUS; SUBCUTANEOUS at 08:10

## 2019-01-01 RX ADMIN — ASPIRIN 81 MG 81 MG: 81 TABLET ORAL at 08:45

## 2019-01-01 RX ADMIN — LEVETIRACETAM INJECTION 1000 MG: 10 INJECTION INTRAVENOUS at 16:31

## 2019-01-01 RX ADMIN — INSULIN ASPART 5 UNITS: 100 INJECTION, SOLUTION INTRAVENOUS; SUBCUTANEOUS at 08:52

## 2019-01-01 RX ADMIN — ASPIRIN 81 MG 81 MG: 81 TABLET ORAL at 09:06

## 2019-01-01 RX ADMIN — HEPARIN SODIUM 5000 UNITS: 5000 INJECTION, SOLUTION INTRAVENOUS; SUBCUTANEOUS at 20:41

## 2019-01-01 RX ADMIN — DEXMEDETOMIDINE 0.2 MCG/KG/HR: 100 INJECTION, SOLUTION, CONCENTRATE INTRAVENOUS at 20:18

## 2019-01-01 RX ADMIN — ATORVASTATIN CALCIUM 40 MG: 40 TABLET, FILM COATED ORAL at 17:01

## 2019-01-01 RX ADMIN — HUMAN INSULIN 10 UNITS: 100 INJECTION, SOLUTION SUBCUTANEOUS at 19:58

## 2019-01-01 RX ADMIN — LEVOTHYROXINE SODIUM 125 MCG: 125 TABLET ORAL at 06:28

## 2019-01-01 RX ADMIN — Medication 250 MG: at 10:49

## 2019-01-01 RX ADMIN — MECLIZINE HYDROCHLORIDE 25 MG: 25 TABLET ORAL at 06:02

## 2019-01-01 RX ADMIN — CARVEDILOL 6.25 MG: 6.25 TABLET, FILM COATED ORAL at 08:16

## 2019-01-01 RX ADMIN — LEVOTHYROXINE SODIUM 125 MCG: 125 TABLET ORAL at 06:05

## 2019-01-01 RX ADMIN — INSULIN LISPRO 2 UNITS: 100 INJECTION, SOLUTION INTRAVENOUS; SUBCUTANEOUS at 20:11

## 2019-01-01 RX ADMIN — HEPARIN SODIUM 5000 UNITS: 5000 INJECTION INTRAVENOUS; SUBCUTANEOUS at 21:13

## 2019-01-01 RX ADMIN — DEXTROSE 50 % IN WATER (D50W) INTRAVENOUS SYRINGE 50 ML: at 04:31

## 2019-01-01 RX ADMIN — GUAIFENESIN 600 MG: 600 TABLET, EXTENDED RELEASE ORAL at 08:44

## 2019-01-01 RX ADMIN — SODIUM CHLORIDE, PRESERVATIVE FREE 10 ML: 5 INJECTION INTRAVENOUS at 20:08

## 2019-01-01 RX ADMIN — ROCURONIUM BROMIDE 50 MG: 10 INJECTION, SOLUTION INTRAVENOUS at 06:57

## 2019-01-01 RX ADMIN — INSULIN DETEMIR 5 UNITS: 100 INJECTION, SOLUTION SUBCUTANEOUS at 10:59

## 2019-01-01 RX ADMIN — TAZOBACTAM SODIUM AND PIPERACILLIN SODIUM 3.38 G: 375; 3 INJECTION, SOLUTION INTRAVENOUS at 00:41

## 2019-01-01 RX ADMIN — TAZOBACTAM SODIUM AND PIPERACILLIN SODIUM 3.38 G: 375; 3 INJECTION, SOLUTION INTRAVENOUS at 17:47

## 2019-01-01 RX ADMIN — LEVOTHYROXINE SODIUM 125 MCG: 125 TABLET ORAL at 12:18

## 2019-01-01 RX ADMIN — HYDROMORPHONE HYDROCHLORIDE 0.5 MG: 1 INJECTION, SOLUTION INTRAMUSCULAR; INTRAVENOUS; SUBCUTANEOUS at 20:36

## 2019-01-01 RX ADMIN — ATORVASTATIN CALCIUM 40 MG: 40 TABLET, FILM COATED ORAL at 08:14

## 2019-01-01 RX ADMIN — PANTOPRAZOLE SODIUM 40 MG: 40 INJECTION, POWDER, FOR SOLUTION INTRAVENOUS at 11:03

## 2019-01-01 RX ADMIN — INSULIN ASPART 2 UNITS: 100 INJECTION, SOLUTION INTRAVENOUS; SUBCUTANEOUS at 21:10

## 2019-01-01 RX ADMIN — LEVETIRACETAM 500 MG: 500 TABLET, FILM COATED ORAL at 09:00

## 2019-01-01 RX ADMIN — HEPARIN SODIUM 5000 UNITS: 5000 INJECTION, SOLUTION INTRAVENOUS; SUBCUTANEOUS at 08:45

## 2019-01-01 RX ADMIN — CALCIUM GLUCONATE 1 G: 20 INJECTION, SOLUTION INTRAVENOUS at 12:34

## 2019-01-01 RX ADMIN — CEFEPIME HYDROCHLORIDE 2 G: 2 INJECTION, POWDER, FOR SOLUTION INTRAVENOUS at 18:45

## 2019-01-01 RX ADMIN — INSULIN HUMAN 6 UNITS: 100 INJECTION, SOLUTION PARENTERAL at 05:31

## 2019-01-01 RX ADMIN — IPRATROPIUM BROMIDE AND ALBUTEROL SULFATE 3 ML: 2.5; .5 SOLUTION RESPIRATORY (INHALATION) at 07:22

## 2019-01-01 RX ADMIN — PANTOPRAZOLE SODIUM 40 MG: 40 TABLET, DELAYED RELEASE ORAL at 05:15

## 2019-01-01 RX ADMIN — IOPAMIDOL 76 ML: 755 INJECTION, SOLUTION INTRAVENOUS at 18:32

## 2019-01-01 RX ADMIN — IPRATROPIUM BROMIDE AND ALBUTEROL SULFATE 3 ML: .5; 3 SOLUTION RESPIRATORY (INHALATION) at 14:41

## 2019-01-01 RX ADMIN — METOPROLOL SUCCINATE 50 MG: 50 TABLET, EXTENDED RELEASE ORAL at 09:01

## 2019-01-01 RX ADMIN — LEVETIRACETAM 500 MG: 500 TABLET, FILM COATED ORAL at 15:41

## 2019-01-01 RX ADMIN — LEVETIRACETAM 500 MG: 500 TABLET, FILM COATED ORAL at 21:10

## 2019-01-01 RX ADMIN — HYDROCODONE BITARTRATE AND ACETAMINOPHEN 1 TABLET: 5; 325 TABLET ORAL at 18:50

## 2019-01-01 RX ADMIN — EPHEDRINE SULFATE 10 MG: 50 INJECTION INTRAMUSCULAR; INTRAVENOUS; SUBCUTANEOUS at 09:08

## 2019-01-01 RX ADMIN — CLOPIDOGREL BISULFATE 75 MG: 75 TABLET ORAL at 09:06

## 2019-01-01 RX ADMIN — METOPROLOL SUCCINATE 50 MG: 50 TABLET, EXTENDED RELEASE ORAL at 08:23

## 2019-01-01 RX ADMIN — HYDROCODONE BITARTRATE AND ACETAMINOPHEN 1 TABLET: 5; 325 TABLET ORAL at 21:12

## 2019-01-01 RX ADMIN — INSULIN HUMAN 2 UNITS: 100 INJECTION, SOLUTION PARENTERAL at 00:43

## 2019-01-01 RX ADMIN — IPRATROPIUM BROMIDE AND ALBUTEROL SULFATE 3 ML: .5; 3 SOLUTION RESPIRATORY (INHALATION) at 20:42

## 2019-01-01 RX ADMIN — METOPROLOL SUCCINATE 100 MG: 100 TABLET, EXTENDED RELEASE ORAL at 09:05

## 2019-01-01 RX ADMIN — RENAGEL 800 MG: 800 TABLET ORAL at 20:56

## 2019-01-01 RX ADMIN — HEPARIN SODIUM 5000 UNITS: 5000 INJECTION INTRAVENOUS; SUBCUTANEOUS at 21:08

## 2019-01-01 RX ADMIN — PROPOFOL 25 MG: 10 INJECTION, EMULSION INTRAVENOUS at 08:52

## 2019-01-01 RX ADMIN — INSULIN DETEMIR 20 UNITS: 100 INJECTION, SOLUTION SUBCUTANEOUS at 08:31

## 2019-01-01 RX ADMIN — SODIUM CHLORIDE, PRESERVATIVE FREE 10 ML: 5 INJECTION INTRAVENOUS at 08:51

## 2019-01-01 RX ADMIN — FAMOTIDINE 20 MG: 20 TABLET ORAL at 09:52

## 2019-01-01 RX ADMIN — MIDAZOLAM 5 MG/HR: 5 INJECTION INTRAMUSCULAR; INTRAVENOUS at 14:14

## 2019-01-01 RX ADMIN — MIDAZOLAM HYDROCHLORIDE 2 MG: 1 INJECTION, SOLUTION INTRAMUSCULAR; INTRAVENOUS at 02:32

## 2019-01-01 RX ADMIN — Medication 250 MG: at 15:41

## 2019-01-01 RX ADMIN — LEVETIRACETAM INJECTION 500 MG: 5 INJECTION INTRAVENOUS at 21:34

## 2019-01-01 RX ADMIN — IPRATROPIUM BROMIDE AND ALBUTEROL SULFATE 3 ML: .5; 3 SOLUTION RESPIRATORY (INHALATION) at 17:37

## 2019-01-01 RX ADMIN — IPRATROPIUM BROMIDE AND ALBUTEROL SULFATE 3 ML: 2.5; .5 SOLUTION RESPIRATORY (INHALATION) at 16:45

## 2019-01-01 RX ADMIN — LEVETIRACETAM 500 MG: 500 TABLET, FILM COATED ORAL at 20:02

## 2019-01-01 RX ADMIN — SODIUM CHLORIDE, PRESERVATIVE FREE 10 ML: 5 INJECTION INTRAVENOUS at 09:01

## 2019-01-01 RX ADMIN — POVIDONE-IODINE: 10 SOLUTION TOPICAL at 11:13

## 2019-01-01 RX ADMIN — LANTHANUM CARBONATE 1000 MG: 500 TABLET, CHEWABLE ORAL at 12:24

## 2019-01-01 RX ADMIN — LOSARTAN POTASSIUM 25 MG: 25 TABLET, FILM COATED ORAL at 09:42

## 2019-01-01 RX ADMIN — IPRATROPIUM BROMIDE AND ALBUTEROL SULFATE 3 ML: .5; 3 SOLUTION RESPIRATORY (INHALATION) at 19:31

## 2019-01-01 RX ADMIN — HEPARIN SODIUM 5000 UNITS: 5000 INJECTION, SOLUTION INTRAVENOUS; SUBCUTANEOUS at 08:03

## 2019-01-01 RX ADMIN — TAZOBACTAM SODIUM AND PIPERACILLIN SODIUM 3.38 G: 375; 3 INJECTION, SOLUTION INTRAVENOUS at 17:49

## 2019-01-01 RX ADMIN — CHLORHEXIDINE GLUCONATE 0.12% ORAL RINSE 15 ML: 1.2 LIQUID ORAL at 08:15

## 2019-01-01 RX ADMIN — TAZOBACTAM SODIUM AND PIPERACILLIN SODIUM 3.38 G: 375; 3 INJECTION, SOLUTION INTRAVENOUS at 05:11

## 2019-01-01 RX ADMIN — Medication 1 PACKET: at 20:19

## 2019-01-01 RX ADMIN — SODIUM CHLORIDE, PRESERVATIVE FREE 10 ML: 5 INJECTION INTRAVENOUS at 11:33

## 2019-01-01 RX ADMIN — LEVETIRACETAM INJECTION 500 MG: 5 INJECTION INTRAVENOUS at 05:47

## 2019-01-01 RX ADMIN — CHLORHEXIDINE GLUCONATE 0.12% ORAL RINSE 15 ML: 1.2 LIQUID ORAL at 20:10

## 2019-01-01 RX ADMIN — IPRATROPIUM BROMIDE AND ALBUTEROL SULFATE 3 ML: .5; 3 SOLUTION RESPIRATORY (INHALATION) at 07:49

## 2019-01-01 RX ADMIN — LEVOFLOXACIN 500 MG: 5 INJECTION, SOLUTION INTRAVENOUS at 16:26

## 2019-01-01 RX ADMIN — GUAIFENESIN 600 MG: 600 TABLET, EXTENDED RELEASE ORAL at 15:41

## 2019-01-01 RX ADMIN — GUAIFENESIN 600 MG: 600 TABLET, EXTENDED RELEASE ORAL at 09:05

## 2019-01-01 RX ADMIN — LEVETIRACETAM INJECTION 500 MG: 5 INJECTION INTRAVENOUS at 10:40

## 2019-01-01 RX ADMIN — Medication 1 TABLET: at 08:23

## 2019-01-01 RX ADMIN — IPRATROPIUM BROMIDE AND ALBUTEROL SULFATE 3 ML: 2.5; .5 SOLUTION RESPIRATORY (INHALATION) at 20:28

## 2019-01-01 RX ADMIN — ATORVASTATIN CALCIUM 40 MG: 40 TABLET, FILM COATED ORAL at 08:16

## 2019-01-01 RX ADMIN — CASTOR OIL AND BALSAM, PERU: 788; 87 OINTMENT TOPICAL at 20:50

## 2019-01-01 RX ADMIN — HEPARIN SODIUM 5000 UNITS: 5000 INJECTION, SOLUTION INTRAVENOUS; SUBCUTANEOUS at 21:15

## 2019-01-01 RX ADMIN — INSULIN HUMAN 4 UNITS: 100 INJECTION, SOLUTION PARENTERAL at 06:29

## 2019-01-01 RX ADMIN — IPRATROPIUM BROMIDE AND ALBUTEROL SULFATE 3 ML: 2.5; .5 SOLUTION RESPIRATORY (INHALATION) at 07:50

## 2019-01-01 RX ADMIN — PANTOPRAZOLE SODIUM 40 MG: 40 INJECTION, POWDER, FOR SOLUTION INTRAVENOUS at 05:43

## 2019-01-01 RX ADMIN — HEPARIN SODIUM 5000 UNITS: 5000 INJECTION, SOLUTION INTRAVENOUS; SUBCUTANEOUS at 09:05

## 2019-01-01 RX ADMIN — ASPIRIN 81 MG 81 MG: 81 TABLET ORAL at 09:01

## 2019-01-01 RX ADMIN — LEVOTHYROXINE SODIUM 125 MCG: 125 TABLET ORAL at 04:35

## 2019-01-01 RX ADMIN — IPRATROPIUM BROMIDE AND ALBUTEROL SULFATE 3 ML: 2.5; .5 SOLUTION RESPIRATORY (INHALATION) at 06:29

## 2019-01-01 RX ADMIN — CHOLECALCIFEROL CAP 125 MCG (5000 UNIT) 5000 UNITS: 125 CAP at 08:46

## 2019-01-01 RX ADMIN — LEVETIRACETAM 500 MG: 500 TABLET, FILM COATED ORAL at 20:46

## 2019-01-01 RX ADMIN — CLOPIDOGREL BISULFATE 75 MG: 75 TABLET ORAL at 08:23

## 2019-01-01 RX ADMIN — Medication 250 MG: at 20:02

## 2019-01-01 RX ADMIN — LEVETIRACETAM INJECTION 500 MG: 5 INJECTION INTRAVENOUS at 18:05

## 2019-01-01 RX ADMIN — HEPARIN SODIUM 5000 UNITS: 5000 INJECTION, SOLUTION INTRAVENOUS; SUBCUTANEOUS at 20:18

## 2019-01-01 RX ADMIN — IPRATROPIUM BROMIDE AND ALBUTEROL SULFATE 3 ML: .5; 3 SOLUTION RESPIRATORY (INHALATION) at 11:26

## 2019-01-01 RX ADMIN — CHLORHEXIDINE GLUCONATE 0.12% ORAL RINSE 15 ML: 1.2 LIQUID ORAL at 08:13

## 2019-01-01 RX ADMIN — CLOPIDOGREL BISULFATE 75 MG: 75 TABLET ORAL at 08:44

## 2019-01-01 RX ADMIN — VANCOMYCIN HYDROCHLORIDE 1250 MG: 500 INJECTION, POWDER, LYOPHILIZED, FOR SOLUTION INTRAVENOUS at 16:51

## 2019-01-01 RX ADMIN — TAZOBACTAM SODIUM AND PIPERACILLIN SODIUM 3.38 G: 375; 3 INJECTION, SOLUTION INTRAVENOUS at 05:18

## 2019-01-01 RX ADMIN — CARVEDILOL 12.5 MG: 12.5 TABLET, FILM COATED ORAL at 10:15

## 2019-01-01 RX ADMIN — LANTHANUM CARBONATE 1000 MG: 500 TABLET, CHEWABLE ORAL at 09:50

## 2019-01-01 RX ADMIN — ONDANSETRON HYDROCHLORIDE 4 MG: 2 INJECTION, SOLUTION INTRAMUSCULAR; INTRAVENOUS at 04:08

## 2019-01-01 RX ADMIN — VALSARTAN 80 MG: 80 TABLET, FILM COATED ORAL at 20:34

## 2019-01-01 RX ADMIN — LEVETIRACETAM INJECTION 1000 MG: 10 INJECTION INTRAVENOUS at 02:28

## 2019-01-01 RX ADMIN — LOSARTAN POTASSIUM 100 MG: 50 TABLET, FILM COATED ORAL at 09:06

## 2019-01-01 RX ADMIN — ATORVASTATIN CALCIUM 40 MG: 40 TABLET, FILM COATED ORAL at 20:40

## 2019-01-01 RX ADMIN — HEPARIN SODIUM 5000 UNITS: 5000 INJECTION INTRAVENOUS; SUBCUTANEOUS at 06:54

## 2019-01-01 RX ADMIN — MELATONIN TAB 5 MG 5 MG: 5 TAB at 01:05

## 2019-01-01 RX ADMIN — INSULIN HUMAN 5 UNITS: 100 INJECTION, SOLUTION PARENTERAL at 12:04

## 2019-01-01 RX ADMIN — VALSARTAN 80 MG: 80 TABLET, FILM COATED ORAL at 08:13

## 2019-01-01 RX ADMIN — TAZOBACTAM SODIUM AND PIPERACILLIN SODIUM 3.38 G: 375; 3 INJECTION, SOLUTION INTRAVENOUS at 15:42

## 2019-01-01 RX ADMIN — LEVOTHYROXINE SODIUM 125 MCG: 125 TABLET ORAL at 06:40

## 2019-01-01 RX ADMIN — IPRATROPIUM BROMIDE AND ALBUTEROL SULFATE 3 ML: 2.5; .5 SOLUTION RESPIRATORY (INHALATION) at 19:48

## 2019-01-01 RX ADMIN — TAZOBACTAM SODIUM AND PIPERACILLIN SODIUM 3.38 G: 375; 3 INJECTION, SOLUTION INTRAVENOUS at 06:32

## 2019-01-01 RX ADMIN — SODIUM CHLORIDE, PRESERVATIVE FREE 10 ML: 5 INJECTION INTRAVENOUS at 20:40

## 2019-01-01 RX ADMIN — GUAIFENESIN 600 MG: 600 TABLET, EXTENDED RELEASE ORAL at 09:52

## 2019-01-01 RX ADMIN — PANTOPRAZOLE SODIUM 40 MG: 40 INJECTION, POWDER, FOR SOLUTION INTRAVENOUS at 06:04

## 2019-01-01 RX ADMIN — ACETAMINOPHEN 650 MG: 325 TABLET, FILM COATED ORAL at 06:29

## 2019-01-01 RX ADMIN — DOCUSATE SODIUM 100 MG: 100 CAPSULE, LIQUID FILLED ORAL at 18:56

## 2019-01-01 RX ADMIN — TAZOBACTAM SODIUM AND PIPERACILLIN SODIUM 3.38 G: 375; 3 INJECTION, SOLUTION INTRAVENOUS at 05:47

## 2019-01-01 RX ADMIN — POVIDONE-IODINE: 10 SOLUTION TOPICAL at 11:59

## 2019-01-01 RX ADMIN — LANTHANUM CARBONATE 1000 MG: 500 TABLET, CHEWABLE ORAL at 12:36

## 2019-01-01 RX ADMIN — IPRATROPIUM BROMIDE AND ALBUTEROL SULFATE 3 ML: 2.5; .5 SOLUTION RESPIRATORY (INHALATION) at 20:30

## 2019-01-01 RX ADMIN — Medication 250 MG: at 21:12

## 2019-01-01 RX ADMIN — FOLIC ACID 1 MG: 1 TABLET ORAL at 15:41

## 2019-01-01 RX ADMIN — PANTOPRAZOLE SODIUM 40 MG: 40 INJECTION, POWDER, FOR SOLUTION INTRAVENOUS at 06:05

## 2019-01-01 RX ADMIN — LEVOTHYROXINE SODIUM 125 MCG: 125 TABLET ORAL at 06:53

## 2019-01-01 RX ADMIN — MIDAZOLAM 4 MG/HR: 5 INJECTION INTRAMUSCULAR; INTRAVENOUS at 06:28

## 2019-01-01 RX ADMIN — IPRATROPIUM BROMIDE AND ALBUTEROL SULFATE 3 ML: 2.5; .5 SOLUTION RESPIRATORY (INHALATION) at 15:53

## 2019-01-01 RX ADMIN — CLOPIDOGREL BISULFATE 75 MG: 75 TABLET ORAL at 10:48

## 2019-01-01 RX ADMIN — LIDOCAINE 1 PATCH: 50 PATCH CUTANEOUS at 21:15

## 2019-01-01 RX ADMIN — SODIUM CHLORIDE, PRESERVATIVE FREE 10 ML: 5 INJECTION INTRAVENOUS at 21:00

## 2019-01-01 RX ADMIN — EPHEDRINE SULFATE 10 MG: 50 INJECTION INTRAMUSCULAR; INTRAVENOUS; SUBCUTANEOUS at 08:53

## 2019-01-01 RX ADMIN — MORPHINE SULFATE 1 MG: 2 INJECTION, SOLUTION INTRAMUSCULAR; INTRAVENOUS at 00:58

## 2019-01-01 RX ADMIN — LEVETIRACETAM 500 MG: 500 TABLET, FILM COATED ORAL at 21:12

## 2019-01-01 RX ADMIN — LEVETIRACETAM INJECTION 500 MG: 5 INJECTION INTRAVENOUS at 06:32

## 2019-01-01 RX ADMIN — CLOPIDOGREL BISULFATE 75 MG: 75 TABLET ORAL at 10:15

## 2019-01-01 RX ADMIN — SODIUM CHLORIDE, PRESERVATIVE FREE 10 ML: 5 INJECTION INTRAVENOUS at 08:15

## 2019-01-01 RX ADMIN — LEVETIRACETAM INJECTION 500 MG: 5 INJECTION INTRAVENOUS at 17:47

## 2019-01-01 RX ADMIN — GUAIFENESIN 600 MG: 600 TABLET, EXTENDED RELEASE ORAL at 23:22

## 2019-01-01 RX ADMIN — Medication 80 MG: at 12:32

## 2019-01-01 RX ADMIN — FAMOTIDINE 20 MG: 20 TABLET ORAL at 08:44

## 2019-01-01 RX ADMIN — PANTOPRAZOLE SODIUM 40 MG: 40 INJECTION, POWDER, FOR SOLUTION INTRAVENOUS at 05:10

## 2019-01-01 RX ADMIN — INSULIN HUMAN 5 UNITS: 100 INJECTION, SOLUTION PARENTERAL at 05:43

## 2019-01-01 RX ADMIN — SODIUM POLYSTYRENE SULFONATE 15 G: 15 SUSPENSION ORAL; RECTAL at 10:18

## 2019-01-01 RX ADMIN — HYDRALAZINE HYDROCHLORIDE 50 MG: 25 TABLET, FILM COATED ORAL at 09:06

## 2019-01-01 RX ADMIN — IPRATROPIUM BROMIDE AND ALBUTEROL SULFATE 3 ML: 2.5; .5 SOLUTION RESPIRATORY (INHALATION) at 20:40

## 2019-01-01 RX ADMIN — PANTOPRAZOLE SODIUM 40 MG: 40 INJECTION, POWDER, FOR SOLUTION INTRAVENOUS at 05:31

## 2019-01-01 RX ADMIN — SODIUM CHLORIDE, PRESERVATIVE FREE 10 ML: 5 INJECTION INTRAVENOUS at 22:07

## 2019-01-01 RX ADMIN — SODIUM CHLORIDE, PRESERVATIVE FREE 10 ML: 5 INJECTION INTRAVENOUS at 23:29

## 2019-01-01 RX ADMIN — CHLORHEXIDINE GLUCONATE 0.12% ORAL RINSE 15 ML: 1.2 LIQUID ORAL at 20:18

## 2019-01-01 RX ADMIN — LEVOFLOXACIN 750 MG: 5 INJECTION, SOLUTION INTRAVENOUS at 16:51

## 2019-01-01 RX ADMIN — HEPARIN SODIUM 5000 UNITS: 5000 INJECTION, SOLUTION INTRAVENOUS; SUBCUTANEOUS at 09:51

## 2019-01-01 RX ADMIN — PANTOPRAZOLE SODIUM 40 MG: 40 INJECTION, POWDER, FOR SOLUTION INTRAVENOUS at 21:34

## 2019-01-01 RX ADMIN — IPRATROPIUM BROMIDE AND ALBUTEROL SULFATE 3 ML: 2.5; .5 SOLUTION RESPIRATORY (INHALATION) at 12:18

## 2019-01-01 RX ADMIN — ACETAMINOPHEN 650 MG: 650 SOLUTION ORAL at 23:53

## 2019-01-01 RX ADMIN — FLUTICASONE PROPIONATE 1 SPRAY: 50 SPRAY, METERED NASAL at 09:07

## 2019-01-01 RX ADMIN — HEPARIN SODIUM 5000 UNITS: 5000 INJECTION, SOLUTION INTRAVENOUS; SUBCUTANEOUS at 20:27

## 2019-01-01 RX ADMIN — RENAGEL 800 MG: 800 TABLET ORAL at 17:10

## 2019-01-01 RX ADMIN — METOPROLOL SUCCINATE 100 MG: 100 TABLET, EXTENDED RELEASE ORAL at 09:52

## 2019-01-01 RX ADMIN — POLYETHYLENE GLYCOL 3350 17 G: 17 POWDER, FOR SOLUTION ORAL at 16:55

## 2019-01-01 RX ADMIN — INSULIN LISPRO 2 UNITS: 100 INJECTION, SOLUTION INTRAVENOUS; SUBCUTANEOUS at 16:58

## 2019-01-01 RX ADMIN — IPRATROPIUM BROMIDE AND ALBUTEROL SULFATE 3 ML: 2.5; .5 SOLUTION RESPIRATORY (INHALATION) at 13:09

## 2019-01-01 RX ADMIN — LEVOTHYROXINE SODIUM 125 MCG: 125 TABLET ORAL at 05:43

## 2019-01-01 RX ADMIN — ATORVASTATIN CALCIUM 40 MG: 40 TABLET, FILM COATED ORAL at 21:57

## 2019-01-01 RX ADMIN — IPRATROPIUM BROMIDE AND ALBUTEROL SULFATE 3 ML: 2.5; .5 SOLUTION RESPIRATORY (INHALATION) at 21:10

## 2019-01-01 RX ADMIN — GUAIFENESIN 600 MG: 600 TABLET, EXTENDED RELEASE ORAL at 21:57

## 2019-01-01 RX ADMIN — Medication 1 PACKET: at 21:14

## 2019-01-01 RX ADMIN — DOCUSATE SODIUM 100 MG: 100 CAPSULE, LIQUID FILLED ORAL at 06:28

## 2019-01-01 RX ADMIN — ACETAMINOPHEN 650 MG: 650 SOLUTION ORAL at 01:39

## 2019-01-01 RX ADMIN — HEPARIN SODIUM 5000 UNITS: 5000 INJECTION, SOLUTION INTRAVENOUS; SUBCUTANEOUS at 08:14

## 2019-01-01 RX ADMIN — CHLORHEXIDINE GLUCONATE 0.12% ORAL RINSE 15 ML: 1.2 LIQUID ORAL at 12:03

## 2019-01-01 RX ADMIN — ONDANSETRON 4 MG: 2 INJECTION INTRAMUSCULAR; INTRAVENOUS at 18:00

## 2019-01-01 RX ADMIN — PANTOPRAZOLE SODIUM 40 MG: 40 INJECTION, POWDER, FOR SOLUTION INTRAVENOUS at 10:50

## 2019-01-01 RX ADMIN — FLUTICASONE PROPIONATE 1 SPRAY: 50 SPRAY, METERED NASAL at 09:19

## 2019-01-01 RX ADMIN — VALSARTAN 40 MG: 80 TABLET, FILM COATED ORAL at 12:51

## 2019-01-01 RX ADMIN — DOCUSATE SODIUM 100 MG: 100 CAPSULE, LIQUID FILLED ORAL at 10:17

## 2019-01-01 RX ADMIN — DOCUSATE SODIUM 100 MG: 100 CAPSULE, LIQUID FILLED ORAL at 17:21

## 2019-01-01 RX ADMIN — LEVETIRACETAM INJECTION 500 MG: 5 INJECTION INTRAVENOUS at 12:27

## 2019-01-01 RX ADMIN — FOLIC ACID 1 MG: 1 TABLET ORAL at 10:18

## 2019-01-01 RX ADMIN — INSULIN ASPART 4 UNITS: 100 INJECTION, SOLUTION INTRAVENOUS; SUBCUTANEOUS at 06:50

## 2019-01-01 RX ADMIN — LEVETIRACETAM INJECTION 500 MG: 5 INJECTION INTRAVENOUS at 08:52

## 2019-01-01 RX ADMIN — ASPIRIN 81 MG CHEWABLE TABLET 81 MG: 81 TABLET CHEWABLE at 10:15

## 2019-01-01 RX ADMIN — HEPARIN SODIUM 5000 UNITS: 5000 INJECTION, SOLUTION INTRAVENOUS; SUBCUTANEOUS at 23:22

## 2019-01-01 RX ADMIN — Medication 250 MG: at 08:23

## 2019-01-01 RX ADMIN — CEFEPIME HYDROCHLORIDE 1 G: 1 INJECTION, POWDER, FOR SOLUTION INTRAMUSCULAR; INTRAVENOUS at 17:39

## 2019-01-01 RX ADMIN — POLYETHYLENE GLYCOL 3350 17 G: 17 POWDER, FOR SOLUTION ORAL at 17:05

## 2019-01-01 RX ADMIN — ATORVASTATIN CALCIUM 40 MG: 40 TABLET, FILM COATED ORAL at 08:03

## 2019-01-01 RX ADMIN — DOCUSATE SODIUM 100 MG: 100 CAPSULE, LIQUID FILLED ORAL at 20:02

## 2019-01-01 RX ADMIN — IPRATROPIUM BROMIDE AND ALBUTEROL SULFATE 3 ML: .5; 3 SOLUTION RESPIRATORY (INHALATION) at 07:23

## 2019-01-01 RX ADMIN — VANCOMYCIN HYDROCHLORIDE 1000 MG: 1 INJECTION, SOLUTION INTRAVENOUS at 23:15

## 2019-01-01 RX ADMIN — ONDANSETRON 4 MG: 2 INJECTION INTRAMUSCULAR; INTRAVENOUS at 06:01

## 2019-01-01 RX ADMIN — LEVETIRACETAM INJECTION 500 MG: 5 INJECTION INTRAVENOUS at 08:19

## 2019-01-01 RX ADMIN — IPRATROPIUM BROMIDE AND ALBUTEROL SULFATE 3 ML: 2.5; .5 SOLUTION RESPIRATORY (INHALATION) at 07:42

## 2019-01-01 RX ADMIN — CHOLECALCIFEROL CAP 125 MCG (5000 UNIT) 5000 UNITS: 125 CAP at 10:17

## 2019-01-01 RX ADMIN — HEPARIN SODIUM 5000 UNITS: 5000 INJECTION, SOLUTION INTRAVENOUS; SUBCUTANEOUS at 21:10

## 2019-01-01 RX ADMIN — INSULIN DETEMIR 10 UNITS: 100 INJECTION, SOLUTION SUBCUTANEOUS at 06:50

## 2019-01-01 RX ADMIN — PHENYLEPHRINE HYDROCHLORIDE 80 MCG: 10 INJECTION INTRAVENOUS at 09:03

## 2019-01-01 RX ADMIN — MIDAZOLAM 2 MG: 1 INJECTION INTRAMUSCULAR; INTRAVENOUS at 02:30

## 2019-01-01 RX ADMIN — INSULIN ASPART 5 UNITS: 100 INJECTION, SOLUTION INTRAVENOUS; SUBCUTANEOUS at 12:15

## 2019-01-01 RX ADMIN — PANTOPRAZOLE SODIUM 40 MG: 40 INJECTION, POWDER, FOR SOLUTION INTRAVENOUS at 08:23

## 2019-01-01 RX ADMIN — GUAIFENESIN 600 MG: 600 TABLET, EXTENDED RELEASE ORAL at 09:00

## 2019-01-01 RX ADMIN — INSULIN ASPART 8 UNITS: 100 INJECTION, SOLUTION INTRAVENOUS; SUBCUTANEOUS at 21:58

## 2019-01-01 RX ADMIN — Medication 250 MG: at 21:09

## 2019-01-01 RX ADMIN — CARVEDILOL 6.25 MG: 6.25 TABLET, FILM COATED ORAL at 17:23

## 2019-01-01 RX ADMIN — PANTOPRAZOLE SODIUM 40 MG: 40 INJECTION, POWDER, FOR SOLUTION INTRAVENOUS at 22:07

## 2019-01-01 RX ADMIN — FOLIC ACID 1 MG: 1 TABLET ORAL at 09:52

## 2019-01-01 RX ADMIN — HYDRALAZINE HYDROCHLORIDE 50 MG: 25 TABLET, FILM COATED ORAL at 16:26

## 2019-01-01 RX ADMIN — PANTOPRAZOLE SODIUM 40 MG: 40 INJECTION, POWDER, FOR SOLUTION INTRAVENOUS at 21:45

## 2019-01-01 RX ADMIN — SODIUM CHLORIDE, PRESERVATIVE FREE 10 ML: 5 INJECTION INTRAVENOUS at 12:21

## 2019-01-01 RX ADMIN — CLOPIDOGREL BISULFATE 75 MG: 75 TABLET ORAL at 08:14

## 2019-01-01 RX ADMIN — INSULIN ASPART 5 UNITS: 100 INJECTION, SOLUTION INTRAVENOUS; SUBCUTANEOUS at 12:35

## 2019-01-01 RX ADMIN — CLOPIDOGREL BISULFATE 75 MG: 75 TABLET ORAL at 15:41

## 2019-01-01 RX ADMIN — LEVETIRACETAM INJECTION 500 MG: 5 INJECTION INTRAVENOUS at 21:30

## 2019-01-01 RX ADMIN — SODIUM CHLORIDE SOLN NEBU 3% 4 ML: 3 NEBU SOLN at 12:27

## 2019-01-01 RX ADMIN — INSULIN HUMAN 4 UNITS: 100 INJECTION, SOLUTION PARENTERAL at 06:06

## 2019-01-01 RX ADMIN — CEFTRIAXONE 2 G: 2 INJECTION, POWDER, FOR SOLUTION INTRAMUSCULAR; INTRAVENOUS at 14:12

## 2019-01-01 RX ADMIN — CLOPIDOGREL BISULFATE 75 MG: 75 TABLET ORAL at 08:16

## 2019-01-01 RX ADMIN — ASPIRIN 81 MG CHEWABLE TABLET 81 MG: 81 TABLET CHEWABLE at 08:02

## 2019-01-01 RX ADMIN — IPRATROPIUM BROMIDE AND ALBUTEROL SULFATE 3 ML: .5; 3 SOLUTION RESPIRATORY (INHALATION) at 07:08

## 2019-01-01 RX ADMIN — HEPARIN SODIUM 5000 UNITS: 5000 INJECTION, SOLUTION INTRAVENOUS; SUBCUTANEOUS at 21:00

## 2019-01-01 RX ADMIN — EPHEDRINE SULFATE 20 MG: 50 INJECTION INTRAMUSCULAR; INTRAVENOUS; SUBCUTANEOUS at 08:57

## 2019-01-01 RX ADMIN — ATORVASTATIN CALCIUM 40 MG: 40 TABLET, FILM COATED ORAL at 22:24

## 2019-01-01 RX ADMIN — INSULIN DETEMIR 20 UNITS: 100 INJECTION, SOLUTION SUBCUTANEOUS at 08:18

## 2019-01-01 RX ADMIN — INSULIN LISPRO 3 UNITS: 100 INJECTION, SOLUTION INTRAVENOUS; SUBCUTANEOUS at 17:28

## 2019-01-01 RX ADMIN — INSULIN HUMAN 6 UNITS: 100 INJECTION, SOLUTION PARENTERAL at 23:52

## 2019-01-01 RX ADMIN — IPRATROPIUM BROMIDE AND ALBUTEROL SULFATE 3 ML: 2.5; .5 SOLUTION RESPIRATORY (INHALATION) at 11:41

## 2019-01-01 RX ADMIN — IPRATROPIUM BROMIDE AND ALBUTEROL SULFATE 3 ML: 2.5; .5 SOLUTION RESPIRATORY (INHALATION) at 11:40

## 2019-01-01 RX ADMIN — Medication 1 TABLET: at 09:52

## 2019-01-01 RX ADMIN — FOLIC ACID 1 MG: 1 TABLET ORAL at 08:23

## 2019-01-01 RX ADMIN — ASPIRIN 81 MG 81 MG: 81 TABLET ORAL at 10:18

## 2019-01-01 RX ADMIN — DOCUSATE SODIUM 100 MG: 100 CAPSULE, LIQUID FILLED ORAL at 08:23

## 2019-01-01 RX ADMIN — PANTOPRAZOLE SODIUM 40 MG: 40 INJECTION, POWDER, FOR SOLUTION INTRAVENOUS at 16:55

## 2019-01-01 RX ADMIN — INSULIN HUMAN 3 UNITS: 100 INJECTION, SOLUTION PARENTERAL at 12:13

## 2019-01-01 RX ADMIN — DOCUSATE SODIUM 100 MG: 100 CAPSULE, LIQUID FILLED ORAL at 06:42

## 2019-01-01 RX ADMIN — IPRATROPIUM BROMIDE AND ALBUTEROL SULFATE 3 ML: 2.5; .5 SOLUTION RESPIRATORY (INHALATION) at 20:47

## 2019-01-01 RX ADMIN — INSULIN HUMAN 6 UNITS: 100 INJECTION, SOLUTION PARENTERAL at 11:55

## 2019-01-01 RX ADMIN — PROMETHAZINE HYDROCHLORIDE 12.5 MG: 25 INJECTION INTRAMUSCULAR; INTRAVENOUS at 09:18

## 2019-01-01 RX ADMIN — FOLIC ACID 1 MG: 1 TABLET ORAL at 08:46

## 2019-01-01 RX ADMIN — ACETAMINOPHEN 650 MG: 325 TABLET, FILM COATED ORAL at 12:24

## 2019-01-01 RX ADMIN — HEPARIN SODIUM 5000 UNITS: 5000 INJECTION, SOLUTION INTRAVENOUS; SUBCUTANEOUS at 20:40

## 2019-01-01 RX ADMIN — DOCUSATE SODIUM 100 MG: 100 CAPSULE, LIQUID FILLED ORAL at 21:09

## 2019-01-01 RX ADMIN — IPRATROPIUM BROMIDE AND ALBUTEROL SULFATE 3 ML: .5; 3 SOLUTION RESPIRATORY (INHALATION) at 12:45

## 2019-01-01 RX ADMIN — HEPARIN SODIUM 5000 UNITS: 5000 INJECTION INTRAVENOUS; SUBCUTANEOUS at 12:32

## 2019-01-01 RX ADMIN — DOCUSATE SODIUM 100 MG: 100 CAPSULE, LIQUID FILLED ORAL at 15:41

## 2019-01-01 RX ADMIN — TAZOBACTAM SODIUM AND PIPERACILLIN SODIUM 3.38 G: 375; 3 INJECTION, SOLUTION INTRAVENOUS at 17:46

## 2019-01-01 RX ADMIN — RENAGEL 800 MG: 800 TABLET ORAL at 08:46

## 2019-01-01 RX ADMIN — Medication 1 PACKET: at 20:38

## 2019-01-01 RX ADMIN — IPRATROPIUM BROMIDE AND ALBUTEROL SULFATE 3 ML: 2.5; .5 SOLUTION RESPIRATORY (INHALATION) at 06:44

## 2019-01-01 RX ADMIN — PANTOPRAZOLE SODIUM 40 MG: 40 INJECTION, POWDER, FOR SOLUTION INTRAVENOUS at 09:42

## 2019-01-01 RX ADMIN — HEPARIN SODIUM 5000 UNITS: 5000 INJECTION, SOLUTION INTRAVENOUS; SUBCUTANEOUS at 08:12

## 2019-01-01 RX ADMIN — ALBUMIN HUMAN 25 G: 0.25 SOLUTION INTRAVENOUS at 12:30

## 2019-01-01 RX ADMIN — INSULIN LISPRO 2 UNITS: 100 INJECTION, SOLUTION INTRAVENOUS; SUBCUTANEOUS at 12:52

## 2019-01-01 RX ADMIN — VALSARTAN 40 MG: 80 TABLET, FILM COATED ORAL at 12:27

## 2019-01-01 RX ADMIN — AMLODIPINE BESYLATE 10 MG: 5 TABLET ORAL at 09:52

## 2019-01-01 RX ADMIN — HYDRALAZINE HYDROCHLORIDE 50 MG: 25 TABLET, FILM COATED ORAL at 18:56

## 2019-01-01 RX ADMIN — ERYTHROPOIETIN 20000 UNITS: 20000 INJECTION, SOLUTION INTRAVENOUS; SUBCUTANEOUS at 08:23

## 2019-01-01 RX ADMIN — LEVETIRACETAM INJECTION 500 MG: 5 INJECTION INTRAVENOUS at 21:00

## 2019-01-01 RX ADMIN — SODIUM CHLORIDE, PRESERVATIVE FREE 10 ML: 5 INJECTION INTRAVENOUS at 08:03

## 2019-01-01 RX ADMIN — ATORVASTATIN CALCIUM 40 MG: 40 TABLET, FILM COATED ORAL at 21:24

## 2019-01-01 RX ADMIN — ATORVASTATIN CALCIUM 40 MG: 40 TABLET, FILM COATED ORAL at 12:52

## 2019-01-01 RX ADMIN — PHENYLEPHRINE HYDROCHLORIDE 80 MCG: 10 INJECTION INTRAVENOUS at 09:05

## 2019-01-01 RX ADMIN — GUAIFENESIN 600 MG: 600 TABLET, EXTENDED RELEASE ORAL at 22:23

## 2019-01-01 RX ADMIN — TAZOBACTAM SODIUM AND PIPERACILLIN SODIUM 3.38 G: 375; 3 INJECTION, SOLUTION INTRAVENOUS at 18:05

## 2019-01-01 RX ADMIN — MIDAZOLAM 5 MG/HR: 5 INJECTION INTRAMUSCULAR; INTRAVENOUS at 22:41

## 2019-01-01 RX ADMIN — FENTANYL CITRATE 50 MCG: 50 INJECTION, SOLUTION INTRAMUSCULAR; INTRAVENOUS at 15:03

## 2019-01-01 RX ADMIN — CLOPIDOGREL BISULFATE 75 MG: 75 TABLET ORAL at 08:02

## 2019-01-01 RX ADMIN — TAZOBACTAM SODIUM AND PIPERACILLIN SODIUM 3.38 G: 375; 3 INJECTION, SOLUTION INTRAVENOUS at 17:23

## 2019-01-01 RX ADMIN — PANTOPRAZOLE SODIUM 40 MG: 40 INJECTION, POWDER, FOR SOLUTION INTRAVENOUS at 15:40

## 2019-01-01 RX ADMIN — LIDOCAINE 1 PATCH: 50 PATCH TOPICAL at 10:42

## 2019-01-01 RX ADMIN — HEPARIN SODIUM 5000 UNITS: 5000 INJECTION INTRAVENOUS; SUBCUTANEOUS at 10:48

## 2019-01-01 RX ADMIN — IPRATROPIUM BROMIDE AND ALBUTEROL SULFATE 3 ML: 2.5; .5 SOLUTION RESPIRATORY (INHALATION) at 15:33

## 2019-01-01 RX ADMIN — INSULIN LISPRO 5 UNITS: 100 INJECTION, SOLUTION INTRAVENOUS; SUBCUTANEOUS at 08:26

## 2019-01-01 RX ADMIN — PANTOPRAZOLE SODIUM 40 MG: 40 INJECTION, POWDER, FOR SOLUTION INTRAVENOUS at 09:41

## 2019-01-01 RX ADMIN — SODIUM CHLORIDE, PRESERVATIVE FREE 10 ML: 5 INJECTION INTRAVENOUS at 20:41

## 2019-01-01 RX ADMIN — METOPROLOL SUCCINATE 50 MG: 50 TABLET, EXTENDED RELEASE ORAL at 10:49

## 2019-01-01 RX ADMIN — HEPARIN SODIUM 5000 UNITS: 5000 INJECTION INTRAVENOUS; SUBCUTANEOUS at 20:04

## 2019-01-01 RX ADMIN — FOLIC ACID 1 MG: 1 TABLET ORAL at 10:48

## 2019-01-01 RX ADMIN — PANTOPRAZOLE SODIUM 40 MG: 40 INJECTION, POWDER, FOR SOLUTION INTRAVENOUS at 06:28

## 2019-01-01 RX ADMIN — HYDROCODONE BITARTRATE AND ACETAMINOPHEN 1 TABLET: 5; 325 TABLET ORAL at 21:09

## 2019-01-01 RX ADMIN — PANTOPRAZOLE SODIUM 40 MG: 40 INJECTION, POWDER, FOR SOLUTION INTRAVENOUS at 05:47

## 2019-01-01 RX ADMIN — LEVETIRACETAM INJECTION 500 MG: 5 INJECTION INTRAVENOUS at 17:23

## 2019-01-01 RX ADMIN — ASPIRIN 81 MG 81 MG: 81 TABLET ORAL at 10:48

## 2019-01-01 RX ADMIN — CARVEDILOL 12.5 MG: 12.5 TABLET, FILM COATED ORAL at 17:12

## 2019-01-01 RX ADMIN — LEVETIRACETAM INJECTION 500 MG: 5 INJECTION INTRAVENOUS at 22:06

## 2019-01-01 RX ADMIN — INSULIN LISPRO 5 UNITS: 100 INJECTION, SOLUTION INTRAVENOUS; SUBCUTANEOUS at 11:31

## 2019-01-01 RX ADMIN — METOPROLOL SUCCINATE 75 MG: 50 TABLET, EXTENDED RELEASE ORAL at 09:42

## 2019-01-01 RX ADMIN — ALUMINUM HYDROXIDE, MAGNESIUM HYDROXIDE, AND DIMETHICONE 15 ML: 400; 400; 40 SUSPENSION ORAL at 06:40

## 2019-01-01 RX ADMIN — ATORVASTATIN CALCIUM 40 MG: 40 TABLET, FILM COATED ORAL at 10:16

## 2019-01-01 RX ADMIN — INSULIN ASPART 4 UNITS: 100 INJECTION, SOLUTION INTRAVENOUS; SUBCUTANEOUS at 12:14

## 2019-01-01 RX ADMIN — VALSARTAN 40 MG: 80 TABLET, FILM COATED ORAL at 20:40

## 2019-01-01 RX ADMIN — LEVOTHYROXINE SODIUM 125 MCG: 125 TABLET ORAL at 06:36

## 2019-01-01 RX ADMIN — INSULIN HUMAN 2 UNITS: 100 INJECTION, SOLUTION PARENTERAL at 01:36

## 2019-01-01 RX ADMIN — CHLORHEXIDINE GLUCONATE 0.12% ORAL RINSE 15 ML: 1.2 LIQUID ORAL at 22:21

## 2019-01-01 RX ADMIN — SODIUM CHLORIDE, PRESERVATIVE FREE 10 ML: 5 INJECTION INTRAVENOUS at 11:39

## 2019-01-01 RX ADMIN — LEVETIRACETAM 500 MG: 5 INJECTION INTRAVENOUS at 17:12

## 2019-01-01 RX ADMIN — ACETAMINOPHEN 650 MG: 325 TABLET, FILM COATED ORAL at 21:09

## 2019-01-01 RX ADMIN — LEVETIRACETAM INJECTION 500 MG: 5 INJECTION INTRAVENOUS at 05:11

## 2019-01-01 RX ADMIN — CEFEPIME 2 G: 1 INJECTION, POWDER, FOR SOLUTION INTRAMUSCULAR; INTRAVENOUS at 16:03

## 2019-01-01 RX ADMIN — PANTOPRAZOLE SODIUM 40 MG: 40 INJECTION, POWDER, FOR SOLUTION INTRAVENOUS at 16:21

## 2019-01-01 RX ADMIN — POTASSIUM CHLORIDE 40 MEQ: 750 CAPSULE, EXTENDED RELEASE ORAL at 15:41

## 2019-01-01 RX ADMIN — IPRATROPIUM BROMIDE AND ALBUTEROL SULFATE 3 ML: 2.5; .5 SOLUTION RESPIRATORY (INHALATION) at 21:15

## 2019-01-01 RX ADMIN — ERYTHROPOIETIN 5000 UNITS: 3000 INJECTION, SOLUTION INTRAVENOUS; SUBCUTANEOUS at 11:53

## 2019-01-01 RX ADMIN — LEVETIRACETAM 1000 MG: 10 INJECTION INTRAVENOUS at 09:16

## 2019-01-01 RX ADMIN — ASPIRIN 81 MG CHEWABLE TABLET 81 MG: 81 TABLET CHEWABLE at 08:03

## 2019-01-01 RX ADMIN — SODIUM CHLORIDE, PRESERVATIVE FREE 10 ML: 5 INJECTION INTRAVENOUS at 20:11

## 2019-01-01 RX ADMIN — INSULIN DETEMIR 10 UNITS: 100 INJECTION, SOLUTION SUBCUTANEOUS at 22:08

## 2019-01-01 RX ADMIN — HYDROCODONE BITARTRATE AND ACETAMINOPHEN 1 TABLET: 5; 325 TABLET ORAL at 10:49

## 2019-01-01 RX ADMIN — LORAZEPAM 2 MG: 2 INJECTION, SOLUTION INTRAMUSCULAR; INTRAVENOUS at 16:24

## 2019-01-01 RX ADMIN — ONDANSETRON HYDROCHLORIDE 4 MG: 2 INJECTION, SOLUTION INTRAMUSCULAR; INTRAVENOUS at 08:23

## 2019-01-01 RX ADMIN — INSULIN DETEMIR 10 UNITS: 100 INJECTION, SOLUTION SUBCUTANEOUS at 08:03

## 2019-01-01 RX ADMIN — SODIUM CHLORIDE, PRESERVATIVE FREE 10 ML: 5 INJECTION INTRAVENOUS at 21:34

## 2019-01-01 RX ADMIN — LEVETIRACETAM 500 MG: 500 TABLET, FILM COATED ORAL at 10:49

## 2019-01-01 RX ADMIN — Medication 1 TABLET: at 18:56

## 2019-01-01 RX ADMIN — LEVETIRACETAM INJECTION 500 MG: 5 INJECTION INTRAVENOUS at 11:38

## 2019-01-01 RX ADMIN — INSULIN DETEMIR 10 UNITS: 100 INJECTION, SOLUTION SUBCUTANEOUS at 21:31

## 2019-01-01 RX ADMIN — PIPERACILLIN SODIUM,TAZOBACTAM SODIUM 2.25 G: 2; .25 INJECTION, POWDER, FOR SOLUTION INTRAVENOUS at 17:14

## 2019-01-01 RX ADMIN — SODIUM CHLORIDE, PRESERVATIVE FREE 10 ML: 5 INJECTION INTRAVENOUS at 08:12

## 2019-01-01 RX ADMIN — ASPIRIN 81 MG CHEWABLE TABLET 81 MG: 81 TABLET CHEWABLE at 12:18

## 2019-01-01 RX ADMIN — Medication 250 MG: at 09:01

## 2019-01-01 RX ADMIN — HEPARIN SODIUM 5000 UNITS: 5000 INJECTION, SOLUTION INTRAVENOUS; SUBCUTANEOUS at 20:07

## 2019-01-01 RX ADMIN — INSULIN HUMAN 5 UNITS: 100 INJECTION, SOLUTION PARENTERAL at 05:12

## 2019-01-01 RX ADMIN — CARVEDILOL 12.5 MG: 12.5 TABLET, FILM COATED ORAL at 11:29

## 2019-01-01 RX ADMIN — DOCUSATE SODIUM 100 MG: 100 CAPSULE, LIQUID FILLED ORAL at 06:29

## 2019-01-01 RX ADMIN — INSULIN HUMAN 3 UNITS: 100 INJECTION, SOLUTION PARENTERAL at 17:47

## 2019-01-01 RX ADMIN — LORAZEPAM 0.5 MG: 0.5 TABLET ORAL at 14:13

## 2019-01-01 RX ADMIN — CASTOR OIL AND BALSAM, PERU: 788; 87 OINTMENT TOPICAL at 11:56

## 2019-01-01 RX ADMIN — INSULIN HUMAN 6 UNITS: 100 INJECTION, SOLUTION PARENTERAL at 12:02

## 2019-01-01 RX ADMIN — ETOMIDATE 20 MG: 2 INJECTION, SOLUTION INTRAVENOUS at 02:30

## 2019-01-01 RX ADMIN — VALSARTAN 80 MG: 80 TABLET, FILM COATED ORAL at 20:07

## 2019-01-01 RX ADMIN — CHLORHEXIDINE GLUCONATE 0.12% ORAL RINSE 15 ML: 1.2 LIQUID ORAL at 08:03

## 2019-01-01 RX ADMIN — VANCOMYCIN HYDROCHLORIDE 1250 MG: 10 INJECTION, POWDER, LYOPHILIZED, FOR SOLUTION INTRAVENOUS at 20:10

## 2019-01-01 RX ADMIN — Medication 1 TABLET: at 21:24

## 2019-01-01 RX ADMIN — SODIUM CHLORIDE, PRESERVATIVE FREE 10 ML: 5 INJECTION INTRAVENOUS at 09:41

## 2019-01-01 RX ADMIN — FOLIC ACID 1 MG: 1 TABLET ORAL at 08:44

## 2019-01-01 RX ADMIN — DEXTROSE AND SODIUM CHLORIDE 75 ML/HR: 5; 450 INJECTION, SOLUTION INTRAVENOUS at 23:55

## 2019-01-01 RX ADMIN — INSULIN HUMAN 4 UNITS: 100 INJECTION, SOLUTION PARENTERAL at 11:15

## 2019-01-01 RX ADMIN — MELATONIN TAB 5 MG 5 MG: 5 TAB at 23:38

## 2019-01-01 RX ADMIN — LORAZEPAM 0.5 MG: 0.5 TABLET ORAL at 23:38

## 2019-01-01 RX ADMIN — INSULIN ASPART 5 UNITS: 100 INJECTION, SOLUTION INTRAVENOUS; SUBCUTANEOUS at 17:22

## 2019-01-01 RX ADMIN — DOCUSATE SODIUM 100 MG: 100 CAPSULE, LIQUID FILLED ORAL at 20:56

## 2019-01-01 RX ADMIN — PANTOPRAZOLE SODIUM 40 MG: 40 INJECTION, POWDER, FOR SOLUTION INTRAVENOUS at 22:25

## 2019-01-01 RX ADMIN — INSULIN HUMAN 4 UNITS: 100 INJECTION, SOLUTION PARENTERAL at 05:47

## 2019-01-01 RX ADMIN — ASPIRIN 81 MG 81 MG: 81 TABLET ORAL at 08:46

## 2019-01-01 RX ADMIN — SODIUM CHLORIDE, PRESERVATIVE FREE 10 ML: 5 INJECTION INTRAVENOUS at 09:05

## 2019-01-01 RX ADMIN — INSULIN ASPART 5 UNITS: 100 INJECTION, SOLUTION INTRAVENOUS; SUBCUTANEOUS at 12:24

## 2019-01-01 RX ADMIN — ACETAMINOPHEN 649.6 MG: 650 SOLUTION ORAL at 18:15

## 2019-01-01 RX ADMIN — Medication 1 TABLET: at 08:45

## 2019-01-01 RX ADMIN — TAZOBACTAM SODIUM AND PIPERACILLIN SODIUM 3.38 G: 375; 3 INJECTION, SOLUTION INTRAVENOUS at 05:30

## 2019-01-01 RX ADMIN — LIDOCAINE 1 PATCH: 50 PATCH CUTANEOUS at 21:10

## 2019-01-01 RX ADMIN — ACETAMINOPHEN 649.6 MG: 650 SOLUTION ORAL at 17:46

## 2019-01-01 RX ADMIN — VANCOMYCIN HYDROCHLORIDE 1250 MG: 10 INJECTION, POWDER, LYOPHILIZED, FOR SOLUTION INTRAVENOUS at 15:38

## 2019-01-01 RX ADMIN — Medication 250 MG: at 20:46

## 2019-01-01 RX ADMIN — IPRATROPIUM BROMIDE AND ALBUTEROL SULFATE 3 ML: .5; 3 SOLUTION RESPIRATORY (INHALATION) at 19:47

## 2019-01-01 RX ADMIN — SODIUM CHLORIDE, PRESERVATIVE FREE 10 ML: 5 INJECTION INTRAVENOUS at 21:45

## 2019-01-01 RX ADMIN — CEFEPIME HYDROCHLORIDE 1 G: 1 INJECTION, POWDER, FOR SOLUTION INTRAMUSCULAR; INTRAVENOUS at 18:46

## 2019-01-01 RX ADMIN — ASPIRIN 81 MG 81 MG: 81 TABLET ORAL at 08:23

## 2019-01-01 RX ADMIN — SODIUM CHLORIDE 1000 ML: 9 INJECTION, SOLUTION INTRAVENOUS at 20:01

## 2019-01-01 RX ADMIN — Medication 1 TABLET: at 10:49

## 2019-01-01 RX ADMIN — IPRATROPIUM BROMIDE AND ALBUTEROL SULFATE 3 ML: 2.5; .5 SOLUTION RESPIRATORY (INHALATION) at 12:36

## 2019-01-01 RX ADMIN — VANCOMYCIN HYDROCHLORIDE 1000 MG: 1 INJECTION, POWDER, LYOPHILIZED, FOR SOLUTION INTRAVENOUS at 11:49

## 2019-01-01 RX ADMIN — LEVOTHYROXINE SODIUM 125 MCG: 125 TABLET ORAL at 10:49

## 2019-01-01 RX ADMIN — HEPARIN SODIUM 5000 UNITS: 5000 INJECTION INTRAVENOUS; SUBCUTANEOUS at 04:35

## 2019-01-01 RX ADMIN — TAZOBACTAM SODIUM AND PIPERACILLIN SODIUM 3.38 G: 375; 3 INJECTION, SOLUTION INTRAVENOUS at 05:43

## 2019-01-01 RX ADMIN — LEVETIRACETAM INJECTION 500 MG: 5 INJECTION INTRAVENOUS at 17:48

## 2019-01-01 RX ADMIN — LANTHANUM CARBONATE 1000 MG: 500 TABLET, CHEWABLE ORAL at 08:50

## 2019-01-01 RX ADMIN — INSULIN LISPRO 2 UNITS: 100 INJECTION, SOLUTION INTRAVENOUS; SUBCUTANEOUS at 11:45

## 2019-01-01 RX ADMIN — FOLIC ACID 1 MG: 1 TABLET ORAL at 09:06

## 2019-01-01 RX ADMIN — LEVETIRACETAM INJECTION 500 MG: 5 INJECTION INTRAVENOUS at 05:17

## 2019-01-01 RX ADMIN — SODIUM CHLORIDE, PRESERVATIVE FREE 10 ML: 5 INJECTION INTRAVENOUS at 08:10

## 2019-01-01 RX ADMIN — HYDROCODONE BITARTRATE AND ACETAMINOPHEN 1 TABLET: 5; 325 TABLET ORAL at 03:54

## 2019-01-01 RX ADMIN — IPRATROPIUM BROMIDE AND ALBUTEROL SULFATE 3 ML: 2.5; .5 SOLUTION RESPIRATORY (INHALATION) at 12:16

## 2019-01-01 RX ADMIN — GUAIFENESIN 600 MG: 600 TABLET, EXTENDED RELEASE ORAL at 20:02

## 2019-01-01 RX ADMIN — POVIDONE-IODINE 1 APPLICATION: 10 SOLUTION TOPICAL at 16:21

## 2019-01-01 RX ADMIN — DOXYCYCLINE 100 MG: 100 INJECTION, POWDER, LYOPHILIZED, FOR SOLUTION INTRAVENOUS at 15:25

## 2019-01-01 RX ADMIN — CHLORHEXIDINE GLUCONATE 0.12% ORAL RINSE 15 ML: 1.2 LIQUID ORAL at 10:36

## 2019-01-01 RX ADMIN — FAMOTIDINE 20 MG: 20 TABLET ORAL at 06:49

## 2019-01-01 RX ADMIN — Medication 1 TABLET: at 09:00

## 2019-01-01 RX ADMIN — GUAIFENESIN 600 MG: 600 TABLET, EXTENDED RELEASE ORAL at 08:23

## 2019-01-01 RX ADMIN — LIDOCAINE 1 PATCH: 50 PATCH TOPICAL at 11:28

## 2019-01-01 RX ADMIN — PHENYLEPHRINE HYDROCHLORIDE 80 MCG: 10 INJECTION INTRAVENOUS at 08:49

## 2019-01-01 RX ADMIN — CEFEPIME 2 G: 1 INJECTION, POWDER, FOR SOLUTION INTRAMUSCULAR; INTRAVENOUS at 17:36

## 2019-01-01 RX ADMIN — DEXTROSE MONOHYDRATE 50 ML/HR: 100 INJECTION, SOLUTION INTRAVENOUS at 12:03

## 2019-01-01 RX ADMIN — CASTOR OIL AND BALSAM, PERU: 788; 87 OINTMENT TOPICAL at 21:08

## 2019-01-01 RX ADMIN — INSULIN ASPART 4 UNITS: 100 INJECTION, SOLUTION INTRAVENOUS; SUBCUTANEOUS at 06:30

## 2019-01-01 RX ADMIN — VALSARTAN 80 MG: 80 TABLET, FILM COATED ORAL at 11:29

## 2019-01-01 RX ADMIN — CLOPIDOGREL BISULFATE 75 MG: 75 TABLET ORAL at 09:51

## 2019-01-01 RX ADMIN — CHLORHEXIDINE GLUCONATE 0.12% ORAL RINSE 15 ML: 1.2 LIQUID ORAL at 08:12

## 2019-01-01 RX ADMIN — HEPARIN SODIUM 5000 UNITS: 5000 INJECTION, SOLUTION INTRAVENOUS; SUBCUTANEOUS at 08:16

## 2019-01-01 RX ADMIN — PANTOPRAZOLE SODIUM 40 MG: 40 INJECTION, POWDER, FOR SOLUTION INTRAVENOUS at 20:39

## 2019-01-01 RX ADMIN — IPRATROPIUM BROMIDE AND ALBUTEROL SULFATE 3 ML: 2.5; .5 SOLUTION RESPIRATORY (INHALATION) at 22:03

## 2019-01-01 RX ADMIN — ALBUTEROL SULFATE 2.5 MG: 2.5 SOLUTION RESPIRATORY (INHALATION) at 13:25

## 2019-01-01 RX ADMIN — IPRATROPIUM BROMIDE AND ALBUTEROL SULFATE 3 ML: 2.5; .5 SOLUTION RESPIRATORY (INHALATION) at 20:57

## 2019-01-01 RX ADMIN — GUAIFENESIN 600 MG: 600 TABLET, EXTENDED RELEASE ORAL at 21:10

## 2019-01-01 RX ADMIN — IPRATROPIUM BROMIDE AND ALBUTEROL SULFATE 3 ML: 2.5; .5 SOLUTION RESPIRATORY (INHALATION) at 15:37

## 2019-01-01 RX ADMIN — CLOPIDOGREL BISULFATE 75 MG: 75 TABLET ORAL at 09:00

## 2019-01-01 RX ADMIN — PANTOPRAZOLE SODIUM 40 MG: 40 INJECTION, POWDER, FOR SOLUTION INTRAVENOUS at 17:23

## 2019-01-01 RX ADMIN — ATORVASTATIN CALCIUM 40 MG: 40 TABLET, FILM COATED ORAL at 20:56

## 2019-01-01 RX ADMIN — Medication 1 PACKET: at 08:10

## 2019-01-01 RX ADMIN — IPRATROPIUM BROMIDE AND ALBUTEROL SULFATE 3 ML: 2.5; .5 SOLUTION RESPIRATORY (INHALATION) at 11:17

## 2019-01-01 RX ADMIN — TAZOBACTAM SODIUM AND PIPERACILLIN SODIUM 3.38 G: 375; 3 INJECTION, SOLUTION INTRAVENOUS at 20:11

## 2019-01-01 RX ADMIN — HEPARIN SODIUM 5000 UNITS: 5000 INJECTION, SOLUTION INTRAVENOUS; SUBCUTANEOUS at 20:11

## 2019-01-01 RX ADMIN — SODIUM CHLORIDE, PRESERVATIVE FREE 10 ML: 5 INJECTION INTRAVENOUS at 22:28

## 2019-01-01 RX ADMIN — SODIUM CHLORIDE, PRESERVATIVE FREE 10 ML: 5 INJECTION INTRAVENOUS at 11:57

## 2019-01-01 RX ADMIN — METOPROLOL SUCCINATE 50 MG: 50 TABLET, EXTENDED RELEASE ORAL at 16:01

## 2019-01-01 RX ADMIN — SODIUM CHLORIDE, PRESERVATIVE FREE 10 ML: 5 INJECTION INTRAVENOUS at 20:33

## 2019-01-01 RX ADMIN — TAZOBACTAM SODIUM AND PIPERACILLIN SODIUM 3.38 G: 375; 3 INJECTION, SOLUTION INTRAVENOUS at 17:54

## 2019-01-01 RX ADMIN — ETOMIDATE 20 MG: 2 INJECTION, SOLUTION INTRAVENOUS at 02:32

## 2019-01-01 RX ADMIN — SODIUM CHLORIDE, PRESERVATIVE FREE 10 ML: 5 INJECTION INTRAVENOUS at 09:54

## 2019-01-01 RX ADMIN — IPRATROPIUM BROMIDE AND ALBUTEROL SULFATE 3 ML: 2.5; .5 SOLUTION RESPIRATORY (INHALATION) at 16:05

## 2019-01-01 RX ADMIN — DOCUSATE SODIUM 100 MG: 100 CAPSULE, LIQUID FILLED ORAL at 17:11

## 2019-01-01 RX ADMIN — INSULIN ASPART 7 UNITS: 100 INJECTION, SOLUTION INTRAVENOUS; SUBCUTANEOUS at 21:36

## 2019-01-01 RX ADMIN — IPRATROPIUM BROMIDE AND ALBUTEROL SULFATE 3 ML: .5; 3 SOLUTION RESPIRATORY (INHALATION) at 20:35

## 2019-01-01 RX ADMIN — TAZOBACTAM SODIUM AND PIPERACILLIN SODIUM 3.38 G: 375; 3 INJECTION, SOLUTION INTRAVENOUS at 11:45

## 2019-01-01 RX ADMIN — DOCUSATE SODIUM 100 MG: 100 CAPSULE, LIQUID FILLED ORAL at 21:12

## 2019-01-01 RX ADMIN — POLYETHYLENE GLYCOL 3350 17 G: 17 POWDER, FOR SOLUTION ORAL at 08:24

## 2019-01-01 RX ADMIN — IPRATROPIUM BROMIDE AND ALBUTEROL SULFATE 3 ML: .5; 3 SOLUTION RESPIRATORY (INHALATION) at 01:27

## 2019-01-01 RX ADMIN — LANTHANUM CARBONATE 1000 MG: 500 TABLET, CHEWABLE ORAL at 12:13

## 2019-01-01 RX ADMIN — DOCUSATE SODIUM 100 MG: 100 CAPSULE, LIQUID FILLED ORAL at 10:48

## 2019-01-01 RX ADMIN — PANTOPRAZOLE SODIUM 40 MG: 40 INJECTION, POWDER, FOR SOLUTION INTRAVENOUS at 15:37

## 2019-01-01 RX ADMIN — ASPIRIN 81 MG 81 MG: 81 TABLET ORAL at 09:52

## 2019-01-01 RX ADMIN — INSULIN LISPRO 2 UNITS: 100 INJECTION, SOLUTION INTRAVENOUS; SUBCUTANEOUS at 08:24

## 2019-01-01 RX ADMIN — MIDAZOLAM 1 MG/HR: 5 INJECTION INTRAMUSCULAR; INTRAVENOUS at 08:05

## 2019-01-01 RX ADMIN — INSULIN DETEMIR 10 UNITS: 100 INJECTION, SOLUTION SUBCUTANEOUS at 21:44

## 2019-01-01 RX ADMIN — AMLODIPINE BESYLATE 10 MG: 5 TABLET ORAL at 09:05

## 2019-01-01 RX ADMIN — ATORVASTATIN CALCIUM 40 MG: 40 TABLET, FILM COATED ORAL at 12:18

## 2019-01-01 RX ADMIN — CLOPIDOGREL BISULFATE 75 MG: 75 TABLET ORAL at 11:29

## 2019-01-01 RX ADMIN — IPRATROPIUM BROMIDE AND ALBUTEROL SULFATE 3 ML: 2.5; .5 SOLUTION RESPIRATORY (INHALATION) at 12:01

## 2019-01-01 RX ADMIN — IPRATROPIUM BROMIDE AND ALBUTEROL SULFATE 3 ML: 2.5; .5 SOLUTION RESPIRATORY (INHALATION) at 15:42

## 2019-01-01 RX ADMIN — TAZOBACTAM SODIUM AND PIPERACILLIN SODIUM 3.38 G: 375; 3 INJECTION, SOLUTION INTRAVENOUS at 23:55

## 2019-01-01 RX ADMIN — IPRATROPIUM BROMIDE AND ALBUTEROL SULFATE 3 ML: .5; 3 SOLUTION RESPIRATORY (INHALATION) at 15:49

## 2019-01-01 RX ADMIN — LIDOCAINE 1 PATCH: 50 PATCH CUTANEOUS at 22:26

## 2019-01-01 RX ADMIN — CEFEPIME 2 G: 1 INJECTION, POWDER, FOR SOLUTION INTRAMUSCULAR; INTRAVENOUS at 17:10

## 2019-01-01 RX ADMIN — INSULIN LISPRO 2 UNITS: 100 INJECTION, SOLUTION INTRAVENOUS; SUBCUTANEOUS at 12:50

## 2019-01-01 RX ADMIN — PANTOPRAZOLE SODIUM 40 MG: 40 INJECTION, POWDER, FOR SOLUTION INTRAVENOUS at 05:17

## 2019-01-01 RX ADMIN — GUAIFENESIN 600 MG: 600 TABLET, EXTENDED RELEASE ORAL at 10:49

## 2019-01-01 RX ADMIN — DEXMEDETOMIDINE 0.2 MCG/KG/HR: 100 INJECTION, SOLUTION, CONCENTRATE INTRAVENOUS at 10:36

## 2019-01-01 RX ADMIN — IPRATROPIUM BROMIDE AND ALBUTEROL SULFATE 3 ML: 2.5; .5 SOLUTION RESPIRATORY (INHALATION) at 16:42

## 2019-01-01 RX ADMIN — SODIUM CHLORIDE, PRESERVATIVE FREE 10 ML: 5 INJECTION INTRAVENOUS at 12:53

## 2019-01-01 RX ADMIN — POLYETHYLENE GLYCOL 3350 17 G: 17 POWDER, FOR SOLUTION ORAL at 10:48

## 2019-01-01 RX ADMIN — INSULIN HUMAN 4 UNITS: 100 INJECTION, SOLUTION PARENTERAL at 18:05

## 2019-01-01 RX ADMIN — ISOSORBIDE MONONITRATE 30 MG: 30 TABLET, EXTENDED RELEASE ORAL at 08:45

## 2019-01-01 RX ADMIN — ALBUTEROL SULFATE 2.5 MG: 2.5 SOLUTION RESPIRATORY (INHALATION) at 04:49

## 2019-01-01 RX ADMIN — Medication 1 TABLET: at 20:56

## 2019-01-01 RX ADMIN — PANTOPRAZOLE SODIUM 40 MG: 40 INJECTION, POWDER, FOR SOLUTION INTRAVENOUS at 13:22

## 2019-01-01 RX ADMIN — IPRATROPIUM BROMIDE AND ALBUTEROL SULFATE 3 ML: 2.5; .5 SOLUTION RESPIRATORY (INHALATION) at 19:36

## 2019-01-01 RX ADMIN — HEPARIN SODIUM 5000 UNITS: 5000 INJECTION, SOLUTION INTRAVENOUS; SUBCUTANEOUS at 11:30

## 2019-01-01 RX ADMIN — HYDROCODONE BITARTRATE AND ACETAMINOPHEN 1 TABLET: 5; 325 TABLET ORAL at 13:09

## 2019-01-01 RX ADMIN — MELATONIN TAB 5 MG 5 MG: 5 TAB at 21:15

## 2019-01-01 RX ADMIN — METOPROLOL SUCCINATE 100 MG: 100 TABLET, EXTENDED RELEASE ORAL at 08:47

## 2019-01-01 RX ADMIN — EPHEDRINE SULFATE 10 MG: 50 INJECTION INTRAMUSCULAR; INTRAVENOUS; SUBCUTANEOUS at 09:03

## 2019-01-01 RX ADMIN — SODIUM CHLORIDE, PRESERVATIVE FREE 10 ML: 5 INJECTION INTRAVENOUS at 08:47

## 2019-01-01 RX ADMIN — LEVOTHYROXINE SODIUM 125 MCG: 125 TABLET ORAL at 05:47

## 2019-01-01 RX ADMIN — ASPIRIN 81 MG CHEWABLE TABLET 81 MG: 81 TABLET CHEWABLE at 11:28

## 2019-01-01 RX ADMIN — LIDOCAINE 1 PATCH: 50 PATCH CUTANEOUS at 20:41

## 2019-01-01 RX ADMIN — SODIUM CHLORIDE, PRESERVATIVE FREE 10 ML: 5 INJECTION INTRAVENOUS at 09:44

## 2019-01-01 RX ADMIN — IPRATROPIUM BROMIDE AND ALBUTEROL SULFATE 3 ML: 2.5; .5 SOLUTION RESPIRATORY (INHALATION) at 15:07

## 2019-01-01 RX ADMIN — LEVETIRACETAM INJECTION 500 MG: 5 INJECTION INTRAVENOUS at 21:44

## 2019-01-01 RX ADMIN — LEVOTHYROXINE SODIUM 125 MCG: 125 TABLET ORAL at 05:39

## 2019-01-01 RX ADMIN — FLUTICASONE PROPIONATE 1 SPRAY: 50 SPRAY, METERED NASAL at 09:53

## 2019-01-01 RX ADMIN — Medication 1 PACKET: at 08:02

## 2019-01-01 RX ADMIN — ALBUTEROL SULFATE 2.5 MG: 2.5 SOLUTION RESPIRATORY (INHALATION) at 05:56

## 2019-01-01 RX ADMIN — BENZONATATE 100 MG: 100 CAPSULE ORAL at 23:38

## 2019-01-01 RX ADMIN — INSULIN ASPART 4 UNITS: 100 INJECTION, SOLUTION INTRAVENOUS; SUBCUTANEOUS at 06:56

## 2019-01-01 RX ADMIN — CASTOR OIL AND BALSAM, PERU: 788; 87 OINTMENT TOPICAL at 21:13

## 2019-01-01 RX ADMIN — TAZOBACTAM SODIUM AND PIPERACILLIN SODIUM 3.38 G: 375; 3 INJECTION, SOLUTION INTRAVENOUS at 23:37

## 2019-01-01 RX ADMIN — SODIUM CHLORIDE, PRESERVATIVE FREE 10 ML: 5 INJECTION INTRAVENOUS at 03:36

## 2019-01-01 RX ADMIN — DEXTROSE AND SODIUM CHLORIDE 75 ML/HR: 5; 450 INJECTION, SOLUTION INTRAVENOUS at 10:57

## 2019-01-01 RX ADMIN — INSULIN DETEMIR 10 UNITS: 100 INJECTION, SOLUTION SUBCUTANEOUS at 21:40

## 2019-01-01 RX ADMIN — Medication 1 PACKET: at 08:04

## 2019-01-01 RX ADMIN — LEVETIRACETAM INJECTION 500 MG: 5 INJECTION INTRAVENOUS at 11:07

## 2019-01-01 RX ADMIN — DOCUSATE SODIUM 100 MG: 100 CAPSULE, LIQUID FILLED ORAL at 06:24

## 2019-01-01 RX ADMIN — CLOPIDOGREL BISULFATE 75 MG: 75 TABLET ORAL at 12:18

## 2019-01-01 RX ADMIN — CASTOR OIL AND BALSAM, PERU: 788; 87 OINTMENT TOPICAL at 08:23

## 2019-01-01 RX ADMIN — HEPARIN SODIUM 5000 UNITS: 5000 INJECTION, SOLUTION INTRAVENOUS; SUBCUTANEOUS at 21:56

## 2019-01-01 RX ADMIN — HYDROCODONE BITARTRATE AND ACETAMINOPHEN 1 TABLET: 5; 325 TABLET ORAL at 21:52

## 2019-01-01 RX ADMIN — AMLODIPINE BESYLATE 10 MG: 5 TABLET ORAL at 08:46

## 2019-01-01 RX ADMIN — ATORVASTATIN CALCIUM 40 MG: 40 TABLET, FILM COATED ORAL at 22:07

## 2019-01-01 RX ADMIN — SODIUM CHLORIDE: 9 INJECTION, SOLUTION INTRAVENOUS at 08:43

## 2019-01-01 RX ADMIN — LEVETIRACETAM 500 MG: 500 TABLET, FILM COATED ORAL at 08:23

## 2019-01-01 RX ADMIN — HUMAN INSULIN 10 UNITS: 100 INJECTION, SOLUTION SUBCUTANEOUS at 20:13

## 2019-01-01 RX ADMIN — CHLORHEXIDINE GLUCONATE 0.12% ORAL RINSE 15 ML: 1.2 LIQUID ORAL at 20:11

## 2019-01-01 RX ADMIN — ALBUMIN HUMAN 50 G: 0.25 SOLUTION INTRAVENOUS at 08:06

## 2019-01-01 RX ADMIN — DEXMEDETOMIDINE 0.3 MCG/KG/HR: 100 INJECTION, SOLUTION, CONCENTRATE INTRAVENOUS at 05:47

## 2019-01-01 RX ADMIN — IPRATROPIUM BROMIDE AND ALBUTEROL SULFATE 3 ML: .5; 3 SOLUTION RESPIRATORY (INHALATION) at 06:38

## 2019-01-01 RX ADMIN — MORPHINE SULFATE 1 MG: 2 INJECTION, SOLUTION INTRAMUSCULAR; INTRAVENOUS at 21:38

## 2019-01-01 RX ADMIN — MELATONIN TAB 5 MG 5 MG: 5 TAB at 23:10

## 2019-01-01 RX ADMIN — CEFEPIME 2 G: 1 INJECTION, POWDER, FOR SOLUTION INTRAMUSCULAR; INTRAVENOUS at 18:31

## 2019-01-01 RX ADMIN — CHLORHEXIDINE GLUCONATE 0.12% ORAL RINSE 15 ML: 1.2 LIQUID ORAL at 20:03

## 2019-01-01 RX ADMIN — PANTOPRAZOLE SODIUM 40 MG: 40 INJECTION, POWDER, FOR SOLUTION INTRAVENOUS at 11:04

## 2019-01-01 RX ADMIN — PHENYLEPHRINE HYDROCHLORIDE 80 MCG: 10 INJECTION INTRAVENOUS at 09:00

## 2019-01-01 RX ADMIN — LOSARTAN POTASSIUM 100 MG: 50 TABLET, FILM COATED ORAL at 09:52

## 2019-01-01 RX ADMIN — INSULIN DETEMIR 12 UNITS: 100 INJECTION, SOLUTION SUBCUTANEOUS at 22:26

## 2019-01-01 RX ADMIN — GUAIFENESIN 600 MG: 600 TABLET, EXTENDED RELEASE ORAL at 21:15

## 2019-01-01 RX ADMIN — ASPIRIN 81 MG CHEWABLE TABLET 81 MG: 81 TABLET CHEWABLE at 08:15

## 2019-01-01 RX ADMIN — SODIUM CHLORIDE, PRESERVATIVE FREE 10 ML: 5 INJECTION INTRAVENOUS at 18:47

## 2019-01-01 RX ADMIN — HUMAN INSULIN 10 UNITS: 100 INJECTION, SOLUTION SUBCUTANEOUS at 18:55

## 2019-01-01 RX ADMIN — MORPHINE SULFATE 1 MG: 2 INJECTION, SOLUTION INTRAMUSCULAR; INTRAVENOUS at 03:49

## 2019-01-01 RX ADMIN — INSULIN HUMAN 4 UNITS: 100 INJECTION, SOLUTION PARENTERAL at 17:48

## 2019-01-01 RX ADMIN — INSULIN DETEMIR 12 UNITS: 100 INJECTION, SOLUTION SUBCUTANEOUS at 18:55

## 2019-01-01 RX ADMIN — IPRATROPIUM BROMIDE AND ALBUTEROL SULFATE 3 ML: 2.5; .5 SOLUTION RESPIRATORY (INHALATION) at 07:24

## 2019-01-01 RX ADMIN — INSULIN LISPRO 3 UNITS: 100 INJECTION, SOLUTION INTRAVENOUS; SUBCUTANEOUS at 21:31

## 2019-01-01 RX ADMIN — SODIUM CHLORIDE, PRESERVATIVE FREE 10 ML: 5 INJECTION INTRAVENOUS at 20:50

## 2019-01-01 RX ADMIN — DEXTROSE AND SODIUM CHLORIDE 75 ML/HR: 5; 450 INJECTION, SOLUTION INTRAVENOUS at 04:09

## 2019-01-01 RX ADMIN — FAMOTIDINE 20 MG: 20 TABLET ORAL at 17:10

## 2019-01-01 RX ADMIN — DOXYCYCLINE 100 MG: 100 CAPSULE ORAL at 20:26

## 2019-01-01 RX ADMIN — HEPARIN SODIUM 5000 UNITS: 5000 INJECTION, SOLUTION INTRAVENOUS; SUBCUTANEOUS at 20:10

## 2019-01-01 RX ADMIN — IPRATROPIUM BROMIDE AND ALBUTEROL SULFATE 3 ML: 2.5; .5 SOLUTION RESPIRATORY (INHALATION) at 16:41

## 2019-01-01 RX ADMIN — TAZOBACTAM SODIUM AND PIPERACILLIN SODIUM 3.38 G: 375; 3 INJECTION, SOLUTION INTRAVENOUS at 23:44

## 2019-01-01 RX ADMIN — PIPERACILLIN SODIUM AND TAZOBACTAM SODIUM 2.25 G: 2; .25 INJECTION, POWDER, LYOPHILIZED, FOR SOLUTION INTRAVENOUS at 15:38

## 2019-01-01 RX ADMIN — ACETAMINOPHEN 650 MG: 650 SOLUTION ORAL at 11:03

## 2019-01-01 RX ADMIN — CEFEPIME HYDROCHLORIDE 1 G: 1 INJECTION, POWDER, FOR SOLUTION INTRAMUSCULAR; INTRAVENOUS at 18:43

## 2019-01-01 RX ADMIN — ASPIRIN 81 MG CHEWABLE TABLET 81 MG: 81 TABLET CHEWABLE at 12:52

## 2019-01-01 RX ADMIN — ISOSORBIDE MONONITRATE 30 MG: 30 TABLET, EXTENDED RELEASE ORAL at 09:51

## 2019-01-01 RX ADMIN — INSULIN ASPART 2 UNITS: 100 INJECTION, SOLUTION INTRAVENOUS; SUBCUTANEOUS at 21:24

## 2019-01-01 RX ADMIN — ATORVASTATIN CALCIUM 40 MG: 40 TABLET, FILM COATED ORAL at 23:22

## 2019-01-01 RX ADMIN — LEVETIRACETAM INJECTION 500 MG: 5 INJECTION INTRAVENOUS at 22:59

## 2019-01-01 RX ADMIN — IPRATROPIUM BROMIDE AND ALBUTEROL SULFATE 3 ML: 2.5; .5 SOLUTION RESPIRATORY (INHALATION) at 12:06

## 2019-01-01 RX ADMIN — IPRATROPIUM BROMIDE AND ALBUTEROL SULFATE 3 ML: 2.5; .5 SOLUTION RESPIRATORY (INHALATION) at 11:01

## 2019-01-01 RX ADMIN — LEVETIRACETAM 750 MG: 100 INJECTION, SOLUTION INTRAVENOUS at 11:29

## 2019-01-01 RX ADMIN — LEVOTHYROXINE SODIUM 125 MCG: 125 TABLET ORAL at 06:54

## 2019-01-01 RX ADMIN — ASPIRIN 81 MG 81 MG: 81 TABLET ORAL at 15:41

## 2019-01-01 RX ADMIN — FAMOTIDINE 20 MG: 20 TABLET ORAL at 09:06

## 2019-01-01 RX ADMIN — ACETAMINOPHEN 649.6 MG: 650 SOLUTION ORAL at 16:19

## 2019-01-01 RX ADMIN — CARVEDILOL 12.5 MG: 12.5 TABLET, FILM COATED ORAL at 17:39

## 2019-01-01 RX ADMIN — INSULIN DETEMIR 20 UNITS: 100 INJECTION, SOLUTION SUBCUTANEOUS at 08:43

## 2019-01-01 RX ADMIN — SODIUM CHLORIDE SOLN NEBU 3% 4 ML: 3 NEBU SOLN at 20:56

## 2019-01-01 RX ADMIN — SODIUM CHLORIDE, PRESERVATIVE FREE 10 ML: 5 INJECTION INTRAVENOUS at 21:25

## 2019-01-01 RX ADMIN — INSULIN DETEMIR 10 UNITS: 100 INJECTION, SOLUTION SUBCUTANEOUS at 06:57

## 2019-01-01 RX ADMIN — PANTOPRAZOLE SODIUM 40 MG: 40 INJECTION, POWDER, FOR SOLUTION INTRAVENOUS at 06:36

## 2019-01-01 RX ADMIN — FAMOTIDINE 20 MG: 20 TABLET ORAL at 06:57

## 2019-01-01 RX ADMIN — IPRATROPIUM BROMIDE AND ALBUTEROL SULFATE 3 ML: 2.5; .5 SOLUTION RESPIRATORY (INHALATION) at 08:41

## 2019-01-01 RX ADMIN — VANCOMYCIN HYDROCHLORIDE 1250 MG: 500 INJECTION, POWDER, LYOPHILIZED, FOR SOLUTION INTRAVENOUS at 20:39

## 2019-01-01 RX ADMIN — ISOSORBIDE MONONITRATE 30 MG: 30 TABLET, EXTENDED RELEASE ORAL at 08:46

## 2019-01-01 RX ADMIN — HEPARIN SODIUM 5000 UNITS: 5000 INJECTION, SOLUTION INTRAVENOUS; SUBCUTANEOUS at 20:03

## 2019-01-01 RX ADMIN — ACETAMINOPHEN 650 MG: 325 TABLET, FILM COATED ORAL at 16:26

## 2019-01-01 RX ADMIN — LEVOTHYROXINE SODIUM 125 MCG: 125 TABLET ORAL at 05:17

## 2019-01-01 RX ADMIN — INSULIN DETEMIR 12 UNITS: 100 INJECTION, SOLUTION SUBCUTANEOUS at 21:16

## 2019-01-01 RX ADMIN — LEVETIRACETAM INJECTION 500 MG: 5 INJECTION INTRAVENOUS at 05:30

## 2019-01-01 RX ADMIN — FLUTICASONE PROPIONATE 1 SPRAY: 50 SPRAY, METERED NASAL at 08:52

## 2019-01-01 RX ADMIN — ALUMINUM HYDROXIDE, MAGNESIUM HYDROXIDE, AND DIMETHICONE 15 ML: 400; 400; 40 SUSPENSION ORAL at 21:15

## 2019-01-01 RX ADMIN — LEVOFLOXACIN 500 MG: 5 INJECTION, SOLUTION INTRAVENOUS at 17:07

## 2019-01-01 RX ADMIN — LEVETIRACETAM INJECTION 500 MG: 5 INJECTION INTRAVENOUS at 10:17

## 2019-01-01 RX ADMIN — HEPARIN SODIUM 5000 UNITS: 5000 INJECTION INTRAVENOUS; SUBCUTANEOUS at 05:35

## 2019-01-01 RX ADMIN — SODIUM CHLORIDE, PRESERVATIVE FREE 10 ML: 5 INJECTION INTRAVENOUS at 09:19

## 2019-01-01 RX ADMIN — IPRATROPIUM BROMIDE AND ALBUTEROL SULFATE 3 ML: 2.5; .5 SOLUTION RESPIRATORY (INHALATION) at 21:05

## 2019-01-01 RX ADMIN — SODIUM CHLORIDE, PRESERVATIVE FREE 10 ML: 5 INJECTION INTRAVENOUS at 11:03

## 2019-01-01 RX ADMIN — TAZOBACTAM SODIUM AND PIPERACILLIN SODIUM 3.38 G: 375; 3 INJECTION, SOLUTION INTRAVENOUS at 11:56

## 2019-01-01 RX ADMIN — MORPHINE SULFATE 1 MG: 2 INJECTION, SOLUTION INTRAMUSCULAR; INTRAVENOUS at 17:39

## 2019-01-01 RX ADMIN — MELATONIN TAB 5 MG 5 MG: 5 TAB at 22:24

## 2019-01-01 RX ADMIN — ETOMIDATE 20 MG: 2 INJECTION, SOLUTION INTRAVENOUS at 07:12

## 2019-01-01 RX ADMIN — HEPARIN SODIUM 5000 UNITS: 5000 INJECTION, SOLUTION INTRAVENOUS; SUBCUTANEOUS at 08:02

## 2019-01-01 RX ADMIN — INSULIN DETEMIR 15 UNITS: 100 INJECTION, SOLUTION SUBCUTANEOUS at 08:27

## 2019-01-01 RX ADMIN — Medication 1 TABLET: at 16:21

## 2019-01-01 RX ADMIN — HEPARIN SODIUM 5000 UNITS: 5000 INJECTION INTRAVENOUS; SUBCUTANEOUS at 20:46

## 2019-01-01 RX ADMIN — INSULIN LISPRO 2 UNITS: 100 INJECTION, SOLUTION INTRAVENOUS; SUBCUTANEOUS at 21:14

## 2019-01-01 RX ADMIN — SODIUM CHLORIDE, PRESERVATIVE FREE 10 ML: 5 INJECTION INTRAVENOUS at 10:48

## 2019-01-01 RX ADMIN — POVIDONE-IODINE: 10 SOLUTION TOPICAL at 12:03

## 2019-01-01 RX ADMIN — INSULIN HUMAN 2 UNITS: 100 INJECTION, SOLUTION PARENTERAL at 00:57

## 2019-01-01 RX ADMIN — INSULIN ASPART 5 UNITS: 100 INJECTION, SOLUTION INTRAVENOUS; SUBCUTANEOUS at 09:53

## 2019-01-01 RX ADMIN — CHLORHEXIDINE GLUCONATE 0.12% ORAL RINSE 15 ML: 1.2 LIQUID ORAL at 20:08

## 2019-01-01 RX ADMIN — INSULIN LISPRO 2 UNITS: 100 INJECTION, SOLUTION INTRAVENOUS; SUBCUTANEOUS at 11:46

## 2019-01-01 RX ADMIN — CEFEPIME 2 G: 1 INJECTION, POWDER, FOR SOLUTION INTRAMUSCULAR; INTRAVENOUS at 18:58

## 2019-01-01 RX ADMIN — POVIDONE-IODINE: 10 SOLUTION TOPICAL at 15:28

## 2019-01-01 RX ADMIN — CARVEDILOL 6.25 MG: 6.25 TABLET, FILM COATED ORAL at 17:46

## 2019-01-01 RX ADMIN — ASPIRIN 81 MG CHEWABLE TABLET 81 MG: 81 TABLET CHEWABLE at 08:14

## 2019-01-01 RX ADMIN — MIDAZOLAM HYDROCHLORIDE 2 MG: 1 INJECTION, SOLUTION INTRAMUSCULAR; INTRAVENOUS at 14:27

## 2019-01-01 RX ADMIN — SODIUM CHLORIDE 250 ML: 9 INJECTION, SOLUTION INTRAVENOUS at 16:23

## 2019-01-01 RX ADMIN — INSULIN LISPRO 2 UNITS: 100 INJECTION, SOLUTION INTRAVENOUS; SUBCUTANEOUS at 09:00

## 2019-01-01 RX ADMIN — RENAGEL 800 MG: 800 TABLET ORAL at 10:17

## 2019-01-01 RX ADMIN — HEPARIN SODIUM 5000 UNITS: 5000 INJECTION INTRAVENOUS; SUBCUTANEOUS at 16:21

## 2019-01-01 RX ADMIN — CASTOR OIL AND BALSAM, PERU: 788; 87 OINTMENT TOPICAL at 09:00

## 2019-01-01 RX ADMIN — HEPARIN SODIUM 5000 UNITS: 5000 INJECTION, SOLUTION INTRAVENOUS; SUBCUTANEOUS at 10:36

## 2019-01-01 RX ADMIN — PANTOPRAZOLE SODIUM 40 MG: 40 INJECTION, POWDER, FOR SOLUTION INTRAVENOUS at 10:17

## 2019-01-01 RX ADMIN — LEVOTHYROXINE SODIUM 125 MCG: 125 TABLET ORAL at 05:31

## 2019-01-01 RX ADMIN — LANTHANUM CARBONATE 1000 MG: 500 TABLET, CHEWABLE ORAL at 17:21

## 2019-01-01 RX ADMIN — LEVETIRACETAM INJECTION 500 MG: 5 INJECTION INTRAVENOUS at 17:46

## 2019-01-01 RX ADMIN — CASTOR OIL AND BALSAM, PERU: 788; 87 OINTMENT TOPICAL at 16:20

## 2019-01-01 RX ADMIN — GUAIFENESIN 600 MG: 600 TABLET, EXTENDED RELEASE ORAL at 21:13

## 2019-01-01 RX ADMIN — CARVEDILOL 12.5 MG: 12.5 TABLET, FILM COATED ORAL at 08:15

## 2019-01-01 RX ADMIN — PANTOPRAZOLE SODIUM 40 MG: 40 INJECTION, POWDER, FOR SOLUTION INTRAVENOUS at 16:57

## 2019-01-01 RX ADMIN — IPRATROPIUM BROMIDE AND ALBUTEROL SULFATE 3 ML: 2.5; .5 SOLUTION RESPIRATORY (INHALATION) at 12:58

## 2019-01-01 RX ADMIN — VALSARTAN 40 MG: 80 TABLET, FILM COATED ORAL at 21:50

## 2019-01-01 RX ADMIN — MIDAZOLAM 5 MG/HR: 5 INJECTION INTRAMUSCULAR; INTRAVENOUS at 02:29

## 2019-01-01 RX ADMIN — SODIUM CHLORIDE, PRESERVATIVE FREE 10 ML: 5 INJECTION INTRAVENOUS at 10:18

## 2019-01-01 RX ADMIN — LORAZEPAM 0.5 MG: 0.5 TABLET ORAL at 15:47

## 2019-01-01 RX ADMIN — DOCUSATE SODIUM 100 MG: 100 CAPSULE, LIQUID FILLED ORAL at 08:46

## 2019-01-01 RX ADMIN — IPRATROPIUM BROMIDE AND ALBUTEROL SULFATE 3 ML: 2.5; .5 SOLUTION RESPIRATORY (INHALATION) at 16:11

## 2019-01-01 RX ADMIN — LEVOTHYROXINE SODIUM 125 MCG: 125 TABLET ORAL at 05:35

## 2019-01-01 RX ADMIN — SODIUM CHLORIDE, PRESERVATIVE FREE 10 ML: 5 INJECTION INTRAVENOUS at 21:40

## 2019-01-01 RX ADMIN — FOLIC ACID 1 MG: 1 TABLET ORAL at 09:00

## 2019-01-01 RX ADMIN — DOCUSATE SODIUM 100 MG: 100 CAPSULE, LIQUID FILLED ORAL at 20:46

## 2019-01-01 RX ADMIN — MORPHINE SULFATE 4 MG: 4 INJECTION INTRAVENOUS at 17:59

## 2019-01-01 RX ADMIN — INSULIN DETEMIR 10 UNITS: 100 INJECTION, SOLUTION SUBCUTANEOUS at 12:02

## 2019-01-01 RX ADMIN — LEVOTHYROXINE SODIUM 125 MCG: 125 TABLET ORAL at 05:54

## 2019-01-01 RX ADMIN — LEVETIRACETAM INJECTION 500 MG: 5 INJECTION INTRAVENOUS at 05:43

## 2019-01-01 RX ADMIN — ETOMIDATE 20 MG: 2 INJECTION, SOLUTION INTRAVENOUS at 02:31

## 2019-01-01 RX ADMIN — IPRATROPIUM BROMIDE AND ALBUTEROL SULFATE 3 ML: .5; 3 SOLUTION RESPIRATORY (INHALATION) at 12:27

## 2019-01-01 RX ADMIN — Medication 1 TABLET: at 09:05

## 2019-01-01 RX ADMIN — ATORVASTATIN CALCIUM 40 MG: 40 TABLET, FILM COATED ORAL at 21:15

## 2019-01-01 RX ADMIN — INSULIN HUMAN 5 UNITS: 100 INJECTION, SOLUTION PARENTERAL at 23:57

## 2019-01-01 RX ADMIN — CARVEDILOL 6.25 MG: 6.25 TABLET, FILM COATED ORAL at 12:52

## 2019-01-01 RX ADMIN — CARVEDILOL 6.25 MG: 6.25 TABLET, FILM COATED ORAL at 17:53

## 2019-01-01 RX ADMIN — INSULIN DETEMIR 15 UNITS: 100 INJECTION, SOLUTION SUBCUTANEOUS at 10:36

## 2019-01-01 RX ADMIN — IPRATROPIUM BROMIDE AND ALBUTEROL SULFATE 3 ML: .5; 3 SOLUTION RESPIRATORY (INHALATION) at 20:56

## 2019-01-01 RX ADMIN — LEVETIRACETAM INJECTION 500 MG: 5 INJECTION INTRAVENOUS at 20:08

## 2019-01-01 RX ADMIN — PANTOPRAZOLE SODIUM 40 MG: 40 INJECTION, POWDER, FOR SOLUTION INTRAVENOUS at 09:00

## 2019-01-01 RX ADMIN — HYDROCODONE BITARTRATE AND ACETAMINOPHEN 1 TABLET: 5; 325 TABLET ORAL at 02:45

## 2019-01-01 RX ADMIN — VANCOMYCIN HYDROCHLORIDE 750 MG: 750 INJECTION, SOLUTION INTRAVENOUS at 09:50

## 2019-01-01 RX ADMIN — CLOPIDOGREL BISULFATE 75 MG: 75 TABLET ORAL at 12:52

## 2019-01-01 RX ADMIN — VALSARTAN 80 MG: 80 TABLET, FILM COATED ORAL at 08:15

## 2019-01-01 RX ADMIN — LEVETIRACETAM INJECTION 500 MG: 5 INJECTION INTRAVENOUS at 18:12

## 2019-01-01 RX ADMIN — ONDANSETRON 4 MG: 2 INJECTION INTRAMUSCULAR; INTRAVENOUS at 03:36

## 2019-01-01 RX ADMIN — PANTOPRAZOLE SODIUM 40 MG: 40 INJECTION, POWDER, FOR SOLUTION INTRAVENOUS at 21:40

## 2019-01-01 RX ADMIN — ISOSORBIDE MONONITRATE 30 MG: 30 TABLET, EXTENDED RELEASE ORAL at 09:06

## 2019-01-01 RX ADMIN — GUAIFENESIN 600 MG: 600 TABLET, EXTENDED RELEASE ORAL at 20:46

## 2019-01-01 RX ADMIN — ALBUTEROL SULFATE 2.5 MG: 2.5 SOLUTION RESPIRATORY (INHALATION) at 01:08

## 2019-01-01 RX ADMIN — EPHEDRINE SULFATE 10 MG: 50 INJECTION INTRAMUSCULAR; INTRAVENOUS; SUBCUTANEOUS at 09:10

## 2019-01-01 RX ADMIN — MIDAZOLAM 4 MG/HR: 5 INJECTION INTRAMUSCULAR; INTRAVENOUS at 11:55

## 2019-01-01 RX ADMIN — ALBUTEROL SULFATE 2.5 MG: 2.5 SOLUTION RESPIRATORY (INHALATION) at 23:38

## 2019-01-01 RX ADMIN — INSULIN DETEMIR 12 UNITS: 100 INJECTION, SOLUTION SUBCUTANEOUS at 21:11

## 2019-01-01 RX ADMIN — DOCUSATE SODIUM 100 MG: 100 CAPSULE, LIQUID FILLED ORAL at 16:34

## 2019-01-01 RX ADMIN — ATORVASTATIN CALCIUM 40 MG: 40 TABLET, FILM COATED ORAL at 21:09

## 2019-01-01 RX ADMIN — SODIUM CHLORIDE 500 ML: 9 INJECTION, SOLUTION INTRAVENOUS at 06:23

## 2019-01-01 RX ADMIN — ALBUTEROL SULFATE 2.5 MG: 2.5 SOLUTION RESPIRATORY (INHALATION) at 17:39

## 2019-01-02 ENCOUNTER — TELEPHONE (OUTPATIENT)
Dept: PULMONOLOGY | Facility: CLINIC | Age: 59
End: 2019-01-02

## 2019-01-02 NOTE — TELEPHONE ENCOUNTER
Patient was called, he is in understanding that he needs to be here at the Outpatient surgery at 10:30 am, patient was also told to stop taking his Palvix 5 days before the procedure which would be on 1/5/19. Patient is in understanding and confirmed that he will be here.     Fax received back from Dr Gonzalez office, patient is cleared to stop taking anticoagulant Plavix 5 days before procedure. The confirmation is scanned into the Media. Patient is scheduled from 1/10/19 @ 12:00 start time, 10:30 arrival, I will call patient to let him know what time to he here and to also stop taking his Plavix on 1/5/19 which would be 5 days prior to his procedure.       Fax sent to Dr Gonzalez office to request stop of anticoagulant for upcoming Bronch.

## 2019-01-05 ENCOUNTER — HOSPITAL ENCOUNTER (OUTPATIENT)
Facility: HOSPITAL | Age: 59
Setting detail: OBSERVATION
Discharge: HOME | End: 2019-01-07
Attending: EMERGENCY MEDICINE | Admitting: INTERNAL MEDICINE

## 2019-01-05 ENCOUNTER — APPOINTMENT (OUTPATIENT)
Dept: CT IMAGING | Facility: HOSPITAL | Age: 59
End: 2019-01-05

## 2019-01-05 ENCOUNTER — APPOINTMENT (OUTPATIENT)
Dept: GENERAL RADIOLOGY | Facility: HOSPITAL | Age: 59
End: 2019-01-05

## 2019-01-05 DIAGNOSIS — Z99.2 ESRD NEEDING DIALYSIS (HCC): ICD-10-CM

## 2019-01-05 DIAGNOSIS — J18.9 PNEUMONIA OF RIGHT LUNG DUE TO INFECTIOUS ORGANISM, UNSPECIFIED PART OF LUNG: Primary | ICD-10-CM

## 2019-01-05 DIAGNOSIS — N18.6 ESRD NEEDING DIALYSIS (HCC): ICD-10-CM

## 2019-01-05 LAB
ALBUMIN SERPL-MCNC: 4.5 G/DL (ref 3.5–5)
ALBUMIN/GLOB SERPL: 1.6 G/DL (ref 1–2)
ALP SERPL-CCNC: 98 U/L (ref 38–126)
ALT SERPL W P-5'-P-CCNC: 22 U/L (ref 13–69)
ANION GAP SERPL CALCULATED.3IONS-SCNC: 19.9 MMOL/L (ref 10–20)
AST SERPL-CCNC: 26 U/L (ref 15–46)
BASOPHILS # BLD AUTO: 0.07 10*3/MM3 (ref 0–0.2)
BASOPHILS NFR BLD AUTO: 1.1 % (ref 0–2.5)
BILIRUB SERPL-MCNC: 0.8 MG/DL (ref 0.2–1.3)
BUN BLD-MCNC: 30 MG/DL (ref 7–20)
BUN/CREAT SERPL: 6 (ref 6.3–21.9)
CALCIUM SPEC-SCNC: 9.3 MG/DL (ref 8.4–10.2)
CHLORIDE SERPL-SCNC: 78 MMOL/L (ref 98–107)
CO2 SERPL-SCNC: 32 MMOL/L (ref 26–30)
CREAT BLD-MCNC: 5 MG/DL (ref 0.6–1.3)
DEPRECATED RDW RBC AUTO: 51.1 FL (ref 37–54)
DIGOXIN SERPL-MCNC: 1.2 NG/ML (ref 0.8–2)
EOSINOPHIL # BLD AUTO: 0.69 10*3/MM3 (ref 0–0.7)
EOSINOPHIL NFR BLD AUTO: 10.7 % (ref 0–7)
ERYTHROCYTE [DISTWIDTH] IN BLOOD BY AUTOMATED COUNT: 15.4 % (ref 11.5–14.5)
GFR SERPL CREATININE-BSD FRML MDRD: 12 ML/MIN/1.73
GFR SERPL CREATININE-BSD FRML MDRD: ABNORMAL ML/MIN/1.73
GLOBULIN UR ELPH-MCNC: 2.9 GM/DL
GLUCOSE BLD-MCNC: 414 MG/DL (ref 74–98)
GLUCOSE BLDC GLUCOMTR-MCNC: 138 MG/DL (ref 70–130)
GLUCOSE BLDC GLUCOMTR-MCNC: 154 MG/DL (ref 70–130)
GLUCOSE BLDC GLUCOMTR-MCNC: 179 MG/DL (ref 70–130)
GLUCOSE BLDC GLUCOMTR-MCNC: 435 MG/DL (ref 70–130)
HCT VFR BLD AUTO: 29.1 % (ref 42–52)
HGB BLD-MCNC: 9.6 G/DL (ref 14–18)
IMM GRANULOCYTES # BLD AUTO: 0.03 10*3/MM3 (ref 0–0.06)
IMM GRANULOCYTES NFR BLD AUTO: 0.5 % (ref 0–0.6)
LIPASE SERPL-CCNC: 240 U/L (ref 23–300)
LYMPHOCYTES # BLD AUTO: 0.65 10*3/MM3 (ref 0.6–3.4)
LYMPHOCYTES NFR BLD AUTO: 10.1 % (ref 10–50)
MCH RBC QN AUTO: 30.3 PG (ref 27–31)
MCHC RBC AUTO-ENTMCNC: 33 G/DL (ref 30–37)
MCV RBC AUTO: 91.8 FL (ref 80–94)
MONOCYTES # BLD AUTO: 0.89 10*3/MM3 (ref 0–0.9)
MONOCYTES NFR BLD AUTO: 13.8 % (ref 0–12)
NEUTROPHILS # BLD AUTO: 4.11 10*3/MM3 (ref 2–6.9)
NEUTROPHILS NFR BLD AUTO: 63.8 % (ref 37–80)
NRBC BLD AUTO-RTO: 0 /100 WBC (ref 0–0)
PHOSPHATE SERPL-MCNC: 5.2 MG/DL (ref 2.5–4.5)
PLATELET # BLD AUTO: 209 10*3/MM3 (ref 130–400)
PMV BLD AUTO: 10.3 FL (ref 6–12)
POTASSIUM BLD-SCNC: 4.9 MMOL/L (ref 3.5–5.1)
PROT SERPL-MCNC: 7.4 G/DL (ref 6.3–8.2)
RBC # BLD AUTO: 3.17 10*6/MM3 (ref 4.7–6.1)
SODIUM BLD-SCNC: 125 MMOL/L (ref 137–145)
TROPONIN I SERPL-MCNC: <0.012 NG/ML (ref 0–0.03)
WBC NRBC COR # BLD: 6.44 10*3/MM3 (ref 4.8–10.8)

## 2019-01-05 PROCEDURE — 25010000002 LORAZEPAM PER 2 MG: Performed by: EMERGENCY MEDICINE

## 2019-01-05 PROCEDURE — 96367 TX/PROPH/DG ADDL SEQ IV INF: CPT

## 2019-01-05 PROCEDURE — 85025 COMPLETE CBC W/AUTO DIFF WBC: CPT | Performed by: EMERGENCY MEDICINE

## 2019-01-05 PROCEDURE — 82962 GLUCOSE BLOOD TEST: CPT

## 2019-01-05 PROCEDURE — 83690 ASSAY OF LIPASE: CPT | Performed by: EMERGENCY MEDICINE

## 2019-01-05 PROCEDURE — 99285 EMERGENCY DEPT VISIT HI MDM: CPT

## 2019-01-05 PROCEDURE — 71046 X-RAY EXAM CHEST 2 VIEWS: CPT

## 2019-01-05 PROCEDURE — 99218 PR INITIAL OBSERVATION CARE/DAY 30 MINUTES: CPT | Performed by: INTERNAL MEDICINE

## 2019-01-05 PROCEDURE — 25010000002 LEVOFLOXACIN PER 250 MG: Performed by: EMERGENCY MEDICINE

## 2019-01-05 PROCEDURE — 36415 COLL VENOUS BLD VENIPUNCTURE: CPT | Performed by: EMERGENCY MEDICINE

## 2019-01-05 PROCEDURE — 96366 THER/PROPH/DIAG IV INF ADDON: CPT

## 2019-01-05 PROCEDURE — 25010000002 PROMETHAZINE PER 50 MG: Performed by: EMERGENCY MEDICINE

## 2019-01-05 PROCEDURE — 25010000002 AZITHROMYCIN: Performed by: INTERNAL MEDICINE

## 2019-01-05 PROCEDURE — G0378 HOSPITAL OBSERVATION PER HR: HCPCS

## 2019-01-05 PROCEDURE — 84100 ASSAY OF PHOSPHORUS: CPT | Performed by: EMERGENCY MEDICINE

## 2019-01-05 PROCEDURE — 80162 ASSAY OF DIGOXIN TOTAL: CPT | Performed by: EMERGENCY MEDICINE

## 2019-01-05 PROCEDURE — 25010000002 CEFTRIAXONE SODIUM-DEXTROSE 1-3.74 GM-%(50ML) RECONSTITUTED SOLUTION: Performed by: INTERNAL MEDICINE

## 2019-01-05 PROCEDURE — 90935 HEMODIALYSIS ONE EVALUATION: CPT

## 2019-01-05 PROCEDURE — 70450 CT HEAD/BRAIN W/O DYE: CPT

## 2019-01-05 PROCEDURE — 94640 AIRWAY INHALATION TREATMENT: CPT

## 2019-01-05 PROCEDURE — G0257 UNSCHED DIALYSIS ESRD PT HOS: HCPCS

## 2019-01-05 PROCEDURE — 93005 ELECTROCARDIOGRAM TRACING: CPT | Performed by: EMERGENCY MEDICINE

## 2019-01-05 PROCEDURE — 25010000002 CEFEPIME PER 500 MG: Performed by: EMERGENCY MEDICINE

## 2019-01-05 PROCEDURE — 25010000002 VANCOMYCIN 5 G RECONSTITUTED SOLUTION 5,000 MG VIAL: Performed by: EMERGENCY MEDICINE

## 2019-01-05 PROCEDURE — 87040 BLOOD CULTURE FOR BACTERIA: CPT | Performed by: EMERGENCY MEDICINE

## 2019-01-05 PROCEDURE — 80053 COMPREHEN METABOLIC PANEL: CPT | Performed by: EMERGENCY MEDICINE

## 2019-01-05 PROCEDURE — 96365 THER/PROPH/DIAG IV INF INIT: CPT

## 2019-01-05 PROCEDURE — 84484 ASSAY OF TROPONIN QUANT: CPT | Performed by: EMERGENCY MEDICINE

## 2019-01-05 PROCEDURE — 74176 CT ABD & PELVIS W/O CONTRAST: CPT

## 2019-01-05 PROCEDURE — 96375 TX/PRO/DX INJ NEW DRUG ADDON: CPT

## 2019-01-05 PROCEDURE — 94799 UNLISTED PULMONARY SVC/PX: CPT

## 2019-01-05 RX ORDER — HYDRALAZINE HYDROCHLORIDE 25 MG/1
50 TABLET, FILM COATED ORAL 3 TIMES DAILY
Status: DISCONTINUED | OUTPATIENT
Start: 2019-01-05 | End: 2019-01-07 | Stop reason: HOSPADM

## 2019-01-05 RX ORDER — LOSARTAN POTASSIUM 50 MG/1
100 TABLET ORAL
Status: DISCONTINUED | OUTPATIENT
Start: 2019-01-05 | End: 2019-01-07 | Stop reason: HOSPADM

## 2019-01-05 RX ORDER — AMLODIPINE BESYLATE 5 MG/1
10 TABLET ORAL DAILY
Status: DISCONTINUED | OUTPATIENT
Start: 2019-01-05 | End: 2019-01-07 | Stop reason: HOSPADM

## 2019-01-05 RX ORDER — ISOSORBIDE MONONITRATE 30 MG/1
30 TABLET, EXTENDED RELEASE ORAL DAILY
Status: DISCONTINUED | OUTPATIENT
Start: 2019-01-05 | End: 2019-01-07 | Stop reason: HOSPADM

## 2019-01-05 RX ORDER — ASPIRIN 81 MG/1
81 TABLET, CHEWABLE ORAL DAILY
Status: DISCONTINUED | OUTPATIENT
Start: 2019-01-05 | End: 2019-01-07 | Stop reason: HOSPADM

## 2019-01-05 RX ORDER — PANTOPRAZOLE SODIUM 40 MG/1
40 TABLET, DELAYED RELEASE ORAL
Status: DISCONTINUED | OUTPATIENT
Start: 2019-01-06 | End: 2019-01-07 | Stop reason: HOSPADM

## 2019-01-05 RX ORDER — LEVOTHYROXINE SODIUM 0.1 MG/1
100 TABLET ORAL
Status: DISCONTINUED | OUTPATIENT
Start: 2019-01-06 | End: 2019-01-07 | Stop reason: HOSPADM

## 2019-01-05 RX ORDER — ACETAMINOPHEN 325 MG/1
325 TABLET ORAL EVERY 4 HOURS PRN
Status: DISCONTINUED | OUTPATIENT
Start: 2019-01-05 | End: 2019-01-07 | Stop reason: HOSPADM

## 2019-01-05 RX ORDER — PROMETHAZINE HYDROCHLORIDE 25 MG/ML
12.5 INJECTION, SOLUTION INTRAMUSCULAR; INTRAVENOUS ONCE
Status: COMPLETED | OUTPATIENT
Start: 2019-01-05 | End: 2019-01-05

## 2019-01-05 RX ORDER — LORAZEPAM 2 MG/ML
0.5 INJECTION INTRAMUSCULAR ONCE
Status: COMPLETED | OUTPATIENT
Start: 2019-01-05 | End: 2019-01-05

## 2019-01-05 RX ORDER — LEVOFLOXACIN 5 MG/ML
750 INJECTION, SOLUTION INTRAVENOUS ONCE
Status: COMPLETED | OUTPATIENT
Start: 2019-01-05 | End: 2019-01-05

## 2019-01-05 RX ORDER — CEFTRIAXONE 1 G/50ML
1 INJECTION, SOLUTION INTRAVENOUS EVERY 24 HOURS
Status: DISCONTINUED | OUTPATIENT
Start: 2019-01-05 | End: 2019-01-07 | Stop reason: HOSPADM

## 2019-01-05 RX ORDER — DOCUSATE SODIUM 100 MG/1
100 CAPSULE, LIQUID FILLED ORAL EVERY 12 HOURS
Status: DISCONTINUED | OUTPATIENT
Start: 2019-01-05 | End: 2019-01-06

## 2019-01-05 RX ORDER — IPRATROPIUM BROMIDE AND ALBUTEROL SULFATE 2.5; .5 MG/3ML; MG/3ML
3 SOLUTION RESPIRATORY (INHALATION)
Status: DISCONTINUED | OUTPATIENT
Start: 2019-01-05 | End: 2019-01-07 | Stop reason: HOSPADM

## 2019-01-05 RX ORDER — DIGOXIN 125 MCG
125 TABLET ORAL SEE ADMIN INSTRUCTIONS
Status: DISCONTINUED | OUTPATIENT
Start: 2019-01-05 | End: 2019-01-07 | Stop reason: HOSPADM

## 2019-01-05 RX ORDER — CLOPIDOGREL BISULFATE 75 MG/1
75 TABLET ORAL DAILY
Status: DISCONTINUED | OUTPATIENT
Start: 2019-01-05 | End: 2019-01-07

## 2019-01-05 RX ORDER — METOPROLOL SUCCINATE 100 MG/1
100 TABLET, EXTENDED RELEASE ORAL DAILY
Status: DISCONTINUED | OUTPATIENT
Start: 2019-01-05 | End: 2019-01-07 | Stop reason: HOSPADM

## 2019-01-05 RX ADMIN — HYDRALAZINE HYDROCHLORIDE 50 MG: 25 TABLET, FILM COATED ORAL at 21:17

## 2019-01-05 RX ADMIN — CEFTRIAXONE 1 G: 1 INJECTION, SOLUTION INTRAVENOUS at 21:16

## 2019-01-05 RX ADMIN — VANCOMYCIN HYDROCHLORIDE 1250 MG: 500 INJECTION, POWDER, LYOPHILIZED, FOR SOLUTION INTRAVENOUS at 12:13

## 2019-01-05 RX ADMIN — CEFEPIME 2 G: 1 INJECTION, POWDER, FOR SOLUTION INTRAMUSCULAR; INTRAVENOUS at 10:00

## 2019-01-05 RX ADMIN — AZITHROMYCIN 500 MG: 500 INJECTION, POWDER, LYOPHILIZED, FOR SOLUTION INTRAVENOUS at 18:49

## 2019-01-05 RX ADMIN — IPRATROPIUM BROMIDE AND ALBUTEROL SULFATE 3 ML: .5; 3 SOLUTION RESPIRATORY (INHALATION) at 21:42

## 2019-01-05 RX ADMIN — LORAZEPAM 0.5 MG: 2 INJECTION INTRAMUSCULAR; INTRAVENOUS at 09:03

## 2019-01-05 RX ADMIN — LEVOFLOXACIN 750 MG: 5 INJECTION, SOLUTION INTRAVENOUS at 10:37

## 2019-01-05 RX ADMIN — PROMETHAZINE HYDROCHLORIDE 12.5 MG: 25 INJECTION INTRAMUSCULAR; INTRAVENOUS at 07:58

## 2019-01-05 RX ADMIN — HYDRALAZINE HYDROCHLORIDE 50 MG: 25 TABLET, FILM COATED ORAL at 18:49

## 2019-01-05 NOTE — ED PROVIDER NOTES
Subjective   58-year-old male presenting with multiple complaints.  He was sent from the dialysis center.  Per report from nursing at the dialysis center patient was too nauseous and having too much vomiting to complete or even start treatment today.  Patient states that he had one episode of dry heaves earlier this morning.  He is more concerned because he's had ongoing abdominal pain, shortness of breath, cough.  He denies any fevers, chills, vomiting, diarrhea.  He notes multiple bouts of colitis as well as an ongoing pneumonia for which he is scheduled a bronchoscopy and 4 days.            Review of Systems   Constitutional: Negative.    HENT: Negative.    Eyes: Negative.    Respiratory: Positive for cough and shortness of breath.    Cardiovascular: Negative.    Gastrointestinal: Positive for abdominal pain and nausea. Negative for vomiting.   Genitourinary: Negative.    Musculoskeletal: Negative.    Skin: Negative.    Neurological: Negative.    Psychiatric/Behavioral: Negative.        Past Medical History:   Diagnosis Date   • Anemia    • CHF (congestive heart failure) (CMS/[facility])    • Chronic kidney disease    • Diabetes mellitus (CMS/[facility])    • H/O chest x-ray 03/25/2016    Interval decrease in size of the patients right pleural effusion with a persistent small left effusion as well   • History of transfusion    • Hypertension    • Left-sided weakness    • Pneumonia    • Renal failure    • Stroke (CMS/[facility])        Allergies   Allergen Reactions   • Erythromycin Hives       Past Surgical History:   Procedure Laterality Date   • ARTERIOVENOUS FISTULA Right    • CARDIAC CATHETERIZATION N/A 3/28/2018    Procedure: Peripheral angiography;  Surgeon: Clay Ruano MD;  Location: [facility] CATH INVASIVE LOCATION;  Service: Cardiovascular   • CARDIAC CATHETERIZATION N/A 3/28/2018    Procedure: Angioplasty-peripheral;  Surgeon: Clay Ruano MD;  Location: [facility] CATH INVASIVE LOCATION;  Service:  Cardiovascular   • CARDIAC CATHETERIZATION N/A 3/28/2018    Procedure: Atherectomy-peripheral;  Surgeon: Clay Ruano MD;  Location: [facility] CATH INVASIVE LOCATION;  Service: Cardiovascular   • CATARACT EXTRACTION, BILATERAL     • CHOLECYSTECTOMY     • COLONOSCOPY     • EYE SURGERY     • INTERVENTIONAL RADIOLOGY PROCEDURE N/A 3/28/2018    Procedure: Abdominal Aortogram;  Surgeon: Clay Ruano MD;  Location: [facility] CATH INVASIVE LOCATION;  Service: Cardiovascular   • PORTACATH PLACEMENT     • VENOUS ACCESS DEVICE (PORT) REMOVAL         Family History   Problem Relation Age of Onset   • COPD Mother    • Diabetes Father    • Hypertension Sister    • Diabetes Sister    • Hypertension Brother    • Diabetes Paternal Grandmother        Social History     Socioeconomic History   • Marital status:      Spouse name: Not on file   • Number of children: Not on file   • Years of education: Not on file   • Highest education level: Not on file   Tobacco Use   • Smoking status: Never Smoker   • Smokeless tobacco: Never Used   Substance and Sexual Activity   • Alcohol use: No   • Drug use: No   • Sexual activity: Defer           Objective   Physical Exam   Constitutional: He is oriented to person, place, and time. No distress.   Chronically ill appearing   HENT:   Head: Normocephalic and atraumatic.   Right Ear: External ear normal.   Left Ear: External ear normal.   Nose: Nose normal.   Mouth/Throat: Oropharynx is clear and moist.   Eyes: Conjunctivae and EOM are normal. Pupils are equal, round, and reactive to light.   Neck: Normal range of motion. Neck supple.   Cardiovascular: Normal rate, regular rhythm, normal heart sounds and intact distal pulses.   Right upper extremity AV fistula   Pulmonary/Chest: Effort normal and breath sounds normal. No respiratory distress.   Abdominal: Soft. Bowel sounds are normal. He exhibits no distension. There is no rebound and no guarding.   Diffuse tenderness worse  in the upper abdomen   Musculoskeletal: Normal range of motion. He exhibits no edema, tenderness or deformity.   Neurological: He is alert and oriented to person, place, and time.   Skin: Skin is warm and dry. No rash noted.   Psychiatric: He has a normal mood and affect. His behavior is normal.   Nursing note and vitals reviewed.      Procedures           ED Course                  MDM  Number of Diagnoses or Management Options  ESRD needing dialysis (CMS/[facility]):   Pneumonia of right lung due to infectious organism, unspecified part of lung:   Diagnosis management comments: 58 year old male with multiple complaints. Chronically ill appearing man in no distress with exam as above. Will check labs, EKG, chest xray and CT of the abdomen. Will treat symptomatically. Disposition pending work up.    DDX: volume overload, colitis, pneumonia, electrolyte abnormality, obstruction    EKG interpreted by me: Sinus rhythm, normal rate, normal axis/intervals, nonspecific ST/T changes, this is an abnormal EKG, this appears similar to previous    8:54 AM Called to patient's room by family as they were concerned he was having another stroke or a seizure. When I entered the room patient was slowly shaking his head side to side. He is alert and responds to verbal stimuli. Do not feel this is a stroke or seizure. Nevertheless I have added CT of the head. Disposition pending.    10:25 AM CT head is negative. Chest/abdomen imaging most notable for worsening right sided pneumonia. Given he is on dialysis will treat as HCAP. Discussed with Dr Pacheco for admission.        Amount and/or Complexity of Data Reviewed  Decide to obtain previous medical records or to obtain history from someone other than the patient: yes          Final diagnoses:   Pneumonia of right lung due to infectious organism, unspecified part of lung   ESRD needing dialysis (CMS/[facility])            Steffen Bedolla MD  01/05/19 1631

## 2019-01-05 NOTE — CONSULTS
[facility]      Nephrology Consultation    Referring Provider:   No ref. provider found    Reason for Consultation:    ESRD and associated problems.     Subjective     Chief complaint   Chief Complaint   Patient presents with   • Nausea       History of present illness:    Patient is 58-year-old white male with multiple medical problems as listed below in the past medical history.  He went to the dialysis Center for his routine dialysis at this morning was not feeling well and starting to have chilling episodes while in the dialysis center, he also was unable to stand up to be weighed, at that time he also requested to be transferred to the hospital as he has not been doing well for the last 2 days.  In the ER he was noted to have right lower sided pneumonia and was finally admitted for treatment and I was consulted for his dialysis.  I have reviewed labs/imaging/records from this hospitalization, including ER staff and admitting/attending physicians H/P's and progress notes to establish a comprehensive understanding of this patient's clinical hospital course, as well as to establish plan of care appropriately.   Past Medical History:   Diagnosis Date   • Anemia    • CHF (congestive heart failure) (CMS/HCC)    • Chronic kidney disease    • Diabetes mellitus (CMS/HCC)    • H/O chest x-ray 03/25/2016    Interval decrease in size of the patients right pleural effusion with a persistent small left effusion as well   • History of transfusion    • Hypertension    • Left-sided weakness    • Pneumonia    • Renal failure    • Stroke (CMS/HCC)        Past Surgical History:   Procedure Laterality Date   • ARTERIOVENOUS FISTULA Right    • CARDIAC CATHETERIZATION N/A 3/28/2018    Procedure: Peripheral angiography;  Surgeon: Clay Ruano MD;  Location: [facility] INVASIVE LOCATION;  Service: Cardiovascular   • CARDIAC CATHETERIZATION N/A 3/28/2018    Procedure: Angioplasty-peripheral;  Surgeon:  Clay Ruano MD;  Location: [facility] CATH INVASIVE LOCATION;  Service: Cardiovascular   • CARDIAC CATHETERIZATION N/A 3/28/2018    Procedure: Atherectomy-peripheral;  Surgeon: Clay Ruano MD;  Location: [facility] CATH INVASIVE LOCATION;  Service: Cardiovascular   • CATARACT EXTRACTION, BILATERAL     • CHOLECYSTECTOMY     • COLONOSCOPY     • EYE SURGERY     • INTERVENTIONAL RADIOLOGY PROCEDURE N/A 3/28/2018    Procedure: Abdominal Aortogram;  Surgeon: Clay Ruano MD;  Location: [facility] CATH INVASIVE LOCATION;  Service: Cardiovascular   • PORTACATH PLACEMENT     • VENOUS ACCESS DEVICE (PORT) REMOVAL         Family History   Problem Relation Age of Onset   • COPD Mother    • Diabetes Father    • Hypertension Sister    • Diabetes Sister    • Hypertension Brother    • Diabetes Paternal Grandmother      negative h/o ESRD     Social History     Tobacco Use   • Smoking status: Never Smoker   • Smokeless tobacco: Never Used   Substance Use Topics   • Alcohol use: No   • Drug use: No     Medications Prior to Admission   Medication Sig Dispense Refill Last Dose   • acetaminophen (TYLENOL) 325 MG tablet Take 1 tablet by mouth Every 4 (Four) Hours As Needed for Mild Pain .   Taking   • albuterol (PROVENTIL HFA;VENTOLIN HFA) 108 (90 Base) MCG/ACT inhaler Inhale 2 puffs Every 4 (Four) Hours As Needed for Shortness of Air. 1 inhaler 0 Taking   • amLODIPine (NORVASC) 10 MG tablet Take 10 mg by mouth Daily.   Taking   • aspirin 81 MG chewable tablet Chew 81 mg Daily.   Taking   • AURYXIA 1  MG(Fe) tablet    Taking   • B Complex-C-Folic Acid (RADHA-SHIRLENE PO) Take  by mouth. Patient states he takes these after dialysis, but is unaware of dose.   Taking   • Cholecalciferol (VITAMIN D3) 5000 UNITS capsule capsule Take  by mouth daily.   Taking   • clopidogrel (PLAVIX) 75 MG tablet Take 1 tablet by mouth Daily. 90 tablet 3 Taking   • digoxin (LANOXIN) 125 MCG tablet Take 125 mcg by mouth See Admin  Instructions. Alternate days after dialysis   Taking   • docusate sodium (COLACE) 100 MG capsule Take 100 mg by mouth Every 12 (Twelve) Hours.   Taking   • doxycycline (VIBRAMYCIN) 100 MG capsule    Taking   • folic acid (FOLVITE) 1 MG tablet Take 1 mg by mouth daily.   Taking   • hydrALAZINE (APRESOLINE) 50 MG tablet Take 50 mg by mouth 3 (Three) Times a Day.   Taking   • insulin detemir (LEVEMIR) 100 UNIT/ML injection Inject 8 Units under the skin Daily. 100 mL 0 Taking   • isosorbide mononitrate (IMDUR) 30 MG 24 hr tablet Take 30 mg by mouth Daily.   Taking   • levothyroxine (SYNTHROID, LEVOTHROID) 100 MCG tablet Take 100 mcg by mouth daily.   Taking   • losartan (COZAAR) 100 MG tablet Take 1 tablet by mouth Daily.   Taking   • metoprolol succinate XL (TOPROL-XL) 100 MG 24 hr tablet Take 1 tablet by mouth Daily. 30 tablet 0 Taking   • omeprazole (priLOSEC) 20 MG capsule Take 20 mg by mouth Daily.   Taking   • ondansetron ODT (ZOFRAN-ODT) 4 MG disintegrating tablet Take 1 tablet by mouth Every 6 (Six) Hours As Needed for Nausea or Vomiting. 20 tablet 0 Taking   • pravastatin (PRAVACHOL) 80 MG tablet    Taking     Allergies:  Erythromycin    Review of Systems  1. Constitutional: Positive for fever.  Positive for chills.  Denies any diaphoresis, significant for fatigue and denies any unexpected weight change.   2. HENT: Negative for congestion and hearing loss.   3. Eyes: Negative for redness and visual disturbance.   4. Respiratory: Positive for shortness of breath and cough. Negative for chest pain  5. Cardiovascular: Negative for chest pain and chest tightness or palpitations.   6. Gastrointestinal: Negative for abdominal pain or distention, and blood in stool. Denies any Nausea, vomiting, diarrhea or constipation.  7. Endocrine: Negative for heat or cold intolerance.   8. Genitourinary: Negative for difficulty urinating, dysuria and frequency.   9. Musculoskeletal: Positive for arthralgias, back pain.  Denies  "any myalgias.   10. Skin: Negative for color change, rash and wound.   11. Neurological: Negative for syncope, weakness and headaches.   12. Hematological: Negative for adenopathy. Does not bruise/bleed easily.   13. Psychiatric/Behavioral: Negative for confusion. The patient is not nervous/anxious.     Objective     Vital Signs  /71 (BP Location: Left arm, Patient Position: Lying)   Pulse 72   Temp 98.2 °F (36.8 °C) (Oral)   Resp 16   Ht 172.7 cm (68\")   Wt 62.2 kg (137 lb 2 oz)   SpO2 97%   BMI 20.85 kg/m²          I/O this shift:  In: 500 [IV Piggyback:500]  Out: -     Intake/Output Summary (Last 24 hours) at 1/5/2019 1515  Last data filed at 1/5/2019 1336  Gross per 24 hour   Intake 500 ml   Output --   Net 500 ml       Physical Exam:     General Appearance:   Alert, cooperative, in no acute distress.  He does appear more anxious than his usual self.     Head:   Normocephalic, without obvious abnormality, atraumatic.     Eyes:      Normal, conjunctivae and sclerae, no icterus, no pallor, corneas clear, PERRLA        Throat:   Oral mucosa dry      Neck:  No adenopathy, supple, trachea midline, no thyromegaly, no carotid bruit, no JVD      Back:   No CVA tenderness on Percussion.     Lungs:    Clear to auscultation and fair air movement noted.  He does have right-sided basal crackles and decreased breath sounds.      Heart::   Regular rhythm and normal rate, normal S1 and S2. does have 2/6 systolic ejection murmur        Abdomen:   Obese. Normal bowel sounds, no masses, no organomegaly, soft non-tender, non-distended, no guarding, no rebound tenderness      Genital urinary:   No urinary bladder palpable      Extremities:  Moves all extremities, no edema, no cyanosis, no redness.     Pulses:  Pulses palpable and equal bilaterally but weak.     Skin:  No bleeding, bruising or rash        Neurologic:  Cranial nerves grossly intact, move all extremities             Lab Results (last 7 days)     Procedure " Component Value Units Date/Time    Digoxin Level [[id number]]  (Normal) Collected:  01/05/19 0922    Specimen:  Blood Updated:  01/05/19 1016     Digoxin 1.20 ng/mL     Blood Culture With ROSELINE - Blood, Arm, Left [[id number]] Collected:  01/05/19 0957    Specimen:  Blood from Arm, Left Updated:  01/05/19 1001    Troponin [[id number]]  (Normal) Collected:  01/05/19 0756    Specimen:  Blood Updated:  01/05/19 0937     Troponin I <0.012 ng/mL     Narrative:       Normal Patient Upper Reference Limit (URL) (99th Percentile)=0.03 ng/mL   Non-AMI Illness Reference Limit=0.03-0.11 ng/mL   AMI Confirmation=0.12 ng/mL and above    Blood Culture With ROSELINE - Blood, Arm, Left [[id number]] Collected:  01/05/19 0922    Specimen:  Blood from Arm, Left Updated:  01/05/19 0930    Comprehensive Metabolic Panel [[id number]]  (Abnormal) Collected:  01/05/19 0756    Specimen:  Blood Updated:  01/05/19 0824     Glucose 414 mg/dL      Comment: Glucose >180, Hemoglobin A1C recommended.        BUN 30 mg/dL      Creatinine 5.00 mg/dL      Sodium 125 mmol/L      Potassium 4.9 mmol/L      Chloride 78 mmol/L      CO2 32.0 mmol/L      Calcium 9.3 mg/dL      Total Protein 7.4 g/dL      Albumin 4.50 g/dL      ALT (SGPT) 22 U/L      AST (SGOT) 26 U/L      Alkaline Phosphatase 98 U/L      Total Bilirubin 0.8 mg/dL      eGFR Non African Amer 12 mL/min/1.73      Comment: <15 Indicative of kidney failure.        eGFR   Amer -- mL/min/1.73      Comment: <15 Indicative of kidney failure.        Globulin 2.9 gm/dL      A/G Ratio 1.6 g/dL      BUN/Creatinine Ratio 6.0     Anion Gap 19.9 mmol/L     Narrative:       GFR Normal >60  Chronic Kidney Disease <60  Kidney Failure <15    Lipase [[id number]]  (Normal) Collected:  01/05/19 0756    Specimen:  Blood Updated:  01/05/19 0816     Lipase 240 U/L     Phosphorus [[id number]]  (Abnormal) Collected:  01/05/19 0756    Specimen:  Blood Updated:  01/05/19 0816     Phosphorus 5.2 mg/dL     CBC & Differential  [[id number]] Collected:  01/05/19 0756    Specimen:  Blood Updated:  01/05/19 0805    Narrative:       The following orders were created for panel order CBC & Differential.  Procedure                               Abnormality         Status                     ---------                               -----------         ------                     CBC Auto Differential[[id number]]        Abnormal            Final result                 Please view results for these tests on the individual orders.    CBC Auto Differential [[id number]]  (Abnormal) Collected:  01/05/19 0756    Specimen:  Blood Updated:  01/05/19 0805     WBC 6.44 10*3/mm3      RBC 3.17 10*6/mm3      Hemoglobin 9.6 g/dL      Hematocrit 29.1 %      MCV 91.8 fL      MCH 30.3 pg      MCHC 33.0 g/dL      RDW 15.4 %      RDW-SD 51.1 fl      MPV 10.3 fL      Platelets 209 10*3/mm3      Neutrophil % 63.8 %      Lymphocyte % 10.1 %      Monocyte % 13.8 %      Eosinophil % 10.7 %      Basophil % 1.1 %      Immature Grans % 0.5 %      Neutrophils, Absolute 4.11 10*3/mm3      Lymphocytes, Absolute 0.65 10*3/mm3      Monocytes, Absolute 0.89 10*3/mm3      Eosinophils, Absolute 0.69 10*3/mm3      Basophils, Absolute 0.07 10*3/mm3      Immature Grans, Absolute 0.03 10*3/mm3      nRBC 0.0 /100 WBC     POC Glucose Once [[id number]]  (Abnormal) Collected:  01/05/19 0729    Specimen:  Blood Updated:  01/05/19 0735     Glucose 435 mg/dL      Comment: Serial Number: [id number]:  [id number]           Imaging Results (last 72 hours)     Procedure Component Value Units Date/Time    CT Head Without Contrast [[id number]] Collected:  01/05/19 0957     Updated:  01/05/19 1003    Narrative:       PROCEDURE: CT HEAD WO CONTRAST-     INDICATION: abnormal movements, h/o stroke     TECHNIQUE: Multiple axial CT images were performed from the foramen  magnum to the vertex without contrast.   Coronal reconstruction images  were obtained from the axial data.     COMPARISON: 9/15/2018.      FINDINGS: There is no mass effect or midline shift.  There is mild  atrophy. The ventricles are symmetric in size and configuration.  There  is no hydrocephalus.  There are no extraaxial fluid collections.  There  is no intraventricular or intraparenchymal hemorrhage.  The posterior  fossa has a normal CT appearance.  The soft tissues are within normal  limits. No acute osseous abnormalities are present.       Impression:       No acute intracranial abnormality. Consider MR if  symptomatology persists.                      This study was performed with techniques to keep radiation doses as low  as reasonably achievable (ALARA). Individualized dose reduction  techniques using automated exposure control or adjustment of mA and/or  kV according to the patient size were employed.      This report was finalized on 1/5/2019 10:01 AM by Lizette Shafer MD.    XR Chest 2 View [[id number]] Collected:  01/05/19 0856     Updated:  01/05/19 0900    Narrative:       PROCEDURE: XR CHEST 2 VW-     INDICATION: sob, cough     COMPARISON:  9/19/2018     FINDINGS: PA and lateral views of the chest were obtained.   Heart size  is stable. There has been interval worsening of right lung opacity. A  right pleural effusion may have increased. There is a small left pleural  effusion. There is no pneumothorax.   Vascular stents are noted in the  region of the right axilla.         Impression:       Worsening right lung opacity and right pleural effusion.  Small left pleural effusion.                   This report was finalized on 1/5/2019 8:58 AM by Lizette Shafer MD.    CT Abdomen Pelvis Without Contrast [[id number]] Collected:  01/05/19 0837     Updated:  01/05/19 0858    Narrative:       PROCEDURE: CT ABDOMEN PELVIS WO CONTRAST-     INDICATION:  abd pain, nausea     TECHNIQUE:  Thin section axial images were obtained from the lung bases  through the pubic symphysis without oral or IV contrast.  Coronal  reconstruction images were obtained  from the axial data.     COMPARISON:  7/27/2018. Chest CT dated 12/28/2018 was also reviewed.     FINDINGS: There is been interval worsening of groundglass and airspace  opacities in the right middle and lower lobes. There is dependent  atelectasis at the left lung base. There are small bilateral pleural  effusions. The left is new. The heart is mildly enlarged.  There are  extensive renovascular calcifications bilaterally. The kidneys are  small. There are no obstructing renal or ureteral stones and there is no  hydronephrosis. The gallbladder is absent.  The unenhanced liver,  spleen, adrenal glands, and pancreas are without acute abnormality.       Evaluation of the GI tract is limited without contrast. There is no  evidence of small bowel obstruction. The appendix is not visualized.  There are no secondary signs to suggest appendicitis. There is  questionable distal colonic wall thickening.  There is wall thickening  of the urinary bladder with surrounding abnormal attenuation. Favor  cystitis. The prostate is borderline in size. Motion artifact limits  evaluation of the pelvis. There are extensive vascular calcifications  and atherosclerotic disease. There are multiple prominent  retroperitoneal lymph nodes, unchanged from prior.       Impression:          1. Worsening groundglass and airspace opacity at the right lung base.  Favor worsening pneumonia.  2. Bilateral pleural effusions, the left of which is new.  3. No renal or ureteral stones and no hydronephrosis.  4. Possible distal colonic bowel wall thickening. Consider colitis.  5. Wall thickening of the urinary bladder with surrounding abnormal  attenuation concerning for cystitis.           This study was performed with techniques to keep radiation doses as low  as reasonably achievable (ALARA). Individualized dose reduction  techniques using automated exposure control or adjustment of mA and/or  kV according to the patient size were employed.      This  report was finalized on 1/5/2019 8:55 AM by Lizette Shafer MD.              azithromycin 500 mg Intravenous Q24H   ceftriaxone 1 g Intravenous Q24H   ipratropium-albuterol 3 mL Nebulization 4x Daily - RT   [START ON 1/6/2019] pantoprazole 40 mg Oral QAM AC          Assessment/Plan     1. End stage renal disease on dialysis (CMS/HCC)  2. Chronic kidney disease-mineral and bone disorder:  3. Pneumonia of right lower lobe due to infectious organism (CMS/[facility])  4. Type 2 diabetes mellitus treated with insulin (CMS/[facility]) uncontrolled.  5. Anemia of chronic kidney disease  6. Chronic right-sided loculated pleural effusion status post pleurodesis  7. Coronary artery disease with medical management  8. [facility] sc[facility]al [facility]  9. Hypertension with end-stage renal disease  10. Hypothyroidism  11. Physical debility    Plan:  · Dialysis schedules is on Tuesday Thursday Saturday he runs 4 hours on a standard bath usually gains 2-3 L in between dialysis.  I'll go ahead and schedule for his dialysis today as he has missed his dialysis.  · Continue rest of the treatment as noted.  · Surveillance labs.  · Details were discussed with the patient as well as family in the room.    · Details were also discussed with the hospitalist service.   · Further recommendations will depend on clinical course of the patient during the current hospitalization.    · I also discussed the details with the nursing staff.  · Rest as ordered.    In closing, I sincerely appreciate opportunity to participate in care of this patient. If I can be of any further assistance with the management of this patient, please don’t hesitate to contact me.    Chance Mackey MD, FASN  01/05/19  3:15 PM    Dictated using Dragon.

## 2019-01-05 NOTE — ED NOTES
Phlebotomy states 2nd blood draw hemolyzed, @BS for redraw at this time     Mahi Hsu, RN  01/05/19 0936

## 2019-01-05 NOTE — ED NOTES
Patient sat up in bed and had about a 10 second tremor episode. Dr. Bedolla called to bedside. See new orders.     Elvira Morton RN  01/05/19 1593

## 2019-01-05 NOTE — H&P
[facility]   HISTORY AND PHYSICAL      Name:  Talha Cutler   Age:  58 y.o.  Sex:  male  :  1960  MRN:  [id number]   Visit Number:  [id number]  Admission Date:  2019  Date Of Service:  19  Primary Care Physician:  Idalia Kay MD     Admitting diagnosis:      Pneumonia of right lung due to infectious organism    Type 2 diabetes mellitus treated with insulin (CMS/[facility])    End stage renal disease on dialysis (CMS/[facility])    Hypertension due to end stage renal disease caused by type 2 diabetes mellitus, on dialysis (CMS/[facility])    Physical debility  Hyponatremia      History Of Presenting Illness:    58-year-old male with past medical history significant for chronic kidney disease on hemodialysis, diabetes, history of CVA, history of recurrent pneumonia who came to the ER was sent from dialysis center due to nausea.  Patient the actually went for his regular dialysis today and the complain of significant nausea and source patient was sent to the ER.  In the ER he stated that the nausea was better but was just not feeling well.  Workup done in the ER showed that he had the worsening of his right-sided pneumonia compared to before.  And on November he was admitted and was treated for pneumonia.  Also an abdominal CT scan was done which showed there worse pneumonia with some effusion.  He has been further admitted the due to the [facility] unit for management for his pneumonia.  Patient denies any chest pain he states that he has been having dry cough and the having some dyspnea.  Though room air saturation was in the 90s and he is denying any complaints of fever.  Also was found to have sodium of 125  Other review of system were mainly significant for just weak weakness and now feeling weak and worse      Review Of Systems:     The following systems were reviewed and negative;  constitution, eyes, ENT, respiratory, cardiovascular, gastrointestinal, genitourinary,  musculoskeletal, neurological and behavioral/psych,  Skin except as above.     Past Medical History:    Past Medical History:   Diagnosis Date   • Anemia    • CHF (congestive heart failure) (CMS/HCC)    • Chronic kidney disease    • Diabetes mellitus (CMS/HCC)    • H/O chest x-ray 03/25/2016    Interval decrease in size of the patients right pleural effusion with a persistent small left effusion as well   • History of transfusion    • Hypertension    • Left-sided weakness    • Pneumonia    • Renal failure    • Stroke (CMS/HCC)        Past Surgical history:    Past Surgical History:   Procedure Laterality Date   • ARTERIOVENOUS FISTULA Right    • CARDIAC CATHETERIZATION N/A 3/28/2018    Procedure: Peripheral angiography;  Surgeon: Clay Ruano MD;  Location: [facility] CATH INVASIVE LOCATION;  Service: Cardiovascular   • CARDIAC CATHETERIZATION N/A 3/28/2018    Procedure: Angioplasty-peripheral;  Surgeon: Clay Ruano MD;  Location: [facility] CATH INVASIVE LOCATION;  Service: Cardiovascular   • CARDIAC CATHETERIZATION N/A 3/28/2018    Procedure: Atherectomy-peripheral;  Surgeon: Clay Ruano MD;  Location: [facility] CATH INVASIVE LOCATION;  Service: Cardiovascular   • CATARACT EXTRACTION, BILATERAL     • CHOLECYSTECTOMY     • COLONOSCOPY     • EYE SURGERY     • INTERVENTIONAL RADIOLOGY PROCEDURE N/A 3/28/2018    Procedure: Abdominal Aortogram;  Surgeon: Clay Ruano MD;  Location: [facility] CATH INVASIVE LOCATION;  Service: Cardiovascular   • PORTACATH PLACEMENT     • VENOUS ACCESS DEVICE (PORT) REMOVAL         Social History:    Social History     Socioeconomic History   • Marital status:      Spouse name: Not on file   • Number of children: Not on file   • Years of education: Not on file   • Highest education level: Not on file   Tobacco Use   • Smoking status: Never Smoker   • Smokeless tobacco: Never Used   Substance and Sexual Activity   • Alcohol use: No   • Drug use: No    • Sexual activity: Defer       Family History:    Family History   Problem Relation Age of Onset   • COPD Mother    • Diabetes Father    • Hypertension Sister    • Diabetes Sister    • Hypertension Brother    • Diabetes Paternal Grandmother          Allergies:      Erythromycin    Home Medications:    Prior to Admission Medications     Prescriptions Last Dose Informant Patient Reported? Taking?    acetaminophen (TYLENOL) 325 MG tablet   No No    Take 1 tablet by mouth Every 4 (Four) Hours As Needed for Mild Pain .    albuterol (PROVENTIL HFA;VENTOLIN HFA) 108 (90 Base) MCG/ACT inhaler   No No    Inhale 2 puffs Every 4 (Four) Hours As Needed for Shortness of Air.    amLODIPine (NORVASC) 10 MG tablet  Pharmacy Yes No    Take 10 mg by mouth Daily.    aspirin 81 MG chewable tablet  Self Yes No    Chew 81 mg Daily.    AURYXIA 1  MG(Fe) tablet   Yes No    B Complex-C-Folic Acid (RADHA-SHIRLENE PO)  Self Yes No    Take  by mouth. Patient states he takes these after dialysis, but is unaware of dose.    Cholecalciferol (VITAMIN D3) 5000 UNITS capsule capsule   Yes No    Take  by mouth daily.    clopidogrel (PLAVIX) 75 MG tablet   No No    Take 1 tablet by mouth Daily.    digoxin (LANOXIN) 125 MCG tablet  Pharmacy Yes No    Take 125 mcg by mouth See Admin Instructions. Alternate days after dialysis    docusate sodium (COLACE) 100 MG capsule   Yes No    Take 100 mg by mouth Every 12 (Twelve) Hours.    doxycycline (VIBRAMYCIN) 100 MG capsule   Yes No    folic acid (FOLVITE) 1 MG tablet   Yes No    Take 1 mg by mouth daily.    hydrALAZINE (APRESOLINE) 50 MG tablet   Yes No    Take 50 mg by mouth 3 (Three) Times a Day.    insulin detemir (LEVEMIR) 100 UNIT/ML injection   No No    Inject 8 Units under the skin Daily.    isosorbide mononitrate (IMDUR) 30 MG 24 hr tablet   Yes No    Take 30 mg by mouth Daily.    levothyroxine (SYNTHROID, LEVOTHROID) 100 MCG tablet  Pharmacy Yes No    Take 100 mcg by mouth daily.    losartan  (COZAAR) 100 MG tablet  Pharmacy No No    Take 1 tablet by mouth Daily.    metoprolol succinate XL (TOPROL-XL) 100 MG 24 hr tablet   No No    Take 1 tablet by mouth Daily.    omeprazole (priLOSEC) 20 MG capsule  Pharmacy Yes No    Take 20 mg by mouth Daily.    ondansetron ODT (ZOFRAN-ODT) 4 MG disintegrating tablet  Pharmacy No No    Take 1 tablet by mouth Every 6 (Six) Hours As Needed for Nausea or Vomiting.    pravastatin (PRAVACHOL) 80 MG tablet   Yes No                 Vital Signs:    Temp:  [98.1 °F (36.7 °C)] 98.1 °F (36.7 °C)  Heart Rate:  [69-82] 69  Resp:  [15-16] 15  BP: (128-160)/(59-84) 150/73        01/05/19  0730   Weight: 61.8 kg (136 lb 3.9 oz)       Body mass index is 20.72 kg/m².    Physical Exam:      General Appearance:    Alert and cooperative,  And lying comfortably in bed   Head:    Atraumatic and normocephalic, without obvious abnormality.   Eyes:            PERRLA, conjunctivae and sclerae normal, no Icterus. No pallor. Extra-occular movements are within normal limits.   Ears:    Ears appear intact with no abnormalities noted.   Throat:   No oral lesions, no thrush, oral mucosa moist.   Neck:   Supple, trachea midline, no thyromegaly, no carotid bruit, no lymphadenopathy   Lungs:     Chest shape is normal. Breath sounds heard bilaterally equally.  Right lower lobe were mild crepitations and decreased air entry      Heart:    Normal S1 and S2, no murmur, no gallop, no rub. No JVD   Abdomen:     Normal bowel sounds, no masses, no organomegaly. Soft        non-tender, non-distended, no guarding, no rebound                tenderness   Extremities:   Moves all extremities well, no edema, no cyanosis, no             clubbing   Skin:   No  bruising or rash   Neurologic:   Cranial nerves 2 - 12 grossly intact, sensation intact, Motor power is normal and equal bilaterally.       EKG:      No acute ischemic findings    Labs:    Lab Results (last 24 hours)     Procedure Component Value Units Date/Time     Digoxin Level [[id number]]  (Normal) Collected:  01/05/19 0922    Specimen:  Blood Updated:  01/05/19 1016     Digoxin 1.20 ng/mL     Blood Culture With ROSELINE - Blood, Arm, Left [[id number]] Collected:  01/05/19 0957    Specimen:  Blood from Arm, Left Updated:  01/05/19 1001    Troponin [[id number]]  (Normal) Collected:  01/05/19 0756    Specimen:  Blood Updated:  01/05/19 0937     Troponin I <0.012 ng/mL     Narrative:       Normal Patient Upper Reference Limit (URL) (99th Percentile)=0.03 ng/mL   Non-AMI Illness Reference Limit=0.03-0.11 ng/mL   AMI Confirmation=0.12 ng/mL and above    Blood Culture With ROSELINE - Blood, Arm, Left [[id number]] Collected:  01/05/19 0922    Specimen:  Blood from Arm, Left Updated:  01/05/19 0930    Comprehensive Metabolic Panel [[id number]]  (Abnormal) Collected:  01/05/19 0756    Specimen:  Blood Updated:  01/05/19 0824     Glucose 414 mg/dL      Comment: Glucose >180, Hemoglobin A1C recommended.        BUN 30 mg/dL      Creatinine 5.00 mg/dL      Sodium 125 mmol/L      Potassium 4.9 mmol/L      Chloride 78 mmol/L      CO2 32.0 mmol/L      Calcium 9.3 mg/dL      Total Protein 7.4 g/dL      Albumin 4.50 g/dL      ALT (SGPT) 22 U/L      AST (SGOT) 26 U/L      Alkaline Phosphatase 98 U/L      Total Bilirubin 0.8 mg/dL      eGFR Non African Amer 12 mL/min/1.73      Comment: <15 Indicative of kidney failure.        eGFR   Amer -- mL/min/1.73      Comment: <15 Indicative of kidney failure.        Globulin 2.9 gm/dL      A/G Ratio 1.6 g/dL      BUN/Creatinine Ratio 6.0     Anion Gap 19.9 mmol/L     Narrative:       GFR Normal >60  Chronic Kidney Disease <60  Kidney Failure <15    Lipase [[id number]]  (Normal) Collected:  01/05/19 0756    Specimen:  Blood Updated:  01/05/19 0816     Lipase 240 U/L     Phosphorus [[id number]]  (Abnormal) Collected:  01/05/19 0756    Specimen:  Blood Updated:  01/05/19 0816     Phosphorus 5.2 mg/dL     CBC & Differential [[id number]] Collected:  01/05/19 0756     Specimen:  Blood Updated:  01/05/19 0805    Narrative:       The following orders were created for panel order CBC & Differential.  Procedure                               Abnormality         Status                     ---------                               -----------         ------                     CBC Auto Differential[[id number]]        Abnormal            Final result                 Please view results for these tests on the individual orders.    CBC Auto Differential [[id number]]  (Abnormal) Collected:  01/05/19 0756    Specimen:  Blood Updated:  01/05/19 0805     WBC 6.44 10*3/mm3      RBC 3.17 10*6/mm3      Hemoglobin 9.6 g/dL      Hematocrit 29.1 %      MCV 91.8 fL      MCH 30.3 pg      MCHC 33.0 g/dL      RDW 15.4 %      RDW-SD 51.1 fl      MPV 10.3 fL      Platelets 209 10*3/mm3      Neutrophil % 63.8 %      Lymphocyte % 10.1 %      Monocyte % 13.8 %      Eosinophil % 10.7 %      Basophil % 1.1 %      Immature Grans % 0.5 %      Neutrophils, Absolute 4.11 10*3/mm3      Lymphocytes, Absolute 0.65 10*3/mm3      Monocytes, Absolute 0.89 10*3/mm3      Eosinophils, Absolute 0.69 10*3/mm3      Basophils, Absolute 0.07 10*3/mm3      Immature Grans, Absolute 0.03 10*3/mm3      nRBC 0.0 /100 WBC     POC Glucose Once [[id number]]  (Abnormal) Collected:  01/05/19 0729    Specimen:  Blood Updated:  01/05/19 0735     Glucose 435 mg/dL      Comment: Serial Number: [id number]:  [id number]             Radiology:    Imaging Results (last 72 hours)     Procedure Component Value Units Date/Time    CT Head Without Contrast [[id number]] Collected:  01/05/19 0957     Updated:  01/05/19 1003    Narrative:       PROCEDURE: CT HEAD WO CONTRAST-     INDICATION: abnormal movements, h/o stroke     TECHNIQUE: Multiple axial CT images were performed from the foramen  magnum to the vertex without contrast.   Coronal reconstruction images  were obtained from the axial data.     COMPARISON: 9/15/2018.     FINDINGS: There is no mass  effect or midline shift.  There is mild  atrophy. The ventricles are symmetric in size and configuration.  There  is no hydrocephalus.  There are no extraaxial fluid collections.  There  is no intraventricular or intraparenchymal hemorrhage.  The posterior  fossa has a normal CT appearance.  The soft tissues are within normal  limits. No acute osseous abnormalities are present.       Impression:       No acute intracranial abnormality. Consider MR if  symptomatology persists.                      This study was performed with techniques to keep radiation doses as low  as reasonably achievable (ALARA). Individualized dose reduction  techniques using automated exposure control or adjustment of mA and/or  kV according to the patient size were employed.      This report was finalized on 1/5/2019 10:01 AM by Lizette Shafer MD.    XR Chest 2 View [[id number]] Collected:  01/05/19 0856     Updated:  01/05/19 0900    Narrative:       PROCEDURE: XR CHEST 2 VW-     INDICATION: sob, cough     COMPARISON:  9/19/2018     FINDINGS: PA and lateral views of the chest were obtained.   Heart size  is stable. There has been interval worsening of right lung opacity. A  right pleural effusion may have increased. There is a small left pleural  effusion. There is no pneumothorax.   Vascular stents are noted in the  region of the right axilla.         Impression:       Worsening right lung opacity and right pleural effusion.  Small left pleural effusion.                   This report was finalized on 1/5/2019 8:58 AM by Lizette Shafer MD.    CT Abdomen Pelvis Without Contrast [[id number]] Collected:  01/05/19 0837     Updated:  01/05/19 0858    Narrative:       PROCEDURE: CT ABDOMEN PELVIS WO CONTRAST-     INDICATION:  abd pain, nausea     TECHNIQUE:  Thin section axial images were obtained from the lung bases  through the pubic symphysis without oral or IV contrast.  Coronal  reconstruction images were obtained from the axial data.      COMPARISON:  7/27/2018. Chest CT dated 12/28/2018 was also reviewed.     FINDINGS: There is been interval worsening of groundglass and airspace  opacities in the right middle and lower lobes. There is dependent  atelectasis at the left lung base. There are small bilateral pleural  effusions. The left is new. The heart is mildly enlarged.  There are  extensive renovascular calcifications bilaterally. The kidneys are  small. There are no obstructing renal or ureteral stones and there is no  hydronephrosis. The gallbladder is absent.  The unenhanced liver,  spleen, adrenal glands, and pancreas are without acute abnormality.       Evaluation of the GI tract is limited without contrast. There is no  evidence of small bowel obstruction. The appendix is not visualized.  There are no secondary signs to suggest appendicitis. There is  questionable distal colonic wall thickening.  There is wall thickening  of the urinary bladder with surrounding abnormal attenuation. Favor  cystitis. The prostate is borderline in size. Motion artifact limits  evaluation of the pelvis. There are extensive vascular calcifications  and atherosclerotic disease. There are multiple prominent  retroperitoneal lymph nodes, unchanged from prior.       Impression:          1. Worsening groundglass and airspace opacity at the right lung base.  Favor worsening pneumonia.  2. Bilateral pleural effusions, the left of which is new.  3. No renal or ureteral stones and no hydronephrosis.  4. Possible distal colonic bowel wall thickening. Consider colitis.  5. Wall thickening of the urinary bladder with surrounding abnormal  attenuation concerning for cystitis.           This study was performed with techniques to keep radiation doses as low  as reasonably achievable (ALARA). Individualized dose reduction  techniques using automated exposure control or adjustment of mA and/or  kV according to the patient size were employed.      This report was finalized on  1/5/2019 8:55 AM by Lizette Shafer MD.          Assessment:    Assessment/Plan       Pneumonia of right lung due to infectious organism    Type 2 diabetes mellitus treated with insulin (CMS/[facility])    End stage renal disease on dialysis (CMS/[facility])    Hypertension due to end stage renal disease caused by type 2 diabetes mellitus, on dialysis (CMS/[facility])    Physical debility  Hyponatremia      Plan:     58-year-old with history of end-stage renal disease on hemodialysis, diabetes, hypertension, comes in because of right-sided pneumonia with hyponatremia.  He will be admitted started on IV Rocephin and Zithromax along with bronchodilators.  Nephrology consult to be done now to continue his hemodialysis and patient the will be admitted for observation and if stable and listed may be able to discharge in 48 hours.    Leo Pacheco MD  01/05/19  2:30 PM    Please note that portions of this note may have been completed with a voice recognition program. Efforts were made to edit the dictations, but occasionally words are mistranscribed.

## 2019-01-05 NOTE — ED NOTES
Requested tele bed from [facility], @ 1023. No available beds @ this time. Will call back when bed is available.      Aniyah Leavitt  01/05/19 1020

## 2019-01-05 NOTE — PLAN OF CARE
Problem: Kidney Disease, Chronic/End Stage Renal Disease (Adult)  Goal: Signs and Symptoms of Listed Potential Problems Will be Absent, Minimized or Managed (Kidney Disease, Chronic/End Stage Renal Disease)  Outcome: Ongoing (interventions implemented as appropriate)      Problem: Fall Risk (Adult)  Goal: Identify Related Risk Factors and Signs and Symptoms  Outcome: Ongoing (interventions implemented as appropriate)      Problem: Patient Care Overview  Goal: Plan of Care Review  Outcome: Ongoing (interventions implemented as appropriate)    Goal: Discharge Needs Assessment  Outcome: Ongoing (interventions implemented as appropriate)    Goal: Interprofessional Rounds/Family Conf  Outcome: Ongoing (interventions implemented as appropriate)      Problem: Skin Injury Risk (Adult)  Goal: Identify Related Risk Factors and Signs and Symptoms  Outcome: Ongoing (interventions implemented as appropriate)    Goal: Skin Health and Integrity  Outcome: Ongoing (interventions implemented as appropriate)

## 2019-01-06 LAB
ALBUMIN SERPL-MCNC: 4.1 G/DL (ref 3.5–5)
ALBUMIN/GLOB SERPL: 1.4 G/DL (ref 1–2)
ALP SERPL-CCNC: 86 U/L (ref 38–126)
ALT SERPL W P-5'-P-CCNC: 26 U/L (ref 13–69)
ANION GAP SERPL CALCULATED.3IONS-SCNC: 14.1 MMOL/L (ref 10–20)
AST SERPL-CCNC: 27 U/L (ref 15–46)
BASOPHILS # BLD AUTO: 0.07 10*3/MM3 (ref 0–0.2)
BASOPHILS NFR BLD AUTO: 1.1 % (ref 0–2.5)
BILIRUB SERPL-MCNC: 0.6 MG/DL (ref 0.2–1.3)
BUN BLD-MCNC: 13 MG/DL (ref 7–20)
BUN/CREAT SERPL: 3.7 (ref 6.3–21.9)
C DIFF GDH STL QL: NEGATIVE
CALCIUM SPEC-SCNC: 9.4 MG/DL (ref 8.4–10.2)
CHLORIDE SERPL-SCNC: 99 MMOL/L (ref 98–107)
CO2 SERPL-SCNC: 26 MMOL/L (ref 26–30)
CREAT BLD-MCNC: 3.5 MG/DL (ref 0.6–1.3)
DEPRECATED RDW RBC AUTO: 51.8 FL (ref 37–54)
EOSINOPHIL # BLD AUTO: 0.62 10*3/MM3 (ref 0–0.7)
EOSINOPHIL NFR BLD AUTO: 9.4 % (ref 0–7)
ERYTHROCYTE [DISTWIDTH] IN BLOOD BY AUTOMATED COUNT: 16 % (ref 11.5–14.5)
GFR SERPL CREATININE-BSD FRML MDRD: 18 ML/MIN/1.73
GLOBULIN UR ELPH-MCNC: 3 GM/DL
GLUCOSE BLD-MCNC: 152 MG/DL (ref 74–98)
GLUCOSE BLDC GLUCOMTR-MCNC: 107 MG/DL (ref 70–130)
GLUCOSE BLDC GLUCOMTR-MCNC: 136 MG/DL (ref 70–130)
GLUCOSE BLDC GLUCOMTR-MCNC: 183 MG/DL (ref 70–130)
HCT VFR BLD AUTO: 28.1 % (ref 42–52)
HGB BLD-MCNC: 9.2 G/DL (ref 14–18)
IMM GRANULOCYTES # BLD AUTO: 0.03 10*3/MM3 (ref 0–0.06)
IMM GRANULOCYTES NFR BLD AUTO: 0.5 % (ref 0–0.6)
LYMPHOCYTES # BLD AUTO: 0.41 10*3/MM3 (ref 0.6–3.4)
LYMPHOCYTES NFR BLD AUTO: 6.2 % (ref 10–50)
MCH RBC QN AUTO: 30.1 PG (ref 27–31)
MCHC RBC AUTO-ENTMCNC: 32.7 G/DL (ref 30–37)
MCV RBC AUTO: 91.8 FL (ref 80–94)
MONOCYTES # BLD AUTO: 0.78 10*3/MM3 (ref 0–0.9)
MONOCYTES NFR BLD AUTO: 11.9 % (ref 0–12)
NEUTROPHILS # BLD AUTO: 4.66 10*3/MM3 (ref 2–6.9)
NEUTROPHILS NFR BLD AUTO: 70.9 % (ref 37–80)
NRBC BLD AUTO-RTO: 0 /100 WBC (ref 0–0)
PLATELET # BLD AUTO: 214 10*3/MM3 (ref 130–400)
PMV BLD AUTO: 9.9 FL (ref 6–12)
POTASSIUM BLD-SCNC: 4.1 MMOL/L (ref 3.5–5.1)
PROT SERPL-MCNC: 7.1 G/DL (ref 6.3–8.2)
RBC # BLD AUTO: 3.06 10*6/MM3 (ref 4.7–6.1)
SODIUM BLD-SCNC: 135 MMOL/L (ref 137–145)
WBC NRBC COR # BLD: 6.57 10*3/MM3 (ref 4.8–10.8)

## 2019-01-06 PROCEDURE — 87449 NOS EACH ORGANISM AG IA: CPT | Performed by: INTERNAL MEDICINE

## 2019-01-06 PROCEDURE — 82962 GLUCOSE BLOOD TEST: CPT

## 2019-01-06 PROCEDURE — G0378 HOSPITAL OBSERVATION PER HR: HCPCS

## 2019-01-06 PROCEDURE — 63710000001 INSULIN DETEMIR PER 5 UNITS: Performed by: INTERNAL MEDICINE

## 2019-01-06 PROCEDURE — 80053 COMPREHEN METABOLIC PANEL: CPT | Performed by: INTERNAL MEDICINE

## 2019-01-06 PROCEDURE — 99214 OFFICE O/P EST MOD 30 MIN: CPT | Performed by: INTERNAL MEDICINE

## 2019-01-06 PROCEDURE — 90935 HEMODIALYSIS ONE EVALUATION: CPT

## 2019-01-06 PROCEDURE — 25010000002 AZITHROMYCIN: Performed by: INTERNAL MEDICINE

## 2019-01-06 PROCEDURE — 96367 TX/PROPH/DG ADDL SEQ IV INF: CPT

## 2019-01-06 PROCEDURE — 99225 PR SBSQ OBSERVATION CARE/DAY 25 MINUTES: CPT | Performed by: INTERNAL MEDICINE

## 2019-01-06 PROCEDURE — 85025 COMPLETE CBC W/AUTO DIFF WBC: CPT | Performed by: INTERNAL MEDICINE

## 2019-01-06 PROCEDURE — 94799 UNLISTED PULMONARY SVC/PX: CPT

## 2019-01-06 PROCEDURE — 87324 CLOSTRIDIUM AG IA: CPT | Performed by: INTERNAL MEDICINE

## 2019-01-06 PROCEDURE — G0257 UNSCHED DIALYSIS ESRD PT HOS: HCPCS

## 2019-01-06 PROCEDURE — 25010000002 CEFTRIAXONE SODIUM-DEXTROSE 1-3.74 GM-%(50ML) RECONSTITUTED SOLUTION: Performed by: INTERNAL MEDICINE

## 2019-01-06 RX ORDER — DOCUSATE SODIUM 100 MG/1
100 CAPSULE, LIQUID FILLED ORAL EVERY 12 HOURS
Status: DISCONTINUED | OUTPATIENT
Start: 2019-01-06 | End: 2019-01-07 | Stop reason: HOSPADM

## 2019-01-06 RX ADMIN — HYDRALAZINE HYDROCHLORIDE 50 MG: 25 TABLET, FILM COATED ORAL at 20:22

## 2019-01-06 RX ADMIN — AMLODIPINE BESYLATE 10 MG: 5 TABLET ORAL at 08:48

## 2019-01-06 RX ADMIN — METOPROLOL SUCCINATE 100 MG: 100 TABLET, EXTENDED RELEASE ORAL at 08:48

## 2019-01-06 RX ADMIN — ISOSORBIDE MONONITRATE 30 MG: 30 TABLET, EXTENDED RELEASE ORAL at 08:48

## 2019-01-06 RX ADMIN — IPRATROPIUM BROMIDE AND ALBUTEROL SULFATE 3 ML: .5; 3 SOLUTION RESPIRATORY (INHALATION) at 19:51

## 2019-01-06 RX ADMIN — LEVOTHYROXINE SODIUM 100 MCG: 100 TABLET ORAL at 06:14

## 2019-01-06 RX ADMIN — HYDRALAZINE HYDROCHLORIDE 50 MG: 25 TABLET, FILM COATED ORAL at 08:48

## 2019-01-06 RX ADMIN — AZITHROMYCIN 500 MG: 500 INJECTION, POWDER, LYOPHILIZED, FOR SOLUTION INTRAVENOUS at 13:43

## 2019-01-06 RX ADMIN — PANTOPRAZOLE SODIUM 40 MG: 40 TABLET, DELAYED RELEASE ORAL at 06:45

## 2019-01-06 RX ADMIN — IPRATROPIUM BROMIDE AND ALBUTEROL SULFATE 3 ML: .5; 3 SOLUTION RESPIRATORY (INHALATION) at 07:29

## 2019-01-06 RX ADMIN — HYDRALAZINE HYDROCHLORIDE 50 MG: 25 TABLET, FILM COATED ORAL at 17:53

## 2019-01-06 RX ADMIN — CEFTRIAXONE 1 G: 1 INJECTION, SOLUTION INTRAVENOUS at 17:53

## 2019-01-06 RX ADMIN — INSULIN DETEMIR 8 UNITS: 100 INJECTION, SOLUTION SUBCUTANEOUS at 09:00

## 2019-01-06 RX ADMIN — ASPIRIN 81 MG 81 MG: 81 TABLET ORAL at 08:48

## 2019-01-06 RX ADMIN — DOCUSATE SODIUM 100 MG: 100 CAPSULE, LIQUID FILLED ORAL at 06:14

## 2019-01-06 RX ADMIN — LOSARTAN POTASSIUM 100 MG: 50 TABLET, FILM COATED ORAL at 08:48

## 2019-01-06 NOTE — PLAN OF CARE
Problem: Kidney Disease, Chronic/End Stage Renal Disease (Adult)  Goal: Signs and Symptoms of Listed Potential Problems Will be Absent, Minimized or Managed (Kidney Disease, Chronic/End Stage Renal Disease)  Outcome: Ongoing (interventions implemented as appropriate)      Problem: Fall Risk (Adult)  Goal: Identify Related Risk Factors and Signs and Symptoms  Outcome: Ongoing (interventions implemented as appropriate)    Goal: Absence of Fall  Outcome: Ongoing (interventions implemented as appropriate)      Problem: Patient Care Overview  Goal: Plan of Care Review  Outcome: Ongoing (interventions implemented as appropriate)   01/06/19 1209   Coping/Psychosocial   Plan of Care Reviewed With patient   Plan of Care Review   Progress improving       Problem: Skin Injury Risk (Adult)  Goal: Identify Related Risk Factors and Signs and Symptoms  Outcome: Ongoing (interventions implemented as appropriate)    Goal: Skin Health and Integrity  Outcome: Ongoing (interventions implemented as appropriate)

## 2019-01-06 NOTE — CONSULTS
Date of consultation:   January 6, 2019    Requested by:   Hospitalist Service.     PCP: Idalia Kay MD    Reason:  ?  Pneumonia    History of Present Illness:  58 y.o. male  with past medical history significant for ESRD, chronic right-sided pleural effusion (status post Pleurx catheter induced pleurodesis) who started having nausea after dialysis and was sent in for further evaluation.    The patient actually denies any increased shortness of breath, fever or chills.  He also denied any new cough although does continue to have occasional cough productive of clear sputum.      He was evaluated in the emergency room and CT of the abdomen was performed which actually suggested worsening pneumonia along with diffusion.  The patient was therefore admitted to the hospital and pulmonary Consultation was requested for further recommendations.     Review of System:  All other review of systems negative except indicated in HPI.  Also positive for weakness, back pain, occasional orthopnea, nausea and diarrhea.    Past Medical History:  Past Medical History:   Diagnosis Date   • Anemia    • CHF (congestive heart failure) (CMS/HCC)    • Chronic kidney disease    • Diabetes mellitus (CMS/HCC)    • H/O chest x-ray 03/25/2016    Interval decrease in size of the patients right pleural effusion with a persistent small left effusion as well   • History of transfusion    • Hypertension    • Left-sided weakness    • Pneumonia    • Renal failure    • Stroke (CMS/HCC)          Past Surgical History:  Past Surgical History:   Procedure Laterality Date   • ARTERIOVENOUS FISTULA Right    • CARDIAC CATHETERIZATION N/A 3/28/2018    Procedure: Peripheral angiography;  Surgeon: Clay Ruano MD;  Location: [facility] CATH INVASIVE LOCATION;  Service: Cardiovascular   • CARDIAC CATHETERIZATION N/A 3/28/2018    Procedure: Angioplasty-peripheral;  Surgeon: Clay Ruano MD;  Location: [facility] CATH INVASIVE LOCATION;   "Service: Cardiovascular   • CARDIAC CATHETERIZATION N/A 3/28/2018    Procedure: Atherectomy-peripheral;  Surgeon: Clay Ruano MD;  Location: [facility] CATH INVASIVE LOCATION;  Service: Cardiovascular   • CATARACT EXTRACTION, BILATERAL     • CHOLECYSTECTOMY     • COLONOSCOPY     • EYE SURGERY     • INTERVENTIONAL RADIOLOGY PROCEDURE N/A 3/28/2018    Procedure: Abdominal Aortogram;  Surgeon: Clay Ruano MD;  Location: [facility] CATH INVASIVE LOCATION;  Service: Cardiovascular   • PORTACATH PLACEMENT     • VENOUS ACCESS DEVICE (PORT) REMOVAL           Family History:  Family History   Problem Relation Age of Onset   • COPD Mother    • Diabetes Father    • Hypertension Sister    • Diabetes Sister    • Hypertension Brother    • Diabetes Paternal Grandmother          Social History:  Social History     Socioeconomic History   • Marital status:      Spouse name: Not on file   • Number of children: Not on file   • Years of education: Not on file   • Highest education level: Not on file   Tobacco Use   • Smoking status: Never Smoker   • Smokeless tobacco: Never Used   Substance and Sexual Activity   • Alcohol use: No   • Drug use: No   • Sexual activity: Defer         Physical Exam:  /61 (BP Location: Left arm, Patient Position: Lying)   Pulse 80   Temp 98.9 °F (37.2 °C) (Oral)   Resp 18   Ht 172.7 cm (68\")   Wt 60.8 kg (134 lb)   SpO2 100%   BMI 20.37 kg/m²     General:              No respiratory distress noted.        Eyes:                EOM sluggish.               Conjunctiva appeared somewhat pale. Pupils seemed equal        ENT:                 Oropharynx was moist. No lesions noted.               Missing teeth noted         Neck:                 No JVD.                 Thyroid did not seem to be enlarged.        Cardiovascular:                S1 + S2.  Regular.        Respiratory:                 Respiratory effort was adequate.                 Good Air Entry Bilaterally with " right basal crackles heard.        Abdomen:                Soft. Bowel sounds positive.  No obvious organomegaly noted. Mild epigastric tenderness noted.      Musculoskeletal/Extremities:                No edema noted in the lower extremities.                Right AV graft noted                No clubbing noted.                Gait could not be assesed at this time.        Neurologic/Psychiatric:                AAOx3.                  Affect was appropriate                Was able to follow simple commands        Skin:               Warm and dry.               Chronic skin changes in lower extremities        Labs:   Reviewed. Pertinent labs were noted.     Lab Results   Component Value Date    WBC 6.57 01/06/2019    HGB 9.2 (L) 01/06/2019    HCT 28.1 (L) 01/06/2019    MCV 91.8 01/06/2019     01/06/2019         Lab Results   Component Value Date    GLUCOSE 152 (H) 01/06/2019    CALCIUM 9.4 01/06/2019     (L) 01/06/2019    K 4.1 01/06/2019    CO2 26.0 01/06/2019    CL 99 01/06/2019    BUN 13 01/06/2019    CREATININE 3.50 (H) 01/06/2019    EGFRIFAFRI  01/05/2019      Comment:      <15 Indicative of kidney failure.    EGFRIFNONA 18 (L) 01/06/2019    BCR 3.7 (L) 01/06/2019    ANIONGAP 14.1 01/06/2019       Lab Results   Component Value Date    INR 1.04 07/07/2018         Imaging Study: Images reviewed personally and discussed with patient.     Imaging Results (last 72 hours)     Procedure Component Value Units Date/Time    CT Head Without Contrast [[id number]] Collected:  01/05/19 0957     Updated:  01/05/19 1003    Narrative:       PROCEDURE: CT HEAD WO CONTRAST-     INDICATION: abnormal movements, h/o stroke     TECHNIQUE: Multiple axial CT images were performed from the foramen  magnum to the vertex without contrast.   Coronal reconstruction images  were obtained from the axial data.     COMPARISON: 9/15/2018.     FINDINGS: There is no mass effect or midline shift.  There is mild  atrophy. The ventricles  are symmetric in size and configuration.  There  is no hydrocephalus.  There are no extraaxial fluid collections.  There  is no intraventricular or intraparenchymal hemorrhage.  The posterior  fossa has a normal CT appearance.  The soft tissues are within normal  limits. No acute osseous abnormalities are present.       Impression:       No acute intracranial abnormality. Consider MR if  symptomatology persists.                      This study was performed with techniques to keep radiation doses as low  as reasonably achievable (ALARA). Individualized dose reduction  techniques using automated exposure control or adjustment of mA and/or  kV according to the patient size were employed.      This report was finalized on 1/5/2019 10:01 AM by Lizette Shafer MD.    XR Chest 2 View [[id number]] Collected:  01/05/19 0856     Updated:  01/05/19 0900    Narrative:       PROCEDURE: XR CHEST 2 VW-     INDICATION: sob, cough     COMPARISON:  9/19/2018     FINDINGS: PA and lateral views of the chest were obtained.   Heart size  is stable. There has been interval worsening of right lung opacity. A  right pleural effusion may have increased. There is a small left pleural  effusion. There is no pneumothorax.   Vascular stents are noted in the  region of the right axilla.         Impression:       Worsening right lung opacity and right pleural effusion.  Small left pleural effusion.                   This report was finalized on 1/5/2019 8:58 AM by Lizette Shafer MD.    CT Abdomen Pelvis Without Contrast [[id number]] Collected:  01/05/19 0837     Updated:  01/05/19 0858    Narrative:       PROCEDURE: CT ABDOMEN PELVIS WO CONTRAST-     INDICATION:  abd pain, nausea     TECHNIQUE:  Thin section axial images were obtained from the lung bases  through the pubic symphysis without oral or IV contrast.  Coronal  reconstruction images were obtained from the axial data.     COMPARISON:  7/27/2018. Chest CT dated 12/28/2018 was also  reviewed.     FINDINGS: There is been interval worsening of groundglass and airspace  opacities in the right middle and lower lobes. There is dependent  atelectasis at the left lung base. There are small bilateral pleural  effusions. The left is new. The heart is mildly enlarged.  There are  extensive renovascular calcifications bilaterally. The kidneys are  small. There are no obstructing renal or ureteral stones and there is no  hydronephrosis. The gallbladder is absent.  The unenhanced liver,  spleen, adrenal glands, and pancreas are without acute abnormality.       Evaluation of the GI tract is limited without contrast. There is no  evidence of small bowel obstruction. The appendix is not visualized.  There are no secondary signs to suggest appendicitis. There is  questionable distal colonic wall thickening.  There is wall thickening  of the urinary bladder with surrounding abnormal attenuation. Favor  cystitis. The prostate is borderline in size. Motion artifact limits  evaluation of the pelvis. There are extensive vascular calcifications  and atherosclerotic disease. There are multiple prominent  retroperitoneal lymph nodes, unchanged from prior.       Impression:          1. Worsening groundglass and airspace opacity at the right lung base.  Favor worsening pneumonia.  2. Bilateral pleural effusions, the left of which is new.  3. No renal or ureteral stones and no hydronephrosis.  4. Possible distal colonic bowel wall thickening. Consider colitis.  5. Wall thickening of the urinary bladder with surrounding abnormal  attenuation concerning for cystitis.           This study was performed with techniques to keep radiation doses as low  as reasonably achievable (ALARA). Individualized dose reduction  techniques using automated exposure control or adjustment of mA and/or  kV according to the patient size were employed.      This report was finalized on 1/5/2019 8:55 AM by Lizette Shafer MD.             Assessment:  1.  ?  Right-sided pneumonia  2.  Abnormal CT of the chest  3.  End-stage renal disease.  On hemodialysis  4.  History of chronic right-sided pleural effusion.    Discussion/Recommendations:   I have reviewed the patient's history and radiological data and it doesn't seem to me that the patient has pneumonia at this time.  His white blood cell count is also normal, although it can be an unreliable marker in patients on dialysis.    I will continue antibiotics for now, with plans to rapidly de-escalate over the next 24-48 hours.      Repeat laboratory workup will be followed closely.    I will order pro-calcitonin level.    Chest x-ray will be repeated tomorrow morning.    We'll try to obtain sputum cultures.    As far as bronchoscopy is concerned, this is already planned to be performed within the next few days on an outpatient basis, due to continued and persistent abnormality on the CT of the chest.    If the patient is an inpatient at that time, I will consider performing bronchoscopy while the patient is in the hospital.    The patient does not need to however, stay in the hospital to wait for bronchoscopy if he clinically can be discharged.    His main issue seems to be the nausea and diarrhea which may need to be addressed before discharging him from the hospital.    I've discussed the case with Dr. Pacheco.    The plan was discussed with the patient     I would like to thank you for the opportunity to participate in the care of this patient.  We will communicate changes and recommendations, if and when necessary.      Ean Hernandez MD  01/06/19  2:50 PM    Dictated utilizing Dragon dictation.

## 2019-01-06 NOTE — PROGRESS NOTES
Discharge Planning Assessment   [facility]     Patient Name: Talha Cutler  MRN: [id number]  Today's Date: 1/6/2019    Admit Date: 1/5/2019    Discharge Needs Assessment     Row Name 01/06/19 1208       Living Environment    Lives With  spouse;child(starr), adult    Name(s) of Who Lives With Patient  Mallika Orozco wife  and her son    Current Living Arrangements  home/apartment/condo    Primary Care Provided by  self    Provides Primary Care For  no one    Family Caregiver if Needed  spouse    Quality of Family Relationships  supportive    Able to Return to Prior Arrangements  yes       Resource/Environmental Concerns    Resource/Environmental Concerns  none       Transition Planning    Patient/Family Anticipates Transition to  home       Discharge Needs Assessment    Concerns to be Addressed  no discharge needs identified    Equipment Needed After Discharge  walker, standard        Discharge Plan     Row Name 01/06/19 1210       Plan    Plan  Spoke with patient about discharge plans  Hx of stroke with left sideded weakness  Pt states he can do ADL good in his WC  Confirmed current address and phone numbers  Mallika Cutler is wife [phone]  DME is WC HH is [facility]   PCP  Idalia Kay   No POA  Will continue to assess pt for any needs prior to discharge           Destination      No service coordination in this encounter.      Durable Medical Equipment      No service coordination in this encounter.      Dialysis/Infusion      No service coordination in this encounter.      Home Medical Care      No service coordination in this encounter.      Community Resources      No service coordination in this encounter.          Demographic Summary     Row Name 01/06/19 1156       General Information    Admission Type  observation    Required Notices Provided  Observation Status Notice    Referral Source  admission list    Reason for Consult  discharge planning    Preferred Language  English       Contact Information     Permission Granted to Share Info With      Contact Information Obtained for          Functional Status     Row Name 01/06/19 1158       Functional Status    Usual Activity Tolerance  good    Current Activity Tolerance  good       Functional Status, IADL    Medications  independent    Meal Preparation  independent    Housekeeping  assistive person    Laundry  assistive person    Shopping  independent       Mental Status    General Appearance WDL  WDL       Mental Status Summary    Recent Changes in Mental Status/Cognitive Functioning  no changes       Employment/    Employment Status  unemployed        Psychosocial    No documentation.       Abuse/Neglect    No documentation.       Legal    No documentation.       Substance Abuse    No documentation.       Patient Forms    No documentation.           Kandi Teixeira RN

## 2019-01-06 NOTE — PLAN OF CARE
Problem: Patient Care Overview  Goal: Plan of Care Review  Outcome: Ongoing (interventions implemented as appropriate)   01/05/19 8832   Coping/Psychosocial   Plan of Care Reviewed With patient   OTHER   Outcome Summary Dialysis today, IV antibiotics initiated, patient resting in bed

## 2019-01-06 NOTE — PROGRESS NOTES
"Nephrology Progress Note.    LOS: 0 days    Patient Care Team:  Idalia Kay MD as PCP - General    Chief Complaint:    Chief Complaint   Patient presents with   • Nausea       Subjective     Follow up for ESRD and related issues.    Interval History:     Patient Complaints: none    Patient seen and examined this morning.  Events from last night noted.  Patient denies having any fevers chills.  Occasional nausea no vomiting no abdominal pain.  Denies any chest pain, still complains of some shortness of breath as well as cough and denies any sputum production.  There is no significant edema.   Patient also denies having new onset weakness of numbness of either extremity.  He does feel slightly better and still fairly concerned about his condition.    History taken from: patient    Review of Systems:    The pertinent  ROS was done and it is noted above, rest  was negative.    Objective     Vital Signs  /70 (BP Location: Left leg, Patient Position: Lying)   Pulse 84   Temp 98.2 °F (36.8 °C) (Oral)   Resp 20   Ht 172.7 cm (68\")   Wt 60.8 kg (134 lb)   SpO2 100%   BMI 20.37 kg/m²       No intake/output data recorded.    Intake/Output Summary (Last 24 hours) at 1/6/2019 0830  Last data filed at 1/5/2019 1827  Gross per 24 hour   Intake 500 ml   Output 4000 ml   Net -3500 ml       Physical Exam:    General Appearance: alert, oriented x 3, no acute distress,   HEENT: Oral mucosa dry, extra occular movements intact. Sclera clear.  Skin: Warm and dry  Neck: supple, no JVD, trachea midline  Lungs:Chest shape is normal. Breath sounds heard bilaterally equally.  Bibasilar crackles and occasional wheezing noted  Heart: regular rate and rhythm. normal S1 and S2, no S3, no rub, peripheral pulses weak but palpable.  Abdomen: Obese, soft, non-tender,  present bowel sounds to auscultation  : no palpable bladder.  Extremities: 2+ edema, no cyanosis or clubbing.   Neuro: normal speech and mental status, " grossly non focal.     Results Review:    Results from last 7 days   Lab Units  01/06/19   0606  01/05/19   0756   SODIUM mmol/L  135*  125*   POTASSIUM mmol/L  4.1  4.9   CHLORIDE mmol/L  99  78*   CO2 mmol/L  26.0  32.0*   BUN mg/dL  13  30*   CREATININE mg/dL  3.50*  5.00*   CALCIUM mg/dL  9.4  9.3   BILIRUBIN mg/dL  0.6  0.8   ALK PHOS U/L  86  98   ALT (SGPT) U/L  26  22   AST (SGOT) U/L  27  26   GLUCOSE mg/dL  152*  414*       Estimated Creatinine Clearance: 19.5 mL/min (A) (by C-G formula based on SCr of 3.5 mg/dL (H)).    Results from last 7 days   Lab Units  01/05/19   0756   PHOSPHORUS mg/dL  5.2*             Results from last 7 days   Lab Units  01/06/19   0606  01/05/19   0756   WBC 10*3/mm3  6.57  6.44   HEMOGLOBIN g/dL  9.2*  9.6*   PLATELETS 10*3/mm3  214  209               Imaging Results (last 24 hours)     Procedure Component Value Units Date/Time    CT Head Without Contrast [[id number]] Collected:  01/05/19 0957     Updated:  01/05/19 1003    Narrative:       PROCEDURE: CT HEAD WO CONTRAST-     INDICATION: abnormal movements, h/o stroke     TECHNIQUE: Multiple axial CT images were performed from the foramen  magnum to the vertex without contrast.   Coronal reconstruction images  were obtained from the axial data.     COMPARISON: 9/15/2018.     FINDINGS: There is no mass effect or midline shift.  There is mild  atrophy. The ventricles are symmetric in size and configuration.  There  is no hydrocephalus.  There are no extraaxial fluid collections.  There  is no intraventricular or intraparenchymal hemorrhage.  The posterior  fossa has a normal CT appearance.  The soft tissues are within normal  limits. No acute osseous abnormalities are present.       Impression:       No acute intracranial abnormality. Consider MR if  symptomatology persists.                      This study was performed with techniques to keep radiation doses as low  as reasonably achievable (ALARA). Individualized dose  reduction  techniques using automated exposure control or adjustment of mA and/or  kV according to the patient size were employed.      This report was finalized on 1/5/2019 10:01 AM by Lizette Shafer MD.    XR Chest 2 View [[id number]] Collected:  01/05/19 0856     Updated:  01/05/19 0900    Narrative:       PROCEDURE: XR CHEST 2 VW-     INDICATION: sob, cough     COMPARISON:  9/19/2018     FINDINGS: PA and lateral views of the chest were obtained.   Heart size  is stable. There has been interval worsening of right lung opacity. A  right pleural effusion may have increased. There is a small left pleural  effusion. There is no pneumothorax.   Vascular stents are noted in the  region of the right axilla.         Impression:       Worsening right lung opacity and right pleural effusion.  Small left pleural effusion.                   This report was finalized on 1/5/2019 8:58 AM by Lizette Shafer MD.    CT Abdomen Pelvis Without Contrast [[id number]] Collected:  01/05/19 0837     Updated:  01/05/19 0858    Narrative:       PROCEDURE: CT ABDOMEN PELVIS WO CONTRAST-     INDICATION:  abd pain, nausea     TECHNIQUE:  Thin section axial images were obtained from the lung bases  through the pubic symphysis without oral or IV contrast.  Coronal  reconstruction images were obtained from the axial data.     COMPARISON:  7/27/2018. Chest CT dated 12/28/2018 was also reviewed.     FINDINGS: There is been interval worsening of groundglass and airspace  opacities in the right middle and lower lobes. There is dependent  atelectasis at the left lung base. There are small bilateral pleural  effusions. The left is new. The heart is mildly enlarged.  There are  extensive renovascular calcifications bilaterally. The kidneys are  small. There are no obstructing renal or ureteral stones and there is no  hydronephrosis. The gallbladder is absent.  The unenhanced liver,  spleen, adrenal glands, and pancreas are without acute abnormality.        Evaluation of the GI tract is limited without contrast. There is no  evidence of small bowel obstruction. The appendix is not visualized.  There are no secondary signs to suggest appendicitis. There is  questionable distal colonic wall thickening.  There is wall thickening  of the urinary bladder with surrounding abnormal attenuation. Favor  cystitis. The prostate is borderline in size. Motion artifact limits  evaluation of the pelvis. There are extensive vascular calcifications  and atherosclerotic disease. There are multiple prominent  retroperitoneal lymph nodes, unchanged from prior.       Impression:          1. Worsening groundglass and airspace opacity at the right lung base.  Favor worsening pneumonia.  2. Bilateral pleural effusions, the left of which is new.  3. No renal or ureteral stones and no hydronephrosis.  4. Possible distal colonic bowel wall thickening. Consider colitis.  5. Wall thickening of the urinary bladder with surrounding abnormal  attenuation concerning for cystitis.           This study was performed with techniques to keep radiation doses as low  as reasonably achievable (ALARA). Individualized dose reduction  techniques using automated exposure control or adjustment of mA and/or  kV according to the patient size were employed.      This report was finalized on 1/5/2019 8:55 AM by Lizette Shafer MD.          amLODIPine 10 mg Oral Daily   aspirin 81 mg Oral Daily   azithromycin 500 mg Intravenous Q24H   ceftriaxone 1 g Intravenous Q24H   clopidogrel 75 mg Oral Daily   digoxin 125 mcg Oral See Admin Instructions   docusate sodium 100 mg Oral Q12H   hydrALAZINE 50 mg Oral TID   insulin detemir 8 Units Subcutaneous Daily   ipratropium-albuterol 3 mL Nebulization 4x Daily - RT   isosorbide mononitrate 30 mg Oral Daily   levothyroxine 100 mcg Oral Q AM   losartan 100 mg Oral Q24H   metoprolol succinate  mg Oral Daily   pantoprazole 40 mg Oral QAM AC          Medication Review:    Current Facility-Administered Medications   Medication Dose Route Frequency Provider Last Rate Last Dose   • acetaminophen (TYLENOL) tablet 325 mg  325 mg Oral Q4H PRN Leo Pacheco MD       • amLODIPine (NORVASC) tablet 10 mg  10 mg Oral Daily Leo Pacheco MD       • aspirin chewable tablet 81 mg  81 mg Oral Daily Leo Pacheco MD       • AZITHROMYCIN 500 MG/250 ML 0.9% NS IVPB (vial-mate)  500 mg Intravenous Q24H Leo Pacheco MD   Stopped at 01/05/19 2139   • cefTRIAXone (ROCEPHIN) IVPB 1 g/50ml dextrose (premix)  1 g Intravenous Q24H Leo Pacheco MD   Stopped at 01/05/19 2345   • clopidogrel (PLAVIX) tablet 75 mg  75 mg Oral Daily Leo Pacheco MD       • digoxin (LANOXIN) tablet 125 mcg  125 mcg Oral See Admin Instructions Leo Pacheco MD       • docusate sodium (COLACE) capsule 100 mg  100 mg Oral Q12H Chance Mackey MD, FASN   100 mg at 01/06/19 0614   • hydrALAZINE (APRESOLINE) tablet 50 mg  50 mg Oral TID Leo Pacheco MD   50 mg at 01/05/19 2117   • insulin detemir (LEVEMIR) injection 8 Units  8 Units Subcutaneous Daily Leo Pacheco MD       • ipratropium-albuterol (DUO-NEB) nebulizer solution 3 mL  3 mL Nebulization 4x Daily - RT Leo Pacheco MD   3 mL at 01/06/19 0729   • isosorbide mononitrate (IMDUR) 24 hr tablet 30 mg  30 mg Oral Daily Leo Pacheco MD       • levothyroxine (SYNTHROID, LEVOTHROID) tablet 100 mcg  100 mcg Oral Q AM Leo Pacheco MD   100 mcg at 01/06/19 0614   • losartan (COZAAR) tablet 100 mg  100 mg Oral Q24H Leo Pacheco MD       • metoprolol succinate XL (TOPROL-XL) 24 hr tablet 100 mg  100 mg Oral Daily Leo Pacheco MD       • pantoprazole (PROTONIX) EC tablet 40 mg  40 mg Oral QAM AC Leo Pacheco MD   40 mg at 01/06/19 0645   • sodium chloride 0.9 % bolus 1,000 mL  1,000 mL Intravenous PRN Chance Mackey MD, FASN           Assessment/Plan     1. End stage renal disease on dialysis (CMS/HCC)  2. Chronic kidney  disease-mineral and bone disorder:  3. Pneumonia of right lower lobe due to infectious organism (CMS/HCC)  4. Type 2 diabetes mellitus treated with insulin (CMS/HCC) uncontrolled.  5. Anemia of chronic kidney disease  6. Chronic right-sided loculated pleural effusion status post pleurodesis  7. Coronary artery disease with medical management  8. Chronic scrotal ulcer followed by Dr. Macdonald at [facility].  9. Hypertension with end-stage renal disease  10. Hypothyroidism  11. Physical debility     Plan:    · Still appears to be fluid overloaded we'll go ahead and plan on doing another dialysis at 4 L ultrafiltration today.  · Primary consultation has been ordered.  · Continue rest of the treatment plan  · Details were discussed with the patient no family in the room.    · Details were also discussed with the hospitalist service.   · Surveillance labs.  · Further recommendations will depend on clinical course of the patient during the current hospitalization.    · I also discussed the details with the nursing staff.  · Rest as ordered.    Chance Mackey MD, FASN  01/06/19  8:30 AM    Dictated utilizing Dragon dictation.

## 2019-01-06 NOTE — PROGRESS NOTES
[facility]    Name:  Talha Cutler   Age:  58 y.o.  Sex:  male  :  1960  MRN:  [id number]   Visit Number:  [id number]  Admission Date:  2019  Date Of Service:  19  Primary Care Physician:  Idalia Kay MD     LOS: 0 days :  Patient Care Team:  Idalia Kay MD as PCP - General:      Subjective / Interval History:     58-year-old patient with history of her chronic kidney disease her diabetes was admitted because of right-sided pneumonia.  The x-ray showed worsening of the right-sided infiltrate but clinically patient the arm has had dry cough but no fever and not hypoxic.  He has been not feeling well now because is having abdominal cramps and as per the nurse he had the 3 bowel movements and diarrhea which was foul-smelling      Vital Signs:    Temp:  [98 °F (36.7 °C)-98.3 °F (36.8 °C)] 98.3 °F (36.8 °C)  Heart Rate:  [69-93] 93  Resp:  [15-20] 18  BP: (128-171)/(59-96) 171/75    Intake and output:    I/O last 3 completed shifts:  In: 500 [IV Piggyback:500]  Out: 4000 [Other:4000]  I/O this shift:  In: 200 [P.O.:200]  Out: -     Physical Examination:    General Appearance:    Alert and cooperative, not in any acute distress.   Head:    Atraumatic and normocephalic, without obvious abnormality.   Eyes:            PERRLA,  No pallor. Extraocular movements are within normal limits.   Neck:   Supple,  No lymphglands, no bruit   Lungs:     Chest shape is normal. Breath sounds heard bilaterally but decreased the at the right base  No crackles or wheezing.     Heart:    Normal S1 and S2, no murmur,  No JVD   Abdomen:     Normal bowel sounds, no masses, no organomegaly. Soft        mild diffuse  tender, no guarding, no rebound                tenderness   Extremities:   Moves all extremities well, no edema, no cyanosis,    Skin:   No  bruising or rash.   Neurologic:   Grossly non focal and moves all extrimities equally.     Laboratory results:  Results from last 7 days   Lab Units  01/06/19   0606  01/05/19   0756   SODIUM mmol/L  135*  125*   POTASSIUM mmol/L  4.1  4.9   CHLORIDE mmol/L  99  78*   CO2 mmol/L  26.0  32.0*   BUN mg/dL  13  30*   CREATININE mg/dL  3.50*  5.00*   CALCIUM mg/dL  9.4  9.3   BILIRUBIN mg/dL  0.6  0.8   ALK PHOS U/L  86  98   ALT (SGPT) U/L  26  22   AST (SGOT) U/L  27  26   GLUCOSE mg/dL  152*  414*     Results from last 7 days   Lab Units  01/06/19   0606  01/05/19   0756   WBC 10*3/mm3  6.57  6.44   HEMOGLOBIN g/dL  9.2*  9.6*   HEMATOCRIT %  28.1*  29.1*   PLATELETS 10*3/mm3  214  209         Results from last 7 days   Lab Units  01/05/19   0756   TROPONIN I ng/mL  <0.012     Results from last 7 days   Lab Units  01/05/19   0957  01/05/19   0922   BLOODCX   No growth at less than 24 hours  No growth at 24 hours       Radiology results:    Imaging Results (last 24 hours)     Procedure Component Value Units Date/Time    CT Head Without Contrast [[id number]] Collected:  01/05/19 0957     Updated:  01/05/19 1003    Narrative:       PROCEDURE: CT HEAD WO CONTRAST-     INDICATION: abnormal movements, h/o stroke     TECHNIQUE: Multiple axial CT images were performed from the foramen  magnum to the vertex without contrast.   Coronal reconstruction images  were obtained from the axial data.     COMPARISON: 9/15/2018.     FINDINGS: There is no mass effect or midline shift.  There is mild  atrophy. The ventricles are symmetric in size and configuration.  There  is no hydrocephalus.  There are no extraaxial fluid collections.  There  is no intraventricular or intraparenchymal hemorrhage.  The posterior  fossa has a normal CT appearance.  The soft tissues are within normal  limits. No acute osseous abnormalities are present.       Impression:       No acute intracranial abnormality. Consider MR if  symptomatology persists.                      This study was performed with techniques to keep radiation doses as  low  as reasonably achievable (ALARA). Individualized dose reduction  techniques using automated exposure control or adjustment of mA and/or  kV according to the patient size were employed.      This report was finalized on 1/5/2019 10:01 AM by Lizette Shafer MD.          I have reviewed the patient's radiology reports.    Medication Review:     I have reviewed the patients active and prn medications.     Assessment:      Pneumonia of right lung due to infectious organism    Type 2 diabetes mellitus treated with insulin (CMS/[facility])    End stage renal disease on dialysis (CMS/[facility])    Hypertension due to end stage renal disease caused by type 2 diabetes mellitus, on dialysis (CMS/[facility])    Physical debility  Hypothyroidism   Hyponatremia status post improvement  Diarrhea      Plan:  1.  Right-sided pneumonia the x-ray showing worsening infiltrate though clinically he is stable no fever and not hypoxic.  He does have the pneumonia and it has worsened and we'll consult Dr. Hernandez.  The also there was a question of  scarring and Dr. Hernandez is planning for biopsy also     2.  Hyponatremia sodium is sodium was 125 and has improved to 135 and no further workup needed     3.  Chronic kidney disease continue hemodialysis as per Dr. Mackey     4.  Diarrhea this seems to be new and the will try to do stool for C. difficile for an will treat as per the findings   Leo Pacheco MD  01/06/19  9:55 AM      Please note that portions of this note may have been completed with a voice recognition program. Efforts were made to edit the dictations, but occasionally words are mistranscribed.

## 2019-01-07 ENCOUNTER — APPOINTMENT (OUTPATIENT)
Dept: GENERAL RADIOLOGY | Facility: HOSPITAL | Age: 59
End: 2019-01-07

## 2019-01-07 VITALS
TEMPERATURE: 98 F | BODY MASS INDEX: 18.97 KG/M2 | HEIGHT: 68 IN | SYSTOLIC BLOOD PRESSURE: 130 MMHG | WEIGHT: 125.2 LBS | RESPIRATION RATE: 16 BRPM | DIASTOLIC BLOOD PRESSURE: 91 MMHG | OXYGEN SATURATION: 97 % | HEART RATE: 94 BPM

## 2019-01-07 LAB — PROCALCITONIN SERPL-MCNC: 0.54 NG/ML

## 2019-01-07 PROCEDURE — 99217 PR OBSERVATION CARE DISCHARGE MANAGEMENT: CPT | Performed by: NURSE PRACTITIONER

## 2019-01-07 PROCEDURE — G0378 HOSPITAL OBSERVATION PER HR: HCPCS

## 2019-01-07 PROCEDURE — 96366 THER/PROPH/DIAG IV INF ADDON: CPT

## 2019-01-07 PROCEDURE — 84145 PROCALCITONIN (PCT): CPT | Performed by: INTERNAL MEDICINE

## 2019-01-07 PROCEDURE — 63710000001 INSULIN DETEMIR PER 5 UNITS: Performed by: INTERNAL MEDICINE

## 2019-01-07 PROCEDURE — 94799 UNLISTED PULMONARY SVC/PX: CPT

## 2019-01-07 PROCEDURE — 71045 X-RAY EXAM CHEST 1 VIEW: CPT

## 2019-01-07 PROCEDURE — 25010000002 AZITHROMYCIN: Performed by: INTERNAL MEDICINE

## 2019-01-07 RX ORDER — DIGOXIN 125 MCG
125 TABLET ORAL SEE ADMIN INSTRUCTIONS
Status: ON HOLD
Start: 2019-01-07 | End: 2019-01-01

## 2019-01-07 RX ADMIN — IPRATROPIUM BROMIDE AND ALBUTEROL SULFATE 3 ML: .5; 3 SOLUTION RESPIRATORY (INHALATION) at 06:59

## 2019-01-07 RX ADMIN — ISOSORBIDE MONONITRATE 30 MG: 30 TABLET, EXTENDED RELEASE ORAL at 09:19

## 2019-01-07 RX ADMIN — ASPIRIN 81 MG 81 MG: 81 TABLET ORAL at 09:19

## 2019-01-07 RX ADMIN — LOSARTAN POTASSIUM 100 MG: 50 TABLET, FILM COATED ORAL at 09:19

## 2019-01-07 RX ADMIN — INSULIN DETEMIR 8 UNITS: 100 INJECTION, SOLUTION SUBCUTANEOUS at 09:51

## 2019-01-07 RX ADMIN — AZITHROMYCIN 500 MG: 500 INJECTION, POWDER, LYOPHILIZED, FOR SOLUTION INTRAVENOUS at 13:33

## 2019-01-07 RX ADMIN — HYDRALAZINE HYDROCHLORIDE 50 MG: 25 TABLET, FILM COATED ORAL at 09:19

## 2019-01-07 RX ADMIN — AMLODIPINE BESYLATE 10 MG: 5 TABLET ORAL at 09:19

## 2019-01-07 RX ADMIN — LEVOTHYROXINE SODIUM 100 MCG: 100 TABLET ORAL at 06:36

## 2019-01-07 RX ADMIN — DOCUSATE SODIUM 100 MG: 100 CAPSULE, LIQUID FILLED ORAL at 06:36

## 2019-01-07 RX ADMIN — PANTOPRAZOLE SODIUM 40 MG: 40 TABLET, DELAYED RELEASE ORAL at 06:36

## 2019-01-07 RX ADMIN — METOPROLOL SUCCINATE 100 MG: 100 TABLET, EXTENDED RELEASE ORAL at 09:19

## 2019-01-07 NOTE — PROGRESS NOTES
Case Management Discharge Note         Destination      No service has been selected for the patient.      Durable Medical Equipment      No service has been selected for the patient.      Dialysis/Infusion      No service has been selected for the patient.      Home Medical Care      No service has been selected for the patient.      Community Resources      No service has been selected for the patient.        Other: (car)    Final Discharge Disposition Code: 01 - home or self-care

## 2019-01-07 NOTE — PROGRESS NOTES
Discharge Planning Assessment   Sharma     Patient Name: Talha Cutler  MRN: [id number]  Today's Date: 1/7/2019    Admit Date: 1/5/2019    Discharge Needs Assessment    No documentation.       Discharge Plan     Row Name 01/07/19 1437       Plan    Plan Confirmed [facility] that patient is a waiver patient and not a current home health patient.         Destination      No service coordination in this encounter.      Durable Medical Equipment      No service coordination in this encounter.      Dialysis/Infusion      No service coordination in this encounter.      Home Medical Care      No service coordination in this encounter.      Community Resources      No service coordination in this encounter.        Expected Discharge Date and Time     Expected Discharge Date Expected Discharge Time    Jan 7, 2019         Demographic Summary    No documentation.       Functional Status    No documentation.       Psychosocial    No documentation.       Abuse/Neglect    No documentation.       Legal    No documentation.       Substance Abuse    No documentation.       Patient Forms    No documentation.           Narcisa Morgan

## 2019-01-07 NOTE — DISCHARGE SUMMARY
[facility]   DISCHARGE SUMMARY    Name:  Talha Cutler   Age:  58 y.o.  Sex:  male  :  1960  MRN:  [id number]   Visit Number:  [id number]  Primary Care Physician:  Idalia Kay MD  Date of Discharge:  2019  Admission Date:  2019      Presenting Problem:    Pneumonia of right lung due to infectious organism, unspecified part of lung [J18.9]       Discharge Diagnosis:       Pneumonia of right lung due to infectious organism    Type 2 diabetes mellitus treated with insulin (CMS/[facility])    End stage renal disease on dialysis (CMS/[facility])    Hypertension due to end stage renal disease caused by type 2 diabetes mellitus, on dialysis (CMS/[facility])    Physical debility        Past Medical History:  Past Medical History:   Diagnosis Date   • Anemia    • CHF (congestive heart failure) (CMS/[facility])    • Chronic kidney disease    • Diabetes mellitus (CMS/[facility])    • H/O chest x-ray 2016    Interval decrease in size of the patients right pleural effusion with a persistent small left effusion as well   • History of transfusion    • Hypertension    • Left-sided weakness    • Pneumonia    • Renal failure    • Stroke (CMS/[facility])          Consults:     Consults     Date and Time Order Name Status Description    2019 0819 Inpatient Pulmonology Consult Completed     2019 1421 Inpatient Nephrology Consult Completed           Procedures Performed:  None             History of presenting illness/Hospital Course:  This is a 58-year-old male with history significant for chronic kidney disease on hemodialysis, diabetes, history of CVA, history of recurrent pneumonia he came into the emergency room after he was sent over from the dialysis center due to nausea.  He had when in for his regular dialysis and complained of nausea and was sent to the emergency room.  Upon evaluation in the emergency room his nausea was better but the patient still was not feeling very well.  Workup done in  the emergency room did show worsening of his right-sided pneumonia as compared to before.  He is scheduled for an outpatient bronchoscopy to be done by Dr. Hernandez with pulmonology on Thursday, November 10, 2019.  He also had a CT scan done of his abdomen in the emergency room that did show worsening pneumonia with effusion.  He was admitted to this facility  Last in November 2018 where he was treated for pneumonia in the right upper lobe at that time.  Patient was admitted to the hospitalist service for further medical management of pneumonia.  He was started on IV antibiotics in the form Rocephin and azithromycin as well as bronchodilators.  Dr. Hernandez with pulmonology was consulted for further evaluation.  Dr. Hernandez did evaluate the patient did not feel that this is actually a pneumonia as his white count is normal however it could be unreliable marker in patients on dialysis.  He did recommend to continue the antibiotics but to de-escalate over the next 24-48 hours.  He is going to do the bronchoscopy as an outpatient on Thursday.    I have seen and evaluated the patient this morning.  C. difficile was done for the diarrhea which was negative.  I have seen and evaluated patient at bedside.  He does state that he is feeling much better today.  The diarrhea is resolving.  He denies any abdominal pain nausea vomiting cough or shortness of breath or chest pain.  We will arrange for him to see Dr. Hernandez as an outpatient on Thursday for bronchoscopy.  I have asked that Dr. Mackey arranging to get ceftaz edema during dialysis for a few more doses.  I will have him take his last dose of azithromycin today prior to discharge.  Repeat chest x-ray done today does show slight interval improvement in the patient's right basilar airspace disease and effusion.  We will continue with antibiotics as an outpatient.  He is otherwise stable from the hospitalist standpoint for discharge home to follow-up with Dr. Hernandez as an outpatient  for procedure on Thursday.  He is to continue dialysis on Tuesday Thursday Saturday switches his regular dialysis days.  Blood cultures show no growth at this time.  We will get a walking oximetry prior to discharge to assess for any home oxygen needs.          Vital Signs:    Temp:  [97.5 °F (36.4 °C)-99 °F (37.2 °C)] 98 °F (36.7 °C)  Heart Rate:  [73-94] 94  Resp:  [14-18] 16  BP: (130-173)/(58-91) 130/91    Physical Exam:  General Appearance:    Alert and cooperative, not in any acute distress.   Head:    Atraumatic and normocephalic, without obvious abnormality.   Eyes:            PERRLA, conjunctivae and sclerae normal, no Icterus. No pallor. Extra-occular movements are within normal limits.   Ears:    Ears appear intact with no abnormalities noted.   Throat:   No oral lesions, no thrush, oral mucosa moist.   Neck:   Supple, trachea midline, no thyromegaly, no carotid bruit.   Back:     No kyphoscoliosis present. No tenderness to palpation,   range of motion normal.   Lungs:     Chest shape is normal. Breath sounds heard bilaterally equally.   Crackles in right lobe.  no wheezing. No Pleural rub or bronchial breathing.  Decreased in the bases.    Heart:    Normal S1 and S2, no murmur, no gallop, no rub. No JVD   Abdomen:     Normal bowel sounds, no masses, no organomegaly. Soft        non-tender, non-distended, no guarding, no rebound                tenderness   Extremities:   Moves all extremities well, no edema, no cyanosis, no             clubbing   Pulses:   Pulses palpable and equal bilaterally   Skin:   No bleeding, bruising or rash     Psychiatric/Behavior:        Normal mood, normal behavior   Neurologic:   Cranial nerves 2 - 12 grossly intact, sensation intact, Motor power is normal and equal bilaterally.           Pertinent Lab Results:     Results from last 7 days   Lab Units  01/06/19   0606  01/05/19   0756   SODIUM mmol/L  135*  125*   POTASSIUM mmol/L  4.1  4.9   CHLORIDE mmol/L  99  78*   CO2  mmol/L  26.0  32.0*   BUN mg/dL  13  30*   CREATININE mg/dL  3.50*  5.00*   CALCIUM mg/dL  9.4  9.3   BILIRUBIN mg/dL  0.6  0.8   ALK PHOS U/L  86  98   ALT (SGPT) U/L  26  22   AST (SGOT) U/L  27  26   GLUCOSE mg/dL  152*  414*     Results from last 7 days   Lab Units  01/06/19   0606  01/05/19   0756   WBC 10*3/mm3  6.57  6.44   HEMOGLOBIN g/dL  9.2*  9.6*   HEMATOCRIT %  28.1*  29.1*   PLATELETS 10*3/mm3  214  209         Blood Culture   Date Value Ref Range Status   01/05/2019 No growth at 2 days  Preliminary   01/05/2019 No growth at 2 days  Preliminary         Pertinent Radiology Results:    Imaging Results (all)     Procedure Component Value Units Date/Time    XR Chest 1 View [[id number]] Collected:  01/07/19 0804     Updated:  01/07/19 0807    Narrative:       PROCEDURE: XR CHEST 1 VW-     HISTORY: Pneumonia; J18.9-Pneumonia, unspecified organism; N18.6-End  stage renal disease; Z99.2-Dependence on renal dialysis     COMPARISON: January 5, 2019.     FINDINGS: The heart is normal in size. The mediastinum is unremarkable.  There are chronic interstitial lung changes. There has been some  interval improvement in the patient's right effusion and right basilar  airspace disease. The left lung is clear. There is no pneumothorax.   There are no acute osseous abnormalities.           Impression:       Slight interval improvement in the patient's right basilar  airspace disease and effusion.     Continued followup is recommended.     This report was finalized on 1/7/2019 8:05 AM by Marilia Mora M.D..    CT Head Without Contrast [[id number]] Collected:  01/05/19 0957     Updated:  01/05/19 1003    Narrative:       PROCEDURE: CT HEAD WO CONTRAST-     INDICATION: abnormal movements, h/o stroke     TECHNIQUE: Multiple axial CT images were performed from the foramen  magnum to the vertex without contrast.   Coronal reconstruction images  were obtained from the axial data.     COMPARISON: 9/15/2018.     FINDINGS:  There is no mass effect or midline shift.  There is mild  atrophy. The ventricles are symmetric in size and configuration.  There  is no hydrocephalus.  There are no extraaxial fluid collections.  There  is no intraventricular or intraparenchymal hemorrhage.  The posterior  fossa has a normal CT appearance.  The soft tissues are within normal  limits. No acute osseous abnormalities are present.       Impression:       No acute intracranial abnormality. Consider MR if  symptomatology persists.                      This study was performed with techniques to keep radiation doses as low  as reasonably achievable (ALARA). Individualized dose reduction  techniques using automated exposure control or adjustment of mA and/or  kV according to the patient size were employed.      This report was finalized on 1/5/2019 10:01 AM by Lizette Shafer MD.    XR Chest 2 View [[id number]] Collected:  01/05/19 0856     Updated:  01/05/19 0900    Narrative:       PROCEDURE: XR CHEST 2 VW-     INDICATION: sob, cough     COMPARISON:  9/19/2018     FINDINGS: PA and lateral views of the chest were obtained.   Heart size  is stable. There has been interval worsening of right lung opacity. A  right pleural effusion may have increased. There is a small left pleural  effusion. There is no pneumothorax.   Vascular stents are noted in the  region of the right axilla.         Impression:       Worsening right lung opacity and right pleural effusion.  Small left pleural effusion.                   This report was finalized on 1/5/2019 8:58 AM by Lizette Shafer MD.    CT Abdomen Pelvis Without Contrast [[id number]] Collected:  01/05/19 0837     Updated:  01/05/19 0858    Narrative:       PROCEDURE: CT ABDOMEN PELVIS WO CONTRAST-     INDICATION:  abd pain, nausea     TECHNIQUE:  Thin section axial images were obtained from the lung bases  through the pubic symphysis without oral or IV contrast.  Coronal  reconstruction images were obtained from the  axial data.     COMPARISON:  7/27/2018. Chest CT dated 12/28/2018 was also reviewed.     FINDINGS: There is been interval worsening of groundglass and airspace  opacities in the right middle and lower lobes. There is dependent  atelectasis at the left lung base. There are small bilateral pleural  effusions. The left is new. The heart is mildly enlarged.  There are  extensive renovascular calcifications bilaterally. The kidneys are  small. There are no obstructing renal or ureteral stones and there is no  hydronephrosis. The gallbladder is absent.  The unenhanced liver,  spleen, adrenal glands, and pancreas are without acute abnormality.       Evaluation of the GI tract is limited without contrast. There is no  evidence of small bowel obstruction. The appendix is not visualized.  There are no secondary signs to suggest appendicitis. There is  questionable distal colonic wall thickening.  There is wall thickening  of the urinary bladder with surrounding abnormal attenuation. Favor  cystitis. The prostate is borderline in size. Motion artifact limits  evaluation of the pelvis. There are extensive vascular calcifications  and atherosclerotic disease. There are multiple prominent  retroperitoneal lymph nodes, unchanged from prior.       Impression:          1. Worsening groundglass and airspace opacity at the right lung base.  Favor worsening pneumonia.  2. Bilateral pleural effusions, the left of which is new.  3. No renal or ureteral stones and no hydronephrosis.  4. Possible distal colonic bowel wall thickening. Consider colitis.  5. Wall thickening of the urinary bladder with surrounding abnormal  attenuation concerning for cystitis.           This study was performed with techniques to keep radiation doses as low  as reasonably achievable (ALARA). Individualized dose reduction  techniques using automated exposure control or adjustment of mA and/or  kV according to the patient size were employed.      This report was  finalized on 1/5/2019 8:55 AM by Lizette Shafer MD.          Condition on Discharge:    Stable        Discharge Disposition:    Home or Self Care    Discharge Medication:       Discharge Medications      Changes to Medications      Instructions Start Date   digoxin 125 MCG tablet  Commonly known as:  LANOXIN  What changed:    · when to take this  · additional instructions   125 mcg, Oral, See Admin Instructions         Continue These Medications      Instructions Start Date   acetaminophen 325 MG tablet  Commonly known as:  TYLENOL   325 mg, Oral, Every 4 Hours PRN      albuterol sulfate  (90 Base) MCG/ACT inhaler  Commonly known as:  PROVENTIL HFA;VENTOLIN HFA;PROAIR HFA   2 puffs, Inhalation, Every 4 Hours PRN      amLODIPine 10 MG tablet  Commonly known as:  NORVASC   10 mg, Oral, Daily      aspirin 81 MG chewable tablet   81 mg, Oral, Daily      AURYXIA 1  MG(Fe) tablet  Generic drug:  Ferric Citrate   1 tablet, Oral, With every meal and every snack      clopidogrel 75 MG tablet  Commonly known as:  PLAVIX   75 mg, Oral, Daily      docusate sodium 100 MG capsule  Commonly known as:  COLACE   100 mg, Oral, Every 12 Hours      folic acid 1 MG tablet  Commonly known as:  FOLVITE   1 mg, Oral, Daily      hydrALAZINE 50 MG tablet  Commonly known as:  APRESOLINE   50 mg, Oral, 3 Times Daily      insulin detemir 100 UNIT/ML injection  Commonly known as:  LEVEMIR   8 Units, Subcutaneous, Daily      isosorbide mononitrate 30 MG 24 hr tablet  Commonly known as:  IMDUR   30 mg, Oral, Daily      levothyroxine 100 MCG tablet  Commonly known as:  SYNTHROID, LEVOTHROID   100 mcg, Oral, Daily      losartan 100 MG tablet  Commonly known as:  COZAAR   100 mg, Oral, Every 24 Hours Scheduled      metoprolol succinate  MG 24 hr tablet  Commonly known as:  TOPROL-XL   100 mg, Oral, Daily      omeprazole 20 MG capsule  Commonly known as:  priLOSEC   20 mg, Oral, Daily      ondansetron ODT 4 MG disintegrating  tablet  Commonly known as:  ZOFRAN-ODT   4 mg, Oral, Every 6 Hours PRN      RADHA-SHIRLENE PO   1 tablet, Oral, Patient states he takes these after dialysis, but is unaware of dose.      vitamin D3 5000 units capsule capsule   1 capsule, Oral, Daily             Discharge Diet:  Renal diet        Activity at Discharge: as tolerates      Follow-up Appointments:    Future Appointments   Date Time Provider Department Center   2/11/2019  3:00 PM Kiera Rain APRN [facility] None         Test Results Pending at Discharge:     Order Current Status    Blood Culture With ROSELINE - Blood, Arm, Left Preliminary result    Blood Culture With ROSELINE - Blood, Arm, Left Preliminary result          Discharge Instructions:  Continue dialysis on Tues, Thurs, Sat  Ceftaz will be arrange with nephrology to be given with dialysis for 3 more doses.   Oxygen as indicated per walking oximetry         ALEXYS Gunderson  01/07/19  1:38 PM

## 2019-01-07 NOTE — PLAN OF CARE
Problem: Patient Care Overview  Goal: Plan of Care Review  Outcome: Ongoing (interventions implemented as appropriate)   01/07/19 1414   Coping/Psychosocial   Plan of Care Reviewed With patient   OTHER   Outcome Summary Patient to be discharged home today.    Plan of Care Review   Progress improving

## 2019-01-07 NOTE — PROGRESS NOTES
"Nephrology Progress Note.    LOS: 0 days    Patient Care Team:  Idalia Kay MD as PCP - General    Chief Complaint:    Chief Complaint   Patient presents with   • Nausea       Subjective     Follow up for ESRD and related issues.    Interval History:     Patient Complaints: none    Patient seen and examined this morning.  Events from last night noted.  Patient denies having any fevers chills.  Occasional nausea no vomiting no abdominal pain.  Denies any chest pain, still complains of some shortness of breath as well as cough and denies any sputum production.  There is no significant edema.   Patient also denies having new onset weakness of numbness of either extremity.  He does feel better and said that he has strong family history of parkinsonism and starting to feel like that he may be developing it.  His right hand was shaking this morning.    History taken from: patient    Review of Systems:    The pertinent  ROS was done and it is noted above, rest  was negative.    Objective     Vital Signs  /70   Pulse 93   Temp 98 °F (36.7 °C) (Oral)   Resp 16   Ht 172.7 cm (68\")   Wt 56.8 kg (125 lb 3.2 oz)   SpO2 98%   BMI 19.04 kg/m²       I/O this shift:  In: 360 [P.O.:360]  Out: -     Intake/Output Summary (Last 24 hours) at 1/7/2019 1027  Last data filed at 1/7/2019 0922  Gross per 24 hour   Intake 360 ml   Output 4000 ml   Net -3640 ml       Physical Exam:    General Appearance: alert, oriented x 3, no acute distress,   HEENT: Oral mucosa dry, extra occular movements intact. Sclera clear.  Skin: Warm and dry  Neck: supple, no JVD, trachea midline  Lungs:Chest shape is normal. Breath sounds heard bilaterally equally.  Bibasilar crackles and occasional wheezing appears to have resolved.  Heart: regular rate and rhythm. normal S1 and S2, no S3, no rub, peripheral pulses weak but palpable.  Abdomen: Obese, soft, non-tender,  present bowel sounds to auscultation  : no palpable " bladder.  Extremities:  Trace edema, no cyanosis or clubbing.   Neuro: normal speech and mental status, grossly non focal.     Results Review:    Results from last 7 days   Lab Units  01/06/19   0606  01/05/19   0756   SODIUM mmol/L  135*  125*   POTASSIUM mmol/L  4.1  4.9   CHLORIDE mmol/L  99  78*   CO2 mmol/L  26.0  32.0*   BUN mg/dL  13  30*   CREATININE mg/dL  3.50*  5.00*   CALCIUM mg/dL  9.4  9.3   BILIRUBIN mg/dL  0.6  0.8   ALK PHOS U/L  86  98   ALT (SGPT) U/L  26  22   AST (SGOT) U/L  27  26   GLUCOSE mg/dL  152*  414*       Estimated Creatinine Clearance: 18.3 mL/min (A) (by C-G formula based on SCr of 3.5 mg/dL (H)).    Results from last 7 days   Lab Units  01/05/19   0756   PHOSPHORUS mg/dL  5.2*             Results from last 7 days   Lab Units  01/06/19   0606  01/05/19   0756   WBC 10*3/mm3  6.57  6.44   HEMOGLOBIN g/dL  9.2*  9.6*   PLATELETS 10*3/mm3  214  209               Imaging Results (last 24 hours)     Procedure Component Value Units Date/Time    XR Chest 1 View [[id number]] Collected:  01/07/19 0804     Updated:  01/07/19 0807    Narrative:       PROCEDURE: XR CHEST 1 VW-     HISTORY: Pneumonia; J18.9-Pneumonia, unspecified organism; N18.6-End  stage renal disease; Z99.2-Dependence on renal dialysis     COMPARISON: January 5, 2019.     FINDINGS: The heart is normal in size. The mediastinum is unremarkable.  There are chronic interstitial lung changes. There has been some  interval improvement in the patient's right effusion and right basilar  airspace disease. The left lung is clear. There is no pneumothorax.   There are no acute osseous abnormalities.           Impression:       Slight interval improvement in the patient's right basilar  airspace disease and effusion.     Continued followup is recommended.     This report was finalized on 1/7/2019 8:05 AM by Marilia Mora M.D..          amLODIPine 10 mg Oral Daily   aspirin 81 mg Oral Daily   azithromycin 500 mg Intravenous Q24H    ceftriaxone 1 g Intravenous Q24H   digoxin 125 mcg Oral See Admin Instructions   docusate sodium 100 mg Oral Q12H   hydrALAZINE 50 mg Oral TID   insulin detemir 8 Units Subcutaneous Daily   ipratropium-albuterol 3 mL Nebulization 4x Daily - RT   isosorbide mononitrate 30 mg Oral Daily   levothyroxine 100 mcg Oral Q AM   losartan 100 mg Oral Q24H   metoprolol succinate  mg Oral Daily   pantoprazole 40 mg Oral QAM AC          Medication Review:   Current Facility-Administered Medications   Medication Dose Route Frequency Provider Last Rate Last Dose   • acetaminophen (TYLENOL) tablet 325 mg  325 mg Oral Q4H PRN Leo Pacheco MD       • amLODIPine (NORVASC) tablet 10 mg  10 mg Oral Daily Leo Pacheco MD   10 mg at 01/07/19 0919   • aspirin chewable tablet 81 mg  81 mg Oral Daily Leo Pacheco MD   81 mg at 01/07/19 0919   • AZITHROMYCIN 500 MG/250 ML 0.9% NS IVPB (vial-mate)  500 mg Intravenous Q24H Leo Pacheco MD   Stopped at 01/06/19 1443   • cefTRIAXone (ROCEPHIN) IVPB 1 g/50ml dextrose (premix)  1 g Intravenous Q24H Leo Pacheco MD   Stopped at 01/06/19 2031   • digoxin (LANOXIN) tablet 125 mcg  125 mcg Oral See Admin Instructions Leo Pacheco MD       • docusate sodium (COLACE) capsule 100 mg  100 mg Oral Q12H Chance Mackey MD, FASN   100 mg at 01/07/19 0636   • hydrALAZINE (APRESOLINE) tablet 50 mg  50 mg Oral TID Leo Pacheco MD   50 mg at 01/07/19 0919   • insulin detemir (LEVEMIR) injection 8 Units  8 Units Subcutaneous Daily Leo Pacheco MD   8 Units at 01/07/19 0951   • ipratropium-albuterol (DUO-NEB) nebulizer solution 3 mL  3 mL Nebulization 4x Daily - RT Leo Pcaheco MD   3 mL at 01/07/19 0659   • isosorbide mononitrate (IMDUR) 24 hr tablet 30 mg  30 mg Oral Daily Leo Pacheco MD   30 mg at 01/07/19 0919   • levothyroxine (SYNTHROID, LEVOTHROID) tablet 100 mcg  100 mcg Oral Q AM Leo Pacheco MD   100 mcg at 01/07/19 0636   • losartan (COZAAR) tablet 100  mg  100 mg Oral Q24H Leo Pacheco MD   100 mg at 01/07/19 0919   • metoprolol succinate XL (TOPROL-XL) 24 hr tablet 100 mg  100 mg Oral Daily Leo Pacheco MD   100 mg at 01/07/19 0919   • pantoprazole (PROTONIX) EC tablet 40 mg  40 mg Oral QAM AC Leo Pacheco MD   40 mg at 01/07/19 0636   • sodium chloride 0.9 % bolus 1,000 mL  1,000 mL Intravenous PRN Chance Mackey MD, ARJUN           Assessment/Plan     1. End stage renal disease on dialysis (CMS/[facility])  2. Chronic kidney disease-mineral and bone disorder:  3. Pneumonia of right lower lobe due to infectious organism (CMS/[facility])  4. Type 2 diabetes mellitus treated with insulin (CMS/[facility]) uncontrolled.  5. Anemia of chronic kidney disease  6. Chronic right-sided loculated pleural effusion status post pleurodesis  7. Coronary artery disease with medical management  8. Chronic scrotal ulcer followed by Dr. Macdonald at [facility].  9. Hypertension with end-stage renal disease  10. Hypothyroidism  11. Physical debility     Plan:    · He had to dialysis in a row with 4 L taken off with each dialysis, he looks and feels much better.  · Pulmonary evaluation noted.  · Continue rest of the treatment plan  · Details were discussed with the patient no family in the room.    · Details were also discussed with the hospitalist service.  Clinically appears to have improved significantly may be able to go home today.  He can resume his outpatient dialysis as previously scheduled, I'll see him on his next dialysis.  · Surveillance labs.  · Further recommendations will depend on clinical course of the patient during the current hospitalization.    · I also discussed the details with the nursing staff.  · Rest as ordered.    Chance Mackey MD, ARJUN  01/07/19  10:27 AM    Dictated utilizing Dragon dictation.

## 2019-01-07 NOTE — PROGRESS NOTES
Exercise Oximetry    Patient Name:Talha Cutler   MRN: [id number]   Date: 01/07/19             ROOM AIR BASELINE   SpO2%    Heart Rate    Blood Pressure      EXERCISE ON ROOM AIR SpO2% EXERCISE ON O2 @  LPM SpO2%   1 MINUTE  1 MINUTE    2 MINUTES  2 MINUTES    3 MINUTES  3 MINUTES    4 MINUTES  4 MINUTES    5 MINUTES  5 MINUTES    6 MINUTES  6 MINUTES               Distance Walked   Distance Walked   Dyspnea (Rudy Scale)   Dyspnea (Rudy Scale)   Fatigue (Rudy Scale)   Fatigue (Rudy Scale)   SpO2% Post Exercise   SpO2% Post Exercise   HR Post Exercise   HR Post Exercise   Time to Recovery   Time to Recovery     Comments: Pt's resting O2 sat 100%. Pt is unable to walk, stating he has not walked in years.

## 2019-01-07 NOTE — PLAN OF CARE
Problem: Patient Care Overview  Goal: Plan of Care Review  Outcome: Ongoing (interventions implemented as appropriate)   01/07/19 1727   Coping/Psychosocial   Plan of Care Reviewed With patient   Plan of Care Review   Progress improving

## 2019-01-07 NOTE — PROGRESS NOTES
Adult Nutrition  Assessment/PES    Patient Name:  Talha Cutler  YOB: 1960  MRN: [id number]  Admit Date:  1/5/2019    Assessment Date:  1/7/2019    Comments:  Rec #1: Continue current diet order; Encouraging intake. Pt may benefit from Renal dietary modification. Pt receiving 58%% PO intake over 3 meals. Nutritional supplement ordered Nepro BID to promote PO intake. Rec #2: Consider renal MVI with minerals daily. Rec #3: Continue to monitor/replace electrolytes PRN. Consult RD PRN.       Reason for Assessment     Row Name 01/07/19 1251          Reason for Assessment    Reason For Assessment  diagnosis/disease state;identified at risk by screening criteria     Diagnosis  diabetes diagnosis/complications;renal disease;pulmonary disease;cardiac disease ESRD, HD, CKD, Pneumonia, DM2, Anemia, CAD, Pleural Effusion     Identified At Risk by Screening Criteria  MST SCORE 2+;reduced oral intake over the last month             Labs/Tests/Procedures/Meds     Row Name 01/07/19 1252          Labs/Procedures/Meds    Lab Results Reviewed  reviewed, pertinent     Lab Results Comments  High: Procal, Phos, Creat  Low: Na        Medications    Pertinent Medications Reviewed  reviewed         Physical Findings     Row Name 01/07/19 1253          Physical Findings    Skin  pressure injury         Estimated/Assessed Needs     Row Name 01/07/19 1253          Calculation Measurements    Weight Used For Calculations  70.9 kg (156 lb 4.6 oz) IBW        Estimated/Assessed Needs    Additional Documentation  Fluid Requirements (Group);[place]-St. Jeor Equation (Group);Protein Requirements (Group);Calorie Requirements (Group)        Calorie Requirements    Estimated Calorie Need Method  [place]-St Jeor     Estimated Calorie Requirement Comment  ~7853-1586        KCAL/KG    14 Kcal/Kg (kcal)  992.46     15 Kcal/Kg (kcal)  1063.35     18 Kcal/Kg (kcal)  1276.02     20 Kcal/Kg (kcal)  1417.8     25 Kcal/Kg (kcal)  1772.25     30  Kcal/Kg (kcal)  2126.7     35 Kcal/Kg (kcal)  2481.15     40 Kcal/Kg (kcal)  2835.6     45 Kcal/Kg (kcal)  3190.05     50 Kcal/Kg (kcal)  3544.5        [place]-St. Jeor Equation    RMR ([place]-St. Jeor Equation)  1498.4        Protein Requirements    Est Protein Requirement Amount (gms/kg)  1.4 gm protein 85-99 gm     Estimated Protein Requirements (gms/day)  99.25        Fluid Requirements    Estimated Fluid Requirements (mL/day)  -- Output + 1000 mL     [place]-Segar Method (over 20 kg)  2917.8         Nutrition Prescription Ordered     Row Name 01/07/19 1255          Nutrition Prescription PO    Current PO Diet  Regular     Common Modifiers  Consistent Carbohydrate         Evaluation of Received Nutrient/Fluid Intake     Row Name 01/07/19 1255 01/07/19 1253       Calculation Measurements    Weight Used For Calculations  --  70.9 kg (156 lb 4.6 oz) IBW       PO Evaluation    Number of Days PO Intake Evaluated  2 days  --    Number of Meals  3  --    % PO Intake  58  --        Evaluation of Prescribed Nutrient/Fluid Intake     Row Name 01/07/19 1253          Calculation Measurements    Weight Used For Calculations  70.9 kg (156 lb 4.6 oz) IBW             Problem/Interventions:  Problem 1     Row Name 01/07/19 1256          Nutrition Diagnoses Problem 1    Problem 1  Impaired Nutrient Utilization     Etiology (related to)  Medical Diagnosis     Endocrine  DM2     Renal  CKD;ESRD;Hemodialysis     Signs/Symptoms (evidenced by)  Biochemical     Specific Labs Noted  Creatinine;HgbA1C;Na+;Phosphorus         Problem 2     Row Name 01/07/19 1257          Nutrition Diagnoses Problem 2    Problem 2  Excessive Nutrient Intake Retaining     Macronutrient  Fluid     Etiology (related to)  Medical Diagnosis     Pulmonary/Critical Care  Other (comment) Pleural Effusion     Signs/Symptoms (evidenced by)  Fluid         Problem 3     Row Name 01/07/19 1300          Nutrition Diagnoses Problem 3    Problem 3  Increased Nutrient  Needs     Macronutrient  Kcal;Carbohydrate;Fluid;Protein     Micronutrient  Vitamin;Mineral     Etiology (related to)  Medical Diagnosis     Renal  ESRD;Hemodialysis     Signs/Symptoms (evidenced by)  Other (comment) Pt receiving HD             Intervention Goal     Row Name 01/07/19 1258          Intervention Goal    General  Meet nutritional needs for age/condition     PO  Meet estimated needs;Increase intake;PO intake (%)     PO Intake %  75 %     Weight  Appropriate weight gain         Nutrition Intervention     Row Name 01/07/19 1300          Nutrition Intervention    RD/Tech Action  Follow Tx progress;Care plan reviewd;Encourage intake;Recommend/ordered     Recommended/Ordered  Supplement         Nutrition Prescription     Row Name 01/07/19 1300          Nutrition Prescription PO    PO Prescription  Begin/change supplement     Supplement  Nepro     Supplement Frequency  2 times a day     New PO Prescription Ordered?  Yes        Other Orders    Obtain Weight  Daily     Obtain Weight Ordered?  No, recommended     Supplement  Vitamin mineral supplement     Supplement Ordered?  No, recommended         Education/Evaluation     Row Name 01/07/19 1301          Education    Education  Previous education by RD/CHUY        Monitor/Evaluation    Monitor  Per protocol;I&O;PO intake;Supplement intake;Pertinent labs;Weight           Electronically signed by:  Afshan Grace RD  01/07/19 1:02 PM

## 2019-01-08 ENCOUNTER — READMISSION MANAGEMENT (OUTPATIENT)
Dept: CALL CENTER | Facility: HOSPITAL | Age: 59
End: 2019-01-08

## 2019-01-08 ENCOUNTER — PREP FOR SURGERY (OUTPATIENT)
Dept: OTHER | Facility: HOSPITAL | Age: 59
End: 2019-01-08

## 2019-01-08 DIAGNOSIS — R91.8 ABNORMAL CT SCAN, LUNG: Primary | ICD-10-CM

## 2019-01-08 RX ORDER — SODIUM CHLORIDE 0.9 % (FLUSH) 0.9 %
3-10 SYRINGE (ML) INJECTION AS NEEDED
Status: CANCELLED | OUTPATIENT
Start: 2019-01-08

## 2019-01-08 RX ORDER — SODIUM CHLORIDE 0.9 % (FLUSH) 0.9 %
3 SYRINGE (ML) INJECTION EVERY 12 HOURS SCHEDULED
Status: CANCELLED | OUTPATIENT
Start: 2019-01-08

## 2019-01-08 RX ORDER — SODIUM CHLORIDE 9 MG/ML
42 INJECTION, SOLUTION INTRAVENOUS CONTINUOUS
Status: CANCELLED | OUTPATIENT
Start: 2019-01-08

## 2019-01-08 NOTE — OUTREACH NOTE
Prep Survey      Responses   Facility patient discharged from?  [facility]   Is patient eligible?  Yes   Discharge diagnosis  PNA, T2DM, ESRD on HD   Does the patient have one of the following disease processes/diagnoses(primary or secondary)?  COPD/Pneumonia   Does the patient have Home health ordered?  No   Is there a DME ordered?  No   Medication alerts for this patient  Digoxin change   General alerts for this patient  ESRD on HD   Prep survey completed?  Yes          Colleen Hsu RN

## 2019-01-09 ENCOUNTER — READMISSION MANAGEMENT (OUTPATIENT)
Dept: CALL CENTER | Facility: HOSPITAL | Age: 59
End: 2019-01-09

## 2019-01-09 PROBLEM — R91.8 ABNORMAL CT SCAN, LUNG: Status: ACTIVE | Noted: 2019-01-09

## 2019-01-09 NOTE — OUTREACH NOTE
COPD/PN Week 1 Survey      Responses   Facility patient discharged from?  [facility]   Does the patient have one of the following disease processes/diagnoses(primary or secondary)?  COPD/Pneumonia   Is there a successful TCM telephone encounter documented?  No   Was the primary reason for admission:  Pneumonia   Week 1 attempt successful?  Yes   Call start time  1624   Call end time  1628   General alerts for this patient  ESRD on HD   Discharge diagnosis  PNA, T2DM, ESRD on HD   Is patient permission given to speak with other caregiver?  Yes   Meds reviewed with patient/caregiver?  Yes   Is the patient having any side effects they believe may be caused by any medication additions or changes?  No   Does the patient have all medications ordered at discharge?  Yes   Is the patient taking all medications as directed (includes completed medication regime)?  Yes   Comments regarding appointments  Pulmonary doctor's appt Dec 18   Does the patient have a primary care provider?   Yes   Does the patient have an appointment with their PCP or pulmonologist within 7 days of discharge?  Yes   Has the patient kept scheduled appointments due by today?  Yes   Has home health visited the patient within 72 hours of discharge?  N/A   What DME was ordered?  Home O2 is not newly ordered   Has all DME been delivered?  No   Psychosocial issues?  No   Did the patient receive a copy of their discharge instructions?  Yes   Nursing interventions  Reviewed instructions with patient   What is the patient's perception of their health status since discharge?  Improving   Are the patient's immunizations up to date?   Yes   Nursing interventions  Educated on importance of maintaining up to date immunizations as advised by provider   Is the patient/caregiver able to teach back the hierarchy of who to call/visit for symptoms/problems? PCP, Specialist, Home health nurse, Urgent Care, ED, 911  No   Is the patient/caregiver able to teach back signs and  symptoms of worsening condition:  Fever/chills, Shortness of breath, Chest pain   Is the patient/caregiver able to teach back importance of completing antibiotic course of treatment?  Yes   Week 1 call completed?  Yes   Wrap up additional comments  Pt is having a bronchoscopy for lung mass on 1/10/19. Feels slightly improved. Is undergoing dialysis. Explained we will be checking on him again, just like we did last time. States he has all meds and taking as directed.           Leta Berry RN

## 2019-01-10 ENCOUNTER — ANESTHESIA (OUTPATIENT)
Dept: PERIOP | Facility: HOSPITAL | Age: 59
End: 2019-01-10

## 2019-01-10 ENCOUNTER — HOSPITAL ENCOUNTER (OUTPATIENT)
Facility: HOSPITAL | Age: 59
Setting detail: HOSPITAL OUTPATIENT SURGERY
Discharge: HOME | End: 2019-01-10
Attending: INTERNAL MEDICINE | Admitting: INTERNAL MEDICINE

## 2019-01-10 ENCOUNTER — ANESTHESIA EVENT (OUTPATIENT)
Dept: PERIOP | Facility: HOSPITAL | Age: 59
End: 2019-01-10

## 2019-01-10 ENCOUNTER — APPOINTMENT (OUTPATIENT)
Dept: GENERAL RADIOLOGY | Facility: HOSPITAL | Age: 59
End: 2019-01-10

## 2019-01-10 VITALS
RESPIRATION RATE: 18 BRPM | OXYGEN SATURATION: 93 % | HEART RATE: 55 BPM | HEIGHT: 68 IN | TEMPERATURE: 97.5 F | SYSTOLIC BLOOD PRESSURE: 147 MMHG | WEIGHT: 124 LBS | BODY MASS INDEX: 18.79 KG/M2 | DIASTOLIC BLOOD PRESSURE: 52 MMHG

## 2019-01-10 DIAGNOSIS — R91.8 ABNORMAL CT SCAN, LUNG: ICD-10-CM

## 2019-01-10 LAB
BACTERIA SPEC AEROBE CULT: NORMAL
BACTERIA SPEC AEROBE CULT: NORMAL
GLUCOSE BLDC GLUCOMTR-MCNC: 296 MG/DL (ref 70–130)
GLUCOSE BLDC GLUCOMTR-MCNC: 297 MG/DL (ref 70–130)
GLUCOSE BLDC GLUCOMTR-MCNC: 304 MG/DL (ref 70–130)

## 2019-01-10 PROCEDURE — 25010000002 PROPOFOL 10 MG/ML EMULSION: Performed by: NURSE ANESTHETIST, CERTIFIED REGISTERED

## 2019-01-10 PROCEDURE — 31623 DX BRONCHOSCOPE/BRUSH: CPT | Performed by: INTERNAL MEDICINE

## 2019-01-10 PROCEDURE — 87070 CULTURE OTHR SPECIMN AEROBIC: CPT | Performed by: INTERNAL MEDICINE

## 2019-01-10 PROCEDURE — 63710000001 INSULIN REGULAR HUMAN PER 5 UNITS: Performed by: NURSE ANESTHETIST, CERTIFIED REGISTERED

## 2019-01-10 PROCEDURE — 88305 TISSUE EXAM BY PATHOLOGIST: CPT | Performed by: INTERNAL MEDICINE

## 2019-01-10 PROCEDURE — 88112 CYTOPATH CELL ENHANCE TECH: CPT | Performed by: INTERNAL MEDICINE

## 2019-01-10 PROCEDURE — 31624 DX BRONCHOSCOPE/LAVAGE: CPT | Performed by: INTERNAL MEDICINE

## 2019-01-10 PROCEDURE — 87205 SMEAR GRAM STAIN: CPT | Performed by: INTERNAL MEDICINE

## 2019-01-10 PROCEDURE — 87102 FUNGUS ISOLATION CULTURE: CPT | Performed by: INTERNAL MEDICINE

## 2019-01-10 PROCEDURE — 25010000002 MIDAZOLAM PER 1 MG: Performed by: NURSE ANESTHETIST, CERTIFIED REGISTERED

## 2019-01-10 PROCEDURE — 87116 MYCOBACTERIA CULTURE: CPT | Performed by: INTERNAL MEDICINE

## 2019-01-10 PROCEDURE — 82962 GLUCOSE BLOOD TEST: CPT

## 2019-01-10 PROCEDURE — 71045 X-RAY EXAM CHEST 1 VIEW: CPT

## 2019-01-10 PROCEDURE — 87206 SMEAR FLUORESCENT/ACID STAI: CPT | Performed by: INTERNAL MEDICINE

## 2019-01-10 PROCEDURE — 31628 BRONCHOSCOPY/LUNG BX EACH: CPT | Performed by: INTERNAL MEDICINE

## 2019-01-10 RX ORDER — MIDAZOLAM HYDROCHLORIDE 1 MG/ML
INJECTION INTRAMUSCULAR; INTRAVENOUS AS NEEDED
Status: DISCONTINUED | OUTPATIENT
Start: 2019-01-10 | End: 2019-01-10 | Stop reason: SURG

## 2019-01-10 RX ORDER — LABETALOL HYDROCHLORIDE 5 MG/ML
10 INJECTION, SOLUTION INTRAVENOUS ONCE
Status: COMPLETED | OUTPATIENT
Start: 2019-01-10 | End: 2019-01-10

## 2019-01-10 RX ORDER — MAGNESIUM HYDROXIDE 1200 MG/15ML
LIQUID ORAL AS NEEDED
Status: DISCONTINUED | OUTPATIENT
Start: 2019-01-10 | End: 2019-01-10 | Stop reason: HOSPADM

## 2019-01-10 RX ORDER — KETAMINE HYDROCHLORIDE 50 MG/ML
INJECTION, SOLUTION, CONCENTRATE INTRAMUSCULAR; INTRAVENOUS AS NEEDED
Status: DISCONTINUED | OUTPATIENT
Start: 2019-01-10 | End: 2019-01-10 | Stop reason: SURG

## 2019-01-10 RX ORDER — SODIUM CHLORIDE 0.9 % (FLUSH) 0.9 %
3-10 SYRINGE (ML) INJECTION AS NEEDED
Status: DISCONTINUED | OUTPATIENT
Start: 2019-01-10 | End: 2019-01-10 | Stop reason: HOSPADM

## 2019-01-10 RX ORDER — LIDOCAINE HYDROCHLORIDE 20 MG/ML
INJECTION, SOLUTION INFILTRATION; PERINEURAL AS NEEDED
Status: DISCONTINUED | OUTPATIENT
Start: 2019-01-10 | End: 2019-01-10 | Stop reason: HOSPADM

## 2019-01-10 RX ORDER — CLOPIDOGREL BISULFATE 75 MG/1
75 TABLET ORAL DAILY
Qty: 90 TABLET | Refills: 3 | Status: SHIPPED | OUTPATIENT
Start: 2019-01-10

## 2019-01-10 RX ORDER — PROPOFOL 10 MG/ML
VIAL (ML) INTRAVENOUS AS NEEDED
Status: DISCONTINUED | OUTPATIENT
Start: 2019-01-10 | End: 2019-01-10 | Stop reason: SURG

## 2019-01-10 RX ORDER — DEXTROSE MONOHYDRATE 25 G/50ML
25 INJECTION, SOLUTION INTRAVENOUS
Status: DISCONTINUED | OUTPATIENT
Start: 2019-01-10 | End: 2019-01-10 | Stop reason: HOSPADM

## 2019-01-10 RX ORDER — SODIUM CHLORIDE 0.9 % (FLUSH) 0.9 %
3 SYRINGE (ML) INJECTION EVERY 12 HOURS SCHEDULED
Status: DISCONTINUED | OUTPATIENT
Start: 2019-01-10 | End: 2019-01-10 | Stop reason: HOSPADM

## 2019-01-10 RX ORDER — SODIUM CHLORIDE 9 MG/ML
42 INJECTION, SOLUTION INTRAVENOUS CONTINUOUS
Status: DISCONTINUED | OUTPATIENT
Start: 2019-01-10 | End: 2019-01-10 | Stop reason: HOSPADM

## 2019-01-10 RX ORDER — NICOTINE POLACRILEX 4 MG
1 LOZENGE BUCCAL
Status: DISCONTINUED | OUTPATIENT
Start: 2019-01-10 | End: 2019-01-10 | Stop reason: HOSPADM

## 2019-01-10 RX ADMIN — MIDAZOLAM HYDROCHLORIDE 2 MG: 1 INJECTION, SOLUTION INTRAMUSCULAR; INTRAVENOUS at 12:11

## 2019-01-10 RX ADMIN — PROPOFOL 50 MG: 10 INJECTION, EMULSION INTRAVENOUS at 12:35

## 2019-01-10 RX ADMIN — PROPOFOL 50 MG: 10 INJECTION, EMULSION INTRAVENOUS at 12:20

## 2019-01-10 RX ADMIN — LABETALOL HYDROCHLORIDE 10 MG: 5 INJECTION, SOLUTION INTRAVENOUS at 11:24

## 2019-01-10 RX ADMIN — SODIUM CHLORIDE 42 ML/HR: 9 INJECTION, SOLUTION INTRAVENOUS at 11:18

## 2019-01-10 RX ADMIN — HUMAN INSULIN 4 UNITS: 100 INJECTION, SOLUTION SUBCUTANEOUS at 11:26

## 2019-01-10 RX ADMIN — MIDAZOLAM HYDROCHLORIDE 2 MG: 1 INJECTION, SOLUTION INTRAMUSCULAR; INTRAVENOUS at 12:15

## 2019-01-10 RX ADMIN — KETAMINE HYDROCHLORIDE 10 MG: 50 INJECTION, SOLUTION INTRAMUSCULAR; INTRAVENOUS at 12:20

## 2019-01-10 NOTE — ANESTHESIA PREPROCEDURE EVALUATION
Anesthesia Evaluation     Patient summary reviewed and Nursing notes reviewed   no history of anesthetic complications:  NPO Solid Status: > 8 hours  NPO Liquid Status: > 8 hours           Airway   Mallampati: II  TM distance: >3 FB  No difficulty expected  Dental      Pulmonary    (+) pneumonia worsening , pleural effusion, shortness of breath, recent URI,     ROS comment: 2L 02 at HS  Cardiovascular   Exercise tolerance: poor (<4 METS)    PT is on anticoagulation therapy    (+) hypertension poorly controlled 2 medications or greater, CAD, CHF, PVD,     ROS comment: 03/2016 ECHO  CONCLUSIONS:      1.  The patient had normal chamber dimensions with mild left atrial enlargement     with normal global LV systolic function with calculated ejection fraction of     52% with suggestion of mildly elevated filling pressure based on multiple _____     of diastolic function including _____.      2.  The patient has a PAP of 46 mmHg.  RV dimensions, RV function, and right     sided filling pressure normal.     3.  Patient has mild focal calcification involving the anterior mitral leaflet     with mild-to-moderate mitral insufficiency.     4.  Pericardium normal.       Neuro/Psych  (+) CVA (Left side) residual symptoms, numbness,     GI/Hepatic/Renal/Endo    (+)  GERD,  renal disease dialysis and ESRD, diabetes mellitus poorly controlled,     Musculoskeletal         ROS comment: Wheel chair bound  Abdominal    Substance History      OB/GYN          Other   (+) arthritis                   Anesthesia Plan    ASA 4     MAC   (Risks and benefits discussed including risk of aspiration, recall and dental damage. All patient questions answered. Will continue with POC.)  intravenous induction   Anesthetic plan, all risks, benefits, and alternatives have been provided, discussed and informed consent has been obtained with: patient.    Plan discussed with CRNA.

## 2019-01-10 NOTE — ANESTHESIA POSTPROCEDURE EVALUATION
Patient: Talha Cutler    Procedure Summary     Date:  01/10/19 Room / Location:  [facility] /  [facility]    Anesthesia Start:  1211 Anesthesia Stop:      Procedure:  BRONCHOSCOPY with MAC and Flouro. (N/A Bronchus) Diagnosis:       Abnormal CT scan, lung      (Abnormal CT scan, lung [R91.8])    Surgeon:  Ean Hernandez MD Provider:  Eric Issa CRNA    Anesthesia Type:  MAC ASA Status:  4          Anesthesia Type: MAC  Last vitals  BP   189/54   Temp   97   Pulse   78   Resp 20    SpO2   100     Post Anesthesia Care and Evaluation    Patient location during evaluation: bedside  Patient participation: complete - patient participated  Level of consciousness: awake and alert  Pain score: 0  Pain management: adequate  Airway patency: patent  Anesthetic complications: No anesthetic complications  PONV Status: none  Cardiovascular status: acceptable  Respiratory status: acceptable and nasal cannula  Hydration status: acceptable

## 2019-01-10 NOTE — INTERVAL H&P NOTE
His Plavix has been held for the past 5 days. This was approved by Dr. Ambrosio.     H&P reviewed. The patient was examined and there are no changes to the H&P.       Review of Systems - Negative except cough, cold intolerance, occasional diarrhea.

## 2019-01-10 NOTE — DISCHARGE INSTRUCTIONS
BRONCHOSCOPY AFTER CARE:  WHAT TO EXPECT AFTER THE PROCEDURE  It is normal to have these symptoms 24-48 hours following your procedure.  * increased cough  * low grade fever  * sore throat or hoarse voice  * small streaks of blood in your thick spit(sputum)  HOME CARE INSTRUCTIONS  * Do not eat or drink anything for two hours (or as instructed by your physician) after your procedure.Then for the first 4 hours cool liquids as tolerated. If you try to eat or drink before the medicine wears off, food or drink could go into your lungs or you could burn your self.  After the numbness is gone and your cough and gag reflexes have returned, you may eat soft food and drink room temperature liquids slowly.  * The day after the procedure, you can go back to your normal diet.  * You may resume normal activities.  * You make take tylenol 650 mg for low grade temperature.  * Keep all follow up visits as directed by your health care provider.  SEED IMMEDIATE MEDICAL CARE IF:  * You experience increasing shortness of breath.  * You become light-headed or faint.  * You have chest pain.  * You cough up more than a small amount of blood( a cup in 6 hours)  * The amount of blood you cough up increases.  MAKE SURE YOU:  * Understand these instructions.  * Will watch your condition.  * Will get help right away if you are not doing well (if unable to contact your provider, return to the ED)  * No driving , no alcohol, and no major decisions for 24 hours.  * Avoid cigarettes for at least 24 hours.  Plan to stop smoking!    Please follow all post op instructions and follow up appointment time from your physician's office included in your discharge packet.  .   No pushing, pulling, tugging,  heavy lifting, or strenuous activity.  No major decision making, driving, or drinking alcoholic beverages for 24 hours. ( due to the medications you have  received)  Always use good hand hygiene/washing techniques.  NO driving while taking pain  medications.

## 2019-01-10 NOTE — OP NOTE
Date of Procedure: January 10, 2019       Type:   1. Bronchoscopy with BAL    2. Bronchoscopy with [place] Biopsy    3. Bronchoscopy with Trans- bronchial biopsies under Flouro Guidance      Reason for procedure: Pre-Op Diagnosis Codes:     * Abnormal CT scan, lung [R91.8]     * Recurrent pneumonias in a patient on hemodialysis.  Concern for atypical infections.    Consent: Consent was obtained from the Patient & Family after explanation of the risks and benefits of the procedure. Risks including but not limited to damage to the overlying structures, pneumothorax, bleeding, infection, worsening of respiratory status, requirement for chest tube placement among others were explained.    Patient & Family understood the risks and benefits and agreed to proceed with the procedure.    Time Out: Time out was done with the RN & Bronch Technician at the bedside after confirmation of the site of the procedure and name and date of birth with the patient's ID band.    Details of the procedure:   MAC anesthesia was provided by the anesthesia team. Vital signs were monitored by them, as well. Once under MAC, the oropharynx with lidocaine spray. The bronchoscope was introduced through the oropharynx with immediate visualization of the vocal cords. The vocal cords were adducting and abducting to inspiration and expiration, equally.     Lidocaine was then instilled on the vocal cords and surrounding structures. The bronchoscope was then introduced through the vocal cords with immediate visualization of the trachea.     The trachea was central. The omega was sharp but somewhat inflammed.     Left side was inspected first. The bronchoscope was introduced into the main stem.  The left upper lobe and lower lobe were without any obvious endobronchial lesions.  Apart from minimal erythema and minimal lower lobe secretions, no other abnormality was noted in the left lung.    Next, the right side was evaluated. The bronchoscope was  introduced into the main stem.  The right mainstem showed changes consistent with erythema throughout the lower lobe takeoff and entire lower lobe.  The upper lobe was mostly clear without any significant lesions or erythema.    Biopsy specimens were obtained under fluoroscopic guidance, in a transbronchial fashion from the right lower lobe of the lung.  2 separate sets of biopsy specimens were obtained, one for cultures and the second set was obtained for pathology.    2 separate microbiology [place] biopsies were also obtained from the same location.      A cytology brush biopsy was also obtained from the same subsegment.    Bronchial alveolar lavage was collected during the procedure as well, before and after the biopsies were obtained. A total of 120 mls was instilled and return of approximately 40 mls was obtained .     The samples obtained during the procedure will be sent for Pathology & Cytology. Additionally, cultures have been ordered.    The patient will be monitored by anesthesia. The patient will be discharged home once deemed stable by anesthesia team and if there are no post-procedural complications.     Complications:  No immediate complications noted.  Blood loss of ~10 mls.      Chest X Ray has been ordered.      Post Op Impression:  1.  Abnormal chest CT    2.  Recurrent pneumonia       This document was electronically signed by Ean Hernandez MD January 10, 2019  12:50 PM

## 2019-01-13 LAB
BACTERIA SPEC RESP CULT: NORMAL
GRAM STN SPEC: NORMAL

## 2019-01-16 ENCOUNTER — READMISSION MANAGEMENT (OUTPATIENT)
Dept: CALL CENTER | Facility: HOSPITAL | Age: 59
End: 2019-01-16

## 2019-01-18 LAB
LAB AP CASE REPORT: NORMAL
LAB AP CASE REPORT: NORMAL
LAB AP DIAGNOSIS COMMENT: NORMAL
PATH REPORT.FINAL DX SPEC: NORMAL

## 2019-01-24 ENCOUNTER — READMISSION MANAGEMENT (OUTPATIENT)
Dept: CALL CENTER | Facility: HOSPITAL | Age: 59
End: 2019-01-24

## 2019-01-24 NOTE — OUTREACH NOTE
COPD/PN Week 3 Survey      Responses   Facility patient discharged from?  [facility]   Does the patient have one of the following disease processes/diagnoses(primary or secondary)?  COPD/Pneumonia   Was the primary reason for admission:  Pneumonia   Week 3 attempt successful?  Yes   Call start time  1509   Call end time  1511   Meds reviewed with patient/caregiver?  Yes   Is the patient taking all medications as directed (includes completed medication regime)?  Yes   Has the patient kept scheduled appointments due by today?  Yes   What is the patient's perception of their health status since discharge?  Same   Is the patient able to teach back COPD zones?  No   Week 3 call completed?  Yes   Wrap up additional comments  worried about the results,  and will be seeing his doctor 2/2018          Patti Mancini RN

## 2019-01-31 ENCOUNTER — READMISSION MANAGEMENT (OUTPATIENT)
Dept: CALL CENTER | Facility: HOSPITAL | Age: 59
End: 2019-01-31

## 2019-01-31 NOTE — OUTREACH NOTE
COPD/PN Week 4 Survey      Responses   Facility patient discharged from?  [facility]   Does the patient have one of the following disease processes/diagnoses(primary or secondary)?  COPD/Pneumonia   Was the primary reason for admission:  Pneumonia   Week 4 attempt successful?  No          Jessica Lin RN

## 2019-02-05 ENCOUNTER — HOSPITAL ENCOUNTER (EMERGENCY)
Facility: HOSPITAL | Age: 59
Discharge: HOME | End: 2019-02-05
Attending: EMERGENCY MEDICINE | Admitting: EMERGENCY MEDICINE

## 2019-02-05 ENCOUNTER — APPOINTMENT (OUTPATIENT)
Dept: GENERAL RADIOLOGY | Facility: HOSPITAL | Age: 59
End: 2019-02-05

## 2019-02-05 VITALS
HEART RATE: 87 BPM | BODY MASS INDEX: 18.79 KG/M2 | SYSTOLIC BLOOD PRESSURE: 161 MMHG | WEIGHT: 124 LBS | HEIGHT: 68 IN | TEMPERATURE: 97.5 F | OXYGEN SATURATION: 97 % | DIASTOLIC BLOOD PRESSURE: 99 MMHG | RESPIRATION RATE: 18 BRPM

## 2019-02-05 DIAGNOSIS — R05.9 COUGH: Primary | ICD-10-CM

## 2019-02-05 LAB
ALBUMIN SERPL-MCNC: 4.3 G/DL (ref 3.5–5)
ALBUMIN/GLOB SERPL: 1.3 G/DL (ref 1–2)
ALP SERPL-CCNC: 97 U/L (ref 38–126)
ALT SERPL W P-5'-P-CCNC: 20 U/L (ref 13–69)
ANION GAP SERPL CALCULATED.3IONS-SCNC: 13.6 MMOL/L (ref 10–20)
AST SERPL-CCNC: 55 U/L (ref 15–46)
BASOPHILS # BLD AUTO: 0.06 10*3/MM3 (ref 0–0.2)
BASOPHILS NFR BLD AUTO: 1.1 % (ref 0–2.5)
BILIRUB SERPL-MCNC: 0.8 MG/DL (ref 0.2–1.3)
BUN BLD-MCNC: 14 MG/DL (ref 7–20)
BUN/CREAT SERPL: 5.8 (ref 6.3–21.9)
CALCIUM SPEC-SCNC: 8.8 MG/DL (ref 8.4–10.2)
CHLORIDE SERPL-SCNC: 86 MMOL/L (ref 98–107)
CO2 SERPL-SCNC: 36 MMOL/L (ref 26–30)
CREAT BLD-MCNC: 2.4 MG/DL (ref 0.6–1.3)
DEPRECATED RDW RBC AUTO: 48.9 FL (ref 37–54)
EOSINOPHIL # BLD AUTO: 0.55 10*3/MM3 (ref 0–0.7)
EOSINOPHIL NFR BLD AUTO: 10 % (ref 0–7)
ERYTHROCYTE [DISTWIDTH] IN BLOOD BY AUTOMATED COUNT: 15.6 % (ref 11.5–14.5)
GFR SERPL CREATININE-BSD FRML MDRD: 28 ML/MIN/1.73
GLOBULIN UR ELPH-MCNC: 3.3 GM/DL
GLUCOSE BLD-MCNC: 388 MG/DL (ref 74–98)
HCT VFR BLD AUTO: 25.9 % (ref 42–52)
HGB BLD-MCNC: 9 G/DL (ref 14–18)
HOLD SPECIMEN: NORMAL
IMM GRANULOCYTES # BLD AUTO: 0.03 10*3/MM3 (ref 0–0.06)
IMM GRANULOCYTES NFR BLD AUTO: 0.5 % (ref 0–0.6)
LYMPHOCYTES # BLD AUTO: 0.48 10*3/MM3 (ref 0.6–3.4)
LYMPHOCYTES NFR BLD AUTO: 8.7 % (ref 10–50)
MCH RBC QN AUTO: 31.6 PG (ref 27–31)
MCHC RBC AUTO-ENTMCNC: 34.7 G/DL (ref 30–37)
MCV RBC AUTO: 90.9 FL (ref 80–94)
MONOCYTES # BLD AUTO: 0.65 10*3/MM3 (ref 0–0.9)
MONOCYTES NFR BLD AUTO: 11.8 % (ref 0–12)
NEUTROPHILS # BLD AUTO: 3.73 10*3/MM3 (ref 2–6.9)
NEUTROPHILS NFR BLD AUTO: 67.9 % (ref 37–80)
NRBC BLD AUTO-RTO: 0 /100 WBC (ref 0–0)
PLATELET # BLD AUTO: 204 10*3/MM3 (ref 130–400)
PMV BLD AUTO: 10.3 FL (ref 6–12)
POTASSIUM BLD-SCNC: 4.6 MMOL/L (ref 3.5–5.1)
PROT SERPL-MCNC: 7.6 G/DL (ref 6.3–8.2)
RBC # BLD AUTO: 2.85 10*6/MM3 (ref 4.7–6.1)
SODIUM BLD-SCNC: 131 MMOL/L (ref 137–145)
TROPONIN I SERPL-MCNC: 0.02 NG/ML (ref 0–0.03)
WBC NRBC COR # BLD: 5.5 10*3/MM3 (ref 4.8–10.8)
WHOLE BLOOD HOLD SPECIMEN: NORMAL

## 2019-02-05 PROCEDURE — 71046 X-RAY EXAM CHEST 2 VIEWS: CPT

## 2019-02-05 PROCEDURE — 84484 ASSAY OF TROPONIN QUANT: CPT | Performed by: EMERGENCY MEDICINE

## 2019-02-05 PROCEDURE — 85025 COMPLETE CBC W/AUTO DIFF WBC: CPT | Performed by: EMERGENCY MEDICINE

## 2019-02-05 PROCEDURE — 80053 COMPREHEN METABOLIC PANEL: CPT | Performed by: EMERGENCY MEDICINE

## 2019-02-05 PROCEDURE — 93005 ELECTROCARDIOGRAM TRACING: CPT | Performed by: EMERGENCY MEDICINE

## 2019-02-05 PROCEDURE — 99284 EMERGENCY DEPT VISIT MOD MDM: CPT

## 2019-02-05 NOTE — ED PROVIDER NOTES
Subjective   History of Present Illness  58M w/ hx of CHF, DM, ESRD on HD who finished his bowels since today and presented to the ER for chest tightness.  States that he has had chest as for 2 days, constantly.  Denies any shortness of breath but does have a dry cough as well.  States that he often feels like this prior to having pneumonia.  Denies any other specific symptoms.    Review of Systems   Respiratory: Positive for chest tightness.    All other systems reviewed and are negative.      Past Medical History:   Diagnosis Date   • Abdominal pain    • Anemia    • Cataracts, bilateral    • CHF (congestive heart failure) (CMS/[facility])    • Chronic kidney disease    • Constipation    • Cough    • Dependence on nocturnal oxygen therapy    • Diabetes mellitus (CMS/[facility])    • Diarrhea    • End stage renal failure on dialysis (CMS/[facility])     SILVINO AVITIA, SAT   • GERD (gastroesophageal reflux disease)    • H/O blood clots     LEG/NECK   • H/O chest x-ray 03/25/2016    Interval decrease in size of the patients right pleural effusion with a persistent small left effusion as well   • History of transfusion    • Hypertension    • Left-sided weakness    • Nauseated     DRY HEAVES   • Pleural effusion    • Pneumonia    • Pneumonia    • Renal failure    • Stroke (CMS/[facility]) 2006    LEFT SIDED WEAKNESS   • Swelling    • Teeth missing    • Tinnitus    • Ulcer, stomach peptic     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 LAVAGE, BRUSHINGS AND BIOPSY;  Surgeon: Ean Hernandez MD;  Location: [facility] OR;  Service: Pulmonary   • CARDIAC CATHETERIZATION N/A 3/28/2018    Procedure: Peripheral angiography;  Surgeon: Clay Ruano MD;  Location: [facility] CATH INVASIVE LOCATION;  Service: Cardiovascular  "  • CARDIAC CATHETERIZATION N/A 3/28/2018    Procedure: Angioplasty-peripheral;  Surgeon: Clay Ruano MD;  Location: [facility] CATH INVASIVE LOCATION;  Service: Cardiovascular   • CARDIAC CATHETERIZATION N/A 3/28/2018    Procedure: Atherectomy-peripheral;  Surgeon: Clay Ruano MD;  Location: [facility] CATH INVASIVE LOCATION;  Service: Cardiovascular   • CATARACT EXTRACTION, BILATERAL     • CHOLECYSTECTOMY     • COLONOSCOPY     • EYE SURGERY      BLEEDING BEHING BILATERAL EYES   • INTERVENTIONAL RADIOLOGY PROCEDURE N/A 3/28/2018    Procedure: Abdominal Aortogram;  Surgeon: Clay Ruano MD;  Location: [facility] CATH INVASIVE LOCATION;  Service: Cardiovascular   • PORTACATH PLACEMENT     • SHOULDER MANIPULATION Left     \"FROZEN SHOULDER\"   • VENOUS ACCESS DEVICE (PORT) REMOVAL         Family History   Problem Relation Age of Onset   • COPD Mother    • Diabetes Father    • Hypertension Sister    • Diabetes Sister    • Hypertension Brother    • Diabetes Paternal Grandmother        Social History     Socioeconomic History   • Marital status:      Spouse name: Not on file   • Number of children: Not on file   • Years of education: Not on file   • Highest education level: Not on file   Tobacco Use   • Smoking status: Never Smoker   • Smokeless tobacco: Never Used   • Tobacco comment: 2ND HAND SMOKE EXPOSURE   Substance and Sexual Activity   • Alcohol use: No   • Drug use: No   • Sexual activity: Defer           Objective   Physical Exam   Constitutional: He is oriented to person, place, and time. He appears well-developed. No distress.   Chronically ill-appearing   HENT:   Head: Normocephalic.   Mouth/Throat: Oropharynx is clear and moist.   Eyes: No scleral icterus.   Neck: Normal range of motion. Neck supple. No tracheal deviation present.   Cardiovascular: Normal rate, regular rhythm, normal heart sounds and intact distal pulses. Exam reveals no gallop and no friction rub.   No murmur " heard.  Pulmonary/Chest: Effort normal and breath sounds normal. No stridor. No respiratory distress. He has no wheezes. He has no rales. He exhibits no tenderness.   Abdominal: Soft. He exhibits no distension and no mass. There is no tenderness. There is no rebound and no guarding.   Musculoskeletal: Normal range of motion. He exhibits no deformity.   AVF in RUE w/ palpable thrill. LUE w/ contracture.    Neurological: He is alert and oriented to person, place, and time.   Skin: Skin is warm and dry. No rash noted. He is not diaphoretic. No erythema. No pallor.   Psychiatric: He has a normal mood and affect. His behavior is normal.   Nursing note and vitals reviewed.      ECG 12 Lead    Date/Time: 2/5/2019 2:00 PM  Performed by: Estrada Sorto MD  Authorized by: Estrada Sorto MD   Interpreted by physician  Comparison: compared with previous ECG   Similar to previous ECG  Rhythm: sinus rhythm  Rate: normal  QRS axis: normal  Conduction: conduction normal  ST Depression: V5 and V6  T depression: V6, V5, aVF, II and III  Clinical impression: abnormal ECG                 ED Course                  MDM  58-year-old male here with chest tightness and dry cough.  Afebrile, vital signs stable.  Low suspicion for pneumonia or other emergent cause of his current chest tightness but we'll get an x-ray, basic lab work, EKG, and reassess.    2:56 PM Labs are reassuring with a hemoglobin and white count that are at baseline.  Chest x-ray shows possible right-sided infiltrates but seems improved from previous.  Patient continues to appear well.  Given that he has normal vital signs, and white blood cell count at baseline, fevers or productive cough, I have a low suspicion that he has a new or worsening pneumonia currently.  He does have follow-up with his pulmonologist in 1 week.  I discussed strict return to care precautions and will discharge home.      Final diagnoses:   Cough            Estrada Sorto,  MD  02/05/19 1458

## 2019-02-08 LAB
BACTERIA SPEC AEROBE CULT: NORMAL
FUNGUS SPEC CULT: NORMAL
FUNGUS SPEC FUNGUS STN: NORMAL

## 2019-02-11 ENCOUNTER — OFFICE VISIT (OUTPATIENT)
Dept: PULMONOLOGY | Facility: CLINIC | Age: 59
End: 2019-02-11

## 2019-02-11 VITALS
BODY MASS INDEX: 18.79 KG/M2 | OXYGEN SATURATION: 93 % | HEIGHT: 68 IN | SYSTOLIC BLOOD PRESSURE: 128 MMHG | DIASTOLIC BLOOD PRESSURE: 60 MMHG | WEIGHT: 124 LBS | HEART RATE: 73 BPM | RESPIRATION RATE: 18 BRPM

## 2019-02-11 DIAGNOSIS — J18.9 PNEUMONIA OF RIGHT UPPER LOBE DUE TO INFECTIOUS ORGANISM: ICD-10-CM

## 2019-02-11 DIAGNOSIS — R93.89 ABNORMAL CT OF THE CHEST: ICD-10-CM

## 2019-02-11 DIAGNOSIS — G47.34 NOCTURNAL HYPOXIA: Primary | ICD-10-CM

## 2019-02-11 PROCEDURE — 99214 OFFICE O/P EST MOD 30 MIN: CPT | Performed by: NURSE PRACTITIONER

## 2019-02-11 NOTE — PROGRESS NOTES
"Chief Complaint   Patient presents with   • Follow-up     pnuemonia of right upper lobe due to infectious organism    • Shortness of Breath         Subjective   Talha Cutler is a 58 y.o. male.     History of Present Illness   The patient comes in today for follow up of bronchoscopy results and hospital follow-up.    The patient states he is still having some cough but it is nonproductive.  He continues to feel some congestion.  He denies having any fever or chills.    He has used the nebulizer a few times but does not feel like it helps.    The following portions of the patient's history were reviewed and updated as appropriate: allergies, current medications, past family history, past medical history, past social history and past surgical history.    Review of Systems   Constitutional: Negative for chills and fever.   HENT: Negative for sinus pressure, sneezing and sore throat.    Respiratory: Positive for cough, chest tightness, shortness of breath and wheezing.    Psychiatric/Behavioral: Positive for sleep disturbance.       Objective   Visit Vitals  /60   Pulse 73   Resp 18   Ht 172.7 cm (67.99\")   Wt 56.2 kg (124 lb)   SpO2 93%   BMI 18.86 kg/m²     Physical Exam   Constitutional: He is oriented to person, place, and time. He appears well-developed and well-nourished.   HENT:   Head: Atraumatic.   Eyes: EOM are normal.   Neck: Neck supple.   Cardiovascular: Normal rate and regular rhythm.   Pulmonary/Chest: Effort normal. No respiratory distress.   Somewhat decreased A/E without wheezing noted. Good air movement Right side.   Musculoskeletal: He exhibits no edema.   Neurological: He is alert and oriented to person, place, and time.   Skin: Skin is warm and dry.   Psychiatric: He has a normal mood and affect.   Vitals reviewed.          Assessment/Plan   Talha was seen today for follow-up and shortness of breath.    Diagnoses and all orders for this visit:    Nocturnal hypoxia    Abnormal CT of the " chest    Pneumonia of right upper lobe due to infectious organism (CMS/HCC)           Return in about 3 months (around 5/11/2019) for Recheck, For Dr. Hernandez.    DISCUSSION (if any):  I reviewed the bronchoscopy results and discussed them with the patient in detail.  There is no infection or cancer detected.    I reviewed his most recent chest x-ray from February 5 and it shows improved aeration of the right lung.    I've ask him to continue using oxygen at night and continue using the rescue medication as needed for increased shortness of breath and/or wheezing.      Dictated utilizing Dragon dictation.    This document was electronically signed by ALEXYS Means February 11, 2019  3:33 PM

## 2019-02-22 ENCOUNTER — APPOINTMENT (OUTPATIENT)
Dept: GENERAL RADIOLOGY | Facility: HOSPITAL | Age: 59
End: 2019-02-22

## 2019-02-22 ENCOUNTER — HOSPITAL ENCOUNTER (INPATIENT)
Facility: HOSPITAL | Age: 59
LOS: 4 days | Discharge: HOME | End: 2019-02-26
Attending: EMERGENCY MEDICINE | Admitting: INTERNAL MEDICINE
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
## 2019-02-22 DIAGNOSIS — Z74.09 IMPAIRED FUNCTIONAL MOBILITY, BALANCE, GAIT, AND ENDURANCE: ICD-10-CM

## 2019-02-22 DIAGNOSIS — Z78.9 IMPAIRED MOBILITY AND ADLS: ICD-10-CM

## 2019-02-22 DIAGNOSIS — J18.9 HAP (HOSPITAL-ACQUIRED PNEUMONIA): Primary | ICD-10-CM

## 2019-02-22 DIAGNOSIS — Z74.09 IMPAIRED MOBILITY AND ADLS: ICD-10-CM

## 2019-02-22 LAB
ACID FAST STN SPEC: NEGATIVE
ACID FAST STN SPEC: NEGATIVE
ALBUMIN SERPL-MCNC: 4.2 G/DL (ref 3.5–5)
ALBUMIN/GLOB SERPL: 1.4 G/DL (ref 1–2)
ALP SERPL-CCNC: 80 U/L (ref 38–126)
ALT SERPL W P-5'-P-CCNC: 16 U/L (ref 13–69)
ANION GAP SERPL CALCULATED.3IONS-SCNC: 18.2 MMOL/L (ref 10–20)
AST SERPL-CCNC: 26 U/L (ref 15–46)
BACTERIA SPEC AEROBE CULT: NEGATIVE
BACTERIA SPEC AEROBE CULT: NEGATIVE
BASOPHILS # BLD AUTO: 0.07 10*3/MM3 (ref 0–0.2)
BASOPHILS NFR BLD AUTO: 1.2 % (ref 0–2.5)
BILIRUB SERPL-MCNC: 0.5 MG/DL (ref 0.2–1.3)
BUN BLD-MCNC: 23 MG/DL (ref 7–20)
BUN/CREAT SERPL: 5.8 (ref 6.3–21.9)
CALCIUM SPEC-SCNC: 9.4 MG/DL (ref 8.4–10.2)
CHLORIDE SERPL-SCNC: 85 MMOL/L (ref 98–107)
CO2 SERPL-SCNC: 31 MMOL/L (ref 26–30)
CREAT BLD-MCNC: 4 MG/DL (ref 0.6–1.3)
D-LACTATE SERPL-SCNC: 1.2 MMOL/L (ref 0.5–2)
DEPRECATED RDW RBC AUTO: 52.6 FL (ref 37–54)
DIGOXIN SERPL-MCNC: 1.2 NG/ML (ref 0.8–2)
EOSINOPHIL # BLD AUTO: 0.54 10*3/MM3 (ref 0–0.7)
EOSINOPHIL NFR BLD AUTO: 9.4 % (ref 0–7)
ERYTHROCYTE [DISTWIDTH] IN BLOOD BY AUTOMATED COUNT: 14.7 % (ref 11.5–14.5)
GFR SERPL CREATININE-BSD FRML MDRD: 15 ML/MIN/1.73
GLOBULIN UR ELPH-MCNC: 3 GM/DL
GLUCOSE BLD-MCNC: 585 MG/DL (ref 74–98)
GLUCOSE BLDC GLUCOMTR-MCNC: 315 MG/DL (ref 70–130)
HCT VFR BLD AUTO: 29.1 % (ref 42–52)
HGB BLD-MCNC: 9.5 G/DL (ref 14–18)
HOLD SPECIMEN: NORMAL
HOLD SPECIMEN: NORMAL
IMM GRANULOCYTES # BLD AUTO: 0.02 10*3/MM3 (ref 0–0.06)
IMM GRANULOCYTES NFR BLD AUTO: 0.3 % (ref 0–0.6)
IRON 24H UR-MRATE: 96 MCG/DL (ref 37–181)
IRON SATN MFR SERPL: 43 % (ref 11–46)
LYMPHOCYTES # BLD AUTO: 0.61 10*3/MM3 (ref 0.6–3.4)
LYMPHOCYTES NFR BLD AUTO: 10.6 % (ref 10–50)
MCH RBC QN AUTO: 31.7 PG (ref 27–31)
MCHC RBC AUTO-ENTMCNC: 32.6 G/DL (ref 30–37)
MCV RBC AUTO: 97 FL (ref 80–94)
MONOCYTES # BLD AUTO: 0.73 10*3/MM3 (ref 0–0.9)
MONOCYTES NFR BLD AUTO: 12.7 % (ref 0–12)
NEUTROPHILS # BLD AUTO: 3.76 10*3/MM3 (ref 2–6.9)
NEUTROPHILS NFR BLD AUTO: 65.8 % (ref 37–80)
NRBC BLD AUTO-RTO: 0 /100 WBC (ref 0–0)
NT-PROBNP SERPL-MCNC: ABNORMAL PG/ML (ref 0–125)
PLATELET # BLD AUTO: 157 10*3/MM3 (ref 130–400)
PMV BLD AUTO: 10 FL (ref 6–12)
POTASSIUM BLD-SCNC: 5.2 MMOL/L (ref 3.5–5.1)
PROCALCITONIN SERPL-MCNC: 0.38 NG/ML
PROT SERPL-MCNC: 7.2 G/DL (ref 6.3–8.2)
RBC # BLD AUTO: 3 10*6/MM3 (ref 4.7–6.1)
SODIUM BLD-SCNC: 129 MMOL/L (ref 137–145)
SPECIMEN PREPARATION: NORMAL
SPECIMEN PREPARATION: NORMAL
TIBC SERPL-MCNC: 225 MCG/DL (ref 261–497)
TROPONIN I SERPL-MCNC: 0.03 NG/ML (ref 0–0.03)
TROPONIN I SERPL-MCNC: 0.03 NG/ML (ref 0–0.03)
WBC NRBC COR # BLD: 5.73 10*3/MM3 (ref 4.8–10.8)
WHOLE BLOOD HOLD SPECIMEN: NORMAL
WHOLE BLOOD HOLD SPECIMEN: NORMAL

## 2019-02-22 PROCEDURE — 87040 BLOOD CULTURE FOR BACTERIA: CPT | Performed by: NURSE PRACTITIONER

## 2019-02-22 PROCEDURE — 63710000001 INSULIN REGULAR HUMAN PER 5 UNITS: Performed by: INTERNAL MEDICINE

## 2019-02-22 PROCEDURE — 93005 ELECTROCARDIOGRAM TRACING: CPT | Performed by: EMERGENCY MEDICINE

## 2019-02-22 PROCEDURE — 94799 UNLISTED PULMONARY SVC/PX: CPT

## 2019-02-22 PROCEDURE — 25010000002 ENOXAPARIN PER 10 MG: Performed by: INTERNAL MEDICINE

## 2019-02-22 PROCEDURE — 87081 CULTURE SCREEN ONLY: CPT | Performed by: INTERNAL MEDICINE

## 2019-02-22 PROCEDURE — 83540 ASSAY OF IRON: CPT | Performed by: INTERNAL MEDICINE

## 2019-02-22 PROCEDURE — 84484 ASSAY OF TROPONIN QUANT: CPT | Performed by: INTERNAL MEDICINE

## 2019-02-22 PROCEDURE — 85025 COMPLETE CBC W/AUTO DIFF WBC: CPT | Performed by: EMERGENCY MEDICINE

## 2019-02-22 PROCEDURE — 71046 X-RAY EXAM CHEST 2 VIEWS: CPT

## 2019-02-22 PROCEDURE — 84484 ASSAY OF TROPONIN QUANT: CPT | Performed by: EMERGENCY MEDICINE

## 2019-02-22 PROCEDURE — 83550 IRON BINDING TEST: CPT | Performed by: INTERNAL MEDICINE

## 2019-02-22 PROCEDURE — 99284 EMERGENCY DEPT VISIT MOD MDM: CPT

## 2019-02-22 PROCEDURE — 80162 ASSAY OF DIGOXIN TOTAL: CPT | Performed by: NURSE PRACTITIONER

## 2019-02-22 PROCEDURE — 25010000002 VANCOMYCIN 5 G RECONSTITUTED SOLUTION 5,000 MG VIAL: Performed by: INTERNAL MEDICINE

## 2019-02-22 PROCEDURE — 63710000001 INSULIN ASPART PER 5 UNITS: Performed by: INTERNAL MEDICINE

## 2019-02-22 PROCEDURE — 80053 COMPREHEN METABOLIC PANEL: CPT | Performed by: EMERGENCY MEDICINE

## 2019-02-22 PROCEDURE — 25010000002 PIPERACILLIN SOD-TAZOBACTAM PER 1 G: Performed by: NURSE PRACTITIONER

## 2019-02-22 PROCEDURE — 63710000001 INSULIN DETEMIR PER 5 UNITS: Performed by: INTERNAL MEDICINE

## 2019-02-22 PROCEDURE — 82962 GLUCOSE BLOOD TEST: CPT

## 2019-02-22 PROCEDURE — 83880 ASSAY OF NATRIURETIC PEPTIDE: CPT | Performed by: EMERGENCY MEDICINE

## 2019-02-22 PROCEDURE — 83605 ASSAY OF LACTIC ACID: CPT | Performed by: NURSE PRACTITIONER

## 2019-02-22 PROCEDURE — 99223 1ST HOSP IP/OBS HIGH 75: CPT | Performed by: INTERNAL MEDICINE

## 2019-02-22 PROCEDURE — 94640 AIRWAY INHALATION TREATMENT: CPT

## 2019-02-22 PROCEDURE — 84145 PROCALCITONIN (PCT): CPT | Performed by: NURSE PRACTITIONER

## 2019-02-22 RX ORDER — PROMETHAZINE HYDROCHLORIDE 25 MG/1
12.5 SUPPOSITORY RECTAL EVERY 6 HOURS PRN
Status: DISCONTINUED | OUTPATIENT
Start: 2019-02-22 | End: 2019-02-26 | Stop reason: HOSPADM

## 2019-02-22 RX ORDER — AMLODIPINE BESYLATE 5 MG/1
10 TABLET ORAL DAILY
Status: DISCONTINUED | OUTPATIENT
Start: 2019-02-23 | End: 2019-02-26 | Stop reason: HOSPADM

## 2019-02-22 RX ORDER — DEXTROSE MONOHYDRATE 25 G/50ML
25 INJECTION, SOLUTION INTRAVENOUS
Status: DISCONTINUED | OUTPATIENT
Start: 2019-02-22 | End: 2019-02-26 | Stop reason: HOSPADM

## 2019-02-22 RX ORDER — LANOLIN ALCOHOL/MO/W.PET/CERES
6 CREAM (GRAM) TOPICAL NIGHTLY PRN
Status: DISCONTINUED | OUTPATIENT
Start: 2019-02-22 | End: 2019-02-26 | Stop reason: HOSPADM

## 2019-02-22 RX ORDER — LOSARTAN POTASSIUM 50 MG/1
100 TABLET ORAL
Status: DISCONTINUED | OUTPATIENT
Start: 2019-02-22 | End: 2019-02-26 | Stop reason: HOSPADM

## 2019-02-22 RX ORDER — BISACODYL 10 MG
10 SUPPOSITORY, RECTAL RECTAL DAILY PRN
Status: DISCONTINUED | OUTPATIENT
Start: 2019-02-22 | End: 2019-02-26 | Stop reason: HOSPADM

## 2019-02-22 RX ORDER — SODIUM CHLORIDE 0.9 % (FLUSH) 0.9 %
3 SYRINGE (ML) INJECTION EVERY 12 HOURS SCHEDULED
Status: DISCONTINUED | OUTPATIENT
Start: 2019-02-22 | End: 2019-02-26 | Stop reason: HOSPADM

## 2019-02-22 RX ORDER — HYDRALAZINE HYDROCHLORIDE 25 MG/1
50 TABLET, FILM COATED ORAL 3 TIMES DAILY
Status: DISCONTINUED | OUTPATIENT
Start: 2019-02-22 | End: 2019-02-26 | Stop reason: HOSPADM

## 2019-02-22 RX ORDER — GUAIFENESIN 600 MG/1
600 TABLET, EXTENDED RELEASE ORAL 2 TIMES DAILY
Status: DISCONTINUED | OUTPATIENT
Start: 2019-02-22 | End: 2019-02-26 | Stop reason: HOSPADM

## 2019-02-22 RX ORDER — METOPROLOL SUCCINATE 100 MG/1
100 TABLET, EXTENDED RELEASE ORAL DAILY
Status: DISCONTINUED | OUTPATIENT
Start: 2019-02-22 | End: 2019-02-26 | Stop reason: HOSPADM

## 2019-02-22 RX ORDER — SODIUM CHLORIDE 0.9 % (FLUSH) 0.9 %
3-10 SYRINGE (ML) INJECTION AS NEEDED
Status: DISCONTINUED | OUTPATIENT
Start: 2019-02-22 | End: 2019-02-26 | Stop reason: HOSPADM

## 2019-02-22 RX ORDER — FOLIC ACID 1 MG/1
1 TABLET ORAL DAILY
Status: DISCONTINUED | OUTPATIENT
Start: 2019-02-22 | End: 2019-02-26 | Stop reason: HOSPADM

## 2019-02-22 RX ORDER — PROMETHAZINE HYDROCHLORIDE 12.5 MG/1
12.5 TABLET ORAL EVERY 6 HOURS PRN
Status: DISCONTINUED | OUTPATIENT
Start: 2019-02-22 | End: 2019-02-26 | Stop reason: HOSPADM

## 2019-02-22 RX ORDER — ACETAMINOPHEN 325 MG/1
325 TABLET ORAL EVERY 4 HOURS PRN
Status: DISCONTINUED | OUTPATIENT
Start: 2019-02-22 | End: 2019-02-26 | Stop reason: HOSPADM

## 2019-02-22 RX ORDER — CLOPIDOGREL BISULFATE 75 MG/1
75 TABLET ORAL DAILY
Status: DISCONTINUED | OUTPATIENT
Start: 2019-02-22 | End: 2019-02-26 | Stop reason: HOSPADM

## 2019-02-22 RX ORDER — PROMETHAZINE HYDROCHLORIDE 25 MG/ML
12.5 INJECTION, SOLUTION INTRAMUSCULAR; INTRAVENOUS EVERY 6 HOURS PRN
Status: DISCONTINUED | OUTPATIENT
Start: 2019-02-22 | End: 2019-02-26 | Stop reason: HOSPADM

## 2019-02-22 RX ORDER — FOLIC ACID/VIT B COMPLEX AND C 0.8 MG
1 TABLET ORAL DAILY
Status: DISCONTINUED | OUTPATIENT
Start: 2019-02-23 | End: 2019-02-26 | Stop reason: HOSPADM

## 2019-02-22 RX ORDER — DIGOXIN 125 MCG
125 TABLET ORAL SEE ADMIN INSTRUCTIONS
Status: DISCONTINUED | OUTPATIENT
Start: 2019-02-22 | End: 2019-02-23

## 2019-02-22 RX ORDER — IPRATROPIUM BROMIDE AND ALBUTEROL SULFATE 2.5; .5 MG/3ML; MG/3ML
3 SOLUTION RESPIRATORY (INHALATION)
Status: DISCONTINUED | OUTPATIENT
Start: 2019-02-22 | End: 2019-02-26 | Stop reason: HOSPADM

## 2019-02-22 RX ORDER — SODIUM CHLORIDE 0.9 % (FLUSH) 0.9 %
10 SYRINGE (ML) INJECTION AS NEEDED
Status: DISCONTINUED | OUTPATIENT
Start: 2019-02-22 | End: 2019-02-26 | Stop reason: HOSPADM

## 2019-02-22 RX ORDER — SEVELAMER HYDROCHLORIDE 800 MG/1
800 TABLET, FILM COATED ORAL
Status: DISCONTINUED | OUTPATIENT
Start: 2019-02-23 | End: 2019-02-26 | Stop reason: HOSPADM

## 2019-02-22 RX ORDER — ASPIRIN 81 MG/1
81 TABLET, CHEWABLE ORAL DAILY
Status: DISCONTINUED | OUTPATIENT
Start: 2019-02-23 | End: 2019-02-26 | Stop reason: HOSPADM

## 2019-02-22 RX ORDER — ONDANSETRON 4 MG/1
4 TABLET, ORALLY DISINTEGRATING ORAL EVERY 6 HOURS PRN
Status: DISCONTINUED | OUTPATIENT
Start: 2019-02-22 | End: 2019-02-26 | Stop reason: HOSPADM

## 2019-02-22 RX ORDER — ISOSORBIDE MONONITRATE 30 MG/1
30 TABLET, EXTENDED RELEASE ORAL DAILY
Status: DISCONTINUED | OUTPATIENT
Start: 2019-02-22 | End: 2019-02-26 | Stop reason: HOSPADM

## 2019-02-22 RX ORDER — NICOTINE POLACRILEX 4 MG
1 LOZENGE BUCCAL
Status: DISCONTINUED | OUTPATIENT
Start: 2019-02-22 | End: 2019-02-26 | Stop reason: HOSPADM

## 2019-02-22 RX ORDER — DOCUSATE SODIUM 100 MG/1
100 CAPSULE, LIQUID FILLED ORAL 2 TIMES DAILY
Status: DISCONTINUED | OUTPATIENT
Start: 2019-02-22 | End: 2019-02-26 | Stop reason: HOSPADM

## 2019-02-22 RX ORDER — PANTOPRAZOLE SODIUM 40 MG/1
40 TABLET, DELAYED RELEASE ORAL
Status: DISCONTINUED | OUTPATIENT
Start: 2019-02-23 | End: 2019-02-24

## 2019-02-22 RX ORDER — ACETAMINOPHEN 325 MG/1
650 TABLET ORAL EVERY 4 HOURS PRN
Status: DISCONTINUED | OUTPATIENT
Start: 2019-02-22 | End: 2019-02-26 | Stop reason: HOSPADM

## 2019-02-22 RX ADMIN — VANCOMYCIN HYDROCHLORIDE 1250 MG: 500 INJECTION, POWDER, LYOPHILIZED, FOR SOLUTION INTRAVENOUS at 22:13

## 2019-02-22 RX ADMIN — ENOXAPARIN SODIUM 30 MG: 30 INJECTION SUBCUTANEOUS at 20:29

## 2019-02-22 RX ADMIN — TAZOBACTAM SODIUM AND PIPERACILLIN SODIUM 3.38 G: 375; 3 INJECTION, SOLUTION INTRAVENOUS at 18:39

## 2019-02-22 RX ADMIN — HUMAN INSULIN 8 UNITS: 100 INJECTION, SOLUTION SUBCUTANEOUS at 18:34

## 2019-02-22 RX ADMIN — HYDRALAZINE HYDROCHLORIDE 50 MG: 25 TABLET, FILM COATED ORAL at 20:16

## 2019-02-22 RX ADMIN — INSULIN ASPART 7 UNITS: 100 INJECTION, SOLUTION INTRAVENOUS; SUBCUTANEOUS at 20:18

## 2019-02-22 RX ADMIN — DOCUSATE SODIUM 100 MG: 100 CAPSULE, LIQUID FILLED ORAL at 20:17

## 2019-02-22 RX ADMIN — GUAIFENESIN 600 MG: 600 TABLET, EXTENDED RELEASE ORAL at 20:17

## 2019-02-22 RX ADMIN — IPRATROPIUM BROMIDE AND ALBUTEROL SULFATE 3 ML: .5; 3 SOLUTION RESPIRATORY (INHALATION) at 20:59

## 2019-02-22 RX ADMIN — INSULIN DETEMIR 8 UNITS: 100 INJECTION, SOLUTION SUBCUTANEOUS at 20:18

## 2019-02-22 RX ADMIN — DOXYCYCLINE 100 MG: 100 INJECTION, POWDER, LYOPHILIZED, FOR SOLUTION INTRAVENOUS at 20:29

## 2019-02-22 RX ADMIN — SODIUM CHLORIDE, PRESERVATIVE FREE 3 ML: 5 INJECTION INTRAVENOUS at 20:29

## 2019-02-23 LAB
ANION GAP SERPL CALCULATED.3IONS-SCNC: 18.4 MMOL/L (ref 10–20)
BASOPHILS # BLD AUTO: 0.06 10*3/MM3 (ref 0–0.2)
BASOPHILS NFR BLD AUTO: 1 % (ref 0–2.5)
BUN BLD-MCNC: 29 MG/DL (ref 7–20)
BUN/CREAT SERPL: 6 (ref 6.3–21.9)
CALCIUM SPEC-SCNC: 9.2 MG/DL (ref 8.4–10.2)
CHLORIDE SERPL-SCNC: 87 MMOL/L (ref 98–107)
CO2 SERPL-SCNC: 32 MMOL/L (ref 26–30)
CREAT BLD-MCNC: 4.8 MG/DL (ref 0.6–1.3)
DEPRECATED RDW RBC AUTO: 49.8 FL (ref 37–54)
EOSINOPHIL # BLD AUTO: 0.68 10*3/MM3 (ref 0–0.7)
EOSINOPHIL NFR BLD AUTO: 10.8 % (ref 0–7)
ERYTHROCYTE [DISTWIDTH] IN BLOOD BY AUTOMATED COUNT: 14.5 % (ref 11.5–14.5)
GFR SERPL CREATININE-BSD FRML MDRD: 13 ML/MIN/1.73
GFR SERPL CREATININE-BSD FRML MDRD: ABNORMAL ML/MIN/1.73
GLUCOSE BLD-MCNC: 167 MG/DL (ref 74–98)
GLUCOSE BLDC GLUCOMTR-MCNC: 107 MG/DL (ref 70–130)
GLUCOSE BLDC GLUCOMTR-MCNC: 135 MG/DL (ref 70–130)
GLUCOSE BLDC GLUCOMTR-MCNC: 219 MG/DL (ref 70–130)
GLUCOSE BLDC GLUCOMTR-MCNC: 225 MG/DL (ref 70–130)
HCT VFR BLD AUTO: 27 % (ref 42–52)
HEMOCCULT STL QL: POSITIVE
HGB BLD-MCNC: 9 G/DL (ref 14–18)
IMM GRANULOCYTES # BLD AUTO: 0.02 10*3/MM3 (ref 0–0.06)
IMM GRANULOCYTES NFR BLD AUTO: 0.3 % (ref 0–0.6)
LYMPHOCYTES # BLD AUTO: 0.66 10*3/MM3 (ref 0.6–3.4)
LYMPHOCYTES NFR BLD AUTO: 10.5 % (ref 10–50)
MAGNESIUM SERPL-MCNC: 1.9 MG/DL (ref 1.6–2.3)
MCH RBC QN AUTO: 31.3 PG (ref 27–31)
MCHC RBC AUTO-ENTMCNC: 33.3 G/DL (ref 30–37)
MCV RBC AUTO: 93.8 FL (ref 80–94)
MONOCYTES # BLD AUTO: 0.79 10*3/MM3 (ref 0–0.9)
MONOCYTES NFR BLD AUTO: 12.6 % (ref 0–12)
NEUTROPHILS # BLD AUTO: 4.06 10*3/MM3 (ref 2–6.9)
NEUTROPHILS NFR BLD AUTO: 64.8 % (ref 37–80)
NRBC BLD AUTO-RTO: 0 /100 WBC (ref 0–0)
PLATELET # BLD AUTO: 152 10*3/MM3 (ref 130–400)
PMV BLD AUTO: 10.7 FL (ref 6–12)
POTASSIUM BLD-SCNC: 4.4 MMOL/L (ref 3.5–5.1)
RBC # BLD AUTO: 2.88 10*6/MM3 (ref 4.7–6.1)
SODIUM BLD-SCNC: 133 MMOL/L (ref 137–145)
TROPONIN I SERPL-MCNC: 0.03 NG/ML (ref 0–0.03)
TROPONIN I SERPL-MCNC: 0.03 NG/ML (ref 0–0.03)
TSH SERPL DL<=0.05 MIU/L-ACNC: 3.42 MIU/ML (ref 0.47–4.68)
VANCOMYCIN SERPL-MCNC: 20.84 MCG/ML (ref 5–40)
WBC NRBC COR # BLD: 6.27 10*3/MM3 (ref 4.8–10.8)

## 2019-02-23 PROCEDURE — 83735 ASSAY OF MAGNESIUM: CPT | Performed by: INTERNAL MEDICINE

## 2019-02-23 PROCEDURE — 5A1D70Z PERFORMANCE OF URINARY FILTRATION, INTERMITTENT, LESS THAN 6 HOURS PER DAY: ICD-10-PCS | Performed by: INTERNAL MEDICINE

## 2019-02-23 PROCEDURE — 84443 ASSAY THYROID STIM HORMONE: CPT | Performed by: INTERNAL MEDICINE

## 2019-02-23 PROCEDURE — 83036 HEMOGLOBIN GLYCOSYLATED A1C: CPT | Performed by: HOSPITALIST

## 2019-02-23 PROCEDURE — 25010000002 PIPERACILLIN SOD-TAZOBACTAM PER 1 G: Performed by: INTERNAL MEDICINE

## 2019-02-23 PROCEDURE — 99232 SBSQ HOSP IP/OBS MODERATE 35: CPT | Performed by: HOSPITALIST

## 2019-02-23 PROCEDURE — 63710000001 INSULIN ASPART PER 5 UNITS: Performed by: INTERNAL MEDICINE

## 2019-02-23 PROCEDURE — 94799 UNLISTED PULMONARY SVC/PX: CPT

## 2019-02-23 PROCEDURE — 82962 GLUCOSE BLOOD TEST: CPT

## 2019-02-23 PROCEDURE — 90935 HEMODIALYSIS ONE EVALUATION: CPT

## 2019-02-23 PROCEDURE — 84484 ASSAY OF TROPONIN QUANT: CPT | Performed by: INTERNAL MEDICINE

## 2019-02-23 PROCEDURE — 25010000002 ENOXAPARIN PER 10 MG: Performed by: INTERNAL MEDICINE

## 2019-02-23 PROCEDURE — 80202 ASSAY OF VANCOMYCIN: CPT | Performed by: INTERNAL MEDICINE

## 2019-02-23 PROCEDURE — 25010000002 VANCOMYCIN PER 500 MG: Performed by: HOSPITALIST

## 2019-02-23 PROCEDURE — 85025 COMPLETE CBC W/AUTO DIFF WBC: CPT | Performed by: INTERNAL MEDICINE

## 2019-02-23 PROCEDURE — 63710000001 INSULIN DETEMIR PER 5 UNITS: Performed by: INTERNAL MEDICINE

## 2019-02-23 PROCEDURE — 82272 OCCULT BLD FECES 1-3 TESTS: CPT | Performed by: INTERNAL MEDICINE

## 2019-02-23 PROCEDURE — 80048 BASIC METABOLIC PNL TOTAL CA: CPT | Performed by: INTERNAL MEDICINE

## 2019-02-23 RX ORDER — ENALAPRILAT 2.5 MG/2ML
0.62 INJECTION INTRAVENOUS EVERY 6 HOURS PRN
Status: DISCONTINUED | OUTPATIENT
Start: 2019-02-23 | End: 2019-02-26 | Stop reason: HOSPADM

## 2019-02-23 RX ORDER — SODIUM CHLORIDE FOR INHALATION 0.9 %
VIAL, NEBULIZER (ML) INHALATION
Status: COMPLETED
Start: 2019-02-23 | End: 2019-02-23

## 2019-02-23 RX ORDER — NITROGLYCERIN 0.4 MG/1
0.4 TABLET SUBLINGUAL
Status: DISCONTINUED | OUTPATIENT
Start: 2019-02-23 | End: 2019-02-26 | Stop reason: HOSPADM

## 2019-02-23 RX ORDER — MANNITOL 250 MG/ML
12.5 INJECTION, SOLUTION INTRAVENOUS AS NEEDED
Status: ACTIVE | OUTPATIENT
Start: 2019-02-23 | End: 2019-02-24

## 2019-02-23 RX ORDER — DIGOXIN 125 MCG
125 TABLET ORAL
Status: DISCONTINUED | OUTPATIENT
Start: 2019-02-23 | End: 2019-02-26 | Stop reason: HOSPADM

## 2019-02-23 RX ADMIN — DOXYCYCLINE 100 MG: 100 INJECTION, POWDER, LYOPHILIZED, FOR SOLUTION INTRAVENOUS at 20:51

## 2019-02-23 RX ADMIN — DOCUSATE SODIUM 100 MG: 100 CAPSULE, LIQUID FILLED ORAL at 20:52

## 2019-02-23 RX ADMIN — INSULIN DETEMIR 8 UNITS: 100 INJECTION, SOLUTION SUBCUTANEOUS at 20:51

## 2019-02-23 RX ADMIN — RENAGEL 800 MG: 800 TABLET ORAL at 17:05

## 2019-02-23 RX ADMIN — CHOLECALCIFEROL CAP 125 MCG (5000 UNIT) 5000 UNITS: 125 CAP at 08:31

## 2019-02-23 RX ADMIN — Medication 1 TABLET: at 08:31

## 2019-02-23 RX ADMIN — DOCUSATE SODIUM 100 MG: 100 CAPSULE, LIQUID FILLED ORAL at 08:31

## 2019-02-23 RX ADMIN — RENAGEL 800 MG: 800 TABLET ORAL at 08:31

## 2019-02-23 RX ADMIN — BISACODYL 10 MG: 10 SUPPOSITORY RECTAL at 23:09

## 2019-02-23 RX ADMIN — PANTOPRAZOLE SODIUM 40 MG: 40 TABLET, DELAYED RELEASE ORAL at 06:48

## 2019-02-23 RX ADMIN — TAZOBACTAM SODIUM AND PIPERACILLIN SODIUM 3.38 G: 375; 3 INJECTION, SOLUTION INTRAVENOUS at 17:09

## 2019-02-23 RX ADMIN — DOXYCYCLINE 100 MG: 100 INJECTION, POWDER, LYOPHILIZED, FOR SOLUTION INTRAVENOUS at 08:39

## 2019-02-23 RX ADMIN — HYDRALAZINE HYDROCHLORIDE 50 MG: 25 TABLET, FILM COATED ORAL at 20:52

## 2019-02-23 RX ADMIN — GUAIFENESIN 600 MG: 600 TABLET, EXTENDED RELEASE ORAL at 20:52

## 2019-02-23 RX ADMIN — IPRATROPIUM BROMIDE AND ALBUTEROL SULFATE 3 ML: .5; 3 SOLUTION RESPIRATORY (INHALATION) at 19:56

## 2019-02-23 RX ADMIN — ISODIUM CHLORIDE 3 ML: 0.03 SOLUTION RESPIRATORY (INHALATION) at 06:42

## 2019-02-23 RX ADMIN — VANCOMYCIN HYDROCHLORIDE 500 MG: 500 INJECTION, POWDER, LYOPHILIZED, FOR SOLUTION INTRAVENOUS at 22:29

## 2019-02-23 RX ADMIN — RENAGEL 800 MG: 800 TABLET ORAL at 11:56

## 2019-02-23 RX ADMIN — DIGOXIN 125 MCG: 125 TABLET ORAL at 17:05

## 2019-02-23 RX ADMIN — GUAIFENESIN 600 MG: 600 TABLET, EXTENDED RELEASE ORAL at 08:31

## 2019-02-23 RX ADMIN — SODIUM CHLORIDE, PRESERVATIVE FREE 3 ML: 5 INJECTION INTRAVENOUS at 20:52

## 2019-02-23 RX ADMIN — LOSARTAN POTASSIUM 100 MG: 50 TABLET, FILM COATED ORAL at 08:39

## 2019-02-23 RX ADMIN — HYDRALAZINE HYDROCHLORIDE 50 MG: 25 TABLET, FILM COATED ORAL at 17:05

## 2019-02-23 RX ADMIN — ENOXAPARIN SODIUM 30 MG: 30 INJECTION SUBCUTANEOUS at 20:52

## 2019-02-23 RX ADMIN — CLOPIDOGREL BISULFATE 75 MG: 75 TABLET, FILM COATED ORAL at 08:31

## 2019-02-23 RX ADMIN — TAZOBACTAM SODIUM AND PIPERACILLIN SODIUM 3.38 G: 375; 3 INJECTION, SOLUTION INTRAVENOUS at 04:12

## 2019-02-23 RX ADMIN — IPRATROPIUM BROMIDE AND ALBUTEROL SULFATE 3 ML: .5; 3 SOLUTION RESPIRATORY (INHALATION) at 12:44

## 2019-02-23 RX ADMIN — SODIUM CHLORIDE, PRESERVATIVE FREE 10 ML: 5 INJECTION INTRAVENOUS at 17:09

## 2019-02-23 RX ADMIN — INSULIN ASPART 4 UNITS: 100 INJECTION, SOLUTION INTRAVENOUS; SUBCUTANEOUS at 20:51

## 2019-02-23 RX ADMIN — FOLIC ACID 1 MG: 1 TABLET ORAL at 08:31

## 2019-02-23 RX ADMIN — AMLODIPINE BESYLATE 10 MG: 5 TABLET ORAL at 08:39

## 2019-02-23 RX ADMIN — IPRATROPIUM BROMIDE AND ALBUTEROL SULFATE 3 ML: .5; 3 SOLUTION RESPIRATORY (INHALATION) at 06:41

## 2019-02-23 RX ADMIN — INSULIN DETEMIR 8 UNITS: 100 INJECTION, SOLUTION SUBCUTANEOUS at 08:29

## 2019-02-23 RX ADMIN — ASPIRIN 81 MG 81 MG: 81 TABLET ORAL at 08:31

## 2019-02-23 RX ADMIN — METOPROLOL SUCCINATE 100 MG: 100 TABLET, EXTENDED RELEASE ORAL at 08:39

## 2019-02-23 RX ADMIN — HYDRALAZINE HYDROCHLORIDE 50 MG: 25 TABLET, FILM COATED ORAL at 08:40

## 2019-02-23 RX ADMIN — INSULIN ASPART 4 UNITS: 100 INJECTION, SOLUTION INTRAVENOUS; SUBCUTANEOUS at 11:55

## 2019-02-23 RX ADMIN — ISOSORBIDE MONONITRATE 30 MG: 30 TABLET, EXTENDED RELEASE ORAL at 08:31

## 2019-02-23 RX ADMIN — IPRATROPIUM BROMIDE AND ALBUTEROL SULFATE 3 ML: .5; 3 SOLUTION RESPIRATORY (INHALATION) at 01:42

## 2019-02-24 PROBLEM — D50.0 IRON DEFICIENCY ANEMIA DUE TO CHRONIC BLOOD LOSS: Status: ACTIVE | Noted: 2019-02-24

## 2019-02-24 LAB
ANION GAP SERPL CALCULATED.3IONS-SCNC: 12.6 MMOL/L (ref 10–20)
BASOPHILS # BLD AUTO: 0.05 10*3/MM3 (ref 0–0.2)
BASOPHILS NFR BLD AUTO: 0.9 % (ref 0–2.5)
BUN BLD-MCNC: 18 MG/DL (ref 7–20)
BUN/CREAT SERPL: 5.3 (ref 6.3–21.9)
CALCIUM SPEC-SCNC: 9.2 MG/DL (ref 8.4–10.2)
CHLORIDE SERPL-SCNC: 100 MMOL/L (ref 98–107)
CO2 SERPL-SCNC: 26 MMOL/L (ref 26–30)
CREAT BLD-MCNC: 3.4 MG/DL (ref 0.6–1.3)
DEPRECATED RDW RBC AUTO: 52.8 FL (ref 37–54)
EOSINOPHIL # BLD AUTO: 0.64 10*3/MM3 (ref 0–0.7)
EOSINOPHIL NFR BLD AUTO: 11.2 % (ref 0–7)
ERYTHROCYTE [DISTWIDTH] IN BLOOD BY AUTOMATED COUNT: 14.9 % (ref 11.5–14.5)
GFR SERPL CREATININE-BSD FRML MDRD: 19 ML/MIN/1.73
GLUCOSE BLD-MCNC: 238 MG/DL (ref 74–98)
GLUCOSE BLDC GLUCOMTR-MCNC: 159 MG/DL (ref 70–130)
GLUCOSE BLDC GLUCOMTR-MCNC: 160 MG/DL (ref 70–130)
GLUCOSE BLDC GLUCOMTR-MCNC: 172 MG/DL (ref 70–130)
GLUCOSE BLDC GLUCOMTR-MCNC: 241 MG/DL (ref 70–130)
HCT VFR BLD AUTO: 25.7 % (ref 42–52)
HGB BLD-MCNC: 8.3 G/DL (ref 14–18)
IMM GRANULOCYTES # BLD AUTO: 0.01 10*3/MM3 (ref 0–0.06)
IMM GRANULOCYTES NFR BLD AUTO: 0.2 % (ref 0–0.6)
LYMPHOCYTES # BLD AUTO: 0.59 10*3/MM3 (ref 0.6–3.4)
LYMPHOCYTES NFR BLD AUTO: 10.3 % (ref 10–50)
MCH RBC QN AUTO: 31.3 PG (ref 27–31)
MCHC RBC AUTO-ENTMCNC: 32.3 G/DL (ref 30–37)
MCV RBC AUTO: 97 FL (ref 80–94)
MONOCYTES # BLD AUTO: 0.73 10*3/MM3 (ref 0–0.9)
MONOCYTES NFR BLD AUTO: 12.8 % (ref 0–12)
NEUTROPHILS # BLD AUTO: 3.69 10*3/MM3 (ref 2–6.9)
NEUTROPHILS NFR BLD AUTO: 64.6 % (ref 37–80)
NRBC BLD AUTO-RTO: 0 /100 WBC (ref 0–0)
PLATELET # BLD AUTO: 130 10*3/MM3 (ref 130–400)
PMV BLD AUTO: 10.2 FL (ref 6–12)
POTASSIUM BLD-SCNC: 4.6 MMOL/L (ref 3.5–5.1)
RBC # BLD AUTO: 2.65 10*6/MM3 (ref 4.7–6.1)
SODIUM BLD-SCNC: 134 MMOL/L (ref 137–145)
WBC NRBC COR # BLD: 5.71 10*3/MM3 (ref 4.8–10.8)

## 2019-02-24 PROCEDURE — 63710000001 INSULIN ASPART PER 5 UNITS: Performed by: INTERNAL MEDICINE

## 2019-02-24 PROCEDURE — 99221 1ST HOSP IP/OBS SF/LOW 40: CPT | Performed by: SURGERY

## 2019-02-24 PROCEDURE — 94799 UNLISTED PULMONARY SVC/PX: CPT

## 2019-02-24 PROCEDURE — 80048 BASIC METABOLIC PNL TOTAL CA: CPT | Performed by: HOSPITALIST

## 2019-02-24 PROCEDURE — 99232 SBSQ HOSP IP/OBS MODERATE 35: CPT | Performed by: HOSPITALIST

## 2019-02-24 PROCEDURE — 82962 GLUCOSE BLOOD TEST: CPT

## 2019-02-24 PROCEDURE — 63710000001 INSULIN DETEMIR PER 5 UNITS: Performed by: INTERNAL MEDICINE

## 2019-02-24 PROCEDURE — 85025 COMPLETE CBC W/AUTO DIFF WBC: CPT | Performed by: HOSPITALIST

## 2019-02-24 PROCEDURE — 25010000002 PIPERACILLIN SOD-TAZOBACTAM PER 1 G: Performed by: INTERNAL MEDICINE

## 2019-02-24 PROCEDURE — 97162 PT EVAL MOD COMPLEX 30 MIN: CPT

## 2019-02-24 RX ORDER — PANTOPRAZOLE SODIUM 40 MG/10ML
40 INJECTION, POWDER, LYOPHILIZED, FOR SOLUTION INTRAVENOUS EVERY 12 HOURS SCHEDULED
Status: DISCONTINUED | OUTPATIENT
Start: 2019-02-24 | End: 2019-02-26 | Stop reason: HOSPADM

## 2019-02-24 RX ADMIN — PANTOPRAZOLE SODIUM 40 MG: 40 TABLET, DELAYED RELEASE ORAL at 06:20

## 2019-02-24 RX ADMIN — ONDANSETRON 4 MG: 4 TABLET, ORALLY DISINTEGRATING ORAL at 01:11

## 2019-02-24 RX ADMIN — INSULIN ASPART 2 UNITS: 100 INJECTION, SOLUTION INTRAVENOUS; SUBCUTANEOUS at 11:53

## 2019-02-24 RX ADMIN — RENAGEL 800 MG: 800 TABLET ORAL at 17:12

## 2019-02-24 RX ADMIN — GUAIFENESIN 600 MG: 600 TABLET, EXTENDED RELEASE ORAL at 21:07

## 2019-02-24 RX ADMIN — DOXYCYCLINE 100 MG: 100 INJECTION, POWDER, LYOPHILIZED, FOR SOLUTION INTRAVENOUS at 21:08

## 2019-02-24 RX ADMIN — RENAGEL 800 MG: 800 TABLET ORAL at 11:52

## 2019-02-24 RX ADMIN — IPRATROPIUM BROMIDE AND ALBUTEROL SULFATE 3 ML: .5; 3 SOLUTION RESPIRATORY (INHALATION) at 01:15

## 2019-02-24 RX ADMIN — HYDRALAZINE HYDROCHLORIDE 50 MG: 25 TABLET, FILM COATED ORAL at 17:12

## 2019-02-24 RX ADMIN — CHOLECALCIFEROL CAP 125 MCG (5000 UNIT) 5000 UNITS: 125 CAP at 08:39

## 2019-02-24 RX ADMIN — INSULIN ASPART 4 UNITS: 100 INJECTION, SOLUTION INTRAVENOUS; SUBCUTANEOUS at 06:35

## 2019-02-24 RX ADMIN — PANTOPRAZOLE SODIUM 40 MG: 40 INJECTION, POWDER, FOR SOLUTION INTRAVENOUS at 21:08

## 2019-02-24 RX ADMIN — Medication 1 TABLET: at 08:39

## 2019-02-24 RX ADMIN — LOSARTAN POTASSIUM 100 MG: 50 TABLET, FILM COATED ORAL at 08:39

## 2019-02-24 RX ADMIN — DOXYCYCLINE 100 MG: 100 INJECTION, POWDER, LYOPHILIZED, FOR SOLUTION INTRAVENOUS at 08:37

## 2019-02-24 RX ADMIN — INSULIN DETEMIR 8 UNITS: 100 INJECTION, SOLUTION SUBCUTANEOUS at 08:38

## 2019-02-24 RX ADMIN — IPRATROPIUM BROMIDE AND ALBUTEROL SULFATE 3 ML: .5; 3 SOLUTION RESPIRATORY (INHALATION) at 07:21

## 2019-02-24 RX ADMIN — AMLODIPINE BESYLATE 10 MG: 5 TABLET ORAL at 08:39

## 2019-02-24 RX ADMIN — DOCUSATE SODIUM 100 MG: 100 CAPSULE, LIQUID FILLED ORAL at 08:39

## 2019-02-24 RX ADMIN — TAZOBACTAM SODIUM AND PIPERACILLIN SODIUM 3.38 G: 375; 3 INJECTION, SOLUTION INTRAVENOUS at 18:06

## 2019-02-24 RX ADMIN — HYDRALAZINE HYDROCHLORIDE 50 MG: 25 TABLET, FILM COATED ORAL at 08:39

## 2019-02-24 RX ADMIN — SODIUM CHLORIDE, PRESERVATIVE FREE 3 ML: 5 INJECTION INTRAVENOUS at 21:08

## 2019-02-24 RX ADMIN — ISOSORBIDE MONONITRATE 30 MG: 30 TABLET, EXTENDED RELEASE ORAL at 08:39

## 2019-02-24 RX ADMIN — HYDRALAZINE HYDROCHLORIDE 50 MG: 25 TABLET, FILM COATED ORAL at 21:07

## 2019-02-24 RX ADMIN — IPRATROPIUM BROMIDE AND ALBUTEROL SULFATE 3 ML: .5; 3 SOLUTION RESPIRATORY (INHALATION) at 18:37

## 2019-02-24 RX ADMIN — IPRATROPIUM BROMIDE AND ALBUTEROL SULFATE 3 ML: .5; 3 SOLUTION RESPIRATORY (INHALATION) at 12:59

## 2019-02-24 RX ADMIN — CLOPIDOGREL BISULFATE 75 MG: 75 TABLET, FILM COATED ORAL at 08:39

## 2019-02-24 RX ADMIN — FOLIC ACID 1 MG: 1 TABLET ORAL at 08:39

## 2019-02-24 RX ADMIN — DOCUSATE SODIUM 100 MG: 100 CAPSULE, LIQUID FILLED ORAL at 21:07

## 2019-02-24 RX ADMIN — INSULIN ASPART 2 UNITS: 100 INJECTION, SOLUTION INTRAVENOUS; SUBCUTANEOUS at 21:08

## 2019-02-24 RX ADMIN — METOPROLOL SUCCINATE 100 MG: 100 TABLET, EXTENDED RELEASE ORAL at 08:39

## 2019-02-24 RX ADMIN — ASPIRIN 81 MG 81 MG: 81 TABLET ORAL at 08:39

## 2019-02-24 RX ADMIN — TAZOBACTAM SODIUM AND PIPERACILLIN SODIUM 3.38 G: 375; 3 INJECTION, SOLUTION INTRAVENOUS at 06:20

## 2019-02-24 RX ADMIN — INSULIN ASPART 2 UNITS: 100 INJECTION, SOLUTION INTRAVENOUS; SUBCUTANEOUS at 17:13

## 2019-02-24 RX ADMIN — RENAGEL 800 MG: 800 TABLET ORAL at 08:38

## 2019-02-24 RX ADMIN — INSULIN DETEMIR 8 UNITS: 100 INJECTION, SOLUTION SUBCUTANEOUS at 21:08

## 2019-02-24 RX ADMIN — GUAIFENESIN 600 MG: 600 TABLET, EXTENDED RELEASE ORAL at 08:39

## 2019-02-25 ENCOUNTER — APPOINTMENT (OUTPATIENT)
Dept: GENERAL RADIOLOGY | Facility: HOSPITAL | Age: 59
End: 2019-02-25

## 2019-02-25 LAB
ACINETOBACTER SCREEN CX: NO GROWTH
ANION GAP SERPL CALCULATED.3IONS-SCNC: 17.5 MMOL/L (ref 10–20)
BUN BLD-MCNC: 28 MG/DL (ref 7–20)
BUN/CREAT SERPL: 6 (ref 6.3–21.9)
CALCIUM SPEC-SCNC: 9.7 MG/DL (ref 8.4–10.2)
CHLORIDE SERPL-SCNC: 98 MMOL/L (ref 98–107)
CO2 SERPL-SCNC: 22 MMOL/L (ref 26–30)
CREAT BLD-MCNC: 4.7 MG/DL (ref 0.6–1.3)
GFR SERPL CREATININE-BSD FRML MDRD: 13 ML/MIN/1.73
GFR SERPL CREATININE-BSD FRML MDRD: ABNORMAL ML/MIN/1.73
GLUCOSE BLD-MCNC: 114 MG/DL (ref 74–98)
GLUCOSE BLDC GLUCOMTR-MCNC: 121 MG/DL (ref 70–130)
GLUCOSE BLDC GLUCOMTR-MCNC: 178 MG/DL (ref 70–130)
GLUCOSE BLDC GLUCOMTR-MCNC: 190 MG/DL (ref 70–130)
GLUCOSE BLDC GLUCOMTR-MCNC: 93 MG/DL (ref 70–130)
HBA1C MFR BLD: 10.6 % (ref 3–6)
HGB BLD-MCNC: 8.9 G/DL (ref 14–18)
POTASSIUM BLD-SCNC: 5.5 MMOL/L (ref 3.5–5.1)
PROCALCITONIN SERPL-MCNC: 0.29 NG/ML
SODIUM BLD-SCNC: 132 MMOL/L (ref 137–145)
VANCOMYCIN SERPL-MCNC: 17.22 MCG/ML (ref 5–40)
VRE SPEC QL CULT: NORMAL

## 2019-02-25 PROCEDURE — 99223 1ST HOSP IP/OBS HIGH 75: CPT | Performed by: INTERNAL MEDICINE

## 2019-02-25 PROCEDURE — 84145 PROCALCITONIN (PCT): CPT | Performed by: NURSE PRACTITIONER

## 2019-02-25 PROCEDURE — 99232 SBSQ HOSP IP/OBS MODERATE 35: CPT | Performed by: SURGERY

## 2019-02-25 PROCEDURE — 71046 X-RAY EXAM CHEST 2 VIEWS: CPT

## 2019-02-25 PROCEDURE — 80048 BASIC METABOLIC PNL TOTAL CA: CPT | Performed by: HOSPITALIST

## 2019-02-25 PROCEDURE — 82962 GLUCOSE BLOOD TEST: CPT

## 2019-02-25 PROCEDURE — 85018 HEMOGLOBIN: CPT | Performed by: HOSPITALIST

## 2019-02-25 PROCEDURE — 97165 OT EVAL LOW COMPLEX 30 MIN: CPT

## 2019-02-25 PROCEDURE — 80202 ASSAY OF VANCOMYCIN: CPT | Performed by: INTERNAL MEDICINE

## 2019-02-25 PROCEDURE — 25010000002 VANCOMYCIN PER 500 MG: Performed by: INTERNAL MEDICINE

## 2019-02-25 PROCEDURE — 63710000001 INSULIN DETEMIR PER 5 UNITS: Performed by: INTERNAL MEDICINE

## 2019-02-25 PROCEDURE — 25010000002 PIPERACILLIN SOD-TAZOBACTAM PER 1 G: Performed by: INTERNAL MEDICINE

## 2019-02-25 PROCEDURE — 90935 HEMODIALYSIS ONE EVALUATION: CPT

## 2019-02-25 PROCEDURE — 99232 SBSQ HOSP IP/OBS MODERATE 35: CPT | Performed by: INTERNAL MEDICINE

## 2019-02-25 PROCEDURE — 94799 UNLISTED PULMONARY SVC/PX: CPT

## 2019-02-25 PROCEDURE — 5A1D70Z PERFORMANCE OF URINARY FILTRATION, INTERMITTENT, LESS THAN 6 HOURS PER DAY: ICD-10-PCS | Performed by: INTERNAL MEDICINE

## 2019-02-25 PROCEDURE — 63710000001 INSULIN ASPART PER 5 UNITS: Performed by: INTERNAL MEDICINE

## 2019-02-25 PROCEDURE — 71045 X-RAY EXAM CHEST 1 VIEW: CPT

## 2019-02-25 RX ORDER — MANNITOL 250 MG/ML
12.5 INJECTION, SOLUTION INTRAVENOUS AS NEEDED
Status: DISCONTINUED | OUTPATIENT
Start: 2019-02-25 | End: 2019-02-26 | Stop reason: HOSPADM

## 2019-02-25 RX ADMIN — RENAGEL 800 MG: 800 TABLET ORAL at 09:04

## 2019-02-25 RX ADMIN — DOCUSATE SODIUM 100 MG: 100 CAPSULE, LIQUID FILLED ORAL at 20:50

## 2019-02-25 RX ADMIN — ASPIRIN 81 MG 81 MG: 81 TABLET ORAL at 09:04

## 2019-02-25 RX ADMIN — HYDRALAZINE HYDROCHLORIDE 50 MG: 25 TABLET, FILM COATED ORAL at 20:50

## 2019-02-25 RX ADMIN — Medication 1 TABLET: at 09:04

## 2019-02-25 RX ADMIN — GUAIFENESIN 600 MG: 600 TABLET, EXTENDED RELEASE ORAL at 09:04

## 2019-02-25 RX ADMIN — SODIUM CHLORIDE, PRESERVATIVE FREE 3 ML: 5 INJECTION INTRAVENOUS at 20:51

## 2019-02-25 RX ADMIN — GUAIFENESIN 600 MG: 600 TABLET, EXTENDED RELEASE ORAL at 20:50

## 2019-02-25 RX ADMIN — IPRATROPIUM BROMIDE AND ALBUTEROL SULFATE 3 ML: .5; 3 SOLUTION RESPIRATORY (INHALATION) at 06:48

## 2019-02-25 RX ADMIN — INSULIN DETEMIR 8 UNITS: 100 INJECTION, SOLUTION SUBCUTANEOUS at 09:03

## 2019-02-25 RX ADMIN — CLOPIDOGREL BISULFATE 75 MG: 75 TABLET, FILM COATED ORAL at 09:04

## 2019-02-25 RX ADMIN — IPRATROPIUM BROMIDE AND ALBUTEROL SULFATE 3 ML: .5; 3 SOLUTION RESPIRATORY (INHALATION) at 12:35

## 2019-02-25 RX ADMIN — AMLODIPINE BESYLATE 10 MG: 5 TABLET ORAL at 09:04

## 2019-02-25 RX ADMIN — METOPROLOL SUCCINATE 100 MG: 100 TABLET, EXTENDED RELEASE ORAL at 09:04

## 2019-02-25 RX ADMIN — RENAGEL 800 MG: 800 TABLET ORAL at 17:41

## 2019-02-25 RX ADMIN — DOXYCYCLINE 100 MG: 100 INJECTION, POWDER, LYOPHILIZED, FOR SOLUTION INTRAVENOUS at 09:04

## 2019-02-25 RX ADMIN — HYDRALAZINE HYDROCHLORIDE 50 MG: 25 TABLET, FILM COATED ORAL at 09:04

## 2019-02-25 RX ADMIN — DOCUSATE SODIUM 100 MG: 100 CAPSULE, LIQUID FILLED ORAL at 09:04

## 2019-02-25 RX ADMIN — DOXYCYCLINE 100 MG: 100 INJECTION, POWDER, LYOPHILIZED, FOR SOLUTION INTRAVENOUS at 23:13

## 2019-02-25 RX ADMIN — TAZOBACTAM SODIUM AND PIPERACILLIN SODIUM 3.38 G: 375; 3 INJECTION, SOLUTION INTRAVENOUS at 04:28

## 2019-02-25 RX ADMIN — VANCOMYCIN HYDROCHLORIDE 750 MG: 750 INJECTION, SOLUTION INTRAVENOUS at 17:41

## 2019-02-25 RX ADMIN — SODIUM CHLORIDE, PRESERVATIVE FREE 3 ML: 5 INJECTION INTRAVENOUS at 09:04

## 2019-02-25 RX ADMIN — INSULIN DETEMIR 8 UNITS: 100 INJECTION, SOLUTION SUBCUTANEOUS at 20:51

## 2019-02-25 RX ADMIN — TAZOBACTAM SODIUM AND PIPERACILLIN SODIUM 3.38 G: 375; 3 INJECTION, SOLUTION INTRAVENOUS at 17:41

## 2019-02-25 RX ADMIN — LOSARTAN POTASSIUM 100 MG: 50 TABLET, FILM COATED ORAL at 09:04

## 2019-02-25 RX ADMIN — FOLIC ACID 1 MG: 1 TABLET ORAL at 09:04

## 2019-02-25 RX ADMIN — CHOLECALCIFEROL CAP 125 MCG (5000 UNIT) 5000 UNITS: 125 CAP at 09:04

## 2019-02-25 RX ADMIN — PANTOPRAZOLE SODIUM 40 MG: 40 INJECTION, POWDER, FOR SOLUTION INTRAVENOUS at 09:04

## 2019-02-25 RX ADMIN — HYDRALAZINE HYDROCHLORIDE 50 MG: 25 TABLET, FILM COATED ORAL at 17:41

## 2019-02-25 RX ADMIN — ISOSORBIDE MONONITRATE 30 MG: 30 TABLET, EXTENDED RELEASE ORAL at 09:04

## 2019-02-25 RX ADMIN — PANTOPRAZOLE SODIUM 40 MG: 40 INJECTION, POWDER, FOR SOLUTION INTRAVENOUS at 20:51

## 2019-02-25 RX ADMIN — INSULIN ASPART 2 UNITS: 100 INJECTION, SOLUTION INTRAVENOUS; SUBCUTANEOUS at 20:51

## 2019-02-26 VITALS
RESPIRATION RATE: 16 BRPM | BODY MASS INDEX: 20.53 KG/M2 | SYSTOLIC BLOOD PRESSURE: 139 MMHG | OXYGEN SATURATION: 100 % | TEMPERATURE: 97.7 F | DIASTOLIC BLOOD PRESSURE: 69 MMHG | HEART RATE: 78 BPM | WEIGHT: 135.44 LBS | HEIGHT: 68 IN

## 2019-02-26 PROBLEM — J18.9 HAP (HOSPITAL-ACQUIRED PNEUMONIA): Status: RESOLVED | Noted: 2019-02-22 | Resolved: 2019-02-26
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
## 2019-02-26 LAB
ALBUMIN SERPL-MCNC: 3.7 G/DL (ref 3.5–5)
ANION GAP SERPL CALCULATED.3IONS-SCNC: 14.4 MMOL/L (ref 10–20)
BUN BLD-MCNC: 16 MG/DL (ref 7–20)
BUN/CREAT SERPL: 5 (ref 6.3–21.9)
CALCIUM SPEC-SCNC: 9.5 MG/DL (ref 8.4–10.2)
CHLORIDE SERPL-SCNC: 101 MMOL/L (ref 98–107)
CO2 SERPL-SCNC: 24 MMOL/L (ref 26–30)
CREAT BLD-MCNC: 3.2 MG/DL (ref 0.6–1.3)
GFR SERPL CREATININE-BSD FRML MDRD: 20 ML/MIN/1.73
GLUCOSE BLD-MCNC: 101 MG/DL (ref 74–98)
GLUCOSE BLDC GLUCOMTR-MCNC: 107 MG/DL (ref 70–130)
GLUCOSE BLDC GLUCOMTR-MCNC: 191 MG/DL (ref 70–130)
PHOSPHATE SERPL-MCNC: 5.4 MG/DL (ref 2.5–4.5)
POTASSIUM BLD-SCNC: 4.4 MMOL/L (ref 3.5–5.1)
SODIUM BLD-SCNC: 135 MMOL/L (ref 137–145)

## 2019-02-26 PROCEDURE — 99232 SBSQ HOSP IP/OBS MODERATE 35: CPT | Performed by: INTERNAL MEDICINE

## 2019-02-26 PROCEDURE — 97110 THERAPEUTIC EXERCISES: CPT

## 2019-02-26 PROCEDURE — 25010000002 PIPERACILLIN SOD-TAZOBACTAM PER 1 G: Performed by: INTERNAL MEDICINE

## 2019-02-26 PROCEDURE — 94799 UNLISTED PULMONARY SVC/PX: CPT

## 2019-02-26 PROCEDURE — 80069 RENAL FUNCTION PANEL: CPT | Performed by: INTERNAL MEDICINE

## 2019-02-26 PROCEDURE — 63710000001 INSULIN ASPART PER 5 UNITS: Performed by: INTERNAL MEDICINE

## 2019-02-26 PROCEDURE — 99239 HOSP IP/OBS DSCHRG MGMT >30: CPT | Performed by: INTERNAL MEDICINE

## 2019-02-26 PROCEDURE — 63710000001 INSULIN DETEMIR PER 5 UNITS: Performed by: INTERNAL MEDICINE

## 2019-02-26 PROCEDURE — 82962 GLUCOSE BLOOD TEST: CPT

## 2019-02-26 RX ORDER — SODIUM CHLORIDE FOR INHALATION 3 %
4 VIAL, NEBULIZER (ML) INHALATION ONCE
Status: DISCONTINUED | OUTPATIENT
Start: 2019-02-23 | End: 2019-02-26 | Stop reason: HOSPADM

## 2019-02-26 RX ADMIN — GUAIFENESIN 600 MG: 600 TABLET, EXTENDED RELEASE ORAL at 08:40

## 2019-02-26 RX ADMIN — FOLIC ACID 1 MG: 1 TABLET ORAL at 08:40

## 2019-02-26 RX ADMIN — CHOLECALCIFEROL CAP 125 MCG (5000 UNIT) 5000 UNITS: 125 CAP at 08:40

## 2019-02-26 RX ADMIN — ASPIRIN 81 MG 81 MG: 81 TABLET ORAL at 08:40

## 2019-02-26 RX ADMIN — RENAGEL 800 MG: 800 TABLET ORAL at 11:46

## 2019-02-26 RX ADMIN — SODIUM CHLORIDE, PRESERVATIVE FREE 3 ML: 5 INJECTION INTRAVENOUS at 08:42

## 2019-02-26 RX ADMIN — ISOSORBIDE MONONITRATE 30 MG: 30 TABLET, EXTENDED RELEASE ORAL at 08:40

## 2019-02-26 RX ADMIN — IPRATROPIUM BROMIDE AND ALBUTEROL SULFATE 3 ML: .5; 3 SOLUTION RESPIRATORY (INHALATION) at 06:59

## 2019-02-26 RX ADMIN — Medication 1 TABLET: at 08:40

## 2019-02-26 RX ADMIN — RENAGEL 800 MG: 800 TABLET ORAL at 08:40

## 2019-02-26 RX ADMIN — HYDRALAZINE HYDROCHLORIDE 50 MG: 25 TABLET, FILM COATED ORAL at 08:39

## 2019-02-26 RX ADMIN — TAZOBACTAM SODIUM AND PIPERACILLIN SODIUM 3.38 G: 375; 3 INJECTION, SOLUTION INTRAVENOUS at 04:14

## 2019-02-26 RX ADMIN — CLOPIDOGREL BISULFATE 75 MG: 75 TABLET, FILM COATED ORAL at 08:40

## 2019-02-26 RX ADMIN — PANTOPRAZOLE SODIUM 40 MG: 40 INJECTION, POWDER, FOR SOLUTION INTRAVENOUS at 08:40

## 2019-02-26 RX ADMIN — IPRATROPIUM BROMIDE AND ALBUTEROL SULFATE 3 ML: .5; 3 SOLUTION RESPIRATORY (INHALATION) at 13:05

## 2019-02-26 RX ADMIN — IPRATROPIUM BROMIDE AND ALBUTEROL SULFATE 3 ML: .5; 3 SOLUTION RESPIRATORY (INHALATION) at 02:26

## 2019-02-26 RX ADMIN — LOSARTAN POTASSIUM 100 MG: 50 TABLET, FILM COATED ORAL at 08:40

## 2019-02-26 RX ADMIN — DOCUSATE SODIUM 100 MG: 100 CAPSULE, LIQUID FILLED ORAL at 08:41

## 2019-02-26 RX ADMIN — METOPROLOL SUCCINATE 100 MG: 100 TABLET, EXTENDED RELEASE ORAL at 08:40

## 2019-02-26 RX ADMIN — AMLODIPINE BESYLATE 10 MG: 5 TABLET ORAL at 08:40

## 2019-02-26 RX ADMIN — INSULIN DETEMIR 8 UNITS: 100 INJECTION, SOLUTION SUBCUTANEOUS at 08:15

## 2019-02-26 RX ADMIN — DOXYCYCLINE 100 MG: 100 INJECTION, POWDER, LYOPHILIZED, FOR SOLUTION INTRAVENOUS at 10:16

## 2019-02-26 RX ADMIN — INSULIN ASPART 2 UNITS: 100 INJECTION, SOLUTION INTRAVENOUS; SUBCUTANEOUS at 11:46

## 2019-02-27 ENCOUNTER — READMISSION MANAGEMENT (OUTPATIENT)
Dept: CALL CENTER | Facility: HOSPITAL | Age: 59
End: 2019-02-27

## 2019-02-27 LAB
ACID FAST STN SPEC: NEGATIVE
BACTERIA SPEC AEROBE CULT: NEGATIVE
BACTERIA SPEC AEROBE CULT: NORMAL
BACTERIA SPEC AEROBE CULT: NORMAL
MRSA SPEC QL CULT: NORMAL
SPECIMEN PREPARATION: NORMAL

## 2019-02-27 NOTE — OUTREACH NOTE
Prep Survey      Responses   Facility patient discharged from?  [facility]   Is patient eligible?  Yes   Discharge diagnosis  Bilateral UL HAP   Does the patient have one of the following disease processes/diagnoses(primary or secondary)?  COPD/Pneumonia   Does the patient have Home health ordered?  No   Is there a DME ordered?  No   Prep survey completed?  Yes          Concha Harper RN

## 2019-03-01 ENCOUNTER — READMISSION MANAGEMENT (OUTPATIENT)
Dept: CALL CENTER | Facility: HOSPITAL | Age: 59
End: 2019-03-01

## 2019-03-01 NOTE — OUTREACH NOTE
COPD/PN Week 1 Survey      Responses   Facility patient discharged from?  [facility]   Does the patient have one of the following disease processes/diagnoses(primary or secondary)?  COPD/Pneumonia   Is there a successful TCM telephone encounter documented?  No   Was the primary reason for admission:  Pneumonia [History of COPD as well]   Week 1 attempt successful?  Yes   Call start time  0851   Call end time  0857   General alerts for this patient  HD MWF   Discharge diagnosis  Bilateral UL HAP   Meds reviewed with patient/caregiver?  Yes   Is the patient having any side effects they believe may be caused by any medication additions or changes?  No   Does the patient have all medications ordered at discharge?  Yes   Is the patient taking all medications as directed (includes completed medication regime)?  Yes   Comments regarding appointments  Pulmonary appt May 15    Does the patient have a primary care provider?   Yes   Does the patient have an appointment with their PCP or pulmonologist within 7 days of discharge?  Greater than 7 days   Comments regarding PCP  Dr Kay is PCP . Has followup on March 12 2019   What is preventing the patient from scheduling follow up appointments within 7 days of discharge?  Earlier appointment not available   Nursing Interventions  Verified appointment date/time/provider, Educated patient on importance of making appointment, Advised patient to make appointment   Has the patient kept scheduled appointments due by today?  N/A   Has home health visited the patient within 72 hours of discharge?  N/A   What DME was ordered?  Is on home O2   Comments  -   Nursing interventions  Reviewed instructions with patient   What is the patient's perception of their health status since discharge?  Improving   Nursing Interventions  Nurse provided patient education   Are the patient's immunizations up to date?   Yes   Nursing interventions  Educated on importance of maintaining up to date  immunizations as advised by provider   Is the patient/caregiver able to teach back the hierarchy of who to call/visit for symptoms/problems? PCP, Specialist, Home health nurse, Urgent Care, ED, 911  Yes   Is the patient able to teach back COPD zones?  Yes   Nursing interventions  Education provided on various zones   Patient reports what zone on this call?  Yellow Zone   Yellow Zone  Increased shortness of air, Unable to complete daily activities, Increased or thicker phlegm/mucus, Using quick relief inhaler/nebulizer more often, Increased swelling of ankles   Yellow interventions  Continue to use daily medications, Use quick relief inhaler as ordered, Use oxygen as ordered, Get plenty of rest, Call provider immediatly if symptoms do not improve   Is the patient/caregiver able to teach back signs and symptoms of worsening condition:  Fever/chills, Shortness of breath, Chest pain   Is the patient/caregiver able to teach back importance of completing antibiotic course of treatment?  Yes   Week 1 call completed?  Yes   Wrap up additional comments  Patient reports he is feeling better. Breathing has improved. Using O2 as needed.           Jose Du RN

## 2019-03-08 NOTE — OUTREACH NOTE
COPD/PN Week 2 Survey      Responses   Facility patient discharged from?  [facility]   Does the patient have one of the following disease processes/diagnoses(primary or secondary)?  COPD/Pneumonia   Was the primary reason for admission:  Pneumonia   Week 2 attempt successful?  No   Unsuccessful attempts  Attempt 1          Erika Ricardo RN

## 2019-03-09 NOTE — OUTREACH NOTE
COPD/PN Week 2 Survey      Responses   Facility patient discharged from?  [facility]   Does the patient have one of the following disease processes/diagnoses(primary or secondary)?  COPD/Pneumonia   Was the primary reason for admission:  Pneumonia   Week 2 attempt successful?  Yes   Call start time  1530   Call end time  1535   General alerts for this patient  Dialysis Tues, Thurs, Saturday   Discharge diagnosis  Bilateral UL HAP, ESRD on HD   Is patient permission given to speak with other caregiver?  No   Meds reviewed with patient/caregiver?  Yes   Is the patient having any side effects they believe may be caused by any medication additions or changes?  No   Does the patient have all medications ordered at discharge?  Yes   Is the patient taking all medications as directed (includes completed medication regime)?  Yes   Does the patient have a primary care provider?   Yes   Does the patient have an appointment with their PCP or pulmonologist within 7 days of discharge?  Yes   Comments regarding PCP  Dr Kay is PCP . Has followup on March 12 2019   Has the patient kept scheduled appointments due by today?  Yes   Has home health visited the patient within 72 hours of discharge?  N/A   What DME was ordered?  Is on home O2   Psychosocial issues?  No   Did the patient receive a copy of their discharge instructions?  Yes   Nursing interventions  Reviewed instructions with patient   What is the patient's perception of their health status since discharge?  Improving   Is the patient/caregiver able to teach back the hierarchy of who to call/visit for symptoms/problems? PCP, Specialist, Home health nurse, Urgent Care, ED, 911  Yes   Is the patient/caregiver able to teach back signs and symptoms of worsening condition:  Fever/chills, Shortness of breath, Chest pain   Is the patient/caregiver able to teach back importance of completing antibiotic course of treatment?  Yes   Week 2 call completed?  Yes          Mallika DEVRIES  Gerry, RN

## 2019-03-18 NOTE — OUTREACH NOTE
COPD/PN Week 3 Survey      Responses   Facility patient discharged from?  [facility]   Does the patient have one of the following disease processes/diagnoses(primary or secondary)?  COPD/Pneumonia   Week 3 attempt successful?  Yes   Call start time  0750   Call end time  0752   Meds reviewed with patient/caregiver?  Yes   Is the patient taking all medications as directed (includes completed medication regime)?  Yes   Has the patient kept scheduled appointments due by today?  Yes   What is the patient's perception of their health status since discharge?  Improving   Is the patient able to teach back COPD zones?  Yes   Patient reports what zone on this call?  Green Zone   Green Zone  Reports doing well, Sleeping well, Usual activity and exercise level, Usual amount of phlegm/mucus without difficulty coughing up, Breathing without shortness of breath   Green Zone interventions:  Take daily medications, Continue regular exercise/diet plan   Is the patient/caregiver able to teach back signs and symptoms of worsening condition:  Fever/chills   Is the patient/caregiver able to teach back importance of completing antibiotic course of treatment?  Yes   Week 3 call completed?  Yes   Revoked  No further contact(revokes)-requires comment   Wrap up additional comments  He says no new issues, no real need for calls, is doing much better.           Erika Gracia RN

## 2019-05-15 NOTE — PROGRESS NOTES
"Chief Complaint   Patient presents with   • Follow-up     pneumonia of right upper lobe due to infectious organism    • Sleeping Problem         Subjective   Talha Cutler is a 58 y.o. male.     History of Present Illness   Patient comes in today to follow-up for abnormal CT of the chest.  He was also sent in by his cardiologist to be evaluated for possible sleep apnea.    Upon questioning, he is complaining of sleep disturbance. Patient says that for the past few years, he has had trouble with snoring. Patient has also been told that he sometimes quits breathing at night.       Patient says that he feels tired in the morning after waking up. he is also complaining of occasionally falling asleep while watching TV and sometimes while reading a book.    he is complaining of occasional headaches.    Patient's sleep schedule was reviewed.     Patient is under management for hypertension & ESRD.    Brother and sister have sleep apnea.     The following portions of the patient's history were reviewed and updated as appropriate: allergies, current medications, past family history, past medical history, past social history and past surgical history.    Review of Systems   Constitutional: Negative for chills and fever.   HENT: Negative for sinus pressure and sore throat.    Respiratory: Positive for cough. Negative for shortness of breath.    Psychiatric/Behavioral: Positive for sleep disturbance.       Objective   Visit Vitals  /64   Pulse 67   Resp 18   Ht 172.7 cm (67.99\")   Wt 54.4 kg (120 lb)   SpO2 96%   BMI 18.25 kg/m²       Physical Exam   Constitutional: He is oriented to person, place, and time. He appears well-developed and well-nourished.   HENT:   Head: Atraumatic.   Crowded oropharynx.    Eyes: EOM are normal.   Neck: Neck supple. No JVD present.   Cardiovascular: Normal rate, regular rhythm and normal heart sounds.   No murmur heard.  Pulmonary/Chest: Effort normal. He has rales (Right basal.). "   Musculoskeletal:   Used a wheelchair.   Neurological: He is alert and oriented to person, place, and time.   Left-sided weakness noted.   Skin: Skin is warm and dry.   Psychiatric: He has a normal mood and affect.   Vitals reviewed.      Assessment/Plan   Talha was seen today for follow-up and sleeping problem.    Diagnoses and all orders for this visit:    Abnormal CT of the chest    Pneumonia of right upper lobe due to infectious organism (CMS/HCC)    Nocturnal hypoxia  -     Home Sleep Study; Future    Snoring  -     Home Sleep Study; Future    Obstructive sleep apnea  -     Home Sleep Study; Future           Return in about 8 weeks (around 7/10/2019) for Recheck, Give pt sleep questionairre, Sleep study, For Kiera.    DISCUSSION (if any):  Laboratory workup was also reviewed which showed   Lab Results   Component Value Date    CO2 24.0 (L) 02/26/2019     ===========================  ===========================    As far as right-sided abnormality is concerned, it is most remained stable and the patient does not demonstrate any further symptoms.  His residual changes are likely chronic from his previous history of pneumonia and indwelling tube placement.    Sleep questionnaire was provided to the patient    The pathophysiology of sleep apnea was discussed, with the patient.     We will encourage him to schedule the sleep study soon.     The patient was made aware of the limitation of the home sleep study, whereby it may underestimate the true AHI and also carries a low sensitivity.  I have informed him that even if the home sleep study is negative, we may suggest an in lab sleep study to completely and definitively rule out/in sleep apnea.  The patient has understood.  This was communicated to the patient, in case home study is to be requested.    It appears that the suspicion of sleep apnea in this patient is high therefore the home study is negative, then he will definitely need an in lab sleep study.    The  patient is agreeable to try CPAP/BiPAP, if needed.     Patient was educated on good sleep hygiene measures and voiced understanding of the same.       Dictated utilizing Dragon dictation.    This document was electronically signed by Ean Hernandez MD on 05/15/19 at 2:11 PM

## 2019-08-16 NOTE — ED PROVIDER NOTES
Subjective   59-year-old male well-known to this emergency department who presents with concerns for feeling short of breath and weak.  The patient is in end-stage renal disease patient on hemodialysis who missed dialysis today because his fistula was blocked he spent the day at [facility] having a procedure to have this unclogged.  The patient states that it was approximately 5:00 before they left the hospital.  They told him that they were unable to do dialysis on him today and that he should come in in 2 days as planned.  Patient states after he got home he felt short of breath and wanted to come be checked to make sure he would be okay to wait for 2 more days.  Currently his shortness of breath is worse with exertion and better when he rests.  States that he has to sleep sitting up.            Review of Systems   All other systems reviewed and are negative.      Past Medical History:   Diagnosis Date   • Abdominal pain    • Anemia    • Cataracts, bilateral    • CHF (congestive heart failure) (CMS/[facility])    • Chronic kidney disease    • Constipation    • Cough    • Dependence on nocturnal oxygen therapy    • Diabetes mellitus (CMS/[facility])    • Diarrhea    • End stage renal failure on dialysis (CMS/[facility])     SILVINO AVITIA, SAT   • GERD (gastroesophageal reflux disease)    • H/O blood clots     LEG/NECK   • H/O chest x-ray 03/25/2016    Interval decrease in size of the patients right pleural effusion with a persistent small left effusion as well   • History of transfusion    • Hypertension    • Left-sided weakness    • Nauseated     DRY HEAVES   • Pleural effusion    • Pneumonia    • Pneumonia    • Renal failure    • Stroke (CMS/[facility]) 2006    LEFT SIDED WEAKNESS   • Swelling    • Teeth missing    • Tinnitus    • Ulcer, stomach peptic     HISTORY OF ULCER AS A TEENAGER   • Wears glasses        Allergies   Allergen Reactions   • Erythromycin Hives       Past Surgical History:   Procedure Laterality Date   •  "ARTERIOVENOUS FISTULA Right    • BRONCHOSCOPY N/A 1/10/2019    Procedure: BRONCHOSCOPY with MAC and Flouro. LAVAGE, BRUSHINGS AND BIOPSY;  Surgeon: Ean Hernandez MD;  Location: [facility] OR;  Service: Pulmonary   • CARDIAC CATHETERIZATION N/A 3/28/2018    Procedure: Peripheral angiography;  Surgeon: Clay Ruano MD;  Location: [facility] CATH INVASIVE LOCATION;  Service: Cardiovascular   • CARDIAC CATHETERIZATION N/A 3/28/2018    Procedure: Angioplasty-peripheral;  Surgeon: Clay Ruano MD;  Location: [facility] CATH INVASIVE LOCATION;  Service: Cardiovascular   • CARDIAC CATHETERIZATION N/A 3/28/2018    Procedure: Atherectomy-peripheral;  Surgeon: Clay Ruano MD;  Location: [facility] CATH INVASIVE LOCATION;  Service: Cardiovascular   • CATARACT EXTRACTION, BILATERAL     • CHOLECYSTECTOMY     • COLONOSCOPY     • EYE SURGERY      BLEEDING BEHING BILATERAL EYES   • INTERVENTIONAL RADIOLOGY PROCEDURE N/A 3/28/2018    Procedure: Abdominal Aortogram;  Surgeon: Clay Ruano MD;  Location: [facility] CATH INVASIVE LOCATION;  Service: Cardiovascular   • PORTACATH PLACEMENT     • SHOULDER MANIPULATION Left     \"FROZEN SHOULDER\"   • VENOUS ACCESS DEVICE (PORT) REMOVAL         Family History   Problem Relation Age of Onset   • COPD Mother    • Diabetes Father    • Hypertension Sister    • Diabetes Sister    • Hypertension Brother    • Diabetes Paternal Grandmother        Social History     Socioeconomic History   • Marital status:      Spouse name: Not on file   • Number of children: Not on file   • Years of education: Not on file   • Highest education level: Not on file   Tobacco Use   • Smoking status: Never Smoker   • Smokeless tobacco: Never Used   • Tobacco comment: 2ND HAND SMOKE EXPOSURE   Substance and Sexual Activity   • Alcohol use: No   • Drug use: No   • Sexual activity: Defer           Objective   Physical Exam   Nursing note and vitals reviewed.    GEN: No acute " distress  Head: Normocephalic, atraumatic  Eyes: Pupils equal round reactive to light  ENT: Posterior pharynx normal in appearance, oral mucosa is moist  Chest: Nontender to palpation  Cardiovascular: Regular rate  Lungs: Bibasilar crackles  Abdomen: Soft, nontender, nondistended, no peritoneal signs  Extremities: Right upper extremity still has sterilization soap on it from where he had a procedure earlier.  Bandages clean dry and intact over the right upper extremities AV fistula.  Neuro: GCS 15  Psych: Mood and affect are appropriate    Procedures           ED Course  ED Course as of Aug 16 0137   Thu Aug 15, 2019   2224 EKG shows sinus rhythm with a rate of 92.  Indeterminate axis.  Nonspecific T waves.  Abnormal EKG.  Interpreted by me.  [DT]      ED Course User Index  [DT] Joel Zelaya MD                  MDM  Number of Diagnoses or Management Options  ESRD (end stage renal disease) (CMS/[facility]):   Shortness of breath:   Diagnosis management comments: Patient is stable.  After reviewing labs with the patient as well as imaging I do believe he is safe to wait until Saturday or 2 days from now for dialysis.  Did give him signs and symptoms to prompt return.       Amount and/or Complexity of Data Reviewed  Clinical lab tests: reviewed  Tests in the radiology section of CPT®: reviewed  Discussion of test results with the performing providers: yes  Decide to obtain previous medical records or to obtain history from someone other than the patient: yes  Obtain history from someone other than the patient: yes  Review and summarize past medical records: yes  Independent visualization of images, tracings, or specimens: yes          Final diagnoses:   Shortness of breath   ESRD (end stage renal disease) (CMS/[facility])            Joel Zelaya MD  08/16/19 0511

## 2019-08-16 NOTE — TELEPHONE ENCOUNTER
Spoke to patient's wife, recommended he call the emergency department for results, she states that he is currently asleep and would call back later.

## 2019-09-01 NOTE — ED NOTES
Pt up in bed eating a snack.  Pt updated. Awaiting results     Leila Dan, Nursing Student  08/31/19 6780

## 2019-09-14 NOTE — ED PROVIDER NOTES
Subjective   59 year-old male presents to the emergency department with weakness, he states he has a renal failure patient on dialysis, did not complete his dialysis today.  He was recently seen at [facility], he states he was diagnosed with hyperglycemia and pneumonia, he states he was on insulin drip for 6 hours in the ER, received antibiotics, and was released.  He states he has not been doing better since his release, he still feels weakness, and his glucoses have been reading high at home.        History provided by:  Patient   used: No        Review of Systems   Endocrine:        Hyperglycemia   Neurological: Positive for weakness.   All other systems reviewed and are negative.      Past Medical History:   Diagnosis Date   • Abdominal pain    • Anemia    • Cataracts, bilateral    • CHF (congestive heart failure) (CMS/[facility])    • Chronic kidney disease    • Constipation    • Cough    • Dependence on nocturnal oxygen therapy    • Diabetes mellitus (CMS/[facility])    • Diarrhea    • End stage renal failure on dialysis (CMS/[facility])     TUES, THURS, SAT   • GERD (gastroesophageal reflux disease)    • H/O blood clots     LEG/NECK   • H/O chest x-ray 03/25/2016    Interval decrease in size of the patients right pleural effusion with a persistent small left effusion as well   • History of transfusion    • Hypertension    • Left-sided weakness    • Nauseated     DRY HEAVES   • Pleural effusion    • Pneumonia    • Pneumonia    • Renal failure    • Stroke (CMS/[facility]) 2006    LEFT SIDED WEAKNESS   • Swelling    • Teeth missing    • Tinnitus    • Ulcer, stomach peptic     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 LAVAGE, BRUSHINGS AND BIOPSY;  Surgeon: Ean Hernandez MD;   "Location: [facility] OR;  Service: Pulmonary   • CARDIAC CATHETERIZATION N/A 3/28/2018    Procedure: Peripheral angiography;  Surgeon: Clay Ruano MD;  Location: [facility] CATH INVASIVE LOCATION;  Service: Cardiovascular   • CARDIAC CATHETERIZATION N/A 3/28/2018    Procedure: Angioplasty-peripheral;  Surgeon: Clay Ruano MD;  Location: [facility] CATH INVASIVE LOCATION;  Service: Cardiovascular   • CARDIAC CATHETERIZATION N/A 3/28/2018    Procedure: Atherectomy-peripheral;  Surgeon: Clay Ruano MD;  Location: [facility] CATH INVASIVE LOCATION;  Service: Cardiovascular   • CATARACT EXTRACTION, BILATERAL     • CHOLECYSTECTOMY     • COLONOSCOPY     • EYE SURGERY      BLEEDING BEHING BILATERAL EYES   • INTERVENTIONAL RADIOLOGY PROCEDURE N/A 3/28/2018    Procedure: Abdominal Aortogram;  Surgeon: Clay Ruano MD;  Location: [facility] CATH INVASIVE LOCATION;  Service: Cardiovascular   • PORTACATH PLACEMENT     • SHOULDER MANIPULATION Left     \"FROZEN SHOULDER\"   • VENOUS ACCESS DEVICE (PORT) REMOVAL         Family History   Problem Relation Age of Onset   • COPD Mother    • Diabetes Father    • Hypertension Sister    • Diabetes Sister    • Hypertension Brother    • Diabetes Paternal Grandmother        Social History     Socioeconomic History   • Marital status:      Spouse name: Not on file   • Number of children: Not on file   • Years of education: Not on file   • Highest education level: Not on file   Tobacco Use   • Smoking status: Never Smoker   • Smokeless tobacco: Never Used   • Tobacco comment: 2ND HAND SMOKE EXPOSURE   Substance and Sexual Activity   • Alcohol use: No   • Drug use: No   • Sexual activity: Defer           Objective   Physical Exam   Constitutional: He is oriented to person, place, and time. He appears well-developed and well-nourished.   HENT:   Head: Normocephalic and atraumatic.   Eyes: EOM are normal. Pupils are equal, round, and reactive to light.   Neck: " Normal range of motion.   Cardiovascular: Normal rate and regular rhythm.   Pulmonary/Chest: Effort normal and breath sounds normal.   Abdominal: Soft. Bowel sounds are normal.   Musculoskeletal: Normal range of motion.   Neurological: He is alert and oriented to person, place, and time.   Skin: Skin is warm and dry.   Psychiatric: He has a normal mood and affect. His behavior is normal.   Nursing note and vitals reviewed.      Procedures           ED Course  ED Course as of Sep 14 2310   Sat Sep 14, 2019   2045 EKG interpreted by me reveals sinus rhythm rate 86 without ectopy.  Comparison dated 2/5/2019 reveals chronic T wave changes inferiorly and laterally without acute changes.  [PF]      ED Course User Index  [PF] Sergei Kaur, DO                  MDM  Number of Diagnoses or Management Options  Acute renal failure on dialysis (CMS/HCC): new and requires workup  Pneumonia of right lower lobe due to infectious organism (CMS/HCC): new and requires workup  Poorly controlled diabetes mellitus (CMS/HCC): new and requires workup     Amount and/or Complexity of Data Reviewed  Clinical lab tests: reviewed  Tests in the radiology section of CPT®: reviewed  Decide to obtain previous medical records or to obtain history from someone other than the patient: yes    Risk of Complications, Morbidity, and/or Mortality  Presenting problems: minimal  Diagnostic procedures: minimal  Management options: minimal    Patient Progress  Patient progress: stable      Final diagnoses:   Poorly controlled diabetes mellitus (CMS/HCC)   Pneumonia of right lower lobe due to infectious organism (CMS/HCC)   Acute renal failure on dialysis (CMS/HCC)              Bernard Chatterjee Jr., RODNEY  09/14/19 9849

## 2019-09-20 PROBLEM — D64.9 SYMPTOMATIC ANEMIA: Status: ACTIVE | Noted: 2019-01-01

## 2019-09-20 PROBLEM — R04.0 EPISTAXIS, RECURRENT: Status: ACTIVE | Noted: 2019-01-01

## 2019-09-20 NOTE — PLAN OF CARE
Problem: Fall Risk (Adult)  Goal: Identify Related Risk Factors and Signs and Symptoms  Outcome: Outcome(s) achieved Date Met: 09/20/19

## 2019-09-20 NOTE — PLAN OF CARE
Problem: Skin Injury Risk (Adult)  Goal: Identify Related Risk Factors and Signs and Symptoms  Outcome: Outcome(s) achieved Date Met: 09/20/19

## 2019-09-20 NOTE — H&P
"    [facility]      Name:  Talha Cutler   Age:  59 y.o.  Sex:  male  :  1960  MRN:  [id number]   Visit Number:  [id number]  Admission Date:  2019  Date Of Service:  19  Primary Care Physician:  Jeannette Godoy APRN    Chief Complaint:     Generalized weakness, dizziness and low blood pressure.    History Of Presenting Illness:      This is a 59-year-old pleasant male with history of end-stage renal disease on hemodialysis on Tuesday, Thursday and Saturday, diabetes mellitus type 2, hypothyroidism and coronary artery disease on medical therapy was taken to the emergency room at [facility] by his wife with complaints of generalized weakness, dizziness and low blood pressures.  The history is obtained from the patient as well as his wife who is at the bedside.  According to the wife, patient was seen by Occupational Therapy at home and was told that his blood pressure was low and was asked to go to the emergency room.  Patient states that he has been having recurrent nasal bleeding for the last several weeks.  He does have a chronic history of these.  He denies any bright red blood per rectum or melena.  He states that he was in [facility] about a year ago and apparently had upper and lower GI endoscopies and states \"they could not find out where I was bleeding\".  He does have regular dialysis and his last dialysis was yesterday.  He also does get Procrit injections for his anemia.  He denies any increased shortness of breath.  He did take his blood pressure medications this morning.    Patient was evaluated in the emergency room at [place].  Apparently his home blood pressure reading was 88/42 but in the emergency room it was 104/58.  He was afebrile with a heart rate of 78 and a pulse oxygen saturation of 100% on room air.  Blood work done in the emergency room including CMP and CBC was unremarkable except for a " glucose of 152, sodium of 133, potassium 5.4 and a creatinine of 4.15.  His hemoglobin was noted to be 7 with a proBNP of 35,000.  He was given 500 mL of normal saline bolus and 15 g of Kayexalate in the emergency room.  A call was placed to Dr. Garcia, who recommended admission and transferred to [facility].  He is currently lying down in the bed and is comfortable at rest.  Left nostril has been packed with gauze for epistaxis.  He denies any chest pain or shortness of breath.    Review Of Systems:     General ROS: Patient denies any fevers, chills or loss of consciousness. Complains of generalized weakness.  Psychological ROS: No history of any hallucinations and delusions.  Ophthalmic ROS: No history of any diplopia or transient loss of vision.  ENT ROS: History of recurrent epistaxis.  Allergy and Immunology ROS: No history of rash or itching.  Hematological and Lymphatic ROS: No history of neck swelling or easy bleeding.  Endocrine ROS: No history of any recent unintentional weight gain or loss.  Respiratory ROS: No history of cough or shortness of breath.  Cardiovascular ROS: No history of chest pain or palpitations.  Gastrointestinal ROS: No history of nausea and vomiting. Denies any abdominal pain. No diarrhea.  Genito-Urinary ROS: No history of dysuria or hematuria.  Musculoskeletal ROS: No muscle pain. No calf pain.  Neurological ROS: No history of any focal weakness. No loss of consciousness. Denies any numbness.  Dermatological ROS: Multiple scabs on the skin.     Past Medical History:    Past Medical History:   Diagnosis Date   • Abdominal pain    • Anemia    • Cataracts, bilateral    • CHF (congestive heart failure) (CMS/Formerly [facility])    • Chronic kidney disease    • Constipation    • Cough    • Dependence on nocturnal oxygen therapy    • Diabetes mellitus (CMS/Formerly [facility])    • Diarrhea    • End stage renal failure on dialysis (CMS/Formerly [facility])     TUES, THURS, SAT   • GERD (gastroesophageal reflux disease)    •  "H/O blood clots     LEG/NECK   • H/O chest x-ray 03/25/2016    Interval decrease in size of the patients right pleural effusion with a persistent small left effusion as well   • History of transfusion    • Hypertension    • Left-sided weakness    • Nauseated     DRY HEAVES   • Pleural effusion    • Pneumonia    • Pneumonia    • Renal failure    • Stroke (CMS/HCC) 2006    LEFT SIDED WEAKNESS   • Swelling    • Teeth missing    • Tinnitus    • Ulcer, stomach peptic     HISTORY OF ULCER AS A TEENAGER   • Wears glasses      Past Surgical history:    Past Surgical History:   Procedure Laterality Date   • ARTERIOVENOUS FISTULA Right    • BRONCHOSCOPY N/A 1/10/2019    Procedure: BRONCHOSCOPY with MAC and Flouro. LAVAGE, BRUSHINGS AND BIOPSY;  Surgeon: Ean Hernandez MD;  Location: [facility] OR;  Service: Pulmonary   • CARDIAC CATHETERIZATION N/A 3/28/2018    Procedure: Peripheral angiography;  Surgeon: Clay Ruano MD;  Location: [facility] CATH INVASIVE LOCATION;  Service: Cardiovascular   • CARDIAC CATHETERIZATION N/A 3/28/2018    Procedure: Angioplasty-peripheral;  Surgeon: Clay Ruano MD;  Location: [facility] CATH INVASIVE LOCATION;  Service: Cardiovascular   • CARDIAC CATHETERIZATION N/A 3/28/2018    Procedure: Atherectomy-peripheral;  Surgeon: Clay Ruano MD;  Location: [facility] CATH INVASIVE LOCATION;  Service: Cardiovascular   • CATARACT EXTRACTION, BILATERAL     • CHOLECYSTECTOMY     • COLONOSCOPY     • EYE SURGERY      BLEEDING BEHING BILATERAL EYES   • INTERVENTIONAL RADIOLOGY PROCEDURE N/A 3/28/2018    Procedure: Abdominal Aortogram;  Surgeon: Clay Ruano MD;  Location: [facility] CATH INVASIVE LOCATION;  Service: Cardiovascular   • PORTACATH PLACEMENT     • SHOULDER MANIPULATION Left     \"FROZEN SHOULDER\"   • VENOUS ACCESS DEVICE (PORT) REMOVAL       Social History:    Social History     Socioeconomic History   • Marital status:      Spouse name: Not on file   • Number " of children: Not on file   • Years of education: Not on file   • Highest education level: Not on file   Tobacco Use   • Smoking status: Never Smoker   • Smokeless tobacco: Never Used   • Tobacco comment: 2ND HAND SMOKE EXPOSURE   Substance and Sexual Activity   • Alcohol use: No   • Drug use: No   • Sexual activity: Defer     Family History:    Family History   Problem Relation Age of Onset   • COPD Mother    • Diabetes Father    • Hypertension Sister    • Diabetes Sister    • Hypertension Brother    • Diabetes Paternal Grandmother      Allergies:      Erythromycin    Home Medications:    Prior to Admission Medications     Prescriptions Last Dose Informant Patient Reported? Taking?    albuterol (PROVENTIL) (2.5 MG/3ML) 0.083% nebulizer solution   Yes Yes    Take 2.5 mg by nebulization Every 4 (Four) Hours As Needed for Wheezing.    amLODIPine (NORVASC) 10 MG tablet 9/20/2019 Pharmacy Yes Yes    Take 10 mg by mouth Daily.    aspirin 81 MG chewable tablet 9/20/2019 Self Yes Yes    Chew 81 mg Daily.    atorvastatin (LIPITOR) 40 MG tablet 9/19/2019  Yes Yes    Take 40 mg by mouth Daily.    AURYXIA 1  MG(Fe) tablet 9/20/2019 Self Yes Yes    Take 1 tablet by mouth. With every meal and every snack    B Complex-C-Folic Acid (RADHA-SHIRLENE PO) 9/19/2019 Self Yes Yes    Take 1 tablet by mouth. Patient states he takes these after dialysis, but is unaware of dose.     Cholecalciferol (VITAMIN D3) 5000 UNITS capsule capsule 9/20/2019 Self Yes Yes    Take 1 capsule by mouth Daily.    clopidogrel (PLAVIX) 75 MG tablet 9/20/2019  No Yes    Take 1 tablet by mouth Daily. Start taking from 1/12/2019 onwards ONLY if not coughing up significant amount of blood.    docusate sodium (COLACE) 100 MG capsule 9/20/2019  Yes Yes    Take 100 mg by mouth Every 12 (Twelve) Hours.    fluticasone (VERAMYST) 27.5 MCG/SPRAY nasal spray 9/20/2019  Yes Yes    2 sprays into the nostril(s) as directed by provider Daily.    folic acid (FOLVITE) 1 MG  tablet 9/20/2019  Yes Yes    Take 1 mg by mouth daily.    insulin aspart (novoLOG FLEXPEN) 100 UNIT/ML solution pen-injector sc pen 9/20/2019 Spouse/Significant Other Yes Yes    Inject 5 Units under the skin into the appropriate area as directed 3 (Three) Times a Day With Meals.    insulin glargine (LANTUS) 100 UNIT/ML injection 9/19/2019  Yes Yes    Inject 12 Units under the skin into the appropriate area as directed Every Night.    isosorbide mononitrate (IMDUR) 30 MG 24 hr tablet 9/20/2019  Yes Yes    Take 30 mg by mouth Daily.    levothyroxine (SYNTHROID, LEVOTHROID) 100 MCG tablet 9/20/2019 Pharmacy Yes Yes    Take 125 mcg by mouth Daily.    losartan (COZAAR) 100 MG tablet 9/20/2019 Pharmacy No Yes    Take 1 tablet by mouth Daily.    metoprolol succinate XL (TOPROL-XL) 100 MG 24 hr tablet 9/20/2019  No Yes    Take 1 tablet by mouth Daily.    raNITIdine (ZANTAC) 150 MG tablet 9/20/2019  Yes Yes    Take 150 mg by mouth 2 (Two) Times a Day.    albuterol (PROVENTIL HFA;VENTOLIN HFA) 108 (90 Base) MCG/ACT inhaler   No No    Inhale 2 puffs Every 4 (Four) Hours As Needed for Shortness of Air.    hydrALAZINE (APRESOLINE) 50 MG tablet   Yes No    Take 50 mg by mouth 3 (Three) Times a Day.    ondansetron ODT (ZOFRAN-ODT) 4 MG disintegrating tablet  Pharmacy No No    Take 1 tablet by mouth Every 6 (Six) Hours As Needed for Nausea or Vomiting.         Vital Signs:    Temp:  [97.5 °F (36.4 °C)] 97.5 °F (36.4 °C)  Heart Rate:  [89] 89  Resp:  [18] 18  BP: (110)/(69) 110/69        09/20/19  1750   Weight: 60.2 kg (132 lb 11.2 oz)     Body mass index is 20.18 kg/m².    Physical Exam:    General Appearance:  Alert and cooperative, not in any acute distress.   Head:  Atraumatic and normocephalic, without obvious abnormality.  He does have mild epistaxis on the left nostril.   Eyes:          PERRLA, conjunctivae and sclerae normal, no Icterus. No pallor. Extraocular movements are within normal limits.   Ears:  Ears appear intact  with no abnormalities noted.   Throat: No oral lesions, no thrush, oral mucosa moist.   Neck: Supple, trachea midline, no thyromegaly, no carotid bruit.   Back:   No kyphoscoliosis present. No tenderness to palpation,   range of motion normal.   Lungs:   Chest shape is normal. Breath sounds heard bilaterally equally.  No crackles or wheezing. No Pleural rub or bronchial breathing.   Heart:  Normal S1 and S2, no murmur, no gallop, no rub. No JVD.   Abdomen:   Normal bowel sounds, no masses, no organomegaly. Soft, non-tender, non-distended, no guarding, no rebound tenderness.  Ecchymotic patches noted in the lower abdomen.   Extremities: Moves all extremities well, no edema, no cyanosis, no clubbing.  AV graft with bruit noted on the right arm.   Pulses: Pulses palpable and equal bilaterally.   Skin: No bleeding.  Multiple ulcers with scabs noted in both upper and lower extremities.  No erythema or cellulitis noted.   Neurologic: Alert and oriented x 3. Moves all four limbs equally. No tremors. No facial asymmetry.     Laboratory data:    I have reviewed the labs done in the emergency room at [place] and they are as follows:    Glucose 152, sodium 133, potassium 5.4, chloride 92, CO2 34, calcium 8.5, BUN 38, creatinine 4.15, osmolality 277    Total bilirubin 0.4, total protein 6.4, albumin 2.7, globulin 3.7, alkaline phosphatase 74, ALT 11, AST 16.    Pro-BNP > 35,000, troponin <0.017    WBC 5.9, hemoglobin 7, hematocrit 21.9, MCV 96, platelets 211    EKG:      EKG done in the emergency room was reviewed by me.  It shows sinus rhythm at 74 bpm.  Normal axis.  ST flattening noted in the inferior and lateral leads.    Radiology:    Portable chest x-ray report done at the emergency room is reported as follows:    Findings: The cardiac silhouette is normal in size.  There is calcified plaque in the aorta.  There is right pleural thickening/effusion which is stable since 2017.  The lungs are otherwise clear.  There is no  pneumothorax.  The visualized osseous structures demonstrate no acute abnormalities.  Impression: Right pleural thickening/effusion which is stable since 2017.  The lungs are otherwise clear.    Assessment:    1.  Symptomatic anemia, present on admission.  2.  Hypotension likely related to medications and anemia.  3.  Epistaxis, recurrent, present on admission.  4.  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  5.  Chronic right-sided loculated pleural effusion status post pleurodesis.  6.  Coronary artery disease on medical management.  7.  End-stage renal disease on hemodialysis on Tuesday, Thursday and Saturday.  8.  Chronic debility.  9.  Essential hypertension.    Plan:    Mr. Cutler  is currently being admitted to the medical floor for further evaluation and management of his hypotension and anemia.  He states that he has not been able to walk due to dizziness.  Fortunately he has not passed out.  We will keep him on fall precautions.  His hypotension has improved.  At this time we will continue to monitor him on telemetry for any arrhythmias.  I will hold off on blood transfusion at this time and we will repeat his CBC in the morning and transfuse if necessary.  We will obtain stool guaiac test.      He will be continued on his home medications but hold Plavix due to anemia and nosebleeding.  I will also hold hydralazine and losartan at this time until his blood pressures are stable.  We will continue amlodipine, Toprol-XL and Imdur.  He will be placed on subcutaneous insulin protocol.  We will consult Dr. Mackey from nephrology for continuation of hemodialysis.  I have discussed the patient's condition with Dr. Mackey and he is contemplating on blood transfusion tomorrow with dialysis.  I have discussed the patient's condition with his wife and his son who are at the bedside.    Carmelo Ray MD  09/20/19  6:55 PM    Dictated utilizing Dragon dictation.

## 2019-09-20 NOTE — PLAN OF CARE
Problem: Pain, Chronic (Adult)  Goal: Identify Related Risk Factors and Signs and Symptoms  Outcome: Outcome(s) achieved Date Met: 09/20/19

## 2019-09-20 NOTE — PLAN OF CARE
Problem: Anemia (Adult)  Goal: Identify Related Risk Factors and Signs and Symptoms  Outcome: Outcome(s) achieved Date Met: 09/20/19

## 2019-09-21 NOTE — PROGRESS NOTES
Discharge Planning Assessment   [facility]     Patient Name: Talha Cutler  MRN: [id number]  Today's Date: 9/21/2019    Admit Date: 9/20/2019    Discharge Needs Assessment     Row Name 09/21/19 7415       Living Environment    Lives With  spouse;child(starr), dependent    Name(s) of Who Lives With Patient  Pt lives with spouse, Mallika Cutler [phone] and son, [phone]    Current Living Arrangements  home/apartment/condo    Duration at Residence  22 years    Primary Care Provided by  spouse/significant other Pt's wife helps with daily hygiene care and his son helps with everything else      Provides Primary Care For  spouse;child(starr)    Family Caregiver if Needed  child(starr), adult;spouse    Family Caregiver Names  -- see above notes    Quality of Family Relationships  supportive    Able to Return to Prior Arrangements  yes       Transition Planning    Patient/Family Anticipates Transition to  home    Transportation Anticipated  family or friend will provide       Discharge Needs Assessment    Equipment Currently Used at Home  wheelchair;wheelchair, motorized;nebulizer;bipap/cpap;oxygen O2@2L per [place]@[facility] managed by [facility]        Discharge Plan     Row Name 09/21/19 6638       Plan    Plan  Spoke with pt about discharge plans  Confirmed current address and phone contacts  Mallika Cutler, spouse [phone] and Cisco Cutler, son [phone]   No POA   Pt has living will  Pt can do ADLgood with assistance  Pts spouse helps with his daily hygiene and his sons help him dress  Pt able to manuver WC and do transfer to couch or raised commode etc  Pt independent and cooks for the family  PCP Jeannette Godoy APRN  DME WC, motorized WC, nebulizer, CPAP, ramp, O2@2Lper [place] managed by [facility], Pt has [facility] OP  Will continue to assess pt for any further needs prior to being discharged        Destination      No service coordination in this encounter.      Durable Medical Equipment      No service coordination in  this encounter.      Dialysis/Infusion      No service coordination in this encounter.      Home Medical Care      No service coordination in this encounter.      Therapy      No service coordination in this encounter.      Community Resources      No service coordination in this encounter.        Expected Discharge Date and Time     Expected Discharge Date Expected Discharge Time    Sep 22, 2019         Demographic Summary     Row Name 09/21/19 1525       General Information    Admission Type  observation    Arrived From  hospital Transfer from [facility]    Required Notices Provided  Observation Status Notice    Referral Source  admission list    Reason for Consult  discharge planning    Preferred Language  English       Contact Information    Permission Granted to Share Info With      Contact Information Obtained for          Functional Status     Row Name 09/21/19 1527       Functional Status    Usual Activity Tolerance  good    Current Activity Tolerance  good       Functional Status, IADL    Medications  independent    Meal Preparation  independent Pt cooks    Housekeeping  assistive person    Laundry  assistive person    Shopping  assistive person    IADL Comments  Pt performs ADL good although wheelchair bound  Pt very dependent        Mental Status Summary    Recent Changes in Mental Status/Cognitive Functioning  no changes       Employment/    Employment Status  unemployed        Psychosocial    No documentation.       Abuse/Neglect    No documentation.       Legal    No documentation.       Substance Abuse    No documentation.       Patient Forms    No documentation.           Kandi Teixeira RN

## 2019-09-21 NOTE — PLAN OF CARE
Problem: Fall Risk (Adult)  Goal: Absence of Fall  Outcome: Ongoing (interventions implemented as appropriate)      Problem: Pain, Chronic (Adult)  Goal: Acceptable Pain/Comfort Level and Functional Ability  Outcome: Ongoing (interventions implemented as appropriate)      Problem: Skin Injury Risk (Adult)  Goal: Skin Health and Integrity  Outcome: Ongoing (interventions implemented as appropriate)      Problem: Anemia (Adult)  Goal: Symptom Improvement  Outcome: Ongoing (interventions implemented as appropriate)

## 2019-09-21 NOTE — PLAN OF CARE
Problem: Pain, Chronic (Adult)  Goal: Acceptable Pain/Comfort Level and Functional Ability  Outcome: Ongoing (interventions implemented as appropriate)   09/21/19 0336   Pain, Chronic (Adult)   Acceptable Pain/Comfort Level and Functional Ability (rested well,denies pain)

## 2019-09-21 NOTE — PROGRESS NOTES
[facility]    Name:  Talha Cutler   Age:  59 y.o.  Sex:  male  :  1960  MRN:  [id number]   Visit Number:  [id number]  Admission Date:  2019  Date Of Service:  19  Primary Care Physician:  Jeannette Godoy APRN     LOS: 1 day :  Patient Care Team:  Jeannette Godoy APRN as PCP - General (Family Medicine):    Chief Complaint:      Patient seen in follow-up today for symptomatic anemia    Subjective / Interval History:     Patient was resting comfortably in bed. He has already received dialysis. He denied complaints of fever/chlls, sob, cough, chest pain, and abodminal pain. No furhter episodes of bleeding. No acute events reported by nursing.     Review of Systems:     General ROS: Patient denies any fevers, chills or loss of consciousness.  Respiratory ROS: Denies cough or shortness of breath.  Cardiovascular ROS: Denies chest pain or palpitations. No history of exertional chest pain.  Gastrointestinal ROS: Denies nausea and vomiting. Denies any abdominal pain. No diarrhea.  Neurological ROS: Denies any focal weakness. No loss of consciousness. Denies any numbness.  Dermatological ROS: Denies any redness or pruritis.    Vital Signs:    Temp:  [97.4 °F (36.3 °C)-97.9 °F (36.6 °C)] 97.8 °F (36.6 °C)  Heart Rate:  [] 100  Resp:  [18] 18  BP: (109-159)/(60-82) 148/67    Intake and output:    I/O last 3 completed shifts:  In: 1580 [P.O.:1280; Blood:300]  Out: 4900 [Other:4900]  No intake/output data recorded.    Physical Examination:    General Appearance:  Alert and cooperative, not in any acute distress.   Head:  Atraumatic and normocephalic, without obvious abnormality.   Eyes:          PERRLA, conjunctivae and sclerae normal, no Icterus. No pallor. Extraocular movements are within normal limits.   Neck: Supple, trachea midline, no thyromegaly, no carotid bruit.   Lungs:   Chest shape is normal. Breath sounds heard bilaterally  equally.  No crackles or wheezing. No Pleural rub or bronchial breathing.   Heart:  Normal S1 and S2, no murmur, no gallop, no rub. No JVD   Abdomen:   Normal bowel sounds, no masses, no organomegaly. Soft, non-tender, non-distended, no guarding, no rebound tenderness.   Extremities: no edema, no cyanosis, no clubbing.   Skin: No bleeding, bruising or rash.   Neurologic: Awake, alert and oriented times 3. Moves all 4 extremities equally.     Laboratory results:    Results from last 7 days   Lab Units 09/21/19  0647 09/14/19 2004   SODIUM mmol/L 129* 130*   POTASSIUM mmol/L 6.3* 3.9   CHLORIDE mmol/L 85* 84*   CO2 mmol/L 23.8 30.1*   BUN mg/dL 48* 18   CREATININE mg/dL 4.75* 2.42*   CALCIUM mg/dL 8.4* 9.6   BILIRUBIN mg/dL  --  0.2   ALK PHOS U/L  --  89   ALT (SGPT) U/L  --  17   AST (SGOT) U/L  --  23   GLUCOSE mg/dL 199* 501*     Results from last 7 days   Lab Units 09/21/19  1446 09/21/19  0647 09/14/19 2004   WBC 10*3/mm3  --  7.54 8.60   HEMOGLOBIN g/dL 9.2* 7.4* 8.1*   HEMATOCRIT % 29.3* 24.5* 26.0*   PLATELETS 10*3/mm3  --  190 288         Results from last 7 days   Lab Units 09/14/19  2200 09/14/19 2004   TROPONIN T ng/mL 0.198* 0.209*           I have reviewed the patient's laboratory results.    Radiology results:    Imaging Results (last 24 hours)     ** No results found for the last 24 hours. **          I have reviewed the patient's radiology reports.    Medication Review:     I have reviewed the patients active and prn medications.       Symptomatic anemia    Essential hypertension    Type 2 diabetes mellitus treated with insulin (CMS/Formerly [facility])    End stage renal disease on dialysis (CMS/Formerly [facility])    Epistaxis, recurrent      Assessment:    1.  Symptomatic anemia, present on admission.  2.  Hypotension likely related to medications and anemia.  3.  Epistaxis, recurrent, present on admission.  4.  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  5.  Chronic right-sided loculated pleural effusion  status post pleurodesis.  6.  Coronary artery disease on medical management.  7.  End-stage renal disease on hemodialysis on Tuesday, Thursday and Saturday.  8.  Chronic debility.  9.  Essential hypertension.     Plan:    Keep patient on medical floor today with cardiac monitor. He received 1 unit of PRBC with dialysis. No medical treatment administered for hyperkalemia as patient was able to be dialyzed today. Will monitor with routine BMP in the AM.   Patient's epistaxis has resolved and he has remained hemodynamically stable.  Likely discharge in the AM.     Shane Nolen DO  09/21/19  7:50 PM    Dictated utilizing Dragon dictation.

## 2019-09-21 NOTE — CONSULTS
[facility]      Nephrology Consultation    Referring Provider:   No ref. provider found    Reason for Consultation:  ESRD and associated problems.       Subjective:  Chief complaint No chief complaint on file.    History of present illness:    Patient is 59-year-old  male with end-stage renal disease on hemodialysis Tuesday, Thursday and Saturday, usually have fairly significant fluid gains in between dialysis, also has been having problems mounting reticulocyte count and has been persistently low hemoglobins.  Who presented to [facility] emergency room with not feeling well and they found that he has a hemoglobin of 7.4 was transferred to [facility] under hospitalist service for further evaluation and treatment.  Hemoglobin of 7.4 has been fairly stable usually gets to around 9.  I was consulted for management of end-stage renal disease and related issues.  I have reviewed labs/imaging/records from this hospitalization, including ER staff and admitting/attending physicians H/P's and progress notes to establish a comprehensive understanding of this patient's clinical hospital course, as well as to establish plan of care appropriately.   Past Medical History:   Diagnosis Date   • Abdominal pain    • Anemia    • Cataracts, bilateral    • CHF (congestive heart failure) (CMS/[facility])    • Chronic kidney disease    • Constipation    • Cough    • Dependence on nocturnal oxygen therapy    • Diabetes mellitus (CMS/HCC)    • Diarrhea    • End stage renal failure on dialysis (CMS/HCC)     TUES, THURS, SAT   • GERD (gastroesophageal reflux disease)    • H/O blood clots     LEG/NECK   • H/O chest x-ray 03/25/2016    Interval decrease in size of the patients right pleural effusion with a persistent small left effusion as well   • History of transfusion    • Hypertension    • Left-sided weakness    • Nauseated     DRY HEAVES   • Pleural effusion    • Pneumonia    • Pneumonia    • Renal  "failure    • Stroke (CMS/[facility]) 2006    LEFT SIDED WEAKNESS   • Swelling    • Teeth missing    • Tinnitus    • Ulcer, stomach peptic     HISTORY OF ULCER AS A TEENAGER   • Wears glasses        Past Surgical History:   Procedure Laterality Date   • ARTERIOVENOUS FISTULA Right    • BRONCHOSCOPY N/A 1/10/2019    Procedure: BRONCHOSCOPY with MAC and Flouro. LAVAGE, BRUSHINGS AND BIOPSY;  Surgeon: Ean Hernandez MD;  Location: [facility] OR;  Service: Pulmonary   • CARDIAC CATHETERIZATION N/A 3/28/2018    Procedure: Peripheral angiography;  Surgeon: Clay Ruano MD;  Location: [facility] CATH INVASIVE LOCATION;  Service: Cardiovascular   • CARDIAC CATHETERIZATION N/A 3/28/2018    Procedure: Angioplasty-peripheral;  Surgeon: Clay Ruano MD;  Location: [facility] CATH INVASIVE LOCATION;  Service: Cardiovascular   • CARDIAC CATHETERIZATION N/A 3/28/2018    Procedure: Atherectomy-peripheral;  Surgeon: Clay Ruano MD;  Location: [facility] CATH INVASIVE LOCATION;  Service: Cardiovascular   • CATARACT EXTRACTION, BILATERAL     • CHOLECYSTECTOMY     • COLONOSCOPY     • EYE SURGERY      BLEEDING BEHING BILATERAL EYES   • INTERVENTIONAL RADIOLOGY PROCEDURE N/A 3/28/2018    Procedure: Abdominal Aortogram;  Surgeon: Clay Ruano MD;  Location: [facility] CATH INVASIVE LOCATION;  Service: Cardiovascular   • PORTACATH PLACEMENT     • SHOULDER MANIPULATION Left     \"FROZEN SHOULDER\"   • VENOUS ACCESS DEVICE (PORT) REMOVAL         Family History   Problem Relation Age of Onset   • COPD Mother    • Diabetes Father    • Hypertension Sister    • Diabetes Sister    • Hypertension Brother    • Diabetes Paternal Grandmother      negative h/o ESRD     Social History     Tobacco Use   • Smoking status: Never Smoker   • Smokeless tobacco: Never Used   • Tobacco comment: 2ND HAND SMOKE EXPOSURE   Substance Use Topics   • Alcohol use: No   • Drug use: No     Home medications:   Prior to Admission Medications     " Prescriptions Last Dose Informant Patient Reported? Taking?    albuterol (PROVENTIL) (2.5 MG/3ML) 0.083% nebulizer solution   Yes Yes    Take 2.5 mg by nebulization Every 4 (Four) Hours As Needed for Wheezing.    amLODIPine (NORVASC) 10 MG tablet 9/20/2019 Pharmacy Yes Yes    Take 10 mg by mouth Daily.    aspirin 81 MG chewable tablet 9/20/2019 Self Yes Yes    Chew 81 mg Daily.    atorvastatin (LIPITOR) 40 MG tablet 9/19/2019  Yes Yes    Take 40 mg by mouth Daily.    AURYXIA 1  MG(Fe) tablet 9/20/2019 Self Yes Yes    Take 1 tablet by mouth. With every meal and every snack    B Complex-C-Folic Acid (RADHA-SHIRLENE PO) 9/19/2019 Self Yes Yes    Take 1 tablet by mouth. Patient states he takes these after dialysis, but is unaware of dose.     Cholecalciferol (VITAMIN D3) 5000 UNITS capsule capsule 9/20/2019 Self Yes Yes    Take 1 capsule by mouth Daily.    clopidogrel (PLAVIX) 75 MG tablet 9/20/2019  No Yes    Take 1 tablet by mouth Daily. Start taking from 1/12/2019 onwards ONLY if not coughing up significant amount of blood.    docusate sodium (COLACE) 100 MG capsule 9/20/2019  Yes Yes    Take 100 mg by mouth Every 12 (Twelve) Hours.    fluticasone (VERAMYST) 27.5 MCG/SPRAY nasal spray 9/20/2019  Yes Yes    2 sprays into the nostril(s) as directed by provider Daily.    folic acid (FOLVITE) 1 MG tablet 9/20/2019  Yes Yes    Take 1 mg by mouth daily.    insulin aspart (novoLOG FLEXPEN) 100 UNIT/ML solution pen-injector sc pen 9/20/2019 Spouse/Significant Other Yes Yes    Inject 5 Units under the skin into the appropriate area as directed 3 (Three) Times a Day With Meals.    insulin glargine (LANTUS) 100 UNIT/ML injection 9/19/2019  Yes Yes    Inject 12 Units under the skin into the appropriate area as directed Every Night.    isosorbide mononitrate (IMDUR) 30 MG 24 hr tablet 9/20/2019  Yes Yes    Take 30 mg by mouth Daily.    levothyroxine (SYNTHROID, LEVOTHROID) 100 MCG tablet 9/20/2019 Pharmacy Yes Yes    Take 125  mcg by mouth Daily.    losartan (COZAAR) 100 MG tablet 9/20/2019 Pharmacy No Yes    Take 1 tablet by mouth Daily.    metoprolol succinate XL (TOPROL-XL) 100 MG 24 hr tablet 9/20/2019  No Yes    Take 1 tablet by mouth Daily.    raNITIdine (ZANTAC) 150 MG tablet 9/20/2019  Yes Yes    Take 150 mg by mouth 2 (Two) Times a Day.    albuterol (PROVENTIL HFA;VENTOLIN HFA) 108 (90 Base) MCG/ACT inhaler   No No    Inhale 2 puffs Every 4 (Four) Hours As Needed for Shortness of Air.    hydrALAZINE (APRESOLINE) 50 MG tablet   Yes No    Take 50 mg by mouth 3 (Three) Times a Day.    ondansetron ODT (ZOFRAN-ODT) 4 MG disintegrating tablet  Pharmacy No No    Take 1 tablet by mouth Every 6 (Six) Hours As Needed for Nausea or Vomiting.        Emergency department medications: Medications   Influenza Vac Subunit Quad (FLUCELVAX) injection 0.5 mL (not administered)   albuterol (PROVENTIL) nebulizer solution 0.083% 2.5 mg/3mL (not administered)   amLODIPine (NORVASC) tablet 10 mg (not administered)   aspirin chewable tablet 81 mg (not administered)   atorvastatin (LIPITOR) tablet 40 mg (40 mg Oral Given 9/20/19 2056)   sevelamer (RENAGEL) tablet 800 mg (800 mg Oral Given 9/20/19 2056)   b complex-vitamin c-folic acid (NEPHRO-SHIRLENE) tablet 1 tablet (1 tablet Oral Given 9/20/19 2056)   vitamin D3 capsule 5,000 Units (not administered)   docusate sodium (COLACE) capsule 100 mg (100 mg Oral Given 9/20/19 2056)   folic acid (FOLVITE) tablet 1 mg (not administered)   isosorbide mononitrate (IMDUR) 24 hr tablet 30 mg (not administered)   metoprolol succinate XL (TOPROL-XL) 24 hr tablet 100 mg (not administered)   famotidine (PEPCID) tablet 20 mg (20 mg Oral Given 9/21/19 0657)   sodium chloride 0.9 % flush 10 mL (10 mL Intravenous Given 9/20/19 2100)   sodium chloride 0.9 % flush 10 mL (not administered)   acetaminophen (TYLENOL) tablet 650 mg (not administered)     Or   acetaminophen (TYLENOL) 160 MG/5ML solution 650 mg (not administered)      "Or   acetaminophen (TYLENOL) suppository 650 mg (not administered)   ondansetron (ZOFRAN) injection 4 mg (not administered)   insulin detemir (LEVEMIR) injection 10 Units (10 Units Subcutaneous Given 9/21/19 0657)   dextrose (GLUTOSE) oral gel 1 tube (not administered)   dextrose (D50W) 25 g/ 50mL Intravenous Solution 25 g (not administered)   glucagon (human recombinant) (GLUCAGEN DIAGNOSTIC) injection 1 mg (not administered)   insulin aspart (novoLOG) injection 0-9 Units (4 Units Subcutaneous Given 9/21/19 0656)       Allergies:  Erythromycin    Review of Systems  1. Constitutional: Negative for fever and chills.  Denies any diaphoresis.  Positive fatigue and malaise.  Denies any unexpected weight change.   2. HENT: Negative for congestion and hearing loss.   3. Eyes: Negative for redness and visual disturbance.   4. Respiratory: Positive for shortness of breath, denies any cough. Negative for chest pain  5. Cardiovascular: Negative for chest pain and chest tightness or palpitations.   6. Gastrointestinal: Negative for abdominal pain or distention, and blood in stool.  Does complain of occasional nausea, vomiting, diarrhea.  He denies any constipation.  7. Endocrine: Negative for heat or cold intolerance.   8. Genitourinary: Negative for difficulty urinating, dysuria and frequency.   9. Musculoskeletal: Negative for arthralgias, back pain.  Denies any myalgias.   10. Skin: Negative for color change, rash and wound.   11. Neurological: Negative for syncope, weakness and headaches.   12. Hematological: Negative for adenopathy. Does not bruise/bleed easily.   13. Psychiatric/Behavioral: Negative for confusion. The patient is not nervous/anxious.     Objective:  Vital Signs  /66 (BP Location: Left arm, Patient Position: Lying)   Pulse 85   Temp 97.9 °F (36.6 °C) (Oral)   Resp 18   Ht 172.7 cm (68\")   Wt 60.2 kg (132 lb 11.2 oz)   SpO2 100%   BMI 20.18 kg/m²          No intake/output data " recorded.    Intake/Output Summary (Last 24 hours) at 9/21/2019 0838  Last data filed at 9/20/2019 2129  Gross per 24 hour   Intake 680 ml   Output --   Net 680 ml       Physical Exam:  General Appearance:   Alert, cooperative, in no acute distress.     Head:   Normocephalic, without obvious abnormality, atraumatic.     Eyes:      Normal, conjunctivae and sclerae, no icterus, no pallor, corneas clear, PERRLA        Throat:   Oral mucosa dry      Neck:  No adenopathy, supple, trachea midline, no thyromegaly, no carotid bruit, no JVD      Back:   No CVA tenderness on Percussion.     Lungs:    Clear to auscultation and fair air movement noted.  He does have bibasilar crackles.      Heart::   Regular rhythm and normal rate, normal S1 and S2.  He has 2/6 systolic ejection murmur       Abdomen:   Obese. Normal bowel sounds, no masses, no organomegaly, soft non-tender, non-distended, no guarding, no rebound tenderness      Genital urinary:   No urinary bladder palpable      Extremities:  Moves all extremities, no edema, no cyanosis, no redness.     Pulses:  Pulses palpable and equal bilaterally but weak.     Skin:  No bleeding, bruising or rash        Neurologic:  Cranial nerves grossly intact, move all extremities           Lab Results (last 7 days)     Procedure Component Value Units Date/Time    Basic Metabolic Panel [[id number]]  (Abnormal) Collected:  09/21/19 0647    Specimen:  Blood Updated:  09/21/19 0755     Glucose 199 mg/dL      BUN 48 mg/dL      Creatinine 4.75 mg/dL      Sodium 129 mmol/L      Potassium 6.3 mmol/L      Chloride 85 mmol/L      CO2 23.8 mmol/L      Calcium 8.4 mg/dL      eGFR   Amer --     Comment: <15 Indicative of kidney failure.        eGFR Non African Amer 13 mL/min/1.73      Comment: <15 Indicative of kidney failure.        BUN/Creatinine Ratio 10.1     Anion Gap 20.2 mmol/L     Narrative:       GFR Normal >60  Chronic Kidney Disease <60  Kidney Failure <15    Hemoglobin A1c  [[id number]]  (Abnormal) Collected:  09/21/19 0647    Specimen:  Blood Updated:  09/21/19 0722     Hemoglobin A1C 8.90 %     Narrative:       Hemoglobin A1C Ranges:    Increased Risk for Diabetes  5.7% to 6.4%  Diabetes                     >= 6.5%  Diabetic Goal                < 7.0%    CBC Auto Differential [[id number]]  (Abnormal) Collected:  09/21/19 0647    Specimen:  Blood Updated:  09/21/19 0709     WBC 7.54 10*3/mm3      RBC 2.51 10*6/mm3      Hemoglobin 7.4 g/dL      Hematocrit 24.5 %      MCV 97.6 fL      MCH 29.5 pg      MCHC 30.2 g/dL      RDW 18.2 %      RDW-SD 65.0 fl      MPV 9.5 fL      Platelets 190 10*3/mm3      Neutrophil % 75.0 %      Lymphocyte % 6.8 %      Monocyte % 11.1 %      Eosinophil % 5.8 %      Basophil % 0.8 %      Immature Grans % 0.5 %      Neutrophils, Absolute 5.65 10*3/mm3      Lymphocytes, Absolute 0.51 10*3/mm3      Monocytes, Absolute 0.84 10*3/mm3      Eosinophils, Absolute 0.44 10*3/mm3      Basophils, Absolute 0.06 10*3/mm3      Immature Grans, Absolute 0.04 10*3/mm3      nRBC 0.0 /100 WBC     POC Glucose Once [[id number]]  (Abnormal) Collected:  09/21/19 0646    Specimen:  Blood Updated:  09/21/19 0650     Glucose 207 mg/dL      Comment: Serial Number: [id number]:  [id number]       POC Glucose Once [[id number]]  (Abnormal) Collected:  09/20/19 2037    Specimen:  Blood Updated:  09/20/19 2110     Glucose 336 mg/dL      Comment: Serial Number: [id number]:  [id number]           Imaging Results (last 72 hours)     ** No results found for the last 72 hours. **          amLODIPine 10 mg Oral Daily   aspirin 81 mg Oral Daily   atorvastatin 40 mg Oral Nightly   b complex-vitamin c-folic acid 1 tablet Oral Nightly   docusate sodium 100 mg Oral Q12H   famotidine 20 mg Oral BID AC   folic acid 1 mg Oral Daily   insulin aspart 0-9 Units Subcutaneous 4x Daily AC & at Bedtime   insulin detemir 10 Units Subcutaneous QAM   isosorbide mononitrate 30 mg Oral Daily   metoprolol succinate   mg Oral Daily   sevelamer 800 mg Oral TID With Meals   sodium chloride 10 mL Intravenous Q12H   vitamin D3 5,000 Units Oral Daily          Assessment/Plan:      1. End stage renal disease on dialysis (CMS/[facility]):  2. Symptomatic anemia:  3. Anemia of chronic kidney disease:  4. Hypertension with end-stage renal disease:  5. Type 2 diabetes mellitus treated with insulin (CMS/[facility]):  6. Epistaxis, recurrent:      Plan:  · I will go ahead and check iron stores as well as reticulocyte count today.  It can be added to the labs from this morning.  · Go ahead and transfuse 1 unit of blood today as well.  · Dialysis as ordered.  · Continue with the home medications.  · Surveillance labs.  · Details were discussed with the patient no family in the room.    · Details were also discussed with the hospitalist service.   · Further recommendations will depend on clinical course of the patient during the current hospitalization.    · I also discussed the details with the nursing staff.  · Rest as ordered.    In closing, I sincerely appreciate opportunity to participate in care of this patient. If I can be of any further assistance with the management of this patient, please don’t hesitate to contact me.    Chance Mackey MD, FASN  09/21/19  8:38 AM    Dictated using Dragon.

## 2019-09-21 NOTE — PROGRESS NOTES
Adult Nutrition  Assessment/PES    Patient Name:  Talha Cutler  YOB: 1960  MRN: [id number]  Admit Date:  9/20/2019    Assessment Date:  9/21/2019    Comments:    Recommend:  1. Consider adding cardiac diet modification to current diet order as medically appropriate and tolerated.  2. Encourage PO intake. PO intake average ~100% x 1 meal.  3. RD ordered Novasource Renal daily.  4. Continue with Renal MVI with minerals daily.    RD available PRN.      Reason for Assessment     Row Name 09/21/19 1242          Reason for Assessment    Reason For Assessment  diagnosis/disease state     Diagnosis  cardiac disease;diabetes diagnosis/complications;renal disease;other (see comments) ESRD on HD, DM 2, Anemia, CAD, HTN, Debility             Labs/Tests/Procedures/Meds     Row Name 09/21/19 1243          Labs/Procedures/Meds    Lab Results Reviewed  reviewed, pertinent     Lab Results Comments  Low: Na+, Cl- High: K+, Gluc, BUN, Cr, HgbA1c        Medications    Pertinent Medications Reviewed  reviewed, pertinent     Pertinent Medications Comments  Novolog, Levemir           Estimated/Assessed Needs     Row Name 09/21/19 1244          Calculation Measurements    Weight Used For Calculations  60.2 kg (132 lb 11.5 oz) Actual BW        Estimated/Assessed Needs    Additional Documentation  Fluid Requirements (Group);[place]-St. Jeor Equation (Group);Protein Requirements (Group);Calorie Requirements (Group)        Calorie Requirements    Estimated Calorie Need Method  [place]-St Jeor     Estimated Calorie Requirement Comment  1804 - 2004        [place]-St. Jeor Equation    RMR ([place]-St. Jeor Equation)  1386.5        Protein Requirements    Est Protein Requirement Amount (gms/kg)  1.5 gm protein 73 - 90 gm     Estimated Protein Requirements (gms/day)  90.3        Fluid Requirements    Estimated Fluid Requirement Method  other (see comments) > or = 1 L output: 2 L intake, < 1 L: 1-1.5 L intake, Anuria: 1 L      [place]ec[place] Method (over 20 kg)  2704         Nutrition Prescription Ordered     Row Name 09/21/19 1246          Nutrition Prescription PO    Current PO Diet  Regular     Common Modifiers  Consistent Carbohydrate;Renal         Evaluation of Received Nutrient/Fluid Intake     Row Name 09/21/19 1244          PO Evaluation    Number of Days PO Intake Evaluated  1 day     Number of Meals  1     % PO Intake  100               Problem/Interventions:  Problem 1     Row Name 09/21/19 1247          Nutrition Diagnoses Problem 1    Problem 1  Impaired Nutrient Utilization     Etiology (related to)  Medical Diagnosis     Endocrine  DM2     Renal  ESRD;Hemodialysis     Signs/Symptoms (evidenced by)  Biochemical     Specific Labs Noted  Glucose;HgbA1C;BUN;Creatinine         Problem 2     Row Name 09/21/19 1248          Nutrition Diagnoses Problem 2    Problem 2  Increased Nutrient Needs     Macronutrient  Kcal;Protein     Etiology (related to)  Medical Diagnosis     Hematological  Anemia     Signs/Symptoms (evidenced by)  Report/Observation     Reported/Observed By  MD               Intervention Goal     Row Name 09/21/19 1249          Intervention Goal    General  Meet nutritional needs for age/condition     PO  Meet estimated needs;PO intake (%)     PO Intake %  100 %     Weight  Maintain weight         Nutrition Intervention     Row Name 09/21/19 1249          Nutrition Intervention    RD/Tech Action  Follow Tx progress;Encourage intake;Recommend/ordered     Recommended/Ordered  Supplement;Diet         Nutrition Prescription     Row Name 09/21/19 1249          Nutrition Prescription PO    PO Prescription  Begin/change diet;Begin/change supplement     Begin/Change Diet to  Regular     Supplement  Nova Renal     Supplement Frequency  Daily     Common Modifiers  Cardiac;Consistent Carbohydrate;Renal     New PO Prescription Ordered?  No, recommended        Other Orders    Obtain Weight  Daily     Obtain Weight Ordered?  No,  recommended     Supplement  Vitamin mineral supplement     Supplement Ordered?  No, recommended Continue with Nephrovite         Education/Evaluation     Row Name 09/21/19 5466          Education    Education  Will Instruct as appropriate        Monitor/Evaluation    Monitor  Per protocol;I&O;PO intake;Supplement intake;Pertinent labs;Weight;Skin status           Electronically signed by:  Poly Townsend RD  09/21/19 12:50 PM

## 2019-09-22 NOTE — PROGRESS NOTES
"Nephrology Progress Note.    LOS: 1 day    Patient Care Team:  Jeannette Godoy APRN as PCP - General (Family Medicine)    Chief Complaint:  No chief complaint on file.      Subjective:   Follow up for ESRD and related issues.  Interval History:   Patient Complaints: none  Patient seen and examined this morning.  Events from last night noted.  Patient denies having any fevers chills.  No nausea or vomiting no abdominal pain.  Denies any chest pain, shortness of breath or cough and sputum production.  There is no significant edema.   Patient also denies having new onset weakness of numbness of either extremity.  History taken from: patient    Objective:    Vital Signs  /59 (BP Location: Left arm, Patient Position: Sitting)   Pulse 99   Temp 98.6 °F (37 °C) (Oral)   Resp 18   Ht 172.7 cm (68\")   Wt 60.2 kg (132 lb 11.2 oz)   SpO2 94%   BMI 20.18 kg/m²     No intake/output data recorded.    Intake/Output Summary (Last 24 hours) at 9/22/2019 1014  Last data filed at 9/22/2019 0500  Gross per 24 hour   Intake 900 ml   Output 4900 ml   Net -4000 ml       Physical Exam:  General Appearance: alert, oriented x 3, no acute distress,   HEENT: Oral mucosa dry, extra occular movements intact. Sclera clear.  Skin: Warm and dry  Neck: supple, no JVD, trachea midline  Lungs:Chest shape is normal. Breath sounds heard bilaterally equally. No crackles, No wheezing.   Heart: regular rate and rhythm. normal S1 and S2, no S3, no rub, peripheral pulses weak but palpable.  Abdomen: Obese, soft, non-tender,  present bowel sounds to auscultation  : no palpable bladder.  Extremities: trace edema, no cyanosis or clubbing.   Neuro: normal speech and mental status, grossly non focal.     Results Review:   Results from last 7 days   Lab Units 09/22/19  0854 09/21/19  0647   SODIUM mmol/L 134* 129*   POTASSIUM mmol/L 4.7 6.3*   CHLORIDE mmol/L 93* 85*   CO2 mmol/L 21.8* 23.8   BUN mg/dL 27* 48*   CREATININE mg/dL 3.78* 4.75* "   CALCIUM mg/dL 9.1 8.4*   GLUCOSE mg/dL 171* 199*     Estimated Creatinine Clearance: 17.7 mL/min (A) (by C-G formula based on SCr of 3.78 mg/dL (H)).          Results from last 7 days   Lab Units 09/22/19  0854 09/21/19  1446 09/21/19  0647   WBC 10*3/mm3 6.48  --  7.54   HEMOGLOBIN g/dL 9.2* 9.2* 7.4*   PLATELETS 10*3/mm3 179  --  190         Brief Urine Lab Results     None        Imaging Results (last 24 hours)     ** No results found for the last 24 hours. **          amLODIPine 10 mg Oral Daily   aspirin 81 mg Oral Daily   atorvastatin 40 mg Oral Nightly   b complex-vitamin c-folic acid 1 tablet Oral Nightly   docusate sodium 100 mg Oral Q12H   famotidine 20 mg Oral BID AC   folic acid 1 mg Oral Daily   insulin aspart 0-9 Units Subcutaneous 4x Daily AC & at Bedtime   insulin detemir 10 Units Subcutaneous QAM   isosorbide mononitrate 30 mg Oral Daily   metoprolol succinate  mg Oral Daily   sevelamer 800 mg Oral TID With Meals   sodium chloride 10 mL Intravenous Q12H   vitamin D3 5,000 Units Oral Daily          Medication Review:   Current Facility-Administered Medications   Medication Dose Route Frequency Provider Last Rate Last Dose   • acetaminophen (TYLENOL) tablet 650 mg  650 mg Oral Q4H PRN Carmelo Ray MD        Or   • acetaminophen (TYLENOL) 160 MG/5ML solution 650 mg  650 mg Oral Q4H PRN Carmelo Ray MD        Or   • acetaminophen (TYLENOL) suppository 650 mg  650 mg Rectal Q4H PRN Carmelo Ray MD       • albuterol (PROVENTIL) nebulizer solution 0.083% 2.5 mg/3mL  2.5 mg Nebulization Q4H PRN Carmelo Ray MD       • amLODIPine (NORVASC) tablet 10 mg  10 mg Oral Daily Carmelo Ray MD   10 mg at 09/22/19 0846   • aspirin chewable tablet 81 mg  81 mg Oral Daily Carmelo Ray MD   81 mg at 09/22/19 0846   • atorvastatin (LIPITOR) tablet 40 mg  40 mg Oral Nightly Carmelo Ray MD   40 mg at 09/21/19 2124   • b complex-vitamin c-folic acid (NEPHRO-SHIRLENE) tablet 1 tablet  1 tablet Oral Nightly Flor,  MD Carmelo   1 tablet at 09/21/19 2124   • dextrose (D50W) 25 g/ 50mL Intravenous Solution 25 g  25 g Intravenous Q15 Min PRN Carmelo Ray MD       • dextrose (GLUTOSE) oral gel 1 tube  1 tube Oral Q15 Min PRN Carmelo Ray MD       • docusate sodium (COLACE) capsule 100 mg  100 mg Oral Q12H Carmelo Ray MD   100 mg at 09/22/19 0846   • famotidine (PEPCID) tablet 20 mg  20 mg Oral BID AC Carmelo Ray MD   20 mg at 09/22/19 0649   • folic acid (FOLVITE) tablet 1 mg  1 mg Oral Daily Carmelo Ray MD   1 mg at 09/22/19 0846   • glucagon (human recombinant) (GLUCAGEN DIAGNOSTIC) injection 1 mg  1 mg Subcutaneous PRN Carmelo Ray MD       • Influenza Vac Subunit Quad (FLUCELVAX) injection 0.5 mL  0.5 mL Intramuscular During Hospitalization Carmelo Ray MD       • insulin aspart (novoLOG) injection 0-9 Units  0-9 Units Subcutaneous 4x Daily AC & at Bedtime Carmelo Ray MD   4 Units at 09/22/19 0650   • insulin detemir (LEVEMIR) injection 10 Units  10 Units Subcutaneous QAM Carmelo Ray MD   10 Units at 09/22/19 0650   • isosorbide mononitrate (IMDUR) 24 hr tablet 30 mg  30 mg Oral Daily Carmelo Ray MD   30 mg at 09/22/19 0846   • metoprolol succinate XL (TOPROL-XL) 24 hr tablet 100 mg  100 mg Oral Daily Carmelo Ray MD   100 mg at 09/22/19 0847   • ondansetron (ZOFRAN) injection 4 mg  4 mg Intravenous Q6H PRN Carmelo Ray MD       • sevelamer (RENAGEL) tablet 800 mg  800 mg Oral TID With Meals Carmelo Ray MD   800 mg at 09/22/19 0846   • sodium chloride 0.9 % flush 10 mL  10 mL Intravenous Q12H Carmelo Ray MD   10 mL at 09/22/19 0847   • sodium chloride 0.9 % flush 10 mL  10 mL Intravenous PRN Carmelo Ray MD       • vitamin D3 capsule 5,000 Units  5,000 Units Oral Daily Carmelo Ray MD   5,000 Units at 09/22/19 0846       Assessment/Plan:  1. End stage renal disease on dialysis (CMS/[facility]):  2. Symptomatic anemia:  3. Anemia of chronic kidney disease:  4. Hypertension with end-stage renal disease:  5. Type  2 diabetes mellitus treated with insulin (CMS/[facility]):  6. Epistaxis, recurrent:    Plan:  · Continue with the current treatment plan.  · Next dialysis due on Tuesday will make arrangements if he is still in the hospital.  · Hemoglobin significantly better.  Clinically feels much better.  · Details were discussed with the patient no family in the room.    · Details were also discussed with the hospitalist service.   · Surveillance labs.  · Further recommendations will depend on clinical course of the patient during the current hospitalization.    · I also discussed the details with the nursing staff.  · Rest as ordered.    Chance Mackey MD, FASN  09/22/19  10:14 AM    Dictated utilizing Dragon dictation.

## 2019-09-22 NOTE — PLAN OF CARE
Problem: Fall Risk (Adult)  Goal: Absence of Fall  Outcome: Ongoing (interventions implemented as appropriate)   09/22/19 0020   Fall Risk (Adult)   Absence of Fall making progress toward outcome  (frequent checks,call for assist)

## 2019-09-23 NOTE — DISCHARGE SUMMARY
"    [facility]      Name:  Talha Cutler   Age:  59 y.o.  Sex:  male  :  1960  MRN:  [id number]   Visit Number:  [id number]    Admission Date:  2019  Date of Discharge:  2019  Primary Care Physician:  Jeannette Godoy APRN    Discharge Diagnoses:       Symptomatic anemia    Essential hypertension    Type 2 diabetes mellitus treated with insulin (CMS/[facility])    End stage renal disease on dialysis (CMS/[facility])    Epistaxis, recurrent      Presenting Problem:    Symptomatic anemia [D64.9]  Symptomatic anemia [D64.9]     Consults:     Consults     Date and Time Order Name Status Description    2019 Inpatient Nephrology Consult Completed         Consulting Physician(s)     Provider Relationship Specialty    Chance Mackey MD, ARJUN Consulting Physician Nephrology          Procedures Performed:  Dialysis - routine  1 unit PRBC transfusion          Hospital Course:    This is a 59-year-old pleasant male with history of end-stage renal disease on hemodialysis on Tuesday, Thursday and Saturday, diabetes mellitus type 2, hypothyroidism and coronary artery disease on medical therapy was taken to the emergency room at [facility] by his wife with complaints of generalized weakness, dizziness and low blood pressures. Patient reported that he has been having frequent nosebleeds. He states that he was in [facility] about a year ago and apparently had upper and lower GI endoscopies and states \"they could not find out where I was bleeding\".  His hemoglobin on admission was low at 7.4 and patient did receive 1 unit PRBC with dialysis. He had no other signs of bleeding while inpatient.  Of note, patient's hemoglobin of 7.4 is probably his baseline.  Patient was discharged with instructions to continue his chronic home medications and continue his dialysis schedule. Followup with his pcp in 1 week.     Vital Signs:         Physical " Exam:    General Appearance:  Alert and cooperative, not in any acute distress.   Head:  Atraumatic and normocephalic, without obvious abnormality.   Eyes:          PERRLA, conjunctivae and sclerae normal, no Icterus. No pallor. Extraocular movements are within normal limits.   Ears:  Ears appear intact with no abnormalities noted.   Throat: No oral lesions,oral mucosa moist.   Neck: Supple, trachea midline       Lungs:   CTAB, nonlabored   Heart:  Normal S1 and S2, no murmur.   Abdomen:   Normal bowel sounds, no masses, no organomegaly. Soft, non-tender, non-distended, no guarding, no rebound tenderness.   Extremities: no edema, no cyanosis, no clubbing.   Pulses: Pulses palpable and equal bilaterally.   Skin: No bleeding, bruising or rash.   Lymph nodes: No palpable adenopathy.   Neurologic: Alert and oriented x 3. Moves all four limbs equally. No tremors. No facial asymetry.     Pertinent Lab Results:     Results from last 7 days   Lab Units 09/22/19  0854 09/21/19  0647   SODIUM mmol/L 134* 129*   POTASSIUM mmol/L 4.7 6.3*   CHLORIDE mmol/L 93* 85*   CO2 mmol/L 21.8* 23.8   BUN mg/dL 27* 48*   CREATININE mg/dL 3.78* 4.75*   CALCIUM mg/dL 9.1 8.4*   GLUCOSE mg/dL 171* 199*     Results from last 7 days   Lab Units 09/22/19  0854 09/21/19  1446 09/21/19  0647   WBC 10*3/mm3 6.48  --  7.54   HEMOGLOBIN g/dL 9.2* 9.2* 7.4*   HEMATOCRIT % 29.7* 29.3* 24.5*   PLATELETS 10*3/mm3 179  --  190                                   Invalid input(s): USDES,  BLOODU, NITRITITE, BACT, EP  Pain Management Panel     Pain Management Panel Latest Ref Rng & Units 6/23/2016 7/30/2015    CREATININE UR Not Estab. mg/dL 10.5 25.1              Pertinent Radiology Results:    Imaging Results (all)     None          Condition on Discharge:      Stable.    Code status during the hospital stay:    Code Status and Medical Interventions:   Ordered at: 09/20/19 5333     Level Of Support Discussed With:    Patient     Code Status:    CPR      Medical Interventions (Level of Support Prior to Arrest):    Full       Discharge Disposition:    Home or Self Care    Discharge Medications:       Discharge Medications      Continue These Medications      Instructions Start Date   albuterol (2.5 MG/3ML) 0.083% nebulizer solution  Commonly known as:  PROVENTIL   2.5 mg, Nebulization, Every 4 Hours PRN      albuterol sulfate  (90 Base) MCG/ACT inhaler  Commonly known as:  PROVENTIL HFA;VENTOLIN HFA;PROAIR HFA   2 puffs, Inhalation, Every 4 Hours PRN      amLODIPine 10 MG tablet  Commonly known as:  NORVASC   10 mg, Oral, Daily      aspirin 81 MG chewable tablet   81 mg, Oral, Daily      atorvastatin 40 MG tablet  Commonly known as:  LIPITOR   40 mg, Oral, Daily      AURYXIA 1  MG(Fe) tablet  Generic drug:  Ferric Citrate   1 tablet, Oral, With every meal and every snack      clopidogrel 75 MG tablet  Commonly known as:  PLAVIX   75 mg, Oral, Daily, Start taking from 1/12/2019 onwards ONLY if not coughing up significant amount of blood.      docusate sodium 100 MG capsule  Commonly known as:  COLACE   100 mg, Oral, Every 12 Hours      fluticasone 27.5 MCG/SPRAY nasal spray  Commonly known as:  VERAMYST   2 sprays, Nasal, Daily      folic acid 1 MG tablet  Commonly known as:  FOLVITE   1 mg, Oral, Daily      hydrALAZINE 50 MG tablet  Commonly known as:  APRESOLINE   50 mg, Oral, 3 Times Daily      insulin aspart 100 UNIT/ML solution pen-injector sc pen  Commonly known as:  novoLOG FLEXPEN   5 Units, Subcutaneous, 3 Times Daily With Meals      insulin glargine 100 UNIT/ML injection  Commonly known as:  LANTUS   12 Units, Subcutaneous, Nightly      isosorbide mononitrate 30 MG 24 hr tablet  Commonly known as:  IMDUR   30 mg, Oral, Daily      levothyroxine 100 MCG tablet  Commonly known as:  SYNTHROID, LEVOTHROID   125 mcg, Oral, Daily      losartan 100 MG tablet  Commonly known as:  COZAAR   100 mg, Oral, Every 24 Hours Scheduled      metoprolol succinate   MG 24 hr tablet  Commonly known as:  TOPROL-XL   100 mg, Oral, Daily      ondansetron ODT 4 MG disintegrating tablet  Commonly known as:  ZOFRAN-ODT   4 mg, Oral, Every 6 Hours PRN      raNITIdine 150 MG tablet  Commonly known as:  ZANTAC   150 mg, Oral, 2 Times Daily      RADHA-SHIRLENE PO   1 tablet, Oral, Patient states he takes these after dialysis, but is unaware of dose.      vitamin D3 5000 units capsule capsule   1 capsule, Oral, Daily             Discharge Diet:         Activity at Discharge:     Activity Instructions     Activity as Tolerated            Follow-up Appointments:    Additional Instructions for the Follow-ups that You Need to Schedule     Discharge Follow-up with PCP   As directed       Currently Documented PCP:    Jeannette Godoy APRN    PCP Phone Number:    [phone]     Follow Up Details:  1 week           Follow-up Information     Jeannette Godoy APRN .    Specialty:  Family Medicine  Why:  1 week  Contact information:  [address] [facility] [address]  [phone]                   Future Appointments   Date Time Provider Department Center   11/11/2019  3:00 PM Kiera Rain APRN [facility] None       Additional Instructions for the Follow-ups that You Need to Schedule     Discharge Follow-up with PCP   As directed       Currently Documented PCP:    Jeannette Godoy APRN    PCP Phone Number:    [phone]     Follow Up Details:  1 week               Test Results Pending at Discharge:  none         Shane Nolen DO  09/23/19  9:00 AM    Time spent: greater than 30 minutes     Dictated utilizing Dragon dictation.

## 2019-09-23 NOTE — OUTREACH NOTE
Prep Survey      Responses   Facility patient discharged from?  [facility]   Is patient eligible?  Yes   Discharge diagnosis  Symptomatic anemia   Does the patient have one of the following disease processes/diagnoses(primary or secondary)?  Other   Does the patient have Home health ordered?  No   Is there a DME ordered?  No   General alerts for this patient  HD patient    Prep survey completed?  Yes          Juanita Christine RN

## 2019-09-24 NOTE — OUTREACH NOTE
Medical Week 1 Survey      Responses   Facility patient discharged from?  [facility]   Does the patient have one of the following disease processes/diagnoses(primary or secondary)?  Other   Is there a successful TCM telephone encounter documented?  No   Week 1 attempt successful?  Yes   Call start time  1251   Call end time  1259   General alerts for this patient  HD patient    Discharge diagnosis  Symptomatic anemia   Meds reviewed with patient/caregiver?  Yes   Is the patient having any side effects they believe may be caused by any medication additions or changes?  No   Does the patient have all medications ordered at discharge?  Yes   Is the patient taking all medications as directed (includes completed medication regime)?  Yes   Does the patient have a primary care provider?   Yes   Does the patient have an appointment with their PCP within 7 days of discharge?  Yes   Comments regarding PCP  Has a followup on Monday Sept 30    Has the patient kept scheduled appointments due by today?  N/A   Has home health visited the patient within 72 hours of discharge?  N/A   Psychosocial issues?  No   Did the patient receive a copy of their discharge instructions?  Yes   Nursing interventions  Reviewed instructions with patient   What is the patient's perception of their health status since discharge?  Improving   Is the patient/caregiver able to teach back signs and symptoms related to disease process for when to call PCP?  Yes   Is the patient/caregiver able to teach back signs and symptoms related to disease process for when to call 911?  Yes   Is the patient/caregiver able to teach back the hierarchy of who to call/visit for symptoms/problems? PCP, Specialist, Home health nurse, Urgent Care, ED, 911  Yes   Week 1 call completed?  Yes   Revoked  No further contact(revokes)-requires comment   Graduated/Revoked comments  No further calls.          Jose Du RN

## 2019-11-01 PROBLEM — J18.9 HOSPITAL-ACQUIRED PNEUMONIA: Status: ACTIVE | Noted: 2019-01-01
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
## 2019-11-01 NOTE — PROGRESS NOTES
"Pharmacokinetic Initial Note - Vancomycin    Talha Cutler is a 59 y.o. male  172.7 cm (68\") 56.1 kg (123 lb 9.6 oz)    Indication for use: Pneumonia    Results from last 7 days   Lab Units 11/01/19  1339   WBC 10*3/mm3 6.37   CREATININE mg/dL 3.83*      Estimated Creatinine Clearance: 16.3 mL/min (A) (by C-G formula based on SCr of 3.83 mg/dL (H)).  Temp Readings from Last 1 Encounters:   11/01/19 97.9 °F (36.6 °C) (Oral)       Culture results  Microbiology Results (last 10 days)     Procedure Component Value - Date/Time    Influenza Antigen, Rapid - Swab, Nasopharynx [[id number]]  (Normal) Collected:  11/01/19 1332    Lab Status:  Final result Specimen:  Swab from Nasopharynx Updated:  11/01/19 1348     Influenza A Ag, EIA Negative     Influenza B Ag, EIA Negative          Other Antimicrobials  Levofloxacind 500 mg IV q48h  Cefepime 2 g IV q24h    Assessment/Plan  Initiated Vancomycin 1250 mg IVPB once, followed by 750 mg as needed per levels < 20 mcg/mL. Will order Vancomycin level with morning labs tomorrow to assess.  Pharmacy will monitor renal function and adjust dose accordingly.    Theo Ma RPH  11/01/19 5:36 PM    "

## 2019-11-01 NOTE — H&P
[facility] Medicine Services  HISTORY AND PHYSICAL    Primary Care Physician: Jeannette Godoy APRN    Subjective     Chief Complaint:    Chief Complaint   Patient presents with   • Cough       History of Present Illness:     I have reviewed labs/imaging/records from this hospitalization, including ER staff to establish a comprehensive understanding of this patient's clinical issues, as well as to establish plan of care appropriately.     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  Patient states that his symptoms started about 3 days ago and persisted over the last 3 days and have worsened in severity.  He feels like he needs to cough out a lot of phlegm but is unable to do so.  He reports low-grade temperatures at home around  with associated chills.  He has not had any chest pain or palpitations.  Denies any abdominal pain, diarrhea or dysuria.  He does report that 3 weeks ago he was having fevers while at dialysis and they did cultures but they were all negative.  On further discussion with him regarding his respiratory issues, he states that he uses oxygen nocturnally at 2 L and was previously on CPAP until 5 months ago.  He states that his nostrils get very dry with hard and blood which makes it very difficult for him to breathe so he has not been wearing it.  He is unaware if he has humidifier.    Patient's vitals on arrival are within normal range.  His labs are notable for H&H 8.9/29 which is around his baseline.  Normal white blood cell count.  proBNP greater than 70,000. troponin 0.48  BUN/creatinine 24/3.83,  sodium 130.  Pro-Marv 0.6.  Lactic 1.2.  Chest x-ray showed chronic lung changes with an articular nodular lung pattern in the left lung base which may reflect a bronchopneumonia.  Patient was started on broad-spectrum IV antibiotics.  He is being admitted to the hospitalist service for further care.    Review of Systems   1. Constitutional: Negative for diaphoresis, fatigue and unexpected weight change. + Fever, chills  2. HENT: + Congestion and nasal drip  3. Eyes: Negative for redness and visual disturbance.   4. Respiratory: + Shortness of breath, chest tightness.   5. Cardiovascular: Negative for chest pain and palpitations.   6. Gastrointestinal: Negative for abdominal distention, abdominal pain and blood in stool.   7. Endocrine: Negative for cold intolerance and heat intolerance.   8. Genitourinary: Negative for difficulty urinating, dysuria and frequency.   9. Musculoskeletal: Negative for arthralgias, back pain and myalgias.   10. Skin: Negative for color change, rash and wound.   11. Neurological: Negative for syncope, weakness and headaches.   12. Hematological: Negative for adenopathy. Does not bruise/bleed easily.   13. Psychiatric/Behavioral: Negative for confusion. The patient is not nervous/anxious.     Past Medical History:   Past Medical History:   Diagnosis Date   • Abdominal pain    • Anemia    • Cataracts, bilateral    • CHF (congestive heart failure) (CMS/[facility])    • Chronic kidney disease    • Constipation    • Cough    • Dependence on nocturnal oxygen therapy    • Diabetes mellitus (CMS/[facility])    • Diarrhea    • End stage renal failure on dialysis (CMS/[facility])     TUES, THURS, SAT   • GERD (gastroesophageal reflux disease)    • H/O blood clots     LEG/NECK   • H/O chest x-ray 03/25/2016    Interval decrease in size of the patients right pleural effusion with a persistent small left effusion as well   • History of transfusion    • Hypertension    • Left-sided weakness    • Nauseated     DRY HEAVES  "  • Pleural effusion    • Pneumonia    • Pneumonia    • Renal failure    • Stroke (CMS/HCC) 2006    LEFT SIDED WEAKNESS   • Swelling    • Teeth missing    • Tinnitus    • Ulcer, stomach peptic     HISTORY OF ULCER AS A TEENAGER   • Wears glasses        Past Surgical History:  Past Surgical History:   Procedure Laterality Date   • ARTERIOVENOUS FISTULA Right    • BRONCHOSCOPY N/A 1/10/2019    Procedure: BRONCHOSCOPY with MAC and Flouro. LAVAGE, BRUSHINGS AND BIOPSY;  Surgeon: Ean Hernandez MD;  Location: [facility] OR;  Service: Pulmonary   • CARDIAC CATHETERIZATION N/A 3/28/2018    Procedure: Peripheral angiography;  Surgeon: Clay Ruano MD;  Location: [facility] CATH INVASIVE LOCATION;  Service: Cardiovascular   • CARDIAC CATHETERIZATION N/A 3/28/2018    Procedure: Angioplasty-peripheral;  Surgeon: Clay Ruano MD;  Location: [facility] CATH INVASIVE LOCATION;  Service: Cardiovascular   • CARDIAC CATHETERIZATION N/A 3/28/2018    Procedure: Atherectomy-peripheral;  Surgeon: Clay Ruano MD;  Location: [facility] CATH INVASIVE LOCATION;  Service: Cardiovascular   • CATARACT EXTRACTION, BILATERAL     • CHOLECYSTECTOMY     • COLONOSCOPY     • EYE SURGERY      BLEEDING BEHING BILATERAL EYES   • INTERVENTIONAL RADIOLOGY PROCEDURE N/A 3/28/2018    Procedure: Abdominal Aortogram;  Surgeon: Clay Ruano MD;  Location: [facility] CATH INVASIVE LOCATION;  Service: Cardiovascular   • PORTACATH PLACEMENT     • SHOULDER MANIPULATION Left     \"FROZEN SHOULDER\"   • VENOUS ACCESS DEVICE (PORT) REMOVAL         Family History: family history includes COPD in his mother; Diabetes in his father, paternal grandmother, and sister; Hypertension in his brother and sister.    Social History:  reports that he has never smoked. He has never used smokeless tobacco. He reports that he does not drink alcohol or use drugs.    Home medications:   Prior to Admission Medications     Prescriptions Last Dose Informant " Patient Reported? Taking?    albuterol (PROVENTIL HFA;VENTOLIN HFA) 108 (90 Base) MCG/ACT inhaler   No No    Inhale 2 puffs Every 4 (Four) Hours As Needed for Shortness of Air.    albuterol (PROVENTIL) (2.5 MG/3ML) 0.083% nebulizer solution   Yes No    Take 2.5 mg by nebulization Every 4 (Four) Hours As Needed for Wheezing.    amLODIPine (NORVASC) 10 MG tablet  Pharmacy Yes No    Take 10 mg by mouth Daily.    aspirin 81 MG chewable tablet  Self Yes No    Chew 81 mg Daily.    atorvastatin (LIPITOR) 40 MG tablet   Yes No    Take 40 mg by mouth Daily.    AURYXIA 1  MG(Fe) tablet  Self Yes No    Take 1 tablet by mouth. With every meal and every snack    B Complex-C-Folic Acid (RADHA-SHIRLENE PO)  Self Yes No    Take 1 tablet by mouth. Patient states he takes these after dialysis, but is unaware of dose.     Cholecalciferol (VITAMIN D3) 5000 UNITS capsule capsule  Self Yes No    Take 1 capsule by mouth Daily.    clopidogrel (PLAVIX) 75 MG tablet   No No    Take 1 tablet by mouth Daily. Start taking from 1/12/2019 onwards ONLY if not coughing up significant amount of blood.    docusate sodium (COLACE) 100 MG capsule   Yes No    Take 100 mg by mouth Every 12 (Twelve) Hours.    fluticasone (VERAMYST) 27.5 MCG/SPRAY nasal spray   Yes No    2 sprays into the nostril(s) as directed by provider Daily.    folic acid (FOLVITE) 1 MG tablet   Yes No    Take 1 mg by mouth daily.    hydrALAZINE (APRESOLINE) 50 MG tablet   Yes No    Take 50 mg by mouth 3 (Three) Times a Day.    insulin aspart (novoLOG FLEXPEN) 100 UNIT/ML solution pen-injector sc pen  Spouse/Significant Other Yes No    Inject 5 Units under the skin into the appropriate area as directed 3 (Three) Times a Day With Meals.    insulin glargine (LANTUS) 100 UNIT/ML injection   Yes No    Inject 12 Units under the skin into the appropriate area as directed Every Night.    isosorbide mononitrate (IMDUR) 30 MG 24 hr tablet   Yes No    Take 30 mg by mouth Daily.     levothyroxine (SYNTHROID, LEVOTHROID) 100 MCG tablet  Pharmacy Yes No    Take 125 mcg by mouth Daily.    losartan (COZAAR) 100 MG tablet  Pharmacy No No    Take 1 tablet by mouth Daily.    metoprolol succinate XL (TOPROL-XL) 100 MG 24 hr tablet   No No    Take 1 tablet by mouth Daily.    ondansetron ODT (ZOFRAN-ODT) 4 MG disintegrating tablet  Pharmacy No No    Take 1 tablet by mouth Every 6 (Six) Hours As Needed for Nausea or Vomiting.    raNITIdine (ZANTAC) 150 MG tablet   Yes No    Take 150 mg by mouth 2 (Two) Times a Day.          Emergency department medications:   Medications   vancomycin 1250 mg in sodium chloride 0.9% 250 mL IVPB (not administered)   cefepime (MAXIPIME) 2 g in sodium chloride 0.9 % 100 mL IVPB (2 g Intravenous New Bag 11/1/19 1603)   levoFLOXacin (LEVAQUIN) 750 mg/150 mL D5W (premix) (LEVAQUIN) 750 mg (not administered)   albuterol (PROVENTIL) nebulizer solution 0.083% 2.5 mg/3mL (2.5 mg Nebulization Given 11/1/19 1325)       Medications:  Medications Prior to Admission   Medication Sig Dispense Refill Last Dose   • albuterol (PROVENTIL HFA;VENTOLIN HFA) 108 (90 Base) MCG/ACT inhaler Inhale 2 puffs Every 4 (Four) Hours As Needed for Shortness of Air. 1 inhaler 0 Taking   • albuterol (PROVENTIL) (2.5 MG/3ML) 0.083% nebulizer solution Take 2.5 mg by nebulization Every 4 (Four) Hours As Needed for Wheezing.      • amLODIPine (NORVASC) 10 MG tablet Take 10 mg by mouth Daily.   9/20/2019 at 0800   • aspirin 81 MG chewable tablet Chew 81 mg Daily.   9/20/2019 at 0800   • atorvastatin (LIPITOR) 40 MG tablet Take 40 mg by mouth Daily.   9/19/2019 at 2100   • AURYXIA 1  MG(Fe) tablet Take 1 tablet by mouth. With every meal and every snack   9/20/2019 at 1200   • B Complex-C-Folic Acid (RADHA-SHIRLENE PO) Take 1 tablet by mouth. Patient states he takes these after dialysis, but is unaware of dose.    9/19/2019 at 2100   • Cholecalciferol (VITAMIN D3) 5000 UNITS capsule capsule Take 1 capsule by mouth  "Daily.   9/20/2019 at 0800   • clopidogrel (PLAVIX) 75 MG tablet Take 1 tablet by mouth Daily. Start taking from 1/12/2019 onwards ONLY if not coughing up significant amount of blood. 90 tablet 3 9/20/2019 at 0800   • docusate sodium (COLACE) 100 MG capsule Take 100 mg by mouth Every 12 (Twelve) Hours.   9/20/2019 at 0800   • fluticasone (VERAMYST) 27.5 MCG/SPRAY nasal spray 2 sprays into the nostril(s) as directed by provider Daily.   9/20/2019 at 0800   • folic acid (FOLVITE) 1 MG tablet Take 1 mg by mouth daily.   9/20/2019 at 0800   • hydrALAZINE (APRESOLINE) 50 MG tablet Take 50 mg by mouth 3 (Three) Times a Day.   Taking   • insulin aspart (novoLOG FLEXPEN) 100 UNIT/ML solution pen-injector sc pen Inject 5 Units under the skin into the appropriate area as directed 3 (Three) Times a Day With Meals.   9/20/2019 at 0800   • insulin glargine (LANTUS) 100 UNIT/ML injection Inject 12 Units under the skin into the appropriate area as directed Every Night.   9/19/2019 at 2100   • isosorbide mononitrate (IMDUR) 30 MG 24 hr tablet Take 30 mg by mouth Daily.   9/20/2019 at 0800   • levothyroxine (SYNTHROID, LEVOTHROID) 100 MCG tablet Take 125 mcg by mouth Daily.   9/20/2019 at 0600   • losartan (COZAAR) 100 MG tablet Take 1 tablet by mouth Daily.   9/20/2019 at 0800   • metoprolol succinate XL (TOPROL-XL) 100 MG 24 hr tablet Take 1 tablet by mouth Daily. 30 tablet 0 9/20/2019 at 0800   • ondansetron ODT (ZOFRAN-ODT) 4 MG disintegrating tablet Take 1 tablet by mouth Every 6 (Six) Hours As Needed for Nausea or Vomiting. 20 tablet 0 Not Taking   • raNITIdine (ZANTAC) 150 MG tablet Take 150 mg by mouth 2 (Two) Times a Day.   9/20/2019 at 0800       Allergies:  Allergies   Allergen Reactions   • Erythromycin Hives         Objective     Physical Exam:  Vital Signs: /73   Pulse 87   Temp 98.4 °F (36.9 °C) (Oral)   Resp 18   Ht 172.7 cm (68\")   Wt 56.7 kg (125 lb)   SpO2 100%   BMI 19.01 kg/m²      Physical " Exam:     General Appearance:   Alert, cooperative, in no acute distress, frail.     Head:   Normocephalic, without obvious abnormality, atraumatic.     Eyes:       Normal, conjunctivae and sclerae, no icterus, no pallor, corneas clear, PERRLA        Throat:   Oral mucosa dry      Neck:  No adenopathy, supple, trachea midline, no thyromegaly, no carotid bruit, no JVD      Back:   No CVA tenderness on Percussion.     Lungs:    Fair air movement bilaterally, no significant wheezing but occasional rhonchi appreciated in the left lung       Heart:   Regular rhythm and normal rate, normal S1 and S2.       Abdomen:   Normal bowel sounds, no masses, no organomegaly, soft non-tender, non-distended, no guarding, no rebound tenderness        Extremities:  Left upper and lower extremity weakness which is chronic, left hand contracted, no edema, no cyanosis, no redness.     Pulses:  Pulses palpable and equal bilaterally but weak.     Skin:   Multiple abrasions noted on hands and shins bilaterally       Neurologic:  Cranial nerves grossly intact         Results Review:  Lab Results (last 7 days)     Procedure Component Value Units Date/Time    Troponin [[id number]] Collected:  11/01/19 1545    Specimen:  Blood Updated:  11/01/19 1555    Procalcitonin [[id number]]  (Abnormal) Collected:  11/01/19 1444    Specimen:  Blood Updated:  11/01/19 1519     Procalcitonin 0.60 ng/mL     Narrative:       As a Marker for Sepsis (Non-Neonates):   1. <0.5 ng/mL represents a low risk of severe sepsis and/or septic shock.  1. >2 ng/mL represents a high risk of severe sepsis and/or septic shock.    As a Marker for Lower Respiratory Tract Infections that require antibiotic therapy:  PCT on Admission     Antibiotic Therapy             6-12 Hrs later  > 0.5                Strongly Recommended            >0.25 - <0.5         Recommended  0.1 - 0.25           Discouraged                   Remeasure/reassess PCT  <0.1                 Strongly Discouraged  "         Remeasure/reassess PCT      As 28 day mortality risk marker: \"Change in Procalcitonin Result\" (> 80 % or <=80 %) if Day 0 (or Day 1) and Day 4 values are available. Refer to http://www.[facility]-pct-calculator.com/   Change in PCT <=80 %   A decrease of PCT levels below or equal to 80 % defines a positive change in PCT test result representing a higher risk for 28-day all-cause mortality of patients diagnosed with severe sepsis or septic shock.  Change in PCT > 80 %   A decrease of PCT levels of more than 80 % defines a negative change in PCT result representing a lower risk for 28-day all-cause mortality of patients diagnosed with severe sepsis or septic shock.                Lactic Acid, Plasma [[id number]]  (Normal) Collected:  11/01/19 1419    Specimen:  Blood Updated:  11/01/19 1437     Lactate 1.2 mmol/L     Troponin [[id number]]  (Abnormal) Collected:  11/01/19 1339    Specimen:  Blood Updated:  11/01/19 1431     Troponin T 0.480 ng/mL     Narrative:       Troponin T Reference Range:  <= 0.03 ng/mL-   Negative for AMI  >0.03 ng/mL-     Abnormal for myocardial necrosis.  Clinicians would have to utilize clinical acumen, EKG, Troponin and serial changes to determine if it is an Acute Myocardial Infarction or myocardial injury due to an underlying chronic condition.     BNP [[id number]]  (Abnormal) Collected:  11/01/19 1339    Specimen:  Blood Updated:  11/01/19 1431     proBNP >70,000.0 pg/mL     Narrative:       Among patients with dyspnea, NT-proBNP is highly sensitive for the detection of acute congestive heart failure. In addition NT-proBNP of <300 pg/ml effectively rules out acute congestive heart failure with 99% negative predictive value.    Lipase [[id number]]  (Normal) Collected:  11/01/19 1339    Specimen:  Blood Updated:  11/01/19 1417     Lipase 25 U/L     Comprehensive Metabolic Panel [[id number]]  (Abnormal) Collected:  11/01/19 1339    Specimen:  Blood Updated:  11/01/19 1417     Glucose 317 " mg/dL      Comment: Glucose >180, Hemoglobin A1C recommended.        BUN 24 mg/dL      Creatinine 3.83 mg/dL      Sodium 130 mmol/L      Potassium 4.6 mmol/L      Comment: Specimen hemolyzed.  Results may be affected.        Chloride 82 mmol/L      CO2 32.6 mmol/L      Calcium 9.2 mg/dL      Total Protein 7.4 g/dL      Albumin 3.50 g/dL      ALT (SGPT) 11 U/L      Comment: Specimen hemolyzed.  Results may be affected.        AST (SGOT) 42 U/L      Comment: Specimen hemolyzed.  Results may be affected.        Alkaline Phosphatase 77 U/L      Total Bilirubin 0.3 mg/dL      eGFR Non African Amer 16 mL/min/1.73      Globulin 3.9 gm/dL      A/G Ratio 0.9 g/dL      BUN/Creatinine Ratio 6.3     Anion Gap 15.4 mmol/L     Narrative:       GFR Normal >60  Chronic Kidney Disease <60  Kidney Failure <15    Influenza Antigen, Rapid - Swab, Nasopharynx [[id number]]  (Normal) Collected:  11/01/19 1332    Specimen:  Swab from Nasopharynx Updated:  11/01/19 1348     Influenza A Ag, EIA Negative     Influenza B Ag, EIA Negative    CBC & Differential [[id number]] Collected:  11/01/19 1339    Specimen:  Blood Updated:  11/01/19 1345    Narrative:       The following orders were created for panel order CBC & Differential.  Procedure                               Abnormality         Status                     ---------                               -----------         ------                     CBC Auto Differential[[id number]]        Abnormal            Final result                 Please view results for these tests on the individual orders.    CBC Auto Differential [[id number]]  (Abnormal) Collected:  11/01/19 1339    Specimen:  Blood Updated:  11/01/19 1345     WBC 6.37 10*3/mm3      RBC 3.05 10*6/mm3      Hemoglobin 8.9 g/dL      Hematocrit 29.1 %      MCV 95.4 fL      MCH 29.2 pg      MCHC 30.6 g/dL      RDW 17.8 %      RDW-SD 62.3 fl      MPV 9.3 fL      Platelets 191 10*3/mm3      Neutrophil % 66.7 %      Lymphocyte % 11.6 %       Monocyte % 18.4 %      Eosinophil % 2.4 %      Basophil % 0.6 %      Immature Grans % 0.3 %      Neutrophils, Absolute 4.25 10*3/mm3      Lymphocytes, Absolute 0.74 10*3/mm3      Monocytes, Absolute 1.17 10*3/mm3      Eosinophils, Absolute 0.15 10*3/mm3      Basophils, Absolute 0.04 10*3/mm3      Immature Grans, Absolute 0.02 10*3/mm3      nRBC 0.0 /100 WBC         Imaging Results (Last 72 Hours)     Procedure Component Value Units Date/Time    XR Chest 2 View [[id number]] Collected:  11/01/19 1401     Updated:  11/01/19 1406    Narrative:       PROCEDURE: XR CHEST 2 VW-        HISTORY: Cough, chest tightness     COMPARISON: 09/14/2019.     FINDINGS: The heart is normal in size. The mediastinum is unremarkable.  There are chronic interstitial lung changes with pleural thickening in  the right lung base anteriorly which is unchanged. There is a reticular  nodular pattern in the left lung base best appreciated on the frontal  view which may reflect a bronchopneumonia. There is no pneumothorax.  There are no acute osseous abnormalities.       Impression:       Chronic lung changes with a reticulonodular lung pattern in  the left lung base which may reflect a bronchopneumonia.           This report was finalized on 11/1/2019 2:04 PM by Marilia Mora M.D..        I have personally reviewed and interpreted available lab data, radiology studies and ECG obtained at time of admission.     Assessment / Plan     Assessment/Problem List:       Hospital-acquired pneumonia    1.  Left lower lobe pneumonia, HCAP, present on admission  2.  Elevated troponin level, likely due to poor renal clearance, no associated chest pain, not likely ACS  3.  Elevated proBNP without any evidence of fluid overload on exam  4.  End-stage renal disease on hemodialysis on Tuesday, Thursday and Saturday  5.  Type 2 diabetes mellitus  6.   Hypothyroidism  7.   Coronary artery disease and history of CVA resulting in left-sided residual deficits,  wheelchair bound  8.   Hypertension   9.   Peripheral arterial disease    Plan:  Patient will be admitted to [facility] with telemetry but will go to the intensive care unit as overflow.  He will be continued on broad-spectrum IV antibiotics in the form of vancomycin, cefepime and Levaquin.  We will try to obtain a sputum sample.  Check respiratory panel, urine legionella and s. Pneumoniae.   Start Tessalon Perles and Mucinex.  Continue to trend troponin every 6 hours.  Dr. Mackey will be consulted for continuation of hemodialysis.  Currently, patient does not appear fluid overloaded despite having a significantly elevated proBNP which I suspect is a chronic issue for the patient due to his hemodialysis status.    Anticipate 2-3 days of inpatient stay.   Details were discussed with the patient as well as family in the room.   Further recommendations will depend on clinical course of the patient during the current hospitalization.    I also discussed the details with the nursing staff.  Rest as ordered.      John Sosa MD 11/01/19 4:12 PM    Dictated using Dragon.

## 2019-11-01 NOTE — ED PROVIDER NOTES
Subjective   Mr. Cutler is a 59-year-old male with a history of anemia, CHF, stage renal disease on dialysis, GERD, diabetes, hypertension, CVA with left-sided weakness, and pneumonia presenting to the ER for evaluation of a cough.  Patient states his cough is been present for 2 days.  He states is nonproductive in nature.  He states he has coughed until he has had posttussive emesis.  He states he had a decreased appetite for the past few days and has been nauseous.  He states he feels tight in his chest and congested but denies any chest pain or difficulty breathing. Denies any known fever, headache, syncope, ear pain, difficulty swallowing, hemoptysis, abdominal pain, leg pain or swelling, or any other symptoms.  He denies any known sick contacts.  He states he has oxygen at home that he uses as needed            Review of Systems   Constitutional: Negative for chills and fever.   HENT: Negative for ear pain, rhinorrhea and sore throat.    Eyes: Negative.    Respiratory: Positive for choking and chest tightness. Negative for shortness of breath.    Cardiovascular: Negative for chest pain and leg swelling.   Gastrointestinal: Positive for nausea. Negative for abdominal pain, blood in stool, diarrhea and vomiting.   Genitourinary: Negative.    Musculoskeletal: Negative.    Skin: Negative.    Neurological: Negative.    Psychiatric/Behavioral: Negative.        Past Medical History:   Diagnosis Date   • Abdominal pain    • Anemia    • Cataracts, bilateral    • CHF (congestive heart failure) (CMS/[facility])    • Chronic kidney disease    • Constipation    • Cough    • Dependence on nocturnal oxygen therapy    • Diabetes mellitus (CMS/[facility])    • Diarrhea    • End stage renal failure on dialysis (CMS/[facility])     SILVINO AVITIA, SAT   • GERD (gastroesophageal reflux disease)    • H/O blood clots     LEG/NECK   • H/O chest x-ray 03/25/2016    Interval decrease in size of the patients right pleural effusion with a persistent small left  "effusion as well   • History of transfusion    • Hypertension    • Left-sided weakness    • Nauseated     DRY HEAVES   • Pleural effusion    • Pneumonia    • Pneumonia    • Renal failure    • Stroke (CMS/[facility]) 2006    LEFT SIDED WEAKNESS   • Swelling    • Teeth missing    • Tinnitus    • Ulcer, stomach peptic     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 LAVAGE, BRUSHINGS AND BIOPSY;  Surgeon: Ean Hernandez MD;  Location: [facility] OR;  Service: Pulmonary   • CARDIAC CATHETERIZATION N/A 3/28/2018    Procedure: Peripheral angiography;  Surgeon: Clay Ruano MD;  Location: [facility] CATH INVASIVE LOCATION;  Service: Cardiovascular   • CARDIAC CATHETERIZATION N/A 3/28/2018    Procedure: Angioplasty-peripheral;  Surgeon: Clay Ruano MD;  Location: [facility] CATH INVASIVE LOCATION;  Service: Cardiovascular   • CARDIAC CATHETERIZATION N/A 3/28/2018    Procedure: Atherectomy-peripheral;  Surgeon: Clay Ruano MD;  Location: [facility] CATH INVASIVE LOCATION;  Service: Cardiovascular   • CATARACT EXTRACTION, BILATERAL     • CHOLECYSTECTOMY     • COLONOSCOPY     • EYE SURGERY      BLEEDING BEHING BILATERAL EYES   • INTERVENTIONAL RADIOLOGY PROCEDURE N/A 3/28/2018    Procedure: Abdominal Aortogram;  Surgeon: Clay Ruano MD;  Location: [facility] CATH INVASIVE LOCATION;  Service: Cardiovascular   • PORTACATH PLACEMENT     • SHOULDER MANIPULATION Left     \"FROZEN SHOULDER\"   • VENOUS ACCESS DEVICE (PORT) REMOVAL         Family History   Problem Relation Age of Onset   • COPD Mother    • Diabetes Father    • Hypertension Sister    • Diabetes Sister    • Hypertension Brother    • Diabetes Paternal Grandmother        Social History     Socioeconomic History   • " "Marital status:      Spouse name: Not on file   • Number of children: Not on file   • Years of education: Not on file   • Highest education level: Not on file   Tobacco Use   • Smoking status: Never Smoker   • Smokeless tobacco: Never Used   • Tobacco comment: 2ND HAND SMOKE EXPOSURE   Substance and Sexual Activity   • Alcohol use: No   • Drug use: No   • Sexual activity: Defer           Objective   Physical Exam   Nursing note and vitals reviewed.  /73   Pulse 87   Temp 98.4 °F (36.9 °C) (Oral)   Resp 18   Ht 172.7 cm (68\")   Wt 56.7 kg (125 lb)   SpO2 100%   BMI 19.01 kg/m²     GEN: No acute distress, lying spine stretcher.  Appears chronically ill.  Awake and alert, answering questions  Head: Normocephalic, atraumatic  Eyes: EOM intact  ENT: Posterior pharynx normal in appearance, oral mucosa is moist, tongue midline.  Chest: Nontender to palpation  Cardiovascular: Regular rate and rhythm   Lungs: Breathing is even and nonlabored.  No accessory muscle use or stridor.  He does have decreased lung sounds in the lower bases, no obvious rales appreciated  Abdomen: Soft, nontender, nondistended, no peritoneal signs or guarding   Extremities: No edema, normal appearance, full ROM.  Radial and posterior tibialis pulses are 2+ and equal  Neuro: GCS 15.  No focal neurological deficits.  Alert and oriented  Psych: Mood and affect are appropriate    Procedures           ED Course  ED Course as of Nov 01 1611 Fri Nov 01, 2019   1337 EKG interpreted by me: sinus rhythm, normal rate, non specific ST/T changes, this is an abnormal EKG  [MP]   1353 Influenza A Ag, EIA: Negative [LA]   1354 Influenza B Ag, EIA: Negative [LA]   1354 WBC: 6.37 [LA]   1354 Hemoglobin: (!) 8.9 [LA]   1354 Platelets: 191 [LA]   1354 Neutrophil Rel %: 66.7 [LA]   1407 PROCEDURE: XR CHEST 2 VW-        HISTORY: Cough, chest tightness     COMPARISON: 09/14/2019.     FINDINGS: The heart is normal in size. The mediastinum is " unremarkable.  There are chronic interstitial lung changes with pleural thickening in  the right lung base anteriorly which is unchanged. There is a reticular  nodular pattern in the left lung base best appreciated on the frontal  view which may reflect a bronchopneumonia. There is no pneumothorax.  There are no acute osseous abnormalities.       Impression    Chronic lung changes with a reticulonodular lung pattern in  the left lung base which may reflect a bronchopneumonia.           This report was finalized on 11/1/2019 2:04 PM by Marilia Mora M.D..  [LA]   1409 Given patient receives dialysis, would treat this as HCAP.  [LA]   1419 Lipase: 25 [LA]   1419 Glucose: (!) 317 [LA]   1419 BUN: (!) 24 [LA]   1419 Creatinine: (!) 3.83 [LA]   1419 Sodium: (!) 130 [LA]   1419 Potassium: 4.6 [LA]   1419 Chloride: (!) 82 [LA]   1419 CO2: (!) 32.6 [LA]   1419 Calcium: 9.2 [LA]   1419 ALT (SGPT): 11 [LA]   1419 AST (SGOT): (!) 42 [LA]   1419 Alkaline Phosphatase: 77 [LA]   1421 Chloride and sodium are low but do not want to fluid overload the patient. BNP is >70,000  [LA]   1431 Troponin T: (!!) 0.480 [LA]   1432 Patient is in a wheelchair at baseline, does not ambulate. He wears O2 at home as needed. Denies any recent antibiotics.   [LA]   1438 Lactate: 1.2 [LA]   1520 Procalcitonin: (!) 0.60 [LA]   1520 Spoke with Dr. Reyes. Believe he could be admitted to the unit for treatment of HCAP. Awaiting repeat troponin. Will get blood cultures. Given allergies, will treat with cefepime, Levaquin and vancomycin.  [LA]   1534 Spoke with Dr. Sosa. Will accept as telemetry patient.  [LA]      ED Course User Index  [LA] Narcisa Duckworth PA-C  [MP] Steffen Bedolla MD                  MDM  Number of Diagnoses or Management Options  Cough:   End stage renal disease on dialysis (CMS/HCC):   Hospital-acquired pneumonia:   Diagnosis management comments: On arrival, patient is afebrile, normotensive, no acute distress.   Differential includes acute bronchitis, viral illness, pneumonia, CHF, pulmonary edema, and other concerns.  Lower concern for ACS, pneumothorax, intra-abdominal pathology.  Appears his emesis is posttussive in nature.  Will obtain basic labs including a BNP, troponin, EKG, chest x-ray.  I will give an albuterol inhaler to see if this helps with chest tightness.    Patient does not have a leukocytosis.  His hemoglobin is 8.9 but appears he has had fluctuating hemoglobin over the past few months.  Patient's glucose is 317.  His creatinine is stable.  His potassium is within normal limits.  His sodium and chloride are low but his BNP is greater than 70,000.  Influenza is negative.  Lipase is 25.  His lactic acid is not elevated but his procalcitonin is 0.6.  His troponin is elevated at 0.48 from his usual baseline around 0.2.  Chest x-ray is showing a bronchopneumonia in the left lower lung base that is new.  Patient's vital signs have been stable, he was placed on his usual O2 nasal cannula for a brief desaturation while lying down.  I discussed the patient with Dr. Mackey and he believes he should be admitted and treated for H CAP given his comorbidities.  Ordered blood cultures, start patient on Vanc, Levaquin and cefepime given his allergies.  Spoke with Dr. Sosa who accepted the patient for admission to telemetry       Amount and/or Complexity of Data Reviewed  Clinical lab tests: reviewed and ordered  Tests in the radiology section of CPT®: reviewed and ordered  Discussion of test results with the performing providers: yes  Decide to obtain previous medical records or to obtain history from someone other than the patient: yes  Review and summarize past medical records: yes  Discuss the patient with other providers: yes  Independent visualization of images, tracings, or specimens: yes    Risk of Complications, Morbidity, and/or Mortality  Presenting problems: moderate  Diagnostic procedures:  moderate  Management options: moderate    Patient Progress  Patient progress: stable      Final diagnoses:   Hospital-acquired pneumonia   Cough   End stage renal disease on dialysis (CMS/[facility])              Narcisa Duckworth PA-C  11/01/19 [id number]

## 2019-11-01 NOTE — PLAN OF CARE
Problem: Patient Care Overview  Goal: Plan of Care Review  Outcome: Ongoing (interventions implemented as appropriate)    Goal: Individualization and Mutuality  Outcome: Ongoing (interventions implemented as appropriate)    Goal: Discharge Needs Assessment  Outcome: Ongoing (interventions implemented as appropriate)    Goal: Interprofessional Rounds/Family Conf  Outcome: Ongoing (interventions implemented as appropriate)      Problem: Skin Injury Risk (Adult)  Goal: Identify Related Risk Factors and Signs and Symptoms  Outcome: Ongoing (interventions implemented as appropriate)    Goal: Skin Health and Integrity  Outcome: Ongoing (interventions implemented as appropriate)      Problem: Fall Risk (Adult)  Goal: Identify Related Risk Factors and Signs and Symptoms  Outcome: Ongoing (interventions implemented as appropriate)    Goal: Absence of Fall  Outcome: Ongoing (interventions implemented as appropriate)      Problem: Diabetes, Type 2 (Adult)  Goal: Signs and Symptoms of Listed Potential Problems Will be Absent, Minimized or Managed (Diabetes, Type 2)  Outcome: Ongoing (interventions implemented as appropriate)      Problem: Pneumonia (Adult)  Goal: Signs and Symptoms of Listed Potential Problems Will be Absent, Minimized or Managed (Pneumonia)  Outcome: Ongoing (interventions implemented as appropriate)

## 2019-11-02 NOTE — PROGRESS NOTES
"Hospitalist Progress Note.    LOS: 1 day    Patient Care Team:  Jeannette Godoy APRN as PCP - General (Family Medicine)    Chief Complaint:    Follow-up on cough with suspected pneumonia    SUBJECTIVE:  Patient seen and examined this morning.  Events from last night noted.  Patient is currently on 2 L of oxygen and maintaining oxygen saturation at 100%.  Apparently while on dialysis patient appeared to be pretty uncomfortable and stated that he was really short of breath.  I examined him at the bedside and as mentioned above his oxygen saturation was within normal limits and on exam he had some rales and rhonchi in the left lung field but did not have any significant wheezing.  He appears to be anxious and moving all around the bed.  He has been afebrile overnight.  His labs are showing stable troponin levels.  His respiratory PCR was positive for human metapneumovirus.     Review of Systems:    The pertinent  ROS was done and it is noted above, rest  was negative.    OBJECTIVE:    Vital Signs  /49 (BP Location: Left arm, Patient Position: Lying)   Pulse 98   Temp 98.6 °F (37 °C) (Oral)   Resp 16   Ht 172.7 cm (68\")   Wt 58.6 kg (129 lb 1.6 oz)   SpO2 98%   BMI 19.63 kg/m²       I/O this shift:  In: 240 [P.O.:240]  Out: 3500 [Other:3500]    Intake/Output Summary (Last 24 hours) at 11/2/2019 1720  Last data filed at 11/2/2019 1636  Gross per 24 hour   Intake 340 ml   Output 3500 ml   Net -3160 ml       Physical Exam:    General Appearance: alert, oriented x 3, appears anxious  HEENT: pupils round and reactive to light, oral mucosa dry, extraocular movements intact.  Neck: supple, no JVD, trachea midline  Lungs: Fair air movement bilaterally, rales or rhonchi appreciated in the left lung field   Heart: RRR, normal S1 and S2, no S3, no rub  Abdomen: soft, non-tender, no palpable bladder, present bowel sounds to auscultation  Extremities: no edema, cyanosis or clubbing.   Neuro: normal speech and " mental status, left upper extremity contracted, decreased strength in left upper and lower extremities     Results Review:    Results from last 7 days   Lab Units 11/02/19  0407 11/01/19  1339   SODIUM mmol/L 133* 130*   POTASSIUM mmol/L 4.0 4.6   CHLORIDE mmol/L 85* 82*   CO2 mmol/L 31.4* 32.6*   BUN mg/dL 35* 24*   CREATININE mg/dL 4.80* 3.83*   CALCIUM mg/dL 9.3 9.2   BILIRUBIN mg/dL  --  0.3   ALK PHOS U/L  --  77   ALT (SGPT) U/L  --  11   AST (SGOT) U/L  --  42*   GLUCOSE mg/dL 55* 317*       Estimated Creatinine Clearance: 13.6 mL/min (A) (by C-G formula based on SCr of 4.8 mg/dL (H)).    Results from last 7 days   Lab Units 11/02/19  0407   PHOSPHORUS mg/dL 6.5*             Results from last 7 days   Lab Units 11/02/19  0407 11/01/19  1339   WBC 10*3/mm3 7.12 6.37   HEMOGLOBIN g/dL 8.4* 8.9*   PLATELETS 10*3/mm3 201 191               Imaging Results (Last 24 Hours)     ** No results found for the last 24 hours. **          amLODIPine 10 mg Oral Daily   aspirin 81 mg Oral Daily   atorvastatin 40 mg Oral Nightly   b complex-vitamin c-folic acid 1 tablet Oral Daily   cefepime 2 g Intravenous Q24H   clopidogrel 75 mg Oral Daily   docusate sodium 100 mg Oral Q12H   famotidine 20 mg Oral Daily   fluticasone 1 spray Each Nare Daily   folic acid 1 mg Oral Daily   guaiFENesin 600 mg Oral Q12H   heparin (porcine) 5,000 Units Subcutaneous Q12H   hydrALAZINE 50 mg Oral TID   insulin aspart 0-9 Units Subcutaneous 4x Daily AC & at Bedtime   insulin aspart 5 Units Subcutaneous TID With Meals   insulin detemir 12 Units Subcutaneous Nightly   ipratropium-albuterol 3 mL Nebulization 4x Daily - RT   isosorbide mononitrate 30 mg Oral Daily   levoFLOXacin 500 mg Intravenous Q48H   losartan 100 mg Oral Q24H   metoprolol succinate  mg Oral Daily   sodium chloride 10 mL Intravenous Q12H   [START ON 11/3/2019] vancomycin 750 mg Intravenous Once       Pharmacy Consult - Pharmacy to dose        Medication Review:   Current  Facility-Administered Medications   Medication Dose Route Frequency Provider Last Rate Last Dose   • acetaminophen (TYLENOL) tablet 650 mg  650 mg Oral Q4H PRN John Sosa MD   650 mg at 11/02/19 1626    Or   • acetaminophen (TYLENOL) 160 MG/5ML solution 650 mg  650 mg Oral Q4H PRN John Sosa MD        Or   • acetaminophen (TYLENOL) suppository 650 mg  650 mg Rectal Q4H PRN John Sosa MD       • aluminum-magnesium hydroxide-simethicone (MAALOX MAX) 400-400-40 MG/5ML suspension 15 mL  15 mL Oral Q6H PRN John Sosa MD       • amLODIPine (NORVASC) tablet 10 mg  10 mg Oral Daily John Sosa MD   10 mg at 11/02/19 0905   • aspirin chewable tablet 81 mg  81 mg Oral Daily John oSsa MD   81 mg at 11/02/19 0906   • atorvastatin (LIPITOR) tablet 40 mg  40 mg Oral Nightly John Sosa MD   40 mg at 11/01/19 2157   • b complex-vitamin c-folic acid (NEPHRO-SHIRLENE) tablet 1 tablet  1 tablet Oral Daily John Sosa MD   1 tablet at 11/02/19 0905   • benzonatate (TESSALON) capsule 100 mg  100 mg Oral TID PRN John Sosa MD       • cefepime (MAXIPIME) 2 g in sodium chloride 0.9 % 100 mL IVPB  2 g Intravenous Q24H John Sosa MD       • clopidogrel (PLAVIX) tablet 75 mg  75 mg Oral Daily John Sosa MD   75 mg at 11/02/19 0906   • dextrose (D50W) 25 g/ 50mL Intravenous Solution 25 g  25 g Intravenous Q15 Min PRN Carmelo Ray MD       • dextrose (GLUTOSE) oral gel 1 tube  1 tube Oral Q15 Min PRN Carmelo Ray MD       • docusate sodium (COLACE) capsule 100 mg  100 mg Oral Q12H John Sosa MD   100 mg at 11/02/19 1634   • famotidine (PEPCID) tablet 20 mg  20 mg Oral Daily John Sosa MD   20 mg at 11/02/19 0906   • fluticasone (FLONASE) 50 MCG/ACT nasal spray 1 spray  1 spray Each Nare Daily John Soas MD   1 spray at 11/02/19 0907   • folic acid (FOLVITE) tablet 1 mg  1 mg Oral Daily John Sosa MD   1 mg at 11/02/19 0906   • glucagon (human  recombinant) (GLUCAGEN DIAGNOSTIC) injection 1 mg  1 mg Subcutaneous PRN Carmelo Ray MD       • guaiFENesin (MUCINEX) 12 hr tablet 600 mg  600 mg Oral Q12H John Sosa MD   600 mg at 11/02/19 0905   • heparin (porcine) 5000 UNIT/ML injection 5,000 Units  5,000 Units Subcutaneous Q12H John Sosa MD   5,000 Units at 11/02/19 0905   • hydrALAZINE (APRESOLINE) tablet 50 mg  50 mg Oral TID John Sosa MD   50 mg at 11/02/19 1626   • insulin aspart (novoLOG) injection 0-9 Units  0-9 Units Subcutaneous 4x Daily AC & at Bedtime Carmelo Ray MD   8 Units at 11/01/19 2158   • insulin aspart (novoLOG) injection 5 Units  5 Units Subcutaneous TID With Meals John Sosa MD       • insulin detemir (LEVEMIR) injection 12 Units  12 Units Subcutaneous Nightly Carmelo Ray MD   12 Units at 11/01/19 1855   • ipratropium-albuterol (DUO-NEB) nebulizer solution 3 mL  3 mL Nebulization 4x Daily - RT John Sosa MD   3 mL at 11/02/19 1227   • isosorbide mononitrate (IMDUR) 24 hr tablet 30 mg  30 mg Oral Daily John Sosa MD   30 mg at 11/02/19 0906   • levoFLOXacin (LEVAQUIN) 500 mg/100 mL D5W (premix) (LEVAQUIN) 500 mg  500 mg Intravenous Q48H John Sosa MD   500 mg at 11/02/19 1626   • LORazepam (ATIVAN) tablet 0.5 mg  0.5 mg Oral Q8H PRN John Sosa MD   0.5 mg at 11/02/19 1413   • losartan (COZAAR) tablet 100 mg  100 mg Oral Q24H John Sosa MD   100 mg at 11/02/19 0906   • melatonin tablet 5 mg  5 mg Oral Nightly PRN John Sosa MD       • metoprolol succinate XL (TOPROL-XL) 24 hr tablet 100 mg  100 mg Oral Daily John Sosa MD   100 mg at 11/02/19 0905   • ondansetron (ZOFRAN) tablet 4 mg  4 mg Oral Q6H PRN John Sosa MD        Or   • ondansetron (ZOFRAN) injection 4 mg  4 mg Intravenous Q6H PRN John Sosa MD       • ondansetron ODT (ZOFRAN-ODT) disintegrating tablet 4 mg  4 mg Oral Q6H PRN John Sosa MD       • Pharmacy Consult - Pharmacy to  dose   Does not apply Continuous PRN John Sosa MD       • sodium chloride 0.9 % flush 10 mL  10 mL Intravenous Q12H John Sosa MD   10 mL at 11/02/19 0905   • sodium chloride 0.9 % flush 10 mL  10 mL Intravenous PRN John Sosa MD       • vancomycin in dextrose 5% 150 mL (VANCOCIN) IVPB 750 mg  750 mg Intravenous Daily PRN John Sosa MD       • [START ON 11/3/2019] vancomycin in dextrose 5% 150 mL (VANCOCIN) IVPB 750 mg  750 mg Intravenous Once John Sosa MD           ASSESSMENT/PLAN:    Hospital-acquired pneumonia    1.  Left lower lobe pneumonia, HCAP, acute post viral, present on admission  2.  Elevated troponin level, likely due to poor renal clearance, no associated chest pain, not likely ACS  3.  Elevated proBNP without any evidence of fluid overload on exam  4.  End-stage renal disease on hemodialysis on Tuesday, Thursday and Saturday  5.  Type 2 diabetes mellitus  6.   Hypothyroidism  7.   Coronary artery disease and history of CVA resulting in left-sided residual deficits, wheelchair bound  8.   Hypertension   9.   Peripheral arterial disease    Patient's respiratory panel was positive for human metapneumovirus.  I suspect that the patient had a mild flu and subsequently developed superimposed pneumonia.  Nonetheless, patient has been afebrile over the last 24 hours.  His white blood cell count is within normal limits today.  I will continue him on broad-spectrum antibiotics.  Follow-up on blood cultures.  Check sputum culture when he is able to provide a sample.     His troponin levels have stabilized and I will stop checking them as it does not appear to be cardiac in nature.    Patient appears to be very anxious during my encounter while he was on dialysis.  I will start him on oral Ativan at low-dose today.    At the time of my encounter patient was getting hemodialysis and the plan is to remove 5 L today.    Anticipate 1 to 2 days of additional hospital days.  Details  were discussed with the patient as well as family in the room.   I also discussed the details with the nursing staff.    Rest as ordered.    John Sosa MD  11/02/19  5:20 PM    Please note that portions of this note may have been completed with a voice recognition program. Efforts were made to edit the dictations, but occasionally words are mistranscribed.

## 2019-11-02 NOTE — PROGRESS NOTES
Discharge Planning Assessment   [facility]ach[facility]     Patient Name: Talha Cutler  MRN: [id number]  Today's Date: 11/2/2019    Admit Date: 11/1/2019    Discharge Needs Assessment     Row Name 11/02/19 1247       Living Environment    Lives With  spouse;child(starr), adult    Name(s) of Who Lives With Patient  Pt lives with spouse Mallika Cutler [phone] and his son     Current Living Arrangements  home/apartment/condo    Duration at Residence  23 years    Primary Care Provided by  self    Provides Primary Care For  no one    Family Caregiver if Needed  spouse;child(starr), adult    Quality of Family Relationships  supportive    Able to Return to Prior Arrangements  yes       Resource/Environmental Concerns    Resource/Environmental Concerns  none       Transition Planning    Patient/Family Anticipates Transition to  home with family    Patient/Family Anticipated Services at Transition  home health care HH [facility]  and O2@2L[facility] managed by [facility]    Transportation Anticipated  family or friend will provide       Discharge Needs Assessment    Readmission Within the Last 30 Days  no previous admission in last 30 days    Equipment Currently Used at Home  power chair,(recliner lift);oxygen;wheelchair;wheelchair, motorized;nebulizer and CPAP        Discharge Plan     Row Name 11/02/19 1256       Plan    Plan  Spoke with pt about discharge plans  Pt anticipates going back home upon discharge from hospital   Confirmed current address and phone contacts   Mallika Cutler, spouse [phone] and Cisco Cutler, brother [phone]   Pt has no POA   Pt states he can perform ADL moderately   Pt is wheelchair bound but able to maneuver around with the help of his motorized WC and lift chair    Pt cooks but his son (who lives with him)does the chores around the house   PCP Jeannette Godoy APRN   DME O2@2L[facility] [place] and managed by [facility], motorized WC, manual WC, nebulizer lift chair and  CPAP   Pt currently using caret[facility]  for  services   Pt has dialysis on Tues, Thursday and Sat   Will continue to assess pt for any further needs prior to being discharged        Destination      No service coordination in this encounter.      Durable Medical Equipment      No service coordination in this encounter.      Dialysis/Infusion      No service coordination in this encounter.      Home Medical Care      No service coordination in this encounter.      Therapy      No service coordination in this encounter.      Community Resources      No service coordination in this encounter.        Expected Discharge Date and Time     Expected Discharge Date Expected Discharge Time    Nov 4, 2019         Demographic Summary     Row Name 11/02/19 1244       General Information    Admission Type  observation    Arrived From  emergency department    Required Notices Provided  Observation Status Notice    Referral Source  admission list    Reason for Consult  discharge planning    Preferred Language  English       Contact Information    Permission Granted to Share Info With      Contact Information Obtained for          Functional Status     Row Name 11/02/19 124       Functional Status    Usual Activity Tolerance  moderate    Functional Status Comments  Pt states he can perform ADL moderately    Pt is wheelchair bound but has a chair lift and able to maneuver around without problems         Functional Status, IADL    Medications  independent    Meal Preparation  independent    Housekeeping  assistive person    Laundry  assistive person    Shopping  assistive person       Employment/    Employment Status  unemployed        Psychosocial    No documentation.       Abuse/Neglect    No documentation.       Legal    No documentation.       Substance Abuse    No documentation.       Patient Forms    No documentation.           Kandi Teixeira RN  Continued Stay Note  ANTOINE Sharma     Patient Name: Talha Cutler  MRN:  [id number]  Today's Date: 11/2/2019    Admit Date: 11/1/2019    Discharge Plan     Row Name 11/02/19 1252       Plan    Plan  Spoke with pt about discharge plans  Pt anticipates going back home upon discharge from hospital   Confirmed current address and phone contacts   Mallika Cutler, spouse [phone] and Cisco Cutler, brother [phone]   Pt has no POA   Pt states he can perform ADL moderately   Pt is wheelchair bound but able to maneuver around with the help of his motorized WC and lift chair    Pt cooks but his son (who lives with him)does the chores around the house   PCP Jeannette Godoy APRN   DME [facility]2L[facility] [place] and managed by [facility], motorized WC, manual WC, nebulizer lift chair and      Pt currently using [facility] for HH services   Pt has dialysis on Tues, Thursday and Sat   Will continue to assess pt for any further needs prior to being discharged        Discharge Codes    No documentation.       Expected Discharge Date and Time     Expected Discharge Date Expected Discharge Time    Nov 4, 2019             Kandi Teixeira RN

## 2019-11-02 NOTE — CONSULTS
[facility]      Nephrology Consultation    Referring Provider:   No ref. provider found    Reason for Consultation:    ESRD and associated problems.     Subjective:  Chief complaint   Chief Complaint   Patient presents with   • Cough     History of present illness:    Patient is 59-year-old  male with marked medical problems as listed below the past medical history.  He has fairly advanced complicated type 2 diabetes, coronary artery disease, history of CVA with left-sided residual deficit, hypertension, peripheral arterial disease, end-stage renal disease and episodes of recurrent pneumonia.  He presented to the emergency department with the 3 days history of cough as well as low-grade temperatures.  In the ER he was noted to have left lower lobe pneumonia with chronic changes on the right side.  He has elevated pro-Marv and was admitted for further evaluation and treatment.  I was consulted for the end-stage renal disease related issues and dialysis.  I have reviewed labs/imaging/records from this hospitalization, including ER staff and admitting/attending physicians H/P's and progress notes to establish a comprehensive understanding of this patient's clinical hospital course, as well as to establish plan of care appropriately.   Past Medical History:   Diagnosis Date   • Abdominal pain    • Anemia    • Cataracts, bilateral    • CHF (congestive heart failure) (CMS/[facility])    • Chronic kidney disease    • Constipation    • Cough    • Dependence on nocturnal oxygen therapy    • Diabetes mellitus (CMS/HCC)    • Diarrhea    • End stage renal failure on dialysis (CMS/HCC)     TUES, THURS, SAT   • GERD (gastroesophageal reflux disease)    • H/O blood clots     LEG/NECK   • H/O chest x-ray 03/25/2016    Interval decrease in size of the patients right pleural effusion with a persistent small left effusion as well   • History of transfusion    • Hypertension    • Left-sided weakness    • Nauseated  "    DRY HEAVES   • Pleural effusion    • Pneumonia    • Pneumonia    • Renal failure    • Stroke (CMS/HCC) 2006    LEFT SIDED WEAKNESS   • Swelling    • Teeth missing    • Tinnitus    • Ulcer, stomach peptic     HISTORY OF ULCER AS A TEENAGER   • Wears glasses        Past Surgical History:   Procedure Laterality Date   • ARTERIOVENOUS FISTULA Right    • BRONCHOSCOPY N/A 1/10/2019    Procedure: BRONCHOSCOPY with MAC and Flouro. LAVAGE, BRUSHINGS AND BIOPSY;  Surgeon: Ean Hernandez MD;  Location: [facility] OR;  Service: Pulmonary   • CARDIAC CATHETERIZATION N/A 3/28/2018    Procedure: Peripheral angiography;  Surgeon: Clay Ruano MD;  Location: [facility] CATH INVASIVE LOCATION;  Service: Cardiovascular   • CARDIAC CATHETERIZATION N/A 3/28/2018    Procedure: Angioplasty-peripheral;  Surgeon: Clay Ruano MD;  Location: [facility] CATH INVASIVE LOCATION;  Service: Cardiovascular   • CARDIAC CATHETERIZATION N/A 3/28/2018    Procedure: Atherectomy-peripheral;  Surgeon: Clay Ruano MD;  Location: [facility] CATH INVASIVE LOCATION;  Service: Cardiovascular   • CATARACT EXTRACTION, BILATERAL     • CHOLECYSTECTOMY     • COLONOSCOPY     • EYE SURGERY      BLEEDING BEHING BILATERAL EYES   • INTERVENTIONAL RADIOLOGY PROCEDURE N/A 3/28/2018    Procedure: Abdominal Aortogram;  Surgeon: Clay Ruano MD;  Location: [facility] CATH INVASIVE LOCATION;  Service: Cardiovascular   • PORTACATH PLACEMENT     • SHOULDER MANIPULATION Left     \"FROZEN SHOULDER\"   • VENOUS ACCESS DEVICE (PORT) REMOVAL       Family History   Problem Relation Age of Onset   • COPD Mother    • Diabetes Father    • Hypertension Sister    • Diabetes Sister    • Hypertension Brother    • Diabetes Paternal Grandmother      negative h/o ESRD     Social History     Tobacco Use   • Smoking status: Never Smoker   • Smokeless tobacco: Never Used   • Tobacco comment: 2ND HAND SMOKE EXPOSURE   Substance Use Topics   • Alcohol use: No   • " Drug use: No     Home medications:   Prior to Admission Medications     Prescriptions Last Dose Informant Patient Reported? Taking?    albuterol (PROVENTIL) (2.5 MG/3ML) 0.083% nebulizer solution 11/1/2019  Yes Yes    Take 2.5 mg by nebulization Every 4 (Four) Hours As Needed for Wheezing.    amLODIPine (NORVASC) 10 MG tablet 11/1/2019 Pharmacy Yes Yes    Take 10 mg by mouth Daily.    aspirin 81 MG chewable tablet 11/1/2019 Self Yes Yes    Chew 81 mg Daily.    atorvastatin (LIPITOR) 40 MG tablet 11/1/2019  Yes Yes    Take 40 mg by mouth Daily.    B Complex-C-Folic Acid (RADHA-SHIRLENE PO) 11/1/2019 Self Yes Yes    Take 1 tablet by mouth. Patient states he takes these after dialysis, but is unaware of dose.     Cholecalciferol (VITAMIN D3) 5000 UNITS capsule capsule 11/1/2019 Self Yes Yes    Take 1 capsule by mouth Daily.    clopidogrel (PLAVIX) 75 MG tablet 11/1/2019  No Yes    Take 1 tablet by mouth Daily. Start taking from 1/12/2019 onwards ONLY if not coughing up significant amount of blood.    docusate sodium (COLACE) 100 MG capsule 11/1/2019  Yes Yes    Take 100 mg by mouth Every 12 (Twelve) Hours.    folic acid (FOLVITE) 1 MG tablet 11/1/2019  Yes Yes    Take 1 mg by mouth daily.    hydrALAZINE (APRESOLINE) 50 MG tablet 11/1/2019  Yes Yes    Take 50 mg by mouth 3 (Three) Times a Day.    insulin glargine (LANTUS) 100 UNIT/ML injection 11/1/2019  Yes Yes    Inject 15 Units under the skin into the appropriate area as directed Every Night.    isosorbide mononitrate (IMDUR) 30 MG 24 hr tablet 11/1/2019  Yes Yes    Take 30 mg by mouth Daily.    levothyroxine (SYNTHROID, LEVOTHROID) 100 MCG tablet 11/1/2019 Pharmacy Yes Yes    Take 125 mcg by mouth Daily.    losartan (COZAAR) 100 MG tablet 11/1/2019 Pharmacy No Yes    Take 1 tablet by mouth Daily.    metoprolol succinate XL (TOPROL-XL) 100 MG 24 hr tablet 11/1/2019  No Yes    Take 1 tablet by mouth Daily.    raNITIdine (ZANTAC) 150 MG tablet 11/1/2019  Yes Yes    Take 150  mg by mouth 2 (Two) Times a Day.    albuterol (PROVENTIL HFA;VENTOLIN HFA) 108 (90 Base) MCG/ACT inhaler   No No    Inhale 2 puffs Every 4 (Four) Hours As Needed for Shortness of Air.    AURYXIA 1  MG(Fe) tablet  Self Yes No    Take 1 tablet by mouth. With every meal and every snack    fluticasone (VERAMYST) 27.5 MCG/SPRAY nasal spray   Yes No    2 sprays into the nostril(s) as directed by provider Daily.    insulin aspart (novoLOG FLEXPEN) 100 UNIT/ML solution pen-injector sc pen  Spouse/Significant Other Yes No    Inject 9 Units under the skin into the appropriate area as directed 3 (Three) Times a Day With Meals.    ondansetron ODT (ZOFRAN-ODT) 4 MG disintegrating tablet  Pharmacy No No    Take 1 tablet by mouth Every 6 (Six) Hours As Needed for Nausea or Vomiting.        Emergency department medications: Medications   amLODIPine (NORVASC) tablet 10 mg (10 mg Oral Not Given 11/1/19 1824)   aspirin chewable tablet 81 mg (81 mg Oral Not Given 11/1/19 1825)   atorvastatin (LIPITOR) tablet 40 mg (40 mg Oral Given 11/1/19 2157)   b complex-vitamin c-folic acid (NEPHRO-SHIRLENE) tablet 1 tablet (1 tablet Oral Given 11/1/19 1856)   clopidogrel (PLAVIX) tablet 75 mg (not administered)   docusate sodium (COLACE) capsule 100 mg (100 mg Oral Given 11/2/19 0642)   fluticasone (FLONASE) 50 MCG/ACT nasal spray 1 spray (1 spray Each Nare Not Given 11/1/19 1826)   folic acid (FOLVITE) tablet 1 mg (not administered)   hydrALAZINE (APRESOLINE) tablet 50 mg ( Oral Not Given 11/1/19 2157)   insulin aspart (novoLOG) injection 5 Units (5 Units Subcutaneous Not Given 11/1/19 1826)   isosorbide mononitrate (IMDUR) 24 hr tablet 30 mg (not administered)   losartan (COZAAR) tablet 100 mg (not administered)   metoprolol succinate XL (TOPROL-XL) 24 hr tablet 100 mg (100 mg Oral Not Given 11/1/19 1826)   ondansetron ODT (ZOFRAN-ODT) disintegrating tablet 4 mg (not administered)   famotidine (PEPCID) tablet 20 mg (20 mg Oral Not Given  11/1/19 1825)   sodium chloride 0.9 % flush 10 mL (10 mL Intravenous Given 11/1/19 2130)   sodium chloride 0.9 % flush 10 mL (not administered)   heparin (porcine) 5000 UNIT/ML injection 5,000 Units (5,000 Units Subcutaneous Given 11/1/19 2156)   acetaminophen (TYLENOL) tablet 650 mg (not administered)     Or   acetaminophen (TYLENOL) 160 MG/5ML solution 650 mg (not administered)     Or   acetaminophen (TYLENOL) suppository 650 mg (not administered)   ondansetron (ZOFRAN) tablet 4 mg (not administered)     Or   ondansetron (ZOFRAN) injection 4 mg (not administered)   melatonin tablet 5 mg (not administered)   aluminum-magnesium hydroxide-simethicone (MAALOX MAX) 400-400-40 MG/5ML suspension 15 mL (not administered)   ipratropium-albuterol (DUO-NEB) nebulizer solution 3 mL (3 mL Nebulization Given 11/1/19 2056)   benzonatate (TESSALON) capsule 100 mg (not administered)   guaiFENesin (MUCINEX) 12 hr tablet 600 mg (600 mg Oral Given 11/1/19 2157)   Pharmacy Consult - Pharmacy to dose (not administered)   levoFLOXacin (LEVAQUIN) 500 mg/100 mL D5W (premix) (LEVAQUIN) 500 mg (not administered)   cefepime (MAXIPIME) IVPB 2 g/50ml D5W (premix) (not administered)   vancomycin in dextrose 5% 150 mL (VANCOCIN) IVPB 750 mg (not administered)   insulin detemir (LEVEMIR) injection 12 Units (12 Units Subcutaneous Given 11/1/19 1855)   dextrose (GLUTOSE) oral gel 1 tube (not administered)   dextrose (D50W) 25 g/ 50mL Intravenous Solution 25 g (not administered)   glucagon (human recombinant) (GLUCAGEN DIAGNOSTIC) injection 1 mg (not administered)   insulin aspart (novoLOG) injection 0-9 Units (0 Units Subcutaneous Not Given 11/2/19 0643)   albuterol (PROVENTIL) nebulizer solution 0.083% 2.5 mg/3mL (2.5 mg Nebulization Given 11/1/19 1325)   vancomycin 1250 mg in sodium chloride 0.9% 250 mL IVPB (1,250 mg Intravenous New Bag 11/1/19 9931)   cefepime (MAXIPIME) 2 g in sodium chloride 0.9 % 100 mL IVPB (2 g Intravenous New Bag 11/1/19  "1603)   levoFLOXacin (LEVAQUIN) 750 mg/150 mL D5W (premix) (LEVAQUIN) 750 mg (750 mg Intravenous New Bag 11/1/19 1651)   sodium chloride 3 % nebulizer solution 4 mL (4 mL Nebulization Given 11/1/19 2056)   insulin regular (humuLIN R,novoLIN R) injection 10 Units (10 Units Subcutaneous Given 11/1/19 1855)       Allergies:  Erythromycin    Review of Systems  1. Constitutional: Positive for fever and chills.  Denies any diaphoresis.  Positive fatigue or malaise and denies any unexpected weight change.   2. HENT: Negative for congestion and hearing loss.   3. Eyes: Negative for redness and visual disturbance.   4. Respiratory: Positive for shortness of breath as well as cough. Negative for chest pain  5. Cardiovascular: Negative for chest pain and chest tightness or palpitations.   6. Gastrointestinal: Negative for abdominal pain or distention, and blood in stool. Denies any Nausea, vomiting, diarrhea or constipation.  7. Endocrine: Negative for heat or cold intolerance.   8. Genitourinary: Negative for difficulty urinating, dysuria and frequency.   9. Musculoskeletal: Negative for arthralgias, back pain.  Denies any myalgias.   10. Skin: Negative for color change, rash and wound.   11. Neurological: Negative for syncope, weakness and headaches.   12. Hematological: Negative for adenopathy. Does not bruise/bleed easily.   13. Psychiatric/Behavioral: Negative for confusion. The patient is not nervous/anxious.     Objective:  Vital Signs  /51 (BP Location: Left arm, Patient Position: Lying)   Pulse 90   Temp 97.9 °F (36.6 °C) (Oral)   Resp 16   Ht 172.7 cm (68\")   Wt 58.6 kg (129 lb 1.6 oz)   SpO2 99%   BMI 19.63 kg/m²          No intake/output data recorded.    Intake/Output Summary (Last 24 hours) at 11/2/2019 0814  Last data filed at 11/1/2019 2000  Gross per 24 hour   Intake 100 ml   Output --   Net 100 ml       Physical Exam:  General Appearance:   Alert, cooperative, in no acute distress.     Head:   " Normocephalic, without obvious abnormality, atraumatic.     Eyes:      Normal, conjunctivae and sclerae, no icterus, no pallor, corneas clear, PERRLA        Throat:   Oral mucosa dry      Neck:  No adenopathy, supple, trachea midline, no thyromegaly, no carotid bruit, no JVD      Back:   No CVA tenderness on Percussion.     Lungs:    Bibasilar coarse breath sounds and rhonchi noted..      Heart::   Regular rhythm and normal rate, normal S1 and S2.  2/6 systolic ejection murmur.       Abdomen:   Obese. Normal bowel sounds, no masses, no organomegaly, soft non-tender, non-distended, no guarding, no rebound tenderness      Genital urinary:   No urinary bladder palpable      Extremities:  Moves all extremities, no edema, no cyanosis, no redness.     Pulses:  Pulses palpable and equal bilaterally but weak.     Skin:  No bleeding, bruising or rash        Neurologic:  Cranial nerves grossly intact, move all extremities           Lab Results (last 7 days)     Procedure Component Value Units Date/Time    POC Glucose Once [[id number]]  (Normal) Collected:  11/02/19 0705    Specimen:  Blood Updated:  11/02/19 0710     Glucose 85 mg/dL      Comment: Serial Number: [id number]:  [id number]       POC Glucose Once [[id number]]  (Abnormal) Collected:  11/02/19 0638    Specimen:  Blood Updated:  11/02/19 0710     Glucose 59 mg/dL      Comment: Serial Number: [id number]:  [id number]       Troponin [[id number]]  (Abnormal) Collected:  11/02/19 0407    Specimen:  Blood Updated:  11/02/19 0545     Troponin T 0.419 ng/mL     Narrative:       Troponin T Reference Range:  <= 0.03 ng/mL-   Negative for AMI  >0.03 ng/mL-     Abnormal for myocardial necrosis.  Clinicians would have to utilize clinical acumen, EKG, Troponin and serial changes to determine if it is an Acute Myocardial Infarction or myocardial injury due to an underlying chronic condition.     Renal Function Panel [[id number]]  (Abnormal) Collected:  11/02/19 0407     Specimen:  Blood Updated:  11/02/19 0529     Glucose 55 mg/dL      BUN 35 mg/dL      Creatinine 4.80 mg/dL      Sodium 133 mmol/L      Potassium 4.0 mmol/L      Chloride 85 mmol/L      CO2 31.4 mmol/L      Calcium 9.3 mg/dL      Albumin 3.40 g/dL      Phosphorus 6.5 mg/dL      Anion Gap 16.6 mmol/L      BUN/Creatinine Ratio 7.3     eGFR Non African Amer 12 mL/min/1.73      Comment: <15 Indicative of kidney failure.        eGFR   Amer --     Comment: <15 Indicative of kidney failure.       Narrative:       GFR Normal >60  Chronic Kidney Disease <60  Kidney Failure <15    Vancomycin, Random [[id number]]  (Normal) Collected:  11/02/19 0407    Specimen:  Blood Updated:  11/02/19 0524     Vancomycin Random 20.70 mcg/mL     CBC Auto Differential [[id number]]  (Abnormal) Collected:  11/02/19 0407    Specimen:  Blood Updated:  11/02/19 0501     WBC 7.12 10*3/mm3      RBC 2.95 10*6/mm3      Hemoglobin 8.4 g/dL      Hematocrit 27.7 %      MCV 93.9 fL      MCH 28.5 pg      MCHC 30.3 g/dL      RDW 17.2 %      RDW-SD 59.8 fl      MPV 9.9 fL      Platelets 201 10*3/mm3      Neutrophil % 71.2 %      Lymphocyte % 10.3 %      Monocyte % 12.4 %      Eosinophil % 5.1 %      Basophil % 0.6 %      Immature Grans % 0.4 %      Neutrophils, Absolute 5.08 10*3/mm3      Lymphocytes, Absolute 0.73 10*3/mm3      Monocytes, Absolute 0.88 10*3/mm3      Eosinophils, Absolute 0.36 10*3/mm3      Basophils, Absolute 0.04 10*3/mm3      Immature Grans, Absolute 0.03 10*3/mm3      nRBC 0.0 /100 WBC     Respiratory Panel, PCR - Swab, Nasopharynx [[id number]]  (Abnormal) Collected:  11/01/19 1738    Specimen:  Swab from Nasopharynx Updated:  11/02/19 0008     ADENOVIRUS, PCR Not Detected     Coronavirus 229E Not Detected     Coronavirus HKU1 Not Detected     Coronavirus NL63 Not Detected     Coronavirus OC43 Not Detected     Human Metapneumovirus Detected     Human Rhinovirus/Enterovirus Not Detected     Influenza B PCR Not Detected     Parainfluenza  Virus 1 Not Detected     Parainfluenza Virus 2 Not Detected     Parainfluenza Virus 3 Not Detected     Parainfluenza Virus 4 Not Detected     Bordetella pertussis pcr Not Detected     Influenza A H1 2009 PCR Not Detected     Chlamydophila pneumoniae PCR Not Detected     Mycoplasma pneumo by PCR Not Detected     Influenza A PCR Not Detected     Influenza A H3 Not Detected     Influenza A H1 Not Detected     RSV, PCR Not Detected     Bordetella parapertussis PCR Not Detected    Troponin [[id number]]  (Abnormal) Collected:  11/01/19 2222    Specimen:  Blood Updated:  11/01/19 2307     Troponin T 0.425 ng/mL     Narrative:       Troponin T Reference Range:  <= 0.03 ng/mL-   Negative for AMI  >0.03 ng/mL-     Abnormal for myocardial necrosis.  Clinicians would have to utilize clinical acumen, EKG, Troponin and serial changes to determine if it is an Acute Myocardial Infarction or myocardial injury due to an underlying chronic condition.     POC Glucose Once [[id number]]  (Abnormal) Collected:  11/01/19 2146    Specimen:  Blood Updated:  11/01/19 2150     Glucose 357 mg/dL      Comment: Serial Number: [id number]:  [id number]       POC Glucose Once [[id number]]  (Abnormal) Collected:  11/01/19 1812    Specimen:  Blood Updated:  11/01/19 1816     Glucose 336 mg/dL      Comment: Serial Number: [id number]:  [id number]       TSH [[id number]]  (Normal) Collected:  11/01/19 1545    Specimen:  Blood Updated:  11/01/19 1712     TSH 1.790 uIU/mL     MRSA Screen Culture - Swab, Nares [[id number]] Collected:  11/01/19 1626    Specimen:  Swab from Nares Updated:  11/01/19 1635    VRE Culture - Swab, Per Rectum [[id number]] Collected:  11/01/19 1626    Specimen:  Swab from Per Rectum Updated:  11/01/19 1635    Acinetobacter Screen - Swab, Axilla, Left [[id number]] Collected:  11/01/19 1626    Specimen:  Swab from Axilla, Left Updated:  11/01/19 1635    Troponin [[id number]]  (Abnormal) Collected:  11/01/19 1545    Specimen:  Blood  "Updated:  11/01/19 1630     Troponin T 0.441 ng/mL     Narrative:       Troponin T Reference Range:  <= 0.03 ng/mL-   Negative for AMI  >0.03 ng/mL-     Abnormal for myocardial necrosis.  Clinicians would have to utilize clinical acumen, EKG, Troponin and serial changes to determine if it is an Acute Myocardial Infarction or myocardial injury due to an underlying chronic condition.     Blood Culture - Blood, Hand, Left [[id number]] Collected:  11/01/19 1545    Specimen:  Blood from Hand, Left Updated:  11/01/19 1605    Blood Culture - Blood, Hand, Left [[id number]] Collected:  11/01/19 1552    Specimen:  Blood from Hand, Left Updated:  11/01/19 1605    Procalcitonin [[id number]]  (Abnormal) Collected:  11/01/19 1444    Specimen:  Blood Updated:  11/01/19 1519     Procalcitonin 0.60 ng/mL     Narrative:       As a Marker for Sepsis (Non-Neonates):   1. <0.5 ng/mL represents a low risk of severe sepsis and/or septic shock.  1. >2 ng/mL represents a high risk of severe sepsis and/or septic shock.    As a Marker for Lower Respiratory Tract Infections that require antibiotic therapy:  PCT on Admission     Antibiotic Therapy             6-12 Hrs later  > 0.5                Strongly Recommended            >0.25 - <0.5         Recommended  0.1 - 0.25           Discouraged                   Remeasure/reassess PCT  <0.1                 Strongly Discouraged          Remeasure/reassess PCT      As 28 day mortality risk marker: \"Change in Procalcitonin Result\" (> 80 % or <=80 %) if Day 0 (or Day 1) and Day 4 values are available. Refer to http://www.[employer]-pct-calculator.com/   Change in PCT <=80 %   A decrease of PCT levels below or equal to 80 % defines a positive change in PCT test result representing a higher risk for 28-day all-cause mortality of patients diagnosed with severe sepsis or septic shock.  Change in PCT > 80 %   A decrease of PCT levels of more than 80 % defines a negative change in PCT result representing a lower " risk for 28-day all-cause mortality of patients diagnosed with severe sepsis or septic shock.                Lactic Acid, Plasma [[id number]]  (Normal) Collected:  11/01/19 1419    Specimen:  Blood Updated:  11/01/19 1437     Lactate 1.2 mmol/L     Troponin [[id number]]  (Abnormal) Collected:  11/01/19 1339    Specimen:  Blood Updated:  11/01/19 1431     Troponin T 0.480 ng/mL     Narrative:       Troponin T Reference Range:  <= 0.03 ng/mL-   Negative for AMI  >0.03 ng/mL-     Abnormal for myocardial necrosis.  Clinicians would have to utilize clinical acumen, EKG, Troponin and serial changes to determine if it is an Acute Myocardial Infarction or myocardial injury due to an underlying chronic condition.     BNP [[id number]]  (Abnormal) Collected:  11/01/19 1339    Specimen:  Blood Updated:  11/01/19 1431     proBNP >70,000.0 pg/mL     Narrative:       Among patients with dyspnea, NT-proBNP is highly sensitive for the detection of acute congestive heart failure. In addition NT-proBNP of <300 pg/ml effectively rules out acute congestive heart failure with 99% negative predictive value.    Lipase [[id number]]  (Normal) Collected:  11/01/19 1339    Specimen:  Blood Updated:  11/01/19 1417     Lipase 25 U/L     Comprehensive Metabolic Panel [[id number]]  (Abnormal) Collected:  11/01/19 1339    Specimen:  Blood Updated:  11/01/19 1417     Glucose 317 mg/dL      Comment: Glucose >180, Hemoglobin A1C recommended.        BUN 24 mg/dL      Creatinine 3.83 mg/dL      Sodium 130 mmol/L      Potassium 4.6 mmol/L      Comment: Specimen hemolyzed.  Results may be affected.        Chloride 82 mmol/L      CO2 32.6 mmol/L      Calcium 9.2 mg/dL      Total Protein 7.4 g/dL      Albumin 3.50 g/dL      ALT (SGPT) 11 U/L      Comment: Specimen hemolyzed.  Results may be affected.        AST (SGOT) 42 U/L      Comment: Specimen hemolyzed.  Results may be affected.        Alkaline Phosphatase 77 U/L      Total Bilirubin 0.3 mg/dL      eGFR Non   Amer 16 mL/min/1.73      Globulin 3.9 gm/dL      A/G Ratio 0.9 g/dL      BUN/Creatinine Ratio 6.3     Anion Gap 15.4 mmol/L     Narrative:       GFR Normal >60  Chronic Kidney Disease <60  Kidney Failure <15    Influenza Antigen, Rapid - Swab, Nasopharynx [[id number]]  (Normal) Collected:  11/01/19 1332    Specimen:  Swab from Nasopharynx Updated:  11/01/19 1348     Influenza A Ag, EIA Negative     Influenza B Ag, EIA Negative    CBC & Differential [[id number]] Collected:  11/01/19 1339    Specimen:  Blood Updated:  11/01/19 1345    Narrative:       The following orders were created for panel order CBC & Differential.  Procedure                               Abnormality         Status                     ---------                               -----------         ------                     CBC Auto Differential[[id number]]        Abnormal            Final result                 Please view results for these tests on the individual orders.    CBC Auto Differential [[id number]]  (Abnormal) Collected:  11/01/19 1339    Specimen:  Blood Updated:  11/01/19 1345     WBC 6.37 10*3/mm3      RBC 3.05 10*6/mm3      Hemoglobin 8.9 g/dL      Hematocrit 29.1 %      MCV 95.4 fL      MCH 29.2 pg      MCHC 30.6 g/dL      RDW 17.8 %      RDW-SD 62.3 fl      MPV 9.3 fL      Platelets 191 10*3/mm3      Neutrophil % 66.7 %      Lymphocyte % 11.6 %      Monocyte % 18.4 %      Eosinophil % 2.4 %      Basophil % 0.6 %      Immature Grans % 0.3 %      Neutrophils, Absolute 4.25 10*3/mm3      Lymphocytes, Absolute 0.74 10*3/mm3      Monocytes, Absolute 1.17 10*3/mm3      Eosinophils, Absolute 0.15 10*3/mm3      Basophils, Absolute 0.04 10*3/mm3      Immature Grans, Absolute 0.02 10*3/mm3      nRBC 0.0 /100 WBC         Imaging Results (Last 72 Hours)     Procedure Component Value Units Date/Time    XR Chest 2 View [[id number]] Collected:  11/01/19 1401     Updated:  11/01/19 1406    Narrative:       PROCEDURE: XR CHEST 2 VW-         HISTORY: Cough, chest tightness     COMPARISON: 09/14/2019.     FINDINGS: The heart is normal in size. The mediastinum is unremarkable.  There are chronic interstitial lung changes with pleural thickening in  the right lung base anteriorly which is unchanged. There is a reticular  nodular pattern in the left lung base best appreciated on the frontal  view which may reflect a bronchopneumonia. There is no pneumothorax.  There are no acute osseous abnormalities.       Impression:       Chronic lung changes with a reticulonodular lung pattern in  the left lung base which may reflect a bronchopneumonia.           This report was finalized on 11/1/2019 2:04 PM by Marilia Mora M.D..        No results found for: UTPCR    amLODIPine 10 mg Oral Daily   aspirin 81 mg Oral Daily   atorvastatin 40 mg Oral Nightly   b complex-vitamin c-folic acid 1 tablet Oral Daily   cefepime 2 g Intravenous Q24H   clopidogrel 75 mg Oral Daily   docusate sodium 100 mg Oral Q12H   famotidine 20 mg Oral Daily   fluticasone 1 spray Each Nare Daily   folic acid 1 mg Oral Daily   guaiFENesin 600 mg Oral Q12H   heparin (porcine) 5,000 Units Subcutaneous Q12H   hydrALAZINE 50 mg Oral TID   insulin aspart 0-9 Units Subcutaneous 4x Daily AC & at Bedtime   insulin aspart 5 Units Subcutaneous TID With Meals   insulin detemir 12 Units Subcutaneous Nightly   ipratropium-albuterol 3 mL Nebulization 4x Daily - RT   isosorbide mononitrate 30 mg Oral Daily   levoFLOXacin 500 mg Intravenous Q48H   losartan 100 mg Oral Q24H   metoprolol succinate  mg Oral Daily   sodium chloride 10 mL Intravenous Q12H       Pharmacy Consult - Pharmacy to dose        Assessment/Plan:  1. End-stage renal disease: Patient is on Tuesday Thursday Saturday schedule we will plan on doing dialysis today.  Continue with the outpatient schedule.  2. Left lower lobe pneumonia: Healthcare associated pneumonia being treated.  3. Type 2 diabetes:  4. Hypothyroidism:  5. Coronary  artery disease: Does not have any acute symptoms but elevated troponin noted.  6. Hypertension with end-stage renal disease: Blood pressure is usually under good control as his volume improves.  Continue with the home medications.  7. Peripheral arterial disease:    Plan:  · Dialysis as ordered.  · Continue with the current treatment plan.  · Surveillance labs.  · Details were discussed with the patient no family in the room.    · Details were also discussed with the hospitalist service.   · Further recommendations will depend on clinical course of the patient during the current hospitalization.    · I also discussed the details with the nursing staff.  · Rest as ordered.    In closing, I sincerely appreciate opportunity to participate in care of this patient. If I can be of any further assistance with the management of this patient, please don’t hesitate to contact me.    Chance Mackey MD, ARJUN  11/02/19  8:14 AM    Dictated using Dragon.

## 2019-11-03 NOTE — PROGRESS NOTES
"Hospitalist Progress Note.    LOS: 1 day    Patient Care Team:  Jeannette Godoy APRN as PCP - General (Family Medicine)    Chief Complaint:    Follow-up on cough with suspected pneumonia    SUBJECTIVE:  Patient seen and examined this morning.  He reported feeling markedly better this morning.  He denies any significant shortness of air, cough, chest pain or palpitations.  He was eager to go home this morning.  His labs and vitals are all reassuring.    Later in the afternoon, patient reported feeling \"funny\".  Due to that he was feeling lightheaded.  Did not have any other associated complaints.    Hospital Course:  Mr. 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  His symptoms started 3 days ago and worsened gradually.  He has had low-grade temperatures at with associated chills.  Denies any sick contact on arrival and H&H at his baseline.  proBNP was within 70,000 with a troponin 0.48.  Procalcitonin was slightly elevated at 0.6.  Lactic acid was normal chest x-ray revealed a infiltrate in the left lung base which was felt to reflect bronchopneumonia.  He was started on broad-spectrum IV antibiotics and admitted to the hospitalist service.    Review of Systems:    The pertinent  ROS was done and it is noted above, rest  was negative.    OBJECTIVE:    Vital Signs  /51 (BP Location: Left arm, Patient Position: Lying)   Pulse 87   Temp 98 °F (36.7 °C) (Oral)   Resp 16   Ht 172.7 cm (68\")   Wt 58.7 kg (129 lb 6.4 oz)   SpO2 100%   BMI 19.68 kg/m²       I/O this shift:  In: 360 [P.O.:360]  Out: -     Intake/Output Summary (Last 24 hours) at " 11/3/2019 1423  Last data filed at 11/3/2019 1300  Gross per 24 hour   Intake 600 ml   Output 3500 ml   Net -2900 ml       Physical Exam:    General Appearance: alert, oriented x 3, comfortable  HEENT: pupils round and reactive to light, oral mucosa dry, extraocular movements intact.  Neck: supple, no JVD, trachea midline  Lungs: Fair air movement bilaterally, rales or rhonchi appreciated in the left lung field   Heart: RRR, normal S1 and S2, no S3, no rub  Abdomen: soft, non-tender, no palpable bladder, present bowel sounds to auscultation  Extremities: no edema, cyanosis or clubbing.   Neuro: normal speech and mental status, left upper extremity contracted, decreased strength in left upper and lower extremities     Results Review:    Results from last 7 days   Lab Units 11/03/19  0608 11/02/19 0407 11/01/19  1339   SODIUM mmol/L 132* 133* 130*   POTASSIUM mmol/L 4.7 4.0 4.6   CHLORIDE mmol/L 88* 85* 82*   CO2 mmol/L 20.1* 31.4* 32.6*   BUN mg/dL 26* 35* 24*   CREATININE mg/dL 3.70* 4.80* 3.83*   CALCIUM mg/dL 9.0 9.3 9.2   BILIRUBIN mg/dL  --   --  0.3   ALK PHOS U/L  --   --  77   ALT (SGPT) U/L  --   --  11   AST (SGOT) U/L  --   --  42*   GLUCOSE mg/dL 252* 55* 317*       Estimated Creatinine Clearance: 17.6 mL/min (A) (by C-G formula based on SCr of 3.7 mg/dL (H)).    Results from last 7 days   Lab Units 11/03/19  0608 11/02/19  0407   PHOSPHORUS mg/dL 6.2* 6.5*             Results from last 7 days   Lab Units 11/03/19  0608 11/02/19  0407 11/01/19  1339   WBC 10*3/mm3 6.38 7.12 6.37   HEMOGLOBIN g/dL 8.3* 8.4* 8.9*   PLATELETS 10*3/mm3 163 201 191               Imaging Results (Last 24 Hours)     Procedure Component Value Units Date/Time    XR Chest 1 View [[id number]] Collected:  11/03/19 0918     Updated:  11/03/19 0922    Narrative:       PROCEDURE: XR CHEST 1 VW-     HISTORY: f/u on pneumonia; J18.9-Pneumonia, unspecified organism;  T79-Faujxevpid condition; R05-Cough; N18.6-End stage renal  disease;  Z99.2-Dependence on renal dialysis     COMPARISON: 2 days prior.     FINDINGS: The heart is upper limits of normal, stable from prior. Aortic  mural calcifications noted.. The mediastinum is unremarkable. There is  patchy bilateral airspace disease with mild sparing of the apices. There  has been mild worsening compared to prior. Small right effusion is  stable.. There is no pneumothorax.  There are no acute osseous  abnormalities.       Impression:       Interval worsening of bilateral patchy airspace disease  consistent with pneumonia compared to 2 days prior.     .     This report was finalized on 11/3/2019 9:20 AM by Narcisa Gutierrez MD.          amLODIPine 10 mg Oral Daily   aspirin 81 mg Oral Daily   atorvastatin 40 mg Oral Nightly   b complex-vitamin c-folic acid 1 tablet Oral Daily   cefepime 2 g Intravenous Q24H   clopidogrel 75 mg Oral Daily   docusate sodium 100 mg Oral Q12H   famotidine 20 mg Oral Daily   fluticasone 1 spray Each Nare Daily   folic acid 1 mg Oral Daily   guaiFENesin 600 mg Oral Q12H   heparin (porcine) 5,000 Units Subcutaneous Q12H   insulin aspart 0-9 Units Subcutaneous 4x Daily AC & at Bedtime   insulin aspart 5 Units Subcutaneous TID With Meals   insulin detemir 12 Units Subcutaneous Nightly   ipratropium-albuterol 3 mL Nebulization 4x Daily - RT   isosorbide mononitrate 30 mg Oral Daily   lanthanum 1,000 mg Oral TID With Meals   levoFLOXacin 500 mg Intravenous Q48H   levothyroxine 125 mcg Oral Q AM   losartan 100 mg Oral Q24H   metoprolol succinate  mg Oral Daily   sodium chloride 10 mL Intravenous Q12H       Pharmacy Consult - Pharmacy to dose        Medication Review:   Current Facility-Administered Medications   Medication Dose Route Frequency Provider Last Rate Last Dose   • acetaminophen (TYLENOL) tablet 650 mg  650 mg Oral Q4H PRN John Sosa MD   650 mg at 11/03/19 0629    Or   • acetaminophen (TYLENOL) 160 MG/5ML solution 650 mg  650 mg Oral Q4H PRN  John Sosa MD        Or   • acetaminophen (TYLENOL) suppository 650 mg  650 mg Rectal Q4H PRN John Sosa MD       • aluminum-magnesium hydroxide-simethicone (MAALOX MAX) 400-400-40 MG/5ML suspension 15 mL  15 mL Oral Q6H PRN John Sosa MD       • amLODIPine (NORVASC) tablet 10 mg  10 mg Oral Daily John Sosa MD   10 mg at 11/03/19 0952   • aspirin chewable tablet 81 mg  81 mg Oral Daily John Sosa MD   81 mg at 11/03/19 0952   • atorvastatin (LIPITOR) tablet 40 mg  40 mg Oral Nightly John Sosa MD   40 mg at 11/02/19 2322   • b complex-vitamin c-folic acid (NEPHRO-SHIRLENE) tablet 1 tablet  1 tablet Oral Daily John Sosa MD   1 tablet at 11/03/19 0952   • benzonatate (TESSALON) capsule 100 mg  100 mg Oral TID PRN John Sosa MD       • cefepime (MAXIPIME) 2 g in sodium chloride 0.9 % 100 mL IVPB  2 g Intravenous Q24H John Sosa MD   Stopped at 11/03/19 0129   • clopidogrel (PLAVIX) tablet 75 mg  75 mg Oral Daily John Sosa MD   75 mg at 11/03/19 0951   • dextrose (D50W) 25 g/ 50mL Intravenous Solution 25 g  25 g Intravenous Q15 Min PRN Carmelo Ray MD       • dextrose (GLUTOSE) oral gel 1 tube  1 tube Oral Q15 Min PRN Carmelo Ray MD       • docusate sodium (COLACE) capsule 100 mg  100 mg Oral Q12H John Sosa MD   100 mg at 11/03/19 0629   • famotidine (PEPCID) tablet 20 mg  20 mg Oral Daily John Sosa MD   20 mg at 11/03/19 0952   • fluticasone (FLONASE) 50 MCG/ACT nasal spray 1 spray  1 spray Each Nare Daily John Sosa MD   1 spray at 11/03/19 0953   • folic acid (FOLVITE) tablet 1 mg  1 mg Oral Daily John Sosa MD   1 mg at 11/03/19 0952   • glucagon (human recombinant) (GLUCAGEN DIAGNOSTIC) injection 1 mg  1 mg Subcutaneous PRN Carmelo Ray MD       • guaiFENesin (MUCINEX) 12 hr tablet 600 mg  600 mg Oral Q12H John Sosa MD   600 mg at 11/03/19 0952   • heparin (porcine) 5000 UNIT/ML injection 5,000 Units   5,000 Units Subcutaneous Q12H John Sosa MD   5,000 Units at 11/03/19 0951   • insulin aspart (novoLOG) injection 0-9 Units  0-9 Units Subcutaneous 4x Daily AC & at Bedtime Carmelo Ray MD   4 Units at 11/03/19 1214   • insulin aspart (novoLOG) injection 5 Units  5 Units Subcutaneous TID With Meals John Sosa MD   5 Units at 11/03/19 1215   • insulin detemir (LEVEMIR) injection 12 Units  12 Units Subcutaneous Nightly Carmelo Ray MD   12 Units at 11/01/19 1855   • ipratropium-albuterol (DUO-NEB) nebulizer solution 3 mL  3 mL Nebulization 4x Daily - RT John Sosa MD   3 mL at 11/03/19 1245   • isosorbide mononitrate (IMDUR) 24 hr tablet 30 mg  30 mg Oral Daily John Sosa MD   30 mg at 11/03/19 0951   • lanthanum (FOSRENOL) chewable tablet 1,000 mg  1,000 mg Oral TID With Meals Chance Mackey MD, FASN   1,000 mg at 11/03/19 1213   • levoFLOXacin (LEVAQUIN) 500 mg/100 mL D5W (premix) (LEVAQUIN) 500 mg  500 mg Intravenous Q48H John Sosa MD   Stopped at 11/02/19 1850   • levothyroxine (SYNTHROID, LEVOTHROID) tablet 125 mcg  125 mcg Oral Q AM John Sosa MD       • LORazepam (ATIVAN) tablet 0.5 mg  0.5 mg Oral Q8H PRN John Sosa MD   0.5 mg at 11/02/19 1413   • losartan (COZAAR) tablet 100 mg  100 mg Oral Q24H John Sosa MD   100 mg at 11/03/19 0952   • melatonin tablet 5 mg  5 mg Oral Nightly PRN John Sosa MD       • metoprolol succinate XL (TOPROL-XL) 24 hr tablet 100 mg  100 mg Oral Daily John Sosa MD   100 mg at 11/03/19 0952   • ondansetron (ZOFRAN) tablet 4 mg  4 mg Oral Q6H PRN John Sosa MD        Or   • ondansetron (ZOFRAN) injection 4 mg  4 mg Intravenous Q6H PRN John Sosa MD       • ondansetron ODT (ZOFRAN-ODT) disintegrating tablet 4 mg  4 mg Oral Q6H PRN John Sosa MD       • Pharmacy Consult - Pharmacy to dose   Does not apply Continuous PRN John Sosa MD       • sodium chloride 0.9 % bolus 1,000 mL  1,000  mL Intravenous PRN Chance Mackey MD, FASN       • sodium chloride 0.9 % flush 10 mL  10 mL Intravenous Q12H John Sosa MD   10 mL at 11/03/19 0954   • sodium chloride 0.9 % flush 10 mL  10 mL Intravenous PRN John Sosa MD       • vancomycin in dextrose 5% 150 mL (VANCOCIN) IVPB 750 mg  750 mg Intravenous Daily PRN John Sosa MD           ASSESSMENT/PLAN:    Hospital-acquired pneumonia    1.  Left lower lobe pneumonia, HCAP, postpone acute post viral syndrome, present on admission, positive for human metapneumovirus  2.  Elevated troponin level, likely due to poor renal clearance, no associated chest pain, not likely ACS  3.  Elevated proBNP without any evidence of fluid overload on exam  4.  End-stage renal disease on hemodialysis on Tuesday, Thursday and Saturday  5.  Type 2 diabetes mellitus  6.   Hypothyroidism  7.   Coronary artery disease and history of CVA resulting in left-sided residual deficits, wheelchair bound  8.   Hypertension   9.   Peripheral arterial disease    Patient is currently hemodynamically stable with reassuring labs and vitals.  I repeated a chest x-ray this morning to better evaluate the infiltrate he had in his left lower lung base.  Unfortunately, he appears to have worsening of the infiltrates.  As per my discussion with the patient earlier today, I would like to observe him in the hospital for another day or so and repeat chest x-ray in the next 24 hours.  If it improves then he may be able to go home with continued antibiotic therapy for another 3 to 4 days.  Patient is in agreement with this plan.     Patient's blood pressure was noted to be low this morning.  Patient states that his blood pressures have been fairly low at home.  He feels that hydralazine is too much and he would not need it.  I have discontinued hydralazine for now and will watch his blood pressure closely.    Troponin level stabilized.  This is likely secondary to poor renal clearance in the  setting of end-stage renal disease.  This is not clinically significant as the patient has no associated complaints of chest pain/pressure.    Anticipate discharge in 24-48 hours.  Details were discussed with the patient as well as family in the room.   I also discussed the details with the nursing staff.    Rest as ordered.    Addendum 4:30 PM:  Patient's blood pressure lower and down to 79/43.  He is symptomatic.  We will give a 250 cc bolus.  Discontinue Norvasc and losartan as well.    John Sosa MD  11/03/19  2:23 PM    Please note that portions of this note may have been completed with a voice recognition program. Efforts were made to edit the dictations, but occasionally words are mistranscribed.

## 2019-11-03 NOTE — PLAN OF CARE
Problem: Patient Care Overview  Goal: Plan of Care Review  Outcome: Ongoing (interventions implemented as appropriate)   11/03/19 0605   Coping/Psychosocial   Plan of Care Reviewed With patient   Plan of Care Review   Progress improving       Problem: Skin Injury Risk (Adult)  Goal: Skin Health and Integrity  Outcome: Ongoing (interventions implemented as appropriate)   11/03/19 0605   Skin Injury Risk (Adult)   Skin Health and Integrity making progress toward outcome       Problem: Fall Risk (Adult)  Goal: Identify Related Risk Factors and Signs and Symptoms  Outcome: Ongoing (interventions implemented as appropriate)   11/03/19 0605   Fall Risk (Adult)   Related Risk Factors (Fall Risk) age-related changes;fatigue/slow reaction;fear of falling;gait/mobility problems;history of falls;environment unfamiliar   Signs and Symptoms (Fall Risk) presence of risk factors     Goal: Absence of Fall  Outcome: Ongoing (interventions implemented as appropriate)   11/03/19 0605   Fall Risk (Adult)   Absence of Fall making progress toward outcome       Problem: Pneumonia (Adult)  Goal: Signs and Symptoms of Listed Potential Problems Will be Absent, Minimized or Managed (Pneumonia)  Outcome: Ongoing (interventions implemented as appropriate)   11/03/19 0605   Goal/Outcome Evaluation   Problems Assessed (Pneumonia) all   Problems Present (Pneumonia) infection progression;respiratory compromise

## 2019-11-03 NOTE — PLAN OF CARE
Problem: Patient Care Overview  Goal: Plan of Care Review  Outcome: Ongoing (interventions implemented as appropriate)   11/03/19 1800   Coping/Psychosocial   Plan of Care Reviewed With patient;spouse   Plan of Care Review   Progress no change     Goal: Individualization and Mutuality  Outcome: Ongoing (interventions implemented as appropriate)    Goal: Discharge Needs Assessment  Outcome: Ongoing (interventions implemented as appropriate)    Goal: Interprofessional Rounds/Family Conf  Outcome: Ongoing (interventions implemented as appropriate)      Problem: Skin Injury Risk (Adult)  Goal: Identify Related Risk Factors and Signs and Symptoms  Outcome: Ongoing (interventions implemented as appropriate)    Goal: Skin Health and Integrity  Outcome: Ongoing (interventions implemented as appropriate)      Problem: Fall Risk (Adult)  Goal: Identify Related Risk Factors and Signs and Symptoms  Outcome: Ongoing (interventions implemented as appropriate)    Goal: Absence of Fall  Outcome: Ongoing (interventions implemented as appropriate)      Problem: Diabetes, Type 2 (Adult)  Goal: Signs and Symptoms of Listed Potential Problems Will be Absent, Minimized or Managed (Diabetes, Type 2)  Outcome: Ongoing (interventions implemented as appropriate)      Problem: Pneumonia (Adult)  Goal: Signs and Symptoms of Listed Potential Problems Will be Absent, Minimized or Managed (Pneumonia)  Outcome: Ongoing (interventions implemented as appropriate)

## 2019-11-03 NOTE — PROGRESS NOTES
"Nephrology Progress Note.    LOS: 1 day    Patient Care Team:  Jeannette Godoy APRN as PCP - General (Family Medicine)    Chief Complaint:    Chief Complaint   Patient presents with   • Cough       Subjective:   Follow up for ESRD and related issues.  Interval History:   Patient Complaints: none  Patient seen and examined this morning.  Events from last night noted.  Patient denies having any fevers chills.  No nausea or vomiting no abdominal pain.  Denies any chest pain, shortness of breath or cough and sputum production.  There is no significant edema.   Patient also denies having new onset weakness of numbness of either extremity  History taken from: patient    Objective:    Vital Signs  /51 (BP Location: Left arm, Patient Position: Lying)   Pulse 97   Temp 98.1 °F (36.7 °C) (Oral)   Resp 18   Ht 172.7 cm (68\")   Wt 58.7 kg (129 lb 6.4 oz)   SpO2 96%   BMI 19.68 kg/m²     No intake/output data recorded.    Intake/Output Summary (Last 24 hours) at 11/3/2019 0843  Last data filed at 11/2/2019 2000  Gross per 24 hour   Intake 480 ml   Output 3500 ml   Net -3020 ml       Physical Exam:  General Appearance: alert, oriented x 3, no acute distress,   HEENT: Oral mucosa dry, extra occular movements intact. Sclera clear.  Skin: Warm and dry  Neck: supple, no JVD, trachea midline  Lungs:Chest shape is normal. Breath sounds heard bilaterally equally. No crackles, No wheezing.   Heart: Irregular rate and rhythm. normal S1 and S2, no S3, no rub, peripheral pulses weak but palpable.  Abdomen: Obese, soft, non-tender,  present bowel sounds to auscultation  : no palpable bladder.  Extremities: trace edema, no cyanosis or clubbing.   Neuro: normal speech and mental status, grossly non focal.     Results Review:   Results from last 7 days   Lab Units 11/03/19  0608 11/02/19  0407 11/01/19  1339   SODIUM mmol/L 132* 133* 130*   POTASSIUM mmol/L 4.7 4.0 4.6   CHLORIDE mmol/L 88* 85* 82*   CO2 mmol/L 20.1* 31.4* " 32.6*   BUN mg/dL 26* 35* 24*   CREATININE mg/dL 3.70* 4.80* 3.83*   CALCIUM mg/dL 9.0 9.3 9.2   ALBUMIN g/dL 3.40* 3.40* 3.50   BILIRUBIN mg/dL  --   --  0.3   ALK PHOS U/L  --   --  77   ALT (SGPT) U/L  --   --  11   AST (SGOT) U/L  --   --  42*   GLUCOSE mg/dL 252* 55* 317*     Estimated Creatinine Clearance: 17.6 mL/min (A) (by C-G formula based on SCr of 3.7 mg/dL (H)).  Results from last 7 days   Lab Units 11/03/19  0608 11/02/19  0407   PHOSPHORUS mg/dL 6.2* 6.5*         Results from last 7 days   Lab Units 11/03/19  0608 11/02/19  0407 11/01/19  1339   WBC 10*3/mm3 6.38 7.12 6.37   HEMOGLOBIN g/dL 8.3* 8.4* 8.9*   PLATELETS 10*3/mm3 163 201 191         Brief Urine Lab Results     None        No results found for: UTPCR  Imaging Results (Last 24 Hours)     ** No results found for the last 24 hours. **          amLODIPine 10 mg Oral Daily   aspirin 81 mg Oral Daily   atorvastatin 40 mg Oral Nightly   b complex-vitamin c-folic acid 1 tablet Oral Daily   cefepime 2 g Intravenous Q24H   clopidogrel 75 mg Oral Daily   docusate sodium 100 mg Oral Q12H   famotidine 20 mg Oral Daily   fluticasone 1 spray Each Nare Daily   folic acid 1 mg Oral Daily   guaiFENesin 600 mg Oral Q12H   heparin (porcine) 5,000 Units Subcutaneous Q12H   hydrALAZINE 50 mg Oral TID   insulin aspart 0-9 Units Subcutaneous 4x Daily AC & at Bedtime   insulin aspart 5 Units Subcutaneous TID With Meals   insulin detemir 12 Units Subcutaneous Nightly   ipratropium-albuterol 3 mL Nebulization 4x Daily - RT   isosorbide mononitrate 30 mg Oral Daily   levoFLOXacin 500 mg Intravenous Q48H   losartan 100 mg Oral Q24H   metoprolol succinate  mg Oral Daily   sodium chloride 10 mL Intravenous Q12H   vancomycin 750 mg Intravenous Once       Pharmacy Consult - Pharmacy to dose          Medication Review:   Current Facility-Administered Medications   Medication Dose Route Frequency Provider Last Rate Last Dose   • acetaminophen (TYLENOL) tablet 650 mg   650 mg Oral Q4H PRN John Sosa MD   650 mg at 11/03/19 0629    Or   • acetaminophen (TYLENOL) 160 MG/5ML solution 650 mg  650 mg Oral Q4H PRN John Sosa MD        Or   • acetaminophen (TYLENOL) suppository 650 mg  650 mg Rectal Q4H PRN John Sosa MD       • aluminum-magnesium hydroxide-simethicone (MAALOX MAX) 400-400-40 MG/5ML suspension 15 mL  15 mL Oral Q6H PRN John Sosa MD       • amLODIPine (NORVASC) tablet 10 mg  10 mg Oral Daily John Sosa MD   10 mg at 11/02/19 0905   • aspirin chewable tablet 81 mg  81 mg Oral Daily John Sosa MD   81 mg at 11/02/19 0906   • atorvastatin (LIPITOR) tablet 40 mg  40 mg Oral Nightly John Sosa MD   40 mg at 11/02/19 2322   • b complex-vitamin c-folic acid (NEPHRO-SHIRLENE) tablet 1 tablet  1 tablet Oral Daily John Sosa MD   1 tablet at 11/02/19 0905   • benzonatate (TESSALON) capsule 100 mg  100 mg Oral TID PRN John Sosa MD       • cefepime (MAXIPIME) 2 g in sodium chloride 0.9 % 100 mL IVPB  2 g Intravenous Q24H John Sosa MD   Stopped at 11/03/19 0129   • clopidogrel (PLAVIX) tablet 75 mg  75 mg Oral Daily John Sosa MD   75 mg at 11/02/19 0906   • dextrose (D50W) 25 g/ 50mL Intravenous Solution 25 g  25 g Intravenous Q15 Min PRN Carmelo Ray MD       • dextrose (GLUTOSE) oral gel 1 tube  1 tube Oral Q15 Min PRN Carmelo Ray MD       • docusate sodium (COLACE) capsule 100 mg  100 mg Oral Q12H John Sosa MD   100 mg at 11/03/19 0629   • famotidine (PEPCID) tablet 20 mg  20 mg Oral Daily John Sosa MD   20 mg at 11/02/19 0906   • fluticasone (FLONASE) 50 MCG/ACT nasal spray 1 spray  1 spray Each Nare Daily John Sosa MD   1 spray at 11/02/19 0907   • folic acid (FOLVITE) tablet 1 mg  1 mg Oral Daily John Sosa MD   1 mg at 11/02/19 0906   • glucagon (human recombinant) (GLUCAGEN DIAGNOSTIC) injection 1 mg  1 mg Subcutaneous PRN Carmelo Ray MD       • guaiFENesin  (MUCINEX) 12 hr tablet 600 mg  600 mg Oral Q12H John Sosa MD   600 mg at 11/02/19 2322   • heparin (porcine) 5000 UNIT/ML injection 5,000 Units  5,000 Units Subcutaneous Q12H John Sosa MD   5,000 Units at 11/02/19 2322   • hydrALAZINE (APRESOLINE) tablet 50 mg  50 mg Oral TID John Sosa MD   50 mg at 11/02/19 1626   • insulin aspart (novoLOG) injection 0-9 Units  0-9 Units Subcutaneous 4x Daily AC & at Bedtime Carmelo Ray MD   4 Units at 11/03/19 0630   • insulin aspart (novoLOG) injection 5 Units  5 Units Subcutaneous TID With Meals John Sosa MD       • insulin detemir (LEVEMIR) injection 12 Units  12 Units Subcutaneous Nightly Carmelo Ray MD   12 Units at 11/01/19 1855   • ipratropium-albuterol (DUO-NEB) nebulizer solution 3 mL  3 mL Nebulization 4x Daily - RT John Sosa MD   3 mL at 11/03/19 0638   • isosorbide mononitrate (IMDUR) 24 hr tablet 30 mg  30 mg Oral Daily John Sosa MD   30 mg at 11/02/19 0906   • levoFLOXacin (LEVAQUIN) 500 mg/100 mL D5W (premix) (LEVAQUIN) 500 mg  500 mg Intravenous Q48H John Sosa MD   Stopped at 11/02/19 1850   • LORazepam (ATIVAN) tablet 0.5 mg  0.5 mg Oral Q8H PRN John Sosa MD   0.5 mg at 11/02/19 1413   • losartan (COZAAR) tablet 100 mg  100 mg Oral Q24H John Sosa MD   100 mg at 11/02/19 0906   • melatonin tablet 5 mg  5 mg Oral Nightly PRN John Sosa MD       • metoprolol succinate XL (TOPROL-XL) 24 hr tablet 100 mg  100 mg Oral Daily John Sosa MD   100 mg at 11/02/19 0905   • ondansetron (ZOFRAN) tablet 4 mg  4 mg Oral Q6H PRN John Sosa MD        Or   • ondansetron (ZOFRAN) injection 4 mg  4 mg Intravenous Q6H PRN John Sosa MD       • ondansetron ODT (ZOFRAN-ODT) disintegrating tablet 4 mg  4 mg Oral Q6H PRN John Sosa MD       • Pharmacy Consult - Pharmacy to dose   Does not apply Continuous PRN John Sosa MD       • sodium chloride 0.9 % flush 10 mL  10 mL  Intravenous Q12H John Sosa MD   10 mL at 11/02/19 2329   • sodium chloride 0.9 % flush 10 mL  10 mL Intravenous PRN John Sosa MD       • vancomycin in dextrose 5% 150 mL (VANCOCIN) IVPB 750 mg  750 mg Intravenous Daily PRN John Sosa MD       • vancomycin in dextrose 5% 150 mL (VANCOCIN) IVPB 750 mg  750 mg Intravenous Once John Sosa MD           Assessment/Plan:  1. End-stage renal disease: Patient is on Tuesday Thursday Saturday schedule we will plan on doing dialysis today.  Continue with the outpatient schedule.  2. Left lower lobe pneumonia: Healthcare associated pneumonia being treated.  3. Type 2 diabetes:  4. Hypothyroidism:  5. Coronary artery disease: Does not have any acute symptoms but elevated troponin noted.  6. Hypertension with end-stage renal disease: Blood pressure is usually under good control as his volume improves.  Continue with the home medications.  7. Peripheral arterial disease:  8. Chronic kidney disease-bone and mineral disorder:     Plan:  · Next dialysis due on Tuesday will make arrangements.  · Continue with her current treatment plan.  Clinically stable.  · I will go ahead and start the phosphorus binder in the form of Fosrenol 1000 mg 3 times a day with meals  · Details were discussed with the patient no family in the room.    · Details were also discussed with the hospitalist service.  Whenever patient is stable can be discharged.  I will follow him on Tuesday at the dialysis clinic, if it gets discharged.  · Surveillance labs.  · Further recommendations will depend on clinical course of the patient during the current hospitalization.    · I also discussed the details with the nursing staff.  · Rest as ordered.    Chance Mackey MD, FASN  11/03/19  8:43 AM    Dictated utilizing Dragon dictation.

## 2019-11-04 NOTE — PROGRESS NOTES
Continued Stay Note  ANTOINE Sharma     Patient Name: Talha Cutler  MRN: [id number]  Today's Date: 11/4/2019    Admit Date: 11/1/2019    Discharge Plan     Row Name 11/04/19 1521       Plan    Plan  Home     Plan Comments  Case management spoke to pt regarding discharge plans He plans on returning home His wife transports to dialysis [facility] denies supplying oxygen Does not have [facility]  Case Management to follow up with this  [facility]  reports they believe it is [facility] called they do not supply the oxygen attempted to call family no answer Confirmed pt has oxygen on 3 LNC continuously not HS only  DME [facility]         Discharge Codes    No documentation.       Expected Discharge Date and Time     Expected Discharge Date Expected Discharge Time    Nov 5, 2019             Annika Stanford RN

## 2019-11-04 NOTE — PROGRESS NOTES
"Nephrology Progress Note.    LOS: 1 day    Patient Care Team:  Jeannette Godoy APRN as PCP - General (Family Medicine)    Chief Complaint:    Chief Complaint   Patient presents with   • Cough       Subjective:   Follow up for ESRD and related issues.  Interval History:   Patient Complaints: none  Patient seen and examined this morning.  Events from last night noted.  Patient denies having any fevers chills.  No nausea or vomiting no abdominal pain.  Denies any chest pain, shortness of breath or cough and sputum production.  There is no significant edema.   He is complaining of left-sided weakness, he does have a history of stroke on that side. He also feels does not have any strength to do anything.  History taken from: patient    Objective:    Vital Signs  /60 (BP Location: Left arm, Patient Position: Lying)   Pulse 90   Temp 97.6 °F (36.4 °C) (Oral)   Resp 19   Ht 172.7 cm (68\")   Wt 59.5 kg (131 lb 3.2 oz)   SpO2 99%   BMI 19.95 kg/m²     I/O this shift:  In: 240 [P.O.:240]  Out: -     Intake/Output Summary (Last 24 hours) at 11/4/2019 1030  Last data filed at 11/4/2019 0922  Gross per 24 hour   Intake 460 ml   Output --   Net 460 ml       Physical Exam:  General Appearance: alert, oriented x 3, no acute distress,   HEENT: Oral mucosa dry, extra occular movements intact. Sclera clear.  Skin: Warm and dry  Neck: supple, no JVD, trachea midline  Lungs:Chest shape is normal. Breath sounds heard bilaterally equally. No crackles, No wheezing.   Heart: Irregular rate and rhythm. normal S1 and S2, no S3, no rub, peripheral pulses weak but palpable.  Abdomen: Obese, soft, non-tender,  present bowel sounds to auscultation  : no palpable bladder.  Extremities: trace edema, no cyanosis or clubbing.   Neuro: normal speech and mental status, grossly non focal.     Results Review:   Results from last 7 days   Lab Units 11/03/19  0608 11/02/19  0407 11/01/19  1339   SODIUM mmol/L 132* 133* 130*   POTASSIUM " mmol/L 4.7 4.0 4.6   CHLORIDE mmol/L 88* 85* 82*   CO2 mmol/L 20.1* 31.4* 32.6*   BUN mg/dL 26* 35* 24*   CREATININE mg/dL 3.70* 4.80* 3.83*   CALCIUM mg/dL 9.0 9.3 9.2   ALBUMIN g/dL 3.40* 3.40* 3.50   BILIRUBIN mg/dL  --   --  0.3   ALK PHOS U/L  --   --  77   ALT (SGPT) U/L  --   --  11   AST (SGOT) U/L  --   --  42*   GLUCOSE mg/dL 252* 55* 317*     Estimated Creatinine Clearance: 17.9 mL/min (A) (by C-G formula based on SCr of 3.7 mg/dL (H)).  Results from last 7 days   Lab Units 11/03/19  0608 11/02/19  0407   PHOSPHORUS mg/dL 6.2* 6.5*         Results from last 7 days   Lab Units 11/03/19  0608 11/02/19  0407 11/01/19  1339   WBC 10*3/mm3 6.38 7.12 6.37   HEMOGLOBIN g/dL 8.3* 8.4* 8.9*   PLATELETS 10*3/mm3 163 201 191         Brief Urine Lab Results     None        No results found for: UTPCR  Imaging Results (Last 24 Hours)     Procedure Component Value Units Date/Time    CT Head Without Contrast [[id number]] Collected:  11/04/19 0942     Updated:  11/04/19 0946    Narrative:       PROCEDURE: CT HEAD WO CONTRAST-     HISTORY: Dizziness     COMPARISON: 04/26/2019     FINDINGS: Contiguous axial images were obtained from skull vertex to the  skullbase without administration of contrast.     Some images are slightly degraded by patient motion. No hemorrhage,  midline shift, intra-axial or extra-axial fluid collections. The basal  cisterns are patent and the ventricles are not enlarged. Atherosclerotic  vascular calcifications are very prominent. Diffuse cortical volume  loss. Areas of decreased attenuation in the bilateral periventricular  and subcortical white matter are nonspecific but overall most consistent  with chronic microvascular ischemia. If there is high clinical concern  for an acute ischemic event, correlation with MRI with  diffusion-weighted imaging would be helpful.       Impression:       1. No acute intracranial findings. Consider MRI if symptoms persist or  if there is concern for ischemia.  2.  Cortical volume loss and changes consistent with chronic  microvascular ischemia.           This study was performed with techniques to keep radiation doses as low  as reasonably achievable (ALARA). Individualized dose reduction  techniques using automated exposure control or adjustment of mA and/or  kV according to the patient size were employed.      This report was finalized on 11/4/2019 9:44 AM by Rafita Early MD.    XR Chest 1 View [[id number]] Collected:  11/04/19 0857     Updated:  11/04/19 0901    Narrative:       PORTABLE CHEST     INDICATION: Follow-up pneumonia.     FINDINGS: Single view chest, compared with 11/03/2019. EKG leads overlie  the chest. The patient is rotated to the right. There are persistent  bilateral multifocal infiltrates, although somewhat improved from  previous. Persistence of a small right pleural effusion also appearing  improved.       Impression:       Improving aeration. Continued follow-up recommended.     This report was finalized on 11/4/2019 8:58 AM by Rafita Early MD.          aspirin 81 mg Oral Daily   atorvastatin 40 mg Oral Nightly   b complex-vitamin c-folic acid 1 tablet Oral Daily   cefepime 2 g Intravenous Q24H   clopidogrel 75 mg Oral Daily   docusate sodium 100 mg Oral Q12H   famotidine 20 mg Oral Daily   fluticasone 1 spray Each Nare Daily   folic acid 1 mg Oral Daily   guaiFENesin 600 mg Oral Q12H   heparin (porcine) 5,000 Units Subcutaneous Q12H   insulin aspart 0-9 Units Subcutaneous 4x Daily AC & at Bedtime   insulin aspart 5 Units Subcutaneous TID With Meals   insulin detemir 12 Units Subcutaneous Nightly   ipratropium-albuterol 3 mL Nebulization 4x Daily - RT   isosorbide mononitrate 30 mg Oral Daily   lanthanum 1,000 mg Oral TID With Meals   levoFLOXacin 500 mg Intravenous Q48H   levothyroxine 125 mcg Oral Q AM   lidocaine 1 patch Transdermal Nightly   metoprolol succinate XL 75 mg Oral Daily   sodium chloride 10 mL Intravenous Q12H       Pharmacy Consult -  Pharmacy to dose          Medication Review:   Current Facility-Administered Medications   Medication Dose Route Frequency Provider Last Rate Last Dose   • acetaminophen (TYLENOL) tablet 650 mg  650 mg Oral Q4H PRN John Sosa MD   650 mg at 11/03/19 2109    Or   • acetaminophen (TYLENOL) 160 MG/5ML solution 650 mg  650 mg Oral Q4H PRN John Sosa MD        Or   • acetaminophen (TYLENOL) suppository 650 mg  650 mg Rectal Q4H PRN John Sosa MD       • aluminum-magnesium hydroxide-simethicone (MAALOX MAX) 400-400-40 MG/5ML suspension 15 mL  15 mL Oral Q6H PRN John Sosa MD       • aspirin chewable tablet 81 mg  81 mg Oral Daily John Sosa MD   81 mg at 11/04/19 0845   • atorvastatin (LIPITOR) tablet 40 mg  40 mg Oral Nightly John Sosa MD   40 mg at 11/03/19 2109   • b complex-vitamin c-folic acid (NEPHRO-SHIRLENE) tablet 1 tablet  1 tablet Oral Daily John Sosa MD   1 tablet at 11/04/19 0845   • benzonatate (TESSALON) capsule 100 mg  100 mg Oral TID PRN John Sosa MD   100 mg at 11/03/19 2338   • cefepime (MAXIPIME) 2 g in sodium chloride 0.9 % 100 mL IVPB  2 g Intravenous Q24H John Sosa MD   Stopped at 11/03/19 2136   • clopidogrel (PLAVIX) tablet 75 mg  75 mg Oral Daily John Sosa MD   75 mg at 11/04/19 0844   • dextrose (D50W) 25 g/ 50mL Intravenous Solution 25 g  25 g Intravenous Q15 Min PRN Carmelo Ray MD       • dextrose (GLUTOSE) oral gel 1 tube  1 tube Oral Q15 Min PRN Carmelo Ray MD       • docusate sodium (COLACE) capsule 100 mg  100 mg Oral Q12H John Sosa MD   100 mg at 11/04/19 0624   • famotidine (PEPCID) tablet 20 mg  20 mg Oral Daily John Sosa MD   20 mg at 11/04/19 0844   • fluticasone (FLONASE) 50 MCG/ACT nasal spray 1 spray  1 spray Each Nare Daily John Sosa MD   1 spray at 11/04/19 0852   • folic acid (FOLVITE) tablet 1 mg  1 mg Oral Daily John Sosa MD   1 mg at 11/04/19 0844   • glucagon (human  recombinant) (GLUCAGEN DIAGNOSTIC) injection 1 mg  1 mg Subcutaneous PRN Carmelo Ray MD       • guaiFENesin (MUCINEX) 12 hr tablet 600 mg  600 mg Oral Q12H John Sosa MD   600 mg at 11/04/19 0844   • heparin (porcine) 5000 UNIT/ML injection 5,000 Units  5,000 Units Subcutaneous Q12H John Sosa MD   5,000 Units at 11/04/19 0845   • insulin aspart (novoLOG) injection 0-9 Units  0-9 Units Subcutaneous 4x Daily AC & at Bedtime Carmelo Ray MD   2 Units at 11/03/19 2110   • insulin aspart (novoLOG) injection 5 Units  5 Units Subcutaneous TID With Meals John Sosa MD   5 Units at 11/04/19 0852   • insulin detemir (LEVEMIR) injection 12 Units  12 Units Subcutaneous Nightly Carmelo Ray MD   12 Units at 11/03/19 2111   • ipratropium-albuterol (DUO-NEB) nebulizer solution 3 mL  3 mL Nebulization 4x Daily - RT John Sosa MD   3 mL at 11/04/19 0749   • isosorbide mononitrate (IMDUR) 24 hr tablet 30 mg  30 mg Oral Daily John Sosa MD   30 mg at 11/04/19 0845   • lanthanum (FOSRENOL) chewable tablet 1,000 mg  1,000 mg Oral TID With Meals Chance Mackey MD, FASN   1,000 mg at 11/04/19 0850   • levoFLOXacin (LEVAQUIN) 500 mg/100 mL D5W (premix) (LEVAQUIN) 500 mg  500 mg Intravenous Q48H John Sosa MD   Stopped at 11/02/19 1850   • levothyroxine (SYNTHROID, LEVOTHROID) tablet 125 mcg  125 mcg Oral Q AM John Sosa MD   125 mcg at 11/04/19 0517   • lidocaine (LIDODERM) 5 % 1 patch  1 patch Transdermal Nightly Sandra Zavala DO   1 patch at 11/03/19 2110   • LORazepam (ATIVAN) tablet 0.5 mg  0.5 mg Oral Q8H PRN John Sosa MD   0.5 mg at 11/03/19 2338   • melatonin tablet 5 mg  5 mg Oral Nightly PRN John Sosa MD   5 mg at 11/03/19 2338   • metoprolol succinate XL (TOPROL-XL) 24 hr tablet 75 mg  75 mg Oral Daily Judit Person APRN       • ondansetron (ZOFRAN) tablet 4 mg  4 mg Oral Q6H PRN John Sosa MD        Or   • ondansetron (ZOFRAN) injection 4  mg  4 mg Intravenous Q6H PRN John Sosa MD       • ondansetron ODT (ZOFRAN-ODT) disintegrating tablet 4 mg  4 mg Oral Q6H PRN John Sosa MD       • Pharmacy Consult - Pharmacy to dose   Does not apply Continuous PRN John Sosa MD       • sodium chloride 0.9 % bolus 1,000 mL  1,000 mL Intravenous PRN Chance Mackey MD, LEEANNEN       • sodium chloride 0.9 % flush 10 mL  10 mL Intravenous Q12H John Sosa MD   10 mL at 11/04/19 0851   • sodium chloride 0.9 % flush 10 mL  10 mL Intravenous PRN John Sosa MD       • vancomycin in dextrose 5% 150 mL (VANCOCIN) IVPB 750 mg  750 mg Intravenous Daily PRN John Sosa MD           Assessment/Plan:  1. End-stage renal disease: Patient is on Tuesday Thursday Saturday schedule we will plan on doing dialysis today.  Continue with the outpatient schedule.  2. Left lower lobe pneumonia: Healthcare associated pneumonia being treated.  3. Type 2 diabetes:  4. Hypothyroidism:  5. Coronary artery disease: Does not have any acute symptoms but elevated troponin noted.  6. Hypertension with end-stage renal disease: Blood pressure is usually under good control as his volume improves.  Continue with the home medications.  7. Peripheral arterial disease:  8. Chronic kidney disease-bone and mineral disorder:     Plan:  · Next dialysis due on Tuesday will make arrangements.  · I will go ahead and start the phosphorus binder in the form of Fosrenol 1000 mg 3 times a day with meals.  · Chest x-ray shows worsening of the infiltrate, continue with current treatment plan.  · Details were discussed with the patient no family in the room.    · Details were also discussed with the hospitalist service.    · Surveillance labs.  · Further recommendations will depend on clinical course of the patient during the current hospitalization.    · I also discussed the details with the nursing staff.  · Rest as ordered.    Chance Mackey MD, ARJUN  11/04/19  10:30  AM    Dictated utilizing Dragon dictation.

## 2019-11-04 NOTE — PLAN OF CARE
Problem: Patient Care Overview  Goal: Plan of Care Review  Outcome: Ongoing (interventions implemented as appropriate)   11/04/19 0425   Coping/Psychosocial   Plan of Care Reviewed With patient   Plan of Care Review   Progress no change   OTHER   Outcome Summary No acute events overnight. Patients blood pressure has remained in the 90s. Continue to monitor.        Problem: Skin Injury Risk (Adult)  Goal: Identify Related Risk Factors and Signs and Symptoms  Outcome: Outcome(s) achieved Date Met: 11/04/19    Goal: Skin Health and Integrity  Outcome: Ongoing (interventions implemented as appropriate)      Problem: Fall Risk (Adult)  Goal: Identify Related Risk Factors and Signs and Symptoms  Outcome: Outcome(s) achieved Date Met: 11/04/19    Goal: Absence of Fall  Outcome: Ongoing (interventions implemented as appropriate)      Problem: Diabetes, Type 2 (Adult)  Goal: Signs and Symptoms of Listed Potential Problems Will be Absent, Minimized or Managed (Diabetes, Type 2)  Outcome: Ongoing (interventions implemented as appropriate)      Problem: Pneumonia (Adult)  Goal: Signs and Symptoms of Listed Potential Problems Will be Absent, Minimized or Managed (Pneumonia)  Outcome: Ongoing (interventions implemented as appropriate)

## 2019-11-04 NOTE — PROGRESS NOTES
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  He presented to the emergency room with complaints of shortness of breath and cough.  He was noted in the emergency room to have an elevated pro-Marv is 0.6 and a mildly elevated troponin at 0.48.  Lactic acid was normal.  Chest x-ray was done which showed left lower lobe infiltrate which was felt to be bronchopneumonia.  He was started on IV broad-spectrum antibiotics and admitted to the hospitalist service.    Upon admission to the hospitalist service patient has been continued on cefepime, Levaquin and vancomycin.  Repeat chest x-ray done today does show some interval improvement in the aeration.  I did see and evaluate patient at bedside for which he is complaining of some weakness.  He states when he sits up he just feels so very weak more so on the left however he has a history of having a left-sided CVA with residual weakness.  I did go ahead and get a CT of his head which did not show any acute findings.  He did have some chronic changes consistent with chronic microvascular ischemia.  I have also ordered a carotid duplex as he does complain of some dizziness at times.  This is been an ongoing problem for which he has been referred to neurology and has an appointment this month.    Initially there was some concern of chest pain however when I spoke to the patient he denied any chest pain.  Troponin was done which is elevated but is trending down.  He does have a chronically elevated troponin likely secondary to his renal disease.  He has had some hypotension for which he was given a 250 mL bolus yesterday.  His losartan and Norvasc were held  yesterday and I have held his metoprolol this morning since he is still systolically in the 90s and this may be contributing to this weakness that he is feeling.             Review Of Systems:   Review of Systems   General ROS: Patient denies any fevers, chills or loss of consciousness.  Generalized weakness  ENT ROS: Denies sore throat, nasal congestion or ear pain.   Respiratory ROS: Denies cough or shortness of breath.   Cardiovascular ROS: Denies chest pain or palpitations. No history of exertional chest pain.   Gastrointestinal ROS: Denies nausea and vomiting. Denies any abdominal pain. No diarrhea.  Musculoskeletal ROS: Denies back pain. No muscle pain. No calf pain.   Neurological ROS: Denies any focal weakness. No loss of consciousness. Denies any numbness. Denies neck pain.   Dermatological ROS: Denies any redness or pruritis.    Objective      Temp:  [97.6 °F (36.4 °C)-98.5 °F (36.9 °C)] 97.6 °F (36.4 °C)  Heart Rate:  [84-91] 90  Resp:  [16-20] 19  BP: ()/(43-62) 128/60  Physical Exam    General Appearance:  Alert and cooperative, not in any acute distress.   Head:  Atraumatic and normocephalic, without obvious abnormality.   Eyes:          PERRLA, conjunctivae and sclerae normal, no Icterus. No pallor. Extraocular movements are within normal limits.           Neck: Supple, trachea midline, no thyromegaly, no carotid bruit.       Lungs:   Chest shape is normal. Breath sounds heard bilaterally equally.  No crackles or wheezing. Few rhonchi in left lobe No Pleural rub or bronchial breathing.   Heart:  Normal S1 and S2, no murmur, no gallop, no rub. No JVD   Abdomen:   Normal bowel sounds, no masses, no organomegaly. Soft    non-tender, non-distended, no guarding, no rebound             tenderness   Extremities: Moves all extremities well, no edema, no cyanosis, no clubbing.   Pulses: Pulses palpable and equal bilaterally   Skin: No bleeding, bruising or rash       Neurologic:    Psychiatric/Behavior:      Patient is alert and can move extremities on command.    Mood normal, behavior normal       Results Review:    I have reviewed the labs, radiology results and diagnostic studies.    Results from last 7 days   Lab Units 11/03/19  0608   WBC 10*3/mm3 6.38   HEMOGLOBIN g/dL 8.3*   PLATELETS 10*3/mm3 163     Results from last 7 days   Lab Units 11/03/19  0608   SODIUM mmol/L 132*   POTASSIUM mmol/L 4.7   CO2 mmol/L 20.1*   CREATININE mg/dL 3.70*   GLUCOSE mg/dL 252*       Culture Data:   Blood Culture   Date Value Ref Range Status   11/01/2019 No growth at 2 days  Preliminary   11/01/2019 No growth at 2 days  Preliminary     MRSA SCREEN CX   Date Value Ref Range Status   11/01/2019   Final    No Methicillin Resistant Staphylococcus aureus isolated     Radiology Data:   Cardiology Data:    I have reviewed the medications.    Assessment/Plan     Assessment and Plan:  1.  Left lower lobe pneumonia healthcare associated-he is positive for human metapneumovirus.  This is likely an acute post viral infection.  He is getting IV antibiotics in the form of Levaquin, cefepime and vancomycin.  He is also getting bronchodilators and mucolytic's.    2.  Dizziness-patient has had work-up but I will get a carotid duplex since I do not see one.  I have ordered for Karishma-Hallpike and Epley's to be done if his carotids are negative.  He has been set up with neurology as an outpatient.    3.  Diabetes mellitus type 2-patient on Levemir as well as insulin protocol.  Blood sugars are stable continue to monitor    4.  End-stage renal disease on hemodialysis-Dr. Mackey with nephrology has been consulted and managing dialysis    5.  Elevated troponin likely secondary to his renal disease.  This does appear to be chronic.  Patient denies any chest pain.  There was question of chest pain earlier however patient does not recall saying so and denies chest pain currently.  I did repeat a EKG today which did not show any changes from prior.    6.   Acquired hypothyroidism    7.  History of CVA with left-sided residual deficits-patient had a repeat CT of his head since he was feeling weak.  There are no acute findings on the CT.    8.  Peripheral arterial disease    9.  Coronary artery disease-medical management    10.  Hypotension-patient's blood pressure medicine is currently being held.  We will continue to monitor closely.    DVT prophylaxis:  Discharge Planning:   ALEXYS Gunderson 11/04/19 11:06 AM

## 2019-11-04 NOTE — PROGRESS NOTES
Adult Nutrition  Assessment/PES    Patient Name:  Talha Cutler  YOB: 1960  MRN: [id number]  Admit Date:  11/1/2019    Assessment Date:  11/4/2019    Comments:  Rec. #1: Continue with diet as ordered. Pt. Exhibiting low p.o. Intake ~40% of meals on average per NSG. RD added Novasource Renal BID. RD to follow pt. Consult RD PRN.     Reason for Assessment     Row Name 11/04/19 1434          Reason for Assessment    Reason For Assessment  diagnosis/disease state     Diagnosis  endocrine conditions;pulmonary disease;renal disease;diabetes diagnosis/complications;cardiac disease hypothyroidism, PNA, ESRD on HD, DM Type 2, CHF, HTN, CAD             Labs/Tests/Procedures/Meds     Row Name 11/04/19 1435          Labs/Procedures/Meds    Lab Results Reviewed  reviewed, pertinent     Lab Results Comments  High: BUN, Cr, phosphorus  Low: Na, Cl, Albumin        Medications    Pertinent Medications Reviewed  reviewed, pertinent           Estimated/Assessed Needs     Row Name 11/04/19 1436          Calculation Measurements    Weight Used For Calculations  70.9 kg (156 lb 4.6 oz)        Estimated/Assessed Needs    Additional Documentation  [place]-St. Jeor Equation (Group);Calorie Requirements (Group);Fluid Requirements (Group);Protein Requirements (Group)        Calorie Requirements    Estimated Calorie Requirement Comment  1791-1991        [place]-St. Jeor Equation    RMR ([place]-St. Jeor Equation)  1493.4        Protein Requirements    Weight Used For Protein Calculations  70.9 kg (156 lb 4.6 oz)     Est Protein Requirement Amount (gms/kg)  1.5 gm protein  gm/day     Estimated Protein Requirements (gms/day)  106.34        Fluid Requirements    Estimated Fluid Requirement Method  [place]-Segar Formula     [place]-Segar Method (over 20 kg)  2917.8         Nutrition Prescription Ordered     Row Name 11/04/19 1437          Nutrition Prescription PO    Current PO Diet  Regular     Common Modifiers   Cardiac;Consistent Carbohydrate;Renal         Evaluation of Received Nutrient/Fluid Intake     Row Name 11/04/19 1437          PO Evaluation    Number of Days PO Intake Evaluated  3 days     Number of Meals  6     % PO Intake  46               Problem/Interventions:  Problem 1     Row Name 11/04/19 1437          Nutrition Diagnoses Problem 1    Problem 1  Impaired Nutrient Utilization     Etiology (related to)  Medical Diagnosis     Endocrine  DM2     Renal  CKD;ESRD;Hemodialysis     Signs/Symptoms (evidenced by)  Biochemical     Specific Labs Noted  Na+;Chloride;BUN;Creatinine;Phosphorus         Problem 2     Row Name 11/04/19 1438          Nutrition Diagnoses Problem 2    Problem 2  Increased Nutrient Needs     Macronutrient  Kcal;Protein     Etiology (related to)  Medical Diagnosis     Pulmonary/Critical Care  Pneumonia     Renal  CKD;ESRD;Hemodialysis     Signs/Symptoms (evidenced by)  Other (comment) pulmonary and renal dysfunction             Intervention Goal     Row Name 11/04/19 1438          Intervention Goal    General  Meet nutritional needs for age/condition     PO  PO intake (%)     PO Intake %  -- 50-75     Weight  Appropriate weight gain         Nutrition Intervention     Row Name 11/04/19 1439          Nutrition Intervention    RD/Tech Action  Follow Tx progress;Encourage intake;Interview for preference;Advise available snack;Advise alternate selection;Recommend/ordered;Supplement provided     Recommended/Ordered  Supplement         Nutrition Prescription     Row Name 11/04/19 1439          Nutrition Prescription PO    PO Prescription  Begin/change supplement continue with diet as ordered     Supplement  Nepro     Supplement Frequency  2 times a day     New PO Prescription Ordered?  No, recommended supplement ordered        Other Orders    Supplement  Vitamin mineral supplement     Supplement Ordered?  No, recommended     Other  continue to monitor/replace electrolytes         Education/Evaluation      Row Name 11/04/19 1440          Education    Education  Will Instruct as appropriate        Monitor/Evaluation    Monitor  Per protocol;PO intake;Pertinent labs;Weight;Supplement intake           Electronically signed by:  Debi Cifuentes RD  11/04/19 2:41 PM

## 2019-11-04 NOTE — SIGNIFICANT NOTE
11/04/19 1430   Rehab Time/Intention   Evaluation Not Performed other (see comments)  (PT order received for Eply maneuver post Carotid doppler. Doppler not yet complete. Will await this testing and plan on evaluating pt in a.m.)

## 2019-11-05 NOTE — PROGRESS NOTES
"Nephrology Progress Note.    LOS: 1 day    Patient Care Team:  Jeannette Godoy APRN as PCP - General (Family Medicine)    Chief Complaint:    Chief Complaint   Patient presents with   • Cough       Subjective:   Follow up for ESRD and related issues.  Interval History:   Patient Complaints: none  Patient seen and examined this morning.  Events from last night noted.  Patient denies having any fevers chills.  Positive for nausea denies any vomiting no abdominal pain.  Denies any chest pain, but does complain of shortness of breath, cough and sputum production.  There is no significant edema.   He is complaining of left-sided weakness, he does have a history of stroke on that side. He also feels does not have any strength to do anything.  History taken from: patient    Objective:    Vital Signs  /83 (BP Location: Left arm, Patient Position: Sitting)   Pulse 93   Temp 98 °F (36.7 °C) (Oral)   Resp 18   Ht 172.7 cm (68\")   Wt 59.5 kg (131 lb 3.2 oz)   SpO2 100%   BMI 19.95 kg/m²     No intake/output data recorded.    Intake/Output Summary (Last 24 hours) at 11/5/2019 1018  Last data filed at 11/5/2019 0330  Gross per 24 hour   Intake 490 ml   Output 400 ml   Net 90 ml       Physical Exam:  General Appearance: alert, oriented x 3, no acute distress,   HEENT: Oral mucosa dry, extra occular movements intact. Sclera clear.  Skin: Warm and dry  Neck: supple, no JVD, trachea midline  Lungs:Chest shape is normal. Breath sounds heard bilaterally equally. No crackles, No wheezing.   Heart: Irregular rate and rhythm. normal S1 and S2, no S3, no rub, peripheral pulses weak but palpable.  Abdomen: Obese, soft, non-tender,  present bowel sounds to auscultation  : no palpable bladder.  Extremities: trace edema, no cyanosis or clubbing.   Neuro: normal speech and mental status, grossly non focal.     Results Review:   Results from last 7 days   Lab Units 11/05/19  0618 11/03/19  0608 11/02/19  0407 11/01/19  1339 "   SODIUM mmol/L 127* 132* 133* 130*   POTASSIUM mmol/L 4.9 4.7 4.0 4.6   CHLORIDE mmol/L 83* 88* 85* 82*   CO2 mmol/L 22.2 20.1* 31.4* 32.6*   BUN mg/dL 61* 26* 35* 24*   CREATININE mg/dL 5.92* 3.70* 4.80* 3.83*   CALCIUM mg/dL 9.3 9.0 9.3 9.2   ALBUMIN g/dL 3.50 3.40* 3.40* 3.50   BILIRUBIN mg/dL 0.2  --   --  0.3   ALK PHOS U/L 71  --   --  77   ALT (SGPT) U/L 17  --   --  11   AST (SGOT) U/L 66*  --   --  42*   GLUCOSE mg/dL 123* 252* 55* 317*     Estimated Creatinine Clearance: 11.2 mL/min (A) (by C-G formula based on SCr of 5.92 mg/dL (H)).  Results from last 7 days   Lab Units 11/03/19  0608 11/02/19  0407   PHOSPHORUS mg/dL 6.2* 6.5*         Results from last 7 days   Lab Units 11/05/19  0618 11/03/19  0608 11/02/19  0407 11/01/19  1339   WBC 10*3/mm3 5.89 6.38 7.12 6.37   HEMOGLOBIN g/dL 8.0* 8.3* 8.4* 8.9*   PLATELETS 10*3/mm3 167 163 201 191         Brief Urine Lab Results     None        No results found for: UTPCR  Imaging Results (Last 24 Hours)     Procedure Component Value Units Date/Time    US Carotid Bilateral [[id number]] Collected:  11/04/19 1539     Updated:  11/04/19 1542    Narrative:       PROCEDURE: US CAROTID BILATERAL-     HISTORY: dizziness; J18.9-Pneumonia, unspecified organism;  U72-Dnrgupuqjf condition; R05-Cough; N18.6-End stage renal disease;  Z99.2-Dependence on renal dialysis     Technique: Real-time imaging was performed of the extracranial carotid  arteries in transverse and longitudinal planes, with color duplex  evaluation of blood flow velocity. Spectral analysis was performed. The  cervicovertebral arteries were also examined.  Interpretation based upon  validated velocity criteria.     Right carotid system:  Peak systolic velocity ICA: 30 cm/s  Vertebral artery: Antegrade  ICA/CCA ratio: 0.82  Mild plaque is identified at the bifurcation.     Left carotid system:  Peak systolic velocity ICA: 72 cm/s  Vertebral artery: Antegrade  ICA/CCA ratio: 1.12  Mild plaque is identified  at the bifurcation.          Impression:       No hemodynamically significant left or right internal  carotid artery stenosis.     This report was finalized on 11/4/2019 3:40 PM by Rafita Early MD.          aspirin 81 mg Oral Daily   atorvastatin 40 mg Oral Nightly   b complex-vitamin c-folic acid 1 tablet Oral Daily   cefepime 2 g Intravenous Q24H   clopidogrel 75 mg Oral Daily   docusate sodium 100 mg Oral Q12H   famotidine 20 mg Oral Daily   fluticasone 1 spray Each Nare Daily   folic acid 1 mg Oral Daily   guaiFENesin 600 mg Oral Q12H   heparin (porcine) 5,000 Units Subcutaneous Q12H   insulin aspart 0-9 Units Subcutaneous 4x Daily AC & at Bedtime   insulin aspart 5 Units Subcutaneous TID With Meals   insulin detemir 12 Units Subcutaneous Nightly   ipratropium-albuterol 3 mL Nebulization 4x Daily - RT   isosorbide mononitrate 30 mg Oral Daily   lanthanum 1,000 mg Oral TID With Meals   levothyroxine 125 mcg Oral Q AM   lidocaine 1 patch Transdermal Nightly   metoprolol succinate XL 75 mg Oral Daily   sodium chloride 10 mL Intravenous Q12H       Pharmacy Consult - Pharmacy to dose          Medication Review:   Current Facility-Administered Medications   Medication Dose Route Frequency Provider Last Rate Last Dose   • acetaminophen (TYLENOL) tablet 650 mg  650 mg Oral Q4H PRN John Sosa MD   650 mg at 11/03/19 2109    Or   • acetaminophen (TYLENOL) 160 MG/5ML solution 650 mg  650 mg Oral Q4H PRN John Sosa MD        Or   • acetaminophen (TYLENOL) suppository 650 mg  650 mg Rectal Q4H PRN John Sosa MD       • aluminum-magnesium hydroxide-simethicone (MAALOX MAX) 400-400-40 MG/5ML suspension 15 mL  15 mL Oral Q6H PRN John Sosa MD   15 mL at 11/05/19 0640   • aspirin chewable tablet 81 mg  81 mg Oral Daily John oSsa MD   81 mg at 11/04/19 0845   • atorvastatin (LIPITOR) tablet 40 mg  40 mg Oral Nightly John Sosa MD   40 mg at 11/04/19 2115   • b complex-vitamin c-folic acid  (NEPHRO-SHIRLENE) tablet 1 tablet  1 tablet Oral Daily John Sosa MD   1 tablet at 11/04/19 0845   • benzonatate (TESSALON) capsule 100 mg  100 mg Oral TID PRN John Sosa MD   100 mg at 11/03/19 2338   • cefepime (MAXIPIME) 2 g in sodium chloride 0.9 % 100 mL IVPB  2 g Intravenous Q24H John Sosa MD   Stopped at 11/04/19 2230   • clopidogrel (PLAVIX) tablet 75 mg  75 mg Oral Daily John Sosa MD   75 mg at 11/04/19 0844   • dextrose (D50W) 25 g/ 50mL Intravenous Solution 25 g  25 g Intravenous Q15 Min PRN Carmelo Ray MD       • dextrose (GLUTOSE) oral gel 1 tube  1 tube Oral Q15 Min PRN Carmelo Ray MD       • docusate sodium (COLACE) capsule 100 mg  100 mg Oral Q12H John Sosa MD   100 mg at 11/04/19 1721   • famotidine (PEPCID) tablet 20 mg  20 mg Oral Daily John Sosa MD   20 mg at 11/04/19 0844   • fluticasone (FLONASE) 50 MCG/ACT nasal spray 1 spray  1 spray Each Nare Daily John Sosa MD   1 spray at 11/05/19 0919   • folic acid (FOLVITE) tablet 1 mg  1 mg Oral Daily John Sosa MD   1 mg at 11/04/19 0844   • glucagon (human recombinant) (GLUCAGEN DIAGNOSTIC) injection 1 mg  1 mg Subcutaneous PRN Carmelo Ray MD       • guaiFENesin (MUCINEX) 12 hr tablet 600 mg  600 mg Oral Q12H John Sosa MD   600 mg at 11/04/19 2115   • heparin (porcine) 5000 UNIT/ML injection 5,000 Units  5,000 Units Subcutaneous Q12H John Sosa MD   5,000 Units at 11/04/19 2115   • insulin aspart (novoLOG) injection 0-9 Units  0-9 Units Subcutaneous 4x Daily AC & at Bedtime Carmelo Ray MD   2 Units at 11/03/19 2110   • insulin aspart (novoLOG) injection 5 Units  5 Units Subcutaneous TID With Meals John Sosa MD   5 Units at 11/04/19 1722   • insulin detemir (LEVEMIR) injection 12 Units  12 Units Subcutaneous Nightly Carmelo Ray MD   12 Units at 11/04/19 2116   • ipratropium-albuterol (DUO-NEB) nebulizer solution 3 mL  3 mL Nebulization 4x Daily - RT Sheila  MD John   3 mL at 11/05/19 0708   • isosorbide mononitrate (IMDUR) 24 hr tablet 30 mg  30 mg Oral Daily John Sosa MD   30 mg at 11/04/19 0845   • lanthanum (FOSRENOL) chewable tablet 1,000 mg  1,000 mg Oral TID With Meals Chance Mackey MD, FASN   1,000 mg at 11/04/19 1721   • levothyroxine (SYNTHROID, LEVOTHROID) tablet 125 mcg  125 mcg Oral Q AM John Sosa MD   125 mcg at 11/05/19 0640   • lidocaine (LIDODERM) 5 % 1 patch  1 patch Transdermal Nightly Sandra Zavala DO   1 patch at 11/04/19 2115   • LORazepam (ATIVAN) tablet 0.5 mg  0.5 mg Oral Q8H PRN John Sosa MD   0.5 mg at 11/03/19 2338   • melatonin tablet 5 mg  5 mg Oral Nightly PRN John Sosa MD   5 mg at 11/04/19 2115   • metoprolol succinate XL (TOPROL-XL) 24 hr tablet 75 mg  75 mg Oral Daily Judit Person, ALEXYS       • ondansetron (ZOFRAN) tablet 4 mg  4 mg Oral Q6H PRN John Sosa MD        Or   • ondansetron (ZOFRAN) injection 4 mg  4 mg Intravenous Q6H PRN John Sosa MD   4 mg at 11/05/19 0336   • ondansetron ODT (ZOFRAN-ODT) disintegrating tablet 4 mg  4 mg Oral Q6H PRN John Sosa MD       • Pharmacy Consult - Pharmacy to dose   Does not apply Continuous PRN John Sosa MD       • sodium chloride 0.9 % bolus 1,000 mL  1,000 mL Intravenous PRN Chance Mackey MD, FASN       • sodium chloride 0.9 % flush 10 mL  10 mL Intravenous Q12H John Sosa MD   10 mL at 11/05/19 0919   • sodium chloride 0.9 % flush 10 mL  10 mL Intravenous PRN John Sosa MD   10 mL at 11/05/19 0336   • vancomycin in dextrose 5% 150 mL (VANCOCIN) IVPB 750 mg  750 mg Intravenous Daily PRN John Sosa MD           Assessment/Plan:  1. End-stage renal disease: Patient is on Tuesday Thursday Saturday schedule we will plan on doing dialysis today.  Continue with the outpatient schedule.  2. Left lower lobe pneumonia: Healthcare associated pneumonia being treated.  3. Type 2  diabetes:  4. Hypothyroidism:  5. Coronary artery disease: Does not have any acute symptoms but elevated troponin noted.  6. Hypertension with end-stage renal disease: Blood pressure is usually under good control as his volume improves.  Continue with the home medications.  7. Peripheral arterial disease:  8. Chronic kidney disease-bone and mineral disorder:     Plan:  · Next dialysis due today will make arrangements.  · Low sodium noted, will dialyze him against a high sodium bath hopefully will improve with it.  · I will go ahead and start the phosphorus binder in the form of Fosrenol 1000 mg 3 times a day with meals.  · Chest x-ray shows worsening of the infiltrate, continue with current treatment plan.  · Details were discussed with the patient no family in the room.    · Details were also discussed with the hospitalist service.    · Surveillance labs.  · Further recommendations will depend on clinical course of the patient during the current hospitalization.    · I also discussed the details with the nursing staff.  · Rest as ordered.    Chance Mackey MD, FASN  11/05/19  10:18 AM    Dictated utilizing Dragon dictation.

## 2019-11-05 NOTE — PROGRESS NOTES
Multiple medical problems including long-standing diabetes, coronary artery disease, CVA with left-sided weakness, chronic renal failure on hemodialysis who presented to [facility] on November 1, 2019 with shortness of breath.  The patient was noted to have left-sided pneumonia.    The patient had some nausea and an episode of emesis last night containing coffee-ground material.  Earlier this morning the patient also had an emesis containing coffee-ground material.  The patient denies otherwise no further nausea or vomiting.  Of interest that the patient has history of recurrent nosebleeds.  Last episode of nosebleed was day before yesterday.  The patient has shortness of breath.  There is history of  anemia.  According to the wife the patient had undergone a colonoscopy and upper endoscopy in [facility] in June 2019 for recurrent anemia, drop in H&H and GI bleed.  The patient however, no site was found.  Bleeding cause was found.  There is history of about 70 some pound weight loss unintentionally about a year ago.  The patient had some vague abdominal discomfort yesterday.      The patient has history of irregular bowel habits for the last 3 years.  The patient has history of diarrhea off-and-on for the last 3 years.  Severity is mild, frequency of bowel movements being 2-3 times a day.  The stools are described as loose and occasionally watery.  Occasionally, there is nocturnal element of diarrhea. The diarrhea is not associated with tenesmus.  The patient has associated small amounts of bright red blood per rectum at times.  The diarrheal episodes may last for 1 to 2 days.  Episodes of diarrhea are followed by episodes of constipation.  During constipation the patient may not have a bowel movement for couple of days.  He denies anorectal pain.    However earlier this morning this was resolved.  Upon review of records his H&H are rather stable.  The patient has received blood transfusions off  and on.  He is on Plavix.    Alert x3.  In mild respiratory distress.  Chest decreased breath sounds at bases.  Cardiac exam no S3 no murmurs.  Abdomen soft bowel sounds present nondistended nontender.  No ascites.  No hepatosplenomegaly.  The patient has some erythematous vesicular rash on the right lower extremity.  Left-sided weakness.    Assessment:    1.  History of some nausea and vomiting.  Clinically resolved.   2.   Small amounts of coffee-ground emesis.  Patient has history of recurrent nosebleeds.  This could be swallowed blood.  His H&H are rather stable.  Other differential includes peptic ulcer disease, vascular ectasias.  History is not consistent with Martha-Molina tear.  This is clinically stable.  3.  Irregular bowel habits described as diarrhea and constipation.  4.  History of bright red blood per rectum.  5.  Anemia.  6.  History of significant weight loss.  7.  Recent decompensation of respiratory status secondary to pneumonia.      Recommendations:    1.  Continue with supportive care.  2.   Protonix (Pantoprazole) tablet 40 mg tablet. Take 1 tablet orally in the morning half an hour before eating every day.  3.  Continue to watch the symptoms.  4.  Should there be further suggestion of bleeding or drop in H&H further luminal GI evaluation and intervention be considered.   5.  Currently, the patient is being followed up in [employer] from GI perspective.

## 2019-11-05 NOTE — PLAN OF CARE
Problem: Patient Care Overview  Goal: Plan of Care Review  Outcome: Ongoing (interventions implemented as appropriate)   11/05/19 0512   Coping/Psychosocial   Plan of Care Reviewed With patient   Plan of Care Review   Progress no change   OTHER   Outcome Summary Patient was again restless overnight. Complaints of acid reflux and n/v overnight. medicated per orders with some slight intermittent relief. continue to monitor.        Problem: Skin Injury Risk (Adult)  Goal: Skin Health and Integrity  Outcome: Ongoing (interventions implemented as appropriate)      Problem: Fall Risk (Adult)  Goal: Absence of Fall  Outcome: Ongoing (interventions implemented as appropriate)      Problem: Diabetes, Type 2 (Adult)  Goal: Signs and Symptoms of Listed Potential Problems Will be Absent, Minimized or Managed (Diabetes, Type 2)  Outcome: Ongoing (interventions implemented as appropriate)      Problem: Pneumonia (Adult)  Goal: Signs and Symptoms of Listed Potential Problems Will be Absent, Minimized or Managed (Pneumonia)  Outcome: Ongoing (interventions implemented as appropriate)

## 2019-11-05 NOTE — PROGRESS NOTES
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  He presented to the emergency room with complaints of shortness of breath and cough.  He was noted in the emergency room to have an elevated pro-Marv is 0.6 and a mildly elevated troponin at 0.48.  Lactic acid was normal.  Chest x-ray was done which showed left lower lobe infiltrate which was felt to be bronchopneumonia.  He was started on IV broad-spectrum antibiotics and admitted to the hospitalist service.    Upon admission to the hospitalist service patient has been continued on cefepime, Levaquin and vancomycin.  Repeat chest x-ray done today does show some interval improvement in the aeration.  I did see and evaluate patient at bedside for which he is complaining of some weakness.  He states when he sits up he just feels so very weak more so on the left however he has a history of having a left-sided CVA with residual weakness.  I did go ahead and get a CT of his head which did not show any acute findings.  He did have some chronic changes consistent with chronic microvascular ischemia.  Carotid duplex did not show any significant stenosis or plaque.    Today he is complaining of nausea and vomiting.  He had mild general abdominal pain but that has resolved.  Per nursing staff, patient had an episode of emesis this afternoon which looked to be coffee ground in appearance.   I did give a dose of phenergan this morning but once it worse off he is again nauseated with vomiting.   Patient states that from the respiratory standpoint he is feeling much better      Review Of Systems:   Review of Systems   General ROS: Patient denies any fevers, chills  or loss of consciousness.  Generalized weakness  ENT ROS: Denies sore throat, nasal congestion or ear pain.   Respiratory ROS: Denies cough or shortness of breath.   Cardiovascular ROS: Denies chest pain or palpitations. No history of exertional chest pain.   Gastrointestinal ROS: nausea and vomiting. Denies any abdominal pain. No diarrhea.  No melena, hematemesis or hematochezia.  Nurse felt that he had coffee-ground emesis however patient denies any history of.  Complaining of gastritis  Musculoskeletal ROS: Denies back pain. No muscle pain. No calf pain.   Neurological ROS: Denies any focal weakness. No loss of consciousness. Denies any numbness. Denies neck pain.   Dermatological ROS: Denies any redness or pruritis.    Objective      Temp:  [97.5 °F (36.4 °C)-98.4 °F (36.9 °C)] 98 °F (36.7 °C)  Heart Rate:  [] 86  Resp:  [16-20] 16  BP: (110-132)/(49-86) 132/86  Physical Exam    General Appearance:  Alert and cooperative, not in any acute distress.   Head:  Atraumatic and normocephalic, without obvious abnormality.   Eyes:          PERRLA, conjunctivae and sclerae normal, no Icterus. No pallor. Extraocular movements are within normal limits.           Neck: Supple, trachea midline, no thyromegaly, no carotid bruit.       Lungs:   Chest shape is normal. Breath sounds heard bilaterally equally.  No crackles or wheezing. Few rhonchi in left lobe No Pleural rub or bronchial breathing.   Heart:  Normal S1 and S2, no murmur, no gallop, no rub. No JVD   Abdomen:   Normal bowel sounds, no masses, no organomegaly. Soft    non-tender, non-distended, no guarding, no rebound             Tenderness, abdomen is benign   Extremities: Moves all extremities well, no edema, no cyanosis, no clubbing.   Pulses: Pulses palpable and equal bilaterally   Skin: No bleeding, bruising or rash       Neurologic:    Psychiatric/Behavior:     Patient is alert and can move extremities on command.    Mood normal, behavior normal        Results Review:    I have reviewed the labs, radiology results and diagnostic studies.    Results from last 7 days   Lab Units 11/05/19  0618   WBC 10*3/mm3 5.89   HEMOGLOBIN g/dL 8.0*   PLATELETS 10*3/mm3 167     Results from last 7 days   Lab Units 11/05/19  0618   SODIUM mmol/L 127*   POTASSIUM mmol/L 4.9   CO2 mmol/L 22.2   CREATININE mg/dL 5.92*   GLUCOSE mg/dL 123*       Culture Data:   Blood Culture   Date Value Ref Range Status   11/01/2019 No growth at 2 days  Preliminary   11/01/2019 No growth at 2 days  Preliminary     MRSA SCREEN CX   Date Value Ref Range Status   11/01/2019   Final    No Methicillin Resistant Staphylococcus aureus isolated     Radiology Data:   Cardiology Data:    I have reviewed the medications.    Assessment/Plan     Assessment and Plan:  1.  Left lower lobe pneumonia healthcare associated-he is positive for human metapneumovirus.  This is likely an acute post viral infection.  He is getting IV antibiotics in the form of  cefepime and vancomycin.  I have no sputum cultures.  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  He is also getting bronchodilators and mucolytic's.    2.  Dizziness-patient has had work- Carotid duplex negative.   I have ordered for [place]-Hallpike and Epley's to be done if his carotids are negative.  He has been set up with neurology as an outpatient.    3.  Nausea and vomiting with possible hematemesis with coffee ground emesis-  Dr. Mir with GI has been consulted.  I have made patient n.p.o.    3.  Diabetes mellitus type 2-patient on Levemir as well as insulin protocol.  Blood sugars are stable continue to monitor    4.  End-stage renal disease on hemodialysis-Dr. Mackey with nephrology has been consulted and managing dialysis    5.  Elevated troponin likely secondary to his renal disease.  This does appear to be chronic.  He has vague complaints of gastritis type pain I will go ahead and asked Dr. Ruano with cardiology to evaluate.  Especially if there is a  possibility he could have some hematic emesis and requires intervention    6.  Acquired hypothyroidism    7.  History of CVA with left-sided residual deficits-patient had a repeat CT of his head since he was feeling weak.  There are no acute findings on the CT.    8.  Peripheral arterial disease    9.  Coronary artery disease-medical management    10.  Hypotension-patient's blood pressure is better this am  Continue beta blocker    11.  Hyponatremia-  Pt is due for dialysis today  Should correct with dialysis    12. Chronic systolic CHF - echo pending    DVT prophylaxis:  Held due to possible coffee ground emesis  Discharge Planning:   ALEXYS Gunderson 11/05/19 12:13 PM

## 2019-11-06 PROBLEM — E03.9 HYPOTHYROIDISM (ACQUIRED): Status: ACTIVE | Noted: 2019-01-01

## 2019-11-06 PROBLEM — I24.8 DEMAND ISCHEMIA (HCC): Status: ACTIVE | Noted: 2019-01-01

## 2019-11-06 PROBLEM — Z86.73 HISTORY OF CVA (CEREBROVASCULAR ACCIDENT): Status: ACTIVE | Noted: 2019-01-01

## 2019-11-06 PROBLEM — K92.0 COFFEE GROUND EMESIS: Status: ACTIVE | Noted: 2019-01-01

## 2019-11-06 PROBLEM — E87.70 FLUID OVERLOAD: Status: ACTIVE | Noted: 2019-01-01

## 2019-11-06 PROBLEM — R42 DIZZINESS: Status: ACTIVE | Noted: 2019-01-01

## 2019-11-06 PROBLEM — I50.22 CHRONIC SYSTOLIC CHF (CONGESTIVE HEART FAILURE) (HCC): Status: ACTIVE | Noted: 2019-01-01

## 2019-11-06 PROBLEM — I50.21 SYSTOLIC CHF, ACUTE (HCC): Status: ACTIVE | Noted: 2019-01-01

## 2019-11-06 NOTE — CONSULTS
"Date of consultation:   November 6, 2019    Requested by:   Hospitalist Service.     PCP: Jeannette Godoy APRN    Reason:  Possible pneumonia.    History of Present Illness:  59 y.o. male  with a past medical history significant for end-stage renal disease who is on dialysis, chronic pleural effusion, pneumonia status post bronchoscopy in January 2019 and stroke with left side residual weakness who started having a cough and shortness of breath, that started a few days ago. It worsened over the past 3 to 4 days when he came to the emergency room.  At the time of presentation the patient told the admitting physician that he felt like he had \"a lot of phlegm\" but he could not get it out.  He also reported low-grade temperatures,  and also endorsed some chills.    He denies any sick contacts.     Currently the patient is on CPAP and no further history is available from him.  Family members at bedside do not live with him and are not aware of all the circumstances that led to admission.  They did however tell me that he had been feeling weak for the past 1 week and also did not miss dialysis.    The patient's symptoms continued to worsen and he was admitted to the hospital and pulmonary Consultation was requested for further recommendations.     Review of System: Could not be obtained from the patient he is on CPAP.    Past Medical History:  Past Medical History:   Diagnosis Date   • Abdominal pain    • Anemia    • Cataracts, bilateral    • CHF (congestive heart failure) (CMS/[facility])    • Chronic kidney disease    • Constipation    • Cough    • Dependence on nocturnal oxygen therapy    • Diabetes mellitus (CMS/HCC)    • Diarrhea    • End stage renal failure on dialysis (CMS/[facility])     TUES, THURS, SAT   • GERD (gastroesophageal reflux disease)    • H/O blood clots     LEG/NECK   • H/O chest x-ray 03/25/2016    Interval decrease in size of the patients right pleural effusion with a persistent small left effusion as " "well   • History of transfusion    • Hypertension    • Left-sided weakness    • Nauseated     DRY HEAVES   • Pleural effusion    • Pneumonia    • Pneumonia    • Renal failure    • Stroke (CMS/HCC) 2006    LEFT SIDED WEAKNESS   • Swelling    • Teeth missing    • Tinnitus    • Ulcer, stomach peptic     HISTORY OF ULCER AS A TEENAGER   • Wears glasses          Past Surgical History:  Past Surgical History:   Procedure Laterality Date   • ARTERIOVENOUS FISTULA Right    • BRONCHOSCOPY N/A 1/10/2019    Procedure: BRONCHOSCOPY with MAC and Flouro. LAVAGE, BRUSHINGS AND BIOPSY;  Surgeon: Ean Hernandez MD;  Location: [facility] OR;  Service: Pulmonary   • CARDIAC CATHETERIZATION N/A 3/28/2018    Procedure: Peripheral angiography;  Surgeon: Clay Ruano MD;  Location: [facility] CATH INVASIVE LOCATION;  Service: Cardiovascular   • CARDIAC CATHETERIZATION N/A 3/28/2018    Procedure: Angioplasty-peripheral;  Surgeon: Clay Ruano MD;  Location: [facility] CATH INVASIVE LOCATION;  Service: Cardiovascular   • CARDIAC CATHETERIZATION N/A 3/28/2018    Procedure: Atherectomy-peripheral;  Surgeon: Clay Ruano MD;  Location: [facility] CATH INVASIVE LOCATION;  Service: Cardiovascular   • CATARACT EXTRACTION, BILATERAL     • CHOLECYSTECTOMY     • COLONOSCOPY     • EYE SURGERY      BLEEDING BEHING BILATERAL EYES   • INTERVENTIONAL RADIOLOGY PROCEDURE N/A 3/28/2018    Procedure: Abdominal Aortogram;  Surgeon: Clay Ruano MD;  Location: [facility] CATH INVASIVE LOCATION;  Service: Cardiovascular   • PORTACATH PLACEMENT     • SHOULDER MANIPULATION Left     \"FROZEN SHOULDER\"   • VENOUS ACCESS DEVICE (PORT) REMOVAL           Family History:  Family History   Problem Relation Age of Onset   • COPD Mother    • Diabetes Father    • Hypertension Sister    • Diabetes Sister    • Hypertension Brother    • Diabetes Paternal Grandmother          Social History:  Social History     Socioeconomic History   • Marital " "status:      Spouse name: Not on file   • Number of children: Not on file   • Years of education: Not on file   • Highest education level: Not on file   Tobacco Use   • Smoking status: Never Smoker   • Smokeless tobacco: Never Used   • Tobacco comment: 2ND HAND SMOKE EXPOSURE   Substance and Sexual Activity   • Alcohol use: No   • Drug use: No   • Sexual activity: Defer         Physical Exam:  /68   Pulse 90   Temp 97.6 °F (36.4 °C) (Oral)   Resp 16   Ht 172.7 cm (68\")   Wt 59.5 kg (131 lb 3.2 oz)   SpO2 100%   BMI 19.95 kg/m²     Constitutional:             General: Mild respiratory distress noted.  Currently on CPAP.    Head/Face/Eyes:             No significant facial abnormalities seen.             Extraocular movement was intact.             Pupils appeared equal    ENT:                 Oropharynx was moist. No lesions noted.               Missing teeth noted.         Neck:                 No JVD.                 Thyroid did not seem to be enlarged.        Cardiovascular:                S1 + S2.  Regular.        Respiratory:                 Respiratory effort appeared adequate.                 Good Air Entry Bilaterally with right basal crackles heard.        Abdomen:                Soft. Bowel sounds positive.  No obvious organomegaly noted. Mild epigastric tenderness noted.      Musculoskeletal/Extremities:                No edema noted in the lower extremities.                Right sided AV graft noted                No clubbing noted.                Gait could not be assesed at this time.        Neurologic:                Exam limited because of CPAP.              Left-sided weakness noted.      Psychiatric:                AAOx3.                 Affect was appropriate.      Skin:               Warm and dry.               Chronic skin changes in lower extremities          Labs: Reviewed. Pertinent labs were noted.     Lab Results   Component Value Date    WBC 5.89 11/05/2019    HGB 8.8 " (L) 11/05/2019    HCT 28.2 (L) 11/05/2019    MCV 91.4 11/05/2019     11/05/2019       Lab Results   Component Value Date    GLUCOSE 181 (H) 11/06/2019    CALCIUM 9.4 11/06/2019     (L) 11/06/2019    K 4.7 11/06/2019    CO2 20.2 (L) 11/06/2019    CL 91 (L) 11/06/2019    BUN 43 (H) 11/06/2019    CREATININE 4.48 (H) 11/06/2019    EGFRIFAFRI  11/06/2019      Comment:      <15 Indicative of kidney failure.    EGFRIFNONA 14 (L) 11/06/2019    BCR 9.6 11/06/2019    ANIONGAP 19.8 (H) 11/06/2019       Lab Results   Component Value Date    PROBNP >70,000.0 (H) 11/01/2019    PROBNP 49,800.0 (C) 02/22/2019    PROBNP 73,500.0 (C) 07/07/2018       Lab Results   Component Value Date    INR 1.04 07/07/2018       Lab Results   Component Value Date    PROCALCITO 0.60 (H) 11/01/2019    PROCALCITO 0.29 (H) 02/25/2019    PROCALCITO 0.38 (H) 02/22/2019     Micro: As of November 6, 2019   Lab Results   Component Value Date    RESPCX Moderate growth (3+) Normal Respiratory Ying 01/10/2019    RESPCX Culture in progress 01/10/2019    RESPCX Scant growth (1+) Normal Respiratory Ying 01/10/2019     Lab Results   Component Value Date    BLOODCX No growth at 4 days 11/01/2019    BLOODCX No growth at 4 days 11/01/2019    BLOODCX No growth at 5 days 02/22/2019     Lab Results   Component Value Date    URINECX No growth 03/16/2017     Lab Results   Component Value Date    MRSACX  11/01/2019     No Methicillin Resistant Staphylococcus aureus isolated    MRSACX  02/22/2019     No Methicillin Resistant Staphylococcus aureus isolated    MRSACX  11/09/2018     No Methicillin Resistant Staphylococcus aureus isolated       Lab Results   Component Value Date    WOUNDCX Light growth (2+) Proteus mirabilis (A) 09/15/2018    WOUNDCX Staphylococcus aureus (A) 09/15/2018     Lab Results   Component Value Date    BODYFLDCX Moderate growth (3+)  07/12/2017    BODYFLDCX No growth 04/28/2017         Pharmacy Consult - Pharmacy to dose        Imaging  Study: Latest imaging studies was reviewed personally.   Imaging Results (Last 72 Hours)     Procedure Component Value Units Date/Time    XR Chest 1 View [[id number]] Collected:  11/05/19 1909     Updated:  11/05/19 1910    Narrative:       FINAL REPORT    TECHNIQUE:  An AP portable view of the chest was obtained.    CLINICAL HISTORY:  sob    COMPARISON:  11/4/2019    FINDINGS:  There is worsening airspace consolidation in the inferior right  upper lobe.  Other bilateral airspace opacities are not  significantly changed.  The heart and vasculature are  unremarkable.  There is a persistent small right pleural  effusion.  No pneumothorax is identified.  There is no  adenopathy or significant osseous abnormality.      Impression:       Worsening right upper lobe opacity, otherwise no change.    Authenticated by Brice Sutton M.D. on 11/05/2019 07:09:31 PM    CT Abdomen Pelvis Without Contrast [[id number]] Collected:  11/05/19 1737     Updated:  11/05/19 1738    Narrative:       FINAL REPORT    TECHNIQUE:  Routine axial images through the abdomen and pelvis were  obtained.  Oral contrast was administered. This study was  performed with techniques to keep radiation doses as low as  reasonably achievable (ALARA). Individualized dose reduction  techniques using automated exposure control or adjustment of mA  and/or kV according to the patient's size were employed.    CLINICAL HISTORY:  Abd pain, gastroenteritis or colitis suspected    FINDINGS:  Abdomen: There are multifocal patchy airspace opacities in the  bilateral lower lobes with small right pleural effusion.  The  gallbladder has been removed.  There is extensive vascular  calcification but the solid organs and ureters are otherwise  unremarkable.  The GI tract is unremarkable.  There is no  evidence of appendicitis or colitis.  Pelvis: The urinary  bladder is normal. There is no pelvic or abdominal ascites,  adenopathy or acute osseous abnormality.      Impression:        No acute abnormality of the abdomen or pelvis.  Bilateral  airspace opacities and small right pleural effusion worrisome  for pneumonia.  Extensive vascular calcification.    Authenticated by Brice Sutton M.D. on 11/05/2019 05:37:51 PM    US Carotid Bilateral [[id number]] Collected:  11/04/19 1539     Updated:  11/04/19 1542    Narrative:       PROCEDURE: US CAROTID BILATERAL-     HISTORY: dizziness; J18.9-Pneumonia, unspecified organism;  Z18-[place] condition; R05-Cough; N18.6-End stage renal disease;  Z99.2-Dependence on renal dialysis     Technique: Real-time imaging was performed of the extracranial carotid  arteries in transverse and longitudinal planes, with color duplex  evaluation of blood flow velocity. Spectral analysis was performed. The  cervicovertebral arteries were also examined.  Interpretation based upon  validated velocity criteria.     Right carotid system:  Peak systolic velocity ICA: 30 cm/s  Vertebral artery: Antegrade  ICA/CCA ratio: 0.82  Mild plaque is identified at the bifurcation.     Left carotid system:  Peak systolic velocity ICA: 72 cm/s  Vertebral artery: Antegrade  ICA/CCA ratio: 1.12  Mild plaque is identified at the bifurcation.          Impression:       No hemodynamically significant left or right internal  carotid artery stenosis.     This report was finalized on 11/4/2019 3:40 PM by Rafita Early MD.    CT Head Without Contrast [[id number]] Collected:  11/04/19 0942     Updated:  11/04/19 0946    Narrative:       PROCEDURE: CT HEAD WO CONTRAST-     HISTORY: Dizziness     COMPARISON: 04/26/2019     FINDINGS: Contiguous axial images were obtained from skull vertex to the  skullbase without administration of contrast.     Some images are slightly degraded by patient motion. No hemorrhage,  midline shift, intra-axial or extra-axial fluid collections. The basal  cisterns are patent and the ventricles are not enlarged. Atherosclerotic  vascular calcifications are very prominent. Diffuse  cortical volume  loss. Areas of decreased attenuation in the bilateral periventricular  and subcortical white matter are nonspecific but overall most consistent  with chronic microvascular ischemia. If there is high clinical concern  for an acute ischemic event, correlation with MRI with  diffusion-weighted imaging would be helpful.       Impression:       1. No acute intracranial findings. Consider MRI if symptoms persist or  if there is concern for ischemia.  2. Cortical volume loss and changes consistent with chronic  microvascular ischemia.           This study was performed with techniques to keep radiation doses as low  as reasonably achievable (ALARA). Individualized dose reduction  techniques using automated exposure control or adjustment of mA and/or  kV according to the patient size were employed.      This report was finalized on 11/4/2019 9:44 AM by Rafita Early MD.    XR Chest 1 View [[id number]] Collected:  11/04/19 0857     Updated:  11/04/19 0901    Narrative:       PORTABLE CHEST     INDICATION: Follow-up pneumonia.     FINDINGS: Single view chest, compared with 11/03/2019. EKG leads overlie  the chest. The patient is rotated to the right. There are persistent  bilateral multifocal infiltrates, although somewhat improved from  previous. Persistence of a small right pleural effusion also appearing  improved.       Impression:       Improving aeration. Continued follow-up recommended.     This report was finalized on 11/4/2019 8:58 AM by Rafita Early MD.            ECHO:  Results for orders placed during the hospital encounter of 11/01/19   Adult Transthoracic Echo Complete W/ Cont if Necessary Per Protocol    Narrative · The left ventricular cavity is moderate-to-severely dilated.  · Right ventricular cavity is mildly dilated.  · Left atrial cavity size is moderately dilated.  · Moderate mitral valve regurgitation is present  · There is a small (<1cm) pericardial effusion.              Assessment:  1.   Right sided pneumonia.  Will need to be considered healthcare acquired.  2.  End-stage renal disease.  On hemodialysis  3.  Chronic right-sided pleural effusion, status post intervention.  4.  Severe obstructive sleep apnea.  Only marginally compliant with CPAP, as per history  5.  History of CVA with left sided weakness    Discussion/Recommendations:   I have reviewed the patient's history and radiological data and does appear that the patient has pneumonia at this time.  In this patient with chronic changes on radiology, on the right side, it is difficult to ascertain with any certainty if the patient has new findings radiologically.  His symptoms were somewhat consistent with pneumonia therefore I would recommend continuation of antibiotics.    Repeat laboratory workup will be followed closely.    Respiratory cultures will be obtained.  It should be noted that recent bronchoscopy did not reveal any organism.    I will repeat chest x-ray and pro calcitonin levels as clinically appropriate.    Some parts of history, ROS & physical exam were obtained by and with the APRN.     All the relevant labs, radiology images, echocardiograms etc were reviewed with the APRN. Plan was also discussed in detail with APRN.     The plan was discussed with the patient.  I have also discussed the case with the nursing staff.    Recommendations were also discussed with the referring provider.     I would like to thank you for the opportunity to participate in the care of this patient.  We will communicate changes and recommendations, if and when necessary.      This document was electronically signed by Ean Hernandez MD on November 6, 2019 at 12:46 PM    Dictated utilizing Dragon dictation.

## 2019-11-06 NOTE — CONSULTS
Vu Ambrosio MD  CARDIOLOGY CONSULT    PATIENT: Talha Cutler                                                   DATE: 19   424/1  : 1960     PRIMARY PHYSICIAN: Estefania haq    CHIEF COMPLAINT: History of respiratory failure and elevated cardiac markers    Patient is 59-year-old  male with risk profile for atherosclerotic cardiovascular disease comprising of long-standing diabetes with micro-and macrovascular complications, hypertension with hypertensive heart disease, decreased functional capacity, dyslipidemia and end-stage renal failure, requiring hemodialysis, who has history of nonflow limiting coronary artery disease, and extensive venous stenosis involving both internal jugular veins, SVC and subclavian system, who was hospitalized on account of respiratory failure and gastrointestinal symptomatology and was subsequently noticed to have elevated cardiac markers.    Patient, who has prior history of left-sided stroke, unfortunately has had significant functional decline with progressive lower extremity weakness with profound muscular atrophy. Since last encounter, patient has not been able to pursue any meaningful exercise regimen and for even transfer from bed to the wheelchair, patient has required assistance. 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    Patient, who has prior history of mildly depressed depressed global LV function, developed recent cough with expectoration of mucopurulent sputum with worsening of his baseline shortness of air though without definitive orthopnea untreated recent hospitalization and for last several days patient has developed significant orthopnea with PND's.. Patient is known to have long-standing dependent edema which has improved to some degree with no prior history of DVT involving lower extremities. Patient has been  compliant with hemodialysis though apparently patient declined option of continued hemodialysis.  Per family description, patient has had recurrent bouts of pneumonitis.    Patient, who is known to have atherosclerotic cardiovascular disease, was noticed to have elevated cardiac markers.  In general, patient was functionally limited, has not expressed any chest pain pressure or tightness since last encounter.  Patient has been compliant compliant with a beta blocker therapy.    Patient, who has potential substrates for dysrhythmias including tachyarrhythmias, denies any palpitation since last encounter. Patient has history of dizziness with associated vertigo but is not known to have sustained dysrhythmias.    Patient, who has previously undergone PCI of right common iliac and right popliteal arteries, has had bilateral lower extremity discomfort which apparently has been more or less persistent.    Review of systems: constitutional:  Patient has lost substantial weight and has been afebrile. HEENT: No history of nasal discharge/nasal obstruction or sore throat. CNS: No history of headache and no history of visual complaints, but he has history of, or sensory or motor complaints. BRONCHOPULMONARY: Patient has no history of cough, pleuritic chest pain or hemoptysis. GI: No history of nausea, vomiting, hematemesis, melena; No history of rectal bleeding : Patient has a decreased urinary output after developing end-stage renal disease; MUSCULOSKELETAL: History of diffuse athralgias DERM: No history of skin rash. ENDO: No history of cold or heat intolerance or thyromegaly. HEMATOPOIETEC: No history of bleeding from any site, anemia or lymphadenopathy. PSYCH: No history depression or anxiety; SLEEP: Poor sleeping pattern    PAST MEDICAL HISTORY:   1. Atherosclerotic Cardiovascular Disease:   • History of cardiac arrest which was attributed to hyperkalemia and there is no definitive prior historic data suggestive of  coronary artery syndrome.  He was recommended a coronary angiography which was not pursued for unspecified reason.  Patient eventually underwent myocardial perfusion imaging studies in 2013, which demonstrated fixed inferior photopenia with intact wall thickening with no significant reversibility.  His ejection fraction was calculated as 69%.  Currently, on antiplatelet therapy as well as beta blocker therapy.  On echocardiographic studies in 2013, he did not have obvious regional wall motion abnormalities. On account of recurrent symptomatology, patient eventually underwent angiographic studies in April 2015, which demonstrated aberrant circumflex vessel without any flow limiting disease. Patient did have mild diffuse plaque predominantly in left anterior descending distribution with maximal stenosis of 30%. Patient is currently on antiplatelet therapy and beta blocker therapy. His CT of chest in 2017 demonstrated coronary calcification.  • History of stroke with no definitive known carotid disease but data is not available. Patient eventually did undergo duplex carotid studies in 2019, which demonstrated scattered plaque involving both CCAs, proximal ICAs and bulbs. No flow was demonstrated involving right distal ICA which conceivably reflects distal obstruction versus course of the vessel. There was no evidence of significant subclavian disease though velocities were elevated which was thought reflect patent flow from fistula. His left internal jugular, and axillary vessels were patent though the right internal jugular vein was occluded. Vertebral flow was antegrade in the left side but was bidirectional on the right side. He had significant hemiparesis involving left side of his body and has required rehabilitation and has been restricted functionally since then.  • History of peripheral arterial disease for which he has apparently undergone noninvasive workup in the past but details are not known. Patient is  known to have poorly palpable distal vessels and possible intermittent claudication predominantly involving left side, prompting repeat duplex Dopplers which demonstrated predominantly biphasic waveforms with abnormal distal velocities with normal IVETH bilaterally without evidence of focal disease. On account of progressive symptomatology patient underwent aortography with runoff and was thought to have flow limiting disease involving right common iliac and right popliteal vessel. During attempted procedure, there was evidence of right common iliac vessels though apparently without limitation of flow and patient eventually underwent stent deployment to both right common iliac and right popliteal artery.  • History of aortic calcification  2. History of extensive venous thrombosis involving both subclavian and axillary vessel as well as both internal jugular veins with imaging evidence of severe stenosis involving SVC junction at its junction with subclavian vessel as well as left subclavian vein. Apparently right internal jugular could not be recanalized. Patient underwent endovenous intervention with stent deployment as well as thrombectomy and was on anticoagulant therapy for 4 months, which apparently was discontinued after patency of previously intervened vessels were demonstrated, but details are not known.   3. History of recurrent seizures apparently post operative period with no definitive known embolic phenomenon or definitive strokes related with either venous or arterial circulation.  4. History of history of hypertension with hypertensive heart disease.  On echocardiographic studies in 2013 patient did not have significant left ventricular hypertrophy but had left atrial enlargement and evidence of diastolic dysfunction.  5. History of apparent bradycardia without symptomatology during hemodialysis. Patient subsequently underwent ambulatory Holter monitoring in March 2013 which demonstrated heart rate  ranging between  bpm with an average heart rate of 89 bpm with rare supraventricular and ventricular ectopy demonstrated. There were no sustained dysrhythmias demonstrated and no significant pauses were demonstrated. Apparently patient has history of atrial dysrhythmia and was previously on digoxin which was discontinued on account of concerns about potential digoxin toxicity levels were below toxic levels based on study performed in 2019.  6. History of long-standing diabetes with micro-and macrovascular complications.  7. Prior history of normal LV systolic function in the past with evidence of impaired relaxation demonstrated on studies performed in 2013.  Evidence of mild increase in filling pressures were also demonstrated. On subsequent surveillance studies in 2016 patient had normal global LV systolic function with calculated ejection fraction of 52% with suggestion of mildly elevated filling pressures. However on subsequent studies in 2017, depressed LV function with calculated ejection fraction of 38% was suggested with no definitive history of prior coronary events. His CT of the chest without contrast demonstrated bilateral pleural effusions based on study performed in March 2016. Eventually was demonstrated to have progressive right-sided pleural effusion prompting thoracentesis and was also documented to have diffuse lymphadenopathy. On unrecorded echocardiogram in November 2017 was noticed to have further decline in LV function. Patient eventually underwent repeat studies, which demonstrated mild to moderate global left ventricular systolic function with moderate hypokinesis of inferior and lateral wall with estimated ejection fraction of 40% with evidence of significantly elevated filling pressures. Patient has been on vasodilator regimen including alternate day digoxin levels which were in therapeutic range in 2019.  After hospitalization in fall 2019, his ejection fraction was noticed to be  severely reduced.  8. History of mild to moderate mitral insufficiency which remained more or less unchanged based on study performed in 2017. However on subsequent echocardiographic studies in fall 2017, his mitral insufficiency had worsened and was thought to be moderate to severe.  9. History of pulmonary artery pressure of 46 mmHg in the past but on surveillance studies in 2017 his pulmonary pressure was in the 70s range though RV function was intact. His pulmonary pressure was noticed to be 58 mmHg is on studies in fall 2017.  More or less similar findings were demonstrated in fall 2019.  10. History of significant nocturnal desaturation with lowest recorded oxygen saturation of central percent. Was found to have saturation less than 89% for more than 280 min. based on sleep oximetry performed in April 2017.  11. History of generalized fluid overload including pericardial and pleural effusion with history of lymphadenopathy which was thought reflect infectious versus neoplastic process. Patient was recommended to undergo lymph node biopsy. Patient has previously undergone thoracentesis of right pleural effusion  12. Patient has history of CT of the chest without contrast demonstrated bilateral pleural effusions based on study performed in March 2016. Eventually was demonstrated to have progressive right-sided pleural effusion prompting thoracentesis and was also documented to have diffuse lymphadenopathy but his surveillance chest radiograph in fall 2017, did not demonstrate substantial pleural effusion. Patient was found to have reticular and nodular opacities and was recommended to undergo biopsy for histopathological diagnosis.  13. History of endstage renal disease requiring hemodialysis since August 2011.  He was thought to have advanced diabetic nephropathy and possible hypertensive nephrosclerosis.  His currently in hemodialysis under care of Dr. Mackey.  14. History of multiple musculoskeletal issues  "with frozen shoulder.     PAST SURGICAL HISTORY:    1. History of dialysis access procedures including fistulas as well as grafts on both sides.  He also had indwelling catheter for hemodialysis. History of Port-A-Cath placement.  2. History of cataract surgery  3. History of shoulder surgery   4. History of gallbladder surgery   5. History of recent scrotal abscess prompting local therapy.  6. History of extensive endovenous intervention after developing extensive thrombosis involving cerebral venous system.    SOCIAL HISTORY: Patient is disabled after the stroke.  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  Patient never smoked.  He is living with her spouse. His wife is nonsmoker as well.    FAMILY HISTORY: His mother  of complications of end-stage lung disease in her 50s.  Patient’s father is alive in his 70s.  PHYSICAL EXAMINATION:  GENERAL: Very pleasant adult male who appears to be in moderate respiratory distress /62 (BP Location: Left arm, Patient Position: Sitting)   Pulse (!) 122   Temp 98.3 °F (36.8 °C) (Oral)   Resp 20   Ht 172.7 cm (68\")   Wt 59.5 kg (131 lb 3.2 oz)   SpO2 98%   BMI 19.95 kg/m²  Body mass index is 19.95 kg/m². Head: Atraumatic, normocephalic, No tenderness over paranasal sinuses, external nares normal. No oral or nasal mucosal lesion. Sclera non-icteric ocular movements are normal with pupils reacting both to light and accommodations. Ocular fundi not seen. NECK: Carotid upstroke is normal, there are no carotid bruits, no JVD, no thyromegaly, no cervical or axillary lymphadenopathy. HEART: [place] beat is not displaced, no thrill is appreciated and both heart sounds are normal.  There is no murmur or rub audible. BRONCHOPULMONARY: Patient has markedly diminished air entry on the right side with rather bronchial breath sounds around basis with or crackles. GI & : Soft, no tenderness elicited and no viscera are palpable. Bowel sounds " are positive and there are no renal bruits audible. EXTREMITIES:  There is no radio-femoral delay.  Distal vessels are very poorly palpable and there are no femoral bruits audible. Patient does not have dependent edema. DERM: No skin rash. CNS: Patient has significant weakness of lower extremity musculature. MUSCULOSKELETAL: No joint swelling notice and patient has normal range of motion in lower extremity.     Intake/Output Summary (Last 24 hours) at 11/5/2019 1922  Last data filed at 11/5/2019 1749  Gross per 24 hour   Intake 400 ml   Output 1202 ml   Net -802 ml     Wt Readings from Last 7 Encounters:   11/04/19 59.5 kg (131 lb 3.2 oz)   09/20/19 60.2 kg (132 lb 11.2 oz)   09/14/19 59 kg (130 lb)   08/31/19 56.8 kg (125 lb 3.5 oz)   08/15/19 54.4 kg (120 lb)   05/15/19 54.4 kg (120 lb)   04/26/19 53.8 kg (118 lb 8 oz)     LABS:   Results from last 7 days   Lab Units 11/05/19  1837 11/05/19  0618 11/03/19  0608 11/02/19  0407   WBC 10*3/mm3  --  5.89 6.38 7.12   HEMOGLOBIN g/dL 8.8* 8.0* 8.3* 8.4*   HEMATOCRIT % 28.2* 25.6* 27.7* 27.7*   PLATELETS 10*3/mm3  --  167 163 201     Results from last 7 days   Lab Units 11/05/19  0618 11/03/19  0608 11/02/19  0407   SODIUM mmol/L 127* 132* 133*   POTASSIUM mmol/L 4.9 4.7 4.0   CHLORIDE mmol/L 83* 88* 85*   CO2 mmol/L 22.2 20.1* 31.4*   BUN mg/dL 61* 26* 35*   CREATININE mg/dL 5.92* 3.70* 4.80*   GLUCOSE mg/dL 123* 252* 55*   CALCIUM mg/dL 9.3 9.0 9.3           Invalid input(s): TROPONININT           Lab Results   Component Value Date    HGBA1C 8.90 (H) 09/21/2019     Results from last 7 days   Lab Units 11/01/19  1545   TSH uIU/mL 1.790       RADIOLOGY: Imaging Results (Last 24 Hours)     Procedure Component Value Units Date/Time    XR Chest 1 View [[id number]] Collected:  11/05/19 1909     Updated:  11/05/19 1910    Narrative:       FINAL REPORT    TECHNIQUE:  An AP portable view of the chest was obtained.    CLINICAL  HISTORY:  sob    COMPARISON:  11/4/2019    FINDINGS:  There is worsening airspace consolidation in the inferior right  upper lobe.  Other bilateral airspace opacities are not  significantly changed.  The heart and vasculature are  unremarkable.  There is a persistent small right pleural  effusion.  No pneumothorax is identified.  There is no  adenopathy or significant osseous abnormality.      Impression:       Worsening right upper lobe opacity, otherwise no change.    Authenticated by Brice Sutton M.D. on 11/05/2019 07:09:31 PM    CT Abdomen Pelvis Without Contrast [[id number]] Collected:  11/05/19 1737     Updated:  11/05/19 1738    Narrative:       FINAL REPORT    TECHNIQUE:  Routine axial images through the abdomen and pelvis were  obtained.  Oral contrast was administered. This study was  performed with techniques to keep radiation doses as low as  reasonably achievable (ALARA). Individualized dose reduction  techniques using automated exposure control or adjustment of mA  and/or kV according to the patient's size were employed.    CLINICAL HISTORY:  Abd pain, gastroenteritis or colitis suspected    FINDINGS:  Abdomen: There are multifocal patchy airspace opacities in the  bilateral lower lobes with small right pleural effusion.  The  gallbladder has been removed.  There is extensive vascular  calcification but the solid organs and ureters are otherwise  unremarkable.  The GI tract is unremarkable.  There is no  evidence of appendicitis or colitis.  Pelvis: The urinary  bladder is normal. There is no pelvic or abdominal ascites,  adenopathy or acute osseous abnormality.      Impression:       No acute abnormality of the abdomen or pelvis.  Bilateral  airspace opacities and small right pleural effusion worrisome  for pneumonia.  Extensive vascular calcification.    Authenticated by Brice Sutton M.D. on 11/05/2019 05:37:51 PM          Allergies   Allergen Reactions   • Erythromycin Hives       atorvastatin 40 mg Oral  Nightly   b complex-vitamin c-folic acid 1 tablet Oral Daily   bumetanide 1 mg Intravenous Once   cefepime 2 g Intravenous Q24H   docusate sodium 100 mg Oral Q12H   fluticasone 1 spray Each Nare Daily   folic acid 1 mg Oral Daily   guaiFENesin 600 mg Oral Q12H   insulin aspart 0-9 Units Subcutaneous 4x Daily AC & at Bedtime   insulin aspart 5 Units Subcutaneous TID With Meals   insulin detemir 12 Units Subcutaneous Nightly   ipratropium-albuterol 3 mL Nebulization Q6H - RT   isosorbide mononitrate 30 mg Oral Daily   lanthanum 1,000 mg Oral TID With Meals   levothyroxine 125 mcg Oral Q AM   lidocaine 1 patch Transdermal Nightly   metoprolol succinate XL 75 mg Oral Daily   pantoprazole 40 mg Intravenous Q12H   sodium chloride 10 mL Intravenous Q12H        ASSESSMENT /PLAN:    1. Patient is 58 years old  male with risk profile for atherosclerotic cardiovascular disease as outlined previously, who was hospitalized on account of respiratory failure and gastrointestinal symptomatology and was noticed to have elevated cardiac markers in the setting of acute on chronic respiratory failure.  Patient has developed significant decline in LV function though given significant wall motion abnormalities with only mildly elevated cardiac markers, I doubt that the patient has sustained any recent coronary event though understandably patient does have potential for progressive coronary artery disease.  Patient is currently on adequate dosages of beta-blocker therapy and would merit continued statin and antiplatelet therapy.  At this stage patient will not merit acute intervention.  2. Patient has prior history of respiratory failure with history of nocturnal desaturation and clinical evidence of fluid overload. Patient is known to have right-sided pleural and to some degree left-sided pleural effusion addition to suggestion of pericardial effusion in the past though he did not have significant pericardial effusion based on  study performed today. Patient who unfortunately appears to have developed gradual decline in LV function, appears to have further worsening of LV function without any evidence of any recent coronary event etc. Patient does have obvious underlying end-stage renal disease  but apparently patient declined option of continued dialysis today.  At this stage, he would not consider urgent hemodialysis and ultrafiltration.  Patient would require either positive pressure breathing or intubation for mechanical ventilatory support given progressive worsening of his respiratory failure.  Patient has gradually progressive chronic infiltrates which may not reflect recurrent pneumonia and conceivably patient has either neoplastic or granulomatous disease including potential fungal disease or even tuberculosis.  Depending on evaluation by pulmonary services, he may merit bronchoscopy with a view towards transbronchial biopsy after intubation and mechanical ventilatory support.  3. Patient has a history of progressive decline in LV function which may possibly reflect multiple vessel coronary disease the small vessel disease would certainly be an differential diagnosis.  He has mitral insufficiency though that does not appear to have played a contributory role.  As noted earlier, I doubt that at this his stage, patient has sustained a recent coronary event though his valvular disease appears to be rather progressive. Possibility of diffuse inflammatory process including potential vasculitis needs to be entertained given evidence of generalized lymphadenopathy as well. Patient was previously on metoprolol succinate and losartan. Patient may benefit from preload reducing agents   4. Patient does have some substrates for atrial and conceivably even high-grade ventricular tachyarrhythmias with potential tachy-josue syndrome, given prior history of bradycardia has had significant vertigo and at this stage, I doubt that his dizziness  reflects. Patient has undergone ambulatory Holter monitoring which did not demonstrate significant symptomatology. Hence at this stage, patient would not necessarily merit a repeat Holter monitoring for event monitoring as well.  Patient does have potential for high-grade ventricular dysrhythmias and sudden cardiac death and eventually would merit device therapy if he does not have obviously revascularizable coronary artery disease.  5. Patient unfortunately developed fairly extensive venous thrombosis involving apparently both involving axillary and subclavian vessels on both sides. In addition to both eyes ECAs with underlying critical stenosis involving left subclavian as well as left SVC. Conceivably may reflect thrombosis from contiguous structures as well as indwelling catheters and foreign body, presence though he does appear to have hypercoagulable status secondary to apparent underlying ulcerative colitis as well. Patient does have history of fairly disseminated lymphadenopathy but fortunately she did not have any definitive malignancy based on prior bronchoscopy with transbronchial biopsy per patient's description. His dizziness is quite intriguing though at this stage it does not appear to reflect any potential orthostatic drop in the blood pressure or any potential dysrhythmias given lack of any relationship with postural and positional changes. Possibility of vertebral disease needs to be entertained and he would likely merit assessment for patency of drainage vessels including both internal jugular veins, which can lead to potential intermittent rise in intracranial pressures. This stage he does not have any obvious stigmata which may suggest superior vena cava syndrome, but nonetheless he was recommended to undergo venous duplex Dopplers. P patient may y merit reintroduction of long-term anticoagulant therapy as well. Patient does appear to have conceivably developed stroke reflecting venous etiology  with potential for seizure disorder, but apparently patient already under care of neurology services of Dr. Morrow. At this stage patient would not benefit from ambulatory Holter monitoring et cetera but nonetheless patient should report any significant palpitation promptly to physician office or to emergency room, given at least some substrates for dysrhythmias including depressed LV function as well as possible untreated sleep disorder breathing.  6. Patient, who is known to have peripheral arterial disease and has previously undergone endovascular intervention for right common iliac and right popliteal vessel, has had bilateral lower extremity discomfort which likely reflects diabetic or even compressive neuropathy but given rather very diffuse and calcified plaque, patient would undergo surveillance studies..  7. Patient has history of long-standing hypertension with early hypertensive heart disease.  His blood pressure has been rather low prompting discontinuation of amlodipine.  8. Patient has a history of stroke, which was thought to be embolic with no definitive prior history of intracardiac shunts.  In all likelihood reflects prior local cerebrovascular disease. Patient has previously undergone scattered plaque involving both CCAs, proximal ICAs and bulbs. No flow was demonstrated involving right distal ICA which conceivably reflects distal obstruction versus course of the vessel. There was no evidence of significant subclavian disease though velocities were elevated which was thought reflect patent flow from fistula. His left internal jugular, and axillary vessels were patent though the right internal jugular vein was occluded. Vertebral flow was antegrade in the left side but was bidirectional on the right side. He had significant hemiparesis involving left side of his body and has required rehabilitation and has been restricted functionally since then. . Patient was recommended evaluation by neurology  services given significant spasticity and may benefit from muscle relaxant medication such as baclofen etc.  9. Patient is well aware of dietary management of dyslipidemia, diabetes and truncal obesity have been discussed. Patient , who is on statin, eventually should undergo repeat lipid studies to assess his ability of titration of his statin.  Patient is well aware of surveillance for potential muscle and liver side effects. Patient was given opportunity to ask questions, which were all, answered appropriately.   10. Patient unfortunately appears to have developed generalized lymphadenopathy, possibly worrisome for neoplastic process given his overall presentation the possibility of chronic granulomatous disease including tuberculosis and sarcoidosis was entertained.  Patient was recommended evaluation pulmonary services of Dr. Hernandez for further workup.   11. Other issues noted include history of endstage renal failure, diabetes with micro-and macrovascular complications.                     In closing, I sincerely appreciate opportunity to participate in care of this patient. If I can be of any further assistance with the management of this patient, please don’t hesitate to contact me.    NATALEE RIVERS M.D. [facility]

## 2019-11-06 NOTE — NURSING NOTE
Pt resting in bed; skin assessment completed; caregivers at bedside, then stepped out; possibly a pinpoint opening on scrotum from previous abscess and multiple concave areas from previous per pt; last one drained by [facility] doctor over a month ago; pt has multiple scabs all over per pt and caregiver from dialysis issues; applying coccygeal dressing and offloading booties for skin prevention and encouraged family to take booties home for heel protection and the importance of keeping all open wounds clean and covered and offloading; emotional support given; plan of care explained to pt, caregivers and rn

## 2019-11-06 NOTE — PLAN OF CARE
Problem: NPPV/CPAP (Adult)  Goal: Signs and Symptoms of Listed Potential Problems Will be Absent, Minimized or Managed (NPPV/CPAP)  Outcome: Ongoing (interventions implemented as appropriate)   11/06/19 0055   Goal/Outcome Evaluation   Problems Assessed (NPPV/CPAP) all   Problems Present (NPPV/CPAP) none

## 2019-11-06 NOTE — PLAN OF CARE
Problem: Patient Care Overview  Goal: Plan of Care Review   11/06/19 1457   Coping/Psychosocial   Plan of Care Reviewed With patient;caregiver   Plan of Care Review   Progress no change       Problem: Skin Injury Risk (Adult)  Goal: Identify Related Risk Factors and Signs and Symptoms  Outcome: Ongoing (interventions implemented as appropriate)   11/06/19 0673   Skin Injury Risk (Adult)   Related Risk Factors (Skin Injury Risk) body weight extremes;critical care admission;fluid intake inadequate;infection;mechanical forces;medical devices;medication;mobility impaired;moisture;nutritional deficiencies;skeletal deformities

## 2019-11-06 NOTE — PROGRESS NOTES
Cardiology Progress Note  Vu Ambrosio MD  PATIENT: Talha Cutler : 1960   DATE: 19   I    Patient Care Team:  Jeannette Godoy APRN as PCP - General (Family Medicine)    Subjective .  Patient has had symptomatic improvement after introduction of positive pressure breathing.  He slept relatively well.  He continues to have orthopnea but has not had any PND's.  Overall patient has maintained normal sinus rhythm.  Patient denies chest pain.    Objective     Vital Sign Min/Max for last 24 hours  Temp  Min: 97.6 °F (36.4 °C)  Max: 98.7 °F (37.1 °C)   BP  Min: 97/60  Max: 132/86   Pulse  Min: 84  Max: 122   Resp  Min: 16  Max: 22   SpO2  Min: 95 %  Max: 100 %   No Data Recorded   No Data Recorded     Wt Readings from Last 7 Encounters:   19 59.5 kg (131 lb 3.2 oz)   19 60.2 kg (132 lb 11.2 oz)   19 59 kg (130 lb)   19 56.8 kg (125 lb 3.5 oz)   08/15/19 54.4 kg (120 lb)   05/15/19 54.4 kg (120 lb)   19 53.8 kg (118 lb 8 oz)          Physical Exam:    CONSTITUTIONAL: Not in acute distress.    NECK: Carotid upstroke is normal, there are no carotid bruits, no JVD, no thyromegaly, no cervical or axillary lymphadenopathy.     HEART: [place] beat is not displaced, no thrill is appreciated and both heart sounds are normal.  There is no murmur or rub audible.     BRONCHOPULMONARY: Patient has decreased air entry at right base.  Patient has bilateral expiratory rhonchi and coarse crackles.     GI & : Soft, no tenderness elicited and no viscera are palpable. Bowel sounds are positive and there is no renal bruits audible.       Intake/Output Summary (Last 24 hours) at 2019 0812  Last data filed at 2019 1749  Gross per 24 hour   Intake 350 ml   Output 802 ml   Net -452 ml       Results Review:    LABS:   Results from last 7 days   Lab Units 19  1837 19  0618 19  0608 19  0407   WBC 10*3/mm3  --  5.89 6.38 7.12   HEMOGLOBIN g/dL 8.8* 8.0* 8.3*  8.4*   HEMATOCRIT % 28.2* 25.6* 27.7* 27.7*   PLATELETS 10*3/mm3  --  167 163 201     I reviewed the patient's new clinical results.  Results from last 7 days   Lab Units 11/05/19  1837 11/05/19 0618 11/03/19  0608 11/02/19  0407   WBC 10*3/mm3  --  5.89 6.38 7.12   HEMOGLOBIN g/dL 8.8* 8.0* 8.3* 8.4*   HEMATOCRIT % 28.2* 25.6* 27.7* 27.7*   PLATELETS 10*3/mm3  --  167 163 201     Results from last 7 days   Lab Units 11/05/19  0618 11/03/19  0608 11/02/19  0407   SODIUM mmol/L 127* 132* 133*   POTASSIUM mmol/L 4.9 4.7 4.0   CHLORIDE mmol/L 83* 88* 85*   CO2 mmol/L 22.2 20.1* 31.4*   BUN mg/dL 61* 26* 35*   CREATININE mg/dL 5.92* 3.70* 4.80*   GLUCOSE mg/dL 123* 252* 55*   CALCIUM mg/dL 9.3 9.0 9.3           Invalid input(s): TROPONININT           Lab Results   Component Value Date    HGBA1C 8.90 (H) 09/21/2019     Results from last 7 days   Lab Units 11/01/19  1545   TSH uIU/mL 1.790     Results from last 7 days   Lab Units 11/05/19  0618 11/01/19  1339   LIPASE U/L 13 25       RADIOLOGY: Imaging Results (Last 24 Hours)     Procedure Component Value Units Date/Time    XR Chest 1 View [[id number]] Collected:  11/05/19 1909     Updated:  11/05/19 1910    Narrative:       FINAL REPORT    TECHNIQUE:  An AP portable view of the chest was obtained.    CLINICAL HISTORY:  sob    COMPARISON:  11/4/2019    FINDINGS:  There is worsening airspace consolidation in the inferior right  upper lobe.  Other bilateral airspace opacities are not  significantly changed.  The heart and vasculature are  unremarkable.  There is a persistent small right pleural  effusion.  No pneumothorax is identified.  There is no  adenopathy or significant osseous abnormality.      Impression:       Worsening right upper lobe opacity, otherwise no change.    Authenticated by Brice Sutton M.D. on 11/05/2019 07:09:31 PM    CT Abdomen Pelvis Without Contrast [[id number]] Collected:  11/05/19 1737     Updated:  11/05/19 1738    Narrative:       FINAL  REPORT    TECHNIQUE:  Routine axial images through the abdomen and pelvis were  obtained.  Oral contrast was administered. This study was  performed with techniques to keep radiation doses as low as  reasonably achievable (ALARA). Individualized dose reduction  techniques using automated exposure control or adjustment of mA  and/or kV according to the patient's size were employed.    CLINICAL HISTORY:  Abd pain, gastroenteritis or colitis suspected    FINDINGS:  Abdomen: There are multifocal patchy airspace opacities in the  bilateral lower lobes with small right pleural effusion.  The  gallbladder has been removed.  There is extensive vascular  calcification but the solid organs and ureters are otherwise  unremarkable.  The GI tract is unremarkable.  There is no  evidence of appendicitis or colitis.  Pelvis: The urinary  bladder is normal. There is no pelvic or abdominal ascites,  adenopathy or acute osseous abnormality.      Impression:       No acute abnormality of the abdomen or pelvis.  Bilateral  airspace opacities and small right pleural effusion worrisome  for pneumonia.  Extensive vascular calcification.    Authenticated by Brice Sutton M.D. on 11/05/2019 05:37:51 PM                 Allergies   Allergen Reactions   • Erythromycin Hives       atorvastatin 40 mg Oral Nightly   b complex-vitamin c-folic acid 1 tablet Oral Daily   cefepime 2 g Intravenous Q24H   docusate sodium 100 mg Oral Q12H   fluticasone 1 spray Each Nare Daily   folic acid 1 mg Oral Daily   guaiFENesin 600 mg Oral Q12H   insulin aspart 0-9 Units Subcutaneous 4x Daily AC & at Bedtime   insulin aspart 5 Units Subcutaneous TID With Meals   insulin detemir 12 Units Subcutaneous Nightly   ipratropium-albuterol 3 mL Nebulization Q6H - RT   isosorbide mononitrate 30 mg Oral Daily   lanthanum 1,000 mg Oral TID With Meals   levothyroxine 125 mcg Oral Q AM   lidocaine 1 patch Transdermal Nightly   metoprolol succinate XL 75 mg Oral Daily   pantoprazole  40 mg Intravenous Q12H   sodium chloride 10 mL Intravenous Q12H          Hospital-acquired pneumonia    Assessment/Plan     1. Patient, who has history of chronic respiratory failure, has had progressive worsening of his shortness of air which was thought to be multifactorial.  Patient has obvious underlying kidney disease and evidence of left ventricular dysfunction though he appears to have significant bronchopulmonary complaint.  He has obvious bronchospastic disease and was thought to have sleep disordered breathing the major underlying mechanism appears to be chronic process conceivably granulomatous disease with history of prior lymphadenopathy and recurrent apparent infiltrates and pleural effusion requiring thoracentesis.  In all likelihood in the lung and patient would benefit from possible bronchoscopy with a view towards histopathological evaluation though in the short-term it would be desirable to continue the patient on aggressive hemodialysis regimen and positive pressure breathing.  2. Patient has evidence of progressive left ventricular dysfunction conceivably reflecting progressive epicardial disease though I suspect that small vessel disease and diabetic cardiomyopathy probably has played a contributory role.  Depending on his blood pressure patient would merit ACE inhibitor therapy or ARB therapy.  Patient is already on long-acting beta-blocker therapy.  Benefits of long-acting nitrates are uncertain at this stage.  Patient remains at future risk for cardiac decompensation as well as high-grade dysrhythmias with potential sudden cardiac death and depending on evolution of his left ventricular function and coronary artery disease if any that is not revascularizable then he would merit device therapy.  3. Patient was found to have borderline elevated cardiac markers conceivably reflecting small vessel disease versus right ventricular strain.  He does have history of non-flow-limiting CAD based on  prior angiographic studies and depending on his overall prognosis, may merit repeat angiographic studies.  Patient would merit continue antiplatelet therapy, beta-blocker therapy and statin.  4. Patient history of mitral regurgitation which did not appear to be hemodynamically significant.  He does have pulmonary hypertension which likely reflects left-sided issues but he has significant underlying bronchopulmonary disease.  5. Additional diagnoses include history of end-stage kidney disease, history of peripheral artery disease and history of progressive functional decline.  Patient has prior history of stroke as well.    I discussed the patients findings and my recommendations with the patient and family.    Vu Ambrosio MD  11/06/19  8:12 AM

## 2019-11-06 NOTE — NURSING NOTE
Received report for patient. His heart rate was 116 and he was short of breath and complaining of shortness of breath. Saw the order for transfer. Gave report to ICU nurse and transferred patient to ICU bed 7. Brianda Fletcher RN

## 2019-11-06 NOTE — DISCHARGE SUMMARY
[facility]   DISCHARGE SUMMARY    Name:  Talha Cutler   Age:  59 y.o.  Sex:  male  :  1960  MRN:  [id number]   Visit Number:  [id number]  Primary Care Physician:  Jeannette Godoy APRN  Date of Discharge:  2019  Admission Date:  2019      Presenting Problem:    Hospital-acquired pneumonia [J18.9, Y95]  Hospital-acquired pneumonia [J18.9, Y95]  Hospital-acquired pneumonia [J18.9, Y95]       Discharge Diagnosis:       Hospital-acquired pneumonia        Past Medical History:  Past Medical History:   Diagnosis Date   • Abdominal pain    • Anemia    • Cataracts, bilateral    • CHF (congestive heart failure) (CMS/[facility])    • Chronic kidney disease    • Constipation    • Cough    • Dependence on nocturnal oxygen therapy    • Diabetes mellitus (CMS/[facility])    • Diarrhea    • End stage renal failure on dialysis (CMS/[facility])     SILVINO AVITIA, SAT   • GERD (gastroesophageal reflux disease)    • H/O blood clots     LEG/NECK   • H/O chest x-ray 2016    Interval decrease in size of the patients right pleural effusion with a persistent small left effusion as well   • History of transfusion    • Hypertension    • Left-sided weakness    • Nauseated     DRY HEAVES   • Pleural effusion    • Pneumonia    • Pneumonia    • Renal failure    • Stroke (CMS/[facility]) 2006    LEFT SIDED WEAKNESS   • Swelling    • Teeth missing    • Tinnitus    • Ulcer, stomach peptic     HISTORY OF ULCER AS A TEENAGER   • Wears glasses          Consults:     Consults     Date and Time Order Name Status Description    2019 0753 Inpatient Pulmonology Consult      2019 1249 Inpatient Cardiology Consult      2019 1228 Inpatient Gastroenterology Consult      2019 1641 Inpatient Nephrology Consult Completed           Procedures Performed:             History of presenting illness/Hospital Course:  This is a 59-year-old male with history of end-stage renal disease on hemodialysis, diabetes mellitus  type 2, hypothyroidism, coronary artery disease and 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  He presented to the emergency room with complaints of shortness of breath and cough.  He was noted in the emergency room to have an elevated pro-Marv is 0.6 and a mildly elevated troponin at 0.48.  Lactic acid was normal.  Chest x-ray was done which showed left lower lobe infiltrate which was felt to be bronchopneumonia.  He was started on IV broad-spectrum antibiotics and admitted to the hospitalist service.  Connect complained of some low-grade fevers as well.  He is on home oxygen nocturnally at night and was on CPAP until several months ago.  Into the patient he had been having some dry nose and it was making it difficult to read so he had stopped using the CPAP.  3 weeks prior to admission he had been at dialysis and was having some chills for which blood cultures were done and but all were negative.    Upon admission to the hospitalist service patient has been continued on cefepime, Levaquin and vancomycin.  Repeat chest x-ray done today does show some interval improvement in the aeration.  I did see and evaluate patient at bedside for which he is complaining of some weakness.  He states when he sits up he just feels so very generally weak more so on the left however he has a history of having a left-sided CVA with residual weakness.  His weakness on his left side during my assessment was no different than his baseline.   I did get a CT of his head which did not show any acute findings.  He did have some chronic changes consistent with chronic microvascular ischemia.  Carotid duplex did not show any significant stenosis or plaque.    He did have some nausea and vomiting yesterday for which the nurse reported coffee-ground emesis.  His antiplatelets were held and Dr. Mir with gastroenterology was consulted.  I had asked the patient regarding an upper lower endoscopy for which he denied having 1 however his wife  did report to Dr. Mir with gastroenterology that he had a upper and lower endoscopy in June 2019 for recurrent anemia and drop in H&H.  There was no bleeding site found.  He has had some recent nosebleeds for which gastroenterology felt the small amount of coffee-ground emesis could be due to swallowed blood.  His H&H are stable.  He did not feel that the history was consistent with a Martha-Molina tear.      Last evening during dialysis patient stated that he started feeling worse so not much fluid could be taken off with dialysis and he became increasingly short of breath.  He had also become tachycardic and was transferred to the ICU.  Dr. Ambrosio with cardiology was consulted and did see and evaluate the patient when his dyspnea and elevated troponins.  He does not feel that the patient has sustained any recent coronary events.  He does have risk factors however for the same.  He recommended continuation of his beta-blocker as well as statin and antiplatelet therapy.  He was placed on BiPAP in the ICU which did help improve his symptoms greatly.  Repeat chest x-ray done last evening showed worsening right upper lobe opacity.  Dr. Hernandez with pulmonology has been consulted for recommendations.  Patient had a bronchoscopy approximately 1 month ago and it was negative for infection.  He does have a possible effusion on the right side for which he is planning on doing a chest x-ray tomorrow.  He did recommend continuation of broad-spectrum antibiotics.  We will continue to monitor lab work.  Respiratory cultures have been ordered and MRSA screen is pending.  He does have noted a chronic right-sided pleural effusion.     I was called to ICU at 4pm due to patient had a brief episode of unresponsiveness.  Upon my evaluation patient would not initially respond but then did open his eyes and could tell me his name but no place and time.  He was complaining of worsening weakness stating that he has no energy.  He could  "squeeze my hand however he was weaker on the left side however he has been weaker on the left side from previous CVA.  Patient felt it was worse during this assessment and he did appear weaker than my prior assessments.  Blood sugar was 121, vital signs remain stable during his episode of unresponsive lasted approximately 4 to 5 seconds.  There were no arrhythmias noted on telemetry.  He was slow to recover mentally.  ABG was done which showed a pH of 7.3, PCO2 of 44, PO2 of 74.3.  Cefepime has been discontinued and patient's been placed on Zosyn.  Upon my evaluation when asking the patient questions he would hold his head and stated that \"he just did not feel right\".  He could not give me a complete description of what not feeling right meant other than he just did not feel right in his head.  After my initial evaluation I did witness the patient having some jerking motion of his head as well as his right arm.  During this time he could not give any answers or open his eyes.  His limbs were flaccid.  His pupils were reactive.    He was given Ativan IV.  I also ordered for Keppra 500 mg IV.  CT scan of his head has been ordered.  According to his wife who is now at bedside she states in February 2019 he had was thought to be a possible seizure however according to his wife he was not placed on medication nor was he formally diagnosed.  He had been having some dizziness for which according to his wife he was referred to a neurologist but has not yet been evaluated.       I have spoken to Dr. Kaur at [facility] who has accepted transfer.  She has requested that if patient has a Grupo Coma Score less than 8 that we intubate prior to transfer.  I did assess patient at bedside at 5:45 PM and his Glascow coma score is 13.  We will hold off on intubation.  I have instructed the nurse to monitor closely and if any changes in his Grupo Coma Scale to a score of 8 or less we will need to notify anesthesia for " intubation prior to transfer.        Vital Signs:    Temp:  [97.4 °F (36.3 °C)-98.7 °F (37.1 °C)] 97.4 °F (36.3 °C)  Heart Rate:  [] 89  Resp:  [16-22] 18  BP: ()/(57-71) 114/61    Physical Exam:  General Appearance:    Responds to tactile stimuli and mumble and opens eyes to verbal.  No acute respiratory distress.  Chronically ill and frail appearing   Head:    Atraumatic and normocephalic, without obvious abnormality.   Eyes:           PERRLA   Ears:    Ears appear intact with no abnormalities noted.   Throat:   No oral lesions, no thrush, oral mucosa moist.   Neck:   Supple, trachea midline, no thyromegaly, no carotid bruit.       Lungs:     Chest shape is normal. Breath sounds heard bilaterally equally.  Crackles right lobe wheezing. No Pleural rub or bronchial breathing.    Heart:    Normal S1 and S2, no murmur, no gallop, no rub. No JVD   Abdomen:     Normal bowel sounds, no masses, no organomegaly. Soft        non-tender, non-distended, no guarding, no rebound                tenderness   Extremities:   Moves all extremities well, no edema, no cyanosis, no             clubbing   Pulses:   Pulses palpable and equal bilaterally   Skin:   No bleeding, multiple scabbed area to BLE and BUE   Lymph nodes:  Psychiatric/Behavior:    No palpable adenopathy    Normal mood, normal behavior   Neurologic:   Cranial nerves 2 - 12 grossly intact, sensation intact, Motor power is normal and equal bilaterally.           Pertinent Lab Results:     Results from last 7 days   Lab Units 11/06/19  0823 11/05/19  0618 11/03/19  0608  11/01/19  1339   SODIUM mmol/L 131* 127* 132*   < > 130*   POTASSIUM mmol/L 4.7 4.9 4.7   < > 4.6   CHLORIDE mmol/L 91* 83* 88*   < > 82*   CO2 mmol/L 20.2* 22.2 20.1*   < > 32.6*   BUN mg/dL 43* 61* 26*   < > 24*   CREATININE mg/dL 4.48* 5.92* 3.70*   < > 3.83*   CALCIUM mg/dL 9.4 9.3 9.0   < > 9.2   BILIRUBIN mg/dL  --  0.2  --   --  0.3   ALK PHOS U/L  --  71  --   --  77   ALT (SGPT) U/L   --  17  --   --  11   AST (SGOT) U/L  --  66*  --   --  42*   GLUCOSE mg/dL 181* 123* 252*   < > 317*    < > = values in this interval not displayed.     Results from last 7 days   Lab Units 11/05/19  1837 11/05/19  0618 11/03/19  0608 11/02/19  0407   WBC 10*3/mm3  --  5.89 6.38 7.12   HEMOGLOBIN g/dL 8.8* 8.0* 8.3* 8.4*   HEMATOCRIT % 28.2* 25.6* 27.7* 27.7*   PLATELETS 10*3/mm3  --  167 163 201         Blood Culture   Date Value Ref Range Status   11/01/2019 No growth at 5 days  Final   11/01/2019 No growth at 5 days  Final     MRSA SCREEN CX   Date Value Ref Range Status   11/01/2019   Final    No Methicillin Resistant Staphylococcus aureus isolated         Pertinent Radiology Results:    Imaging Results (All)     Procedure Component Value Units Date/Time    CT Head Without Contrast [[id number]] Collected:  11/06/19 1658     Updated:  11/06/19 1659    Narrative:       FINAL REPORT    TECHNIQUE:  Noncontrast exam. This study was performed with techniques to  keep radiation doses as low as reasonably achievable (ALARA).  Individualized dose reduction techniques using automated  exposure control or adjustment of mA and/or kV according to the  patient's size were employed.    CLINICAL HISTORY:  Neuro deficit(s), subacute    FINDINGS:  There is moderate generalized atrophy and chronic microvascular  change.  No abnormal density is seen.  Ventricles are normal.  There is no hemorrhage.  No mass effect is seen.     Bone  windows show no evidence of fracture.      Impression:       No acute findings    Authenticated by JOLENE Osman MD on 11/06/2019 04:58:08 PM    XR Chest 1 View [[id number]] Collected:  11/05/19 1909     Updated:  11/05/19 1910    Narrative:       FINAL REPORT    TECHNIQUE:  An AP portable view of the chest was obtained.    CLINICAL HISTORY:  sob    COMPARISON:  11/4/2019    FINDINGS:  There is worsening airspace consolidation in the inferior right  upper lobe.  Other bilateral airspace opacities are  not  significantly changed.  The heart and vasculature are  unremarkable.  There is a persistent small right pleural  effusion.  No pneumothorax is identified.  There is no  adenopathy or significant osseous abnormality.      Impression:       Worsening right upper lobe opacity, otherwise no change.    Authenticated by Brice Sutton M.D. on 11/05/2019 07:09:31 PM    CT Abdomen Pelvis Without Contrast [[id number]] Collected:  11/05/19 1737     Updated:  11/05/19 1738    Narrative:       FINAL REPORT    TECHNIQUE:  Routine axial images through the abdomen and pelvis were  obtained.  Oral contrast was administered. This study was  performed with techniques to keep radiation doses as low as  reasonably achievable (ALARA). Individualized dose reduction  techniques using automated exposure control or adjustment of mA  and/or kV according to the patient's size were employed.    CLINICAL HISTORY:  Abd pain, gastroenteritis or colitis suspected    FINDINGS:  Abdomen: There are multifocal patchy airspace opacities in the  bilateral lower lobes with small right pleural effusion.  The  gallbladder has been removed.  There is extensive vascular  calcification but the solid organs and ureters are otherwise  unremarkable.  The GI tract is unremarkable.  There is no  evidence of appendicitis or colitis.  Pelvis: The urinary  bladder is normal. There is no pelvic or abdominal ascites,  adenopathy or acute osseous abnormality.      Impression:       No acute abnormality of the abdomen or pelvis.  Bilateral  airspace opacities and small right pleural effusion worrisome  for pneumonia.  Extensive vascular calcification.    Authenticated by Brice Sutton M.D. on 11/05/2019 05:37:51 PM    US Carotid Bilateral [[id number]] Collected:  11/04/19 1539     Updated:  11/04/19 1542    Narrative:       PROCEDURE: US CAROTID BILATERAL-     HISTORY: dizziness; J18.9-Pneumonia, unspecified organism;  U23-Hmfaanpirv condition; R05-Cough; N18.6-End stage renal  disease;  Z99.2-Dependence on renal dialysis     Technique: Real-time imaging was performed of the extracranial carotid  arteries in transverse and longitudinal planes, with color duplex  evaluation of blood flow velocity. Spectral analysis was performed. The  cervicovertebral arteries were also examined.  Interpretation based upon  validated velocity criteria.     Right carotid system:  Peak systolic velocity ICA: 30 cm/s  Vertebral artery: Antegrade  ICA/CCA ratio: 0.82  Mild plaque is identified at the bifurcation.     Left carotid system:  Peak systolic velocity ICA: 72 cm/s  Vertebral artery: Antegrade  ICA/CCA ratio: 1.12  Mild plaque is identified at the bifurcation.          Impression:       No hemodynamically significant left or right internal  carotid artery stenosis.     This report was finalized on 11/4/2019 3:40 PM by Rafita Early MD.    CT Head Without Contrast [[id number]] Collected:  11/04/19 0942     Updated:  11/04/19 0946    Narrative:       PROCEDURE: CT HEAD WO CONTRAST-     HISTORY: Dizziness     COMPARISON: 04/26/2019     FINDINGS: Contiguous axial images were obtained from skull vertex to the  skullbase without administration of contrast.     Some images are slightly degraded by patient motion. No hemorrhage,  midline shift, intra-axial or extra-axial fluid collections. The basal  cisterns are patent and the ventricles are not enlarged. Atherosclerotic  vascular calcifications are very prominent. Diffuse cortical volume  loss. Areas of decreased attenuation in the bilateral periventricular  and subcortical white matter are nonspecific but overall most consistent  with chronic microvascular ischemia. If there is high clinical concern  for an acute ischemic event, correlation with MRI with  diffusion-weighted imaging would be helpful.       Impression:       1. No acute intracranial findings. Consider MRI if symptoms persist or  if there is concern for ischemia.  2. Cortical volume loss and  changes consistent with chronic  microvascular ischemia.           This study was performed with techniques to keep radiation doses as low  as reasonably achievable (ALARA). Individualized dose reduction  techniques using automated exposure control or adjustment of mA and/or  kV according to the patient size were employed.      This report was finalized on 11/4/2019 9:44 AM by Rafita Early MD.    XR Chest 1 View [[id number]] Collected:  11/04/19 0857     Updated:  11/04/19 0901    Narrative:       PORTABLE CHEST     INDICATION: Follow-up pneumonia.     FINDINGS: Single view chest, compared with 11/03/2019. EKG leads overlie  the chest. The patient is rotated to the right. There are persistent  bilateral multifocal infiltrates, although somewhat improved from  previous. Persistence of a small right pleural effusion also appearing  improved.       Impression:       Improving aeration. Continued follow-up recommended.     This report was finalized on 11/4/2019 8:58 AM by Rafita Early MD.    XR Chest 1 View [[id number]] Collected:  11/03/19 0918     Updated:  11/03/19 0922    Narrative:       PROCEDURE: XR CHEST 1 VW-     HISTORY: f/u on pneumonia; J18.9-Pneumonia, unspecified organism;  Y51-Vipszqhwjv condition; R05-Cough; N18.6-End stage renal disease;  Z99.2-Dependence on renal dialysis     COMPARISON: 2 days prior.     FINDINGS: The heart is upper limits of normal, stable from prior. Aortic  mural calcifications noted.. The mediastinum is unremarkable. There is  patchy bilateral airspace disease with mild sparing of the apices. There  has been mild worsening compared to prior. Small right effusion is  stable.. There is no pneumothorax.  There are no acute osseous  abnormalities.       Impression:       Interval worsening of bilateral patchy airspace disease  consistent with pneumonia compared to 2 days prior.     .     This report was finalized on 11/3/2019 9:20 AM by Narcisa Gutierrez MD.    XR Chest 2 View [[id number]]  Collected:  11/01/19 1401     Updated:  11/01/19 1406    Narrative:       PROCEDURE: XR CHEST 2 VW-        HISTORY: Cough, chest tightness     COMPARISON: 09/14/2019.     FINDINGS: The heart is normal in size. The mediastinum is unremarkable.  There are chronic interstitial lung changes with pleural thickening in  the right lung base anteriorly which is unchanged. There is a reticular  nodular pattern in the left lung base best appreciated on the frontal  view which may reflect a bronchopneumonia. There is no pneumothorax.  There are no acute osseous abnormalities.       Impression:       Chronic lung changes with a reticulonodular lung pattern in  the left lung base which may reflect a bronchopneumonia.           This report was finalized on 11/1/2019 2:04 PM by Marilia Mora M.D..          Condition on Discharge:    Stable        Discharge Disposition:        Discharge Medication:       Discharge Medications      ASK your doctor about these medications      Instructions Start Date   albuterol (2.5 MG/3ML) 0.083% nebulizer solution  Commonly known as:  PROVENTIL   2.5 mg, Nebulization, Every 4 Hours PRN      albuterol sulfate  (90 Base) MCG/ACT inhaler  Commonly known as:  PROVENTIL HFA;VENTOLIN HFA;PROAIR HFA   2 puffs, Inhalation, Every 4 Hours PRN      amLODIPine 10 MG tablet  Commonly known as:  NORVASC   10 mg, Oral, Daily      aspirin 81 MG chewable tablet   81 mg, Oral, Daily      atorvastatin 40 MG tablet  Commonly known as:  LIPITOR   40 mg, Oral, Daily      AURYXIA 1  MG(Fe) tablet  Generic drug:  Ferric Citrate   1 tablet, Oral, With every meal and every snack      clopidogrel 75 MG tablet  Commonly known as:  PLAVIX   75 mg, Oral, Daily, Start taking from 1/12/2019 onwards ONLY if not coughing up significant amount of blood.      docusate sodium 100 MG capsule  Commonly known as:  COLACE   100 mg, Oral, Every 12 Hours      fluticasone 27.5 MCG/SPRAY nasal spray  Commonly known as:   VERAMYST   2 sprays, Nasal, Daily      folic acid 1 MG tablet  Commonly known as:  FOLVITE   1 mg, Oral, Daily      hydrALAZINE 50 MG tablet  Commonly known as:  APRESOLINE   50 mg, Oral, 3 Times Daily      insulin aspart 100 UNIT/ML solution pen-injector sc pen  Commonly known as:  novoLOG FLEXPEN   9 Units, Subcutaneous, 3 Times Daily With Meals      insulin glargine 100 UNIT/ML injection  Commonly known as:  LANTUS   15 Units, Subcutaneous, Nightly      isosorbide mononitrate 30 MG 24 hr tablet  Commonly known as:  IMDUR   30 mg, Oral, Daily      levothyroxine 100 MCG tablet  Commonly known as:  SYNTHROID, LEVOTHROID   125 mcg, Oral, Daily      losartan 100 MG tablet  Commonly known as:  COZAAR   100 mg, Oral, Every 24 Hours Scheduled      metoprolol succinate  MG 24 hr tablet  Commonly known as:  TOPROL-XL   100 mg, Oral, Daily      ondansetron ODT 4 MG disintegrating tablet  Commonly known as:  ZOFRAN-ODT   4 mg, Oral, Every 6 Hours PRN      raNITIdine 150 MG tablet  Commonly known as:  ZANTAC   150 mg, Oral, 2 Times Daily      RADHA-SHIRLENE PO   1 tablet, Oral, Patient states he takes these after dialysis, but is unaware of dose.      vitamin D3 125 MCG (5000 UT) capsule capsule   1 capsule, Oral, Daily             Discharge Diet:         Activity at Discharge:         Follow-up Appointments:    Future Appointments   Date Time Provider Department Center   11/11/2019  3:00 PM Kiera Rain APRN [facility] None         Test Results Pending at Discharge:           ALEXYS Gunderson  11/06/19  5:12 PM    Time:  45  minutes were spent reviewing labs, history, evaluating patient and discharge planning.

## 2019-11-06 NOTE — PROGRESS NOTES
"Nephrology Progress Note.    LOS: 3 days    Patient Care Team:  Jeannette Godoy APRN as PCP - General (Family Medicine)    Chief Complaint:    Chief Complaint   Patient presents with   • Cough       Subjective:   Follow up for ESRD and related issues.  Interval History:   Patient Complaints: none  Patient seen and examined this morning.  Events from last night noted.  Patient denies having any fevers chills.  Positive for nausea denies any vomiting no abdominal pain.  He is complaining of right-sided chest pain, as well as shortness of breath, cough and sputum production.  There is no significant edema.   He is complaining of left-sided weakness, he does have a history of stroke on that side. He also feels does not have any strength to do anything.  History taken from: patient    Objective:    Vital Signs  /68   Pulse 90   Temp 97.6 °F (36.4 °C) (Oral)   Resp 16   Ht 172.7 cm (68\")   Wt 59.5 kg (131 lb 3.2 oz)   SpO2 100%   BMI 19.95 kg/m²     No intake/output data recorded.    Intake/Output Summary (Last 24 hours) at 11/6/2019 1009  Last data filed at 11/5/2019 1749  Gross per 24 hour   Intake 350 ml   Output 802 ml   Net -452 ml       Physical Exam:  General Appearance: alert, oriented x 3, no acute distress,   HEENT: Oral mucosa dry, extra occular movements intact. Sclera clear.  Skin: Warm and dry  Neck: supple, no JVD, trachea midline  Lungs:Chest shape is normal. Breath sounds heard bilaterally equally.  Bilateral basal crackles, No wheezing.   Heart: Irregular rate and rhythm. normal S1 and S2, no S3, no rub, peripheral pulses weak but palpable.  Abdomen: Obese, soft, non-tender,  present bowel sounds to auscultation  : no palpable bladder.  Extremities: trace edema, no cyanosis or clubbing.   Neuro: normal speech and mental status, grossly non focal.     Results Review:   Results from last 7 days   Lab Units 11/06/19  0823 11/05/19  0618 11/03/19  0608  11/01/19  1339   SODIUM mmol/L 131* " 127* 132*   < > 130*   POTASSIUM mmol/L 4.7 4.9 4.7   < > 4.6   CHLORIDE mmol/L 91* 83* 88*   < > 82*   CO2 mmol/L 20.2* 22.2 20.1*   < > 32.6*   BUN mg/dL 43* 61* 26*   < > 24*   CREATININE mg/dL 4.48* 5.92* 3.70*   < > 3.83*   CALCIUM mg/dL 9.4 9.3 9.0   < > 9.2   ALBUMIN g/dL 3.30* 3.50 3.40*   < > 3.50   BILIRUBIN mg/dL  --  0.2  --   --  0.3   ALK PHOS U/L  --  71  --   --  77   ALT (SGPT) U/L  --  17  --   --  11   AST (SGOT) U/L  --  66*  --   --  42*   GLUCOSE mg/dL 181* 123* 252*   < > 317*    < > = values in this interval not displayed.     Estimated Creatinine Clearance: 14.8 mL/min (A) (by C-G formula based on SCr of 4.48 mg/dL (H)).  Results from last 7 days   Lab Units 11/06/19  0823 11/03/19  0608 11/02/19  0407   PHOSPHORUS mg/dL 4.9* 6.2* 6.5*         Results from last 7 days   Lab Units 11/05/19  1837 11/05/19  0618 11/03/19  0608 11/02/19  0407 11/01/19  1339   WBC 10*3/mm3  --  5.89 6.38 7.12 6.37   HEMOGLOBIN g/dL 8.8* 8.0* 8.3* 8.4* 8.9*   PLATELETS 10*3/mm3  --  167 163 201 191         Brief Urine Lab Results     None        No results found for: UTPCR  Imaging Results (Last 24 Hours)     Procedure Component Value Units Date/Time    XR Chest 1 View [[id number]] Collected:  11/05/19 1909     Updated:  11/05/19 1910    Narrative:       FINAL REPORT    TECHNIQUE:  An AP portable view of the chest was obtained.    CLINICAL HISTORY:  sob    COMPARISON:  11/4/2019    FINDINGS:  There is worsening airspace consolidation in the inferior right  upper lobe.  Other bilateral airspace opacities are not  significantly changed.  The heart and vasculature are  unremarkable.  There is a persistent small right pleural  effusion.  No pneumothorax is identified.  There is no  adenopathy or significant osseous abnormality.      Impression:       Worsening right upper lobe opacity, otherwise no change.    Authenticated by Brice Sutton M.D. on 11/05/2019 07:09:31 PM    CT Abdomen Pelvis Without Contrast [[id number]]  Collected:  11/05/19 1737     Updated:  11/05/19 1738    Narrative:       FINAL REPORT    TECHNIQUE:  Routine axial images through the abdomen and pelvis were  obtained.  Oral contrast was administered. This study was  performed with techniques to keep radiation doses as low as  reasonably achievable (ALARA). Individualized dose reduction  techniques using automated exposure control or adjustment of mA  and/or kV according to the patient's size were employed.    CLINICAL HISTORY:  Abd pain, gastroenteritis or colitis suspected    FINDINGS:  Abdomen: There are multifocal patchy airspace opacities in the  bilateral lower lobes with small right pleural effusion.  The  gallbladder has been removed.  There is extensive vascular  calcification but the solid organs and ureters are otherwise  unremarkable.  The GI tract is unremarkable.  There is no  evidence of appendicitis or colitis.  Pelvis: The urinary  bladder is normal. There is no pelvic or abdominal ascites,  adenopathy or acute osseous abnormality.      Impression:       No acute abnormality of the abdomen or pelvis.  Bilateral  airspace opacities and small right pleural effusion worrisome  for pneumonia.  Extensive vascular calcification.    Authenticated by Brice Sutton M.D. on 11/05/2019 05:37:51 PM          atorvastatin 40 mg Oral Nightly   b complex-vitamin c-folic acid 1 tablet Oral Daily   cefepime 2 g Intravenous Q24H   docusate sodium 100 mg Oral Q12H   fluticasone 1 spray Each Nare Daily   folic acid 1 mg Oral Daily   guaiFENesin 600 mg Oral Q12H   insulin aspart 0-9 Units Subcutaneous 4x Daily AC & at Bedtime   insulin aspart 5 Units Subcutaneous TID With Meals   insulin detemir 12 Units Subcutaneous Nightly   ipratropium-albuterol 3 mL Nebulization Q6H - RT   lanthanum 1,000 mg Oral TID With Meals   levothyroxine 125 mcg Oral Q AM   lidocaine 1 patch Transdermal Nightly   losartan 25 mg Oral Q24H   metoprolol succinate XL 75 mg Oral Daily   pantoprazole  40 mg Intravenous Q12H   sodium chloride 10 mL Intravenous Q12H       Pharmacy Consult - Pharmacy to dose          Medication Review:   Current Facility-Administered Medications   Medication Dose Route Frequency Provider Last Rate Last Dose   • acetaminophen (TYLENOL) tablet 650 mg  650 mg Oral Q4H PRN John Sosa MD   650 mg at 11/03/19 2109    Or   • acetaminophen (TYLENOL) 160 MG/5ML solution 650 mg  650 mg Oral Q4H PRN John Sosa MD        Or   • acetaminophen (TYLENOL) suppository 650 mg  650 mg Rectal Q4H PRN John Sosa MD       • aluminum-magnesium hydroxide-simethicone (MAALOX MAX) 400-400-40 MG/5ML suspension 15 mL  15 mL Oral Q6H PRN John Sosa MD   15 mL at 11/05/19 0640   • atorvastatin (LIPITOR) tablet 40 mg  40 mg Oral Nightly John Sosa MD   40 mg at 11/05/19 2224   • b complex-vitamin c-folic acid (NEPHRO-SHIRLENE) tablet 1 tablet  1 tablet Oral Daily John Sosa MD   1 tablet at 11/04/19 0845   • benzonatate (TESSALON) capsule 100 mg  100 mg Oral TID PRN John Sosa MD   100 mg at 11/03/19 2338   • cefepime (MAXIPIME) 2 g in sodium chloride 0.9 % 100 mL IVPB  2 g Intravenous Q24H John Sosa MD 0 mL/hr at 11/04/19 2230 2 g at 11/05/19 1710   • dextrose (D50W) 25 g/ 50mL Intravenous Solution 25 g  25 g Intravenous Q15 Min PRN Carmelo Ray MD       • dextrose (GLUTOSE) oral gel 1 tube  1 tube Oral Q15 Min PRN Carmelo Ray MD       • docusate sodium (COLACE) capsule 100 mg  100 mg Oral Q12H John Sosa MD   100 mg at 11/06/19 0628   • fluticasone (FLONASE) 50 MCG/ACT nasal spray 1 spray  1 spray Each Nare Daily John Sosa MD   1 spray at 11/06/19 0943   • folic acid (FOLVITE) tablet 1 mg  1 mg Oral Daily John Sosa MD   1 mg at 11/04/19 0844   • glucagon (human recombinant) (GLUCAGEN DIAGNOSTIC) injection 1 mg  1 mg Subcutaneous PRN Carmelo Ray MD       • guaiFENesin (MUCINEX) 12 hr tablet 600 mg  600 mg Oral Q12H John Sosa,  MD   600 mg at 11/05/19 2223   • insulin aspart (novoLOG) injection 0-9 Units  0-9 Units Subcutaneous 4x Daily AC & at Bedtime Carmelo Ray MD   2 Units at 11/03/19 2110   • insulin aspart (novoLOG) injection 5 Units  5 Units Subcutaneous TID With Meals John Sosa MD   5 Units at 11/04/19 1722   • insulin detemir (LEVEMIR) injection 12 Units  12 Units Subcutaneous Nightly Carmelo Ray MD   12 Units at 11/05/19 2226   • ipratropium-albuterol (DUO-NEB) nebulizer solution 3 mL  3 mL Nebulization Q6H - RT John Sosa MD   3 mL at 11/06/19 0723   • lanthanum (FOSRENOL) chewable tablet 1,000 mg  1,000 mg Oral TID With Meals Chance Mackey MD, FASN   1,000 mg at 11/04/19 1721   • levothyroxine (SYNTHROID, LEVOTHROID) tablet 125 mcg  125 mcg Oral Q AM John Sosa MD   125 mcg at 11/06/19 0628   • lidocaine (LIDODERM) 5 % 1 patch  1 patch Transdermal Nightly Sandra Zavala DO   1 patch at 11/05/19 2226   • LORazepam (ATIVAN) tablet 0.5 mg  0.5 mg Oral Q8H PRN John Sosa MD   0.5 mg at 11/03/19 2338   • losartan (COZAAR) tablet 25 mg  25 mg Oral Q24H Vu Ambrosio MD   25 mg at 11/06/19 0942   • melatonin tablet 5 mg  5 mg Oral Nightly PRN John Sosa MD   5 mg at 11/05/19 2224   • metoprolol succinate XL (TOPROL-XL) 24 hr tablet 75 mg  75 mg Oral Daily Judit Person APRN   75 mg at 11/06/19 0942   • ondansetron (ZOFRAN) tablet 4 mg  4 mg Oral Q6H PRN John Sosa MD        Or   • ondansetron (ZOFRAN) injection 4 mg  4 mg Intravenous Q6H PRN John Sosa MD   4 mg at 11/05/19 0336   • ondansetron ODT (ZOFRAN-ODT) disintegrating tablet 4 mg  4 mg Oral Q6H PRN John Sosa MD       • pantoprazole (PROTONIX) injection 40 mg  40 mg Intravenous Q12H Judit Person APRN   40 mg at 11/06/19 0942   • Pharmacy Consult - Pharmacy to dose   Does not apply Continuous PRN Judit Person APRN       • promethazine (PHENERGAN) injection 12.5 mg  12.5 mg  Intravenous Q6H PRN Judit Person APRN       • sodium chloride 0.9 % flush 10 mL  10 mL Intravenous Q12H John Sosa MD   10 mL at 11/06/19 0944   • sodium chloride 0.9 % flush 10 mL  10 mL Intravenous PRN John Sosa MD   10 mL at 11/05/19 0336       Assessment/Plan:  1. End-stage renal disease: Patient is on Tuesday Thursday Saturday schedule we will plan on doing dialysis today.  Continue with the outpatient schedule.  2. Left lower lobe pneumonia: Healthcare associated pneumonia being treated.  3. Type 2 diabetes:  4. Hypothyroidism:  5. Coronary artery disease: Does not have any acute symptoms but elevated troponin noted.  6. Hypertension with end-stage renal disease: Blood pressure is usually under good control as his volume improves.  Continue with the home medications.  7. Peripheral arterial disease:  8. Chronic kidney disease-bone and mineral disorder:     Plan:  · Next dialysis due tomorrow will make arrangements.  · Low sodium noted, will dialyze him against a high sodium bath hopefully will improve with it.  It did improve some since yesterday he was dialyzed against 145 sodium.  · Chest x-ray shows worsening of the infiltrate, continue with current treatment plan.  Dr. Hernandez from pulmonary has been consulted.  · Details were discussed with the patient as well as family in the room.    · Details were also discussed with the hospitalist service.    · Surveillance labs.  · Further recommendations will depend on clinical course of the patient during the current hospitalization.    · I also discussed the details with the nursing staff.  · Rest as ordered.    Chance Mackey MD, FASN  11/06/19  10:09 AM    Dictated utilizing Dragon dictation.

## 2019-11-06 NOTE — PROGRESS NOTES
Adult Nutrition  Assessment/PES    Patient Name:  Talha Cutler  YOB: 1960  MRN: [id number]  Admit Date:  11/1/2019    Assessment Date:  11/6/2019    Comments:  Rec. #1: Advance diet once medically feasible to consistent carbohydrate, cardiac. Rec. #2: Continue to monitor/replace electrolytes. RD to follow pt. Consult RD PRN.     Reason for Assessment     Row Name 11/06/19 0842          Reason for Assessment    Reason For Assessment  follow-up protocol     Diagnosis  endocrine conditions;pulmonary disease;renal disease;diabetes diagnosis/complications;cardiac disease hypothyroidism, PNA, ESRD on HD, DM Type 2, CHF, HTN, CAD             Labs/Tests/Procedures/Meds     Row Name 11/06/19 0843          Labs/Procedures/Meds    Lab Results Reviewed  reviewed, pertinent     Lab Results Comments  High: Gluc, BUN, Cr, Phosphorus  Low: Na, Cl        Medications    Pertinent Medications Reviewed  reviewed, pertinent             Nutrition Prescription Ordered     Row Name 11/06/19 0843          Nutrition Prescription PO    Current PO Diet  NPO x 2 days         Evaluation of Received Nutrient/Fluid Intake     Row Name 11/06/19 0843          PO Evaluation    Number of Days PO Intake Evaluated  Insufficient Data               Problem/Interventions:  Problem 1     Row Name 11/06/19 0844          Nutrition Diagnoses Problem 1    Problem 1  Impaired Nutrient Utilization     Etiology (related to)  Medical Diagnosis     Endocrine  DM2     Renal  CKD;ESRD;Hemodialysis     Signs/Symptoms (evidenced by)  Biochemical     Specific Labs Noted  Na+;Chloride;BUN;Creatinine;Phosphorus;Glucose         Problem 2     Row Name 11/06/19 0844          Nutrition Diagnoses Problem 2    Problem 2  Increased Nutrient Needs     Macronutrient  Kcal;Protein     Etiology (related to)  Medical Diagnosis     Pulmonary/Critical Care  Pneumonia     Renal  CKD;ESRD;Hemodialysis     Signs/Symptoms (evidenced by)  Other (comment) pulmonary and  renal dysfunction             Intervention Goal     Row Name 11/06/19 0844          Intervention Goal    General  Meet nutritional needs for age/condition     PO  Advance diet     Weight  No significant weight loss         Nutrition Intervention     Row Name 11/06/19 0844          Nutrition Intervention    RD/Tech Action  Follow Tx progress;Await begin PO         Nutrition Prescription     Row Name 11/06/19 0844          Nutrition Prescription PO    PO Prescription  Begin/change diet;Begin/change supplement     Begin/Change Diet to  Clear Liquid     Supplement  Boost Breeze     Supplement Frequency  2 times a day     New PO Prescription Ordered?  No, recommended        Other Orders    Supplement  Vitamin mineral supplement     Supplement Ordered?  No, recommended     Other  continue to monitor/replace electrolytes         Education/Evaluation     Row Name 11/06/19 0845          Education    Education  Previous education by [facility]        Monitor/Evaluation    Monitor  Per protocol;I&O;Pertinent labs;Weight           Electronically signed by:  Debi Cifuentes RD  11/06/19 8:46 AM

## 2019-11-06 NOTE — NURSING NOTE
"Vomiting dark clear emesis this AM in the amount of 100 cc.  MD notified and new orders obtained for phenergan which was given with good effect.  At approximately  1100, he began vomiting again into floor and coffee ground emesis was noted.  MD notified and new orders were given.  No other distress was noted or reported.  Dialysis was performed in patient room but dialysis nurse reported that patient had wanted to stop the dialysis around [facility] point and nurse complied.  In late afternoon/early evening family reported he was \"feeling very bad.\"  Although SAT was in upper 90's, he was mildly labored and with crackles.  Dr. Nolen was notified by Shawna TELLEZ and came to floor to assess patient.  Cardiology and Gastro specialists also came to see patient.  At shift end, patient was in process of getting an echo and doctors were forming plan of action.  Care handed off to Brianda.  "

## 2019-11-06 NOTE — PROGRESS NOTES
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  He presented to the emergency room with complaints of shortness of breath and cough.  He was noted in the emergency room to have an elevated pro-Marv is 0.6 and a mildly elevated troponin at 0.48.  Lactic acid was normal.  Chest x-ray was done which showed left lower lobe infiltrate which was felt to be bronchopneumonia.  He was started on IV broad-spectrum antibiotics and admitted to the hospitalist service.    Upon admission to the hospitalist service patient has been continued on cefepime, Levaquin and vancomycin.  Repeat chest x-ray done today does show some interval improvement in the aeration.  I did see and evaluate patient at bedside for which he is complaining of some weakness.  He states when he sits up he just feels so very weak more so on the left however he has a history of having a left-sided CVA with residual weakness.  I did go ahead and get a CT of his head which did not show any acute findings.  He did have some chronic changes consistent with chronic microvascular ischemia.  Carotid duplex did not show any significant stenosis or plaque.    He did have some nausea and vomiting yesterday for which the nurse reported coffee-ground emesis.  His antiplatelets were held and Dr. Mir with gastroenterology was consulted.  I had asked the patient regarding an upper lower endoscopy for which he denied having 1 however his wife did report to Dr. Mir with gastroenterology that he had a upper and lower endoscopy in June 2019 for recurrent anemia and drop in H&H.  There was no bleeding site found.  He has had some recent nosebleeds for which  gastroenterology felt the small amount of coffee-ground emesis could be due to swallowed blood.  His H&H are stable.  He did not feel that the history was consistent with a Martha-Molina tear.      Last evening during dialysis patient stated that he started feeling worse so not much fluid could be taken off with dialysis and he became increasingly short of breath.  He had also become tachycardic and was transferred to the ICU.  Dr. Ambrosio with cardiology was consulted and did see and evaluate the patient when his dyspnea and elevated troponins.  He does not feel that the patient has sustained any recent coronary events.  He does have risk factors however for the same.  He recommended continuation of his beta-blocker as well as statin and antiplatelet therapy.  He was placed on BiPAP in the ICU which did help improve his symptoms greatly.  Repeat chest x-ray done last evening showed worsening right upper lobe opacity.  Dr. Hernandez with pulmonology has been consulted for recommendations.  Patient had a bronchoscopy approximately 1 month ago and it was negative for infection.  He does have a possible effusion on the right side for which he is planning on doing a chest x-ray tomorrow.  He did recommend continuation of broad-spectrum antibiotics.  We will continue to monitor lab work.  Respiratory cultures have been ordered and MRSA screen is pending.  He does have noted a chronic right-sided pleural effusion.    Review Of Systems:   Review of Systems   General ROS: Patient denies any fevers, chills or loss of consciousness.  Generalized weakness  ENT ROS: Denies sore throat, nasal congestion or ear pain.   Respiratory ROS: Denies cough  Does feel more shortness of breath today.   Cardiovascular ROS: Denies chest pain or palpitations. No history of exertional chest pain.   Gastrointestinal ROS: Denies any abdominal pain. No diarrhea.  No further nausea or vomiting.    Musculoskeletal ROS: Denies back pain. No muscle pain.  No calf pain.   Neurological ROS: Denies any focal weakness. No loss of consciousness. Denies any numbness. Denies neck pain.   Dermatological ROS: Denies any redness or pruritis.    Objective      Temp:  [97.4 °F (36.3 °C)-98.7 °F (37.1 °C)] 97.4 °F (36.3 °C)  Heart Rate:  [] 89  Resp:  [16-22] 18  BP: ()/(57-71) 114/61  Physical Exam    General Appearance:  Alert and cooperative, not in any acute distress.   Head:  Atraumatic and normocephalic, without obvious abnormality.   Eyes:          PERRLA, conjunctivae and sclerae normal, no Icterus. No pallor. Extraocular movements are within normal limits.           Neck: Supple, trachea midline, no thyromegaly, no carotid bruit.       Lungs:   Chest shape is normal. Breath sounds heard but decreased in bases. Crackles right lobe but wheezing. No Pleural rub or bronchial breathing.   Heart:  Normal S1 and S2, no murmur, no gallop, no rub. No JVD   Abdomen:   Normal bowel sounds, no masses, no organomegaly. Soft    non-tender, non-distended, no guarding, no rebound             Tenderness, abdomen is benign   Extremities: Moves all extremities well, no edema, no cyanosis, no clubbing. Chronic venous stasis changes to BLE, Mutiple scabbed areas to feet and hands   Pulses: Pulses palpable and equal bilaterally   Skin: Scabbed areas noted to lower extremities.       Neurologic:    Psychiatric/Behavior:     Patient is alert and can move extremities on command.    Mood normal, behavior normal       Results Review:    I have reviewed the labs, radiology results and diagnostic studies.    Results from last 7 days   Lab Units 11/05/19  1837 11/05/19  0618   WBC 10*3/mm3  --  5.89   HEMOGLOBIN g/dL 8.8* 8.0*   PLATELETS 10*3/mm3  --  167     Results from last 7 days   Lab Units 11/06/19  0823   SODIUM mmol/L 131*   POTASSIUM mmol/L 4.7   CO2 mmol/L 20.2*   CREATININE mg/dL 4.48*   GLUCOSE mg/dL 181*       Culture Data:   Blood Culture   Date Value Ref Range Status    11/01/2019 No growth at 2 days  Preliminary   11/01/2019 No growth at 2 days  Preliminary     MRSA SCREEN CX   Date Value Ref Range Status   11/01/2019   Final    No Methicillin Resistant Staphylococcus aureus isolated     Radiology Data:   Cardiology Data:    I have reviewed the medications.    Assessment/Plan     Assessment and Plan:  1.  Bilateral pneuumonia worsening right lobe- healthcare associated -he is positive for human metapneumovirus.  He is getting IV antibiotics in the form of  cefepime and vancomycin.  I have no sputum cultures.  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  He is also getting bronchodilators and mucolytic's.  Dr. Hernandez with Pulmonology has been consulted.  Will await recommendations    2.  Dizziness-patient has had work- Carotid duplex negative.   I have ordered for Blanco-Hallpike and Epley's to be done if his carotids are negative.  He has been set up with neurology as an outpatient.    3.  Nausea and vomiting with possible hematemesis with coffee ground emesis-  Dr. Mir with GI has been consulted.  I have made patient n.p.o.    3.  Diabetes mellitus type 2-patient on Levemir as well as insulin protocol.  Blood sugars are stable continue to monitor    4.  End-stage renal disease on hemodialysis-Dr. Mackey with nephrology has been consulted and managing dialysis    5.  Elevated troponin likely secondary demand ischemia.  This does appear to be chronic.  Dr. Ambrosio with cardiology was consulted.  He does not feel that the patient has an acute coronary event.  He does recommend continuation of beta-blockers.    6.  Acquired hypothyroidism    7.  History of CVA with left-sided residual deficits-patient had a repeat CT of his head since he was feeling weak.  There are no acute findings on the CT.    8.  Peripheral vascular disease    9.  Coronary artery disease-medical management    10.  Hypotension-  Resolved.    11.  Hyponatremia-  Improving after dialysis    12. Chronic systolic CHF -echo  showed the left ventricle was moderate to severely dilated.  Right ventricle cavity is mildly dilated.  He does have some moderate mitral regurgitation.  Dr. Ambrosio with cardiology consulted    13.  Fluid overload with pleural effusion-does have a chronic right pleural effusion however according to the x-ray appears a little bit worse.  He was not able to complete dialysis yesterday due to feeling worse during dialysis.    14.  Coffee-ground emesis-patient was seen and evaluated by Dr. Mir with gastroenterology who feels that the small amount of coffee-ground emesis may be secondary to recent nosebleeds that he has had.  His hemoglobin is stable.  No further intervention needed.  Patient just had an upper and lower endoscopy done in an outside facility which was reported as negative with no evidence of bleeding.  His hemoglobin is stable I will go ahead and restart his Plavix and aspirin.    DVT prophylaxis:  Heparin  Discharge Planning:   Judit Person, ALEXYS 11/06/19 3:02 PM

## 2019-11-06 NOTE — PLAN OF CARE
Problem: NPPV/CPAP (Adult)  Intervention: Optimize Tolerance of Noninvasive Ventilation   11/06/19 1821   Respiratory Interventions   Airway/Ventilation Management airway patency maintained;pulmonary hygiene promoted       Goal: Signs and Symptoms of Listed Potential Problems Will be Absent, Minimized or Managed (NPPV/CPAP)  Outcome: Ongoing (interventions implemented as appropriate)   11/06/19 1821   Goal/Outcome Evaluation   Problems Assessed (NPPV/CPAP) hypoxia/hypoxemia   Problems Present (NPPV/CPAP) hypoxia/hypoxemia

## 2019-11-07 PROBLEM — G40.901 STATUS EPILEPTICUS (HCC): Status: ACTIVE | Noted: 2019-01-01

## 2019-11-07 PROBLEM — R56.9 SEIZURE (HCC): Status: ACTIVE | Noted: 2019-01-01

## 2019-11-07 NOTE — H&P
"  CRITICAL CARE ADMISSION NOTE    Chief Complaint     Status epilepticus (CMS/[facility])    History of Present Illness     Mr. Cutler is a 60 yo male with PMH Anemia, CHF, CKD (on Tuesday, Thursday, Saturday HD), ALYSSA on CPAP at night, DM, GERD, HTN, Pleural effusion, PNA, Stroke with left sided residual weakness and peptic ulcer who presents as a transfer from [facility] with issues associated with seizures. According to documentation as patient is confused and family is unable to provide much information, patient initially presented to OSH on 11/1 with SOA and cough. He was diagnosed with HCAP positive for metapneumovirus and treated with Cefepime, Levaquin and Vancomycin. Additionally, since admission he had progressive weakness and at times felt it was worse than normal although per documentation the MD felt it to be the same as previous assessments. CT head was negative for acute findings. 11/5/19 he developed coffee-ground emesis (felt to be associated to epistaxis) as well as dizziness, tachycardia and worsening shortness of breath. With those symptoms he was unable to tolerate fluid removal during dialysis and developed respiratory decline requiring transfer to the ICU. He was place don bipap which did help however follow up CXR showed worsening right upper lobe opacity. Later in the evening he developed a period of unresponsiveness followed by an episode of \"jerking motions\" of the head and right arm. His Cefepime was transitioned to Zosyn and he was started on Keppra 500 mg IV. With his multiple issues as well as new onset seizures it was felt he could benefit from higher level of care so he was transferred to [facility] for evaluation and management.    On arrival to [facility] patient had 2 episodes of tonic clonic seizures for which he was intubated and started on Versed gtt.     In discussion with the patient's wife, he had seizures back in February where she describes him \"flopping like a fish.\" He was not started on any AEDs at " that time. He was referred to a Neurologist but has yet to be seen. Since that time, she reports that his mentation has declined and he is often confused and forgetful. She also thinks that he is weaker than previous and reports that he drops things.       Problem List, Surgical History, Family, Social History, and ROS     Past Medical History:   Diagnosis Date   • Abdominal pain    • Anemia    • Cataracts, bilateral    • CHF (congestive heart failure) (CMS/[facility])    • Chronic kidney disease    • Constipation    • Cough    • Dependence on nocturnal oxygen therapy    • Diabetes mellitus (CMS/[facility])    • Diarrhea    • End stage renal failure on dialysis (CMS/[facility])     SILVINO AVITIA, SAT   • GERD (gastroesophageal reflux disease)    • H/O blood clots     LEG/NECK   • H/O chest x-ray 03/25/2016    Interval decrease in size of the patients right pleural effusion with a persistent small left effusion as well   • History of transfusion    • Hypertension    • Left-sided weakness    • Nauseated     DRY HEAVES   • Pleural effusion    • Pneumonia    • Pneumonia    • Renal failure    • Stroke (CMS/[facility]) 2006    LEFT SIDED WEAKNESS   • Swelling    • Teeth missing    • Tinnitus    • Ulcer, stomach peptic     HISTORY OF ULCER AS A TEENAGER   • Wears glasses      Past Surgical History:   Procedure Laterality Date   • ARTERIOVENOUS FISTULA Right    • BRONCHOSCOPY N/A 1/10/2019    Procedure: BRONCHOSCOPY with MAC and Flouro. LAVAGE, BRUSHINGS AND BIOPSY;  Surgeon: Ean Hernandez MD;  Location: [facility] OR;  Service: Pulmonary   • CARDIAC CATHETERIZATION N/A 3/28/2018    Procedure: Peripheral angiography;  Surgeon: Clay Ruano MD;  Location: [facility] CATH INVASIVE LOCATION;  Service: Cardiovascular   • CARDIAC CATHETERIZATION N/A 3/28/2018    Procedure: Angioplasty-peripheral;  Surgeon: Clay Ruano MD;  Location: [facility] CATH INVASIVE LOCATION;  Service: Cardiovascular   • CARDIAC CATHETERIZATION N/A 3/28/2018     "Procedure: Atherectomy-peripheral;  Surgeon: Clay Ruano MD;  Location: [facility] CATH INVASIVE LOCATION;  Service: Cardiovascular   • CATARACT EXTRACTION, BILATERAL     • CHOLECYSTECTOMY     • COLONOSCOPY     • EYE SURGERY      BLEEDING BEHING BILATERAL EYES   • INTERVENTIONAL RADIOLOGY PROCEDURE N/A 3/28/2018    Procedure: Abdominal Aortogram;  Surgeon: Clay Ruano MD;  Location: [facility] CATH INVASIVE LOCATION;  Service: Cardiovascular   • PORTACATH PLACEMENT     • SHOULDER MANIPULATION Left     \"FROZEN SHOULDER\"   • VENOUS ACCESS DEVICE (PORT) REMOVAL         Allergies   Allergen Reactions   • Erythromycin Hives     Current Facility-Administered Medications on File Prior to Encounter   Medication   • [COMPLETED] LORazepam (ATIVAN) 2 MG/ML injection  - ADS Override Pull   • [COMPLETED] LORazepam (ATIVAN) injection 0.5 mg   • [COMPLETED] piperacillin-tazobactam (ZOSYN) IVPB 2.25 g in 100 mL NS   • [COMPLETED] vancomycin 1250 mg in sodium chloride 0.9% 250 mL IVPB   • [DISCONTINUED] acetaminophen (TYLENOL) 160 MG/5ML solution 650 mg   • [DISCONTINUED] acetaminophen (TYLENOL) suppository 650 mg   • [DISCONTINUED] acetaminophen (TYLENOL) tablet 650 mg   • [DISCONTINUED] aluminum-magnesium hydroxide-simethicone (MAALOX MAX) 400-400-40 MG/5ML suspension 15 mL   • [DISCONTINUED] aspirin EC tablet 81 mg   • [DISCONTINUED] atorvastatin (LIPITOR) tablet 40 mg   • [DISCONTINUED] b complex-vitamin c-folic acid (NEPHRO-SHIRLENE) tablet 1 tablet   • [DISCONTINUED] benzonatate (TESSALON) capsule 100 mg   • [DISCONTINUED] cefepime (MAXIPIME) 2 g in sodium chloride 0.9 % 100 mL IVPB   • [DISCONTINUED] clopidogrel (PLAVIX) tablet 75 mg   • [DISCONTINUED] dextrose (D50W) 25 g/ 50mL Intravenous Solution 25 g   • [DISCONTINUED] dextrose (GLUTOSE) oral gel 1 tube   • [DISCONTINUED] docusate sodium (COLACE) capsule 100 mg   • [DISCONTINUED] fluticasone (FLONASE) 50 MCG/ACT nasal spray 1 spray   • [DISCONTINUED] folic " acid (FOLVITE) tablet 1 mg   • [DISCONTINUED] glucagon (human recombinant) (GLUCAGEN DIAGNOSTIC) injection 1 mg   • [DISCONTINUED] guaiFENesin (MUCINEX) 12 hr tablet 600 mg   • [DISCONTINUED] heparin (porcine) 5000 UNIT/ML injection 5,000 Units   • [DISCONTINUED] HYDROcodone-acetaminophen (NORCO) 5-325 MG per tablet 1 tablet   • [DISCONTINUED] insulin aspart (novoLOG) injection 0-9 Units   • [DISCONTINUED] insulin aspart (novoLOG) injection 5 Units   • [DISCONTINUED] insulin detemir (LEVEMIR) injection 12 Units   • [DISCONTINUED] ipratropium-albuterol (DUO-NEB) nebulizer solution 3 mL   • [DISCONTINUED] isosorbide mononitrate (IMDUR) 24 hr tablet 30 mg   • [DISCONTINUED] lanthanum (FOSRENOL) chewable tablet 1,000 mg   • [DISCONTINUED] levETIRAcetam in NaCl 0.82% (KEPPRA) IVPB 500 mg   • [DISCONTINUED] levETIRAcetam in NaCl 0.82% (KEPPRA) IVPB 500 mg   • [DISCONTINUED] levothyroxine (SYNTHROID, LEVOTHROID) tablet 125 mcg   • [DISCONTINUED] lidocaine (LIDODERM) 5 % 1 patch   • [DISCONTINUED] LORazepam (ATIVAN) tablet 0.5 mg   • [DISCONTINUED] losartan (COZAAR) tablet 25 mg   • [DISCONTINUED] melatonin tablet 5 mg   • [DISCONTINUED] metoprolol succinate XL (TOPROL-XL) 24 hr tablet 75 mg   • [DISCONTINUED] ondansetron (ZOFRAN) injection 4 mg   • [DISCONTINUED] ondansetron (ZOFRAN) tablet 4 mg   • [DISCONTINUED] ondansetron ODT (ZOFRAN-ODT) disintegrating tablet 4 mg   • [DISCONTINUED] pantoprazole (PROTONIX) injection 40 mg   • [DISCONTINUED] Pharmacy Consult - Pharmacy to dose   • [DISCONTINUED] Pharmacy to dose vancomycin   • [DISCONTINUED] piperacillin-tazobactam (ZOSYN) in iso-osmotic dextrose IVPB 2.25 g (premix)   • [DISCONTINUED] piperacillin-tazobactam (ZOSYN) IVPB 2.25 g in 100 mL NS   • [DISCONTINUED] promethazine (PHENERGAN) injection 12.5 mg   • [DISCONTINUED] sodium chloride 0.9 % flush 10 mL   • [DISCONTINUED] sodium chloride 0.9 % flush 10 mL   • [DISCONTINUED] vancomycin in dextrose 5% 150 mL (VANCOCIN)  IVPB 750 mg     Current Outpatient Medications on File Prior to Encounter   Medication Sig   • acetaminophen (TYLENOL) 325 MG tablet Take 2 tablets by mouth Every 4 (Four) Hours As Needed for Mild Pain .   • albuterol (PROVENTIL HFA;VENTOLIN HFA) 108 (90 Base) MCG/ACT inhaler Inhale 2 puffs Every 4 (Four) Hours As Needed for Shortness of Air.   • albuterol (PROVENTIL) (2.5 MG/3ML) 0.083% nebulizer solution Take 2.5 mg by nebulization Every 4 (Four) Hours As Needed for Wheezing.   • aluminum-magnesium hydroxide-simethicone (MAALOX MAX) 400-400-40 MG/5ML suspension Take 15 mL by mouth Every 6 (Six) Hours As Needed for Heartburn.   • aspirin 81 MG chewable tablet Chew 81 mg Daily.   • atorvastatin (LIPITOR) 40 MG tablet Take 40 mg by mouth Daily.   • B Complex-C-Folic Acid (RADHA-SHIRLENE PO) Take 1 tablet by mouth. Patient states he takes these after dialysis, but is unaware of dose.    • benzonatate (TESSALON) 100 MG capsule Take 1 capsule by mouth 3 (Three) Times a Day As Needed for Cough.   • Cholecalciferol (VITAMIN D3) 5000 UNITS capsule capsule Take 1 capsule by mouth Daily.   • clopidogrel (PLAVIX) 75 MG tablet Take 1 tablet by mouth Daily. Start taking from 1/12/2019 onwards ONLY if not coughing up significant amount of blood.   • docusate sodium (COLACE) 100 MG capsule Take 100 mg by mouth Every 12 (Twelve) Hours.   • fluticasone (VERAMYST) 27.5 MCG/SPRAY nasal spray 2 sprays into the nostril(s) as directed by provider Daily.   • folic acid (FOLVITE) 1 MG tablet Take 1 mg by mouth daily.   • guaiFENesin (MUCINEX) 600 MG 12 hr tablet Take 1 tablet by mouth Every 12 (Twelve) Hours.   • Heparin Sodium, Porcine, (HEPARIN, PORCINE,) 5000 UNIT/ML injection Inject 1 mL under the skin into the appropriate area as directed Every 12 (Twelve) Hours.   • HYDROcodone-acetaminophen (NORCO) 5-325 MG per tablet Take 1 tablet by mouth Every 6 (Six) Hours As Needed for Moderate Pain  for up to 10 days.   • insulin aspart (novoLOG  FLEXPEN) 100 UNIT/ML solution pen-injector sc pen Inject 9 Units under the skin into the appropriate area as directed 3 (Three) Times a Day With Meals.   • insulin glargine (LANTUS) 100 UNIT/ML injection Inject 15 Units under the skin into the appropriate area as directed Every Night.   • ipratropium-albuterol (DUO-NEB) 0.5-2.5 mg/3 ml nebulizer Take 3 mL by nebulization Every 6 (Six) Hours.   • isosorbide mononitrate (IMDUR) 30 MG 24 hr tablet Take 30 mg by mouth Daily.   • lanthanum (FOSRENOL) 1000 MG chewable tablet Chew 1 tablet 3 (Three) Times a Day With Meals.   • levETIRAcetam in NaCl 0.82% (KEPPRA) 500 MG/100ML solution IVPB Infuse 100 mL into a venous catheter Every 12 (Twelve) Hours.   • levothyroxine (SYNTHROID, LEVOTHROID) 100 MCG tablet Take 125 mcg by mouth Daily.   • lidocaine (LIDODERM) 5 % Place 1 patch on the skin as directed by provider Every Night. Remove & Discard patch within 12 hours or as directed by MD   • LORazepam (ATIVAN) 0.5 MG tablet Take 1 tablet by mouth Every 8 (Eight) Hours As Needed for Anxiety for up to 6 days.   • losartan (COZAAR) 25 MG tablet Take 1 tablet by mouth Daily.   • metoprolol succinate XL (TOPROL-XL) 25 MG 24 hr tablet Take 3 tablets by mouth Daily.   • ondansetron (ZOFRAN) 4 MG tablet Take 1 tablet by mouth Every 6 (Six) Hours As Needed for Nausea or Vomiting.   • ondansetron ODT (ZOFRAN-ODT) 4 MG disintegrating tablet Take 1 tablet by mouth Every 6 (Six) Hours As Needed for Nausea or Vomiting.   • pantoprazole (PROTONIX) 40 MG injection Infuse 10 mL into a venous catheter Every 12 (Twelve) Hours.   • Vancomycin HCl-Dextrose (PHARMACY TO DOSE VANCOMYCIN) Continuous As Needed (pneumoni) for up to 5 days.   • vancomycin in dextrose 5% 150 mL (VANCOCIN) 750-5 MG/150ML-% IVPB Infuse 150 mL into a venous catheter Every 72 (Seventy-Two) Hours As Needed (pre-dialysis level) for up to 5 days.     MEDICATION LIST AND ALLERGIES REVIEWED.    Family History   Problem Relation  Age of Onset   • COPD Mother    • Diabetes Father    • Hypertension Sister    • Diabetes Sister    • Hypertension Brother    • Diabetes Paternal Grandmother      Social History     Tobacco Use   • Smoking status: Never Smoker   • Smokeless tobacco: Never Used   • Tobacco comment: 2ND HAND SMOKE EXPOSURE   Substance Use Topics   • Alcohol use: No   • Drug use: No     Social History     Social History Narrative   • Not on file     FAMILY AND SOCIAL HISTORY REVIEWED.    Review of Systems   Unable to perform ROS: Intubated   Constitutional: Positive for activity change.   Respiratory: Positive for shortness of breath.    Neurological: Positive for seizures.         Physical Exam and Clinical Information   There were no vitals taken for this visit.  Physical Exam:   GENERAL: Sedated on mechanical ventilation.    HEENT: NCAT. ET tube in place.    LUNGS: Diminished bilaterally. No wheezes. Normal effort.    HEART: Regular rate and rhythm. No murmurs, rubs or gallops.    GI: soft, nontender, nondistended.    EXTREMITIES: no cyanosis, clubbing or edema.    NEURO/PSYCH: Sedated. Moving extremities.     Results from last 7 days   Lab Units 11/05/19  1837 11/05/19  0618 11/03/19  0608 11/02/19  0407   WBC 10*3/mm3  --  5.89 6.38 7.12   HEMOGLOBIN g/dL 8.8* 8.0* 8.3* 8.4*   PLATELETS 10*3/mm3  --  167 163 201     Results from last 7 days   Lab Units 11/06/19  0823 11/05/19  0618 11/03/19  0608 11/02/19  0407   SODIUM mmol/L 131* 127* 132* 133*   POTASSIUM mmol/L 4.7 4.9 4.7 4.0   CO2 mmol/L 20.2* 22.2 20.1* 31.4*   BUN mg/dL 43* 61* 26* 35*   CREATININE mg/dL 4.48* 5.92* 3.70* 4.80*   PHOSPHORUS mg/dL 4.9*  --  6.2* 6.5*   GLUCOSE mg/dL 181* 123* 252* 55*     Estimated Creatinine Clearance: 14.8 mL/min (A) (by C-G formula based on SCr of 4.48 mg/dL (H)).      Results from last 7 days   Lab Units 11/06/19  1633   PH, ARTERIAL pH units 7.368   PCO2, ARTERIAL mm Hg 44.5   PO2 ART mm Hg 74.3*     Lab Results   Component Value Date     LACTATE 1.2 11/01/2019        IMAGES:   Imaging Results (Last 24 Hours)     ** No results found for the last 24 hours. **          I reviewed the patient's results and images.     Aye Cutler is a 58yo M with a history of ESRD who was initially admitted to [facility] on 11/1 for pneumonia. He initially improved and then his respiratory status declined to the point of requiring Bipap and he was transferred to the ICU on 11/5. He improved to nasal cannula. On the afternoon of 11/6, he was noted to become more lethargic and then developed tonic/clonic movements of his right arm and his head. He was given 500mg of IV keppra. A CT scan of the head was performed and was negative for acute abnormalities. [facility] Upon arrival, he had 2 separate episodes of tonic/clonic jerking of his extremities in less than 5 minutes without return to baseline. He was intubated for airway protection.       Active Hospital Problems    Diagnosis   • **Status epilepticus (CMS/HCC)   • Seizure (CMS/[facility])   • Hypothyroidism (acquired)   • History of CVA (cerebrovascular accident)   • Chronic systolic CHF (congestive heart failure) (CMS/HCC)   • Symptomatic anemia   • Pneumonia of right upper lobe due to infectious organism (CMS/[facility])   • Peripheral vascular disease (CMS/[facility])     Added automatically from request for surgery [id number]     • Uncontrolled diabetes mellitus with hyperglycemia, with long-term current use of insulin (CMS/[facility])   • SOB (shortness of breath)   • Physical debility   • Recurrent right pleural effusion     Plan/Recommendations     1. Admit to ICU.   2. Intubation with mechanical ventilation  3. Sedation with a Versed drip  4. Will place on continuous EEG.   5. Load with 1g of Keppra. If further seizure activity, will plan to load and start Vimpat.   6. Neurology consultation in the AM  7. Consult Nephrology for continuation of dialysis.   8. Vancomycin and Zosyn for  pneumonia.   9. AM labs    Adamaris Gutierres, MSN, AGACNP-BC, APRN    Critical Care time spent in direct patient care: 45 minutes (excluding procedure time, if applicable) including high complexity decision making to assess, manipulate, and support vital organ system failure in this individual who has impairment of one or more vital organ systems such that there is a high probability of imminent or life threatening deterioration in the patient’s condition.    I have personally seen, interviewed and examined the patient and verified all the key components of the history, physical examination, assessment and plan with ALEXYS Scott, CIARANP-BC and reviewed the note, which reflects my changes and contributions.    Winnie Kaur, DO  Pulmonary and Critical Care Medicine      CC: Jeannette Godoy APRN

## 2019-11-07 NOTE — NURSING NOTE
[address] [facility] tech performed skin integrity check with RN (Dagmar).  No signs of redness, swelling or irritation.

## 2019-11-07 NOTE — PROGRESS NOTES
Pharmacy Consult-Vancomycin Dosing  Talha Cutler is a  59 y.o. male receiving vancomycin therapy.     Indication: sepsis  Consulting Provider: ALEXYS Scott  ID Consult:     Goal Trough:15-20    Current Antimicrobial Therapy  Anti-Infectives (From admission, onward)      Ordered     Dose/Rate Route Frequency Start Stop    11/07/19 0004  piperacillin-tazobactam (ZOSYN) 3.375 g in iso-osmotic dextrose 50 ml (premix)     Ordering Provider:  Adamaris Gutierres APRN    3.375 g  over 4 Hours Intravenous Every 12 Hours 11/07/19 0600 11/14/19 0559            Allergies  Allergies as of 11/06/2019 - Reviewed 11/06/2019   Allergen Reaction Noted   • Erythromycin Hives 02/01/2017       Labs    Results from last 7 days   Lab Units 11/06/19  0823 11/05/19  0618 11/03/19  0608   BUN mg/dL 43* 61* 26*   CREATININE mg/dL 4.48* 5.92* 3.70*       Results from last 7 days   Lab Units 11/05/19  0618 11/03/19  0608 11/02/19  0407   WBC 10*3/mm3 5.89 6.38 7.12       Evaluation of Dosing     Last Dose Received in the ED/Outside Facility: 1250mg at 11/6 2200  Is Patient on Dialysis or Renal Replacement:     Ht -    Wt -      Estimated Creatinine Clearance: 14.8 mL/min (A) (by C-G formula based on SCr of 4.48 mg/dL (H)).    Intake & Output (last 3 days)       None            Microbiology and Radiology  Microbiology Results (last 10 days)       Procedure Component Value - Date/Time    Respiratory Panel, PCR - Swab, Nasopharynx [[id number]]  (Abnormal) Collected:  11/01/19 1738    Lab Status:  Final result Specimen:  Swab from Nasopharynx Updated:  11/02/19 0008     ADENOVIRUS, PCR Not Detected     Coronavirus 229E Not Detected     Coronavirus HKU1 Not Detected     Coronavirus NL63 Not Detected     Coronavirus OC43 Not Detected     Human Metapneumovirus Detected     Human Rhinovirus/Enterovirus Not Detected     Influenza B PCR Not Detected     Parainfluenza Virus 1 Not Detected     Parainfluenza Virus 2 Not Detected     Parainfluenza Virus 3  Not Detected     Parainfluenza Virus 4 Not Detected     Bordetella pertussis pcr Not Detected     Influenza A H1 2009 PCR Not Detected     Chlamydophila pneumoniae PCR Not Detected     Mycoplasma pneumo by PCR Not Detected     Influenza A PCR Not Detected     Influenza A H3 Not Detected     Influenza A H1 Not Detected     RSV, PCR Not Detected     Bordetella parapertussis PCR Not Detected    MRSA Screen Culture - Swab, Nares [[id number]]  (Normal) Collected:  11/01/19 1626    Lab Status:  Final result Specimen:  Swab from Nares Updated:  11/03/19 1202     MRSA SCREEN CX No Methicillin Resistant Staphylococcus aureus isolated    VRE Culture - Swab, Per Rectum [[id number]]  (Normal) Collected:  11/01/19 1626    Lab Status:  Final result Specimen:  Swab from Per Rectum Updated:  11/03/19 1203     VRE SCREEN CX No Vancomycin Resistant Enterococcus Isolated    Acinetobacter Screen - Swab, Axilla, Left [[id number]]  (Normal) Collected:  11/01/19 1626    Lab Status:  Final result Specimen:  Swab from Axilla, Left Updated:  11/03/19 1203     ACINETOBACTER SCREEN CX No Acinetobacter isolated    Blood Culture - Blood, Hand, Left [[id number]] Collected:  11/01/19 1552    Lab Status:  Final result Specimen:  Blood from Hand, Left Updated:  11/06/19 1515     Blood Culture No growth at 5 days    Blood Culture - Blood, Hand, Left [[id number]] Collected:  11/01/19 1545    Lab Status:  Final result Specimen:  Blood from Hand, Left Updated:  11/06/19 1515     Blood Culture No growth at 5 days    Influenza Antigen, Rapid - Swab, Nasopharynx [[id number]]  (Normal) Collected:  11/01/19 1332    Lab Status:  Final result Specimen:  Swab from Nasopharynx Updated:  11/01/19 1348     Influenza A Ag, EIA Negative     Influenza B Ag, EIA Negative            Evaluation of Level    Results from last 7 days   Lab Units 11/05/19  0618 11/02/19  0407   VANCOMYCIN RM mcg/mL 20.90 20.70                      Assessment/Plan:    The patient has been getting  vancomcyin dosed by levels at [facility], with the last dose being tonight, 11/6@2200. Will continue dosing per levels given poor renal failure. Due to infection severity, will target a trough of 15-20.    Pharmacy will continue to follow the patient’s culture results and clinical progress daily.    Solo Shields, MichaelD

## 2019-11-07 NOTE — H&P
INTENSIVIST   PROGRESS NOTE     Hospital:  LOS: 1 day      S     Mr. Talha Cutler, 59 y.o. male is followed for:  No chief complaint on file.      Status epilepticus    DM    Hypothyroidism (acquired)      As an Intensivist, we provide an integrated approach to the ICU patient and family, medical management of comorbid conditions, lead interdisciplinary rounds and coordinate the care with all other services, including those from other specialists.     Interval History:  Intubated.  Sedated.  No more seizures since he has intubated.     The patient's relevant past medical, surgical and social history were reviewed and updated in Epic as appropriate.     ROS:   ROS cannot be reliably obtained from the patient due to his Acuity of condition and Intubated.        O     Vitals:  Temp: 98.3 °F (36.8 °C) (11/07/19 1600) Temp  Min: 94.1 °F (34.5 °C)  Max: 99 °F (37.2 °C)   Temp core:      BP: 98/52 (11/07/19 1700) BP  Min: 57/35  Max: 127/63   Pulse: 93 (11/07/19 1710) Pulse  Min: 73  Max: 100   Resp: 16 (11/07/19 1700) Resp  Min: 16  Max: 28   SpO2: 99 % (11/07/19 1710) SpO2  Min: 95 %  Max: 100 %   Device: ventilator (11/07/19 1710)    Flow Rate:   No Data Recorded     Intake/Ouptut 24 hrs (7:00AM - 6:59 AM)  Intake/Output       11/06/19 0700 - 11/07/19 0659 11/07/19 0700 - 11/08/19 0659    Intake (ml) 23 1183.7    Output (ml) -- 2330    Net (ml) 23 -1146.3           Medications (drips):    dextrose Last Rate: 50 mL/hr (11/07/19 1203)   midazolam 1 mg/mL 100mL NS Last Rate: 4 mg/hr (11/07/19 1645)   Pharmacy Consult        Mechanical Ventilator:  Settings: Observed:   Mode: VC+/AC (11/07/19 1520)    Vt (Set, L): 0.48 L (11/07/19 1710)    Resp Rate (Set): 16 (11/07/19 1710) Resp Rate (Observed) Vent: 16 (11/07/19 1710)   FiO2 (%): 30 % (11/07/19 1710)    PEEP/CPAP (cm H2O): 5 cm H20 (11/07/19 1710) Plateau Pressure (cm H2O): 24 cm H2O (11/07/19 0740)    Minute Ventilation (L/min) (Obs): 7.68 L/min (11/07/19 1710)     I:E Ratio (Obs): 1:2.60 (11/07/19 1710)     Physical Examination  Telemetry:  Rhythm: normal sinus rhythm, sinus tachycardia (11/07/19 1600)         Constitutional:  No acute distress.   Cardiovascular: RRR.   Normal heart sounds.  No murmurs, gallop or rub.   Respiratory: Normal breath sounds  No adventitious sounds.   Abdominal:  Soft with no tenderness.  No distension.   No HSM.   Extremities: Warm.  Dry.  No cyanosis.  Trace Edema   Neurological:   Sedated.  Best Eye Response: 4-->(E4) spontaneous (11/07/19 1600)  Best Motor Response: 4-->(M4) withdraws from pain (11/07/19 1600)  Best Verbal Response: 4-->(V4) confused (11/07/19 1600)  [place] Coma Scale Score: 12 (11/07/19 1600)   Lines/Drains/Airways: L IJ CVC  O ETT     Hematology:  Results from last 7 days   Lab Units 11/07/19  1627 11/07/19 0135 11/05/19  1837 11/05/19 0618 11/03/19  0608   WBC 10*3/mm3  --  4.60  --  5.89 6.38   HEMOGLOBIN g/dL 10.0* 6.8* 8.8* 8.0* 8.3*   MCV fL  --  97.9*  --  91.4 95.8   PLATELETS 10*3/mm3  --  134*  --  167 163       Chemistry:  Estimated Creatinine Clearance: 13.8 mL/min (A) (by C-G formula based on SCr of 4.78 mg/dL (H)).    Results from last 7 days   Lab Units 11/07/19 0135 11/06/19 0823 11/05/19 0618   SODIUM mmol/L 134* 131* 127*   POTASSIUM mmol/L 4.4  4.4 4.7 4.9   CHLORIDE mmol/L 95* 91* 83*   CO2 mmol/L 22.0 20.2* 22.2   ANION GAP mmol/L 17.0* 19.8* 21.8*   GLUCOSE mg/dL 95 181* 123*   CALCIUM mg/dL 9.2 9.4 9.3     Results from last 7 days   Lab Units 11/07/19 0135 11/06/19 0823 11/05/19 0618   BUN mg/dL 51* 43* 61*   CREATININE mg/dL 4.78* 4.48* 5.92*     Results from last 7 days   Lab Units 11/07/19  0135 11/06/19  0823 11/03/19  0608 11/02/19  0407   MAGNESIUM mg/dL 2.8*  --   --   --    PHOSPHORUS mg/dL  --  4.9* 6.2* 6.5*       Arterial Blood Gases:  Results from last 7 days   Lab Units 11/07/19  0215 11/06/19  1633 11/06/19  0822   PH, ARTERIAL pH units 7.467* 7.368  --    PCO2, ARTERIAL mm Hg  31.2 44.5  --    PO2 ART mm Hg 76.2* 74.3*  --    FIO2 % 30 32 35       Images:  Ct Head Without Contrast    Result Date: 11/6/2019  No acute findings Authenticated by JOLENE Osman MD on 11/06/2019 04:58:08 PM    Xr Chest 1 View    Result Date: 11/7/2019  1. Left IJ catheter placement into the proximal SVC. No evidence of pneumothorax. 2. NG tube in stomach. 3. ET tube remains in good position. 4. Stable patchy right mid and lower lung disease and mild left lung interstitial changes.  D:  11/07/2019 E:  11/07/2019      Xr Chest 1 View    Result Date: 11/5/2019  Worsening right upper lobe opacity, otherwise no change. Authenticated by Brice Sutton M.D. on 11/05/2019 07:09:31 PM      Echo:  Results for orders placed during the hospital encounter of 11/01/19   Adult Transthoracic Echo Complete W/ Cont if Necessary Per Protocol    Narrative · The left ventricular cavity is moderate-to-severely dilated.  · Right ventricular cavity is mildly dilated.  · Left atrial cavity size is moderately dilated.  · Moderate mitral valve regurgitation is present  · There is a small (<1cm) pericardial effusion.          Results: Reviewed.  I reviewed the patient's new laboratory and imaging results.  I independently reviewed the patient's new images.    Medications: Reviewed.    Assessment/Plan   A / P     11/07/19     Assessment:    He was initially admitted to [facility] on 11/1 for pneumonia. But eventually respiratory status declined to the point of requiring Bipap and moved to the ICU on 11/5/19.  On the afternoon of 11/6, he was noted to become more lethargic and then developed tonic/clonic movements of his right arm and his head. He was given 500mg of IV keppra. He was transferred to [facility] for Neurology consultation. Upon arrival, he had 2 separate episodes of tonic/clonic jerking of his extremities in less than 5 minutes without return to baseline. He was then intubated.    1. Seizures  1. He has been on Cefepime at  OSH  2. Levetiracetam .  2. Respiratory failure, 2ry to CNS  1. Intubated 11/07/19   2. Mechanical ventilation  3. Pneumonia RUL at OSH (11/1/19). (Metapneumovirus, per Respiratory Panel PCR 11/1/19))  4. Small R Pleural effusion (as per CT ABD 11/5/19)  5. ALYSSA on CPAP at home  3. Hypothyroidism on Rx.  4. HFrEF   5. GERD  6. HTN  7. Previous CVA with left sided residual weakness.  8. Anemia  1. S/P transfusion 11/07/19  9. ESRD  1. HD  10. T2DM    Results from last 7 days   Lab Units 11/07/19  1735 11/07/19  1130 11/07/19  0528 11/07/19  0022 11/06/19 2011 11/06/19  1551   GLUCOSE mg/dL 128 73 96 105 116 121     Lab Results   Lab Value Date/Time    HGBA1C 8.90 (H) 09/21/2019 0647    HGBA1C 10.6 (H) 02/23/2019 0408    HGBA1C 9.6 (H) 07/13/2017 0438       Nutrition Support: Patient isn't on Tube Feeding   Modulars: Patient doesn't have any tube feeding modular orders   Diet: NPO Diet   Advance Directives: Code Status and Medical Interventions:   Ordered at: 11/06/19 9612     Code Status:    CPR     Medical Interventions (Level of Support Prior to Arrest):    Full        Plan:    1. Continue M Ventilation until seizures controlled  2. Adjust antibiotics.   3. Check PCT  4. IV D10, to prevent hypoglycemia  5. May need to follow up R effusion. (CT Scan of chest, to also assess RUL)  6. Disposition: Keep in ICU.    Plan of care and goals reviewed during interdisciplinary rounds.  I discussed the patient's findings and my recommendations with nursing staff  .  Level of Risk is High due to:  illness with threat to life or bodily function.     Time: was greater than 40 minutes.    (This excludes time spent performing separately reportable procedures and services).  Patient was at high risk of imminent or life-threatening deterioration in his condition due to Respiratory failure.     Garcia Lisa MD, FACP, FCCP, CNSC  Intensive Care Medicine, Nutrition Support and Pulmonary Medicine     [x]  Primary Attending  []   Consultant

## 2019-11-07 NOTE — NURSING NOTE
ACC REVIEW REPORT: [facility]        PATIENT NAME: Talha Cutler    PATIENT ID: [id number]    BED: N230    BED TYPE: ICU    BED GIVEN TO: HANNAH GUILLAUME RN    TIME BED GIVEN: 2214    YOB: 1960    AGE: 59    GENDER: M    PREVIOUS ADMIT TO [facility]: N    PREVIOUS ADMISSION DATE:     PATIENT CLASS: INPT    TODAY'S DATE: 11/6/2019    TRANSFER DATE: 11/6/19    ETA: WILL CALL WHEN PT LEAVES OSH    TRANSFERRING FACILITY: [facility]    TRANSFERRING FACILITY PHONE # : [phone]    TRANSFERRING MD: ANA PENALOZA NP    DATE/TIME REQUEST RECEIVED: 11/6/19 @7779    [facility] RN: MOIRA CATES RN    REPORT FROM: HANNAH GUILLAUME RN    TIME REPORT TAKEN: 2214    DIAGNOSIS: SEIZURE    REASON FOR TRANSFER TO [facility]: HIGHER LEVEL OF CARE    TRANSPORTATION: AMBULANCE    CLINICAL REASON FOR TRANSFER TO [facility]: PT TO BE SEEN BY NEURO R/T SEIZURE ACTIVITY      CLINICAL INFORMATION    HEIGHT: 172.7CM    WEIGHT: 78KG    ALLERGIES: ERYTHROMYCIN    VILLARREAL: ANURIC    INFECTIOUS DISEASE: MRSA, VRE, HUMAN METAPNEUMVIRUS    ISOLATION:     1ST VITAL SIGNS:   TIME:   TEMP:   PULSE:   B/P:   RESP:     LAST VITAL SIGNS:  TIME: 2214  TEMP: 97.3  PULSE: 75  B/P: 100/56  RESP:     LAB INFORMATION:   BUN 43  CREAT 4.48  ALBUMIN 3.30  H/H 8.8/28.0    CULTURE INFORMATION:     MEDS/IV FLUIDS: 22G LFA  VANCOMYCIN 1250MG  ZOSYN 2.25GM  KEPPRA 500MG      CARDIAC SYSTEM:    CHEST PAIN: N    RATE:     SCALE:     RHYTHM: SR    Is patient taking or has patient been given any drugs that could increase bleeding? Y  (Plavix, Brilinta, Effient, Eliquis, Xarelto, Warfarin, Integrilin, Angiomax)    DRUG: PLAVIX 75MG DAILY   DOSE/FREQUENCY:     CARDIAC ENZYMES:    DATE:   TIME:   CK:   CKMB:   LEONARDA:   TROP:     DATE:   TIME:   CK:   CKMB:   LEONARDA:   TROP:       HEART CATH:     HEART CATH DATE:     HEART CATH RESULTS:     LAD:   RCA:   CX:   LMAIN:   EF:     SWAN:     SITE:   SIZE:    DATE INSERTED:     ARTLINE:     SITE:   SIZE:   DATE INSERTED:      SHEATH:    SITE:   SIZE:   DATE INSERTED:         VASOSEAL:    SITE:   DATE INSERTED:     EXTERNAL PACEMAKER:     RATE:   EXT PACER ON:     MODE:    DATE INSERTED:   OUTPUT SETTING:   SENSITIVITY SETTING:   SENSITIVITY TYPE:     IABP:    SITE:   AUG PRESSURE:   DATE INSERTED:     CARDIAC NOTES:       RESPIRATORY SYSTEM:    LUNG SOUNDS:    CLEAR:   CRACKLES:   WHEEZES: THROUGHOUT  RHONCHI:   DIMINISHED:     ABG DATE: 11/6/19        ABG TIME: 1633    ABG RESULTS:    PH: 7.368  PO2: 74.3  PCO2: 44.5  HCO3: 25.6  O2 SAT: 95      ETT:     ETT SIZE:     ORAL:     NASAL:     SECURED AT MEASUREMENT (CM):     ON VENTILATOR:    TV:   FI02:   RATE:   PEEP:     OXYGEN: Y    O2 SAT: 100    ADMINISTRATION ROUTE: BIPAP 35%FIO2 12/8/NASAL CANNULA 2 LITER    IMAGING FINDINGS:     PNEUMO LOCATION:     PNEUMO SIZE:     PNEUMO CHEST TUBE SEAL TYPE:     RADIOLOGY RESULTS:     RESPIRATORY STATUS:       CNS/MUSCULOSKELETAL    ALERT AND ORIENTED:    PERSON: Y  PLACE: Y  TIME: Y    INJURY:  WHERE:     BESSIE COMA SCALE:    E: 4  M: 6  V: 5    STROKE SCALE:     SIZE OF HEMORRHAGE:     SYMPTOMS: (CHOOSE APPROPRIATE)    ASPHASIA:   ATAXIA:   DYSARTHRIA:   DYSPHASIA:   HEADACHE:   PARALYSIS:   SEIZURE:   SYNCOPE:   VERTIGO:   VISION CHANGE:        EXTREMITY WEAKNESS:    LEFT ARM: Y  RIGHT ARM:   LEFT LEG: Y  RIGHT LEG:     CAT SCAN RESULTS:     MRI RESULTS:     CNS/MUSCULOSKELETAL NOTES:       GI//GY      ABDOMINAL PAIN: N    VOMITING: N    DIARRHEA: N    NAUSEA: N    BOWEL SOUNDS: ACTIVE    OCCULT STOOL: N    VAGINAL BLEEDING: N/A    TESTICULAR PAIN: N    HEMATURIA: N    NG TUBE:    SIZE:   DATE INSERTED:       ULTRASOUND:     ULTRASOUND RESULTS:       ACUTE ABDOMEN:     ACUTE ABDOMEN RESULTS:       CT SCAN:     CT SCAN RESULTS:       GI//GY NOTES:     PAST MEDICAL HISTORY: PNEUMONIA  HTN  HLD  DM  HEART DISEASE  STROKE WITH LEFT SIDE DEFICIT  HYPOTHYROID  HEART FAILURE  CAD  PAD  CKD  ESRD DIALYSIS (TU,TH, SAT)      OTHER SYMPTOM  NOTES:     ADDITIONAL NOTES: HAD SKIN ISSUES ON SCROTUM.  SMALL ABCESS NOTED.  OPEN TO AIR.  PT WEARS BRIEFS, BUT IS ANURIC          Grace Sheikh RN  11/6/2019  10:26 PM

## 2019-11-07 NOTE — PLAN OF CARE
Problem: Skin Injury Risk (Adult)  Goal: Identify Related Risk Factors and Signs and Symptoms  Outcome: Ongoing (interventions implemented as appropriate)      Problem: Fall Risk (Adult)  Goal: Identify Related Risk Factors and Signs and Symptoms  Outcome: Ongoing (interventions implemented as appropriate)      Problem: Restraint, Nonbehavioral (Nonviolent)  Goal: Rationale and Justification  Outcome: Ongoing (interventions implemented as appropriate)      Problem: Patient Care Overview  Goal: Plan of Care Review  Outcome: Ongoing (interventions implemented as appropriate)   11/07/19 0513   Coping/Psychosocial   Plan of Care Reviewed With family   Plan of Care Review   Progress improving   OTHER   Outcome Summary Continuous EEG in place, Patient remains on ventilator and sedated with versed. response to pain but does not follow commands. patient recieved dialysis and 2.3 liters,2 units PRBC administered. Attempting to wean versed as tolerated. no other needs expressed and will continue to monitor       Problem: Hemodialysis (Adult)  Goal: Signs and Symptoms of Listed Potential Problems Will be Absent, Minimized or Managed (Hemodialysis)  Outcome: Ongoing (interventions implemented as appropriate)      Problem: Ventilation, Mechanical Invasive (Adult)  Goal: Signs and Symptoms of Listed Potential Problems Will be Absent, Minimized or Managed (Ventilation, Mechanical Invasive)  Outcome: Ongoing (interventions implemented as appropriate)

## 2019-11-07 NOTE — NURSING NOTE
Patient transferred to [facility] per [facility]. EMS at this time.  Report to ROSALINDA Edwards.

## 2019-11-07 NOTE — PROGRESS NOTES
Clinical Nutrition   Reason For Visit: MDR, Admission assessment    Patient Name: Talha Cutler  YOB: 1960  MRN: [id number]  Date of Encounter: 11/07/19 10:35 AM  Admission date: 11/6/2019      Nutrition Assessment     Admission Problem List:  Pt admitted to [facility] (11/1) with HCAP positive for metapneumovirus, progressive weakness, respiratory decline, new onset seizures. Pt transferred to [facility] (11/6) for higher level of care.    Status epilepticus  Acute respiratory failure  Vent (11/7)  ESRD- HD  A/c anemia      Skin: WOCN (11/7)- Consulted to assess patient for a possible pressure injury to his coccyx POA. Assessment performed. Patients coccyx/buttocks is intact, pink, and blanching. BLE scabbing noted.       Applicable PMH:  CHF  HTN  PVD  CVA  DM  Hypothyroid  GERD  ALYSSA, cpap  ESRD  HD- Tues/Thurs/Sat  Anemia  Seizures- February 2019      Applicable Nutrition-Related Information:  From (11/1-11/6) pt was on-and-off a cardiac/consistent carbohydrate/renal diet with 40% PO intake of 12 documented meals. During admission at [facility] pt was also NPO, however unable to determine amount of time pt was NPO.        Reported/Observed/Food/Nutrition Related History     Pt intubated around midnight last night. Planning for pt to have HD today. No family present at time of RD visit. Intensivist reports no plans to start enteral nutrition today.       Anthropometrics   Height: 68 in  Weight: 131 lb per bed scale (11/4)  BMI: 20.0  BMI classification: Normal: 18.5-24.9kg/m2       Weight Weight (kg) Weight (lbs) Weight Method   11/4/2019 59.512 kg 131 lb 3.2 oz Bed scale   11/3/2019 58.695 kg 129 lb 6.4 oz Bed scale   11/2/2019 58.559 kg 129 lb 1.6 oz Bed scale   11/1/2019 56.065 kg 123 lb 9.6 oz Bed scale   11/1/2019 56.7 kg 125 lb Standing scale   9/20/2019 60.192 kg 132 lb 11.2 oz Bed scale   9/14/2019 58.968 kg 130 lb -   8/31/2019 56.8 kg 125 lb 3.5 oz Stated   8/15/2019 54.432 kg 120 lb Stated   5/15/2019  54.432 kg 120 lb -   4/26/2019 53.751 kg 118 lb 8 oz Standing scale   2/26/2019 61.434 kg 135 lb 7 oz Bed scale   2/25/2019 58.65 kg 129 lb 4.8 oz Bed scale   2/23/2019 58.922 kg 129 lb 14.4 oz Bed scale   2/22/2019 58.922 kg 129 lb 14.4 oz -       Labs reviewed   Labs reviewed: Yes  Results from last 7 days   Lab Units 11/07/19  0135 11/06/19 0823 11/05/19 0618 11/03/19  0608 11/02/19  0407   SODIUM mmol/L 134* 131* 127* 132* 133*   POTASSIUM mmol/L 4.4  4.4 4.7 4.9 4.7 4.0   CHLORIDE mmol/L 95* 91* 83* 88* 85*   CO2 mmol/L 22.0 20.2* 22.2 20.1* 31.4*   BUN mg/dL 51* 43* 61* 26* 35*   CREATININE mg/dL 4.78* 4.48* 5.92* 3.70* 4.80*   GLUCOSE mg/dL 95 181* 123* 252* 55*   CALCIUM mg/dL 9.2 9.4 9.3 9.0 9.3   PHOSPHORUS mg/dL  --  4.9*  --  6.2* 6.5*   MAGNESIUM mg/dL 2.8*  --   --   --   --      Results from last 7 days   Lab Units 11/07/19 0135 11/06/19 0823 11/05/19 0618 11/03/19  0608   WBC 10*3/mm3 4.60  --  5.89 6.38   ALBUMIN g/dL  --  3.30* 3.50 3.40*     Results from last 7 days   Lab Units 11/07/19  0528 11/07/19  0022 11/06/19 2011 11/06/19  1551 11/06/19  1056 11/05/19 2004   GLUCOSE mg/dL 96 105 116 121 164* 177*     Lab Results   Lab Value Date/Time    HGBA1C 8.90 (H) 09/21/2019 0647    HGBA1C 10.6 (H) 02/23/2019 0408    HGBA1C 9.6 (H) 07/13/2017 0438     Medications reviewed   Medications reviewed: Yes  Pertinent: keppra, antibiotics  GTT: versed      Needs Assessment  (11/7)   Height used: 68 in/173 cm  Weight used: 131 lb/60 kg    Estimated Calories needs: ~1600 kcal/day  PSU (Ve= 9.0, Tmax= 37.2)= 1610 kcal  25-30 kcal/kg= 9364-9904 kcal    Estimated Protein needs: ~72 g pro/day   1.2 g/kg= 72 g pro    Current Nutrition Prescription   PO: NPO Diet    Evaluation of Received Nutrient/Fluid Intake:  Insufficient data       Nutrition Diagnosis   11/7  Problem Inadequate energy intake   Etiology Clinical condition, diet order   Signs/Symptoms Pt requiring mechanical ventilation, NPO; prior to being  intubated pt had consumed 40% PO intake of 12 documented meals at [facility] from (11/1-11/6)         Intervention   Intervention: Follow treatment progress, Care plan reviewed   -Rec initiating enteral nutrition via NGT  -EN recs provided, however the intensivist is managing the nutrition support    N[employer] Renal at 40 ml/hr (goal volume= 800 ml/day) and free water at 30 ml q 2 hrs via NGT    -Will provide at goal volume:  1600 kcal, 100%  72 g pro, 100%  0 g fiber  19 mEq K+  655 mg phos  574 ml water from EN  874 ml water total      Goal:   General: Nutrition support treatment  EN/PN: Initiate EN      Monitoring/Evaluation:       Monitoring/Evaluation: Per protocol, I&O, Pertinent labs, Symptoms       Will Continue to follow per protocol  Marianna Cristina, MS RD/LD CNSC  Time Spent: 45 mintes

## 2019-11-07 NOTE — PLAN OF CARE
Problem: Ventilation, Mechanical Invasive (Adult)  Intervention: Prevent Airway Displacement/Mechanical Dysfunction   11/07/19 1457   Prevent Airway Displacement/Mechanical Dysfunction   Airway Safety Measures manual resuscitator/mask/valve in room;suction at bedside     Intervention: Prevent Ventilator-Induced Lung Injury   11/07/19 1457   Respiratory Interventions   Lung Protection Measures low inspiratory pressure provided;low tidal volume provided;lung compliance monitored;optimal PEEP applied;plateau/inspiratory pressure monitored;ventilator synchrony promoted;ventilator waveforms monitored     Intervention: Prevent Ventilator-Associated Pneumonia (VAP)   11/07/19 1457   Positioning   Head of Bed (HOB) HOB at 30-45 degrees   VAP Prevention Measures   VAP Prevention Bundle HOB elevation maintained;oral care regularly provided;readiness to extubate assessed;vent circuit breaks minimized     Intervention: Optimize Oxygenation/Ventilation   11/07/19 1457   Respiratory Interventions   Airway/Ventilation Management airway patency maintained;calming measures promoted;humidification applied;pulmonary hygiene promoted

## 2019-11-07 NOTE — PLAN OF CARE
"Problem: Patient Care Overview  Goal: Plan of Care Review   11/07/19 8790   OTHER   Outcome Summary Patient arrived from [facility] around 2335 on 4L nc with VSS; patient alert to self but not to time, situation or place; patient was conversive on arrival but steadily became more confused and slow/sluggish in response to verbal questioning; patient experienced x2 tonic clonic seizures lasting 15 and 30 seconds respectively with head and right arm jerking; patient was able to verbalize that his, \"head felt funny\", but not much else; patient was intubated for airway protection around midnight (2mg versed and 40 etomidate); subsequently restrainted patient for additional safety and placed seizure padding on rails; continuous EEG initiated; keppra gtt given and is currently on versed gtt at 5mg; BP has been labile but has not needed support via gtt; patient has extremely poor venous access (x1 22ga left wrist) and was unable to attain additional access (ultrasound attempted); PICC consult placed by NP; critical HH called by lab (6.8) and 1 unit PRBC ordered which is will be infusing presently; patient is stable at this time; will continue to monitor;         "

## 2019-11-07 NOTE — CONSULTS
Referring Provider: Dr. Lisa  Reason for Consultation: Convulsions    Patient Care Team:  Jeannette Godoy APRN as PCP - General (Family Medicine)    Chief complaint convulsions    Subjective .     History of present illness: 59-yo man with PMH notable for anemia, CHF, CKD on HD, DM, HTN, stroke with residual left hemiparesis, ALYSSA on CPAP was transferred overnight from [facility] due to seizures.  Patient originally presented to outside hospital on 11/1 with shortness of breath and cough, was diagnosed with pneumonia positive for metapneumovirus and treated with cefepime, Levaquin and vancomycin.  During hospitalization he had progressive weakness possibly worse than baseline and head CT was negative for acute findings.  11/5/2019 he developed coffee-ground emesis felt to be associated with epistaxis as well as dizziness, tachycardia and increased shortness of breath.  He could not tolerate fluid removal of dialysis and developed respiratory decline requiring transfer to the ICU.  On BiPAP he had worsening right upper lobe opacity and later developed a period of unresponsiveness followed by jerking motions of the head and right arm.  Cefepime was transitioned to Zosyn and he was started on Keppra 500 mg.  He was then transferred here and on arrival had 2 tonic-clonic seizures for which she was intubated and started on a Versed drip.  His wife reported seizures some months ago but he was not started on anticonvulsant and had not yet seen a neurologist.  His wife reported he is often confused and forgetful.  Patient unable to give history, but other family members at bedside this morning report patient had 4 episodes witnessed yesterday starting about 6 PM, over the ensuing hour.  With 2 of the episodes he had jerking of his head from one side to the other crossing midline bilaterally.  This went on for 15 to 20 seconds each time.  He remained awake throughout the episodes.  He has had 2 additional brief episodes  with bicycling movements of his legs during which he was awake and aware and asked what his legs were doing.  Family at bedside have never seen any episodes like this before.    Review of Systems  Review of systems could not be obtained due to   patient unresponsive.     History  Past Medical History:   Diagnosis Date   • Abdominal pain    • Anemia    • Cataracts, bilateral    • CHF (congestive heart failure) (CMS/[facility])    • Chronic kidney disease    • Constipation    • Cough    • Dependence on nocturnal oxygen therapy    • Diabetes mellitus (CMS/[facility])    • Diarrhea    • End stage renal failure on dialysis (CMS/[facility])     TUES, THURS, SAT   • GERD (gastroesophageal reflux disease)    • H/O blood clots     LEG/NECK   • H/O chest x-ray 03/25/2016    Interval decrease in size of the patients right pleural effusion with a persistent small left effusion as well   • History of transfusion    • Hypertension    • Left-sided weakness    • Nauseated     DRY HEAVES   • Pleural effusion    • Pneumonia    • Pneumonia    • Renal failure    • Stroke (CMS/[facility]) 2006    LEFT SIDED WEAKNESS   • Swelling    • Teeth missing    • Tinnitus    • Ulcer, stomach peptic     HISTORY OF ULCER AS A TEENAGER   • Wears glasses    ,   Past Surgical History:   Procedure Laterality Date   • ARTERIOVENOUS FISTULA Right    • BRONCHOSCOPY N/A 1/10/2019    Procedure: BRONCHOSCOPY with MAC and Flouro. LAVAGE, BRUSHINGS AND BIOPSY;  Surgeon: Ean Hernandez MD;  Location: [facility] OR;  Service: Pulmonary   • CARDIAC CATHETERIZATION N/A 3/28/2018    Procedure: Peripheral angiography;  Surgeon: Clay Ruano MD;  Location: [facility] CATH INVASIVE LOCATION;  Service: Cardiovascular   • CARDIAC CATHETERIZATION N/A 3/28/2018    Procedure: Angioplasty-peripheral;  Surgeon: Clay Ruano MD;  Location: [facility] CATH INVASIVE LOCATION;  Service: Cardiovascular   • CARDIAC CATHETERIZATION N/A 3/28/2018    Procedure: Atherectomy-peripheral;  Surgeon:  "Clay Ruano MD;  Location: [facility] CATH INVASIVE LOCATION;  Service: Cardiovascular   • CATARACT EXTRACTION, BILATERAL     • CHOLECYSTECTOMY     • COLONOSCOPY     • EYE SURGERY      BLEEDING BEHING BILATERAL EYES   • INTERVENTIONAL RADIOLOGY PROCEDURE N/A 3/28/2018    Procedure: Abdominal Aortogram;  Surgeon: Clay Ruano MD;  Location: [facility] CATH INVASIVE LOCATION;  Service: Cardiovascular   • PORTACATH PLACEMENT     • SHOULDER MANIPULATION Left     \"FROZEN SHOULDER\"   • VENOUS ACCESS DEVICE (PORT) REMOVAL     ,   Family History   Problem Relation Age of Onset   • COPD Mother    • Diabetes Father    • Hypertension Sister    • Diabetes Sister    • Hypertension Brother    • Diabetes Paternal Grandmother    ,   Social History     Tobacco Use   • Smoking status: Never Smoker   • Smokeless tobacco: Never Used   • Tobacco comment: 2ND HAND SMOKE EXPOSURE   Substance Use Topics   • Alcohol use: No   • Drug use: No   ,   Medications Prior to Admission   Medication Sig Dispense Refill Last Dose   • acetaminophen (TYLENOL) 325 MG tablet Take 2 tablets by mouth Every 4 (Four) Hours As Needed for Mild Pain .      • albuterol (PROVENTIL HFA;VENTOLIN HFA) 108 (90 Base) MCG/ACT inhaler Inhale 2 puffs Every 4 (Four) Hours As Needed for Shortness of Air. 1 inhaler 0 Taking   • albuterol (PROVENTIL) (2.5 MG/3ML) 0.083% nebulizer solution Take 2.5 mg by nebulization Every 4 (Four) Hours As Needed for Wheezing.   11/1/2019 at Unknown time   • aluminum-magnesium hydroxide-simethicone (MAALOX MAX) 400-400-40 MG/5ML suspension Take 15 mL by mouth Every 6 (Six) Hours As Needed for Heartburn.      • aspirin 81 MG chewable tablet Chew 81 mg Daily.   11/1/2019 at Unknown time   • atorvastatin (LIPITOR) 40 MG tablet Take 40 mg by mouth Daily.   11/1/2019 at Unknown time   • B Complex-C-Folic Acid (RADHA-SHIRLENE PO) Take 1 tablet by mouth. Patient states he takes these after dialysis, but is unaware of dose.    11/1/2019 at " Unknown time   • benzonatate (TESSALON) 100 MG capsule Take 1 capsule by mouth 3 (Three) Times a Day As Needed for Cough.      • Cholecalciferol (VITAMIN D3) 5000 UNITS capsule capsule Take 1 capsule by mouth Daily.   11/1/2019 at Unknown time   • clopidogrel (PLAVIX) 75 MG tablet Take 1 tablet by mouth Daily. Start taking from 1/12/2019 onwards ONLY if not coughing up significant amount of blood. 90 tablet 3 11/1/2019 at Unknown time   • docusate sodium (COLACE) 100 MG capsule Take 100 mg by mouth Every 12 (Twelve) Hours.   11/1/2019 at Unknown time   • fluticasone (VERAMYST) 27.5 MCG/SPRAY nasal spray 2 sprays into the nostril(s) as directed by provider Daily.   9/20/2019 at 0800   • folic acid (FOLVITE) 1 MG tablet Take 1 mg by mouth daily.   11/1/2019 at Unknown time   • guaiFENesin (MUCINEX) 600 MG 12 hr tablet Take 1 tablet by mouth Every 12 (Twelve) Hours.      • Heparin Sodium, Porcine, (HEPARIN, PORCINE,) 5000 UNIT/ML injection Inject 1 mL under the skin into the appropriate area as directed Every 12 (Twelve) Hours.      • HYDROcodone-acetaminophen (NORCO) 5-325 MG per tablet Take 1 tablet by mouth Every 6 (Six) Hours As Needed for Moderate Pain  for up to 10 days.      • insulin aspart (novoLOG FLEXPEN) 100 UNIT/ML solution pen-injector sc pen Inject 9 Units under the skin into the appropriate area as directed 3 (Three) Times a Day With Meals.   9/20/2019 at 0800   • insulin glargine (LANTUS) 100 UNIT/ML injection Inject 15 Units under the skin into the appropriate area as directed Every Night.   11/1/2019 at Unknown time   • ipratropium-albuterol (DUO-NEB) 0.5-2.5 mg/3 ml nebulizer Take 3 mL by nebulization Every 6 (Six) Hours. 360 mL     • isosorbide mononitrate (IMDUR) 30 MG 24 hr tablet Take 30 mg by mouth Daily.   11/1/2019 at Unknown time   • lanthanum (FOSRENOL) 1000 MG chewable tablet Chew 1 tablet 3 (Three) Times a Day With Meals.      • levETIRAcetam in NaCl 0.82% (KEPPRA) 500 MG/100ML solution  IVPB Infuse 100 mL into a venous catheter Every 12 (Twelve) Hours. 4000 mL     • levothyroxine (SYNTHROID, LEVOTHROID) 100 MCG tablet Take 125 mcg by mouth Daily.   11/1/2019 at Unknown time   • lidocaine (LIDODERM) 5 % Place 1 patch on the skin as directed by provider Every Night. Remove & Discard patch within 12 hours or as directed by MD      • LORazepam (ATIVAN) 0.5 MG tablet Take 1 tablet by mouth Every 8 (Eight) Hours As Needed for Anxiety for up to 6 days.      • losartan (COZAAR) 25 MG tablet Take 1 tablet by mouth Daily.      • metoprolol succinate XL (TOPROL-XL) 25 MG 24 hr tablet Take 3 tablets by mouth Daily.      • ondansetron (ZOFRAN) 4 MG tablet Take 1 tablet by mouth Every 6 (Six) Hours As Needed for Nausea or Vomiting.      • ondansetron ODT (ZOFRAN-ODT) 4 MG disintegrating tablet Take 1 tablet by mouth Every 6 (Six) Hours As Needed for Nausea or Vomiting. 20 tablet 0 Not Taking   • pantoprazole (PROTONIX) 40 MG injection Infuse 10 mL into a venous catheter Every 12 (Twelve) Hours.      • Vancomycin HCl-Dextrose (PHARMACY TO DOSE VANCOMYCIN) Continuous As Needed (pneumoni) for up to 5 days.      • vancomycin in dextrose 5% 150 mL (VANCOCIN) 750-5 MG/150ML-% IVPB Infuse 150 mL into a venous catheter Every 72 (Seventy-Two) Hours As Needed (pre-dialysis level) for up to 5 days.      , Scheduled Meds:    chlorhexidine 15 mL Mouth/Throat Q12H   heparin (porcine) 5,000 Units Subcutaneous Q12H   insulin regular 0-9 Units Subcutaneous Q6H   levETIRAcetam 500 mg Intravenous Q12H   pantoprazole 40 mg Intravenous Q AM   piperacillin-tazobactam 3.375 g Intravenous Q12H   sodium chloride 10 mL Intravenous Q12H   [START ON 11/8/2019] vancomycin (dosing per levels)  Does not apply Daily   , Continuous Infusions:    midazolam 1 mg/mL 100mL NS 1-10 mg/hr Last Rate: 5 mg/hr (11/07/19 0746)   Pharmacy Consult     , PRN Meds:  Pharmacy Consult  •  sodium chloride and Allergies:  Erythromycin    Objective     Vital  Signs   Blood pressure 105/57, pulse 96, temperature 99 °F (37.2 °C), temperature source Oral, resp. rate 16, SpO2 98 %.    Physical Exam:   Ill-appearing thin middle-aged man lying intubated on Versed drip, with eyes closed, unresponsive to voice or tactile stim.  There is slight withdrawal of left upper extremity to painful stim otherwise unresponsive.  Attention is diminished, memory, language and fund of knowledge cannot be assessed due to comatose state.  Pupils 2 mm and reactive, no blink to visual threat, sluggish corneals, no doll's reflex, no facial grimace elicited  Motor: No significant limb movement elicited but increased tone/spasticity of left upper extremity  Reflexes trace to absent, no response to plantar stim  Patient unable to cooperate with coordination testing  Neck is supple, IJ in place and unable to assess for bruit  Heart RRR no murmur appreciated  Lungs with diminished breath sounds, symmetric chest wall expansion  Abdomen soft, nondistended, bowel sounds quiet  Extremities with no cyanosis or edema      Results Review:   I reviewed the patient's new clinical results.  I reviewed the patient's new imaging results and agree with the interpretation.  I reviewed the patient's other test results and agree with the interpretation  Labs reviewed, ABG with pH 7.4 7/31/76/22  BMP with BUN 51 creatinine 4.78 sodium 134 chloride 95 GFR 13  CBC with white count 4.6 H&H 6.8 and 23, platelets 134  CMP 11/5 with AST 66 chloride 83 sodium 127 creatinine 5.92 BUN 61 glucose 123  Troponin 11/2 of 0.419  Routine EEG this morning showed moderate generalized slowing no epileptiform activity  Head CT images reviewed, agree no acute abnormality; possible/probable old right thalamic and questionably left basal ganglia strokes      Assessment/Plan       Status epilepticus (CMS/HCC)    Recurrent right pleural effusion    SOB (shortness of breath)    Uncontrolled diabetes mellitus with hyperglycemia, with long-term  current use of insulin (CMS/HCC)    Physical debility    Peripheral vascular disease (CMS/HCC)    Pneumonia of right upper lobe due to infectious organism (CMS/HCC)    Symptomatic anemia    History of CVA (cerebrovascular accident)    Hypothyroidism (acquired)    Chronic systolic CHF (congestive heart failure) (CMS/HCC)    Seizure (CMS/HCC)      New onset seizures- in patient with chronic ill health, history of stroke, recent pneumonia treated with cefepime among other antibiotics.  Now off cefepime.  Had multiple episodes yesterday concerning for partial seizures (would suspect frontal lobe onset if episodes of bilateral movements with retained consciousness or epileptic) as well as generalized tonic-clonic seizures.  History of possible seizures some months ago, family at bedside not able to confirm.  Now on Keppra.  EEG shows no epileptiform activity.  Will taper Versed drip and monitor EEG as well as clinical response.  Will also need MRI, when EEG discontinued.    I discussed the patients findings and my recommendations with family and primary care team    Haydee Suarez MD  11/07/19  10:19 AM

## 2019-11-07 NOTE — PLAN OF CARE
Problem: Hemodialysis (Adult)  Goal: Signs and Symptoms of Listed Potential Problems Will be Absent, Minimized or Managed (Hemodialysis)  Outcome: Ongoing (interventions implemented as appropriate)   11/07/19 1433   Goal/Outcome Evaluation   Problems Assessed (Hemodialysis) all   Problems Present (Hemodialysis) none

## 2019-11-07 NOTE — PLAN OF CARE
Problem: Patient Care Overview  Goal: Plan of Care Review  Outcome: Ongoing (interventions implemented as appropriate)   11/07/19 1010   Plan of Care Review   Progress no change   OTHER   Outcome Summary Consulted to assess patient for a possible pressure injury to his coccyx POA. Assessment performed. Patients coccyx/buttocks is intact, pink, and blanching. BLE scabbing noted. Just leave as is and good general skin care and barrier cream for moisture management. No need for WOC nurse at this time. Will sign off. Thanks

## 2019-11-07 NOTE — CONSULTS
Patient Care Team:  Jeannette Godoy APRN as PCP - General (Family Medicine)    Chief complaint: ESRD on TTS rand    Subjective     Patient is sedated Unable to give hx. HPI obtained from documentation    Mr Cutler is a 60 yo gentleman w past medical hx of ESRD on TTs rand, CVS w left sided residual deficit, HTN, DM, CAD. He has been admitted in the hospital several times for recurrent PNA in last one month. Patient was recent transferred from OSH for eval of tonic clonic seizure activity. He Is currently intubated for airway protection, sedated on 24hr EEG monitoring. He has been on ESRD per TTs [facility]. Nephrology consulted for dialysis need during this hospital stay. Review of labs showed no significant elyte issues.Hb low. Getting 1 U PRBC        Review of Systems   Unable to perform ROS: Acuity of condition        Past Medical History:   Diagnosis Date   • Abdominal pain    • Anemia    • Cataracts, bilateral    • CHF (congestive heart failure) (CMS/[facility])    • Chronic kidney disease    • Constipation    • Cough    • Dependence on nocturnal oxygen therapy    • Diabetes mellitus (CMS/[facility])    • Diarrhea    • End stage renal failure on dialysis (CMS/[facility])     SILVINO AVITIA, SAT   • GERD (gastroesophageal reflux disease)    • H/O blood clots     LEG/NECK   • H/O chest x-ray 03/25/2016    Interval decrease in size of the patients right pleural effusion with a persistent small left effusion as well   • History of transfusion    • Hypertension    • Left-sided weakness    • Nauseated     DRY HEAVES   • Pleural effusion    • Pneumonia    • Pneumonia    • Renal failure    • Stroke (CMS/[facility]) 2006    LEFT SIDED WEAKNESS   • Swelling    • Teeth missing    • Tinnitus    • Ulcer, stomach peptic     HISTORY OF ULCER AS A TEENAGER   • Wears glasses    ,   Past Surgical History:   Procedure Laterality Date   • ARTERIOVENOUS FISTULA Right    • BRONCHOSCOPY N/A 1/10/2019    Procedure: BRONCHOSCOPY with MAC and Flouro. LAVAGE, BRUSHINGS  "AND BIOPSY;  Surgeon: Ean Hernandez MD;  Location: [facility] OR;  Service: Pulmonary   • CARDIAC CATHETERIZATION N/A 3/28/2018    Procedure: Peripheral angiography;  Surgeon: Clay Ruano MD;  Location: [facility] CATH INVASIVE LOCATION;  Service: Cardiovascular   • CARDIAC CATHETERIZATION N/A 3/28/2018    Procedure: Angioplasty-peripheral;  Surgeon: Clay Ruano MD;  Location: [facility] CATH INVASIVE LOCATION;  Service: Cardiovascular   • CARDIAC CATHETERIZATION N/A 3/28/2018    Procedure: Atherectomy-peripheral;  Surgeon: Clay Ruano MD;  Location: [facility] CATH INVASIVE LOCATION;  Service: Cardiovascular   • CATARACT EXTRACTION, BILATERAL     • CHOLECYSTECTOMY     • COLONOSCOPY     • EYE SURGERY      BLEEDING BEHING BILATERAL EYES   • INTERVENTIONAL RADIOLOGY PROCEDURE N/A 3/28/2018    Procedure: Abdominal Aortogram;  Surgeon: Clay Ruano MD;  Location: [facility] CATH INVASIVE LOCATION;  Service: Cardiovascular   • PORTACATH PLACEMENT     • SHOULDER MANIPULATION Left     \"FROZEN SHOULDER\"   • VENOUS ACCESS DEVICE (PORT) REMOVAL     ,   Family History   Problem Relation Age of Onset   • COPD Mother    • Diabetes Father    • Hypertension Sister    • Diabetes Sister    • Hypertension Brother    • Diabetes Paternal Grandmother    ,   Social History     Tobacco Use   • Smoking status: Never Smoker   • Smokeless tobacco: Never Used   • Tobacco comment: 2ND HAND SMOKE EXPOSURE   Substance Use Topics   • Alcohol use: No   • Drug use: No   ,   Medications Prior to Admission   Medication Sig Dispense Refill Last Dose   • acetaminophen (TYLENOL) 325 MG tablet Take 2 tablets by mouth Every 4 (Four) Hours As Needed for Mild Pain .      • albuterol (PROVENTIL HFA;VENTOLIN HFA) 108 (90 Base) MCG/ACT inhaler Inhale 2 puffs Every 4 (Four) Hours As Needed for Shortness of Air. 1 inhaler 0 Taking   • albuterol (PROVENTIL) (2.5 MG/3ML) 0.083% nebulizer solution Take 2.5 mg by nebulization Every 4 " (Four) Hours As Needed for Wheezing.   11/1/2019 at Unknown time   • aluminum-magnesium hydroxide-simethicone (MAALOX MAX) 400-400-40 MG/5ML suspension Take 15 mL by mouth Every 6 (Six) Hours As Needed for Heartburn.      • aspirin 81 MG chewable tablet Chew 81 mg Daily.   11/1/2019 at Unknown time   • atorvastatin (LIPITOR) 40 MG tablet Take 40 mg by mouth Daily.   11/1/2019 at Unknown time   • B Complex-C-Folic Acid (RADHA-SHIRLENE PO) Take 1 tablet by mouth. Patient states he takes these after dialysis, but is unaware of dose.    11/1/2019 at Unknown time   • benzonatate (TESSALON) 100 MG capsule Take 1 capsule by mouth 3 (Three) Times a Day As Needed for Cough.      • Cholecalciferol (VITAMIN D3) 5000 UNITS capsule capsule Take 1 capsule by mouth Daily.   11/1/2019 at Unknown time   • clopidogrel (PLAVIX) 75 MG tablet Take 1 tablet by mouth Daily. Start taking from 1/12/2019 onwards ONLY if not coughing up significant amount of blood. 90 tablet 3 11/1/2019 at Unknown time   • docusate sodium (COLACE) 100 MG capsule Take 100 mg by mouth Every 12 (Twelve) Hours.   11/1/2019 at Unknown time   • fluticasone (VERAMYST) 27.5 MCG/SPRAY nasal spray 2 sprays into the nostril(s) as directed by provider Daily.   9/20/2019 at 0800   • folic acid (FOLVITE) 1 MG tablet Take 1 mg by mouth daily.   11/1/2019 at Unknown time   • guaiFENesin (MUCINEX) 600 MG 12 hr tablet Take 1 tablet by mouth Every 12 (Twelve) Hours.      • Heparin Sodium, Porcine, (HEPARIN, PORCINE,) 5000 UNIT/ML injection Inject 1 mL under the skin into the appropriate area as directed Every 12 (Twelve) Hours.      • HYDROcodone-acetaminophen (NORCO) 5-325 MG per tablet Take 1 tablet by mouth Every 6 (Six) Hours As Needed for Moderate Pain  for up to 10 days.      • insulin aspart (novoLOG FLEXPEN) 100 UNIT/ML solution pen-injector sc pen Inject 9 Units under the skin into the appropriate area as directed 3 (Three) Times a Day With Meals.   9/20/2019 at 0800   •  insulin glargine (LANTUS) 100 UNIT/ML injection Inject 15 Units under the skin into the appropriate area as directed Every Night.   11/1/2019 at Unknown time   • ipratropium-albuterol (DUO-NEB) 0.5-2.5 mg/3 ml nebulizer Take 3 mL by nebulization Every 6 (Six) Hours. 360 mL     • isosorbide mononitrate (IMDUR) 30 MG 24 hr tablet Take 30 mg by mouth Daily.   11/1/2019 at Unknown time   • lanthanum (FOSRENOL) 1000 MG chewable tablet Chew 1 tablet 3 (Three) Times a Day With Meals.      • levETIRAcetam in NaCl 0.82% (KEPPRA) 500 MG/100ML solution IVPB Infuse 100 mL into a venous catheter Every 12 (Twelve) Hours. 4000 mL     • levothyroxine (SYNTHROID, LEVOTHROID) 100 MCG tablet Take 125 mcg by mouth Daily.   11/1/2019 at Unknown time   • lidocaine (LIDODERM) 5 % Place 1 patch on the skin as directed by provider Every Night. Remove & Discard patch within 12 hours or as directed by MD      • LORazepam (ATIVAN) 0.5 MG tablet Take 1 tablet by mouth Every 8 (Eight) Hours As Needed for Anxiety for up to 6 days.      • losartan (COZAAR) 25 MG tablet Take 1 tablet by mouth Daily.      • metoprolol succinate XL (TOPROL-XL) 25 MG 24 hr tablet Take 3 tablets by mouth Daily.      • ondansetron (ZOFRAN) 4 MG tablet Take 1 tablet by mouth Every 6 (Six) Hours As Needed for Nausea or Vomiting.      • ondansetron ODT (ZOFRAN-ODT) 4 MG disintegrating tablet Take 1 tablet by mouth Every 6 (Six) Hours As Needed for Nausea or Vomiting. 20 tablet 0 Not Taking   • pantoprazole (PROTONIX) 40 MG injection Infuse 10 mL into a venous catheter Every 12 (Twelve) Hours.      • Vancomycin HCl-Dextrose (PHARMACY TO DOSE VANCOMYCIN) Continuous As Needed (pneumoni) for up to 5 days.      • vancomycin in dextrose 5% 150 mL (VANCOCIN) 750-5 MG/150ML-% IVPB Infuse 150 mL into a venous catheter Every 72 (Seventy-Two) Hours As Needed (pre-dialysis level) for up to 5 days.       and Allergies:  Erythromycin    Objective     Vital Signs  Temp:  [94.1 °F (34.5  °C)-99 °F (37.2 °C)] 99 °F (37.2 °C)  Heart Rate:  [73-96] 96  Resp:  [16-28] 16  BP: ()/(35-84) 105/57  FiO2 (%):  [30 %-100 %] 30 %    I/O this shift:  In: 360 [Blood:360]  Out: -   I/O last 3 completed shifts:  In: 23 [I.V.:23]  Out: -     Physical Exam   Constitutional: He appears well-developed. No distress.   AV access on the left arm + palpable   HENT:   Head: Normocephalic and atraumatic.   Eyes: Pupils are equal, round, and reactive to light.   Neck: Normal range of motion.   Cardiovascular: Normal rate, regular rhythm and normal heart sounds.   No murmur heard.  Pulmonary/Chest: Effort normal.   Intubated on MV   Abdominal: Soft. Bowel sounds are normal.   Musculoskeletal: He exhibits no edema.   Neurological:   Sedated on 24hr Video EEG   Skin: Skin is warm.   Vitals reviewed.      Results Review:    I reviewed the patient's new clinical results.    WBC WBC   Date Value Ref Range Status   11/07/2019 4.60 3.40 - 10.80 10*3/mm3 Final   11/05/2019 5.89 3.40 - 10.80 10*3/mm3 Final      HGB Hemoglobin   Date Value Ref Range Status   11/07/2019 6.8 (C) 13.0 - 17.7 g/dL Final   11/05/2019 8.8 (L) 13.0 - 17.7 g/dL Final   11/05/2019 8.0 (L) 13.0 - 17.7 g/dL Final      HCT Hematocrit   Date Value Ref Range Status   11/07/2019 23.3 (L) 37.5 - 51.0 % Final   11/05/2019 28.2 (L) 37.5 - 51.0 % Final   11/05/2019 25.6 (L) 37.5 - 51.0 % Final      Platlets No results found for: LABPLAT   MCV MCV   Date Value Ref Range Status   11/07/2019 97.9 (H) 79.0 - 97.0 fL Final   11/05/2019 91.4 79.0 - 97.0 fL Final          Sodium Sodium   Date Value Ref Range Status   11/07/2019 134 (L) 136 - 145 mmol/L Final   11/06/2019 131 (L) 136 - 145 mmol/L Final   11/05/2019 127 (L) 136 - 145 mmol/L Final      Potassium Potassium   Date Value Ref Range Status   11/07/2019 4.4 3.5 - 5.2 mmol/L Final   11/07/2019 4.4 3.5 - 5.2 mmol/L Final   11/06/2019 4.7 3.5 - 5.2 mmol/L Final   11/05/2019 4.9 3.5 - 5.2 mmol/L Final      Chloride  Chloride   Date Value Ref Range Status   11/07/2019 95 (L) 98 - 107 mmol/L Final   11/06/2019 91 (L) 98 - 107 mmol/L Final   11/05/2019 83 (L) 98 - 107 mmol/L Final      CO2 CO2   Date Value Ref Range Status   11/07/2019 22.0 22.0 - 29.0 mmol/L Final   11/06/2019 20.2 (L) 22.0 - 29.0 mmol/L Final   11/05/2019 22.2 22.0 - 29.0 mmol/L Final      BUN BUN   Date Value Ref Range Status   11/07/2019 51 (H) 8 - 23 mg/dL Final   11/06/2019 43 (H) 8 - 23 mg/dL Final   11/05/2019 61 (H) 8 - 23 mg/dL Final      Creatinine Creatinine   Date Value Ref Range Status   11/07/2019 4.78 (H) 0.76 - 1.27 mg/dL Final   11/06/2019 4.48 (H) 0.76 - 1.27 mg/dL Final   11/05/2019 5.92 (H) 0.76 - 1.27 mg/dL Final      Calcium Calcium   Date Value Ref Range Status   11/07/2019 9.2 8.6 - 10.5 mg/dL Final   11/06/2019 9.4 8.6 - 10.5 mg/dL Final   11/05/2019 9.3 8.6 - 10.5 mg/dL Final      PO4 No results found for: CAPO4   Albumin Albumin   Date Value Ref Range Status   11/06/2019 3.30 (L) 3.50 - 5.20 g/dL Final   11/05/2019 3.50 3.50 - 5.20 g/dL Final      Magnesium Magnesium   Date Value Ref Range Status   11/07/2019 2.8 (H) 1.6 - 2.4 mg/dL Final      Uric Acid No results found for: URICACID         Assessment/Plan       Status epilepticus (CMS/[facility])    Recurrent right pleural effusion    SOB (shortness of breath)    Uncontrolled diabetes mellitus with hyperglycemia, with long-term current use of insulin (CMS/[facility])    Physical debility    Peripheral vascular disease (CMS/[facility])    Pneumonia of right upper lobe due to infectious organism (CMS/[facility])    Symptomatic anemia    History of CVA (cerebrovascular accident)    Hypothyroidism (acquired)    Chronic systolic CHF (congestive heart failure) (CMS/[facility])    Seizure (CMS/[facility])      Assessment:    Condition: In stable condition.  Unchanged.   (ESRD: TTs rand KT/B 1.88  Access: AV fistula on the left arm  Non functioning access on the right   Anemia: Acute on chronic  Will need MARIANO on HD day  BMD:  stable  Acid base and Elyte: stable  AMS+ Tonic clonic seiuzre: Neurology following  Unlikely related to uremic toxins     Plan   HD per TTs rand. Minimal UF, 3K  Transfuse at Hb<7  Resume home meds      Thank you for the consult will continue to monitor the patient during this hospital stay).            I discussed the patients findings and my recommendations with nursing staff    Keith Soares MD  11/07/19  9:52 AM

## 2019-11-07 NOTE — NURSING NOTE
Routine EEG at 0045 and MARK started a 0136.  Skin check with nurse Iban at 0140.  Will check again later this morning.

## 2019-11-07 NOTE — PLAN OF CARE
Problem: Skin Injury Risk (Adult)  Goal: Identify Related Risk Factors and Signs and Symptoms  Outcome: Ongoing (interventions implemented as appropriate)   11/07/19 0508   Skin Injury Risk (Adult)   Related Risk Factors (Skin Injury Risk) critical care admission;infection;mechanical forces;medical devices;medication;mobility impaired;moisture;tissue perfusion altered     Goal: Skin Health and Integrity  Outcome: Ongoing (interventions implemented as appropriate)   11/07/19 0508   Skin Injury Risk (Adult)   Skin Health and Integrity making progress toward outcome       Problem: Fall Risk (Adult)  Goal: Identify Related Risk Factors and Signs and Symptoms  Outcome: Ongoing (interventions implemented as appropriate)   11/07/19 0508   Fall Risk (Adult)   Related Risk Factors (Fall Risk) age-related changes;bladder function altered;confusion/agitation;culprit medication(s);fear of falling;gait/mobility problems;history of falls;homeostatic imbalance;impaired vision;inadequate lighting;neuro disease/injury;objects hard to reach;polypharmacy;sensory deficits;sleep pattern alteration;slippery/uneven surfaces;environment unfamiliar   Signs and Symptoms (Fall Risk) presence of risk factors     Goal: Absence of Fall  Outcome: Ongoing (interventions implemented as appropriate)   11/07/19 0508   Fall Risk (Adult)   Absence of Fall achieves outcome       Problem: Restraint, Nonbehavioral (Nonviolent)  Goal: Rationale and Justification  Outcome: Ongoing (interventions implemented as appropriate)   11/07/19 0508   Restraint, Nonbehavioral (Nonviolent)   Rationale and Justification prevent harm to self;prevent line/tube removal;failure of less restrictive safety measures     Goal: Nonbehavioral (Nonviolent) Restraint: Absence of Injury/Harm  Outcome: Ongoing (interventions implemented as appropriate)   11/07/19 0508   Restraint, Nonbehavioral (Nonviolent)   Nonbehavioral (Nonviolent) Restraint: Absence of Injury/Harm met     Goal:  Nonbehavioral (Nonviolent) Restraint: Achievement of Discontinuation Criteria  Outcome: Ongoing (interventions implemented as appropriate)   11/07/19 0508   Restraint, Nonbehavioral (Nonviolent)   Nonbehavioral (Nonviolent) Restraint: Achievement of Discontinuation Criteria not met     Goal: Nonbehavioral (Nonviolent) Restraint: Preservation of Dignity and Wellbeing  Outcome: Ongoing (interventions implemented as appropriate)   11/07/19 0508   Restraint, Nonbehavioral (Nonviolent)   Nonbehavioral (Nonviolent) Restraint: Preservation of Dignity and Wellbeing met

## 2019-11-07 NOTE — PROCEDURES
"Insert Central Line At Bedside  Date/Time: 11/7/2019 9:38 AM  Performed by: Haydee Sen APRN  Authorized by: Haydee Sen APRN   Consent: Verbal consent obtained.  Risks and benefits: risks, benefits and alternatives were discussed  Procedure consent: procedure consent matches procedure scheduled  Patient identity confirmed: arm band  Time out: Immediately prior to procedure a \"time out\" was called to verify the correct patient, procedure, equipment, support staff and site/side marked as required.  Indications: vascular access  Anesthesia: local infiltration    Anesthesia:  Local Anesthetic: lidocaine 1% without epinephrine  Anesthetic total: 5 mL    Sedation:  Patient sedated: yes  Sedatives: midazolam  Vitals: Vital signs were monitored during sedation.    Preparation: skin prepped with ChloraPrep  Skin prep agent dried: skin prep agent completely dried prior to procedure  Sterile barriers: all five maximum sterile barriers used - cap, mask, sterile gown, sterile gloves, and large sterile sheet  Hand hygiene: hand hygiene performed prior to central venous catheter insertion  Location details: left internal jugular  Site selection rationale: Rt IJ could not be visualized under ultrasound  Patient position: flat  Catheter type: triple lumen  Catheter size: 7 Fr  Pre-procedure: landmarks identified  Ultrasound guidance: yes  Sterile ultrasound techniques: sterile gel and sterile probe covers were used  Number of attempts: 1  Successful placement: yes (The guidewire was visualized in the Lt IJ under ultrasound)  Post-procedure: line sutured and dressing applied  Assessment: placement verified by x-ray,  no pneumothorax on x-ray and blood return through all ports (All three ports aspirated dark, nonpulsatile blood and flushed back well.  The initial CXR showed the line crossing the sternum and tracking up.  I pulled the catheter back about 3 cm and the tip of the catheter is now in " proximal SVC)  Patient tolerance: Patient tolerated the procedure well with no immediate complications

## 2019-11-07 NOTE — PROGRESS NOTES
Discharge Planning Assessment  [facility]     Patient Name: Talha Cutler  MRN: [id number]  Today's Date: 11/7/2019    Admit Date: 11/6/2019    Discharge Needs Assessment     Row Name 11/07/19 0840       Living Environment    Lives With  spouse;child(starr), adult    Name(s) of Who Lives With Patient  Pt lives with spouse Mallika and son.    Current Living Arrangements  home/apartment/condo    Primary Care Provided by  self    Provides Primary Care For  no one    Family Caregiver if Needed  spouse;child(starr), adult    Family Caregiver Names  Mallika    Quality of Family Relationships  helpful;involved;supportive    Able to Return to Prior Arrangements  yes    Living Arrangement Comments  Pt lives with spouse and son in [place], [place]       Resource/Environmental Concerns    Resource/Environmental Concerns  none       Transition Planning    Patient/Family Anticipates Transition to  home with family    Transportation Anticipated  family or friend will provide       Discharge Needs Assessment    Readmission Within the Last 30 Days  -- pt came from [facility] he was inpt there    Concerns to be Addressed  discharge planning    Equipment Currently Used at Home  power chair,(recliner lift);oxygen;wheelchair;wheelchair, motorized;nebulizer    Anticipated Changes Related to Illness  none    Discharge Coordination/Progress  [employer] Medicare Replacement with Rx coverage        Discharge Plan     Row Name 11/07/19 0843       Plan    Plan  IDP    Plan Comments  [facility] came from [facility], information is from chart. Per chart states patient has [facility], I spoke with Eyad at [phone] this pt is not current with them. I will follow up with spouse to check on HH. Pt is wheelchair bound, he does maneuver  around in his motorized wheel chair and lift chair.. Pt wears BIPAP provider is res a care. Pt has dialysis at [facility] in [place] on Tuesday, Thursday and Sat. His wife transports him. Sometimes [facility] provided  transportation. CM will cont. To follow.        Destination      No service coordination in this encounter.      Durable Medical Equipment      No service coordination in this encounter.      Dialysis/Infusion      No service coordination in this encounter.      Home Medical Care      No service coordination in this encounter.      Therapy      No service coordination in this encounter.      Community Resources      No service coordination in this encounter.          Demographic Summary     Row Name 11/07/19 0836       General Information    Admission Type  inpatient    Arrived From  hospital    Referral Source  admission list    Reason for Consult  discharge planning    Preferred Language  English       Contact Information    Permission Granted to Share Info With          Functional Status     Row Name 11/07/19 0837       Functional Status    Usual Activity Tolerance  moderate    Functional Status Comments  Per chart pt can perform ADL. Pt is wheelchair bound has a chair lift and does get around in motorized Wheel chair       Functional Status, IADL    Medications  independent    Meal Preparation  independent    Housekeeping  independent    Laundry  independent    Shopping  independent       Employment/    Employment/ Comments  Jeannette Godoy PCP        Psychosocial    No documentation.       Abuse/Neglect    No documentation.       Legal    No documentation.       Substance Abuse    No documentation.       Patient Forms    No documentation.           Bijal Huber RN

## 2019-11-08 NOTE — PROGRESS NOTES
INTENSIVIST   PROGRESS NOTE     Hospital:  LOS: 2 days      S     Mr. Talha Cutler, 59 y.o. male is followed for:  No chief complaint on file.      Status epilepticus    DM    Hypothyroidism (acquired)      As an Intensivist, we provide an integrated approach to the ICU patient and family, medical management of comorbid conditions, lead interdisciplinary rounds and coordinate the care with all other services, including those from other specialists.     Interval History:  Remains unresponsive to verbal.  Grimaces to pain.  Intubated.     The patient's relevant past medical, surgical and social history were reviewed and updated in Epic as appropriate.     ROS:   ROS cannot be reliably obtained from the patient due to his Acuity of condition and Intubated.        O     Vitals:  Temp: 97.4 °F (36.3 °C) (11/08/19 1600) Temp  Min: 97.4 °F (36.3 °C)  Max: 98.3 °F (36.8 °C)   Temp core:      BP: 92/66 (11/08/19 1630) BP  Min: 92/66  Max: 134/83   Pulse: 87 (11/08/19 1630) Pulse  Min: 80  Max: 102   Resp: 13 (11/08/19 1600) Resp  Min: 10  Max: 16   SpO2: 99 % (11/08/19 1630) SpO2  Min: 96 %  Max: 100 %   Device: ventilator (11/08/19 1600)    Flow Rate:   No Data Recorded     Intake/Ouptut 24 hrs (7:00AM - 6:59 AM)  Intake/Output       11/07/19 0700 - 11/08/19 0659 11/08/19 0700 - 11/09/19 0659    Intake (ml) 1521.4 925.6    Output (ml) 2330 --    Net (ml) -808.6 925.6    Last Weight  --  65 kg (143 lb 4.8 oz)           Medications (drips):    midazolam 1 mg/mL 100mL NS Last Rate: 4 mg/hr (11/08/19 1155)       Mechanical Ventilator:  Settings: Observed:   Mode: VC+/AC (11/08/19 1255)    Vt (Set, L): 0.48 L (11/08/19 1255)    Resp Rate (Set): 10 (11/08/19 1255) Resp Rate (Observed) Vent: 13 (11/08/19 1630)   FiO2 (%): 30 % (11/08/19 1255)    PEEP/CPAP (cm H2O): 5 cm H20 (11/08/19 1255) Plateau Pressure (cm H2O): 25 cm H2O (11/08/19 0727)    Minute Ventilation (L/min) (Obs): 5.28 L/min (11/08/19 1255)    I:E Ratio (Obs):  1:2.20 (11/08/19 1255)     Physical Examination  Telemetry:  Rhythm: normal sinus rhythm (11/08/19 1600)     QTc Interval (Sec): 0.45 (11/07/19 1909)   Constitutional:  No acute distress.   Cardiovascular: RRR.   Normal heart sounds.  No murmurs, gallop or rub.   Respiratory: Normal breath sounds  No adventitious sounds.   Abdominal:  Soft with no tenderness.  No distension.   No HSM.   Extremities: Warm.  Dry.  No cyanosis.  Trace Edema   Neurological:   Sedated.  Best Eye Response: 2-->(E2) to pain (11/08/19 1600)  Best Motor Response: 6-->(M6) obeys commands (11/08/19 1600)  Best Verbal Response: 1-->(V1) none (11/08/19 1600)  Grupo Coma Scale Score: 9 (11/08/19 1600)   Lines/Drains/Airways: L IJ CVC  O ETT   RIGHT FISTULA     Hematology:  Results from last 7 days   Lab Units 11/08/19 0550 11/07/19  1627 11/07/19 0135 11/05/19 0618   WBC 10*3/mm3 6.90  --  4.60  --  5.89   HEMOGLOBIN g/dL 8.9* 10.0* 6.8*   < > 8.0*   MCV fL 90.2  --  97.9*  --  91.4   PLATELETS 10*3/mm3 169  --  134*  --  167    < > = values in this interval not displayed.       Chemistry:  Estimated Creatinine Clearance: 24.7 mL/min (A) (by C-G formula based on SCr of 2.92 mg/dL (H)).    Results from last 7 days   Lab Units 11/08/19 0550 11/07/19 0135 11/06/19  0823   SODIUM mmol/L 132* 134* 131*   POTASSIUM mmol/L 3.8 4.4  4.4 4.7   CHLORIDE mmol/L 93* 95* 91*   CO2 mmol/L 21.0* 22.0 20.2*   ANION GAP mmol/L 18.0* 17.0* 19.8*   GLUCOSE mg/dL 251* 95 181*   CALCIUM mg/dL 8.6 9.2 9.4     Results from last 7 days   Lab Units 11/08/19 0550 11/07/19  0135 11/06/19  0823   BUN mg/dL 22 51* 43*   CREATININE mg/dL 2.92* 4.78* 4.48*     Results from last 7 days   Lab Units 11/08/19  0550 11/07/19  0135 11/06/19  0823 11/03/19  0608   MAGNESIUM mg/dL 2.4 2.8*  --   --    PHOSPHORUS mg/dL 2.0*  --  4.9* 6.2*       Arterial Blood Gases:  Results from last 7 days   Lab Units 11/08/19  0348 11/07/19  0215 11/06/19  1633   PH, ARTERIAL pH units  7.550* 7.467* 7.368   PCO2, ARTERIAL mm Hg 28.7 31.2 44.5   PO2 ART mm Hg 87.7 76.2* 74.3*   FIO2 % 30 30 32     Lab Results   Lab Value Date/Time    PROCALCITO 2.17 (H) 11/08/2019 0550    PROCALCITO 0.60 (H) 11/01/2019 1444       Images:  Xr Chest 1 View    Result Date: 11/8/2019  1. Left IJ catheter placement into the proximal SVC. No evidence of pneumothorax. 2. NG tube in stomach. 3. ET tube remains in good position. 4. Stable patchy right mid and lower lung disease and mild left lung interstitial changes.  D:  11/07/2019 E:  11/07/2019  This report was finalized on 11/8/2019 8:17 AM by DR. Rosales Martel MD.        Echo:  Results for orders placed during the hospital encounter of 11/01/19   Adult Transthoracic Echo Complete W/ Cont if Necessary Per Protocol    Narrative · The left ventricular cavity is moderate-to-severely dilated.  · Right ventricular cavity is mildly dilated.  · Left atrial cavity size is moderately dilated.  · Moderate mitral valve regurgitation is present  · There is a small (<1cm) pericardial effusion.          Results: Reviewed.  I reviewed the patient's new laboratory and imaging results.  I independently reviewed the patient's new images.    Medications: Reviewed.    Assessment/Plan   A / P       Assessment:    He was initially admitted to [facility] on 11/1 for pneumonia. But eventually respiratory status declined to the point of requiring Bipap and moved to the ICU on 11/5/19.  On the afternoon of 11/6, he was noted to become more lethargic and then developed tonic/clonic movements of his right arm and his head. He was given 500mg of IV keppra. He was transferred to [facility] for Neurology consultation. Upon arrival, he had 2 separate episodes of tonic/clonic jerking of his extremities in less than 5 minutes without return to baseline. He was then intubated.    1. Seizures  1. He has been on Cefepime at OSH  2. Levetiracetam .  2. Respiratory failure, 2ry to CNS  1. Intubated 11/07/19   2. Mechanical  ventilation  3. Pneumonia RUL at OSH (11/1/19). (Metapneumovirus, per Respiratory Panel PCR 11/1/19))  4. CXR: Patchy infiltrate Right mid and lower lung and mid left lung.  5. Small R Pleural effusion (as per CT ABD 11/5/19)  6. ALYSSA on CPAP at home  7. Abs: Piperacillin-Tazobactam   3. Hypothyroidism on Rx.  4. HFrEF   5. GERD  6. HTN  7. Previous CVA with left sided residual weakness.  8. Anemia  1. S/P transfusion 11/07/19  9. ESRD  1. HD  10. Enteral Nutrition - with low K/P formula  11. T2DM    Results from last 7 days   Lab Units 11/08/19  1102 11/07/19  2341 11/07/19  1735 11/07/19  1130 11/07/19  0528 11/07/19  0022   GLUCOSE mg/dL 278* 203* 128 73 96 105     Lab Results   Lab Value Date/Time    HGBA1C 8.90 (H) 09/21/2019 0647    HGBA1C 10.6 (H) 02/23/2019 0408    HGBA1C 9.6 (H) 07/13/2017 0438       Nutrition Support: Diet, Tube Feeding Tube Feeding Formula: Novasource Renal (Electrolyte Restricted)   Modulars: PRO-STAT oral liquid 1 packet   Diet: NPO Diet   Advance Directives: Code Status and Medical Interventions:   Ordered at: 11/06/19 6401     Code Status:    CPR     Medical Interventions (Level of Support Prior to Arrest):    Full        Plan:    1. Wean if he awakes up.  2. Decrease VE and follow up gas exchange.  3. HD tomorrow  4. F/U PCT in AM  5. Start Enteral Nutrition: low K/Phosp formula.  6. Indirect calorimetry  7. May need to follow up R effusion: CT scan of chest (ordered)  8. MRI brain } Per Neuro  9. Disposition: Keep in ICU.    Plan of care and goals reviewed during interdisciplinary rounds.  I discussed the patient's findings and my recommendations with nursing staff  .  Level of Risk is High due to:  illness with threat to life or bodily function.     Time: was greater than 35 minutes.    (This excludes time spent performing separately reportable procedures and services).  Patient was at high risk of imminent or life-threatening deterioration in his condition due to Respiratory  failure.     Garcia Lisa MD, FACP, FCCP, CNSC  Intensive Care Medicine, Nutrition Support and Pulmonary Medicine     [x]  Primary Attending  []  Consultant

## 2019-11-08 NOTE — PROGRESS NOTES
"Adult Nutrition  Assessment/PES    Patient Name:  Talha Cutler  YOB: 1960  MRN: [id number]  Admit Date:  11/6/2019    Assessment Date:  11/8/2019    Comments:      Reason for Assessment     Row Name 11/08/19 1314          Reason for Assessment    Reason For Assessment  follow-up protocol MDR/30\"     Diagnosis  -- SEE PREVIOUS NUTRITION NOTES. RECURRENT R PLEURAL EFFUSION, PNA         Nutrition/Diet History     Row Name 11/08/19 1316          Nutrition/Diet History    Typical Food/Fluid Intake  MD REPORTS WILL START TF IN VIEW OF NOT WAKING UP.         Anthropometrics     Row Name 11/08/19 1317 11/08/19 1000       Anthropometrics    Height  173 cm (68.11\")  --    Weight  --  65 kg (143 lb 4.8 oz)       Ideal Body Weight (IBW)    Ideal Body Weight (IBW) (kg)  71.2  --    Row Name 11/08/19 0727          Anthropometrics    Height  172.7 cm (67.99\")        Ideal Body Weight (IBW)    Ideal Body Weight (IBW) (kg)  70.87         Labs/Tests/Procedures/Meds     Row Name 11/08/19 1316          Labs/Procedures/Meds    Lab Results Reviewed  reviewed     Lab Results Comments  PHOS        Medications    Pertinent Medications Comments  NO PHOS REPLACEMENT RX, INTENSIVIST DID NOT WANT TO START.          Physical Findings     Row Name 11/08/19 1317          Physical Findings    Gastrointestinal  other (see comments) WDL PER NURSING DOCUMENTATION     Skin  other (see comments) WDL PER NURSING DOCUMENTATION         Estimated/Assessed Needs     Row Name 11/08/19 1317 11/08/19 0727       Calculation Measurements    Weight Used For Calculations  60 kg (132 lb 4.4 oz)  --    Height  173 cm (68.11\")  172.7 cm (67.99\")       Estimated/Assessed Needs    Additional Documentation  Calorie Requirements (Group);Protein Requirements (Group);[place] Equation (Group);KCAL/KG (Group)  --       Calorie Requirements    Weight Used For Calorie Calculations  60 kg (132 lb 4.4 oz)  --    Estimated Calorie Need Method  Francis " State;kcal/kg  --    Estimated Calorie Requirement Comment  EST KCAL RANGE: 9160-5043/DAY  --       KCAL/KG    KCAL/KG  25 Kcal/Kg (kcal) TO 30 KCALS/KG. EST RANGE: 5544-9367/DAY  --    25 Kcal/Kg (kcal)  1500  --       [place] State Equation    Ve (L/Min) for [place] State Equation Calculation  5.28 EST PSU: 1478 KCALS/DAY  --    RMR ([place] State Equation)  1365.38  --       Protein Requirements    Weight Used For Protein Calculations  60 kg (132 lb 4.4 oz)  --    Est Protein Requirement Amount (gms/kg)  1.2 gm protein  --    Estimated Protein Requirements (gms/day)  72  --        Nutrition Prescription Ordered     Row Name 11/08/19 1324          Nutrition Prescription PO    Current PO Diet  NPO                 Problem/Interventions:  Problem 1     Row Name 11/08/19 1325          Nutrition Diagnoses Problem 1    Problem 1  Inadequate Nutrient Intake     Etiology (related to)  Other (comment) VENT SUPPORT.      Signs/Symptoms (evidenced by)  NPO               Intervention Goal     Row Name 11/08/19 1325          Intervention Goal    General  Nutrition support treatment     TF/PN  Inititiate TF/PN           Nutrition Prescription     Row Name 11/08/19 1326          Nutrition Prescription EN    Enteral Prescription  Enteral begin/change;Enteral to supply     Enteral Route  NG OR ND TUBE.      [facility]  [facility] Renal     TF Delivery Method  Continuous     Continuous TF Goal Rate (mL/hr)  40 mL/hr     Continuous TF Starting Rate (mL/hr)  25 mL/hr     Continuous TF Goal Volume (mL)  800 mL        EN to Supply    Kcal/Day  1600 Kcal/Day BASED ON 20 HR/DAY DELIVERY     Protein (gm/day)  72 gm/day BASED ON 20 HR/DAY DELIVERY     TF Free H2O (mL)  574 mL BASED ON 20 HR/DAY DELIVERY     Fiber Per Day (gm/day)  0 gm/day         Education/Evaluation     Row Name 11/08/19 1328          Monitor/Evaluation    Monitor  Per protocol           Electronically signed by:  Ella Kay RD  11/08/19 1:28 PM

## 2019-11-08 NOTE — PROGRESS NOTES
" LOS: 2 days   Patient Care Team:  Jeannette Godoy APRN as PCP - General (Family Medicine)    Chief Complaint: ESRD    Subjective     Tolerated HD well. Labs stable. No significant change in clinical status        Subjective:  Symptoms:  Stable.        History taken from: patient    Objective     Vital Sign Min/Max for last 24 hours  Temp  Min: 97.5 °F (36.4 °C)  Max: 99 °F (37.2 °C)   BP  Min: 94/49  Max: 134/66   Pulse  Min: 85  Max: 102   Resp  Min: 16  Max: 19   SpO2  Min: 95 %  Max: 100 %   No Data Recorded   No Data Recorded     Flowsheet Rows      First Filed Value   Admission Height  172.7 cm (67.99\") Documented at 11/07/2019 1909   Admission Weight  -- [Bed scale not working] Documented at 11/08/2019 0500          I/O this shift:  In: 761 [I.V.:761]  Out: -   I/O last 3 completed shifts:  In: 1544.4 [I.V.:624.4; Blood:720; IV Piggyback:200]  Out: 2330 [Other:2330]    Objective     Constitutional: He appears well-developed. No distress.   AV access on the left arm + palpable   HENT:   Head: Normocephalic and atraumatic.   Eyes: Pupils are equal, round, and reactive to light.   Neck: Normal range of motion.   Cardiovascular: Normal rate, regular rhythm and normal heart sounds.   No murmur heard.  Pulmonary/Chest: Effort normal.   Intubated on MV   Abdominal: Soft. Bowel sounds are normal.   Musculoskeletal: He exhibits no edema.   Neurological:   Sedated on 24hr Video EEG   Skin: Skin is warm.   Vitals reviewed.    Results Review:     I reviewed the patient's new clinical results.    WBC WBC   Date Value Ref Range Status   11/08/2019 6.90 3.40 - 10.80 10*3/mm3 Final   11/07/2019 4.60 3.40 - 10.80 10*3/mm3 Final      HGB Hemoglobin   Date Value Ref Range Status   11/08/2019 8.9 (L) 13.0 - 17.7 g/dL Final   11/07/2019 10.0 (L) 13.0 - 17.7 g/dL Final   11/07/2019 6.8 (C) 13.0 - 17.7 g/dL Final   11/05/2019 8.8 (L) 13.0 - 17.7 g/dL Final      HCT Hematocrit   Date Value Ref Range Status   11/08/2019 28.4 (L) " 37.5 - 51.0 % Final   11/07/2019 32.0 (L) 37.5 - 51.0 % Final   11/07/2019 23.3 (L) 37.5 - 51.0 % Final   11/05/2019 28.2 (L) 37.5 - 51.0 % Final      Platlets No results found for: LABPLAT   MCV MCV   Date Value Ref Range Status   11/08/2019 90.2 79.0 - 97.0 fL Final   11/07/2019 97.9 (H) 79.0 - 97.0 fL Final          Sodium Sodium   Date Value Ref Range Status   11/08/2019 132 (L) 136 - 145 mmol/L Final   11/07/2019 134 (L) 136 - 145 mmol/L Final   11/06/2019 131 (L) 136 - 145 mmol/L Final      Potassium Potassium   Date Value Ref Range Status   11/08/2019 3.8 3.5 - 5.2 mmol/L Final   11/07/2019 4.4 3.5 - 5.2 mmol/L Final   11/07/2019 4.4 3.5 - 5.2 mmol/L Final   11/06/2019 4.7 3.5 - 5.2 mmol/L Final      Chloride Chloride   Date Value Ref Range Status   11/08/2019 93 (L) 98 - 107 mmol/L Final   11/07/2019 95 (L) 98 - 107 mmol/L Final   11/06/2019 91 (L) 98 - 107 mmol/L Final      CO2 CO2   Date Value Ref Range Status   11/08/2019 21.0 (L) 22.0 - 29.0 mmol/L Final   11/07/2019 22.0 22.0 - 29.0 mmol/L Final   11/06/2019 20.2 (L) 22.0 - 29.0 mmol/L Final      BUN BUN   Date Value Ref Range Status   11/08/2019 22 8 - 23 mg/dL Final   11/07/2019 51 (H) 8 - 23 mg/dL Final   11/06/2019 43 (H) 8 - 23 mg/dL Final      Creatinine Creatinine   Date Value Ref Range Status   11/08/2019 2.92 (H) 0.76 - 1.27 mg/dL Final   11/07/2019 4.78 (H) 0.76 - 1.27 mg/dL Final   11/06/2019 4.48 (H) 0.76 - 1.27 mg/dL Final      Calcium Calcium   Date Value Ref Range Status   11/08/2019 8.6 8.6 - 10.5 mg/dL Final   11/07/2019 9.2 8.6 - 10.5 mg/dL Final   11/06/2019 9.4 8.6 - 10.5 mg/dL Final      PO4 No results found for: CAPO4   Albumin Albumin   Date Value Ref Range Status   11/08/2019 2.60 (L) 3.50 - 5.20 g/dL Final   11/06/2019 3.30 (L) 3.50 - 5.20 g/dL Final      Magnesium Magnesium   Date Value Ref Range Status   11/08/2019 2.4 1.6 - 2.4 mg/dL Final   11/07/2019 2.8 (H) 1.6 - 2.4 mg/dL Final      Uric Acid No results found for:  URICACID     Medication Review: Yes    Assessment/Plan       Status epilepticus    Recurrent right pleural effusion    SOB (shortness of breath)    Uncontrolled diabetes mellitus with hyperglycemia, with long-term current use of insulin (CMS/[facility])    Physical debility    Peripheral vascular disease (CMS/[facility])    Pneumonia of right upper lobe due to infectious organism (CMS/[facility])    Symptomatic anemia    History of CVA (cerebrovascular accident)    Hypothyroidism (acquired)    Chronic systolic CHF (congestive heart failure) (CMS/[facility])    Seizure (CMS/[facility])      Assessment & Plan    Condition: In stable condition.  Unchanged.    # (ESRD: [facility] KT/B 1.88  #Access: AV fistula on the left arm  Non functioning access on the right   #Anemia: Acute on chronic  Will need MARIANO on HD day  #BMD: stable  #Acid base and Elyte: stable  #AMS+ Tonic clonic seiuzre: Neurology following  Unlikely related to uremic toxins      Plan   HD per [facility]. Minimal UF, 3K  Transfuse at Hb<7      Keith Soares MD  11/08/19  8:56 AM

## 2019-11-08 NOTE — PROGRESS NOTES
"Neurology       Patient Care Team:  Jeannette Godoy APRN as PCP - General (Family Medicine)    Chief complaint seizures      Subjective .     History:  No hx obtainable from pt. No reports of seizure overnight. Could not wean off versed due to restlessness/discomfort.  Nurse reports plan to continue vent today. Pt overbreathes only when aroused.    ROS: not obtainable    Objective     Vital Signs   Blood pressure 124/62, pulse 85, temperature 98 °F (36.7 °C), temperature source Axillary, resp. rate 10, height 172.7 cm (67.99\"), SpO2 96 %.    Physical Exam:              Neuro: thin, chronically ill appearing mawm lying intubated, sedated on versed. Partially opens eyes with a great deal of verbal and tactile stim, but does not regard. Does FC to squeeze hand bilaterally, although left only when hands tested separately.   Pupils 2.5mm reactive, no blink to threat, no EOM with dolls;   Light  is symmetric    Results Review:              EEG with slowing, no epileptiform discharges  cmp with glucose 251, Cr 2.92, Na132    Assessment/Plan     Chronically ill man with h/o stroke and left HP, recent PNA tx with cefepime, with multiple seizures of new onset (?focal onset, ?frontal). Currently on continuous EEG, on LVT. EEG not showing epileptiform discharges, report pending. Will plan to dc EEG today, and obtain MRI.      I discussed the patients findings and my recommendations with nursing staff    Haydee Suarez MD  11/08/19  9:47 AM    "

## 2019-11-08 NOTE — H&P
INTENSIVIST   PROGRESS NOTE     Hospital:  LOS: 2 days      S     Mr. Talha Cutler, 59 y.o. male is followed for:  No chief complaint on file.      Status epilepticus    DM    Hypothyroidism (acquired)      As an Intensivist, we provide an integrated approach to the ICU patient and family, medical management of comorbid conditions, lead interdisciplinary rounds and coordinate the care with all other services, including those from other specialists.     Interval History:  Remains unresponsive to verbal.  Grimaces to pain.  Intubated.     The patient's relevant past medical, surgical and social history were reviewed and updated in Epic as appropriate.     ROS:   ROS cannot be reliably obtained from the patient due to his Acuity of condition and Intubated.        O     Vitals:  Temp: 97.4 °F (36.3 °C) (11/08/19 1600) Temp  Min: 97.4 °F (36.3 °C)  Max: 98.3 °F (36.8 °C)   Temp core:      BP: 92/66 (11/08/19 1630) BP  Min: 92/66  Max: 134/83   Pulse: 87 (11/08/19 1630) Pulse  Min: 80  Max: 102   Resp: 13 (11/08/19 1600) Resp  Min: 10  Max: 16   SpO2: 99 % (11/08/19 1630) SpO2  Min: 96 %  Max: 100 %   Device: ventilator (11/08/19 1600)    Flow Rate:   No Data Recorded     Intake/Ouptut 24 hrs (7:00AM - 6:59 AM)  Intake/Output       11/07/19 0700 - 11/08/19 0659 11/08/19 0700 - 11/09/19 0659    Intake (ml) 1521.4 925.6    Output (ml) 2330 --    Net (ml) -808.6 925.6    Last Weight  --  65 kg (143 lb 4.8 oz)           Medications (drips):    midazolam 1 mg/mL 100mL NS Last Rate: 4 mg/hr (11/08/19 1155)       Mechanical Ventilator:  Settings: Observed:   Mode: VC+/AC (11/08/19 1255)    Vt (Set, L): 0.48 L (11/08/19 1255)    Resp Rate (Set): 10 (11/08/19 1255) Resp Rate (Observed) Vent: 13 (11/08/19 1630)   FiO2 (%): 30 % (11/08/19 1255)    PEEP/CPAP (cm H2O): 5 cm H20 (11/08/19 1255) Plateau Pressure (cm H2O): 25 cm H2O (11/08/19 0727)    Minute Ventilation (L/min) (Obs): 5.28 L/min (11/08/19 1255)    I:E Ratio (Obs):  1:2.20 (11/08/19 1255)     Physical Examination  Telemetry:  Rhythm: normal sinus rhythm (11/08/19 1600)     QTc Interval (Sec): 0.45 (11/07/19 1909)   Constitutional:  No acute distress.   Cardiovascular: RRR.   Normal heart sounds.  No murmurs, gallop or rub.   Respiratory: Normal breath sounds  No adventitious sounds.   Abdominal:  Soft with no tenderness.  No distension.   No HSM.   Extremities: Warm.  Dry.  No cyanosis.  Trace Edema   Neurological:   Sedated.  Best Eye Response: 2-->(E2) to pain (11/08/19 1600)  Best Motor Response: 6-->(M6) obeys commands (11/08/19 1600)  Best Verbal Response: 1-->(V1) none (11/08/19 1600)  Grupo Coma Scale Score: 9 (11/08/19 1600)   Lines/Drains/Airways: L IJ CVC  O ETT   RIGHT FISTULA     Hematology:  Results from last 7 days   Lab Units 11/08/19 0550 11/07/19  1627 11/07/19 0135 11/05/19 0618   WBC 10*3/mm3 6.90  --  4.60  --  5.89   HEMOGLOBIN g/dL 8.9* 10.0* 6.8*   < > 8.0*   MCV fL 90.2  --  97.9*  --  91.4   PLATELETS 10*3/mm3 169  --  134*  --  167    < > = values in this interval not displayed.       Chemistry:  Estimated Creatinine Clearance: 24.7 mL/min (A) (by C-G formula based on SCr of 2.92 mg/dL (H)).    Results from last 7 days   Lab Units 11/08/19 0550 11/07/19 0135 11/06/19  0823   SODIUM mmol/L 132* 134* 131*   POTASSIUM mmol/L 3.8 4.4  4.4 4.7   CHLORIDE mmol/L 93* 95* 91*   CO2 mmol/L 21.0* 22.0 20.2*   ANION GAP mmol/L 18.0* 17.0* 19.8*   GLUCOSE mg/dL 251* 95 181*   CALCIUM mg/dL 8.6 9.2 9.4     Results from last 7 days   Lab Units 11/08/19 0550 11/07/19  0135 11/06/19  0823   BUN mg/dL 22 51* 43*   CREATININE mg/dL 2.92* 4.78* 4.48*     Results from last 7 days   Lab Units 11/08/19  0550 11/07/19  0135 11/06/19  0823 11/03/19  0608   MAGNESIUM mg/dL 2.4 2.8*  --   --    PHOSPHORUS mg/dL 2.0*  --  4.9* 6.2*       Arterial Blood Gases:  Results from last 7 days   Lab Units 11/08/19  0348 11/07/19  0215 11/06/19  1633   PH, ARTERIAL pH units  7.550* 7.467* 7.368   PCO2, ARTERIAL mm Hg 28.7 31.2 44.5   PO2 ART mm Hg 87.7 76.2* 74.3*   FIO2 % 30 30 32     Lab Results   Lab Value Date/Time    PROCALCITO 2.17 (H) 11/08/2019 0550    PROCALCITO 0.60 (H) 11/01/2019 1444       Images:  Xr Chest 1 View    Result Date: 11/8/2019  1. Left IJ catheter placement into the proximal SVC. No evidence of pneumothorax. 2. NG tube in stomach. 3. ET tube remains in good position. 4. Stable patchy right mid and lower lung disease and mild left lung interstitial changes.  D:  11/07/2019 E:  11/07/2019  This report was finalized on 11/8/2019 8:17 AM by DR. Rosales Martel MD.        Echo:  Results for orders placed during the hospital encounter of 11/01/19   Adult Transthoracic Echo Complete W/ Cont if Necessary Per Protocol    Narrative · The left ventricular cavity is moderate-to-severely dilated.  · Right ventricular cavity is mildly dilated.  · Left atrial cavity size is moderately dilated.  · Moderate mitral valve regurgitation is present  · There is a small (<1cm) pericardial effusion.          Results: Reviewed.  I reviewed the patient's new laboratory and imaging results.  I independently reviewed the patient's new images.    Medications: Reviewed.    Assessment/Plan   A / P       Assessment:    He was initially admitted to [facility] on 11/1 for pneumonia. But eventually respiratory status declined to the point of requiring Bipap and moved to the ICU on 11/5/19.  On the afternoon of 11/6, he was noted to become more lethargic and then developed tonic/clonic movements of his right arm and his head. He was given 500mg of IV keppra. He was transferred to [facility] for Neurology consultation. Upon arrival, he had 2 separate episodes of tonic/clonic jerking of his extremities in less than 5 minutes without return to baseline. He was then intubated.    1. Seizures  1. He has been on Cefepime at OSH  2. Levetiracetam .  2. Respiratory failure, 2ry to CNS  1. Intubated 11/07/19   2. Mechanical  ventilation  3. Pneumonia RUL at OSH (11/1/19). (Metapneumovirus, per Respiratory Panel PCR 11/1/19))  4. CXR: Patchy infiltrate Right mid and lower lung and mid left lung.  5. Small R Pleural effusion (as per CT ABD 11/5/19)  6. ALYSSA on CPAP at home  7. Abs: Piperacillin-Tazobactam   3. Hypothyroidism on Rx.  4. HFrEF   5. GERD  6. HTN  7. Previous CVA with left sided residual weakness.  8. Anemia  1. S/P transfusion 11/07/19  9. ESRD  1. HD  10. Enteral Nutrition - with low K/P formula  11. T2DM    Results from last 7 days   Lab Units 11/08/19  1102 11/07/19  2341 11/07/19  1735 11/07/19  1130 11/07/19  0528 11/07/19  0022   GLUCOSE mg/dL 278* 203* 128 73 96 105     Lab Results   Lab Value Date/Time    HGBA1C 8.90 (H) 09/21/2019 0647    HGBA1C 10.6 (H) 02/23/2019 0408    HGBA1C 9.6 (H) 07/13/2017 0438       Nutrition Support: Patient isn't on Tube Feeding   Modulars: Patient doesn't have any tube feeding modular orders   Diet: NPO Diet   Advance Directives: Code Status and Medical Interventions:   Ordered at: 11/06/19 0204     Code Status:    CPR     Medical Interventions (Level of Support Prior to Arrest):    Full        Plan:    1. Wean if he awakes up.  2. Decrease VE and follow up gas exchange.  3. HD tomorrow  4. F/U PCT in AM  5. Start Enteral Nutrition: low K/Phosp formula.  6. Indirect calorimetry  7. May need to follow up R effusion: CT scan of chest (ordered)  8. MRI brain } Per Neuro  9. Disposition: Keep in ICU.    Plan of care and goals reviewed during interdisciplinary rounds.  I discussed the patient's findings and my recommendations with nursing staff  .  Level of Risk is High due to:  illness with threat to life or bodily function.     Time: was greater than 35 minutes.    (This excludes time spent performing separately reportable procedures and services).  Patient was at high risk of imminent or life-threatening deterioration in his condition due to Respiratory failure.     Garcia Lisa MD,  FACP, FCCP, CNSC  Intensive Care Medicine, Nutrition Support and Pulmonary Medicine     [x]  Primary Attending  []  Consultant

## 2019-11-08 NOTE — PLAN OF CARE
Problem: Patient Care Overview  Goal: Plan of Care Review  Outcome: Ongoing (interventions implemented as appropriate)   11/08/19 8354   Coping/Psychosocial   Plan of Care Reviewed With other (see comments)  (Family not at bedside; patient sedated and intubated.)   Plan of Care Review   Progress improving   OTHER   Outcome Summary Patient VSS over night; will attempt to respond to commands but weak to do so; versed gtt titrated down over night; patient has become increasingly restless/uncomfortable; titrating versed to maintain comfort; will continue to monitor.

## 2019-11-08 NOTE — PROGRESS NOTES
Continued Stay Note  [facility]     Patient Name: Talha Cutler  MRN: [id number]  Today's Date: 11/8/2019    Admit Date: 11/6/2019    Discharge Plan     Row Name 11/08/19 1525       Plan    Plan  TBD    Plan Comments  Discussed patient in am rounds.  Patient remans sedated and on ventilator.  Previously, patient's plan has been to return home with home health at discharge.  Once patient is appropriate, therapy evals would be helpful to determine proper discharge placement.  CM will continue to follow.  Kandi Genao RN [phone]    Final Discharge Disposition Code  30 - still a patient        Discharge Codes    No documentation.       Expected Discharge Date and Time     Expected Discharge Date Expected Discharge Time    Nov 15, 2019             Kandi Genao RN

## 2019-11-08 NOTE — PLAN OF CARE
Problem: Ventilation, Mechanical Invasive (Adult)  Intervention: Prevent Airway Displacement/Mechanical Dysfunction   11/08/19 1616   Prevent Airway Displacement/Mechanical Dysfunction   Airway Safety Measures manual resuscitator/mask/valve in room;suction at bedside     Intervention: Prevent Ventilator-Induced Lung Injury   11/08/19 1616   Respiratory Interventions   Lung Protection Measures low inspiratory pressure provided;low tidal volume provided;lung compliance monitored;optimal PEEP applied;plateau/inspiratory pressure monitored;ventilator synchrony promoted;ventilator waveforms monitored     Intervention: Prevent Ventilator-Associated Pneumonia (VAP)   11/08/19 1616   Positioning   Head of Bed (HOB) HOB at 30-45 degrees   VAP Prevention Measures   VAP Prevention Bundle HOB elevation maintained;readiness to extubate assessed;oral care regularly provided;vent circuit breaks minimized     Intervention: Optimize Oxygenation/Ventilation   11/08/19 1616   Respiratory Interventions   Airway/Ventilation Management airway patency maintained;calming measures promoted;humidification applied;pulmonary hygiene promoted

## 2019-11-08 NOTE — NURSING NOTE
Called MRI for update on status.  Was informed he was cleared on his screening sheet and the imaging would be performed later this evening/tonight.

## 2019-11-08 NOTE — PROGRESS NOTES
INTENSIVIST   PROGRESS NOTE     Hospital:  LOS: 2 days      S     Mr. Talha Cutler, 59 y.o. male is followed for:  No chief complaint on file.      Status epilepticus    DM    Hypothyroidism (acquired)      As an Intensivist, we provide an integrated approach to the ICU patient and family, medical management of comorbid conditions, lead interdisciplinary rounds and coordinate the care with all other services, including those from other specialists.     Interval History:  Intubated.  Sedated.  No more seizures since he has intubated.     The patient's relevant past medical, surgical and social history were reviewed and updated in Epic as appropriate.     ROS:   ROS cannot be reliably obtained from the patient due to his Acuity of condition and Intubated.        O     Vitals:  Temp: 97.4 °F (36.3 °C) (11/08/19 1600) Temp  Min: 97.4 °F (36.3 °C)  Max: 98.3 °F (36.8 °C)   Temp core:      BP: 92/66 (11/08/19 1630) BP  Min: 92/66  Max: 134/83   Pulse: 87 (11/08/19 1630) Pulse  Min: 80  Max: 102   Resp: 13 (11/08/19 1600) Resp  Min: 10  Max: 16   SpO2: 99 % (11/08/19 1630) SpO2  Min: 96 %  Max: 100 %   Device: ventilator (11/08/19 1600)    Flow Rate:   No Data Recorded     Intake/Ouptut 24 hrs (7:00AM - 6:59 AM)  Intake/Output       11/07/19 0700 - 11/08/19 0659 11/08/19 0700 - 11/09/19 0659    Intake (ml) 1521.4 925.6    Output (ml) 2330 --    Net (ml) -808.6 925.6    Last Weight  --  65 kg (143 lb 4.8 oz)           Medications (drips):    midazolam 1 mg/mL 100mL NS Last Rate: 4 mg/hr (11/08/19 1155)       Mechanical Ventilator:  Settings: Observed:   Mode: VC+/AC (11/08/19 1255)    Vt (Set, L): 0.48 L (11/08/19 1255)    Resp Rate (Set): 10 (11/08/19 1255) Resp Rate (Observed) Vent: 13 (11/08/19 1630)   FiO2 (%): 30 % (11/08/19 1255)    PEEP/CPAP (cm H2O): 5 cm H20 (11/08/19 1255) Plateau Pressure (cm H2O): 25 cm H2O (11/08/19 0727)    Minute Ventilation (L/min) (Obs): 5.28 L/min (11/08/19 1255)    I:E Ratio (Obs):  1:2.20 (11/08/19 1255)     Physical Examination  Telemetry:  Rhythm: normal sinus rhythm (11/08/19 1600)     QTc Interval (Sec): 0.45 (11/07/19 1909)   Constitutional:  No acute distress.   Cardiovascular: RRR.   Normal heart sounds.  No murmurs, gallop or rub.   Respiratory: Normal breath sounds  No adventitious sounds.   Abdominal:  Soft with no tenderness.  No distension.   No HSM.   Extremities: Warm.  Dry.  No cyanosis.  Trace Edema   Neurological:   Sedated.  Best Eye Response: 2-->(E2) to pain (11/08/19 1600)  Best Motor Response: 6-->(M6) obeys commands (11/08/19 1600)  Best Verbal Response: 1-->(V1) none (11/08/19 1600)  Grupo Coma Scale Score: 9 (11/08/19 1600)   Lines/Drains/Airways: L IJ CVC  O ETT     Hematology:  Results from last 7 days   Lab Units 11/08/19 0550 11/07/19  1627 11/07/19 0135 11/05/19 0618   WBC 10*3/mm3 6.90  --  4.60  --  5.89   HEMOGLOBIN g/dL 8.9* 10.0* 6.8*   < > 8.0*   MCV fL 90.2  --  97.9*  --  91.4   PLATELETS 10*3/mm3 169  --  134*  --  167    < > = values in this interval not displayed.       Chemistry:  Estimated Creatinine Clearance: 24.7 mL/min (A) (by C-G formula based on SCr of 2.92 mg/dL (H)).    Results from last 7 days   Lab Units 11/08/19 0550 11/07/19 0135 11/06/19 0823   SODIUM mmol/L 132* 134* 131*   POTASSIUM mmol/L 3.8 4.4  4.4 4.7   CHLORIDE mmol/L 93* 95* 91*   CO2 mmol/L 21.0* 22.0 20.2*   ANION GAP mmol/L 18.0* 17.0* 19.8*   GLUCOSE mg/dL 251* 95 181*   CALCIUM mg/dL 8.6 9.2 9.4     Results from last 7 days   Lab Units 11/08/19  0550 11/07/19  0135 11/06/19  0823   BUN mg/dL 22 51* 43*   CREATININE mg/dL 2.92* 4.78* 4.48*     Results from last 7 days   Lab Units 11/08/19  0550 11/07/19  0135 11/06/19  0823 11/03/19  0608   MAGNESIUM mg/dL 2.4 2.8*  --   --    PHOSPHORUS mg/dL 2.0*  --  4.9* 6.2*       Arterial Blood Gases:  Results from last 7 days   Lab Units 11/08/19  0348 11/07/19  0215 11/06/19  1633   PH, ARTERIAL pH units 7.550* 7.467* 7.368    PCO2, ARTERIAL mm Hg 28.7 31.2 44.5   PO2 ART mm Hg 87.7 76.2* 74.3*   FIO2 % 30 30 32       Images:  Xr Chest 1 View    Result Date: 11/8/2019  1. Left IJ catheter placement into the proximal SVC. No evidence of pneumothorax. 2. NG tube in stomach. 3. ET tube remains in good position. 4. Stable patchy right mid and lower lung disease and mild left lung interstitial changes.  D:  11/07/2019 E:  11/07/2019  This report was finalized on 11/8/2019 8:17 AM by DR. Rosales Martel MD.        Echo:  Results for orders placed during the hospital encounter of 11/01/19   Adult Transthoracic Echo Complete W/ Cont if Necessary Per Protocol    Narrative · The left ventricular cavity is moderate-to-severely dilated.  · Right ventricular cavity is mildly dilated.  · Left atrial cavity size is moderately dilated.  · Moderate mitral valve regurgitation is present  · There is a small (<1cm) pericardial effusion.          Results: Reviewed.  I reviewed the patient's new laboratory and imaging results.  I independently reviewed the patient's new images.    Medications: Reviewed.    Assessment/Plan   A / P     11/07/19     Assessment:    He was initially admitted to [facility] on 11/1 for pneumonia. But eventually respiratory status declined to the point of requiring Bipap and moved to the ICU on 11/5/19.  On the afternoon of 11/6, he was noted to become more lethargic and then developed tonic/clonic movements of his right arm and his head. He was given 500mg of IV keppra. He was transferred to [facility] for Neurology consultation. Upon arrival, he had 2 separate episodes of tonic/clonic jerking of his extremities in less than 5 minutes without return to baseline. He was then intubated.    1. Seizures  1. He has been on Cefepime at OSH  2. Levetiracetam .  2. Respiratory failure, 2ry to CNS  1. Intubated 11/07/19   2. Mechanical ventilation  3. Pneumonia RUL at OSH (11/1/19). (Metapneumovirus, per Respiratory Panel PCR 11/1/19))  4. Small R Pleural  effusion (as per CT ABD 11/5/19)  5. ALYSSA on CPAP at home  3. Hypothyroidism on Rx.  4. HFrEF   5. GERD  6. HTN  7. Previous CVA with left sided residual weakness.  8. Anemia  1. S/P transfusion 11/07/19  9. ESRD  1. HD  10. T2DM    Results from last 7 days   Lab Units 11/08/19  1102 11/07/19  2341 11/07/19  1735 11/07/19  1130 11/07/19  0528 11/07/19  0022   GLUCOSE mg/dL 278* 203* 128 73 96 105     Lab Results   Lab Value Date/Time    HGBA1C 8.90 (H) 09/21/2019 0647    HGBA1C 10.6 (H) 02/23/2019 0408    HGBA1C 9.6 (H) 07/13/2017 0438       Nutrition Support: Diet, Tube Feeding Tube Feeding Formula: Novasource Renal (Electrolyte Restricted)   Modulars: PRO-STAT oral liquid 1 packet   Diet: NPO Diet   Advance Directives: Code Status and Medical Interventions:   Ordered at: 11/06/19 8200     Code Status:    CPR     Medical Interventions (Level of Support Prior to Arrest):    Full        Plan:    1. Continue M Ventilation until seizures controlled  2. Adjust antibiotics.   3. Check PCT  4. IV D10, to prevent hypoglycemia  5. May need to follow up R effusion. (CT Scan of chest, to also assess RUL)  6. Disposition: Keep in ICU.    Plan of care and goals reviewed during interdisciplinary rounds.  I discussed the patient's findings and my recommendations with nursing staff  .  Level of Risk is High due to:  illness with threat to life or bodily function.     Time: was greater than 40 minutes.    (This excludes time spent performing separately reportable procedures and services).  Patient was at high risk of imminent or life-threatening deterioration in his condition due to Respiratory failure.     Garcia Lisa MD, FACP, FCCP, CNSC  Intensive Care Medicine, Nutrition Support and Pulmonary Medicine     [x]  Primary Attending  []  Consultant

## 2019-11-09 NOTE — PROGRESS NOTES
INTENSIVIST / PULMONARY FOLLOW UP NOTE     Hospital:  LOS: 3 days   Mr. Talha Cutler, 59 y.o. male is followed for:     Status epilepticus    Recurrent right pleural effusion    SOB (shortness of breath)    Uncontrolled diabetes mellitus with hyperglycemia, with long-term current use of insulin (CMS/HCC)    Physical debility    Peripheral vascular disease (CMS/HCC)    Pneumonia of right upper lobe due to infectious organism (CMS/HCC)    Symptomatic anemia    History of CVA (cerebrovascular accident)    Hypothyroidism (acquired)    Chronic systolic CHF (congestive heart failure) (CMS/HCC)    Seizure (CMS/HCC)          SUBJECTIVE   Remains on vent, getting HD    The patient's relevant past medical, surgical, family, and social history were reviewed    Allergies and medications were reviewed    ROS:  Per subjective, all other systems were reviewed and were negative        OBJECTIVE     Vital Sign Min/Max for last 24 hours:  Temp  Min: 97.2 °F (36.2 °C)  Max: 98.7 °F (37.1 °C)   BP  Min: 92/66  Max: 137/68   Pulse  Min: 75  Max: 94   Resp  Min: 10  Max: 18   SpO2  Min: 97 %  Max: 100 %   No Data Recorded     Physical Exam:  General Appearance:  Intubated, in no acute distress  Eyes:  No scleral icterus or pallor, pupils normal  Ears, Nose, Mouth, Throat:  Atraumatic, oral endotracheal tube  Neck:  Trachea midline, thyroid normal  Respiratory:  Clear to auscultation bilaterally, normal effort  Cardiovascular:  Regular rate and rhythm, no murmurs, no peripheral edema  Gastrointestinal:  Soft, non-tender, non-distended, no hepatosplenomegaly  Skin:  Normal temperature, no rash  Psychiatric:  Intubated, sedated, no agitation  Neuro:  No new focal neurologic deficits observed      Telemetry:              Hemodynamics:   CVP:     PAP:     PAOP:     CO:     CI:     SVI:     SVR:       SpO2: 100 % SpO2  Min: 97 %  Max: 100 %   Device:      Flow Rate:   No Data Recorded     Mechanical Ventilator Settings:       Vt (Set, L):  0.48 L    Resp Rate (Set): 10 Resp Rate (Observed) Vent: 13   FiO2 (%): 30 %    PEEP/CPAP (cm H2O): 5 cm H20 Plateau Pressure (cm H2O): 22 cm H2O    Minute Ventilation (L/min) (Obs): 5.94 L/min    I:E Ratio (Obs): 1:4.70     Intake/Ouptut 24 hrs (7:00AM - 6:59 AM)  Intake & Output (last 3 days)       11/06 0701 - 11/07 0700 11/07 0701 - 11/08 0700 11/08 0701 - 11/09 0700 11/09 0701 - 11/10 0700    I.V. (mL/kg) 23 601.4 1020 (15.7) 97.5 (1.5)    Blood  720      Other    60    NG/GT    262    IV Piggyback  200 128.6 200    Total Intake(mL/kg) 23 1521.4 1148.6 (17.7) 619.5 (9.5)    Urine (mL/kg/hr)  0      Other  2330      Total Output  2330      Net +23 -808.6 +1148.6 +619.5            Stool Unmeasured Occurrence   2 x           Lines, Drains & Airways    Active LDAs     Name:   Placement date:   Placement time:   Site:   Days:    CVC Triple Lumen 11/07/19 Left Internal jugular   11/07/19    0800    Internal jugular   2    Peripheral IV 11/07/19 0730 Anterior;Left Foot   11/07/19    0730    Foot   2    Peripheral IV 11/07/19 0730 Anterior;Left Foot   11/07/19    0730    Foot   2    NG/OG Tube Nasogastric 16 Fr Right nostril   11/07/19 was in patient on arrival to shift, reviewed x-ray for placement date    --    Right nostril   2    Hi-Lo Evac ETT 7.5   11/07/19    0016    Oral   2                Hematology:  Results from last 7 days   Lab Units 11/09/19  0431 11/08/19  0550 11/07/19  1627 11/07/19  0135 11/05/19  1837 11/05/19  0618 11/03/19  0608   WBC 10*3/mm3 7.84 6.90  --  4.60  --  5.89 6.38   HEMOGLOBIN g/dL 10.1* 8.9* 10.0* 6.8* 8.8* 8.0* 8.3*   HEMATOCRIT % 32.6* 28.4* 32.0* 23.3* 28.2* 25.6* 27.7*   PLATELETS 10*3/mm3 194 169  --  134*  --  167 163     Electrolytes, Magnesium and Phosphorus:  Results from last 7 days   Lab Units 11/09/19  0431 11/08/19  0550 11/07/19  0135 11/06/19  0823 11/05/19  0618 11/03/19  0608   SODIUM mmol/L 133* 132* 134* 131* 127* 132*   CHLORIDE mmol/L 93* 93* 95* 91* 83* 88*    POTASSIUM mmol/L 4.0 3.8 4.4  4.4 4.7 4.9 4.7   CO2 mmol/L 22.0 21.0* 22.0 20.2* 22.2 20.1*   MAGNESIUM mg/dL 2.5* 2.4 2.8*  --   --   --    PHOSPHORUS mg/dL 3.2 2.0*  --  4.9*  --  6.2*     Renal:  Results from last 7 days   Lab Units 11/09/19  0431 11/08/19  0550 11/07/19  0135 11/06/19  0823 11/05/19  0618 11/03/19  0608   CREATININE mg/dL 3.86* 2.92* 4.78* 4.48* 5.92* 3.70*   BUN mg/dL 31* 22 51* 43* 61* 26*     Estimated Creatinine Clearance: 18.7 mL/min (A) (by C-G formula based on SCr of 3.86 mg/dL (H)).  Hepatic:  Results from last 7 days   Lab Units 11/08/19  0550 11/05/19  0618   ALK PHOS U/L 58 71   BILIRUBIN mg/dL 0.4 0.2   ALT (SGPT) U/L 12 17   AST (SGOT) U/L 40 66*     Arterial Blood Gases:  Results from last 7 days   Lab Units 11/09/19  0457 11/08/19  1805 11/08/19  0348 11/07/19  0215 11/06/19  1633 11/06/19  0822   PH, ARTERIAL pH units 7.437 7.513* 7.550* 7.467* 7.368  --    PCO2, ARTERIAL mm Hg 37.0 34.0 28.7 31.2 44.5  --    PO2 ART mm Hg 105.0 108.0 87.7 76.2* 74.3*  --    FIO2 % 30 30 30 30 32 35             Lab Results   Component Value Date    LACTATE 1.2 11/01/2019       Relevant imaging studies and labs from 11/09/19 were reviewed and interpreted by me    Medications (drips):    midazolam 1 mg/mL 100mL NS Last Rate: Stopped (11/09/19 0821)         chlorhexidine 15 mL Mouth/Throat Q12H   heparin (porcine) 5,000 Units Subcutaneous Q12H   insulin regular 0-9 Units Subcutaneous Q6H   levETIRAcetam 500 mg Intravenous Q12H   pantoprazole 40 mg Intravenous Q AM   piperacillin-tazobactam 3.375 g Intravenous Q12H   PRO-STAT 1 packet Nasogastric BID   sodium chloride 10 mL Intravenous Q12H       Assessment/Plan   IMPRESSION / PLAN     Inpatient Problem List:  59 y.o.male:  Active Hospital Problems    Diagnosis   • **Status epilepticus   • Seizure (CMS/HCC)   • Hypothyroidism (acquired)   • History of CVA (cerebrovascular accident)   • Chronic systolic CHF (congestive heart failure) (CMS/HCC)   •  Symptomatic anemia   • Pneumonia of right upper lobe due to infectious organism (CMS/HCC)   • Peripheral vascular disease (CMS/[facility])     Added automatically from request for surgery [id number]     • Uncontrolled diabetes mellitus with hyperglycemia, with long-term current use of insulin (CMS/[facility])   • SOB (shortness of breath)   • Physical debility   • Recurrent right pleural effusion        Impression:  58 yo male with PMH Anemia, CHF, CKD (on Tuesday, Thursday, Saturday HD), ALYSSA on CPAP at night, DM, GERD, HTN, Pleural effusion, PNA, Stroke with left sided residual weakness and peptic ulcer who presents as a transfer from [facility] with issues associated with seizures on 11/6/19.  Originally admitted at OSH on 11/1.  Patient was intubated on arrival to [facility] 11/6 with status epilepticus.    Plan:  Respiratory Failure / Vent / Sedation - failed bedside PST this am, can try again when more alert.  If needs to be re sedated will order precedex.  Nebs.    Pneumonia / Small effusion - +PCR for metapneumovirus.  On pip-tazo.  MRSA PCR neg.  No chest tube / thoracentesis needed.    CHF EF 25% / Mod MR / NSTEMI - med management, evaluated by Cardiology at [facility].  May get Cards eval here if ongoing evidence of ischemia or difficulty weaning secondary to cardiac causes.    Seizures / Encephalopathy - per neurology    ESRD - per nephrology    DM - titrate Sq insulin    Resume / continue appropriate home meds    DVT/PUD prophylaxis    Nutrition - Tube feeds    Plan of care and goals reviewed with mulitdisciplinary team at daily rounds    Critical Care time spent in direct patient care: 30 minutes (excluding procedure time, if applicable) including high complexity decision making to assess, manipulate, and support vital organ system failure in this individual who has impairment of one or more vital organ systems such that there is a high probability of imminent or life threatening deterioration in the patient’s condition.       Davis  MD Donald  Intensive Care Medicine  11/09/19 11:21 AM

## 2019-11-09 NOTE — PROGRESS NOTES
Neurology Progress Note      Patient: Talha Cutler    CC: seizures  HPI: 58 y/o M with ESRD and hx of stroke with seizures  Events: HD this AM.  No recent seizures. Continuous EEG discontinued.     The relevant past medical, social, surgical, and family history and ROS were reviewed.     Vital Signs   Vitals:    11/09/19 1000 11/09/19 1030 11/09/19 1050 11/09/19 1100   BP: 137/68 131/67  132/64   BP Location: Left arm      Patient Position: Lying      Pulse: 94 91  92   Resp: 18  18    Temp:       TempSrc:       SpO2: 99% 99%  100%   Weight:       Height:         Temp:  [97.2 °F (36.2 °C)-98.7 °F (37.1 °C)] 97.5 °F (36.4 °C)  Physical Exam:   General:  Intubated, on sedation, does not respond to voice              Neuro:  Intubated, sedated. Arouses to stimulation and VILLA.  Pupils miotic, reactive.     Current Facility-Administered Medications:   •  albumin human 25 % IV SOLN 25 g, 25 g, Intravenous, PRN, Keith Soares MD, 50 g at 11/09/19 0806  •  chlorhexidine (PERIDEX) 0.12 % solution 15 mL, 15 mL, Mouth/Throat, Q12H, Garcia Lisa MD, 15 mL at 11/09/19 0812  •  heparin (porcine) 5000 UNIT/ML injection 5,000 Units, 5,000 Units, Subcutaneous, Q12H, Adamaris Gutierres APRN, 5,000 Units at 11/09/19 0810  •  insulin regular (humuLIN R,novoLIN R) injection 0-9 Units, 0-9 Units, Subcutaneous, Q6H, Case, Winnie V., DO, 4 Units at 11/09/19 0629  •  levETIRAcetam in NaCl 0.82% (KEPPRA) IVPB 500 mg, 500 mg, Intravenous, Q12H, Adamaris Gutierres APRN, 500 mg at 11/09/19 0632  •  midazolam 1 mg/mL 100mL NS (VERSED) 100-0.9 MG/100ML-% infusion solution, 1-10 mg/hr, Intravenous, Titrated, Adamaris Gutierres APRN, Stopped at 11/09/19 0821  •  pantoprazole (PROTONIX) injection 40 mg, 40 mg, Intravenous, Q AM, Garcia Lisa MD, 40 mg at 11/09/19 0628  •  piperacillin-tazobactam (ZOSYN) 3.375 g in iso-osmotic dextrose 50 ml (premix), 3.375 g, Intravenous, Q12H, Adamaris Gutierres APRN, Last Rate: 0 mL/hr at 11/07/19 0700, 3.375 g at  11/09/19 0632  •  PRO-STAT oral liquid 1 packet, 1 packet, Nasogastric, BID, Garcia Lisa MD, 1 packet at 11/09/19 0810  •  sodium chloride 0.9 % flush 10 mL, 10 mL, Intravenous, Q12H, Adamaris Gutierres APRN, 10 mL at 11/09/19 0810  •  sodium chloride 0.9 % flush 10 mL, 10 mL, Intravenous, PRN, Adamaris Gutierres APRN      Allergies   Allergen Reactions   • Erythromycin Hives         Results Review:  Results from last 7 days   Lab Units 11/09/19  0431   WBC 10*3/mm3 7.84   HEMOGLOBIN g/dL 10.1*   HEMATOCRIT % 32.6*   PLATELETS 10*3/mm3 194     Results from last 7 days   Lab Units 11/09/19  0431 11/08/19  0550 11/07/19  0135  11/05/19  0618   SODIUM mmol/L 133* 132* 134*   < > 127*   POTASSIUM mmol/L 4.0 3.8 4.4  4.4   < > 4.9   CHLORIDE mmol/L 93* 93* 95*   < > 83*   CO2 mmol/L 22.0 21.0* 22.0   < > 22.2   BUN mg/dL 31* 22 51*   < > 61*   CREATININE mg/dL 3.86* 2.92* 4.78*   < > 5.92*   CALCIUM mg/dL 9.0 8.6 9.2   < > 9.3   BILIRUBIN mg/dL  --  0.4  --   --  0.2   ALK PHOS U/L  --  58  --   --  71   ALT (SGPT) U/L  --  12  --   --  17   AST (SGOT) U/L  --  40  --   --  66*   GLUCOSE mg/dL 217* 251* 95   < > 123*    < > = values in this interval not displayed.     Results for orders placed or performed during the hospital encounter of 11/06/19   EEG Continuous Monitoring    Narrative    Date of Procedure:    11-8 at 12 01 AM through 11-8 at 1 30 PM    Reason for referral:    59 y.o.male with recurrent seizures, altered mental status    Technical summary:     A 19 channel digital EEG is performed using the international 10-20   placement system, including eye leads and EKG leads.  Split screen   technology with video/EEG correlation is used.  [employer] software is used   for spike and event detection.  A patient event button is offered.    Findings:    Multiple time samples are reviewed.  The patient is intubated and sedated.    The background shows diffuse moderate amplitude 3-5 hz delta and theta   sen symmetrically over  both hemispheres, alternating with brief periods of   lower amplitude 5-6 hz generalized theta.  Frontal intermittent rhythmic   delta activity is present at times.  No epileptiform acitvity or   electrographic seizures are seen.  The patient event button is not pushed.      Impression          EEG background is moderate generalized slow     No electrographic seizures are seen during this recording epoch          This report is transcribed using the Dragon dictation system.  Despite the   best of intentions, unexpected transcription errors may be present.         EEG    Narrative    Reason for referral:    59 y.o.male with recurrent seizures, jerking of right arm and head,   generalized seizures    Technical Summary:     A 19 channel digital EEG was performed using the international 10-20   placement system, including eye leads and EKG leads.    Findings:    The patient is intubated and sedated.  The background shows diffuse   moderate amplitude 2-5 Hz intermixed delta and theta activity, with   intermittent EMG artifact and low amplitude faster frequencies present.    Photic stimulation does not change the background.  Hyperventilation was   not performed.  No focal slowing or epileptiform activity is seen.      Impression    Moderate generalized slow    No epileptiform activity or electrographic seizures are seen    This report is transcribed using the Dragon dictation system.       Results for orders placed or performed during the hospital encounter of 11/01/19   CT Head Without Contrast    Narrative    FINAL REPORT    TECHNIQUE:  Noncontrast exam. This study was performed with techniques to  keep radiation doses as low as reasonably achievable (ALARA).  Individualized dose reduction techniques using automated  exposure control or adjustment of mA and/or kV according to the  patient's size were employed.    CLINICAL HISTORY:  Neuro deficit(s), subacute    FINDINGS:  There is moderate generalized atrophy and  chronic microvascular  change.  No abnormal density is seen.  Ventricles are normal.  There is no hemorrhage.  No mass effect is seen.     Bone  windows show no evidence of fracture.      Impression    No acute findings    Authenticated by JOLENE Osman MD on 11/06/2019 04:58:08 PM       Assessment/Plan:     1. Seizures  2. History of Stroke  3. ESRD on hemodialysis  4. Encephalopathy  5. Respiratory Failure      -Keppra 500 mg q12h  -MRI brain when able   -continue current medical treatment, nutrition, and supportive care         Theo Wilkins MD  11/09/19  11:24 AM

## 2019-11-09 NOTE — PLAN OF CARE
Problem: Patient Care Overview  Goal: Plan of Care Review  Outcome: Ongoing (interventions implemented as appropriate)   11/08/19 1636 11/09/19 0501   Coping/Psychosocial   Plan of Care Reviewed With --  patient   Plan of Care Review   Progress improving --    OTHER   Outcome Summary --  Patient VSS over night; versed gtt continues; MRI ordered after 12 yesterday 11/8 but scan has not yet been completed; CT scan ordered this AM as well, but awaiting CT readiness; family to bedside this evening and to return this AM; will continue to monitor.

## 2019-11-09 NOTE — PLAN OF CARE
Problem: Hemodialysis (Adult)  Goal: Signs and Symptoms of Listed Potential Problems Will be Absent, Minimized or Managed (Hemodialysis)  Outcome: Ongoing (interventions implemented as appropriate)   11/09/19 0736   Goal/Outcome Evaluation   Problems Assessed (Hemodialysis) all   Problems Present (Hemodialysis) electrolyte imbalance;fluid imbalance

## 2019-11-09 NOTE — PLAN OF CARE
Problem: Patient Care Overview  Goal: Plan of Care Review  Outcome: Ongoing (interventions implemented as appropriate)   11/09/19 4773   Coping/Psychosocial   Plan of Care Reviewed With patient   Plan of Care Review   Progress no change   OTHER   Outcome Summary Sedation has been off since 0800. Patient is restless, however still not following commands. Precedex orded in case of agitation. VSS. Will continue to monitor.

## 2019-11-09 NOTE — PROGRESS NOTES
" LOS: 3 days   Patient Care Team:  Jeannette Godoy APRN as PCP - General (Family Medicine)    Chief Complaint: ESRD    Subjective     Tolerated HD well. Labs stable. No significant change in clinical status        Subjective:  Symptoms:  Stable.        History taken from: patient    Objective     Vital Sign Min/Max for last 24 hours  Temp  Min: 97.2 °F (36.2 °C)  Max: 98.7 °F (37.1 °C)   BP  Min: 92/66  Max: 137/63   Pulse  Min: 75  Max: 93   Resp  Min: 10  Max: 16   SpO2  Min: 97 %  Max: 100 %   No Data Recorded   Weight  Min: 65 kg (143 lb 4.8 oz)  Max: 65 kg (143 lb 4.8 oz)     Flowsheet Rows      First Filed Value   Admission Height  172.7 cm (67.99\") Documented at 11/07/2019 1909   Admission Weight  -- [Bed scale not working] Documented at 11/08/2019 0500          I/O this shift:  In: 297.5 [I.V.:97.5; IV Piggyback:200]  Out: -   I/O last 3 completed shifts:  In: 1377.6 [I.V.:1249; IV Piggyback:128.6]  Out: -     Objective:  Vital signs: (most recent): Blood pressure 137/63, pulse 87, temperature 97.5 °F (36.4 °C), temperature source Axillary, resp. rate 11, height 173 cm (68.11\"), weight 65 kg (143 lb 4.8 oz), SpO2 100 %.               Constitutional: He appears well-developed. No distress.   AV access on the left arm + palpable   HENT:   Head: Normocephalic and atraumatic.   Eyes: Pupils are equal, round, and reactive to light.   Neck: Normal range of motion.   Cardiovascular: Normal rate, regular rhythm and normal heart sounds.   No murmur heard.  Pulmonary/Chest: Effort normal.   Intubated on MV   Abdominal: Soft. Bowel sounds are normal.   Musculoskeletal: He exhibits no edema.   Neurological:   Sedated on 24hr Video EEG   Skin: Skin is warm.   Vitals reviewed.    Results Review:     I reviewed the patient's new clinical results.    WBC WBC   Date Value Ref Range Status   11/09/2019 7.84 3.40 - 10.80 10*3/mm3 Final   11/08/2019 6.90 3.40 - 10.80 10*3/mm3 Final   11/07/2019 4.60 3.40 - 10.80 10*3/mm3 " Final      HGB Hemoglobin   Date Value Ref Range Status   11/09/2019 10.1 (L) 13.0 - 17.7 g/dL Final   11/08/2019 8.9 (L) 13.0 - 17.7 g/dL Final   11/07/2019 10.0 (L) 13.0 - 17.7 g/dL Final   11/07/2019 6.8 (C) 13.0 - 17.7 g/dL Final      HCT Hematocrit   Date Value Ref Range Status   11/09/2019 32.6 (L) 37.5 - 51.0 % Final   11/08/2019 28.4 (L) 37.5 - 51.0 % Final   11/07/2019 32.0 (L) 37.5 - 51.0 % Final   11/07/2019 23.3 (L) 37.5 - 51.0 % Final      Platlets No results found for: LABPLAT   MCV MCV   Date Value Ref Range Status   11/09/2019 91.8 79.0 - 97.0 fL Final   11/08/2019 90.2 79.0 - 97.0 fL Final   11/07/2019 97.9 (H) 79.0 - 97.0 fL Final          Sodium Sodium   Date Value Ref Range Status   11/09/2019 133 (L) 136 - 145 mmol/L Final   11/08/2019 132 (L) 136 - 145 mmol/L Final   11/07/2019 134 (L) 136 - 145 mmol/L Final      Potassium Potassium   Date Value Ref Range Status   11/09/2019 4.0 3.5 - 5.2 mmol/L Final   11/08/2019 3.8 3.5 - 5.2 mmol/L Final   11/07/2019 4.4 3.5 - 5.2 mmol/L Final   11/07/2019 4.4 3.5 - 5.2 mmol/L Final      Chloride Chloride   Date Value Ref Range Status   11/09/2019 93 (L) 98 - 107 mmol/L Final   11/08/2019 93 (L) 98 - 107 mmol/L Final   11/07/2019 95 (L) 98 - 107 mmol/L Final      CO2 CO2   Date Value Ref Range Status   11/09/2019 22.0 22.0 - 29.0 mmol/L Final   11/08/2019 21.0 (L) 22.0 - 29.0 mmol/L Final   11/07/2019 22.0 22.0 - 29.0 mmol/L Final      BUN BUN   Date Value Ref Range Status   11/09/2019 31 (H) 8 - 23 mg/dL Final   11/08/2019 22 8 - 23 mg/dL Final   11/07/2019 51 (H) 8 - 23 mg/dL Final      Creatinine Creatinine   Date Value Ref Range Status   11/09/2019 3.86 (H) 0.76 - 1.27 mg/dL Final   11/08/2019 2.92 (H) 0.76 - 1.27 mg/dL Final   11/07/2019 4.78 (H) 0.76 - 1.27 mg/dL Final      Calcium Calcium   Date Value Ref Range Status   11/09/2019 9.0 8.6 - 10.5 mg/dL Final   11/08/2019 8.6 8.6 - 10.5 mg/dL Final   11/07/2019 9.2 8.6 - 10.5 mg/dL Final      PO4 No  results found for: CAPO4   Albumin Albumin   Date Value Ref Range Status   11/08/2019 2.60 (L) 3.50 - 5.20 g/dL Final      Magnesium Magnesium   Date Value Ref Range Status   11/09/2019 2.5 (H) 1.6 - 2.4 mg/dL Final   11/08/2019 2.4 1.6 - 2.4 mg/dL Final   11/07/2019 2.8 (H) 1.6 - 2.4 mg/dL Final      Uric Acid No results found for: URICACID     Medication Review: Yes    Assessment/Plan       Status epilepticus    Recurrent right pleural effusion    SOB (shortness of breath)    Uncontrolled diabetes mellitus with hyperglycemia, with long-term current use of insulin (CMS/[facility])    Physical debility    Peripheral vascular disease (CMS/[facility])    Pneumonia of right upper lobe due to infectious organism (CMS/[facility])    Symptomatic anemia    History of CVA (cerebrovascular accident)    Hypothyroidism (acquired)    Chronic systolic CHF (congestive heart failure) (CMS/[facility])    Seizure (CMS/formerly [facility])      Assessment & Plan    Condition: In stable condition.  Unchanged.    # (ESRD: [facility] KT/B 1.88  #Access: AV fistula on the left arm  Non functioning access on the right   #Anemia: Acute on chronic  Will need MARINAO on HD day  #BMD: stable  #Acid base and [place]: stable  #AMS+ Tonic clonic seiuzre: Neurology following  Unlikely related to uremic toxins      Plan   HD per [facility]. Minimal UF, 3K  Transfuse at Hb<7      Keith Soares MD  11/09/19  9:26 AM

## 2019-11-10 NOTE — PLAN OF CARE
Problem: Ventilation, Mechanical Invasive (Adult)  Intervention: Prevent Airway Displacement/Mechanical Dysfunction   11/09/19 1908   Prevent Airway Displacement/Mechanical Dysfunction   Airway Safety Measures mask at bedside;suction at bedside

## 2019-11-10 NOTE — PLAN OF CARE
Problem: Ventilation, Mechanical Invasive (Adult)  Intervention: Prevent Airway Displacement/Mechanical Dysfunction   11/10/19 1841   Prevent Airway Displacement/Mechanical Dysfunction   Airway Safety Measures mask at bedside;suction at bedside

## 2019-11-10 NOTE — PLAN OF CARE
Problem: Patient Care Overview  Goal: Plan of Care Review  Outcome: Ongoing (interventions implemented as appropriate)   11/10/19 1687   Coping/Psychosocial   Plan of Care Reviewed With patient;family   Plan of Care Review   Progress no change   OTHER   Outcome Summary MRI complete. Pt was agitated, restless and continuously reaching for ETT despite being restrained, therefore low dose Precedex was turned back on to ensure that the Pt was comftorable. Pt does not follow commands, however does withdraw from pain. HR 80'S - 110'S NSR-ST. 'S-150'S. Afebrile. Will continue to assess.

## 2019-11-10 NOTE — PROGRESS NOTES
"Neurology Progress Note        Patient: Talha Cutler     CC: seizures  HPI: 60 y/o M with ESRD and hx of stroke with seizures  Events:  No recent seizures.  Failed extubation trial today.     The relevant past medical, social, surgical, and family history and ROS were reviewed.      Vital Signs   Vitals   /60 (BP Location: Left arm, Patient Position: Lying)   Pulse 86   Temp 98.1 °F (36.7 °C) (Axillary)   Resp 11   Ht 173 cm (68.11\")   Wt 64.5 kg (142 lb 3.2 oz)   SpO2 97%   BMI 21.55 kg/m²     Physical Exam:              General:  Intubated, on sedation, does not respond to voice              Neuro:  Intubated, sedated on Precedex. Minimal arousal to stimulation.  Pupils miotic, reactive.        Current Facility-Administered Medications:   •  albumin human 25 % IV SOLN 25 g, 25 g, Intravenous, PRN, Keith Soares MD, 50 g at 11/09/19 0806  •  aspirin chewable tablet 81 mg, 81 mg, Oral, Daily, Davis Bennett MD, 81 mg at 11/10/19 0802  •  atorvastatin (LIPITOR) tablet 40 mg, 40 mg, Oral, Daily, Davis Bennett MD, 40 mg at 11/10/19 0803  •  chlorhexidine (PERIDEX) 0.12 % solution 15 mL, 15 mL, Mouth/Throat, Q12H, Garcia Lisa MD, 15 mL at 11/10/19 0803  •  clopidogrel (PLAVIX) tablet 75 mg, 75 mg, Oral, Daily, Davis Bennett MD, 75 mg at 11/10/19 0802  •  DEXMEDETOMIDINE 1000 MCG/250ML INFUSION infusion, 0.2-1.5 mcg/kg/hr, Intravenous, Titrated, Davis Bennett MD, Last Rate: 6.5 mL/hr at 11/10/19 1103, 0.4 mcg/kg/hr at 11/10/19 1103  •  heparin (porcine) 5000 UNIT/ML injection 5,000 Units, 5,000 Units, Subcutaneous, Q12H, Adamaris Gutierres APRN, 5,000 Units at 11/10/19 0803  •  insulin detemir (LEVEMIR) injection 10 Units, 10 Units, Subcutaneous, Daily, Davis Bennett MD, 10 Units at 11/10/19 1202  •  insulin regular (humuLIN R,novoLIN R) injection 0-9 Units, 0-9 Units, Subcutaneous, Q6H, Winnie Kaur DO, 6 Units at 11/10/19 " 1202  •  ipratropium-albuterol (DUO-NEB) nebulizer solution 3 mL, 3 mL, Nebulization, 4x Daily - RT, Davis Bennett MD, 3 mL at 11/10/19 1206  •  levETIRAcetam in NaCl 0.82% (KEPPRA) IVPB 500 mg, 500 mg, Intravenous, Q12H, Adamaris Gutierres APRN, 500 mg at 11/10/19 0530  •  levothyroxine (SYNTHROID, LEVOTHROID) tablet 125 mcg, 125 mcg, Oral, Q AM, Davis Bennett MD, 125 mcg at 11/10/19 0531  •  pantoprazole (PROTONIX) injection 40 mg, 40 mg, Intravenous, Q AM, Garcia Lisa MD, 40 mg at 11/10/19 0531  •  piperacillin-tazobactam (ZOSYN) 3.375 g in iso-osmotic dextrose 50 ml (premix), 3.375 g, Intravenous, Q12H, Adamaris Gutierres APRN, Last Rate: 0 mL/hr at 11/07/19 0700, 3.375 g at 11/10/19 0530  •  PRO-STAT oral liquid 1 packet, 1 packet, Nasogastric, BID, Garcia Lisa MD, 1 packet at 11/10/19 0804  •  sodium chloride 0.9 % flush 10 mL, 10 mL, Intravenous, Q12H, Adamaris Gutierres APRN, 10 mL at 11/10/19 0803  •  sodium chloride 0.9 % flush 10 mL, 10 mL, Intravenous, PRN, Adamaris Gutierres APRN           Allergies   Allergen Reactions   • Erythromycin Hives            Results Review:       Results from last 7 days   Lab Units 11/09/19  0431   WBC 10*3/mm3 7.84   HEMOGLOBIN g/dL 10.1*   HEMATOCRIT % 32.6*   PLATELETS 10*3/mm3 194               Results from last 7 days   Lab Units 11/09/19  0431 11/08/19  0550 11/07/19  0135   11/05/19  0618   SODIUM mmol/L 133* 132* 134*   < > 127*   POTASSIUM mmol/L 4.0 3.8 4.4  4.4   < > 4.9   CHLORIDE mmol/L 93* 93* 95*   < > 83*   CO2 mmol/L 22.0 21.0* 22.0   < > 22.2   BUN mg/dL 31* 22 51*   < > 61*   CREATININE mg/dL 3.86* 2.92* 4.78*   < > 5.92*   CALCIUM mg/dL 9.0 8.6 9.2   < > 9.3   BILIRUBIN mg/dL  --  0.4  --   --  0.2   ALK PHOS U/L  --  58  --   --  71   ALT (SGPT) U/L  --  12  --   --  17   AST (SGOT) U/L  --  40  --   --  66*   GLUCOSE mg/dL 217* 251* 95   < > 123*    < > = values in this interval not displayed.          Results for orders placed  or performed during the hospital encounter of 11/06/19   EEG Continuous Monitoring     Narrative     Date of Procedure:     11-8 at 12 01 AM through 11-8 at 1 30 PM     Reason for referral:     59 y.o.male with recurrent seizures, altered mental status     Technical summary:      A 19 channel digital EEG is performed using the international 10-20   placement system, including eye leads and EKG leads.  Split screen   technology with video/EEG correlation is used.  [employer] software is used   for spike and event detection.  A patient event button is offered.     Findings:     Multiple time samples are reviewed.  The patient is intubated and sedated.    The background shows diffuse moderate amplitude 3-5 hz delta and theta   sen symmetrically over both hemispheres, alternating with brief periods of   lower amplitude 5-6 hz generalized theta.  Frontal intermittent rhythmic   delta activity is present at times.  No epileptiform acitvity or   electrographic seizures are seen.  The patient event button is not pushed.        Impression            EEG background is moderate generalized slow      No electrographic seizures are seen during this recording epoch              This report is transcribed using the Dragon dictation system.  Despite the   best of intentions, unexpected transcription errors may be present.            EEG     Narrative     Reason for referral:     59 y.o.male with recurrent seizures, jerking of right arm and head,   generalized seizures     Technical Summary:      A 19 channel digital EEG was performed using the international 10-20   placement system, including eye leads and EKG leads.     Findings:     The patient is intubated and sedated.  The background shows diffuse   moderate amplitude 2-5 Hz intermixed delta and theta activity, with   intermittent EMG artifact and low amplitude faster frequencies present.    Photic stimulation does not change the background.  Hyperventilation was   not performed.   No focal slowing or epileptiform activity is seen.        Impression     Moderate generalized slow     No epileptiform activity or electrographic seizures are seen     This report is transcribed using the Dragon dictation system.        Results for orders placed or performed during the hospital encounter of 11/01/19   CT Head Without Contrast     Narrative     FINAL REPORT     TECHNIQUE:  Noncontrast exam. This study was performed with techniques to  keep radiation doses as low as reasonably achievable (ALARA).  Individualized dose reduction techniques using automated  exposure control or adjustment of mA and/or kV according to the  patient's size were employed.     CLINICAL HISTORY:  Neuro deficit(s), subacute     FINDINGS:  There is moderate generalized atrophy and chronic microvascular  change.  No abnormal density is seen.  Ventricles are normal.  There is no hemorrhage.  No mass effect is seen.     Bone  windows show no evidence of fracture.        Impression     No acute findings     Authenticated by JOLENE Osman MD on 11/06/2019 04:58:08 PM         Assessment/Plan:      1. Seizures  2. History of Stroke  3. ESRD on hemodialysis  4. Encephalopathy  5. Respiratory Failure        -Keppra 500 mg q12h  -continue current medical treatment, nutrition, and supportive care         Theo Wilkins MD

## 2019-11-10 NOTE — PROGRESS NOTES
INTENSIVIST / PULMONARY FOLLOW UP NOTE     Hospital:  LOS: 4 days   Mr. Talha Cutler, 59 y.o. male is followed for:     Status epilepticus    Recurrent right pleural effusion    SOB (shortness of breath)    Uncontrolled diabetes mellitus with hyperglycemia, with long-term current use of insulin (CMS/HCC)    Physical debility    Peripheral vascular disease (CMS/HCC)    Pneumonia of right upper lobe due to infectious organism (CMS/HCC)    Symptomatic anemia    History of CVA (cerebrovascular accident)    Hypothyroidism (acquired)    Chronic systolic CHF (congestive heart failure) (CMS/HCC)    Seizure (CMS/HCC)          SUBJECTIVE   Remains on vent, failed PST today at bedside    The patient's relevant past medical, surgical, family, and social history were reviewed    Allergies and medications were reviewed    ROS:  Per subjective, all other systems were reviewed and were negative        OBJECTIVE     Vital Sign Min/Max for last 24 hours:  Temp  Min: 96.5 °F (35.8 °C)  Max: 98.3 °F (36.8 °C)   BP  Min: 119/58  Max: 159/76   Pulse  Min: 82  Max: 114   Resp  Min: 10  Max: 20   SpO2  Min: 97 %  Max: 100 %   No Data Recorded     Physical Exam:  General Appearance:  Intubated, in no acute distress  Eyes:  No scleral icterus or pallor, pupils normal  Ears, Nose, Mouth, Throat:  Atraumatic, oral endotracheal tube  Neck:  Trachea midline, thyroid normal  Respiratory:  Clear to auscultation bilaterally, normal effort  Cardiovascular:  Regular rate and rhythm, no murmurs, no peripheral edema  Gastrointestinal:  Soft, non-tender, non-distended, no hepatosplenomegaly  Skin:  Normal temperature, no rash  Psychiatric:  Intubated, sedated, mild agitation  Neuro:  No new focal neurologic deficits observed (old residual left sided weakness)      Telemetry:              Hemodynamics:   CVP:     PAP:     PAOP:     CO:     CI:     SVI:     SVR:       SpO2: 99 % SpO2  Min: 97 %  Max: 100 %   Device:      Flow Rate:   No Data Recorded      Mechanical Ventilator Settings:       Vt (Set, L): 0.48 L    Resp Rate (Set): 10 Resp Rate (Observed) Vent: 14   FiO2 (%): 30 %    PEEP/CPAP (cm H2O): 5 cm H20 Plateau Pressure (cm H2O): 23 cm H2O    Minute Ventilation (L/min) (Obs): 6.9 L/min    I:E Ratio (Obs): 1:4.40     Intake/Ouptut 24 hrs (7:00AM - 6:59 AM)  Intake & Output (last 3 days)       11/07 0701 - 11/08 0700 11/08 0701 - 11/09 0700 11/09 0701 - 11/10 0700 11/10 0701 - 11/11 0700    I.V. (mL/kg) 601.4 1020 (15.7) 216 (3.3)     Blood 720       Other   338 40    NG/GT   749 50    IV Piggyback 200 128.6 299.9     Total Intake(mL/kg) 1521.4 1148.6 (17.7) 1602.9 (24.9) 90 (1.4)    Urine (mL/kg/hr) 0       Other 2330  1790     Stool   0     Total Output 2330  1790     Net -808.6 +1148.6 -187.1 +90            Stool Unmeasured Occurrence  2 x 3 x           Lines, Drains & Airways    Active LDAs     Name:   Placement date:   Placement time:   Site:   Days:    CVC Triple Lumen 11/07/19 Left Internal jugular   11/07/19    0800    Internal jugular   2    Peripheral IV 11/07/19 0730 Anterior;Left Foot   11/07/19    0730    Foot   2    Peripheral IV 11/07/19 0730 Anterior;Left Foot   11/07/19    0730    Foot   2    NG/OG Tube Nasogastric 16 Fr Right nostril   11/07/19 was in patient on arrival to shift, reviewed x-ray for placement date    --    Right nostril   2    Hi-Lo Evac ETT 7.5   11/07/19    0016    Oral   2                Hematology:  Results from last 7 days   Lab Units 11/10/19  0428 11/09/19  0431 11/08/19  0550 11/07/19  1627 11/07/19  0135 11/05/19  1837 11/05/19  0618   WBC 10*3/mm3 6.88 7.84 6.90  --  4.60  --  5.89   HEMOGLOBIN g/dL 9.6* 10.1* 8.9* 10.0* 6.8* 8.8* 8.0*   HEMATOCRIT % 32.4* 32.6* 28.4* 32.0* 23.3* 28.2* 25.6*   PLATELETS 10*3/mm3 194 194 169  --  134*  --  167     Electrolytes, Magnesium and Phosphorus:  Results from last 7 days   Lab Units 11/10/19  0428 11/09/19  0431 11/08/19  0550 11/07/19  0135 11/06/19  0823 11/05/19  0618    SODIUM mmol/L 139 133* 132* 134* 131* 127*   CHLORIDE mmol/L 100 93* 93* 95* 91* 83*   POTASSIUM mmol/L 3.9 4.0 3.8 4.4  4.4 4.7 4.9   CO2 mmol/L 25.0 22.0 21.0* 22.0 20.2* 22.2   MAGNESIUM mg/dL 2.3 2.5* 2.4 2.8*  --   --    PHOSPHORUS mg/dL 2.4* 3.2 2.0*  --  4.9*  --      Renal:  Results from last 7 days   Lab Units 11/10/19  0428 11/09/19  0431 11/08/19  0550 11/07/19  0135 11/06/19  0823 11/05/19  0618   CREATININE mg/dL 2.46* 3.86* 2.92* 4.78* 4.48* 5.92*   BUN mg/dL 20 31* 22 51* 43* 61*     Estimated Creatinine Clearance: 29.1 mL/min (A) (by C-G formula based on SCr of 2.46 mg/dL (H)).  Hepatic:  Results from last 7 days   Lab Units 11/10/19  0428 11/08/19  0550 11/05/19  0618   ALK PHOS U/L 64 58 71   BILIRUBIN mg/dL 0.4 0.4 0.2   ALT (SGPT) U/L 12 12 17   AST (SGOT) U/L 28 40 66*     Arterial Blood Gases:  Results from last 7 days   Lab Units 11/10/19  0453 11/09/19  0457 11/08/19  1805 11/08/19  0348 11/07/19  0215 11/06/19  1633 11/06/19  0822   PH, ARTERIAL pH units 7.476* 7.437 7.513* 7.550* 7.467* 7.368  --    PCO2, ARTERIAL mm Hg 38.0 37.0 34.0 28.7 31.2 44.5  --    PO2 ART mm Hg 102.0 105.0 108.0 87.7 76.2* 74.3*  --    FIO2 % 30 30 30 30 30 32 35             Lab Results   Component Value Date    LACTATE 1.3 11/10/2019       Relevant imaging studies and labs from 11/10/19 were reviewed and interpreted by me    Medications (drips):    dexmedetomidine Last Rate: 0.2 mcg/kg/hr (11/10/19 1036)         aspirin 81 mg Oral Daily   atorvastatin 40 mg Oral Daily   chlorhexidine 15 mL Mouth/Throat Q12H   clopidogrel 75 mg Oral Daily   heparin (porcine) 5,000 Units Subcutaneous Q12H   insulin regular 0-9 Units Subcutaneous Q6H   ipratropium-albuterol 3 mL Nebulization 4x Daily - RT   levETIRAcetam 500 mg Intravenous Q12H   levothyroxine 125 mcg Oral Q AM   pantoprazole 40 mg Intravenous Q AM   piperacillin-tazobactam 3.375 g Intravenous Q12H   PRO-STAT 1 packet Nasogastric BID   sodium chloride 10 mL  Intravenous Q12H       Assessment/Plan   IMPRESSION / PLAN     Inpatient Problem List:  59 y.o.male:  Active Hospital Problems    Diagnosis   • **Status epilepticus   • Seizure (CMS/[facility])   • Hypothyroidism (acquired)   • History of CVA (cerebrovascular accident)   • Chronic systolic CHF (congestive heart failure) (CMS/[facility])   • Symptomatic anemia   • Pneumonia of right upper lobe due to infectious organism (CMS/[facility])   • Peripheral vascular disease (CMS/[facility])     Added automatically from request for surgery [id number]     • Uncontrolled diabetes mellitus with hyperglycemia, with long-term current use of insulin (CMS/[facility])   • SOB (shortness of breath)   • Physical debility   • Recurrent right pleural effusion        Impression:  58 yo male with PMH Anemia, CHF, CKD (on Tuesday, Thursday, Saturday HD), ALYSSA on CPAP at night, DM, GERD, HTN, Pleural effusion, PNA, Stroke with left sided residual weakness and peptic ulcer who presents as a transfer from [facility] with issues associated with seizures on 11/6/19.  Originally admitted at OSH on 11/1.  Patient was intubated on arrival to [facility] 11/6 with status epilepticus.    Plan:  Respiratory Failure / Vent / Sedation - failed bedside PST this am, can try again when more alert.  Add precedex for mild agitation.    Pneumonia / Small effusion - +PCR for metapneumovirus.  On pip-tazo.  MRSA PCR neg.  No chest tube / thoracentesis needed.    CHF EF 25% / Mod MR / NSTEMI - med management, evaluated by Cardiology at [facility].  Cards eval in am.    Seizures / Encephalopathy - per neurology, MRI w/ no acute findings    ESRD - per nephrology    DM - titrate Sq insulin    Resume / continue appropriate home meds    DVT/PUD prophylaxis    Nutrition - Tube feeds    Plan of care and goals reviewed with mulitdisciplinary team at daily rounds    Critical Care time spent in direct patient care: 30 minutes (excluding procedure time, if applicable) including high complexity decision making to assess,  manipulate, and support vital organ system failure in this individual who has impairment of one or more vital organ systems such that there is a high probability of imminent or life threatening deterioration in the patient’s condition.       Davis Bennett MD  Intensive Care Medicine  11/10/19 10:52 AM

## 2019-11-10 NOTE — PROGRESS NOTES
" LOS: 4 days   Patient Care Team:  Jeannette Godoy APRN as PCP - General (Family Medicine)    Chief Complaint: ESRD    Subjective     Plan for possible extubation today. No change in clinical status. Plan for HD tomorrow. Labs reviewed.        Subjective:  Symptoms:  Stable.        History taken from: patient    Objective     Vital Sign Min/Max for last 24 hours  Temp  Min: 96.5 °F (35.8 °C)  Max: 98.3 °F (36.8 °C)   BP  Min: 120/60  Max: 159/76   Pulse  Min: 82  Max: 112   Resp  Min: 10  Max: 20   SpO2  Min: 97 %  Max: 100 %   No Data Recorded   Weight  Min: 64.5 kg (142 lb 3.2 oz)  Max: 64.5 kg (142 lb 3.2 oz)     Flowsheet Rows      First Filed Value   Admission Height  172.7 cm (67.99\") Documented at 11/07/2019 1909   Admission Weight  -- [Bed scale not working] Documented at 11/08/2019 0500          I/O this shift:  In: 90 [Other:40; NG/GT:50]  Out: -   I/O last 3 completed shifts:  In: 1602.9 [I.V.:216; Other:338; NG/GT:749; IV Piggyback:299.9]  Out: 1790 [Other:1790]    Objective:  Vital signs: (most recent): Blood pressure 142/70, pulse 90, temperature (P) 98.1 °F (36.7 °C), temperature source (P) Axillary, resp. rate (P) 10, height 173 cm (68.11\"), weight 64.5 kg (142 lb 3.2 oz), SpO2 100 %.               Constitutional: He appears well-developed. No distress.   AV access on the left arm + palpable   HENT:   Head: Normocephalic and atraumatic.   Eyes: Pupils are equal, round, and reactive to light.   Neck: Normal range of motion.   Cardiovascular: Normal rate, regular rhythm and normal heart sounds.   No murmur heard.  Pulmonary/Chest: Effort normal.   Intubated on MV   Abdominal: Soft. Bowel sounds are normal.   Musculoskeletal: He exhibits no edema.   Neurological:   Sedated on 24hr Video EEG   Skin: Skin is warm.   Vitals reviewed.    Results Review:     I reviewed the patient's new clinical results.    WBC WBC   Date Value Ref Range Status   11/10/2019 6.88 3.40 - 10.80 10*3/mm3 Final   11/09/2019 " 7.84 3.40 - 10.80 10*3/mm3 Final   11/08/2019 6.90 3.40 - 10.80 10*3/mm3 Final      HGB Hemoglobin   Date Value Ref Range Status   11/10/2019 9.6 (L) 13.0 - 17.7 g/dL Final   11/09/2019 10.1 (L) 13.0 - 17.7 g/dL Final   11/08/2019 8.9 (L) 13.0 - 17.7 g/dL Final   11/07/2019 10.0 (L) 13.0 - 17.7 g/dL Final      HCT Hematocrit   Date Value Ref Range Status   11/10/2019 32.4 (L) 37.5 - 51.0 % Final   11/09/2019 32.6 (L) 37.5 - 51.0 % Final   11/08/2019 28.4 (L) 37.5 - 51.0 % Final   11/07/2019 32.0 (L) 37.5 - 51.0 % Final      Platlets No results found for: LABPLAT   MCV MCV   Date Value Ref Range Status   11/10/2019 94.7 79.0 - 97.0 fL Final   11/09/2019 91.8 79.0 - 97.0 fL Final   11/08/2019 90.2 79.0 - 97.0 fL Final          Sodium Sodium   Date Value Ref Range Status   11/10/2019 139 136 - 145 mmol/L Final   11/09/2019 133 (L) 136 - 145 mmol/L Final   11/08/2019 132 (L) 136 - 145 mmol/L Final      Potassium Potassium   Date Value Ref Range Status   11/10/2019 3.9 3.5 - 5.2 mmol/L Final   11/09/2019 4.0 3.5 - 5.2 mmol/L Final   11/08/2019 3.8 3.5 - 5.2 mmol/L Final      Chloride Chloride   Date Value Ref Range Status   11/10/2019 100 98 - 107 mmol/L Final   11/09/2019 93 (L) 98 - 107 mmol/L Final   11/08/2019 93 (L) 98 - 107 mmol/L Final      CO2 CO2   Date Value Ref Range Status   11/10/2019 25.0 22.0 - 29.0 mmol/L Final   11/09/2019 22.0 22.0 - 29.0 mmol/L Final   11/08/2019 21.0 (L) 22.0 - 29.0 mmol/L Final      BUN BUN   Date Value Ref Range Status   11/10/2019 20 8 - 23 mg/dL Final   11/09/2019 31 (H) 8 - 23 mg/dL Final   11/08/2019 22 8 - 23 mg/dL Final      Creatinine Creatinine   Date Value Ref Range Status   11/10/2019 2.46 (H) 0.76 - 1.27 mg/dL Final   11/09/2019 3.86 (H) 0.76 - 1.27 mg/dL Final   11/08/2019 2.92 (H) 0.76 - 1.27 mg/dL Final      Calcium Calcium   Date Value Ref Range Status   11/10/2019 10.0 8.6 - 10.5 mg/dL Final   11/09/2019 9.0 8.6 - 10.5 mg/dL Final   11/08/2019 8.6 8.6 - 10.5 mg/dL  Final      PO4 No results found for: CAPO4   Albumin Albumin   Date Value Ref Range Status   11/10/2019 3.20 (L) 3.50 - 5.20 g/dL Final   11/08/2019 2.60 (L) 3.50 - 5.20 g/dL Final      Magnesium Magnesium   Date Value Ref Range Status   11/10/2019 2.3 1.6 - 2.4 mg/dL Final   11/09/2019 2.5 (H) 1.6 - 2.4 mg/dL Final   11/08/2019 2.4 1.6 - 2.4 mg/dL Final      Uric Acid No results found for: URICACID     Medication Review: Yes    Assessment/Plan       Status epilepticus    Recurrent right pleural effusion    SOB (shortness of breath)    Uncontrolled diabetes mellitus with hyperglycemia, with long-term current use of insulin (CMS/[facility])    Physical debility    Peripheral vascular disease (CMS/[facility])    Pneumonia of right upper lobe due to infectious organism (CMS/[facility])    Symptomatic anemia    History of CVA (cerebrovascular accident)    Hypothyroidism (acquired)    Chronic systolic CHF (congestive heart failure) (CMS/[facility])    Seizure (CMS/[facility])      Assessment & Plan    Condition: In stable condition.  Unchanged.    # (ESRD: [facility] KT/B 1.88  #Access: AV fistula on the left arm  Non functioning access on the right   #Anemia: Acute on chronic  Will need MARIANO on HD day  #BMD: stable  #Acid base and [place]: stable  #AMS+ Tonic clonic seiuzre: Neurology following  Unlikely related to uremic toxins   On keppra      Plan   HD per [facility]. Minimal UF, 3K  Transfuse at Hb<7      Keith Soares MD  11/10/19  9:05 AM

## 2019-11-11 NOTE — CONSULTS
Talha Cutler  [id number]  1960   LOS: 5 days   Patient Care Team:  HEALTHCARE PROVIDER: ALEXYS Lockhart   CARDIOLOGIST: Clay Ruano MD, [facility]  PULMONOLOGIST: Ean Hernandez MD  NEPHROLOGIST: Chance Mackey MD  NEUROLOGIST: Sammy Morrow MD    Mr. Cutler is a 59 year old  white male from [place], [place], disabled from stroke, formerly worked in a factory.    Chief Complaint:  Cardiomyopathy, elevated troponin and CRP    Problem List:  1. Ischemic heart disease/cardiomyopathy  a. History of cardiac arrest attributed to hyperkalemia with myocardial perfusion imaging studies in 2013 with fixed inferior photopenia with intact wall thickening and no significant reversibility.   LVEF 69%  b. Holter monitoring March 2013: Heart rate between  bpm with average 89 bpm, rare supraventricular and ventricular ectopy demonstrated, no sustained dysrhythmias, no significant pauses  c. Left heart catheterization approximately April 2015 demonstrating aberrant circumflex vessel without any flow-limiting disease with mild diffuse plaque predominantly in LAD with maximal stenosis of 30%   d. CT chest with right-sided pleural effusion with thoracentesis with diffuse lymphadenopathy-data deficit  e. Echocardiogram 3/21/2016: Normal chamber dimensions with left LAE, LVEF 52%, RVSP 46 mmHg, mild focal calcification involving the anterior mitral leaflet is mild to moderate mitral insufficiency, pericardium normal  f. CT chest 2017 demonstrating coronary calcification  g. Echocardiogram November 2017: LVEF 38%  h. Echocardiogram 2017: Mild to moderate global LV systolic function with moderate hypokinesis of the inferior and lateral wall with EF 40% with evidence of significantly elevated filling pressures 58 mmHg  i. Echocardiogram 11/5/2019: LV moderate to severely dilated, RV mildly dilated, LA moderately dilated, moderate MR, small less than 1 cm pericardial effusion, LVEF 25%, RVSP 70  mmHg  2. Peripheral arterial disease  a. IVETH 3/26/18: mid/distal right SFA stenosis, monophasic flow distally  b. AA with runoffs 3/27/18: Successful balloon angioplasty of the mid popliteal artery producing a critical lesion to remnant at 0% improving the flow significantly.  Successful angioplasty with subsequent stenting of the right common iliac artery (express stent 8 x 37 mm) producing a critical lesion to a remnant of 0%  c. Carotid duplex November 2019 with no hemodynamically significant left or right internal carotid artery stenosis  3. Hypertension  4. Hyperlipidemia; on statin therapy  5. ESRD on dialysis T, R, Sat  6. Type 2 diabetes mellitus; hemoglobin A1c 8.9% September 2019  7. Obstructive sleep apnea with CPAP nightly  8. Hypothyroidism, with replacement therapy  9. GERD  10. History of CVA with left sided weakness with carotid duplex demonstrating scattered plaque involving both CCAs, proximal ICAs and bulbs in 2019 vertebral flow antegrade in the left side but bidirectional on the right side  11. History of seizures  12. History of extensive venous thrombosis involving subclavian and axillary vessel as well as both IJ's with severe stenosis involving SVC junction at its junction with subclavian vessel as well as left subclavian vein with right IJ not able to be recanalized, with endovenous intervention with stent deployment and thrombectomy  13. Chronic anemia  14. Lumbago with sciatica  15. Sensorineural hearing loss  16. Status epilepticus  17. Right upper lobe pneumonia, positive for metapneumovirus  18. Coffee-ground emesis felt to be associated to epistaxis 11/5/2019  19. Surgical history  a. AV fistula  b. Cataracts bilateraL  c. Cholecystectomy  d. Eye surgery  e. Port-A-Cath placement  f. Port removal  g. AA with runoffs and stent to R common iliac artery      Allergies   Allergen Reactions   • Erythromycin Hives     Medications Prior to Admission   Medication Sig Dispense Refill Last Dose    • famotidine (PEPCID) 20 MG tablet Take 20 mg by mouth Daily.      • albuterol (PROVENTIL HFA;VENTOLIN HFA) 108 (90 Base) MCG/ACT inhaler Inhale 2 puffs Every 4 (Four) Hours As Needed for Shortness of Air. 1 inhaler 0 Taking   • albuterol (PROVENTIL) (2.5 MG/3ML) 0.083% nebulizer solution Take 2.5 mg by nebulization Every 4 (Four) Hours As Needed for Wheezing.   11/1/2019 at Unknown time   • aspirin 81 MG chewable tablet Chew 81 mg Daily.   11/1/2019 at Unknown time   • atorvastatin (LIPITOR) 40 MG tablet Take 40 mg by mouth Daily.   11/1/2019 at Unknown time   • B Complex-C-Folic Acid (RADHA-SHIRLENE PO) Take 1 tablet by mouth. Patient states he takes these after dialysis, but is unaware of dose.    11/1/2019 at Unknown time   • Cholecalciferol (VITAMIN D3) 5000 UNITS capsule capsule Take 1 capsule by mouth Daily.   11/1/2019 at Unknown time   • clopidogrel (PLAVIX) 75 MG tablet Take 1 tablet by mouth Daily. Start taking from 1/12/2019 onwards ONLY if not coughing up significant amount of blood. 90 tablet 3 11/1/2019 at Unknown time   • docusate sodium (COLACE) 100 MG capsule Take 100 mg by mouth Every 12 (Twelve) Hours.   11/1/2019 at Unknown time   • fluticasone (VERAMYST) 27.5 MCG/SPRAY nasal spray 2 sprays into the nostril(s) as directed by provider Daily.   9/20/2019 at 0800   • folic acid (FOLVITE) 1 MG tablet Take 1 mg by mouth daily.   11/1/2019 at Unknown time   • insulin aspart (novoLOG FLEXPEN) 100 UNIT/ML solution pen-injector sc pen Inject 9 Units under the skin into the appropriate area as directed 3 (Three) Times a Day With Meals.   9/20/2019 at 0800   • insulin glargine (LANTUS) 100 UNIT/ML injection Inject 15 Units under the skin into the appropriate area as directed Every Night.   11/1/2019 at Unknown time   • isosorbide mononitrate (IMDUR) 30 MG 24 hr tablet Take 30 mg by mouth Daily.   11/1/2019 at Unknown time   • lanthanum (FOSRENOL) 1000 MG chewable tablet Chew 1 tablet 3 (Three) Times a Day  With Meals.      • levETIRAcetam in NaCl 0.82% (KEPPRA) 500 MG/100ML solution IVPB Infuse 100 mL into a venous catheter Every 12 (Twelve) Hours. 4000 mL     • levothyroxine (SYNTHROID, LEVOTHROID) 100 MCG tablet Take 125 mcg by mouth Daily.   11/1/2019 at Unknown time   • losartan (COZAAR) 25 MG tablet Take 1 tablet by mouth Daily.      • metoprolol succinate XL (TOPROL-XL) 25 MG 24 hr tablet Take 3 tablets by mouth Daily.      • ondansetron ODT (ZOFRAN-ODT) 4 MG disintegrating tablet Take 1 tablet by mouth Every 6 (Six) Hours As Needed for Nausea or Vomiting. 20 tablet 0 Not Taking     Scheduled Meds:  aspirin 81 mg Oral Daily   atorvastatin 40 mg Oral Daily   chlorhexidine 15 mL Mouth/Throat Q12H   clopidogrel 75 mg Oral Daily   heparin (porcine) 5,000 Units Subcutaneous Q12H   [START ON 11/12/2019] insulin detemir 15 Units Subcutaneous Daily   insulin regular 0-14 Units Subcutaneous Q6H   ipratropium-albuterol 3 mL Nebulization 4x Daily - RT   levETIRAcetam 500 mg Intravenous Q12H   levothyroxine 125 mcg Oral Q AM   pantoprazole 40 mg Intravenous Q AM   piperacillin-tazobactam 3.375 g Intravenous Q12H   PRO-STAT 1 packet Nasogastric BID   sodium chloride 10 mL Intravenous Q12H     Continuous Infusions:  dexmedetomidine 0.2-1.5 mcg/kg/hr Last Rate: Stopped (11/11/19 0800)     PRN Meds:.•  albumin human  •  sodium chloride       History of Present Illness:   This is a 59-year-old white male who is currently sedated and intubated so no review of systems is able to be obtained.  Per reports, the patient was transferred from [facility] to [facility] with issues associated with seizures on 11/6/2019.  The patient was intubated on arrival to [facility] with status epilepticus.  The patient has right upper lobe pneumonia, positive for metapneumovirus. His troponins and CRP are elevated and patient was found to have EF 25% on echocardiogram.  The patient was unable to tolerate fluid removal during dialysis and developed respiratory decline  "and was transferred to the ICU 11/6/2019.  Apparently the patient had developed seizures in February 2019 but was not started on any antiepileptic therapy at that time and was referred to a neurologist but he had not seen one.  The patient's wife felt that her 's mentation had declined and was often forgetful and confused prior to admission.    The patient had a failed extubation yesterday. He currently is trying to open his eyes occasionally and has no overt posturing at this time.     Cardiac risk factors: advanced age (older than 55 for men, 65 for women), diabetes mellitus, dyslipidemia, hypertension and male gender.    Social History     Socioeconomic History   • Marital status:      Spouse name: Not on file   • Number of children: Not on file   • Years of education: Not on file   • Highest education level: Not on file   Tobacco Use   • Smoking status: Never Smoker   • Smokeless tobacco: Never Used   • Tobacco comment: 2ND HAND SMOKE EXPOSURE   Substance and Sexual Activity   • Alcohol use: No   • Drug use: No   • Sexual activity: Defer     Family History   Problem Relation Age of Onset   • COPD Mother    • Diabetes Father    • Hypertension Sister    • Diabetes Sister    • Hypertension Brother    • Diabetes Paternal Grandmother        Review of Systems  10 point review of systems was completed, positives outlined in the HPI, and otherwise all other systems are negative.      Objective:       Physical Exam  /54 (BP Location: Left arm, Patient Position: Lying)   Pulse 78   Temp 97.6 °F (36.4 °C) (Oral)   Resp 10   Ht 173 cm (68.11\")   Wt 65.3 kg (143 lb 15.4 oz)   SpO2 99%   BMI 21.82 kg/m²       11/10/19  0500 11/11/19  0600   Weight: 64.5 kg (142 lb 3.2 oz) 65.3 kg (143 lb 15.4 oz)     Body mass index is 21.82 kg/m².    Intake/Output Summary (Last 24 hours) at 11/11/2019 1137  Last data filed at 11/11/2019 1000  Gross per 24 hour   Intake 1453.7 ml   Output 1 ml   Net 1452.7 ml "       General Appearance:   Sedated and intubated, no distress, appears stated age, NG,LIJ CVL   Head:  Normocephalic, without obvious abnormality, atraumatic   Neck: Supple, symmetrical, trachea midline, no adenopathy, thyroid: not enlarged, symmetric, no tenderness/mass/nodules, no carotid bruit or JVD   Lungs:    Clear breath sounds to auscultation bilaterally, respirations unlabored, mechanically ventilated   Heart:  Regular rate and rhythm, S1, S2 normal, grade 1/6 murmur, rub or gallop   Abdomen:   Soft, non-tender, no masses, no organomegaly, bowel sounds audible x4   Extremities: No edema, AV fistula left arm, in restraints   Pulses: 1+ and symmetric   Skin: Skin color, texture, turgor normal, no rashes or lesions   Neurologic:  Sedated, patient tries to open his eyes       Cardiographics:    · EKG 11/5/2019:    Sinus tachycardia  Indeterminate axis  Nonspecific ST and T wave abnormality . LVH.  Abnormal ECG  When compared with ECG of 04-NOV-2019 09:58,  No significant change was found  except sinus tachycardia  Confirmed by VALERIE MORALES ([id number]) on 11/7/2019 7:34:01 AM    · Carotid duplex 11/4/2019:    No hemodynamically significant left or right internal  carotid artery stenosis    Imaging:    · Chest x-ray 11/11/2019:    The heart Is enlarged with ill-defined opacification seen within the right middle lower lung field. Small right pleural effusion. Lines and tubes in satisfactory position.    · CT chest 11/9/2019:    Extensive vascular calcification seen throughout the vessels  within the chest and upper abdomen. There is dense consolidation at the  lung fields with patchy airspace disease seen throughout the remainder  of the lung fields bilaterally. Tiny pleural effusion identified on the  left. No significant pleural fluid on the right. Adenopathy seen  throughout the mediastinum.    · CT head 11/6/2019: No acute findings.    · EEG 11/8/2019:     EEG background is moderate generalized slow, no  electrographic seizures or epileptiform activity are seen during the recording.    · MRI brain 11/9/2019:    No acute intracranial abnormalities specifically no evidence  for acute infarction with mild chronic small vessel ischemic disease  findings and generalized atrophy of senescence.  Mild mucosal thickening of the bilateral maxillary sinuses may represent  sinusitis along with a moderate right and mild to moderate left mastoid  effusions present.    Lab Review:     Results from last 7 days   Lab Units 11/11/19  0555 11/10/19  0428 11/09/19  0431   SODIUM mmol/L 137 139 133*   POTASSIUM mmol/L 3.5 3.9 4.0   CHLORIDE mmol/L 99 100 93*   CO2 mmol/L 23.0 25.0 22.0   BUN mg/dL 38* 20 31*   CREATININE mg/dL 3.67* 2.46* 3.86*   GLUCOSE mg/dL 217* 236* 217*   CALCIUM mg/dL 9.8 10.0 9.0     Results from last 7 days   Lab Units 11/11/19  0555 11/10/19  0428 11/09/19  0431   WBC 10*3/mm3 6.65 6.88 7.84   HEMOGLOBIN g/dL 9.8* 9.6* 10.1*   HEMATOCRIT % 33.5* 32.4* 32.6*   PLATELETS 10*3/mm3 208 194 194     Results from last 7 days   Lab Units 11/11/19  0555   TRIGLYCERIDES mg/dL 64     Results from last 7 days   Lab Units 11/05/19  0618   TROPONIN T ng/mL 0.253*   · Drips = Precedex at 0.2 mcg/kg/h.    · NG feedings = Novosource at 25ml/hr.     Assessment:   Patient with elevated CRP and troponin in the setting of right upper lobe pneumonia, status epilepticus, end-stage renal failure with dialysis Tuesdays, Thursdays, and Saturdays, and uncontrolled type 2 diabetes mellitus.  The patient was found to have EF 25% on echocardiogram.  He will eventually need an ischemic evaluation but would defer MPS/LHC at this time. Patient is positive for human metapneumovirus and currently intubated/sedated.  Prognosis very guarded and I do not feel the patient has acute coronary syndrome at this time.  Attempting to initiate guideline directed medical therapy for advanced probably nonischemic cardiomyopathy will be quite difficult in the  setting of his acute illness.  Prognosis very guarded.  He would not appear to be an acceptable candidate to pursue LVAD therapy as destination treatment or consideration of cardiac transplantation at this time.      Plan:   1. Troponin in morning  2. ECG now and in the morning  3. Defer MPS/LHC  4. Eventual ischemic evaluation  5. Intensivists continued treatment for pneumonia/seizures/attempts for eventual extubation  6. Coreg 6.25mg bid  7. Consider trial of Entresto if hemoynamics allow once the patient is able to take po medications  8. Consider trial of IV milrinone  9. Defer IV dopamine/dobutamine at this time   10. Continue low dose ASA as tolerated  11. Continue SC heparin, not  IV heparin     Discussed with patient, wife, and daughter in room.    Scribed for Darren Mart MD by ALEXYS Cervantes. 11/11/2019  12:48 PM     I have seen and examined the patient, case was discussed with the physician extender, reviewed the above note, necessary changes were made and I agree with the final note.     Darren Mart MD [facility]

## 2019-11-11 NOTE — PLAN OF CARE
Problem: Skin Injury Risk (Adult)  Goal: Skin Health and Integrity  Outcome: Ongoing (interventions implemented as appropriate)      Problem: Fall Risk (Adult)  Goal: Absence of Fall  Outcome: Ongoing (interventions implemented as appropriate)      Problem: Restraint, Nonbehavioral (Nonviolent)  Goal: Rationale and Justification  Outcome: Ongoing (interventions implemented as appropriate)      Problem: Patient Care Overview  Goal: Plan of Care Review  Outcome: Ongoing (interventions implemented as appropriate)   11/11/19 8482   Coping/Psychosocial   Plan of Care Reviewed With family   Plan of Care Review   Progress no change   OTHER   Outcome Summary Sedation off but patient becomes agitated and restless. Precedex restarted at minima, dosage. Patient failed SBT this am again. turn patient Q2, patient had mutliple BM's and no UOP. Patient is dialysis patient and will recieve tomorrow. no other needs expressed and will continue to monitor.        Problem: Hemodialysis (Adult)  Goal: Signs and Symptoms of Listed Potential Problems Will be Absent, Minimized or Managed (Hemodialysis)  Outcome: Ongoing (interventions implemented as appropriate)      Problem: Ventilation, Mechanical Invasive (Adult)  Goal: Signs and Symptoms of Listed Potential Problems Will be Absent, Minimized or Managed (Ventilation, Mechanical Invasive)  Outcome: Ongoing (interventions implemented as appropriate)

## 2019-11-11 NOTE — PROGRESS NOTES
Clinical Nutrition   Reason For Visit: MDR, Follow-up protocol, EN    Patient Name: Talha Cutler  YOB: 1960  MRN: [id number]  Date of Encounter: 11/11/19 12:22 PM  Admission date: 11/6/2019      Nutrition Assessment     Admission Problem List:  Pt admitted to [facility] (11/1) with HCAP positive for metapneumovirus, progressive weakness, respiratory decline, new onset seizures. Pt transferred to [facility] (11/6) for higher level of care.    Seizures  Acute respiratory failure  Vent (11/7)  ESRD- HD  A/c anemia  Encephalopathy      Skin: WOCN (11/7)- Consulted to assess patient for a possible pressure injury to his coccyx POA. Assessment performed. Patients coccyx/buttocks is intact, pink, and blanching. BLE scabbing noted.       Applicable PMH:  CHF  HTN  PVD  CVA  DM  Hypothyroid  GERD  ALYSSA, cpap  ESRD  HD- Tues/Thurs/Sat  Anemia  Seizures- February 2019      Applicable Nutrition-Related Information:  From (11/1-11/6) pt was on-and-off a cardiac/consistent carbohydrate/renal diet with 40% PO intake of 12 documented meals. During admission at [facility] pt was also NPO, however unable to determine amount of time pt was NPO.   (11/8) EN started per MD- NovaSource Renal at 25 ml/hr with 1 ProStat 2x/day and free water at 30 ml q 2 hrs via NGT       Reported/Observed/Food/Nutrition Related History     Pt tolerating EN at goal rate. Next HD tomorrow (11/12).     Per I&O over the past 24 hrs:  4 bowel movements      Anthropometrics   Height: 68 in  Weight: 131 lb per bed scale (11/4)  BMI: 20.0  BMI classification: Normal: 18.5-24.9kg/m2     Date Weight (kg) Weight (lbs) Weight Method   11/11/2019 65.3 kg 143 lb 15.4 oz Bed scale   11/10/2019 64.5 kg 142 lb 3.2 oz Bed scale   11/8/2019 65 kg 143 lb 4.8 oz Bed scale     11/4/2019 59.512 kg 131 lb 3.2 oz Bed scale   11/3/2019 58.695 kg 129 lb 6.4 oz Bed scale   11/2/2019 58.559 kg 129 lb 1.6 oz Bed scale   11/1/2019 56.065 kg 123 lb 9.6 oz Bed scale   11/1/2019 56.7 kg 125  lb Standing scale   9/20/2019 60.192 kg 132 lb 11.2 oz Bed scale   9/14/2019 58.968 kg 130 lb -   8/31/2019 56.8 kg 125 lb 3.5 oz Stated   8/15/2019 54.432 kg 120 lb Stated   5/15/2019 54.432 kg 120 lb -   4/26/2019 53.751 kg 118 lb 8 oz Standing scale   2/26/2019 61.434 kg 135 lb 7 oz Bed scale   2/25/2019 58.65 kg 129 lb 4.8 oz Bed scale   2/23/2019 58.922 kg 129 lb 14.4 oz Bed scale   2/22/2019 58.922 kg 129 lb 14.4 oz -       Labs reviewed   Labs reviewed: Yes; insulin being adjusted    Results from last 7 days   Lab Units 11/11/19  0555 11/10/19  0428 11/09/19  0431   SODIUM mmol/L 137 139 133*   POTASSIUM mmol/L 3.5 3.9 4.0   CHLORIDE mmol/L 99 100 93*   CO2 mmol/L 23.0 25.0 22.0   BUN mg/dL 38* 20 31*   CREATININE mg/dL 3.67* 2.46* 3.86*   GLUCOSE mg/dL 217* 236* 217*   CALCIUM mg/dL 9.8 10.0 9.0   PHOSPHORUS mg/dL 3.3 2.4* 3.2   MAGNESIUM mg/dL 2.5* 2.3 2.5*   TRIGLYCERIDES mg/dL 64  --   --      Results from last 7 days   Lab Units 11/11/19  0555 11/10/19  0428 11/09/19  0431 11/08/19  0550   WBC 10*3/mm3 6.65 6.88 7.84 6.90   ALBUMIN g/dL 2.70* 3.20*  --  2.60*   CRP mg/dL 5.38*  --   --   --    TRIGLYCERIDES mg/dL 64  --   --   --      Results from last 7 days   Lab Units 11/11/19  1203 11/11/19  0511 11/10/19  2326 11/10/19  1731 11/10/19  1202 11/10/19  0504   GLUCOSE mg/dL 207* 224* 194* 229* 272* 264*     Lab Results   Lab Value Date/Time    HGBA1C 8.90 (H) 09/21/2019 0647    HGBA1C 10.6 (H) 02/23/2019 0408    HGBA1C 9.6 (H) 07/13/2017 0438     Medications reviewed   Medications reviewed: Yes  Pertinent: insulin, keppra, protonix, antibiotics  GTT: precedex      Needs Assessment  (11/11)   Height used: 68 in/173 cm  Weight used: 131 lb/60 kg    Estimated Calories needs: ~1500 kcal/day  IC (11/10): RQ= 0.80; MREE= 1498 kcal  PSU (Ve= 6.0, Tmax= 36.7)= 1434 kcal  25-30 kcal/kg= 1379-3118 kcal    Estimated Protein needs: ~72 g pro/day   1.2 g/kg= 72 g pro    Current Nutrition Prescription   PO: NPO Diet      EN: NovaSource Renal at 25 ml/hr (goal volume= 500 ml/day) with 1 ProStat 2x/day and free water at 30 ml q 2 hrs  Route: NG  Verified at the bedside: Yes    Evaluation of Received Nutrient/Fluid Intake-EN:  2 Days:   569 ml, 113% + 2 ProStats  1338 kcal, 89%  81 g pro, 113%  0 g fiber  14 mEq K+  566 mg phos  408 ml water from EN  907 ml water total       Nutrition Diagnosis   11/7, updated 11/11  Problem Inadequate energy intake   Etiology Clinical condition, diet order   Signs/Symptoms Pt requiring mechanical ventilation, NPO; prior to being intubated pt had consumed 40% PO intake of 12 documented meals at [facility] from (11/1-11/6)   Status: EN started (11/8), however EN at goal volume provides 80% of pt's estimated kcal needs      Intervention   Intervention: Follow treatment progress, Care plan reviewed   -Rec adjusting EN to better meet pt's kcal needs, however the intensivist is managing the nutrition support    NovaSource Renal at 40 ml/hr (goal volume= 800 ml/day) and free water at 30 ml q 2 hrs via NGT  -Will provide at goal volume:  1600 kcal, 107%  72 g pro, 100%  0 g fiber  19 mEq K+  655 mg phos  574 ml water from EN  874 ml water total      Goal:   General: Nutrition support treatment  EN/PN: Adjust EN      Monitoring/Evaluation:       Monitoring/Evaluation: Per protocol, I&O, Pertinent labs, EN delivery/tolerance, Weight, Symptoms       Will Continue to follow per protocol    Marianna Cristina, MS RD/LD CNSC  Time Spent: 45 mintes

## 2019-11-11 NOTE — PROGRESS NOTES
"  INTENSIVIST   PROGRESS NOTE     Hospital:  LOS: 5 days      S     Mr. Talha Cutler, 59 y.o. male is followed for:  No chief complaint on file.      Status epilepticus    DM    Hypothyroidism (acquired)      As an Intensivist, we provide an integrated approach to the ICU patient and family, medical management of comorbid conditions, lead interdisciplinary rounds and coordinate the care with all other services, including those from other specialists.     Interval History:  Uneventful night.  Follows very simple commands, like \"squeeze my hands\".  Hyperglycemia.     The patient's relevant past medical, surgical and social history were reviewed and updated in Epic as appropriate.     ROS:   ROS cannot be reliably obtained from the patient due to his Acuity of condition and Intubated.        O     Vitals:  Temp: 97.6 °F (36.4 °C) (11/11/19 1200) Temp  Min: 96.6 °F (35.9 °C)  Max: 97.7 °F (36.5 °C)   Temp core:      BP: 131/61 (11/11/19 1300) BP  Min: 110/55  Max: 135/67   Pulse: 93 (11/11/19 1300) Pulse  Min: 74  Max: 93   Resp: 14 (11/11/19 1300) Resp  Min: 10  Max: 21   SpO2: 100 % (11/11/19 1300) SpO2  Min: 98 %  Max: 100 %   Device: ventilator (11/11/19 1300)    Flow Rate:   No Data Recorded     Intake/Ouptut 24 hrs (7:00AM - 6:59 AM)  Intake/Output       11/10/19 0700 - 11/11/19 0659 11/11/19 0700 - 11/12/19 0659    Intake (ml) 1518.7 283    Output (ml) 1 --    Net (ml) 1517.7 283    Last Weight  65.3 kg (143 lb 15.4 oz)  --           Medications (drips):    dexmedetomidine Last Rate: 0.2 mcg/kg/hr (11/11/19 1205)       Mechanical Ventilator:  Settings: Observed:   Mode: VC+/AC (11/11/19 1236)    Vt (Set, L): 0.48 L (11/11/19 1236)    Resp Rate (Set): 10 (11/11/19 1236) Resp Rate (Observed) Vent: 14 (11/11/19 1300)   FiO2 (%): 28 % (11/11/19 1236)    PEEP/CPAP (cm H2O): 5 cm H20 (11/11/19 1236) Plateau Pressure (cm H2O): 23 cm H2O (11/10/19 1845)    Minute Ventilation (L/min) (Obs): 4.59 L/min (11/11/19 1236) "    I:E Ratio (Obs): 1:3.80 (11/11/19 1236)     Physical Examination  Telemetry:  Rhythm: normal sinus rhythm (11/11/19 1200)     QTc Interval (Sec): 0.45 (11/10/19 2000)   Constitutional:  No acute distress.   Cardiovascular: RRR.   Normal heart sounds.  No murmurs, gallop or rub.   Respiratory: Normal breath sounds  No adventitious sounds.   Abdominal:  Soft with no tenderness.  No distension.   No HSM.   Extremities: Warm.  Dry.  No cyanosis.  Trace Edema   Neurological:   Sedated.  Best Eye Response: 3-->(E3) to speech (11/11/19 1200)  Best Motor Response: 4-->(M4) withdraws from pain (11/11/19 1200)  Best Verbal Response: 1-->(V1) none (11/11/19 1200)  [place] Coma Scale Score: 8 (11/11/19 1200)   Lines/Drains/Airways: L IJ CVC  O ETT   RIGHT FISTULA     Hematology:  Results from last 7 days   Lab Units 11/11/19  0555 11/10/19  0428 11/09/19  0431   WBC 10*3/mm3 6.65 6.88 7.84   HEMOGLOBIN g/dL 9.8* 9.6* 10.1*   MCV fL 96.3 94.7 91.8   PLATELETS 10*3/mm3 208 194 194       Chemistry:  Estimated Creatinine Clearance: 19.8 mL/min (A) (by C-G formula based on SCr of 3.67 mg/dL (H)).    Results from last 7 days   Lab Units 11/11/19  0555 11/10/19  0428 11/09/19  0431   SODIUM mmol/L 137 139 133*   POTASSIUM mmol/L 3.5 3.9 4.0   CHLORIDE mmol/L 99 100 93*   CO2 mmol/L 23.0 25.0 22.0   ANION GAP mmol/L 15.0 14.0 18.0*   GLUCOSE mg/dL 217* 236* 217*   CALCIUM mg/dL 9.8 10.0 9.0     Results from last 7 days   Lab Units 11/11/19  0555 11/10/19  0428 11/09/19  0431   BUN mg/dL 38* 20 31*   CREATININE mg/dL 3.67* 2.46* 3.86*     Results from last 7 days   Lab Units 11/11/19 0555 11/10/19  0428 11/09/19  0431   MAGNESIUM mg/dL 2.5* 2.3 2.5*   PHOSPHORUS mg/dL 3.3 2.4* 3.2       Arterial Blood Gases:  Results from last 7 days   Lab Units 11/11/19  0431 11/10/19  0453 11/09/19  0457   PH, ARTERIAL pH units 7.442 7.476* 7.437   PCO2, ARTERIAL mm Hg 43.2 38.0 37.0   PO2 ART mm Hg 95.1 102.0 105.0   FIO2 % 28 30 30     Lab  Results   Lab Value Date/Time    PROCALCITO 0.98 (H) 11/11/2019 0555    PROCALCITO 1.31 (H) 11/10/2019 0428    PROCALCITO 2.17 (H) 11/08/2019 0550    PROCALCITO 0.60 (H) 11/01/2019 1444     Lab Results   Lab Value Date/Time    CRP 5.38 (H) 11/11/2019 0555    PREALBUMIN 8.9 (L) 11/11/2019 0555    TRIG 64 11/11/2019 0555    TRIG 125 11/12/2016 0613    TRIG 58 09/22/2016 1005    TRIG 129 06/23/2016 1135       Images:  Mri Brain Without Contrast    Result Date: 11/11/2019  No acute intracranial abnormalities specifically no evidence for acute infarction with mild chronic small vessel ischemic disease findings and generalized atrophy of senescence. Mild mucosal thickening of the bilateral maxillary sinuses may represent sinusitis along with a moderate right and mild to moderate left mastoid effusions present.   D:  11/10/2019 E:  11/11/2019      Xr Chest 1 View    Result Date: 11/11/2019  The heart Is enlarged with ill-defined opacification seen within the right mid and lower lung field. Small right pleural effusion. Lines and tubes in satisfactory position.  D:  11/11/2019 E:  11/11/2019       Xr Chest 1 View    Result Date: 11/11/2019  No significant interval change with persistent opacifications right lower lung.  D:  11/10/2019 E:  11/11/2019          Echo:  Results for orders placed during the hospital encounter of 11/01/19   Adult Transthoracic Echo Complete W/ Cont if Necessary Per Protocol    Narrative · The left ventricular cavity is moderate-to-severely dilated.  · Right ventricular cavity is mildly dilated.  · Left atrial cavity size is moderately dilated.  · Moderate mitral valve regurgitation is present  · There is a small (<1cm) pericardial effusion.          Results: Reviewed.  I reviewed the patient's new laboratory and imaging results.  I independently reviewed the patient's new images.    Medications: Reviewed.    Assessment/Plan   A / P       Assessment:    He was initially admitted to [facility] on 11/1 for  pneumonia. But eventually respiratory status declined to the point of requiring Bipap and moved to the ICU on 11/5/19.  On the afternoon of 11/6, he was noted to become more lethargic and then developed tonic/clonic movements of his right arm and his head. He was given 500mg of IV keppra. He was transferred to [facility] for Neurology consultation. Upon arrival, he had 2 separate episodes of tonic/clonic jerking of his extremities in less than 5 minutes without return to baseline. He was then intubated.    1. Seizures  1. He has been on Cefepime at OSH  2. Levetiracetam .  2. Respiratory failure, 2ry to CNS  1. Intubated 11/07/19   2. Mechanical ventilation  3. Pneumonia RUL at OSH (11/1/19). (Metapneumovirus, per Respiratory Panel PCR 11/1/19))  4. CXR: Patchy infiltrate Right mid and lower lung and mid left lung.  5. Small R Pleural effusion (as per CT ABD 11/5/19)  6. ALYSSA on CPAP at home  7. Abs: Piperacillin-Tazobactam   3. Hypothyroidism on Rx.  4. Cardiac:  1. HFrEF   5. GERD  6. HTN  7. Previous CVA with left sided residual weakness.  8. Anemia  1. S/P transfusion 11/07/19  9. ESRD  1. HD  10. Enteral Nutrition - with low K/P formula  11. T2DM    Results from last 7 days   Lab Units 11/11/19  1203 11/11/19  0511 11/10/19  2326 11/10/19  1731 11/10/19  1202 11/10/19  0504   GLUCOSE mg/dL 207* 224* 194* 229* 272* 264*     Lab Results   Lab Value Date/Time    HGBA1C 8.90 (H) 09/21/2019 0647    HGBA1C 10.6 (H) 02/23/2019 0408    HGBA1C 9.6 (H) 07/13/2017 0438       Nutrition Support: Diet, Tube Feeding Tube Feeding Formula: Novasource Renal (Electrolyte Restricted)   Modulars: PRO-STAT oral liquid 1 packet   Diet: NPO Diet   Advance Directives: Code Status and Medical Interventions:   Ordered at: 11/06/19 7561     Code Status:    CPR     Medical Interventions (Level of Support Prior to Arrest):    Full        Plan:    1. Wean when more awake.  1. RSBI 86  2. Cardiology to see for his cardiomyopathy, EF 25%  3. Adjust  insulin doses  1. Goal: Glucose < 180 mg/dL.  4. Next HD tomorrow Tuesday  5. Indirect calorimetry: MREE ~ 1498, RQ 0.8  1. We will increase his Enteral Nutrition prescription  6. Disposition: Keep in ICU.    Plan of care and goals reviewed during interdisciplinary rounds.  I discussed the patient's findings and my recommendations with nursing staff  .  Level of Risk is High due to:  illness with threat to life or bodily function.     Time: was greater than 35 minutes.    (This excludes time spent performing separately reportable procedures and services).  Patient was at high risk of imminent or life-threatening deterioration in his condition due to Respiratory failure.     Garcia Lisa MD, FACP, FCCP, CNSC  Intensive Care Medicine, Nutrition Support and Pulmonary Medicine     [x]  Primary Attending  []  Consultant

## 2019-11-11 NOTE — PLAN OF CARE
Problem: Patient Care Overview  Goal: Plan of Care Review  Outcome: Ongoing (interventions implemented as appropriate)   11/11/19 0722   Coping/Psychosocial   Plan of Care Reviewed With patient   Plan of Care Review   Progress no change   OTHER   Outcome Summary Pt intubated and sedated. No neuro changes, no seizure activity noted this shift. HR 70'S-90'S NSR. 'S-140'S. Afebrile. Will continue to monitor.

## 2019-11-11 NOTE — PROGRESS NOTES
" LOS: 5 days   Patient Care Team:  Jeannette Godoy APRN as PCP - General (Family Medicine)    Chief Complaint: ESRD    Subjective   Still intubated no new events      History taken from: chart    Objective     Vital Sign Min/Max for last 24 hours  Temp  Min: 96.6 °F (35.9 °C)  Max: 97.7 °F (36.5 °C)   BP  Min: 110/55  Max: 135/67   Pulse  Min: 74  Max: 90   Resp  Min: 10  Max: 21   SpO2  Min: 97 %  Max: 100 %   No Data Recorded   Weight  Min: 65.3 kg (143 lb 15.4 oz)  Max: 65.3 kg (143 lb 15.4 oz)     Flowsheet Rows      First Filed Value   Admission Height  172.7 cm (67.99\") Documented at 11/07/2019 1909   Admission Weight  -- [Bed scale not working] Documented at 11/08/2019 0500          I/O this shift:  In: 283 [I.V.:23; Other:85; NG/GT:120; IV Piggyback:55]  Out: -   I/O last 3 completed shifts:  In: 2219.1 [I.V.:418.2; Other:528; NG/GT:1023; IV Piggyback:249.9]  Out: 1 [Stool:1]    Objective:  Vital signs: (most recent): Blood pressure 130/60, pulse 90, temperature 97.6 °F (36.4 °C), temperature source Oral, resp. rate 11, height 173 cm (68.11\"), weight 65.3 kg (143 lb 15.4 oz), SpO2 100 %.               Constitutional: He appears well-developed. No distress.   AV access on the left arm + palpable   HENT:   Head: Normocephalic and atraumatic.   Eyes: Pupils are equal, round, and reactive to light.   Neck: Normal range of motion.   Cardiovascular: Normal rate, regular rhythm and normal heart sounds.   No murmur heard.  Pulmonary/Chest: Effort normal.   Intubated on MV   Abdominal: Soft. Bowel sounds are normal.   Musculoskeletal: He exhibits no edema.   Neurological:   Sedated on 24hr Video EEG   Skin: Skin is warm.   Vitals reviewed.    Results Review:     I reviewed the patient's new clinical results.    WBC WBC   Date Value Ref Range Status   11/11/2019 6.65 3.40 - 10.80 10*3/mm3 Final   11/10/2019 6.88 3.40 - 10.80 10*3/mm3 Final   11/09/2019 7.84 3.40 - 10.80 10*3/mm3 Final      HGB Hemoglobin   Date " Value Ref Range Status   11/11/2019 9.8 (L) 13.0 - 17.7 g/dL Final   11/10/2019 9.6 (L) 13.0 - 17.7 g/dL Final   11/09/2019 10.1 (L) 13.0 - 17.7 g/dL Final      HCT Hematocrit   Date Value Ref Range Status   11/11/2019 33.5 (L) 37.5 - 51.0 % Final   11/10/2019 32.4 (L) 37.5 - 51.0 % Final   11/09/2019 32.6 (L) 37.5 - 51.0 % Final      Platlets No results found for: LABPLAT   MCV MCV   Date Value Ref Range Status   11/11/2019 96.3 79.0 - 97.0 fL Final   11/10/2019 94.7 79.0 - 97.0 fL Final   11/09/2019 91.8 79.0 - 97.0 fL Final          Sodium Sodium   Date Value Ref Range Status   11/11/2019 137 136 - 145 mmol/L Final   11/10/2019 139 136 - 145 mmol/L Final   11/09/2019 133 (L) 136 - 145 mmol/L Final      Potassium Potassium   Date Value Ref Range Status   11/11/2019 3.5 3.5 - 5.2 mmol/L Final   11/10/2019 3.9 3.5 - 5.2 mmol/L Final   11/09/2019 4.0 3.5 - 5.2 mmol/L Final      Chloride Chloride   Date Value Ref Range Status   11/11/2019 99 98 - 107 mmol/L Final   11/10/2019 100 98 - 107 mmol/L Final   11/09/2019 93 (L) 98 - 107 mmol/L Final      CO2 CO2   Date Value Ref Range Status   11/11/2019 23.0 22.0 - 29.0 mmol/L Final   11/10/2019 25.0 22.0 - 29.0 mmol/L Final   11/09/2019 22.0 22.0 - 29.0 mmol/L Final      BUN BUN   Date Value Ref Range Status   11/11/2019 38 (H) 8 - 23 mg/dL Final   11/10/2019 20 8 - 23 mg/dL Final   11/09/2019 31 (H) 8 - 23 mg/dL Final      Creatinine Creatinine   Date Value Ref Range Status   11/11/2019 3.67 (H) 0.76 - 1.27 mg/dL Final   11/10/2019 2.46 (H) 0.76 - 1.27 mg/dL Final   11/09/2019 3.86 (H) 0.76 - 1.27 mg/dL Final      Calcium Calcium   Date Value Ref Range Status   11/11/2019 9.8 8.6 - 10.5 mg/dL Final   11/10/2019 10.0 8.6 - 10.5 mg/dL Final   11/09/2019 9.0 8.6 - 10.5 mg/dL Final      PO4 No results found for: CAPO4   Albumin Albumin   Date Value Ref Range Status   11/11/2019 2.70 (L) 3.50 - 5.20 g/dL Final   11/10/2019 3.20 (L) 3.50 - 5.20 g/dL Final      Magnesium  Magnesium   Date Value Ref Range Status   11/11/2019 2.5 (H) 1.6 - 2.4 mg/dL Final   11/10/2019 2.3 1.6 - 2.4 mg/dL Final   11/09/2019 2.5 (H) 1.6 - 2.4 mg/dL Final      Uric Acid No results found for: URICACID     Medication Review: Yes    Assessment/Plan       Status epilepticus    Recurrent right pleural effusion    SOB (shortness of breath)    Uncontrolled diabetes mellitus with hyperglycemia, with long-term current use of insulin (CMS/[facility])    Physical debility    Peripheral vascular disease (CMS/[facility])    Pneumonia of right upper lobe due to infectious organism (CMS/[facility])    Symptomatic anemia    History of CVA (cerebrovascular accident)    Hypothyroidism (acquired)    Chronic systolic CHF (congestive heart failure) (CMS/[facility])    Seizure (CMS/[facility])      Assessment & Plan    Condition: In stable condition.  Unchanged.    # (ESRD: [facility] KT/B 1.88  #Access: AV fistula on the left arm  Non functioning access on the right   #Anemia: Acute on chronic  Will need MARIANO on HD day  #BMD: stable  #Acid base and [place]: stable  #AMS+ Tonic clonic seiuzre: Neurology following  Unlikely related to uremic toxins   On keppra      Plan   HD per [facility]. Minimal UF, 3K  Transfuse at Hb<7      Iggyjeramy Mccray MD  11/11/19  12:39 PM

## 2019-11-11 NOTE — PROGRESS NOTES
Neurology Note    Patient:  Talha Cutler    YOB: 1960    REFERRING PHYSICIAN:  Winnie Kaur, DO    CHIEF COMPLAINT:    seizures    HISTORY OF PRESENT ILLNESS:   The patient has been seizure free, taken off Versed and Precedex this am, not ready to come the vent, back on Precedex. Following commands but weaker on the left when off sedation.     Past Medical History:  Past Medical History:   Diagnosis Date   • Abdominal pain    • Anemia    • Cataracts, bilateral    • CHF (congestive heart failure) (CMS/[facility])    • Chronic kidney disease    • Constipation    • Cough    • Dependence on nocturnal oxygen therapy    • Diabetes mellitus (CMS/HCC)    • Diarrhea    • End stage renal failure on dialysis (CMS/HCC)     TUES, THURS, SAT   • GERD (gastroesophageal reflux disease)    • H/O blood clots     LEG/NECK   • H/O chest x-ray 03/25/2016    Interval decrease in size of the patients right pleural effusion with a persistent small left effusion as well   • History of transfusion    • Hypertension    • Left-sided weakness    • Nauseated     DRY HEAVES   • Pleural effusion    • Pneumonia    • Pneumonia    • Renal failure    • Stroke (CMS/[facility]) 2006    LEFT SIDED WEAKNESS   • Swelling    • Teeth missing    • Tinnitus    • Ulcer, stomach peptic     HISTORY OF ULCER AS A TEENAGER   • Wears glasses        Past Surgical History:  Past Surgical History:   Procedure Laterality Date   • ARTERIOVENOUS FISTULA Right    • BRONCHOSCOPY N/A 1/10/2019    Procedure: BRONCHOSCOPY with MAC and Flouro. LAVAGE, BRUSHINGS AND BIOPSY;  Surgeon: Ean Hernandez MD;  Location: [facility] OR;  Service: Pulmonary   • CARDIAC CATHETERIZATION N/A 3/28/2018    Procedure: Peripheral angiography;  Surgeon: Clay Ruano MD;  Location: [facility] CATH INVASIVE LOCATION;  Service: Cardiovascular   • CARDIAC CATHETERIZATION N/A 3/28/2018    Procedure: Angioplasty-peripheral;  Surgeon: Clay Ruano MD;  Location: [facility] CATH  "INVASIVE LOCATION;  Service: Cardiovascular   • CARDIAC CATHETERIZATION N/A 3/28/2018    Procedure: Atherectomy-peripheral;  Surgeon: Clay Ruano MD;  Location: [facility] INVASIVE LOCATION;  Service: Cardiovascular   • CATARACT EXTRACTION, BILATERAL     • CHOLECYSTECTOMY     • COLONOSCOPY     • EYE SURGERY      BLEEDING BEHING BILATERAL EYES   • INTERVENTIONAL RADIOLOGY PROCEDURE N/A 3/28/2018    Procedure: Abdominal Aortogram;  Surgeon: Clay Ruano MD;  Location: [facility] CATH INVASIVE LOCATION;  Service: Cardiovascular   • PORTACATH PLACEMENT     • SHOULDER MANIPULATION Left     \"FROZEN SHOULDER\"   • VENOUS ACCESS DEVICE (PORT) REMOVAL         Social History:   Social History     Socioeconomic History   • Marital status:      Spouse name: Not on file   • Number of children: Not on file   • Years of education: Not on file   • Highest education level: Not on file   Tobacco Use   • Smoking status: Never Smoker   • Smokeless tobacco: Never Used   • Tobacco comment: 2ND HAND SMOKE EXPOSURE   Substance and Sexual Activity   • Alcohol use: No   • Drug use: No   • Sexual activity: Defer        Family History:   Family History   Problem Relation Age of Onset   • COPD Mother    • Diabetes Father    • Hypertension Sister    • Diabetes Sister    • Hypertension Brother    • Diabetes Paternal Grandmother        Medications Prior to Admission:    Prior to Admission medications    Medication Sig Start Date End Date Taking? Authorizing Provider   famotidine (PEPCID) 20 MG tablet Take 20 mg by mouth Daily.   Yes Provider, MD Noe   albuterol (PROVENTIL HFA;VENTOLIN HFA) 108 (90 Base) MCG/ACT inhaler Inhale 2 puffs Every 4 (Four) Hours As Needed for Shortness of Air. 11/18/17   Latia Blancas APRN   albuterol (PROVENTIL) (2.5 MG/3ML) 0.083% nebulizer solution Take 2.5 mg by nebulization Every 4 (Four) Hours As Needed for Wheezing.    Provider, MD Noe   aspirin 81 MG chewable tablet " Chew 81 mg Daily.    Noe Patrick MD   atorvastatin (LIPITOR) 40 MG tablet Take 40 mg by mouth Daily.    Noe Patrick MD   B Complex-C-Folic Acid (RADHA-SHIRLENE PO) Take 1 tablet by mouth. Patient states he takes these after dialysis, but is unaware of dose.     Noe Patrick MD   Cholecalciferol (VITAMIN D3) 5000 UNITS capsule capsule Take 1 capsule by mouth Daily.    Noe Patrick MD   clopidogrel (PLAVIX) 75 MG tablet Take 1 tablet by mouth Daily. Start taking from 1/12/2019 onwards ONLY if not coughing up significant amount of blood. 1/10/19   Ean Hernandez MD   docusate sodium (COLACE) 100 MG capsule Take 100 mg by mouth Every 12 (Twelve) Hours.    Noe Patrick MD   fluticasone (VERAMYST) 27.5 MCG/SPRAY nasal spray 2 sprays into the nostril(s) as directed by provider Daily.    Noe Patrick MD   folic acid (FOLVITE) 1 MG tablet Take 1 mg by mouth daily.    Noe Patrick MD   insulin aspart (novoLOG FLEXPEN) 100 UNIT/ML solution pen-injector sc pen Inject 9 Units under the skin into the appropriate area as directed 3 (Three) Times a Day With Meals.    Noe Patrick MD   insulin glargine (LANTUS) 100 UNIT/ML injection Inject 15 Units under the skin into the appropriate area as directed Every Night.    Noe Patrick MD   isosorbide mononitrate (IMDUR) 30 MG 24 hr tablet Take 30 mg by mouth Daily.    Noe Patrick MD   lanthanum (FOSRENOL) 1000 MG chewable tablet Chew 1 tablet 3 (Three) Times a Day With Meals. 11/7/19   Sandra Zavala, DO   levETIRAcetam in NaCl 0.82% (KEPPRA) 500 MG/100ML solution IVPB Infuse 100 mL into a venous catheter Every 12 (Twelve) Hours. 11/7/19   Sandra Zavala, DO   levothyroxine (SYNTHROID, LEVOTHROID) 100 MCG tablet Take 125 mcg by mouth Daily.    Noe Patrick MD   losartan (COZAAR) 25 MG tablet Take 1 tablet by mouth Daily. 11/7/19   Sandra Zavala, DO   metoprolol succinate XL (TOPROL-XL)  25 MG 24 hr tablet Take 3 tablets by mouth Daily. 11/7/19   Sandra Zavala,    ondansetron ODT (ZOFRAN-ODT) 4 MG disintegrating tablet Take 1 tablet by mouth Every 6 (Six) Hours As Needed for Nausea or Vomiting. 8/26/17   Joel Zelaya MD       Allergies:  Erythromycin      Review of system  Review of Systems   Unable to perform ROS: Intubated       Vitals:    11/11/19 1200   BP: 130/60   Pulse: 90   Resp: 11   Temp: 97.6 °F (36.4 °C)   SpO2: 100%       Physical exam  Physical Exam   Constitutional: He appears well-developed and well-nourished.   HENT:   Head: Normocephalic and atraumatic.   Neck: Neck supple.   Cardiovascular: Normal rate and regular rhythm.   Pulmonary/Chest: Effort normal and breath sounds normal.   Neurological:   Sedated, hard to arouse, opens eyes some to voice briefly,  on command, weaker on the left.         Lab Results   Component Value Date    WBC 6.65 11/11/2019    HGB 9.8 (L) 11/11/2019    HCT 33.5 (L) 11/11/2019    MCV 96.3 11/11/2019     11/11/2019     Lab Results   Component Value Date    GLUCOSE 217 (H) 11/11/2019    BUN 38 (H) 11/11/2019    CREATININE 3.67 (H) 11/11/2019    EGFRIFNONA 17 (L) 11/11/2019    EGFRIFAFRI  11/07/2019      Comment:      <15 Indicative of kidney failure.    BCR 10.4 11/11/2019    CO2 23.0 11/11/2019    CALCIUM 9.8 11/11/2019    ALBUMIN 2.70 (L) 11/11/2019    LABIL2 1.0 01/01/2017    AST 22 11/11/2019    ALT 11 11/11/2019   EEG Continuous Monitoring   Order: [id number]   Status:  Final result   Visible to patient:  No (Not Released)   Details     Reading Physician Reading Date Result Priority   Joel Jacobson MD 11/8/2019 Routine      Narrative & Impression     Date of Procedure:     11-8 at 12 01 AM through 11-8 at 1 30 PM     Reason for referral:     59 y.o.male with recurrent seizures, altered mental status     Technical summary:      A 19 channel digital EEG is performed using the international 10-20 placement system, including eye  leads and EKG leads.  Split screen technology with video/EEG correlation is used.  [employer] software is used for spike and event detection.  A patient event button is offered.     Findings:     Multiple time samples are reviewed.  The patient is intubated and sedated.  The background shows diffuse moderate amplitude 3-5 hz delta and theta sen symmetrically over both hemispheres, alternating with brief periods of lower amplitude 5-6 hz generalized theta.  Frontal intermittent rhythmic delta activity is present at times.  No epileptiform acitvity or electrographic seizures are seen.  The patient event button is not pushed.     IMPRESSION:       EEG background is moderate generalized slow      No electrographic seizures are seen during this recording epoch              This report is transcribed using the Dragon dictation system.  Despite the best of intentions, unexpected transcription errors may be present.                      Last Resulted: 11/08/19 14:16                 Radiological Studies:  EXAMINATION: MRI BRAIN WO CONTRAST- 11/09/2019     INDICATION: seizures; J18.1-Lobar pneumonia, unspecified organism;  I50.22-Chronic systolic (congestive) heart failure; G40.901-Epilepsy,  unspecified, not intractable, with status epilepticus; N18.6-End stage  renal disease; Z99.2-Dependence on renal dialysis; R06.89-Other  abnormalities of breathing      TECHNIQUE: Multiplanar MRI of the brain without intravenous contrast     COMPARISON: NONE     FINDINGS: No restriction on diffusion-weighted sequences. Midline  structures are symmetric without evidence of mass, mass effect or  midline shift demonstrating mild prominence of the sulci towards the  vertex of mild generalized atrophy and/or age-related volume loss with  minimal amount of T2 and FLAIR signal findings that may represent subtle  chronic small vessel ischemic disease in the periventricular and deep  white matter without signal aberration otherwise noted. Pituitary  and  sella within normal limits. Cervicomedullary junction widely patent.  Globes and orbits retain normal T2 signal characteristics. Visualized  paranasal sinuses and mastoid air cells demonstrate mild mucosal  thickening of the maxillary sinuses right greater than left along with  moderate right and mild to moderate left mastoid effusions. No  cerebellopontine angle mass lesion. Normal signal flow voids of the  distal internal carotid and basilar arteries.     IMPRESSION:  No acute intracranial abnormalities specifically no evidence  for acute infarction with mild chronic small vessel ischemic disease  findings and generalized atrophy of senescence.  Mild mucosal thickening of the bilateral maxillary sinuses may represent  sinusitis along with a moderate right and mild to moderate left mastoid  effusions present.      D:  11/10/2019  E:  11/11/2019        During this visit the following were done:  Labs Reviewed [x]    Labs Ordered []    Radiology Reports Reviewed []    Radiology Ordered []    EKG, echo, and/or stress test reviewed []    EEG results reviewed  [x]    EEG reviewed and interpreted per myself   []    Discussed case with neurointerventionalist or neuroradiologist []    Referring Provider Records Reviewed []    ER Records Reviewed []    Hospital Records Reviewed [x]    History Obtained From Family []    Radiological images view and Interpreted per myself [x]    Case Discussed with referring provider []     Decision to obtain and request outside records  []        Assessment and Plan     New onset seizures 22 UTI, question of PNA, suspect possible role of cefepime, chronic CVD. No further seizures, encephalopathy improving. No signs of CNS infectuion. MRI brain w/o acute changes, personally reviewed.     - Continue Keppra 500 mg 12 IV or po.   - Call for problems, will see prn.    Thanks,                 Electronically signed by Theo Moise MD on 11/11/2019 at 12:43 PM

## 2019-11-11 NOTE — PROGRESS NOTES
Continued Stay Note  [facility]     Patient Name: Talha Cutler  MRN: [id number]  Today's Date: 11/11/2019    Admit Date: 11/6/2019    Discharge Plan     Row Name 11/11/19 1144       Plan    Plan  TBD    Plan Comments  Discussed patient in am rounds.  Patient still intubated and receiving TF via NGT.  Spoke with brother at bedside.  Previously, patient's plan had been to return home with home health at discharge.  Once patient is appropriate, therapy evals would be helpful to determine proper discharge placement.  CM will continue to follow.  Kandi Genao RN [phone]    Final Discharge Disposition Code  30 - still a patient        Discharge Codes    No documentation.       Expected Discharge Date and Time     Expected Discharge Date Expected Discharge Time    Nov 15, 2019             Kandi Genao RN

## 2019-11-12 NOTE — PROGRESS NOTES
Multidisciplinary Rounds    Time: 20min  Patient Name: Talha Cutler  Date of Encounter: 11/12/19 9:32 AM  MRN: [id number]  Admission date: 11/6/2019      Reason for visit: MDR. RD to continue to follow per protocol.     Additional information obtained during MDR: EN goal rate increased to 38 ml/hr yesterday (11/11) per intensivist. Pt tolerating EN. Per I&O- 9 bowel movements over the past 24 hrs; loose stools per RN. Pt continues on the vent, precedex. Planning for HD today. Rec adding 2 packets NutriSource Fiber 3x/day (6 total packets/day), which would provide at goal volume= 1610 kcal, 18 g fiber. However the intensivist is managing the nutrition support.     Current diet: NovaSource Renal at 38 ml/hr and free water at 30 ml q 2 hrs via NGT      Intervention:  Follow treatment plan  Care plan reviewed    Follow up:   Per protocol      Marianna Cristina MS RD/CHUY CNSC  9:32 AM

## 2019-11-12 NOTE — PLAN OF CARE
Problem: Patient Care Overview  Goal: Plan of Care Review  Outcome: Ongoing (interventions implemented as appropriate)   11/12/19 1804   Coping/Psychosocial   Plan of Care Reviewed With patient   Plan of Care Review   Progress improving   OTHER   Outcome Summary Pt. awake at approximately 1600. Able to follow commands and VILLA. Dialysis today with 2 L removed. Pt. c/o headache at 1800. PRN tylenol ordered. May plan for spontaneous breathing trial tomorrow.      Goal: Individualization and Mutuality  Outcome: Ongoing (interventions implemented as appropriate)    Goal: Discharge Needs Assessment  Outcome: Ongoing (interventions implemented as appropriate)    Goal: Interprofessional Rounds/Family Conf  Outcome: Ongoing (interventions implemented as appropriate)      Problem: Hemodialysis (Adult)  Goal: Signs and Symptoms of Listed Potential Problems Will be Absent, Minimized or Managed (Hemodialysis)  Outcome: Ongoing (interventions implemented as appropriate)      Problem: Ventilation, Mechanical Invasive (Adult)  Goal: Signs and Symptoms of Listed Potential Problems Will be Absent, Minimized or Managed (Ventilation, Mechanical Invasive)  Outcome: Ongoing (interventions implemented as appropriate)

## 2019-11-12 NOTE — PROGRESS NOTES
Talha Cutler  [id number]  1960   LOS: 6 days   Patient Care Team:  Jeannette Godoy APRN as PCP - General (Family Medicine)    Chief Complaint:  SEIZURES / RESPIRATORY FAILURE / CM / HFrEF / ESDR / HD    Subjective     Sedated and ventilated and comfortable on FiO2 0.28 with nominal saturation (100%).  No overt seizure activity or posturing noted.  He continues to tolerate nutritional support with Nova Renal Source 38 cc/hour via NG.    Objective     Vital Sign Min/Max for last 24 hours  Temp  Min: 96.8 °F (36 °C)  Max: 99.6 °F (37.6 °C)   BP  Min: 113/54  Max: 158/76   Pulse  Min: 73  Max: 93   Resp  Min: 10  Max: 21   SpO2  Min: 97 %  Max: 100 %      Weight  Min: 64.3 kg (141 lb 12.1 oz)  Max: 64.3 kg (141 lb 12.1 oz)         11/11/19  0600 11/12/19  0500   Weight: 65.3 kg (143 lb 15.4 oz) 64.3 kg (141 lb 12.1 oz)         Intake/Output Summary (Last 24 hours) at 11/12/2019 0724  Last data filed at 11/12/2019 0606  Gross per 24 hour   Intake 1389.1 ml   Output --   Net 1389.1 ml       Physical Exam:     General Appearance:   Sedated, ventilated, in no acute distress   Lungs:    Bibasilar crackles and dullness more notable in the right,respirations regular, even and  unlabored    Heart:    Regular and normal rate, normal S1 and S2, grade 1/6            murmur, no gallop, no rub, no click   Abdomen:  Extremities:   Soft, non-tender, bowel sounds audible x4    1+ UE edema, normal range of motion   Pulses:   Pulses palpable and equal bilaterally      Results Review:   Results from last 7 days   Lab Units 11/12/19  0416 11/11/19  0555 11/10/19  0428   SODIUM mmol/L 139 137 139   POTASSIUM mmol/L 3.6 3.5 3.9   CHLORIDE mmol/L 100 99 100   CO2 mmol/L 25.0 23.0 25.0   BUN mg/dL 58* 38* 20   CREATININE mg/dL 4.36* 3.67* 2.46*   GLUCOSE mg/dL 207* 217* 236*   CALCIUM mg/dL 10.6* 9.8 10.0     Results from last 7 days   Lab Units 11/12/19  0416 11/11/19  0555 11/10/19  0428   WBC 10*3/mm3 8.23 6.65 6.88    HEMOGLOBIN g/dL 10.0* 9.8* 9.6*   HEMATOCRIT % 32.7* 33.5* 32.4*   PLATELETS 10*3/mm3 205 208 194     Results from last 7 days   Lab Units 11/11/19  0555   TRIGLYCERIDES mg/dL 64     Results from last 7 days   Lab Units 11/12/19  0416   TROPONIN T ng/mL 0.225*     · NO CXR.    · EKG:    Normal sinus rhythm  Nonspecific T wave abnormality  Abnormal ECG  When compared with ECG of 05-NOV-2019 17:58,  Nonspecific T wave abnormality now evident in Inferior leads  Confirmed by EVERETTE CAMERON MD ([id number]) on 11/11/2019 6:13:38 PM    Medication Review: REVIEWED; DRIP = Precedex 0.4 mcg/kilogram/hour.    Assessment/Plan     Acceptable hemodynamics with low-grade fever and persistent normal sinus rhythm.  Doubt acute coronary syndrome.  We will add valsartan 40 mg BID and titrate upward on both this medication as well as carvedilol for his probable nonischemic advanced cardiomyopathy.      Status epilepticus    Recurrent right pleural effusion    SOB (shortness of breath)    Uncontrolled diabetes mellitus with hyperglycemia, with long-term current use of insulin (CMS/[facility])    Physical debility    Peripheral vascular disease (CMS/[facility])    Pneumonia of right upper lobe due to infectious organism (CMS/[facility])    Symptomatic anemia    History of CVA (cerebrovascular accident)    Hypothyroidism (acquired)    Chronic systolic CHF (congestive heart failure) (CMS/[facility])    Seizure (CMS/[facility])    11/12/19  7:24 AM

## 2019-11-12 NOTE — PLAN OF CARE
Problem: Ventilation, Mechanical Invasive (Adult)  Intervention: Prevent Airway Displacement/Mechanical Dysfunction   11/12/19 1717   Prevent Airway Displacement/Mechanical Dysfunction   Airway Safety Measures manual resuscitator at bedside;suction at bedside     Intervention: Prevent Ventilator-Induced Lung Injury   11/12/19 1717   Respiratory Interventions   Lung Protection Measures plateau/inspiratory pressure monitored     Intervention: Promote Early Weaning/Extubation   11/12/19 1717   Coping/Psychosocial Interventions   Environmental Support calm environment promoted     Intervention: Prevent Ventilator-Associated Pneumonia (VAP)   11/12/19 1717   Positioning   Head of Bed (HOB) HOB at 30-45 degrees   VAP Prevention Measures   VAP Prevention Bundle readiness to extubate assessed     Intervention: Optimize Oxygenation/Ventilation   11/12/19 1717   Respiratory Interventions   Airway/Ventilation Management airway patency maintained

## 2019-11-12 NOTE — PLAN OF CARE
Problem: Hemodialysis (Adult)  Goal: Signs and Symptoms of Listed Potential Problems Will be Absent, Minimized or Managed (Hemodialysis)  Outcome: Ongoing (interventions implemented as appropriate)   11/12/19 8783   Goal/Outcome Evaluation   Problems Assessed (Hemodialysis) all   Problems Present (Hemodialysis) fluid imbalance;electrolyte imbalance

## 2019-11-12 NOTE — PROGRESS NOTES
INTENSIVIST   PROGRESS NOTE     Hospital:  LOS: 6 days      S     Mr. Talha Cutler, 59 y.o. male is followed for:  No chief complaint on file.      Status epilepticus    DM    Hypothyroidism (acquired)      As an Intensivist, we provide an integrated approach to the ICU patient and family, medical management of comorbid conditions, lead interdisciplinary rounds and coordinate the care with all other services, including those from other specialists.     Interval History:  Lethargic.  Getting HD this morning.  Not following commands.  Only on very low dose of Dexmedetomidine      The patient's relevant past medical, surgical and social history were reviewed and updated in Epic as appropriate.     ROS:   ROS cannot be reliably obtained from the patient due to his Acuity of condition and Intubated.        O     Vitals:  Temp: 98.1 °F (36.7 °C) (11/12/19 1200) Temp  Min: 96.8 °F (36 °C)  Max: 99.6 °F (37.6 °C)   Temp core:      BP: 120/60 (11/12/19 1200) BP  Min: 115/62  Max: 158/76   Pulse: 89 (11/12/19 1200) Pulse  Min: 71  Max: 90   Resp: 12 (11/12/19 1200) Resp  Min: 10  Max: 14   SpO2: 100 % (11/12/19 1200) SpO2  Min: 97 %  Max: 100 %   Device: ventilator (11/12/19 1200)    Flow Rate:   No Data Recorded     Intake/Ouptut 24 hrs (7:00AM - 6:59 AM)  Intake/Output       11/11/19 0700 - 11/12/19 0659 11/12/19 0700 - 11/13/19 0659    Intake (ml) 1389.1 636    Output (ml) -- --    Net (ml) 1389.1 636    Last Weight  64.3 kg (141 lb 12.1 oz)  --           Medications (drips):    dexmedetomidine Last Rate: 0.2 mcg/kg/hr (11/12/19 1301)       Mechanical Ventilator:  Settings: Observed:   Mode: VC+/AC (11/12/19 1140)    Vt (Set, L): 0.48 L (11/12/19 1140)    Resp Rate (Set): 10 (11/12/19 1140) Resp Rate (Observed) Vent: 12 (11/12/19 1200)   FiO2 (%): 28 % (11/12/19 1140)    PEEP/CPAP (cm H2O): 5 cm H20 (11/12/19 1140) Plateau Pressure (cm H2O): 20 cm H2O (11/12/19 0750)    Minute Ventilation (L/min) (Obs): 6.39 L/min  (11/12/19 1140)    I:E Ratio (Obs): 1:3.80 (11/12/19 1140)     Physical Examination  Telemetry:  Rhythm: normal sinus rhythm (11/12/19 1200)     QTc Interval (Sec): 0.46 (11/11/19 2000)   Constitutional:  No acute distress.   Cardiovascular: RRR.   Normal heart sounds.  No murmurs, gallop or rub.   Respiratory: Normal breath sounds  No adventitious sounds.   Abdominal:  Soft with no tenderness.  No distension.   No HSM.   Extremities: Warm.  Dry.  No cyanosis.  Trace Edema   Neurological:   Lethargic.  Best Eye Response: 2-->(E2) to pain (11/12/19 1200)  Best Motor Response: 6-->(M6) obeys commands (11/12/19 1200)  Best Verbal Response: 1-->(V1) none (11/12/19 1200)  [place] Coma Scale Score: 9 (11/12/19 1200)   Lines/Drains/Airways: L IJ CVC  O ETT   RIGHT FISTULA     Hematology:  Results from last 7 days   Lab Units 11/12/19  0416 11/11/19  0555 11/10/19  0428   WBC 10*3/mm3 8.23 6.65 6.88   HEMOGLOBIN g/dL 10.0* 9.8* 9.6*   MCV fL 94.8 96.3 94.7   PLATELETS 10*3/mm3 205 208 194       Chemistry:  Estimated Creatinine Clearance: 16.4 mL/min (A) (by C-G formula based on SCr of 4.36 mg/dL (H)).    Results from last 7 days   Lab Units 11/12/19  0416 11/11/19  0555 11/10/19  0428   SODIUM mmol/L 139 137 139   POTASSIUM mmol/L 3.6 3.5 3.9   CHLORIDE mmol/L 100 99 100   CO2 mmol/L 25.0 23.0 25.0   ANION GAP mmol/L 14.0 15.0 14.0   GLUCOSE mg/dL 207* 217* 236*   CALCIUM mg/dL 10.6* 9.8 10.0     Results from last 7 days   Lab Units 11/12/19  0416 11/11/19  0555 11/10/19  0428   BUN mg/dL 58* 38* 20   CREATININE mg/dL 4.36* 3.67* 2.46*     Results from last 7 days   Lab Units 11/12/19  0416 11/11/19  0555 11/10/19  0428 11/09/19  0431   MAGNESIUM mg/dL  --  2.5* 2.3 2.5*   PHOSPHORUS mg/dL 3.4 3.3 2.4* 3.2       Arterial Blood Gases:  Results from last 7 days   Lab Units 11/12/19  0452 11/11/19  0431 11/10/19  0453   PH, ARTERIAL pH units 7.410 7.442 7.476*   PCO2, ARTERIAL mm Hg 43.3 43.2 38.0   PO2 ART mm Hg 91.0 95.1  102.0   FIO2 % 28 28 30     Lab Results   Lab Value Date/Time    PROCALCITO 0.98 (H) 11/11/2019 0555    PROCALCITO 1.31 (H) 11/10/2019 0428    PROCALCITO 2.17 (H) 11/08/2019 0550    PROCALCITO 0.60 (H) 11/01/2019 1444     Images:  Xr Chest 1 View    Result Date: 11/11/2019  The heart Is enlarged with ill-defined opacification seen within the right mid and lower lung field. Small right pleural effusion. Lines and tubes in satisfactory position.  D:  11/11/2019 E:  11/11/2019         Echo:  Results for orders placed during the hospital encounter of 11/01/19   Adult Transthoracic Echo Complete W/ Cont if Necessary Per Protocol    Narrative · The left ventricular cavity is moderate-to-severely dilated.  · Right ventricular cavity is mildly dilated.  · Left atrial cavity size is moderately dilated.  · Moderate mitral valve regurgitation is present  · There is a small (<1cm) pericardial effusion.          Results: Reviewed.  I reviewed the patient's new laboratory and imaging results.  I independently reviewed the patient's new images.    Medications: Reviewed.    Assessment/Plan   A / P       Assessment:    He was initially admitted to [facility] on 11/1/19 for pneumonia. But eventually respiratory status declined to the point of requiring Bipap and moved to the ICU on 11/5/19.  On the afternoon of 11/6, he was noted to become more lethargic and then developed tonic/clonic movements of his right arm and his head. He was given 500mg of IV keppra. He was transferred to [facility] for Neurology consultation. Upon arrival, he had 2 separate episodes of tonic/clonic jerking of his extremities in less than 5 minutes without return to baseline. He was then intubated.    1. Seizures  1. He was on Cefepime at [facility]  2. Levetiracetam .  2. Respiratory failure, 2ry to CNS  1. Intubated 11/07/19   2. Mechanical ventilation  3. Pneumonia RUL at [facility] (11/1/19). (Metapneumovirus, per Respiratory Panel PCR 11/1/19))  4. CXR: Patchy infiltrate Right mid  and lower lung and mid left lung.  5. Small R Pleural effusion (as per CT ABD 11/5/19)  6. ALYSSA on CPAP at home  7. Abs: Piperacillin-Tazobactam   3. Hypothyroidism on Rx.  4. Cardiac: { Dr Mart following  1. HFrEF   5. GERD  6. HTN  7. Previous CVA with left sided residual weakness.  8. Anemia  1. S/P transfusion 11/07/19  9. ESRD  1. HD  10. Enteral Nutrition - with low K/P formula  11. T2DM    Results from last 7 days   Lab Units 11/12/19  1131 11/12/19  0500 11/11/19  2310 11/11/19  1745 11/11/19  1203 11/11/19  0511   GLUCOSE mg/dL 194* 216* 151* 107 207* 224*     Lab Results   Lab Value Date/Time    HGBA1C 8.90 (H) 09/21/2019 0647    HGBA1C 10.6 (H) 02/23/2019 0408    HGBA1C 9.6 (H) 07/13/2017 0438       Nutrition Support: Diet, Tube Feeding Tube Feeding Formula: Novasource Renal (Electrolyte Restricted)   Modulars: Patient doesn't have any tube feeding modular orders   Diet: NPO Diet   Advance Directives: Code Status and Medical Interventions:   Ordered at: 11/06/19 8699     Code Status:    CPR     Medical Interventions (Level of Support Prior to Arrest):    Full        Plan:    1. No progress.  2. Not ready for weaning.  3. Cardiology to see for his cardiomyopathy, EF 25%  4. Insuling doses adjusted yesterday. Keep same today.  1. Goal: Glucose < 180 mg/dL.  5. Disposition: Keep in ICU.   1. Family conference to re-address goals of care after 6 days at our Hospital.  2. Would he need a TRACH and PEG? Vent Day ~ 7    Plan of care and goals reviewed during interdisciplinary rounds.  I discussed the patient's findings and my recommendations with nursing staff  .  Level of Risk is High due to:  illness with threat to life or bodily function.     Time: was greater than 35 minutes.    (This excludes time spent performing separately reportable procedures and services).  Patient was at high risk of imminent or life-threatening deterioration in his condition due to Respiratory failure.     Garcia Lisa MD, FACP,  FCCP, CNSC  Intensive Care Medicine, Nutrition Support and Pulmonary Medicine     [x]  Primary Attending  []  Consultant

## 2019-11-12 NOTE — PLAN OF CARE
Problem: Patient Care Overview  Goal: Plan of Care Review  Outcome: Ongoing (interventions implemented as appropriate)   11/12/19 1020   Coping/Psychosocial   Plan of Care Reviewed With family   Plan of Care Review   Progress no change   OTHER   Outcome Summary No changes this shift. Pt continues to be on minimal sedation, able to follow some commands at times. Will try to wean off sedation this morning if tolerated. Plan for another SBT and Dialysis today. Will continue to closely monitor.

## 2019-11-12 NOTE — PROGRESS NOTES
" LOS: 6 days   Patient Care Team:  Jeannette Godoy APRN as PCP - General (Family Medicine)    Chief Complaint: ESRD    Subjective     Seen on dialysis   No new events      History taken from: chart    Objective     Vital Sign Min/Max for last 24 hours  Temp  Min: 96.8 °F (36 °C)  Max: 99.6 °F (37.6 °C)   BP  Min: 115/62  Max: 158/76   Pulse  Min: 71  Max: 93   Resp  Min: 10  Max: 14   SpO2  Min: 97 %  Max: 100 %   No Data Recorded   Weight  Min: 64.3 kg (141 lb 12.1 oz)  Max: 64.3 kg (141 lb 12.1 oz)     Flowsheet Rows      First Filed Value   Admission Height  172.7 cm (67.99\") Documented at 11/07/2019 1909   Admission Weight  -- [Bed scale not working] Documented at 11/08/2019 0500          I/O this shift:  In: 466.3 [I.V.:159.6; Other:60; NG/GT:191; IV Piggyback:55.7]  Out: -   I/O last 3 completed shifts:  In: 2147.8 [I.V.:439.3; Other:475; NG/GT:1130; IV Piggyback:103.5]  Out: 1 [Stool:1]    Objective:  Vital signs: (most recent): Blood pressure 122/63, pulse 84, temperature 97.4 °F (36.3 °C), temperature source Axillary, resp. rate 10, height 173 cm (68.11\"), weight 64.3 kg (141 lb 12.1 oz), SpO2 100 %.               Constitutional: He appears well-developed. No distress.   AV access on the left arm + palpable   HENT:   Head: Normocephalic and atraumatic.   Eyes: Pupils are equal, round, and reactive to light.   Neck: Normal range of motion.   Cardiovascular: Normal rate, regular rhythm and normal heart sounds.   No murmur heard.  Pulmonary/Chest: vent sounds  Intubated on MV   Abdominal: Soft. Bowel sounds are normal.   Musculoskeletal: He exhibits no edema.   Neurological:   Sedated on 24hr Video EEG   Skin: Skin is warm.   Vitals reviewed.    Results Review:     I reviewed the patient's new clinical results.    WBC WBC   Date Value Ref Range Status   11/12/2019 8.23 3.40 - 10.80 10*3/mm3 Final   11/11/2019 6.65 3.40 - 10.80 10*3/mm3 Final   11/10/2019 6.88 3.40 - 10.80 10*3/mm3 Final      HGB " Hemoglobin   Date Value Ref Range Status   11/12/2019 10.0 (L) 13.0 - 17.7 g/dL Final   11/11/2019 9.8 (L) 13.0 - 17.7 g/dL Final   11/10/2019 9.6 (L) 13.0 - 17.7 g/dL Final      HCT Hematocrit   Date Value Ref Range Status   11/12/2019 32.7 (L) 37.5 - 51.0 % Final   11/11/2019 33.5 (L) 37.5 - 51.0 % Final   11/10/2019 32.4 (L) 37.5 - 51.0 % Final      Platlets No results found for: LABPLAT   MCV MCV   Date Value Ref Range Status   11/12/2019 94.8 79.0 - 97.0 fL Final   11/11/2019 96.3 79.0 - 97.0 fL Final   11/10/2019 94.7 79.0 - 97.0 fL Final          Sodium Sodium   Date Value Ref Range Status   11/12/2019 139 136 - 145 mmol/L Final   11/11/2019 137 136 - 145 mmol/L Final   11/10/2019 139 136 - 145 mmol/L Final      Potassium Potassium   Date Value Ref Range Status   11/12/2019 3.6 3.5 - 5.2 mmol/L Final   11/11/2019 3.5 3.5 - 5.2 mmol/L Final   11/10/2019 3.9 3.5 - 5.2 mmol/L Final      Chloride Chloride   Date Value Ref Range Status   11/12/2019 100 98 - 107 mmol/L Final   11/11/2019 99 98 - 107 mmol/L Final   11/10/2019 100 98 - 107 mmol/L Final      CO2 CO2   Date Value Ref Range Status   11/12/2019 25.0 22.0 - 29.0 mmol/L Final   11/11/2019 23.0 22.0 - 29.0 mmol/L Final   11/10/2019 25.0 22.0 - 29.0 mmol/L Final      BUN BUN   Date Value Ref Range Status   11/12/2019 58 (H) 8 - 23 mg/dL Final   11/11/2019 38 (H) 8 - 23 mg/dL Final   11/10/2019 20 8 - 23 mg/dL Final      Creatinine Creatinine   Date Value Ref Range Status   11/12/2019 4.36 (H) 0.76 - 1.27 mg/dL Final   11/11/2019 3.67 (H) 0.76 - 1.27 mg/dL Final   11/10/2019 2.46 (H) 0.76 - 1.27 mg/dL Final      Calcium Calcium   Date Value Ref Range Status   11/12/2019 10.6 (H) 8.6 - 10.5 mg/dL Final   11/11/2019 9.8 8.6 - 10.5 mg/dL Final   11/10/2019 10.0 8.6 - 10.5 mg/dL Final      PO4 No results found for: CAPO4   Albumin Albumin   Date Value Ref Range Status   11/12/2019 3.00 (L) 3.50 - 5.20 g/dL Final   11/11/2019 2.70 (L) 3.50 - 5.20 g/dL Final    11/10/2019 3.20 (L) 3.50 - 5.20 g/dL Final      Magnesium Magnesium   Date Value Ref Range Status   11/11/2019 2.5 (H) 1.6 - 2.4 mg/dL Final   11/10/2019 2.3 1.6 - 2.4 mg/dL Final      Uric Acid No results found for: URICACID     Medication Review: Yes    Assessment/Plan       Status epilepticus    Recurrent right pleural effusion    SOB (shortness of breath)    Uncontrolled diabetes mellitus with hyperglycemia, with long-term current use of insulin (CMS/[facility])    Physical debility    Peripheral vascular disease (CMS/[facility])    Pneumonia of right upper lobe due to infectious organism (CMS/[facility])    Symptomatic anemia    History of CVA (cerebrovascular accident)    Hypothyroidism (acquired)    Chronic systolic CHF (congestive heart failure) (CMS/[facility])    Seizure (CMS/[facility])      Assessment & Plan    Condition: In stable condition.  Unchanged.    # (ESRD: [facility] KT/B 1.88  #Access: AV fistula on the left arm  Non functioning access on the right   #Anemia: Acute on chronic  Will need MARIANO on HD day  #BMD: stable  #Acid base and [place]: stable  #AMS+ Tonic clonic seiuzre: Neurology following  Unlikely related to uremic toxins   On keppra      Plan   HD per [facility]. Seen on hd  Transfuse at Hb<7      Iggyjeramy Mccray MD  11/12/19  11:33 AM

## 2019-11-12 NOTE — PLAN OF CARE
Problem: Ventilation, Mechanical Invasive (Adult)  Intervention: Prevent Airway Displacement/Mechanical Dysfunction   11/11/19 1914   Prevent Airway Displacement/Mechanical Dysfunction   Airway Safety Measures mask at bedside;suction at bedside

## 2019-11-12 NOTE — PLAN OF CARE
Problem: Skin Injury Risk (Adult)  Goal: Skin Health and Integrity  Outcome: Ongoing (interventions implemented as appropriate)      Problem: Fall Risk (Adult)  Goal: Absence of Fall  Outcome: Ongoing (interventions implemented as appropriate)      Problem: Restraint, Nonbehavioral (Nonviolent)  Goal: Rationale and Justification  Outcome: Ongoing (interventions implemented as appropriate)    Goal: Nonbehavioral (Nonviolent) Restraint: Absence of Injury/Harm  Outcome: Ongoing (interventions implemented as appropriate)    Goal: Nonbehavioral (Nonviolent) Restraint: Achievement of Discontinuation Criteria  Outcome: Ongoing (interventions implemented as appropriate)    Goal: Nonbehavioral (Nonviolent) Restraint: Preservation of Dignity and Wellbeing  Outcome: Ongoing (interventions implemented as appropriate)

## 2019-11-13 NOTE — PROGRESS NOTES
Clinical Nutrition   Reason For Visit: MDR, Follow-up protocol, EN    Patient Name: Talha Cutler  YOB: 1960  MRN: [id number]  Date of Encounter: 11/13/19 10:28 AM  Admission date: 11/6/2019      Nutrition Assessment     Admission Problem List:  Pt admitted to [facility] (11/1) with HCAP positive for metapneumovirus, progressive weakness, respiratory decline, new onset seizures. Pt transferred to [facility] (11/6) for higher level of care.    Seizures  Acute respiratory failure  Vent (11/7)  ESRD- HD  A/c anemia  Encephalopathy      Skin: WOCN (11/7)- Consulted to assess patient for a possible pressure injury to his coccyx POA. Assessment performed. Patients coccyx/buttocks is intact, pink, and blanching. BLE scabbing noted.       Applicable PMH:  CHF  HTN  PVD  CVA  DM  Hypothyroid  GERD  ALYSSA, cpap  ESRD  HD- Tues/Thurs/Sat  Anemia  Seizures- February 2019      Applicable Nutrition-Related Information:  From (11/1-11/6) pt was on-and-off a cardiac/consistent carbohydrate/renal diet with 40% PO intake of 12 documented meals. During admission at [facility] pt was also NPO, however unable to determine amount of time pt was NPO.   (11/8) EN started per MD- NovaSource Renal at 25 ml/hr with 1 ProStat 2x/day and free water at 30 ml q 2 hrs via NGT  (11/11) EN rate increased to 38 ml/hr and ProStats d/c'd per intensivist       Reported/Observed/Food/Nutrition Related History     Pt tolerating EN at goal rate. Next HD tomorrow (11/14). Planning for pt to possibly be weaned/extubated today. Insulin being adjusted.     Per I&O over the past 24 hrs:  2 bowel movements      Anthropometrics   Height: 68 in  Weight: 131 lb per bed scale (11/4)  BMI: 20.0  BMI classification: Normal: 18.5-24.9kg/m2     Date Weight (kg) Weight (lbs) Weight Method   11/11/2019 65.3 kg 143 lb 15.4 oz Bed scale   11/10/2019 64.5 kg 142 lb 3.2 oz Bed scale   11/8/2019 65 kg 143 lb 4.8 oz Bed scale     11/4/2019 59.512 kg 131 lb 3.2 oz Bed scale    11/3/2019 58.695 kg 129 lb 6.4 oz Bed scale   11/2/2019 58.559 kg 129 lb 1.6 oz Bed scale   11/1/2019 56.065 kg 123 lb 9.6 oz Bed scale   11/1/2019 56.7 kg 125 lb Standing scale   9/20/2019 60.192 kg 132 lb 11.2 oz Bed scale   9/14/2019 58.968 kg 130 lb -   8/31/2019 56.8 kg 125 lb 3.5 oz Stated   8/15/2019 54.432 kg 120 lb Stated   5/15/2019 54.432 kg 120 lb -   4/26/2019 53.751 kg 118 lb 8 oz Standing scale   2/26/2019 61.434 kg 135 lb 7 oz Bed scale   2/25/2019 58.65 kg 129 lb 4.8 oz Bed scale   2/23/2019 58.922 kg 129 lb 14.4 oz Bed scale   2/22/2019 58.922 kg 129 lb 14.4 oz -       Labs reviewed   Labs reviewed: Yes; insulin being adjusted    Results from last 7 days   Lab Units 11/13/19 0451 11/12/19 0416 11/11/19  0555 11/10/19  0428 11/09/19  0431   SODIUM mmol/L 137 139 137 139 133*   POTASSIUM mmol/L 3.3* 3.6 3.5 3.9 4.0   CHLORIDE mmol/L 98 100 99 100 93*   CO2 mmol/L 28.0 25.0 23.0 25.0 22.0   BUN mg/dL 30* 58* 38* 20 31*   CREATININE mg/dL 2.51* 4.36* 3.67* 2.46* 3.86*   GLUCOSE mg/dL 222* 207* 217* 236* 217*   CALCIUM mg/dL 10.2 10.6* 9.8 10.0 9.0   PHOSPHORUS mg/dL 2.2* 3.4 3.3 2.4* 3.2   MAGNESIUM mg/dL  --   --  2.5* 2.3 2.5*   TRIGLYCERIDES mg/dL  --   --  64  --   --      Results from last 7 days   Lab Units 11/13/19 0451 11/12/19 0416 11/11/19  0555   WBC 10*3/mm3 5.98 8.23 6.65   ALBUMIN g/dL 2.80* 3.00* 2.70*   PREALBUMIN mg/dL  --   --  8.9*   CRP mg/dL  --   --  5.38*   TRIGLYCERIDES mg/dL  --   --  64     Results from last 7 days   Lab Units 11/13/19  0459 11/12/19  2305 11/12/19  1726 11/12/19  1131 11/12/19  0500 11/11/19  2310   GLUCOSE mg/dL 215* 244* 163* 194* 216* 151*     Lab Results   Lab Value Date/Time    HGBA1C 8.90 (H) 09/21/2019 0647    HGBA1C 10.6 (H) 02/23/2019 0408    HGBA1C 9.6 (H) 07/13/2017 0438     Medications reviewed   Medications reviewed: Yes  Pertinent: insulin, keppra, protonix, antibiotics  GTT: precedex  PRN: tylenol      Needs Assessment  (11/11)   Height  used: 68 in/173 cm  Weight used: 131 lb/60 kg    Estimated Calories needs: ~1500 kcal/day  IC (11/10): RQ= 0.80; MREE= 1498 kcal  PSU (Ve= 6.0, Tmax= 36.7)= 1434 kcal  25-30 kcal/kg= 7209-7421 kcal    Estimated Protein needs: ~72 g pro/day   1.2 g/kg= 72 g pro    Current Nutrition Prescription   PO: NPO Diet     EN: NovaSource Renal at 38 ml/hr (goal volume= 760 ml/day) and free water at 30 ml q 2 hrs  Route: NG  Verified at the bedside: Yes    Evaluation of Received Nutrient/Fluid Intake-EN:  2 Days:   847, 111%   1694 kcal, 113%  76 g pro, 106%  0 g fiber  607 ml water from EN  1193 ml water total       Nutrition Diagnosis   11/7, updated 11/11, 11/13  Problem Inadequate energy intake   Etiology Clinical condition, diet order   Signs/Symptoms Pt requiring mechanical ventilation, NPO; prior to being intubated pt had consumed 40% PO intake of 12 documented meals at [facility] from (11/1-11/6)   Status: EN started (11/8), resolved      Intervention   Intervention: Follow treatment progress, Care plan reviewed   -Will continue to monitor and provide EN recs, however the intensivist is managing the nutrition support      Goal:   General: Nutrition support treatment  EN/PN: Maintain EN      Monitoring/Evaluation:       Monitoring/Evaluation: Per protocol, I&O, Pertinent labs, EN delivery/tolerance, Weight, Symptoms       Will Continue to follow per protocol    Marianna Cristina, MS RD/LD CNSC  Time Spent: 45 mintes

## 2019-11-13 NOTE — PROGRESS NOTES
INTENSIVIST   PROGRESS NOTE     Hospital:  LOS: 7 days      S     Mr. Talha Cutler, 59 y.o. male is followed for:  No chief complaint on file.      Status epilepticus    DM    Hypothyroidism (acquired)      As an Intensivist, we provide an integrated approach to the ICU patient and family, medical management of comorbid conditions, lead interdisciplinary rounds and coordinate the care with all other services, including those from other specialists.     Interval History:  Alert.  Trying to talk.  Nods head appropriately.     The patient's relevant past medical, surgical and social history were reviewed and updated in Epic as appropriate.     ROS:   ROS cannot be reliably obtained from the patient due to his Acuity of condition and Intubated.        O     Vitals:  Temp: 97.6 °F (36.4 °C) (11/13/19 0800) Temp  Min: 97.5 °F (36.4 °C)  Max: 98.1 °F (36.7 °C)   Temp core:      BP: 135/63 (11/13/19 0813) BP  Min: 126/59  Max: 155/72   Pulse: 62 (11/13/19 0813) Pulse  Min: 61  Max: 81   Resp: 10 (11/13/19 0800) Resp  Min: 10  Max: 16   SpO2: 100 % (11/13/19 0800) SpO2  Min: 99 %  Max: 100 %   Device: ventilator (11/13/19 1156)    Flow Rate:   No Data Recorded     Intake/Ouptut 24 hrs (7:00AM - 6:59 AM)  Intake/Output       11/12/19 0700 - 11/13/19 0659 11/13/19 0700 - 11/14/19 0659    Intake (ml) 2395.6 107    Output (ml) 2330 --    Net (ml) 65.6 107    Last Weight  67.4 kg (148 lb 9.4 oz)  --           Medications (drips):    dexmedetomidine Last Rate: Stopped (11/13/19 0800)       Mechanical Ventilator:  Settings: Observed:   Mode: (S) PS (11/13/19 1156)    Vt (Set, L): 0.48 L (11/13/19 1141)    Resp Rate (Set): 8 (11/13/19 1141) Resp Rate (Observed) Vent: 21 (11/13/19 1156)   FiO2 (%): 28 % (11/13/19 1156)    PEEP/CPAP (cm H2O): 5 cm H20 (11/13/19 1156) Plateau Pressure (cm H2O): 21 cm H2O (11/13/19 0644)    Minute Ventilation (L/min) (Obs): 6.91 L/min (11/13/19 1156)    I:E Ratio (Obs): 1:2.60 (11/13/19 1156)      Physical Examination  Telemetry:  Rhythm: normal sinus rhythm (11/13/19 0800)     QTc Interval (Sec): 0.43 (11/12/19 2000)   Constitutional:  No acute distress.   Cardiovascular: RRR.   Normal heart sounds.  No murmurs, gallop or rub.   Respiratory: Normal breath sounds  No adventitious sounds.   Abdominal:  Soft with no tenderness.  No distension.   No HSM.   Extremities: Warm.  Dry.  No cyanosis.  Trace Edema   Neurological:   Awake. Cooperative.  Best Eye Response: 3-->(E3) to speech (11/13/19 0800)  Best Motor Response: 6-->(M6) obeys commands (11/13/19 0800)  Best Verbal Response: 1-->(V1) none (11/13/19 0800)  Grupo Coma Scale Score: 10 (11/13/19 0800)   Lines/Drains/Airways: L IJ CVC  O ETT   RIGHT FISTULA     Hematology:  Results from last 7 days   Lab Units 11/13/19 0451 11/12/19 0416 11/11/19  0555   WBC 10*3/mm3 5.98 8.23 6.65   HEMOGLOBIN g/dL 10.0* 10.0* 9.8*   MCV fL 93.4 94.8 96.3   PLATELETS 10*3/mm3 176 205 208       Chemistry:  Estimated Creatinine Clearance: 29.8 mL/min (A) (by C-G formula based on SCr of 2.51 mg/dL (H)).    Results from last 7 days   Lab Units 11/13/19 0451 11/12/19 0416 11/11/19  0555   SODIUM mmol/L 137 139 137   POTASSIUM mmol/L 3.3* 3.6 3.5   CHLORIDE mmol/L 98 100 99   CO2 mmol/L 28.0 25.0 23.0   ANION GAP mmol/L 11.0 14.0 15.0   GLUCOSE mg/dL 222* 207* 217*   CALCIUM mg/dL 10.2 10.6* 9.8     Results from last 7 days   Lab Units 11/13/19 0451 11/12/19 0416 11/11/19  0555   BUN mg/dL 30* 58* 38*   CREATININE mg/dL 2.51* 4.36* 3.67*     Results from last 7 days   Lab Units 11/13/19 0451 11/12/19 0416 11/11/19  0555 11/10/19  0428 11/09/19  0431   MAGNESIUM mg/dL  --   --  2.5* 2.3 2.5*   PHOSPHORUS mg/dL 2.2* 3.4 3.3 2.4* 3.2       Arterial Blood Gases:  Results from last 7 days   Lab Units 11/13/19  1257 11/13/19  0357 11/12/19  0452   PH, ARTERIAL pH units 7.418 7.459* 7.410   PCO2, ARTERIAL mm Hg 46.0 43.2 43.3   PO2 ART mm Hg 103.0 112.0* 91.0   FIO2 % 28 28  28     Lab Results   Lab Value Date/Time    PROCALCITO 0.98 (H) 11/11/2019 0555    PROCALCITO 1.31 (H) 11/10/2019 0428    PROCALCITO 2.17 (H) 11/08/2019 0550    PROCALCITO 0.60 (H) 11/01/2019 1444     Images:  No radiology results for the last day    Echo:  Results for orders placed during the hospital encounter of 11/01/19   Adult Transthoracic Echo Complete W/ Cont if Necessary Per Protocol    Narrative · The left ventricular cavity is moderate-to-severely dilated.  · Right ventricular cavity is mildly dilated.  · Left atrial cavity size is moderately dilated.  · Moderate mitral valve regurgitation is present  · There is a small (<1cm) pericardial effusion.          Results: Reviewed.  I reviewed the patient's new laboratory and imaging results.  I independently reviewed the patient's new images.    Medications: Reviewed.    Assessment/Plan   A / P       Assessment:    He was initially admitted to [facility] on 11/1/19 for pneumonia. But eventually respiratory status declined to the point of requiring Bipap and moved to the ICU on 11/5/19.  On the afternoon of 11/6, he was noted to become more lethargic and then developed tonic/clonic movements of his right arm and his head. He was given 500mg of IV keppra. He was transferred to [facility] for Neurology consultation. Upon arrival, he had 2 separate episodes of tonic/clonic jerking of his extremities in less than 5 minutes without return to baseline. He was then intubated.    1. Seizures  1. He was on Cefepime at [facility]  2. Levetiracetam .  2. Respiratory failure, 2ry to CNS  1. Intubated 11/07/19   2. Mechanical ventilation  3. Extubation 11/13/19   4. Pneumonia RUL at [facility] (11/1/19). (Metapneumovirus, per Respiratory Panel PCR 11/1/19))  5. CXR: Patchy infiltrate Right mid and lower lung and mid left lung.  6. Small R Pleural effusion (as per CT ABD 11/5/19)  7. ALYSSA on CPAP at home  8. Abs: Piperacillin-Tazobactam   3. Hypothyroidism on Rx.  4. Cardiac: } Dr Mart  following  1. HFrEF   5. GERD  6. HTN  7. Previous CVA with left sided residual weakness.  8. Anemia  1. S/P transfusion 11/07/19  9. ESRD  1. HD  10. Enteral Nutrition - with low K/P formula  11. T2DM    Results from last 7 days   Lab Units 11/13/19  1114 11/13/19  0459 11/12/19  2305 11/12/19  1726 11/12/19  1131 11/12/19  0500   GLUCOSE mg/dL 222* 215* 244* 163* 194* 216*     Lab Results   Lab Value Date/Time    HGBA1C 8.90 (H) 09/21/2019 0647    HGBA1C 10.6 (H) 02/23/2019 0408    HGBA1C 9.6 (H) 07/13/2017 0438       Nutrition Support: Diet, Tube Feeding Tube Feeding Formula: Novasource Renal (Electrolyte Restricted)   Modulars: Patient doesn't have any tube feeding modular orders   Diet: NPO Diet   Advance Directives: Code Status and Medical Interventions:   Ordered at: 11/06/19 8461     Code Status:    CPR     Medical Interventions (Level of Support Prior to Arrest):    Full        Plan:    1. RSBI: 63 (11/13/19 1141)  2. SBT and ABG adequate. Extubated.  3. Insulin doses adjusted.  1. Goal: Glucose < 180 mg/dL.   4. Antibiotics, last dose today.  5. Discussed with family  1. Wife, and son.  6. Disposition: Keep in ICU.    1. Labs, CXR in AM    Plan of care and goals reviewed during interdisciplinary rounds.  I discussed the patient's findings and my recommendations with nursing staff  .  Level of Risk is High due to:  illness with threat to life or bodily function.     Time: was greater than 35 minutes.    (This excludes time spent performing separately reportable procedures and services).  Patient was at high risk of imminent or life-threatening deterioration in his condition due to Respiratory failure.     Garcia Lisa MD, FACP, FCCP, CNSC  Intensive Care Medicine, Nutrition Support and Pulmonary Medicine     [x]  Primary Attending  []  Consultant

## 2019-11-13 NOTE — PLAN OF CARE
Problem: Skin Injury Risk (Adult)  Goal: Skin Health and Integrity  Outcome: Ongoing (interventions implemented as appropriate)      Problem: Fall Risk (Adult)  Goal: Absence of Fall  Outcome: Ongoing (interventions implemented as appropriate)      Problem: Restraint, Nonbehavioral (Nonviolent)  Goal: Rationale and Justification  Outcome: Ongoing (interventions implemented as appropriate)    Goal: Nonbehavioral (Nonviolent) Restraint: Absence of Injury/Harm  Outcome: Ongoing (interventions implemented as appropriate)    Goal: Nonbehavioral (Nonviolent) Restraint: Achievement of Discontinuation Criteria  Outcome: Ongoing (interventions implemented as appropriate)    Goal: Nonbehavioral (Nonviolent) Restraint: Preservation of Dignity and Wellbeing  Outcome: Ongoing (interventions implemented as appropriate)      Problem: Patient Care Overview  Goal: Plan of Care Review  Outcome: Ongoing (interventions implemented as appropriate)   11/13/19 0541   Coping/Psychosocial   Plan of Care Reviewed With patient   Plan of Care Review   Progress improving   OTHER   Outcome Summary VSS. Pt has been agitated at times tonight and complains of pain with ETT. Precedex gtt titrated for pt comfort. Pt will follow commands and attempt to communicate with staff. Communication board utilized with patient. No UOP tonight (pt is anuric on hemodialysis). ABG was obtained this AM. The goal is to attempt breathing trials/extubation today. Will continue to monitor.        Problem: Ventilation, Mechanical Invasive (Adult)  Goal: Signs and Symptoms of Listed Potential Problems Will be Absent, Minimized or Managed (Ventilation, Mechanical Invasive)  Outcome: Outcome(s) achieved Date Met: 11/13/19

## 2019-11-13 NOTE — PROGRESS NOTES
Talha Cutler  [id number]  1960   LOS: 7 days   Patient Care Team:  Jeannette Godoy APRN as PCP - General (Family Medicine)    Chief Complaint:  SEIZURES / ESRD / RESPIRATORY FAILURE / DM / HFrEF / DEBILITY    Subjective     Sedated, ventilated, calm.  Stable oxygenation on FiO2 0.28.  He continues to tolerate nutritional support with Nova RenalSource 38 cc/hour via NG.  No overt seizure activity or posturing.    Objective     Vital Sign Min/Max for last 24 hours  Temp  Min: 96.9 °F (36.1 °C)  Max: 98.1 °F (36.7 °C)   BP  Min: 115/62  Max: 155/72   Pulse  Min: 61  Max: 89   Resp  Min: 10  Max: 16   SpO2  Min: 98 %  Max: 100 %      Weight  Min: 67.4 kg (148 lb 9.4 oz)  Max: 67.4 kg (148 lb 9.4 oz)         11/12/19  0500 11/13/19  0600   Weight: 64.3 kg (141 lb 12.1 oz) 67.4 kg (148 lb 9.4 oz)         Intake/Output Summary (Last 24 hours) at 11/13/2019 0719  Last data filed at 11/13/2019 0600  Gross per 24 hour   Intake 2395.64 ml   Output 2330 ml   Net 65.64 ml       Physical Exam:     General Appearance:   Sedated, ventilated, in no acute distress   Lungs:    Bibasilar crackles and dullness,respirations regular, even and  unlabored    Heart:    Regular and normal rate, normal S1 and S2, grade 1/6            murmur, no gallop, no rub, no click   Abdomen:  Extremities:   Soft, non-tender, bowel sounds audible x4    1+ UE edema, normal range of motion   Pulses:   Pulses palpable and equal bilaterally      Results Review:   Results from last 7 days   Lab Units 11/13/19  0451 11/12/19  0416 11/11/19  0555   SODIUM mmol/L 137 139 137   POTASSIUM mmol/L 3.3* 3.6 3.5   CHLORIDE mmol/L 98 100 99   CO2 mmol/L 28.0 25.0 23.0   BUN mg/dL 30* 58* 38*   CREATININE mg/dL 2.51* 4.36* 3.67*   GLUCOSE mg/dL 222* 207* 217*   CALCIUM mg/dL 10.2 10.6* 9.8     Results from last 7 days   Lab Units 11/13/19  0451 11/12/19  0416 11/11/19  0555   WBC 10*3/mm3 5.98 8.23 6.65   HEMOGLOBIN g/dL 10.0* 10.0* 9.8*   HEMATOCRIT % 32.8*  32.7* 33.5*   PLATELETS 10*3/mm3 176 205 208     Results from last 7 days   Lab Units 11/11/19  0555   TRIGLYCERIDES mg/dL 64     Results from last 7 days   Lab Units 11/12/19  0416   TROPONIN T ng/mL 0.225*     · NO CXR / EKG.    · ACCU-CHECKS: 163-244 mg/DL    Medication Review: REVIEWED; DRIP = 0.5 mcg/kilogram/hour Precedex.    Assessment/Plan     Afebrile with the stable hemodynamics following hemodialysis yesterday.  Will titrate upward on valsartan.  Eventually he will need repeat myocardial ischemia evaluation.      Status epilepticus    Recurrent right pleural effusion    SOB (shortness of breath)    Uncontrolled diabetes mellitus with hyperglycemia, with long-term current use of insulin (CMS/HCC)    Physical debility    Peripheral vascular disease (CMS/HCC)    Pneumonia of right upper lobe due to infectious organism (CMS/HCC)    Symptomatic anemia    History of CVA (cerebrovascular accident)    Hypothyroidism (acquired)    Chronic systolic CHF (congestive heart failure) (CMS/HCC)    Seizure (CMS/HCC)    11/13/19  7:19 AM

## 2019-11-13 NOTE — PROGRESS NOTES
"   LOS: 7 days    Patient Care Team:  Jeannette Godoy APRN as PCP - General (Family Medicine)    Reason For Visit:  F/U ESRD.  Subjective           Review of Systems:    Pulm: No soa   CV:  No CP      Objective       aspirin 81 mg Oral Daily   atorvastatin 40 mg Oral Daily   carvedilol 6.25 mg Oral BID With Meals   chlorhexidine 15 mL Mouth/Throat Q12H   clopidogrel 75 mg Oral Daily   heparin (porcine) 5,000 Units Subcutaneous Q12H   insulin detemir 20 Units Subcutaneous Daily   insulin regular 0-9 Units Subcutaneous Q6H   insulin regular 5 Units Subcutaneous Q6H   ipratropium-albuterol 3 mL Nebulization 4x Daily - RT   levETIRAcetam 500 mg Intravenous Q12H   levothyroxine 125 mcg Oral Q AM   pantoprazole 40 mg Intravenous Q AM   piperacillin-tazobactam 3.375 g Intravenous Q12H   sodium chloride 10 mL Intravenous Q12H   valsartan 80 mg Nasogastric Q12H       dexmedetomidine 0.2-1.5 mcg/kg/hr Last Rate: Stopped (11/13/19 0800)         Vital Signs:  Blood pressure 135/63, pulse 62, temperature 97.6 °F (36.4 °C), temperature source Axillary, resp. rate 10, height 173 cm (68.11\"), weight 67.4 kg (148 lb 9.4 oz), SpO2 100 %.    Flowsheet Rows      First Filed Value   Admission Height  172.7 cm (67.99\") Documented at 11/07/2019 1909   Admission Weight  -- [Bed scale not working] Documented at 11/08/2019 0500          11/12 0701 - 11/13 0700  In: 2395.6 [I.V.:757]  Out: 2330     Physical Exam:    General Appearance: NAD. AWAKE. INTUBATED.  Eyes: PER, conjunctivae and sclerae normal, no icterus  Lungs: respirations regular and unlabored, no crepitus, clear to auscultation  Heart/CV: regular rhythm & normal rate, no murmur, no gallop, no rub and no edema  Abdomen: not distended, soft, non-tender, no masses,  bowel sounds present  Skin: No rash, Warm and dry. AVF RUE.    Radiology:            Labs:  Results from last 7 days   Lab Units 11/13/19  0451 11/12/19  0416 11/11/19  0555   WBC 10*3/mm3 5.98 8.23 6.65 "   HEMOGLOBIN g/dL 10.0* 10.0* 9.8*   HEMATOCRIT % 32.8* 32.7* 33.5*   PLATELETS 10*3/mm3 176 205 208     Results from last 7 days   Lab Units 11/13/19  0451 11/12/19  0416 11/11/19  0555 11/10/19  0428 11/09/19  0431 11/08/19  0550   SODIUM mmol/L 137 139 137 139 133* 132*   POTASSIUM mmol/L 3.3* 3.6 3.5 3.9 4.0 3.8   CHLORIDE mmol/L 98 100 99 100 93* 93*   CO2 mmol/L 28.0 25.0 23.0 25.0 22.0 21.0*   BUN mg/dL 30* 58* 38* 20 31* 22   CREATININE mg/dL 2.51* 4.36* 3.67* 2.46* 3.86* 2.92*   CALCIUM mg/dL 10.2 10.6* 9.8 10.0 9.0 8.6   PHOSPHORUS mg/dL 2.2* 3.4 3.3 2.4* 3.2 2.0*   MAGNESIUM mg/dL  --   --  2.5* 2.3 2.5* 2.4   ALBUMIN g/dL 2.80* 3.00* 2.70* 3.20*  --  2.60*     Results from last 7 days   Lab Units 11/13/19  0451   GLUCOSE mg/dL 222*       Results from last 7 days   Lab Units 11/11/19  0555   ALK PHOS U/L 64   BILIRUBIN mg/dL 0.2   ALT (SGPT) U/L 11   AST (SGOT) U/L 22     Results from last 7 days   Lab Units 11/13/19  0357   PH, ARTERIAL pH units 7.459*   PO2 ART mm Hg 112.0*   PCO2, ARTERIAL mm Hg 43.2   HCO3 ART mmol/L 30.6*             Estimated Creatinine Clearance: 29.8 mL/min (A) (by C-G formula based on SCr of 2.51 mg/dL (H)).      Assessment       Status epilepticus    Recurrent right pleural effusion    SOB (shortness of breath)    Uncontrolled diabetes mellitus with hyperglycemia, with long-term current use of insulin (CMS/HCC)    Physical debility    Peripheral vascular disease (CMS/HCC)    Pneumonia of right upper lobe due to infectious organism (CMS/HCC)    Symptomatic anemia    History of CVA (cerebrovascular accident)    Hypothyroidism (acquired)    Chronic systolic CHF (congestive heart failure) (CMS/HCC)    Seizure (CMS/HCC)            Impression: ESRD. ANEMIA.             Recommendations: HD 11/14/19.      Teddy Doll MD  11/13/19  9:04 AM

## 2019-11-13 NOTE — PROGRESS NOTES
Continued Stay Note   [facility]     Patient Name: Talha Cutler  MRN: [id number]  Today's Date: 11/13/2019    Admit Date: 11/6/2019    Discharge Plan     Row Name 11/13/19 1337       Plan    Plan  Home w/Home Health    Patient/Family in Agreement with Plan  yes    Plan Comments  Spoke with patient's wife and son in room.  Patient was wheelchair bound prior to admission.  He is also current with [facility] at home for skilled nursing.  Wife's goal is for patient to go home with home health at discharge.  Therapy evals would be helpful to determine proper discharge placement.  CM will continue to follow.  Kandi Genao RN [phone]    Final Discharge Disposition Code  06 - home with home health care        Discharge Codes    No documentation.       Expected Discharge Date and Time     Expected Discharge Date Expected Discharge Time    Nov 18, 2019             Kandi Genao RN

## 2019-11-13 NOTE — PROGRESS NOTES
"Neurology       Patient Care Team:  Jeannette Godoy APRN as PCP - General (Family Medicine)    Chief complaint seizures      Subjective .     History: Patient woke up over the last couple of days per family and primary team.  Plan for trial of extubation today.  He is able to nod and shakes head regarding questions.  No headache.        Objective     Vital Signs   Blood pressure 135/63, pulse 62, temperature 97.6 °F (36.4 °C), temperature source Axillary, resp. rate 10, height 173 cm (68.11\"), weight 67.4 kg (148 lb 9.4 oz), SpO2 100 %.    Physical Exam:              Neuro: Thin frail middle-aged white man lying in hospital bed alert, intubated with ET tube.  Follows verbal commands well, regards, nods and shakes head appropriately  EOMI  Left hemiparesis but at least 2/5 on the left,  3/5    MRI with no recurrent stroke      Assessment/Plan     New onset seizures in patient with chronic ill health, history of stroke with residual left hemiparesis, recent pneumonia treated with cefepime.  Given history of stroke and concern for partial onset seizures at onset, recommend continuing anticonvulsant indefinitely.  Tolerating Keppra well.  He will need neurology follow-up.  Discussed etiology of seizures, prognosis, medication.  Will sign off, call if needed.      I discussed the patients findings and my recommendations with patient, family and primary care team    Haydee Suarez MD  11/13/19  12:56 PM    "

## 2019-11-13 NOTE — PLAN OF CARE
Problem: Ventilation, Mechanical Invasive (Adult)  Intervention: Prevent Airway Displacement/Mechanical Dysfunction   11/12/19 1918   Prevent Airway Displacement/Mechanical Dysfunction   Airway Safety Measures mask at bedside;suction at bedside

## 2019-11-14 NOTE — PLAN OF CARE
Problem: Skin Injury Risk (Adult)  Goal: Skin Health and Integrity  Outcome: Ongoing (interventions implemented as appropriate)      Problem: Fall Risk (Adult)  Goal: Absence of Fall  Outcome: Ongoing (interventions implemented as appropriate)      Problem: Restraint, Nonbehavioral (Nonviolent)  Goal: Rationale and Justification  Outcome: Outcome(s) achieved Date Met: 11/14/19    Goal: Nonbehavioral (Nonviolent) Restraint: Absence of Injury/Harm  Outcome: Outcome(s) achieved Date Met: 11/14/19    Goal: Nonbehavioral (Nonviolent) Restraint: Achievement of Discontinuation Criteria  Outcome: Outcome(s) achieved Date Met: 11/14/19    Goal: Nonbehavioral (Nonviolent) Restraint: Preservation of Dignity and Wellbeing  Outcome: Outcome(s) achieved Date Met: 11/14/19      Problem: Patient Care Overview  Goal: Plan of Care Review  Outcome: Ongoing (interventions implemented as appropriate)   11/14/19 0650   Coping/Psychosocial   Plan of Care Reviewed With patient   Plan of Care Review   Progress improving   OTHER   Outcome Summary VSS. Pt extubated yesterday. Pt has worn CPAP or 2L NC tonight. Pt complains of pain in his mouth. PRN tylenol given. Will continue to monitor.        Problem: Hemodialysis (Adult)  Goal: Signs and Symptoms of Listed Potential Problems Will be Absent, Minimized or Managed (Hemodialysis)  Outcome: Ongoing (interventions implemented as appropriate)

## 2019-11-14 NOTE — PROGRESS NOTES
INTENSIVIST   PROGRESS NOTE     Hospital:  LOS: 8 days      S     Mr. Talha Cutler, 59 y.o. male is followed for:  No chief complaint on file.      Status epilepticus    DM    Hypothyroidism (acquired)      As an Intensivist, we provide an integrated approach to the ICU patient and family, medical management of comorbid conditions, lead interdisciplinary rounds and coordinate the care with all other services, including those from other specialists.     Interval History:  Uneventful night.  Extubated yesterday.  Episode of seizures vs pseudoseizures yesterday afternoon.  Not hungry.  DIarrhea.  He used CPAP last night (he does the same at home)  Hemodialysis at present.     The patient's relevant past medical, surgical and social history were reviewed and updated in Epic as appropriate.     ROS:   Constitutional: Negative for fever or chills.   Respiratory: Negative for dyspnea or cough.   Cardiovascular: Negative for chest pain or palpitations.   Gastrointestinal: Negative for  nausea, vomiting or diarrhea.        O     Vitals:  Temp: 97.8 °F (36.6 °C) (11/14/19 0800) Temp  Min: 97.5 °F (36.4 °C)  Max: 98 °F (36.7 °C)   Temp core:      BP: 137/76 (11/14/19 1128) BP  Min: 118/58  Max: 169/80   Pulse: 102 (11/14/19 1100) Pulse  Min: 80  Max: 102   Resp: 16 (11/14/19 0800) Resp  Min: 12  Max: 20   SpO2: 95 % (11/14/19 1100) SpO2  Min: 90 %  Max: 100 %   Device: nasal cannula (11/14/19 0800)    Flow Rate: 2 (11/14/19 0800) Flow (L/min)  Min: 2  Max: 4     Intake/Ouptut 24 hrs (7:00AM - 6:59 AM)  Intake/Output       11/13/19 0700 - 11/14/19 0659 11/14/19 0700 - 11/15/19 0659    Intake (ml) 951 219    Output (ml) -- --    Net (ml) 951 219    Last Weight  68.9 kg (151 lb 14.4 oz)  --           Physical Examination  Telemetry:  Rhythm: normal sinus rhythm (11/14/19 1000)     QTc Interval (Sec): 0.42 (11/14/19 0200)   Constitutional:  No acute distress.   Cardiovascular: RRR.   Normal heart sounds.  No murmurs, gallop  or rub.   Respiratory: Normal breath sounds  Rhonchi.   Abdominal:  Soft with no tenderness.  No distension.   No HSM.   Extremities: Warm.  Dry.  No cyanosis.  Trace Edema   Neurological:   Awake. Cooperative.  Best Eye Response: 4-->(E4) spontaneous (11/14/19 1000)  Best Motor Response: 6-->(M6) obeys commands (11/14/19 1000)  Best Verbal Response: 5-->(V5) oriented (11/14/19 1000)  Temple Coma Scale Score: 15 (11/14/19 1000)   Lines/Drains/Airways: L IJ CVC   RIGHT FISTULA     Hematology:  Results from last 7 days   Lab Units 11/14/19 0409 11/13/19 0451 11/12/19 0416   WBC 10*3/mm3 7.82 5.98 8.23   HEMOGLOBIN g/dL 8.6* 10.0* 10.0*   MCV fL 94.5 93.4 94.8   PLATELETS 10*3/mm3 97* 176 205       Chemistry:  Estimated Creatinine Clearance: 23.1 mL/min (A) (by C-G formula based on SCr of 3.32 mg/dL (H)).    Results from last 7 days   Lab Units 11/14/19  0544 11/13/19 0451 11/12/19 0416   SODIUM mmol/L 134* 137 139   POTASSIUM mmol/L 3.6 3.3* 3.6   CHLORIDE mmol/L 97* 98 100   CO2 mmol/L 24.0 28.0 25.0   ANION GAP mmol/L 13.0 11.0 14.0   GLUCOSE mg/dL 240* 222* 207*   CALCIUM mg/dL 10.1 10.2 10.6*     Results from last 7 days   Lab Units 11/14/19  0544 11/13/19 0451 11/12/19 0416   BUN mg/dL 46* 30* 58*   CREATININE mg/dL 3.32* 2.51* 4.36*     Results from last 7 days   Lab Units 11/14/19 0409 11/13/19 0451 11/12/19  0416 11/11/19  0555 11/10/19  0428   MAGNESIUM mg/dL 2.4  --   --  2.5* 2.3   PHOSPHORUS mg/dL 3.2 2.2* 3.4 3.3 2.4*       Arterial Blood Gases:  Results from last 7 days   Lab Units 11/14/19  0330 11/13/19  1257 11/13/19  0357   PH, ARTERIAL pH units 7.393 7.418 7.459*   PCO2, ARTERIAL mm Hg 47.4 46.0 43.2   PO2 ART mm Hg 71.9* 103.0 112.0*   FIO2 % 28 28 28     Images:  Xr Chest 1 View    Result Date: 11/14/2019  No new chest disease.  D:  11/14/2019 E:  11/14/2019        Echo:  Results for orders placed during the hospital encounter of 11/01/19   Adult Transthoracic Echo Complete W/ Cont if  Necessary Per Protocol    Narrative · The left ventricular cavity is moderate-to-severely dilated.  · Right ventricular cavity is mildly dilated.  · Left atrial cavity size is moderately dilated.  · Moderate mitral valve regurgitation is present  · There is a small (<1cm) pericardial effusion.          Results: Reviewed.  I reviewed the patient's new laboratory and imaging results.  I independently reviewed the patient's new images.    Medications: Reviewed.    Assessment/Plan   A / P       Assessment:    He was initially admitted to [facility] on 11/1/19 for pneumonia. But eventually respiratory status declined to the point of requiring Bipap and moved to the ICU on 11/5/19.  On the afternoon of 11/6, he was noted to become more lethargic and then developed tonic/clonic movements of his right arm and his head. He was given 500mg of IV keppra. He was transferred to [facility] for Neurology consultation. Upon arrival, he had 2 separate episodes of tonic/clonic jerking of his extremities in less than 5 minutes without return to baseline. He was then intubated.    1. Seizures  1. He was on Cefepime at [facility]  2. Levetiracetam .  2. Respiratory failure, 2ry to CNS  1. Intubated 11/07/19   2. Mechanical ventilation  3. Extubated 11/13/19   4. Pneumonia RUL at [facility] (11/1/19). (Metapneumovirus, per Respiratory Panel PCR 11/1/19))  5. CXR: Patchy infiltrate Right mid and lower lung and mid left lung.  6. Small R Pleural effusion (as per CT ABD 11/5/19)  7. ALYSSA on CPAP at home  8. Abs: Completed.  3. Hypothyroidism on Rx.  4. Cardiac: } Dr Mart following  1. HFrEF   5. GERD  6. HTN  7. Previous CVA with left sided residual weakness.  8. Anemia  1. S/P transfusion 11/07/19  9. ESRD  1. HD  10. Enteral Nutrition - with low K/P formula  11. T2DM    Results from last 7 days   Lab Units 11/14/19  1118 11/14/19  0557 11/13/19  2329 11/13/19  1749 11/13/19  1114 11/13/19  0459   GLUCOSE mg/dL 96 232* 105 74 222* 215*     Lab Results   Lab Value  Date/Time    HGBA1C 8.90 (H) 09/21/2019 0647    HGBA1C 10.6 (H) 02/23/2019 0408    HGBA1C 9.6 (H) 07/13/2017 0438       Nutrition Support: Diet, Tube Feeding Tube Feeding Formula: Novasource Renal (Electrolyte Restricted)   Modulars: Patient doesn't have any tube feeding modular orders   Diet: NPO Diet   Advance Directives: Code Status and Medical Interventions:   Ordered at: 11/06/19 1363     Code Status:    CPR     Medical Interventions (Level of Support Prior to Arrest):    Full        Plan:      1. No changes in insulin today, he actually may need less if he is able to eat PO, and Enteral Nutrition is discontinued.  2. EEG today as per Neuro.  3. Disposition: Keep in ICU.    1. PT/OT consult  2. ST: Dysphagia evaluation    Plan of care and goals reviewed during interdisciplinary rounds.  I discussed the patient's findings and my recommendations with nursing staff  .  Level of Risk is High due to:  illness with threat to life or bodily function.     Time: 25 minutes, in direct patient care, with the patient and/or on the higuera coordinating care with other health care providers.     I have spent > 50% percent of this time, counseling and discussing management.       Garcia Lisa MD, FACP, FCCP, CNSC  Intensive Care Medicine, Nutrition Support and Pulmonary Medicine     [x]  Primary Attending  []  Consultant

## 2019-11-14 NOTE — PROGRESS NOTES
"   LOS: 8 days    Patient Care Team:  Jeannette Godoy APRN as PCP - General (Family Medicine)    Reason For Visit:  F/U ESRD. SEEN ON DIALYSIS.  Subjective           Review of Systems:    Pulm: No soa   CV:  No CP      Objective       aspirin 81 mg Oral Daily   atorvastatin 40 mg Oral Daily   carvedilol 12.5 mg Oral BID With Meals   clopidogrel 75 mg Oral Daily   heparin (porcine) 5,000 Units Subcutaneous Q12H   insulin detemir 20 Units Subcutaneous Daily   insulin regular 0-9 Units Subcutaneous Q6H   insulin regular 5 Units Subcutaneous Q6H   ipratropium-albuterol 3 mL Nebulization 4x Daily - RT   levETIRAcetam 750 mg Intravenous Q12H   levothyroxine 125 mcg Oral Q AM   pantoprazole 40 mg Intravenous Q AM   sodium chloride 10 mL Intravenous Q12H   valsartan 80 mg Nasogastric Q12H            Vital Signs:  Blood pressure 128/67, pulse 101, temperature 97.8 °F (36.6 °C), temperature source Axillary, resp. rate 16, height 173 cm (68.11\"), weight 68.9 kg (151 lb 14.4 oz), SpO2 90 %.    Flowsheet Rows      First Filed Value   Admission Height  172.7 cm (67.99\") Documented at 11/07/2019 1909   Admission Weight  -- [Bed scale not working] Documented at 11/08/2019 0500          11/13 0701 - 11/14 0700  In: 951 [I.V.:123]  Out: -     Physical Exam:    General Appearance: NAD, alert and cooperative, Ox3  Eyes: PER, conjunctivae and sclerae normal, no icterus  Lungs: respirations regular and unlabored, no crepitus, clear to auscultation  Heart/CV: regular rhythm & normal rate, no murmur, no gallop, no rub and no edema  Abdomen: not distended, soft, non-tender, no masses,  bowel sounds present  Skin: No rash, Warm and dry. AVF  Radiology:            Labs:  Results from last 7 days   Lab Units 11/14/19  0409 11/13/19  0451 11/12/19  0416   WBC 10*3/mm3 7.82 5.98 8.23   HEMOGLOBIN g/dL 8.6* 10.0* 10.0*   HEMATOCRIT % 29.0* 32.8* 32.7*   PLATELETS 10*3/mm3 97* 176 205     Results from last 7 days   Lab Units 11/14/19  0544 " 11/14/19  0409 11/13/19  0451 11/12/19  0416 11/11/19  0555 11/10/19  0428 11/09/19  0431   SODIUM mmol/L 134*  --  137 139 137 139 133*   POTASSIUM mmol/L 3.6  --  3.3* 3.6 3.5 3.9 4.0   CHLORIDE mmol/L 97*  --  98 100 99 100 93*   CO2 mmol/L 24.0  --  28.0 25.0 23.0 25.0 22.0   BUN mg/dL 46*  --  30* 58* 38* 20 31*   CREATININE mg/dL 3.32*  --  2.51* 4.36* 3.67* 2.46* 3.86*   CALCIUM mg/dL 10.1  --  10.2 10.6* 9.8 10.0 9.0   PHOSPHORUS mg/dL  --  3.2 2.2* 3.4 3.3 2.4* 3.2   MAGNESIUM mg/dL  --  2.4  --   --  2.5* 2.3 2.5*   ALBUMIN g/dL  --   --  2.80* 3.00* 2.70* 3.20*  --      Results from last 7 days   Lab Units 11/14/19  0544   GLUCOSE mg/dL 240*       Results from last 7 days   Lab Units 11/11/19  0555   ALK PHOS U/L 64   BILIRUBIN mg/dL 0.2   ALT (SGPT) U/L 11   AST (SGOT) U/L 22     Results from last 7 days   Lab Units 11/14/19  0330   PH, ARTERIAL pH units 7.393   PO2 ART mm Hg 71.9*   PCO2, ARTERIAL mm Hg 47.4   HCO3 ART mmol/L 28.9*             Estimated Creatinine Clearance: 23.1 mL/min (A) (by C-G formula based on SCr of 3.32 mg/dL (H)).      Assessment       Status epilepticus    Recurrent right pleural effusion    SOB (shortness of breath)    Uncontrolled diabetes mellitus with hyperglycemia, with long-term current use of insulin (CMS/[facility])    Physical debility    Peripheral vascular disease (CMS/[facility])    Pneumonia of right upper lobe due to infectious organism (CMS/[facility])    Symptomatic anemia    History of CVA (cerebrovascular accident)    Hypothyroidism (acquired)    Chronic systolic CHF (congestive heart failure) (CMS/HCC)    Seizure (CMS/[facility])            Impression: ESRD. ANEMIA.            Recommendations: HD TODAY.      Teddy Doll MD  11/14/19  9:16 AM

## 2019-11-14 NOTE — PROGRESS NOTES
Multidisciplinary Rounds    Time: 20min  Patient Name: Talha Cutler  Date of Encounter: 11/14/19 9:05 AM  MRN: [id number]  Admission date: 11/6/2019      Reason for visit: MDR. RD to continue to follow per protocol.     Additional information obtained during MDR: Pt tolerating EN, with 68% of EN goal volume delivered over the past 24 hrs. Pt extubated yesterday (11/13), receiving HD this AM. Per I&O- 8 bowel movements documented over the past 24 hrs. Planning on SLP swallow eval today.     Current diet: NovaSource Renal at 38 ml/hr and free water at 30 ml q 2 hrs via NGT      Intervention:  Follow treatment plan  Care plan reviewed    Follow up:   Per protocol      Marianna Cristina MS RD/CHUY CNSC  9:05 AM

## 2019-11-14 NOTE — PROGRESS NOTES
"Neurology       Patient Care Team:  Jeannette Godoy APRN as PCP - General (Family Medicine)    Chief complaint convulsions      Subjective .     History: Patient had further events last night with head shaking from side to side.  Notes initially noted no responsiveness during events, but then noted that patient repeatedly would avoid hitting his face when arm held above and dropped.  Family at bedside note that events last night the same as some that have been occurring at home for 2 months.  Patient reports he feels better today.  Denies headache or vision changes.    ROS: No fever or chest pain    Objective     Vital Signs   Blood pressure 127/64, pulse 101, temperature 97.8 °F (36.6 °C), temperature source Axillary, resp. rate 20, height 173 cm (68.11\"), weight 68.9 kg (151 lb 14.4 oz), SpO2 92 %.    Physical Exam:              Neuro: Thin, chronically ill-appearing middle-aged white man in ICU, awake, initially alert, later dozes off during discussion with family.  Speech is fluent and non-aphasic, appropriate and oriented.  No dysarthria.  VFF left lower facial weakness that is old  Motor: Left hemiparesis unchanged    Review of video taken by brother shows patient with side to side sinusoidal movements of head on the pillow with eyes closed lasting 30 seconds or more at a time.  These are of variable amplitude and frequency, and when more pronounced, there is some shoulder movement as well.    Results Review:              Repeat EEG this morning shows mild to moderate generalized slowing, no epileptiform activity    Assessment/Plan     1. Psychogenic nonepileptic spells- discussed with patient and family that these are not epileptic as recorded by his brother with the sinusoidal head movements with eyes closed.  Discussed treatment of these (conservative, reassurance, monitoring for safety and preventing injury) versus epileptic events.  Discussed risk of iatrogenic complications of treatment of " nonepileptic events.  Discussed features that distinguish these 2, as well as prognosis.  Will decrease Keppra back to previous dose.  Some of events leading to admission were identical according to his brother, but also reported to have had a generalized tonic-clonic seizure so we will continue the Keppra.  This is a difficult treatment dilemma in a patient with history of stroke and significant risk of epileptic seizures.  Discussed video recording of future events and when to call for emergency services, as well as the need to act when they are concerned.  Patient does not drive.  2.  Probable seizures-continue Keppra at lower dose-will decrease to 500 mg every 12 hours.  Discussed potential side effects of medication  3.  History of stroke with residual left hemiparesis-continue aspirin, Plavix and high-dose statin    I discussed the patients findings and my recommendations with patient and family    Haydee Suarez MD  11/14/19  2:47 PM

## 2019-11-14 NOTE — THERAPY EVALUATION
Acute Care - Occupational Therapy Initial Evaluation  [facility]     Patient Name: Talha Cutler  : 1960  MRN: [id number]  Today's Date: 2019     Date of Referral to OT: 19       Admit Date: 2019       ICD-10-CM ICD-9-CM   1. Pneumonia of right upper lobe due to infectious organism (CMS/[facility]) J18.1 486   2. Chronic systolic CHF (congestive heart failure) (CMS/[facility]) I50.22 428.22     428.0   3. Status epilepticus (CMS/[facility]) G40.901 345.3   4. End stage renal disease on dialysis (CMS/[facility]) N18.6 585.6    Z99.2 V45.11   5. Chronic respiratory insufficiency R06.89 786.09     Patient Active Problem List   Diagnosis   • Chest pain   • Edema   • Diabetes mellitus (CMS/[facility])   • Essential hypertension   • Kidney disease   • Encounter for venous access device care   • Recurrent right pleural effusion   • SOB (shortness of breath)   • Type 2 diabetes mellitus treated with insulin (CMS/[facility])   • End stage renal disease on dialysis (CMS/[facility])   • Uncontrolled diabetes mellitus with hyperglycemia, with long-term current use of insulin (CMS/[facility])   • Acute hyperkalemia   • Hypertension due to end stage renal disease caused by type 2 diabetes mellitus, on dialysis (CMS/[facility])   • Physical debility   • Chronic respiratory insufficiency   • Hyperglycemia due to type 2 diabetes mellitus (CMS/[facility])   • Pancreatitis, acute   • Chronic kidney disease-mineral and bone disorder   • Pneumonia of right lower lobe due to infectious organism (CMS/[facility])   • Peripheral vascular disease (CMS/[facility])   • Pneumonia due to infectious organism   • Pneumonia of right lower lobe due to infectious organism (CMS/[facility])   • Pneumonia of right upper lobe due to infectious organism (CMS/[facility])   • Pneumonia of right lung due to infectious organism   • Abnormal CT scan, lung   • Iron deficiency anemia due to chronic blood loss   • Symptomatic anemia   • Epistaxis, recurrent   • Hospital-acquired pneumonia   • Dizziness   • Demand ischemia  (CMS/[facility])   • History of CVA (cerebrovascular accident)   • Hypothyroidism (acquired)   • Coffee ground emesis   • Chronic systolic CHF (congestive heart failure) (CMS/[facility])   • Fluid overload   • Status epilepticus   • Seizure (CMS/[facility])     Past Medical History:   Diagnosis Date   • Abdominal pain    • Anemia    • Cataracts, bilateral    • CHF (congestive heart failure) (CMS/[facility])    • Chronic kidney disease    • Constipation    • Cough    • Dependence on nocturnal oxygen therapy    • Diabetes mellitus (CMS/[facility])    • Diarrhea    • End stage renal failure on dialysis (CMS/[facility])     SILVINO AVITIA, SAT   • GERD (gastroesophageal reflux disease)    • H/O blood clots     LEG/NECK   • H/O chest x-ray 03/25/2016    Interval decrease in size of the patients right pleural effusion with a persistent small left effusion as well   • History of transfusion    • Hypertension    • Left-sided weakness    • Nauseated     DRY HEAVES   • Pleural effusion    • Pneumonia    • Pneumonia    • Renal failure    • Stroke (CMS/[facility]) 2006    LEFT SIDED WEAKNESS   • Swelling    • Teeth missing    • Tinnitus    • Ulcer, stomach peptic     HISTORY OF ULCER AS A TEENAGER   • Wears glasses      Past Surgical History:   Procedure Laterality Date   • ARTERIOVENOUS FISTULA Right    • BRONCHOSCOPY N/A 1/10/2019    Procedure: BRONCHOSCOPY with MAC and Flouro. LAVAGE, BRUSHINGS AND BIOPSY;  Surgeon: Ean Hernandez MD;  Location: [facility] OR;  Service: Pulmonary   • CARDIAC CATHETERIZATION N/A 3/28/2018    Procedure: Peripheral angiography;  Surgeon: Clay Ruano MD;  Location: [facility] CATH INVASIVE LOCATION;  Service: Cardiovascular   • CARDIAC CATHETERIZATION N/A 3/28/2018    Procedure: Angioplasty-peripheral;  Surgeon: Clay Ruano MD;  Location: [facility] CATH INVASIVE LOCATION;  Service: Cardiovascular   • CARDIAC CATHETERIZATION N/A 3/28/2018    Procedure: Atherectomy-peripheral;  Surgeon: Clay Ruano MD;  Location: [facility]  "CATH INVASIVE LOCATION;  Service: Cardiovascular   • CATARACT EXTRACTION, BILATERAL     • CHOLECYSTECTOMY     • COLONOSCOPY     • EYE SURGERY      BLEEDING BEHING BILATERAL EYES   • INTERVENTIONAL RADIOLOGY PROCEDURE N/A 3/28/2018    Procedure: Abdominal Aortogram;  Surgeon: Clay Ruano MD;  Location: [facility] CATH INVASIVE LOCATION;  Service: Cardiovascular   • PORTACATH PLACEMENT     • SHOULDER MANIPULATION Left     \"FROZEN SHOULDER\"   • VENOUS ACCESS DEVICE (PORT) REMOVAL            OT ASSESSMENT FLOWSHEET (last 12 hours)      Occupational Therapy Evaluation     Row Name 11/14/19 7946                   OT Evaluation Time/Intention    Subjective Information  complains of;weakness;fatigue;pain  -CL        Document Type  evaluation  -CL        Mode of Treatment  occupational therapy  -CL        Patient Effort  good  -CL           General Information    Patient Profile Reviewed?  yes  -CL        Prior Level of Function  max assist:;transfer;mod assist:;ADL's;dressing;bathing;independent:;feeding  -CL        Equipment Currently Used at Home  wheelchair;wheelchair, motorized w/c for all mobility at baseline w/ assist for SPT  -CL        Existing Precautions/Restrictions  fall;seizures;other (see comments) remote CVA w/ L sided weakness, NG  -CL        Risks Reviewed  patient and family:;LOB;nausea/vomiting;dizziness;increased discomfort;change in vital signs;lines disloged  -CL        Benefits Reviewed  patient and family:;increase strength;increase balance;improve function;increase independence;decrease pain;increase knowledge  -CL        Barriers to Rehab  medically complex;previous functional deficit;cognitive status  -CL           Relationship/Environment    Lives With  child(starr), adult;spouse  -CL           Resource/Environmental Concerns    Current Living Arrangements  home/apartment/condo  -CL           Cognitive Assessment/Interventions    Additional Documentation  Cognitive Assessment/Intervention " (Group)  -CL           Cognitive Assessment/Intervention- PT/OT    Affect/Mental Status (Cognitive)  flat/blunted affect  -CL        Orientation Status (Cognition)  oriented to;person;place  -CL        Follows Commands (Cognition)  follows one step commands;over 90% accuracy;repetition of directions required;verbal cues/prompting required  -CL        Cognitive Function (Cognitive)  safety deficit  -CL        Safety Deficit (Cognitive)  mild deficit;awareness of need for assistance;safety precautions awareness  -CL           Bed Mobility Assessment/Treatment    Comment (Bed Mobility)  UIC.   -CL           Transfer Assessment/Treatment    Comment (Transfers)  Defer to PT.   -CL           General ROM    GENERAL ROM COMMENTS  LUE shldr F lacking ~30%/ER lacking ~50%, elbow E lacking ~20%, remainder grossly WFL.   -CL           MMT (Manual Muscle Testing)    General MMT Comments  RUE grossly 3+/5, LUE  3/5, bicep/tricep 2-/5, shldr F 2-/5  -CL           Motor Assessment/Interventions    Additional Documentation  Balance (Group);Therapeutic Exercise (Group)  -CL           Therapeutic Exercise    Therapeutic Exercise  seated, upper extremities  -CL        Additional Documentation  Therapeutic Exercise (Row)  -CL           Upper Extremity Seated Therapeutic Exercise    Performed, Seated Upper Extremity (Therapeutic Exercise)  shoulder flexion/extension;elbow flexion/extension;forearm supination/pronation;wrist flexion/extension;digit flexion/extension;shoulder external/internal rotation  -CL        Exercise Type, Seated Upper Extremity (Therapeutic Exercise)  PROM (passive range of motion);AAROM (active assistive range of motion)  -CL        Sets/Reps Detail, Seated Upper Extremity (Therapeutic Exercise)  1/10  -CL        Comment, Seated Upper Extremity (Therapeutic Exercise)  LUE  -CL           Balance    Balance  static sitting balance;dynamic sitting balance  -CL           Static Sitting Balance    Level of  Camp (Unsupported Sitting, Static Balance)  minimal assist, 75% patient effort  -CL        Sitting Position (Unsupported Sitting, Static Balance)  sitting in chair  -CL           Dynamic Sitting Balance    Level of [place], Reaches Outside Midline (Sitting, Dynamic Balance)  moderate assist, 50 to 74% patient effort  -CL        Sitting Position, Reaches Outside Midline (Sitting, Dynamic Balance)  sitting in chair  -CL           Sensory Assessment/Intervention    Sensory General Assessment  light touch sensation deficits identified  -CL           Light Touch Sensation Assessment    Left Upper Extremity: Light Touch Sensation Assessment  mild impairment, 75% or more correct responses  -CL        Right Upper Extremity: Light Touch Sensation Assessment  intact  -CL           Positioning and Restraints    Pre-Treatment Position  sitting in chair/recliner  -CL        Post Treatment Position  chair  -CL        In Chair  notified nsg;call light within reach;exit alarm on;encouraged to call for assist;RUE elevated;LUE elevated;waffle cushion;on mechanical lift sling;legs elevated;heels elevated;sitting;with PT  -CL           Pain Assessment    Additional Documentation  Pain Scale 2: FACES Pre/Post-Treatment (Group)  -CL           Pain Scale 2: FACES Pre/Post-Treatment    Pain 2: FACES Scale, Pretreatment  2-->hurts little bit  -CL        Pain 2: FACES Scale, Post-Treatment  2-->hurts little bit  -CL        Pain Location 2 - Side  Left  -CL        Pain Location 2  hip  -CL        Pre/Post Treatment Pain 2 Comment  tolerated  -CL        Pain Intervention(s) 2  Repositioned;Ambulation/increased activity  -CL           Clinical Impression (OT)    Date of Referral to OT  11/14/19  -CL        OT Diagnosis  Decreased independence in ADLs.   -CL        Patient/Family Goals Statement (OT Eval)  Return to PLOF.   -CL        Criteria for Skilled Therapeutic Interventions Met (OT Eval)  yes;treatment indicated  -CL         Rehab Potential (OT Eval)  good, to achieve stated therapy goals  -CL        Therapy Frequency (OT Eval)  daily  -CL        Anticipated Equipment Needs at Discharge (OT)  -- TBA further  -CL        Anticipated Discharge Disposition (OT)  home with 24/7 care;home with home health  -CL           Vital Signs    Pre Systolic BP Rehab  -- VSS  -CL           OT Goals    Transfer Goal Selection (OT)  transfer, OT goal 1  -CL        Dressing Goal Selection (OT)  dressing, OT goal 1  -CL        Self-Feeding Goal Selection (OT)  self feeding, OT goal 1  -CL        Strength Goal Selection (OT)  strength, OT goal 1  -CL        Additional Documentation  Self-Feeding Goal Selection (OT) (Row);Strength Goal Selection (OT) (Row)  -CL           Transfer Goal 1 (OT)    Activity/Assistive Device (Transfer Goal 1, OT)  bed-to-chair/chair-to-bed  -CL        Avery Level/Cues Needed (Transfer Goal 1, OT)  moderate assist (50-74% patient effort);verbal cues required  -CL        Time Frame (Transfer Goal 1, OT)  by discharge;long term goal (LTG)  -CL        Progress/Outcome (Transfer Goal 1, OT)  goal ongoing  -CL           Dressing Goal 1 (OT)    Activity/Assistive Device (Dressing Goal 1, OT)  upper body dressing joaquin-technique  -CL        Avery/Cues Needed (Dressing Goal 1, OT)  minimum assist (75% or more patient effort);verbal cues required  -CL        Time Frame (Dressing Goal 1, OT)  by discharge;long term goal (LTG)  -CL        Progress/Outcome (Dressing Goal 1, OT)  goal ongoing  -CL           Self-Feeding Goal 1 (OT)    Activity/Assistive Device (Self-Feeding Goal 1, OT)  liquids to mouth;scoop food and bring to mouth  -CL        Avery Level/Cues Needed (Self-Feeding Goal 1, OT)  supervision required;verbal cues required  -CL        Time Frame (Self-Feeding Goal 1, OT)  by discharge;long term goal (LTG)  -CL        Progress/Outcomes (Self-Feeding Goal 1, OT)  goal ongoing  -CL           Strength Goal 1 (OT)     Strength Goal 1 (OT)  Pt will tolerate RUE AROM and LUE AAROM HEP x15 reps.   -CL        Time Frame (Strength Goal 1, OT)  long term goal (LTG);by discharge  -CL        Progress/Outcome (Strength Goal 1, OT)  goal ongoing  -CL          User Key  (r) = Recorded By, (t) = Taken By, (c) = Cosigned By    Initials Name Effective Dates    CL Zoe Morrow OT 04/03/18 -          Occupational Therapy Education     Title: PT OT SLP Therapies (In Progress)     Topic: Occupational Therapy (In Progress)     Point: ADL training (In Progress)     Description: Instruct learner(s) on proper safety adaptation and remediation techniques during self care or transfers.   Instruct in proper use of assistive devices.    Learning Progress Summary           Patient Acceptance, E, NR by CL at 11/14/2019  1:30 PM                   Point: Home exercise program (In Progress)     Description: Instruct learner(s) on appropriate technique for monitoring, assisting and/or progressing therapeutic exercises/activities.    Learning Progress Summary           Patient Acceptance, E, NR by CL at 11/14/2019  1:30 PM                   Point: Precautions (In Progress)     Description: Instruct learner(s) on prescribed precautions during self-care and functional transfers.    Learning Progress Summary           Patient Acceptance, E, NR by CL at 11/14/2019  1:30 PM                   Point: Body mechanics (In Progress)     Description: Instruct learner(s) on proper positioning and spine alignment during self-care, functional mobility activities and/or exercises.    Learning Progress Summary           Patient Acceptance, E, NR by CL at 11/14/2019  1:30 PM                               User Key     Initials Effective Dates Name Provider Type Discipline     04/03/18 -  Zoe Morrow OT Occupational Therapist OT                  OT Recommendation and Plan  Outcome Summary/Treatment Plan (OT)  Anticipated Equipment Needs at Discharge (OT): (TBA  further)  Anticipated Discharge Disposition (OT): home with 24/7 care, home with home health  Therapy Frequency (OT Eval): daily  Plan of Care Review  Plan of Care Reviewed With: patient, family  Plan of Care Reviewed With: patient, family  Outcome Summary: OT completed a brief chart review. Pt Min A for static sitting balance and requires sitting assist for ADL performance, limited d/t significant generalized weakness. Recommend cont skilled IPOT POC. Recommend pt DC home w/ 24/7 assist and HH OT/PT services.     Outcome Measures     Row Name 11/14/19 1330             How much help from another is currently needed...    Putting on and taking off regular lower body clothing?  1  -CL      Bathing (including washing, rinsing, and drying)  2  -CL      Toileting (which includes using toilet bed pan or urinal)  1  -CL      Putting on and taking off regular upper body clothing  2  -CL      Taking care of personal grooming (such as brushing teeth)  2  -CL      Eating meals  3  -CL      AM-PAC 6 Clicks Score (OT)  11  -CL         Functional Assessment    Outcome Measure Options  AM-PAC 6 Clicks Daily Activity (OT)  -CL        User Key  (r) = Recorded By, (t) = Taken By, (c) = Cosigned By    Initials Name Provider Type    CL Zoe Morrow OT Occupational Therapist          Time Calculation:   Time Calculation- OT     Row Name 11/14/19 1330             Time Calculation- OT    OT Start Time  1330  -CL      OT Received On  11/14/19  -CL      OT Goal Re-Cert Due Date  11/24/19  -CL        User Key  (r) = Recorded By, (t) = Taken By, (c) = Cosigned By    Initials Name Provider Type    Zoe Dodd OT Occupational Therapist        Therapy Charges for Today     Code Description Service Date Service Provider Modifiers Qty    [id number]  OT EVAL LOW COMPLEXITY 3 11/14/2019 Zoe Morrow OT GO 1               Zoe Morrow OT  11/14/2019

## 2019-11-14 NOTE — DISCHARGE INSTR - DIET
FEES 11/14/19  Reason for Referral  Patient was referred for a FEES to assess the efficiency of his/her swallow function, rule out aspiration and make recommendations regarding safe dietary consistencies, effective compensatory strategies, and safe eating environment.         Recommendations/Treatment  SLP Swallowing Diagnosis: functional oral phase, functional pharyngeal phase  Swallow Criteria for Skilled Therapeutic Interventions Met: no problems identified which require skilled intervention  Therapy Frequency (Swallow): evaluation only  SLP Diet Recommendation: regular textures, thin liquids  Recommended Precautions and Strategies: upright posture during/after eating  SLP Rec. for Method of Medication Administration: as tolerated    Instrumental Set-up  Risks/Benefits Reviewed: risks/benefits explained, patient, family, agreed to eval  Nasal Entry: right:  Utensils Used: Spoon, Cup, Straw  Consistencies Trialed: thin liquids, pudding/puree, Regular textures    Oral Preparation/ Oral Phase  Oral Phase: WFL    Pharyngeal Phase  Initiation of Pharyngeal Swallow: WFL  Pharyngeal Phase: functional pharyngeal phase of swallow

## 2019-11-14 NOTE — PLAN OF CARE
Problem: Hemodialysis (Adult)  Goal: Signs and Symptoms of Listed Potential Problems Will be Absent, Minimized or Managed (Hemodialysis)  Outcome: Ongoing (interventions implemented as appropriate)   11/14/19 0806   Goal/Outcome Evaluation   Problems Assessed (Hemodialysis) all   Problems Present (Hemodialysis) electrolyte imbalance;fluid imbalance

## 2019-11-14 NOTE — MBS/VFSS/FEES
Acute Care - Speech Language Pathology   Swallow Initial Evaluation  [place]   Clinical Swallow Evaluation  Fiberoptic Endoscopic Evaluation of Swallowing (FEES)     Patient Name: Talha Cutler  : 1960  MRN: [id number]  Today's Date: 2019               Admit Date: 2019    Visit Dx:     ICD-10-CM ICD-9-CM   1. Pneumonia of right upper lobe due to infectious organism (CMS/[facility]) J18.1 486   2. Chronic systolic CHF (congestive heart failure) (CMS/[facility]) I50.22 428.22     428.0   3. Status epilepticus (CMS/[facility]) G40.901 345.3   4. End stage renal disease on dialysis (CMS/[facility]) N18.6 585.6    Z99.2 V45.11   5. Chronic respiratory insufficiency R06.89 786.09     Patient Active Problem List   Diagnosis   • Chest pain   • Edema   • Diabetes mellitus (CMS/[facility])   • Essential hypertension   • Kidney disease   • Encounter for venous access device care   • Recurrent right pleural effusion   • SOB (shortness of breath)   • Type 2 diabetes mellitus treated with insulin (CMS/[facility])   • End stage renal disease on dialysis (CMS/[facility])   • Uncontrolled diabetes mellitus with hyperglycemia, with long-term current use of insulin (CMS/[facility])   • Acute hyperkalemia   • Hypertension due to end stage renal disease caused by type 2 diabetes mellitus, on dialysis (CMS/[facility])   • Physical debility   • Chronic respiratory insufficiency   • Hyperglycemia due to type 2 diabetes mellitus (CMS/[facility])   • Pancreatitis, acute   • Chronic kidney disease-mineral and bone disorder   • Pneumonia of right lower lobe due to infectious organism (CMS/[facility])   • Peripheral vascular disease (CMS/[facility])   • Pneumonia due to infectious organism   • Pneumonia of right lower lobe due to infectious organism (CMS/[facility])   • Pneumonia of right upper lobe due to infectious organism (CMS/[facility])   • Pneumonia of right lung due to infectious organism   • Abnormal CT scan, lung   • Iron deficiency anemia due to chronic blood loss   • Symptomatic anemia   • Epistaxis,  recurrent   • Hospital-acquired pneumonia   • Dizziness   • Demand ischemia (CMS/[facility])   • History of CVA (cerebrovascular accident)   • Hypothyroidism (acquired)   • Coffee ground emesis   • Chronic systolic CHF (congestive heart failure) (CMS/[facility])   • Fluid overload   • Status epilepticus   • Seizure (CMS/[facility])     Past Medical History:   Diagnosis Date   • Abdominal pain    • Anemia    • Cataracts, bilateral    • CHF (congestive heart failure) (CMS/[facility])    • Chronic kidney disease    • Constipation    • Cough    • Dependence on nocturnal oxygen therapy    • Diabetes mellitus (CMS/[facility])    • Diarrhea    • End stage renal failure on dialysis (CMS/[facility])     TUES, THURS, SAT   • GERD (gastroesophageal reflux disease)    • H/O blood clots     LEG/NECK   • H/O chest x-ray 03/25/2016    Interval decrease in size of the patients right pleural effusion with a persistent small left effusion as well   • History of transfusion    • Hypertension    • Left-sided weakness    • Nauseated     DRY HEAVES   • Pleural effusion    • Pneumonia    • Pneumonia    • Renal failure    • Stroke (CMS/[facility]) 2006    LEFT SIDED WEAKNESS   • Swelling    • Teeth missing    • Tinnitus    • Ulcer, stomach peptic     HISTORY OF ULCER AS A TEENAGER   • Wears glasses      Past Surgical History:   Procedure Laterality Date   • ARTERIOVENOUS FISTULA Right    • BRONCHOSCOPY N/A 1/10/2019    Procedure: BRONCHOSCOPY with MAC and Flouro. LAVAGE, BRUSHINGS AND BIOPSY;  Surgeon: Ean Hernandez MD;  Location: [facility] OR;  Service: Pulmonary   • CARDIAC CATHETERIZATION N/A 3/28/2018    Procedure: Peripheral angiography;  Surgeon: Clay Ruano MD;  Location: [facility] CATH INVASIVE LOCATION;  Service: Cardiovascular   • CARDIAC CATHETERIZATION N/A 3/28/2018    Procedure: Angioplasty-peripheral;  Surgeon: Clay Ruano MD;  Location: [facility] CATH INVASIVE LOCATION;  Service: Cardiovascular   • CARDIAC CATHETERIZATION N/A 3/28/2018    Procedure:  "Atherectomy-peripheral;  Surgeon: Clay Ruano MD;  Location:  [facility] CATH INVASIVE LOCATION;  Service: Cardiovascular   • CATARACT EXTRACTION, BILATERAL     • CHOLECYSTECTOMY     • COLONOSCOPY     • EYE SURGERY      BLEEDING BEHING BILATERAL EYES   • INTERVENTIONAL RADIOLOGY PROCEDURE N/A 3/28/2018    Procedure: Abdominal Aortogram;  Surgeon: Clay Ruano MD;  Location:  [facility] CATH INVASIVE LOCATION;  Service: Cardiovascular   • PORTACATH PLACEMENT     • SHOULDER MANIPULATION Left     \"FROZEN SHOULDER\"   • VENOUS ACCESS DEVICE (PORT) REMOVAL          SWALLOW EVALUATION (last 72 hours)      SLP Adult Swallow Evaluation     Row Name 11/14/19 1400                   Rehab Evaluation    Document Type  evaluation  -SM        Subjective Information  complains of;pain  -SM        Patient Observations  alert;cooperative  -        Patient/Family Observations  Brother and sister present  -SM           General Information    Patient Profile Reviewed  yes  -SM        Pertinent History Of Current Problem  Hx CVA 2006 with temporary dysphagia and thickened liquids. Adm with seaizures and increased weakness.   -SM        Current Method of Nutrition  NPO  -SM        Prior Level of Function-Swallowing  safe, efficient swallowing in all situations  -        Plans/Goals Discussed with  patient and family;agreed upon  -        Barriers to Rehab  none identified  -        Patient's Goals for Discharge  return to regular diet  -        Family Goals for Discharge  family did not state  -SM           Pain Scale: Numbers Pre/Post-Treatment    Pain Location  hip  -           Pain Scale: FACES Pre/Post-Treatment    Pain: FACES Scale, Pretreatment  4-->hurts little more  -SM        Pain: FACES Scale, Post-Treatment  6-->hurts even more  -SM        Pre/Post Treatment Pain Comment  RN aware and moving back to bed after FEES  -SM           Clinical Swallow Eval    Pharyngeal Phase  suspected pharyngeal impairment  " -           Pharyngeal Phase Concerns    Pharyngeal Phase Concerns  multiple swallows  -        Multiple Swallows  all consistencies  -        Pharyngeal Phase Concerns, Comment  Reports odynophagia. Generalized weakness noted. NPO to continue and proceeded with FEES to further assess  -           Fiberoptic Endoscopic Evaluation of Swallowing (FEES)    Risks/Benefits Reviewed  risks/benefits explained;patient;family;agreed to eval  -        Nasal Entry  right:  -           Anatomy and Physiology    Velopharyngeal  WFL  -        Base of Tongue  symmetrical;range adequate  -        Epiglottis  WFL  -        Laryngeal Function Breathing  symmetrical  -        Laryngeal Function Phonation  symmetrical  -        Laryngeal Function to Breath Hold  TVF/FVF/Arytenoid;sustained closure  -        Secretion Rating Scale (Porfirio et alNidhi 1996)  0- normal, no visible secretions  -        Secretion Description  thin;clear  -        Ice Chips  DNA  -        Spontaneous Swallow  frequency adequate  -        Sensory  sensed scope  -        Utensils Used  Spoon;Cup;Straw  -        Consistencies Trialed  thin liquids;pudding/puree;Regular textures  -           FEES Interpretation    Oral Phase  WFL  -           Initiation of Pharyngeal Swallow    Initiation of Pharyngeal Swallow  WFL  -        Pharyngeal Phase  functional pharyngeal phase of swallow  -           Clinical Impression    SLP Swallowing Diagnosis  functional oral phase;functional pharyngeal phase  -        Swallow Criteria for Skilled Therapeutic Interventions Met  no problems identified which require skilled intervention  -           Recommendations    Therapy Frequency (Swallow)  evaluation only  -        SLP Diet Recommendation  regular textures;thin liquids  -        Recommended Precautions and Strategies  upright posture during/after eating  -        SLP Rec. for Method of Medication Administration  as tolerated   -          User Key  (r) = Recorded By, (t) = Taken By, (c) = Cosigned By    Initials Name Effective Dates    Paulette Downing MS CCC-SLP 06/22/15 -           EDUCATION  The patient has been educated in the following areas:   Dysphagia (Swallowing Impairment).    SLP Recommendation and Plan  SLP Swallowing Diagnosis: functional oral phase, functional pharyngeal phase  SLP Diet Recommendation: regular textures, thin liquids  Recommended Precautions and Strategies: upright posture during/after eating  SLP Rec. for Method of Medication Administration: as tolerated           Swallow Criteria for Skilled Therapeutic Interventions Met: no problems identified which require skilled intervention        Therapy Frequency (Swallow): evaluation only          Plan of Care Reviewed With: patient, family           Time Calculation:   Time Calculation- SLP     Row Name 11/14/19 1605             Time Calculation- SLP    SLP Start Time  1400  -      SLP Received On  11/14/19  -        User Key  (r) = Recorded By, (t) = Taken By, (c) = Cosigned By    Initials Name Provider Type    Paulette Downing MS CCC-SLP Speech and Language Pathologist          Therapy Charges for Today     Code Description Service Date Service Provider Modifiers Qty    75222709411 HC ST EVAL ORAL PHARYNG SWALLOW 3 11/14/2019 Paulette Martines MS CCC-SLP GN 1    63324716251 HC ST FIBEROPTIC ENDO EVAL SWALL 5 11/14/2019 Paulette Martines MS CCC-SLP GN 1               Paulette Martines MS CCC-SLP  11/14/2019

## 2019-11-14 NOTE — PROGRESS NOTES
Talha Cutler  [id number]  1960   LOS: 8 days   Patient Care Team:  Jeannette Godoy APRN as PCP - General (Family Medicine)    Chief Complaint:  SEIZURES / RESPIRATORY FAILURE / HFrEF / ESRD / DM / DEBILITY    Subjective     Extubated approximately 1300 hrs. yesterday.  Comfortable on nasal oxygen therapy.  No posturing or overt seizure activity currently.  He continues to tolerate nutritional support with [employer] 38 cc/hour via NG.  No anginal type chest discomfort, increased tachypnea/dyspnea, nausea, emesis, palpitation, headache, or focal motor-sensory changes.  Moderate thick sputum production.    Objective     Vital Sign Min/Max for last 24 hours  Temp  Min: 97.5 °F (36.4 °C)  Max: 98 °F (36.7 °C)   BP  Min: 116/56  Max: 169/80   Pulse  Min: 62  Max: 102   Resp  Min: 10  Max: 20   SpO2  Min: 90 %  Max: 100 %      Weight  Min: 68.9 kg (151 lb 14.4 oz)  Max: 68.9 kg (151 lb 14.4 oz)         11/13/19  0600 11/14/19  0600   Weight: 67.4 kg (148 lb 9.4 oz) 68.9 kg (151 lb 14.4 oz)         Intake/Output Summary (Last 24 hours) at 11/14/2019 0649  Last data filed at 11/14/2019 0000  Gross per 24 hour   Intake 951 ml   Output --   Net 951 ml       Physical Exam:     General Appearance:    Alert, cooperative, in no acute distress   Lungs:    Dullness crackles right posterior base with scattered expiratory wheezes,respirations regular, even and                   unlabored    Heart:    Regular and normal rate, normal S1 and S2, grade 3/6            murmur, no gallop, no rub, no click   Abdomen:  Extremities:   Soft, non-tender, bowel sounds audible x4   1+ UE edema, normal range of motion   Pulses:   Pulses palpable and equal bilaterally      Results Review:   Results from last 7 days   Lab Units 11/14/19  0544 11/13/19  0451 11/12/19  0416   SODIUM mmol/L 134* 137 139   POTASSIUM mmol/L 3.6 3.3* 3.6   CHLORIDE mmol/L 97* 98 100   CO2 mmol/L 24.0 28.0 25.0   BUN mg/dL 46* 30* 58*   CREATININE mg/dL  3.32* 2.51* 4.36*   GLUCOSE mg/dL 240* 222* 207*   CALCIUM mg/dL 10.1 10.2 10.6*     Results from last 7 days   Lab Units 11/14/19  0409 11/13/19  0451 11/12/19  0416   WBC 10*3/mm3 7.82 5.98 8.23   HEMOGLOBIN g/dL 8.6* 10.0* 10.0*   HEMATOCRIT % 29.0* 32.8* 32.7*   PLATELETS 10*3/mm3 97* 176 205     Results from last 7 days   Lab Units 11/11/19  0555   TRIGLYCERIDES mg/dL 64     Results from last 7 days   Lab Units 11/12/19  0416   TROPONIN T ng/mL 0.225*     · NO EKG.    · CXR: Mild-moderate cardiomegaly with clear left lung and diffuse infiltrate with elevated right hemidiaphragm and atelectasis/effusion right lung base without marked increased pulmonary vascularity or pneumothorax; formal official report pending.    Medication Review: REVIEWED; NO DRIPS.    Assessment/Plan     Acceptable initial course post extubation.  Will titrate upward on carvedilol today and have him undergo hemodialysis and initiate Entresto therapy tomorrow if hemodynamically stable.  Acceptable cardiac status at this time.    Discussed with patient and 2 family members in room.      Status epilepticus    Recurrent right pleural effusion    SOB (shortness of breath)    Uncontrolled diabetes mellitus with hyperglycemia, with long-term current use of insulin (CMS/HCC)    Physical debility    Peripheral vascular disease (CMS/HCC)    Pneumonia of right upper lobe due to infectious organism (CMS/HCC)    Symptomatic anemia    History of CVA (cerebrovascular accident)    Hypothyroidism (acquired)    Chronic systolic CHF (congestive heart failure) (CMS/HCC)    Seizure (CMS/HCC)    11/14/19  6:49 AM

## 2019-11-14 NOTE — PLAN OF CARE
Problem: Patient Care Overview  Goal: Plan of Care Review  Outcome: Ongoing (interventions implemented as appropriate)   11/14/19 3338   Coping/Psychosocial   Plan of Care Reviewed With patient;family   SLP evaluation completed. Safe with regular diet and thin liquids. Will sign-off. Please see note for further details and recommendations.

## 2019-11-14 NOTE — PLAN OF CARE
Problem: Patient Care Overview  Goal: Plan of Care Review  Outcome: Ongoing (interventions implemented as appropriate)   11/14/19 1286   Coping/Psychosocial   Plan of Care Reviewed With patient;family   OTHER   Outcome Summary Pt demonstrates deficits in B LE knee ext with residual L weakness from CVA. Demonstrates decreased independence and safety with functional mobility, warranting further PT services to promote PLOF and safe d/c. Pt was able to achieve ~30% of upright stand with max.Ax2 with use of PT's for UE support. Recommend d/c home with assist,  PT, and resources for new WC cushion. Will cont to monitor d/c disposition based on medical status and functional progression.

## 2019-11-14 NOTE — PLAN OF CARE
Problem: Patient Care Overview  Goal: Plan of Care Review  Outcome: Ongoing (interventions implemented as appropriate)   11/14/19 2106   Coping/Psychosocial   Plan of Care Reviewed With patient;family   OTHER   Outcome Summary OT completed a brief chart review. Pt Min A for static sitting balance and requires sitting assist for ADL performance, limited d/t significant generalized weakness. Recommend cont skilled IPOT POC. Recommend pt DC home w/ 24/7 assist and HH OT/PT services.

## 2019-11-15 NOTE — PLAN OF CARE
Problem: Skin Injury Risk (Adult)  Goal: Skin Health and Integrity  Outcome: Ongoing (interventions implemented as appropriate)      Problem: Fall Risk (Adult)  Goal: Absence of Fall  Outcome: Ongoing (interventions implemented as appropriate)      Problem: Patient Care Overview  Goal: Plan of Care Review  Outcome: Ongoing (interventions implemented as appropriate)   11/15/19 0500   Coping/Psychosocial   Plan of Care Reviewed With patient   Plan of Care Review   Progress improving   OTHER   Outcome Summary VSS. Pt. did not sleep during the night. He comlains of discomfort while in bed and is looking forward to getting up. No urine output this shift. No seizure activity noted. Will continue to monitor.       Problem: Hemodialysis (Adult)  Goal: Signs and Symptoms of Listed Potential Problems Will be Absent, Minimized or Managed (Hemodialysis)  Outcome: Ongoing (interventions implemented as appropriate)

## 2019-11-15 NOTE — PROGRESS NOTES
"   LOS: 9 days    Patient Care Team:  Jeannette Godoy APRN as PCP - General (Family Medicine)    Reason For Visit:  F/U ESRD.   Subjective           Review of Systems:    Pulm: No soa   CV:  No CP      Objective       aspirin 81 mg Oral Daily   atorvastatin 40 mg Oral Daily   carvedilol 12.5 mg Oral BID With Meals   clopidogrel 75 mg Oral Daily   heparin (porcine) 5,000 Units Subcutaneous Q12H   [START ON 11/16/2019] insulin detemir 15 Units Subcutaneous Daily   insulin lispro 0-9 Units Subcutaneous 4x Daily With Meals & Nightly   insulin lispro 5 Units Subcutaneous TID With Meals   ipratropium-albuterol 3 mL Nebulization 4x Daily - RT   levETIRAcetam 500 mg Intravenous Q12H   levothyroxine 125 mcg Oral Q AM   lidocaine 1 patch Transdermal Q24H   pantoprazole 40 mg Intravenous Q AM   sodium chloride 10 mL Intravenous Q12H   valsartan 80 mg Nasogastric Q12H            Vital Signs:  Blood pressure 132/75, pulse 109, temperature 97.9 °F (36.6 °C), temperature source Oral, resp. rate 17, height 173 cm (68.11\"), weight 64.2 kg (141 lb 8.6 oz), SpO2 97 %.    Flowsheet Rows      First Filed Value   Admission Height  172.7 cm (67.99\") Documented at 11/07/2019 1909   Admission Weight  -- [Bed scale not working] Documented at 11/08/2019 0500          11/14 0701 - 11/15 0700  In: 319   Out: 2330     Physical Exam:    General Appearance: NAD, alert and cooperative, Ox3  Eyes: PER, conjunctivae and sclerae normal, no icterus  Lungs: OCC RHONCHI  Heart/CV: regular rhythm & normal rate, no murmur, no gallop, no rub and no edema  Abdomen: not distended, soft, non-tender, no masses,  bowel sounds present  Skin: No rash, Warm and dry. AVF.    Radiology:            Labs:  Results from last 7 days   Lab Units 11/15/19  0435 11/14/19  0409 11/13/19  0451   WBC 10*3/mm3 6.91 7.82 5.98   HEMOGLOBIN g/dL 7.9* 8.6* 10.0*   HEMATOCRIT % 26.3* 29.0* 32.8*   PLATELETS 10*3/mm3 214 97* 176     Results from last 7 days   Lab Units " 11/15/19  0435 11/14/19  0544 11/14/19  0409 11/13/19  0451 11/12/19  0416 11/11/19  0555 11/10/19  0428  11/09/19  0431   SODIUM mmol/L 137 134*  --  137 139 137 139  --  133*   POTASSIUM mmol/L 3.9 3.6  --  3.3* 3.6 3.5 3.9  --  4.0   CHLORIDE mmol/L 100 97*  --  98 100 99 100  --  93*   CO2 mmol/L 25.0 24.0  --  28.0 25.0 23.0 25.0  --  22.0   BUN mg/dL 24* 46*  --  30* 58* 38* 20  --  31*   CREATININE mg/dL 2.36* 3.32*  --  2.51* 4.36* 3.67* 2.46*  --  3.86*   CALCIUM mg/dL 9.5 10.1  --  10.2 10.6* 9.8 10.0  --  9.0   PHOSPHORUS mg/dL 2.4*  --  3.2 2.2* 3.4 3.3 2.4*  --  3.2   MAGNESIUM mg/dL  --   --  2.4  --   --  2.5* 2.3  --  2.5*   ALBUMIN g/dL 2.80*  --   --  2.80* 3.00* 2.70* 3.20*   < >  --     < > = values in this interval not displayed.     Results from last 7 days   Lab Units 11/15/19  0435   GLUCOSE mg/dL 98       Results from last 7 days   Lab Units 11/11/19  0555   ALK PHOS U/L 64   BILIRUBIN mg/dL 0.2   ALT (SGPT) U/L 11   AST (SGOT) U/L 22     Results from last 7 days   Lab Units 11/14/19  0330   PH, ARTERIAL pH units 7.393   PO2 ART mm Hg 71.9*   PCO2, ARTERIAL mm Hg 47.4   HCO3 ART mmol/L 28.9*             Estimated Creatinine Clearance: 30.2 mL/min (A) (by C-G formula based on SCr of 2.36 mg/dL (H)).      Assessment       Status epilepticus    Recurrent right pleural effusion    SOB (shortness of breath)    Uncontrolled diabetes mellitus with hyperglycemia, with long-term current use of insulin (CMS/HCC)    Physical debility    Peripheral vascular disease (CMS/HCC)    Pneumonia of right upper lobe due to infectious organism (CMS/HCC)    Symptomatic anemia    History of CVA (cerebrovascular accident)    Hypothyroidism (acquired)    Chronic systolic CHF (congestive heart failure) (CMS/HCC)    Seizure (CMS/HCC)            Impression: ESRD. ANEMIA.            Recommendations: HD 11/16/19.      Teddy Doll MD  11/15/19  9:47 AM

## 2019-11-15 NOTE — PROGRESS NOTES
Continued Stay Note   [facility]     Patient Name: Talha Cutler  MRN: [id number]  Today's Date: 11/15/2019    Admit Date: 11/6/2019    Discharge Plan     Row Name 11/15/19 1446       Plan    Plan  Home w/Home Health    Patient/Family in Agreement with Plan  yes    Plan Comments  Discussed patient in am rounds.  MD feels that patient would benefit from STR.  Spoke with patient and wife in room.  Patient adamant that he go home.  Wife agreeable.  Patient already current with [facility] for skilled nursing.  Will need resume orders faxed to [phone] at discharge.  Patient would also benefit from PT, OT if that could be added to the order.  Patient also does hemodialysis at [facility] in [place] on T, TH and Sat.  They will need discharge summary faxed to [phone].  Patient can resume normal dialysis chair time at discharge.  CM will continue to follow.  Kandi Genao RN [phone]          Final Discharge Disposition Code  06 - home with home health care        Discharge Codes    No documentation.       Expected Discharge Date and Time     Expected Discharge Date Expected Discharge Time    Nov 18, 2019             Kandi Genao RN

## 2019-11-15 NOTE — PROGRESS NOTES
INTENSIVIST   PROGRESS NOTE     Hospital:  LOS: 9 days      S     Mr. Talha Cutler, 59 y.o. male is followed for:  No chief complaint on file.      Status epilepticus    DM    Hypothyroidism (acquired)      As an Intensivist, we provide an integrated approach to the ICU patient and family, medical management of comorbid conditions, lead interdisciplinary rounds and coordinate the care with all other services, including those from other specialists.     Interval History:  Uneventful night.  Extubated yesterday.  Episode of seizures vs pseudoseizures yesterday afternoon.  Not hungry.  DIarrhea.  He used CPAP last night (he does the same at home)  Hemodialysis at present.     The patient's relevant past medical, surgical and social history were reviewed and updated in Epic as appropriate.     ROS:   Constitutional: Negative for fever or chills.   Respiratory: Negative for dyspnea or cough.   Cardiovascular: Negative for chest pain or palpitations.   Gastrointestinal: Negative for  nausea, vomiting or diarrhea.        O     Vitals:  Temp: 97.9 °F (36.6 °C) (11/15/19 0800) Temp  Min: 97.8 °F (36.6 °C)  Max: 98.9 °F (37.2 °C)   Temp core:      BP: 132/75 (11/15/19 0800) BP  Min: 99/49  Max: 143/81   Pulse: 100 (11/15/19 1101) Pulse  Min: 98  Max: 109   Resp: 17 (11/15/19 1101) Resp  Min: 16  Max: 22   SpO2: 94 % (11/15/19 1101) SpO2  Min: 89 %  Max: 100 %   Device: nasal cannula (11/15/19 1101)    Flow Rate: 2 (11/15/19 1101) Flow (L/min)  Min: 2  Max: 2     Intake/Ouptut 24 hrs (7:00AM - 6:59 AM)  Intake/Output       11/14/19 0700 - 11/15/19 0659    Intake (ml) 319    Output (ml) 2330    Net (ml) -2011    Last Weight  64.2 kg (141 lb 8.6 oz)           Physical Examination  Telemetry:  Rhythm: normal sinus rhythm (11/15/19 0800)     QTc Interval (Sec): 0.42 (11/14/19 0200)   Constitutional:  No acute distress.   Cardiovascular: RRR.   Normal heart sounds.  No murmurs, gallop or rub.   Respiratory: Normal breath  sounds  Rhonchi.   Abdominal:  Soft with no tenderness.  No distension.   No HSM.   Extremities: Warm.  Dry.  No cyanosis.  Trace Edema   Neurological:   Awake. Cooperative.  Best Eye Response: 4-->(E4) spontaneous (11/15/19 0800)  Best Motor Response: 6-->(M6) obeys commands (11/15/19 0800)  Best Verbal Response: 5-->(V5) oriented (11/15/19 0800)  Mobile Coma Scale Score: 15 (11/15/19 0800)   Lines/Drains/Airways: L IJ CVC   RIGHT FISTULA     Hematology:  Results from last 7 days   Lab Units 11/15/19  0435 11/14/19  0409 11/13/19  0451   WBC 10*3/mm3 6.91 7.82 5.98   HEMOGLOBIN g/dL 7.9* 8.6* 10.0*   MCV fL 93.6 94.5 93.4   PLATELETS 10*3/mm3 214 97* 176       Chemistry:  Estimated Creatinine Clearance: 30.2 mL/min (A) (by C-G formula based on SCr of 2.36 mg/dL (H)).    Results from last 7 days   Lab Units 11/15/19  0435 11/14/19  0544 11/13/19  0451   SODIUM mmol/L 137 134* 137   POTASSIUM mmol/L 3.9 3.6 3.3*   CHLORIDE mmol/L 100 97* 98   CO2 mmol/L 25.0 24.0 28.0   ANION GAP mmol/L 12.0 13.0 11.0   GLUCOSE mg/dL 98 240* 222*   CALCIUM mg/dL 9.5 10.1 10.2     Results from last 7 days   Lab Units 11/15/19  0435 11/14/19  0544 11/13/19  0451   BUN mg/dL 24* 46* 30*   CREATININE mg/dL 2.36* 3.32* 2.51*     Results from last 7 days   Lab Units 11/15/19  0435 11/14/19  0409 11/13/19  0451  11/11/19  0555 11/10/19  0428   MAGNESIUM mg/dL  --  2.4  --   --  2.5* 2.3   PHOSPHORUS mg/dL 2.4* 3.2 2.2*   < > 3.3 2.4*    < > = values in this interval not displayed.       Arterial Blood Gases:  Results from last 7 days   Lab Units 11/14/19  0330 11/13/19  1257 11/13/19  0357   PH, ARTERIAL pH units 7.393 7.418 7.459*   PCO2, ARTERIAL mm Hg 47.4 46.0 43.2   PO2 ART mm Hg 71.9* 103.0 112.0*   FIO2 % 28 28 28     Images:  Xr Chest 1 View    Result Date: 11/14/2019  No new chest disease.  D:  11/14/2019 E:  11/14/2019        Echo:  Results for orders placed during the hospital encounter of 11/01/19   Adult Transthoracic Echo  Complete W/ Cont if Necessary Per Protocol    Narrative · The left ventricular cavity is moderate-to-severely dilated.  · Right ventricular cavity is mildly dilated.  · Left atrial cavity size is moderately dilated.  · Moderate mitral valve regurgitation is present  · There is a small (<1cm) pericardial effusion.          Results: Reviewed.  I reviewed the patient's new laboratory and imaging results.  I independently reviewed the patient's new images.    Medications: Reviewed.    Assessment/Plan   A / P       Assessment:    He was initially admitted to [facility] on 11/1/19 for pneumonia. But eventually respiratory status declined to the point of requiring Bipap and moved to the ICU on 11/5/19.  On the afternoon of 11/6, he was noted to become more lethargic and then developed tonic/clonic movements of his right arm and his head. He was given 500mg of IV keppra. He was transferred to [facility] for Neurology consultation. Upon arrival, he had 2 separate episodes of tonic/clonic jerking of his extremities in less than 5 minutes without return to baseline. He was then intubated.    1. Seizures  1. He was on Cefepime at [facility]  2. Levetiracetam .  2. Respiratory failure, 2ry to CNS  1. Intubated 11/07/19   2. Mechanical ventilation  3. Extubated 11/13/19   4. Pneumonia RUL at [facility] (11/1/19). (Metapneumovirus, per Respiratory Panel PCR 11/1/19))  5. CXR: 11/14/19, post extubation, Right basilar scarring or linear atelectasis. Borderline cardiomegaly  6. Small R Pleural effusion (as per CT ABD 11/5/19)  7. ALYSSA on CPAP at home  8. Abs: Completed.  3. Hypothyroidism on Rx.  4. Cardiac: } Dr Mart following  1. HFrEF   5. GERD  6. HTN  7. Previous CVA with left sided residual weakness.  8. Anemia  1. S/P transfusion 11/07/19  9. ESRD  1. HD  10. T2DM    Results from last 7 days   Lab Units 11/15/19  0548 11/14/19  2329 11/14/19  1746 11/14/19  1659 11/14/19  1118 11/14/19  0557   GLUCOSE mg/dL 98 100 89 55* 96 232*     Lab Results   Lab  Value Date/Time    HGBA1C 8.90 (H) 09/21/2019 0647    HGBA1C 10.6 (H) 02/23/2019 0408    HGBA1C 9.6 (H) 07/13/2017 0438       Nutrition Support: Patient isn't on Tube Feeding   Modulars: Patient doesn't have any tube feeding modular orders   Diet: Diet Regular; Thin; Cardiac, Consistent Carbohydrate, Renal   Advance Directives: Code Status and Medical Interventions:   Ordered at: 11/06/19 5358     Code Status:    CPR     Medical Interventions (Level of Support Prior to Arrest):    Full        Plan:      1. Decrease basal insulin.  2. Watch Hb, transfuse if symptomatic or Hb <7.0-7.5  3. Disposition: Transfer to Telemetry Unit.    1. PT/OT consult   2. Eventually myocardial ischemia evaluation per Cardiology (Dr. Mart or his cardiologist in [place])  3. Continue HD Tu/Th/Sat  4. Eventually home with home health, although he may benefit from a rehab unit, if he meets criteria.  5. F/u with Neurology in [place]. Dr. Rasheeda Flores.    Plan of care and goals reviewed during interdisciplinary rounds.  I discussed the patient's findings and my recommendations with nursing staff  .  Level of Risk is High due to:  illness with threat to life or bodily function.     Time: 25 minutes, in direct patient care, with the patient and/or on the higuera coordinating care with other health care providers.     I have spent > 50% percent of this time, counseling and discussing management.       Garcia Lisa MD, FACP, FCCP, CNSC  Intensive Care Medicine, Nutrition Support and Pulmonary Medicine     [x]  Primary Attending  []  Consultant

## 2019-11-15 NOTE — PROGRESS NOTES
"Neurology       Patient Care Team:  Jeannette Godoy APRN as PCP - General (Family Medicine)    Chief complaint seizures and nonepileptic convulsions      Subjective .     History: No further seizures overnight.  Slept poorly.  No headache.  Does not find melatonin helpful.  Family at bedside note that he may be transferred to the floor.    ROS: No fever or chest pain    Objective     Vital Signs   Blood pressure 132/75, pulse 109, temperature 97.9 °F (36.6 °C), temperature source Oral, resp. rate 17, height 173 cm (68.11\"), weight 64.2 kg (141 lb 8.6 oz), SpO2 97 %.    Physical Exam:              Neuro: Chronically ill-appearing middle-aged white man in no acute distress, alert, fluent and appropriate.  No dysarthria.  EOMI left lower facial weakness (old)  Motor: Stable left hemiparesis    Results Review:              Renal function panel with BUN 24 creatinine 2.36 albumin 2.8 phosphorus 2.4 GFR 28  CBC with white count 6.9 H&H 7.9 and 26, platelets 214      Assessment/Plan     Chronically ill patient with uncontrolled diabetes, PVD, history of stroke with residual left hemiparesis, ESRD on HD admitted with probable seizures- on Keppra, dose reduced yesterday back to 500 mg every 12 hours after previous dose elevation due to convulsions found to be consistent with psychogenic nonepileptic seizures (PNES).  Patient and family educated regarding epileptic versus nonepileptic events as per note from 11/14/2019.    Patient complains of insomnia.  Given concern that sleep deprivation may lead to further events, either epileptic or nonepileptic, will give Benadryl.  Will sign off, plan neurology follow-up in [place] with Rasheeda Flores.  Please call if needed.    I discussed the patients findings and my recommendations with patient, family and nursing staff    Haydee Suarez MD  11/15/19  10:43 AM      "

## 2019-11-15 NOTE — PROGRESS NOTES
Clinical Nutrition   Reason For Visit: MDR, Follow-up protocol    Patient Name: Talha Cutler  YOB: 1960  MRN: [id number]  Date of Encounter: 11/15/19 12:08 PM  Admission date: 11/6/2019      Nutrition Assessment     Admission Problem List:  Pt admitted to [facility] (11/1) with HCAP positive for metapneumovirus, progressive weakness, respiratory decline, new onset seizures. Pt transferred to [facility] (11/6) for higher level of care.    Probable seizures  Acute respiratory failure  Vent (11/7) --> extubated (11/13)  ESRD- HD  A/c anemia  Psychogenic nonepileptic spells      Skin: WOCN (11/7)- Consulted to assess patient for a possible pressure injury to his coccyx POA. Assessment performed. Patients coccyx/buttocks is intact, pink, and blanching. BLE scabbing noted.       Applicable PMH:  CHF  HTN  PVD  CVA  DM  Hypothyroid  GERD  ALYSSA, cpap  ESRD  HD- Tues/Thurs/Sat  Anemia  Seizures- February 2019      Applicable Nutrition-Related Information:  From (11/1-11/6) pt was on-and-off a cardiac/consistent carbohydrate/renal diet with 40% PO intake of 12 documented meals. During admission at [facility] pt was also NPO, however unable to determine amount of time pt was NPO.   (11/8) EN started per MD- [employer]Source Renal at 25 ml/hr with 1 ProStat 2x/day and free water at 30 ml q 2 hrs via NGT  (11/11) EN rate increased to 38 ml/hr and ProStats d/c'd per intensivist  (11/14) SLP FEES rec regular texture thin liquids. PO diet starts- cardiac/consistent carbohydrate/renal. NGT removed, however EN order is still in Epic        Reported/Observed/Food/Nutrition Related History     Pt states that he feels good today. Reports that he called the kitchen this AM and provided food preferences for his lunch meal today. States that he is open to trying strawberry-flavored oral nutrition supplements.     Per I&O over the past 24 hrs:  4 bowel movements      Anthropometrics   Height: 68 in  Weight: 131 lb per bed scale (11/4)  BMI:  20.0  BMI classification: Normal: 18.5-24.9kg/m2     Weight Weight (kg) Weight (lbs) Weight Method   11/15/2019 64.2 kg 141 lb 8.6 oz Bed scale   11/14/2019 68.9 kg 151 lb 14.4 oz Bed scale   11/13/2019 67.4 kg 148 lb 9.4 oz Bed scale   11/12/2019 64.3 kg 141 lb 12.1 oz Bed scale   11/11/2019 65.3 kg 143 lb 15.4 oz Bed scale   11/10/2019 64.5 kg 142 lb 3.2 oz Bed scale   11/8/2019 65 kg 143 lb 4.8 oz Bed scale   11/4/2019 59.512 kg 131 lb 3.2 oz Bed scale   11/3/2019 58.695 kg 129 lb 6.4 oz Bed scale   11/2/2019 58.559 kg 129 lb 1.6 oz Bed scale   11/1/2019 56.065 kg 123 lb 9.6 oz Bed scale   11/1/2019 56.7 kg 125 lb Standing scale   9/20/2019 60.192 kg 132 lb 11.2 oz Bed scale   9/14/2019 58.968 kg 130 lb -     9/14/2019 58.968 kg 130 lb -   8/31/2019 56.8 kg 125 lb 3.5 oz Stated   8/15/2019 54.432 kg 120 lb Stated   5/15/2019 54.432 kg 120 lb -   4/26/2019 53.751 kg 118 lb 8 oz Standing scale   2/26/2019 61.434 kg 135 lb 7 oz Bed scale   2/25/2019 58.65 kg 129 lb 4.8 oz Bed scale   2/23/2019 58.922 kg 129 lb 14.4 oz Bed scale   2/22/2019 58.922 kg 129 lb 14.4 oz -       Labs reviewed   Labs reviewed: Yes; insulin being adjusted    Results from last 7 days   Lab Units 11/15/19  0435 11/14/19  0544 11/14/19  0409 11/13/19  0451  11/11/19  0555 11/10/19  0428   SODIUM mmol/L 137 134*  --  137   < > 137 139   POTASSIUM mmol/L 3.9 3.6  --  3.3*   < > 3.5 3.9   CHLORIDE mmol/L 100 97*  --  98   < > 99 100   CO2 mmol/L 25.0 24.0  --  28.0   < > 23.0 25.0   BUN mg/dL 24* 46*  --  30*   < > 38* 20   CREATININE mg/dL 2.36* 3.32*  --  2.51*   < > 3.67* 2.46*   GLUCOSE mg/dL 98 240*  --  222*   < > 217* 236*   CALCIUM mg/dL 9.5 10.1  --  10.2   < > 9.8 10.0   PHOSPHORUS mg/dL 2.4*  --  3.2 2.2*   < > 3.3 2.4*   MAGNESIUM mg/dL  --   --  2.4  --   --  2.5* 2.3   TRIGLYCERIDES mg/dL  --   --   --   --   --  64  --     < > = values in this interval not displayed.     Results from last 7 days   Lab Units 11/15/19  4898  11/14/19  0409 11/13/19  0451 11/12/19  0416 11/11/19  0555   WBC 10*3/mm3 6.91 7.82 5.98 8.23 6.65   ALBUMIN g/dL 2.80*  --  2.80* 3.00* 2.70*   PREALBUMIN mg/dL  --   --   --   --  8.9*   CRP mg/dL  --   --   --   --  5.38*   TRIGLYCERIDES mg/dL  --   --   --   --  64     Results from last 7 days   Lab Units 11/15/19  1102 11/15/19  0548 11/14/19  2329 11/14/19  1746 11/14/19  1659 11/14/19  1118   GLUCOSE mg/dL 155* 98 100 89 55* 96     Lab Results   Lab Value Date/Time    HGBA1C 8.90 (H) 09/21/2019 0647    HGBA1C 10.6 (H) 02/23/2019 0408    HGBA1C 9.6 (H) 07/13/2017 0438     Medications reviewed   Medications reviewed: Yes  Pertinent: insulin, keppra, protonix, antibiotics  PRN: tylenol      Needs Assessment  (11/11)   Height used: 68 in/173 cm  Weight used: 131 lb/60 kg    Estimated Calories needs: ~1500 kcal/day  IC (11/10): RQ= 0.80; MREE= 1498 kcal  PSU (Ve= 6.0, Tmax= 36.7)= 1434 kcal  25-30 kcal/kg= 6640-7761 kcal    Estimated Protein needs: ~72 g pro/day   1.2 g/kg= 72 g pro    Current Nutrition Prescription   PO: Diet Regular; Thin; Cardiac, Consistent Carbohydrate, Renal     EN: [employer]Source Renal at 38 ml/hr (goal volume= 760 ml/day) and free water at 30 ml q 2 hrs -- order still in [facility], however NGT was d/c'd yesterday (11/14)      Evaluation of Received Nutrient/Fluid Intake-PO:  25%/1 meal    Evaluation of Received Nutrient/Fluid Intake-EN:  1 Day:   129 ml      Nutrition Diagnosis   11/7, updated 11/11, 11/13  Problem Inadequate energy intake   Etiology Clinical condition, diet order   Signs/Symptoms Pt requiring mechanical ventilation, NPO; prior to being intubated pt had consumed 40% PO intake of 12 documented meals at [facility] from (11/1-11/6)   Status: EN started (11/8), had resolved; however PO diet started (11/14) and EN d/c'd      Intervention   Intervention: Follow treatment progress, Care plan reviewed, Interview for preferences, Menu provided, Encourage intake, Supplement provided   -Will d/c EN  order in Epic  -Will send strawberry Boost Glucose Control 3x/day. The amount of electrolytes per serving in Boost Glucose Control are comparable to Nepro    Goal:   General: Nutrition support treatment  PO: Establish PO      Monitoring/Evaluation:       Monitoring/Evaluation: Per protocol, I&O, PO intake, Supplement intake, Pertinent labs, Weight, Symptoms       Will Continue to follow per protocol    Marianna Cristina, MS RD/LD [facility]  Time Spent: 30 minutes

## 2019-11-15 NOTE — PROGRESS NOTES
Talha Cutler  [id number]  1960   LOS: 9 days   Patient Care Team:  Jeannette Godoy APRN as PCP - General (Family Medicine)    Chief Complaint:  SEIZURES / HFrEF / DM / PNA / HTN / ESRD / MALNUTRITION / DEBILITY    Subjective     Comfortable flat in bed on supplemental oxygen therapy.  NG feedings have been discontinued.  No current complaints of anginal type chest discomfort, increased tachypnea/dyspnea, nausea, emesis, abdominal pain, headache, or focal motor-sensory changes.  He is looking forward to breakfast.    Objective     Vital Sign Min/Max for last 24 hours  Temp  Min: 97.6 °F (36.4 °C)  Max: 98.9 °F (37.2 °C)   BP  Min: 99/49  Max: 143/81   Pulse  Min: 96  Max: 109   Resp  Min: 16  Max: 22   SpO2  Min: 89 %  Max: 100 %      Weight  Min: 64.2 kg (141 lb 8.6 oz)  Max: 64.2 kg (141 lb 8.6 oz)         11/14/19  0600 11/15/19  0610   Weight: 68.9 kg (151 lb 14.4 oz) 64.2 kg (141 lb 8.6 oz)         Intake/Output Summary (Last 24 hours) at 11/15/2019 0715  Last data filed at 11/15/2019 0013  Gross per 24 hour   Intake 319 ml   Output 2330 ml   Net -2011 ml       Physical Exam:     General Appearance:    Alert, cooperative, in no acute distress   Lungs:    Bibasilar crackles and dullness more notable in the right,respirations regular, even and unlabored 2LPM NC O2    Heart:    Regular and normal rate, normal S1 and S2, grade 1/6            murmur, no gallop, no rub, no click   Abdomen:  Extremities:   Soft, non-tender, bowel sounds audible x4    No edema, normal range of motion   Pulses:   Pulses palpable and equal bilaterally      Results Review:   Results from last 7 days   Lab Units 11/15/19  0435 11/14/19  0544 11/13/19  0451   SODIUM mmol/L 137 134* 137   POTASSIUM mmol/L 3.9 3.6 3.3*   CHLORIDE mmol/L 100 97* 98   CO2 mmol/L 25.0 24.0 28.0   BUN mg/dL 24* 46* 30*   CREATININE mg/dL 2.36* 3.32* 2.51*   GLUCOSE mg/dL 98 240* 222*   CALCIUM mg/dL 9.5 10.1 10.2     Results from last 7 days   Lab  Units 11/15/19  0435 11/14/19  0409 11/13/19  0451   WBC 10*3/mm3 6.91 7.82 5.98   HEMOGLOBIN g/dL 7.9* 8.6* 10.0*   HEMATOCRIT % 26.3* 29.0* 32.8*   PLATELETS 10*3/mm3 214 97* 176     Results from last 7 days   Lab Units 11/11/19  0555   TRIGLYCERIDES mg/dL 64     Results from last 7 days   Lab Units 11/12/19  0416   TROPONIN T ng/mL 0.225*     NO EKG / CXR.    Medication Review: REVIEWED; NO DRIPS.    Assessment/Plan     Acceptable clinical course.  He does have progressive anemia with slightly increased heart rate; this will need evaluation and treatment.  We will continue current dose of valsartan and carvedilol at this time and consider altering valsartan to Entresto 24/26 BID over the next 24 to 48 hours if hemodynamics remain acceptable.  He will need myocardial ischemia evaluation at some point but this can be accomplished with his cardiologist in [place] as an outpatient in the next few weeks.      Status epilepticus    Recurrent right pleural effusion    SOB (shortness of breath)    Uncontrolled diabetes mellitus with hyperglycemia, with long-term current use of insulin (CMS/HCC)    Physical debility    Peripheral vascular disease (CMS/[facility])    Pneumonia of right upper lobe due to infectious organism (CMS/[facility])    Symptomatic anemia    History of CVA (cerebrovascular accident)    Hypothyroidism (acquired)    Chronic systolic CHF (congestive heart failure) (CMS/HCC)    Seizure (CMS/[facility])    11/15/19  7:15 AM

## 2019-11-16 NOTE — PLAN OF CARE
Problem: Hemodialysis (Adult)  Goal: Signs and Symptoms of Listed Potential Problems Will be Absent, Minimized or Managed (Hemodialysis)  Outcome: Ongoing (interventions implemented as appropriate)   11/16/19 1151   Goal/Outcome Evaluation   Problems Assessed (Hemodialysis) all   Problems Present (Hemodialysis) fluid imbalance

## 2019-11-16 NOTE — PROGRESS NOTES
[facility] Medicine Services  PROGRESS NOTE    Patient Name: Talha Cutler  : 1960  MRN: [id number]    Date of Admission: 2019  Primary Care Physician: Jeannette Godoy APRN    Subjective   Subjective     CC:  ICU transfer/ PNA/ seizures    HPI:  Patient was seen in HD this morning. He reports he is doing ok, though still endorses weakness. No seizure like activity reported. Some dyspnea, and reports feeling like he needs to cough something up that he can't.     Review of Systems  Gen- No fevers, chills  CV- No chest pain, palpitations  Resp- No cough, + dyspnea  GI- No N/V/D, abd pain        Objective   Objective     Vital Signs:   Temp:  [96 °F (35.6 °C)-97.8 °F (36.6 °C)] 97.3 °F (36.3 °C)  Heart Rate:  [] 102  Resp:  [16-18] 16  BP: (104-141)/(50-76) 141/75        Physical Exam:  Constitutional: No acute distress, awake, alert, seen in HD  HENT: NCAT, mucous membranes moist, pallor noted   Respiratory: Clear to auscultation bilaterally, respiratory effort normal , on NC  Cardiovascular: RRR, no murmurs, rubs, or gallops, palpable pedal pulses bilaterally  Gastrointestinal: Positive bowel sounds, soft, nontender, nondistended  Musculoskeletal: No bilateral ankle edema  Psychiatric: Appropriate affect, cooperative  Neurologic: Oriented x 3, residual left sided hemiparesis noted, Cranial Nerves grossly intact to confrontation, speech clear  Skin: No rashes      Results Reviewed:    Results from last 7 days   Lab Units 19  0523 11/15/19  0435 19  0409  19  0555 11/10/19  0428   WBC 10*3/mm3 6.01 6.91 7.82   < > 6.65 6.88   HEMOGLOBIN g/dL 7.5* 7.9* 8.6*   < > 9.8* 9.6*   HEMATOCRIT % 25.6* 26.3* 29.0*   < > 33.5* 32.4*   PLATELETS 10*3/mm3 217 214 97*   < > 208 194   PROCALCITONIN ng/mL  --   --   --   --  0.98* 1.31*    < > = values in this interval not displayed.     Results from last 7 days   Lab Units 19  0523 11/15/19  5901  11/14/19  0544  11/12/19  0416 11/11/19  0555 11/10/19  0428   SODIUM mmol/L 133* 137 134*   < > 139 137 139   POTASSIUM mmol/L 4.8 3.9 3.6   < > 3.6 3.5 3.9   CHLORIDE mmol/L 95* 100 97*   < > 100 99 100   CO2 mmol/L 23.0 25.0 24.0   < > 25.0 23.0 25.0   BUN mg/dL 43* 24* 46*   < > 58* 38* 20   CREATININE mg/dL 3.46* 2.36* 3.32*   < > 4.36* 3.67* 2.46*   GLUCOSE mg/dL 96 98 240*   < > 207* 217* 236*   CALCIUM mg/dL 10.2 9.5 10.1   < > 10.6* 9.8 10.0   ALT (SGPT) U/L  --   --   --   --   --  11 12   AST (SGOT) U/L  --   --   --   --   --  22 28   TROPONIN T ng/mL  --   --   --   --  0.225*  --   --     < > = values in this interval not displayed.     Estimated Creatinine Clearance: 19.8 mL/min (A) (by C-G formula based on SCr of 3.46 mg/dL (H)).    Microbiology Results Abnormal     None          Imaging Results (Last 24 Hours)     Procedure Component Value Units Date/Time    XR Chest 1 View [[id number]] Collected:  11/14/19 0836     Updated:  11/16/19 1053    Narrative:       EXAMINATION: XR CHEST 1 VW-      INDICATION: Respiratory failure; J18.1-Lobar pneumonia, unspecified  organism; I50.22-Chronic systolic (congestive) heart failure;  G40.901-Epilepsy, unspecified, not intractable, with status epilepticus;  N18.6-End stage renal disease; Z99.2-Dependence on renal dialysis;  R06.89-Other abnormalities of breathing.      COMPARISON: 11/11/2019.     FINDINGS: ET tube has been removed. NG tube and left IJ catheter appear  in good position. Right axillary stents are noted. There is right  basilar scarring or linear atelectasis unchanged. Borderline  cardiomegaly and mild nonspecific pulmonary interstitial changes are  stable.           Impression:       No new chest disease.     D:  11/14/2019  E:  11/14/2019     This report was finalized on 11/16/2019 10:49 AM by DR. Rosales Martel MD.             Results for orders placed during the hospital encounter of 11/01/19   Adult Transthoracic Echo Complete W/ Cont if Necessary  Per Protocol    Narrative · The left ventricular cavity is moderate-to-severely dilated.  · Right ventricular cavity is mildly dilated.  · Left atrial cavity size is moderately dilated.  · Moderate mitral valve regurgitation is present  · There is a small (<1cm) pericardial effusion.          I have reviewed the medications:  Scheduled Meds:  aspirin 81 mg Oral Daily   atorvastatin 40 mg Oral Daily   carvedilol 12.5 mg Oral BID With Meals   clopidogrel 75 mg Oral Daily   epoetin case/case-epbx 20,000 Units Subcutaneous Once per day on Mon Wed Fri   heparin (porcine) 5,000 Units Subcutaneous Q12H   insulin detemir 15 Units Subcutaneous Daily   insulin lispro 0-9 Units Subcutaneous 4x Daily With Meals & Nightly   insulin lispro 5 Units Subcutaneous TID With Meals   ipratropium-albuterol 3 mL Nebulization 4x Daily - RT   levETIRAcetam 500 mg Intravenous Q12H   levothyroxine 125 mcg Oral Q AM   lidocaine 1 patch Transdermal Q24H   pantoprazole 40 mg Intravenous Q AM   sodium chloride 10 mL Intravenous Q12H   valsartan 40 mg Oral Q12H     Continuous Infusions:   PRN Meds:.•  acetaminophen  •  albumin human  •  diphenhydrAMINE  •  sodium chloride      Assessment/Plan   Assessment / Plan     Active Hospital Problems    Diagnosis  POA   • **Status epilepticus [G40.901]  Yes   • Seizure (CMS/[facility]) [R56.9]  Yes   • Hypothyroidism (acquired) [E03.9]  Yes   • History of CVA (cerebrovascular accident) [Z86.73]  Not Applicable   • Chronic systolic CHF (congestive heart failure) (CMS/[facility]) [I50.22]  Yes   • Symptomatic anemia [D64.9]  Yes   • Pneumonia of right upper lobe due to infectious organism (CMS/[facility]) [J18.1]  Yes   • Peripheral vascular disease (CMS/[facility]) [I73.9]  Yes   • Uncontrolled diabetes mellitus with hyperglycemia, with long-term current use of insulin (CMS/[facility]) [E11.65, Z79.4]  Not Applicable   • SOB (shortness of breath) [R06.02]  Yes   • Physical debility [R53.81]  Yes   • Recurrent right pleural effusion [J90]  Yes       Resolved Hospital Problems   No resolved problems to display.        Brief Hospital Course to date:  Talha Cutler is a 59 y.o. male with multiple medical comorbidities who presented initially to [facility] 11/1 with PNA. Patient eventually required intubation for respiratory failure and was transferred to ICU 1/5, hospital course also complicated by tonic/clonic ?seizures 11/6, patient was ultimately transferred to [facility] for neurology evaluation. Patient was initially placed in our ICU as he did require intubation for inability to protect airway following ? Seizure activity. Patient extubated 11/13, and doing well now on NC. He has completed abx for PNA, respiratory panel was noted to be +metapneumovirus. Patient also evaluated by neurology who felt that patient has component of both psychogenic non-epileptic seizures and epileptic seizures. They have now s/o. Patient transferred to [facility] 11/16 with hospitalists assuming care.     Psychogenic non-epileptic seizures  History of epileptic seizures  ---complex case, neurology has evaluated and feels convulsions this admission 2/2 PNES- Keppra dose reduced back to home previous dosing of 500 BID   ---will need f/u with Rasheeda Flores, neurology in [place]  ---seizure precautions    Anemia, worsening  ---noted previous w/u in [place], patient reports previous negative C-scope/EGD at ? Data-def- reviewed previous d/c summary from [facility] in Sept 2019- at time of d/c Hb 7.4 and this was felt to be ? Baseline, though H/H dropped from near 10--7.5 over past few days. Patient denies bleeding, though reports ongoing black stools.  ---will try to obtain records of timing and results of GI w/u, follow H/H, transfuse <7, consider GI consultation if worsening    Respiratory failure, resolving  Metapneuovirus PNA  ---extubated 11/13, now on NC  ---completed course of abx, monitor    ESRD on HD  ---NAL following for HD needs     Previous CVA with residual left sided  hemiparesis  ---PT/OT    Uncontrolled T2DM  ---good control of blood sugar at this time, monitor and titrate as needed    Chronic systolic CHF  ---cardiology following, appreciate assistance with medications    PVD            DVT Prophylaxis:  heparin    Disposition: I expect the patient to be discharged pending. Will need stability in H/H and possibly more GI w/u. Patient declining rehab and wants to return home at time of discharge, CM aware.     CODE STATUS:   Code Status and Medical Interventions:   Ordered at: 11/06/19 0316     Code Status:    CPR     Medical Interventions (Level of Support Prior to Arrest):    Full         Electronically signed by Gisell Driscoll MD, 11/16/19, 12:03 PM.

## 2019-11-16 NOTE — NURSING NOTE
"Patient called , requesting to speak with house supervisors regarding the wandering policy. Mary Ann LIRA and I responded to his room and reiterated the wandering policy and the reasons behind it as well as offered alternatives to meet his needs without leaving the floor. Patient stated understanding but as we exited to speak to staff, his family member at bedside stated \"Just go outside and come back through the ER as a new patient.\" We went back into the room and explained that the patient would have to have his deep line removed before leaving AMA and was not guaranteed a deep line or the same room upon his admission to the hospital through the ED. Staff and charge nurse updated and told to call with any further issues.     Vivienne De La Cruz RN [facility]   "

## 2019-11-16 NOTE — PLAN OF CARE
Problem: Skin Injury Risk (Adult)  Goal: Skin Health and Integrity  Outcome: Ongoing (interventions implemented as appropriate)      Problem: Fall Risk (Adult)  Goal: Absence of Fall  Outcome: Ongoing (interventions implemented as appropriate)      Problem: Patient Care Overview  Goal: Plan of Care Review  Outcome: Ongoing (interventions implemented as appropriate)   11/16/19 4644   Coping/Psychosocial   Plan of Care Reviewed With patient;spouse;son   Plan of Care Review   Progress improving   OTHER   Outcome Summary Sleeping most of night. VSS. Denies pain/discomfort. nNo seizure activity observed. Will continue to monitor.

## 2019-11-16 NOTE — PROGRESS NOTES
"   LOS: 10 days    Patient Care Team:  Jeannette Godoy APRN as PCP - General (Family Medicine)    Reason For Visit:  F/U ESRD. SEEN ON DIALYSIS.  Subjective           Review of Systems:    Pulm: No soa   CV:  No CP      Objective       aspirin 81 mg Oral Daily   atorvastatin 40 mg Oral Daily   carvedilol 12.5 mg Oral BID With Meals   clopidogrel 75 mg Oral Daily   epoetin case/case-epbx 20,000 Units Subcutaneous Once per day on Mon Wed Fri   heparin (porcine) 5,000 Units Subcutaneous Q12H   insulin detemir 15 Units Subcutaneous Daily   insulin lispro 0-9 Units Subcutaneous 4x Daily With Meals & Nightly   insulin lispro 5 Units Subcutaneous TID With Meals   ipratropium-albuterol 3 mL Nebulization 4x Daily - RT   levETIRAcetam 500 mg Intravenous Q12H   levothyroxine 125 mcg Oral Q AM   lidocaine 1 patch Transdermal Q24H   pantoprazole 40 mg Intravenous Q AM   sodium chloride 10 mL Intravenous Q12H   valsartan 40 mg Oral Q12H            Vital Signs:  Blood pressure 124/66, pulse 103, temperature 97.6 °F (36.4 °C), temperature source Oral, resp. rate 18, height 173 cm (68.11\"), weight 61.7 kg (136 lb), SpO2 100 %.    Flowsheet Rows      First Filed Value   Admission Height  172.7 cm (67.99\") Documented at 11/07/2019 1909   Admission Weight  -- [Bed scale not working] Documented at 11/08/2019 0500          11/15 0701 - 11/16 0700  In: 120 [P.O.:120]  Out: -     Physical Exam:    General Appearance: NAD, alert and cooperative, Ox3  Eyes: PER, conjunctivae and sclerae normal, no icterus  Lungs: respirations regular and unlabored, no crepitus, clear to auscultation  Heart/CV: regular rhythm & normal rate, no murmur, no gallop, no rub and no edema  Abdomen: not distended, soft, non-tender, no masses,  bowel sounds present  Skin: No rash, Warm and dry. AVF.    Radiology:            Labs:  Results from last 7 days   Lab Units 11/16/19  0523 11/15/19  0435 11/14/19  0409   WBC 10*3/mm3 6.01 6.91 7.82   HEMOGLOBIN g/dL 7.5* " 7.9* 8.6*   HEMATOCRIT % 25.6* 26.3* 29.0*   PLATELETS 10*3/mm3 217 214 97*     Results from last 7 days   Lab Units 11/16/19  0523 11/15/19  0435 11/14/19  0544 11/14/19  0409 11/13/19  0451 11/12/19  0416 11/11/19  0555 11/10/19  0428   SODIUM mmol/L 133* 137 134*  --  137 139 137 139   POTASSIUM mmol/L 4.8 3.9 3.6  --  3.3* 3.6 3.5 3.9   CHLORIDE mmol/L 95* 100 97*  --  98 100 99 100   CO2 mmol/L 23.0 25.0 24.0  --  28.0 25.0 23.0 25.0   BUN mg/dL 43* 24* 46*  --  30* 58* 38* 20   CREATININE mg/dL 3.46* 2.36* 3.32*  --  2.51* 4.36* 3.67* 2.46*   CALCIUM mg/dL 10.2 9.5 10.1  --  10.2 10.6* 9.8 10.0   PHOSPHORUS mg/dL 3.6 2.4*  --  3.2 2.2* 3.4 3.3 2.4*   MAGNESIUM mg/dL  --   --   --  2.4  --   --  2.5* 2.3   ALBUMIN g/dL 3.30* 2.80*  --   --  2.80* 3.00* 2.70* 3.20*     Results from last 7 days   Lab Units 11/16/19  0523   GLUCOSE mg/dL 96       Results from last 7 days   Lab Units 11/11/19  0555   ALK PHOS U/L 64   BILIRUBIN mg/dL 0.2   ALT (SGPT) U/L 11   AST (SGOT) U/L 22     Results from last 7 days   Lab Units 11/14/19  0330   PH, ARTERIAL pH units 7.393   PO2 ART mm Hg 71.9*   PCO2, ARTERIAL mm Hg 47.4   HCO3 ART mmol/L 28.9*             Estimated Creatinine Clearance: 19.8 mL/min (A) (by C-G formula based on SCr of 3.46 mg/dL (H)).      Assessment       Status epilepticus    Recurrent right pleural effusion    SOB (shortness of breath)    Uncontrolled diabetes mellitus with hyperglycemia, with long-term current use of insulin (CMS/HCC)    Physical debility    Peripheral vascular disease (CMS/HCC)    Pneumonia of right upper lobe due to infectious organism (CMS/HCC)    Symptomatic anemia    History of CVA (cerebrovascular accident)    Hypothyroidism (acquired)    Chronic systolic CHF (congestive heart failure) (CMS/HCC)    Seizure (CMS/[facility])            Impression: ESRD. ANEMIA.            Recommendations: HD TODAY. EPO.      Teddy Doll MD  11/16/19  8:02 AM

## 2019-11-16 NOTE — NURSING NOTE
Prior to central line removal, order for the removal of catheter was verified, patient was assessed, necessary materials were gathered and patient was educated regarding procedure .    Patient was positioned supine to ensure that the insertion site was at or below the level of the heart.    Hands were washed, clean gloves were applied and central line dressing was gently removed. Catheter exit site was not cultured.     A new pair of clean gloves were then applied. Insertion site was cleansed with 2% Chlorhexidine swab using a circular motion beginning at the insertion site and moving outward for 30 seconds and allowed to dry.     Clamp on line was present and clamped.     Patient was instructed to hold breath as catheter was withdrawn.     The central line was grasped at the insertion site and slowly pulled outward parallel to the skin. Resistance was not met.    After central line was completely removed, a sterile 4x4 gauze pad was used to apply light pressure until bleeding stopped. At that time, petroleum-based gauze and a sterile occlusive dressing was applied to exit site.     Patient was instructed to keep dressing in place for at least 24 hours and to remain in a flat or reclining position for 30 minutes post-removal.     Catheter was inspected after removal and was intact. Tip of central line was not sent for culture. Patient tolerated procedure.

## 2019-11-18 PROBLEM — J18.9 HCAP (HEALTHCARE-ASSOCIATED PNEUMONIA): Status: ACTIVE | Noted: 2019-01-01

## 2019-11-18 PROBLEM — K92.1 MELENA: Status: ACTIVE | Noted: 2019-01-01

## 2019-11-18 NOTE — PROGRESS NOTES
Case Management Readmission Assessment Note       Case Management Readmission Assessment (all recorded)      Readmission Interview     Row Name 11/18/19 1541             Readmission Indications    Is this hospitalization related to the prior hospital diagnosis?  Yes      What was the reason you were admitted?  HCAP      Row Name 11/18/19 1541             Recommendation for rehospitalization    Did you speak with your physician prior to coming to the hospital  No      Who recommended you return to the hospital?  -- SELF      Did you seek care elsewhere prior to coming to the hospital?  No      Row Name 11/18/19 1541             Follow up appointment    Do you have a PCP?  Yes      [facility] Name 11/18/19 1541             Medications    Did you have newly prescribed medications at discharge?  Yes      Did you understand the reasons for your medications at discharge and how to take them?  Yes      Did you understand the side effects of your medications?  Yes      Are you taking all of you prescribed medications?  Yes      [facility] Name 11/18/19 1541             Discharge Instructions    Did you understand your discharge instructions?  Yes      Did your family/caregiver hear your instructions?  Yes      Were you told to eat a special diet?  No      Were you given a number of someone to call if you had questions or concerns?  Yes      [facility] Name 11/18/19 1541             Index discharge location/services    Where did you go upon discharge?  Home with services      Do you have supportive family or friends in the home?  Yes      What services were arranged at discharge?  Home Health

## 2019-11-18 NOTE — H&P
"    [facility] Medicine Services  HISTORY AND PHYSICAL    Patient Name: Talha Cutler  : 1960  MRN: [id number]  Primary Care Physician: Jeanntete Godoy APRN  Date of admission: 2019      Subjective   Subjective     Chief Complaint:  \"dizziness\"     HPI:  Talha Cutler is a 59 y.o. 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 He was in ICU for several days for airway protection, and extubated on . He had completed antibiotics for pneumonia, was  +metapneumovirus. 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 He was transferred to the telemetry floor on  and then left AMA.    He came back to the ED today with a chief complaint of \"dizziness\". States it is intermittent, and occurred while he was transferring from his bed to the wheelchair. Denies associated LOC. Has not had any nausea, vomiting, abdominal pain, or diarrhea. His stool is always \"dark black\". Denies hematochezia or hematemesis. Has been taking EPO for the past 3 years for anemia. Has required blood transfusions in the past. Currently feeling well, but very weak. Denies significant worsening shortness of breath or cough.    In the ED Hgb 6.2, AST 1590/ , Alk phos 218, T bili 0.6. Lactate 3.6. He received 2U PRBCs. Nephrology consulted for HD and GI consulted for acute blood loss anemia.     Review of Systems   Gen- No fevers, chills  CV- No chest pain, palpitations  Resp- No cough, dyspnea  GI- No N/V/D, abd pain    All other systems reviewed and are negative.     Personal History     Past Medical History:   Diagnosis Date   • Abdominal pain    • Anemia    • Cataracts, bilateral    • CHF (congestive heart failure) (CMS/HCC)    • Chronic kidney disease    • Constipation    • Cough "    • Dependence on nocturnal oxygen therapy    • Diabetes mellitus (CMS/[facility])    • Diarrhea    • End stage renal failure on dialysis (CMS/[facility])     SILVINO AVITIA, SAT   • GERD (gastroesophageal reflux disease)    • H/O blood clots     LEG/NECK   • H/O chest x-ray 03/25/2016    Interval decrease in size of the patients right pleural effusion with a persistent small left effusion as well   • History of transfusion    • Hypertension    • Left-sided weakness    • Nauseated     DRY HEAVES   • Pleural effusion    • Pneumonia    • Pneumonia    • Renal failure    • Stroke (CMS/[facility]) 2006    LEFT SIDED WEAKNESS   • Swelling    • Teeth missing    • Tinnitus    • Ulcer, stomach peptic     HISTORY OF ULCER AS A TEENAGER   • Wears glasses        Past Surgical History:   Procedure Laterality Date   • ARTERIOVENOUS FISTULA Right    • BRONCHOSCOPY N/A 1/10/2019    Procedure: BRONCHOSCOPY with MAC and Flouro. LAVAGE, BRUSHINGS AND BIOPSY;  Surgeon: Ean Hernandez MD;  Location: [facility] OR;  Service: Pulmonary   • CARDIAC CATHETERIZATION N/A 3/28/2018    Procedure: Peripheral angiography;  Surgeon: Clay Ruano MD;  Location: [facility] CATH INVASIVE LOCATION;  Service: Cardiovascular   • CARDIAC CATHETERIZATION N/A 3/28/2018    Procedure: Angioplasty-peripheral;  Surgeon: Clay Ruano MD;  Location: [facility] CATH INVASIVE LOCATION;  Service: Cardiovascular   • CARDIAC CATHETERIZATION N/A 3/28/2018    Procedure: Atherectomy-peripheral;  Surgeon: Clay Ruano MD;  Location: [facility] CATH INVASIVE LOCATION;  Service: Cardiovascular   • CATARACT EXTRACTION, BILATERAL     • CHOLECYSTECTOMY     • COLONOSCOPY     • EYE SURGERY      BLEEDING BEHING BILATERAL EYES   • INTERVENTIONAL RADIOLOGY PROCEDURE N/A 3/28/2018    Procedure: Abdominal Aortogram;  Surgeon: Clay Ruano MD;  Location: [facility] CATH INVASIVE LOCATION;  Service: Cardiovascular   • PORTACATH PLACEMENT     • SHOULDER MANIPULATION Left      "\"FROZEN SHOULDER\"   • VENOUS ACCESS DEVICE (PORT) REMOVAL         Family History: family history includes COPD in his mother; Diabetes in his father, paternal grandmother, and sister; Hypertension in his brother and sister. Otherwise pertinent FHx was reviewed and unremarkable.     Social History:  reports that he has never smoked. He has never used smokeless tobacco. He reports that he does not drink alcohol or use drugs.  Social History     Social History Narrative   • Not on file       Medications:  Available home medication information reviewed.  Medications Prior to Admission   Medication Sig Dispense Refill Last Dose   • albuterol (PROVENTIL HFA;VENTOLIN HFA) 108 (90 Base) MCG/ACT inhaler Inhale 2 puffs Every 4 (Four) Hours As Needed for Shortness of Air. 1 inhaler 0 Taking   • albuterol (PROVENTIL) (2.5 MG/3ML) 0.083% nebulizer solution Take 2.5 mg by nebulization Every 4 (Four) Hours As Needed for Wheezing.   11/1/2019 at Unknown time   • aspirin 81 MG chewable tablet Chew 81 mg Daily.   11/1/2019 at Unknown time   • atorvastatin (LIPITOR) 40 MG tablet Take 40 mg by mouth Daily.   11/1/2019 at Unknown time   • B Complex-C-Folic Acid (RADHA-SHIRLENE PO) Take 1 tablet by mouth. Patient states he takes these after dialysis, but is unaware of dose.    11/1/2019 at Unknown time   • Cholecalciferol (VITAMIN D3) 5000 UNITS capsule capsule Take 1 capsule by mouth Daily.   11/1/2019 at Unknown time   • clopidogrel (PLAVIX) 75 MG tablet Take 1 tablet by mouth Daily. Start taking from 1/12/2019 onwards ONLY if not coughing up significant amount of blood. 90 tablet 3 11/1/2019 at Unknown time   • docusate sodium (COLACE) 100 MG capsule Take 100 mg by mouth Every 12 (Twelve) Hours.   11/1/2019 at Unknown time   • fluticasone (VERAMYST) 27.5 MCG/SPRAY nasal spray 2 sprays into the nostril(s) as directed by provider Daily.   9/20/2019 at 0800   • folic acid (FOLVITE) 1 MG tablet Take 1 mg by mouth daily.   11/1/2019 at Unknown " time   • insulin aspart (novoLOG FLEXPEN) 100 UNIT/ML solution pen-injector sc pen Inject 9 Units under the skin into the appropriate area as directed 3 (Three) Times a Day With Meals.   9/20/2019 at 0800   • insulin glargine (LANTUS) 100 UNIT/ML injection Inject 15 Units under the skin into the appropriate area as directed Every Night.   11/1/2019 at Unknown time   • isosorbide mononitrate (IMDUR) 30 MG 24 hr tablet Take 30 mg by mouth Daily.   11/1/2019 at Unknown time   • lanthanum (FOSRENOL) 1000 MG chewable tablet Chew 1 tablet 3 (Three) Times a Day With Meals.      • losartan (COZAAR) 25 MG tablet Take 1 tablet by mouth Daily.      • metoprolol succinate XL (TOPROL-XL) 25 MG 24 hr tablet Take 3 tablets by mouth Daily. (Patient taking differently: Take 100 mg by mouth Daily.)      • ondansetron ODT (ZOFRAN-ODT) 4 MG disintegrating tablet Take 1 tablet by mouth Every 6 (Six) Hours As Needed for Nausea or Vomiting. 20 tablet 0 Not Taking   • famotidine (PEPCID) 20 MG tablet Take 20 mg by mouth Daily.   Unknown at Unknown time   • levETIRAcetam in NaCl 0.82% (KEPPRA) 500 MG/100ML solution IVPB Infuse 100 mL into a venous catheter Every 12 (Twelve) Hours. 4000 mL     • levothyroxine (SYNTHROID, LEVOTHROID) 100 MCG tablet Take 125 mcg by mouth Daily.   Unknown at Unknown time       Allergies   Allergen Reactions   • Erythromycin Hives       Objective   Objective     Vital Signs:   Temp:  [95.7 °F (35.4 °C)-97.4 °F (36.3 °C)] 96.7 °F (35.9 °C)  Heart Rate:  [75-89] 89  Resp:  [16-17] 17  BP: ()/(53-99) 147/99        Physical Exam   Constitutional: Awake, alert  Eyes: PERRLA, sclerae anicteric, no conjunctival injection  HENT: NCAT, mucous membranes moist  Neck: Supple, no thyromegaly, no lymphadenopathy, trachea midline  Respiratory: crackles bilaterally, nonlabored respirations   Cardiovascular: RRR, no murmurs, rubs, or gallops, no lower extremity edema   Gastrointestinal: Positive bowel sounds, soft,  nontender, nondistended  Psychiatric: Appropriate affect, cooperative  Neurologic: Oriented x 3, strength decreased in left arm and leg compared to right arm and leg, Cranial Nerves grossly intact to confrontation, speech clear, but slow   Skin: No rashes on exposed skin     Results Reviewed:  I have personally reviewed current lab and radiology data.    Results from last 7 days   Lab Units 11/18/19  0559   WBC 10*3/mm3 9.59   HEMOGLOBIN g/dL 6.2*   HEMATOCRIT % 20.6*   PLATELETS 10*3/mm3 212     Results from last 7 days   Lab Units 11/18/19  1224 11/18/19  0908  11/18/19  0559  11/12/19  0416   SODIUM mmol/L  --  134*  --  136   < > 139   POTASSIUM mmol/L  --  5.1  --  5.6*   < > 3.6   CHLORIDE mmol/L  --  95*  --  94*   < > 100   CO2 mmol/L  --  21.0*  --  20.0*   < > 25.0   BUN mg/dL  --  59*  --  53*   < > 58*   CREATININE mg/dL  --  3.78*  --  3.58*   < > 4.36*   GLUCOSE mg/dL  --  246*  --  230*   < > 207*   CALCIUM mg/dL  --  10.0  --  10.3   < > 10.6*   ALT (SGPT) U/L  --   --   --  727*  --   --    AST (SGOT) U/L  --   --   --  1,590*  --   --    TROPONIN T ng/mL  --   --   --  0.380*  --  0.225*   LACTATE mmol/L 0.6  --    < >  --   --   --     < > = values in this interval not displayed.     Estimated Creatinine Clearance: 18.5 mL/min (A) (by C-G formula based on SCr of 3.78 mg/dL (H)).  Brief Urine Lab Results     None        Imaging Results (Last 24 Hours)     Procedure Component Value Units Date/Time    MRI Brain Without Contrast [[id number]] Collected:  11/18/19 0727     Updated:  11/18/19 0729    Narrative:       MRI brain without contrast.    HISTORY: Dizziness, nausea and vomiting.    TECHNIQUE: Routine brain MRI without contrast.    COMPARISON: Brain MRI dated 11/9/2019.    FINDINGS: There is no MR evidence of acute ischemia or other restricted diffusion. There is advanced cerebral atrophy, unchanged since the prior study, and there is no hydrocephalus or extra-axial fluid collection.  Periventricular white matter changes  stable. There is no MR evidence of acute or chronic intracranial hemorrhage, and no mass is seen.    Normal flow voids are seen in the cerebral vessels. There is maxillary and lesser ethmoid sinus mucosal thickening, and there is fluid throughout the mastoid air cells bilaterally, all unchanged. Bone marrow signal is within normal limits, and the  extracranial soft tissues are unremarkable.    IMPRESSION : Advanced senescent change, greater than expected for age, but no acute abnormality nor interval change when compared to 11/9/2019.    Signer Name: Live Choi MD   Signed: 11/18/2019 7:27 AM   Workstation Name: [facility]    Radiology [facility]    XR Chest 1 View [[id number]] Collected:  11/18/19 0627     Updated:  11/18/19 0629    Narrative:       CR Chest 1 Vw    INDICATION:   Dizziness and confusion     COMPARISON:    11/14/2019    FINDINGS:  Single portable AP view(s) of the chest.    NG tube is been removed. Redemonstrated mild cardiomegaly.    Increased right lower lobe infiltrate and increased right effusion.    Left lung remains normally aerated. No pneumothorax.    Signer Name: Live Choi MD   Signed: 11/18/2019 6:27 AM   Workstation Name: [facility]    Radiology [facility]        Results for orders placed during the hospital encounter of 11/01/19   Adult Transthoracic Echo Complete W/ Cont if Necessary Per Protocol    Narrative · The left ventricular cavity is moderate-to-severely dilated.  · Right ventricular cavity is mildly dilated.  · Left atrial cavity size is moderately dilated.  · Moderate mitral valve regurgitation is present  · There is a small (<1cm) pericardial effusion.          Assessment/Plan   Assessment / Plan     Active Hospital Problems    Diagnosis POA   • **Melena [K92.1] Unknown     Added automatically from request for surgery [id number]     • HCAP (healthcare-associated pneumonia) [J18.9] Yes       .  He is a 58 yo gentleman with hx of chronic anemia, ESRD on HD, previous CVA with residual left sided weakness, T2DM, and chronic systolic heart failure who was admitted today for severe symptomatic anemia.     Severe symptomatic anemia  Hgb 6.2 (7.5 on 11/16)  -likely 2/2 upper GI bleed, reports chronically dark stool   -received 2U PRBCs  -trend H&H q8h, transfuse if <8   -continue PPI BID   -hold aspirin and plavix   -GI consult - EGD tomorrow keep NPO after midnight     Elevated LFT's  -etiology unclear, differential diagnoses include acute viral hepatitis or DILI or shock liver  -acute hepatitis panel negative, tylenol level wnl  -was hypotensive in ED   -LDH elevated, lacitc acidosis   -will check ultrasound of liver, GI on consult   -continue to monitor CMP     Hypotension  -2/2 possible blood loss  -will hold anti-hypertensive medications that he takes at home - imdur, losartan, and metoprolol     Hx of CVA: left sided weakness; PT/OT, hold aspirin and plavix in setting of acute GI bleed     T2DM: bc he's NPO will keep on correction dose insulin (normally on levemir 15u SC qhs and novolog 9u sc TID AC)    DVT prophylaxis:  Mechanical     CODE STATUS:    Code Status and Medical Interventions:   Ordered at: 11/18/19 1540     Level Of Support Discussed With:    Patient     Code Status:    CPR     Medical Interventions (Level of Support Prior to Arrest):    Full       Admission Status:  I believe this patient meets OBSERVATION status, however if further evaluation or treatment plans warrant, status may change.  Based upon current information, I predict patient's care encounter to be less than or equal to 2 midnights.  Electronically signed by Isabel Hinojosa MD, 11/18/19, 11:48 AM.

## 2019-11-18 NOTE — PROGRESS NOTES
Discharge Planning Assessment  [facility]     Patient Name: Talha Cutler  MRN: [id number]  Today's Date: 11/18/2019    Admit Date: 11/18/2019    Discharge Needs Assessment     Row Name 11/18/19 1544       Living Environment    Lives With  spouse    Current Living Arrangements  home/apartment/condo    Primary Care Provided by  self;spouse/significant other;homecare agency    Provides Primary Care For  no one, unable/limited ability to care for self    Family Caregiver if Needed  spouse    Quality of Family Relationships  supportive;involved;helpful    Able to Return to Prior Arrangements  yes       Resource/Environmental Concerns    Transportation Concerns  car, none       Transition Planning    Patient/Family Anticipates Transition to  home with family    Patient/Family Anticipated Services at Transition      Transportation Anticipated  family or friend will provide       Discharge Needs Assessment    Readmission Within the Last 30 Days  previous discharge plan unsuccessful    Concerns to be Addressed  discharge planning    Equipment Currently Used at Home  wheelchair, motorized;oxygen;nebulizer;bipap/cpap;slide board;other (see comments) handicap toilet    Anticipated Changes Related to Illness  none    Equipment Needed After Discharge  hospital bed    Outpatient/Agency/Support Group Needs  homecare agency    Discharge Facility/Level of Care Needs  home with home health    Current Discharge Risk  chronically ill;dependent with mobility/activities of daily living        Discharge Plan     Row Name 11/18/19 1532       Plan    Plan  HOME with     Patient/Family in Agreement with Plan  yes    Plan Comments  Met with pt at bedside.  [place].  He uses a power chair at baseline for mobility and indicates that he can transfer himself.  Wife assists with sponge bathing.  He relates that he is able to dress, feed, and cook for himself.  He has cont home O2 @ 2lpm provided  per [facility] in [place], [place].  He also has a home CPAP (provider unknown).  He is current with [facility] (SN, SW;  Dx:  heart failure, PNA, COPD).  Will need ELVA orders upon DC.  He dialyzes at [facility] on TTS.  Wife transports to/from appts.  Confirmed he has [employer] Medicare with Rx coverage.  Discussed possible STR (he declined on previous admission).  He has again declined and relates that he plans to return home with assistance of wife and HH services.  CM will cont to follow for any DC needs.      Final Discharge Disposition Code  06 - home with home health care        Destination      No service coordination in this encounter.      Durable Medical Equipment      No service coordination in this encounter.      Dialysis/Infusion - Selection Complete      Service Provider Request Status Selected Services Address Phone Number Fax Number    [facility] [address] [facility] [address] [phone] --      Home Medical Care      Service Provider Request Status Selected Services Address Phone Number Fax Number    [facility] [place] [facility] Health Services [address] [address] [phone]      Therapy      No service coordination in this encounter.      Community Resources      No service coordination in this encounter.          Demographic Summary     Row Name 11/18/19 6984       General Information    Referral Source  admission list    Reason for Consult  discharge planning    Preferred Language  English       Contact Information    Permission Granted to Share Info With      Contact Information Obtained for          Functional Status     Row Name 11/18/19 1543       Functional Status    Usual Activity Tolerance  poor       Functional Status, IADL    Medications  assistive person    Meal Preparation  assistive person    Housekeeping  assistive person    Laundry  assistive person    Shopping   assistive person        Psychosocial    No documentation.       Abuse/Neglect    No documentation.       Legal    No documentation.       Substance Abuse    No documentation.       Patient Forms    No documentation.           Haydee Jacobson RN

## 2019-11-18 NOTE — PLAN OF CARE
Problem: Hemodialysis (Adult)  Goal: Signs and Symptoms of Listed Potential Problems Will be Absent, Minimized or Managed (Hemodialysis)  Outcome: Ongoing (interventions implemented as appropriate)   11/18/19 4103   Goal/Outcome Evaluation   Problems Assessed (Hemodialysis) all   Problems Present (Hemodialysis) electrolyte imbalance;fluid imbalance

## 2019-11-18 NOTE — ED PROVIDER NOTES
Subjective   59-year-old male with extensive past medical history presents for evaluation of generalized weakness and dizziness.  Of note, the patient was recently admitted to our facility on November 1 for pneumonia.  He had a complicated and prolonged hospital course and ultimately was intubated for acute respiratory failure.  He also had seizures while in the hospital.  He ultimately signed out AGAINST MEDICAL ADVICE 2 days ago.  He states that since he went home he has not felt well.  He states that he had a normal run of dialysis on Saturday.  This morning, at approximately 1 AM he began experiencing dizziness in addition to his overall sensation of not feeling well.  He was experiencing nausea and vomiting as well and felt as if the room was spinning on him.  EMS was subsequently called and he was brought to our facility for further evaluation and treatment.            Review of Systems   Gastrointestinal: Positive for nausea and vomiting.   Neurological: Positive for dizziness and weakness.   All other systems reviewed and are negative.      Past Medical History:   Diagnosis Date   • Abdominal pain    • Anemia    • Cataracts, bilateral    • CHF (congestive heart failure) (CMS/[facility])    • Chronic kidney disease    • Constipation    • Cough    • Dependence on nocturnal oxygen therapy    • Diabetes mellitus (CMS/[facility])    • Diarrhea    • End stage renal failure on dialysis (CMS/[facility])     TUES, THURS, SAT   • GERD (gastroesophageal reflux disease)    • H/O blood clots     LEG/NECK   • H/O chest x-ray 03/25/2016    Interval decrease in size of the patients right pleural effusion with a persistent small left effusion as well   • History of transfusion    • Hypertension    • Left-sided weakness    • Nauseated     DRY HEAVES   • Pleural effusion    • Pneumonia    • Pneumonia    • Renal failure    • Stroke (CMS/[facility]) 2006    LEFT SIDED WEAKNESS   • Swelling    • Teeth missing    • Tinnitus    • Ulcer, stomach peptic      "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 LAVAGE, BRUSHINGS AND BIOPSY;  Surgeon: Ean Hernandez MD;  Location: [facility] OR;  Service: Pulmonary   • CARDIAC CATHETERIZATION N/A 3/28/2018    Procedure: Peripheral angiography;  Surgeon: Clay Ruano MD;  Location: [facility] CATH INVASIVE LOCATION;  Service: Cardiovascular   • CARDIAC CATHETERIZATION N/A 3/28/2018    Procedure: Angioplasty-peripheral;  Surgeon: Clay Ruano MD;  Location: [facility] CATH INVASIVE LOCATION;  Service: Cardiovascular   • CARDIAC CATHETERIZATION N/A 3/28/2018    Procedure: Atherectomy-peripheral;  Surgeon: Clay Ruano MD;  Location: [facility] CATH INVASIVE LOCATION;  Service: Cardiovascular   • CATARACT EXTRACTION, BILATERAL     • CHOLECYSTECTOMY     • COLONOSCOPY     • EYE SURGERY      BLEEDING BEHING BILATERAL EYES   • INTERVENTIONAL RADIOLOGY PROCEDURE N/A 3/28/2018    Procedure: Abdominal Aortogram;  Surgeon: Clay Ruano MD;  Location: [facility] CATH INVASIVE LOCATION;  Service: Cardiovascular   • PORTACATH PLACEMENT     • SHOULDER MANIPULATION Left     \"FROZEN SHOULDER\"   • VENOUS ACCESS DEVICE (PORT) REMOVAL         Family History   Problem Relation Age of Onset   • COPD Mother    • Diabetes Father    • Hypertension Sister    • Diabetes Sister    • Hypertension Brother    • Diabetes Paternal Grandmother        Social History     Socioeconomic History   • Marital status:      Spouse name: Not on file   • Number of children: Not on file   • Years of education: Not on file   • Highest education level: Not on file   Tobacco Use   • Smoking status: Never Smoker   • Smokeless tobacco: Never Used   • Tobacco comment: 2ND HAND SMOKE EXPOSURE   Substance and Sexual " Activity   • Alcohol use: No   • Drug use: No   • Sexual activity: Defer           Objective   Physical Exam   Constitutional: He is oriented to person, place, and time. He appears well-developed and well-nourished. No distress.   Chronically ill-appearing male   HENT:   Head: Normocephalic and atraumatic.   Mouth/Throat: Oropharynx is clear and moist.   Eyes: EOM are normal. Pupils are equal, round, and reactive to light.   No nystagmus   Neck: Normal range of motion. No JVD present.   Cardiovascular: Normal rate, regular rhythm and normal heart sounds. Exam reveals no gallop and no friction rub.   No murmur heard.  Pulmonary/Chest: Effort normal. No respiratory distress. He has no wheezes. He has rales (right lung base).   Abdominal: Soft. Bowel sounds are normal. He exhibits no distension and no mass. There is no tenderness. There is no guarding.   Musculoskeletal: Normal range of motion.   Neurological: He is alert and oriented to person, place, and time. No cranial nerve deficit or sensory deficit. Coordination normal.   Skin: Skin is warm and dry. No rash noted. He is not diaphoretic. No erythema. No pallor.   Psychiatric: He has a normal mood and affect. Judgment and thought content normal.   Nursing note and vitals reviewed.      Procedures           ED Course  ED Course as of Nov 18 0747   Mon Nov 18, 2019   0653 59-year-old male with long and complicated past medical history presents for evaluation of generalized weakness and dizziness.  Of note, the patient just left our facility 2 days ago AGAINST MEDICAL ADVICE after being treated as an inpatient for pneumonia.  He states that since leaving he has continued to feel weak but this morning became acutely dizzy and was also experiencing nausea and vomiting, prompting his return visit to the emergency department.  On arrival, patient nontoxic-appearing.  No focal neurological deficits noted on exam.  [DD]   0654 Initial EKG revealed normal sinus rhythm with  a heart rate of 77 and nonspecific T wave abnormalities in lateral precordial leads.  Labs remarkable for hemoglobin of 6.  Patient typed and crossed.  We will proceed with transfusion of packed red blood cells.  The patient denies any bloody or tarry stools.  [DD]   0655 Chest x-ray revealed persistent and worsening pneumonia.  HCAP coverage initiated.  Blood cultures are pending.  The patient is agreeable to being readmitted to the hospital at this time.  Awaiting callback from the hospitalist.  [DD]   0737 Labs also remarkable for potassium of 5.6.  No QRS widening noted on EKG.  Albuterol, insulin, and D50 given.  The patient's LFTs are also markedly elevated.  He denies any Tylenol use.  He denies any heavy alcohol use.  He denies any abdominal pain.  Etiology of his transaminitis is currently unknown.  I have ordered tox screen, Tylenol and aspirin levels, and an acute hepatitis panel.  I discussed the patient's case with Dr. Hinojosa, and the patient will be admitted under her care for further evaluation and treatment.  He is hemodynamically stable at this time and aware/agreeable with the plan.  [DD]      ED Course User Index  [DD] Joel Palm MD          Recent Results (from the past 24 hour(s))   Comprehensive Metabolic Panel    Collection Time: 11/18/19  5:59 AM   Result Value Ref Range    Glucose 230 (H) 65 - 99 mg/dL    BUN 53 (H) 8 - 23 mg/dL    Creatinine 3.58 (H) 0.76 - 1.27 mg/dL    Sodium 136 136 - 145 mmol/L    Potassium 5.6 (H) 3.5 - 5.2 mmol/L    Chloride 94 (L) 98 - 107 mmol/L    CO2 20.0 (L) 22.0 - 29.0 mmol/L    Calcium 10.3 8.6 - 10.5 mg/dL    Total Protein 7.2 6.0 - 8.5 g/dL    Albumin 3.40 (L) 3.50 - 5.20 g/dL    ALT (SGPT) 727 (H) 1 - 41 U/L    AST (SGOT) 1,590 (H) 1 - 40 U/L    Alkaline Phosphatase 218 (H) 39 - 117 U/L    Total Bilirubin 0.6 0.2 - 1.2 mg/dL    eGFR Non African Amer 17 (L) >60 mL/min/1.73    Globulin 3.8 gm/dL    A/G Ratio 0.9 g/dL    BUN/Creatinine Ratio 14.8 7.0  - 25.0    Anion Gap 22.0 (H) 5.0 - 15.0 mmol/L   Magnesium    Collection Time: 11/18/19  5:59 AM   Result Value Ref Range    Magnesium 2.3 1.6 - 2.4 mg/dL   Light Blue Top    Collection Time: 11/18/19  5:59 AM   Result Value Ref Range    Extra Tube hold for add-on    Green Top (Gel)    Collection Time: 11/18/19  5:59 AM   Result Value Ref Range    Extra Tube Hold for add-ons.    Lavender Top    Collection Time: 11/18/19  5:59 AM   Result Value Ref Range    Extra Tube hold for add-on    Gold Top - SST    Collection Time: 11/18/19  5:59 AM   Result Value Ref Range    Extra Tube Hold for add-ons.    CBC Auto Differential    Collection Time: 11/18/19  5:59 AM   Result Value Ref Range    WBC 9.59 3.40 - 10.80 10*3/mm3    RBC 2.16 (L) 4.14 - 5.80 10*6/mm3    Hemoglobin 6.2 (C) 13.0 - 17.7 g/dL    Hematocrit 20.6 (C) 37.5 - 51.0 %    MCV 95.4 79.0 - 97.0 fL    MCH 28.7 26.6 - 33.0 pg    MCHC 30.1 (L) 31.5 - 35.7 g/dL    RDW 16.9 (H) 12.3 - 15.4 %    RDW-SD 56.4 (H) 37.0 - 54.0 fl    MPV 10.5 6.0 - 12.0 fL    Platelets 212 140 - 450 10*3/mm3    Neutrophil % 74.3 42.7 - 76.0 %    Lymphocyte % 8.3 (L) 19.6 - 45.3 %    Monocyte % 12.4 (H) 5.0 - 12.0 %    Eosinophil % 0.3 0.3 - 6.2 %    Basophil % 0.3 0.0 - 1.5 %    Immature Grans % 4.4 (H) 0.0 - 0.5 %    Neutrophils, Absolute 7.12 (H) 1.70 - 7.00 10*3/mm3    Lymphocytes, Absolute 0.80 0.70 - 3.10 10*3/mm3    Monocytes, Absolute 1.19 (H) 0.10 - 0.90 10*3/mm3    Eosinophils, Absolute 0.03 0.00 - 0.40 10*3/mm3    Basophils, Absolute 0.03 0.00 - 0.20 10*3/mm3    Immature Grans, Absolute 0.42 (H) 0.00 - 0.05 10*3/mm3    nRBC 0.2 0.0 - 0.2 /100 WBC     Note: In addition to lab results from this visit, the labs listed above may include labs taken at another facility or during a different encounter within the last 24 hours. Please correlate lab times with ED admission and discharge times for further clarification of the services performed during this visit.    MRI Brain Without  Contrast   Final Result      XR Chest 1 View   Final Result        Vitals:    11/18/19 0600 11/18/19 0615 11/18/19 0630 11/18/19 0637   BP: 97/59 (!) 81/53 108/68    BP Location:       Patient Position:       Pulse: 76  75    Resp:       Temp:    97.4 °F (36.3 °C)   TempSrc:    Axillary   SpO2: 95%  100%    Weight:       Height:         Medications   sodium chloride 0.9 % flush 10 mL (not administered)   vancomycin 1250 mg/250 mL 0.9% NS IVPB ([facility]) (not administered)   piperacillin-tazobactam (ZOSYN) in iso-osmotic dextrose IVPB 2.25 g (premix) (not administered)   dextrose (D50W) 25 g/ 50mL Intravenous Solution 50 mL (not administered)   insulin regular (humuLIN R,novoLIN R) injection 6 Units (not administered)   albuterol (PROVENTIL) nebulizer solution 0.083% 2.5 mg/3mL (not administered)   meclizine (ANTIVERT) tablet 25 mg (25 mg Oral Given 11/18/19 0602)   ondansetron (ZOFRAN) injection 4 mg (4 mg Intravenous Given 11/18/19 0601)   sodium chloride 0.9 % bolus 500 mL (500 mL Intravenous New Bag 11/18/19 0623)     ECG/EMG Results (last 24 hours)     Procedure Component Value Units Date/Time    ECG 12 Lead [[id number]] Collected:  11/18/19 0524     Updated:  11/18/19 0638    ECG 12 Lead [[id number]] Collected:  11/18/19 0626     Updated:  11/18/19 0638        ECG 12 Lead         ECG 12 Lead             Recent Results (from the past 24 hour(s))   Comprehensive Metabolic Panel    Collection Time: 11/18/19  5:59 AM   Result Value Ref Range    Glucose 230 (H) 65 - 99 mg/dL    BUN 53 (H) 8 - 23 mg/dL    Creatinine 3.58 (H) 0.76 - 1.27 mg/dL    Sodium 136 136 - 145 mmol/L    Potassium 5.6 (H) 3.5 - 5.2 mmol/L    Chloride 94 (L) 98 - 107 mmol/L    CO2 20.0 (L) 22.0 - 29.0 mmol/L    Calcium 10.3 8.6 - 10.5 mg/dL    Total Protein 7.2 6.0 - 8.5 g/dL    Albumin 3.40 (L) 3.50 - 5.20 g/dL    ALT (SGPT) 727 (H) 1 - 41 U/L    AST (SGOT) 1,590 (H) 1 - 40 U/L    Alkaline Phosphatase 218 (H) 39 - 117 U/L    Total Bilirubin 0.6 0.2 -  1.2 mg/dL    eGFR Non African Amer 17 (L) >60 mL/min/1.73    Globulin 3.8 gm/dL    A/G Ratio 0.9 g/dL    BUN/Creatinine Ratio 14.8 7.0 - 25.0    Anion Gap 22.0 (H) 5.0 - 15.0 mmol/L   Magnesium    Collection Time: 11/18/19  5:59 AM   Result Value Ref Range    Magnesium 2.3 1.6 - 2.4 mg/dL   Light Blue Top    Collection Time: 11/18/19  5:59 AM   Result Value Ref Range    Extra Tube hold for add-on    Green Top (Gel)    Collection Time: 11/18/19  5:59 AM   Result Value Ref Range    Extra Tube Hold for add-ons.    Lavender Top    Collection Time: 11/18/19  5:59 AM   Result Value Ref Range    Extra Tube hold for add-on    Gold Top - SST    Collection Time: 11/18/19  5:59 AM   Result Value Ref Range    Extra Tube Hold for add-ons.    CBC Auto Differential    Collection Time: 11/18/19  5:59 AM   Result Value Ref Range    WBC 9.59 3.40 - 10.80 10*3/mm3    RBC 2.16 (L) 4.14 - 5.80 10*6/mm3    Hemoglobin 6.2 (C) 13.0 - 17.7 g/dL    Hematocrit 20.6 (C) 37.5 - 51.0 %    MCV 95.4 79.0 - 97.0 fL    MCH 28.7 26.6 - 33.0 pg    MCHC 30.1 (L) 31.5 - 35.7 g/dL    RDW 16.9 (H) 12.3 - 15.4 %    RDW-SD 56.4 (H) 37.0 - 54.0 fl    MPV 10.5 6.0 - 12.0 fL    Platelets 212 140 - 450 10*3/mm3    Neutrophil % 74.3 42.7 - 76.0 %    Lymphocyte % 8.3 (L) 19.6 - 45.3 %    Monocyte % 12.4 (H) 5.0 - 12.0 %    Eosinophil % 0.3 0.3 - 6.2 %    Basophil % 0.3 0.0 - 1.5 %    Immature Grans % 4.4 (H) 0.0 - 0.5 %    Neutrophils, Absolute 7.12 (H) 1.70 - 7.00 10*3/mm3    Lymphocytes, Absolute 0.80 0.70 - 3.10 10*3/mm3    Monocytes, Absolute 1.19 (H) 0.10 - 0.90 10*3/mm3    Eosinophils, Absolute 0.03 0.00 - 0.40 10*3/mm3    Basophils, Absolute 0.03 0.00 - 0.20 10*3/mm3    Immature Grans, Absolute 0.42 (H) 0.00 - 0.05 10*3/mm3    nRBC 0.2 0.0 - 0.2 /100 WBC     Note: In addition to lab results from this visit, the labs listed above may include labs taken at another facility or during a different encounter within the last 24 hours. Please correlate lab times  with ED admission and discharge times for further clarification of the services performed during this visit.    MRI Brain Without Contrast   Final Result      XR Chest 1 View   Final Result        Vitals:    11/18/19 0600 11/18/19 0615 11/18/19 0630 11/18/19 0637   BP: 97/59 (!) 81/53 108/68    BP Location:       Patient Position:       Pulse: 76  75    Resp:       Temp:    97.4 °F (36.3 °C)   TempSrc:    Axillary   SpO2: 95%  100%    Weight:       Height:         Medications   sodium chloride 0.9 % flush 10 mL (not administered)   vancomycin 1250 mg/250 mL 0.9% NS IVPB ([facility]) (not administered)   piperacillin-tazobactam (ZOSYN) in iso-osmotic dextrose IVPB 2.25 g (premix) (not administered)   dextrose (D50W) 25 g/ 50mL Intravenous Solution 50 mL (not administered)   insulin regular (humuLIN R,novoLIN R) injection 6 Units (not administered)   albuterol (PROVENTIL) nebulizer solution 0.083% 2.5 mg/3mL (not administered)   meclizine (ANTIVERT) tablet 25 mg (25 mg Oral Given 11/18/19 0602)   ondansetron (ZOFRAN) injection 4 mg (4 mg Intravenous Given 11/18/19 0601)   sodium chloride 0.9 % bolus 500 mL (500 mL Intravenous New Bag 11/18/19 0623)     ECG/EMG Results (last 24 hours)     Procedure Component Value Units Date/Time    ECG 12 Lead [[id number]] Collected:  11/18/19 0524     Updated:  11/18/19 0638    ECG 12 Lead [[id number]] Collected:  11/18/19 0626     Updated:  11/18/19 0638        ECG 12 Lead         ECG 12 Lead                     MDM    Final diagnoses:   HCAP (healthcare-associated pneumonia)   ESRD (end stage renal disease) (CMS/[facility])   Anemia, unspecified type   Generalized weakness   Dizziness   Hyperkalemia              Joel Palm MD  11/18/19 0766

## 2019-11-18 NOTE — CONSULTS
"[facility]ology Consult    Referring Provider: Isabel Hinojosa MD   PCP: Jeannette Godoy APRN    Reason for Consultation: Acute blood loss anemia and elevated LFTs    Chief complaint: Dizziness     History of present illness:    Talha Cutler is a 59 y.o. male who is admitted with acute blood loss anemia and elevated LFTs.   He initially was admitted to [facility] on 11/1/19 for shortness of breath and found to have pneumonia with positive metapneumovirus.   On 11/5/19 he developed coffee ground emesis thought to be associated with epistaxis.   He was evaluated by gastroenterologist Dr. Desai.   No endoscopy performed. He had worsening respiratory decline and was transferred to our facility on 11/7 and was intubated.   He had two tonic-clonic seizures on arrival.   He was evaluated by neurology and these were found to be consistent with psychogenic nonepileptic seizures.    He left against medical advice two days ago.     He now presents to our ED with a chief complaint of weakness and dizziness.  He is found to be anemic with H&H of 6.2 and 20.   He has markedly elevated transaminases (, AST 1590), Alk phos is 216 and total bilirubin is normal at 0.6.  Tylenol level is < 0.5.   Acute viral hepatitis panel is negative.   He has been hypotensive this morning (BP 81/53).    He describes dizziness upon sitting up.   Stools have been \"completely black\".   He does note epistaxis a few weeks ago but not recent.   He is on Plavix.       Allergies:  Erythromycin    Scheduled Meds:    albuterol 10 mg Nebulization Once   ammonia      dextrose 50 mL Intravenous Once   insulin regular 6 Units Intravenous Once   piperacillin-tazobactam 2.25 g Intravenous Once   vancomycin 20 mg/kg Intravenous Once        Infusions:       PRN Meds:  •  albumin human  •  sodium chloride    Home Meds:  Medications Prior to Admission   Medication Sig Dispense Refill Last Dose   • albuterol (PROVENTIL HFA;VENTOLIN HFA) " 108 (90 Base) MCG/ACT inhaler Inhale 2 puffs Every 4 (Four) Hours As Needed for Shortness of Air. 1 inhaler 0 Taking   • albuterol (PROVENTIL) (2.5 MG/3ML) 0.083% nebulizer solution Take 2.5 mg by nebulization Every 4 (Four) Hours As Needed for Wheezing.   11/1/2019 at Unknown time   • aspirin 81 MG chewable tablet Chew 81 mg Daily.   11/1/2019 at Unknown time   • atorvastatin (LIPITOR) 40 MG tablet Take 40 mg by mouth Daily.   11/1/2019 at Unknown time   • B Complex-C-Folic Acid (RADHA-SIHRLENE PO) Take 1 tablet by mouth. Patient states he takes these after dialysis, but is unaware of dose.    11/1/2019 at Unknown time   • Cholecalciferol (VITAMIN D3) 5000 UNITS capsule capsule Take 1 capsule by mouth Daily.   11/1/2019 at Unknown time   • clopidogrel (PLAVIX) 75 MG tablet Take 1 tablet by mouth Daily. Start taking from 1/12/2019 onwards ONLY if not coughing up significant amount of blood. 90 tablet 3 11/1/2019 at Unknown time   • docusate sodium (COLACE) 100 MG capsule Take 100 mg by mouth Every 12 (Twelve) Hours.   11/1/2019 at Unknown time   • fluticasone (VERAMYST) 27.5 MCG/SPRAY nasal spray 2 sprays into the nostril(s) as directed by provider Daily.   9/20/2019 at 0800   • folic acid (FOLVITE) 1 MG tablet Take 1 mg by mouth daily.   11/1/2019 at Unknown time   • insulin aspart (novoLOG FLEXPEN) 100 UNIT/ML solution pen-injector sc pen Inject 9 Units under the skin into the appropriate area as directed 3 (Three) Times a Day With Meals.   9/20/2019 at 0800   • insulin glargine (LANTUS) 100 UNIT/ML injection Inject 15 Units under the skin into the appropriate area as directed Every Night.   11/1/2019 at Unknown time   • isosorbide mononitrate (IMDUR) 30 MG 24 hr tablet Take 30 mg by mouth Daily.   11/1/2019 at Unknown time   • lanthanum (FOSRENOL) 1000 MG chewable tablet Chew 1 tablet 3 (Three) Times a Day With Meals.      • losartan (COZAAR) 25 MG tablet Take 1 tablet by mouth Daily.      • metoprolol succinate XL  (TOPROL-XL) 25 MG 24 hr tablet Take 3 tablets by mouth Daily. (Patient taking differently: Take 100 mg by mouth Daily.)      • ondansetron ODT (ZOFRAN-ODT) 4 MG disintegrating tablet Take 1 tablet by mouth Every 6 (Six) Hours As Needed for Nausea or Vomiting. 20 tablet 0 Not Taking   • famotidine (PEPCID) 20 MG tablet Take 20 mg by mouth Daily.   Unknown at Unknown time   • levETIRAcetam in NaCl 0.82% (KEPPRA) 500 MG/100ML solution IVPB Infuse 100 mL into a venous catheter Every 12 (Twelve) Hours. 4000 mL     • levothyroxine (SYNTHROID, LEVOTHROID) 100 MCG tablet Take 125 mcg by mouth Daily.   Unknown at Unknown time       ROS: Review of Systems   Constitutional: Positive for fatigue.   HENT:        Left ear hearing loss   Eyes: Negative.    Respiratory: Positive for cough and shortness of breath.    Cardiovascular: Negative.    Gastrointestinal: Positive for abdominal pain.        Black stools    Endocrine: Negative.    Genitourinary: Negative.    Musculoskeletal: Positive for gait problem.   Skin: Negative.    Allergic/Immunologic: Negative.    Neurological: Positive for dizziness, weakness and light-headedness.   Hematological: Negative.    Psychiatric/Behavioral: Negative.        PAST MED HX:  Past Medical History:   Diagnosis Date   • Abdominal pain    • Anemia    • Cataracts, bilateral    • CHF (congestive heart failure) (CMS/[facility])    • Chronic kidney disease    • Constipation    • Cough    • Dependence on nocturnal oxygen therapy    • Diabetes mellitus (CMS/[facility])    • Diarrhea    • End stage renal failure on dialysis (CMS/[facility])     TUES, THURS, SAT   • GERD (gastroesophageal reflux disease)    • H/O blood clots     LEG/NECK   • H/O chest x-ray 03/25/2016    Interval decrease in size of the patients right pleural effusion with a persistent small left effusion as well   • History of transfusion    • Hypertension    • Left-sided weakness    • Nauseated     DRY HEAVES   • Pleural effusion    • Pneumonia    •  "Pneumonia    • Renal failure    • Stroke (CMS/[facility]) 2006    LEFT SIDED WEAKNESS   • Swelling    • Teeth missing    • Tinnitus    • Ulcer, stomach peptic     HISTORY OF ULCER AS A TEENAGER   • Wears glasses        PAST SURG HX:  Past Surgical History:   Procedure Laterality Date   • ARTERIOVENOUS FISTULA Right    • BRONCHOSCOPY N/A 1/10/2019    Procedure: BRONCHOSCOPY with MAC and Flouro. LAVAGE, BRUSHINGS AND BIOPSY;  Surgeon: Ean Hernandez MD;  Location: [facility] OR;  Service: Pulmonary   • CARDIAC CATHETERIZATION N/A 3/28/2018    Procedure: Peripheral angiography;  Surgeon: Clya Ruano MD;  Location: [facility] CATH INVASIVE LOCATION;  Service: Cardiovascular   • CARDIAC CATHETERIZATION N/A 3/28/2018    Procedure: Angioplasty-peripheral;  Surgeon: Clay Ruano MD;  Location: [facility] CATH INVASIVE LOCATION;  Service: Cardiovascular   • CARDIAC CATHETERIZATION N/A 3/28/2018    Procedure: Atherectomy-peripheral;  Surgeon: Clay Ruano MD;  Location: [facility] CATH INVASIVE LOCATION;  Service: Cardiovascular   • CATARACT EXTRACTION, BILATERAL     • CHOLECYSTECTOMY     • COLONOSCOPY     • EYE SURGERY      BLEEDING BEHING BILATERAL EYES   • INTERVENTIONAL RADIOLOGY PROCEDURE N/A 3/28/2018    Procedure: Abdominal Aortogram;  Surgeon: Clay Ruano MD;  Location: [facility] CATH INVASIVE LOCATION;  Service: Cardiovascular   • PORTACATH PLACEMENT     • SHOULDER MANIPULATION Left     \"FROZEN SHOULDER\"   • VENOUS ACCESS DEVICE (PORT) REMOVAL         FAM HX:  Family History   Problem Relation Age of Onset   • COPD Mother    • Diabetes Father    • Hypertension Sister    • Diabetes Sister    • Hypertension Brother    • Diabetes Paternal Grandmother        SOC HX:  Social History     Socioeconomic History   • Marital status:      Spouse name: Not on file   • Number of children: Not on file   • Years of education: Not on file   • Highest education level: Not on file   Tobacco Use   • " "Smoking status: Never Smoker   • Smokeless tobacco: Never Used   • Tobacco comment: 2ND HAND SMOKE EXPOSURE   Substance and Sexual Activity   • Alcohol use: No   • Drug use: No   • Sexual activity: Defer       PHYSICAL EXAM  /66   Pulse 85   Temp 96.4 °F (35.8 °C)   Resp 17   Ht 172.7 cm (68\")   Wt 62.8 kg (138 lb 8 oz)   SpO2 100%   BMI 21.06 kg/m²   Wt Readings from Last 3 Encounters:   11/18/19 62.8 kg (138 lb 8 oz)   11/16/19 61.7 kg (136 lb)   11/04/19 59.5 kg (131 lb 3.2 oz)   ,body mass index is 21.06 kg/m².  Physical Exam   Constitutional: He is oriented to person, place, and time.   Chronically ill appearing, thin male   Seen on hemodialysis    HENT:   Head: Normocephalic and atraumatic.   Eyes: No scleral icterus.   Neck: Normal range of motion.   Cardiovascular: Normal rate and regular rhythm.   Pulmonary/Chest: Effort normal.   Coarse breath sounds bilaterally.  No distress    Abdominal: Soft. Bowel sounds are normal. He exhibits no distension. There is no tenderness.   Neurological: He is alert and oriented to person, place, and time.   Skin: Skin is warm and dry.   Psychiatric: His behavior is normal.   Nursing note and vitals reviewed.      Results Review:   I reviewed the patient's new clinical results.    Lab Results   Component Value Date    WBC 9.59 11/18/2019    HGB 6.2 (C) 11/18/2019    HGB 7.5 (L) 11/16/2019    HGB 7.9 (L) 11/15/2019    HCT 20.6 (C) 11/18/2019    MCV 95.4 11/18/2019     11/18/2019       Lab Results   Component Value Date    INR 1.04 07/07/2018    INR 1.29 (H) 03/28/2018    INR 1.45 (H) 03/26/2018       Lab Results   Component Value Date    GLUCOSE 246 (H) 11/18/2019    BUN 59 (H) 11/18/2019    CREATININE 3.78 (H) 11/18/2019    EGFRIFNONA 16 (L) 11/18/2019    EGFRIFAFRI  11/12/2019      Comment:      <15 Indicative of kidney failure.    BCR 15.6 11/18/2019     (L) 11/18/2019    K 5.1 11/18/2019    CO2 21.0 (L) 11/18/2019    CALCIUM 10.0 11/18/2019    " ALBUMIN 3.40 (L) 11/18/2019    ALKPHOS 218 (H) 11/18/2019    BILITOT 0.6 11/18/2019     (H) 11/18/2019    AST 1,590 (H) 11/18/2019        Ref. Range 11/18/2019 05:59   Acetaminophen Latest Ref Range: 10.0 - 30.0 mcg/mL <5.0 (L)      Ref. Range 11/18/2019 05:59   Salicylate Latest Ref Range: <=30.0 mg/dL <0.3      Ref. Range 11/18/2019 05:59   Hep A IgM Latest Ref Range: Non-Reactive  Non-Reactive   Hepatitis B Surface Ag Latest Ref Range: Non-Reactive  Non-Reactive   Hep B Core IgM Latest Ref Range: Non-Reactive  Non-Reactive   Hepatitis C Ab Latest Ref Range: Non-Reactive  Non-Reactive     ASSESSMENTS/PLANS    1.  Acute blood loss anemia   2.  Melena   3.  Elevated LFTs   4.  ESRD, on hemodialysis   5. Pneumonia     Suspect upper GI source of bleeding.   Ongoing melena on Plavix.    Symptomatic with hypotension and thus dizziness.   Possible AVM vs PUD.    >>> Recommend EGD tomorrow  >>> Suspect elevated LFTs are related to shock liver.    Should see rapid improvement in the next few days.   If not, needs further work up.   Will order urine tox.     >>> IV BID PPI    I discussed the patients findings and my recommendations with patient    JAG Brown  11/18/19  12:43 PM

## 2019-11-18 NOTE — PLAN OF CARE
Problem: Fall Risk (Adult)  Goal: Identify Related Risk Factors and Signs and Symptoms  Outcome: Ongoing (interventions implemented as appropriate)    Goal: Absence of Fall  Outcome: Ongoing (interventions implemented as appropriate)      Problem: Patient Care Overview  Goal: Plan of Care Review  Outcome: Ongoing (interventions implemented as appropriate)   11/18/19 1002   Coping/Psychosocial   Plan of Care Reviewed With patient;family   Plan of Care Review   Progress no change   OTHER   Outcome Summary admitted for pneumonia, taken for HD, needs transfusion. family at bedside.     Goal: Individualization and Mutuality  Outcome: Ongoing (interventions implemented as appropriate)    Goal: Discharge Needs Assessment  Outcome: Ongoing (interventions implemented as appropriate)    Goal: Interprofessional Rounds/Family Conf  Outcome: Ongoing (interventions implemented as appropriate)

## 2019-11-18 NOTE — PROGRESS NOTES
Discharge Planning Assessment  [facility]     Patient Name: Talha Cutler  MRN: [id number]  Today's Date: 11/18/2019    Admit Date: 11/18/2019    Discharge Needs Assessment    No documentation.       Discharge Plan     Row Name 11/18/19 1532       Plan    Plan  HOME with HH    Patient/Family in Agreement with Plan  yes    Plan Comments  Met with pt at bedside.  He resides with his wife in [facility].  He uses a power chair at baseline for mobility and indicates that he can transfer himself.  Wife assists with sponge bathing.  He relates that he is able to dress, feed, and cook for himself.  He has cont home O2 @ 2lpm provided per [facility] in [place], [place].  He also has a home CPAP (provider unknown).  He is current with [facility] (, SW;  Dx:  heart failure, PNA, COPD).  Will need ELVA orders upon DC.  He dialyzes at [facility] on TTS.  Wife transports to/from [facility].  Confirmed he has [employer] Medicare with Rx coverage.  Discussed possible STR (he declined on previous admission).  He has again declined and relates that he plans to return home with assistance of wife and HH services.  CM will cont to follow for any DC needs.      Final Discharge Disposition Code  06 - home with home health care        Destination      No service coordination in this encounter.      Durable Medical Equipment      No service coordination in this encounter.      Dialysis/Infusion - Selection Complete      Service Provider Request Status Selected Services Address Phone Number Fax Number    [facility] Selected Dialysis [address] [facility] [address] [phone] —      Home Medical Care      Service Provider Request Status Selected Services Address Phone Number Fax Number    [facility] [place] [facility] Health Services [address] [address] [phone]      Therapy      No service coordination in this encounter.      Community Resources      No service  coordination in this encounter.          Demographic Summary    No documentation.       Functional Status    No documentation.       Psychosocial    No documentation.       Abuse/Neglect    No documentation.       Legal    No documentation.       Substance Abuse    No documentation.       Patient Forms    No documentation.           Haydee Jacobson RN

## 2019-11-18 NOTE — CONSULTS
[facility] Consult Note    Talha Cutler  1960  [id number]    Date of Admit:  11/18/2019    Date of Consult: 11/18/2019        Requesting Provider: No ref. provider found  Evaluating Physician: Teddy Doll MD        Reason for Consultation:  ESRD.   History of present illness:    Patient is a 59 y.o.  Yr old male WELL KNOWN TO [facility] ( LEFT [facility] 2 DAYS AGO AMA ) WITH A H/O ESRD ON HD ( TTS C [facility] WITH NAK ) ADMITTED WITH SEVERE ANEMIA AND WE ARE ASKED TO MANAGE ESRD/DIALYSIS. LAST HD 11/16/19 HERE AND GETS HD FOR 4 HRS WITH AN AVF.    Past Medical History:   Diagnosis Date   • Abdominal pain    • Anemia    • Cataracts, bilateral    • CHF (congestive heart failure) (CMS/HCC)    • Chronic kidney disease    • Constipation    • Cough    • Dependence on nocturnal oxygen therapy    • Diabetes mellitus (CMS/HCC)    • Diarrhea    • End stage renal failure on dialysis (CMS/HCC)     TUES, THURS, SAT   • GERD (gastroesophageal reflux disease)    • H/O blood clots     LEG/NECK   • H/O chest x-ray 03/25/2016    Interval decrease in size of the patients right pleural effusion with a persistent small left effusion as well   • History of transfusion    • Hypertension    • Left-sided weakness    • Nauseated     DRY HEAVES   • Pleural effusion    • Pneumonia    • Pneumonia    • Renal failure    • Stroke (CMS/HCC) 2006    LEFT SIDED WEAKNESS   • Swelling    • Teeth missing    • Tinnitus    • Ulcer, stomach peptic     HISTORY OF ULCER AS A TEENAGER   • Wears glasses        Past Surgical History:   Procedure Laterality Date   • ARTERIOVENOUS FISTULA Right    • BRONCHOSCOPY N/A 1/10/2019    Procedure: BRONCHOSCOPY with MAC and Flouro. LAVAGE, BRUSHINGS AND BIOPSY;  Surgeon: Ean Hernandez MD;  Location: [facility] OR;  Service: Pulmonary   • CARDIAC CATHETERIZATION N/A 3/28/2018    Procedure: Peripheral angiography;  Surgeon: Clay Ruano MD;  Location: [facility] CATH INVASIVE LOCATION;  Service: Cardiovascular   •  "CARDIAC CATHETERIZATION N/A 3/28/2018    Procedure: Angioplasty-peripheral;  Surgeon: Clay Ruano MD;  Location: [facility] CATH INVASIVE LOCATION;  Service: Cardiovascular   • CARDIAC CATHETERIZATION N/A 3/28/2018    Procedure: Atherectomy-peripheral;  Surgeon: Clay Ruano MD;  Location: [facility] CATH INVASIVE LOCATION;  Service: Cardiovascular   • CATARACT EXTRACTION, BILATERAL     • CHOLECYSTECTOMY     • COLONOSCOPY     • EYE SURGERY      BLEEDING BEHING BILATERAL EYES   • INTERVENTIONAL RADIOLOGY PROCEDURE N/A 3/28/2018    Procedure: Abdominal Aortogram;  Surgeon: Clay Ruano MD;  Location: [facility] CATH INVASIVE LOCATION;  Service: Cardiovascular   • PORTACATH PLACEMENT     • SHOULDER MANIPULATION Left     \"FROZEN SHOULDER\"   • VENOUS ACCESS DEVICE (PORT) REMOVAL         Social History     Socioeconomic History   • Marital status:      Spouse name: Not on file   • Number of children: Not on file   • Years of education: Not on file   • Highest education level: Not on file   Tobacco Use   • Smoking status: Never Smoker   • Smokeless tobacco: Never Used   • Tobacco comment: 2ND HAND SMOKE EXPOSURE   Substance and Sexual Activity   • Alcohol use: No   • Drug use: No   • Sexual activity: Defer       family history includes COPD in his mother; Diabetes in his father, paternal grandmother, and sister; Hypertension in his brother and sister. NO FH OF RENAL DZ    Allergies   Allergen Reactions   • Erythromycin Hives       Medication:    Current Facility-Administered Medications:   •  albuterol (PROVENTIL) nebulizer solution 0.083% 2.5 mg/3mL, 10 mg, Nebulization, Once, Joel Palm MD  •  ammonia inhaler  - ADS Override Pull, , , ,   •  dextrose (D50W) 25 g/ 50mL Intravenous Solution 50 mL, 50 mL, Intravenous, Once, Joel Palm MD  •  insulin regular (humuLIN R,novoLIN R) injection 6 Units, 6 Units, Intravenous, Once, Joel Palm MD  •  piperacillin-tazobactam " (ZOSYN) in iso-osmotic dextrose IVPB 2.25 g (premix), 2.25 g, Intravenous, Once, Joel Palm MD  •  sodium chloride 0.9 % flush 10 mL, 10 mL, Intravenous, PRN, Joel Palm MD  •  vancomycin 1250 mg/250 mL 0.9% NS IVPB ([facility]), 20 mg/kg, Intravenous, Once, Joel Palm MD    Medications Prior to Admission   Medication Sig Dispense Refill Last Dose   • albuterol (PROVENTIL HFA;VENTOLIN HFA) 108 (90 Base) MCG/ACT inhaler Inhale 2 puffs Every 4 (Four) Hours As Needed for Shortness of Air. 1 inhaler 0 Taking   • albuterol (PROVENTIL) (2.5 MG/3ML) 0.083% nebulizer solution Take 2.5 mg by nebulization Every 4 (Four) Hours As Needed for Wheezing.   11/1/2019 at Unknown time   • aspirin 81 MG chewable tablet Chew 81 mg Daily.   11/1/2019 at Unknown time   • atorvastatin (LIPITOR) 40 MG tablet Take 40 mg by mouth Daily.   11/1/2019 at Unknown time   • B Complex-C-Folic Acid (RADHA-SHIRLENE PO) Take 1 tablet by mouth. Patient states he takes these after dialysis, but is unaware of dose.    11/1/2019 at Unknown time   • Cholecalciferol (VITAMIN D3) 5000 UNITS capsule capsule Take 1 capsule by mouth Daily.   11/1/2019 at Unknown time   • clopidogrel (PLAVIX) 75 MG tablet Take 1 tablet by mouth Daily. Start taking from 1/12/2019 onwards ONLY if not coughing up significant amount of blood. 90 tablet 3 11/1/2019 at Unknown time   • docusate sodium (COLACE) 100 MG capsule Take 100 mg by mouth Every 12 (Twelve) Hours.   11/1/2019 at Unknown time   • fluticasone (VERAMYST) 27.5 MCG/SPRAY nasal spray 2 sprays into the nostril(s) as directed by provider Daily.   9/20/2019 at 0800   • folic acid (FOLVITE) 1 MG tablet Take 1 mg by mouth daily.   11/1/2019 at Unknown time   • insulin aspart (novoLOG FLEXPEN) 100 UNIT/ML solution pen-injector sc pen Inject 9 Units under the skin into the appropriate area as directed 3 (Three) Times a Day With Meals.   9/20/2019 at 0800   • insulin glargine (LANTUS) 100 UNIT/ML injection  Inject 15 Units under the skin into the appropriate area as directed Every Night.   11/1/2019 at Unknown time   • isosorbide mononitrate (IMDUR) 30 MG 24 hr tablet Take 30 mg by mouth Daily.   11/1/2019 at Unknown time   • lanthanum (FOSRENOL) 1000 MG chewable tablet Chew 1 tablet 3 (Three) Times a Day With Meals.      • losartan (COZAAR) 25 MG tablet Take 1 tablet by mouth Daily.      • metoprolol succinate XL (TOPROL-XL) 25 MG 24 hr tablet Take 3 tablets by mouth Daily.      • ondansetron ODT (ZOFRAN-ODT) 4 MG disintegrating tablet Take 1 tablet by mouth Every 6 (Six) Hours As Needed for Nausea or Vomiting. 20 tablet 0 Not Taking   • famotidine (PEPCID) 20 MG tablet Take 20 mg by mouth Daily.   Unknown at Unknown time   • levETIRAcetam in NaCl 0.82% (KEPPRA) 500 MG/100ML solution IVPB Infuse 100 mL into a venous catheter Every 12 (Twelve) Hours. 4000 mL     • levothyroxine (SYNTHROID, LEVOTHROID) 100 MCG tablet Take 125 mcg by mouth Daily.   Unknown at Unknown time       Review of Systems:    Constitutional-- No Fever, chills or sweats. No significant change in weight. SEVERE WEAKNESS AND FATIGUE.  Eye-- no diplopia, no conjunctivitis  ENT-- No new hearing or throat complaints.  No epistaxis or oral sores. No odynophagia or dysphagia. No headache, photophobia or neck stiffness.  CV-- + chest pain. NO palpitations or edema  Resp-- No SOB/cough/Hemoptysis  GI- + nausea, vomiting, & diarrhea.  No hematochezia, melena, or hematemesis.  -- No dysuria, hematuria, or flank pain. No lower tract obstructive symptoms  Skin-- No rash OR ITCHING  Lymph- no painful or swollen lymph nodes in neck/axilla or groin.   Heme- No active bruising or bleeding; no Hx of DVT or PE.  MS-- no swelling or pain in the joints  Neuro-- No acute focal weakness or numbness in the arms or legs.  No seizures.  Psych--No anxiety or depression  Endo--No cold or heat intolerance.  No polyuria, polydipsia, or polyphagia    Full review of systems  "reviewed and negative otherwise for acute complaints    Physical Exam:   Vital Signs   Blood pressure 121/66, pulse 79, temperature 97.4 °F (36.3 °C), temperature source Axillary, resp. rate 16, height 172.7 cm (68\"), weight 62.8 kg (138 lb 8 oz), SpO2 100 %.     GENERAL: Awake and alert, in no acute distress. APPEARS CHRONICALLY ILL.   HEENT: Normocephalic, atraumatic.  PER.  No conjunctivitis. No icterus. Oropharynx clear without evidence of thrush or exudate. No evidence of peridontal disease. CONJUNCTIVA PALE.   NECK: Supple without nuchal rigidity. No mass.  LYMPH: No painful cervical, axillary or inguinal lymphadenopathy.  HEART: RRR; No murmur, rubs, gallops. No bruits in neck, abdomen, or groins, no edema  LUNGS: Normal respiratory effort. Nonlabored. No dullness.  No crepitus.  FEW wheezing, rales & rhonchi.  ABDOMEN: Soft, nontender, nondistended. Positive bowel sounds. No rebound or guarding. NO mass or HSM.  JOINTS:  Full range of motion, no redness or tenderness.  EXT:  No cyanosis, clubbing or edema. + AVG.  :  BLADDER NOT DISTENDED.  SKIN: Warm and dry without rash  NEURO: Oriented to PPT. No focal neurological deficits. Strength equal bilateral  PSYCHIATRIC: Normal insight and judgement. Cooperative with PE    Laboratory Data  Results from last 7 days   Lab Units 11/18/19  0559 11/16/19  0523 11/15/19  0435   HEMOGLOBIN g/dL 6.2* 7.5* 7.9*   HEMATOCRIT % 20.6* 25.6* 26.3*     Results from last 7 days   Lab Units 11/18/19  0559 11/16/19  0523 11/15/19  0435 11/14/19  0544 11/14/19  0409 11/13/19  0451   SODIUM mmol/L 136 133* 137 134*  --  137   POTASSIUM mmol/L 5.6* 4.8 3.9 3.6  --  3.3*   CHLORIDE mmol/L 94* 95* 100 97*  --  98   CO2 mmol/L 20.0* 23.0 25.0 24.0  --  28.0   BUN mg/dL 53* 43* 24* 46*  --  30*   CREATININE mg/dL 3.58* 3.46* 2.36* 3.32*  --  2.51*   CALCIUM mg/dL 10.3 10.2 9.5 10.1  --  10.2   PHOSPHORUS mg/dL  --  3.6 2.4*  --  3.2 2.2*   MAGNESIUM mg/dL 2.3  --   --   --  2.4  --  "   ALBUMIN g/dL 3.40* 3.30* 2.80*  --   --  2.80*     Results from last 7 days   Lab Units 11/18/19  0559   GLUCOSE mg/dL 230*         Results from last 7 days   Lab Units 11/18/19  0559   ALK PHOS U/L 218*   BILIRUBIN mg/dL 0.6   ALT (SGPT) U/L 727*   AST (SGOT) U/L 1,590*     Estimated Creatinine Clearance: 19.5 mL/min (A) (by C-G formula based on SCr of 3.58 mg/dL (H)).    Radiology:      Impression: ESRD. SEVERE ANEMIA. HYPERKALEMIA.      PLAN: Thank you for asking us to see Talha Cutler, I recommend the following: HD TODAY WITH TRANSFUSION OF P RBC'S.       Teddy Doll MD  11/18/2019  9:07 AM

## 2019-11-19 NOTE — ANESTHESIA PREPROCEDURE EVALUATION
Anesthesia Evaluation     Patient summary reviewed and Nursing notes reviewed   NPO Solid Status: > 8 hours  NPO Liquid Status: > 8 hours           Airway   Mallampati: III  TM distance: >3 FB  Neck ROM: limited  Possible difficult intubation  Dental      Pulmonary    (+) pneumonia (RLL) improving , pleural effusion, COPD (albuterol) moderate, home oxygen (night), shortness of breath,   (-) not a smoker  Cardiovascular     ECG reviewed  Patient on routine beta blocker    (+) hypertension, CHF (EF 25% ) Systolic <55%, PVD (sp arterila stentikn attempt ), hyperlipidemia,   (-) past MI, dysrhythmias, angina, cardiac stents    ROS comment: ECG Normal sinus rhythm  T wave abnormality, consider lateral ischemia  ECHO EF25% LV  moderate-to-severely dilated.  LA moderately dilated.  Moderate mitral valve regurgitation is present  Small (<1cm) pericardial effusion.    Neuro/Psych  (+) seizures (keppra), CVA (2006 L side dense hemiplegia ), dizziness/light headedness,     GI/Hepatic/Renal/Endo    (+)  GERD, PUD, GI bleeding , renal disease, diabetes mellitus type 2 using insulin, thyroid problem hypothyroidism  Liver disease: creat 3.7 K5.1.    ROS Comment: A Pancreatitis     Musculoskeletal     Abdominal    Substance History      OB/GYN          Other        ROS/Med Hx Other: Admitted with GIBleed hypotension PNA  2 u PRBC   On plavix  HCT 28   ABG pH, 7.393  pCO2, Arterial: 47.4  pO2, Arterial: 71.9 (L)  pO2, Temperature Corrected: 71.9 (L)  HCO3, Arterial: 28.9 (H)  Base Excess: 3.4 (H)                  Anesthesia Plan    ASA 4     MAC   (PFL maintainn MAP>65)  intravenous induction     Anesthetic plan, all risks, benefits, and alternatives have been provided, discussed and informed consent has been obtained with: patient.    Plan discussed with CRNA.

## 2019-11-19 NOTE — PLAN OF CARE
Problem: Patient Care Overview  Goal: Plan of Care Review  Outcome: Ongoing (interventions implemented as appropriate)   11/19/19 5074   Coping/Psychosocial   Plan of Care Reviewed With patient   Plan of Care Review   Progress improving   OTHER   Outcome Summary Pt requires MOD A for bed mobility, MOD A x2 for SB t/fs. Pt demonstrates weakness in BLEs, decreased balance and strength in trunk. PT to cotninue during stay. Pt would benefit form rehab, but likely to refuse.

## 2019-11-19 NOTE — PLAN OF CARE
Problem: Patient Care Overview  Goal: Plan of Care Review  Outcome: Ongoing (interventions implemented as appropriate)   11/19/19 1717   Coping/Psychosocial   Plan of Care Reviewed With patient;spouse;son   Plan of Care Review   Progress improving   OTHER   Outcome Summary OT eval completed Pt presents with deficits in strength, AROM, and balance compared to baseline ADL completion, modAx2 sliding board tf bed to chair, min A balance seated EOB intermittently with CGA throughout, able to completed LBD supine with rolling and bridging strategies with mod A; recom IPOT d/c home with HHOT and 24/7 assist

## 2019-11-19 NOTE — PROGRESS NOTES
"   LOS: 1 day    Patient Care Team:  Jeannette Godoy APRN as PCP - General (Family Medicine)    Reason For Visit:  F/U ESRD.  Subjective           Review of Systems:    Pulm: No soa   CV:  No CP      Objective       albuterol 10 mg Nebulization Once   atorvastatin 40 mg Oral Nightly   dextrose 50 mL Intravenous Once   insulin lispro 0-7 Units Subcutaneous 4x Daily With Meals & Nightly   insulin regular 6 Units Intravenous Once   levETIRAcetam in NaCl 0.82% 500 mg Intravenous Q12H   levothyroxine 125 mcg Oral Q AM   pantoprazole 40 mg Intravenous BID   sodium chloride 10 mL Intravenous Q12H            Vital Signs:  Blood pressure 122/62, pulse 106, temperature 97.4 °F (36.3 °C), temperature source Temporal, resp. rate 18, height 172.7 cm (68\"), weight 62.6 kg (138 lb), SpO2 99 %.    Flowsheet Rows      First Filed Value   Admission Height  172.7 cm (68\") Documented at 11/18/2019 0523   Admission Weight  59 kg (130 lb) Documented at 11/18/2019 0523          11/18 0701 - 11/19 0700  In: 645   Out: 2830     Physical Exam:    General Appearance: NAD, alert and cooperative, Ox3  Eyes: PER, conjunctivae and sclerae normal, no icterus  Lungs: respirations regular and unlabored, no crepitus, clear to auscultation  Heart/CV: regular rhythm & normal rate, no murmur, no gallop, no rub and no edema  Abdomen: not distended, soft, non-tender, no masses,  bowel sounds present  Skin: No rash, Warm and dry. AVF LUE.    Radiology:            Labs:  Results from last 7 days   Lab Units 11/19/19  1119 11/19/19  0026 11/18/19  1620 11/18/19  0559 11/16/19  0523   WBC 10*3/mm3 12.20*  --   --  9.59 6.01   HEMOGLOBIN g/dL 8.3* 8.3* 8.2* 6.2* 7.5*   HEMATOCRIT % 26.7* 28.0* 24.5* 20.6* 25.6*   PLATELETS 10*3/mm3 241  --   --  212 217     Results from last 7 days   Lab Units 11/19/19  1119 11/19/19  0812 11/18/19  0908 11/18/19  0559 11/16/19  0523 11/15/19  0435  11/14/19  0409 11/13/19  0451   SODIUM mmol/L  --   --  134* 136 133* 137 "   < >  --  137   POTASSIUM mmol/L  --  3.4* 5.1 5.6* 4.8 3.9   < >  --  3.3*   CHLORIDE mmol/L  --   --  95* 94* 95* 100   < >  --  98   CO2 mmol/L  --   --  21.0* 20.0* 23.0 25.0   < >  --  28.0   BUN mg/dL  --   --  59* 53* 43* 24*   < >  --  30*   CREATININE mg/dL  --   --  3.78* 3.58* 3.46* 2.36*   < >  --  2.51*   CALCIUM mg/dL  --   --  10.0 10.3 10.2 9.5   < >  --  10.2   PHOSPHORUS mg/dL 3.0  --   --   --  3.6 2.4*  --  3.2 2.2*   MAGNESIUM mg/dL  --   --   --  2.3  --   --   --  2.4  --    ALBUMIN g/dL  --   --   --  3.40* 3.30* 2.80*  --   --  2.80*    < > = values in this interval not displayed.     Results from last 7 days   Lab Units 11/18/19  0908   GLUCOSE mg/dL 246*       Results from last 7 days   Lab Units 11/18/19  0559   ALK PHOS U/L 218*   BILIRUBIN mg/dL 0.6   ALT (SGPT) U/L 727*   AST (SGOT) U/L 1,590*     Results from last 7 days   Lab Units 11/14/19  0330   PH, ARTERIAL pH units 7.393   PO2 ART mm Hg 71.9*   PCO2, ARTERIAL mm Hg 47.4   HCO3 ART mmol/L 28.9*             Estimated Creatinine Clearance: 18.4 mL/min (A) (by C-G formula based on SCr of 3.78 mg/dL (H)).      Assessment       Melena    HCAP (healthcare-associated pneumonia)            Impression: ESRD. ANEMIA WITH GI BLEED.             Recommendations: HD 11/201/19.      Teddy Doll MD  11/19/19  12:15 PM

## 2019-11-19 NOTE — ANESTHESIA POSTPROCEDURE EVALUATION
Patient: Talha Cutler    Procedure Summary     Date:  11/19/19 Room / Location:   [facility]    Anesthesia Start:  0843 Anesthesia Stop:  0927    Procedure:  ESOPHAGOGASTRODUODENOSCOPY (N/A ) Diagnosis:       Melena      (Melena [K92.1])    Surgeon:  Iban Santana MD Provider:  Sergei Maya MD    Anesthesia Type:  MAC ASA Status:  4          Anesthesia Type: MAC  Last vitals  BP   (!) 144/34 (11/19/19 0945)   Temp   97.4 °F (36.3 °C) (11/19/19 1000)   Pulse   101 (11/19/19 1000)   Resp   18 (11/19/19 1000)     SpO2   100 % (11/19/19 1000)     Anesthesia Post Evaluation

## 2019-11-19 NOTE — PROGRESS NOTES
"                  Clinical Nutrition     Nutrition Assessment  Reason for Visit:   Identified at risk by screening criteria, MST score 2+    Patient Name: Talha Cutler  YOB: 1960  MRN: [id number]  Date of Encounter: 11/19/19 2:11 PM  Admission date: 11/18/2019    Nutrition Assessment   Assessment     Admission Diagnosis  Melena   HCAP ([employer]-associated pneumonia)    Hospital Problem List  T2DM  Hypotension   ESRD on HD  Elevated LFT's  Acute blood loss anemia     Additional applicable diagnosis/conditions/procedures this adm:  (11/19) s/p EGD- gastritis antrum    Applicable PMH/PSxH:   CVA  HTN  GERD  Anemia   Constipation   Heart failure   Cardiac cath  Bronchoscopy  Cholecystectomy     Other nutrition related factors:   [facility]; Pt was receiving EN 11/8-11/13 via [facility].     Reported/Observed/Food/Nutrition Related History:     RD attempted to speak with pt 3x 11/19. He was in OR this AM and working with [employer] this afternoon at visit attempts. RD to follow up with pt for hx.     Anthropometrics     Height: 172.7 cm (68\")  Last filed wt: Weight: 62.6 kg (138 lb) (11/19/19 0813)  Weight Method: Stated    BMI: BMI (Calculated): 21  Normal: 18.5-24.9kg/m2    Ideal Body Weight (IBW) (kg): 70.89      Weight Change   UBW: unsure   Weight change:   % wt change:   Time frame of weight loss:     Labs reviewed   Yes  Hypokalemia   Results from last 7 days   Lab Units 11/19/19  1119 11/19/19  0812 11/18/19  0908 11/18/19  0559 11/16/19  0523 11/15/19  0435  11/14/19  0409   GLUCOSE mg/dL 181*  --  246* 230* 96 98   < >  --    BUN mg/dL 34*  --  59* 53* 43* 24*   < >  --    CREATININE mg/dL 2.65*  --  3.78* 3.58* 3.46* 2.36*   < >  --    SODIUM mmol/L 137  --  134* 136 133* 137   < >  --    CHLORIDE mmol/L 98  --  95* 94* 95* 100   < >  --    POTASSIUM mmol/L 3.4* 3.4* 5.1 5.6* 4.8 3.9   < >  --    PHOSPHORUS mg/dL 3.0  --   --   --  3.6 2.4*  --  3.2   MAGNESIUM mg/dL  " --   --   --  2.3  --   --   --  2.4   ALT (SGPT) U/L 1,684*  --   --  727*  --   --   --   --     < > = values in this interval not displayed.     Results from last 7 days   Lab Units 11/19/19  1119 11/18/19  0559 11/16/19  0523   ALBUMIN g/dL 3.10* 3.40* 3.30*     Results from last 7 days   Lab Units 11/19/19  1130 11/19/19  0743 11/18/19  2045 11/16/19  1250 11/16/19  0713 11/15/19  2012   GLUCOSE mg/dL 172* 144* 150* 104 132* 108     Lab Results   Lab Value Date/Time    HGBA1C 8.90 (H) 09/21/2019 0647    HGBA1C 10.6 (H) 02/23/2019 0408    HGBA1C 9.6 (H) 07/13/2017 0438     Medications reviewed   Pertinent: insulin, keppra, protonix  PRN: morphine, zofran     Current Nutrition Prescription     PO: Diet Regular; Consistent Carbohydrate, Cardiac    Intake: Insufficient data     Nutrition Diagnosis     11/19  Problem Predicted suboptimal energy intake   Etiology GIB   Signs/Symptoms Pt with rpted poor kavita PTA        Nutrition Intervention   1.  Follow treatment progress, Care plan reviewed   2. RD to f/u with pt to obtain hx    Goal:   General: Nutrition support treatment  PO: Increase intake    Monitoring/Evaluation:   Per protocol, PO intake, Pertinent labs, Weight, GI status, Symptoms    Will Continue to follow per protocol    Delaney Krishnan RD  Time Spent: 30 minutes

## 2019-11-19 NOTE — THERAPY TREATMENT NOTE
Patient Name: Talha Cutler  : 1960    MRN: [id number]                              Today's Date: 2019       Admit Date: 2019    Visit Dx:     ICD-10-CM ICD-9-CM   1. HCAP (healthcare-associated pneumonia) J18.9 486   2. ESRD (end stage renal disease) (CMS/[facility]) N18.6 585.6   3. Anemia, unspecified type D64.9 285.9   4. Generalized weakness R53.1 780.79   5. Dizziness R42 780.4   6. Hyperkalemia E87.5 276.7   7. Melena K92.1 578.1   8. Impaired mobility and ADLs Z74.09 799.89     Patient Active Problem List   Diagnosis   • Chest pain   • Edema   • Diabetes mellitus (CMS/[facility])   • Essential hypertension   • Kidney disease   • Encounter for venous access device care   • Recurrent right pleural effusion   • SOB (shortness of breath)   • Type 2 diabetes mellitus treated with insulin (CMS/[facility])   • End stage renal disease on dialysis (CMS/HCC)   • Uncontrolled diabetes mellitus with hyperglycemia, with long-term current use of insulin (CMS/[facility])   • Acute hyperkalemia   • Hypertension due to end stage renal disease caused by type 2 diabetes mellitus, on dialysis (CMS/[facility])   • Physical debility   • Chronic respiratory insufficiency   • Hyperglycemia due to type 2 diabetes mellitus (CMS/[facility])   • Pancreatitis, acute   • Chronic kidney disease-mineral and bone disorder   • Pneumonia of right lower lobe due to infectious organism (CMS/[facility])   • Peripheral vascular disease (CMS/[facility])   • Pneumonia due to infectious organism   • Pneumonia of right lower lobe due to infectious organism (CMS/[facility])   • Pneumonia of right upper lobe due to infectious organism (CMS/[facility])   • Pneumonia of right lung due to infectious organism   • Abnormal CT scan, lung   • Iron deficiency anemia due to chronic blood loss   • Symptomatic anemia   • Epistaxis, recurrent   • Hospital-acquired pneumonia   • Dizziness   • Demand ischemia (CMS/[facility])   • History of CVA (cerebrovascular accident)   • Hypothyroidism (acquired)   • Coffee ground  emesis   • Chronic systolic CHF (congestive heart failure) (CMS/[facility])   • Fluid overload   • Status epilepticus   • Seizure (CMS/[facility])   • HCAP (healthcare-associated pneumonia)   • Melena     Past Medical History:   Diagnosis Date   • Abdominal pain    • Anemia    • Cataracts, bilateral    • CHF (congestive heart failure) (CMS/[facility])    • Chronic kidney disease    • Constipation    • Cough    • Dependence on nocturnal oxygen therapy    • Diabetes mellitus (CMS/[facility])    • Diarrhea    • End stage renal failure on dialysis (CMS/[facility])     SILVINO AVITIA, SAT   • GERD (gastroesophageal reflux disease)    • H/O blood clots     LEG/NECK   • H/O chest x-ray 03/25/2016    Interval decrease in size of the patients right pleural effusion with a persistent small left effusion as well   • History of transfusion    • Hypertension    • Left-sided weakness    • Nauseated     DRY HEAVES   • Pleural effusion    • Pneumonia    • Pneumonia    • Renal failure    • Stroke (CMS/[facility]) 2006    LEFT SIDED WEAKNESS   • Swelling    • Teeth missing    • Tinnitus    • Ulcer, stomach peptic     HISTORY OF ULCER AS A TEENAGER   • Wears glasses      Past Surgical History:   Procedure Laterality Date   • ARTERIOVENOUS FISTULA Right    • BRONCHOSCOPY N/A 1/10/2019    Procedure: BRONCHOSCOPY with MAC and Flouro. LAVAGE, BRUSHINGS AND BIOPSY;  Surgeon: Ean Hernandez MD;  Location: [facility] OR;  Service: Pulmonary   • CARDIAC CATHETERIZATION N/A 3/28/2018    Procedure: Peripheral angiography;  Surgeon: Clay Ruano MD;  Location: [facility] CATH INVASIVE LOCATION;  Service: Cardiovascular   • CARDIAC CATHETERIZATION N/A 3/28/2018    Procedure: Angioplasty-peripheral;  Surgeon: Clay Ruano MD;  Location: [facility] CATH INVASIVE LOCATION;  Service: Cardiovascular   • CARDIAC CATHETERIZATION N/A 3/28/2018    Procedure: Atherectomy-peripheral;  Surgeon: Clay Ruano MD;  Location: [facility] CATH INVASIVE LOCATION;  Service: Cardiovascular  "  • CATARACT EXTRACTION, BILATERAL     • CHOLECYSTECTOMY     • COLONOSCOPY     • EYE SURGERY      BLEEDING BEHING BILATERAL EYES   • INTERVENTIONAL RADIOLOGY PROCEDURE N/A 3/28/2018    Procedure: Abdominal Aortogram;  Surgeon: Clay Ruano MD;  Location: [facility] INVASIVE LOCATION;  Service: Cardiovascular   • PORTACATH PLACEMENT     • SHOULDER MANIPULATION Left     \"FROZEN SHOULDER\"   • VENOUS ACCESS DEVICE (PORT) REMOVAL       General Information     Row Name 11/19/19 1611          PT Evaluation Time/Intention    Document Type  evaluation  -EJ     Mode of Treatment  physical therapy  -EJ     Row Name 11/19/19 1611          General Information    Patient Profile Reviewed?  yes  -EJ     Prior Level of Function  min assist:;ADL's;transfer;bed mobility;independent:;w/c or scooter per wife pt's son assists at times and performs more dependent transfer than necessary.  -EJ     Existing Precautions/Restrictions  fall;other (see comments);oxygen therapy device and L/min L sided weakness, impaired balance.  -EJ     Barriers to Rehab  medically complex;previous functional deficit  -EJ     Row Name 11/19/19 1611          Relationship/Environment    Lives With  spouse;child(starr), adult  -EJ     Row Name 11/19/19 1611          Resource/Environmental Concerns    Current Living Arrangements  home/apartment/condo  -EJ     Row Name 11/19/19 1611          Cognitive Assessment/Intervention- PT/OT    Orientation Status (Cognition)  oriented x 3  -EJ     Row Name 11/19/19 1611          Safety Issues, Functional Mobility    Safety Issues Affecting Function (Mobility)  friction/shear risk;safety precaution awareness;safety precautions follow-through/compliance;judgment;insight into deficits/self awareness;at risk behavior observed;problem solving;awareness of need for assistance  -EJ     Impairments Affecting Function (Mobility)  balance;endurance/activity tolerance;strength;range of motion (ROM)  -EJ       User Key  (r) = " Recorded By, (t) = Taken By, (c) = Cosigned By    Initials Name Provider Type    Jeannette Redman PT Physical Therapist        Mobility     Row Name 11/19/19 1614          Bed Mobility Assessment/Treatment    Rolling Right Branch (Bed Mobility)  minimum assist (75% patient effort);verbal cues  -EJ     Scooting/Bridging Branch (Bed Mobility)  moderate assist (50% patient effort);verbal cues  -EJ     Supine-Sit Branch (Bed Mobility)  verbal cues;moderate assist (50% patient effort);2 person assist  -EJ     Assistive Device (Bed Mobility)  bed rails;draw sheet;head of bed elevated  -EJ     Comment (Bed Mobility)  gets out on R side. looses balance to right  -     Row Name 11/19/19 1614          Transfer Assessment/Treatment    Comment (Transfers)  cues for sequencing and Hand Placement on sliding board as pt does not initiate movement in typical pattern at all.  SB t/f to w/c then to recliner chair.  -     Row Name 11/19/19 1614          Bed-Chair Transfer    Bed-Chair Branch (Transfers)  maximum assist (25% patient effort);2 person assist  -     Row Name 11/19/19 1614          Gait/Stairs Assessment/Training    Distance in Feet (Gait)  Pt propelled self in w/c with MIN A for 20 ft and MOD A for 15 additional feet. Pt used RLE.  -       User Key  (r) = Recorded By, (t) = Taken By, (c) = Cosigned By    Initials Name Provider Type    Jeannette Redman PT Physical Therapist        Obj/Interventions     Row Name 11/19/19 1618          General ROM    GENERAL ROM COMMENTS  DF limited to neutral. Knee extension limited L>R.  -     Row Name 11/19/19 1618          MMT (Manual Muscle Testing)    General MMT Comments  RLE grossly 4-/5. LLE grossly 3/5 other than 0/5 DF on L, 0/5 GTE billaterally  -       User Key  (r) = Recorded By, (t) = Taken By, (c) = Cosigned By    Initials Name Provider Type    Jeannette Redman PT Physical Therapist        Goals/Plan     Row Name 11/19/19  1621          Bed Mobility Goal 1 (PT)    Activity/Assistive Device (Bed Mobility Goal 1, PT)  sit to supine/supine to sit  -EJ     [place] Level/Cues Needed (Bed Mobility Goal 1, PT)  conditional independence  -EJ     Time Frame (Bed Mobility Goal 1, PT)  long term goal (LTG);10 days  -[facility] Name 11/19/19 1621          Transfer Goal 1 (PT)    Activity/Assistive Device (Transfer Goal 1, PT)  bed-to-chair/chair-to-bed  -EJ     [place] Level/Cues Needed (Transfer Goal 1, PT)  minimum assist (75% or more patient effort)  -EJ     Time Frame (Transfer Goal 1, PT)  long term goal (LTG);10 days  -       User Key  (r) = Recorded By, (t) = Taken By, (c) = Cosigned By    Initials Name Provider Type    Jeannette Redman, PT Physical Therapist        Clinical Impression     [facility] Name 11/19/19 1621          Pain Assessment    Additional Documentation  Pain Scale: Numbers Pre/Post-Treatment (Group)  -EJ     Row Name 11/19/19 1621          Pain Scale: Numbers Pre/Post-Treatment    Pain Scale: Numbers, Pretreatment  0/10 - no pain  -     Pain Scale: Numbers, Post-Treatment  0/10 - no pain  -     Pain Intervention(s)  Repositioned;Ambulation/increased activity  -[facility] Name 11/19/19 1621          Plan of Care Review    Plan of Care Reviewed With  patient;spouse;son  -EJ     Row Name 11/19/19 1621          Physical Therapy Clinical Impression    Criteria for Skilled Interventions Met (PT Clinical Impression)  yes;treatment indicated  -     Rehab Potential (PT Clinical Summary)  fair, will monitor progress closely  -[facility] Name 11/19/19 1621          Positioning and Restraints    In Wheelchair  notified nsg;reclined;sitting;call light within reach;encouraged to call for assist;exit alarm on;waffle cushion;on mechanical lift sling;with family/caregiver  -       User Key  (r) = Recorded By, (t) = Taken By, (c) = Cosigned By    Initials Name Provider Type    Jeannette Redman PT Physical Therapist         Outcome Measures     Row Name 11/19/19 1623          How much help from another person do you currently need...    Turning from your back to your side while in flat bed without using bedrails?  2  -EJ     Moving from lying on back to sitting on the side of a flat bed without bedrails?  2  -EJ     Moving to and from a bed to a chair (including a wheelchair)?  2  -EJ     Standing up from a chair using your arms (e.g., wheelchair, bedside chair)?  1  -EJ     Climbing 3-5 steps with a railing?  1  -EJ     To walk in hospital room?  1  -EJ     AM-PAC 6 Clicks Score (PT)  9  -EJ     Row Name 11/19/19 1623          Functional Assessment    Outcome Measure Options  AM-PAC 6 Clicks Basic Mobility (PT)  -       User Key  (r) = Recorded By, (t) = Taken By, (c) = Cosigned By    Initials Name Provider Type     Jeannette Banda PT Physical Therapist        Physical Therapy Education     Title: PT OT SLP Therapies (In Progress)     Topic: Physical Therapy (In Progress)     Point: Mobility training (Done)     Learning Progress Summary           Patient Acceptance, E, VU,NR by  at 11/19/2019  4:23 PM   Family Acceptance, E, VU,NR by  at 11/19/2019  4:23 PM                   Point: Body mechanics (Done)     Learning Progress Summary           Patient Acceptance, E, VU,NR by  at 11/19/2019  4:23 PM   Family Acceptance, E, VU,NR by  at 11/19/2019  4:23 PM                   Point: Precautions (Done)     Learning Progress Summary           Patient Acceptance, E, VU,NR by  at 11/19/2019  4:23 PM   Family Acceptance, E, VU,NR by  at 11/19/2019  4:23 PM                               User Key     Initials Effective Dates Name Provider Type Discipline     11/20/18 -  Jeannette Banda PT Physical Therapist PT              PT Recommendation and Plan  Planned Therapy Interventions (PT Eval): balance training, bed mobility training, motor coordination training, neuromuscular re-education, home exercise program,  lumbar stabilization, strengthening, stretching, ROM (range of motion), postural re-education, transfer training  Outcome Summary/Treatment Plan (PT)  Anticipated Discharge Disposition (PT): inpatient rehabilitation facility(pt likely to refuse)  Plan of Care Reviewed With: patient  Progress: improving  Outcome Summary: Pt requires MOD A for bed mobility, MOD A x2 for SB t/fs. Pt demonstrates weakness in BLEs, decreased balance and strength in trunk. PT to cotninue during stay. Pt would benefit form rehab, but likely to refuse.     Time Calculation:   PT Charges     Row Name 11/19/19 1626             Time Calculation    Start Time  1415  -EJ      PT Received On  11/19/19  -EJ      PT Goal Re-Cert Due Date  11/29/19  -EJ         Time Calculation- PT    Total Timed Code Minutes- PT  15 minute(s)  -EJ         Timed Charges    46603 - PT Therapeutic Activity Minutes  15  -EJ        User Key  (r) = Recorded By, (t) = Taken By, (c) = Cosigned By    Initials Name Provider Type     Jeannette Banda, PT Physical Therapist        Therapy Charges for Today     Code Description Service Date Service Provider Modifiers Qty    04134950212  PT THERAPEUTIC ACT EA 15 MIN 11/19/2019 Jeannette Bnada, PT GP 1    89814148012 HC PT EVAL MOD COMPLEXITY 4 11/19/2019 Jeannette Banda, PT GP 1          PT G-Codes  Outcome Measure Options: AM-PAC 6 Clicks Basic Mobility (PT)  AM-PAC 6 Clicks Score (PT): 9  AM-PAC 6 Clicks Score (OT): 15    Jeannette Guzman, PT  11/19/2019

## 2019-11-19 NOTE — PLAN OF CARE
Problem: Patient Care Overview  Goal: Plan of Care Review  Outcome: Ongoing (interventions implemented as appropriate)   11/19/19 4970   Coping/Psychosocial   Plan of Care Reviewed With patient;spouse;other (see comments)  (Grace TELLEZ)   Plan of Care Review   Progress no change   OTHER   Outcome Summary WOC nurse consulted to assess BLE wounds. Pt has abrasion right calf and left shin. Right calf wound with slough; cleaned with NS, Thera Honey applied; covered with Xeroform, then wrapped in Kerlix. Left shin wound with red wound base; cleaned with NS, Xeroform applied, then wrapped in Kerlix. Pt has chronic wound/abscess? posterior scrotum which drains intermittently; dry with no drainage at this time; scrotum slightly reddened; no treatment at this time. See wound/skin orders. WOC nurse will f/u. Please contact WOC nurse as needed for concerns.

## 2019-11-19 NOTE — THERAPY EVALUATION
Acute Care - Occupational Therapy Initial Evaluation  [facility]     Patient Name: Talha Cutler  : 1960  MRN: [id number]  Today's Date: 2019  Onset of Illness/Injury or Date of Surgery: 19  Date of Referral to OT: 19       Admit Date: 2019       ICD-10-CM ICD-9-CM   1. HCAP (healthcare-associated pneumonia) J18.9 486   2. ESRD (end stage renal disease) (CMS/[facility]) N18.6 585.6   3. Anemia, unspecified type D64.9 285.9   4. Generalized weakness R53.1 780.79   5. Dizziness R42 780.4   6. Hyperkalemia E87.5 276.7   7. Melena K92.1 578.1   8. Impaired mobility and ADLs Z74.09 799.89     Patient Active Problem List   Diagnosis   • Chest pain   • Edema   • Diabetes mellitus (CMS/[facility])   • Essential hypertension   • Kidney disease   • Encounter for venous access device care   • Recurrent right pleural effusion   • SOB (shortness of breath)   • Type 2 diabetes mellitus treated with insulin (CMS/[facility])   • End stage renal disease on dialysis (CMS/[facility])   • Uncontrolled diabetes mellitus with hyperglycemia, with long-term current use of insulin (CMS/[facility])   • Acute hyperkalemia   • Hypertension due to end stage renal disease caused by type 2 diabetes mellitus, on dialysis (CMS/[facility])   • Physical debility   • Chronic respiratory insufficiency   • Hyperglycemia due to type 2 diabetes mellitus (CMS/[facility])   • Pancreatitis, acute   • Chronic kidney disease-mineral and bone disorder   • Pneumonia of right lower lobe due to infectious organism (CMS/[facility])   • Peripheral vascular disease (CMS/[facility])   • Pneumonia due to infectious organism   • Pneumonia of right lower lobe due to infectious organism (CMS/[facility])   • Pneumonia of right upper lobe due to infectious organism (CMS/[facility])   • Pneumonia of right lung due to infectious organism   • Abnormal CT scan, lung   • Iron deficiency anemia due to chronic blood loss   • Symptomatic anemia   • Epistaxis, recurrent   • Hospital-acquired pneumonia   • Dizziness   •  Demand ischemia (CMS/[facility])   • History of CVA (cerebrovascular accident)   • Hypothyroidism (acquired)   • Coffee ground emesis   • Chronic systolic CHF (congestive heart failure) (CMS/[facility])   • Fluid overload   • Status epilepticus   • Seizure (CMS/[facility])   • HCAP (healthcare-associated pneumonia)   • Melena     Past Medical History:   Diagnosis Date   • Abdominal pain    • Anemia    • Cataracts, bilateral    • CHF (congestive heart failure) (CMS/[facility])    • Chronic kidney disease    • Constipation    • Cough    • Dependence on nocturnal oxygen therapy    • Diabetes mellitus (CMS/[facility])    • Diarrhea    • End stage renal failure on dialysis (CMS/[facility])     SILVINO AVITIA, SAT   • GERD (gastroesophageal reflux disease)    • H/O blood clots     LEG/NECK   • H/O chest x-ray 03/25/2016    Interval decrease in size of the patients right pleural effusion with a persistent small left effusion as well   • History of transfusion    • Hypertension    • Left-sided weakness    • Nauseated     DRY HEAVES   • Pleural effusion    • Pneumonia    • Pneumonia    • Renal failure    • Stroke (CMS/[facility]) 2006    LEFT SIDED WEAKNESS   • Swelling    • Teeth missing    • Tinnitus    • Ulcer, stomach peptic     HISTORY OF ULCER AS A TEENAGER   • Wears glasses      Past Surgical History:   Procedure Laterality Date   • ARTERIOVENOUS FISTULA Right    • BRONCHOSCOPY N/A 1/10/2019    Procedure: BRONCHOSCOPY with MAC and Flouro. LAVAGE, BRUSHINGS AND BIOPSY;  Surgeon: Ean Hernandez MD;  Location: [facility] OR;  Service: Pulmonary   • CARDIAC CATHETERIZATION N/A 3/28/2018    Procedure: Peripheral angiography;  Surgeon: Clay Ruano MD;  Location: [facility] CATH INVASIVE LOCATION;  Service: Cardiovascular   • CARDIAC CATHETERIZATION N/A 3/28/2018    Procedure: Angioplasty-peripheral;  Surgeon: Clay Ruano MD;  Location: [facility] CATH INVASIVE LOCATION;  Service: Cardiovascular   • CARDIAC CATHETERIZATION N/A 3/28/2018    Procedure:  "Atherectomy-peripheral;  Surgeon: Clay Ruano MD;  Location:  [facility] CATH INVASIVE LOCATION;  Service: Cardiovascular   • CATARACT EXTRACTION, BILATERAL     • CHOLECYSTECTOMY     • COLONOSCOPY     • EYE SURGERY      BLEEDING BEHING BILATERAL EYES   • INTERVENTIONAL RADIOLOGY PROCEDURE N/A 3/28/2018    Procedure: Abdominal Aortogram;  Surgeon: Clay Ruano MD;  Location:  [facility] CATH INVASIVE LOCATION;  Service: Cardiovascular   • PORTACATH PLACEMENT     • SHOULDER MANIPULATION Left     \"FROZEN SHOULDER\"   • VENOUS ACCESS DEVICE (PORT) REMOVAL            OT ASSESSMENT FLOWSHEET (last 12 hours)      Occupational Therapy Evaluation     Row Name 11/19/19 0210                   OT Evaluation Time/Intention    Subjective Information  complains of;weakness;fatigue  -KF        Document Type  evaluation  -KF        Mode of Treatment  occupational therapy  -KF        Patient Effort  good  -KF        Symptoms Noted During/After Treatment  none  -KF           General Information    Patient Profile Reviewed?  yes  -KF        Onset of Illness/Injury or Date of Surgery  11/18/19  -KF        Patient Observations  alert;cooperative;agree to therapy  -KF        Prior Level of Function  min assist:;ADL's;bed mobility;transfer;independent:;w/c or scooter  -KF        Equipment Currently Used at Home  wheelchair;wheelchair, motorized;ramp;raised toilet;sock aid;slide board  -KF        Existing Precautions/Restrictions  fall;other (see comments) sliding board tf or lift recom for functional tf  -KF        Risks Reviewed  patient and family:;LOB;nausea/vomiting;dizziness;increased discomfort  -KF        Benefits Reviewed  patient and family:;improve function;increase independence;increase strength;increase balance;increase knowledge;improve skin integrity  -KF        Barriers to Rehab  medically complex;previous functional deficit  -KF           Relationship/Environment    Primary Source of Support/Comfort  " child(starr);spouse  -KF        Lives With  spouse;child(starr), adult  -KF        Family Caregiver if Needed  spouse;child(starr), adult  -KF           Resource/Environmental Concerns    Current Living Arrangements  home/apartment/condo  -KF           Cognitive Assessment/Interventions    Additional Documentation  Cognitive Assessment/Intervention (Group)  -KF           Cognitive Assessment/Intervention- PT/OT    Affect/Mental Status (Cognitive)  WFL  -KF        Orientation Status (Cognition)  oriented x 3  -KF        Follows Commands (Cognition)  WFL;follows one step commands;over 90% accuracy  -KF        Cognitive Function (Cognitive)  safety deficit;executive function deficit  -KF        Executive Function Deficit (Cognition)  mild deficit;judgment;insight/awareness of deficits  -KF        Safety Deficit (Cognitive)  mild deficit;awareness of need for assistance;insight into deficits/self awareness;judgment;safety precautions awareness  -KF        Cognitive Interventions (Cognitive)  occupation/activity based interventions;process/task specific training  -KF           Safety Issues, Functional Mobility    Safety Issues Affecting Function (Mobility)  friction/shear risk;insight into deficits/self awareness;judgment;safety precaution awareness;awareness of need for assistance  -KF        Impairments Affecting Function (Mobility)  balance;endurance/activity tolerance;strength;range of motion (ROM)  -KF           Bed Mobility Assessment/Treatment    Bed Mobility Assessment/Treatment  rolling left;rolling right;scooting/bridging;supine-sit  -KF        Rolling Left [place] (Bed Mobility)  contact guard;verbal cues  -KF        Rolling Right [place] (Bed Mobility)  minimum assist (75% patient effort);verbal cues  -KF        Scooting/Bridging [place] (Bed Mobility)  moderate assist (50% patient effort)  -KF        Supine-Sit [place] (Bed Mobility)  moderate assist (50% patient effort);2 person assist  -KF         Bed Mobility, Safety Issues  decreased use of arms for pushing/pulling;decreased use of legs for bridging/pushing;impaired trunk control for bed mobility  -KF        Assistive Device (Bed Mobility)  bed rails;head of bed elevated;draw sheet  -KF        Comment (Bed Mobility)  states gets out on R side, unsteady upon sitting intermittently   -KF           Functional Mobility    Functional Mobility- Ind. Level  not appropriate to assess  -KF        Functional Mobility- Comment  nonambulatory at baseline  -KF           Transfer Assessment/Treatment    Transfer Assessment/Treatment  wheelchair transfer  -KF        Comment (Transfers)  cues for seq and HP during sliding board   -KF           Wheelchair Transfer    Type (Wheelchair Transfer)  lateral  -KF        [place] Level (Wheelchair Transfer)  moderate assist (50% patient effort);2 person assist  -KF        Assistive Device (Wheelchair Transfer)  transfer board;wheelchair  -KF           ADL Assessment/Intervention    BADL Assessment/Intervention  lower body dressing  -KF           Lower Body Dressing Assessment/Training    Lower Body Dressing [place] Level  don;socks;dependent (less than 25% patient effort);pants/bottoms;moderate assist (50% patient effort);two person assist required  -KF        Lower Body Dressing Position  supine  -KF        Comment (Lower Body Dressing)  comp strategies supine rolling to don pants  -KF           BADL Safety/Performance    Impairments, BADL Safety/Performance  balance;endurance/activity tolerance;strength;range of motion  -KF        Skilled BADL Treatment/Intervention  BADL process/adaptation training;cognitive/safety deficit modifications;compensatory training  -KF           General ROM    GENERAL ROM COMMENTS  LUE 90 degrees shoulder flexion, L elbow and hand WFL; RUE WNL  -KF           MMT (Manual Muscle Testing)    General MMT Comments  RUE grossly 4+/5, LUE shoulder 3-/5, L elbow flex/ext 3/5  -KF            Motor Assessment/Interventions    Additional Documentation  Balance (Group);Balance Interventions (Group);Fine Motor Testing & Training (Group)  -KF           Balance    Balance  static sitting balance;dynamic sitting balance  -KF           Static Sitting Balance    Level of [place] (Unsupported Sitting, Static Balance)  contact guard assist;minimal assist, 75% patient effort  -KF        Sitting Position (Unsupported Sitting, Static Balance)  sitting on edge of bed  -KF        Time Able to Maintain Position (Unsupported Sitting, Static Balance)  more than 5 minutes  -KF        Comment (Unsupported Sitting, Static Balance)  LOB to R side req support   -KF           Dynamic Sitting Balance    Level of [place], Reaches Outside Midline (Sitting, Dynamic Balance)  minimal assist, 75% patient effort;moderate assist, 50 to 74% patient effort  -KF        Sitting Position, Reaches Outside Midline (Sitting, Dynamic Balance)  sitting on edge of bed  -KF           Fine Motor Testing & Training    Training Activity, Fine Motor Coordination  other (see comments) AROM  -KF        Comment, Fine Motor Coordination  moderate deficits L hand  -KF           Positioning and Restraints    Pre-Treatment Position  in bed  -KF        Post Treatment Position  wheelchair  -KF        In Wheelchair  notified nsg;reclined;sitting;call light within reach;encouraged to call for assist;exit alarm on;with family/caregiver;with other staff;legs elevated;on mechanical lift sling;waffle cushion  -KF           Pain Assessment    Additional Documentation  Pain Scale: Numbers Pre/Post-Treatment (Group)  -KF           Pain Scale: Numbers Pre/Post-Treatment    Pain Scale: Numbers, Pretreatment  0/10 - no pain  -KF        Pain Scale: Numbers, Post-Treatment  0/10 - no pain  -KF        Pain Intervention(s)  Repositioned;Ambulation/increased activity  -KF           Plan of Care Review    Plan of Care Reviewed With  patient;spouse;son  -KF            Clinical Impression (OT)    Date of Referral to OT  11/18/19  -KF        OT Diagnosis  ADL decline, weakness  -KF        Patient/Family Goals Statement (OT Eval)  PLOF   -KF        Criteria for Skilled Therapeutic Interventions Met (OT Eval)  yes;treatment indicated  -KF        Rehab Potential (OT Eval)  good, to achieve stated therapy goals  -KF        Therapy Frequency (OT Eval)  daily  -KF        Care Plan Review (OT)  evaluation/treatment results reviewed;care plan/treatment goals reviewed;risks/benefits reviewed;current/potential barriers reviewed;patient/other agree to care plan  -KF        Care Plan Review, Other Participant (OT Eval)  spouse;son  -KF        Anticipated Equipment Needs at Discharge (OT)  -- TBD  -KF        Anticipated Discharge Disposition (OT)  home with home health;home with 24/7 care  -KF           Vital Signs    Pre Systolic BP Rehab  -- RN cleared VSS  -KF        Pre SpO2 (%)  94  -KF        O2 Delivery Pre Treatment  supplemental O2  -KF        O2 Delivery Intra Treatment  supplemental O2  -KF        O2 Delivery Post Treatment  supplemental O2  -KF        Pre Patient Position  Supine  -KF        Intra Patient Position  Sitting  -KF        Post Patient Position  Sitting  -KF           Planned OT Interventions    Planned Therapy Interventions (OT Eval)  activity tolerance training;adaptive equipment training;BADL retraining;cognitive/visual perception retraining;edema control/reduction;IADL retraining;functional balance retraining;neuromuscular control/coordination retraining;occupation/activity based interventions;patient/caregiver education/training;ROM/therapeutic exercise;strengthening exercise;transfer/mobility retraining;wheelchair assessment/training  -KF           OT Goals    Bed Mobility Goal Selection (OT)  bed mobility, OT goal 1  -KF        Dressing Goal Selection (OT)  dressing, OT goal 1  -KF        Toileting Goal Selection (OT)  toileting, OT goal 1  -KF        Balance Goal  Selection (OT)  balance, OT goal 1  -KF        Additional Documentation  Balance Goal Selection (OT) (Row)  -KF           Bed Mobility Goal 1 (OT)    Activity/Assistive Device (Bed Mobility Goal 1, OT)  sit to supine/supine to sit;bridging;bed rails  -KF        [place] Level/Cues Needed (Bed Mobility Goal 1, OT)  minimum assist (75% or more patient effort);verbal cues required  -KF        Time Frame (Bed Mobility Goal 1, OT)  long term goal (LTG);by discharge  -KF        Progress/Outcomes (Bed Mobility Goal 1, OT)  goal ongoing  -KF           Dressing Goal 1 (OT)    Activity/Assistive Device (Dressing Goal 1, OT)  lower body dressing;upper body dressing joaquin dressing, comp strategies supine LBD  -KF        [place]/Cues Needed (Dressing Goal 1, OT)  minimum assist (75% or more patient effort);verbal cues required  -KF        Time Frame (Dressing Goal 1, OT)  long term goal (LTG);by discharge  -KF        Progress/Outcome (Dressing Goal 1, OT)  goal ongoing  -KF           Toileting Goal 1 (OT)    Activity/Device (Toileting Goal 1, OT)  commode, bedside with drop arms;adjust/manage clothing;perform perineal hygiene  -KF        [place] Level/Cues Needed (Toileting Goal 1, OT)  moderate assist (50-74% patient effort);verbal cues required  -KF        Time Frame (Toileting Goal 1, OT)  long term goal (LTG);by discharge  -KF        Barriers (Toileting Goal 1, OT)  lateral tf  -KF        Progress/Outcome (Toileting Goal 1, OT)  goal ongoing  -KF           Balance Goal 1 (OT)    Activity/Assistive Device (Balance Goal 1, OT)  sitting, dynamic  -KF        [place] Level/Cues Needed (Balance Goal 1, OT)  contact guard assist  -KF        Time Frame (Balance Goal 1, OT)  long term goal (LTG);by discharge  -KF        Progress/Outcomes (Balance Goal 1, OT)  goal ongoing  -KF           Living Environment    Home Accessibility  wheelchair accessible;tub/shower is not walk in ramp; sponge bathes at baseline  -KF           User Key  (r) = Recorded By, (t) = Taken By, (c) = Cosigned By    Initials Name Effective Dates    KF Jackelyn Culp, OT 04/03/18 -          Occupational Therapy Education     Title: PT OT SLP Therapies (In Progress)     Topic: Occupational Therapy (In Progress)     Point: ADL training (Done)     Description: Instruct learner(s) on proper safety adaptation and remediation techniques during self care or transfers.   Instruct in proper use of assistive devices.    Learning Progress Summary           Patient Acceptance, E,TB,D, VU,DU,NR by KF at 11/19/2019  1:30 PM    Comment:  Lateral tf seq for tf board and safety for UEs and hand, comp dressing strategies in supine to protect skin integrity for transfers   Family Acceptance, E,TB,D, VU,DU,NR by KF at 11/19/2019  1:30 PM    Comment:  Lateral tf seq for tf board and safety for UEs and hand, comp dressing strategies in supine to protect skin integrity for transfers                   Point: Precautions (Done)     Description: Instruct learner(s) on prescribed precautions during self-care and functional transfers.    Learning Progress Summary           Patient Acceptance, E,TB,D, VU,DU,NR by KF at 11/19/2019  1:30 PM    Comment:  Lateral tf seq for tf board and safety for UEs and hand, comp dressing strategies in supine to protect skin integrity for transfers   Family Acceptance, E,TB,D, VU,DU,NR by KF at 11/19/2019  1:30 PM    Comment:  Lateral tf seq for tf board and safety for UEs and hand, comp dressing strategies in supine to protect skin integrity for transfers                   Point: Body mechanics (Done)     Description: Instruct learner(s) on proper positioning and spine alignment during self-care, functional mobility activities and/or exercises.    Learning Progress Summary           Patient Acceptance, E,TB,D, VU,DU,NR by KF at 11/19/2019  1:30 PM    Comment:  Lateral tf seq for tf board and safety for UEs and hand, comp dressing strategies in supine  to protect skin integrity for transfers   Family Acceptance, E,TB,D, VU,DU,NR by KF at 11/19/2019  1:30 PM    Comment:  Lateral tf seq for tf board and safety for UEs and hand, comp dressing strategies in supine to protect skin integrity for transfers                               User Key     Initials Effective Dates Name Provider Type Discipline     04/03/18 -  Jackelyn Culp, OT Occupational Therapist OT                  OT Recommendation and Plan  Outcome Summary/Treatment Plan (OT)  Anticipated Equipment Needs at Discharge (OT): (TBD)  Anticipated Discharge Disposition (OT): home with home health, home with 24/7 care  Planned Therapy Interventions (OT Eval): activity tolerance training, adaptive equipment training, BADL retraining, cognitive/visual perception retraining, edema control/reduction, IADL retraining, functional balance retraining, neuromuscular control/coordination retraining, occupation/activity based interventions, patient/caregiver education/training, ROM/therapeutic exercise, strengthening exercise, transfer/mobility retraining, wheelchair assessment/training  Therapy Frequency (OT Eval): daily  Plan of Care Review  Plan of Care Reviewed With: patient, spouse, son  Plan of Care Reviewed With: patient, spouse, son  Outcome Summary: OT eval completed Pt presents with deficits in strength, AROM, and balance compared to baseline ADL completion, modAx2 sliding board tf bed to chair, min A balance seated EOB intermittently with CGA throughout, able to completed LBD supine with rolling and bridging strategies with mod A; recom IPOT d/c home with HHOT and 24/7 assist    Outcome Measures     Row Name 11/19/19 6187             How much help from another is currently needed...    Putting on and taking off regular lower body clothing?  2  -KF      Bathing (including washing, rinsing, and drying)  2  -KF      Toileting (which includes using toilet bed pan or urinal)  2  -KF      Putting on and taking  off regular upper body clothing  3  -KF      Taking care of personal grooming (such as brushing teeth)  3  -KF      Eating meals  3  -KF      AM-PAC 6 Clicks Score (OT)  15  -KF         Functional Assessment    Outcome Measure Options  AM-PAC 6 Clicks Daily Activity (OT)  -KF        User Key  (r) = Recorded By, (t) = Taken By, (c) = Cosigned By    Initials Name Provider Type    KF Jackelyn Culp, OT Occupational Therapist          Time Calculation:   Time Calculation- OT     Row Name 11/19/19 1330             Time Calculation- OT    OT Start Time  1330  -KF      Total Timed Code Minutes- OT  10 minute(s)  -KF      OT Received On  11/19/19  -KF      OT Goal Re-Cert Due Date  11/29/19  -         Timed Charges    76941 - OT Self Care/Mgmt Minutes  10  -KF        User Key  (r) = Recorded By, (t) = Taken By, (c) = Cosigned By    Initials Name Provider Type    Jackelyn Lal, OT Occupational Therapist        Therapy Charges for Today     Code Description Service Date Service Provider Modifiers Qty    [id number]  OT SELF CARE/MGMT/TRAIN EA 15 MIN 11/19/2019 Jackelyn Culp OT GO 1    [id number]  OT EVAL MOD COMPLEXITY 4 11/19/2019 Jackelyn Culp OT GO 1               Jackelyn Culp OT  11/19/2019

## 2019-11-19 NOTE — NURSING NOTE
WOC nurse attempted to visit pt, but he was in EGD. Will f/u at later time. Please contact [facility] nurse as needed for concerns.

## 2019-11-19 NOTE — PROGRESS NOTES
[facility] Medicine Services  PROGRESS NOTE    Patient Name: Talha Cutler  : 1960  MRN: [id number]    Date of Admission: 2019  Primary Care Physician: Jeannette Godoy APRN    Subjective   Subjective     CC:  Follow up GI bleeding    HPI:  Just had his EGD, tolerated well and now feeling fine. Hungry and wants to eat, denies acute issue.     Review of Systems  Gen- No fevers, chills  CV- No chest pain, palpitations  Resp- No cough, dyspnea  GI- No N/V/D, abd pain    Objective   Objective     Vital Signs:   Temp:  [96.9 °F (36.1 °C)-98.3 °F (36.8 °C)] 97.4 °F (36.3 °C)  Heart Rate:  [] 105  Resp:  [16-20] 18  BP: (122-150)/(34-89) 122/62        Physical Exam:  Constitutional: No acute distress, awake, alert, appears chronically ill  HENT: NCAT, mucous membranes moist  Respiratory: Scattered coarse breath sounds, respiratory effort normal  Cardiovascular: Tachycardic, regular rhythm, extremities warm with palpable radial pulses bilaterally  Gastrointestinal: Positive bowel sounds, soft, nontender, nondistended  Musculoskeletal: No bilateral ankle edema  Psychiatric: Appropriate affect, cooperative  Neurologic: AO x3, speech clear  Skin: No rashes      Results Reviewed:    Results from last 7 days   Lab Units 19  1546 19  1119 19  0026  19  0559 19  0523   WBC 10*3/mm3  --  12.20*  --   --  9.59 6.01   HEMOGLOBIN g/dL 8.0* 8.3* 8.3*   < > 6.2* 7.5*   HEMATOCRIT % 24.7* 26.7* 28.0*   < > 20.6* 25.6*   PLATELETS 10*3/mm3  --  241  --   --  212 217    < > = values in this interval not displayed.     Results from last 7 days   Lab Units 19  1119 19  0812 19  0908 19  0559   SODIUM mmol/L 137  --  134* 136   POTASSIUM mmol/L 3.4* 3.4* 5.1 5.6*   CHLORIDE mmol/L 98  --  95* 94*   CO2 mmol/L 22.0  --  21.0* 20.0*   BUN mg/dL 34*  --  59* 53*   CREATININE mg/dL 2.65*  --  3.78* 3.58*   GLUCOSE mg/dL 181*  --  246* 230*    CALCIUM mg/dL 9.4  --  10.0 10.3   ALT (SGPT) U/L 1,684*  --   --  727*   AST (SGOT) U/L 2,653*  --   --  1,590*   TROPONIN T ng/mL  --   --   --  0.380*     Estimated Creatinine Clearance: 26.2 mL/min (A) (by C-G formula based on SCr of 2.65 mg/dL (H)).    Microbiology Results Abnormal     Procedure Component Value - Date/Time    Blood Culture - Blood, Arm, Left [[id number]] Collected:  11/18/19 0800    Lab Status:  Preliminary result Specimen:  Blood from Arm, Left Updated:  11/19/19 0845     Blood Culture No growth at 24 hours    Blood Culture - Blood, Arm, Left [[id number]] Collected:  11/18/19 0814    Lab Status:  Preliminary result Specimen:  Blood from Arm, Left Updated:  11/19/19 0845     Blood Culture No growth at 24 hours          Imaging Results (Last 24 Hours)     Procedure Component Value Units Date/Time    XR Chest 1 View [[id number]] Collected:  11/19/19 1749     Updated:  11/19/19 1750    Narrative:          EXAMINATION: XR CHEST 1 VW-      INDICATION: Acute shortness of air, chest pain; J18.9-Pneumonia,  unspecified organism; N18.6-End stage renal disease; D64.9-Anemia,  unspecified; R53.1-Weakness; R42-Dizziness and giddiness;  E87.5-Hyperkalemia; K92.1-Melena; Z74.09-Other reduced mobility      COMPARISON: Chest radiograph 11/18/2019     FINDINGS: Single portal chest radiograph is limited for review. The  heart is top normal in size, similar to prior. Mild pulmonary gastric  congestion identified. Similar right lower lobe and right midlung  airspace disease with small to moderate right-sided pleural effusion.  The left lung demonstrates chronic airspace changes appears unrevealing.  No pneumothorax appreciated.     Atretic calcification is aorta identified. Visualized upper abdomen is  unrevealing. No acute osseous and amounted appreciated.           Impression:          Redemonstration of right middle and right lower lobe airspace disease  with small to moderate right-sided pleural effusion  similar to  11/18/2019.          US Liver [[id number]] Collected:  11/19/19 1405     Updated:  11/19/19 1645    Narrative:       EXAMINATION: US LIVER- 11/18/2019     INDICATION: elevated AST/ALT; J18.9-Pneumonia, unspecified organism;  N18.6-End stage renal disease; D64.9-Anemia, unspecified;  R53.1-Weakness; R42-Dizziness and giddiness; E87.5-Hyperkalemia;  K92.1-Melena      TECHNIQUE: Ultrasound right upper quadrant abdomen and liver with  elastography performed     COMPARISON: NONE     FINDINGS:      Visualized portions of the pancreas within normal limits.  Liver demonstrates increased echogenicity throughout the hepatic  parenchyma without focal liver lesion.  Gallbladder surgically absent without abnormality of the gallbladder  fossa.  Common bile duct measures 6 mm in diameter at the level of the pravin  hepatis within normal limits.     Right kidney measures 7.6 cm in length without evidence of  hydronephrosis, contour deforming mass or obvious calculi.     Shear wave elastography performed with multiple data points obtained of  the hepatic parenchyma with a mean of 2.95 m/s correlating with liver  fibrosis quantification Metavir score of F4 (severe ).       Impression:       1. Diffusely echogenic liver parenchyma with mildly coarsened  echotexture consistent with parenchymal disease process such as hepatic  steatosis. No focal liver lesion.     2. Shear wave elastography performed with multiple data points obtained  of the hepatic parenchyma with a mean of 2.95 m/s correlating with liver  fibrosis quantification Metavir score of F4 (severe).     3. No ascites.     D:  11/19/2019  E:  11/19/2019         This report was finalized on 11/19/2019 4:42 PM by Dr. Anjum Martinez.             Results for orders placed during the hospital encounter of 11/01/19   Adult Transthoracic Echo Complete W/ Cont if Necessary Per Protocol    Narrative · The left ventricular cavity is moderate-to-severely dilated.  · Right  ventricular cavity is mildly dilated.  · Left atrial cavity size is moderately dilated.  · Moderate mitral valve regurgitation is present  · There is a small (<1cm) pericardial effusion.          I have reviewed the medications:  Scheduled Meds:  albuterol 10 mg Nebulization Once   atorvastatin 40 mg Oral Nightly   cefepime 2 g Intravenous Once   dextrose 50 mL Intravenous Once   insulin lispro 0-7 Units Subcutaneous 4x Daily With Meals & Nightly   insulin regular 6 Units Intravenous Once   levETIRAcetam in NaCl 0.82% 500 mg Intravenous Q12H   levothyroxine 125 mcg Oral Q AM   pantoprazole 40 mg Intravenous BID   povidone-iodine  Topical Daily   sodium chloride 10 mL Intravenous Q12H     Continuous Infusions:  Pharmacy Consult    Pharmacy to dose vancomycin      PRN Meds:.•  albumin human  •  albuterol  •  dextrose  •  dextrose  •  glucagon (human recombinant)  •  Morphine **AND** naloxone  •  ondansetron  •  Pharmacy Consult  •  Pharmacy to dose vancomycin  •  sodium chloride  •  sodium chloride      Assessment/Plan   Assessment / Plan     Active Hospital Problems    Diagnosis  POA   • **Melena [K92.1]  Unknown   • HCAP (healthcare-associated pneumonia) [J18.9]  Yes      Resolved Hospital Problems   No resolved problems to display.        Brief Hospital Course to date:  Talha Cutler is a 59 y.o. male with ESRD on HD, DM2, prior CVA, CHF (25%), recent admission several days PTA who left [facility] with partial treatment for HCAP (also had metapneumovirus), and chronic anemia who is admitted for weakness, low Hgb, and now post EGD with bleeding telangectasia s/p APC    Assessment/Plan    Acute blood loss on chronic, symptomatic, anemia related to bleeding telangectasia  - s/p EGD 11/19/19 w/ Dr. Santana and x2 APC to bleeding telangectasia  - s/p 2U PRBC's this admission  - BID PPI  - diet per GI    HCAP  Early sepsis, elevated WBC's, tachycardic  - recent incomplete treatment due to leaving AMA  - called this afternoon  for worsening SOB, ordered CXR but one from admission reports worsening infiltrate  - blood Cx, sputum Cx, lactic, start vanc/zosyn with pharmacy dosing, 7 days would end on 11/25/19     Elevated LFT's  Fatty liver disease with evidence for cirrhosis  - do suspect shock liver from bleed, inc some today, recheck in the AM  - liver US does show fatty infiltration and parenchyma c/w Metavir score of F4  - GI following    ESRD on chronic HD  - nephro following, next planned HD tomorrow    Hypotension  - 2/2 blood loss  - holding antihypertensives (imdur, ARB, BB)  - hold today with initiation of Abx, if remains elevated would restart    Chronic systolic CHF, possible early decompensation?  - most recently recorded EF was 25%  - arb/bb held as above  - more SOB today, most likely PNA but cannot rule out CHF at the moment, cxr pending as above, has HD tomorrow to help with volume     Prior stroke with chronic LT weakness  - PT/OT, holding ASA/plavix for bleeding, restart once ok per GI     DM type 2, (hgba1c 9/2019 was 8.9%)  - normally takes levemir 15U HS and novolog 9U TID  - restart levemir at 10U HS, cont SSI, monitor and titrate     DVT Prophylaxis:  mechanical    Disposition: I expect the patient to be discharged TBD pending tx for PNA and stability of H&H.    CODE STATUS:   Code Status and Medical Interventions:   Ordered at: 11/18/19 1540     Level Of Support Discussed With:    Patient     Code Status:    CPR     Medical Interventions (Level of Support Prior to Arrest):    Full         Electronically signed by Sammy Nolen DO, 11/19/19, 5:50 PM.

## 2019-11-20 PROBLEM — D62 ACUTE BLOOD LOSS ANEMIA: Status: ACTIVE | Noted: 2019-01-01

## 2019-11-20 PROBLEM — K72.00 SHOCK LIVER: Status: ACTIVE | Noted: 2019-01-01

## 2019-11-20 PROBLEM — K92.2 UPPER GI BLEED: Status: ACTIVE | Noted: 2019-01-01

## 2019-11-20 NOTE — PLAN OF CARE
Problem: Fall Risk (Adult)  Goal: Identify Related Risk Factors and Signs and Symptoms  Outcome: Ongoing (interventions implemented as appropriate)   11/20/19 1543   Fall Risk (Adult)   Related Risk Factors (Fall Risk) bladder function altered;gait/mobility problems;sleep pattern alteration;sensory deficits   Signs and Symptoms (Fall Risk) presence of risk factors     Goal: Absence of Fall  Outcome: Ongoing (interventions implemented as appropriate)   11/20/19 1543   Fall Risk (Adult)   Absence of Fall achieves outcome

## 2019-11-20 NOTE — PLAN OF CARE
Problem: Skin Injury Risk (Adult)  Goal: Identify Related Risk Factors and Signs and Symptoms  Outcome: Ongoing (interventions implemented as appropriate)   11/20/19 1544   Skin Injury Risk (Adult)   Related Risk Factors (Skin Injury Risk) medical devices;mobility impaired;nutritional deficiencies     Goal: Skin Health and Integrity  Outcome: Ongoing (interventions implemented as appropriate)   11/20/19 1544   Skin Injury Risk (Adult)   Skin Health and Integrity making progress toward outcome

## 2019-11-20 NOTE — PLAN OF CARE
Problem: Hemodialysis (Adult)  Goal: Signs and Symptoms of Listed Potential Problems Will be Absent, Minimized or Managed (Hemodialysis)  Outcome: Ongoing (interventions implemented as appropriate)   11/20/19 154   Goal/Outcome Evaluation   Problems Assessed (Hemodialysis) all   Problems Present (Hemodialysis) situational response

## 2019-11-20 NOTE — PROGRESS NOTES
Continued Stay Note  [facility]     Patient Name: Talha Cutler  MRN: [id number]  Today's Date: 11/20/2019    Admit Date: 11/18/2019    Discharge Plan     Row Name 11/20/19 1054       Plan    Plan Comments  Pts HH remains low. Once it returns to a stable level he will be discharged home and will resume HH with [place] at home.        Discharge Codes    No documentation.             Jory Mai RN

## 2019-11-20 NOTE — PROGRESS NOTES
[facility] Medicine Services  PROGRESS NOTE    Patient Name: Talha Cutler  : 1960  MRN: [id number]    Date of Admission: 2019  Primary Care Physician: Jeannette Godoy APRN    Subjective   Subjective     CC:  F/U GI bleed    HPI:  Patient seen on dialysis late this morning. He has no complaints.    Review of Systems  Gen-no fevers, no chills  CV-no chest pain, no palpitations  Resp-no cough, no dyspnea  GI-no N/V/D, no abd pain    All other systems reviewed and negative except any additional pertinent positives and negatives as discussed in HPI.      Objective   Objective     Vital Signs:   Temp:  [96.3 °F (35.7 °C)-97.5 °F (36.4 °C)] 97.3 °F (36.3 °C)  Heart Rate:  [] 105  Resp:  [16-28] 18  BP: (111-143)/(61-86) 111/86        Physical Exam:  Gen-no acute distress, chronically ill appearing  HENT-NCAT, mucous membranes moist  CV-RRR, S1 S2 normal, no m/r/g  Resp-mild coarse breath sounds bilaterally, no wheezes or rales, nonlabored  Abd-soft, NT, ND, +BS  Ext-no edema  Neuro-A&Ox3  Skin-no rashes  Psych-appropriate mood        Results Reviewed:    Results from last 7 days   Lab Units 19  0420 19  2347 19  1546 19  1119  19  0559   WBC 10*3/mm3 8.59  --   --  12.20*  --  9.59   HEMOGLOBIN g/dL 7.7*  7.7* 8.4* 8.0* 8.3*   < > 6.2*   HEMATOCRIT % 26.0*  26.0* 26.9* 24.7* 26.7*   < > 20.6*   PLATELETS 10*3/mm3 245  --   --  241  --  212   INR  1.67*  --   --   --   --   --    PROCALCITONIN ng/mL 2.56*  --   --   --   --   --     < > = values in this interval not displayed.     Results from last 7 days   Lab Units 19  0420 19  1119 19  0812 19  0908 19  0559   SODIUM mmol/L 133* 137  --  134* 136   POTASSIUM mmol/L 3.2* 3.4* 3.4* 5.1 5.6*   CHLORIDE mmol/L 95* 98  --  95* 94*   CO2 mmol/L 20.0* 22.0  --  21.0* 20.0*   BUN mg/dL 40* 34*  --  59* 53*   CREATININE mg/dL 3.54* 2.65*  --  3.78* 3.58*   GLUCOSE  mg/dL 184* 181*  --  246* 230*   CALCIUM mg/dL 9.3 9.4  --  10.0 10.3   ALT (SGPT) U/L 1,308* 1,684*  --   --  727*   AST (SGOT) U/L 1,439* 2,653*  --   --  1,590*   TROPONIN T ng/mL  --   --   --   --  0.380*     Estimated Creatinine Clearance: 19.6 mL/min (A) (by C-G formula based on SCr of 3.54 mg/dL (H)).    Microbiology Results Abnormal     Procedure Component Value - Date/Time    Blood Culture - Blood, Arm, Left [[id number]] Collected:  11/19/19 1801    Lab Status:  Preliminary result Specimen:  Blood from Arm, Left Updated:  11/20/19 0912    Narrative:       Aerobic bottle only    Blood Culture - Blood, Hand, Left [[id number]] Collected:  11/19/19 1804    Lab Status:  Preliminary result Specimen:  Blood from Hand, Left Updated:  11/20/19 0911    Narrative:       Aerobic bottle only    Blood Culture - Blood, Arm, Left [[id number]] Collected:  11/18/19 0800    Lab Status:  Preliminary result Specimen:  Blood from Arm, Left Updated:  11/20/19 0845     Blood Culture No growth at 2 days    Blood Culture - Blood, Arm, Left [[id number]] Collected:  11/18/19 0814    Lab Status:  Preliminary result Specimen:  Blood from Arm, Left Updated:  11/20/19 0845     Blood Culture No growth at 2 days          Imaging Results (Last 24 Hours)     Procedure Component Value Units Date/Time    XR Chest 1 View [[id number]] Collected:  11/19/19 1749     Updated:  11/20/19 0910    Narrative:       EXAMINATION: XR CHEST 1 VW-11/19/2019:      INDICATION: Acute shortness of air, chest pain; J18.9-Pneumonia,  unspecified organism; N18.6-End stage renal disease; D64.9-Anemia,  unspecified; R53.1-Weakness; R42-Dizziness and giddiness;  E87.5-Hyperkalemia; K92.1-Melena; Z74.09-Other reduced mobility.      COMPARISON: Chest radiograph 11/18/2019.     FINDINGS: Single portable chest radiograph was submitted for review. The  heart is top normal in size, similar to prior. Mild pulmonary vascular  congestion identified. Similar right lower lobe and  right mid lung  airspace disease with small to moderate right-sided pleural effusion.  The left lung demonstrates chronic airspace changes and appears  unrevealing. No pneumothorax appreciated. Atherosclerotic calcification  of the aorta identified. The visualized upper abdomen is unrevealing. No  acute osseous abnormality appreciated.           Impression:       Redemonstration of right middle and right lower lobe  airspace disease with small to moderate right-sided pleural effusion,  similar to 11/18/2019.     D:  11/19/2019  E:  11/19/2019     This report was finalized on 11/20/2019 9:07 AM by Dr. Billy Diop MD.       US Liver [[id number]] Collected:  11/19/19 1405     Updated:  11/19/19 1645    Narrative:       EXAMINATION: US LIVER- 11/18/2019     INDICATION: elevated AST/ALT; J18.9-Pneumonia, unspecified organism;  N18.6-End stage renal disease; D64.9-Anemia, unspecified;  R53.1-Weakness; R42-Dizziness and giddiness; E87.5-Hyperkalemia;  K92.1-Melena      TECHNIQUE: Ultrasound right upper quadrant abdomen and liver with  elastography performed     COMPARISON: NONE     FINDINGS:      Visualized portions of the pancreas within normal limits.  Liver demonstrates increased echogenicity throughout the hepatic  parenchyma without focal liver lesion.  Gallbladder surgically absent without abnormality of the gallbladder  fossa.  Common bile duct measures 6 mm in diameter at the level of the pravin  hepatis within normal limits.     Right kidney measures 7.6 cm in length without evidence of  hydronephrosis, contour deforming mass or obvious calculi.     Shear wave elastography performed with multiple data points obtained of  the hepatic parenchyma with a mean of 2.95 m/s correlating with liver  fibrosis quantification Metavir score of F4 (severe ).       Impression:       1. Diffusely echogenic liver parenchyma with mildly coarsened  echotexture consistent with parenchymal disease process such as  hepatic  steatosis. No focal liver lesion.     2. Shear wave elastography performed with multiple data points obtained  of the hepatic parenchyma with a mean of 2.95 m/s correlating with liver  fibrosis quantification Metavir score of F4 (severe).     3. No ascites.     D:  11/19/2019  E:  11/19/2019         This report was finalized on 11/19/2019 4:42 PM by Dr. Anjum Martinez.             Results for orders placed during the hospital encounter of 11/01/19   Adult Transthoracic Echo Complete W/ Cont if Necessary Per Protocol    Narrative · The left ventricular cavity is moderate-to-severely dilated.  · Right ventricular cavity is mildly dilated.  · Left atrial cavity size is moderately dilated.  · Moderate mitral valve regurgitation is present  · There is a small (<1cm) pericardial effusion.          I have reviewed the medications:  Scheduled Meds:    albuterol 10 mg Nebulization Once   cefepime 1 g Intravenous Q24H   dextrose 50 mL Intravenous Once   insulin detemir 10 Units Subcutaneous Nightly   insulin lispro 0-7 Units Subcutaneous 4x Daily With Meals & Nightly   insulin regular 6 Units Intravenous Once   levETIRAcetam in NaCl 0.82% 500 mg Intravenous Q12H   levothyroxine 125 mcg Oral Q AM   pantoprazole 40 mg Intravenous BID   povidone-iodine  Topical Daily   sodium chloride 10 mL Intravenous Q12H   vancomycin (dosing per levels)  Does not apply Daily     Continuous Infusions:    Pharmacy Consult    Pharmacy to dose vancomycin      PRN Meds:.•  albumin human  •  albuterol  •  dextrose  •  dextrose  •  glucagon (human recombinant)  •  Morphine **AND** naloxone  •  ondansetron  •  Pharmacy Consult  •  Pharmacy to dose vancomycin  •  sodium chloride  •  sodium chloride      Assessment/Plan   Assessment / Plan     Active Hospital Problems    Diagnosis  POA   • **Upper GI bleed [K92.2]  Yes   • Acute blood loss anemia [D62]  Yes   • Shock liver [K72.00]  Yes   • HCAP (healthcare-associated pneumonia) [J18.9]  Yes   •  Melena [K92.1]  Unknown   • Chronic systolic CHF (congestive heart failure) (CMS/[facility]) [I50.22]  Yes   • End stage renal disease on dialysis (CMS/[facility]) [N18.6, Z99.2]  Not Applicable   • Diabetes mellitus (CMS/[facility]) [E11.9]  Yes      Resolved Hospital Problems   No resolved problems to display.        Brief Hospital Course to date:  Talha Cutler is a 59 y.o. male with ESRD on HD, DM2, prior CVA with residual left sided hemiparesis, CHF (25%), recent admission 11/6-11/16 (left AMA after partial treatment for HCAP and seizures), and chronic anemia who presents back to the ER due to dizziness and weakness. He was found to have worsening anemia with Hb 6.2 and elevated LFT's with AST 1590 and . He received 2 units PRBC and was admitted to the hospitlaist service for further workup.    Of note, he was admitted to [facility] 11/6-11/16 due to pneumonia with respiratory failure requiring mechanical ventilation (tested positive for Metapneumovirus) and seizures evaluated by Neurology who felt patient had component of both psychogenic non-epileptic seizures and epileptic seizures (Keppra dose reduced back to previous home dosing 500 mg BID). Cardiology also evaluated the patient as Echo that admission showed reduced EF of 25%; recommended medical management and outpatient ischemic evaluation with his primary cardiologist in [place] following discharge.    Assessment/Plan:    Acute blood loss on chronic, symptomatic, anemia   Upper GI bleed secondary to bleeding telangectasia  - s/p EGD 11/19/19 with Dr. Santana showed gastritis in the antrum as well as a bleeding telangiectasia in D2 s/p APC. Continue BID PPI. Biopsies pending.  - s/p 2U PRBC's this admission. Hb improved and stable today. Continue to monitor.    HCAP  Early sepsis, elevated WBC's, tachycardic  - recent incomplete treatment due to leaving AMA on 11/16  - repeat CXR shows persistent RML/RLL airspace disease with small to moderate right pleural effusion  -  continue on Vanc/Cefepime  - follow up cultures     Elevated LFT's secondary to shock liver in setting of GI bleed  Fatty liver disease with evidence for cirrhosis  - LFT's a little better today, continue to follow trend  - liver US does show fatty infiltration and parenchyma c/w Metavir score of F4  - GI following    ESRD on chronic HD  - Renal following, HD today    Hypotension  - secondary to blood loss  - holding antihypertensives (Imdur, ARB, BB)  - continue to hold today as he is normotensive    Chronic systolic CHF  - most recently recorded EF was 25%  - last admission, Dr. Mart recommended continuing Valsartan and Coreg, consider changing Valsartan to Entresto if BP tolerates; will need myocardial ischemia evaluation at some point but can be done as outpatient with his primary cardiologist in [place] in the next few weeks  - compensated currently    Prior stroke with chronic left sided weakness  - holding ASA/Plavix for bleeding, restart once okay per GI  - PT/OT recs for rehab but patient likely to decline     DM type 2, (HbA1c 9/2019 was 8.9%)  - normally takes Levemir 15U HS and Novolog 9U TID  - restarted Levemir at 10U HS, continue SSI, monitor and titrate     DVT Prophylaxis:  mechanical    Disposition: I expect the patient to be discharged home vs rehab in ~2 days    CODE STATUS:   Code Status and Medical Interventions:   Ordered at: 11/18/19 0020     Level Of Support Discussed With:    Patient     Code Status:    CPR     Medical Interventions (Level of Support Prior to Arrest):    Full         Electronically signed by Marleny Witt MD, 11/20/19, 12:47 PM.

## 2019-11-20 NOTE — PROGRESS NOTES
"   LOS: 2 days    Patient Care Team:  Jeannette Godoy APRN as PCP - General (Family Medicine)    Reason For Visit:  F/U ESRD. SEEN ON DIALYSIS  Subjective           Review of Systems:    Pulm: No soa   CV:  No CP      Objective       albuterol 10 mg Nebulization Once   atorvastatin 40 mg Oral Nightly   cefepime 1 g Intravenous Q24H   dextrose 50 mL Intravenous Once   insulin detemir 10 Units Subcutaneous Nightly   insulin lispro 0-7 Units Subcutaneous 4x Daily With Meals & Nightly   insulin regular 6 Units Intravenous Once   levETIRAcetam in NaCl 0.82% 500 mg Intravenous Q12H   levothyroxine 125 mcg Oral Q AM   pantoprazole 40 mg Intravenous BID   povidone-iodine  Topical Daily   sodium chloride 10 mL Intravenous Q12H       Pharmacy Consult    Pharmacy to dose vancomycin          Vital Signs:  Blood pressure 126/61, pulse 90, temperature 96.3 °F (35.7 °C), temperature source Temporal, resp. rate 18, height 172.7 cm (68\"), weight 62.6 kg (138 lb), SpO2 100 %.    Flowsheet Rows      First Filed Value   Admission Height  172.7 cm (68\") Documented at 11/18/2019 0523   Admission Weight  59 kg (130 lb) Documented at 11/18/2019 0523          11/19 0701 - 11/20 0700  In: 100 [I.V.:100]  Out: -     Physical Exam:    General Appearance: NAD, alert and cooperative, Ox3  Eyes: PER, conjunctivae and sclerae normal, no icterus  Lungs: respirations regular and unlabored, no crepitus, clear to auscultation  Heart/CV: regular rhythm & normal rate, no murmur, no gallop, no rub and no edema  Abdomen: not distended, soft, non-tender, no masses,  bowel sounds present  Skin: No rash, Warm and dry. AVF.    Radiology:            Labs:  Results from last 7 days   Lab Units 11/20/19  0420 11/19/19  2347 11/19/19  1546 11/19/19  1119  11/18/19  0559   WBC 10*3/mm3 8.59  --   --  12.20*  --  9.59   HEMOGLOBIN g/dL 7.7*  7.7* 8.4* 8.0* 8.3*   < > 6.2*   HEMATOCRIT % 26.0*  26.0* 26.9* 24.7* 26.7*   < > 20.6*   PLATELETS 10*3/mm3 245  --  "  --  241  --  212    < > = values in this interval not displayed.     Results from last 7 days   Lab Units 11/20/19  0420 11/19/19  1119 11/19/19  0812 11/18/19  0908 11/18/19  0559 11/16/19  0523 11/15/19  0435  11/14/19  0409   SODIUM mmol/L 133* 137  --  134* 136 133* 137   < >  --    POTASSIUM mmol/L 3.2* 3.4* 3.4* 5.1 5.6* 4.8 3.9   < >  --    CHLORIDE mmol/L 95* 98  --  95* 94* 95* 100   < >  --    CO2 mmol/L 20.0* 22.0  --  21.0* 20.0* 23.0 25.0   < >  --    BUN mg/dL 40* 34*  --  59* 53* 43* 24*   < >  --    CREATININE mg/dL 3.54* 2.65*  --  3.78* 3.58* 3.46* 2.36*   < >  --    CALCIUM mg/dL 9.3 9.4  --  10.0 10.3 10.2 9.5   < >  --    PHOSPHORUS mg/dL 3.5 3.0  --   --   --  3.6 2.4*  --  3.2   MAGNESIUM mg/dL  --   --   --   --  2.3  --   --   --  2.4   ALBUMIN g/dL 3.10* 3.10*  --   --  3.40* 3.30* 2.80*   < >  --     < > = values in this interval not displayed.     Results from last 7 days   Lab Units 11/20/19  0420   GLUCOSE mg/dL 184*       Results from last 7 days   Lab Units 11/20/19  0420 11/19/19  1119   ALK PHOS U/L 182* 201*   BILIRUBIN mg/dL 0.4 0.6   BILIRUBIN DIRECT mg/dL  --  0.3   ALT (SGPT) U/L 1,308* 1,684*   AST (SGOT) U/L 1,439* 2,653*     Results from last 7 days   Lab Units 11/14/19  0330   PH, ARTERIAL pH units 7.393   PO2 ART mm Hg 71.9*   PCO2, ARTERIAL mm Hg 47.4   HCO3 ART mmol/L 28.9*             Estimated Creatinine Clearance: 19.6 mL/min (A) (by C-G formula based on SCr of 3.54 mg/dL (H)).      Assessment       Melena    HCAP (healthcare-associated pneumonia)            Impression: ESRD. ANEMIA.             Recommendations: HD TODAY.      Teddy Doll MD  11/20/19  8:47 AM

## 2019-11-20 NOTE — PLAN OF CARE
Problem: Patient Care Overview  Goal: Plan of Care Review  Outcome: Ongoing (interventions implemented as appropriate)   11/20/19 1544   Coping/Psychosocial   Plan of Care Reviewed With patient   Plan of Care Review   Progress no change   OTHER   Outcome Summary Pt had hemodialysis today and apparently tolerated well with 2 L removed. Pleasant and cooperative but tired on return to room. Family at bedside. Denies pain. VS WNL for patient. Anticipate continuation of care as currently ordered     Goal: Individualization and Mutuality  Outcome: Ongoing (interventions implemented as appropriate)   11/20/19 1544   Individualization   Patient Specific Preferences black coffee, half a cup, 3 packets of creamer   Patient Specific Goals (Include Timeframe) free from falls/injury and labs return to baseline normal for pt while admitted   Patient Specific Interventions maintain safety alarms, assist with ADL's while encouraging independence.      Goal: Discharge Needs Assessment  Outcome: Ongoing (interventions implemented as appropriate)   11/20/19 1544   Discharge Needs Assessment   Concerns to be Addressed denies needs/concerns at this time     Goal: Interprofessional Rounds/Family Conf  Outcome: Ongoing (interventions implemented as appropriate)   11/20/19 1544   Interdisciplinary Rounds/Family Conf   Participants

## 2019-11-20 NOTE — PROGRESS NOTES
"Adult Nutrition  Assessment/PES    Patient Name:  Talha Cutler  YOB: 1960  MRN: [id number]  Admit Date:  11/18/2019    Assessment Date:  11/20/2019    Comments:      Reason for Assessment     Row Name 11/20/19 1109          Reason for Assessment    Reason For Assessment  follow-up protocol 30\"     Diagnosis  -- SEE PREVIOUS NUTRITION NOTES         Nutrition/Diet History     Row Name 11/20/19 1109          Nutrition/Diet History    Typical Food/Fluid Intake  PT REPORTS HIS APPETITE & INTAKE OF FOOD HAS BEEN DOWN FOR ~ 4-5 MONTHS, HE REPORTS HIS LAST STANDING WEIGHT WAS ~ 2 WEEKS AGO @ THE DIALYSIS CLINIC WHICH #. HE DOES REPORT WEIGHT FLUCTUATES WITH FLUID. HE REPORTS WHEN HIS APPETITE IS GOOD HE CAN EAT MOST ANYTHING & DOES NOT ALWAYS FOLLOW HIS RENAL DIET. HE REPORTS CAN DRINK AN ORAL SUPPLEMENT IF IN [place] [facility]. HE REPORTS SOME DIFFICULTY CHEWING DUE TO MISSING TEETH BUT DOES NOT WANT TEXURE OF FOOD MODIFIED.          Anthropometrics     Row Name 11/20/19 1113          Anthropometrics    Height  172.7 cm (68\")        Admit Weight    Admit Weight Method  stated     Admit Weight  59 kg (130 lb) #        Ideal Body Weight (IBW)    Ideal Body Weight (IBW) (kg)  70.89        Usual Body Weight (UBW)    Usual Body Weight  -- BASED ON [facility] MDO RECORDS, WEIGH 8/26/19 120#, 5/15/19 120#, 2/11/19 124# & 12/17/18 124#         Labs/Tests/Procedures/Meds     Row Name 11/20/19 1115          Labs/Procedures/Meds    Lab Results Reviewed  reviewed     Lab Results Comments  K+         Physical Findings     Row Name 11/20/19 1116          Physical Findings    Gastrointestinal  other (see comments) WDL PER NURSING DOCUMENTATION     Skin  other (see comments) PUNCTURE WOUND & MULTIPLE ABRASIONS PER NURSING DOCUMENTATION                 Nutrition Prescription Ordered     Row Name 11/20/19 1116          Nutrition Prescription PO    Current PO Diet  Regular     Common Modifiers  Cardiac;Consistent " Carbohydrate         Evaluation of Received Nutrient/Fluid Intake     Row Name 11/20/19 1117          PO Evaluation    Number of Days PO Intake Evaluated  Insufficient Data NO MEALS RECORDED YET               Problem/Interventions:  Problem 1     Row Name 11/20/19 1117          Nutrition Diagnoses Problem 1    Problem 1  Predicted Suboptimal Intake     Etiology (related to)  Other (comment) UNKNOWN ETIOLOGY     Signs/Symptoms (evidenced by)  Report of Mnimal PO Intake               Intervention Goal     Row Name 11/20/19 1117          Intervention Goal    General  Nutrition support treatment     PO  Establish PO;Increase intake         Nutrition Intervention     Row Name 11/20/19 1117          Nutrition Intervention    RD/Tech Action  Advise alternate selection;Advise available snack;Interview for preference;Encourage intake;Recommend/ordered;Care plan reviewd;Follow Tx progress     Recommended/Ordered  Supplement         Nutrition Prescription     Row Name 11/20/19 1118          Nutrition Prescription PO    PO Prescription  Begin/change supplement     Supplement  Boost Plus [employer]Y     Supplement Frequency  3 times a day     New PO Prescription Ordered?  Yes         Education/Evaluation     Row Name 11/20/19 1118          Monitor/Evaluation    Monitor  Per protocol;I&O;PO intake;Supplement intake;Pertinent labs;Weight;Skin status;Symptoms           Electronically signed by:  Ella Kay RD  11/20/19 11:18 AM

## 2019-11-20 NOTE — PROGRESS NOTES
"GI Daily Progress Note  Subjective     Itzel Cutler is a 59 y.o. male who was admitted with Melena.   Just back from dialysis.  Had EGD with APC and clip to bleeding duodenal angioectasia. No further bloody stool.  C/O anorexia    Chief Complaint:  melena    Objective     /86 (BP Location: Right arm, Patient Position: Lying)   Pulse 105   Temp 97.3 °F (36.3 °C) (Oral)   Resp 18   Ht 172.7 cm (68\")   Wt 62.6 kg (138 lb)   SpO2 100%   BMI 20.98 kg/m²     Intake/Output last 3 shifts:  I/O last 3 completed shifts:  In: 100 [I.V.:100]  Out: -   Intake/Output this shift:  I/O this shift:  In: -   Out: 2320 [Other:2320]      Physical Exam  Wt Readings from Last 3 Encounters:   11/19/19 62.6 kg (138 lb)   11/16/19 61.7 kg (136 lb)   11/04/19 59.5 kg (131 lb 3.2 oz)   ,body mass index is 20.98 kg/m².,@FLOWAMB(6)@,@FLOWAMB(5)@,@FLOWAMB(8)@   CONSTITUTIONAL:  Resp CTA; no rhonchi, rales, or wheezes.  Respiration effort normal  CV RRR; no M/R/G. No lower extremity edema  GI Abd soft, NT, ND, normal active bowel sounds.    Psych: Awake and alert    DATA:Results for ITZEL CUTLER (MRN [id number]) as of 11/20/2019 11:56   Ref. Range 11/19/2019 23:47 11/20/2019 04:20 11/20/2019 04:20   Hemoglobin Latest Ref Range: 13.0 - 17.7 g/dL 8.4 (L) 7.7 (L) 7.7 (L)   Hematocrit Latest Ref Range: 37.5 - 51.0 % 26.9 (L) 26.0 (L) 26.0 (L)   Results for ITZEL CUTLER (MRN [id number]) as of 11/20/2019 11:56   Ref. Range 11/11/2019 05:55 11/18/2019 05:59 11/19/2019 11:19 11/20/2019 04:20   Alkaline Phosphatase Latest Ref Range: 39 - 117 U/L 64 218 (H) 201 (H) 182 (H)   Results for ITZEL CUTLER (MRN [id number]) as of 11/20/2019 11:56   Ref. Range 11/11/2019 05:55 11/18/2019 05:59 11/19/2019 11:19 11/20/2019 04:20   ALT (SGPT) Latest Ref Range: 1 - 41 U/L 11 727 (H) 1,684 (H) 1,308 (H)   Results for ITZEL CUTLER (MRN [id number]) as of 11/20/2019 11:56   Ref. Range 11/11/2019 05:55 11/18/2019 05:59 11/19/2019 11:19 " 11/20/2019 04:20   AST (SGOT) Latest Ref Range: 1 - 40 U/L 22 1,590 (H) 2,653 (H) 1,439 (H)   Results for ITZEL STORM (MRN [id number]) as of 11/20/2019 11:56   Ref. Range 11/20/2019 04:20   Total Bilirubin Latest Ref Range: 0.2 - 1.2 mg/dL 0.4       Assessment/Plan     1. Bleeding duodenal angioectasia  2. Acute blood loss anemia  3. Elevated LFT's   4 ESRD  5 pneumonia        Hg stable  LFT's consistent with shock liver.      Continue PPI  Follow LFT's              Melena    HCAP (healthcare-associated pneumonia)       LOS: 2 days     Iban Santana MD  11/20/19  11:55 AM

## 2019-11-20 NOTE — PLAN OF CARE
Problem: Hemodialysis (Adult)  Goal: Signs and Symptoms of Listed Potential Problems Will be Absent, Minimized or Managed (Hemodialysis)   11/20/19 5556   Goal/Outcome Evaluation   Problems Assessed (Hemodialysis) all   Problems Present (Hemodialysis) electrolyte imbalance;fluid imbalance

## 2019-11-20 NOTE — PROGRESS NOTES
Pharmacy Consult-Vancomycin Dosing  Talha Cutler is a  59 y.o. male receiving vancomycin therapy.     Indication: pneumonia  Consulting Provider:  Dr Nolen  ID Consult: no    Goal Trough:  15 ~ 20mcg/mL.    Current Antimicrobial Therapy  Anti-Infectives (From admission, onward)      Ordered     Dose/Rate Route Frequency Start Stop    11/19/19 1832  cefepime (MAXIPIME) 1 g/100 mL 0.9% NS IVPB (mbp)     Ordering Provider:  Sammy Nolen DO    1 g  over 4 Hours Intravenous Every 24 Hours 11/20/19 1800 11/27/19 1759    11/20/19 0945  vancomycin (dosing per levels)     Ordering Provider:  Soo Guzman RPH     Does not apply Daily 11/20/19 1045 11/25/19 0859    11/19/19 2046  vancomycin (VANCOCIN) in iso-osmotic dextrose IVPB 1 g (premix) 200 mL     Ordering Provider:  Sammy oNlen DO    15 mg/kg × 62.6 kg  over 60 Minutes Intravenous Once 11/19/19 2145 11/20/19 0015    11/19/19 1733  cefepime (MAXIPIME) 2 g/100 mL 0.9% NS (mbp)     Ordering Provider:  Sammy Nolen DO    2 g  200 mL/hr over 30 Minutes Intravenous Once 11/19/19 1830 11/19/19 1915    11/19/19 1727  Pharmacy to dose vancomycin     Ordering Provider:  Sammy Nolen DO     Does not apply Continuous PRN 11/19/19 1726 11/26/19 1725    11/18/19 1426  piperacillin-tazobactam (ZOSYN) 2.25 g/100 mL 0.9% NS IVPB (mbp)     Ordering Provider:  Joel Palm MD    2.25 g  over 30 Minutes Intravenous Once 11/18/19 1515 11/18/19 1740    11/18/19 0652  vancomycin 1250 mg/250 mL 0.9% NS IVPB ([facility])     Ordering Provider:  Joel Palm MD    20 mg/kg × 59 kg Intravenous Once 11/18/19 0654 11/18/19 1740            Allergies  Allergies as of 11/18/2019 - Reviewed 11/18/2019   Allergen Reaction Noted   • Erythromycin Hives 02/01/2017       Labs    Results from last 7 days   Lab Units 11/20/19  0420 11/19/19  1119 11/18/19  0908   BUN mg/dL 40* 34* 59*   CREATININE mg/dL 3.54* 2.65* 3.78*       Results from last 7 days  "  Lab Units 11/20/19  0420 11/19/19  1119 11/18/19  0559   WBC 10*3/mm3 8.59 12.20* 9.59       Evaluation of Dosing     Last Dose Received in the ED/Outside Facility: 11/18 ~ 16:00 pm (yesterday) = 20 mg/kg (1,250 mg)  Is Patient on Dialysis or Renal Replacement: yes    Ht - 172.7 cm (68\")  Wt - 62.6 kg (138 lb)    Estimated Creatinine Clearance: 19.6 mL/min (A) (by C-G formula based on SCr of 3.54 mg/dL (H)).    Intake & Output (last 3 days)         11/17 0701 - 11/18 0700 11/18 0701 - 11/19 0700 11/19 0701 - 11/20 0700 11/20 0701 - 11/21 0700    I.V. (mL/kg)   100 (1.6)     Blood  645      Total Intake(mL/kg)  645 (10.3) 100 (1.6)     Other  2830  2320    Stool  0      Total Output  2830  2320    Net  -2185 +100 -2320            Stool Unmeasured Occurrence  4 x              Microbiology and Radiology  Microbiology Results (last 10 days)       Procedure Component Value - Date/Time    Blood Culture - Blood, Hand, Left [[id number]] Collected:  11/19/19 1804    Lab Status:  Preliminary result Specimen:  Blood from Hand, Left Updated:  11/20/19 0911    Narrative:       Aerobic bottle only    Blood Culture - Blood, Arm, Left [[id number]] Collected:  11/19/19 1801    Lab Status:  Preliminary result Specimen:  Blood from Arm, Left Updated:  11/20/19 0912    Narrative:       Aerobic bottle only    Blood Culture - Blood, Arm, Left [[id number]] Collected:  11/18/19 0814    Lab Status:  Preliminary result Specimen:  Blood from Arm, Left Updated:  11/20/19 0845     Blood Culture No growth at 2 days    Blood Culture - Blood, Arm, Left [[id number]] Collected:  11/18/19 0800    Lab Status:  Preliminary result Specimen:  Blood from Arm, Left Updated:  11/20/19 0845     Blood Culture No growth at 2 days          Assessment of levels    Lab Results   Component Value Date    [facility] 16.30 11/19/2019    [facility] 11/18: Vancomycin 1250mg IV x 1 dose given    11/19: Vancomycin random level=16.3mcg/mL             " Vancomycin 1000mg IV x 1 dose given    11/20: Vancomycin pre-HD random level=33.3mcg/mL, patient had HD             No Vancomycin given    Plan:    1. Pharmacy consulted to dose vancomycin for pneumonia, goal trough 15-20mcg/mL. Patient is an HD patient.  2. Vancomycin 1000mg x 1 dose administered on 11/19 after a random level of 16.3mcg/mL.  3. Vancomycin random level this morning is SUPRA-therapeutic at 33.3mcg/mL.  4. Patient was dialyzed today, would expect ~30% of vancomycin to be removed. Will HOLD vancomycin doses today and obtain a random vancomycin level on 11/21.  5. Will continue to follow and adjust dose as needed.    Thank you for this consult,    Soo Guzman, PharmD, BCPS  11/20/2019  2:11 PM

## 2019-11-21 NOTE — SIGNIFICANT NOTE
Rapid response called secondary to chest pain. Patient reports 5/10 non-radiating right sided chest pain upon awakening.  He reports his chest pain is worse with inspiration.  Ausculation: expiratory wheezes to bilateral upper lobes, scattered rhonchi.  Duo-neb given at bedside with complete resolution of chest pain.  Troponin, CXR, CBC and CMP pending.        -Troponin was 0.298, previously on 11/18/2019: 0.380  -will continue to trend troponin, EKG shows no acute changes.    -patient continues to have no chest pain currently.   -CXR reviewed which shows no changes since prior CXR: continue currently treatment plan.

## 2019-11-21 NOTE — PROGRESS NOTES
"   LOS: 3 days    Patient Care Team:  Jeannette Godoy APRN as PCP - General (Family Medicine)    Reason For Visit:  F/U ESRD.  Subjective           Review of Systems:    Pulm: No soa   CV:  No CP      Objective       albuterol 10 mg Nebulization Once   cefepime 1 g Intravenous Q24H   dextrose 50 mL Intravenous Once   insulin detemir 10 Units Subcutaneous Nightly   insulin lispro 0-7 Units Subcutaneous 4x Daily With Meals & Nightly   insulin regular 6 Units Intravenous Once   levETIRAcetam in NaCl 0.82% 500 mg Intravenous Q12H   levothyroxine 125 mcg Oral Q AM   pantoprazole 40 mg Intravenous BID   povidone-iodine  Topical Daily   sodium chloride 10 mL Intravenous Q12H            Vital Signs:  Blood pressure 122/66, pulse 106, temperature 97.8 °F (36.6 °C), temperature source Oral, resp. rate 18, height 172.7 cm (68\"), weight 62.6 kg (138 lb), SpO2 99 %.    Flowsheet Rows      First Filed Value   Admission Height  172.7 cm (68\") Documented at 11/18/2019 0523   Admission Weight  59 kg (130 lb) Documented at 11/18/2019 0523          11/20 0701 - 11/21 0700  In: 700 [P.O.:600]  Out: 2320     Physical Exam:    General Appearance: NAD, alert and cooperative, Ox3  Eyes: PER, conjunctivae and sclerae normal, no icterus  Lungs: respirations regular and unlabored, no crepitus, clear to auscultation  Heart/CV: regular rhythm & normal rate, no murmur, no gallop, no rub and no edema  Abdomen: not distended, soft, non-tender, no masses,  bowel sounds present  Skin: No rash, Warm and dry. AVF    Radiology:            Labs:  Results from last 7 days   Lab Units 11/21/19  0056 11/20/19  0420 11/19/19  2347  11/19/19  1119   WBC 10*3/mm3 8.27 8.59  --   --  12.20*   HEMOGLOBIN g/dL 7.9*  7.9* 7.7*  7.7* 8.4*   < > 8.3*   HEMATOCRIT % 25.3*  25.3* 26.0*  26.0* 26.9*   < > 26.7*   PLATELETS 10*3/mm3 226 245  --   --  241    < > = values in this interval not displayed.     Results from last 7 days   Lab Units 11/21/19  0056 " 11/20/19  0420 11/19/19  1119 11/19/19  0812 11/18/19  0908 11/18/19  0559 11/16/19  0523 11/15/19  0435   SODIUM mmol/L 139 133* 137  --  134* 136 133* 137   POTASSIUM mmol/L 3.9 3.2* 3.4* 3.4* 5.1 5.6* 4.8 3.9   CHLORIDE mmol/L 102 95* 98  --  95* 94* 95* 100   CO2 mmol/L 23.0 20.0* 22.0  --  21.0* 20.0* 23.0 25.0   BUN mg/dL 15 40* 34*  --  59* 53* 43* 24*   CREATININE mg/dL 2.19* 3.54* 2.65*  --  3.78* 3.58* 3.46* 2.36*   CALCIUM mg/dL 8.9 9.3 9.4  --  10.0 10.3 10.2 9.5   PHOSPHORUS mg/dL  --  3.5 3.0  --   --   --  3.6 2.4*   MAGNESIUM mg/dL  --   --   --   --   --  2.3  --   --    ALBUMIN g/dL 3.00* 3.10* 3.10*  --   --  3.40* 3.30* 2.80*     Results from last 7 days   Lab Units 11/21/19  0056   GLUCOSE mg/dL 103*       Results from last 7 days   Lab Units 11/21/19  0056   ALK PHOS U/L 166*   BILIRUBIN mg/dL 0.5   BILIRUBIN DIRECT mg/dL 0.2   ALT (SGPT) U/L 1,076*   AST (SGOT) U/L 931*                 Estimated Creatinine Clearance: 31.8 mL/min (A) (by C-G formula based on SCr of 2.19 mg/dL (H)).      Assessment       Upper GI bleed    Diabetes mellitus (CMS/HCC)    End stage renal disease on dialysis (CMS/HCC)    Chronic systolic CHF (congestive heart failure) (CMS/[facility])    HCAP (healthcare-associated pneumonia)    Melena    Acute blood loss anemia    Shock liver            Impression: ESRD. ANEMIA.            Recommendations: HD 11/22/19.      Teddy Doll MD  11/21/19  1:55 PM

## 2019-11-21 NOTE — PROGRESS NOTES
[facility] Medicine Services  PROGRESS NOTE    Patient Name: Talha Cutler  : 1960  MRN: [id number]    Date of Admission: 2019  Primary Care Physician: Jeannette Godoy APRN    Subjective   Subjective     CC:  F/U GI bleed    HPI:  Patient seen this morning. Had right sided chest pain overnight, EKG no changes and troponins are downtrending since admission. Resolved after a neb treatment. Eating breakfast. No complaints. Says he will not go to rehab.    Review of Systems  Gen-no fevers, no chills  CV-no chest pain, no palpitations  Resp-no cough, no dyspnea  GI-no N/V/D, no abd pain    All other systems reviewed and negative except any additional pertinent positives and negatives as discussed in HPI.      Objective   Objective     Vital Signs:   Temp:  [97.4 °F (36.3 °C)-98.3 °F (36.8 °C)] 97.8 °F (36.6 °C)  Heart Rate:  [] 106  Resp:  [16-20] 18  BP: (116-135)/(64-76) 122/66        Physical Exam:  Gen-no acute distress, chronically ill appearing  HENT-NCAT, mucous membranes moist  CV-RRR, S1 S2 normal, no m/r/g  Resp-mildly decreased at the bases, no wheezes or rales, nonlabored  Abd-soft, NT, ND, +BS  Ext-no edema  Neuro-A&Ox3  Skin-no rashes  Psych-appropriate mood    Results Reviewed:    Results from last 7 days   Lab Units 19  0056 19  0420 19  2347  19  1119   WBC 10*3/mm3 8.27 8.59  --   --  12.20*   HEMOGLOBIN g/dL 7.9*  7.9* 7.7*  7.7* 8.4*   < > 8.3*   HEMATOCRIT % 25.3*  25.3* 26.0*  26.0* 26.9*   < > 26.7*   PLATELETS 10*3/mm3 226 245  --   --  241   INR  1.48* 1.67*  --   --   --    PROCALCITONIN ng/mL 2.18* 2.56*  --   --   --     < > = values in this interval not displayed.     Results from last 7 days   Lab Units 19  0533 19  0056 19  0420 19  1119  19  0559   SODIUM mmol/L  --  139 133* 137   < > 136   POTASSIUM mmol/L  --  3.9 3.2* 3.4*   < > 5.6*   CHLORIDE mmol/L  --  102 95* 98   < >  94*   CO2 mmol/L  --  23.0 20.0* 22.0   < > 20.0*   BUN mg/dL  --  15 40* 34*   < > 53*   CREATININE mg/dL  --  2.19* 3.54* 2.65*   < > 3.58*   GLUCOSE mg/dL  --  103* 184* 181*   < > 230*   CALCIUM mg/dL  --  8.9 9.3 9.4   < > 10.3   ALT (SGPT) U/L  --  1,076* 1,308* 1,684*  --  727*   AST (SGOT) U/L  --  931* 1,439* 2,653*  --  1,590*   TROPONIN T ng/mL 0.278* 0.298*  --   --   --  0.380*    < > = values in this interval not displayed.     Estimated Creatinine Clearance: 31.8 mL/min (A) (by C-G formula based on SCr of 2.19 mg/dL (H)).    Microbiology Results Abnormal     Procedure Component Value - Date/Time    Blood Culture - Blood, Arm, Left [[id number]] Collected:  11/18/19 0800    Lab Status:  Preliminary result Specimen:  Blood from Arm, Left Updated:  11/21/19 0846     Blood Culture No growth at 3 days    Blood Culture - Blood, Arm, Left [[id number]] Collected:  11/18/19 0814    Lab Status:  Preliminary result Specimen:  Blood from Arm, Left Updated:  11/21/19 0846     Blood Culture No growth at 3 days    Blood Culture - Blood, Arm, Left [[id number]] Collected:  11/19/19 1801    Lab Status:  Preliminary result Specimen:  Blood from Arm, Left Updated:  11/20/19 1915     Blood Culture No growth at 24 hours    Narrative:       Aerobic bottle only    Blood Culture - Blood, Hand, Left [[id number]] Collected:  11/19/19 1804    Lab Status:  Preliminary result Specimen:  Blood from Hand, Left Updated:  11/20/19 1915     Blood Culture No growth at 24 hours    Narrative:       Aerobic bottle only    MRSA Screen, PCR - Swab, Nares [[id number]]  (Normal) Collected:  11/20/19 1627    Lab Status:  Final result Specimen:  Swab from Nares Updated:  11/20/19 1809     MRSA PCR Negative    Narrative:       MRSA Negative          Imaging Results (Last 24 Hours)     Procedure Component Value Units Date/Time    XR Chest 1 View [[id number]] Collected:  11/21/19 0117     Updated:  11/21/19 0119    Narrative:       CR Chest 1  Vw    INDICATION:   59-year-old male with chest pain and dyspnea today.     COMPARISON:    Chest 11/19/2019    FINDINGS:  Single portable AP view(s) of the chest.    No change in patchy infiltrates throughout the right mid and lower lung field. Stable right pleural effusion. Left lung clear. No pneumothorax. Stable cardiomegaly. Mediastinum and pulmonary vasculature are unremarkable.       Impression:       No interval change from 11/19/2019.    Signer Name: Ross Wagner MD   Signed: 11/21/2019 1:17 AM   Workstation Name: [facility]    [facility]          Results for orders placed during the hospital encounter of 11/01/19   Adult Transthoracic Echo Complete W/ Cont if Necessary Per Protocol    Narrative · The left ventricular cavity is moderate-to-severely dilated.  · Right ventricular cavity is mildly dilated.  · Left atrial cavity size is moderately dilated.  · Moderate mitral valve regurgitation is present  · There is a small (<1cm) pericardial effusion.          I have reviewed the medications:  Scheduled Meds:    albuterol 10 mg Nebulization Once   cefepime 1 g Intravenous Q24H   dextrose 50 mL Intravenous Once   insulin detemir 10 Units Subcutaneous Nightly   insulin lispro 0-7 Units Subcutaneous 4x Daily With Meals & Nightly   insulin regular 6 Units Intravenous Once   levETIRAcetam in NaCl 0.82% 500 mg Intravenous Q12H   levothyroxine 125 mcg Oral Q AM   pantoprazole 40 mg Intravenous BID   povidone-iodine  Topical Daily   sodium chloride 10 mL Intravenous Q12H     Continuous Infusions:     PRN Meds:.•  albumin human  •  albuterol  •  dextrose  •  dextrose  •  glucagon (human recombinant)  •  Morphine **AND** naloxone  •  ondansetron  •  sodium chloride  •  sodium chloride      Assessment/Plan   Assessment / Plan     Active Hospital Problems    Diagnosis  POA   • **Upper GI bleed [K92.2]  Yes   • Acute blood loss anemia [D62]  Yes   • Shock liver [K72.00]  Yes   • HCAP  (healthcare-associated pneumonia) [J18.9]  Yes   • Melena [K92.1]  Unknown   • Chronic systolic CHF (congestive heart failure) (CMS/[facility]) [I50.22]  Yes   • End stage renal disease on dialysis (CMS/[facility]) [N18.6, Z99.2]  Not Applicable   • Diabetes mellitus (CMS/[facility]) [E11.9]  Yes      Resolved Hospital Problems   No resolved problems to display.        Brief Hospital Course to date:  Talha Cutler is a 59 y.o. male with ESRD on HD, DM2, prior CVA with residual left sided hemiparesis, CHF (25%), recent admission 11/6-11/16 (left AMA after partial treatment for HCAP and seizures), and chronic anemia who presents back to the ER due to dizziness and weakness. He was found to have worsening anemia with Hb 6.2 and elevated LFT's with AST 1590 and . He received 2 units PRBC and was admitted to the hospitlaist service for further workup.    Of note, he was admitted to [facility] 11/6-11/16 due to pneumonia with respiratory failure requiring mechanical ventilation (tested positive for Metapneumovirus) and seizures evaluated by Neurology who felt patient had component of both psychogenic non-epileptic seizures and epileptic seizures (Keppra dose reduced back to previous home dosing 500 mg BID). Cardiology also evaluated the patient as Echo that admission showed reduced EF of 25%; recommended medical management and outpatient ischemic evaluation with his primary cardiologist in [place] following discharge.    Assessment/Plan:    Acute blood loss on chronic, symptomatic, anemia   Upper GI bleed secondary to bleeding telangectasia  - s/p EGD 11/19/19 with Dr. Santana showed gastritis in the antrum as well as a bleeding telangiectasia in D2 s/p APC. Continue BID PPI. Biopsies with chronic gastritis, no H.pylori.  - s/p 2U PRBC's this admission. Hb improved and remains stable today. Continue to monitor.    HCAP  Early sepsis (elevated WBC, tachycardic)  - recent incomplete treatment due to leaving AMA on 11/16  - repeat CXR shows  persistent RML/RLL airspace disease with small to moderate right pleural effusion  - MRSA screen negative, will stop Vanc and continue on Cefepime Day 3, plan to switch to PO ATBx tomorrow  - blood cultures no growth     Elevated LFT's secondary to shock liver in setting of GI bleed  Fatty liver disease with evidence for cirrhosis  - LFT's continue to improve  - liver US does show fatty infiltration and parenchyma c/w Metavir score of F4  - GI following    ESRD on chronic HD  - Renal following, HD tomorrow    Hypotension  - secondary to blood loss  - holding antihypertensives (Imdur, ARB, BB)  - continue to hold today as he is normotensive    Chronic systolic CHF  - most recently recorded EF was 25%  - last admission, Dr. Mart recommended continuing Valsartan and Coreg, consider changing Valsartan to Entresto if BP tolerates; will need myocardial ischemia evaluation at some point but can be done as outpatient with his primary cardiologist in [place] in the next few weeks  - compensated currently    Prior stroke with chronic left sided weakness  - holding ASA/Plavix for bleeding, restart once okay per GI  - PT/OT recs for rehab but patient does not want to go     DM type 2, (HbA1c 9/2019 was 8.9%)  - normally takes Levemir 15U HS and Novolog 9U TID  - restarted Levemir at 10U HS, continue SSI, monitor and titrate     DVT Prophylaxis:  mechanical    Disposition: I expect the patient to be discharged home tomorrow after dialysis    CODE STATUS:   Code Status and Medical Interventions:   Ordered at: 11/18/19 1540     Level Of Support Discussed With:    Patient     Code Status:    CPR     Medical Interventions (Level of Support Prior to Arrest):    Full         Electronically signed by Marleny Witt MD, 11/21/19, 11:44 AM.

## 2019-11-21 NOTE — PLAN OF CARE
Problem: Patient Care Overview  Goal: Plan of Care Review  Outcome: Ongoing (interventions implemented as appropriate)   11/21/19 0577   Coping/Psychosocial   Plan of Care Reviewed With patient   OTHER   Outcome Summary Pt's direction on slide board t/f slightly improved today. Pt continues to need much assist, reports soreness from bed. Rehab recommended. Pt concerned about cost.

## 2019-11-21 NOTE — THERAPY TREATMENT NOTE
Patient Name: Talha Cutler  : 1960    MRN: [id number]                              Today's Date: 2019       Admit Date: 2019    Visit Dx:     ICD-10-CM ICD-9-CM   1. HCAP (healthcare-associated pneumonia) J18.9 486   2. ESRD (end stage renal disease) (CMS/[facility]) N18.6 585.6   3. Anemia, unspecified type D64.9 285.9   4. Generalized weakness R53.1 780.79   5. Dizziness R42 780.4   6. Hyperkalemia E87.5 276.7   7. Melena K92.1 578.1   8. Impaired mobility and ADLs Z74.09 799.89   9. End stage renal disease on dialysis (CMS/[facility]) N18.6 585.6    Z99.2 V45.11   10. History of CVA (cerebrovascular accident) Z86.73 V12.54     Patient Active Problem List   Diagnosis   • Chest pain   • Edema   • Diabetes mellitus (CMS/[facility])   • Essential hypertension   • Kidney disease   • Encounter for venous access device care   • Recurrent right pleural effusion   • SOB (shortness of breath)   • Type 2 diabetes mellitus treated with insulin (CMS/[facility])   • End stage renal disease on dialysis (CMS/H[facility])   • Uncontrolled diabetes mellitus with hyperglycemia, with long-term current use of insulin (CMS/HCC)   • Acute hyperkalemia   • Hypertension due to end stage renal disease caused by type 2 diabetes mellitus, on dialysis (CMS/[facility])   • Physical debility   • Chronic respiratory insufficiency   • Hyperglycemia due to type 2 diabetes mellitus (CMS/[facility])   • Pancreatitis, acute   • Chronic kidney disease-mineral and bone disorder   • Pneumonia of right lower lobe due to infectious organism (CMS/[facility])   • Peripheral vascular disease (CMS/[facility])   • Pneumonia due to infectious organism   • Pneumonia of right lower lobe due to infectious organism (CMS/[facility])   • Pneumonia of right upper lobe due to infectious organism (CMS/[facility])   • Pneumonia of right lung due to infectious organism   • Abnormal CT scan, lung   • Iron deficiency anemia due to chronic blood loss   • Symptomatic anemia   • Epistaxis, recurrent   • Hospital-acquired  pneumonia   • Dizziness   • Demand ischemia (CMS/[facility])   • History of CVA (cerebrovascular accident)   • Hypothyroidism (acquired)   • Coffee ground emesis   • Chronic systolic CHF (congestive heart failure) (CMS/[facility])   • Fluid overload   • Status epilepticus   • Seizure (CMS/[facility])   • HCAP (healthcare-associated pneumonia)   • Melena   • Upper GI bleed   • Acute blood loss anemia   • Shock liver     Past Medical History:   Diagnosis Date   • Abdominal pain    • Anemia    • Cataracts, bilateral    • CHF (congestive heart failure) (CMS/[facility])    • Chronic kidney disease    • Constipation    • Cough    • Dependence on nocturnal oxygen therapy    • Diabetes mellitus (CMS/[facility])    • Diarrhea    • End stage renal failure on dialysis (CMS/[facility])     TUES, THURS, SAT   • GERD (gastroesophageal reflux disease)    • H/O blood clots     LEG/NECK   • H/O chest x-ray 03/25/2016    Interval decrease in size of the patients right pleural effusion with a persistent small left effusion as well   • History of transfusion    • Hypertension    • Left-sided weakness    • Nauseated     DRY HEAVES   • Pleural effusion    • Pneumonia    • Pneumonia    • Renal failure    • Stroke (CMS/[facility]) 2006    LEFT SIDED WEAKNESS   • Swelling    • Teeth missing    • Tinnitus    • Ulcer, stomach peptic     HISTORY OF ULCER AS A TEENAGER   • Wears glasses      Past Surgical History:   Procedure Laterality Date   • ARTERIOVENOUS FISTULA Right    • BRONCHOSCOPY N/A 1/10/2019    Procedure: BRONCHOSCOPY with MAC and Flouro. LAVAGE, BRUSHINGS AND BIOPSY;  Surgeon: Ean Hernandez MD;  Location: [facility] OR;  Service: Pulmonary   • CARDIAC CATHETERIZATION N/A 3/28/2018    Procedure: Peripheral angiography;  Surgeon: Clay Ruano MD;  Location: [facility] CATH INVASIVE LOCATION;  Service: Cardiovascular   • CARDIAC CATHETERIZATION N/A 3/28/2018    Procedure: Angioplasty-peripheral;  Surgeon: Clay Ruano MD;  Location: [facility] CATH INVASIVE LOCATION;  " Service: Cardiovascular   • CARDIAC CATHETERIZATION N/A 3/28/2018    Procedure: Atherectomy-peripheral;  Surgeon: Clay Ruano MD;  Location: [facility] CATH INVASIVE LOCATION;  Service: Cardiovascular   • CATARACT EXTRACTION, BILATERAL     • CHOLECYSTECTOMY     • COLONOSCOPY     • ENDOSCOPY N/A 11/19/2019    Procedure: ESOPHAGOGASTRODUODENOSCOPY;  Surgeon: Iban Santana MD;  Location: [facility] ENDOSCOPY;  Service: Gastroenterology   • EYE SURGERY      BLEEDING BEHING BILATERAL EYES   • INTERVENTIONAL RADIOLOGY PROCEDURE N/A 3/28/2018    Procedure: Abdominal Aortogram;  Surgeon: Clay Ruano MD;  Location: [facility] CATH INVASIVE LOCATION;  Service: Cardiovascular   • PORTACATH PLACEMENT     • SHOULDER MANIPULATION Left     \"FROZEN SHOULDER\"   • VENOUS ACCESS DEVICE (PORT) REMOVAL       General Information     Row Name 11/21/19 0846          PT Evaluation Time/Intention    Document Type  therapy note (daily note)  -EJ     Mode of Treatment  physical therapy  -EJ     Row Name 11/21/19 0846          General Information    Patient Profile Reviewed?  yes  -EJ     Existing Precautions/Restrictions  fall;other (see comments);oxygen therapy device and L/min  -EJ     Row Name 11/21/19 0846          Cognitive Assessment/Intervention- PT/OT    Orientation Status (Cognition)  oriented x 3  -EJ     Row Name 11/21/19 0846          Safety Issues, Functional Mobility    Safety Issues Affecting Function (Mobility)  ability to follow commands;safety precaution awareness;safety precautions follow-through/compliance;insight into deficits/self awareness  -EJ     Impairments Affecting Function (Mobility)  balance;cognition;coordination;endurance/activity tolerance;motor control;motor planning;pain;range of motion (ROM);postural/trunk control;strength;shortness of breath  -EJ       User Key  (r) = Recorded By, (t) = Taken By, (c) = Cosigned By    Initials Name Provider Type    EJ Jeannette Banda PT Physical Therapist "        Mobility     Row Name 11/21/19 0850          Bed Mobility Assessment/Treatment    Bed Mobility Assessment/Treatment  rolling left;rolling right  -EJ     Rolling Left [place] (Bed Mobility)  moderate assist (50% patient effort)  -EJ     Rolling Right [place] (Bed Mobility)  minimum assist (75% patient effort);verbal cues  -EJ     Supine-Sit [place] (Bed Mobility)  moderate assist (50% patient effort);2 person assist;verbal cues  -EJ     Assistive Device (Bed Mobility)  bed rails;draw sheet;head of bed elevated  -EJ     Comment (Bed Mobility)  gets out on R side of bed, fearful of LOB to L.   -     Row Name 11/21/19 0835          Transfer Assessment/Treatment    Comment (Transfers)  cues for sequencing. Assist for positioning of slide board with PT assisting pt to lean on L elbow, raise RLE hip/thigh up. Assist for placement of slide board. Sit>stand not performed-when pt assised to scoot back blocking of knees was required, skin integrity in LEs is deminished.   -[facility] Name 11/21/19 0896          Bed-Chair Transfer    Bed-Chair Green[place] (Transfers)  2 person assist;moderate assist (50% patient effort)  -     Assistive Device (Bed-Chair Transfers)  sliding board  -       User Key  (r) = Recorded By, (t) = Taken By, (c) = Cosigned By    Initials Name Provider Type    Jeannette Redman PT Physical Therapist        Obj/Interventions    No documentation.       Goals/Plan    No documentation.       Clinical Impression     Row Name 11/21/19 0900          Pain Assessment    Additional Documentation  Pain Scale: FACES Pre/Post-Treatment (Group)  -[facility] Name 11/21/19 0900          Pain Scale: Numbers Pre/Post-Treatment    Pain Location - Orientation  generalized  -     Pre/Post Treatment Pain Comment  pt c/o pain he blames on laying in bed  -     Pain Intervention(s)  Repositioned;Ambulation/increased activity  -     Row Name 11/21/19 0900          Pain Scale: FACES  Pre/Post-Treatment    Pain: FACES Scale, Pretreatment  4-->hurts little more  -EJ     Pain: FACES Scale, Post-Treatment  4-->hurts little more  -EJ     Row Name 11/21/19 0900          Plan of Care Review    Plan of Care Reviewed With  patient  -     Row Name 11/21/19 0900          Physical Therapy Clinical Impression    Criteria for Skilled Interventions Met (PT Clinical Impression)  yes;treatment indicated  -EJ     Rehab Potential (PT Clinical Summary)  fair, will monitor progress closely  -     Row Name 11/21/19 0900          Positioning and Restraints    Pre-Treatment Position  in bed  -EJ     Post Treatment Position  chair  -EJ     In Chair  call light within reach;encouraged to call for assist;with family/caregiver;on mechanical lift sling;waffle cushion;exit alarm on;sitting;with other staff  -       User Key  (r) = Recorded By, (t) = Taken By, (c) = Cosigned By    Initials Name Provider Type    Jeannette Redman PT Physical Therapist        Outcome Measures     Row Name 11/21/19 0903          How much help from another person do you currently need...    Turning from your back to your side while in flat bed without using bedrails?  2  -EJ     Moving from lying on back to sitting on the side of a flat bed without bedrails?  2  -EJ     Moving to and from a bed to a chair (including a wheelchair)?  2  -EJ     Standing up from a chair using your arms (e.g., wheelchair, bedside chair)?  1  -EJ     Climbing 3-5 steps with a railing?  1  -EJ     To walk in hospital room?  1  -EJ     AM-PAC 6 Clicks Score (PT)  9  -     Row Name 11/21/19 0903          Functional Assessment    Outcome Measure Options  AM-PAC 6 Clicks Basic Mobility (PT)  -       User Key  (r) = Recorded By, (t) = Taken By, (c) = Cosigned By    Initials Name Provider Type    Jeannette Redman PT Physical Therapist        Physical Therapy Education     Title: PT OT SLP Therapies (In Progress)     Topic: Physical Therapy (In Progress)      Point: Mobility training (Done)     Learning Progress Summary           Patient Acceptance, E, VU,NR by EJ at 11/21/2019  9:04 AM    Acceptance, E, VU,NR by EJ at 11/19/2019  4:23 PM   Family Acceptance, E, VU,NR by EJ at 11/19/2019  4:23 PM                   Point: Body mechanics (Done)     Learning Progress Summary           Patient Acceptance, E, VU,NR by EJ at 11/21/2019  9:04 AM    Acceptance, E, VU,NR by EJ at 11/19/2019  4:23 PM   Family Acceptance, E, VU,NR by EJ at 11/19/2019  4:23 PM                   Point: Precautions (Done)     Learning Progress Summary           Patient Acceptance, E, VU,NR by EJ at 11/21/2019  9:04 AM    Acceptance, E, VU,NR by EJ at 11/19/2019  4:23 PM   Family Acceptance, E, VU,NR by EJ at 11/19/2019  4:23 PM                               User Key     Initials Effective Dates Name Provider Type Discipline     11/20/18 -  Jeannette Banda PT Physical Therapist PT              PT Recommendation and Plan  Planned Therapy Interventions (PT Eval): balance training, bed mobility training, motor coordination training, neuromuscular re-education, home exercise program, lumbar stabilization, strengthening, stretching, ROM (range of motion), postural re-education, transfer training  Outcome Summary/Treatment Plan (PT)  Anticipated Discharge Disposition (PT): inpatient rehabilitation facility(still recommended. Notified  of pt concerns.)  Plan of Care Reviewed With: patient  Progress: improving  Outcome Summary: Pt's direction on slide board t/f slightly improved today. Pt continues to need much assist, reports soreness from bed. Rehab recommended. Pt concerned about cost.      Time Calculation:   PT Charges     Row Name 11/21/19 0906             Time Calculation    Start Time  0815  -EJ      PT Received On  11/21/19  -      PT Goal Re-Cert Due Date  11/29/19  -         Time Calculation- PT    Total Timed Code Minutes- PT  23 minute(s)  -EJ         Timed Charges     42956 - PT Therapeutic Activity Minutes  23  -EJ        User Key  (r) = Recorded By, (t) = Taken By, (c) = Cosigned By    Initials Name Provider Type     Jeannette Banda PT Physical Therapist        Therapy Charges for Today     Code Description Service Date Service Provider Modifiers Qty    [id number]  PT THERAPEUTIC ACT EA 15 MIN 11/21/2019 Jeannette Banda PT GP 2          PT G-Codes  Outcome Measure Options: AM-PAC 6 Clicks Basic Mobility (PT)  AM-PAC 6 Clicks Score (PT): 9  AM-PAC 6 Clicks Score (OT): 15    Jeannette Guzman PT  11/21/2019

## 2019-11-22 NOTE — PROGRESS NOTES
"Adult Nutrition  Assessment/PES    Patient Name:  Talha Cutler  YOB: 1960  MRN: [id number]  Admit Date:  11/18/2019    Assessment Date:  11/22/2019    Comments:      Reason for Assessment     Row Name 11/22/19 0658          Reason for Assessment    Reason For Assessment  follow-up protocol 30\"     Diagnosis  -- SEE PREVIOUS NUTRITION NOTES, SHOCK LIVER.          Nutrition/Diet History     Row Name 11/22/19 1458          Nutrition/Diet History    Typical Food/Fluid Intake  PT REPORTS INTAKE OF FOOD IS NOT IMPROVED, HOWEVER, DID DRINK A STRAWBERRY BOOST PLUS & WOULD DRINK MORE IF PROVIDED EACH MEAL. HE REPORTS HAS ONLY RECEIVED ONE SINCE LAST VISIT. HE REPORTS IS GOING TO HAVE A TURKEY SANDWICH TONIGHT IN WHICH HE IS HUNGRY FOR..           Labs/Tests/Procedures/Meds     Row Name 11/22/19 1503          Labs/Procedures/Meds    Lab Results Reviewed  reviewed        Medications    Pertinent Medications Reviewed  reviewed         Physical Findings     Row Name 11/22/19 1503          Physical Findings    Gastrointestinal  other (see comments) WDL PER NURSING DOCUMENTATION     Skin  other (see comments) PUNCTURE WOUND & MULT ABRASIONS PER NURSING DOCUMENTATION           Nutrition Prescription Ordered     Row Name 11/22/19 1504          Nutrition Prescription PO    Current PO Diet  Regular     Supplement  Boost Plus (Ensure Enlive, Ensure Plus)     Supplement Frequency  3 times a day     Common Modifiers  Cardiac;Consistent Carbohydrate         Evaluation of Received Nutrient/Fluid Intake     Row Name 11/22/19 1504          PO Evaluation    Number of Meals  3     % PO Intake  7               Problem/Interventions:  Problem 1     Row Name 11/22/19 1504          Nutrition Diagnoses Problem 1    Problem 1  Predicted Suboptimal Intake     Etiology (related to)  Other (comment);Factors Affecting Nutrition UNKNOWN ETIOLOGY     Other  PT NOT RECEIVING ORAL SUPPLEMENTS     Signs/Symptoms (evidenced by)  Report of " Mnimal PO Intake;PO Intake     Percent (%) intake recorded  7 %     Over number of meals  3               Intervention Goal     Row Name 11/22/19 1505          Intervention Goal    General  Nutrition support treatment     PO  Increase intake         Nutrition Intervention     Row Name 11/22/19 1505          Nutrition Intervention    RD/Tech Action  Care plan reviewd;Follow Tx progress;Other (comment) CONTACTED FOOD & NUTRITION SERVICES SUPERVISOR TO RESOLVE PROBLEM OF PT NOT RECEIVING ORAL SUPPLEMENT           Education/Evaluation     Row Name 11/22/19 1506          Monitor/Evaluation    Monitor  Per protocol;I&O;PO intake;Supplement intake;Pertinent labs;Weight;Skin status;Symptoms           Electronically signed by:  Ella Kay RD  11/22/19 3:06 PM

## 2019-11-22 NOTE — PLAN OF CARE
Problem: Fall Risk (Adult)  Goal: Absence of Fall  Outcome: Ongoing (interventions implemented as appropriate)   11/22/19 0322   Fall Risk (Adult)   Absence of Fall achieves outcome       Problem: Patient Care Overview  Goal: Plan of Care Review  Outcome: Ongoing (interventions implemented as appropriate)   11/22/19 0322   Coping/Psychosocial   Plan of Care Reviewed With patient   Plan of Care Review   Progress no change   OTHER   Outcome Summary pt has rested well all night, no complaints tonight, poss D/C tomorrow, will continue to monitor        Problem: Skin Injury Risk (Adult)  Goal: Skin Health and Integrity  Outcome: Ongoing (interventions implemented as appropriate)   11/22/19 0322   Skin Injury Risk (Adult)   Skin Health and Integrity making progress toward outcome

## 2019-11-22 NOTE — PLAN OF CARE
Problem: Hemodialysis (Adult)  Goal: Signs and Symptoms of Listed Potential Problems Will be Absent, Minimized or Managed (Hemodialysis)  Outcome: Ongoing (interventions implemented as appropriate)   11/22/19 0642   Goal/Outcome Evaluation   Problems Assessed (Hemodialysis) all   Problems Present (Hemodialysis) situational response

## 2019-11-22 NOTE — PROGRESS NOTES
Case Management Discharge Note      Final Note: Met with pt at bedside to f/u DCP.  Goal is to return home with wife and resume HH services upon DC.  CM notified Rosalie of pt's anticipated DC on Sat and faxed ELVA charlton.  Also notified Lenora at [facility] of DC plan.  Given the Thanksgiving holiday next week, pt's OP HD schedule will be on Mon, Wed, Sat at he ususal time (next week only).  Of note, pt has cont home O2 orders already in place and provided per [facility].  However, pt chooses to only use it HS and prn.  Spoke with pt and wife (via phone) and instructed them to have a portable tank at the hospital on the day of DC.  Pt verbalized understanding of all instructions.          Destination      No service has been selected for the patient.      Durable Medical Equipment - Selection Complete      Service Provider Request Status Selected Services Address Phone Number Fax Number    [facility] [place] Selected Durable Medical Equipment [address] [address] [phone]      Dialysis/Infusion - Selection Complete      Service Provider Request Status Selected Services Address Phone Number Fax Number    [facility] [place] Selected Dialysis [address], [facility] [address] [phone] --       Haydee Jacobson RN 11/22/2019 1545    Next weeks schedule will be on Monday, Wednesday, and Saturday (same time) due to the Thanksgiving holiday.                   Home Medical Care      Service Provider Request Status Selected Services Address Phone Number Fax Number    [facility] [place] Selected Home Health Services [address] [address] [phone]      Therapy      No service has been selected for the patient.      Community Resources      No service has been selected for the patient.             Final Discharge Disposition Code: 06 - home with home health care

## 2019-11-22 NOTE — PROGRESS NOTES
"   LOS: 4 days    Patient Care Team:  Jeannette Godoy APRN as PCP - General (Family Medicine)    Reason For Visit:    Subjective   Patient seen/examined on dialysis, no complaints        Review of Systems:    Pulm: No soa   CV:  No CP      Objective       albuterol 10 mg Nebulization Once   cefepime 1 g Intravenous Q24H   dextrose 50 mL Intravenous Once   insulin detemir 10 Units Subcutaneous Nightly   insulin lispro 0-7 Units Subcutaneous 4x Daily With Meals & Nightly   insulin regular 6 Units Intravenous Once   levETIRAcetam in NaCl 0.82% 500 mg Intravenous Q12H   levothyroxine 125 mcg Oral Q AM   pantoprazole 40 mg Intravenous BID   povidone-iodine  Topical Daily   sodium chloride 10 mL Intravenous Q12H            Vital Signs:  Blood pressure 116/64, pulse 90, temperature 97.6 °F (36.4 °C), temperature source Oral, resp. rate 18, height 172.7 cm (68\"), weight 62.6 kg (138 lb), SpO2 99 %.    Flowsheet Rows      First Filed Value   Admission Height  172.7 cm (68\") Documented at 11/18/2019 0523   Admission Weight  59 kg (130 lb) Documented at 11/18/2019 0523          11/21 0701 - 11/22 0700  In: 200   Out: -     Physical Exam:    General Appearance: NAD, alert and cooperative, Ox3  Eyes: PER, conjunctivae and sclerae normal, no icterus  Lungs: respirations regular and unlabored, no crepitus, clear to auscultation  Heart/CV: regular rhythm & normal rate, no murmur, no gallop, no rub and no edema  Abdomen: not distended, soft, non-tender, no masses,  bowel sounds present  Skin: No rash, Warm and dry +AVF    Radiology:      Renal Imaging:        Labs:  Results from last 7 days   Lab Units 11/22/19  0522 11/21/19 0056 11/20/19  0420   WBC 10*3/mm3 6.08 8.27 8.59   HEMOGLOBIN g/dL 8.5* 7.9*  7.9* 7.7*  7.7*   HEMATOCRIT % 28.9* 25.3*  25.3* 26.0*  26.0*   PLATELETS 10*3/mm3 207 226 245     Results from last 7 days   Lab Units 11/22/19  0522 11/21/19  0056 11/20/19  0420 11/19/19  1119  11/18/19  0559 " 11/16/19  0523   SODIUM mmol/L 134* 139 133* 137   < > 136 133*   POTASSIUM mmol/L 4.4 3.9 3.2* 3.4*   < > 5.6* 4.8   CHLORIDE mmol/L 100 102 95* 98   < > 94* 95*   CO2 mmol/L 19.0* 23.0 20.0* 22.0   < > 20.0* 23.0   BUN mg/dL 25* 15 40* 34*   < > 53* 43*   CREATININE mg/dL 3.59* 2.19* 3.54* 2.65*   < > 3.58* 3.46*   CALCIUM mg/dL 9.2 8.9 9.3 9.4   < > 10.3 10.2   PHOSPHORUS mg/dL  --   --  3.5 3.0  --   --  3.6   MAGNESIUM mg/dL  --   --   --   --   --  2.3  --    ALBUMIN g/dL 2.90* 3.00* 3.10* 3.10*  --  3.40* 3.30*    < > = values in this interval not displayed.     Results from last 7 days   Lab Units 11/22/19  0522   GLUCOSE mg/dL 136*       Results from last 7 days   Lab Units 11/22/19  0522 11/21/19  0056   ALK PHOS U/L 154* 166*   BILIRUBIN mg/dL 0.6 0.5   BILIRUBIN DIRECT mg/dL  --  0.2   ALT (SGPT) U/L 753* 1,076*   AST (SGOT) U/L 358* 931*                 Estimated Creatinine Clearance: 19.4 mL/min (A) (by C-G formula based on SCr of 3.59 mg/dL (H)).      Assessment       Upper GI bleed    Diabetes mellitus (CMS/HCC)    End stage renal disease on dialysis (CMS/[facility])    Chronic systolic CHF (congestive heart failure) (CMS/[facility])    HCAP (healthcare-associated pneumonia)    Melena    Acute blood loss anemia    Shock liver            Impression:     ESRD MWF  Anemia of renal failure  Hypotension improving  Anemia, blood loss and renal failure        Recommendations:   HD now  Add epo   Next hd on Monday   Renally dose abx    Danika Greco DO  11/22/19  9:35 AM

## 2019-11-22 NOTE — PROGRESS NOTES
"GI Daily Progress Note  Subjective:    Chief Complaint:  Follow up melena and upper GI bleed     No complaints.  Family at bedside.   No melena.       Objective:    /73 (BP Location: Left arm, Patient Position: Lying)   Pulse 94   Temp 97.3 °F (36.3 °C) (Oral)   Resp 18   Ht 172.7 cm (68\")   Wt 62.6 kg (138 lb)   SpO2 98%   BMI 20.98 kg/m²     Physical Exam   Constitutional: He is oriented to person, place, and time.   Chronically ill appearing    Cardiovascular: Normal rate and regular rhythm.   Pulmonary/Chest: Effort normal. No respiratory distress.   Abdominal: Soft. Bowel sounds are normal. There is no tenderness.   Neurological: He is alert and oriented to person, place, and time.   Nursing note and vitals reviewed.      Lab  Lab Results   Component Value Date    WBC 6.08 11/22/2019    HGB 8.5 (L) 11/22/2019    HGB 7.9 (L) 11/21/2019    HGB 7.9 (L) 11/21/2019    MCV 99.7 (H) 11/22/2019     11/22/2019    INR 1.48 (H) 11/21/2019    INR 1.67 (H) 11/20/2019    INR 1.04 07/07/2018    INR 1.29 (H) 03/28/2018    INR 1.45 (H) 03/26/2018       Lab Results   Component Value Date    GLUCOSE 136 (H) 11/22/2019    BUN 25 (H) 11/22/2019    CREATININE 3.59 (H) 11/22/2019    EGFRIFNONA 17 (L) 11/22/2019    EGFRIFAFRI  11/12/2019      Comment:      <15 Indicative of kidney failure.    BCR 7.0 11/22/2019     (L) 11/22/2019    K 4.4 11/22/2019    CO2 19.0 (L) 11/22/2019    CALCIUM 9.2 11/22/2019    ALBUMIN 2.90 (L) 11/22/2019    ALKPHOS 154 (H) 11/22/2019    BILITOT 0.6 11/22/2019    BILIDIR 0.2 11/21/2019     (H) 11/22/2019     (H) 11/22/2019       Assessment:    Bleeding telangectasia in duodenum s/p APC  Melena, resolved, due to above   Acute blood loss anemia  Elevated LFTs consistent with shock liver   Coagulopathy, question cirrhosis ?  Ultrasound shows F4     Plan:    >>> H&H appears stable.  No further bleeding.   Discontinue IV BID PPI and start once daily PO   >>> LFTs trending " down.    Recommend outpatient follow up with [facility] in 3-4 weeks.   Patient's wife prefers to see Dr. Romero or Dr. Magaña.   Needs CMP prior to appointment.       Will sign off.  Please call for questions or concerns.      JAG Brown  11/22/19  11:59 AM

## 2019-11-22 NOTE — PROGRESS NOTES
"    [facility] Medicine Services  PROGRESS NOTE    Patient Name: Talha Cutler  : 1960  MRN: [id number]    Date of Admission: 2019  Primary Care Physician: Jeannette Godoy APRN    Subjective   Subjective     CC:  F/U GI bleed    HPI:  Patient resting comfortably in bed in dialysis this morning.  States that when he turns on his left side he still experiences smothering and difficulty catching his breath.  Denies productive cough.  Not much appetite.  Concerned about his breathing.      Review of Systems  Gen-no fevers, no chills  CV-no chest pain, no palpitations  Resp-no cough, + \"smothering\" when laying on his left side.   GI-no N/V/D, no abd pain    All other systems reviewed and negative except any additional pertinent positives and negatives as discussed in HPI.      Objective   Objective     Vital Signs:   Temp:  [97.2 °F (36.2 °C)-97.6 °F (36.4 °C)] 97.3 °F (36.3 °C)  Heart Rate:  [88-97] 94  Resp:  [16-20] 18  BP: (108-122)/(61-73) 115/73        Physical Exam:    Constitutional: No acute distress, awake, alert, chronically ill-appearing, resting in bed in dialysis  HENT: NCAT, mucous membranes moist  Respiratory: Right breath sounds coarse throughout, decreased in right base, left lung sounds clear, respiratory effort normal on 2 L  Cardiovascular: RRR, + murmur, palpable pedal pulses bilaterally  Gastrointestinal: Positive bowel sounds, soft, nontender, nondistended  Musculoskeletal: No bilateral ankle edema noted  Psychiatric: Appropriate affect, cooperative  Neurologic: Oriented x 3, strength symmetric in all extremities, Cranial Nerves grossly intact to confrontation, speech clear  Skin: No rashes to exposed skin, loose fragile skin       Results Reviewed:    Results from last 7 days   Lab Units 19  0522 19  0056 19  0420   WBC 10*3/mm3 6.08 8.27 8.59   HEMOGLOBIN g/dL 8.5* 7.9*  7.9* 7.7*  7.7*   HEMATOCRIT % 28.9* 25.3*  25.3* 26.0*  " 26.0*   PLATELETS 10*3/mm3 207 226 245   INR   --  1.48* 1.67*   PROCALCITONIN ng/mL  --  2.18* 2.56*     Results from last 7 days   Lab Units 11/22/19  0522 11/21/19  0533 11/21/19  0056 11/20/19  0420  11/18/19  0559   SODIUM mmol/L 134*  --  139 133*   < > 136   POTASSIUM mmol/L 4.4  --  3.9 3.2*   < > 5.6*   CHLORIDE mmol/L 100  --  102 95*   < > 94*   CO2 mmol/L 19.0*  --  23.0 20.0*   < > 20.0*   BUN mg/dL 25*  --  15 40*   < > 53*   CREATININE mg/dL 3.59*  --  2.19* 3.54*   < > 3.58*   GLUCOSE mg/dL 136*  --  103* 184*   < > 230*   CALCIUM mg/dL 9.2  --  8.9 9.3   < > 10.3   ALT (SGPT) U/L 753*  --  1,076* 1,308*   < > 727*   AST (SGOT) U/L 358*  --  931* 1,439*   < > 1,590*   TROPONIN T ng/mL  --  0.278* 0.298*  --   --  0.380*    < > = values in this interval not displayed.     Estimated Creatinine Clearance: 19.4 mL/min (A) (by C-G formula based on SCr of 3.59 mg/dL (H)).    Microbiology Results Abnormal     Procedure Component Value - Date/Time    Blood Culture - Blood, Arm, Left [[id number]] Collected:  11/18/19 0800    Lab Status:  Preliminary result Specimen:  Blood from Arm, Left Updated:  11/22/19 0846     Blood Culture No growth at 4 days    Blood Culture - Blood, Arm, Left [[id number]] Collected:  11/18/19 0814    Lab Status:  Preliminary result Specimen:  Blood from Arm, Left Updated:  11/22/19 0846     Blood Culture No growth at 4 days    Blood Culture - Blood, Arm, Left [[id number]] Collected:  11/19/19 1801    Lab Status:  Preliminary result Specimen:  Blood from Arm, Left Updated:  11/21/19 1916     Blood Culture No growth at 2 days    Narrative:       Aerobic bottle only    Blood Culture - Blood, Hand, Left [[id number]] Collected:  11/19/19 1804    Lab Status:  Preliminary result Specimen:  Blood from Hand, Left Updated:  11/21/19 1916     Blood Culture No growth at 2 days    Narrative:       Aerobic bottle only    MRSA Screen, PCR - Swab, Nares [[id number]]  (Normal) Collected:  11/20/19 9771     Lab Status:  Final result Specimen:  Swab from Nares Updated:  11/20/19 1809     MRSA PCR Negative    Narrative:       MRSA Negative          Imaging Results (Last 24 Hours)     Procedure Component Value Units Date/Time    XR Chest 1 View [[id number]] Collected:  11/22/19 1401     Updated:  11/22/19 1402    Narrative:          EXAMINATION: XR CHEST 1 VW-      INDICATION: follow up, SOA; J18.9-Pneumonia, unspecified organism;  N18.6-End stage renal disease; D64.9-Anemia, unspecified;  R53.1-Weakness; R42-Dizziness and giddiness; E87.5-Hyperkalemia;  K92.1-Melena; Z74.09-Other reduced mobility; N18.6-End stage renal  disease; Z99.2-Dependence on renal dialysis; Z86.73-Personal history of  transient ischemic attack (TIA), and cerebral infarction without  residual deficits      COMPARISON: Chest radiograph 11/21/2019     FINDINGS: Single frontal chest radiograph are submitted for review. The  heart is top normal size.     There is mild prostatic desiccation of the aorta noted. Similar  multifocal patchy airspace changes are noted in the right mid and lower  lung with small to moderate right-sided pleural effusion. Visualized  upper abdomen is unrevealing. No acute offset or malalignment  identified.           Impression:       Similar right mid and lower lung airspace disease with a  small-to-moderate right-sided pleural effusion.                Results for orders placed during the hospital encounter of 11/01/19   Adult Transthoracic Echo Complete W/ Cont if Necessary Per Protocol    Narrative · The left ventricular cavity is moderate-to-severely dilated.  · Right ventricular cavity is mildly dilated.  · Left atrial cavity size is moderately dilated.  · Moderate mitral valve regurgitation is present  · There is a small (<1cm) pericardial effusion.          I have reviewed the medications:  Scheduled Meds:    albuterol 10 mg Nebulization Once   cefepime 1 g Intravenous Q24H   dextrose 50 mL Intravenous Once   epoetin  case/case-epbx 5,000 Units Subcutaneous Once per day on Mon Wed Fri   insulin detemir 10 Units Subcutaneous Nightly   insulin lispro 0-7 Units Subcutaneous 4x Daily With Meals & Nightly   insulin regular 6 Units Intravenous Once   levETIRAcetam in NaCl 0.82% 500 mg Intravenous Q12H   levothyroxine 125 mcg Oral Q AM   [START ON 11/23/2019] pantoprazole 40 mg Oral Q AM   povidone-iodine  Topical Daily   sodium chloride 10 mL Intravenous Q12H     Continuous Infusions:     PRN Meds:.•  albumin human  •  albuterol  •  dextrose  •  dextrose  •  glucagon (human recombinant)  •  melatonin  •  Morphine **AND** naloxone  •  ondansetron  •  sodium chloride  •  sodium chloride      Assessment/Plan   Assessment / Plan     Active Hospital Problems    Diagnosis  POA   • **Upper GI bleed [K92.2]  Yes   • Acute blood loss anemia [D62]  Yes   • Shock liver [K72.00]  Yes   • HCAP (healthcare-associated pneumonia) [J18.9]  Yes   • Melena [K92.1]  Unknown   • Chronic systolic CHF (congestive heart failure) (CMS/[facility]) [I50.22]  Yes   • End stage renal disease on dialysis (CMS/[facility]) [N18.6, Z99.2]  Not Applicable   • Diabetes mellitus (CMS/[facility]) [E11.9]  Yes      Resolved Hospital Problems   No resolved problems to display.        Brief Hospital Course to date:  Talha Cutler is a 59 y.o. male with ESRD on HD, DM2, prior CVA with residual left sided hemiparesis, CHF (25%), recent admission 11/6-11/16 (left AMA after partial treatment for HCAP and seizures), and chronic anemia who presents back to the ER due to dizziness and weakness. He was found to have worsening anemia with Hb 6.2 and elevated LFT's with AST 1590 and . He received 2 units PRBC and was admitted to the hospitlaist service for further workup.    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 Cardiology also evaluated the patient as Echo that admission showed reduced EF of 25%; recommended medical management and outpatient ischemic evaluation with his primary cardiologist in [place] following discharge.    Assessment/Plan:    Acute blood loss on chronic, symptomatic, anemia   Upper GI bleed secondary to bleeding telangectasia  - s/p EGD 11/19/19 with Dr. Santana showed gastritis in the antrum as well as a bleeding telangiectasia in D2 s/p APC. PPI swtiched to daily by GI today.  Follow up with Dr. Romero or Dr. Magaña in 3-4 weeks with a CMP prior to appt.    - s/p 2U PRBC's this admission. Hb improved and stable today. Continue to monitor.    HCAP  Early sepsis, elevated WBC's, tachycardic  - recent incomplete treatment due to leaving AMA on 11/16  - continue on cefepime.  - repeated cxr this am after dialysis.  Right lung appears improved from last CXR.  Maybe a fluid component since improved after HD.    -will work on weaning off of 02 today and plan on hopeful discharge tomorrow.         Elevated LFT's secondary to shock liver in setting of GI bleed  Fatty liver disease with evidence for cirrhosis  - LFT's a trending down.   - liver US does show fatty infiltration and parenchyma c/w Metavir score of F4      ESRD on chronic HD  - Renal following, HD again today    Hypotension  - secondary to blood loss  - holding antihypertensives (Imdur, ARB, BB)  - continue to hold today as he is normotensive    Chronic systolic CHF  - most recently recorded EF was 25%  - last admission, Dr. Mart recommended continuing Valsartan and Coreg, consider changing Valsartan to Entresto if BP tolerates; will need myocardial ischemia evaluation at some point but can be done as outpatient with his primary cardiologist in [place] in the next few weeks  - compensated currently    Prior stroke  with chronic left sided weakness  - holding ASA/Plavix for bleeding, restart once okay per GI  - PT/OT recs for rehab but patient likely to decline     DM type 2, (HbA1c 9/2019 was 8.9%)  - normally takes Levemir 15U HS and Novolog 9U TID  - continue current regimen      DVT Prophylaxis:  mechanical    Disposition: I expect the patient to be discharged home tomorrow.  Patient has declined rehab.      CODE STATUS:   Code Status and Medical Interventions:   Ordered at: 11/18/19 1540     Level Of Support Discussed With:    Patient     Code Status:    CPR     Medical Interventions (Level of Support Prior to Arrest):    Full         Electronically signed by ALEXYS Moulton, 11/22/19, 2:24 PM.

## 2019-11-23 PROBLEM — K92.2 UPPER GI BLEED: Status: RESOLVED | Noted: 2019-01-01 | Resolved: 2019-01-01

## 2019-11-23 PROBLEM — J18.9 HCAP (HEALTHCARE-ASSOCIATED PNEUMONIA): Status: RESOLVED | Noted: 2019-01-01 | Resolved: 2019-01-01

## 2019-11-23 PROBLEM — D62 ACUTE BLOOD LOSS ANEMIA: Status: RESOLVED | Noted: 2019-01-01 | Resolved: 2019-01-01

## 2019-11-23 PROBLEM — K92.1 MELENA: Status: RESOLVED | Noted: 2019-01-01 | Resolved: 2019-01-01

## 2019-11-23 PROBLEM — K72.00 SHOCK LIVER: Status: RESOLVED | Noted: 2019-01-01 | Resolved: 2019-01-01

## 2019-11-23 NOTE — THERAPY DISCHARGE NOTE
Patient Name: Talha Cutler  : 1960    MRN: [id number]                              Today's Date: 2019       Admit Date: 2019    Visit Dx:     ICD-10-CM ICD-9-CM   1. HCAP (healthcare-associated pneumonia) J18.9 486   2. ESRD (end stage renal disease) (CMS/[facility]) N18.6 585.6   3. Anemia, unspecified type D64.9 285.9   4. Generalized weakness R53.1 780.79   5. Dizziness R42 780.4   6. Hyperkalemia E87.5 276.7   7. Melena K92.1 578.1   8. Impaired mobility and ADLs Z74.09 799.89   9. End stage renal disease on dialysis (CMS/[facility]) N18.6 585.6    Z99.2 V45.11   10. History of CVA (cerebrovascular accident) Z86.73 V12.54   11. Upper GI bleed K92.2 578.9   12. Shock liver K72.00 570     Patient Active Problem List   Diagnosis   • Chest pain   • Edema   • Diabetes mellitus (CMS/[facility])   • Essential hypertension   • Kidney disease   • Encounter for venous access device care   • Recurrent right pleural effusion   • SOB (shortness of breath)   • Type 2 diabetes mellitus treated with insulin (CMS/[facility])   • End stage renal disease on dialysis (CMS/[facility])   • Uncontrolled diabetes mellitus with hyperglycemia, with long-term current use of insulin (CMS/[facility])   • Acute hyperkalemia   • Hypertension due to end stage renal disease caused by type 2 diabetes mellitus, on dialysis (CMS/[facility])   • Physical debility   • Chronic respiratory insufficiency   • Hyperglycemia due to type 2 diabetes mellitus (CMS/[facility])   • Pancreatitis, acute   • Chronic kidney disease-mineral and bone disorder   • Pneumonia of right lower lobe due to infectious organism (CMS/[facility])   • Peripheral vascular disease (CMS/[facility])   • Pneumonia due to infectious organism   • Pneumonia of right lower lobe due to infectious organism (CMS/[facility])   • Pneumonia of right upper lobe due to infectious organism (CMS/[facility])   • Pneumonia of right lung due to infectious organism   • Abnormal CT scan, lung   • Iron deficiency anemia due to chronic blood loss   • Symptomatic  anemia   • Epistaxis, recurrent   • Hospital-acquired pneumonia   • Dizziness   • Demand ischemia (CMS/[facility])   • History of CVA (cerebrovascular accident)   • Hypothyroidism (acquired)   • Coffee ground emesis   • Chronic systolic CHF (congestive heart failure) (CMS/[facility])   • Fluid overload   • Status epilepticus   • Seizure (CMS/[facility])     Past Medical History:   Diagnosis Date   • Abdominal pain    • Anemia    • Cataracts, bilateral    • CHF (congestive heart failure) (CMS/[facility])    • Chronic kidney disease    • Constipation    • Cough    • Dependence on nocturnal oxygen therapy    • Diabetes mellitus (CMS/[facility])    • Diarrhea    • End stage renal failure on dialysis (CMS/[facility])     SILVINO AVITIA, SAT   • GERD (gastroesophageal reflux disease)    • H/O blood clots     LEG/NECK   • H/O chest x-ray 03/25/2016    Interval decrease in size of the patients right pleural effusion with a persistent small left effusion as well   • History of transfusion    • Hypertension    • Left-sided weakness    • Nauseated     DRY HEAVES   • Pleural effusion    • Pneumonia    • Pneumonia    • Renal failure    • Stroke (CMS/[facility]) 2006    LEFT SIDED WEAKNESS   • Swelling    • Teeth missing    • Tinnitus    • Ulcer, stomach peptic     HISTORY OF ULCER AS A TEENAGER   • Wears glasses      Past Surgical History:   Procedure Laterality Date   • ARTERIOVENOUS FISTULA Right    • BRONCHOSCOPY N/A 1/10/2019    Procedure: BRONCHOSCOPY with MAC and Flouro. LAVAGE, BRUSHINGS AND BIOPSY;  Surgeon: Ean Hernandez MD;  Location: [facility] OR;  Service: Pulmonary   • CARDIAC CATHETERIZATION N/A 3/28/2018    Procedure: Peripheral angiography;  Surgeon: Clay Ruano MD;  Location: [facility] CATH INVASIVE LOCATION;  Service: Cardiovascular   • CARDIAC CATHETERIZATION N/A 3/28/2018    Procedure: Angioplasty-peripheral;  Surgeon: Clay Ruano MD;  Location: [facility] CATH INVASIVE LOCATION;  Service: Cardiovascular   • CARDIAC CATHETERIZATION N/A  "3/28/2018    Procedure: Atherectomy-peripheral;  Surgeon: Clay Ruano MD;  Location: [facility] CATH INVASIVE LOCATION;  Service: Cardiovascular   • CATARACT EXTRACTION, BILATERAL     • CHOLECYSTECTOMY     • COLONOSCOPY     • ENDOSCOPY N/A 11/19/2019    Procedure: ESOPHAGOGASTRODUODENOSCOPY;  Surgeon: Iban Santana MD;  Location:  [facility];  Service: Gastroenterology   • EYE SURGERY      BLEEDING BEHING BILATERAL EYES   • INTERVENTIONAL RADIOLOGY PROCEDURE N/A 3/28/2018    Procedure: Abdominal Aortogram;  Surgeon: Clay Ruano MD;  Location: [facility] CATH INVASIVE LOCATION;  Service: Cardiovascular   • PORTACATH PLACEMENT     • SHOULDER MANIPULATION Left     \"FROZEN SHOULDER\"   • VENOUS ACCESS DEVICE (PORT) REMOVAL       General Information     Row Name 11/23/19 1045          PT Evaluation Time/Intention    Document Type  therapy note (daily note);discharge treatment  -DM     Mode of Treatment  physical therapy  -DM     Row Name 11/23/19 1045          General Information    Existing Precautions/Restrictions  fall;seizures;other (see comments) MRSA, VRE; Dial.  -DM       User Key  (r) = Recorded By, (t) = Taken By, (c) = Cosigned By    Initials Name Provider Type    DM Rosalie Foster, PT Physical Therapist        Mobility     Row Name 11/23/19 1045          Bed Mobility Assessment/Treatment    Comment (Bed Mobility)  sitting EOB,leaning on R elbow  -DM     Row Name 11/23/19 1045          Transfer Assessment/Treatment    Comment (Transfers)  cues for HP,Seq; transport chair placed at R EOB for SPT on RLE  -DM     Row Name 11/23/19 1045          Bed-Chair Transfer    Bed-Chair [place] (Transfers)  maximum assist (25% patient effort);verbal cues;nonverbal cues (demo/gesture) SPT on RLE from EOB to transport chair(PT placed gt belt w/ metal buckle to facil adeq. support)  -DM     Assistive Device (Bed-Chair Transfers)  other (see comments) wife had packed sliding board; gt belt placed;pt " rested RUE on PT'S shldr.(holds LUE in joaquin posturing)  -DM     Row Name 11/23/19 1045          Sit-Stand Transfer    Sit-Stand [place] (Transfers)  maximum assist (25% patient effort) brief stand@EOB  -DM     Assistive Device (Sit-Stand Transfers)  other (see comments) gt belt; RUE supp.  -DM     Row Name 11/23/19 1045          Gait/Stairs Assessment/Training    [place] Level (Gait)  unable to assess  -DM     Comment (Gait/Stairs)  Def.  -DM       User Key  (r) = Recorded By, (t) = Taken By, (c) = Cosigned By    Initials Name Provider Type    DM Rosalie Foster, PT Physical Therapist        Obj/Interventions     Row Name 11/23/19 1045          Therapeutic Exercise    Lower Extremity (Therapeutic Exercise)  hamstring sets, right;quad sets, right;LAQ (long arc quad), right  -DM     Lower Extremity Range of Motion (Therapeutic Exercise)  hip abduction/adduction, right;hip flexion/extension, right;ankle dorsiflexion/plantar flexion, right  -DM     Exercise Type (Therapeutic Exercise)  AAROM (active assistive range of motion);AROM (active range of motion);isometric contraction, static  -DM     Position (Therapeutic Exercise)  seated  -DM     Sets/Reps (Therapeutic Exercise)  1/10  -DM     Expected Outcome (Therapeutic Exercise)  improve functional tolerance, single extremity activity  -DM     Row Name 11/23/19 1045          Static Sitting Balance    Level of [place] (Unsupported Sitting, Static Balance)  minimal assist, 75% patient effort pt.reverts to leaning on R elbow on mattress when PT not assisting to maintain erect sitting posture w/ min A(R hand on bedrail)  -DM     Sitting Position (Unsupported Sitting, Static Balance)  sitting on edge of bed  -DM     Time Able to Maintain Position (Unsupported Sitting, Static Balance)  more than 5 minutes  -DM     Comment (Unsupported Sitting, Static Balance)  cues to ext trunk/neck,WB through RUE/ R bedrail, & maintain equal WB B hips  -DM     Row Name  11/23/19 1045          Dynamic Sitting Balance    Level of [place], Reaches Outside Midline (Sitting, Dynamic Balance)  moderate assist, 50 to 74% patient effort  -DM     Sitting Position, Reaches Outside Midline (Sitting, Dynamic Balance)  sitting on edge of bed  -DM     Comment, Reaches Outside Midline (Sitting, Dynamic Balance)  WS L/R & F/B, then rocking forward STS to init. SPT  -DM     Row Name 11/23/19 1045          Static Standing Balance    Level of [place] (Supported Standing, Static Balance)  maximal assist, 25 to 49% patient effort  -DM     Time Able to Maintain Position (Supported Standing, Static Balance)  15 to 30 seconds  -DM     Assistive Device Utilized (Supported Standing, Static Balance)  other (see comments) GT belt  -DM     Row Name 11/23/19 1045          Dynamic Standing Balance    Level of [place], Reaches Outside Midline (Standing, Dynamic Balance)  maximal assist, 25 to 49% patient effort  -DM     Time Able to Maintain Position, Reaches Outside Midline (Standing, Dynamic Balance)  less than 15 seconds  -DM     Assistive Device Utilized (Supported Standing, Dynamic Balance)  other (see comments) gt belt  -DM     Comment, Reaches Outside Midline (Standing, Dynamic Balance)  WS to init. SPT on RLE from EOB to transport chair  -DM       User Key  (r) = Recorded By, (t) = Taken By, (c) = Cosigned By    Initials Name Provider Type    DM Rosalie Foster, PT Physical Therapist        Goals/Plan     Row Name 11/23/19 1045          Bed Mobility Goal 1 (PT)    Activity/Assistive Device (Bed Mobility Goal 1, PT)  sit to supine/supine to sit  -DM     [place] Level/Cues Needed (Bed Mobility Goal 1, PT)  conditional independence  -DM     Time Frame (Bed Mobility Goal 1, PT)  long term goal (LTG);10 days  -DM     Progress/Outcomes (Bed Mobility Goal 1, PT)  goal not met  -DM     Row Name 11/23/19 1045          Transfer Goal 1 (PT)    Activity/Assistive Device (Transfer Goal 1, PT)   bed-to-chair/chair-to-bed  -DM     [place] Level/Cues Needed (Transfer Goal 1, PT)  minimum assist (75% or more patient effort)  -DM     Time Frame (Transfer Goal 1, PT)  long term goal (LTG);10 days  -DM     Progress/Outcome (Transfer Goal 1, PT)  goal not met  -DM     Row Name 11/23/19 1045          Patient Education Goal (PT)    Activity (Patient Education Goal, PT)  HEP exer  -DM     [place]/Cues/Accuracy (Memory Goal 2, PT)  demonstrates adequately;verbalizes understanding  -DM     Time Frame (Patient Education Goal, PT)  short term goal (STG);1 day  -DM     Progress/Outcome (Patient Education Goal, PT)  goal partially met  -DM       User Key  (r) = Recorded By, (t) = Taken By, (c) = Cosigned By    Initials Name Provider Type    Rosalie Melgar, PT Physical Therapist        Clinical Impression     Row Name 11/23/19 1045          Pain Assessment    Additional Documentation  Pain Scale: Numbers Pre/Post-Treatment (Group)  -DM     Row Name 11/23/19 1045          Pain Scale: Numbers Pre/Post-Treatment    Pain Scale: Numbers, Pretreatment  0/10 - no pain  -DM     Pain Scale: Numbers, Post-Treatment  0/10 - no pain  -DM     Row Name 11/23/19 1045          Plan of Care Review    Plan of Care Reviewed With  patient;spouse;son  -DM     Row Name 11/23/19 1045          Vital Signs    Pre Systolic BP Rehab  113  -DM     Pre Treatment Diastolic BP  66  -DM     Pretreatment Heart Rate (beats/min)  100  -DM     Pre SpO2 (%)  99  -DM     O2 Delivery Pre Treatment  room air  -DM     O2 Delivery Intra Treatment  room air  -DM     O2 Delivery Post Treatment  room air  -DM     Pre Patient Position  Sitting  -DM     Intra Patient Position  Standing  -DM     Post Patient Position  Sitting  -DM     Row Name 11/23/19 1045          Positioning and Restraints    Pre-Treatment Position  in bed  -DM     Post Treatment Position  chair transport chair  -DM     In Wheelchair  notified nsg;sitting;with other staff;legs elevated   -DM       User Key  (r) = Recorded By, (t) = Taken By, (c) = Cosigned By    Initials Name Provider Type    Rosalie Melgar, PT Physical Therapist        Outcome Measures     Row Name 11/23/19 1045          How much help from another person do you currently need...    Turning from your back to your side while in flat bed without using bedrails?  3  -DM     Moving from lying on back to sitting on the side of a flat bed without bedrails?  3  -DM     Moving to and from a bed to a chair (including a wheelchair)?  2  -DM     Standing up from a chair using your arms (e.g., wheelchair, bedside chair)?  2  -DM     Climbing 3-5 steps with a railing?  1  -DM     To walk in hospital room?  1  -DM     AM-PAC 6 Clicks Score (PT)  12  -DM     Row Name 11/23/19 1045          Functional Assessment    Outcome Measure Options  AM-PAC 6 Clicks Basic Mobility (PT)  -DM       User Key  (r) = Recorded By, (t) = Taken By, (c) = Cosigned By    Initials Name Provider Type    Rosalie Melgar, PT Physical Therapist        Physical Therapy Education     Title: PT OT SLP Therapies (In Progress)     Topic: Physical Therapy (In Progress)     Point: Mobility training (In Progress)     Learning Progress Summary           Patient Eager, E,D, NR by NAHOMY at 11/23/2019 11:10 AM    Acceptance, E, VU,NR by OTILIO at 11/21/2019  9:04 AM    Acceptance, E, VU,NR by OTILIO at 11/19/2019  4:23 PM   Family Eager, E,D, NR by NAHOMY at 11/23/2019 11:10 AM    Acceptance, E, VU,NR by OTILIO at 11/19/2019  4:23 PM   Significant Other Eager, E,D, NR by NAHOMY at 11/23/2019 11:10 AM                   Point: Home exercise program (In Progress)     Learning Progress Summary           Patient Eager, E,D, NR by NAHOMY at 11/23/2019 11:10 AM   Family Eager, E,D, NR by NAHOMY at 11/23/2019 11:10 AM   Significant Other Eager, E,D, NR by NAHOMY at 11/23/2019 11:10 AM                   Point: Body mechanics (In Progress)     Learning Progress Summary           Patient Eager, E,D, NR by NAHOMY at 11/23/2019  11:10 AM    Acceptance, E, VU,NR by OTILIO at 11/21/2019  9:04 AM    Acceptance, E, VU,NR by EJ at 11/19/2019  4:23 PM   Family Eager, E,D, NR by DM at 11/23/2019 11:10 AM    Acceptance, E, VU,NR by EJ at 11/19/2019  4:23 PM   Significant Other Eager, E,D, NR by DM at 11/23/2019 11:10 AM                   Point: Precautions (In Progress)     Learning Progress Summary           Patient Eager, E,D, NR by DM at 11/23/2019 11:10 AM    Acceptance, E, VU,NR by OTILIO at 11/21/2019  9:04 AM    Acceptance, E, VU,NR by OTILIO at 11/19/2019  4:23 PM   Family Eager, E,D, NR by DM at 11/23/2019 11:10 AM    Acceptance, E, VU,NR by OTILIO at 11/19/2019  4:23 PM   Significant Other Eager, E,D, NR by DM at 11/23/2019 11:10 AM                               User Key     Initials Effective Dates Name Provider Type Discipline     11/20/18 -  Jeannette Banda, PT Physical Therapist PT    DM 06/19/15 -  Rosalie Foster, PT Physical Therapist PT              PT Recommendation and Plan     Outcome Summary/Treatment Plan (PT)  Anticipated Discharge Disposition (PT): home with assist, home with home health  Plan of Care Reviewed With: patient, spouse, son  Progress: improving  Outcome Summary: Able to perform RLE exer x 10 reps, stat.& dyn.sit/stand bal activ,brief stand.trial (10 sec.) w/ max A, & SPT on RLE from EOB to transport chair w/max A; pt. declined copy of HEP (states he had this from prior therapy intervention); partially met HEP goal; did not meet mobil. goals; will benefit from HHPT f/u (is declining S.T. rehab)     Time Calculation:   PT Charges     Row Name 11/23/19 1110             Time Calculation    Start Time  1045  -DM      PT Received On  11/23/19  -DM      PT Goal Re-Cert Due Date  11/29/19  -DM         Time Calculation- PT    Total Timed Code Minutes- PT  12 minute(s)  -DM         Timed Charges    75621 - PT Therapeutic Activity Minutes  12  -DM        User Key  (r) = Recorded By, (t) = Taken By, (c) = Cosigned By    Initials  Name Provider Type    DM Rosalie Foster, PT Physical Therapist        Therapy Charges for Today     Code Description Service Date Service Provider Modifiers Qty    95039167252 HC PT THERAPEUTIC ACT EA 15 MIN 11/23/2019 Rosalie Foster, PT GP 1          PT G-Codes  Outcome Measure Options: AM-PAC 6 Clicks Basic Mobility (PT)  AM-PAC 6 Clicks Score (PT): 12  AM-PAC 6 Clicks Score (OT): 15    PT Discharge Summary  Anticipated Discharge Disposition (PT): home with assist, home with home health    Rosalie Foster, PT  11/23/2019

## 2019-11-23 NOTE — PLAN OF CARE
Problem: Fall Risk (Adult)  Goal: Identify Related Risk Factors and Signs and Symptoms  Outcome: Ongoing (interventions implemented as appropriate)      Problem: Patient Care Overview  Goal: Plan of Care Review  Outcome: Ongoing (interventions implemented as appropriate)   11/23/19 0232   Coping/Psychosocial   Plan of Care Reviewed With patient   Plan of Care Review   Progress no change   OTHER   Outcome Summary vss, pain controlled with iv meds, plan for dc in am continue to monitor       Problem: Skin Injury Risk (Adult)  Goal: Identify Related Risk Factors and Signs and Symptoms  Outcome: Ongoing (interventions implemented as appropriate)

## 2019-11-23 NOTE — PLAN OF CARE
Problem: Patient Care Overview  Goal: Plan of Care Review  Outcome: Unable to achieve outcome(s) by discharge Date Met: 11/23/19 11/23/19 1110   Coping/Psychosocial   Plan of Care Reviewed With patient;spouse;son   Plan of Care Review   Progress improving   OTHER   Outcome Summary Able to perform RLE exer x 10 reps, stat.& dyn.sit/stand bal activ,brief stand.trial (10 sec.) w/ max A, & SPT on RLE from EOB to transport chair w/max A; noted low HGB (8.5); pt. declined copy of HEP (states he had this from prior therapy intervention); partially met HEP goal; did not meet mobil. goals; will benefit from HHPT f/u (is declining S.T. rehab)

## 2019-11-23 NOTE — DISCHARGE SUMMARY
[facility] Medicine Services  DISCHARGE SUMMARY    Patient Name: Talha Cutler  : 1960  MRN: [id number]    Date of Admission: 2019  5:27 AM  Date of Discharge:  2019  Primary Care Physician: Jeannette Godoy APRN    Consults     Date and Time Order Name Status Description    2019 1202 Inpatient Gastroenterology Consult Completed     2019 0841 Inpatient Nephrology Consult Completed     2019 0030 Inpatient Cardiology Consult Completed     2019 2354 Inpatient Neurology Consult General Completed     2019 0753 Inpatient Pulmonology Consult Completed     2019 1641 Inpatient Nephrology Consult Completed           Hospital Course     Presenting Problem:   HCAP (healthcare-associated pneumonia) [J18.9]    Active Hospital Problems    Diagnosis  POA   • Chronic systolic CHF (congestive heart failure) (CMS/[facility]) [I50.22]  Yes   • End stage renal disease on dialysis (CMS/[facility]) [N18.6, Z99.2]  Not Applicable   • Diabetes mellitus (CMS/[facility]) [E11.9]  Yes      Resolved Hospital Problems    Diagnosis Date Resolved POA   • **Upper GI bleed [K92.2] 2019 Yes   • Acute blood loss anemia [D62] 2019 Yes   • Shock liver [K72.00] 2019 Yes   • HCAP (healthcare-associated pneumonia) [J18.9] 2019 Yes   • Melena [K92.1] 2019 Unknown          Hospital Course:  Talha Cutler is a 59 y.o. male with a history of end-stage renal disease on hemodialysis, type 2 diabetes, prior CVA with residual left-sided hemiparesis, CHF with an EF of 25%, recent admission 2019 to 2019 where he left AMA after a partial treatment for H CAP and seizures, and history of chronic anemia who presented to the emergency department on 2019 with complaints of dizziness and weakness.  He was noted to have a worsening anemia with a hemoglobin of 6.2 and elevated LFTs with an AST of 1590 and ALT of 727.  The patient was admitted to hospital  medicine services for further work-up and given 2 units of packed red blood cells.    Of note, the patient was admitted to [facility] from 11/6 to 11/16/2019 due to pneumonia with respiratory failure requiring mechanical ventilation.  He tested positive for metapneumovirus at that time.  He was also treated for seizures and evaluated by neurology who felt that the patient had a component of both psychogenic and epileptic seizures.  His Keppra dose was reduced back to his previous home dose of 500 twice daily at this hospitalization.  Cardiology also evaluated the patient during his last hospitalization as his echo on that admission showed a reduced EF of 25%.  They recommended medical management and outpatient ischemic evaluation with his primary cardiologist in [place] following discharge.    During this hospitalization, the patient was seen in consultation by gastroenterology, Dr. Santana.  He underwent an EGD on 11/19/2019 which showed gastritis in the antrum as well as a bleeding telangiectasia in D2 which is now status post APC.  He was placed on IV PPI twice daily and has now been transitioned to oral Protonix daily.  The patient will need to follow-up with [facility] at discharge (either Dr. Romero or Dr. Canales) with a CMP prior to his next appointment.  It was felt that the patient's elevated LFTs were secondary to shock liver in the setting of his GI bleed.  He was also noted to have a fatty liver disease consistent with a Metavir score of F4.  The patient was followed by nephrology during this hospitalization and continued on his chronic hemodialysis regimen.  Patient also developed some hypotension secondary to his blood loss.  His home indoor, losartan, and metoprolol were held due to his hypotension.  He also does have a history of chronic systolic congestive heart failure.  He will need to follow-up with his primary cardiologist in [place] in the next few weeks.  He is currently well  compensated.  On the day of his discharge home, he was placed back on his beta-blocker at a lower dose but his indoor and his losartan were held due to his borderline blood pressures.  He was given instructions on when to hold his metoprolol as well.  Patient was followed by PT as well as OT due to his recent prior stroke.  They do recommend rehab but he has declined, deciding instead to go home with home health.  The patient's aspirin and Plavix had been previously held due to his bleeding but will be restarted today upon discharge.  Patient is now medically stable and ready to be discharged home.  He will follow-up with his PCP at their first available hospital follow-up appointment and with his primary cardiologist as well as [facility] in the next 3 to 4 weeks.      Discharge Follow Up Recommendations for labs/diagnostics:  Follow-up with PCP first available follow-up appointment  Follow-up with primary cardiologist in [place] in the next 3 to 4 weeks  Follow-up with [facility] (Dr. Magaña or Alonso) in the next 3 to 4 weeks.    Day of Discharge     HPI:   Resting comfortably in bed this morning with family at bedside.  Breathing seems improved today.  Not complaining of any smothering when he rolls over.  Reports that he slept well last night.  Wants to go home today.    Review of Systems  Gen- No fevers, chills  CV- No chest pain, palpitations  Resp- No cough, dyspnea  GI- No N/V/D, abd pain      Otherwise ROS is negative except as mentioned in the HPI.    Vital Signs:   Temp:  [97.3 °F (36.3 °C)-98.3 °F (36.8 °C)] 98.1 °F (36.7 °C)  Heart Rate:  [] 100  Resp:  [16-20] 16  BP: ()/(51-73) 113/66     Physical Exam:  Constitutional: No acute distress, awake, alert, chronically ill-appearing, resting in bed, family at bedside  HENT: NCAT, mucous membranes moist  Respiratory: Crackles noted in both lobes right greater than left, respiratory effort normal on chronic nasal cannula  Cardiovascular: RRR,  no murmurs, rubs, or gallops, palpable pedal pulses bilaterally  Gastrointestinal: Positive bowel sounds, soft, nontender, nondistended  Musculoskeletal: No bilateral ankle edema  Psychiatric: Appropriate affect, cooperative  Neurologic: Oriented x 3, strength symmetric in all extremities, Cranial Nerves grossly intact to confrontation, speech clear  Skin: No rashes noted to his loose fragile skin    Pertinent  and/or Most Recent Results     Results from last 7 days   Lab Units 11/22/19  0522 11/21/19  0056 11/20/19  0420 11/19/19  2347 11/19/19  1546 11/19/19  1119 11/19/19  0812 11/19/19  0026  11/18/19  0908 11/18/19  0559   WBC 10*3/mm3 6.08 8.27 8.59  --   --  12.20*  --   --   --   --  9.59   HEMOGLOBIN g/dL 8.5* 7.9*  7.9* 7.7*  7.7* 8.4* 8.0* 8.3*  --  8.3*   < >  --  6.2*   HEMATOCRIT % 28.9* 25.3*  25.3* 26.0*  26.0* 26.9* 24.7* 26.7*  --  28.0*   < >  --  20.6*   PLATELETS 10*3/mm3 207 226 245  --   --  241  --   --   --   --  212   SODIUM mmol/L 134* 139 133*  --   --  137  --   --   --  134* 136   POTASSIUM mmol/L 4.4 3.9 3.2*  --   --  3.4* 3.4*  --   --  5.1 5.6*   CHLORIDE mmol/L 100 102 95*  --   --  98  --   --   --  95* 94*   CO2 mmol/L 19.0* 23.0 20.0*  --   --  22.0  --   --   --  21.0* 20.0*   BUN mg/dL 25* 15 40*  --   --  34*  --   --   --  59* 53*   CREATININE mg/dL 3.59* 2.19* 3.54*  --   --  2.65*  --   --   --  3.78* 3.58*   GLUCOSE mg/dL 136* 103* 184*  --   --  181*  --   --   --  246* 230*   CALCIUM mg/dL 9.2 8.9 9.3  --   --  9.4  --   --   --  10.0 10.3    < > = values in this interval not displayed.     Results from last 7 days   Lab Units 11/22/19  0522 11/21/19  0056 11/20/19  0420 11/19/19  1119 11/18/19  0559   BILIRUBIN mg/dL 0.6 0.5 0.4 0.6 0.6   ALK PHOS U/L 154* 166* 182* 201* 218*   ALT (SGPT) U/L 753* 1,076* 1,308* 1,684* 727*   AST (SGOT) U/L 358* 931* 1,439* 2,653* 1,590*   PROTIME Seconds  --  17.2* 18.9*  --   --    INR   --  1.48* 1.67*  --   --            Invalid  input(s): TG, LDLCALC, LDLREALC  Results from last 7 days   Lab Units 11/23/19  0500 11/21/19  0533 11/21/19  0056 11/20/19  0420 11/19/19  1801 11/18/19  1224 11/18/19  0813 11/18/19  0559   TSH uIU/mL  --   --   --   --   --   --   --  6.950*   TROPONIN T ng/mL  --  0.278* 0.298*  --   --   --   --  0.380*   PROCALCITONIN ng/mL 1.21*  --  2.18* 2.56*  --   --   --   --    LACTATE mmol/L  --   --   --   --  1.3 0.6 3.6*  --        Brief Urine Lab Results     None          Microbiology Results Abnormal     Procedure Component Value - Date/Time    Blood Culture - Blood, Arm, Left [[id number]] Collected:  11/18/19 0800    Lab Status:  Final result Specimen:  Blood from Arm, Left Updated:  11/23/19 0846     Blood Culture No growth at 5 days    Blood Culture - Blood, Arm, Left [[id number]] Collected:  11/18/19 0814    Lab Status:  Final result Specimen:  Blood from Arm, Left Updated:  11/23/19 0846     Blood Culture No growth at 5 days    Blood Culture - Blood, Hand, Left [[id number]] Collected:  11/19/19 1804    Lab Status:  Preliminary result Specimen:  Blood from Hand, Left Updated:  11/22/19 1916     Blood Culture No growth at 3 days    Narrative:       Aerobic bottle only    Blood Culture - Blood, Arm, Left [[id number]] Collected:  11/19/19 1801    Lab Status:  Preliminary result Specimen:  Blood from Arm, Left Updated:  11/22/19 1916     Blood Culture No growth at 3 days    Narrative:       Aerobic bottle only    MRSA Screen, PCR - Swab, Nares [[id number]]  (Normal) Collected:  11/20/19 1627    Lab Status:  Final result Specimen:  Swab from Nares Updated:  11/20/19 1809     MRSA PCR Negative    Narrative:       MRSA Negative          Imaging Results (All)     Procedure Component Value Units Date/Time    XR Chest 1 View [[id number]] Collected:  11/22/19 1401     Updated:  11/22/19 1634    Narrative:       EXAMINATION: XR CHEST 1 VW-11/22/2019:      INDICATION: Followup, SOA; J18.9-Pneumonia, unspecified  organism;  N18.6-End stage renal disease; D64.9-Anemia, unspecified;  R53.1-Weakness; R42-Dizziness and giddiness; E87.5-Hyperkalemia;  K92.1-Melena; Z74.09-Other reduced mobility; N18.6-End stage renal  disease; Z99.2-Dependence on renal dialysis; Z86.73-Personal history of  transient ischemic attack (TIA), and cerebral infarction without  residual deficits.      COMPARISON: Chest radiograph 11/21/2019.     FINDINGS: Single portable chest radiograph was submitted for review. The  heart is top normal in size, similar to prior. Mild atherosclerotic  calcification of the aorta noted. Similar multifocal patchy airspace  changes are noted in the right mid and lower lung with small to moderate  right-sided pleural effusion. The visualized upper abdomen is  unrevealing. No acute osseous abnormality identified.           Impression:       Similar right mid and lower lung airspace disease with a  small to moderate right-sided pleural effusion.     D:  11/22/2019  E:  11/22/2019     This report was finalized on 11/22/2019 4:30 PM by Dr. Billy Diop MD.       XR Chest 1 View [[id number]] Collected:  11/21/19 0117     Updated:  11/21/19 0119    Narrative:       CR Chest 1 Vw    INDICATION:   59-year-old male with chest pain and dyspnea today.     COMPARISON:    Chest 11/19/2019    FINDINGS:  Single portable AP view(s) of the chest.    No change in patchy infiltrates throughout the right mid and lower lung field. Stable right pleural effusion. Left lung clear. No pneumothorax. Stable cardiomegaly. Mediastinum and pulmonary vasculature are unremarkable.       Impression:       No interval change from 11/19/2019.    Signer Name: Ross Wagner MD   Signed: 11/21/2019 1:17 AM   Workstation Name: [facility]    [facility]    XR Chest 1 View [[id number]] Collected:  11/19/19 1749     Updated:  11/20/19 0910    Narrative:       EXAMINATION: XR CHEST 1 VW-11/19/2019:      INDICATION: Acute shortness of air,  chest pain; J18.9-Pneumonia,  unspecified organism; N18.6-End stage renal disease; D64.9-Anemia,  unspecified; R53.1-Weakness; R42-Dizziness and giddiness;  E87.5-Hyperkalemia; K92.1-Melena; Z74.09-Other reduced mobility.      COMPARISON: Chest radiograph 11/18/2019.     FINDINGS: Single portable chest radiograph was submitted for review. The  heart is top normal in size, similar to prior. Mild pulmonary vascular  congestion identified. Similar right lower lobe and right mid lung  airspace disease with small to moderate right-sided pleural effusion.  The left lung demonstrates chronic airspace changes and appears  unrevealing. No pneumothorax appreciated. Atherosclerotic calcification  of the aorta identified. The visualized upper abdomen is unrevealing. No  acute osseous abnormality appreciated.           Impression:       Redemonstration of right middle and right lower lobe  airspace disease with small to moderate right-sided pleural effusion,  similar to 11/18/2019.     D:  11/19/2019  E:  11/19/2019     This report was finalized on 11/20/2019 9:07 AM by Dr. Billy Diop MD.       US Liver [[id number]] Collected:  11/19/19 1405     Updated:  11/19/19 1645    Narrative:       EXAMINATION: US LIVER- 11/18/2019     INDICATION: elevated AST/ALT; J18.9-Pneumonia, unspecified organism;  N18.6-End stage renal disease; D64.9-Anemia, unspecified;  R53.1-Weakness; R42-Dizziness and giddiness; E87.5-Hyperkalemia;  K92.1-Melena      TECHNIQUE: Ultrasound right upper quadrant abdomen and liver with  elastography performed     COMPARISON: NONE     FINDINGS:      Visualized portions of the pancreas within normal limits.  Liver demonstrates increased echogenicity throughout the hepatic  parenchyma without focal liver lesion.  Gallbladder surgically absent without abnormality of the gallbladder  fossa.  Common bile duct measures 6 mm in diameter at the level of the pravin  hepatis within normal limits.     Right kidney  measures 7.6 cm in length without evidence of  hydronephrosis, contour deforming mass or obvious calculi.     Shear wave elastography performed with multiple data points obtained of  the hepatic parenchyma with a mean of 2.95 m/s correlating with liver  fibrosis quantification Metavir score of F4 (severe ).       Impression:       1. Diffusely echogenic liver parenchyma with mildly coarsened  echotexture consistent with parenchymal disease process such as hepatic  steatosis. No focal liver lesion.     2. Shear wave elastography performed with multiple data points obtained  of the hepatic parenchyma with a mean of 2.95 m/s correlating with liver  fibrosis quantification Metavir score of F4 (severe).     3. No ascites.     D:  11/19/2019  E:  11/19/2019         This report was finalized on 11/19/2019 4:42 PM by Dr. Anjum Martinez.       MRI Brain Without Contrast [[id number]] Collected:  11/18/19 0727     Updated:  11/18/19 0729    Narrative:       MRI brain without contrast.    HISTORY: Dizziness, nausea and vomiting.    TECHNIQUE: Routine brain MRI without contrast.    COMPARISON: Brain MRI dated 11/9/2019.    FINDINGS: There is no MR evidence of acute ischemia or other restricted diffusion. There is advanced cerebral atrophy, unchanged since the prior study, and there is no hydrocephalus or extra-axial fluid collection. Periventricular white matter changes  stable. There is no MR evidence of acute or chronic intracranial hemorrhage, and no mass is seen.    Normal flow voids are seen in the cerebral vessels. There is maxillary and lesser ethmoid sinus mucosal thickening, and there is fluid throughout the mastoid air cells bilaterally, all unchanged. Bone marrow signal is within normal limits, and the  extracranial soft tissues are unremarkable.    IMPRESSION : Advanced senescent change, greater than expected for age, but no acute abnormality nor interval change when compared to 11/9/2019.    Signer Name: Live  MD Jimbo   Signed: 11/18/2019 7:27 AM   [facility]    [facility]    XR Chest 1 View [[id number]] Collected:  11/18/19 0627     Updated:  11/18/19 0629    Narrative:       CR Chest 1 Vw    INDICATION:   Dizziness and confusion     COMPARISON:    11/14/2019    FINDINGS:  Single portable AP view(s) of the chest.    NG tube is been removed. Redemonstrated mild cardiomegaly.    Increased right lower lobe infiltrate and increased right effusion.    Left lung remains normally aerated. No pneumothorax.    Signer Name: Live Choi MD   Signed: 11/18/2019 6:27 AM   Workstation [facility]: [facility]    [facility]                    Results for orders placed during the hospital encounter of 11/01/19   Adult Transthoracic Echo Complete W/ Cont if Necessary Per Protocol    Narrative · The left ventricular cavity is moderate-to-severely dilated.  · Right ventricular cavity is mildly dilated.  · Left atrial cavity size is moderately dilated.  · Moderate mitral valve regurgitation is present  · There is a small (<1cm) pericardial effusion.           Order Current Status    Blood Culture - Blood, Arm, Left Preliminary result    Blood Culture - Blood, Hand, Left Preliminary result        Discharge Details        Discharge Medications      New Medications      Instructions Start Date   cefuroxime 500 MG tablet  Commonly known as:  CEFTIN   500 mg, Oral, 2 Times Daily      levETIRAcetam 500 MG tablet  Commonly known as:  KEPPRA   500 mg, Oral, 2 Times Daily      pantoprazole 40 MG EC tablet  Commonly known as:  PROTONIX   40 mg, Oral, Daily         Changes to Medications      Instructions Start Date   albuterol sulfate  (90 Base) MCG/ACT inhaler  Commonly known as:  PROVENTIL HFA;VENTOLIN HFA;PROAIR HFA  What changed:  Another medication with the same name was removed. Continue taking this medication, and follow the directions you see here.   2 puffs,  Inhalation, Every 4 Hours PRN      metoprolol succinate XL 50 MG 24 hr tablet  Commonly known as:  TOPROL-XL  What changed:    · medication strength  · how much to take  · additional instructions   50 mg, Oral, Daily, Hold this medication for blood pressure <100, HR<60         Continue These Medications      Instructions Start Date   aspirin 81 MG chewable tablet   81 mg, Oral, Daily      clopidogrel 75 MG tablet  Commonly known as:  PLAVIX   75 mg, Oral, Daily, Start taking from 1/12/2019 onwards ONLY if not coughing up significant amount of blood.      docusate sodium 100 MG capsule  Commonly known as:  COLACE   100 mg, Oral, Every 12 Hours      fluticasone 27.5 MCG/SPRAY nasal spray  Commonly known as:  VERAMYST   2 sprays, Nasal, Daily      folic acid 1 MG tablet  Commonly known as:  FOLVITE   1 mg, Oral, Daily      insulin aspart 100 UNIT/ML solution pen-injector sc pen  Commonly known as:  novoLOG FLEXPEN   9 Units, Subcutaneous, 3 Times Daily With Meals      insulin glargine 100 UNIT/ML injection  Commonly known as:  LANTUS   15 Units, Subcutaneous, Nightly      levothyroxine 100 MCG tablet  Commonly known as:  SYNTHROID, LEVOTHROID   125 mcg, Oral, Daily      ondansetron ODT 4 MG disintegrating tablet  Commonly known as:  ZOFRAN-ODT   4 mg, Oral, Every 6 Hours PRN      RADHA-SHIRLENE PO   1 tablet, Oral, Patient states he takes these after dialysis, but is unaware of dose.      vitamin D3 125 MCG (5000 UT) capsule capsule   1 capsule, Oral, Daily         Stop These Medications    atorvastatin 40 MG tablet  Commonly known as:  LIPITOR     famotidine 20 MG tablet  Commonly known as:  PEPCID     isosorbide mononitrate 30 MG 24 hr tablet  Commonly known as:  IMDUR     lanthanum 1000 MG chewable tablet  Commonly known as:  FOSRENOL     levETIRAcetam in NaCl 0.82% 500 MG/100ML solution IVPB  Commonly known as:  KEPPRA     losartan 25 MG tablet  Commonly known as:  COZAAR            Allergies   Allergen Reactions   •  Erythromycin Hives         Discharge Disposition:  Home-Health Care Svc    Diet:  Hospital:  Diet Order   Procedures   • Diet Regular; Consistent Carbohydrate, Cardiac       Activity:  Activity Instructions     Activity as Tolerated                 CODE STATUS:    Code Status and Medical Interventions:   Ordered at: 11/18/19 1540     Level Of Support Discussed With:    Patient     Code Status:    CPR     Medical Interventions (Level of Support Prior to Arrest):    Full       Future Appointments   Date Time Provider Department Center   11/25/2019  7:00 AM DIALYSIS STATION 2  [facility] ANAMIKA LISA       Additional Instructions for the Follow-ups that You Need to Schedule     Discharge Follow-up with PCP   As directed       Currently Documented PCP:    Jeannette Godoy APRN    PCP Phone Number:    [phone]     Follow Up Details:  first available hospital follow up appt.         Discharge Follow-up with Specified Provider: LEONEL Magaña or [place]; 3 Weeks   As directed      To:  LEONEL Magaña or [place]    Follow Up:  3 Weeks         Discharge Follow-up with Specified Provider: Primary cardiologist in [place]; 1 Month   As directed      To:  Primary cardiologist in [place]    Follow Up:  1 Month               Time Spent on Discharge:  45 minutes    Electronically signed by ALEXYS Moulton, 11/23/19, 9:54 AM.

## 2019-11-24 NOTE — OUTREACH NOTE
Prep Survey      Responses   Facility patient discharged from?  [place]   Is patient eligible?  Yes   Discharge diagnosis  Upper GI bleed  HCAP (healthcare-associated pneumonia,  CHF   Does the patient have one of the following disease processes/diagnoses(primary or secondary)?  CHF   Does the patient have Home health ordered?  Yes   What is the Home health agency?   [facility]    Is there a DME ordered?  No   Prep survey completed?  Yes          Subha Kennedy RN

## 2019-11-26 NOTE — OUTREACH NOTE
CHF Week 1 Survey      Responses   Facility patient discharged from?  [place]   Does the patient have one of the following disease processes/diagnoses(primary or secondary)?  CHF   Is there a successful TCM telephone encounter documented?  No   CHF Week 1 attempt successful?  Yes   Call start time  1511   Call end time  1521   Discharge diagnosis  Upper GI bleed  HCAP (healthcare-associated pneumonia,  CHF   Meds reviewed with patient/caregiver?  Yes   Is the patient having any side effects they believe may be caused by any medication additions or changes?  No   Does the patient have all medications ordered at discharge?  Yes   Is the patient taking all medications as directed (includes completed medication regime)?  Yes   Does the patient have a primary care provider?   Yes   Does the patient have an appointment with their PCP within 7 days of discharge?  Yes   Has the patient kept scheduled appointments due by today?  No   What is preventing the patient from keeping their appointments?  Doesn't understand importance [Wife reports that the pt was too ill to go to appt. Pt having N/V/D]   Nursing Interventions  Advised patient to keep appointment   What is the Home health agency?   [facility]   Has home health visited the patient within 72 hours of discharge?  No   Psychosocial issues?  No   Comments  Pt having black stools per wife. Still unable to tolerate food able to keep liquids down. Drinking Ensure.    Did the patient receive a copy of their discharge instructions?  Yes   Nursing interventions  Reviewed instructions with patient   What is the patient's perception of their health status since discharge?  Worsening   Is the patient weighing daily?  No [Wife reports that the pt is unable to stand to do wts. ]   Is the patient able to teach back signs and symptoms of worsening condition? (i.e. weight gain, shortness of air, etc.)  Yes   Is the patient/caregiver able to teach back the hierarchy of who to  call/visit for symptoms/problems? PCP, Specialist, Home health nurse, Urgent Care, ED, 911  Yes    CHF Week 1 call completed?  Yes          Alysa Carson RN

## 2019-11-30 NOTE — DISCHARGE SUMMARY
[facility] Medicine Services  ELOPEMENT AGAINST MEDICAL ADVICE    Patient Name: Talha Cutler  : 1960  MRN: [id number]    Date of Admission: 2019  Date of Elopement:  2019  Primary Care Physician: Jeannette Godoy APRN    Consults     Date and Time Order Name Status Description    2019 0030 Inpatient Cardiology Consult Completed     2019 2354 Inpatient Neurology Consult General Completed     2019 0753 Inpatient Pulmonology Consult Completed     2019 1641 Inpatient Nephrology Consult Completed         Hospital Course     Presenting Problem:   Seizure (CMS/[facility]) [R56.9]    Active Hospital Problems    Diagnosis  POA   • **Status epilepticus [G40.901]  Yes   • Seizure (CMS/HCC) [R56.9]  Yes   • Hypothyroidism (acquired) [E03.9]  Yes   • History of CVA (cerebrovascular accident) [Z86.73]  Not Applicable   • Chronic systolic CHF (congestive heart failure) (CMS/[facility]) [I50.22]  Yes   • Symptomatic anemia [D64.9]  Yes   • Pneumonia of right upper lobe due to infectious organism (CMS/[facility]) [J18.1]  Yes   • Peripheral vascular disease (CMS/[facility]) [I73.9]  Yes   • Uncontrolled diabetes mellitus with hyperglycemia, with long-term current use of insulin (CMS/[facility]) [E11.65, Z79.4]  Not Applicable   • SOB (shortness of breath) [R06.02]  Yes   • Physical debility [R53.81]  Yes   • Recurrent right pleural effusion [J90]  Yes      Resolved Hospital Problems   No resolved problems to display.          Hospital Course:  Talha Cutler is a 59 y.o. male with multiple medical comorbidities who presented initially to [facility]  with PNA. Patient eventually required intubation for respiratory failure and was transferred to ICU , hospital course also complicated by tonic/clonic ?seizures , patient was ultimately transferred to [facility] for neurology evaluation. Patient was initially placed in our ICU as he did require intubation for inability to protect airway following ?  Seizure activity. Patient extubated 11/13, and doing well now on NC. He has completed abx for PNA, respiratory panel was noted to be +metapneumovirus. Patient also evaluated by neurology who felt that patient has component of both psychogenic non-epileptic seizures and epileptic seizures. They have now s/o. Patient transferred to [facility] 11/16 with hospitalists assuming care.      Psychogenic non-epileptic seizures  History of epileptic seizures  ---complex case, neurology has evaluated and feels convulsions this admission 2/2 PNES- Keppra dose reduced back to home previous dosing of 500 BID   ---will need f/u with Rasheeda Flores, neurology in [place]  ---seizure precautions     Anemia, worsening  ---noted previous w/u in [place], patient reports previous negative C-scope/EGD at ? Data-def- reviewed previous d/c summary from [facility] in Sept 2019- at time of d/c Hb 7.4 and this was felt to be ? Baseline, though H/H dropped from near 10--7.5 over past few days. Patient denies bleeding, though reports ongoing black stools.  ---will try to obtain records of timing and results of GI w/u, follow H/H, transfuse <7, consider GI consultation if worsening     Respiratory failure, resolving  Metapneuovirus PNA  ---extubated 11/13, now on NC  ---completed course of abx, monitor     ESRD on HD  ---NAL following for HD needs      Previous CVA with residual left sided hemiparesis  ---PT/OT     Uncontrolled T2DM  ---good control of blood sugar at this time, monitor and titrate as needed     Chronic systolic CHF  ---cardiology following, appreciate assistance with medications    **Patient left AMA prior to completion of evaluation and management**      Day of Discharge     HPI:   **Patient left AMA prior to completion of evaluation and management**    Vital Signs:         Physical Exam (if applicable):  See progress note 11/16    Pertinent  and/or Most Recent Results               Invalid input(s): PROT, LABALBU        Invalid input(s):  TG, LDLCALC, LDLREALC      Brief Urine Lab Results     None          Microbiology Results Abnormal     None          Imaging Results (All)     Procedure Component Value Units Date/Time    XR Chest 1 View [[id number]] Collected:  11/14/19 0836     Updated:  11/16/19 1053    Narrative:       EXAMINATION: XR CHEST 1 VW-      INDICATION: Respiratory failure; J18.1-Lobar pneumonia, unspecified  organism; I50.22-Chronic systolic (congestive) heart failure;  G40.901-Epilepsy, unspecified, not intractable, with status epilepticus;  N18.6-End stage renal disease; Z99.2-Dependence on renal dialysis;  R06.89-Other abnormalities of breathing.      COMPARISON: 11/11/2019.     FINDINGS: ET tube has been removed. NG tube and left IJ catheter appear  in good position. Right axillary stents are noted. There is right  basilar scarring or linear atelectasis unchanged. Borderline  cardiomegaly and mild nonspecific pulmonary interstitial changes are  stable.           Impression:       No new chest disease.     D:  11/14/2019  E:  11/14/2019     This report was finalized on 11/16/2019 10:49 AM by DR. Rosales Martel MD.       Fiberoptic Endo (fees) [[id number]] Resulted:  11/14/19 1343     Updated:  11/14/19 1343    XR Chest 1 View [[id number]] Collected:  11/11/19 0825     Updated:  11/13/19 1026    Narrative:       EXAMINATION: XR CHEST 1 VW- 11/11/2019     INDICATION: f/u; J18.1-Lobar pneumonia, unspecified organism;  I50.22-Chronic systolic (congestive) heart failure; G40.901-Epilepsy,  unspecified, not intractable, with status epilepticus; N18.6-End stage  renal disease; Z99.2-Dependence on renal dialysis; R06.89-Other  abnormalities of breathing      COMPARISON: 11/10/2019     FINDINGS: Portable chest reveals lines and tubes to be in satisfactory  position. Patchy airspace disease seen within the right mid and lower  lung field with small right pleural effusion. Improvement seen in the  aeration of the left lung. Degenerative changes seen  within the spine.            Impression:       The heart Is enlarged with ill-defined opacification seen  within the right mid and lower lung field. Small right pleural effusion.  Lines and tubes in satisfactory position.     D:  11/11/2019  E:  11/11/2019     This report was finalized on 11/13/2019 10:22 AM by Dr. Marisol García MD.       XR Chest 1 View [[id number]] Collected:  11/10/19 0758     Updated:  11/11/19 1703    Narrative:       EXAMINATION: XR CHEST 1 VW-11/10/2019:      INDICATION: F/U; J18.1-Lobar pneumonia, unspecified organism;  I50.22-Chronic systolic (congestive) heart failure; G40.901-Epilepsy,  unspecified, not intractable, with status epilepticus; N18.6-End stage  renal disease; Z99.2-Dependence on renal dialysis; R06.89-Other  abnormalities of breathing.      COMPARISON: 11/09/2019.     FINDINGS: Support hardware unchanged and in satisfactory positioning.  The cardiac silhouette enlarged. Overall similar appearance of confluent  patchy opacifications in the right mid and lower lung with obscuration  of the right hemidiaphragm and right heart margin.           Impression:       No significant interval change with persistent  opacifications right lower lung.     D:  11/10/2019  E:  11/11/2019     This report was finalized on 11/11/2019 5:01 PM by Dr. Anjum Martinez.       MRI Brain Without Contrast [[id number]] Collected:  11/10/19 0827     Updated:  11/11/19 1704    Narrative:       EXAMINATION: MRI BRAIN WO CONTRAST- 11/09/2019     INDICATION: seizures; J18.1-Lobar pneumonia, unspecified organism;  I50.22-Chronic systolic (congestive) heart failure; G40.901-Epilepsy,  unspecified, not intractable, with status epilepticus; N18.6-End stage  renal disease; Z99.2-Dependence on renal dialysis; R06.89-Other  abnormalities of breathing      TECHNIQUE: Multiplanar MRI of the brain without intravenous contrast     COMPARISON: NONE     FINDINGS: No restriction on diffusion-weighted sequences.  Midline  structures are symmetric without evidence of mass, mass effect or  midline shift demonstrating mild prominence of the sulci towards the  vertex of mild generalized atrophy and/or age-related volume loss with  minimal amount of T2 and FLAIR signal findings that may represent subtle  chronic small vessel ischemic disease in the periventricular and deep  white matter without signal aberration otherwise noted. Pituitary and  sella within normal limits. Cervicomedullary junction widely patent.  Globes and orbits retain normal T2 signal characteristics. Visualized  paranasal sinuses and mastoid air cells demonstrate mild mucosal  thickening of the maxillary sinuses right greater than left along with  moderate right and mild to moderate left mastoid effusions. No  cerebellopontine angle mass lesion. Normal signal flow voids of the  distal internal carotid and basilar arteries.       Impression:       No acute intracranial abnormalities specifically no evidence  for acute infarction with mild chronic small vessel ischemic disease  findings and generalized atrophy of senescence.  Mild mucosal thickening of the bilateral maxillary sinuses may represent  sinusitis along with a moderate right and mild to moderate left mastoid  effusions present.      D:  11/10/2019  E:  11/11/2019     This report was finalized on 11/11/2019 5:00 PM by Dr. Anjum Martinez.       CT Chest Without Contrast [[id number]] Collected:  11/09/19 0806     Updated:  11/11/19 1433    Narrative:       EXAMINATION: CT CHEST WO CONTRAST - 11/09/2019     INDICATION: Evaluate bilateral infiltrates     TECHNIQUE: Multiple axial CT imaging is obtained of the chest without  the administration of intravenous contrast.     The radiation dose reduction device was turned on for each scan per the  ALARA (As Low as Reasonably Achievable) protocol.     COMPARISON: None     FINDINGS: There is extensive adenopathy identified throughout the  mediastinum. There is  vascular calcification seen within the thoracic  aorta and coronary vessels. Calcification seen in several of the lymph  nodes in the subcarinal region and hilar region. Patchy airspace disease  seen diffusely throughout the lung fields bilaterally with dense  consolidation seen at the lung bases. No significant pleural effusion  identified either on the right or left. There is a tiny pleural effusion  identified on the left. Degenerative changes identified within the  spine. The visualized portions of the upper abdomen reveal extensive  vascular calcification seen within the mesenteric vessels and renal  arteries.       Impression:       Extensive vascular calcification seen throughout the vessels  within the chest and upper abdomen. There is dense consolidation at the  lung fields with patchy airspace disease seen throughout the remainder  of the lung fields bilaterally. Tiny pleural effusion identified on the  left. No significant pleural fluid on the right. Adenopathy seen  throughout the mediastinum.     DICTATED:   11/09/2019  EDITED/ls :   11/10/2019      This report was finalized on 11/11/2019 2:30 PM by Dr. Marisol García MD.       XR Chest 1 View [[id number]] Collected:  11/09/19 0853     Updated:  11/11/19 1433    Narrative:          EXAMINATION: XR CHEST 1 VW - 11/09/2019      INDICATION: J18.1-Lobar pneumonia, unspecified organism; I50.22-Chronic  systolic (congestive) heart failure; G40.901-Epilepsy, unspecified, not  intractable, with status epilepticus; N18.6-End stage renal disease;  Z99.2-Dependence on renal dialysis; R06.89-Other abnormalities of  breathing.      COMPARISON: 11/08/2019     FINDINGS: Portable chest reveals heart to be enlarged with ill-defined  opacification seen within the right mid to lower lung field. Small right  pleural effusion. Lines and tubes in satisfactory position. Left lung is  clear.           Impression:       Ill-defined opacification seen within the right mid to  lower  lung field. Small right pleural effusion with lines and tubes in  satisfactory position.     DICTATED:   11/09/2019  EDITED/ls :   11/10/2019      This report was finalized on 11/11/2019 2:30 PM by Dr. Marisol García MD.       XR Chest 1 View [[id number]] Collected:  11/07/19 0923     Updated:  11/08/19 0820    Narrative:       EXAMINATION: XR CHEST 1 VW-      INDICATION: Line placement.      COMPARISON: 11/07/2019.     FINDINGS: ET tube remains midway between the clavicles and omega. Left  IJ catheter has been placed, tip in the proximal SVC. NG tube is seen  below the diaphragm. Moderate, patchy disease in the right mid and lower  lung, and much milder left lung interstitial changes appear stable from  the prior exam. No pneumothorax is seen. There is probably a small right  effusion. Heart is normal in size.  Incidental note is again made of  what appear to be right axillary stents.       Impression:       1. Left IJ catheter placement into the proximal SVC. No evidence of  pneumothorax.  2. NG tube in stomach.  3. ET tube remains in good position.  4. Stable patchy right mid and lower lung disease and mild left lung  interstitial changes.     D:  11/07/2019  E:  11/07/2019     This report was finalized on 11/8/2019 8:17 AM by DR. Rosales Martel MD.       XR Abdomen KUB [[id number]] Collected:  11/07/19 0515     Updated:  11/07/19 0517    Narrative:       CR Abdomen 1 Vw: 11/7/2019 6:11 AM    INDICATION:   NG tube placement    COMPARISON:   None available    FINDINGS:  1  AP radiographs of the abdomen.     Nasogastric tube present, tip in the proximal to mid stomach.    Visualized bowel gas pattern unremarkable.    Signer Name: Live Choi MD   Signed: 11/7/2019 5:15 AM   [facility]    [facility]    XR Chest 1 View [[id number]] Collected:  11/07/19 0215     Updated:  11/07/19 0217    Narrative:       CR Chest 1 Vw    INDICATION:   Respiratory distress and  intubation     COMPARISON:    None available.    FINDINGS:  Single portable AP view(s) of the chest.    ET tube present, tip about 3 to 4 cm above the omega. No pneumothorax.    There is right lung volume loss with coarse mid and lower lung infiltrate and effusion.    There is mild interstitial prominence in the left lung without consolidation.     Signer Name: Live Choi MD   Signed: 11/7/2019 2:15 AM   Workstation Name: [facility]    [facility]                    Results for orders placed during the hospital encounter of 11/01/19   Adult Transthoracic Echo Complete W/ Cont if Necessary Per Protocol    Narrative · The left ventricular cavity is moderate-to-severely dilated.  · Right ventricular cavity is mildly dilated.  · Left atrial cavity size is moderately dilated.  · Moderate mitral valve regurgitation is present  · There is a small (<1cm) pericardial effusion.            Discharge Details     Discharge Disposition:  **Patient left AMA prior to completion of evaluation and management, therefore discharge planning remains incomplete including absence of any needed discharging medications, testing arrangements or follow up unless otherwise specified**      Future Appointments   Date Time Provider Department Center   1/14/2020  3:00 PM Saul Romero MD [id number] None             Electronically signed by Gisell Driscoll MD, 11/30/19, 1:28 PM.

## 2019-12-03 NOTE — OUTREACH NOTE
CHF Week 2 Survey      Responses   Facility patient discharged from?  [place]   Does the patient have one of the following disease processes/diagnoses(primary or secondary)?  CHF   Week 2 attempt successful?  No   Unsuccessful attempts  Attempt 1          Mallika Garrett RN

## 2019-12-04 NOTE — OUTREACH NOTE
CHF Week 2 Survey      Responses   Facility patient discharged from?  [place]   Does the patient have one of the following disease processes/diagnoses(primary or secondary)?  CHF   Week 2 attempt successful?  No   Unsuccessful attempts  Attempt 2          Mallika Garrett RN

## 2019-12-05 NOTE — ED PROVIDER NOTES
Subjective   59-year-old male presents with cough, congestion, and constipation.  He states he was recently in the hospital for pneumonia.  He states that over the last few days he developed a cough, and has had difficulty with bowel movements over the last week.  He did have a bowel movement today.  Has been to dialysis as scheduled.        History provided by:  Patient   used: No        Review of Systems   HENT: Positive for congestion.    Respiratory: Positive for cough.    Gastrointestinal: Positive for constipation.   All other systems reviewed and are negative.      Past Medical History:   Diagnosis Date   • Abdominal pain    • Anemia    • Cataracts, bilateral    • CHF (congestive heart failure) (CMS/[facility])    • Chronic kidney disease    • Constipation    • Cough    • Dependence on nocturnal oxygen therapy    • Diabetes mellitus (CMS/HCC)    • Diarrhea    • End stage renal failure on dialysis (CMS/[facility])     SILVINO AVITIA, SAT   • GERD (gastroesophageal reflux disease)    • H/O blood clots     LEG/NECK   • H/O chest x-ray 03/25/2016    Interval decrease in size of the patients right pleural effusion with a persistent small left effusion as well   • History of transfusion    • Hypertension    • Left-sided weakness    • Nauseated     DRY HEAVES   • Pleural effusion    • Pneumonia    • Pneumonia    • Renal failure    • Seizures (CMS/HCC)    • Stroke (CMS/HCC) 2006    LEFT SIDED WEAKNESS   • Swelling    • Teeth missing    • Tinnitus    • Ulcer, stomach peptic     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 LAVAGE, BRUSHINGS AND BIOPSY;  Surgeon: Ean Hernandez MD;  Location: [facility];  Service: Pulmonary   • CARDIAC CATHETERIZATION N/A 3/28/2018    Procedure:  "Peripheral angiography;  Surgeon: Clay Ruano MD;  Location:  [facility] CATH INVASIVE LOCATION;  Service: Cardiovascular   • CARDIAC CATHETERIZATION N/A 3/28/2018    Procedure: Angioplasty-peripheral;  Surgeon: Clay Ruano MD;  Location: [facility] CATH INVASIVE LOCATION;  Service: Cardiovascular   • CARDIAC CATHETERIZATION N/A 3/28/2018    Procedure: Atherectomy-peripheral;  Surgeon: Clay Ruano MD;  Location: [facility] CATH INVASIVE LOCATION;  Service: Cardiovascular   • CATARACT EXTRACTION, BILATERAL     • CHOLECYSTECTOMY     • COLONOSCOPY     • ENDOSCOPY N/A 11/19/2019    Procedure: ESOPHAGOGASTRODUODENOSCOPY;  Surgeon: Iban Santana MD;  Location:  [facility];  Service: Gastroenterology   • EYE SURGERY      BLEEDING BEHING BILATERAL EYES   • INTERVENTIONAL RADIOLOGY PROCEDURE N/A 3/28/2018    Procedure: Abdominal Aortogram;  Surgeon: Clay Ruano MD;  Location: [facility] CATH INVASIVE LOCATION;  Service: Cardiovascular   • PORTACATH PLACEMENT     • SHOULDER MANIPULATION Left     \"FROZEN SHOULDER\"   • VENOUS ACCESS DEVICE (PORT) REMOVAL         Family History   Problem Relation Age of Onset   • COPD Mother    • Diabetes Father    • Hypertension Sister    • Diabetes Sister    • Hypertension Brother    • Diabetes Paternal Grandmother        Social History     Socioeconomic History   • Marital status:      Spouse name: Not on file   • Number of children: Not on file   • Years of education: Not on file   • Highest education level: Not on file   Tobacco Use   • Smoking status: Never Smoker   • Smokeless tobacco: Never Used   • Tobacco comment: 2ND HAND SMOKE EXPOSURE   Substance and Sexual Activity   • Alcohol use: No   • Drug use: No   • Sexual activity: Defer           Objective   Physical Exam   Constitutional: He is oriented to person, place, and time. He appears well-developed and well-nourished.   HENT:   Head: Normocephalic and atraumatic.   Eyes: EOM are normal. " Pupils are equal, round, and reactive to light.   Neck: Normal range of motion.   Cardiovascular: Normal rate and regular rhythm.   Pulmonary/Chest: Effort normal and breath sounds normal.   Abdominal: Soft. Bowel sounds are normal.   Musculoskeletal: Normal range of motion.   Neurological: He is alert and oriented to person, place, and time.   Skin: Skin is warm and dry.   Psychiatric: He has a normal mood and affect. His behavior is normal.   Nursing note and vitals reviewed.      Procedures           ED Course                  MDM  Number of Diagnoses or Management Options  Pneumonia of left lung due to infectious organism, unspecified part of lung: new and requires workup     Amount and/or Complexity of Data Reviewed  Clinical lab tests: reviewed  Tests in the radiology section of CPT®: reviewed    Risk of Complications, Morbidity, and/or Mortality  Presenting problems: low  Diagnostic procedures: low  Management options: low    Patient Progress  Patient progress: stable      Final diagnoses:   Pneumonia of left lung due to infectious organism, unspecified part of lung              Bernard Chatterjee Jr., PANorisC  12/05/19 2006

## 2019-12-06 NOTE — TELEPHONE ENCOUNTER
"He was discharged on 11/23/19. He went to the ED on 12/06/19. He thinks he has pneumonia again. He has no strength, he thinks he is weak. He wants to come to the hospital and get IV medication instead of a pill so he gets better for [place].     Reason for Disposition  • [1] Caller requesting NON-URGENT health information AND [2] PCP's office is the best resource    Additional Information  • Negative: [1] Caller is not with the adult (patient) AND [2] reporting urgent symptoms  • Negative: Lab result questions  • Negative: Medication questions  • Negative: Caller cannot be reached by phone  • Negative: Caller has already spoken to PCP or another triager  • Negative: RN needs further essential information from caller in order to complete triage  • Negative: Requesting regular office appointment    Answer Assessment - Initial Assessment Questions  1. REASON FOR CALL or QUESTION: \"What is your reason for calling today?\" or \"How can I best help you?\" or \"What question do you have that I can help answer?\"      See note    Protocols used: INFORMATION ONLY CALL-ADULT-      "

## 2019-12-08 NOTE — ED PROVIDER NOTES
"Subjective   Talha Cutler is a 59 year old male presenting to the ED today with complaints of shortness of breath. He reports 3 days ago he presented to the ED in [place] due to shortness of breath. He states he was diagnosed with pneumonia and prescribed Doxycycline, however his shortness of breath continues to worsen so he presented to [facility] ED today. He currently states it \"feels like someone is sticking him on his right side with a sharp knife\". He has found no relief from breathing treatments. Additional complaints include congestion and constipation, however he denies cough or fever. He reports he is also currently on dialysis three times a week. He also reports a history of congestive heart failure, cirrhosis, and sleep apnea. He denies a history of smoking but states he has been exposed to second-hand smoke. There are no other acute complaints at this time.        History provided by:  Patient  Shortness of Breath   Severity:  Moderate  Onset quality:  Gradual  Timing:  Constant  Progression:  Worsening  Chronicity:  New  Ineffective treatments: breathing treatments, doxycycline.  Associated symptoms: no cough and no fever        Review of Systems   Constitutional: Negative for fever.   HENT: Positive for congestion.    Respiratory: Positive for shortness of breath. Negative for cough.    Gastrointestinal: Positive for constipation.   All other systems reviewed and are negative.      Past Medical History:   Diagnosis Date   • Abdominal pain    • Anemia    • Cataracts, bilateral    • CHF (congestive heart failure) (CMS/Formerly [facility])    • Chronic kidney disease    • Constipation    • Cough    • Dependence on nocturnal oxygen therapy    • Diabetes mellitus (CMS/HCC)    • Diarrhea    • End stage renal failure on dialysis (CMS/Formerly [facility])     TUES, THURS, SAT   • GERD (gastroesophageal reflux disease)    • H/O blood clots     LEG/NECK   • H/O chest x-ray 03/25/2016    Interval decrease in size of the patients right pleural " "effusion with a persistent small left effusion as well   • History of transfusion    • Hypertension    • Left-sided weakness    • Nauseated     DRY HEAVES   • Pleural effusion    • Pneumonia    • Pneumonia    • Renal failure    • Seizures (CMS/HCC)    • Stroke (CMS/HCC) 2006    LEFT SIDED WEAKNESS   • Swelling    • Teeth missing    • Tinnitus    • Ulcer, stomach peptic     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 LAVAGE, BRUSHINGS AND BIOPSY;  Surgeon: Ean Hernandez MD;  Location: [facility] OR;  Service: Pulmonary   • CARDIAC CATHETERIZATION N/A 3/28/2018    Procedure: Peripheral angiography;  Surgeon: Clay Ruano MD;  Location: [facility] CATH INVASIVE LOCATION;  Service: Cardiovascular   • CARDIAC CATHETERIZATION N/A 3/28/2018    Procedure: Angioplasty-peripheral;  Surgeon: Clay Ruano MD;  Location: [facility] CATH INVASIVE LOCATION;  Service: Cardiovascular   • CARDIAC CATHETERIZATION N/A 3/28/2018    Procedure: Atherectomy-peripheral;  Surgeon: Clay Ruano MD;  Location: [facility] CATH INVASIVE LOCATION;  Service: Cardiovascular   • CATARACT EXTRACTION, BILATERAL     • CHOLECYSTECTOMY     • COLONOSCOPY     • ENDOSCOPY N/A 11/19/2019    Procedure: ESOPHAGOGASTRODUODENOSCOPY;  Surgeon: Iban Santana MD;  Location: [facility] ENDOSCOPY;  Service: Gastroenterology   • EYE SURGERY      BLEEDING BEHING BILATERAL EYES   • INTERVENTIONAL RADIOLOGY PROCEDURE N/A 3/28/2018    Procedure: Abdominal Aortogram;  Surgeon: Clay Ruano MD;  Location: [facility] CATH INVASIVE LOCATION;  Service: Cardiovascular   • PORTACATH PLACEMENT     • SHOULDER MANIPULATION Left     \"FROZEN SHOULDER\"   • VENOUS ACCESS DEVICE (PORT) REMOVAL         Family History   Problem " Relation Age of Onset   • COPD Mother    • Diabetes Father    • Hypertension Sister    • Diabetes Sister    • Hypertension Brother    • Diabetes Paternal Grandmother        Social History     Socioeconomic History   • Marital status:      Spouse name: Not on file   • Number of children: Not on file   • Years of education: Not on file   • Highest education level: Not on file   Tobacco Use   • Smoking status: Never Smoker   • Smokeless tobacco: Never Used   • Tobacco comment: 2ND HAND SMOKE EXPOSURE   Substance and Sexual Activity   • Alcohol use: No   • Drug use: No   • Sexual activity: Defer         Objective   Physical Exam   Constitutional: He is oriented to person, place, and time. No distress.   Patient appears unkept.    HENT:   Head: Normocephalic and atraumatic.   External jugular line in left side of neck.   Eyes: Conjunctivae are normal. No scleral icterus.   Neck: Normal range of motion. Neck supple.   Cardiovascular: Normal rate and regular rhythm.   Murmur heard.  High pitched systolic ejection murmur with a closing snap, loudest to the right upper sternum.    Pulmonary/Chest: No respiratory distress. He has wheezes. He has rales.   Decreased breath sounds bilaterally. Rales in anterior and posterior lung fields on right. Scattered wheezes.    Abdominal: Soft. There is no tenderness.   Musculoskeletal: Normal range of motion.   Neurological: He is alert and oriented to person, place, and time.   Skin: Skin is warm and dry.   Psychiatric: He has a normal mood and affect. His behavior is normal.   Nursing note and vitals reviewed.      Procedures         ED Course  ED Course as of Dec 09 1316   Sun Dec 08, 2019   1413 CBC & Differential(!) [ANDRE]   1414 Troponin(!!) [ANDRE]   1414 Discussed with Dr. Dickerson will admit to tele    [ANDRE]      ED Course User Index  [ANDRE] Kurtis Woodall PA     Recent Results (from the past 24 hour(s))   Duplex Venous Lower Extremity - Bilateral CAR    Collection Time:  12/08/19  3:38 PM   Result Value Ref Range    Right Common Femoral Spont Y     Right Common Femoral Phasic Y     Right Common Femoral Augment Y     Right Common Femoral Compress C     Right Saphenofemoral Junction Spont Y     Right Saphenofemoral Junction Phasic Y     Right Saphenofemoral Junction Augment Y     Right Saphenofemoral Junction Compress C     Right Profunda Femoral Compress C     Right Proximal Femoral Compress C     Right Mid Femoral Spont Y     Right Mid Femoral Phasic Y     Right Mid Femoral Augment Y     Right Mid Femoral Compress C     Right Distal Femoral Compress C     Right Popliteal Spont Y     Right Popliteal Phasic Y     Right Popliteal Augment Y     Right Popliteal Compress C     Right Posterior Tibial Compress C     Right Peroneal Compress C     Right GastronemiusSoleal Compress C     Right Greater Saph AK Compress C     Right Greater Saph BK Compress C     Right Lesser Saph Compress C     Left Common Femoral Spont Y     Left Common Femoral Phasic Y     Left Common Femoral Augment Y     Left Common Femoral Compress C     Left Saphenofemoral Junction Spont Y     Left Saphenofemoral Junction Phasic Y     Left Saphenofemoral Junction Augment Y     Left Saphenofemoral Junction Compress C     Left Profunda Femoral Spont Y     Left Profunda Femoral Phasic Y     Left Profunda Femoral Augment Y     Left Profunda Femoral Compress C     Left Proximal Femoral Compress C     Left Mid Femoral Spont Y     Left Mid Femoral Phasic Y     Left Mid Femoral Augment Y     Left Mid Femoral Compress C     Left Distal Femoral Compress C     Left Popliteal Spont Y     Left Popliteal Phasic Y     Left Popliteal Augment Y     Left Popliteal Compress C     Left Posterior Tibial Compress C     Left Peroneal Compress C     Left GastronemiusSoleal Compress C     Left Greater Saph AK Compress C     Left Greater Saph BK Compress C     Left Lesser Saph Compress C    POC Glucose Once    Collection Time: 12/08/19  4:59 PM    Result Value Ref Range    Glucose 230 (H) 70 - 130 mg/dL   POC Glucose Once    Collection Time: 12/08/19  8:10 PM   Result Value Ref Range    Glucose 183 (H) 70 - 130 mg/dL   MRSA Screen, PCR - Swab, Nares    Collection Time: 12/08/19  9:02 PM   Result Value Ref Range    MRSA PCR Negative Negative   POC Glucose Once    Collection Time: 12/09/19  4:25 AM   Result Value Ref Range    Glucose 44 (C) 70 - 130 mg/dL   POC Glucose Once    Collection Time: 12/09/19  4:47 AM   Result Value Ref Range    Glucose 139 (H) 70 - 130 mg/dL   POC Glucose Once    Collection Time: 12/09/19  5:05 AM   Result Value Ref Range    Glucose 135 (H) 70 - 130 mg/dL   Blood Gas, Venous With Co-Ox    Collection Time: 12/09/19  5:40 AM   Result Value Ref Range    Site OTHER     pH, Venous 7.350 pH Units    pCO2, Venous 62.2 (H) 41.0 - 51.0 mm Hg    pO2, Venous 46.2 27.0 - 53.0 mm Hg    HCO3, Venous 34.4 (H) 22.0 - 28.0 mmol/L    Base Excess, Venous 7.7 (H) -2.0 - 2.0 mmol/L    Hemoglobin, Blood Gas 7.7 (L) 13.5 - 17.5 g/dL    Oxyhemoglobin Venous 63.9 %    Methemoglobin Venous 0.8 %    Carboxyhemoglobin Venous 1.3 %    CO2 Content 36.3 (H) 22 - 33 mmol/L    Temperature 37.0 C    Barometric Pressure for Blood Gas      Modality Nasal Cannula     FIO2 28 %    Rate 0 Breaths/minute    PIP 0 cmH2O    IPAP 0     EPAP 0     Note     Basic Metabolic Panel    Collection Time: 12/09/19  6:04 AM   Result Value Ref Range    Glucose 102 (H) 65 - 99 mg/dL    BUN 14 8 - 23 mg/dL    Creatinine 3.10 (H) 0.76 - 1.27 mg/dL    Sodium 132 (L) 136 - 145 mmol/L    Potassium 3.2 (L) 3.5 - 5.2 mmol/L    Chloride 86 (L) 98 - 107 mmol/L    CO2 31.0 (H) 22.0 - 29.0 mmol/L    Calcium 8.7 8.6 - 10.5 mg/dL    eGFR Non African Amer 21 (L) >60 mL/min/1.73    BUN/Creatinine Ratio 4.5 (L) 7.0 - 25.0    Anion Gap 15.0 5.0 - 15.0 mmol/L   Procalcitonin    Collection Time: 12/09/19  6:04 AM   Result Value Ref Range    Procalcitonin 0.41 (H) 0.10 - 0.25 ng/mL   Cortisol     Collection Time: 12/09/19  6:04 AM   Result Value Ref Range    Cortisol 18.04   mcg/dL   Ammonia    Collection Time: 12/09/19  6:06 AM   Result Value Ref Range    Ammonia 47 16 - 60 umol/L   POC Glucose Once    Collection Time: 12/09/19  7:29 AM   Result Value Ref Range    Glucose 74 70 - 130 mg/dL   CBC Auto Differential    Collection Time: 12/09/19  8:15 AM   Result Value Ref Range    WBC 6.47 3.40 - 10.80 10*3/mm3    RBC 3.78 (L) 4.14 - 5.80 10*6/mm3    Hemoglobin 10.6 (L) 13.0 - 17.7 g/dL    Hematocrit 34.0 (L) 37.5 - 51.0 %    MCV 89.9 79.0 - 97.0 fL    MCH 28.0 26.6 - 33.0 pg    MCHC 31.2 (L) 31.5 - 35.7 g/dL    RDW 18.0 (H) 12.3 - 15.4 %    RDW-SD 54.9 (H) 37.0 - 54.0 fl    MPV 10.3 6.0 - 12.0 fL    Platelets 132 (L) 140 - 450 10*3/mm3    Neutrophil % 79.6 (H) 42.7 - 76.0 %    Lymphocyte % 8.0 (L) 19.6 - 45.3 %    Monocyte % 8.5 5.0 - 12.0 %    Eosinophil % 2.5 0.3 - 6.2 %    Basophil % 0.8 0.0 - 1.5 %    Immature Grans % 0.6 (H) 0.0 - 0.5 %    Neutrophils, Absolute 5.15 1.70 - 7.00 10*3/mm3    Lymphocytes, Absolute 0.52 (L) 0.70 - 3.10 10*3/mm3    Monocytes, Absolute 0.55 0.10 - 0.90 10*3/mm3    Eosinophils, Absolute 0.16 0.00 - 0.40 10*3/mm3    Basophils, Absolute 0.05 0.00 - 0.20 10*3/mm3    Immature Grans, Absolute 0.04 0.00 - 0.05 10*3/mm3    nRBC 0.0 0.0 - 0.2 /100 WBC   POC Glucose Once    Collection Time: 12/09/19 10:12 AM   Result Value Ref Range    Glucose 46 (C) 70 - 130 mg/dL   POC Glucose Once    Collection Time: 12/09/19 10:29 AM   Result Value Ref Range    Glucose 66 (L) 70 - 130 mg/dL   POC Glucose Once    Collection Time: 12/09/19 10:37 AM   Result Value Ref Range    Glucose 74 70 - 130 mg/dL   POC Glucose Once    Collection Time: 12/09/19 11:27 AM   Result Value Ref Range    Glucose 116 70 - 130 mg/dL     Note: In addition to lab results from this visit, the labs listed above may include labs taken at another facility or during a different encounter within the last 24 hours. Please correlate  lab times with ED admission and discharge times for further clarification of the services performed during this visit.    CT Angiogram Chest With & Without Contrast   Preliminary Result   1. No evidence of pulmonary embolus.   2. New but small left pleural effusion. Left lung otherwise appears   clear   3. Extensive and diffuse right lung interstitial and airspace disease,   presumably pneumonia.       DICTATED:   12/08/2019   EDITED/ls :   12/08/2019           XR Chest 1 View   Final Result   1. Cardiomegaly and mild pulmonary vascular congestion similar to prior   study.   2. Worsening aeration of the right mid and lower lung, whether   atelectasis or pneumonia.       DICTATED:   12/08/2019   EDITED/ls :   12/08/2019        This report was finalized on 12/8/2019 11:15 PM by DR. Rosales Martel MD.            Vitals:    12/09/19 0742 12/09/19 0818 12/09/19 1225 12/09/19 1309   BP:  106/65 116/70    BP Location:  Left arm Left arm    Patient Position:  Lying Lying    Pulse: 77 76 82 83   Resp: 18 18 18 16   Temp:   96 °F (35.6 °C)    TempSrc:   Axillary    SpO2: 100% 100% 100% 100%   Weight:       Height:         Medications   sodium chloride 0.9 % flush 10 mL (has no administration in time range)   b complex-vitamin c-folic acid (NEPHRO-SHIRLENE) tablet 1 tablet (has no administration in time range)   clopidogrel (PLAVIX) tablet 75 mg (has no administration in time range)   folic acid (FOLVITE) tablet 1 mg (has no administration in time range)   levETIRAcetam (KEPPRA) tablet 500 mg (500 mg Oral Given 12/8/19 2002)   levothyroxine (SYNTHROID, LEVOTHROID) tablet 125 mcg ( Oral Canceled Entry 12/9/19 0525)   metoprolol succinate XL (TOPROL-XL) 24 hr tablet 50 mg (has no administration in time range)   dextrose (GLUTOSE) oral gel 15 g (has no administration in time range)   dextrose (D50W) 25 g/ 50mL Intravenous Solution 25 g (has no administration in time range)   glucagon (human recombinant) (GLUCAGEN DIAGNOSTIC) injection 1  mg (has no administration in time range)   sodium chloride 0.9 % flush 10 mL ( Intravenous Canceled Entry 12/8/19 0045)   sodium chloride 0.9 % flush 10 mL (has no administration in time range)   heparin (porcine) 5000 UNIT/ML injection 5,000 Units ( Subcutaneous Canceled Entry 12/9/19 9720)   insulin lispro (humaLOG) injection 0-7 Units (0 Units Subcutaneous Not Given 12/9/19 1129)   ondansetron (ZOFRAN) tablet 4 mg (has no administration in time range)     Or   ondansetron (ZOFRAN) injection 4 mg (has no administration in time range)   ipratropium-albuterol (DUO-NEB) nebulizer solution 3 mL (3 mL Nebulization Given 12/9/19 1309)   ipratropium-albuterol (DUO-NEB) nebulizer solution 3 mL (has no administration in time range)   guaiFENesin (MUCINEX) 12 hr tablet 600 mg (600 mg Oral Given 12/8/19 2002)   saccharomyces boulardii (FLORASTOR) capsule 250 mg (250 mg Oral Given 12/8/19 2002)   pantoprazole (PROTONIX) injection 40 mg (40 mg Intravenous Given 12/8/19 1655)   piperacillin-tazobactam (ZOSYN) 3.375 g in iso-osmotic dextrose 50 ml (premix) (3.375 g Intravenous New Bag 12/9/19 0041)   polyethylene glycol 3350 powder (packet) (17 g Oral Given 12/8/19 1655)   docusate sodium (COLACE) capsule 100 mg (100 mg Oral Given 12/8/19 2002)   aspirin chewable tablet 81 mg (has no administration in time range)   HYDROcodone-acetaminophen (NORCO) 5-325 MG per tablet 1 tablet (1 tablet Oral Given 12/9/19 1049)   potassium chloride (MICRO-K) CR capsule 40 mEq (has no administration in time range)   povidone-iodine (BETADINE) external solution (has no administration in time range)   castor oil-balsam peru (VENELEX) ointment (has no administration in time range)   dextrose 5 % and sodium chloride 0.45 % infusion (75 mL/hr Intravenous New Bag 12/9/19 1057)   bisacodyl (DULCOLAX) suppository 10 mg (has no administration in time range)   cefTRIAXone (ROCEPHIN) 2 g/100 mL 0.9% NS VTB (DEISY) (0 g Intravenous Stopped 12/8/19 1442)    doxycycline (VIBRAMYCIN) 100 mg/100 mL 0.9% NS MBP (100 mg Intravenous New Bag 12/8/19 1525)   piperacillin-tazobactam (ZOSYN) 3.375 g in iso-osmotic dextrose 50 ml (premix) (3.375 g Intravenous New Bag 12/8/19 2011)   vancomycin 1250 mg/250 mL 0.9% NS IVPB ([facility]) (1,250 mg Intravenous New Bag 12/8/19 2010)   iopamidol (ISOVUE-370) 76 % injection 76 mL (76 mL Intravenous Given 12/8/19 1832)   dextrose (D50W) 50 % 25 g/ 50mL Intravenous Solution  - ADS Override Pull (50 mL  Given 12/9/19 0431)     ECG/EMG Results (last 24 hours)     Procedure Component Value Units Date/Time    ECG 12 Lead [[id number]] Collected:  12/08/19 1114     Updated:  12/08/19 1115        ECG 12 Lead   Final Result   Test Reason : SOA Protocol   Blood Pressure : **/** mmHG   Vent. Rate : 094 BPM     Atrial Rate : 094 BPM      P-R Int : 120 ms          QRS Dur : 086 ms       QT Int : 372 ms       P-R-T Axes : 050 002 187 degrees      QTc Int : 465 ms      Normal sinus rhythm   Possible Left atrial enlargement   Nonspecific ST and T wave abnormality   Prolonged QT   Abnormal ECG   Confirmed by NAYA HERNÁNDEZ MD ([id number]) on 12/8/2019 10:29:23 PM      Referred By:  EDMD           Confirmed By:NAYA HERNÁNDEZ MD                          MDM  Number of Diagnoses or Management Options  ESRD (end stage renal disease) (CMS/HCC): established and worsening  Pneumonia of right lower lobe due to infectious organism (CMS/HCC): established and worsening     Amount and/or Complexity of Data Reviewed  Clinical lab tests: reviewed and ordered  Tests in the radiology section of CPT®: reviewed and ordered  Tests in the medicine section of CPT®: ordered and reviewed  Discuss the patient with other providers: yes    Patient Progress  Patient progress: stable      Final diagnoses:   Pneumonia of right lower lobe due to infectious organism (CMS/HCC)   ESRD (end stage renal disease) (CMS/HCC)       Documentation assistance provided by amy Velasco.  Information  recorded by the scribe was done at my direction and has been verified and validated by me.     Kiera Velasco  12/08/19 1218       Kurtis Woodall PA  12/09/19 1314

## 2019-12-08 NOTE — H&P
[facility] Medicine Services  HISTORY AND PHYSICAL    Patient Name: Talha Cutler  : 1960  MRN: [id number]  Primary Care Physician: Jeannette Godoy APRN  Date of admission: 2019      Subjective   Subjective     Chief Complaint:  Dyspnea     HPI:  Talha Cutler is a 59 y.o. male recently discharged from our facility last month for HCAP, GIB, and new SHF, here with progressive dyspnea over the past week. Feels like he can't catch his breath. Cough, but difficulty producing sputum. Chills, but no f/sweats. Now notes intermittent pressure/sharp R chest pain, worse with deep inspiration. Notes increased B LE edema, but markedly worse in R LE with pain in calf. Notes anorexia, nausea, and dry heaves. No BM in 6 days. Anuric. Notes redness in R calf today as well. No palpitations or dizziness. No new numbness/weakness, chronic L weakness from prior CVA.    Review of Systems   Constitutional: Positive for activity change, appetite change, chills and fatigue. Negative for diaphoresis and fever.   HENT: Positive for congestion. Negative for trouble swallowing.    Eyes: Negative.    Respiratory: Positive for cough, chest tightness and shortness of breath. Negative for apnea, choking and wheezing.    Cardiovascular: Positive for chest pain and leg swelling. Negative for palpitations.   Gastrointestinal: Positive for constipation, nausea and vomiting.   Endocrine: Positive for cold intolerance.   Genitourinary: Negative.    Musculoskeletal: Positive for back pain.   Skin: Positive for color change.   Neurological: Positive for weakness.   Hematological: Negative.    Psychiatric/Behavioral: Positive for dysphoric mood.      All other systems reviewed and are negative.     Personal History     Past Medical History:   Diagnosis Date   • Abdominal pain    • Anemia    • Cataracts, bilateral    • CHF (congestive heart failure) (CMS/HCC)    • Chronic kidney disease    • Constipation     • Cough    • Dependence on nocturnal oxygen therapy    • Diabetes mellitus (CMS/[facility])    • Diarrhea    • End stage renal failure on dialysis (CMS/[facility])     SILVINO AVITIA, SAT   • GERD (gastroesophageal reflux disease)    • H/O blood clots     LEG/NECK   • H/O chest x-ray 03/25/2016    Interval decrease in size of the patients right pleural effusion with a persistent small left effusion as well   • History of transfusion    • Hypertension    • Left-sided weakness    • Nauseated     DRY HEAVES   • Pleural effusion    • Pneumonia    • Pneumonia    • Renal failure    • Seizures (CMS/[facility])    • Stroke (CMS/[facility]) 2006    LEFT SIDED WEAKNESS   • Swelling    • Teeth missing    • Tinnitus    • Ulcer, stomach peptic     HISTORY OF ULCER AS A TEENAGER   • Wears glasses        Past Surgical History:   Procedure Laterality Date   • ARTERIOVENOUS FISTULA Right    • BRONCHOSCOPY N/A 1/10/2019    Procedure: BRONCHOSCOPY with MAC and Flouro. LAVAGE, BRUSHINGS AND BIOPSY;  Surgeon: Ean Hernandez MD;  Location: [facility] OR;  Service: Pulmonary   • CARDIAC CATHETERIZATION N/A 3/28/2018    Procedure: Peripheral angiography;  Surgeon: Clay Ruano MD;  Location: [facility] CATH INVASIVE LOCATION;  Service: Cardiovascular   • CARDIAC CATHETERIZATION N/A 3/28/2018    Procedure: Angioplasty-peripheral;  Surgeon: Clay Ruano MD;  Location: [facility] CATH INVASIVE LOCATION;  Service: Cardiovascular   • CARDIAC CATHETERIZATION N/A 3/28/2018    Procedure: Atherectomy-peripheral;  Surgeon: Clay Ruano MD;  Location: [facility] CATH INVASIVE LOCATION;  Service: Cardiovascular   • CATARACT EXTRACTION, BILATERAL     • CHOLECYSTECTOMY     • COLONOSCOPY     • ENDOSCOPY N/A 11/19/2019    Procedure: ESOPHAGOGASTRODUODENOSCOPY;  Surgeon: Iban Santana MD;  Location: [facility] ENDOSCOPY;  Service: Gastroenterology   • EYE SURGERY      BLEEDING BEHING BILATERAL EYES   • INTERVENTIONAL RADIOLOGY PROCEDURE N/A 3/28/2018    Procedure:  "Abdominal Aortogram;  Surgeon: Clay Ruano MD;  Location: [facility] CATH INVASIVE LOCATION;  Service: Cardiovascular   • PORTACATH PLACEMENT     • SHOULDER MANIPULATION Left     \"FROZEN SHOULDER\"   • VENOUS ACCESS DEVICE (PORT) REMOVAL         Family History: family history includes COPD in his mother; Diabetes in his father, paternal grandmother, and sister; Hypertension in his brother and sister. Otherwise pertinent FHx was reviewed and unremarkable.     Social History:  reports that he has never smoked. He has never used smokeless tobacco. He reports that he does not drink alcohol or use drugs.  Social History     Social History Narrative   • Not on file       Medications:  Available home medication information reviewed.    (Not in a hospital admission)    Allergies   Allergen Reactions   • Erythromycin Hives       Objective   Objective     Vital Signs:   Temp:  [97.6 °F (36.4 °C)] 97.6 °F (36.4 °C)  Heart Rate:  [89-95] 89  Resp:  [18] 18  BP: (104-116)/(52-64) 104/52        Physical Exam   NAD, alert and oriented, x 3, family at bedside  OP clear, MMM  PERRL, face symmetric and speech clear  Neck supple, No LAD  Tachy  Decreased BS on R, with rales, diminished L base otherwise clear on L  +BS, ND, NT, soft  B LE edema R greater than L, R calf TTP, with mild redness  VILLA, but weak on L, speech clear  Normal affect    Results Reviewed:  I have personally reviewed current lab and radiology data.    Results from last 7 days   Lab Units 12/08/19  1202   WBC 10*3/mm3 5.30   HEMOGLOBIN g/dL 8.2*   HEMATOCRIT % 26.6*   PLATELETS 10*3/mm3 133*     Results from last 7 days   Lab Units 12/08/19  1202   SODIUM mmol/L 132*   POTASSIUM mmol/L 3.2*   CHLORIDE mmol/L 87*   CO2 mmol/L 31.0*   BUN mg/dL 10   CREATININE mg/dL 2.53*   GLUCOSE mg/dL 275*   CALCIUM mg/dL 9.1   ALT (SGPT) U/L 23   AST (SGOT) U/L 18   TROPONIN T ng/mL 0.298*   PROBNP pg/mL >70,000.0*   LACTATE mmol/L 0.9   PROCALCITONIN ng/mL 0.44* "     Estimated Creatinine Clearance: 25.9 mL/min (A) (by C-G formula based on SCr of 2.53 mg/dL (H)).  Brief Urine Lab Results     None        Imaging Results (Last 24 Hours)     Procedure Component Value Units Date/Time    XR Chest 1 View [[id number]] Collected:  12/08/19 1215     Updated:  12/08/19 1417    Narrative:          EXAMINATION: XR CHEST 1 VW - 12/08/2019     INDICATION: Shortness of air.     COMPARISON: 11/22/2019     FINDINGS: There is persistent patchy opacity of the right mid and lower  lung, with increasing volume loss, whether due to generalized  atelectasis or pneumonia. Lungs elsewhere appear clear except for mild  interstitial changes, perhaps early changes of interstitial edema. There  are probably very small effusions. Heart remains enlarged.           Impression:       1. Cardiomegaly and mild pulmonary vascular congestion similar to prior  study.  2. Worsening aeration of the right mid and lower lung, whether  atelectasis or pneumonia.     DICTATED:   12/08/2019  EDITED/ls :   12/08/2019            Results for orders placed during the hospital encounter of 11/01/19   Adult Transthoracic Echo Complete W/ Cont if Necessary Per Protocol    Narrative · The left ventricular cavity is moderate-to-severely dilated.  · Right ventricular cavity is mildly dilated.  · Left atrial cavity size is moderately dilated.  · Moderate mitral valve regurgitation is present  · There is a small (<1cm) pericardial effusion.          Assessment/Plan   Assessment / Plan     Active Hospital Problems    Diagnosis POA   • **Pneumonia of right lower lobe due to infectious organism (CMS/HCC) [J18.1] Yes   • Seizure (CMS/HCC) [R56.9] Yes   • Chronic systolic CHF (congestive heart failure) (CMS/HCC) [I50.22] Yes   • History of CVA (cerebrovascular accident) [Z86.73] Not Applicable   • Hypothyroidism (acquired) [E03.9] Yes   • Peripheral vascular disease (CMS/HCC) [I73.9] Yes     Added automatically from request for surgery  [id number]     • End stage renal disease on dialysis (CMS/[facility]) [N18.6, Z99.2] Not Applicable   • Recurrent right pleural effusion [J90] Yes   • Chest pain [R07.9] Yes   • Diabetes mellitus (CMS/HCC) [E11.9] Yes   • Essential hypertension [I10] Yes     Dyspnea/progressive SOA and hypoxia  --multifactorial, worrisome for HCAP  --continues to appear volume overload, needs HD/UF as tolerated  --probable component of A/C SHF  --now with R pleurisy/calf pain, checked duplex/CTA, no DVT/PE    ESRD  --HD    New SHF  --ischemic evaluation deferred on prior visit until outpatient follow up  --BB    Troponin elevation  --likely demand ischemia due to hypoxia/dyspnea, in setting of ESRD  --not an NSTEMI    Recent treatment for seizures  --Keppra    Recent GI bleed/gastritis/D2 telangiectasis  --IV PPI    DM  --basal/bolus insulin    Hx of CVA    Hypothyroidism  --synthroid    Hx of PVD    Hx of HTN  --home meds as tolerated    DVT prophylaxis:  LORA, pending duplex/CTA    CODE STATUS:    Code Status and Medical Interventions:   Ordered at: 12/08/19 1440     Level Of Support Discussed With:    Patient     Code Status:    CPR     Medical Interventions (Level of Support Prior to Arrest):    Full       Admission Status:  I believe this patient meets inpatient criteria, given the need for more than 2 midnights.  Electronically signed by Rosales Dickerson MD, 12/08/19, 2:42 PM.

## 2019-12-09 NOTE — PLAN OF CARE
Problem: Patient Care Overview  Goal: Plan of Care Review  Flowsheets (Taken 12/9/2019 0900)  Progress: no change  Plan of Care Reviewed With: patient; other (see comments) (Isatu RN)  Note:   WOC nurse consulted to assess left gluteal wound. Pt has unstagable pressure injury left gluteas; cleaned with NS, Z-guard applied; covered with foam dressing. Venelex ordered. Pt has multiple dry scabbed wounds left foot, bilateral heels; cleaned with NS, Betadine applied. Pt has multiple scabbed abrasions bilateral lower legs R>L. Wounds right calf and right knee cleaned with NS, Thera Honey applied; Xeroform applied to wounds bilateral lower legs, then wrapped in Kerlix. See wound orders. Off loading heel boots applied. [facility] due to immobility, high skin risk, moisture, inadequate nutrition. WOC nurse will f/u. Please contact WOC nurse as needed for concerns.

## 2019-12-09 NOTE — CONSULTS
"Diabetes Education  Assessment/Teaching    Patient Name:  Talha Cutler  YOB: 1960  MRN: [id number]  Admit Date:  12/8/2019      Assessment Date:  12/9/2019    Most Recent Value   General Information    Referral From:  MD rojo   Height  172.7 cm (68\")   Height Method  Stated   Weight  59 kg (130 lb)   Weight Method  Stated   Pregnancy Assessment   Diabetes History   What type of diabetes do you have?  Type 2   Length of Diabetes Diagnosis  10 + years   Current DM knowledge  good   Have you had diabetes education/teaching in the past?  yes   When and where was your diabetes education?  physician office   Do you test your blood sugar at home?  yes   Frequency of checks  2 to 3 times daily   Who performs the test?  self   Typical readings  unable to remember   Have you had low blood sugar? (<70mg/dl)  yes   How often do you have low blood sugar?  rare   Have you had high blood sugar? (>140mg/dl)  yes   How often do you have high blood sugar?  occasionally   When was your last high blood sugar?  yesterday   Education Preferences   What areas of diabetes would you like to learn about?  avoiding high blood sugar, diabetes complications   Nutrition Information   Assessment Topics   Problem Solving - Assessment  Needs education   Reducing Risk - Assessment  Needs education   Healthy Coping - Assessment  Needs education   DM Goals   Contact Plan  Follow-up medical care [FU with PCP]            Most Recent Value   DM Education Needs   Meter  Has own   Meter Type  -- [doesn't remember name of meter]   Frequency of Testing  4 times a day   Blood Glucose Target Range  Ranges per ADA guidelines   Medication  Insulin, Administration, Pen   Problem Solving  Hypoglycemia, Sick days, Signs, Symptoms, Treatment, Hyperglycemia   Reducing Risks  A1C testing, Immunizations   Physical Activity  None [WC bound]   Physical Activity Frequency  Never   Healthy Coping  Appropriate   Motivation  Moderate   Teaching Method  " Explanation, Discussion, Handouts, Teach back   Patient Response  Verbalized understanding, Needs reinforcement      Mr. Cutler gave permission for diabetes education. His last A1c was 8.9% on 9/21/19. He reports he takes Lantus and Novolog. He states he checks his BG 2 to 3 times per day. He states he has been on HD for 9 years and is WC bound due to a CVA with left hemiplegia in 2006.  Discussed and taught patient about type 2 diabetes self-management, risk factors, and importance of blood glucose control to reduce complications. Target blood glucose readings and A1c goals per ADA were reviewed. Reviewed  A1c and discussed its significance. Signs, symptoms and treatment of hyperglycemia and hypoglycemia were discussed. Lifestyle changes such as physical activit, for upper body due to being WC bound, with MD approval and healthy eating were encouraged. nstructed to  check blood sugar 4 times per day and to call PCP if  Blood glucose is higher than 180 two times or more.  Patient was encouraged to keep record of blood glucose readings to take to follow up appointment with PCP.  Pt was offered a free op follow up appointment however declined at this time.         Electronically signed by:  Cathryn Doll RN  12/09/19 1:44 PM

## 2019-12-09 NOTE — PLAN OF CARE
Pt experienced hypoglycemic episode this AM; PO intake provided. Hypoglycemia resolved. Pt spent most of day in Hemodialysis. C/O pain 1x this shift; PRN Norco given. WO nurse dressed pt's wounds today. Pt now on specialty bed; heel offloading boots in place. Pt receiving IVF. VSS on 2L O2, will continue to monitor.

## 2019-12-09 NOTE — PROGRESS NOTES
[facility] Medicine Services  INPATIENT PROGRESS NOTE    Date of Admission: 12/8/2019  Length of Stay: 1  Primary Care Physician: Jeannette Godoy APRN    Subjective     Chief Complaint: shortness of breath    HPI:  Patient feeling only some better- still hurting    Review Of Systems:   Patient denies headaches, abdominal pain, nausea or vomiting, diarrhea, rash, itching or bleeding      Objective      Vitals:   97.7 °F (36.5 °C) Temp  Min: 97.6 °F (36.4 °C)  Max: 97.8 °F (36.6 °C)    106/65 BP  Min: 84/52  Max: 123/75    76 Pulse  Min: 72  Max: 95    18 Resp  Min: 18  Max: 20    100 % SpO2  Min: 82 %  Max: 100 %    nasal cannula     2 No data recorded     Patient is alert and talkative thin frail but coherent   Neck is without mass or JVD  Heart is Reg wo murmur  Lungs have coarse breath sounds on the right  Abd is soft without HSM or mass, not distended or tender to palpation  MAEW- very thin, multiple ulcers-   Skin has dark red lacy change  sNeurologic exam is nonfocal   Mood is appropriate      Results Review:    I have reviewed the labs, radiology results and diagnostic studies.    Results from last 7 days   Lab Units 12/09/19  0815 12/08/19  1202 12/05/19  1907   WBC 10*3/mm3 6.47 5.30 4.88   HEMOGLOBIN g/dL 10.6* 8.2* 9.2*   HEMATOCRIT % 34.0* 26.6* 29.5*   PLATELETS 10*3/mm3 132* 133* 179     Results from last 7 days   Lab Units 12/09/19  0604 12/08/19  1202 12/05/19  1907   SODIUM mmol/L 132* 132* 133*   POTASSIUM mmol/L 3.2* 3.2* 3.1*   CHLORIDE mmol/L 86* 87* 85*   CO2 mmol/L 31.0* 31.0* 33.8*   BUN mg/dL 14 10 6*   CREATININE mg/dL 3.10* 2.53* 2.05*   GLUCOSE mg/dL 102* 275* 330*   CALCIUM mg/dL 8.7 9.1 9.0   ALT (SGPT) U/L  --  23 45*   AST (SGOT) U/L  --  18 28       Microbiology Results Abnormal     Procedure Component Value - Date/Time    MRSA Screen, PCR - Swab, Nares [[id number]]  (Normal) Collected:  12/08/19 2102    Lab Status:  Final result Specimen:  Swab from  Shani Updated:  12/08/19 2214     MRSA PCR Negative    Narrative:       MRSA Negative        Ct Angiogram Chest With & Without Contrast    Result Date: 12/8/2019  1. No evidence of pulmonary embolus. 2. New but small left pleural effusion. Left lung otherwise appears clear 3. Extensive and diffuse right lung interstitial and airspace disease, presumably pneumonia.  DICTATED:   12/08/2019 EDITED/ls :   12/08/2019      Xr Chest 1 View    Result Date: 12/8/2019  1. Cardiomegaly and mild pulmonary vascular congestion similar to prior study. 2. Worsening aeration of the right mid and lower lung, whether atelectasis or pneumonia.  DICTATED:   12/08/2019 EDITED/ls :   12/08/2019  This report was finalized on 12/8/2019 11:15 PM by DR. Rosales Martel MD.      Results for orders placed during the hospital encounter of 11/01/19   Adult Transthoracic Echo Complete W/ Cont if Necessary Per Protocol    Narrative · The left ventricular cavity is moderate-to-severely dilated.  · Right ventricular cavity is mildly dilated.  · Left atrial cavity size is moderately dilated.  · Moderate mitral valve regurgitation is present  · There is a small (<1cm) pericardial effusion.          I have reviewed the medications.    Assessment/Plan     Assessment/Problem List    Pneumonia of right lower lobe due to infectious organism (CMS/HCC)    Chest pain    Diabetes mellitus (CMS/HCC)    Essential hypertension    Recurrent right pleural effusion    End stage renal disease on dialysis (CMS/HCC)    Peripheral vascular disease (CMS/HCC)    History of CVA (cerebrovascular accident)    Hypothyroidism (acquired)    Chronic systolic CHF (congestive heart failure) (CMS/HCC)    Seizure (CMS/HCC)      58 yo who has been on dialysis for 9 years- presentes with right sided pneumonia    HCAP  --continues to appear volume overload, needs HD/UF as tolerated  duplex/CTA, no DVT/PE     ESRD  --HD MWF     New SHF  --ischemic evaluation deferred on prior visit until  outpatient follow up  --BB     Troponin elevation  --likely demand ischemia due to hypoxia/dyspnea, in setting of ESRD  --not an NSTEMI     Recent treatment for seizures  --Keppra     Recent GI bleed/gastritis/D2 telangiectasis  --IV PPI     DM  --basal/bolus insulin     Hx of CVA     Hypothyroidism  --synthroid     Hx of PVD     Hx of HTN  --home meds as tolerated     DVT prophylaxis:  LORA  Discharge Planning: TBD  Electronically signed by Amarilis Alanis MD, 12/09/19 10:48 AM .

## 2019-12-09 NOTE — SIGNIFICANT NOTE
[facility] Medicine Services  CROSS COVER NOTE        EVENT:  Rapid response    AMS  Hypoglycemia BG 44@ 4:25 am  Treated, repeat 135    Patient hemodynamically stable, however, per nurse, mental status change and much more confused and less responsive now.      OBJECTIVE DATA:  Vitals:    19 0510   BP:    Pulse: 75   Resp:    Temp:    SpO2: 100%         ACTION TAKEN:    Stat labs  Neuro eval showed no focal deficits  Patient appears to be having staring spells, suspicious for ?seizure activity  Able to recall details of dates and prior admission but when asked name and  he acts confused.    Patient diagnosed with psychogenic nonepileptic seizures in November and on keppra      FOLLOW UP REQUIRED:  Neuro checks, neurology consult  Await lab results      Electronically signed by JAG Parra, 19, 5:28 AM.

## 2019-12-09 NOTE — SIGNIFICANT NOTE
Around 0500 this morning patient was diaphoretic and very lethargic. Patient was barley responding to vigorous stimuli. BG was in the 130's. An RRT was called. VSS were stable. Patient became more alert over time with no interventions. Neuro consult was placed and neuro checks increased.

## 2019-12-09 NOTE — PLAN OF CARE
VSS. Episode of hypotension, now stabilized. Hypoglycemic this morning. Given dextrose. Labored abdominal breathing on 3L NC. No signs of distress. Will continue to monitor.

## 2019-12-09 NOTE — CONSULTS
NAL Consult Note    Talha Cutler  1960  [id number]    Date of Admit:  12/8/2019    Date of Consult: 12/9/2019        Requesting Provider: bIan Mars MD  Evaluating Physician: Iggy Mccray MD        Reason for Consultation: ESRD    History of present illness:    Patient is a 59 y.o.  Yr old male recently discharged from our facility last month for HCAP, GIB, and new SHF, here with progressive dyspnea over the past week. Feels like he can't catch his breath. Cough, but difficulty producing sputum. Chills, but no f/sweats. Now notes intermittent pressure/sharp R chest pain, worse with deep inspiration. Notes increased B LE edema, but markedly worse in R LE with pain in calf. Notes anorexia, nausea, and dry heaves. No BM in 6 days. Anuric. Notes redness in R calf today as well. No palpitations or dizziness. No new numbness/weakness, chronic L weakness from prior CVA.    Past Medical History:   Diagnosis Date   • Abdominal pain    • Anemia    • Cataracts, bilateral    • CHF (congestive heart failure) (CMS/HCC)    • Chronic kidney disease    • Constipation    • Cough    • Dependence on nocturnal oxygen therapy    • Diabetes mellitus (CMS/HCC)    • Diarrhea    • End stage renal failure on dialysis (CMS/HCC)     SILVINO AVITIA, SAT   • GERD (gastroesophageal reflux disease)    • H/O blood clots     LEG/NECK   • H/O chest x-ray 03/25/2016    Interval decrease in size of the patients right pleural effusion with a persistent small left effusion as well   • History of transfusion    • Hypertension    • Left-sided weakness    • Nauseated     DRY HEAVES   • Pleural effusion    • Pneumonia    • Pneumonia    • Renal failure    • Seizures (CMS/HCC)    • Stroke (CMS/HCC) 2006    LEFT SIDED WEAKNESS   • Swelling    • Teeth missing    • Tinnitus    • Ulcer, stomach peptic     HISTORY OF ULCER AS A TEENAGER   • Wears glasses        Past Surgical History:   Procedure Laterality Date   • ARTERIOVENOUS FISTULA Right    •  "BRONCHOSCOPY N/A 1/10/2019    Procedure: BRONCHOSCOPY with MAC and Flouro. LAVAGE, BRUSHINGS AND BIOPSY;  Surgeon: Ean Hernandez MD;  Location: [facility] OR;  Service: Pulmonary   • CARDIAC CATHETERIZATION N/A 3/28/2018    Procedure: Peripheral angiography;  Surgeon: Clay Ruano MD;  Location: [facility] CATH INVASIVE LOCATION;  Service: Cardiovascular   • CARDIAC CATHETERIZATION N/A 3/28/2018    Procedure: Angioplasty-peripheral;  Surgeon: Clay Ruano MD;  Location: [facility] CATH INVASIVE LOCATION;  Service: Cardiovascular   • CARDIAC CATHETERIZATION N/A 3/28/2018    Procedure: Atherectomy-peripheral;  Surgeon: Clay Ruano MD;  Location: [facility] CATH INVASIVE LOCATION;  Service: Cardiovascular   • CATARACT EXTRACTION, BILATERAL     • CHOLECYSTECTOMY     • COLONOSCOPY     • ENDOSCOPY N/A 11/19/2019    Procedure: ESOPHAGOGASTRODUODENOSCOPY;  Surgeon: Iban Santana MD;  Location: [facility] ENDOSCOPY;  Service: Gastroenterology   • EYE SURGERY      BLEEDING BEHING BILATERAL EYES   • INTERVENTIONAL RADIOLOGY PROCEDURE N/A 3/28/2018    Procedure: Abdominal Aortogram;  Surgeon: Clay Ruano MD;  Location: [facility] CATH INVASIVE LOCATION;  Service: Cardiovascular   • PORTACATH PLACEMENT     • SHOULDER MANIPULATION Left     \"FROZEN SHOULDER\"   • VENOUS ACCESS DEVICE (PORT) REMOVAL         Social History     Socioeconomic History   • Marital status:      Spouse name: Not on file   • Number of children: Not on file   • Years of education: Not on file   • Highest education level: Not on file   Tobacco Use   • Smoking status: Never Smoker   • Smokeless tobacco: Never Used   • Tobacco comment: 2ND HAND SMOKE EXPOSURE   Substance and Sexual Activity   • Alcohol use: No   • Drug use: No   • Sexual activity: Defer       family history includes COPD in his mother; Diabetes in his father, paternal grandmother, and sister; Hypertension in his brother and sister.    Allergies   Allergen " Reactions   • Erythromycin Hives       Medication:    Current Facility-Administered Medications:   •  aspirin chewable tablet 81 mg, 81 mg, Oral, Daily, Rosales Dickerson MD  •  b complex-vitamin c-folic acid (NEPHRO-SHIRLENE) tablet 1 tablet, 1 tablet, Oral, Daily, Roslaes Dickerson MD  •  bisacodyl (DULCOLAX) suppository 10 mg, 10 mg, Rectal, Once, Amarilis Alanis MD  •  castor oil-balsam peru (VENELEX) ointment, , Topical, Q12H, Amarilis Alanis MD  •  clopidogrel (PLAVIX) tablet 75 mg, 75 mg, Oral, Daily, Rosales Dickerson MD  •  dextrose (D50W) 25 g/ 50mL Intravenous Solution 25 g, 25 g, Intravenous, Q15 Min PRN, Rosales Dickerson MD  •  dextrose (GLUTOSE) oral gel 15 g, 15 g, Oral, Q15 Min PRN, Rosales Dickerson MD  •  dextrose 5 % and sodium chloride 0.45 % infusion, 75 mL/hr, Intravenous, Continuous, Amarilis Alanis MD, Last Rate: 75 mL/hr at 12/09/19 1057, 75 mL/hr at 12/09/19 1057  •  docusate sodium (COLACE) capsule 100 mg, 100 mg, Oral, BID, Rosales Dickerson MD, 100 mg at 12/08/19 2002  •  folic acid (FOLVITE) tablet 1 mg, 1 mg, Oral, Daily, Rosales Dickerson MD  •  glucagon (human recombinant) (GLUCAGEN DIAGNOSTIC) injection 1 mg, 1 mg, Subcutaneous, Q15 Min PRN, Rosales Dickerson MD  •  guaiFENesin (MUCINEX) 12 hr tablet 600 mg, 600 mg, Oral, Q12H, Rosales Dickerson MD, 600 mg at 12/08/19 2002  •  heparin (porcine) 5000 UNIT/ML injection 5,000 Units, 5,000 Units, Subcutaneous, Q8H, Rosales Dickerson MD, 5,000 Units at 12/09/19 0435  •  HYDROcodone-acetaminophen (NORCO) 5-325 MG per tablet 1 tablet, 1 tablet, Oral, Q6H PRN, Rosales Dickerson MD, 1 tablet at 12/09/19 1049  •  insulin lispro (humaLOG) injection 0-7 Units, 0-7 Units, Subcutaneous, 4x Daily With Meals & Nightly, Rosales Dickerson MD, 2 Units at 12/08/19 2011  •  ipratropium-albuterol (DUO-NEB) nebulizer solution 3 mL, 3 mL, Nebulization, 4x Daily - RT, Rosales Dickerson MD, 3 mL at 12/09/19 0742  •  ipratropium-albuterol (DUO-NEB) nebulizer solution 3  mL, 3 mL, Nebulization, Q4H PRN, Rosales Dickerson MD  •  levETIRAcetam (KEPPRA) tablet 500 mg, 500 mg, Oral, BID, Rosales Dickerson MD, 500 mg at 12/08/19 2002  •  levothyroxine (SYNTHROID, LEVOTHROID) tablet 125 mcg, 125 mcg, Oral, Q AM, Rosales Dickerson MD, 125 mcg at 12/09/19 0435  •  metoprolol succinate XL (TOPROL-XL) 24 hr tablet 50 mg, 50 mg, Oral, Daily, Rosales Dickerson MD  •  ondansetron (ZOFRAN) tablet 4 mg, 4 mg, Oral, Q6H PRN **OR** ondansetron (ZOFRAN) injection 4 mg, 4 mg, Intravenous, Q6H PRN, Rosales Dickerson MD  •  pantoprazole (PROTONIX) injection 40 mg, 40 mg, Intravenous, BID AC, Rosales Dickerson MD, 40 mg at 12/08/19 1655  •  piperacillin-tazobactam (ZOSYN) 3.375 g in iso-osmotic dextrose 50 ml (premix), 3.375 g, Intravenous, Q12H, Rosales Dickerson MD, 3.375 g at 12/09/19 0041  •  polyethylene glycol 3350 powder (packet), 17 g, Oral, Daily, Rosales Dickerson MD, 17 g at 12/08/19 1655  •  potassium chloride (MICRO-K) CR capsule 40 mEq, 40 mEq, Oral, Once, Amarilis Alanis MD  •  povidone-iodine (BETADINE) external solution, , Topical, Daily, Amarilis Alanis MD  •  saccharomyces boulardii (FLORASTOR) capsule 250 mg, 250 mg, Oral, BID, Rosales Dickerson MD, 250 mg at 12/08/19 2002  •  sodium chloride 0.9 % flush 10 mL, 10 mL, Intravenous, PRN, Rosales Dickerson MD  •  sodium chloride 0.9 % flush 10 mL, 10 mL, Intravenous, Q12H, Rosales Dickerson MD  •  sodium chloride 0.9 % flush 10 mL, 10 mL, Intravenous, PRN, Rosales Dickerson MD    Medications Prior to Admission   Medication Sig Dispense Refill Last Dose   • albuterol (PROVENTIL HFA;VENTOLIN HFA) 108 (90 Base) MCG/ACT inhaler Inhale 2 puffs Every 4 (Four) Hours As Needed for Shortness of Air. 1 inhaler 0 12/8/2019 at Unknown time   • aspirin 81 MG chewable tablet Chew 81 mg Daily.   12/8/2019 at Unknown time   • B Complex-C-Folic Acid (RADHA-SHIRLENE PO) Take 1 tablet by mouth. Patient states he takes these after dialysis, but is unaware of dose.     12/8/2019 at Unknown time   • Cholecalciferol (VITAMIN D3) 5000 UNITS capsule capsule Take 1 capsule by mouth Daily.   12/8/2019 at Unknown time   • clopidogrel (PLAVIX) 75 MG tablet Take 1 tablet by mouth Daily. Start taking from 1/12/2019 onwards ONLY if not coughing up significant amount of blood. 90 tablet 3 12/8/2019 at Unknown time   • docusate sodium (COLACE) 100 MG capsule Take 100 mg by mouth Every 12 (Twelve) Hours.   12/8/2019 at Unknown time   • fluticasone (VERAMYST) 27.5 MCG/SPRAY nasal spray 2 sprays into the nostril(s) as directed by provider Daily.   12/8/2019 at Unknown time   • folic acid (FOLVITE) 1 MG tablet Take 1 mg by mouth daily.   12/8/2019 at Unknown time   • insulin glargine (LANTUS) 100 UNIT/ML injection Inject 15 Units under the skin into the appropriate area as directed Every Night.   12/7/2019 at Unknown time   • levETIRAcetam (KEPPRA) 500 MG tablet Take 1 tablet by mouth 2 (Two) Times a Day. 60 tablet 0 12/8/2019 at Unknown time   • levothyroxine (SYNTHROID, LEVOTHROID) 100 MCG tablet Take 125 mcg by mouth Daily.   12/8/2019 at Unknown time   • metoprolol succinate XL (TOPROL-XL) 50 MG 24 hr tablet Take 1 tablet by mouth Daily. Hold this medication for blood pressure <100, HR<60 30 tablet 0 12/8/2019 at Unknown time   • pantoprazole (PROTONIX) 40 MG EC tablet Take 1 tablet by mouth Daily. 30 tablet 0 12/8/2019 at Unknown time   • doxycycline (MONODOX) 100 MG capsule Take 1 capsule by mouth 2 (Two) Times a Day for 7 days. 14 capsule 0    • insulin aspart (novoLOG FLEXPEN) 100 UNIT/ML solution pen-injector sc pen Inject 9 Units under the skin into the appropriate area as directed 3 (Three) Times a Day With Meals.   9/20/2019 at 0800       Review of Systems:    Constitutional-- No Fever, chills or sweats. + appetite change  Eye-- no diplopia, no conjunctivitis  ENT-- No new hearing or throat complaints.  No epistaxis or oral sores. No odynophagia or dysphagia. No headache, photophobia  "or neck stiffness.  CV-- + chest pain, palpitations, soa, + edema  Resp-- + SOB/cough/no Hemoptysis  GI- No nausea, vomiting, or diarrhea.  No hematochezia, melena, or hematemesis.  -- No dysuria, hematuria, or flank pain. No lower tract obstructive symptoms  Skin-- No rash, warm and dry  Lymph- no painful or swollen lymph nodes in neck/axilla or groin.   Heme- No active bruising or bleeding; no Hx of DVT or PE.  MS-- no swelling or pain in the joints  Neuro-- No acute focal weakness or numbness in the arms or legs.  No seizures.  Psych--No anxiety or depression  Endo--No cold or heat intolerance.  No polyuria, polydipsia, or polyphagia    Full review of systems reviewed and negative otherwise for acute complaints    Physical Exam:   Vital Signs   Blood pressure 106/65, pulse 76, temperature 97.7 °F (36.5 °C), temperature source Oral, resp. rate 18, height 172.7 cm (68\"), weight 59 kg (130 lb), SpO2 100 %.     GENERAL: Awake and alert, in no acute distress.   HEENT: Normocephalic, atraumatic.  PER.  No conjunctivitis. No icterus. Oropharynx clear without evidence of thrush or exudate. No evidence of peridontal disease.    NECK: Supple without nuchal rigidity. No mass.  LYMPH: No painful cervical, axillary or inguinal lymphadenopathy.  HEART: RRR; No murmur, rubs, gallops. No bruits in neck, abdomen, or groins, no edema  LUNGS: Normal respiratory effort. Nonlabored. No dullness.  No crepitus.  Clear to auscultation bilaterally without wheezing, rales, rhonchi.  ABDOMEN: Soft, nontender, nondistended. Positive bowel sounds. No rebound or guarding. NO mass or HSM.  JOINTS:  Full range of motion, no redness or tenderness.  EXT:  No cyanosis, clubbing or edema.  :  External genitalia without swelling or lesion  SKIN: Warm and dry without rash  NEURO: Oriented to PPT. No focal neurological deficits. Strength equal bilateral  PSYCHIATRIC: Normal insight and judgement. Cooperative with PE    Laboratory Data  Results " from last 7 days   Lab Units 12/09/19  0815 12/08/19  1202 12/05/19  1907   HEMOGLOBIN g/dL 10.6* 8.2* 9.2*   HEMATOCRIT % 34.0* 26.6* 29.5*     Results from last 7 days   Lab Units 12/09/19  0604 12/08/19  1202 12/05/19  1907   SODIUM mmol/L 132* 132* 133*   POTASSIUM mmol/L 3.2* 3.2* 3.1*   CHLORIDE mmol/L 86* 87* 85*   CO2 mmol/L 31.0* 31.0* 33.8*   BUN mg/dL 14 10 6*   CREATININE mg/dL 3.10* 2.53* 2.05*   CALCIUM mg/dL 8.7 9.1 9.0   ALBUMIN g/dL  --  3.10* 3.30*     Results from last 7 days   Lab Units 12/09/19  0604   GLUCOSE mg/dL 102*         Results from last 7 days   Lab Units 12/08/19  1202   ALK PHOS U/L 95   BILIRUBIN mg/dL 0.3   ALT (SGPT) U/L 23   AST (SGOT) U/L 18     Estimated Creatinine Clearance: 21.1 mL/min (A) (by C-G formula based on SCr of 3.1 mg/dL (H)).    Radiology:      Renal Imaging:    I personally read  the radiographic studies    Impression:   ESRD  ANEMIA  PNEUMONIA  VOL OVERLOAD  CHF    PLAN: Thank you for asking us to see Talha Cutler, I recommend the following:   HD now and minimal UF as seem to have pneumonia       Please note that portions of this note were completed with a voice recognition program efforts were made to add at the dictations but words may be mistranscribed.    Iggy Mccray MD  12/9/2019  12:23 PM

## 2019-12-09 NOTE — OUTREACH NOTE
CHF Week 3 Survey      Responses   Facility patient discharged from?  [place]   Does the patient have one of the following disease processes/diagnoses(primary or secondary)?  CHF   Week 3 attempt successful?  No   Revoke  Readmitted          Inez Adrian RN

## 2019-12-09 NOTE — CONSULTS
Referring Provider: Dr. Alanis  Reason for Consultation: Seizure, episode diminished responsiveness    Patient Care Team:  Jeannette Godoy APRN as PCP - General (Family Medicine)    Chief complaint is of diminished responsiveness    Subjective .     History of present illness: 59-year-old man with PMH notable for DM, HTN, ESRD on dialysis, history of stroke with residual left-sided weakness, CHF, recently discharged from this facility with problems including GI bleed, pneumonia and pseudoseizures was admitted yesterday for progressive dyspnea over the past week.  He described inability to catch his breath, cough, chills but no fever or sweats.  Also complained of intermittent sharp right chest pain, bilateral lower extremity edema worse on the right.    During recent admission he had numerous episodes of jerking movements for which he was treated with Keppra.  With further evaluation, it was noted that he was having side-to-side head movements and diminished responsiveness consistent with psychogenic nonepileptic seizures.  Keppra was reduced to initial dose.  He was reported to have had generalized tonic-clonic seizures at the time of admission, and so Keppra was not discontinued entirely.  Per the chart, this morning around 0500 he became diaphoretic and very lethargic. Patient was barley responding to vigorous stimuli. BG was in the 130's. An RRT was called. VSS were stable. Patient became more alert over time with no interventions.  He reports now that he has had this a few times including this morning.  He reports episodes start with his face feeling numb, and soon after that he has more trouble thinking and this morning also with responding to others.  He reports he can hear.  He has had a couple episodes will where he wakes up and for about 10 minutes he cannot think where he is but eventually it comes to him.  He reports no recent shaking episodes.  He has a mild headache, no change in vision, feels  little short of breath, no current chest pain or abdominal pain.  No palpitations.  Appetite is poor and has had nausea and dry heaves.      Review of Systems  Pertinent items are noted in HPI, all other systems reviewed and negative     History  Past Medical History:   Diagnosis Date   • Abdominal pain    • Anemia    • Cataracts, bilateral    • CHF (congestive heart failure) (CMS/formerly [facility])    • Chronic kidney disease    • Constipation    • Cough    • Dependence on nocturnal oxygen therapy    • Diabetes mellitus (CMS/formerly [facility])    • Diarrhea    • End stage renal failure on dialysis (CMS/formerly [facility])     SILVINO AVITIA, SAT   • GERD (gastroesophageal reflux disease)    • H/O blood clots     LEG/NECK   • H/O chest x-ray 03/25/2016    Interval decrease in size of the patients right pleural effusion with a persistent small left effusion as well   • History of transfusion    • Hypertension    • Left-sided weakness    • Nauseated     DRY HEAVES   • Pleural effusion    • Pneumonia    • Pneumonia    • Renal failure    • Seizures (CMS/formerly [facility])    • Stroke (CMS/formerly [facility]) 2006    LEFT SIDED WEAKNESS   • Swelling    • Teeth missing    • Tinnitus    • Ulcer, stomach peptic     HISTORY OF ULCER AS A TEENAGER   • Wears glasses    ,   Past Surgical History:   Procedure Laterality Date   • ARTERIOVENOUS FISTULA Right    • BRONCHOSCOPY N/A 1/10/2019    Procedure: BRONCHOSCOPY with MAC and Flouro. LAVAGE, BRUSHINGS AND BIOPSY;  Surgeon: Ean Hernandez MD;  Location: [facility] OR;  Service: Pulmonary   • CARDIAC CATHETERIZATION N/A 3/28/2018    Procedure: Peripheral angiography;  Surgeon: Clay Ruano MD;  Location: [facility] CATH INVASIVE LOCATION;  Service: Cardiovascular   • CARDIAC CATHETERIZATION N/A 3/28/2018    Procedure: Angioplasty-peripheral;  Surgeon: Clay Ruano MD;  Location: [facility] CATH INVASIVE LOCATION;  Service: Cardiovascular   • CARDIAC CATHETERIZATION N/A 3/28/2018    Procedure: Atherectomy-peripheral;  Surgeon: Clay  "Tom Ruano MD;  Location:  [facility] CATH INVASIVE LOCATION;  Service: Cardiovascular   • CATARACT EXTRACTION, BILATERAL     • CHOLECYSTECTOMY     • COLONOSCOPY     • ENDOSCOPY N/A 11/19/2019    Procedure: ESOPHAGOGASTRODUODENOSCOPY;  Surgeon: Iban Santana MD;  Location:  [facility];  Service: Gastroenterology   • EYE SURGERY      BLEEDING BEHING BILATERAL EYES   • INTERVENTIONAL RADIOLOGY PROCEDURE N/A 3/28/2018    Procedure: Abdominal Aortogram;  Surgeon: Clay Ruano MD;  Location: [facility] CATH INVASIVE LOCATION;  Service: Cardiovascular   • PORTACATH PLACEMENT     • SHOULDER MANIPULATION Left     \"FROZEN SHOULDER\"   • VENOUS ACCESS DEVICE (PORT) REMOVAL     ,   Family History   Problem Relation Age of Onset   • COPD Mother    • Diabetes Father    • Hypertension Sister    • Diabetes Sister    • Hypertension Brother    • Diabetes Paternal Grandmother    ,   Social History     Tobacco Use   • Smoking status: Never Smoker   • Smokeless tobacco: Never Used   • Tobacco comment: 2ND HAND SMOKE EXPOSURE   Substance Use Topics   • Alcohol use: No   • Drug use: No   ,   Medications Prior to Admission   Medication Sig Dispense Refill Last Dose   • albuterol (PROVENTIL HFA;VENTOLIN HFA) 108 (90 Base) MCG/ACT inhaler Inhale 2 puffs Every 4 (Four) Hours As Needed for Shortness of Air. 1 inhaler 0 12/8/2019 at Unknown time   • aspirin 81 MG chewable tablet Chew 81 mg Daily.   12/8/2019 at Unknown time   • B Complex-C-Folic Acid (RADHA-SIHRLENE PO) Take 1 tablet by mouth. Patient states he takes these after dialysis, but is unaware of dose.    12/8/2019 at Unknown time   • Cholecalciferol (VITAMIN D3) 5000 UNITS capsule capsule Take 1 capsule by mouth Daily.   12/8/2019 at Unknown time   • clopidogrel (PLAVIX) 75 MG tablet Take 1 tablet by mouth Daily. Start taking from 1/12/2019 onwards ONLY if not coughing up significant amount of blood. 90 tablet 3 12/8/2019 at Unknown time   • docusate sodium (COLACE) 100 MG " capsule Take 100 mg by mouth Every 12 (Twelve) Hours.   12/8/2019 at Unknown time   • fluticasone (VERAMYST) 27.5 MCG/SPRAY nasal spray 2 sprays into the nostril(s) as directed by provider Daily.   12/8/2019 at Unknown time   • folic acid (FOLVITE) 1 MG tablet Take 1 mg by mouth daily.   12/8/2019 at Unknown time   • insulin glargine (LANTUS) 100 UNIT/ML injection Inject 15 Units under the skin into the appropriate area as directed Every Night.   12/7/2019 at Unknown time   • levETIRAcetam (KEPPRA) 500 MG tablet Take 1 tablet by mouth 2 (Two) Times a Day. 60 tablet 0 12/8/2019 at Unknown time   • levothyroxine (SYNTHROID, LEVOTHROID) 100 MCG tablet Take 125 mcg by mouth Daily.   12/8/2019 at Unknown time   • metoprolol succinate XL (TOPROL-XL) 50 MG 24 hr tablet Take 1 tablet by mouth Daily. Hold this medication for blood pressure <100, HR<60 30 tablet 0 12/8/2019 at Unknown time   • pantoprazole (PROTONIX) 40 MG EC tablet Take 1 tablet by mouth Daily. 30 tablet 0 12/8/2019 at Unknown time   • doxycycline (MONODOX) 100 MG capsule Take 1 capsule by mouth 2 (Two) Times a Day for 7 days. 14 capsule 0    • insulin aspart (novoLOG FLEXPEN) 100 UNIT/ML solution pen-injector sc pen Inject 9 Units under the skin into the appropriate area as directed 3 (Three) Times a Day With Meals.   9/20/2019 at 0800   , Scheduled Meds:      aspirin 81 mg Oral Daily   b complex-vitamin c-folic acid 1 tablet Oral Daily   bisacodyl 10 mg Rectal Once   castor oil-balsam peru  Topical Q12H   clopidogrel 75 mg Oral Daily   docusate sodium 100 mg Oral BID   folic acid 1 mg Oral Daily   guaiFENesin 600 mg Oral Q12H   heparin (porcine) 5,000 Units Subcutaneous Q8H   insulin lispro 0-7 Units Subcutaneous 4x Daily With Meals & Nightly   ipratropium-albuterol 3 mL Nebulization 4x Daily - RT   levETIRAcetam 500 mg Oral BID   levothyroxine 125 mcg Oral Q AM   metoprolol succinate XL 50 mg Oral Daily   pantoprazole 40 mg Intravenous BID AC  "  piperacillin-tazobactam 3.375 g Intravenous Q12H   polyethylene glycol 17 g Oral Daily   povidone-iodine  Topical Daily   saccharomyces boulardii 250 mg Oral BID   sodium chloride 10 mL Intravenous Q12H   , Continuous Infusions:      dextrose 5 % and sodium chloride 0.45 % 75 mL/hr Last Rate: 75 mL/hr (12/09/19 1057)   , PRN Meds:  dextrose  •  dextrose  •  glucagon (human recombinant)  •  HYDROcodone-acetaminophen  •  ipratropium-albuterol  •  ondansetron **OR** ondansetron  •  sodium chloride  •  sodium chloride and Allergies:  Erythromycin    Objective     Vital Signs   Blood pressure 102/58, pulse 83, temperature 96 °F (35.6 °C), temperature source Axillary, resp. rate 16, height 172.7 cm (68\"), weight 59 kg (130 lb), SpO2 100 %.    Physical Exam:   Thin, chronically ill-appearing middle-aged white man in no acute distress.  He is alert, oriented and speech is fluent and non-aphasic.  He is attentive, memory intact, no dysarthria.  Visual fields full to confrontation with encouragement.  EOMI with encouragement (initially does not look to the right but does so with repeated requests).  Grimace symmetric in normal facial sensation.  Hearing intact to voice, palate elevates symmetrically, diminished left shoulder shrug, tongue protrudes midline  Motor: Chronic left hemiparesis, moves right side well  Increased tone in the left upper more than lower extremity  Reflex exam difficult, probably somewhat increased in the left upper extremity, no response to plantar stim  He reports chronic diminished light touch in his right arm from so many surgeries, left arm feels different, leg symmetric  Coordination is commensurate with strength  Neck supple, no carotid bruit appreciated  Heart RRR with systolic murmur  Lungs clear anteriorly, normal respiratory effort  Abdomen soft, NT, ND with positive bowel sounds  Extremities thin, feet partially wrapped with healing skin wounds  Head normocephalic and " atraumatic      Results Review:   I reviewed the patient's new clinical results.  I reviewed the patient's new imaging results and agree with the interpretation.  I reviewed the patient's other test results and agree with the interpretation  Labs reviewed, white count 6.47 H&H 10.6 and 34 platelets 132  Ammonia 47  BMP with glucose 102 creatinine 3.1 sodium 132 potassium 3.2 chloride 86 CO2 31 GFR 21 otherwise normal  Procalcitonin elevated 0.41  BNP yesterday greater than 70,000  EEG today with mild generalized slowing, no epileptiform activity  Chest CT with extensive and diffuse right lung interstitial and airspace disease presumably pneumonia  Duplex lower extremity negative for DVT    Assessment/Plan       Pneumonia of right lower lobe due to infectious organism (CMS/HCC)    Chest pain    Diabetes mellitus (CMS/HCC)    Essential hypertension    Recurrent right pleural effusion    End stage renal disease on dialysis (CMS/HCC)    Peripheral vascular disease (CMS/HCC)    History of CVA (cerebrovascular accident)    Hypothyroidism (acquired)    Chronic systolic CHF (congestive heart failure) (CMS/HCC)    Seizure (CMS/HCC)      Episode of diminished responsiveness-I think it is of low likelihood that the episode this morning constituted seizure or any neurologic event.  Would leave Keppra at the current dose, check vitals during any spells but minimize intervention, lights, noise etc.    I discussed the patients findings and my recommendations with patient    Haydee Suarez MD  12/09/19  4:07 PM

## 2019-12-09 NOTE — PROGRESS NOTES
Continued Stay Note  [facility]     Patient Name: Talha Cutler  MRN: [id number]  Today's Date: 12/9/2019    Admit Date: 12/8/2019    Discharge Plan     Row Name 12/09/19 1541       Plan    Plan Comments  Attempted to see pt for DCP, however, he's off the floor in HD.  CM will f/u in am.          Discharge Codes    No documentation.       Expected Discharge Date and Time     Expected Discharge Date Expected Discharge Time    Dec 12, 2019             Haydee Jacobson RN

## 2019-12-10 NOTE — PROGRESS NOTES
[facility] Medicine Services  PROGRESS NOTE    Patient Name: Talha Cutler  : 1960  MRN: [id number]    Date of Admission: 2019  Primary Care Physician: Jeannette Godoy APRN    Subjective   Subjective     CC:SOA    HPI: No acute events overnight, patient stated he did not get much sleep, does complain of some shortness of breath      Review of Systems  Gen- No fevers, chills  CV- No chest pain, palpitations  Resp- No cough, +dyspnea  GI- No N/V/D, abd pain    All systems reviewed and are negative except as mentioned in HPI above  Objective   Objective     Vital Signs:   Temp:  [96 °F (35.6 °C)-97.4 °F (36.3 °C)] 97.4 °F (36.3 °C)  Heart Rate:  [] 100  Resp:  [16-18] 18  BP: ()/(51-86) 124/86        Physical Exam:  Constitutional: Chronically ill-appearing male, in no acute distress, awake, alert  HENT: NCAT, mucous membranes moist  Respiratory: Moderately labored respirations, diffuse coarse breath sounds on 2 L NC  Cardiovascular: RRR, no murmurs, rubs, or gallops, palpable pedal pulses bilaterally  Gastrointestinal: Positive bowel sounds, soft, nontender, nondistended  Musculoskeletal: No bilateral ankle edema  Psychiatric: Appropriate affect, cooperative  Neurologic: Oriented x 3, left-sided weakness  Skin: No rashes, multiple skin wounds on lower extremity    Results Reviewed:    Results from last 7 days   Lab Units 19  0815 19  0604 19  12019  1907   WBC 10*3/mm3 6.47  --  5.30 4.88   HEMOGLOBIN g/dL 10.6*  --  8.2* 9.2*   HEMATOCRIT % 34.0*  --  26.6* 29.5*   PLATELETS 10*3/mm3 132*  --  133* 179   PROCALCITONIN ng/mL  --  0.41* 0.44*  --      Results from last 7 days   Lab Units 19  0604 19  1202 19  1907   SODIUM mmol/L 132* 132* 133*   POTASSIUM mmol/L 3.2* 3.2* 3.1*   CHLORIDE mmol/L 86* 87* 85*   CO2 mmol/L 31.0* 31.0* 33.8*   BUN mg/dL 14 10 6*   CREATININE mg/dL 3.10* 2.53* 2.05*   GLUCOSE mg/dL 102*  275* 330*   CALCIUM mg/dL 8.7 9.1 9.0   ALT (SGPT) U/L  --  23 45*   AST (SGOT) U/L  --  18 28   TROPONIN T ng/mL  --  0.298*  --    PROBNP pg/mL  --  >70,000.0*  --      Estimated Creatinine Clearance: 21.1 mL/min (A) (by C-G formula based on SCr of 3.1 mg/dL (H)).    Microbiology Results Abnormal     Procedure Component Value - Date/Time    Blood Culture - Blood, Arm, Left [[id number]] Collected:  12/08/19 1202    Lab Status:  Preliminary result Specimen:  Blood from Arm, Left Updated:  12/09/19 1248     Blood Culture No growth at 24 hours    Blood Culture - Blood, Wrist, Left [[id number]] Collected:  12/08/19 1202    Lab Status:  Preliminary result Specimen:  Blood from Wrist, Left Updated:  12/09/19 1248     Blood Culture No growth at 24 hours    MRSA Screen, PCR - Swab, Nares [[id number]]  (Normal) Collected:  12/08/19 2102    Lab Status:  Final result Specimen:  Swab from Nares Updated:  12/08/19 2214     MRSA PCR Negative    Narrative:       MRSA Negative          Imaging Results (Last 24 Hours)     Procedure Component Value Units Date/Time    CT Angiogram Chest With & Without Contrast [[id number]] Collected:  12/08/19 1846     Updated:  12/09/19 2247    Narrative:       EXAMINATION: CT ANGIOGRAM CHEST W/WO CONTRAST - 12/08/2019      INDICATION:  J18.1-Lobar pneumonia, unspecified organism; N18.6-End  stage renal disease.     TECHNIQUE: Spiral acquisition 3 mm post IV contrast images through the  chest with coronal and sagittal 10 mm 2D reconstructions.     The radiation dose reduction device was turned on for each scan per the  ALARA (As Low as Reasonably Achievable) protocol.     COMPARISON: 11/09/2019 unenhanced chest CT scan.     FINDINGS: Previous exam report indicates extensive pulmonary disease,  right greater than left. Today's study shows a new but small left  pleural effusion and minimal if any right effusion. There is no  pericardial effusion or significant mediastinal adenopathy. There is  good  contrast opacification of the pulmonary arteries and no visible  filling defects to suggest pulmonary embolic disease.     Lung window images show interval clearing of most of the left lung since  the prior study except for a small area of discoid atelectasis. There is  increasingly dense interstitial and airspace disease practically  throughout the right lung, presumably pneumonia. There is no obvious  underlying airway obstruction.     Included images of the upper abdomen show no significant abnormalities  of the visualized portions of the liver, spleen, pancreatic tail, or  adrenal glands. Kidneys are noted to be atrophic. There is extensive  diffuse vascular calcification.       Impression:       1. No evidence of pulmonary embolus.  2. New but small left pleural effusion. Left lung otherwise appears  clear  3. Extensive and diffuse right lung interstitial and airspace disease,  presumably pneumonia.     DICTATED:   12/08/2019  EDITED/ls :   12/08/2019      This report was finalized on 12/9/2019 10:44 PM by DR. Rosales Martel MD.             Results for orders placed during the hospital encounter of 11/01/19   Adult Transthoracic Echo Complete W/ Cont if Necessary Per Protocol    Narrative · The left ventricular cavity is moderate-to-severely dilated.  · Right ventricular cavity is mildly dilated.  · Left atrial cavity size is moderately dilated.  · Moderate mitral valve regurgitation is present  · There is a small (<1cm) pericardial effusion.          I have reviewed the medications:  Scheduled Meds:    aspirin 81 mg Oral Daily   b complex-vitamin c-folic acid 1 tablet Oral Daily   bisacodyl 10 mg Rectal Once   castor oil-balsam peru  Topical Q12H   clopidogrel 75 mg Oral Daily   docusate sodium 100 mg Oral BID   folic acid 1 mg Oral Daily   guaiFENesin 600 mg Oral Q12H   heparin (porcine) 5,000 Units Subcutaneous Q8H   insulin lispro 0-7 Units Subcutaneous 4x Daily With Meals & Nightly   ipratropium-albuterol 3 mL  Nebulization 4x Daily - RT   levETIRAcetam 500 mg Oral BID   levothyroxine 125 mcg Oral Q AM   metoprolol succinate XL 50 mg Oral Daily   pantoprazole 40 mg Intravenous BID AC   piperacillin-tazobactam 3.375 g Intravenous Q12H   polyethylene glycol 17 g Oral Daily   povidone-iodine  Topical Daily   saccharomyces boulardii 250 mg Oral BID   sodium chloride 10 mL Intravenous Q12H     Continuous Infusions:    dextrose 5 % and sodium chloride 0.45 % 75 mL/hr Last Rate: 75 mL/hr (12/09/19 9064)     PRN Meds:.dextrose  •  dextrose  •  glucagon (human recombinant)  •  HYDROcodone-acetaminophen  •  ipratropium-albuterol  •  ondansetron **OR** ondansetron  •  sodium chloride  •  sodium chloride      Assessment/Plan   Assessment / Plan     Active Hospital Problems    Diagnosis  POA   • **Pneumonia of right lower lobe due to infectious organism (CMS/[facility]) [J18.1]  Yes   • Seizure (CMS/[facility]) [R56.9]  Yes   • Chronic systolic CHF (congestive heart failure) (CMS/[facility]) [I50.22]  Yes   • History of CVA (cerebrovascular accident) [Z86.73]  Not Applicable   • Hypothyroidism (acquired) [E03.9]  Yes   • Peripheral vascular disease (CMS/[facility]) [I73.9]  Yes   • End stage renal disease on dialysis (CMS/[facility]) [N18.6, Z99.2]  Not Applicable   • Recurrent right pleural effusion [J90]  Yes   • Chest pain [R07.9]  Yes   • Diabetes mellitus (CMS/[facility]) [E11.9]  Yes   • Essential hypertension [I10]  Yes      Resolved Hospital Problems   No resolved problems to display.        Brief Hospital Course to date:  Talha Cutler is a 59 y.o. male with past medical history of hypothyroidism, prior CVA, ESRD on HD, type 2 diabetes, hypertension.  Patient presented with shortness of breath admitted with volume overload and pneumonia.    Acute respiratory failure with hypoxia  -Etiology likely multifactorial sec to volume overload and HCAP  -CTA chest and BLE duplex negative for DVT/PE  -Continue Zosyn for pneumonia  -Continue to wean O2 as  able    ESRD  -Currently with volume overload, continue HD on MWF schedule  -Renal consulted and following    New systolic heart failure  -ischemic evaluation deferred on prior visits until outpatient follow-up  -continue beta-blocker    Hypothyroidism- TSH currently at baseline continue home Synthroid    Hypertension-BP currently low we will likely hold his home meds, continue to monitor    Elevated troponin  -this was likely supply demand mismatch secondary to dyspnea  -No planned intervention    Recent GI bleed/gastritis/telangiectasias  -H&H remains stable, improved from previous  -Continue IV PPI    Poorly controlled type 2 diabetes, A1c 8.9%  -24-hour fingersticks reviewed and are appropriate  -Continue basal/bolus insulin, adjust as warranted.    History of ?recent seizures,   -patient with episode diminished responsiveness on 12/9, since resolved  -neurology consulted, doubt patient had a seizure event  -recommend to continue Keppra at current dose    History of prior CVA-with residual left-sided weakness    DVT Prophylaxis: SQH    Disposition: TBD    CODE STATUS:   Code Status and Medical Interventions:   Ordered at: 12/08/19 1440     Level Of Support Discussed With:    Patient     Code Status:    CPR     Medical Interventions (Level of Support Prior to Arrest):    Full         Electronically signed by Anand Alvarez MD, 12/10/19, 10:40 AM.

## 2019-12-10 NOTE — PROGRESS NOTES
"NAL Progress Note  Talha Cutler  [id number]  1960 12/8/2019    Reason for visit:  ESRD    No new events  ROS:    Pulm:  No SOA  CV:  No CP    I&O:    Intake/Output Summary (Last 24 hours) at 12/10/2019 1305  Last data filed at 12/9/2019 2355  Gross per 24 hour   Intake 433 ml   Output 1320 ml   Net -887 ml       PE:   Blood pressure 117/77, pulse 93, temperature 97.4 °F (36.3 °C), temperature source Oral, resp. rate 18, height 172.7 cm (68\"), weight 59 kg (130 lb), SpO2 100 %.    GENERAL: Awake and alert, in no acute distress.   HEENT: PER, No conjunctivitis  NECK: Supple, no JVD     HEART: RRR; No murmur, rubs, gallops.   LUNGS: Clear to auscultation bilaterally without wheezing, rales, rhonchi. Normal respiratory effort. Nonlabored. No dullness.  ABDOMEN: Soft, nontender, nondistended. Positive bowel sounds. No rebound or guarding. NO mass or HSM.  EXT:  No cyanosis, clubbing or edema. No cord.  : Genitalia generally unremarkable.  Without Sheehan catheter.  SKIN: Warm and dry without cutaneous eruptions on Inspection/palpation.    NEURO: Alert and oriented x 3, Motor 5/5 bilaterally    Medications:    Current Facility-Administered Medications:   •  aspirin chewable tablet 81 mg, 81 mg, Oral, Daily, Rosales Dickerson MD, 81 mg at 12/10/19 0823  •  b complex-vitamin c-folic acid (NEPHRO-SHIRLENE) tablet 1 tablet, 1 tablet, Oral, Daily, Rosales Dickerson MD, 1 tablet at 12/10/19 0823  •  bisacodyl (DULCOLAX) suppository 10 mg, 10 mg, Rectal, Once, Amarilis Alanis MD  •  castor oil-balsam peru (VENELEX) ointment, , Topical, Q12H, Amarilis Alanis MD  •  clopidogrel (PLAVIX) tablet 75 mg, 75 mg, Oral, Daily, Rosales Dickerson MD, 75 mg at 12/10/19 0823  •  dextrose (D50W) 25 g/ 50mL Intravenous Solution 25 g, 25 g, Intravenous, Q15 Min PRN, Rosales Dickerson MD  •  dextrose (GLUTOSE) oral gel 15 g, 15 g, Oral, Q15 Min PRN, Rosales Dickerson MD  •  dextrose 5 % and sodium chloride 0.45 % infusion, 75 mL/hr, " Intravenous, Continuous, Amarilis Alanis MD, Last Rate: 75 mL/hr at 12/09/19 2355, 75 mL/hr at 12/09/19 2355  •  docusate sodium (COLACE) capsule 100 mg, 100 mg, Oral, BID, Rosales Dickerson MD, 100 mg at 12/10/19 0823  •  folic acid (FOLVITE) tablet 1 mg, 1 mg, Oral, Daily, Rosales Dickerson MD, 1 mg at 12/10/19 0823  •  glucagon (human recombinant) (GLUCAGEN DIAGNOSTIC) injection 1 mg, 1 mg, Subcutaneous, Q15 Min PRN, Rosales Dickerson MD  •  guaiFENesin (MUCINEX) 12 hr tablet 600 mg, 600 mg, Oral, Q12H, Rosales Dickerson MD, 600 mg at 12/10/19 0823  •  heparin (porcine) 5000 UNIT/ML injection 5,000 Units, 5,000 Units, Subcutaneous, Q8H, Rosales Dickerson MD, 5,000 Units at 12/10/19 1232  •  HYDROcodone-acetaminophen (NORCO) 5-325 MG per tablet 1 tablet, 1 tablet, Oral, Q6H PRN, Rosales Dickerson MD, 1 tablet at 12/10/19 0245  •  insulin lispro (humaLOG) injection 0-7 Units, 0-7 Units, Subcutaneous, 4x Daily With Meals & Nightly, Rosales Dickerson MD, 2 Units at 12/10/19 1145  •  ipratropium-albuterol (DUO-NEB) nebulizer solution 3 mL, 3 mL, Nebulization, 4x Daily - RT, Rosales Dickerson MD, 3 mL at 12/10/19 1117  •  ipratropium-albuterol (DUO-NEB) nebulizer solution 3 mL, 3 mL, Nebulization, Q4H PRN, Rosales Dickerson MD  •  levETIRAcetam (KEPPRA) tablet 500 mg, 500 mg, Oral, BID, Rosales Dickerson MD, 500 mg at 12/10/19 0823  •  levothyroxine (SYNTHROID, LEVOTHROID) tablet 125 mcg, 125 mcg, Oral, Q AM, Rosales Dickerson MD, 125 mcg at 12/10/19 0654  •  metoprolol succinate XL (TOPROL-XL) 24 hr tablet 50 mg, 50 mg, Oral, Daily, Rosales Dickerson MD, 50 mg at 12/10/19 0823  •  ondansetron (ZOFRAN) tablet 4 mg, 4 mg, Oral, Q6H PRN **OR** ondansetron (ZOFRAN) injection 4 mg, 4 mg, Intravenous, Q6H PRN, Rosales Dickesron MD, 4 mg at 12/10/19 0823  •  pantoprazole (PROTONIX) injection 40 mg, 40 mg, Intravenous, BID AC, Rosales Dickerson MD, 40 mg at 12/10/19 0823  •  piperacillin-tazobactam (ZOSYN) 3.375 g in iso-osmotic dextrose  50 ml (premix), 3.375 g, Intravenous, Q12H, Rosales Dickerson MD, 3.375 g at 12/10/19 1145  •  polyethylene glycol 3350 powder (packet), 17 g, Oral, Daily, Rosales Dickerson MD, 17 g at 12/10/19 0824  •  povidone-iodine (BETADINE) external solution, , Topical, Daily, Amarilis Alanis MD  •  saccharomyces boulardii (FLORASTOR) capsule 250 mg, 250 mg, Oral, BID, Rosales Dickerson MD, 250 mg at 12/10/19 0823  •  simethicone (MYLICON) chewable tablet 80 mg, 80 mg, Oral, 4x Daily PRN, Anand Alvarez MD, 80 mg at 12/10/19 1232  •  sodium chloride 0.9 % flush 10 mL, 10 mL, Intravenous, PRN, Rosales Dickerson MD  •  sodium chloride 0.9 % flush 10 mL, 10 mL, Intravenous, Q12H, Rosales Dickerson MD, 10 mL at 12/09/19 1621  •  sodium chloride 0.9 % flush 10 mL, 10 mL, Intravenous, PRN, Rosales Dickerson MD    Meds reviewed and doses adjusted for renal function    Labs:  Results from last 7 days   Lab Units 12/09/19  0815 12/09/19  0604 12/08/19  1202 12/05/19  1907   WBC 10*3/mm3 6.47  --  5.30 4.88   HEMOGLOBIN g/dL 10.6*  --  8.2* 9.2*   HEMATOCRIT % 34.0*  --  26.6* 29.5*   PLATELETS 10*3/mm3 132*  --  133* 179   SODIUM mmol/L  --  132* 132* 133*   POTASSIUM mmol/L  --  3.2* 3.2* 3.1*   CHLORIDE mmol/L  --  86* 87* 85*   CO2 mmol/L  --  31.0* 31.0* 33.8*   BUN mg/dL  --  14 10 6*   CREATININE mg/dL  --  3.10* 2.53* 2.05*   GLUCOSE mg/dL  --  102* 275* 330*   CALCIUM mg/dL  --  8.7 9.1 9.0             Radiology:  Imaging Results (Last 72 Hours)     Procedure Component Value Units Date/Time    CT Angiogram Chest With & Without Contrast [[id number]] Collected:  12/08/19 1846     Updated:  12/09/19 2247    Narrative:       EXAMINATION: CT ANGIOGRAM CHEST W/WO CONTRAST - 12/08/2019      INDICATION:  J18.1-Lobar pneumonia, unspecified organism; N18.6-End  stage renal disease.     TECHNIQUE: Spiral acquisition 3 mm post IV contrast images through the  chest with coronal and sagittal 10 mm 2D reconstructions.     The radiation dose  reduction device was turned on for each scan per the  ALARA (As Low as Reasonably Achievable) protocol.     COMPARISON: 11/09/2019 unenhanced chest CT scan.     FINDINGS: Previous exam report indicates extensive pulmonary disease,  right greater than left. Today's study shows a new but small left  pleural effusion and minimal if any right effusion. There is no  pericardial effusion or significant mediastinal adenopathy. There is  good contrast opacification of the pulmonary arteries and no visible  filling defects to suggest pulmonary embolic disease.     Lung window images show interval clearing of most of the left lung since  the prior study except for a small area of discoid atelectasis. There is  increasingly dense interstitial and airspace disease practically  throughout the right lung, presumably pneumonia. There is no obvious  underlying airway obstruction.     Included images of the upper abdomen show no significant abnormalities  of the visualized portions of the liver, spleen, pancreatic tail, or  adrenal glands. Kidneys are noted to be atrophic. There is extensive  diffuse vascular calcification.       Impression:       1. No evidence of pulmonary embolus.  2. New but small left pleural effusion. Left lung otherwise appears  clear  3. Extensive and diffuse right lung interstitial and airspace disease,  presumably pneumonia.     DICTATED:   12/08/2019  EDITED/ls :   12/08/2019      This report was finalized on 12/9/2019 10:44 PM by DR. Rosales Martel MD.       XR Chest 1 View [[id number]] Collected:  12/08/19 1215     Updated:  12/08/19 2318    Narrative:          EXAMINATION: XR CHEST 1 VW - 12/08/2019     INDICATION: Shortness of air.     COMPARISON: 11/22/2019     FINDINGS: There is persistent patchy opacity of the right mid and lower  lung, with increasing volume loss, whether due to generalized  atelectasis or pneumonia. Lungs elsewhere appear clear except for mild  interstitial changes, perhaps early  changes of interstitial edema. There  are probably very small effusions. Heart remains enlarged.           Impression:       1. Cardiomegaly and mild pulmonary vascular congestion similar to prior  study.  2. Worsening aeration of the right mid and lower lung, whether  atelectasis or pneumonia.     DICTATED:   12/08/2019  EDITED/ls :   12/08/2019      This report was finalized on 12/8/2019 11:15 PM by DR. Rosales Martel MD.             Renal Imaging:  Ct Angiogram Chest With & Without Contrast    Result Date: 12/9/2019  1. No evidence of pulmonary embolus. 2. New but small left pleural effusion. Left lung otherwise appears clear 3. Extensive and diffuse right lung interstitial and airspace disease, presumably pneumonia.  DICTATED:   12/08/2019 EDITED/ls :   12/08/2019  This report was finalized on 12/9/2019 10:44 PM by DR. Rosales Martel MD.            IMPRESSION:  ESRD  ANEMIA  PNEUMONIA  VOL OVERLOAD  CHF       RECOMMENDATIONS:  HD tomorrow  Hb above 10 for now   Please note that portions of this note were completed with a voice recognition program efforts were made to add at the dictations but words may be mistranscribed.    Iggy Mccray MD  12/10/2019  1:05 PM

## 2019-12-10 NOTE — PLAN OF CARE
Problem: Patient Care Overview  Goal: Plan of Care Review  Outcome: Ongoing (interventions implemented as appropriate)  Flowsheets  Taken 12/10/2019 1610 by Idalmis Flores RN  Progress: no change  Outcome Summary: VSS, complaints of discomfort alleviated with repositioning. Sat in chair today without complaint. Dressings changed. Pt. continues on D5 1/2NS for low blood sugars as well as Q3 finger sticks. Dialysis tomorrow. Plan is to return home with family when medically able. Continue to monitor.  Taken 12/10/2019 0600 by Timothy Robles RN  Plan of Care Reviewed With: patient  Goal: Individualization and Mutuality  Outcome: Ongoing (interventions implemented as appropriate)  Goal: Discharge Needs Assessment  Outcome: Ongoing (interventions implemented as appropriate)  Goal: Interprofessional Rounds/Family Conf  Outcome: Ongoing (interventions implemented as appropriate)     Problem: Skin Injury Risk (Adult)  Goal: Identify Related Risk Factors and Signs and Symptoms  Outcome: Ongoing (interventions implemented as appropriate)  Goal: Skin Health and Integrity  Outcome: Ongoing (interventions implemented as appropriate)     Problem: Fall Risk (Adult)  Goal: Identify Related Risk Factors and Signs and Symptoms  Outcome: Ongoing (interventions implemented as appropriate)  Goal: Absence of Fall  Outcome: Ongoing (interventions implemented as appropriate)     Problem: Pneumonia (Adult)  Goal: Signs and Symptoms of Listed Potential Problems Will be Absent, Minimized or Managed (Pneumonia)  Outcome: Ongoing (interventions implemented as appropriate)     Problem: Hemodialysis (Adult)  Goal: Signs and Symptoms of Listed Potential Problems Will be Absent, Minimized or Managed (Hemodialysis)  Outcome: Ongoing (interventions implemented as appropriate)     Problem: Wound (Includes Pressure Injury) (Adult)  Goal: Signs and Symptoms of Listed Potential Problems Will be Absent, Minimized or Managed (Wound)  Outcome: Ongoing  (interventions implemented as appropriate)

## 2019-12-10 NOTE — PROGRESS NOTES
Discharge Planning Assessment  [facility]     Patient Name: Talha Cutler  MRN: [id number]  Today's Date: 12/10/2019    Admit Date: 12/8/2019    Discharge Needs Assessment     Row Name 12/10/19 1438       Living Environment    Lives With  spouse    Current Living Arrangements  home/apartment/condo    Primary Care Provided by  self;spouse/significant other    Provides Primary Care For  no one, unable/limited ability to care for self    Family Caregiver if Needed  spouse    Quality of Family Relationships  supportive;involved;helpful    Able to Return to Prior Arrangements  yes       Resource/Environmental Concerns    Transportation Concerns  car, none       Transition Planning    Patient/Family Anticipates Transition to  home with family    Patient/Family Anticipated Services at Transition      Transportation Anticipated  family or friend will provide       Discharge Needs Assessment    Readmission Within the Last 30 Days  previous discharge plan unsuccessful    Concerns to be Addressed  discharge planning    Equipment Currently Used at Home  oxygen;bipap/cpap;wheelchair, motorized;slide board handicap toilet    Anticipated Changes Related to Illness  inability to care for self    Equipment Needed After Discharge  hospital bed HH/PCP has ordered a hospital bed    Outpatient/Agency/Support Group Needs  homecare agency    Discharge Facility/Level of Care Needs  home with home health    Current Discharge Risk  chronically ill;dependent with mobility/activities of daily living        Discharge Plan     Row Name 12/10/19 1441       Plan    Plan  HOME with wife and HH    Provided post acute provider list?  Yes    Post Acute Provider Lists  Home Health    Post Acuite Provider List  Delivered    Delivered To  Patient    Method of Delivery  In person    Patient/Family in Agreement with Plan  yes    Plan Comments  Met with pt at bedside.  He resides with his wife in [facility].  He uses a power chair at  baseline for mobility and indicates that he can transfer himself.  Wife assists with sponge bathing.  He relates that he is able to dress, feed, and cook for himself.  He has cont home O2 @ 2lpm provided per [facility] in [place], [place].  He also has a home CPAP (provider unknown).  He is current with [facility] (SN, OT;  Dx:  CKD with heart failure, PNA, COPD).  Will need ELAV orders upon DC.  He advised that HH/PCP has ordered him a hospital bed.  He dialyzes at [facility] on [facility].  Wife transports to/from appts.  He has indicated that he is followed by Palliative Care team at home.  Confirmed he has [employer] Medicare with Rx coverage.  Discussed possible STR (he declined on previous admission).  He has again adamantly declined and relates that he plans to return home with assistance of wife and HH services.  CM will cont to follow for any DC needs.      Final Discharge Disposition Code  06 - home with home health care        Destination      Coordination has not been started for this encounter.      Durable Medical Equipment      Coordination has not been started for this encounter.      Dialysis/Infusion      Coordination has not been started for this encounter.      Home Medical Care - Selection Complete      Service Provider Request Status Selected Services Address Phone Number Fax Number    [facility] [place] [facility] [address] [address] [phone]      Therapy      Coordination has not been started for this encounter.      Community Resources      Coordination has not been started for this encounter.        Expected Discharge Date and Time     Expected Discharge Date Expected Discharge Time    Dec 12, 2019         Demographic Summary     Row Name 12/10/19 9556       General Information    Admission Type  inpatient    Referral Source  admission list    Reason for Consult  discharge planning    Preferred Language  English        Contact Information    Permission Granted to Share Info With      Contact Information Obtained for          Functional Status     Row Name 12/10/19 1437       Functional Status    Usual Activity Tolerance  fair       Functional Status, IADL    Medications  assistive person    Meal Preparation  assistive person    Housekeeping  completely dependent    Laundry  completely dependent    Shopping  completely dependent        Psychosocial    No documentation.       Abuse/Neglect    No documentation.       Legal    No documentation.       Substance Abuse    No documentation.       Patient Forms    No documentation.           Haydee Jacobson RN

## 2019-12-10 NOTE — PLAN OF CARE
Vss, Patient had a very restless night, only slept for small periods of time. Tolerated his c-pap well. Iv fluids for blood sugar management has appeared to be sucessful, IV abx continued for pneumonia. Will continue to monitor.

## 2019-12-11 NOTE — PROGRESS NOTES
"NAL Progress Note  Talha Cutler  [id number]  1960 12/8/2019    Reason for visit:  ESRD    Seen on hd  No complaints  ROS:    Pulm:  No SOA  CV:  No CP    I&O:    Intake/Output Summary (Last 24 hours) at 12/11/2019 0911  Last data filed at 12/11/2019 0600  Gross per 24 hour   Intake 1190 ml   Output --   Net 1190 ml       PE:   Blood pressure 110/80, pulse 96, temperature 98 °F (36.7 °C), temperature source Oral, resp. rate 20, height 172.7 cm (68\"), weight 59 kg (130 lb), SpO2 100 %.    GENERAL: Awake and alert, in no acute distress.   HEENT: PER, No conjunctivitis  NECK: Supple, no JVD     HEART: RRR; No murmur, rubs, gallops.   LUNGS: Clear to auscultation bilaterally without wheezing, rales, rhonchi. Normal respiratory effort. Nonlabored. No dullness.  ABDOMEN: Soft, nontender, nondistended. Positive bowel sounds. No rebound or guarding. NO mass or HSM.  EXT:  No cyanosis, clubbing , mild thigh edema. No cord.  : Genitalia generally unremarkable.  Without Sheehan catheter.  SKIN: Warm and dry without cutaneous eruptions on Inspection/palpation.    NEURO: Alert and oriented x 3, Motor 5/5 bilaterally    Medications:    Current Facility-Administered Medications:   •  aspirin chewable tablet 81 mg, 81 mg, Oral, Daily, Rosales Dickerson MD, 81 mg at 12/10/19 0823  •  b complex-vitamin c-folic acid (NEPHRO-SHIRLENE) tablet 1 tablet, 1 tablet, Oral, Daily, Rosales Dickerson MD, 1 tablet at 12/10/19 0823  •  bisacodyl (DULCOLAX) suppository 10 mg, 10 mg, Rectal, Once, Amarilis Alanis MD  •  castor oil-balsam peru (VENELEX) ointment, , Topical, Q12H, Amarilis Alanis MD  •  clopidogrel (PLAVIX) tablet 75 mg, 75 mg, Oral, Daily, Rosales Dickerson MD, 75 mg at 12/10/19 0823  •  dextrose (D50W) 25 g/ 50mL Intravenous Solution 25 g, 25 g, Intravenous, Q15 Min PRN, Rosales Dickerson MD  •  dextrose (GLUTOSE) oral gel 15 g, 15 g, Oral, Q15 Min PRN, Rosales Dickerson MD  •  dextrose 5 % and sodium chloride 0.45 % " infusion, 75 mL/hr, Intravenous, Continuous, Amarilis Alanis MD, Last Rate: 75 mL/hr at 12/11/19 0409, 75 mL/hr at 12/11/19 0409  •  docusate sodium (COLACE) capsule 100 mg, 100 mg, Oral, BID, Rosales Dickerson MD, 100 mg at 12/10/19 2046  •  folic acid (FOLVITE) tablet 1 mg, 1 mg, Oral, Daily, Rosales Dickerson MD, 1 mg at 12/10/19 0823  •  glucagon (human recombinant) (GLUCAGEN DIAGNOSTIC) injection 1 mg, 1 mg, Subcutaneous, Q15 Min PRN, Rosales Dickerson MD  •  guaiFENesin (MUCINEX) 12 hr tablet 600 mg, 600 mg, Oral, Q12H, Rosales Dickerson MD, 600 mg at 12/10/19 2046  •  heparin (porcine) 5000 UNIT/ML injection 5,000 Units, 5,000 Units, Subcutaneous, Q8H, Rosales Dickerson MD, 5,000 Units at 12/10/19 2046  •  HYDROcodone-acetaminophen (NORCO) 5-325 MG per tablet 1 tablet, 1 tablet, Oral, Q6H PRN, Rosales Dickerson MD, 1 tablet at 12/11/19 0354  •  insulin lispro (humaLOG) injection 0-7 Units, 0-7 Units, Subcutaneous, 4x Daily With Meals & Nightly, Rosales Dickerson MD, 2 Units at 12/10/19 1145  •  ipratropium-albuterol (DUO-NEB) nebulizer solution 3 mL, 3 mL, Nebulization, 4x Daily - RT, Rosales Dickerson MD, 3 mL at 12/10/19 2030  •  ipratropium-albuterol (DUO-NEB) nebulizer solution 3 mL, 3 mL, Nebulization, Q4H PRN, Rosales Dickerson MD  •  levETIRAcetam (KEPPRA) tablet 500 mg, 500 mg, Oral, BID, Rosales Dickerson MD, 500 mg at 12/10/19 2046  •  levothyroxine (SYNTHROID, LEVOTHROID) tablet 125 mcg, 125 mcg, Oral, Q AM, Rosales Dickerson MD, 125 mcg at 12/10/19 0654  •  metoprolol succinate XL (TOPROL-XL) 24 hr tablet 50 mg, 50 mg, Oral, Daily, Rosales Dickerson MD, 50 mg at 12/10/19 0823  •  ondansetron (ZOFRAN) tablet 4 mg, 4 mg, Oral, Q6H PRN **OR** ondansetron (ZOFRAN) injection 4 mg, 4 mg, Intravenous, Q6H PRN, Rosales Dickerson MD, 4 mg at 12/11/19 0408  •  pantoprazole (PROTONIX) injection 40 mg, 40 mg, Intravenous, BID AC, Rosales Dickerson MD, 40 mg at 12/10/19 1723  •  piperacillin-tazobactam (ZOSYN) 3.375 g in  iso-osmotic dextrose 50 ml (premix), 3.375 g, Intravenous, Q12H, Rosales Dickerson MD, 3.375 g at 12/10/19 2344  •  polyethylene glycol 3350 powder (packet), 17 g, Oral, Daily, Rosales Dickerson MD, 17 g at 12/10/19 0824  •  povidone-iodine (BETADINE) external solution, , Topical, Daily, Amarilis Alanis MD  •  saccharomyces boulardii (FLORASTOR) capsule 250 mg, 250 mg, Oral, BID, Rosales Dickerson MD, 250 mg at 12/10/19 2046  •  simethicone (MYLICON) chewable tablet 80 mg, 80 mg, Oral, 4x Daily PRN, Anand Alvarez MD, 80 mg at 12/10/19 1232  •  sodium chloride 0.9 % flush 10 mL, 10 mL, Intravenous, PRN, Rosales Dickerson MD  •  sodium chloride 0.9 % flush 10 mL, 10 mL, Intravenous, Q12H, Rosales Dickerson MD, 10 mL at 12/10/19 2050  •  sodium chloride 0.9 % flush 10 mL, 10 mL, Intravenous, PRN, Rosales Dickerson MD    Meds reviewed and doses adjusted for renal function    Labs:  Results from last 7 days   Lab Units 12/11/19  0631 12/09/19  0815 12/09/19  0604 12/08/19  1202 12/05/19  1907   WBC 10*3/mm3  --  6.47  --  5.30 4.88   HEMOGLOBIN g/dL  --  10.6*  --  8.2* 9.2*   HEMATOCRIT %  --  34.0*  --  26.6* 29.5*   PLATELETS 10*3/mm3  --  132*  --  133* 179   SODIUM mmol/L 128*  --  132* 132* 133*   POTASSIUM mmol/L 5.5*  --  3.2* 3.2* 3.1*   CHLORIDE mmol/L 94*  --  86* 87* 85*   CO2 mmol/L 18.0*  --  31.0* 31.0* 33.8*   BUN mg/dL 13  --  14 10 6*   CREATININE mg/dL 2.77*  --  3.10* 2.53* 2.05*   GLUCOSE mg/dL 115*  --  102* 275* 330*   CALCIUM mg/dL 9.2  --  8.7 9.1 9.0             Radiology:  Imaging Results (Last 72 Hours)     Procedure Component Value Units Date/Time    CT Angiogram Chest With & Without Contrast [[id number]] Collected:  12/08/19 1846     Updated:  12/09/19 2247    Narrative:       EXAMINATION: CT ANGIOGRAM CHEST W/WO CONTRAST - 12/08/2019      INDICATION:  J18.1-Lobar pneumonia, unspecified organism; N18.6-End  stage renal disease.     TECHNIQUE: Spiral acquisition 3 mm post IV contrast images  through the  chest with coronal and sagittal 10 mm 2D reconstructions.     The radiation dose reduction device was turned on for each scan per the  ALARA (As Low as Reasonably Achievable) protocol.     COMPARISON: 11/09/2019 unenhanced chest CT scan.     FINDINGS: Previous exam report indicates extensive pulmonary disease,  right greater than left. Today's study shows a new but small left  pleural effusion and minimal if any right effusion. There is no  pericardial effusion or significant mediastinal adenopathy. There is  good contrast opacification of the pulmonary arteries and no visible  filling defects to suggest pulmonary embolic disease.     Lung window images show interval clearing of most of the left lung since  the prior study except for a small area of discoid atelectasis. There is  increasingly dense interstitial and airspace disease practically  throughout the right lung, presumably pneumonia. There is no obvious  underlying airway obstruction.     Included images of the upper abdomen show no significant abnormalities  of the visualized portions of the liver, spleen, pancreatic tail, or  adrenal glands. Kidneys are noted to be atrophic. There is extensive  diffuse vascular calcification.       Impression:       1. No evidence of pulmonary embolus.  2. New but small left pleural effusion. Left lung otherwise appears  clear  3. Extensive and diffuse right lung interstitial and airspace disease,  presumably pneumonia.     DICTATED:   12/08/2019  EDITED/ls :   12/08/2019      This report was finalized on 12/9/2019 10:44 PM by DR. Rosales Martel MD.       XR Chest 1 View [[id number]] Collected:  12/08/19 1215     Updated:  12/08/19 2318    Narrative:          EXAMINATION: XR CHEST 1 VW - 12/08/2019     INDICATION: Shortness of air.     COMPARISON: 11/22/2019     FINDINGS: There is persistent patchy opacity of the right mid and lower  lung, with increasing volume loss, whether due to generalized  atelectasis or  pneumonia. Lungs elsewhere appear clear except for mild  interstitial changes, perhaps early changes of interstitial edema. There  are probably very small effusions. Heart remains enlarged.           Impression:       1. Cardiomegaly and mild pulmonary vascular congestion similar to prior  study.  2. Worsening aeration of the right mid and lower lung, whether  atelectasis or pneumonia.     DICTATED:   12/08/2019  EDITED/ls :   12/08/2019      This report was finalized on 12/8/2019 11:15 PM by DR. Rosales Martel MD.             Renal Imaging:  No radiology results for the last day        IMPRESSION:  ESRD  ANEMIA  PNEUMONIA  VOL OVERLOAD  CHF       RECOMMENDATIONS:  HD on going  Hb above 10 for now   Please note that portions of this note were completed with a voice recognition program efforts were made to add at the dictations but words may be mistranscribed.    Iggy Mccray MD  12/11/2019  9:11 AM

## 2019-12-11 NOTE — PROGRESS NOTES
[facility] Medicine Services  PROGRESS NOTE    Patient Name: Talha Cutler  : 1960  MRN: [id number]    Date of Admission: 2019  Primary Care Physician: Jeannette Godoy APRN    Subjective   Subjective     CC: SOA    HPI: No acute events overnight, patient had dialysis this morning, this afternoon he says that he is feeling much better, breathing is improved, still feels little bit tired.    Review of Systems  Gen- No fevers, chills  CV- No chest pain, palpitations  Resp- No cough, dyspnea  GI- No N/V/D, abd pain    All systems reviewed and are negative except as mentioned in HPI above  Objective   Objective     Vital Signs:   Temp:  [97.5 °F (36.4 °C)-98 °F (36.7 °C)] 98 °F (36.7 °C)  Heart Rate:  [] 107  Resp:  [18-20] 20  BP: (104-131)/(58-87) 122/87        Physical Exam:  Constitutional: Chronically ill-appearing male, in no acute distress, awake, alert  HENT: NCAT, mucous membranes moist  Respiratory: Clear to auscultation bilaterally, respiratory effort normal   Cardiovascular: RRR, no murmurs, rubs, or gallops, palpable pedal pulses bilaterally  Gastrointestinal: Positive bowel sounds, soft, nontender, nondistended  Musculoskeletal: No bilateral ankle edema  Psychiatric: Appropriate affect, cooperative  Neurologic: Oriented x 3, no focal deficits  Skin: No rashes    Results Reviewed:    Results from last 7 days   Lab Units 19  0631 19  0815 19  0619  12019  1907   WBC 10*3/mm3  --  6.47  --  5.30 4.88   HEMOGLOBIN g/dL  --  10.6*  --  8.2* 9.2*   HEMATOCRIT %  --  34.0*  --  26.6* 29.5*   PLATELETS 10*3/mm3  --  132*  --  133* 179   PROCALCITONIN ng/mL 0.46*  --  0.41* 0.44*  --      Results from last 7 days   Lab Units 19  0631 19  0604 19  12019  1907   SODIUM mmol/L 128* 132* 132* 133*   POTASSIUM mmol/L 5.5* 3.2* 3.2* 3.1*   CHLORIDE mmol/L 94* 86* 87* 85*   CO2 mmol/L 18.0* 31.0* 31.0* 33.8*    BUN mg/dL 13 14 10 6*   CREATININE mg/dL 2.77* 3.10* 2.53* 2.05*   GLUCOSE mg/dL 115* 102* 275* 330*   CALCIUM mg/dL 9.2 8.7 9.1 9.0   ALT (SGPT) U/L  --   --  23 45*   AST (SGOT) U/L  --   --  18 28   TROPONIN T ng/mL  --   --  0.298*  --    PROBNP pg/mL  --   --  >70,000.0*  --      Estimated Creatinine Clearance: 23.7 mL/min (A) (by C-G formula based on SCr of 2.77 mg/dL (H)).    Microbiology Results Abnormal     Procedure Component Value - Date/Time    Blood Culture - Blood, Arm, Left [[id number]] Collected:  12/08/19 1202    Lab Status:  Preliminary result Specimen:  Blood from Arm, Left Updated:  12/11/19 1245     Blood Culture No growth at 3 days    Blood Culture - Blood, Wrist, Left [[id number]] Collected:  12/08/19 1202    Lab Status:  Preliminary result Specimen:  Blood from Wrist, Left Updated:  12/11/19 1245     Blood Culture No growth at 3 days    MRSA Screen, PCR - Swab, Nares [[id number]]  (Normal) Collected:  12/08/19 2102    Lab Status:  Final result Specimen:  Swab from Nares Updated:  12/08/19 2214     MRSA PCR Negative    Narrative:       MRSA Negative          Imaging Results (Last 24 Hours)     ** No results found for the last 24 hours. **          Results for orders placed during the hospital encounter of 11/01/19   Adult Transthoracic Echo Complete W/ Cont if Necessary Per Protocol    Narrative · The left ventricular cavity is moderate-to-severely dilated.  · Right ventricular cavity is mildly dilated.  · Left atrial cavity size is moderately dilated.  · Moderate mitral valve regurgitation is present  · There is a small (<1cm) pericardial effusion.          I have reviewed the medications:  Scheduled Meds:    aspirin 81 mg Oral Daily   b complex-vitamin c-folic acid 1 tablet Oral Daily   bisacodyl 10 mg Rectal Once   castor oil-balsam peru  Topical Q12H   clopidogrel 75 mg Oral Daily   docusate sodium 100 mg Oral BID   folic acid 1 mg Oral Daily   guaiFENesin 600 mg Oral Q12H   heparin  (porcine) 5,000 Units Subcutaneous Q8H   insulin lispro 0-7 Units Subcutaneous 4x Daily With Meals & Nightly   ipratropium-albuterol 3 mL Nebulization 4x Daily - RT   levETIRAcetam 500 mg Oral BID   levothyroxine 125 mcg Oral Q AM   metoprolol succinate XL 50 mg Oral Daily   pantoprazole 40 mg Intravenous BID AC   piperacillin-tazobactam 3.375 g Intravenous Q12H   polyethylene glycol 17 g Oral Daily   povidone-iodine  Topical Daily   saccharomyces boulardii 250 mg Oral BID   sodium chloride 10 mL Intravenous Q12H     Continuous Infusions:    dextrose 5 % and sodium chloride 0.45 % 75 mL/hr Last Rate: 75 mL/hr (12/11/19 0409)     PRN Meds:.dextrose  •  dextrose  •  glucagon (human recombinant)  •  HYDROcodone-acetaminophen  •  ipratropium-albuterol  •  ondansetron **OR** ondansetron  •  simethicone  •  sodium chloride  •  sodium chloride      Assessment/Plan   Assessment / Plan     Active Hospital Problems    Diagnosis  POA   • **Pneumonia of right lower lobe due to infectious organism (CMS/[facility]) [J18.1]  Yes   • Seizure (CMS/[facility]) [R56.9]  Yes   • Chronic systolic CHF (congestive heart failure) (CMS/[facility]) [I50.22]  Yes   • History of CVA (cerebrovascular accident) [Z86.73]  Not Applicable   • Hypothyroidism (acquired) [E03.9]  Yes   • Peripheral vascular disease (CMS/[facility]) [I73.9]  Yes   • End stage renal disease on dialysis (CMS/[facility]) [N18.6, Z99.2]  Not Applicable   • Recurrent right pleural effusion [J90]  Yes   • Chest pain [R07.9]  Yes   • Diabetes mellitus (CMS/[facility]) [E11.9]  Yes   • Essential hypertension [I10]  Yes      Resolved Hospital Problems   No resolved problems to display.        Brief Hospital Course to date:  Talha Cutler is a 59 y.o. male with past medical history of hypothyroidism, prior CVA, ESRD on HD, type 2 diabetes, hypertension.  Patient presented with shortness of breath admitted with volume overload and pneumonia.     Acute respiratory failure with hypoxia  -Etiology likely multifactorial  sec to volume overload and HCAP  -CTA chest and BLE duplex negative for DVT/PE  -Continue Zosyn for pneumonia, plan to switch to Augmentin at discharge  -Continue to wean O2 as able     ESRD  -Currently with volume overload, continue HD on MWF schedule  -Renal consulted and following     New systolic heart failure  -ischemic evaluation deferred on prior visits until outpatient follow-up  -continue beta-blocker     Hypothyroidism- TSH currently at baseline continue home Synthroid     Hypertension-BP currently appropriate continue to monitor     Elevated troponin  -this was likely supply demand mismatch secondary to dyspnea  -No planned intervention     Recent GI bleed/gastritis/telangiectasias  -H&H remains stable, improved from previous  -Continue IV PPI     Poorly controlled type 2 diabetes, A1c 8.9%  -24-hour fingersticks reviewed and are appropriate  -Continue basal/bolus insulin, adjust as warranted.     History of ?recent seizures,   -patient with episode diminished responsiveness on 12/9, since resolved  -neurology consulted, doubt patient had a seizure event  -recommend to continue Keppra at current dose     History of prior CVA-with residual left-sided weakness     DVT Prophylaxis: SQH     Disposition: TBD    CODE STATUS:   Code Status and Medical Interventions:   Ordered at: 12/08/19 1440     Level Of Support Discussed With:    Patient     Code Status:    CPR     Medical Interventions (Level of Support Prior to Arrest):    Full       Electronically signed by Anand Alvarez MD, 12/11/19, 2:09 PM.

## 2019-12-11 NOTE — PLAN OF CARE
Problem: Patient Care Overview  Goal: Plan of Care Review  Outcome: Ongoing (interventions implemented as appropriate)  Flowsheets  Taken 12/10/2019 2200 by Torie Chowdhury, Nursing Student  Plan of Care Reviewed With: patient  Taken 12/11/2019 0321 by Vivienne Garcia RN  Outcome Summary: VSS, Patient states he still does not feel very well and doesn't feel like eating much. HD in the morning.

## 2019-12-12 PROBLEM — J96.21 ACUTE ON CHRONIC RESPIRATORY FAILURE WITH HYPOXIA (HCC): Status: ACTIVE | Noted: 2019-01-01

## 2019-12-12 PROBLEM — I50.23 ACUTE ON CHRONIC SYSTOLIC CHF (CONGESTIVE HEART FAILURE) (HCC): Status: ACTIVE | Noted: 2019-01-01

## 2019-12-12 NOTE — PROGRESS NOTES
Case Management Discharge Note      Final Note: Notified Jessenia at [facility] of pt's DC today and faxed ELVA to office.  Also faxed DC summary to [facility].      Provided post acute provider list?: Yes  Post Acute Provider Lists: Home Health  Post Acuite Provider List: Delivered  Delivered To: Patient  Method of Delivery: In person    Destination      No service has been selected for the patient.      Durable Medical Equipment      No service has been selected for the patient.      Dialysis/Infusion - Selection Complete      Service Provider Request Status Selected Services Address Phone Number Fax Number    [facility] [address] [facility] [address] [phone] --      Home Medical Care - Selection Complete      Service Provider Request Status Selected Services Address Phone Number Fax Number    [facility] [place] Selected Home Health Services [address] [place] [address] [address] [phone]      Therapy      No service has been selected for the patient.      Community Resources      No service has been selected for the patient.             Final Discharge Disposition Code: 06 - home with home health care

## 2019-12-12 NOTE — NURSING NOTE
59 year old male with ESRD admitted with PNA. Clearly volume overloaded. Patient undergoing HD MWF and is followed by Nephrology.   Will follow up as needed.

## 2019-12-12 NOTE — DISCHARGE PLACEMENT REQUEST
"Talha Storm (59 y.o. 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)   Code Status:  CPR    Ht:  172.7 cm (68\")   Wt:  59 kg (130 lb)    Admission Cmt:  None   Principal Problem:  Acute on chronic systolic CHF (congestive heart failure) (CMS/[facility]) [I50.23]                 Active Insurance as of 2019     Primary Coverage     Payor Plan Insurance Group Employer/Plan Group    HUMANA MEDICARE REPLACEMENT HUMANA MEDICARE REPLACEMENT [id number]     Payor Plan Address Payor Plan Phone Number Payor Plan Fax Number Effective Dates    [address] [address] [phone]  2014 - None Entered    [facility] [id number]       Subscriber Name Subscriber Birth Date Member ID       TALHA STORM 1960 [id number]                 Emergency Contacts      (Rel.) Home Phone Work Phone Mobile Phone    Mallika Storm (Spouse) [phone] -- --    Cisco Orozco (Brother) [phone] -- --    HeJesseniae (Sister) -- -- [phone]                       History & Physical      Rosales Dickerson MD at 19 1442              [facility] Medicine Services  HISTORY AND PHYSICAL    Patient Name: Talha Storm  : 1960  MRN: [id number]  Primary Care Physician: Jeannette Godoy APRN  Date of admission: 2019      Subjective   Subjective     Chief Complaint:  Dyspnea     HPI:  Talha Storm is a 59 y.o. male recently " discharged from our facility last month for HCAP, GIB, and new SHF, here with progressive dyspnea over the past week. Feels like he can't catch his breath. Cough, but difficulty producing sputum. Chills, but no f/sweats. Now notes intermittent pressure/sharp R chest pain, worse with deep inspiration. Notes increased B LE edema, but markedly worse in R LE with pain in calf. Notes anorexia, nausea, and dry heaves. No BM in 6 days. Anuric. Notes redness in R calf today as well. No palpitations or dizziness. No new numbness/weakness, chronic L weakness from prior CVA.    Review of Systems   Constitutional: Positive for activity change, appetite change, chills and fatigue. Negative for diaphoresis and fever.   HENT: Positive for congestion. Negative for trouble swallowing.    Eyes: Negative.    Respiratory: Positive for cough, chest tightness and shortness of breath. Negative for apnea, choking and wheezing.    Cardiovascular: Positive for chest pain and leg swelling. Negative for palpitations.   Gastrointestinal: Positive for constipation, nausea and vomiting.   Endocrine: Positive for cold intolerance.   Genitourinary: Negative.    Musculoskeletal: Positive for back pain.   Skin: Positive for color change.   Neurological: Positive for weakness.   Hematological: Negative.    Psychiatric/Behavioral: Positive for dysphoric mood.      All other systems reviewed and are negative.     Personal History     Past Medical History:   Diagnosis Date   • Abdominal pain    • Anemia    • Cataracts, bilateral    • CHF (congestive heart failure) (CMS/[facility])    • Chronic kidney disease    • Constipation    • Cough    • Dependence on nocturnal oxygen therapy    • Diabetes mellitus (CMS/[facility])    • Diarrhea    • End stage renal failure on dialysis (CMS/[facility])     SILVINO AVITIA, SAT   • GERD (gastroesophageal reflux disease)    • H/O blood clots     LEG/NECK   • H/O chest x-ray 03/25/2016    Interval decrease in size of the patients right pleural  "effusion with a persistent small left effusion as well   • History of transfusion    • Hypertension    • Left-sided weakness    • Nauseated     DRY HEAVES   • Pleural effusion    • Pneumonia    • Pneumonia    • Renal failure    • Seizures (CMS/HCC)    • Stroke (CMS/HCC) 2006    LEFT SIDED WEAKNESS   • Swelling    • Teeth missing    • Tinnitus    • Ulcer, stomach peptic     HISTORY OF ULCER AS A TEENAGER   • Wears glasses        Past Surgical History:   Procedure Laterality Date   • ARTERIOVENOUS FISTULA Right    • BRONCHOSCOPY N/A 1/10/2019    Procedure: BRONCHOSCOPY with MAC and Flouro. LAVAGE, BRUSHINGS AND BIOPSY;  Surgeon: Ean Hernandez MD;  Location: [facility] OR;  Service: Pulmonary   • CARDIAC CATHETERIZATION N/A 3/28/2018    Procedure: Peripheral angiography;  Surgeon: Caly Ruano MD;  Location: [facility] CATH INVASIVE LOCATION;  Service: Cardiovascular   • CARDIAC CATHETERIZATION N/A 3/28/2018    Procedure: Angioplasty-peripheral;  Surgeon: Clay Ruano MD;  Location: [facility] CATH INVASIVE LOCATION;  Service: Cardiovascular   • CARDIAC CATHETERIZATION N/A 3/28/2018    Procedure: Atherectomy-peripheral;  Surgeon: Clay Ruano MD;  Location: [facility] CATH INVASIVE LOCATION;  Service: Cardiovascular   • CATARACT EXTRACTION, BILATERAL     • CHOLECYSTECTOMY     • COLONOSCOPY     • ENDOSCOPY N/A 11/19/2019    Procedure: ESOPHAGOGASTRODUODENOSCOPY;  Surgeon: Iban Santana MD;  Location: [facility] ENDOSCOPY;  Service: Gastroenterology   • EYE SURGERY      BLEEDING BEHING BILATERAL EYES   • INTERVENTIONAL RADIOLOGY PROCEDURE N/A 3/28/2018    Procedure: Abdominal Aortogram;  Surgeon: Clay Ruano MD;  Location: [facility] CATH INVASIVE LOCATION;  Service: Cardiovascular   • PORTACATH PLACEMENT     • SHOULDER MANIPULATION Left     \"FROZEN SHOULDER\"   • VENOUS ACCESS DEVICE (PORT) REMOVAL         Family History: family history includes COPD in his mother; Diabetes in his father, " paternal grandmother, and sister; Hypertension in his brother and sister. Otherwise pertinent FHx was reviewed and unremarkable.     Social History:  reports that he has never smoked. He has never used smokeless tobacco. He reports that he does not drink alcohol or use drugs.  Social History     Social History Narrative   • Not on file       Medications:  Available home medication information reviewed.    (Not in a hospital admission)    Allergies   Allergen Reactions   • Erythromycin Hives       Objective   Objective     Vital Signs:   Temp:  [97.6 °F (36.4 °C)] 97.6 °F (36.4 °C)  Heart Rate:  [89-95] 89  Resp:  [18] 18  BP: (104-116)/(52-64) 104/52        Physical Exam   NAD, alert and oriented, x 3, family at bedside  OP clear, MMM  PERRL, face symmetric and speech clear  Neck supple, No LAD  Tachy  Decreased BS on R, with rales, diminished L base otherwise clear on L  +BS, ND, NT, soft  B LE edema R greater than L, R calf TTP, with mild redness  VILLA, but weak on L, speech clear  Normal affect    Results Reviewed:  I have personally reviewed current lab and radiology data.    Results from last 7 days   Lab Units 12/08/19  1202   WBC 10*3/mm3 5.30   HEMOGLOBIN g/dL 8.2*   HEMATOCRIT % 26.6*   PLATELETS 10*3/mm3 133*     Results from last 7 days   Lab Units 12/08/19  1202   SODIUM mmol/L 132*   POTASSIUM mmol/L 3.2*   CHLORIDE mmol/L 87*   CO2 mmol/L 31.0*   BUN mg/dL 10   CREATININE mg/dL 2.53*   GLUCOSE mg/dL 275*   CALCIUM mg/dL 9.1   ALT (SGPT) U/L 23   AST (SGOT) U/L 18   TROPONIN T ng/mL 0.298*   PROBNP pg/mL >70,000.0*   LACTATE mmol/L 0.9   PROCALCITONIN ng/mL 0.44*     Estimated Creatinine Clearance: 25.9 mL/min (A) (by C-G formula based on SCr of 2.53 mg/dL (H)).  Brief Urine Lab Results     None        Imaging Results (Last 24 Hours)     Procedure Component Value Units Date/Time    XR Chest 1 View [[id number]] Collected:  12/08/19 1215     Updated:  12/08/19 1417    Narrative:          EXAMINATION: XR  CHEST 1 VW - 12/08/2019     INDICATION: Shortness of air.     COMPARISON: 11/22/2019     FINDINGS: There is persistent patchy opacity of the right mid and lower  lung, with increasing volume loss, whether due to generalized  atelectasis or pneumonia. Lungs elsewhere appear clear except for mild  interstitial changes, perhaps early changes of interstitial edema. There  are probably very small effusions. Heart remains enlarged.           Impression:       1. Cardiomegaly and mild pulmonary vascular congestion similar to prior  study.  2. Worsening aeration of the right mid and lower lung, whether  atelectasis or pneumonia.     DICTATED:   12/08/2019  EDITED/ls :   12/08/2019            Results for orders placed during the hospital encounter of 11/01/19   Adult Transthoracic Echo Complete W/ Cont if Necessary Per Protocol    Narrative · The left ventricular cavity is moderate-to-severely dilated.  · Right ventricular cavity is mildly dilated.  · Left atrial cavity size is moderately dilated.  · Moderate mitral valve regurgitation is present  · There is a small (<1cm) pericardial effusion.          Assessment/Plan   Assessment / Plan     Active Hospital Problems    Diagnosis POA   • **Pneumonia of right lower lobe due to infectious organism (CMS/[facility]) [J18.1] Yes   • Seizure (CMS/[facility]) [R56.9] Yes   • Chronic systolic CHF (congestive heart failure) (CMS/[facility]) [I50.22] Yes   • History of CVA (cerebrovascular accident) [Z86.73] Not Applicable   • Hypothyroidism (acquired) [E03.9] Yes   • Peripheral vascular disease (CMS/[facility]) [I73.9] Yes     Added automatically from request for surgery [id number]     • End stage renal disease on dialysis (CMS/[facility]) [N18.6, Z99.2] Not Applicable   • Recurrent right pleural effusion [J90] Yes   • Chest pain [R07.9] Yes   • Diabetes mellitus (CMS/[facility]) [E11.9] Yes   • Essential hypertension [I10] Yes     Dyspnea/progressive SOA and hypoxia  --multifactorial, worrisome for HCAP  --continues to appear  volume overload, needs HD/UF as tolerated  --probable component of A/C SHF  --now with R pleurisy/calf pain, checked duplex/CTA, no DVT/PE    ESRD  --HD    New SHF  --ischemic evaluation deferred on prior visit until outpatient follow up  --BB    Troponin elevation  --likely demand ischemia due to hypoxia/dyspnea, in setting of ESRD  --not an NSTEMI    Recent treatment for seizures  --Keppra    Recent GI bleed/gastritis/D2 telangiectasis  --IV PPI    DM  --basal/bolus insulin    Hx of CVA    Hypothyroidism  --synthroid    Hx of PVD    Hx of HTN  --home meds as tolerated    DVT prophylaxis:  LORA, pending duplex/CTA    CODE STATUS:    Code Status and Medical Interventions:   Ordered at: 19 1440     Level Of Support Discussed With:    Patient     Code Status:    CPR     Medical Interventions (Level of Support Prior to Arrest):    Full       Admission Status:  I believe this patient meets inpatient criteria, given the need for more than 2 midnights.  Electronically signed by Rosales Dickerson MD, 19, 2:42 PM.      Electronically signed by Rosales Dickerson MD at 19 2146          Discharge Summary      Inez Raines PA-C at 19 1244     Attestation signed by Anand Alvarez MD at 19 1414      Attending Cosignature     I supervised care of the patient on day of service with direct care provided by the advanced care provider (APC).    Electronically signed by Anand Alvarez MD, 19, 2:13 PM.                         [facility] Medicine Services  DISCHARGE SUMMARY    Patient Name: Talha Cutler  : 1960  MRN: [id number]    Date of Admission: 2019 11:19 AM  Date of Discharge: 2019  Primary Care Physician: Jeannette Godoy APRN    Consults     Date and Time Order Name Status Description    2019 0520 Inpatient Neurology Consult Other (see comments) (seizure)      2019 1444 Inpatient Nephrology Consult Completed     2019 1202  Inpatient Gastroenterology Consult Completed     11/18/2019 0841 Inpatient Nephrology Consult Completed     11/11/2019 0030 Inpatient Cardiology Consult Completed     11/6/2019 2354 Inpatient Neurology Consult General Completed         Hospital Course     Presenting Problem:   Pneumonia of right lower lobe due to infectious organism (CMS/[facility]) [J18.1]    Active Hospital Problems    Diagnosis  POA   • **Acute on chronic systolic CHF (congestive heart failure) (CMS/[facility]) [I50.23]  Yes   • Acute on chronic respiratory failure with hypoxia (CMS/[facility]) [J96.21]  Yes   • HCAP (healthcare-associated pneumonia) [J18.9]  Yes   • Seizure (CMS/[facility]) [R56.9]  Yes   • History of CVA (cerebrovascular accident) [Z86.73]  Not Applicable   • Hypothyroidism [E03.9]  Yes   • Pneumonia of right lower lobe due to infectious organism (CMS/[facility]) [J18.1]  Yes   • Peripheral vascular disease (CMS/[facility]) [I73.9]  Yes   • End stage renal disease on dialysis (CMS/[facility]) [N18.6, Z99.2]  Not Applicable   • Uncontrolled diabetes mellitus with hyperglycemia, with long-term current use of insulin (CMS/[facility]) [E11.65, Z79.4]  Not Applicable   • Hypertension due to end stage renal disease caused by type 2 diabetes mellitus, on dialysis (CMS/[facility]) [E11.22, I12.0, Z99.2, N18.6]  Not Applicable   • Recurrent right pleural effusion [J90]  Yes   • Essential hypertension [I10]  Yes      Resolved Hospital Problems    Diagnosis Date Resolved POA   • Chest pain [R07.9] 12/12/2019 Yes      Hospital Course:  Mr. Talha Cutler is a 59yoM with PMH significant for ESRD on MWF HD, DMII, prior CVA with residual left-sided hemiparesis, chronic systolic CHF with EF 25%. HE was admitted to [facility]11[facility]16[facility]2019 for HCAP (requiring mechanical ventilation. Respiratory PCR positive for metapneumovirus), anemia and seizures. He was found to have EF reduced to 25%. Cardiology recommended outpatient ischemic evaluation with his primary cardiologist in [place].  He left  "[facility].     He returned to [facility] 11/18/2019 with complaints of dizziness and weakness. Hgb 6.2, AST 1590, . He was transfused 2 units PRBCS. GI performed EGD on 11/19 which showed gastritis and a bleeding telangiectasia (s/p APC). He was placed on BID PPI. He was also noted to have fatty liver disease. Rehab was recommended but he declined.     He returned to [facility] ED on 12/8/19 with complaints of progressive dyspnea over the week prior to presentation. Cough but difficulty bringing up sputum. Chills but no fevers or sweats. Sharp, intermittent chest pain/pressure. Increasing BLE, markedly worse in RLE, now with redness. No BM in a week. He was admitted to the hospital medicine service for acute systolic CHF exacerbation and community acquired pneumonia.     He was diuresed via HD with assistance of nephrology.   He was treated for HCAP with Vanc/Zosyn, later transitioned to IV Zosyn monotherapy.   Neurology was consulted due to episodes of diminished responsiveness. These were not felt to be seizures and home Keppra was kept at his home dose.     Mr. Cutler has improved. He will discharge home with home health instable condition on 12/12/2019.   Continue MWF HD schedule  Follow up with [facility] on 1/14/2020 @ 3:00pm    Day of Discharge     HPI:   Sitting up in bed. No new issues. He is feeling well and wants to go home. Son and wife at bedside. % on room air during exam. Says he feels \"back to normal.\"     Review of Systems  Gen- No fevers, chills  CV- No chest pain, palpitations  Resp- No cough, dyspnea  GI- No N/V/D, abd pain    Otherwise ROS is negative except as mentioned in the HPI.    Vital Signs:   Temp:  [97.8 °F (36.6 °C)-98.1 °F (36.7 °C)] 98.1 °F (36.7 °C)  Heart Rate:  [] 94  Resp:  [18-20] 20  BP: (108-120)/(63-68) 120/68     Physical Exam:  Constitutional: No acute distress, awake, alert and conversant. Sitting upright in bed.   HENT: NCAT, mucous " membranes moist  Respiratory: Diffuse adventicious sounds in right-lung (per patient, was told he has a lot of scar tissue on the right side), respiratory effort normal. Sats % on room air   Cardiovascular: RRR, no murmurs, rubs, or gallops, palpable pedal pulses bilaterally  Gastrointestinal: Positive bowel sounds, soft, nontender, nondistended  Musculoskeletal: No bilateral ankle edema  Psychiatric: Appropriate affect, cooperative  Neurologic: Oriented x 3, strength symmetric in all extremities, Cranial Nerves grossly intact to confrontation, speech clear  Skin: No rashes    Pertinent  and/or Most Recent Results     Results from last 7 days   Lab Units 12/12/19  0413 12/11/19  0631 12/09/19  0815 12/09/19  0604 12/08/19  1202 12/05/19  1907   WBC 10*3/mm3  --   --  6.47  --  5.30 4.88   HEMOGLOBIN g/dL  --   --  10.6*  --  8.2* 9.2*   HEMATOCRIT %  --   --  34.0*  --  26.6* 29.5*   PLATELETS 10*3/mm3  --   --  132*  --  133* 179   SODIUM mmol/L 132* 128*  --  132* 132* 133*   POTASSIUM mmol/L 4.7 5.5*  --  3.2* 3.2* 3.1*   CHLORIDE mmol/L 99 94*  --  86* 87* 85*   CO2 mmol/L 19.0* 18.0*  --  31.0* 31.0* 33.8*   BUN mg/dL 9 13  --  14 10 6*   CREATININE mg/dL 2.11* 2.77*  --  3.10* 2.53* 2.05*   GLUCOSE mg/dL 165* 115*  --  102* 275* 330*   CALCIUM mg/dL 8.9 9.2  --  8.7 9.1 9.0     Results from last 7 days   Lab Units 12/08/19  1202 12/05/19  1907   BILIRUBIN mg/dL 0.3 0.3   ALK PHOS U/L 95 114   ALT (SGPT) U/L 23 45*   AST (SGOT) U/L 18 28     Results from last 7 days   Lab Units 12/11/19  0631 12/09/19  0604 12/08/19  1202   CORTISOL mcg/dL  --  18.04  --    PROBNP pg/mL  --   --  >70,000.0*   TROPONIN T ng/mL  --   --  0.298*   PROCALCITONIN ng/mL 0.46* 0.41* 0.44*   LACTATE mmol/L  --   --  0.9     Brief Urine Lab Results     None        Microbiology Results Abnormal     Procedure Component Value - Date/Time    Blood Culture - Blood, Arm, Left [[id number]] Collected:  12/08/19 1202    Lab Status:   Preliminary result Specimen:  Blood from Arm, Left Updated:  12/12/19 1245     Blood Culture No growth at 4 days    Blood Culture - Blood, Wrist, Left [[id number]] Collected:  12/08/19 1202    Lab Status:  Preliminary result Specimen:  Blood from Wrist, Left Updated:  12/12/19 1245     Blood Culture No growth at 4 days    MRSA Screen, PCR - Swab, Nares [[id number]]  (Normal) Collected:  12/08/19 2102    Lab Status:  Final result Specimen:  Swab from Nares Updated:  12/08/19 2214     MRSA PCR Negative    Narrative:       MRSA Negative        Imaging Results (All)     Procedure Component Value Units Date/Time    CT Angiogram Chest With & Without Contrast [[id number]] Collected:  12/08/19 1846     Updated:  12/09/19 2247    Narrative:       EXAMINATION: CT ANGIOGRAM CHEST W/WO CONTRAST - 12/08/2019      INDICATION:  J18.1-Lobar pneumonia, unspecified organism; N18.6-End  stage renal disease.     TECHNIQUE: Spiral acquisition 3 mm post IV contrast images through the  chest with coronal and sagittal 10 mm 2D reconstructions.     The radiation dose reduction device was turned on for each scan per the  ALARA (As Low as Reasonably Achievable) protocol.     COMPARISON: 11/09/2019 unenhanced chest CT scan.     FINDINGS: Previous exam report indicates extensive pulmonary disease,  right greater than left. Today's study shows a new but small left  pleural effusion and minimal if any right effusion. There is no  pericardial effusion or significant mediastinal adenopathy. There is  good contrast opacification of the pulmonary arteries and no visible  filling defects to suggest pulmonary embolic disease.     Lung window images show interval clearing of most of the left lung since  the prior study except for a small area of discoid atelectasis. There is  increasingly dense interstitial and airspace disease practically  throughout the right lung, presumably pneumonia. There is no obvious  underlying airway obstruction.     Included  images of the upper abdomen show no significant abnormalities  of the visualized portions of the liver, spleen, pancreatic tail, or  adrenal glands. Kidneys are noted to be atrophic. There is extensive  diffuse vascular calcification.       Impression:       1. No evidence of pulmonary embolus.  2. New but small left pleural effusion. Left lung otherwise appears  clear  3. Extensive and diffuse right lung interstitial and airspace disease,  presumably pneumonia.     DICTATED:   12/08/2019  EDITED/ls :   12/08/2019      This report was finalized on 12/9/2019 10:44 PM by DR. Rosales Martel MD.       XR Chest 1 View [[id number]] Collected:  12/08/19 1215     Updated:  12/08/19 2318    Narrative:          EXAMINATION: XR CHEST 1 VW - 12/08/2019     INDICATION: Shortness of air.     COMPARISON: 11/22/2019     FINDINGS: There is persistent patchy opacity of the right mid and lower  lung, with increasing volume loss, whether due to generalized  atelectasis or pneumonia. Lungs elsewhere appear clear except for mild  interstitial changes, perhaps early changes of interstitial edema. There  are probably very small effusions. Heart remains enlarged.           Impression:       1. Cardiomegaly and mild pulmonary vascular congestion similar to prior  study.  2. Worsening aeration of the right mid and lower lung, whether  atelectasis or pneumonia.     DICTATED:   12/08/2019  EDITED/ls :   12/08/2019      This report was finalized on 12/8/2019 11:15 PM by DR. Rosales Martel MD.           Results for orders placed during the hospital encounter of 12/08/19   Duplex Venous Lower Extremity - Bilateral CAR    Narrative · The bilateral lower extremity venous duplex scan is negative for   evidence of a DVT.        Results for orders placed during the hospital encounter of 11/01/19   Adult Transthoracic Echo Complete W/ Cont if Necessary Per Protocol    Narrative · The left ventricular cavity is moderate-to-severely dilated.  · Right ventricular  cavity is mildly dilated.  · Left atrial cavity size is moderately dilated.  · Moderate mitral valve regurgitation is present  · There is a small (<1cm) pericardial effusion.         Order Current Status    Blood Culture - Blood, Arm, Left Preliminary result    Blood Culture - Blood, Wrist, Left Preliminary result        Discharge Details        Discharge Medications      New Medications      Instructions Start Date   amoxicillin-clavulanate 875-125 MG per tablet  Commonly known as:  AUGMENTIN   1 tablet, Oral, 2 Times Daily      castor oil-balsam peru ointment   Apply to pressure injury wound on left buttock every 12 hours      guaiFENesin 600 MG 12 hr tablet  Commonly known as:  MUCINEX   600 mg, Oral, Every 12 Hours Scheduled      polyethylene glycol pack packet  Commonly known as:  MIRALAX   17 g, Oral, Daily, May take a second dose daily as needed for constipation   Start Date:  December 13, 2019     povidone-iodine 10 % external solution  Commonly known as:  BETADINE   Clean R foot wounds with sterile water, paint with povidone-iodine solution. Leave open to air or wrap with gauze. Change dressing daily      saccharomyces boulardii 250 MG capsule  Commonly known as:  FLORASTOR   250 mg, Oral, 2 Times Daily         Changes to Medications      Instructions Start Date   pantoprazole 40 MG EC tablet  Commonly known as:  PROTONIX  What changed:    · when to take this  · additional instructions   40 mg, Oral, 2 Times Daily Before Meals, x 30 days, then daily thereafter         Continue These Medications      Instructions Start Date   albuterol sulfate  (90 Base) MCG/ACT inhaler  Commonly known as:  PROVENTIL HFA;VENTOLIN HFA;PROAIR HFA   2 puffs, Inhalation, Every 4 Hours PRN      aspirin 81 MG chewable tablet   81 mg, Oral, Daily      clopidogrel 75 MG tablet  Commonly known as:  PLAVIX   75 mg, Oral, Daily, Start taking from 1/12/2019 onwards ONLY if not coughing up significant amount of blood.       docusate sodium 100 MG capsule  Commonly known as:  COLACE   100 mg, Oral, Every 12 Hours      fluticasone 27.5 MCG/SPRAY nasal spray  Commonly known as:  VERAMYST   2 sprays, Nasal, Daily      folic acid 1 MG tablet  Commonly known as:  FOLVITE   1 mg, Oral, Daily      insulin aspart 100 UNIT/ML solution pen-injector sc pen  Commonly known as:  novoLOG FLEXPEN   9 Units, Subcutaneous, 3 Times Daily With Meals      insulin glargine 100 UNIT/ML injection  Commonly known as:  LANTUS   15 Units, Subcutaneous, Nightly      levETIRAcetam 500 MG tablet  Commonly known as:  KEPPRA   500 mg, Oral, 2 Times Daily      levothyroxine 100 MCG tablet  Commonly known as:  SYNTHROID, LEVOTHROID   125 mcg, Oral, Daily      metoprolol succinate XL 50 MG 24 hr tablet  Commonly known as:  TOPROL-XL   50 mg, Oral, Daily, Hold this medication for blood pressure <100, HR<60      RADHA-SHIRLENE PO   1 tablet, Oral, Patient states he takes these after dialysis, but is unaware of dose.      vitamin D3 125 MCG (5000 UT) capsule capsule   1 capsule, Oral, Daily         Stop These Medications    doxycycline 100 MG capsule  Commonly known as:  MONODOX          Allergies   Allergen Reactions   • Erythromycin Hives     Discharge Disposition:  Home or Self Care    Diet:  Hospital:  Diet Order   Procedures   • Diet Regular; Consistent Carbohydrate, Daily Fluid Restriction; 1000 mL Fluid Per Day     Activity:  Activity Instructions     Activity as Tolerated             CODE STATUS:    Code Status and Medical Interventions:   Ordered at: 12/08/19 1440     Level Of Support Discussed With:    Patient     Code Status:    CPR     Medical Interventions (Level of Support Prior to Arrest):    Full     Future Appointments   Date Time Provider Department Center   1/14/2020  3:00 PM Saul Romero MD [facility] None     Additional Instructions for the Follow-ups that You Need to Schedule     Discharge Follow-up with PCP   As directed        Currently Documented PCP:    Jeannette Godoy APRN    PCP Phone Number:    [phone]     Follow Up Details:  PCP follow up in 1 week             Time Spent on Discharge: 45 minutes    Electronically signed by Inez Raines PA-C, 19, 1:08 PM.          Electronically signed by Anand Alvarez MD at 19 1414                     [address] [address]  Phone:  [phone]  Fax:          Patient:     Talha Cutler MRN:  [id number]   [address] [facility] [address] :  1960  SSN:    Phone: [phone] Sex:  M      INSURANCE PAYOR PLAN GROUP # SUBSCRIBER ID   Primary:    [employer] MEDICARE REPLACEMENT [id number]   Admitting Diagnosis: Pneumonia of right lower lobe due to infectious organism (CMS/HCC) [J18.1]  Order Date:  Dec 12, 2019         Notify [facility]       (Order ID: [id number])     Diagnosis:         Priority:  Routine Expected Date:   Expiration Date:        Interval:  Until Discontinued Count:    Comments: Resume  services as prior to hospitalization.        Specimen Type:   Specimen Source:   Specimen Taken Date:   Specimen Taken Time:                   Verbal Order Mode: Per protocol: cosign required  Authorizing Provider: Anand Alvarez MD  Authorizing Provider's NPI: [id number]     Order Entered By: Haydee Jacobson RN 2019  7:51 AM     Electronically signed by: Anand Alvarez MD 2019  2:14 PM

## 2019-12-12 NOTE — DISCHARGE SUMMARY
[facility] Medicine Services  DISCHARGE SUMMARY    Patient Name: Talha Cutler  : 1960  MRN: [id number]    Date of Admission: 2019 11:19 AM  Date of Discharge: 2019  Primary Care Physician: Jeannette Godoy APRN    Consults     Date and Time Order Name Status Description    2019 0520 Inpatient Neurology Consult Other (see comments) (seizure)      2019 1444 Inpatient Nephrology Consult Completed     2019 1202 Inpatient Gastroenterology Consult Completed     2019 0841 Inpatient Nephrology Consult Completed     2019 0030 Inpatient Cardiology Consult Completed     2019 2354 Inpatient Neurology Consult General Completed         Hospital Course     Presenting Problem:   Pneumonia of right lower lobe due to infectious organism (CMS/[facility]) [J18.1]    Active Hospital Problems    Diagnosis  POA   • **Acute on chronic systolic CHF (congestive heart failure) (CMS/[facility]) [I50.23]  Yes   • Acute on chronic respiratory failure with hypoxia (CMS/[facility]) [J96.21]  Yes   • HCAP (healthcare-associated pneumonia) [J18.9]  Yes   • Seizure (CMS/[facility]) [R56.9]  Yes   • History of CVA (cerebrovascular accident) [Z86.73]  Not Applicable   • Hypothyroidism [E03.9]  Yes   • Pneumonia of right lower lobe due to infectious organism (CMS/[facility]) [J18.1]  Yes   • Peripheral vascular disease (CMS/[facility]) [I73.9]  Yes   • End stage renal disease on dialysis (CMS/[facility]) [N18.6, Z99.2]  Not Applicable   • Uncontrolled diabetes mellitus with hyperglycemia, with long-term current use of insulin (CMS/[facility]) [E11.65, Z79.4]  Not Applicable   • Hypertension due to end stage renal disease caused by type 2 diabetes mellitus, on dialysis (CMS/[facility]) [E11.22, I12.0, Z99.2, N18.6]  Not Applicable   • Recurrent right pleural effusion [J90]  Yes   • Essential hypertension [I10]  Yes      Resolved Hospital Problems    Diagnosis Date Resolved POA   • Chest pain [R07.9] 2019 Yes      Hospital  "Course:  Mr. Talha Cutler is a 59yoM with PMH significant for ESRD on MWF HD, DMII, prior CVA with residual left-sided hemiparesis, chronic systolic CHF with EF 25%. HE was admitted to [facility] 11/6-11/16/2019 for HCAP (requiring mechanical ventilation. Respiratory PCR positive for metapneumovirus), anemia and seizures. He was found to have EF reduced to 25%. Cardiology recommended outpatient ischemic evaluation with his primary cardiologist in [place].  He left AMA.     He returned to [facility] 11/18/2019 with complaints of dizziness and weakness. Hgb 6.2, AST 1590, . He was transfused 2 units PRBCS. GI performed EGD on 11/19 which showed gastritis and a bleeding telangiectasia (s/p APC). He was placed on BID PPI. He was also noted to have fatty liver disease. Rehab was recommended but he declined.     He returned to [facility] ED on 12/8/19 with complaints of progressive dyspnea over the week prior to presentation. Cough but difficulty bringing up sputum. Chills but no fevers or sweats. Sharp, intermittent chest pain/pressure. Increasing BLE, markedly worse in RLE, now with redness. No BM in a week. He was admitted to the hospital medicine service for acute systolic CHF exacerbation and community acquired pneumonia.     He was diuresed via HD with assistance of nephrology.   He was treated for HCAP with Vanc/Zosyn, later transitioned to IV Zosyn monotherapy.   Neurology was consulted due to episodes of diminished responsiveness. These were not felt to be seizures and home Keppra was kept at his home dose.     Mr. Cutler has improved. He will discharge home with home health instable condition on 12/12/2019.   Continue MWF HD schedule  Follow up with [facility] on 1/14/2020 @ 3:00pm    Day of Discharge     HPI:   Sitting up in bed. No new issues. He is feeling well and wants to go home. Son and wife at bedside. % on room air during exam. Says he feels \"back to " "normal.\"     Review of Systems  Gen- No fevers, chills  CV- No chest pain, palpitations  Resp- No cough, dyspnea  GI- No N/V/D, abd pain    Otherwise ROS is negative except as mentioned in the HPI.    Vital Signs:   Temp:  [97.8 °F (36.6 °C)-98.1 °F (36.7 °C)] 98.1 °F (36.7 °C)  Heart Rate:  [] 94  Resp:  [18-20] 20  BP: (108-120)/(63-68) 120/68     Physical Exam:  Constitutional: No acute distress, awake, alert and conversant. Sitting upright in bed.   HENT: NCAT, mucous membranes moist  Respiratory: Diffuse adventicious sounds in right-lung (per patient, was told he has a lot of scar tissue on the right side), respiratory effort normal. Sats % on room air   Cardiovascular: RRR, no murmurs, rubs, or gallops, palpable pedal pulses bilaterally  Gastrointestinal: Positive bowel sounds, soft, nontender, nondistended  Musculoskeletal: No bilateral ankle edema  Psychiatric: Appropriate affect, cooperative  Neurologic: Oriented x 3, strength symmetric in all extremities, Cranial Nerves grossly intact to confrontation, speech clear  Skin: No rashes    Pertinent  and/or Most Recent Results     Results from last 7 days   Lab Units 12/12/19  0413 12/11/19  0631 12/09/19  0815 12/09/19  0604 12/08/19  1202 12/05/19  1907   WBC 10*3/mm3  --   --  6.47  --  5.30 4.88   HEMOGLOBIN g/dL  --   --  10.6*  --  8.2* 9.2*   HEMATOCRIT %  --   --  34.0*  --  26.6* 29.5*   PLATELETS 10*3/mm3  --   --  132*  --  133* 179   SODIUM mmol/L 132* 128*  --  132* 132* 133*   POTASSIUM mmol/L 4.7 5.5*  --  3.2* 3.2* 3.1*   CHLORIDE mmol/L 99 94*  --  86* 87* 85*   CO2 mmol/L 19.0* 18.0*  --  31.0* 31.0* 33.8*   BUN mg/dL 9 13  --  14 10 6*   CREATININE mg/dL 2.11* 2.77*  --  3.10* 2.53* 2.05*   GLUCOSE mg/dL 165* 115*  --  102* 275* 330*   CALCIUM mg/dL 8.9 9.2  --  8.7 9.1 9.0     Results from last 7 days   Lab Units 12/08/19  1202 12/05/19  1907   BILIRUBIN mg/dL 0.3 0.3   ALK PHOS U/L 95 114   ALT (SGPT) U/L 23 45*   AST (SGOT) U/L " 18 28     Results from last 7 days   Lab Units 12/11/19  0631 12/09/19  0604 12/08/19  1202   CORTISOL mcg/dL  --  18.04  --    PROBNP pg/mL  --   --  >70,000.0*   TROPONIN T ng/mL  --   --  0.298*   PROCALCITONIN ng/mL 0.46* 0.41* 0.44*   LACTATE mmol/L  --   --  0.9     Brief Urine Lab Results     None        Microbiology Results Abnormal     Procedure Component Value - Date/Time    Blood Culture - Blood, Arm, Left [[id number]] Collected:  12/08/19 1202    Lab Status:  Preliminary result Specimen:  Blood from Arm, Left Updated:  12/12/19 1245     Blood Culture No growth at 4 days    Blood Culture - Blood, Wrist, Left [[id number]] Collected:  12/08/19 1202    Lab Status:  Preliminary result Specimen:  Blood from Wrist, Left Updated:  12/12/19 1245     Blood Culture No growth at 4 days    MRSA Screen, PCR - Swab, Nares [[id number]]  (Normal) Collected:  12/08/19 2102    Lab Status:  Final result Specimen:  Swab from Nares Updated:  12/08/19 2214     MRSA PCR Negative    Narrative:       MRSA Negative        Imaging Results (All)     Procedure Component Value Units Date/Time    CT Angiogram Chest With & Without Contrast [[id number]] Collected:  12/08/19 1846     Updated:  12/09/19 2247    Narrative:       EXAMINATION: CT ANGIOGRAM CHEST W/WO CONTRAST - 12/08/2019      INDICATION:  J18.1-Lobar pneumonia, unspecified organism; N18.6-End  stage renal disease.     TECHNIQUE: Spiral acquisition 3 mm post IV contrast images through the  chest with coronal and sagittal 10 mm 2D reconstructions.     The radiation dose reduction device was turned on for each scan per the  ALARA (As Low as Reasonably Achievable) protocol.     COMPARISON: 11/09/2019 unenhanced chest CT scan.     FINDINGS: Previous exam report indicates extensive pulmonary disease,  right greater than left. Today's study shows a new but small left  pleural effusion and minimal if any right effusion. There is no  pericardial effusion or significant mediastinal  adenopathy. There is  good contrast opacification of the pulmonary arteries and no visible  filling defects to suggest pulmonary embolic disease.     Lung window images show interval clearing of most of the left lung since  the prior study except for a small area of discoid atelectasis. There is  increasingly dense interstitial and airspace disease practically  throughout the right lung, presumably pneumonia. There is no obvious  underlying airway obstruction.     Included images of the upper abdomen show no significant abnormalities  of the visualized portions of the liver, spleen, pancreatic tail, or  adrenal glands. Kidneys are noted to be atrophic. There is extensive  diffuse vascular calcification.       Impression:       1. No evidence of pulmonary embolus.  2. New but small left pleural effusion. Left lung otherwise appears  clear  3. Extensive and diffuse right lung interstitial and airspace disease,  presumably pneumonia.     DICTATED:   12/08/2019  EDITED/ls :   12/08/2019      This report was finalized on 12/9/2019 10:44 PM by DR. Rosales Martel MD.       XR Chest 1 View [[id number]] Collected:  12/08/19 1215     Updated:  12/08/19 2318    Narrative:          EXAMINATION: XR CHEST 1 VW - 12/08/2019     INDICATION: Shortness of air.     COMPARISON: 11/22/2019     FINDINGS: There is persistent patchy opacity of the right mid and lower  lung, with increasing volume loss, whether due to generalized  atelectasis or pneumonia. Lungs elsewhere appear clear except for mild  interstitial changes, perhaps early changes of interstitial edema. There  are probably very small effusions. Heart remains enlarged.           Impression:       1. Cardiomegaly and mild pulmonary vascular congestion similar to prior  study.  2. Worsening aeration of the right mid and lower lung, whether  atelectasis or pneumonia.     DICTATED:   12/08/2019  EDITED/ls :   12/08/2019      This report was finalized on 12/8/2019 11:15 PM by DR. Caba  MD [place].           Results for orders placed during the hospital encounter of 12/08/19   Duplex Venous Lower Extremity - Bilateral CAR    Narrative · The bilateral lower extremity venous duplex scan is negative for   evidence of a DVT.        Results for orders placed during the hospital encounter of 11/01/19   Adult Transthoracic Echo Complete W/ Cont if Necessary Per Protocol    Narrative · The left ventricular cavity is moderate-to-severely dilated.  · Right ventricular cavity is mildly dilated.  · Left atrial cavity size is moderately dilated.  · Moderate mitral valve regurgitation is present  · There is a small (<1cm) pericardial effusion.         Order Current Status    Blood Culture - Blood, Arm, Left Preliminary result    Blood Culture - Blood, Wrist, Left Preliminary result        Discharge Details        Discharge Medications      New Medications      Instructions Start Date   amoxicillin-clavulanate 875-125 MG per tablet  Commonly known as:  AUGMENTIN   1 tablet, Oral, 2 Times Daily      castor oil-balsam peru ointment   Apply to pressure injury wound on left buttock every 12 hours      guaiFENesin 600 MG 12 hr tablet  Commonly known as:  MUCINEX   600 mg, Oral, Every 12 Hours Scheduled      polyethylene glycol pack packet  Commonly known as:  MIRALAX   17 g, Oral, Daily, May take a second dose daily as needed for constipation   Start Date:  December 13, 2019     povidone-iodine 10 % external solution  Commonly known as:  BETADINE   Clean R foot wounds with sterile water, paint with povidone-iodine solution. Leave open to air or wrap with gauze. Change dressing daily      saccharomyces boulardii 250 MG capsule  Commonly known as:  FLORASTOR   250 mg, Oral, 2 Times Daily         Changes to Medications      Instructions Start Date   pantoprazole 40 MG EC tablet  Commonly known as:  PROTONIX  What changed:    · when to take this  · additional instructions   40 mg, Oral, 2 Times Daily Before Meals, x 30  days, then daily thereafter         Continue These Medications      Instructions Start Date   albuterol sulfate  (90 Base) MCG/ACT inhaler  Commonly known as:  PROVENTIL HFA;VENTOLIN HFA;PROAIR HFA   2 puffs, Inhalation, Every 4 Hours PRN      aspirin 81 MG chewable tablet   81 mg, Oral, Daily      clopidogrel 75 MG tablet  Commonly known as:  PLAVIX   75 mg, Oral, Daily, Start taking from 1/12/2019 onwards ONLY if not coughing up significant amount of blood.      docusate sodium 100 MG capsule  Commonly known as:  COLACE   100 mg, Oral, Every 12 Hours      fluticasone 27.5 MCG/SPRAY nasal spray  Commonly known as:  VERAMYST   2 sprays, Nasal, Daily      folic acid 1 MG tablet  Commonly known as:  FOLVITE   1 mg, Oral, Daily      insulin aspart 100 UNIT/ML solution pen-injector sc pen  Commonly known as:  novoLOG FLEXPEN   9 Units, Subcutaneous, 3 Times Daily With Meals      insulin glargine 100 UNIT/ML injection  Commonly known as:  LANTUS   15 Units, Subcutaneous, Nightly      levETIRAcetam 500 MG tablet  Commonly known as:  KEPPRA   500 mg, Oral, 2 Times Daily      levothyroxine 100 MCG tablet  Commonly known as:  SYNTHROID, LEVOTHROID   125 mcg, Oral, Daily      metoprolol succinate XL 50 MG 24 hr tablet  Commonly known as:  TOPROL-XL   50 mg, Oral, Daily, Hold this medication for blood pressure <100, HR<60      RADHA-SHIRLENE PO   1 tablet, Oral, Patient states he takes these after dialysis, but is unaware of dose.      vitamin D3 125 MCG (5000 UT) capsule capsule   1 capsule, Oral, Daily         Stop These Medications    doxycycline 100 MG capsule  Commonly known as:  MONODOX          Allergies   Allergen Reactions   • Erythromycin Hives     Discharge Disposition:  Home or Self Care    Diet:  Hospital:  Diet Order   Procedures   • Diet Regular; Consistent Carbohydrate, Daily Fluid Restriction; 1000 mL Fluid Per Day     Activity:  Activity Instructions     Activity as Tolerated             CODE STATUS:     Code Status and Medical Interventions:   Ordered at: 12/08/19 1440     Level Of Support Discussed With:    Patient     Code Status:    CPR     Medical Interventions (Level of Support Prior to Arrest):    Full     Future Appointments   Date Time Provider Department Center   1/14/2020  3:00 PM Saul Romero MD [facility] None     Additional Instructions for the Follow-ups that You Need to Schedule     Discharge Follow-up with PCP   As directed       Currently Documented PCP:    Jeannette Godoy APRN    PCP Phone Number:    [phone]     Follow Up Details:  PCP follow up in 1 week             Time Spent on Discharge: 45 minutes    Electronically signed by Inez Raines PA-C, 12/12/19, 1:08 PM.

## 2019-12-12 NOTE — PROGRESS NOTES
"NAL Progress Note  Talha Cutler  [id number]  1960 12/8/2019    Reason for visit:  ESRD    No overnight events he is okay to go home  ROS:    Pulm:  No SOA  CV:  No CP    I&O:    Intake/Output Summary (Last 24 hours) at 12/12/2019 1056  Last data filed at 12/12/2019 0540  Gross per 24 hour   Intake 1560 ml   Output 2800 ml   Net -1240 ml       PE:   Blood pressure 120/68, pulse 102, temperature 98.1 °F (36.7 °C), temperature source Oral, resp. rate 20, height 172.7 cm (68\"), weight 59 kg (130 lb), SpO2 96 %.    GENERAL: Awake and alert, in no acute distress.   HEENT: PER, No conjunctivitis  NECK: Supple, no JVD     HEART: RRR; No murmur, rubs, gallops.   LUNGS: Clear to auscultation bilaterally without wheezing, rales, rhonchi. Normal respiratory effort. Nonlabored. No dullness.  ABDOMEN: Soft, nontender, nondistended. Positive bowel sounds. No rebound or guarding. NO mass or HSM.  EXT:  No cyanosis, clubbing , mild thigh edema. No cord.  : Genitalia generally unremarkable.  Without Sheehan catheter.  SKIN: Warm and dry without cutaneous eruptions on Inspection/palpation.    NEURO: Alert and oriented x 3, Motor 5/5 bilaterally    Medications:    Current Facility-Administered Medications:   •  aspirin chewable tablet 81 mg, 81 mg, Oral, Daily, Rosales Dickerson MD, 81 mg at 12/12/19 0901  •  b complex-vitamin c-folic acid (NEPHRO-SHIRLENE) tablet 1 tablet, 1 tablet, Oral, Daily, Rosales Dickerson MD, 1 tablet at 12/12/19 0900  •  bisacodyl (DULCOLAX) suppository 10 mg, 10 mg, Rectal, Once, Amarilis Alanis MD  •  castor oil-balsam peru (VENELEX) ointment, , Topical, Q12H, Amarilis Alanis MD  •  clopidogrel (PLAVIX) tablet 75 mg, 75 mg, Oral, Daily, Rosales Dickerson MD, 75 mg at 12/12/19 0900  •  dextrose (D50W) 25 g/ 50mL Intravenous Solution 25 g, 25 g, Intravenous, Q15 Min PRN, Rosales Dickerson MD  •  dextrose (GLUTOSE) oral gel 15 g, 15 g, Oral, Q15 Min PRN, Rosales Dickerson MD  •  dextrose 5 % and sodium " chloride 0.45 % infusion, 75 mL/hr, Intravenous, Continuous, Amarilis Alanis MD, Last Rate: 75 mL/hr at 12/11/19 0409, 75 mL/hr at 12/11/19 0409  •  docusate sodium (COLACE) capsule 100 mg, 100 mg, Oral, BID, Rosales Dickerson MD, 100 mg at 12/11/19 2109  •  folic acid (FOLVITE) tablet 1 mg, 1 mg, Oral, Daily, Rosales Dickerson MD, 1 mg at 12/12/19 0900  •  glucagon (human recombinant) (GLUCAGEN DIAGNOSTIC) injection 1 mg, 1 mg, Subcutaneous, Q15 Min PRN, Rosales Dickerson MD  •  guaiFENesin (MUCINEX) 12 hr tablet 600 mg, 600 mg, Oral, Q12H, Rosales Dickerson MD, 600 mg at 12/12/19 0900  •  heparin (porcine) 5000 UNIT/ML injection 5,000 Units, 5,000 Units, Subcutaneous, Q8H, Rosales Dickerson MD, 5,000 Units at 12/12/19 0535  •  HYDROcodone-acetaminophen (NORCO) 5-325 MG per tablet 1 tablet, 1 tablet, Oral, Q6H PRN, Rosales Dickerson MD, 1 tablet at 12/11/19 2109  •  insulin lispro (humaLOG) injection 0-7 Units, 0-7 Units, Subcutaneous, 4x Daily With Meals & Nightly, Rosales Dickerson MD, 2 Units at 12/12/19 0900  •  ipratropium-albuterol (DUO-NEB) nebulizer solution 3 mL, 3 mL, Nebulization, 4x Daily - RT, Rosales Dickerson MD, 3 mL at 12/12/19 0841  •  ipratropium-albuterol (DUO-NEB) nebulizer solution 3 mL, 3 mL, Nebulization, Q4H PRN, Rosales Dickerson MD  •  levETIRAcetam (KEPPRA) tablet 500 mg, 500 mg, Oral, BID, Rosales Dickerson MD, 500 mg at 12/12/19 0900  •  levothyroxine (SYNTHROID, LEVOTHROID) tablet 125 mcg, 125 mcg, Oral, Q AM, Rosales Dickerson MD, 125 mcg at 12/12/19 0535  •  metoprolol succinate XL (TOPROL-XL) 24 hr tablet 50 mg, 50 mg, Oral, Daily, Rosales Dickerson MD, 50 mg at 12/12/19 0901  •  ondansetron (ZOFRAN) tablet 4 mg, 4 mg, Oral, Q6H PRN **OR** ondansetron (ZOFRAN) injection 4 mg, 4 mg, Intravenous, Q6H PRN, Rosales Dickerson MD, 4 mg at 12/11/19 0408  •  pantoprazole (PROTONIX) injection 40 mg, 40 mg, Intravenous, BID AC, Rosales Dickerson MD, 40 mg at 12/12/19 0900  •  piperacillin-tazobactam  (ZOSYN) 3.375 g in iso-osmotic dextrose 50 ml (premix), 3.375 g, Intravenous, Q12H, Rosales Dickerson MD, 3.375 g at 12/11/19 2337  •  polyethylene glycol 3350 powder (packet), 17 g, Oral, Daily, Rosales Dickerson MD, 17 g at 12/11/19 1048  •  povidone-iodine (BETADINE) external solution, , Topical, Daily, Amarilis Alanis MD  •  saccharomyces boulardii (FLORASTOR) capsule 250 mg, 250 mg, Oral, BID, Rosales Dickerson MD, 250 mg at 12/12/19 0901  •  simethicone (MYLICON) chewable tablet 80 mg, 80 mg, Oral, 4x Daily PRN, Anand Alvarez MD, 80 mg at 12/10/19 1232  •  sodium chloride 0.9 % flush 10 mL, 10 mL, Intravenous, PRN, Rosales Dickerson MD  •  sodium chloride 0.9 % flush 10 mL, 10 mL, Intravenous, Q12H, Rosales Dickerson MD, 10 mL at 12/12/19 0901  •  sodium chloride 0.9 % flush 10 mL, 10 mL, Intravenous, PRN, Rosales Dickerson MD    Meds reviewed and doses adjusted for renal function    Labs:  Results from last 7 days   Lab Units 12/12/19  0413 12/11/19  0631 12/09/19  0815 12/09/19  0604 12/08/19  1202 12/05/19  1907   WBC 10*3/mm3  --   --  6.47  --  5.30 4.88   HEMOGLOBIN g/dL  --   --  10.6*  --  8.2* 9.2*   HEMATOCRIT %  --   --  34.0*  --  26.6* 29.5*   PLATELETS 10*3/mm3  --   --  132*  --  133* 179   SODIUM mmol/L 132* 128*  --  132* 132* 133*   POTASSIUM mmol/L 4.7 5.5*  --  3.2* 3.2* 3.1*   CHLORIDE mmol/L 99 94*  --  86* 87* 85*   CO2 mmol/L 19.0* 18.0*  --  31.0* 31.0* 33.8*   BUN mg/dL 9 13  --  14 10 6*   CREATININE mg/dL 2.11* 2.77*  --  3.10* 2.53* 2.05*   GLUCOSE mg/dL 165* 115*  --  102* 275* 330*   CALCIUM mg/dL 8.9 9.2  --  8.7 9.1 9.0             Radiology:  Imaging Results (Last 72 Hours)     Procedure Component Value Units Date/Time    CT Angiogram Chest With & Without Contrast [[id number]] Collected:  12/08/19 1846     Updated:  12/09/19 2247    Narrative:       EXAMINATION: CT ANGIOGRAM CHEST W/WO CONTRAST - 12/08/2019      INDICATION:  J18.1-Lobar pneumonia, unspecified organism;  N18.6-End  stage renal disease.     TECHNIQUE: Spiral acquisition 3 mm post IV contrast images through the  chest with coronal and sagittal 10 mm 2D reconstructions.     The radiation dose reduction device was turned on for each scan per the  ALARA (As Low as Reasonably Achievable) protocol.     COMPARISON: 11/09/2019 unenhanced chest CT scan.     FINDINGS: Previous exam report indicates extensive pulmonary disease,  right greater than left. Today's study shows a new but small left  pleural effusion and minimal if any right effusion. There is no  pericardial effusion or significant mediastinal adenopathy. There is  good contrast opacification of the pulmonary arteries and no visible  filling defects to suggest pulmonary embolic disease.     Lung window images show interval clearing of most of the left lung since  the prior study except for a small area of discoid atelectasis. There is  increasingly dense interstitial and airspace disease practically  throughout the right lung, presumably pneumonia. There is no obvious  underlying airway obstruction.     Included images of the upper abdomen show no significant abnormalities  of the visualized portions of the liver, spleen, pancreatic tail, or  adrenal glands. Kidneys are noted to be atrophic. There is extensive  diffuse vascular calcification.       Impression:       1. No evidence of pulmonary embolus.  2. New but small left pleural effusion. Left lung otherwise appears  clear  3. Extensive and diffuse right lung interstitial and airspace disease,  presumably pneumonia.     DICTATED:   12/08/2019  EDITED/ls :   12/08/2019      This report was finalized on 12/9/2019 10:44 PM by DR. Rosales Martel MD.             Renal Imaging:  No radiology results for the last day        IMPRESSION:  ESRD  ANEMIA  PNEUMONIA  VOL OVERLOAD  CHF       RECOMMENDATIONS:  Hemodialysis can be done as outpatient tomorrow  Discussed the case with Dr. Alvarez   Please note that portions of this note  were completed with a voice recognition program efforts were made to add at the dictations but words may be mistranscribed.    Iggy Mccray MD  12/12/2019  10:56 AM

## 2019-12-12 NOTE — PLAN OF CARE
Problem: Patient Care Overview  Goal: Plan of Care Review  Flowsheets  Taken 12/11/2019 2000  Plan of Care Reviewed With: patient  Taken 12/12/2019 0418  Outcome Summary: VSS. Patient is eating more. Patient seems to feel better. Possible d/c

## 2019-12-13 NOTE — OUTREACH NOTE
COPD/PN Week 1 Survey      Responses   Facility patient discharged from?  [facility]   Does the patient have one of the following disease processes/diagnoses(primary or secondary)?  COPD/Pneumonia   General alerts for this patient  Dialysis pt.   Discharge diagnosis  Pneumonia/CHF   Comments regarding appointments  Please see AVS   What is the Home health agency?   [facility]   What DME was ordered?  Has all DME needs          Claudia Casillas RN

## 2019-12-16 NOTE — OUTREACH NOTE
COPD/PN Week 1 Survey      Responses   Facility patient discharged from?  [facility]   Does the patient have one of the following disease processes/diagnoses(primary or secondary)?  COPD/Pneumonia   Is there a successful TCM telephone encounter documented?  No   Was the primary reason for admission:  Pneumonia   Week 1 attempt successful?  No   Unsuccessful attempts  Attempt 1          Alysa Carson RN

## 2019-12-18 NOTE — OUTREACH NOTE
COPD/PN Week 1 Survey      Responses   Facility patient discharged from?  [facility]   Does the patient have one of the following disease processes/diagnoses(primary or secondary)?  COPD/Pneumonia   Is there a successful TCM telephone encounter documented?  No   Was the primary reason for admission:  Pneumonia   Week 1 attempt successful?  Yes   Call start time  1132   Call end time  1138   Discharge diagnosis  Pneumonia/CHF   Meds reviewed with patient/caregiver?  Yes   Is the patient having any side effects they believe may be caused by any medication additions or changes?  No   Does the patient have all medications ordered at discharge?  Yes   Is the patient taking all medications as directed (includes completed medication regime)?  Yes   Comments regarding appointments  PCP 12/19/19   Does the patient have a primary care provider?   Yes   Does the patient have an appointment with their PCP or pulmonologist within 7 days of discharge?  Greater than 7 days   What is preventing the patient from scheduling follow up appointments within 7 days of discharge?  Earlier appointment not available   Nursing Interventions  Verified appointment date/time/provider   Has the patient kept scheduled appointments due by today?  N/A   What is the Home health agency?   [facility]   Has home health visited the patient within 72 hours of discharge?  Yes   Psychosocial issues?  No   Comments  pt states has more than normal amounts of fluids and swelling, has had to add extra day of dialysis this week to remove fluid   Did the patient receive a copy of their discharge instructions?  Yes   Nursing interventions  Reviewed instructions with patient   What is the patient's perception of their health status since discharge?  Improving   Nursing Interventions  Nurse provided patient education   Is the patient/caregiver able to teach back the hierarchy of who to call/visit for symptoms/problems? PCP, Specialist, Home health nurse, Urgent Care,  ED, 911  Yes   Is the patient/caregiver able to teach back signs and symptoms of worsening condition:  Fever/chills, Shortness of breath, Chest pain   Is the patient/caregiver able to teach back importance of completing antibiotic course of treatment?  Yes   Week 1 call completed?  Yes          Juana Johnson RN

## 2019-12-19 PROBLEM — J96.01 ACUTE RESPIRATORY FAILURE WITH HYPOXIA (HCC): Status: ACTIVE | Noted: 2019-01-01

## 2019-12-19 PROBLEM — K76.0 FATTY INFILTRATION OF LIVER: Chronic | Status: ACTIVE | Noted: 2019-01-01

## 2019-12-19 PROBLEM — Z87.898 HISTORY OF SEIZURES: Status: ACTIVE | Noted: 2019-01-01

## 2019-12-19 PROBLEM — F44.5 PSYCHOGENIC NONEPILEPTIC SEIZURE: Status: ACTIVE | Noted: 2019-01-01

## 2019-12-19 PROBLEM — I50.20 SYSTOLIC HEART FAILURE (HCC): Chronic | Status: ACTIVE | Noted: 2019-01-01

## 2019-12-19 PROBLEM — Z86.73 HISTORY OF CVA (CEREBROVASCULAR ACCIDENT): Chronic | Status: ACTIVE | Noted: 2019-01-01

## 2019-12-19 PROBLEM — I50.20 SYSTOLIC HEART FAILURE (HCC): Status: ACTIVE | Noted: 2019-01-01

## 2019-12-19 PROBLEM — K76.0 FATTY INFILTRATION OF LIVER: Status: ACTIVE | Noted: 2019-01-01

## 2019-12-19 PROBLEM — E03.9 HYPOTHYROIDISM: Chronic | Status: ACTIVE | Noted: 2019-01-01

## 2019-12-19 NOTE — ED NOTES
[facility] called back at this time with an available bed. Rosana Krishnan RN, transferred to the line at this time.     Janis Cid  12/19/19 [id number]

## 2019-12-19 NOTE — ED NOTES
At this time, [facility] was contacted and states they will call right back for Dr. Kaur.     Antoinette Yip  12/19/19 1126

## 2019-12-19 NOTE — ED NOTES
At this time, [facility] was contacted and states she will call back for Dr. Kaur.      Antoinette Yip  12/19/19 0920

## 2019-12-19 NOTE — ED NOTES
At this time, [facility], Dr. Krishnan, was transferred to Dr. Kaur.      Antoinette Yip  12/19/19 1127

## 2019-12-19 NOTE — ED NOTES
At this time, [facility] was contacted and states they will call back for Dr. Kaur.      Antoinette Yip  12/19/19 8763

## 2019-12-19 NOTE — ED PROVIDER NOTES
Subjective   59-year-old male presents to the ED via EMS for altered mental status.  Per EMS patient was found in his chair unresponsive so the family put him in the floor.  He did have a pulse of 89 to CPR.  On EMSs arrival the patient was unresponsive to painful stimuli or noxious stimuli.  He was unresponsive to voice with respiratory rate of 6-8.  Family indicated that he does not have a history of drug abuse.  EMS was concerned that the patient may have had a seizure as his left arm was twitching and he had some nystagmus so they gave him 2 mg of Ativan.  On arrival to the ED the patient has unresponsive with a GCS of 3.  He was subsequently intubated.          Review of Systems   Reason unable to perform ROS: Altered mental status.       Past Medical History:   Diagnosis Date   • Abdominal pain    • Anemia    • Cataracts, bilateral    • CHF (congestive heart failure) (CMS/[facility])    • Chronic kidney disease    • Constipation    • Cough    • Dependence on nocturnal oxygen therapy    • Diabetes mellitus (CMS/[facility])    • Diarrhea    • End stage renal failure on dialysis (CMS/[facility])     TUES, THURS, SAT   • GERD (gastroesophageal reflux disease)    • H/O blood clots     LEG/NECK   • H/O chest x-ray 03/25/2016    Interval decrease in size of the patients right pleural effusion with a persistent small left effusion as well   • History of transfusion    • Hypertension    • Left-sided weakness    • Nauseated     DRY HEAVES   • Pleural effusion    • Pneumonia    • Pneumonia    • Renal failure    • Seizures (CMS/HCC)    • Stroke (CMS/[facility]) 2006    LEFT SIDED WEAKNESS   • Swelling    • Teeth missing    • Tinnitus    • Ulcer, stomach peptic     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 LAVAGE,  "BRUSHINGS AND BIOPSY;  Surgeon: Ean Hernandez MD;  Location: [facility] OR;  Service: Pulmonary   • CARDIAC CATHETERIZATION N/A 3/28/2018    Procedure: Peripheral angiography;  Surgeon: Clay Ruano MD;  Location: [facility] CATH INVASIVE LOCATION;  Service: Cardiovascular   • CARDIAC CATHETERIZATION N/A 3/28/2018    Procedure: Angioplasty-peripheral;  Surgeon: Clay Ruano MD;  Location: [facility] CATH INVASIVE LOCATION;  Service: Cardiovascular   • CARDIAC CATHETERIZATION N/A 3/28/2018    Procedure: Atherectomy-peripheral;  Surgeon: Clay Ruano MD;  Location: [facility] CATH INVASIVE LOCATION;  Service: Cardiovascular   • CATARACT EXTRACTION, BILATERAL     • CHOLECYSTECTOMY     • COLONOSCOPY     • ENDOSCOPY N/A 11/19/2019    Procedure: ESOPHAGOGASTRODUODENOSCOPY;  Surgeon: Iban Santana MD;  Location: [facility] ENDOSCOPY;  Service: Gastroenterology   • EYE SURGERY      BLEEDING BEHING BILATERAL EYES   • INTERVENTIONAL RADIOLOGY PROCEDURE N/A 3/28/2018    Procedure: Abdominal Aortogram;  Surgeon: Clay Ruano MD;  Location: [facility] CATH INVASIVE LOCATION;  Service: Cardiovascular   • PORTACATH PLACEMENT     • SHOULDER MANIPULATION Left     \"FROZEN SHOULDER\"   • VENOUS ACCESS DEVICE (PORT) REMOVAL         Family History   Problem Relation Age of Onset   • COPD Mother    • Diabetes Father    • Hypertension Sister    • Diabetes Sister    • Hypertension Brother    • Diabetes Paternal Grandmother        Social History     Socioeconomic History   • Marital status:      Spouse name: Not on file   • Number of children: Not on file   • Years of education: Not on file   • Highest education level: Not on file   Tobacco Use   • Smoking status: Never Smoker   • Smokeless tobacco: Never Used   • Tobacco comment: 2ND HAND SMOKE EXPOSURE   Substance and Sexual Activity   • Alcohol use: No   • Drug use: No   • Sexual activity: Defer           Objective   Physical Exam   Constitutional: No " distress.   Chronically ill-appearing unresponsive   HENT:   Head: Normocephalic.   Nose: Nose normal.   Eyes: Pupils are equal, round, and reactive to light. Conjunctivae and EOM are normal.   Cardiovascular: Regular rhythm.   Tachycardic   Pulmonary/Chest: Breath sounds normal. No respiratory distress.   Coarse breath sounds bilaterally worse in the right lung field.   Abdominal: Soft. He exhibits no distension. There is no tenderness.   Musculoskeletal: He exhibits no edema or deformity.   Global atrophy.  Cachectic.  Right upper extremity AV fistula.   Neurological:   Unresponsive.  GCS of 3.   Skin: He is not diaphoretic.   Nursing note and vitals reviewed.      Intubation  Date/Time: 12/19/2019 7:17 AM  Performed by: Clint Gipson DO  Authorized by: Clint Gipson DO     Consent:     Consent obtained:  Emergent situation  Pre-procedure details:     Patient status:  Unresponsive    Mallampati score:  III    Paralytics:  Rocuronium  Procedure details:     Preoxygenation:  Bag valve mask    CPR in progress: no      Intubation method:  Oral    Oral intubation technique:  Video-assisted    Laryngoscope blade:  Mac 4    Tube size (mm):  7.5    Tube type:  Cuffed    Number of attempts:  1  Placement assessment:     ETT to lip:  25    ETT to teeth:  24    Tube secured with:  ETT camacho    Breath sounds:  Equal    Placement verification: chest rise, CXR verification, direct visualization and equal breath sounds      CXR findings:  ETT in proper place  Post-procedure details:     Patient tolerance of procedure:  Tolerated well, no immediate complications               ED Course  ED Course as of Dec 19 1419   Thu Dec 19, 2019   0949 FINDINGS:   Soft tissue windows reveal enlarged mediastinal lymph nodes felt to be  reactive. Heart size is normal. The aorta is normal in caliber. There  are left greater than right pleural effusions. Lung windows reveal  diffuse right lung airspace disease either aspiration or  pneumonia. The  visualized upper abdomen is unremarkable. Bone windows reveal no acute  osseous abnormalities.     IMPRESSION:  Left greater than right pleural effusions with diffuse right  lung airspace disease either reflecting aspiration or pneumonia.    [PF]   0950 FINDINGS:  There is generalized cerebral volume loss. Patchy hypodensities in the  periventricular white matter consistent with chronic small vessel  ischemic change.  There is no CT evidence of hemorrhage. There is no  mass, mass effect or midline shift.  There is no hydrocephalus. The  paranasal sinuses are clear. Bone windows reveal no acute osseous  abnormalities.     IMPRESSION:  No acute intracranial process.          [PF]   0953 Glucose(!): 213 [PF]   0953 BUN: 14 [PF]   0953 Creatinine(!): 3.76 [PF]   0953 Sodium(!): 129 [PF]   0954 Potassium: 3.7 [PF]   0954 Chloride(!): 84 [PF]   0954 CO2: 28.3 [PF]   0956 Calcium: 9.2 [PF]   0956 Total Protein: 6.8 [PF]   0956 Albumin(!): 3.00 [PF]   0956 ALT (SGPT): 12 [PF]   0956 AST (SGOT): 24 [PF]   0956 Alkaline Phosphatase: 94 [PF]   0956 Lactate: 1.6 [PF]   0956 Color, UA(!): Dark Yellow [PF]   0956 Appearance, UA(!): Cloudy [PF]   0956 pH, UA: 5.5 [PF]   0956 Specific Gravity, UA: 1.023 [PF]   0956 Glucose(!): >=1000 mg/dL (3+) [PF]   0956 Ketones, UA: Negative [PF]   0956 Bilirubin, UA: Negative [PF]   0956 Blood, UA(!): Trace [PF]   0956 Protein, UA(!): >=300 mg/dL (3+) [PF]   0956 Leukocytes, UA(!): Small (1+) [PF]   0956 Nitrite, UA: Negative [PF]   0956 Glucose(!): 200 [PF]   0956 RBC, UA(!): 3-5 [PF]   0956 WBC, UA(!): 3-5 [PF]   0956 Bacteria, UA(!): Trace [PF]   0956 Squamous Epithelial Cells, UA: 0-2 [PF]   0956 Hyaline Casts, UA: 0-2 [PF]   0956 Amorphous Crystals, UA: Small/1+ [PF]   0956 Mucus, UA(!): Small/1+ [PF]   0956 Methodology:: Manual Light Microscopy [PF]   0956 THC Screen, Urine: Negative [PF]   0956 Phencyclidine (PCP), Urine: Negative [PF]   0956 Cocaine Screen, Urine:  Negative [PF]   0956 Methamphetamine, Ur: Negative [PF]   0956 Opiate Screen(!): Positive [PF]   0956 Amphetamine, Urine Qual: Negative [PF]   0956 Benzodiazepine Screen, Urine: Negative [PF]   0956 Tricyclic Antidepressants Screen: Negative [PF]   0956 Methadone Screen , Urine: Negative [PF]   0956 Barbiturates Screen, Urine: Negative [PF]   0956 Oxycodone Screen, Urine: Negative [PF]   0956 Propoxyphene Screen: Negative [PF]   0956 Buprenorphine, Screen, Urine: Negative [PF]   0956 Procalcitonin(!): 0.45 [PF]   0957 Protime(!): 15.5 [PF]   0957 Respiratory reported they were unable to obtain blood gas due to restricted to 1 extremity due to dialysis fistula on the other side.  Multiple attempts failed upper respiratory, will do ultrasound-guided attempt at bedside.   INR(!): 1.20 [PF]   0958 Dr. Maki, hospitalist, advised no neurology, no pulmonology this weekend.  Given presentation of status epilepticus with persistent pneumonia, chronic kidney disease, recommended transfer, will discuss with [facility].    [PF]   0959 FINDINGS:   Soft tissue windows reveal enlarged mediastinal lymph nodes felt to be  reactive. Heart size is normal. The aorta is normal in caliber. There  are left greater than right pleural effusions. Lung windows reveal  diffuse right lung airspace disease either aspiration or pneumonia. The  visualized upper abdomen is unremarkable. Bone windows reveal no acute  osseous abnormalities.     IMPRESSION:  Left greater than right pleural effusions with diffuse right  lung airspace disease either reflecting aspiration or pneumonia.       [PF]   0959 TECHNIQUE: Multiple axial CT images were performed from the foramen  magnum to the vertex without enhancement.      FINDINGS:  There is generalized cerebral volume loss. Patchy hypodensities in the  periventricular white matter consistent with chronic small vessel  ischemic change.  There is no CT evidence of hemorrhage. There is  no  mass, mass effect or midline shift.  There is no hydrocephalus. The  paranasal sinuses are clear. Bone windows reveal no acute osseous  abnormalities.     IMPRESSION:  No acute intracranial process.       [PF]   0959 FINDINGS: An endotracheal tube is in place and is well-positioned with  the tip above the omega. The heart is normal in size. The mediastinum  is unremarkable. There is interstitial pulmonary edema with bilateral  effusions and patchy right lung airspace disease. There is no  pneumothorax.  There are no acute osseous abnormalities.     IMPRESSION:  Interstitial pulmonary edema with bilateral pleural  effusions and patchy right lung airspace disease.     Continued followup is recommended.    [PF]   1000 EKG interpreted by me reveals sinus rhythm at a rate of 99.  Nonspecific T wave changes.  No obvious ectopy ischemic changes.    [PF]   1126 Dr. Krishnan, will place on pend list, recommended vancomycin for persistent pneumonia.     [PF]   1221 Family agreeable to check with other facilities with neuro coverage. Call placed to . Awaiting callback.     [PF]   1236 Dr. Elena, intensivist at , no beds, placed on list.     [PF]   1259 Dr. Murphy, [facility], will accept, unsure of bed status, bed control pending update. Potential wait list.     [PF]      ED Course User Index  [PF] Sergei Kaur, DO                      No data recorded                        MDM  Patient will be accepted to [facility] for status epilepticus, pneumonia, complicated by chronic kidney disease on dialysis  Final diagnoses:   Status epilepticus (CMS/HCC)   Pneumonia of right lung due to infectious organism, unspecified part of lung   Stage 5 chronic kidney disease on chronic dialysis (CMS/HCC)              Sergei Kaur,   12/19/19 [id number]

## 2019-12-29 NOTE — OUTREACH NOTE
COPD/PN Week 2 Survey      Responses   Facility patient discharged from?  [facility]   Does the patient have one of the following disease processes/diagnoses(primary or secondary)?  COPD/Pneumonia   Week 2 attempt successful?  No   Unsuccessful attempts  Attempt 2          Erika Gracia RN

## 2020-01-01 ENCOUNTER — APPOINTMENT (OUTPATIENT)
Dept: CT IMAGING | Facility: HOSPITAL | Age: 60
End: 2020-01-01

## 2020-01-01 ENCOUNTER — TELEPHONE (OUTPATIENT)
Dept: GASTROENTEROLOGY | Facility: CLINIC | Age: 60
End: 2020-01-01

## 2020-01-01 ENCOUNTER — APPOINTMENT (OUTPATIENT)
Dept: GENERAL RADIOLOGY | Facility: HOSPITAL | Age: 60
End: 2020-01-01

## 2020-01-01 ENCOUNTER — READMISSION MANAGEMENT (OUTPATIENT)
Dept: CALL CENTER | Facility: HOSPITAL | Age: 60
End: 2020-01-01

## 2020-01-01 ENCOUNTER — HOSPITAL ENCOUNTER (INPATIENT)
Facility: HOSPITAL | Age: 60
LOS: 5 days | Discharge: HOME HEALTH | End: 2020-01-27
Attending: INTERNAL MEDICINE | Admitting: INTERNAL MEDICINE

## 2020-01-01 VITALS
HEART RATE: 100 BPM | HEIGHT: 68 IN | WEIGHT: 131.6 LBS | SYSTOLIC BLOOD PRESSURE: 113 MMHG | DIASTOLIC BLOOD PRESSURE: 64 MMHG | RESPIRATION RATE: 18 BRPM | BODY MASS INDEX: 19.94 KG/M2 | TEMPERATURE: 98.2 F | OXYGEN SATURATION: 100 %

## 2020-01-01 DIAGNOSIS — R53.2 FUNCTIONAL QUADRIPLEGIA (HCC): ICD-10-CM

## 2020-01-01 DIAGNOSIS — R13.12 OROPHARYNGEAL DYSPHAGIA: Primary | ICD-10-CM

## 2020-01-01 DIAGNOSIS — N18.6 END STAGE RENAL DISEASE ON DIALYSIS (HCC): ICD-10-CM

## 2020-01-01 DIAGNOSIS — J96.01 ACUTE RESPIRATORY FAILURE WITH HYPOXIA (HCC): ICD-10-CM

## 2020-01-01 DIAGNOSIS — J90 RECURRENT PLEURAL EFFUSION ON RIGHT: ICD-10-CM

## 2020-01-01 DIAGNOSIS — I50.22 CHRONIC SYSTOLIC HEART FAILURE (HCC): Chronic | ICD-10-CM

## 2020-01-01 DIAGNOSIS — Z86.73 HISTORY OF CVA (CEREBROVASCULAR ACCIDENT): Chronic | ICD-10-CM

## 2020-01-01 DIAGNOSIS — G47.33 OSA (OBSTRUCTIVE SLEEP APNEA): Primary | ICD-10-CM

## 2020-01-01 DIAGNOSIS — Z99.2 END STAGE RENAL DISEASE ON DIALYSIS (HCC): ICD-10-CM

## 2020-01-01 DIAGNOSIS — Z78.9 IMPAIRED MOBILITY AND ADLS: ICD-10-CM

## 2020-01-01 DIAGNOSIS — E43 SEVERE MALNUTRITION (HCC): ICD-10-CM

## 2020-01-01 DIAGNOSIS — R53.81 PHYSICAL DEBILITY: Chronic | ICD-10-CM

## 2020-01-01 DIAGNOSIS — Z74.09 IMPAIRED MOBILITY AND ADLS: ICD-10-CM

## 2020-01-01 DIAGNOSIS — J90 RECURRENT RIGHT PLEURAL EFFUSION: ICD-10-CM

## 2020-01-01 LAB
ALBUMIN SERPL-MCNC: 2.7 G/DL (ref 3.5–5.2)
ALBUMIN SERPL-MCNC: 2.9 G/DL (ref 3.5–5.2)
ALBUMIN SERPL-MCNC: 3.3 G/DL (ref 3.5–5.2)
ALBUMIN/GLOB SERPL: 0.9 G/DL
ALP SERPL-CCNC: 71 U/L (ref 39–117)
ALT SERPL W P-5'-P-CCNC: <5 U/L (ref 1–41)
ANION GAP SERPL CALCULATED.3IONS-SCNC: 13.5 MMOL/L (ref 5–15)
ANION GAP SERPL CALCULATED.3IONS-SCNC: 13.9 MMOL/L (ref 5–15)
ANION GAP SERPL CALCULATED.3IONS-SCNC: 15 MMOL/L (ref 5–15)
ANION GAP SERPL CALCULATED.3IONS-SCNC: 15.5 MMOL/L (ref 5–15)
ANISOCYTOSIS BLD QL: NORMAL
ANISOCYTOSIS BLD QL: NORMAL
AST SERPL-CCNC: 14 U/L (ref 1–40)
BASOPHILS # BLD AUTO: 0.06 10*3/MM3 (ref 0–0.2)
BASOPHILS # BLD AUTO: 0.07 10*3/MM3 (ref 0–0.2)
BASOPHILS NFR BLD AUTO: 0.8 % (ref 0–1.5)
BASOPHILS NFR BLD AUTO: 0.8 % (ref 0–1.5)
BILIRUB SERPL-MCNC: 0.4 MG/DL (ref 0.2–1.2)
BUN BLD-MCNC: 15 MG/DL (ref 8–23)
BUN BLD-MCNC: 16 MG/DL (ref 8–23)
BUN BLD-MCNC: 23 MG/DL (ref 8–23)
BUN BLD-MCNC: 24 MG/DL (ref 8–23)
BUN/CREAT SERPL: 5.7 (ref 7–25)
BUN/CREAT SERPL: 6.2 (ref 7–25)
BUN/CREAT SERPL: 7.6 (ref 7–25)
BUN/CREAT SERPL: 8.3 (ref 7–25)
CALCIUM SPEC-SCNC: 9.2 MG/DL (ref 8.6–10.5)
CALCIUM SPEC-SCNC: 9.5 MG/DL (ref 8.6–10.5)
CALCIUM SPEC-SCNC: 9.8 MG/DL (ref 8.6–10.5)
CALCIUM SPEC-SCNC: 9.9 MG/DL (ref 8.6–10.5)
CHLORIDE SERPL-SCNC: 90 MMOL/L (ref 98–107)
CHLORIDE SERPL-SCNC: 94 MMOL/L (ref 98–107)
CHLORIDE SERPL-SCNC: 94 MMOL/L (ref 98–107)
CHLORIDE SERPL-SCNC: 95 MMOL/L (ref 98–107)
CO2 SERPL-SCNC: 24.5 MMOL/L (ref 22–29)
CO2 SERPL-SCNC: 27.1 MMOL/L (ref 22–29)
CO2 SERPL-SCNC: 29.5 MMOL/L (ref 22–29)
CO2 SERPL-SCNC: 30 MMOL/L (ref 22–29)
CREAT BLD-MCNC: 2.11 MG/DL (ref 0.76–1.27)
CREAT BLD-MCNC: 2.64 MG/DL (ref 0.76–1.27)
CREAT BLD-MCNC: 2.89 MG/DL (ref 0.76–1.27)
CREAT BLD-MCNC: 3.7 MG/DL (ref 0.76–1.27)
DEPRECATED RDW RBC AUTO: 66 FL (ref 37–54)
DEPRECATED RDW RBC AUTO: 66.5 FL (ref 37–54)
DEPRECATED RDW RBC AUTO: 68.1 FL (ref 37–54)
DEPRECATED RDW RBC AUTO: 71.6 FL (ref 37–54)
ELLIPTOCYTES BLD QL SMEAR: NORMAL
EOSINOPHIL # BLD AUTO: 0.13 10*3/MM3 (ref 0–0.4)
EOSINOPHIL # BLD AUTO: 0.15 10*3/MM3 (ref 0–0.4)
EOSINOPHIL NFR BLD AUTO: 1.7 % (ref 0.3–6.2)
EOSINOPHIL NFR BLD AUTO: 1.8 % (ref 0.3–6.2)
ERYTHROCYTE [DISTWIDTH] IN BLOOD BY AUTOMATED COUNT: 21 % (ref 12.3–15.4)
ERYTHROCYTE [DISTWIDTH] IN BLOOD BY AUTOMATED COUNT: 21.1 % (ref 12.3–15.4)
ERYTHROCYTE [DISTWIDTH] IN BLOOD BY AUTOMATED COUNT: 21.2 % (ref 12.3–15.4)
ERYTHROCYTE [DISTWIDTH] IN BLOOD BY AUTOMATED COUNT: 21.2 % (ref 12.3–15.4)
GFR SERPL CREATININE-BSD FRML MDRD: 17 ML/MIN/1.73
GFR SERPL CREATININE-BSD FRML MDRD: 22 ML/MIN/1.73
GFR SERPL CREATININE-BSD FRML MDRD: 25 ML/MIN/1.73
GFR SERPL CREATININE-BSD FRML MDRD: 32 ML/MIN/1.73
GLOBULIN UR ELPH-MCNC: 3.7 GM/DL
GLUCOSE BLD-MCNC: 102 MG/DL (ref 65–99)
GLUCOSE BLD-MCNC: 214 MG/DL (ref 65–99)
GLUCOSE BLD-MCNC: 90 MG/DL (ref 65–99)
GLUCOSE BLD-MCNC: 95 MG/DL (ref 65–99)
GLUCOSE BLDC GLUCOMTR-MCNC: 100 MG/DL (ref 70–130)
GLUCOSE BLDC GLUCOMTR-MCNC: 103 MG/DL (ref 70–130)
GLUCOSE BLDC GLUCOMTR-MCNC: 127 MG/DL (ref 70–130)
GLUCOSE BLDC GLUCOMTR-MCNC: 129 MG/DL (ref 70–130)
GLUCOSE BLDC GLUCOMTR-MCNC: 131 MG/DL (ref 70–130)
GLUCOSE BLDC GLUCOMTR-MCNC: 140 MG/DL (ref 70–130)
GLUCOSE BLDC GLUCOMTR-MCNC: 153 MG/DL (ref 70–130)
GLUCOSE BLDC GLUCOMTR-MCNC: 153 MG/DL (ref 70–130)
GLUCOSE BLDC GLUCOMTR-MCNC: 160 MG/DL (ref 70–130)
GLUCOSE BLDC GLUCOMTR-MCNC: 178 MG/DL (ref 70–130)
GLUCOSE BLDC GLUCOMTR-MCNC: 181 MG/DL (ref 70–130)
GLUCOSE BLDC GLUCOMTR-MCNC: 205 MG/DL (ref 70–130)
GLUCOSE BLDC GLUCOMTR-MCNC: 208 MG/DL (ref 70–130)
GLUCOSE BLDC GLUCOMTR-MCNC: 210 MG/DL (ref 70–130)
GLUCOSE BLDC GLUCOMTR-MCNC: 238 MG/DL (ref 70–130)
GLUCOSE BLDC GLUCOMTR-MCNC: 66 MG/DL (ref 70–130)
GLUCOSE BLDC GLUCOMTR-MCNC: 95 MG/DL (ref 70–130)
GLUCOSE BLDC GLUCOMTR-MCNC: 95 MG/DL (ref 70–130)
GLUCOSE BLDC GLUCOMTR-MCNC: 98 MG/DL (ref 70–130)
HBV SURFACE AG SERPL QL IA: NORMAL
HCT VFR BLD AUTO: 26.1 % (ref 37.5–51)
HCT VFR BLD AUTO: 27.9 % (ref 37.5–51)
HCT VFR BLD AUTO: 28.6 % (ref 37.5–51)
HCT VFR BLD AUTO: 29.3 % (ref 37.5–51)
HGB BLD-MCNC: 8.1 G/DL (ref 13–17.7)
HGB BLD-MCNC: 8.5 G/DL (ref 13–17.7)
HGB BLD-MCNC: 8.8 G/DL (ref 13–17.7)
HGB BLD-MCNC: 9 G/DL (ref 13–17.7)
HOLD SPECIMEN: NORMAL
HYPOCHROMIA BLD QL: NORMAL
HYPOCHROMIA BLD QL: NORMAL
IMM GRANULOCYTES # BLD AUTO: 0.03 10*3/MM3 (ref 0–0.05)
IMM GRANULOCYTES # BLD AUTO: 0.03 10*3/MM3 (ref 0–0.05)
IMM GRANULOCYTES NFR BLD AUTO: 0.4 % (ref 0–0.5)
IMM GRANULOCYTES NFR BLD AUTO: 0.4 % (ref 0–0.5)
LYMPHOCYTES # BLD AUTO: 0.42 10*3/MM3 (ref 0.7–3.1)
LYMPHOCYTES # BLD AUTO: 0.54 10*3/MM3 (ref 0.7–3.1)
LYMPHOCYTES NFR BLD AUTO: 5.6 % (ref 19.6–45.3)
LYMPHOCYTES NFR BLD AUTO: 6.4 % (ref 19.6–45.3)
MCH RBC QN AUTO: 27.6 PG (ref 26.6–33)
MCH RBC QN AUTO: 27.7 PG (ref 26.6–33)
MCHC RBC AUTO-ENTMCNC: 29.7 G/DL (ref 31.5–35.7)
MCHC RBC AUTO-ENTMCNC: 30.7 G/DL (ref 31.5–35.7)
MCHC RBC AUTO-ENTMCNC: 31 G/DL (ref 31.5–35.7)
MCHC RBC AUTO-ENTMCNC: 31.5 G/DL (ref 31.5–35.7)
MCV RBC AUTO: 87.7 FL (ref 79–97)
MCV RBC AUTO: 88.8 FL (ref 79–97)
MCV RBC AUTO: 89.9 FL (ref 79–97)
MCV RBC AUTO: 92.9 FL (ref 79–97)
MONOCYTES # BLD AUTO: 0.73 10*3/MM3 (ref 0.1–0.9)
MONOCYTES # BLD AUTO: 1 10*3/MM3 (ref 0.1–0.9)
MONOCYTES NFR BLD AUTO: 11.8 % (ref 5–12)
MONOCYTES NFR BLD AUTO: 9.7 % (ref 5–12)
MRSA DNA SPEC QL NAA+PROBE: NORMAL
NEUTROPHILS # BLD AUTO: 6.19 10*3/MM3 (ref 1.7–7)
NEUTROPHILS # BLD AUTO: 6.65 10*3/MM3 (ref 1.7–7)
NEUTROPHILS NFR BLD AUTO: 78.8 % (ref 42.7–76)
NEUTROPHILS NFR BLD AUTO: 81.8 % (ref 42.7–76)
NRBC BLD AUTO-RTO: 0 /100 WBC (ref 0–0.2)
NRBC BLD AUTO-RTO: 0 /100 WBC (ref 0–0.2)
PHOSPHATE SERPL-MCNC: 3.2 MG/DL (ref 2.5–4.5)
PHOSPHATE SERPL-MCNC: 4.2 MG/DL (ref 2.5–4.5)
PLATELET # BLD AUTO: 217 10*3/MM3 (ref 140–450)
PLATELET # BLD AUTO: 223 10*3/MM3 (ref 140–450)
PLATELET # BLD AUTO: 259 10*3/MM3 (ref 140–450)
PLATELET # BLD AUTO: 273 10*3/MM3 (ref 140–450)
PMV BLD AUTO: 10.3 FL (ref 6–12)
PMV BLD AUTO: 10.3 FL (ref 6–12)
PMV BLD AUTO: 10.7 FL (ref 6–12)
PMV BLD AUTO: 11 FL (ref 6–12)
POIKILOCYTOSIS BLD QL SMEAR: NORMAL
POIKILOCYTOSIS BLD QL SMEAR: NORMAL
POLYCHROMASIA BLD QL SMEAR: NORMAL
POTASSIUM BLD-SCNC: 4.2 MMOL/L (ref 3.5–5.2)
POTASSIUM BLD-SCNC: 4.2 MMOL/L (ref 3.5–5.2)
POTASSIUM BLD-SCNC: 4.4 MMOL/L (ref 3.5–5.2)
POTASSIUM BLD-SCNC: 4.6 MMOL/L (ref 3.5–5.2)
PROT SERPL-MCNC: 7 G/DL (ref 6–8.5)
RBC # BLD AUTO: 2.94 10*6/MM3 (ref 4.14–5.8)
RBC # BLD AUTO: 3.08 10*6/MM3 (ref 4.14–5.8)
RBC # BLD AUTO: 3.18 10*6/MM3 (ref 4.14–5.8)
RBC # BLD AUTO: 3.26 10*6/MM3 (ref 4.14–5.8)
SCHISTOCYTES BLD QL SMEAR: NORMAL
SMALL PLATELETS BLD QL SMEAR: ADEQUATE
SMALL PLATELETS BLD QL SMEAR: ADEQUATE
SODIUM BLD-SCNC: 134 MMOL/L (ref 136–145)
SODIUM BLD-SCNC: 135 MMOL/L (ref 136–145)
SODIUM BLD-SCNC: 135 MMOL/L (ref 136–145)
SODIUM BLD-SCNC: 138 MMOL/L (ref 136–145)
TARGETS BLD QL SMEAR: NORMAL
VANCOMYCIN SERPL-MCNC: 16.9 MCG/ML (ref 5–40)
WBC MORPH BLD: NORMAL
WBC MORPH BLD: NORMAL
WBC NRBC COR # BLD: 5.56 10*3/MM3 (ref 3.4–10.8)
WBC NRBC COR # BLD: 6.44 10*3/MM3 (ref 3.4–10.8)
WBC NRBC COR # BLD: 7.56 10*3/MM3 (ref 3.4–10.8)
WBC NRBC COR # BLD: 8.44 10*3/MM3 (ref 3.4–10.8)

## 2020-01-01 PROCEDURE — 71046 X-RAY EXAM CHEST 2 VIEWS: CPT

## 2020-01-01 PROCEDURE — 80069 RENAL FUNCTION PANEL: CPT | Performed by: INTERNAL MEDICINE

## 2020-01-01 PROCEDURE — 85007 BL SMEAR W/DIFF WBC COUNT: CPT | Performed by: INTERNAL MEDICINE

## 2020-01-01 PROCEDURE — 94668 MNPJ CHEST WALL SBSQ: CPT

## 2020-01-01 PROCEDURE — 94660 CPAP INITIATION&MGMT: CPT

## 2020-01-01 PROCEDURE — 82962 GLUCOSE BLOOD TEST: CPT

## 2020-01-01 PROCEDURE — 94799 UNLISTED PULMONARY SVC/PX: CPT

## 2020-01-01 PROCEDURE — 99239 HOSP IP/OBS DSCHRG MGMT >30: CPT | Performed by: NURSE PRACTITIONER

## 2020-01-01 PROCEDURE — 85025 COMPLETE CBC W/AUTO DIFF WBC: CPT | Performed by: INTERNAL MEDICINE

## 2020-01-01 PROCEDURE — 71250 CT THORAX DX C-: CPT

## 2020-01-01 PROCEDURE — 25010000002 PIPERACILLIN SOD-TAZOBACTAM PER 1 G: Performed by: NURSE PRACTITIONER

## 2020-01-01 PROCEDURE — 99232 SBSQ HOSP IP/OBS MODERATE 35: CPT | Performed by: INTERNAL MEDICINE

## 2020-01-01 PROCEDURE — 87641 MR-STAPH DNA AMP PROBE: CPT | Performed by: NURSE PRACTITIONER

## 2020-01-01 PROCEDURE — 25010000002 HEPARIN (PORCINE) PER 1000 UNITS: Performed by: INTERNAL MEDICINE

## 2020-01-01 PROCEDURE — 85007 BL SMEAR W/DIFF WBC COUNT: CPT | Performed by: NURSE PRACTITIONER

## 2020-01-01 PROCEDURE — 85025 COMPLETE CBC W/AUTO DIFF WBC: CPT | Performed by: NURSE PRACTITIONER

## 2020-01-01 PROCEDURE — 71045 X-RAY EXAM CHEST 1 VIEW: CPT

## 2020-01-01 PROCEDURE — 85027 COMPLETE CBC AUTOMATED: CPT | Performed by: INTERNAL MEDICINE

## 2020-01-01 PROCEDURE — 80202 ASSAY OF VANCOMYCIN: CPT | Performed by: NURSE PRACTITIONER

## 2020-01-01 PROCEDURE — 97166 OT EVAL MOD COMPLEX 45 MIN: CPT

## 2020-01-01 PROCEDURE — 94669 MECHANICAL CHEST WALL OSCILL: CPT

## 2020-01-01 PROCEDURE — 25010000002 VANCOMYCIN 5 G RECONSTITUTED SOLUTION 5,000 MG VIAL: Performed by: NURSE PRACTITIONER

## 2020-01-01 PROCEDURE — 87340 HEPATITIS B SURFACE AG IA: CPT | Performed by: INTERNAL MEDICINE

## 2020-01-01 PROCEDURE — 99231 SBSQ HOSP IP/OBS SF/LOW 25: CPT | Performed by: INTERNAL MEDICINE

## 2020-01-01 PROCEDURE — 94640 AIRWAY INHALATION TREATMENT: CPT

## 2020-01-01 PROCEDURE — 63710000001 INSULIN ASPART PER 5 UNITS: Performed by: INTERNAL MEDICINE

## 2020-01-01 PROCEDURE — P9047 ALBUMIN (HUMAN), 25%, 50ML: HCPCS | Performed by: INTERNAL MEDICINE

## 2020-01-01 PROCEDURE — 97162 PT EVAL MOD COMPLEX 30 MIN: CPT

## 2020-01-01 PROCEDURE — 5A1D70Z PERFORMANCE OF URINARY FILTRATION, INTERMITTENT, LESS THAN 6 HOURS PER DAY: ICD-10-PCS | Performed by: INTERNAL MEDICINE

## 2020-01-01 PROCEDURE — 99223 1ST HOSP IP/OBS HIGH 75: CPT | Performed by: INTERNAL MEDICINE

## 2020-01-01 PROCEDURE — 80048 BASIC METABOLIC PNL TOTAL CA: CPT | Performed by: INTERNAL MEDICINE

## 2020-01-01 PROCEDURE — 80053 COMPREHEN METABOLIC PANEL: CPT | Performed by: NURSE PRACTITIONER

## 2020-01-01 PROCEDURE — 92611 MOTION FLUOROSCOPY/SWALLOW: CPT

## 2020-01-01 PROCEDURE — 99222 1ST HOSP IP/OBS MODERATE 55: CPT | Performed by: INTERNAL MEDICINE

## 2020-01-01 PROCEDURE — 74230 X-RAY XM SWLNG FUNCJ C+: CPT

## 2020-01-01 PROCEDURE — 25010000002 ALBUMIN HUMAN 25% PER 50 ML: Performed by: INTERNAL MEDICINE

## 2020-01-01 PROCEDURE — 63710000001 INSULIN DETEMIR PER 5 UNITS: Performed by: INTERNAL MEDICINE

## 2020-01-01 PROCEDURE — 99232 SBSQ HOSP IP/OBS MODERATE 35: CPT | Performed by: NURSE PRACTITIONER

## 2020-01-01 RX ORDER — LOSARTAN POTASSIUM 25 MG/1
12.5 TABLET ORAL
Qty: 15 TABLET | Refills: 0 | Status: SHIPPED | OUTPATIENT
Start: 2020-01-01 | End: 2020-01-01

## 2020-01-01 RX ORDER — HYDROCODONE BITARTRATE AND ACETAMINOPHEN 5; 325 MG/1; MG/1
1 TABLET ORAL EVERY 6 HOURS PRN
Status: DISCONTINUED | OUTPATIENT
Start: 2020-01-01 | End: 2020-01-01 | Stop reason: HOSPADM

## 2020-01-01 RX ORDER — SODIUM CHLORIDE FOR INHALATION 3 %
4 VIAL, NEBULIZER (ML) INHALATION ONCE
Status: COMPLETED | OUTPATIENT
Start: 2020-01-01 | End: 2020-01-01

## 2020-01-01 RX ORDER — DOCUSATE SODIUM 100 MG/1
100 CAPSULE, LIQUID FILLED ORAL EVERY 12 HOURS
Status: DISCONTINUED | OUTPATIENT
Start: 2020-01-01 | End: 2020-01-01 | Stop reason: HOSPADM

## 2020-01-01 RX ORDER — LEVETIRACETAM 500 MG/1
500 TABLET ORAL 2 TIMES DAILY
Status: DISCONTINUED | OUTPATIENT
Start: 2020-01-01 | End: 2020-01-01 | Stop reason: HOSPADM

## 2020-01-01 RX ORDER — ASPIRIN 81 MG/1
81 TABLET ORAL DAILY
Status: DISCONTINUED | OUTPATIENT
Start: 2020-01-01 | End: 2020-01-01 | Stop reason: HOSPADM

## 2020-01-01 RX ORDER — ASPIRIN 81 MG/1
81 TABLET, CHEWABLE ORAL DAILY
Status: DISCONTINUED | OUTPATIENT
Start: 2020-01-01 | End: 2020-01-01

## 2020-01-01 RX ORDER — ATORVASTATIN CALCIUM 40 MG/1
40 TABLET, FILM COATED ORAL NIGHTLY
COMMUNITY

## 2020-01-01 RX ORDER — ATORVASTATIN CALCIUM 40 MG/1
40 TABLET, FILM COATED ORAL NIGHTLY
Status: DISCONTINUED | OUTPATIENT
Start: 2020-01-01 | End: 2020-01-01 | Stop reason: HOSPADM

## 2020-01-01 RX ORDER — SODIUM CHLORIDE 0.9 % (FLUSH) 0.9 %
10 SYRINGE (ML) INJECTION AS NEEDED
Status: DISCONTINUED | OUTPATIENT
Start: 2020-01-01 | End: 2020-01-01 | Stop reason: HOSPADM

## 2020-01-01 RX ORDER — HEPARIN SODIUM 5000 [USP'U]/ML
5000 INJECTION, SOLUTION INTRAVENOUS; SUBCUTANEOUS EVERY 12 HOURS SCHEDULED
Status: COMPLETED | OUTPATIENT
Start: 2020-01-01 | End: 2020-01-01

## 2020-01-01 RX ORDER — FOLIC ACID/VIT B COMPLEX AND C 0.8 MG
1 TABLET ORAL DAILY
Status: DISCONTINUED | OUTPATIENT
Start: 2020-01-01 | End: 2020-01-01 | Stop reason: HOSPADM

## 2020-01-01 RX ORDER — SODIUM CHLORIDE FOR INHALATION 3 %
4 VIAL, NEBULIZER (ML) INHALATION ONCE
Status: DISCONTINUED | OUTPATIENT
Start: 2020-01-01 | End: 2020-01-01 | Stop reason: HOSPADM

## 2020-01-01 RX ORDER — ACETAMINOPHEN 325 MG/1
650 TABLET ORAL EVERY 4 HOURS PRN
Status: DISCONTINUED | OUTPATIENT
Start: 2020-01-01 | End: 2020-01-01 | Stop reason: HOSPADM

## 2020-01-01 RX ORDER — METOPROLOL SUCCINATE 50 MG/1
50 TABLET, EXTENDED RELEASE ORAL DAILY
Status: DISCONTINUED | OUTPATIENT
Start: 2020-01-01 | End: 2020-01-01 | Stop reason: HOSPADM

## 2020-01-01 RX ORDER — PANTOPRAZOLE SODIUM 40 MG/1
40 TABLET, DELAYED RELEASE ORAL
Status: DISCONTINUED | OUTPATIENT
Start: 2020-01-01 | End: 2020-01-01 | Stop reason: HOSPADM

## 2020-01-01 RX ORDER — AMINO ACIDS/PROTEIN HYDROLYS 15G-100/30
30 LIQUID (ML) ORAL 2 TIMES DAILY
Qty: 60 ML | Refills: 0 | Status: SHIPPED | OUTPATIENT
Start: 2020-01-01

## 2020-01-01 RX ORDER — ACETAMINOPHEN 160 MG/5ML
650 SOLUTION ORAL EVERY 4 HOURS PRN
Status: DISCONTINUED | OUTPATIENT
Start: 2020-01-01 | End: 2020-01-01 | Stop reason: HOSPADM

## 2020-01-01 RX ORDER — FOLIC ACID 1 MG/1
1 TABLET ORAL DAILY
Status: DISCONTINUED | OUTPATIENT
Start: 2020-01-01 | End: 2020-01-01 | Stop reason: HOSPADM

## 2020-01-01 RX ORDER — IPRATROPIUM BROMIDE AND ALBUTEROL SULFATE 2.5; .5 MG/3ML; MG/3ML
3 SOLUTION RESPIRATORY (INHALATION) EVERY 8 HOURS
Status: DISCONTINUED | OUTPATIENT
Start: 2020-01-01 | End: 2020-01-01 | Stop reason: HOSPADM

## 2020-01-01 RX ORDER — ONDANSETRON 2 MG/ML
4 INJECTION INTRAMUSCULAR; INTRAVENOUS EVERY 6 HOURS PRN
Status: DISCONTINUED | OUTPATIENT
Start: 2020-01-01 | End: 2020-01-01 | Stop reason: HOSPADM

## 2020-01-01 RX ORDER — LACTULOSE 20 G/30ML
10 SOLUTION ORAL DAILY
Status: DISCONTINUED | OUTPATIENT
Start: 2020-01-01 | End: 2020-01-01

## 2020-01-01 RX ORDER — AMOXICILLIN AND CLAVULANATE POTASSIUM 500; 125 MG/1; MG/1
1 TABLET, FILM COATED ORAL DAILY
Qty: 5 TABLET | Refills: 0 | Status: SHIPPED | OUTPATIENT
Start: 2020-01-01 | End: 2020-01-01

## 2020-01-01 RX ORDER — SODIUM CHLORIDE 0.9 % (FLUSH) 0.9 %
10 SYRINGE (ML) INJECTION EVERY 12 HOURS SCHEDULED
Status: DISCONTINUED | OUTPATIENT
Start: 2020-01-01 | End: 2020-01-01 | Stop reason: HOSPADM

## 2020-01-01 RX ORDER — BISACODYL 10 MG
10 SUPPOSITORY, RECTAL RECTAL
Status: DISCONTINUED | OUTPATIENT
Start: 2020-01-01 | End: 2020-01-01 | Stop reason: HOSPADM

## 2020-01-01 RX ORDER — LEVOTHYROXINE SODIUM 0.12 MG/1
125 TABLET ORAL DAILY
Status: DISCONTINUED | OUTPATIENT
Start: 2020-01-01 | End: 2020-01-01 | Stop reason: HOSPADM

## 2020-01-01 RX ORDER — ACETAMINOPHEN 650 MG/1
650 SUPPOSITORY RECTAL EVERY 4 HOURS PRN
Status: DISCONTINUED | OUTPATIENT
Start: 2020-01-01 | End: 2020-01-01 | Stop reason: HOSPADM

## 2020-01-01 RX ORDER — FLUTICASONE PROPIONATE 50 MCG
1 SPRAY, SUSPENSION (ML) NASAL 2 TIMES DAILY
Status: DISCONTINUED | OUTPATIENT
Start: 2020-01-01 | End: 2020-01-01 | Stop reason: HOSPADM

## 2020-01-01 RX ORDER — LOSARTAN POTASSIUM 25 MG/1
12.5 TABLET ORAL
Status: DISCONTINUED | OUTPATIENT
Start: 2020-01-01 | End: 2020-01-01 | Stop reason: HOSPADM

## 2020-01-01 RX ORDER — AMINO ACIDS/PROTEIN HYDROLYS 15G-100/30
30 LIQUID (ML) ORAL 2 TIMES DAILY
Status: DISCONTINUED | OUTPATIENT
Start: 2020-01-01 | End: 2020-01-01 | Stop reason: HOSPADM

## 2020-01-01 RX ORDER — NICOTINE POLACRILEX 4 MG
1 LOZENGE BUCCAL
Status: DISCONTINUED | OUTPATIENT
Start: 2020-01-01 | End: 2020-01-01 | Stop reason: HOSPADM

## 2020-01-01 RX ORDER — DEXTROSE MONOHYDRATE 25 G/50ML
25 INJECTION, SOLUTION INTRAVENOUS
Status: DISCONTINUED | OUTPATIENT
Start: 2020-01-01 | End: 2020-01-01 | Stop reason: HOSPADM

## 2020-01-01 RX ORDER — ALBUMIN (HUMAN) 12.5 G/50ML
12.5 SOLUTION INTRAVENOUS AS NEEDED
Status: DISCONTINUED | OUTPATIENT
Start: 2020-01-01 | End: 2020-01-01

## 2020-01-01 RX ORDER — CLOPIDOGREL BISULFATE 75 MG/1
75 TABLET ORAL DAILY
Status: DISCONTINUED | OUTPATIENT
Start: 2020-01-01 | End: 2020-01-01

## 2020-01-01 RX ADMIN — TAZOBACTAM SODIUM AND PIPERACILLIN SODIUM 3.38 G: 375; 3 INJECTION, SOLUTION INTRAVENOUS at 00:25

## 2020-01-01 RX ADMIN — FLUTICASONE PROPIONATE 1 SPRAY: 50 SPRAY, METERED NASAL at 09:23

## 2020-01-01 RX ADMIN — LEVETIRACETAM 500 MG: 500 TABLET, FILM COATED ORAL at 00:31

## 2020-01-01 RX ADMIN — DOCUSATE SODIUM 100 MG: 100 CAPSULE, LIQUID FILLED ORAL at 00:31

## 2020-01-01 RX ADMIN — Medication 1 TABLET: at 15:13

## 2020-01-01 RX ADMIN — CHOLECALCIFEROL CAP 125 MCG (5000 UNIT) 5000 UNITS: 125 CAP at 15:13

## 2020-01-01 RX ADMIN — Medication 1 TABLET: at 09:05

## 2020-01-01 RX ADMIN — Medication 1 TABLET: at 09:56

## 2020-01-01 RX ADMIN — METOPROLOL SUCCINATE 50 MG: 50 TABLET, EXTENDED RELEASE ORAL at 08:36

## 2020-01-01 RX ADMIN — DOCUSATE SODIUM 100 MG: 100 CAPSULE, LIQUID FILLED ORAL at 23:17

## 2020-01-01 RX ADMIN — FOLIC ACID 1 MG: 1 TABLET ORAL at 09:05

## 2020-01-01 RX ADMIN — LOSARTAN POTASSIUM 12.5 MG: 25 TABLET, FILM COATED ORAL at 08:51

## 2020-01-01 RX ADMIN — PANTOPRAZOLE SODIUM 40 MG: 40 TABLET, DELAYED RELEASE ORAL at 06:59

## 2020-01-01 RX ADMIN — ATORVASTATIN CALCIUM 40 MG: 40 TABLET, FILM COATED ORAL at 21:48

## 2020-01-01 RX ADMIN — HYDROCODONE BITARTRATE AND ACETAMINOPHEN 1 TABLET: 5; 325 TABLET ORAL at 21:48

## 2020-01-01 RX ADMIN — LEVETIRACETAM 500 MG: 500 TABLET, FILM COATED ORAL at 08:36

## 2020-01-01 RX ADMIN — ALBUMIN HUMAN 12.5 G: 0.25 SOLUTION INTRAVENOUS at 10:00

## 2020-01-01 RX ADMIN — METOPROLOL SUCCINATE 50 MG: 50 TABLET, EXTENDED RELEASE ORAL at 09:05

## 2020-01-01 RX ADMIN — ALBUMIN HUMAN 12.5 G: 0.25 SOLUTION INTRAVENOUS at 10:31

## 2020-01-01 RX ADMIN — IPRATROPIUM BROMIDE AND ALBUTEROL SULFATE 3 ML: .5; 3 SOLUTION RESPIRATORY (INHALATION) at 23:41

## 2020-01-01 RX ADMIN — IPRATROPIUM BROMIDE AND ALBUTEROL SULFATE 3 ML: .5; 3 SOLUTION RESPIRATORY (INHALATION) at 15:35

## 2020-01-01 RX ADMIN — LACTULOSE 10 G: 20 SOLUTION ORAL at 09:25

## 2020-01-01 RX ADMIN — INSULIN ASPART 2 UNITS: 100 INJECTION, SOLUTION INTRAVENOUS; SUBCUTANEOUS at 12:30

## 2020-01-01 RX ADMIN — HEPARIN SODIUM 5000 UNITS: 5000 INJECTION, SOLUTION INTRAVENOUS; SUBCUTANEOUS at 15:13

## 2020-01-01 RX ADMIN — ASPIRIN 81 MG 81 MG: 81 TABLET ORAL at 09:56

## 2020-01-01 RX ADMIN — Medication 30 ML: at 20:59

## 2020-01-01 RX ADMIN — PANTOPRAZOLE SODIUM 40 MG: 40 TABLET, DELAYED RELEASE ORAL at 17:53

## 2020-01-01 RX ADMIN — HEPARIN SODIUM 5000 UNITS: 5000 INJECTION, SOLUTION INTRAVENOUS; SUBCUTANEOUS at 09:55

## 2020-01-01 RX ADMIN — HYDROCODONE BITARTRATE AND ACETAMINOPHEN 1 TABLET: 5; 325 TABLET ORAL at 20:59

## 2020-01-01 RX ADMIN — LOSARTAN POTASSIUM 12.5 MG: 25 TABLET, FILM COATED ORAL at 09:05

## 2020-01-01 RX ADMIN — Medication 30 ML: at 15:19

## 2020-01-01 RX ADMIN — LEVOTHYROXINE SODIUM 125 MCG: 125 TABLET ORAL at 06:44

## 2020-01-01 RX ADMIN — IPRATROPIUM BROMIDE AND ALBUTEROL SULFATE 3 ML: .5; 3 SOLUTION RESPIRATORY (INHALATION) at 22:19

## 2020-01-01 RX ADMIN — Medication 30 ML: at 09:20

## 2020-01-01 RX ADMIN — PANTOPRAZOLE SODIUM 40 MG: 40 TABLET, DELAYED RELEASE ORAL at 08:37

## 2020-01-01 RX ADMIN — CHOLECALCIFEROL CAP 125 MCG (5000 UNIT) 5000 UNITS: 125 CAP at 08:51

## 2020-01-01 RX ADMIN — Medication 30 ML: at 21:43

## 2020-01-01 RX ADMIN — HYDROCODONE BITARTRATE AND ACETAMINOPHEN 1 TABLET: 5; 325 TABLET ORAL at 21:43

## 2020-01-01 RX ADMIN — DOCUSATE SODIUM 100 MG: 100 CAPSULE, LIQUID FILLED ORAL at 13:47

## 2020-01-01 RX ADMIN — METOPROLOL SUCCINATE 50 MG: 50 TABLET, EXTENDED RELEASE ORAL at 15:15

## 2020-01-01 RX ADMIN — ALBUMIN HUMAN 12.5 G: 0.25 SOLUTION INTRAVENOUS at 11:00

## 2020-01-01 RX ADMIN — ATORVASTATIN CALCIUM 40 MG: 40 TABLET, FILM COATED ORAL at 21:43

## 2020-01-01 RX ADMIN — DOCUSATE SODIUM 100 MG: 100 CAPSULE, LIQUID FILLED ORAL at 12:37

## 2020-01-01 RX ADMIN — FOLIC ACID 1 MG: 1 TABLET ORAL at 09:56

## 2020-01-01 RX ADMIN — FOLIC ACID 1 MG: 1 TABLET ORAL at 15:14

## 2020-01-01 RX ADMIN — SODIUM CHLORIDE, PRESERVATIVE FREE 10 ML: 5 INJECTION INTRAVENOUS at 09:56

## 2020-01-01 RX ADMIN — TAZOBACTAM SODIUM AND PIPERACILLIN SODIUM 3.38 G: 375; 3 INJECTION, SOLUTION INTRAVENOUS at 12:30

## 2020-01-01 RX ADMIN — LEVOTHYROXINE SODIUM 125 MCG: 125 TABLET ORAL at 06:59

## 2020-01-01 RX ADMIN — SODIUM CHLORIDE, PRESERVATIVE FREE 10 ML: 5 INJECTION INTRAVENOUS at 15:14

## 2020-01-01 RX ADMIN — IPRATROPIUM BROMIDE AND ALBUTEROL SULFATE 3 ML: .5; 3 SOLUTION RESPIRATORY (INHALATION) at 16:36

## 2020-01-01 RX ADMIN — TAZOBACTAM SODIUM AND PIPERACILLIN SODIUM 3.38 G: 375; 3 INJECTION, SOLUTION INTRAVENOUS at 03:37

## 2020-01-01 RX ADMIN — IPRATROPIUM BROMIDE AND ALBUTEROL SULFATE 3 ML: .5; 3 SOLUTION RESPIRATORY (INHALATION) at 07:52

## 2020-01-01 RX ADMIN — IPRATROPIUM BROMIDE AND ALBUTEROL SULFATE 3 ML: .5; 3 SOLUTION RESPIRATORY (INHALATION) at 07:24

## 2020-01-01 RX ADMIN — LEVETIRACETAM 500 MG: 500 TABLET, FILM COATED ORAL at 15:14

## 2020-01-01 RX ADMIN — FLUTICASONE PROPIONATE 1 SPRAY: 50 SPRAY, METERED NASAL at 10:00

## 2020-01-01 RX ADMIN — ATORVASTATIN CALCIUM 40 MG: 40 TABLET, FILM COATED ORAL at 20:59

## 2020-01-01 RX ADMIN — LEVETIRACETAM 500 MG: 500 TABLET, FILM COATED ORAL at 21:43

## 2020-01-01 RX ADMIN — LEVETIRACETAM 500 MG: 500 TABLET, FILM COATED ORAL at 09:05

## 2020-01-01 RX ADMIN — METOPROLOL SUCCINATE 50 MG: 50 TABLET, EXTENDED RELEASE ORAL at 09:24

## 2020-01-01 RX ADMIN — LEVETIRACETAM 500 MG: 500 TABLET, FILM COATED ORAL at 09:24

## 2020-01-01 RX ADMIN — FOLIC ACID 1 MG: 1 TABLET ORAL at 09:24

## 2020-01-01 RX ADMIN — VANCOMYCIN HYDROCHLORIDE 1250 MG: 500 INJECTION, POWDER, LYOPHILIZED, FOR SOLUTION INTRAVENOUS at 14:58

## 2020-01-01 RX ADMIN — IPRATROPIUM BROMIDE AND ALBUTEROL SULFATE 3 ML: .5; 3 SOLUTION RESPIRATORY (INHALATION) at 15:29

## 2020-01-01 RX ADMIN — LEVETIRACETAM 500 MG: 500 TABLET, FILM COATED ORAL at 21:20

## 2020-01-01 RX ADMIN — SODIUM CHLORIDE, PRESERVATIVE FREE 10 ML: 5 INJECTION INTRAVENOUS at 09:05

## 2020-01-01 RX ADMIN — SODIUM CHLORIDE, PRESERVATIVE FREE 10 ML: 5 INJECTION INTRAVENOUS at 21:58

## 2020-01-01 RX ADMIN — FLUTICASONE PROPIONATE 1 SPRAY: 50 SPRAY, METERED NASAL at 08:35

## 2020-01-01 RX ADMIN — CHOLECALCIFEROL CAP 125 MCG (5000 UNIT) 5000 UNITS: 125 CAP at 09:05

## 2020-01-01 RX ADMIN — HEPARIN SODIUM 5000 UNITS: 5000 INJECTION, SOLUTION INTRAVENOUS; SUBCUTANEOUS at 09:25

## 2020-01-01 RX ADMIN — TAZOBACTAM SODIUM AND PIPERACILLIN SODIUM 3.38 G: 375; 3 INJECTION, SOLUTION INTRAVENOUS at 17:43

## 2020-01-01 RX ADMIN — ATORVASTATIN CALCIUM 40 MG: 40 TABLET, FILM COATED ORAL at 21:20

## 2020-01-01 RX ADMIN — HEPARIN SODIUM 5000 UNITS: 5000 INJECTION, SOLUTION INTRAVENOUS; SUBCUTANEOUS at 21:20

## 2020-01-01 RX ADMIN — LEVETIRACETAM 500 MG: 500 TABLET, FILM COATED ORAL at 20:59

## 2020-01-01 RX ADMIN — IPRATROPIUM BROMIDE AND ALBUTEROL SULFATE 3 ML: .5; 3 SOLUTION RESPIRATORY (INHALATION) at 07:21

## 2020-01-01 RX ADMIN — CLOPIDOGREL BISULFATE 75 MG: 75 TABLET ORAL at 09:56

## 2020-01-01 RX ADMIN — IPRATROPIUM BROMIDE AND ALBUTEROL SULFATE 3 ML: .5; 3 SOLUTION RESPIRATORY (INHALATION) at 07:04

## 2020-01-01 RX ADMIN — INSULIN ASPART 3 UNITS: 100 INJECTION, SOLUTION INTRAVENOUS; SUBCUTANEOUS at 06:59

## 2020-01-01 RX ADMIN — HEPARIN SODIUM 5000 UNITS: 5000 INJECTION, SOLUTION INTRAVENOUS; SUBCUTANEOUS at 20:59

## 2020-01-01 RX ADMIN — HYDROCODONE BITARTRATE AND ACETAMINOPHEN 1 TABLET: 5; 325 TABLET ORAL at 21:27

## 2020-01-01 RX ADMIN — LOSARTAN POTASSIUM 12.5 MG: 25 TABLET, FILM COATED ORAL at 15:19

## 2020-01-01 RX ADMIN — TAZOBACTAM SODIUM AND PIPERACILLIN SODIUM 3.38 G: 375; 3 INJECTION, SOLUTION INTRAVENOUS at 03:47

## 2020-01-01 RX ADMIN — CHOLECALCIFEROL CAP 125 MCG (5000 UNIT) 5000 UNITS: 125 CAP at 09:25

## 2020-01-01 RX ADMIN — TAZOBACTAM SODIUM AND PIPERACILLIN SODIUM 3.38 G: 375; 3 INJECTION, SOLUTION INTRAVENOUS at 13:48

## 2020-01-01 RX ADMIN — HYDROCODONE BITARTRATE AND ACETAMINOPHEN 1 TABLET: 5; 325 TABLET ORAL at 03:37

## 2020-01-01 RX ADMIN — PANTOPRAZOLE SODIUM 40 MG: 40 TABLET, DELAYED RELEASE ORAL at 06:48

## 2020-01-01 RX ADMIN — SODIUM CHLORIDE, PRESERVATIVE FREE 10 ML: 5 INJECTION INTRAVENOUS at 09:24

## 2020-01-01 RX ADMIN — Medication 1 TABLET: at 09:25

## 2020-01-01 RX ADMIN — BISACODYL 10 MG: 10 SUPPOSITORY RECTAL at 15:30

## 2020-01-01 RX ADMIN — TAZOBACTAM SODIUM AND PIPERACILLIN SODIUM 3.38 G: 375; 3 INJECTION, SOLUTION INTRAVENOUS at 23:17

## 2020-01-01 RX ADMIN — INSULIN ASPART 3 UNITS: 100 INJECTION, SOLUTION INTRAVENOUS; SUBCUTANEOUS at 18:30

## 2020-01-01 RX ADMIN — HYDROCODONE BITARTRATE AND ACETAMINOPHEN 1 TABLET: 5; 325 TABLET ORAL at 06:49

## 2020-01-01 RX ADMIN — FLUTICASONE PROPIONATE 1 SPRAY: 50 SPRAY, METERED NASAL at 09:55

## 2020-01-01 RX ADMIN — IPRATROPIUM BROMIDE AND ALBUTEROL SULFATE 3 ML: .5; 3 SOLUTION RESPIRATORY (INHALATION) at 23:00

## 2020-01-01 RX ADMIN — INSULIN ASPART 3 UNITS: 100 INJECTION, SOLUTION INTRAVENOUS; SUBCUTANEOUS at 13:09

## 2020-01-01 RX ADMIN — ASPIRIN 81 MG: 81 TABLET, COATED ORAL at 08:51

## 2020-01-01 RX ADMIN — INSULIN ASPART 2 UNITS: 100 INJECTION, SOLUTION INTRAVENOUS; SUBCUTANEOUS at 21:53

## 2020-01-01 RX ADMIN — ASPIRIN 81 MG: 81 TABLET, COATED ORAL at 09:05

## 2020-01-01 RX ADMIN — LIDOCAINE HYDROCHLORIDE: 20 SOLUTION ORAL; TOPICAL at 17:30

## 2020-01-01 RX ADMIN — TAZOBACTAM SODIUM AND PIPERACILLIN SODIUM 3.38 G: 375; 3 INJECTION, SOLUTION INTRAVENOUS at 15:12

## 2020-01-01 RX ADMIN — FLUTICASONE PROPIONATE 1 SPRAY: 50 SPRAY, METERED NASAL at 21:52

## 2020-01-01 RX ADMIN — HYDROCODONE BITARTRATE AND ACETAMINOPHEN 1 TABLET: 5; 325 TABLET ORAL at 02:21

## 2020-01-01 RX ADMIN — HYDROCODONE BITARTRATE AND ACETAMINOPHEN 1 TABLET: 5; 325 TABLET ORAL at 05:29

## 2020-01-01 RX ADMIN — INSULIN ASPART 3 UNITS: 100 INJECTION, SOLUTION INTRAVENOUS; SUBCUTANEOUS at 00:34

## 2020-01-01 RX ADMIN — Medication 1 TABLET: at 08:51

## 2020-01-01 RX ADMIN — PANTOPRAZOLE SODIUM 40 MG: 40 TABLET, DELAYED RELEASE ORAL at 06:44

## 2020-01-01 RX ADMIN — INSULIN DETEMIR 10 UNITS: 100 INJECTION, SOLUTION SUBCUTANEOUS at 00:36

## 2020-01-01 RX ADMIN — HEPARIN SODIUM 5000 UNITS: 5000 INJECTION, SOLUTION INTRAVENOUS; SUBCUTANEOUS at 21:55

## 2020-01-01 RX ADMIN — FOLIC ACID 1 MG: 1 TABLET ORAL at 08:51

## 2020-01-01 RX ADMIN — LEVETIRACETAM 500 MG: 500 TABLET, FILM COATED ORAL at 09:56

## 2020-01-01 RX ADMIN — HYDROCODONE BITARTRATE AND ACETAMINOPHEN 1 TABLET: 5; 325 TABLET ORAL at 10:09

## 2020-01-01 RX ADMIN — PANTOPRAZOLE SODIUM 40 MG: 40 TABLET, DELAYED RELEASE ORAL at 17:43

## 2020-01-01 RX ADMIN — LEVETIRACETAM 500 MG: 500 TABLET, FILM COATED ORAL at 21:49

## 2020-01-01 RX ADMIN — Medication 30 ML: at 21:20

## 2020-01-01 RX ADMIN — PANTOPRAZOLE SODIUM 40 MG: 40 TABLET, DELAYED RELEASE ORAL at 17:35

## 2020-01-01 RX ADMIN — LEVOTHYROXINE SODIUM 125 MCG: 125 TABLET ORAL at 05:29

## 2020-01-01 RX ADMIN — PANTOPRAZOLE SODIUM 40 MG: 40 TABLET, DELAYED RELEASE ORAL at 18:12

## 2020-01-01 RX ADMIN — INSULIN ASPART 2 UNITS: 100 INJECTION, SOLUTION INTRAVENOUS; SUBCUTANEOUS at 17:53

## 2020-01-01 RX ADMIN — SODIUM CHLORIDE SOLN NEBU 3% 4 ML: 3 NEBU SOLN at 15:35

## 2020-01-01 RX ADMIN — HEPARIN SODIUM 5000 UNITS: 5000 INJECTION, SOLUTION INTRAVENOUS; SUBCUTANEOUS at 21:43

## 2020-01-01 RX ADMIN — SODIUM CHLORIDE, PRESERVATIVE FREE 10 ML: 5 INJECTION INTRAVENOUS at 00:32

## 2020-01-01 RX ADMIN — HEPARIN SODIUM 5000 UNITS: 5000 INJECTION, SOLUTION INTRAVENOUS; SUBCUTANEOUS at 10:00

## 2020-01-01 RX ADMIN — CHOLECALCIFEROL CAP 125 MCG (5000 UNIT) 5000 UNITS: 125 CAP at 09:56

## 2020-01-01 RX ADMIN — LEVOTHYROXINE SODIUM 125 MCG: 125 TABLET ORAL at 06:49

## 2020-01-06 NOTE — OUTREACH NOTE
COPD/PN Week 3 Survey      Responses   Facility patient discharged from?  [facility]   Does the patient have one of the following disease processes/diagnoses(primary or secondary)?  COPD/Pneumonia   Was the primary reason for admission:  Pneumonia   Week 3 attempt successful?  Yes   Call start time  1430   Call end time  1433   Discharge diagnosis  Pneumonia/CHF   Meds reviewed with patient/caregiver?  Yes   Is the patient having any side effects they believe may be caused by any medication additions or changes?  No   Does the patient have all medications ordered at discharge?  Yes   Is the patient taking all medications as directed (includes completed medication regime)?  Yes   Does the patient have a primary care provider?   Yes   Has the patient kept scheduled appointments due by today?  Yes   What is the Home health agency?   [facility]   Has home health visited the patient within 72 hours of discharge?  Yes   Psychosocial issues?  No   Did the patient receive a copy of their discharge instructions?  Yes   Nursing interventions  Reviewed instructions with patient   What is the patient's perception of their health status since discharge?  Improving   Nursing Interventions  Nurse provided patient education   Are the patient's immunizations up to date?   Yes   Nursing interventions  Educated on importance of maintaining up to date immunizations as advised by provider   Is the patient/caregiver able to teach back the hierarchy of who to call/visit for symptoms/problems? PCP, Specialist, Home health nurse, Urgent Care, ED, 911  Yes   Additional teach back comments  He sounded short of breath but says his breathing is fine today.  He is waiting on appts to discuss his esophagus.   Is the patient/caregiver able to teach back signs and symptoms of worsening condition:  Fever/chills, Shortness of breath, Chest pain   Is the patient/caregiver able to teach back importance of completing antibiotic course of treatment?  Yes    Week 3 call completed?  Yes          Subha Kennedy RN

## 2020-01-16 NOTE — OUTREACH NOTE
COPD/PN Week 4 Survey      Responses   Facility patient discharged from?  [facility]   Does the patient have one of the following disease processes/diagnoses(primary or secondary)?  COPD/Pneumonia   Was the primary reason for admission:  Pneumonia   Week 4 attempt successful?  No          Grace Tejada RN

## 2020-01-22 PROBLEM — N18.6 END STAGE RENAL DISEASE ON DIALYSIS (HCC): Chronic | Status: RESOLVED | Noted: 2017-06-13 | Resolved: 2020-01-01

## 2020-01-22 PROBLEM — R53.2 FUNCTIONAL QUADRIPLEGIA (HCC): Status: ACTIVE | Noted: 2020-01-01

## 2020-01-22 PROBLEM — N18.6 END STAGE RENAL DISEASE ON DIALYSIS (HCC): Status: ACTIVE | Noted: 2020-01-01

## 2020-01-22 PROBLEM — J90 RECURRENT PLEURAL EFFUSION ON RIGHT: Status: ACTIVE | Noted: 2020-01-01

## 2020-01-22 PROBLEM — I50.22 CHRONIC SYSTOLIC HEART FAILURE (HCC): Chronic | Status: ACTIVE | Noted: 2019-01-01

## 2020-01-22 PROBLEM — Z99.2 END STAGE RENAL DISEASE ON DIALYSIS (HCC): Chronic | Status: RESOLVED | Noted: 2017-06-13 | Resolved: 2020-01-01

## 2020-01-22 PROBLEM — D63.8 ANEMIA, CHRONIC DISEASE: Status: ACTIVE | Noted: 2020-01-01

## 2020-01-22 PROBLEM — E11.21 TYPE 2 DIABETES MELLITUS WITH NEPHROPATHY (HCC): Chronic | Status: ACTIVE | Noted: 2017-06-13

## 2020-01-22 PROBLEM — E43 SEVERE MALNUTRITION (HCC): Status: ACTIVE | Noted: 2020-01-01

## 2020-01-22 PROBLEM — Z99.2 END STAGE RENAL DISEASE ON DIALYSIS (HCC): Status: ACTIVE | Noted: 2020-01-01

## 2020-01-23 PROBLEM — R13.12 OROPHARYNGEAL DYSPHAGIA: Status: ACTIVE | Noted: 2020-01-01

## 2020-01-23 PROBLEM — K59.04 CHRONIC IDIOPATHIC CONSTIPATION: Status: ACTIVE | Noted: 2020-01-01

## 2020-01-23 NOTE — TELEPHONE ENCOUNTER
Spoke with Annika in the OR and they have patient on for Monday at noon for PEG tube. She asked for you to put in case request and then let me know and I will call her back to let her know it's in there. Thank you.

## 2020-01-27 PROBLEM — I87.1 SVC OBSTRUCTION: Status: ACTIVE | Noted: 2020-01-01

## 2020-01-28 NOTE — OUTREACH NOTE
Prep Survey      Responses   Facility patient discharged from?  [facility]   Is patient eligible?  Yes   Discharge diagnosis  recurrent right pleural effusion   Does the patient have one of the following disease processes/diagnoses(primary or secondary)?  COPD/Pneumonia   Does the patient have Home health ordered?  Yes   What is the Home health agency?   [facility]   Is there a DME ordered?  Yes   What DME was ordered?  hospital bed and BSC   Comments regarding appointments  see AVS   Prep survey completed?  Yes          Renée Hsu RN

## 2020-01-30 NOTE — OUTREACH NOTE
COPD/PN Week 1 Survey      Responses   Facility patient discharged from?  [facility]   Does the patient have one of the following disease processes/diagnoses(primary or secondary)?  COPD/Pneumonia   Is there a successful TCM telephone encounter documented?  No   Was the primary reason for admission:  Pneumonia   Week 1 attempt successful?  Yes   Call start time  1422   Call end time  1431   Discharge diagnosis  recurrent right pleural effusion   Meds reviewed with patient/caregiver?  Yes   Is the patient having any side effects they believe may be caused by any medication additions or changes?  No   Does the patient have all medications ordered at discharge?  Yes   Is the patient taking all medications as directed (includes completed medication regime)?  Yes   Does the patient have a primary care provider?   Yes   Does the patient have an appointment with their PCP or pulmonologist within 7 days of discharge?  Yes   Comments regarding PCP  Has a followup on Feb 4th 2020   Has the patient kept scheduled appointments due by today?  N/A   What is the Home health agency?   [facility]   Has home health visited the patient within 72 hours of discharge?  Yes   What DME was ordered?  hospital bed and BSC   Has all DME been delivered?  Yes   Psychosocial issues?  No   Did the patient receive a copy of their discharge instructions?  Yes   Nursing interventions  Reviewed instructions with patient   What is the patient's perception of their health status since discharge?  Improving   Nursing Interventions  Nurse provided patient education   Are the patient's immunizations up to date?   Yes   Nursing interventions  Educated on importance of maintaining up to date immunizations as advised by provider   Is the patient/caregiver able to teach back the hierarchy of who to call/visit for symptoms/problems? PCP, Specialist, Home health nurse, Urgent Care, ED, 911  Yes   Is the patient able to teach back COPD zones?  Yes   Nursing  interventions  Education provided on various zones   Patient reports what zone on this call?  Green [place]   Is the patient/caregiver able to teach back signs and symptoms of worsening condition:  Fever/chills, Shortness of breath, Chest pain   Is the patient/caregiver able to teach back importance of completing antibiotic course of treatment?  Yes   Week 1 call completed?  Yes   Revoked  No further contact(revokes)-requires comment   Graduated/Revoked comments  Declines further calls          Jose Du RN

## 2021-05-25 NOTE — DISCHARGE PLACEMENT REQUEST
"Talha Cutler (59 y.o. 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)   Code Status:  CPR    Ht:  172.7 cm (68\")   Wt:  62.6 kg (138 lb)    Admission Cmt:  None   Principal Problem:  Upper GI bleed [K92.2]                 Active Insurance as of 2019     Primary Coverage     Payor Plan Insurance Group Employer/Plan Group    HUMANA MEDICARE REPLACEMENT HUMANA MEDICARE REPLACEMENT [id number]     Payor Plan Address Payor Plan Phone Number Payor Plan Fax Number Effective Dates    [address] [address] [phone]  2014 - None Entered    [facility] [address]       Subscriber Name Subscriber Birth Date Member ID       TALHA CUTLER 1960 [id number]                 Emergency Contacts      (Rel.) Home Phone Work Phone Mobile Phone    Mallika Cutler (Spouse) [phone] -- --    Cisco Orozco (Brother) [phone] -- --    CutlerValeria -- -- [phone]                    [address] [address]  Phone:  [phone]  Fax:          Patient:     Talha Cutler MRN:  [id number]   [address] [facility] [address] :  1960  SSN:    Phone: [phone] Sex:  M      INSURANCE PAYOR PLAN GROUP # SUBSCRIBER ID   Primary:    HUMANA MEDICARE REPLACEMENT [id number]   Admitting Diagnosis: HCAP (healthcare-associated pneumonia) [J18.9]  Order Date:  Nov " Dad states that Geovany has an area on the top of his left hand.  The area is under the skin, not painful or red.  And the area is normal skin colored. And when touched the area is movable  Dad states that he is able to use the hand properly and does not show any favortism when using that hand. 3 days ago he fell against a table and hit his head.  Dad sttes that after that fall is when they noticed the bump.  He is not sure if it happened at that time or if it was present before that time.    I instructed dad that he can continue watch the area.  Instructed dad that if Geovany has any redness, increase in size, temps, or any other worsening os symptoms or if they would just have increased concern that we would like to see him in office.    Dad verbalized thanks.   Father states that Geovany has a cyst on his hand for about 2 or 3 day and would like to know if he should bring him in.  Father states he needs a call before 1 because he will have a patients later.   "2019         [facility]       (Order ID: [id number])     Diagnosis:         Priority:  Routine Expected Date:   Expiration Date:        Interval:  Until Discontinued Count:    Comments: Resume previous  orders as prior to hospitalization.        Specimen Type:   Specimen Source:   Specimen Taken Date:   Specimen Taken Time:                   Verbal Order Mode: Per protocol: cosign required  Authorizing Provider: Marleny Witt MD  Authorizing Provider's NPI: [id number]     Order Entered By: Haydee Jacobson RN 2019  8:37 AM     Electronically signed by: Marleny Witt MD 2019 11:37 AM                 History & Physical      Isabel Hinojosa MD at 19 [address] [place] [address] Medicine Services  HISTORY AND PHYSICAL    Patient Name: Talha Cutler  : 1960  MRN: [id number]  Primary Care Physician: Jeannette Godoy APRN  Date of admission: 2019      Subjective   Subjective     Chief Complaint:  \"dizziness\"     HPI:  Talha Cutler is a 59 y.o. 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 He was in ICU for several days for airway protection, and extubated on . He had completed antibiotics for pneumonia, was  +metapneumovirus. 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 He was transferred to the telemetry floor on  and then left AMA.    He came back to the ED today with a chief complaint of \"dizziness\". States it is intermittent, and occurred while he was transferring from his bed to the wheelchair. Denies associated LOC. Has not had any nausea, vomiting, abdominal pain, or diarrhea. His stool is always \"dark black\". Denies hematochezia or hematemesis. Has been taking EPO for the past 3 years for anemia. Has " required blood transfusions in the past. Currently feeling well, but very weak. Denies significant worsening shortness of breath or cough.    In the ED Hgb 6.2, AST 1590/ , Alk phos 218, T bili 0.6. Lactate 3.6. He received 2U PRBCs. Nephrology consulted for HD and GI consulted for acute blood loss anemia.     Review of Systems   Gen- No fevers, chills  CV- No chest pain, palpitations  Resp- No cough, dyspnea  GI- No N/V/D, abd pain    All other systems reviewed and are negative.     Personal History     Past Medical History:   Diagnosis Date   • Abdominal pain    • Anemia    • Cataracts, bilateral    • CHF (congestive heart failure) (CMS/[facility])    • Chronic kidney disease    • Constipation    • Cough    • Dependence on nocturnal oxygen therapy    • Diabetes mellitus (CMS/[facility])    • Diarrhea    • End stage renal failure on dialysis (CMS/[facility])     TUES, THURS, SAT   • GERD (gastroesophageal reflux disease)    • H/O blood clots     LEG/NECK   • H/O chest x-ray 03/25/2016    Interval decrease in size of the patients right pleural effusion with a persistent small left effusion as well   • History of transfusion    • Hypertension    • Left-sided weakness    • Nauseated     DRY HEAVES   • Pleural effusion    • Pneumonia    • Pneumonia    • Renal failure    • Stroke (CMS/[facility]) 2006    LEFT SIDED WEAKNESS   • Swelling    • Teeth missing    • Tinnitus    • Ulcer, stomach peptic     HISTORY OF ULCER AS A TEENAGER   • Wears glasses        Past Surgical History:   Procedure Laterality Date   • ARTERIOVENOUS FISTULA Right    • BRONCHOSCOPY N/A 1/10/2019    Procedure: BRONCHOSCOPY with MAC and Flouro. LAVAGE, BRUSHINGS AND BIOPSY;  Surgeon: Ean Hernandez MD;  Location: [facility] OR;  Service: Pulmonary   • CARDIAC CATHETERIZATION N/A 3/28/2018    Procedure: Peripheral angiography;  Surgeon: Clay Ruano MD;  Location: [facility] CATH INVASIVE LOCATION;  Service: Cardiovascular   • CARDIAC CATHETERIZATION N/A 3/28/2018  "   Procedure: Angioplasty-peripheral;  Surgeon: Clay Ruano MD;  Location: [facility] CATH INVASIVE LOCATION;  Service: Cardiovascular   • CARDIAC CATHETERIZATION N/A 3/28/2018    Procedure: Atherectomy-peripheral;  Surgeon: Clay Ruano MD;  Location: [facility] CATH INVASIVE LOCATION;  Service: Cardiovascular   • CATARACT EXTRACTION, BILATERAL     • CHOLECYSTECTOMY     • COLONOSCOPY     • EYE SURGERY      BLEEDING BEHING BILATERAL EYES   • INTERVENTIONAL RADIOLOGY PROCEDURE N/A 3/28/2018    Procedure: Abdominal Aortogram;  Surgeon: Clay Ruano MD;  Location: [facility] CATH INVASIVE LOCATION;  Service: Cardiovascular   • PORTACATH PLACEMENT     • SHOULDER MANIPULATION Left     \"FROZEN SHOULDER\"   • VENOUS ACCESS DEVICE (PORT) REMOVAL         Family History: family history includes COPD in his mother; Diabetes in his father, paternal grandmother, and sister; Hypertension in his brother and sister. Otherwise pertinent FHx was reviewed and unremarkable.     Social History:  reports that he has never smoked. He has never used smokeless tobacco. He reports that he does not drink alcohol or use drugs.  Social History     Social History Narrative   • Not on file       Medications:  Available home medication information reviewed.  Medications Prior to Admission   Medication Sig Dispense Refill Last Dose   • albuterol (PROVENTIL HFA;VENTOLIN HFA) 108 (90 Base) MCG/ACT inhaler Inhale 2 puffs Every 4 (Four) Hours As Needed for Shortness of Air. 1 inhaler 0 Taking   • albuterol (PROVENTIL) (2.5 MG/3ML) 0.083% nebulizer solution Take 2.5 mg by nebulization Every 4 (Four) Hours As Needed for Wheezing.   11/1/2019 at Unknown time   • aspirin 81 MG chewable tablet Chew 81 mg Daily.   11/1/2019 at Unknown time   • atorvastatin (LIPITOR) 40 MG tablet Take 40 mg by mouth Daily.   11/1/2019 at Unknown time   • B Complex-C-Folic Acid (RADHA-SHIRLENE PO) Take 1 tablet by mouth. Patient states he takes these after " dialysis, but is unaware of dose.    11/1/2019 at Unknown time   • Cholecalciferol (VITAMIN D3) 5000 UNITS capsule capsule Take 1 capsule by mouth Daily.   11/1/2019 at Unknown time   • clopidogrel (PLAVIX) 75 MG tablet Take 1 tablet by mouth Daily. Start taking from 1/12/2019 onwards ONLY if not coughing up significant amount of blood. 90 tablet 3 11/1/2019 at Unknown time   • docusate sodium (COLACE) 100 MG capsule Take 100 mg by mouth Every 12 (Twelve) Hours.   11/1/2019 at Unknown time   • fluticasone (VERAMYST) 27.5 MCG/SPRAY nasal spray 2 sprays into the nostril(s) as directed by provider Daily.   9/20/2019 at 0800   • folic acid (FOLVITE) 1 MG tablet Take 1 mg by mouth daily.   11/1/2019 at Unknown time   • insulin aspart (novoLOG FLEXPEN) 100 UNIT/ML solution pen-injector sc pen Inject 9 Units under the skin into the appropriate area as directed 3 (Three) Times a Day With Meals.   9/20/2019 at 0800   • insulin glargine (LANTUS) 100 UNIT/ML injection Inject 15 Units under the skin into the appropriate area as directed Every Night.   11/1/2019 at Unknown time   • isosorbide mononitrate (IMDUR) 30 MG 24 hr tablet Take 30 mg by mouth Daily.   11/1/2019 at Unknown time   • lanthanum (FOSRENOL) 1000 MG chewable tablet Chew 1 tablet 3 (Three) Times a Day With Meals.      • losartan (COZAAR) 25 MG tablet Take 1 tablet by mouth Daily.      • metoprolol succinate XL (TOPROL-XL) 25 MG 24 hr tablet Take 3 tablets by mouth Daily. (Patient taking differently: Take 100 mg by mouth Daily.)      • ondansetron ODT (ZOFRAN-ODT) 4 MG disintegrating tablet Take 1 tablet by mouth Every 6 (Six) Hours As Needed for Nausea or Vomiting. 20 tablet 0 Not Taking   • famotidine (PEPCID) 20 MG tablet Take 20 mg by mouth Daily.   Unknown at Unknown time   • levETIRAcetam in NaCl 0.82% (KEPPRA) 500 MG/100ML solution IVPB Infuse 100 mL into a venous catheter Every 12 (Twelve) Hours. 4000 mL     • levothyroxine (SYNTHROID, LEVOTHROID) 100 MCG  tablet Take 125 mcg by mouth Daily.   Unknown at Unknown time       Allergies   Allergen Reactions   • Erythromycin Hives       Objective   Objective     Vital Signs:   Temp:  [95.7 °F (35.4 °C)-97.4 °F (36.3 °C)] 96.7 °F (35.9 °C)  Heart Rate:  [75-89] 89  Resp:  [16-17] 17  BP: ()/(53-99) 147/99        Physical Exam   Constitutional: Awake, alert  Eyes: PERRLA, sclerae anicteric, no conjunctival injection  HENT: NCAT, mucous membranes moist  Neck: Supple, no thyromegaly, no lymphadenopathy, trachea midline  Respiratory: crackles bilaterally, nonlabored respirations   Cardiovascular: RRR, no murmurs, rubs, or gallops, no lower extremity edema   Gastrointestinal: Positive bowel sounds, soft, nontender, nondistended  Psychiatric: Appropriate affect, cooperative  Neurologic: Oriented x 3, strength decreased in left arm and leg compared to right arm and leg, Cranial Nerves grossly intact to confrontation, speech clear, but slow   Skin: No rashes on exposed skin     Results Reviewed:  I have personally reviewed current lab and radiology data.    Results from last 7 days   Lab Units 11/18/19  0559   WBC 10*3/mm3 9.59   HEMOGLOBIN g/dL 6.2*   HEMATOCRIT % 20.6*   PLATELETS 10*3/mm3 212     Results from last 7 days   Lab Units 11/18/19  1224 11/18/19  0908  11/18/19  0559  11/12/19  0416   SODIUM mmol/L  --  134*  --  136   < > 139   POTASSIUM mmol/L  --  5.1  --  5.6*   < > 3.6   CHLORIDE mmol/L  --  95*  --  94*   < > 100   CO2 mmol/L  --  21.0*  --  20.0*   < > 25.0   BUN mg/dL  --  59*  --  53*   < > 58*   CREATININE mg/dL  --  3.78*  --  3.58*   < > 4.36*   GLUCOSE mg/dL  --  246*  --  230*   < > 207*   CALCIUM mg/dL  --  10.0  --  10.3   < > 10.6*   ALT (SGPT) U/L  --   --   --  727*  --   --    AST (SGOT) U/L  --   --   --  1,590*  --   --    TROPONIN T ng/mL  --   --   --  0.380*  --  0.225*   LACTATE mmol/L 0.6  --    < >  --   --   --     < > = values in this interval not displayed.     Estimated Creatinine  Clearance: 18.5 mL/min (A) (by C-G formula based on SCr of 3.78 mg/dL (H)).  Brief Urine Lab Results     None        Imaging Results (Last 24 Hours)     Procedure Component Value Units Date/Time    MRI Brain Without Contrast [[id number]] Collected:  11/18/19 0727     Updated:  11/18/19 0729    Narrative:       MRI brain without contrast.    HISTORY: Dizziness, nausea and vomiting.    TECHNIQUE: Routine brain MRI without contrast.    COMPARISON: Brain MRI dated 11/9/2019.    FINDINGS: There is no MR evidence of acute ischemia or other restricted diffusion. There is advanced cerebral atrophy, unchanged since the prior study, and there is no hydrocephalus or extra-axial fluid collection. Periventricular white matter changes  stable. There is no MR evidence of acute or chronic intracranial hemorrhage, and no mass is seen.    Normal flow voids are seen in the cerebral vessels. There is maxillary and lesser ethmoid sinus mucosal thickening, and there is fluid throughout the mastoid air cells bilaterally, all unchanged. Bone marrow signal is within normal limits, and the  extracranial soft tissues are unremarkable.    IMPRESSION : Advanced senescent change, greater than expected for age, but no acute abnormality nor interval change when compared to 11/9/2019.    Signer Name: Live Choi MD   Signed: 11/18/2019 7:27 AM   Workstation Name: [employer]    [facility]    XR Chest 1 View [[id number]] Collected:  11/18/19 0627     Updated:  11/18/19 0629    Narrative:       CR Chest 1 Vw    INDICATION:   Dizziness and confusion     COMPARISON:    11/14/2019    FINDINGS:  Single portable AP view(s) of the chest.    NG tube is been removed. Redemonstrated mild cardiomegaly.    Increased right lower lobe infiltrate and increased right effusion.    Left lung remains normally aerated. No pneumothorax.    Signer Name: Live Choi MD   Signed: 11/18/2019 6:27 AM   Workstation Name: [email or web address]     Detail Level: Detailed [facility]        Results for orders placed during the hospital encounter of 11/01/19   Adult Transthoracic Echo Complete W/ Cont if Necessary Per Protocol    Narrative · The left ventricular cavity is moderate-to-severely dilated.  · Right ventricular cavity is mildly dilated.  · Left atrial cavity size is moderately dilated.  · Moderate mitral valve regurgitation is present  · There is a small (<1cm) pericardial effusion.          Assessment/Plan   Assessment / Plan     Active Hospital Problems    Diagnosis POA   • **Melena [K92.1] Unknown     Added automatically from request for surgery [id number]     • HCAP (healthcare-associated pneumonia) [J18.9] Yes       Mr. Cutler is a 58 yo gentleman with hx of chronic anemia, ESRD on HD, previous CVA with residual left sided weakness, T2DM, and chronic systolic heart failure who was admitted today for severe symptomatic anemia.     Severe symptomatic anemia  Hgb 6.2 (7.5 on 11/16)  -likely 2/2 upper GI bleed, reports chronically dark stool   -received 2U PRBCs  -trend H&H q8h, transfuse if <8   -continue PPI BID   -hold aspirin and plavix   -GI consult - EGD tomorrow keep NPO after midnight     Elevated LFT's  -etiology unclear, differential diagnoses include acute viral hepatitis or DILI or shock liver  -acute hepatitis panel negative, tylenol level wnl  -was hypotensive in ED   -LDH elevated, lacitc acidosis   -will check ultrasound of liver, GI on consult   -continue to monitor CMP     Hypotension  -2/2 possible blood loss  -will hold anti-hypertensive medications that he takes at home - imdur, losartan, and metoprolol     Hx of CVA: left sided weakness; PT/OT, hold aspirin and plavix in setting of acute GI bleed     T2DM: bc he's NPO will keep on correction dose insulin (normally on levemir 15u SC qhs and novolog 9u sc TID AC)    DVT prophylaxis:  Mechanical     CODE STATUS:    Code Status and Medical Interventions:   Ordered at: 11/18/19 3964      Level Of Support Discussed With:    Patient     Code Status:    CPR     Medical Interventions (Level of Support Prior to Arrest):    Full       Admission Status:  I believe this patient meets OBSERVATION status, however if further evaluation or treatment plans warrant, status may change.  Based upon current information, I predict patient's care encounter to be less than or equal to 2 midnights.  Electronically signed by Isabel Hinojosa MD, 11/18/19, 11:48 AM.      Electronically signed by Isabel Hinojosa MD at 11/18/19 8210        Quality 137: Melanoma: Continuity Of Care - Recall System: Patient information entered into a recall system that includes: target date for the next exam specified AND a process to follow up with patients regarding missed or unscheduled appointments Detail Level: Zone

## 2022-11-01 NOTE — SIGNIFICANT NOTE
11/09/18 1339   Rehab Time/Intention   Evaluation Not Performed patient unavailable for evaluation  (Pt at dialysis.)   Rehab Treatment   Discipline occupational therapist      [Lower Ext Edema] : lower extremity edema [Shortness Of Breath] : shortness of breath [Dyspnea on Exertion] : dyspnea on exertion [Heartburn] : heartburn [Negative] : Musculoskeletal [Vision Problems] : no vision problems [Hearing Loss] : no hearing loss [Chest Pain] : no chest pain [Palpitations] : no palpitations [Claudication] : no  leg claudication [FreeTextEntry7] : GERD symptoms present after eating specific foods; pt follows with GI physician, Dr. Hodge [FreeTextEntry8] : takes medication for BPH [de-identified] : h/o vasovagal- no recent fainting [de-identified] : has appointments with psychologist for emotional health
